# Patient Record
Sex: FEMALE | Race: WHITE | NOT HISPANIC OR LATINO | Employment: OTHER | URBAN - METROPOLITAN AREA
[De-identification: names, ages, dates, MRNs, and addresses within clinical notes are randomized per-mention and may not be internally consistent; named-entity substitution may affect disease eponyms.]

---

## 2017-03-01 ENCOUNTER — TELEPHONE (OUTPATIENT)
Dept: CARDIOLOGY | Facility: CLINIC | Age: 82
End: 2017-03-01

## 2017-03-01 NOTE — TELEPHONE ENCOUNTER
Patient called and asked that her notes from Dr. Mandel and Dr. Castellano be faxed over to Dr. Mast's office. She also requested her last carotid ultrasound and last echo be faxed also. Records faxed to Dr. Mast at [phone].

## 2017-03-15 PROBLEM — R00.1 SYMPTOMATIC BRADYCARDIA: Status: ACTIVE | Noted: 2017-03-15

## 2017-11-13 ENCOUNTER — TELEPHONE (OUTPATIENT)
Dept: PULMONOLOGY | Facility: CLINIC | Age: 82
End: 2017-11-13

## 2017-11-13 NOTE — TELEPHONE ENCOUNTER
----- Message from Dayanna Everett MA sent at 11/13/2017 11:59 AM CST -----  Contact: Pt   Isiah,  This is something you can do. The pt needs to be scheduled with any provider in a new pt spot b/c its been more than 3 years  Thanks  ----- Message -----  From: Yadira Mosley MA  Sent: 11/13/2017  11:46 AM  To: Dayanna Everett MA        ----- Message -----  From: Brennon Dominguez  Sent: 11/13/2017  11:43 AM  [facility]    Pt is calling to schedule Lung rehab stated that she was seeing a rehab specialist at [facility] but is now closer to [facility] not sure who to schedule with     Pt can be reached [phone] Pt will be stepping out please leave detailed message with call back extension

## 2017-11-13 NOTE — TELEPHONE ENCOUNTER
Reffered pt to call [facility] for pulm rehab. Pt stated that she understood and agreed verbal read back.

## 2019-05-22 ENCOUNTER — HOSPITAL ENCOUNTER (INPATIENT)
Facility: HOSPITAL | Age: 84
LOS: 9 days | Discharge: HOME HEALTH | DRG: 199 | End: 2019-05-31
Attending: EMERGENCY MEDICINE | Admitting: HOSPITALIST
Payer: MEDICARE

## 2019-05-22 DIAGNOSIS — R06.02 SHORTNESS OF BREATH: ICD-10-CM

## 2019-05-22 DIAGNOSIS — J44.9 CHRONIC OBSTRUCTIVE PULMONARY DISEASE, UNSPECIFIED COPD TYPE: ICD-10-CM

## 2019-05-22 DIAGNOSIS — R79.89 ELEVATED BRAIN NATRIURETIC PEPTIDE (BNP) LEVEL: ICD-10-CM

## 2019-05-22 DIAGNOSIS — Z96.89 CHEST TUBE IN PLACE: ICD-10-CM

## 2019-05-22 DIAGNOSIS — R07.9 CHEST PAIN: ICD-10-CM

## 2019-05-22 DIAGNOSIS — J93.83 SPONTANEOUS PNEUMOTHORAX: Primary | ICD-10-CM

## 2019-05-22 DIAGNOSIS — Z46.82 ENCOUNTER FOR CHEST TUBE PLACEMENT: ICD-10-CM

## 2019-05-22 DIAGNOSIS — J93.9 PNEUMOTHORAX, RIGHT: ICD-10-CM

## 2019-05-22 PROBLEM — R53.81 DEBILITY: Status: ACTIVE | Noted: 2019-05-22

## 2019-05-22 PROBLEM — Z66 DNR (DO NOT RESUSCITATE): Status: ACTIVE | Noted: 2019-05-22

## 2019-05-22 LAB
ALBUMIN SERPL BCP-MCNC: 3.8 G/DL (ref 3.5–5.2)
ALLENS TEST: ABNORMAL
ANION GAP SERPL CALC-SCNC: 11 MMOL/L (ref 8–16)
BASOPHILS # BLD AUTO: 0.06 K/UL (ref 0–0.2)
BASOPHILS NFR BLD: 0.5 % (ref 0–1.9)
BNP SERPL-MCNC: 1379 PG/ML (ref 0–99)
BUN SERPL-MCNC: 30 MG/DL (ref 8–23)
CALCIUM SERPL-MCNC: 10.8 MG/DL (ref 8.7–10.5)
CHLORIDE SERPL-SCNC: 92 MMOL/L (ref 95–110)
CO2 SERPL-SCNC: 31 MMOL/L (ref 23–29)
CREAT SERPL-MCNC: 1 MG/DL (ref 0.5–1.4)
DIFFERENTIAL METHOD: ABNORMAL
EOSINOPHIL # BLD AUTO: 0 K/UL (ref 0–0.5)
EOSINOPHIL NFR BLD: 0.3 % (ref 0–8)
ERYTHROCYTE [DISTWIDTH] IN BLOOD BY AUTOMATED COUNT: 13.4 % (ref 11.5–14.5)
EST. GFR  (AFRICAN AMERICAN): 57.3 ML/MIN/1.73 M^2
EST. GFR  (NON AFRICAN AMERICAN): 49.7 ML/MIN/1.73 M^2
GLUCOSE SERPL-MCNC: 118 MG/DL (ref 70–110)
HCO3 UR-SCNC: 36.1 MMOL/L (ref 24–28)
HCT VFR BLD AUTO: 41.4 % (ref 37–48.5)
HGB BLD-MCNC: 13.5 G/DL (ref 12–16)
IMM GRANULOCYTES # BLD AUTO: 0.07 K/UL (ref 0–0.04)
IMM GRANULOCYTES NFR BLD AUTO: 0.6 % (ref 0–0.5)
LYMPHOCYTES # BLD AUTO: 1.1 K/UL (ref 1–4.8)
LYMPHOCYTES NFR BLD: 9.7 % (ref 18–48)
MAGNESIUM SERPL-MCNC: 2.3 MG/DL (ref 1.6–2.6)
MCH RBC QN AUTO: 31.4 PG (ref 27–31)
MCHC RBC AUTO-ENTMCNC: 32.6 G/DL (ref 32–36)
MCV RBC AUTO: 96 FL (ref 82–98)
MONOCYTES # BLD AUTO: 1.1 K/UL (ref 0.3–1)
MONOCYTES NFR BLD: 9.4 % (ref 4–15)
NEUTROPHILS # BLD AUTO: 9.2 K/UL (ref 1.8–7.7)
NEUTROPHILS NFR BLD: 79.5 % (ref 38–73)
NRBC BLD-RTO: 0 /100 WBC
PCO2 BLDA: 70 MMHG (ref 35–45)
PH SMN: 7.32 [PH] (ref 7.35–7.45)
PLATELET # BLD AUTO: 182 K/UL (ref 150–350)
PMV BLD AUTO: 10.1 FL (ref 9.2–12.9)
PO2 BLDA: 59 MMHG (ref 40–60)
POC BE: 10 MMOL/L
POC SATURATED O2: 87 % (ref 95–100)
POC TCO2: 38 MMOL/L (ref 24–29)
POTASSIUM SERPL-SCNC: 4.5 MMOL/L (ref 3.5–5.1)
RBC # BLD AUTO: 4.3 M/UL (ref 4–5.4)
SAMPLE: ABNORMAL
SITE: ABNORMAL
SODIUM SERPL-SCNC: 134 MMOL/L (ref 136–145)
WBC # BLD AUTO: 11.58 K/UL (ref 3.9–12.7)

## 2019-05-22 PROCEDURE — 32551 PR TUBE THORACOSTOMY INCLUDES WATER SEAL: ICD-10-PCS | Mod: ,,, | Performed by: EMERGENCY MEDICINE

## 2019-05-22 PROCEDURE — 99223 PR INITIAL HOSPITAL CARE,LEVL III: ICD-10-PCS | Mod: AI,GC,, | Performed by: HOSPITALIST

## 2019-05-22 PROCEDURE — 93005 ELECTROCARDIOGRAM TRACING: CPT

## 2019-05-22 PROCEDURE — 20600001 HC STEP DOWN PRIVATE ROOM

## 2019-05-22 PROCEDURE — 82040 ASSAY OF SERUM ALBUMIN: CPT

## 2019-05-22 PROCEDURE — 99291 CRITICAL CARE FIRST HOUR: CPT | Mod: 25

## 2019-05-22 PROCEDURE — 93010 ELECTROCARDIOGRAM REPORT: CPT | Mod: ,,, | Performed by: INTERNAL MEDICINE

## 2019-05-22 PROCEDURE — 83880 ASSAY OF NATRIURETIC PEPTIDE: CPT

## 2019-05-22 PROCEDURE — 80048 BASIC METABOLIC PNL TOTAL CA: CPT

## 2019-05-22 PROCEDURE — 93010 EKG 12-LEAD: ICD-10-PCS | Mod: ,,, | Performed by: INTERNAL MEDICINE

## 2019-05-22 PROCEDURE — 99291 CRITICAL CARE FIRST HOUR: CPT | Mod: 25,,, | Performed by: EMERGENCY MEDICINE

## 2019-05-22 PROCEDURE — 63600175 PHARM REV CODE 636 W HCPCS: Performed by: STUDENT IN AN ORGANIZED HEALTH CARE EDUCATION/TRAINING PROGRAM

## 2019-05-22 PROCEDURE — 85025 COMPLETE CBC W/AUTO DIFF WBC: CPT

## 2019-05-22 PROCEDURE — 32551 INSERTION OF CHEST TUBE: CPT | Mod: ,,, | Performed by: EMERGENCY MEDICINE

## 2019-05-22 PROCEDURE — 99223 1ST HOSP IP/OBS HIGH 75: CPT | Mod: AI,GC,, | Performed by: HOSPITALIST

## 2019-05-22 PROCEDURE — 96374 THER/PROPH/DIAG INJ IV PUSH: CPT

## 2019-05-22 PROCEDURE — 99291 PR CRITICAL CARE, E/M 30-74 MINUTES: ICD-10-PCS | Mod: 25,,, | Performed by: EMERGENCY MEDICINE

## 2019-05-22 PROCEDURE — 63600175 PHARM REV CODE 636 W HCPCS: Performed by: EMERGENCY MEDICINE

## 2019-05-22 PROCEDURE — 83735 ASSAY OF MAGNESIUM: CPT

## 2019-05-22 PROCEDURE — 32551 INSERTION OF CHEST TUBE: CPT | Mod: 59

## 2019-05-22 RX ORDER — AMLODIPINE BESYLATE 2.5 MG/1
2.5 TABLET ORAL NIGHTLY PRN
Status: DISCONTINUED | OUTPATIENT
Start: 2019-05-22 | End: 2019-05-31 | Stop reason: HOSPADM

## 2019-05-22 RX ORDER — GLUCAGON 1 MG
1 KIT INJECTION
Status: DISCONTINUED | OUTPATIENT
Start: 2019-05-22 | End: 2019-05-31 | Stop reason: HOSPADM

## 2019-05-22 RX ORDER — SODIUM CHLORIDE 0.9 % (FLUSH) 0.9 %
10 SYRINGE (ML) INJECTION
Status: DISCONTINUED | OUTPATIENT
Start: 2019-05-22 | End: 2019-05-31 | Stop reason: HOSPADM

## 2019-05-22 RX ORDER — IBUPROFEN 200 MG
24 TABLET ORAL
Status: DISCONTINUED | OUTPATIENT
Start: 2019-05-22 | End: 2019-05-31 | Stop reason: HOSPADM

## 2019-05-22 RX ORDER — IPRATROPIUM BROMIDE AND ALBUTEROL SULFATE 2.5; .5 MG/3ML; MG/3ML
3 SOLUTION RESPIRATORY (INHALATION)
Status: DISCONTINUED | OUTPATIENT
Start: 2019-05-22 | End: 2019-05-22

## 2019-05-22 RX ORDER — TIOTROPIUM BROMIDE 18 UG/1
18 CAPSULE ORAL; RESPIRATORY (INHALATION) DAILY
Status: DISCONTINUED | OUTPATIENT
Start: 2019-05-23 | End: 2019-05-31 | Stop reason: HOSPADM

## 2019-05-22 RX ORDER — IBUPROFEN 200 MG
16 TABLET ORAL
Status: DISCONTINUED | OUTPATIENT
Start: 2019-05-22 | End: 2019-05-31 | Stop reason: HOSPADM

## 2019-05-22 RX ORDER — ALBUTEROL SULFATE 90 UG/1
1 AEROSOL, METERED RESPIRATORY (INHALATION) EVERY 6 HOURS PRN
Status: DISCONTINUED | OUTPATIENT
Start: 2019-05-22 | End: 2019-05-31 | Stop reason: HOSPADM

## 2019-05-22 RX ORDER — FUROSEMIDE 10 MG/ML
40 INJECTION INTRAMUSCULAR; INTRAVENOUS DAILY
Status: DISCONTINUED | OUTPATIENT
Start: 2019-05-22 | End: 2019-05-24

## 2019-05-22 RX ORDER — MAGNESIUM SULFATE HEPTAHYDRATE 40 MG/ML
2 INJECTION, SOLUTION INTRAVENOUS
Status: DISCONTINUED | OUTPATIENT
Start: 2019-05-22 | End: 2019-05-22

## 2019-05-22 RX ORDER — AMLODIPINE BESYLATE 2.5 MG/1
2.5 TABLET ORAL NIGHTLY PRN
Refills: 3 | Status: ON HOLD | COMMUNITY
Start: 2019-04-06 | End: 2019-08-09 | Stop reason: SDUPTHER

## 2019-05-22 RX ORDER — ENOXAPARIN SODIUM 100 MG/ML
40 INJECTION SUBCUTANEOUS EVERY 24 HOURS
Status: DISCONTINUED | OUTPATIENT
Start: 2019-05-22 | End: 2019-05-31 | Stop reason: HOSPADM

## 2019-05-22 RX ORDER — FENTANYL CITRATE 50 UG/ML
50 INJECTION, SOLUTION INTRAMUSCULAR; INTRAVENOUS
Status: COMPLETED | OUTPATIENT
Start: 2019-05-22 | End: 2019-05-22

## 2019-05-22 RX ORDER — FUROSEMIDE 20 MG/1
20 TABLET ORAL DAILY
Status: ON HOLD | COMMUNITY
End: 2019-05-31 | Stop reason: HOSPADM

## 2019-05-22 RX ADMIN — ENOXAPARIN SODIUM 40 MG: 100 INJECTION SUBCUTANEOUS at 07:05

## 2019-05-22 RX ADMIN — FUROSEMIDE 40 MG: 10 INJECTION, SOLUTION INTRAMUSCULAR; INTRAVENOUS at 07:05

## 2019-05-22 RX ADMIN — FENTANYL CITRATE 50 MCG: 50 INJECTION INTRAMUSCULAR; INTRAVENOUS at 09:05

## 2019-05-22 NOTE — H&P
[facility] Hospital Medicine  History & Physical    Patient Name: Venus Mayer  MRN: [id number]  Admission Date: 5/22/2019  Attending Physician: Cherry Mar MD   Primary Care Provider: Jona Che MD    [facility] Medicine Team: [facility] HOSP MED 2 Shane Jo MD     Patient information was obtained from patient, relative(s), past medical records and ER records.     Subjective:     Principal Problem:Spontaneous pneumothorax    Chief Complaint:   Chief Complaint   Patient presents with    Shortness of Breath     hx of copd 3L home oxygen. On arrival of ems 78% on 3L.         HPI: Venus Mayer is a 89 year old female with a medical history significant for HLD, HTN, COPD (on home O2 3L), HOCM s/p AICD, carotid artery stenosis s/p L CEA 2008 and prior L sided spontaneous pneumothorax who presents to [facility] for evaluation of acute onset shortness of breath. Pt states that she developed acute shortness of breath yesterday afternoon around 3PM. Pt states that she took her rescue inhalers, nebulized saline and an allerga without any improvement. Pt states that she tried to go to sleep but was unable due to dyspnea. Pt states that this am one of her children felt that as she was not improving they should call 911. On arrival to ED, pt had SpO2 of 78%. CXR in ED showed a large right pneumothorax with marked compressive atelectasis of the right lung. A right sided pleural catheter was placed in ED with immediate resolution of shortness of breath. Repeat CXR showed a decrease in the volume of pneumothorax following catheter placement, with partial re-expansion of the right lung. Oxygen saturation increased to 88-93% on home oxygen requirement.     Pt denies any chest pain. Pt endorses chronic shortness of breath that acute worsened yesterday but has since resolved. Pt endorses a recent sinus infection that was treated with antibiotics. Pt denies N/V, fever/chills, change in bowel or bladder  habits, HA or focal/general weakness.    Pt rece[facility] majority of [facility] at [facility].    Past Medical History:   Diagnosis Date    Cardiomyopathy     Carotid artery occlusion     Hyperlipidemia     Hypertension        Past Surgical History:   Procedure Laterality Date    CARDIAC CATHETERIZATION      CAROTID ENDARTERECTOMY         Review of patient's allergies indicates:   Allergen Reactions    Sulfamethoxazole-trimethoprim      Other reaction(s): Rash       No current facility-administered medications on file prior to encounter.      Current Outpatient Medications on File Prior to Encounter   Medication Sig    albuterol (PROAIR HFA) 90 mcg/actuation inhaler Inhale 1 puff into the lungs every 6 (six) hours as needed for Wheezing. Rescue    amLODIPine (NORVASC) 2.5 MG tablet Take 2.5 mg by mouth nightly as needed for SBP greater than. SBP greater than 155    aspirin 81 mg Tab Take by mouth. 1 Tablet Oral Every day    tiotropium (SPIRIVA) 18 mcg inhalation capsule Inhale 18 mcg into the lungs once daily.      [DISCONTINUED] hydrochlorothiazide (HYDRODIURIL) 12.5 MG Tab Take 12.5 mg by mouth. Pt is taking medication on Monday/wednesday and friday     [DISCONTINUED] losartan (COZAAR) 100 MG tablet Take 100 mg by mouth once daily.      [DISCONTINUED] multivitamin-minerals-lutein (CENTRUM SILVER) Tab Take by mouth. 1 Tablet Oral Every day    [DISCONTINUED] omega-3 fatty acids-vitamin E (FISH OIL) 1,000 mg Cap Take by mouth. 1 Capsule Oral Every day    [DISCONTINUED] potassium chloride (MICRO-K) 10 MEQ CpSR Take 10 mEq by mouth once daily.    [DISCONTINUED] predniSONE (DELTASONE) 20 MG tablet Take 20 mg by mouth once daily.    albuterol (PROVENTIL/VENTOLIN) 90 mcg/actuation inhaler Inhale 2 puffs into the lungs every 6 (six) hours as needed for Wheezing or Shortness of Breath (cough).     Family History     Problem Relation (Age of Onset)    Hypertension Mother        Tobacco Use     Smoking status: Former Smoker     Packs/day: 2.00     Years: 20.00     Pack years: 40.00     Types: Cigarettes     Last attempt to quit: 1980     Years since quittin.3   Substance and Sexual Activity    Alcohol use: Yes     Alcohol/week: 1.2 oz     Types: 2 Glasses of wine per week     Comment: social    Drug use: No    Sexual activity: Not on file     Review of Systems   Constitutional: Negative for activity change, appetite change, chills, fatigue and fever.   HENT: Negative for sneezing and sore throat.    Eyes: Negative for photophobia and visual disturbance.   Respiratory: Positive for cough and shortness of breath. Negative for chest tightness and wheezing.    Cardiovascular: Positive for leg swelling. Negative for chest pain and palpitations.   Gastrointestinal: Negative for abdominal distention, abdominal pain, diarrhea, nausea and vomiting.   Genitourinary: Negative for difficulty urinating and dysuria.   Musculoskeletal: Negative for arthralgias, back pain, neck pain and neck stiffness.   Skin: Positive for wound (chest tube in place). Negative for pallor.   Neurological: Negative for dizziness, tremors, speech difficulty, weakness and headaches.   Psychiatric/Behavioral: Negative for confusion. The patient is not nervous/anxious.      Objective:     Vital Signs (Most Recent):  Temp: 96.8 °F (36 °C) (19 0850)  Pulse: 92 (19 1437)  Resp: (!) 30 (19 0850)  BP: (!) 167/76 (19 1431)  SpO2: 96 % (19 1437) Vital Signs (24h Range):  Temp:  [96.8 °F (36 °C)] 96.8 °F (36 °C)  Pulse:  [] 92  Resp:  [30] 30  SpO2:  [91 %-100 %] 96 %  BP: (128-212)/() 167/76     Weight: 54.4 kg (120 lb)  Body mass index is 20.6 kg/m².    Physical Exam   Constitutional: She is oriented to person, place, and time. She appears well-developed and well-nourished. No distress.   HENT:   Head: Normocephalic and atraumatic.   Eyes: Pupils are equal, round, and reactive to light. EOM are  normal.   Neck: Normal range of motion. Neck supple. No JVD present.   Cardiovascular: Normal rate, regular rhythm and intact distal pulses.   AICD present left chest   Pulmonary/Chest: Effort normal. No respiratory distress. She has no wheezes. She exhibits no tenderness.   Slight decrease in right sided breath sounds. Chest tube present on right side   Abdominal: Soft. Bowel sounds are normal. There is no tenderness.   Musculoskeletal: Normal range of motion.   Neurological: She is alert and oriented to person, place, and time. No cranial nerve deficit.   Skin: Skin is warm and dry. She is not diaphoretic.   Nursing note and vitals reviewed.     Significant Labs:   ABGs:   Recent Labs   Lab 05/22/19  0900   PH 7.321*   PCO2 70.0*   HCO3 36.1*   POCSATURATED 87*   BE 10     CBC:   Recent Labs   Lab 05/22/19  0857   WBC 11.58   HGB 13.5   HCT 41.4        CMP:   Recent Labs   Lab 05/22/19  0857   *   K 4.5   CL 92*   CO2 31*   *   BUN 30*   CREATININE 1.0   CALCIUM 10.8*   ALBUMIN 3.8   ANIONGAP 11   EGFRNONAA 49.7*     All pertinent labs within the past 24 hours have been reviewed.    Significant Imaging: I have reviewed all pertinent imaging results/findings within the past 24 hours.     CXR 5/22/19 0905  Large right pneumothorax with marked compressive atelectasis of the right lung, obviously representing a significant detrimental interval change since 07/15/2013.    CXR 5/22/19 0953  Right-sided pleural catheter is now seen.  Volume of pneumothorax on the right has decreased since 05/22/2019 at 09:01, following this catheter placement, with partial re-expansion of the right lung also appreciated.  No detrimental interval change in the appearance of the chest since that time is noted.    CXR 5/22/19 1025  Left chest wall pacemaker is seen with transvenous lead wires extending to the myocardium. Right-sided chest tube is noted, which appears slightly reposition and overlying the mid aspect of  the right hemithorax. Unchanged radiographic appearance of the lungs.  Small pneumothorax is present along the lateral lower aspect of the right hemithorax.  Cardiac silhouette is unchanged.  Atherosclerotic calcification is noted at the level of the aortic arch.    Assessment/Plan:     * Spontaneous pneumothorax  - 89 year old female with preexisting lung disease (COPD) and prior spontaneous PTX presenting with acute onset SOB not responsive to COPD medications or rescue inhalers  - CXR shows large R PTX now s/p chest tube placement with resolution of SOB and radiographic improvement  - Continue to monitor  - Admit to hospital medicine for further work up       Debility  - Hip fracture 2 months ago  - works with OP PT/OT  - Continue PT/OT      DNR (do not resuscitate)  - Conversation held between medical team, pt and family at bedside concerning code status.   - Pt states that per her living will, she is DNR  - Family agrees with patient decision   - [facility] DNR form signed and placed into chart       COPD (chronic obstructive pulmonary disease)  - ?COPD vs ADHF   - Continue home spiriva  - Albuterol rescue inahler PRN for wheezing  - Oxygen as needed to maintain sp02 >88%  - Monitor respiratory status for any acute change       Heart failure, diastolic  - ?ADHF vs COPD  - BNP 1300  - LE pitting edema on exam, no crackles of elevated JVD  - Lasix 20 PO daily at home  - Will give lasix 40 IV daily and fluid restrict   - No recent echo on file, will repeat      HTN (hypertension)  - Per pt records, pt takes Amlodipine 2.5mg at bed if SBP>155  - Hypertensive on arrival, resolved with resolution of SOB  - Amlodipine 2.5mg nightly prn as prescribed       HOCM (hypertrophic obstructive cardiomyopathy): Apical HCM  - AICD in placed, follows with cardiology at [facility]       VTE Risk Mitigation (From admission, onward)        Ordered     enoxaparin injection 40 mg  Daily      05/22/19 1156     IP VTE HIGH RISK  PATIENT  Once      05/22/19 1156     Place sequential compression device  Until discontinued      05/22/19 1100             Shane Jo MD  Department of Hospital Medicine   [facility] [address]

## 2019-05-22 NOTE — ED PROVIDER NOTES
Encounter Date: 2019       History     Chief Complaint   Patient presents with    Shortness of Breath     hx of copd 3L home oxygen. On arrival of ems 78% on 3L.      89-year-old female with past medical history of hyperlipidemia, CAD, hypertension, cardiomyopathy, and COPD (3L supplemental O2 at home) who presents to the ED with complaint of shortness of breath.  Patient states that around 3:00 PM yesterday, she suddenly developed acute SOB. She contributed her symptoms to allergies and took an Allegra, which exacerbated her SOB. Per EMS, she uses 3L of home O2 and had SpO2 of 78% upon their arrival. She has associated mild lower chest tightness, which she contributes to her SOB and accessory muscle use. Denies cough, fevers, chills, diaphoresis, numbness, weakness, congestion, sinus pressure, sneezing, abdominal pain, n/v, or any other medical complaints.    A ten point review of systems was completed and is negative except as documented above.  Patient denies any other acute medical complaint.  The patients available PMH, PSH, Social History, medications, allergies, and triage vital signs were reviewed just prior to their medical evaluation.    The history is provided by the patient.     Review of patient's allergies indicates:   Allergen Reactions    Sulfamethoxazole-trimethoprim      Other reaction(s): Rash     Past Medical History:   Diagnosis Date    Cardiomyopathy     Carotid artery occlusion     Hyperlipidemia     Hypertension      Past Surgical History:   Procedure Laterality Date    CARDIAC CATHETERIZATION      CAROTID ENDARTERECTOMY       Family History   Problem Relation Age of Onset    Hypertension Mother      Social History     Tobacco Use    Smoking status: Former Smoker     Packs/day: 2.00     Years: 20.00     Pack years: 40.00     Types: Cigarettes     Last attempt to quit: 1980     Years since quittin.3   Substance Use Topics    Alcohol use: Yes     Alcohol/week: 1.2 oz      Types: 2 Glasses of wine per week     Comment: social    Drug use: No     Review of Systems   Constitutional: Negative for chills and fever.   HENT: Negative for congestion, sinus pressure, sinus pain and sore throat.    Eyes: Negative for visual disturbance.   Respiratory: Positive for shortness of breath. Negative for cough and wheezing.    Cardiovascular: Positive for chest pain.   Gastrointestinal: Negative for abdominal pain, nausea and vomiting.   Genitourinary: Negative for dysuria.   Musculoskeletal: Negative for back pain.   Skin: Negative for rash.   Neurological: Negative for dizziness, weakness, numbness and headaches.   Hematological: Does not bruise/bleed easily.   All other systems reviewed and are negative.      Physical Exam     Initial Vitals [05/22/19 0850]   BP Pulse Resp Temp SpO2   (!) 186/120 (!) 115 (!) 30 96.8 °F (36 °C) (!) 93 %      MAP       --         Physical Exam    Nursing note and vitals reviewed.  Constitutional: She appears well-developed and well-nourished. She is not diaphoretic. She appears distressed.   Pt is obvious respiratory distress with accessory muscle use.    HENT:   Head: Normocephalic and atraumatic.   Nose: Nose normal.   Eyes: Conjunctivae are normal. Right eye exhibits no discharge. Left eye exhibits no discharge.   Neck: Normal range of motion. Neck supple.   Cardiovascular: Regular rhythm and normal heart sounds. Tachycardia present.  Exam reveals no gallop and no friction rub.    No murmur heard.  Pulmonary/Chest: She is in respiratory distress. She has no wheezes. She has no rhonchi. She has no rales. She exhibits no tenderness.   Absence of right lung sounds. Left lung sounds clear without wheeze.    Abdominal: Soft. There is no tenderness. There is no rebound and no guarding.   Musculoskeletal: Normal range of motion. She exhibits no edema or tenderness.   Neurological: She is alert and oriented to person, place, and time. GCS score is 15. GCS eye  "subscore is 4. GCS verbal subscore is 5. GCS motor subscore is 6.   Skin: Skin is warm. No rash noted. No erythema.   Psychiatric: She has a normal mood and affect. Her behavior is normal. Judgment and thought content normal.         ED Course   Chest Tube  Date/Time: 2019 9:50 AM  Location procedure was performed: [facility] EMERGENCY DEPARTMENT  Performed by: Edgardo Monae MD  Authorized by: Edgardo Monae MD   Assisting provider: Edgardo Monae MD  Post-operative diagnosis: pneumothorax  Pre-operative diagnosis: pneumothorax  Consent Done: Yes  Consent: Verbal consent obtained. Written consent obtained.  Risks and benefits: risks, benefits and alternatives were discussed  Consent given by: patient  Patient understanding: patient states understanding of the procedure being performed  Patient consent: the patient's understanding of the procedure matches consent given  Procedure consent: procedure consent matches procedure scheduled  Test results: test results available and properly labeled  Site marked: the operative site was marked  Imaging studies: imaging studies available  Patient identity confirmed: name,  and verbally with patient  Time out: Immediately prior to procedure a "time out" was called to verify the correct patient, procedure, equipment, support staff and site/side marked as required.  Indications: pneumothorax  Patient sedated: yes  Sedation type: anxiolysis  (See MAR for exact dosages of medications).  Sedatives: fentanyl  Analgesia: fentanyl  Anesthesia: local infiltration    Anesthesia:  Local Anesthetic: lidocaine 1% without epinephrine  Anesthetic total: 15 mL  Preparation: skin prepped with ChloraPrep  Placement location: right lateral  Scalpel size: 11  Ultrasound guidance: no  Tension pneumothorax heard: no  Tube connected to: water seal  Drainage amount: 0 ml  Suture material: 0 silk  Dressing: 4x4 sterile gauze and petrolatum-impregnated gauze  Post-insertion x-ray " findings: tube in good position  Patient tolerance: Patient tolerated the procedure well with no immediate complications  Complications: No  Estimated blood loss (mL): 0  Specimens: No  Implants: No  Comments: Local infiltration with pleural block provided good analgesia.  Pig tail cath placed without issue.  Pulled back 2cm after confirmatory CXR.  No complications or issues        Labs Reviewed   BASIC METABOLIC PANEL - Abnormal; Notable for the following components:       Result Value    Sodium 134 (*)     Chloride 92 (*)     CO2 31 (*)     Glucose 118 (*)     BUN, Bld 30 (*)     Calcium 10.8 (*)     eGFR if  57.3 (*)     eGFR if non  49.7 (*)     All other components within normal limits   CBC W/ AUTO DIFFERENTIAL - Abnormal; Notable for the following components:    Mean Corpuscular Hemoglobin 31.4 (*)     Immature Granulocytes 0.6 (*)     Gran # (ANC) 9.2 (*)     Immature Grans (Abs) 0.07 (*)     Mono # 1.1 (*)     Gran% 79.5 (*)     Lymph% 9.7 (*)     All other components within normal limits   ISTAT PROCEDURE - Abnormal; Notable for the following components:    POC PH 7.321 (*)     POC PCO2 70.0 (*)     POC HCO3 36.1 (*)     POC SATURATED O2 87 (*)     POC TCO2 38 (*)     All other components within normal limits   MAGNESIUM     EKG Readings: (Independently Interpreted)   Initial Reading: No STEMI. Rhythm: Sinus Tachycardia. Heart Rate: 130. Ectopy: No Ectopy. Conduction: RBBB. ST Segments: Normal ST Segments. T Waves: Normal.   biatrial enlargement       Imaging Results          X-Ray Chest AP Portable (Final result)  Result time 05/22/19 10:32:34    Final result by Edmund Hebert MD (05/22/19 10:32:34)                 Impression:      Interval repositioning of the right chest tube.  Small pneumothorax noted along the lateral lower aspect of the right hemithorax, which appears improved in comparison to prior exam.      Electronically signed by: Edmund  Emilee  Date:    05/22/2019  Time:    10:32             Narrative:    EXAMINATION:  XR CHEST AP PORTABLE    CLINICAL HISTORY:  Presence of other specified functional implants    TECHNIQUE:  Single frontal view of the chest was performed.    COMPARISON:  Radiograph 05/22/2019.    FINDINGS:  Left chest wall pacemaker is seen with transvenous lead wires extending to the myocardium.  Right-sided chest tube is noted, which appears slightly reposition and overlying the mid aspect of the right hemithorax.    Unchanged radiographic appearance of the lungs.  Small pneumothorax is present along the lateral lower aspect of the right hemithorax.  Cardiac silhouette is unchanged.  Atherosclerotic calcification is noted at the level of the aortic arch.                               X-Ray Chest AP Portable (Final result)  Result time 05/22/19 10:01:10    Final result by Thor Murrell MD (05/22/19 10:01:10)                 Impression:      As above      Electronically signed by: Thor Murrell MD  Date:    05/22/2019  Time:    10:01             Narrative:    EXAMINATION:  XR CHEST AP PORTABLE    COMPARISON:  Comparison is made to 05/22/2019 at 09:01.    FINDINGS:  Right-sided pleural catheter is now seen.  Volume of pneumothorax on the right has decreased since 05/22/2019 at 09:01, following this catheter placement, with partial re-expansion of the right lung also appreciated.  No detrimental interval change in the appearance of the chest since that time is noted.                               X-Ray Chest AP Portable (Final result)  Result time 05/22/19 09:18:11    Final result by Thor Murrell MD (05/22/19 09:18:11)                 Impression:      Large right pneumothorax with marked compressive atelectasis of the right lung, obviously representing a significant detrimental interval change since 07/15/2013.    The results of this examination were communicated to Dr. Monae on 05/22/2019 at 09:12 a.m., immediately upon discovery of the  abnormality.  He was already aware of the finding,      Electronically signed by: Thor Murrell MD  Date:    05/22/2019  Time:    09:18             Narrative:    EXAMINATION:  XR CHEST AP PORTABLE    COMPARISON:  Comparison is made to the only available prior chest radiograph, dated 07/15/2013.    FINDINGS:  A large right pneumothorax is present, with severe associated compressive atelectasis of the right lung.  No significant degree of mediastinal shift is present.  The heart is not significantly enlarged, and there is no significant detriment interval change in the appearance of the cardiomediastinal silhouette since the examination referenced above.  Left lung is adequately aerated and free of significant airspace consolidation or volume loss.  No pleural fluid is seen on either side.  No pneumothorax on the left.  Cardiac pacemaker is again noted, as is prominent calcification in the wall of the aortic arch.                              X-Rays:   Independently Interpreted Readings:   Other Readings:  Initial CXR:  Right PNX    Second CXR:  Chest tube with improved PNX, a little deep    Third CXR:  Improved PNX and Chest tube placement    Medical Decision Making:   History:   I obtained history from: someone other than patient and EMS provider.  Old Medical Records: I decided to obtain old medical records.  Old Records Summarized: records from clinic visits.  Initial Assessment:   89-year-old female with past medical history of hyperlipidemia, CAD, hypertension, cardiomyopathy, and COPD who presents to the ED with complaint of SOB that be began suddenly yesterday at 3 PM. Increased oxygen demand, spo2 78% upon EMS arrival with patient on 3 L of supplemental O2. Pt tachycardic, tachypneic, and hypoxic upon arrival to ED. Pt is obvious respiratory distress with accessory muscle use. Abscence of right sided lung sounds.   Differential Diagnosis:   DDx includes but is not limited to spontaneous pneumothorax, COPD  exacerbation, PE, ACS, pneumonia.   Independently Interpreted Test(s):   I have ordered and independently interpreted X-rays - see prior notes.  I have ordered and independently interpreted EKG Reading(s) - see prior notes  Clinical Tests:   Lab Tests: Ordered and Reviewed  Radiological Study: Ordered and Reviewed  Medical Tests: Ordered and Reviewed  ED Management:  Pt's presentation consistent with spontaneous pneumothorax. Will obtain portable CXR STAT and VBG. Will also check basic labs, mag, and EKG.     9:05 AM Portable CXR independently interpreted by my supervising physician. Large right sided pneumothorax visualized with compressive atelectasis.     9:49 AM Chest tube successfully placed, without complications. See procedure note for details. Pt tolerated procedure well. Pt's vital signs improved with HR 83 and SpO2 95-96%. Pt appears comfortable and is no longer in respiratory distress. Post procedural CXR with re-expansion of lungs and confirming proper placement of tube.     9:59 AM Discussed with CTS, who will come evaluate the patient in the ED.     BUN 30, Cr 1.0. Calcium 10.8, Sodium 134, Cl 92, CO2 31. No leukocytosis or anemia on CBC.     10:15 AM: Discussed with Hospital Medicine, who will admit the patient to their service for further management of pneumothorax. Pt resting comfortably on 5 L of O2 NC.     I have discussed the treatment and management of this patient with my supervising physician, and we agree on the plan of care.      Rafaela Gleason PA-C      .  Other:   I have discussed this case with another health care provider.       <> Summary of the Discussion: CTS  Thoracic, IM              Attending Attestation:     Physician Attestation Statement for NP/PA:   I have conducted a face to face encounter with this patient in addition to the NP/PA, due to Medical Complexity    Other NP/PA Attestation Additions:      Medical Decision Makin-year-old female with severe COPD presents with  spontaneous right-sided pneumothorax.  Suspect lab rupture.  Placed pigtail catheter.  Patient vastly improved.  Transition from facemask to nasal cannula.  Re-evaluated multiple times.  Discussed with patient family at bedside.  All questions answered.  They knowledge understanding.  Admitted to Internal Medicine with thoracic surgery consult.   Procedure Note: See procedure note    Attending Critical Care:   Critical Care Times:   Direct Patient Care (initial evaluation, reassessments, and time considering the case)................................................................15 minutes.   Additional History from reviewing old medical records or taking additional history from the family, EMS, PCP, etc.......................5 minutes.   Ordering, Reviewing, and Interpreting Diagnostic Studies...............................................................................................................5 minutes.   Documentation..................................................................................................................................................................................5 minutes.   Consultation with other Physicians. .................................................................................................................................................5 minutes.   Consultation with the patient's family directly relating to the patient's condition, care, and DNR status (when patient unable)......5 minutes.   ==============================================================  · Total Critical Care Time - exclusive of procedural time: 40 minutes.  ==============================================================  Critical care reasons: respiratory distress from pneumothorax.   The following critical care procedures were done by me (see procedure notes): pulse oximetry and chest tube placement.   Critical care was time spent personally by me on the following activities: obtaining  history from patient or relative, examination of patient, ordering lab, x-rays, and/or EKG, development of treatment plan with patient or relative, ordering and performing treatments and interventions, evaluation of patient's response to treatment, discussion with consultants, interpretation of cardiac measurements, re-evaluation of patient's conition and end of life discussions.   Critical Care Condition: potentially life-threatening                  Clinical Impression:       ICD-10-CM ICD-9-CM   1. Spontaneous pneumothorax J93.83 512.89   2. Shortness of breath R06.02 786.05   3. Encounter for chest tube placement Z46.82 V58.82   4. Pneumothorax, right J93.9 512.89   5. Chest tube in place Z96.89 V49.89         Disposition:   Disposition: Admitted  Condition: Fair                        Edgardo Monae MD  05/23/19 1002

## 2019-05-22 NOTE — HPI
Venus Mayer is a 89 year old female with a medical history significant for HLD, HTN, COPD (on home O2 3L), HOCM s/p AICD, carotid artery stenosis s/p L CEA 2008 and prior L sided spontaneous pneumothorax who presents to [facility] for evaluation of acute onset shortness of breath. Pt states that she developed acute shortness of breath yesterday afternoon around 3PM. Pt states that she took her rescue inhalers, nebulized saline and an allerga without any improvement. Pt states that she tried to go to sleep but was unable due to dyspnea. Pt states that this am one of her children felt that as she was not improving they should call 911. On arrival to ED, pt had SpO2 of 78%. CXR in ED showed a large right pneumothorax with marked compressive atelectasis of the right lung. A right sided pleural catheter was placed in ED with immediate resolution of shortness of breath. Repeat CXR showed a decrease in the volume of pneumothorax following catheter placement, with partial re-expansion of the right lung. Oxygen saturation increased to 88-93% on home oxygen requirement.     Pt denies any chest pain. Pt endorses chronic shortness of breath that acute worsened yesterday but has since resolved. Pt endorses a recent sinus infection that was treated with antibiotics. Pt denies N/V, fever/chills, change in bowel or bladder habits, HA or focal/general weakness.    Pt receieves majority of care at [facility].

## 2019-05-22 NOTE — ASSESSMENT & PLAN NOTE
- Per pt records, pt takes Amlodipine 2.5mg at bed if SBP>155  - Hypertensive on arrival, resolved with resolution of SOB  - Amlodipine 2.5mg nightly prn as prescribed   - PRN Hydralazine 5 for SBP>180

## 2019-05-22 NOTE — CONSULTS
"[facility]  General Surgery  Consult Note    Consults  Subjective:     History of Present Illness: Venus Mayer is a 89 y.o. female with a hx of HLD, CAD, HTN, and COPD on 3L of oxygen at home, who presented for evaluation of SOB to the ED this morning. She reported acute onset SOB this morning. Pt noted to be in distress on presentation with saturations in the high 70s as well as tachycardia. Pneumothorax noted on CXR. Pigtail catheter placed by ED with resolution of pneumothorax. Patient denies any falls or other traumas recently. Of note, she reports a distant hx of left sided "air around my lung" as well as a left sided "lung mass" excision. Denies any pathology on the right. Pt reports resolution of her SOB with placement of pigtail catheter.       No current facility-administered medications on file prior to encounter.      Current Outpatient Medications on File Prior to Encounter   Medication Sig    albuterol (PROAIR HFA) 90 mcg/actuation inhaler Inhale 1 puff into the lungs every 6 (six) hours as needed for Wheezing. Rescue    aspirin 81 mg Tab Take by mouth. 1 Tablet Oral Every day    hydrochlorothiazide (HYDRODIURIL) 12.5 MG Tab Take 12.5 mg by mouth. Pt is taking medication on Monday/wednesday and friday     losartan (COZAAR) 100 MG tablet Take 100 mg by mouth once daily.      multivitamin-minerals-lutein (CENTRUM SILVER) Tab Take by mouth. 1 Tablet Oral Every day    omega-3 fatty acids-vitamin E (FISH OIL) 1,000 mg Cap Take by mouth. 1 Capsule Oral Every day    potassium chloride (MICRO-K) 10 MEQ CpSR Take 10 mEq by mouth once daily.    predniSONE (DELTASONE) 20 MG tablet Take 20 mg by mouth once daily.    tiotropium (SPIRIVA) 18 mcg inhalation capsule Inhale 18 mcg into the lungs once daily.      albuterol (PROVENTIL/VENTOLIN) 90 mcg/actuation inhaler Inhale 2 puffs into the lungs every 6 (six) hours as needed for Wheezing or Shortness of Breath (cough).       Review of " patient's allergies indicates:   Allergen Reactions    Sulfamethoxazole-trimethoprim      Other reaction(s): Rash       Past Medical History:   Diagnosis Date    Cardiomyopathy     Carotid artery occlusion     Hyperlipidemia     Hypertension      Past Surgical History:   Procedure Laterality Date    CARDIAC CATHETERIZATION      CAROTID ENDARTERECTOMY       Family History     Problem Relation (Age of Onset)    Hypertension Mother        Tobacco Use    Smoking status: Former Smoker     Packs/day: 2.00     Years: 20.00     Pack years: 40.00     Types: Cigarettes     Last attempt to quit: 1980     Years since quittin.3   Substance and Sexual Activity    Alcohol use: Yes     Alcohol/week: 1.2 oz     Types: 2 Glasses of wine per week     Comment: social    Drug use: No    Sexual activity: Not on file     Review of Systems   Respiratory: Positive for shortness of breath.    All other systems reviewed and are negative.    Objective:     Vital Signs (Most Recent):  Temp: 96.8 °F (36 °C) (19 0850)  Pulse: 90 (19 1037)  Resp: (!) 30 (19 0850)  BP: (!) 151/70 (19 1031)  SpO2: 95 % (19 1037) Vital Signs (24h Range):  Temp:  [96.8 °F (36 °C)] 96.8 °F (36 °C)  Pulse:  [] 90  Resp:  [30] 30  SpO2:  [91 %-98 %] 95 %  BP: (138-212)/() 151/70     Weight: 54.4 kg (120 lb)  Body mass index is 20.6 kg/m².    No intake or output data in the 24 hours ending 19 1046    Physical Exam   Constitutional: She is oriented to person, place, and time. No distress.   HENT:   Head: Normocephalic.   Cardiovascular: Normal rate.   Pulmonary/Chest: Effort normal. No respiratory distress.   On supplemental oxygen, not in respiratory distress   Abdominal: Soft. She exhibits no distension.   Neurological: She is alert and oriented to person, place, and time. No cranial nerve deficit.   Psychiatric: She has a normal mood and affect. Her behavior is normal.   No air leak    Significant  Labs:  CBC:   Recent Labs   Lab 05/22/19  0857   WBC 11.58   RBC 4.30   HGB 13.5   HCT 41.4      MCV 96   MCH 31.4*   MCHC 32.6     CMP:   Recent Labs   Lab 05/22/19  0857   *   CALCIUM 10.8*   *   K 4.5   CO2 31*   CL 92*   BUN 30*   CREATININE 1.0       Significant Diagnostics:  I have reviewed all pertinent imaging results/findings within the past 24 hours.    Assessment/Plan:     Active Diagnoses:    Diagnosis Date Noted POA    Spontaneous pneumothorax [J93.83] 05/22/2019 Yes      Problems Resolved During this Admission:     Venus Mayer is a 89 y.o. female with right sided spontaneous pneumothorax s/p right pigtail catheter placement    Continue chest tube to suction  Daily CXR  Will attempt to manage conservatively  Will follow along while pt admitted to medicine     Thank you for your consult. I will follow-up with patient. Please contact us if you have any additional questions.    Juan Luis Gooden MD  General Surgery  [facility]

## 2019-05-22 NOTE — ED NOTES
"Patient called this RN to bedside - reports "I can't breathe". O2 sat stable - patient reports chest "tightness" across her lower chest. Per patient family, patient often has esophogeal spasms after eating that cause similar pain. EKG obtained. Chest tube remains in place to R chest wall. Breath sounds clear and equal bilaterally. Equal chest rise noted, no tracheal deviation present. Patient sat upright in bed with some relief. Hospital medicine paged.     "

## 2019-05-22 NOTE — ED TRIAGE NOTES
"Patient in from home for eval of SOB that started yesterday afternoon - patient wears home O2 (3 L) but reports she has was unable to catch her breath this morning. Patient reports chest "tightness". Denies abdominal pain, nausea, vomiting, swelling, or trauma.   "

## 2019-05-22 NOTE — ASSESSMENT & PLAN NOTE
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

## 2019-05-22 NOTE — MEDICAL/APP STUDENT
[facility] Hospital Medicine  History & Physical    Patient Name: Venus Mayer  MRN: [id number]  Admission Date: 5/22/2019  Attending Physician: Cherry Mar MD   Primary Care Provider: Jona Che MD    [facility] Medicine Team: [facility] MED 2 Duglas Hicks     Patient information was obtained from patient, relative(s) and ER records.     Subjective:     Principal Problem:<principal problem not specified>    Chief Complaint:   Chief Complaint   Patient presents with    Shortness of Breath     hx of copd 3L home oxygen. On arrival of ems 78% on 3L.         HPI: PT is a 89 y.o. Female with past medical history of HLD, CAD, HTN, carotid artery occlusion, and COPD who presented to ED with shortness of breath. Pt states SOB was noticed suddenly in the evening and was treated with Allegra and Proair and slept over the night and when she woke up still having shortness of breath in the morning. Uses 2.5 LPM home O2 continuous and SpO2 of 78% upon arrival. Denies chest pain, cough, fever, chills, diaphoresis, numbness, weakness, congestion, sneezing, abd pain, or N/V.     Past Medical History:   Diagnosis Date    Cardiomyopathy     Carotid artery occlusion     Hyperlipidemia     Hypertension        Past Surgical History:   Procedure Laterality Date    CARDIAC CATHETERIZATION      CAROTID ENDARTERECTOMY         Review of patient's allergies indicates:   Allergen Reactions    Sulfamethoxazole-trimethoprim      Other reaction(s): Rash       No current facility-administered medications on file prior to encounter.      Current Outpatient Medications on File Prior to Encounter   Medication Sig    albuterol (PROAIR HFA) 90 mcg/actuation inhaler Inhale 1 puff into the lungs every 6 (six) hours as needed for Wheezing. Rescue    amLODIPine (NORVASC) 2.5 MG tablet Take 2.5 mg by mouth nightly as needed for SBP greater than. SBP greater than 155    aspirin 81 mg Tab Take by mouth. 1 Tablet Oral  Every day    tiotropium (SPIRIVA) 18 mcg inhalation capsule Inhale 18 mcg into the lungs once daily.      [DISCONTINUED] hydrochlorothiazide (HYDRODIURIL) 12.5 MG Tab Take 12.5 mg by mouth. Pt is taking medication on Monday/wednesday and friday     [DISCONTINUED] losartan (COZAAR) 100 MG tablet Take 100 mg by mouth once daily.      [DISCONTINUED] multivitamin-minerals-lutein (CENTRUM SILVER) Tab Take by mouth. 1 Tablet Oral Every day    [DISCONTINUED] omega-3 fatty acids-vitamin E (FISH OIL) 1,000 mg Cap Take by mouth. 1 Capsule Oral Every day    [DISCONTINUED] potassium chloride (MICRO-K) 10 MEQ CpSR Take 10 mEq by mouth once daily.    [DISCONTINUED] predniSONE (DELTASONE) 20 MG tablet Take 20 mg by mouth once daily.    albuterol (PROVENTIL/VENTOLIN) 90 mcg/actuation inhaler Inhale 2 puffs into the lungs every 6 (six) hours as needed for Wheezing or Shortness of Breath (cough).     Family History     Problem Relation (Age of Onset)    Hypertension Mother        Tobacco Use    Smoking status: Former Smoker     Packs/day: 2.00     Years: 20.00     Pack years: 40.00     Types: Cigarettes     Last attempt to quit: 1980     Years since quittin.3   Substance and Sexual Activity    Alcohol use: Yes     Alcohol/week: 1.2 oz     Types: 2 Glasses of wine per week     Comment: social    Drug use: No    Sexual activity: Not on file     Review of Systems   Constitutional: Negative for chills and fever.   HENT: Negative for congestion and sneezing.    Respiratory: Positive for shortness of breath. Negative for wheezing.    Cardiovascular: Negative for chest pain and leg swelling.   Gastrointestinal: Negative for abdominal pain, nausea and vomiting.   Genitourinary: Negative for dysuria and urgency.   Musculoskeletal: Negative for back pain and myalgias.   Skin: Negative for color change and pallor.   Neurological: Negative for dizziness and headaches.   Psychiatric/Behavioral: Negative for agitation and  confusion.     Objective:     Vital Signs (Most Recent):  Temp: 96.8 °F (36 °C) (05/22/19 0850)  Pulse: 92 (05/22/19 1437)  Resp: (!) 30 (05/22/19 0850)  BP: (!) 167/76 (05/22/19 1431)  SpO2: 96 % (05/22/19 1437) Vital Signs (24h Range):  Temp:  [96.8 °F (36 °C)] 96.8 °F (36 °C)  Pulse:  [] 92  Resp:  [30] 30  SpO2:  [91 %-100 %] 96 %  BP: (128-212)/() 167/76     Weight: 54.4 kg (120 lb)  Body mass index is 20.6 kg/m².    Physical Exam   Constitutional: She is oriented to person, place, and time. She appears well-developed and well-nourished.   HENT:   Head: Normocephalic and atraumatic.   Eyes: Pupils are equal, round, and reactive to light. EOM are normal.   Left eye adducted.   Neck: Normal range of motion. Neck supple.   Cardiovascular: Normal rate and regular rhythm.   Pulmonary/Chest: Effort normal and breath sounds normal.   Abdominal: Soft. Bowel sounds are normal.   Musculoskeletal: Normal range of motion.   Neurological: She is alert and oriented to person, place, and time.   Skin: Skin is warm and dry.   Psychiatric: She has a normal mood and affect. Her behavior is normal.         CRANIAL NERVES     CN III, IV, VI   Pupils are equal, round, and reactive to light.  Extraocular motions are normal.       Significant Labs:   CBC:   Recent Labs   Lab 05/22/19  0857   WBC 11.58   HGB 13.5   HCT 41.4        CMP:   Recent Labs   Lab 05/22/19  0857   *   K 4.5   CL 92*   CO2 31*   *   BUN 30*   CREATININE 1.0   CALCIUM 10.8*   ALBUMIN 3.8   ANIONGAP 11   EGFRNONAA 49.7*     Cardiac Markers:   Recent Labs   Lab 05/22/19  1318   BNP 1,379*     Magnesium:   Recent Labs   Lab 05/22/19  0857   MG 2.3       Significant Imaging: I have reviewed all pertinent imaging results/findings within the past 24 hours.     CXR (05/22)    Interval repositioning of the right chest tube.  Small pneumothorax noted along the lateral lower aspect of the right hemithorax, which appears improved in  comparison to prior exam.    CXR (05/22)    Right-sided pleural catheter is now seen.  Volume of pneumothorax on the right has decreased since 05/22/2019 at 09:01, following this catheter placement, with partial re-expansion of the right lung also appreciated.  No detrimental interval change in the appearance of the chest since that time is noted.    CXR (05/22)    Large right pneumothorax with marked compressive atelectasis of the right lung, obviously representing a significant detrimental interval change since 07/15/2013.    The results of this examination were communicated to Dr. Monae on 05/22/2019 at 09:12 a.m., immediately upon discovery of the abnormality.  He was already aware of the finding,    Assessment/Plan:   Venus Mayer is a 89 year old Female who presented with SOB and is being treated for spontaneous pneumothorax.    Spontaneous pneumothorax  CXR showed large pneumothorax, chest tube successful placed and post procedural CXR showed re-expansion of lungs. Treat 5LPM O2 NC and wean pt off. Incentive spirometry. PT/OT.     Heart Failure  Continue home furosemide. BNP at 1379. Unaware of what is baseline.    Chronic Obstructive Pulmonary Disease (COPD)  Continue home albutoral inhaler. On oxygen 5LPM O2 and slowly wean as tolerated. Pt home is 2.5 LPM.    Coronary Artery Disease  Chronic, stable, hold baby aspirin.    HTN  BPS currently >160's. Continue amlodipine 2.5 MG table PRN when BP > 155.    Active Diagnoses:    Diagnosis Date Noted POA    Spontaneous pneumothorax [J93.83] 05/22/2019 Yes      Problems Resolved During this Admission:     VTE Risk Mitigation (From admission, onward)        Ordered     enoxaparin injection 40 mg  Daily      05/22/19 1156     IP VTE HIGH RISK PATIENT  Once      05/22/19 1156     Place sequential compression device  Until discontinued      05/22/19 1100            Duglas Hicks  Department of Hospital Medicine   [facility]

## 2019-05-22 NOTE — ASSESSMENT & PLAN NOTE
- 89 year old female with preexisting lung disease (COPD) and prior spontaneous PTX presenting with acute onset SOB not responsive to COPD medications or rescue inhalers  - Pigtail cath initially placed by CT surgery, replaced this am (5/22/19) by chest tube with improvement in dyspnea     - Daily CXR  - CXR 5/22/19 0905 - Large right pneumothorax with marked compressive atelectasis of the right lung  - CXR 5/22/19 0953 - Right-sided pleural catheter is now seen.  Volume of pneumothorax on the right has decreased since 05/22/2019 at 09:01, following this catheter placement, with partial re-expansion of the right lung also appreciated.  No detrimental interval change in the appearance of the chest since that time is noted.  - CXR 5/22/19 1025 - Right-sided chest tube is noted, which appears slightly reposition and overlying the mid aspect of the right hemithorax. Unchanged radiographic appearance of the lungs.  Small pneumothorax is present along the lateral lower aspect of the right hemithorax  - CXR 5/22/19 2130 - More lateral position of right-sided chest tube tip with marked increase in size of right-sided pneumothorax.  - CXR 5/22/19 2334 - On the current examination the right pigtail catheter projects over the right hemithorax similar to the earlier study of 05/22/2019, 21:25 hours.  Size of the right pneumothorax is smaller than that 21:25 hours.  - CXR 5/23/19 0406 - Right-sided pigtail chest tube and right pneumothorax appear unchanged.  - CXR 5/23/19 0826 - Reaccumulation of right pneumothorax and slight leftward mediastinal shift despite pleural pigtail catheter.   - CXR 5/23/19 0930 - Previously present right pleural catheter has been removed, and replaced with a right thoracostomy tube.  A right pneumothorax persists, although its volume has significantly decreased since the examination referenced above, following this tube placement.  No detrimental interval change in the appearance of the chest since  05/23/2019 at 08:22 is appreciated.

## 2019-05-22 NOTE — ASSESSMENT & PLAN NOTE
- Per pt records, pt takes Amlodipine 2.5mg at bed if SBP>155  - Hypertensive on arrival, resolved with resolution of SOB  - Amlodipine 2.5mg nightly prn as prescribed

## 2019-05-22 NOTE — ASSESSMENT & PLAN NOTE
- 89 year old female with preexisting lung disease (COPD) and prior spontaneous PTX presenting with acute onset SOB not responsive to COPD medications or rescue inhalers  - CXR shows large R PTX now s/p chest tube placement with resolution of SOB and radiographic improvement  - Continue to monitor  - Admit to hospital medicine for further work up

## 2019-05-22 NOTE — ASSESSMENT & PLAN NOTE
- ?COPD vs ADHF   - Continue home spiriva  - Albuterol rescue inahler PRN for wheezing  - Oxygen as needed to maintain sp02 >88%  - Monitor respiratory status for any acute change

## 2019-05-22 NOTE — ASSESSMENT & PLAN NOTE
- Conversation held between medical team, pt and family at bedside concerning code status.   - Pt states that per her living will, she is DNR  - Family agrees with patient decision   - [facility] DNR form signed and placed into chart

## 2019-05-22 NOTE — ASSESSMENT & PLAN NOTE
- Continue home spiriva  - Albuterol rescue inahler PRN for wheezing  - Oxygen as needed to maintain sp02 >88%  - Monitor respiratory status for any acute change

## 2019-05-22 NOTE — SUBJECTIVE & OBJECTIVE
Past Medical History:   Diagnosis Date    Cardiomyopathy     Carotid artery occlusion     Hyperlipidemia     Hypertension        Past Surgical History:   Procedure Laterality Date    CARDIAC CATHETERIZATION      CAROTID ENDARTERECTOMY         Review of patient's allergies indicates:   Allergen Reactions    Sulfamethoxazole-trimethoprim      Other reaction(s): Rash       No current facility-administered medications on file prior to encounter.      Current Outpatient Medications on File Prior to Encounter   Medication Sig    albuterol (PROAIR HFA) 90 mcg/actuation inhaler Inhale 1 puff into the lungs every 6 (six) hours as needed for Wheezing. Rescue    amLODIPine (NORVASC) 2.5 MG tablet Take 2.5 mg by mouth nightly as needed for SBP greater than. SBP greater than 155    aspirin 81 mg Tab Take by mouth. 1 Tablet Oral Every day    tiotropium (SPIRIVA) 18 mcg inhalation capsule Inhale 18 mcg into the lungs once daily.      [DISCONTINUED] hydrochlorothiazide (HYDRODIURIL) 12.5 MG Tab Take 12.5 mg by mouth. Pt is taking medication on Monday/wednesday and friday     [DISCONTINUED] losartan (COZAAR) 100 MG tablet Take 100 mg by mouth once daily.      [DISCONTINUED] multivitamin-minerals-lutein (CENTRUM SILVER) Tab Take by mouth. 1 Tablet Oral Every day    [DISCONTINUED] omega-3 fatty acids-vitamin E (FISH OIL) 1,000 mg Cap Take by mouth. 1 Capsule Oral Every day    [DISCONTINUED] potassium chloride (MICRO-K) 10 MEQ CpSR Take 10 mEq by mouth once daily.    [DISCONTINUED] predniSONE (DELTASONE) 20 MG tablet Take 20 mg by mouth once daily.    albuterol (PROVENTIL/VENTOLIN) 90 mcg/actuation inhaler Inhale 2 puffs into the lungs every 6 (six) hours as needed for Wheezing or Shortness of Breath (cough).     Family History     Problem Relation (Age of Onset)    Hypertension Mother        Tobacco Use    Smoking status: Former Smoker     Packs/day: 2.00     Years: 20.00     Pack years: 40.00     Types: Cigarettes      Last attempt to quit: 1980     Years since quittin.3   Substance and Sexual Activity    Alcohol use: Yes     Alcohol/week: 1.2 oz     Types: 2 Glasses of wine per week     Comment: social    Drug use: No    Sexual activity: Not on file     Review of Systems   Constitutional: Negative for activity change, appetite change, chills, fatigue and fever.   HENT: Negative for sneezing and sore throat.    Eyes: Negative for photophobia and visual disturbance.   Respiratory: Positive for cough and shortness of breath. Negative for chest tightness and wheezing.    Cardiovascular: Positive for leg swelling. Negative for chest pain and palpitations.   Gastrointestinal: Negative for abdominal distention, abdominal pain, diarrhea, nausea and vomiting.   Genitourinary: Negative for difficulty urinating and dysuria.   Musculoskeletal: Negative for arthralgias, back pain, neck pain and neck stiffness.   Skin: Positive for wound (chest tube in place). Negative for pallor.   Neurological: Negative for dizziness, tremors, speech difficulty, weakness and headaches.   Psychiatric/Behavioral: Negative for confusion. The patient is not nervous/anxious.      Objective:     Vital Signs (Most Recent):  Temp: 96.8 °F (36 °C) (19 0850)  Pulse: 92 (19 1437)  Resp: (!) 30 (19 0850)  BP: (!) 167/76 (19 1431)  SpO2: 96 % (19 1437) Vital Signs (24h Range):  Temp:  [96.8 °F (36 °C)] 96.8 °F (36 °C)  Pulse:  [] 92  Resp:  [30] 30  SpO2:  [91 %-100 %] 96 %  BP: (128-212)/() 167/76     Weight: 54.4 kg (120 lb)  Body mass index is 20.6 kg/m².    Physical Exam   Constitutional: She is oriented to person, place, and time. She appears well-developed and well-nourished. No distress.   HENT:   Head: Normocephalic and atraumatic.   Eyes: Pupils are equal, round, and reactive to light. EOM are normal.   Neck: Normal range of motion. Neck supple. No JVD present.   Cardiovascular: Normal rate, regular  rhythm and intact distal pulses.   AICD present left chest   Pulmonary/Chest: Effort normal. No respiratory distress. She has no wheezes. She exhibits no tenderness.   Slight decrease in right sided breath sounds. Chest tube present on right side   Abdominal: Soft. Bowel sounds are normal. There is no tenderness.   Musculoskeletal: Normal range of motion.   Neurological: She is alert and oriented to person, place, and time. No cranial nerve deficit.   Skin: Skin is warm and dry. She is not diaphoretic.   Nursing note and vitals reviewed.     Significant Labs:   ABGs:   Recent Labs   Lab 05/22/19  0900   PH 7.321*   PCO2 70.0*   HCO3 36.1*   POCSATURATED 87*   BE 10     CBC:   Recent Labs   Lab 05/22/19  0857   WBC 11.58   HGB 13.5   HCT 41.4        CMP:   Recent Labs   Lab 05/22/19  0857   *   K 4.5   CL 92*   CO2 31*   *   BUN 30*   CREATININE 1.0   CALCIUM 10.8*   ALBUMIN 3.8   ANIONGAP 11   EGFRNONAA 49.7*     All pertinent labs within the past 24 hours have been reviewed.    Significant Imaging: I have reviewed all pertinent imaging results/findings within the past 24 hours.     CXR 5/22/19 0905  Large right pneumothorax with marked compressive atelectasis of the right lung, obviously representing a significant detrimental interval change since 07/15/2013.    CXR 5/22/19 0953  Right-sided pleural catheter is now seen.  Volume of pneumothorax on the right has decreased since 05/22/2019 at 09:01, following this catheter placement, with partial re-expansion of the right lung also appreciated.  No detrimental interval change in the appearance of the chest since that time is noted.    CXR 5/22/19 1025  Left chest wall pacemaker is seen with transvenous lead wires extending to the myocardium. Right-sided chest tube is noted, which appears slightly reposition and overlying the mid aspect of the right hemithorax. Unchanged radiographic appearance of the lungs.  Small pneumothorax is present along the  lateral lower aspect of the right hemithorax.  Cardiac silhouette is unchanged.  Atherosclerotic calcification is noted at the level of the aortic arch.

## 2019-05-23 PROBLEM — M62.838 MUSCLE SPASM: Status: ACTIVE | Noted: 2019-05-23

## 2019-05-23 LAB
ALBUMIN SERPL BCP-MCNC: 3.2 G/DL (ref 3.5–5.2)
ALLENS TEST: ABNORMAL
ALP SERPL-CCNC: 84 U/L (ref 55–135)
ALT SERPL W/O P-5'-P-CCNC: 14 U/L (ref 10–44)
ANION GAP SERPL CALC-SCNC: 9 MMOL/L (ref 8–16)
AST SERPL-CCNC: 28 U/L (ref 10–40)
BASOPHILS # BLD AUTO: 0.05 K/UL (ref 0–0.2)
BASOPHILS NFR BLD: 0.7 % (ref 0–1.9)
BILIRUB SERPL-MCNC: 0.5 MG/DL (ref 0.1–1)
BSA FOR ECHO PROCEDURE: 1.57 M2
BUN SERPL-MCNC: 26 MG/DL (ref 8–23)
CALCIUM SERPL-MCNC: 9.9 MG/DL (ref 8.7–10.5)
CHLORIDE SERPL-SCNC: 94 MMOL/L (ref 95–110)
CO2 SERPL-SCNC: 38 MMOL/L (ref 23–29)
CREAT SERPL-MCNC: 0.9 MG/DL (ref 0.5–1.4)
CV ECHO LV RWT: 0.81 CM
DELSYS: ABNORMAL
DIFFERENTIAL METHOD: ABNORMAL
DOP CALC LVOT AREA: 2.3 CM2
DOP CALC LVOT DIAMETER: 1.71 CM
ECHO LV POSTERIOR WALL: 1.2 CM (ref 0.6–1.1)
EOSINOPHIL # BLD AUTO: 0.3 K/UL (ref 0–0.5)
EOSINOPHIL NFR BLD: 4.5 % (ref 0–8)
ERYTHROCYTE [DISTWIDTH] IN BLOOD BY AUTOMATED COUNT: 13.2 % (ref 11.5–14.5)
EST. GFR  (AFRICAN AMERICAN): >60 ML/MIN/1.73 M^2
EST. GFR  (NON AFRICAN AMERICAN): 56.5 ML/MIN/1.73 M^2
FERRITIN SERPL-MCNC: 269 NG/ML (ref 20–300)
FRACTIONAL SHORTENING: 36 % (ref 28–44)
GLUCOSE SERPL-MCNC: 80 MG/DL (ref 70–110)
HCO3 UR-SCNC: 42.7 MMOL/L (ref 24–28)
HCT VFR BLD AUTO: 37.6 % (ref 37–48.5)
HGB BLD-MCNC: 12.3 G/DL (ref 12–16)
IMM GRANULOCYTES # BLD AUTO: 0.02 K/UL (ref 0–0.04)
IMM GRANULOCYTES NFR BLD AUTO: 0.3 % (ref 0–0.5)
INTERVENTRICULAR SEPTUM: 1.4 CM (ref 0.6–1.1)
IRON SERPL-MCNC: 29 UG/DL (ref 30–160)
LEFT ATRIUM SIZE: 2.59 CM
LEFT INTERNAL DIMENSION IN SYSTOLE: 1.9 CM (ref 2.1–4)
LEFT VENTRICLE DIASTOLIC VOLUME INDEX: 21.61 ML/M2
LEFT VENTRICLE DIASTOLIC VOLUME: 34.01 ML
LEFT VENTRICLE MASS INDEX: 77.9 G/M2
LEFT VENTRICLE SYSTOLIC VOLUME INDEX: 7.1 ML/M2
LEFT VENTRICLE SYSTOLIC VOLUME: 11.1 ML
LEFT VENTRICULAR INTERNAL DIMENSION IN DIASTOLE: 2.97 CM (ref 3.5–6)
LEFT VENTRICULAR MASS: 122.58 G
LYMPHOCYTES # BLD AUTO: 1.3 K/UL (ref 1–4.8)
LYMPHOCYTES NFR BLD: 17.3 % (ref 18–48)
MAGNESIUM SERPL-MCNC: 2.1 MG/DL (ref 1.6–2.6)
MCH RBC QN AUTO: 30.9 PG (ref 27–31)
MCHC RBC AUTO-ENTMCNC: 32.7 G/DL (ref 32–36)
MCV RBC AUTO: 95 FL (ref 82–98)
MONOCYTES # BLD AUTO: 1.1 K/UL (ref 0.3–1)
MONOCYTES NFR BLD: 13.8 % (ref 4–15)
NEUTROPHILS # BLD AUTO: 4.8 K/UL (ref 1.8–7.7)
NEUTROPHILS NFR BLD: 63.4 % (ref 38–73)
NRBC BLD-RTO: 0 /100 WBC
PCO2 BLDA: 84.5 MMHG (ref 35–45)
PH SMN: 7.31 [PH] (ref 7.35–7.45)
PHOSPHATE SERPL-MCNC: 3.8 MG/DL (ref 2.7–4.5)
PISA TR MAX VEL: 2.11 M/S
PLATELET # BLD AUTO: 148 K/UL (ref 150–350)
PMV BLD AUTO: 10.5 FL (ref 9.2–12.9)
PO2 BLDA: 195 MMHG (ref 80–100)
POC BE: 16 MMOL/L
POC SATURATED O2: 100 % (ref 95–100)
POC TCO2: 45 MMOL/L (ref 23–27)
POTASSIUM SERPL-SCNC: 3.7 MMOL/L (ref 3.5–5.1)
PROT SERPL-MCNC: 6.3 G/DL (ref 6–8.4)
PROVIDER NOTIFIED: ABNORMAL
PV PEAK VELOCITY: 1.13 CM/S
RBC # BLD AUTO: 3.98 M/UL (ref 4–5.4)
SAMPLE: ABNORMAL
SATURATED IRON: 8 % (ref 20–50)
SINUS: 2.57 CM
SITE: ABNORMAL
SODIUM SERPL-SCNC: 141 MMOL/L (ref 136–145)
STJ: 1.96 CM
TIME NOTIFIED: 855
TOTAL IRON BINDING CAPACITY: 373 UG/DL (ref 250–450)
TR MAX PG: 17.81 MMHG
TRANSFERRIN SERPL-MCNC: 252 MG/DL (ref 200–375)
WBC # BLD AUTO: 7.61 K/UL (ref 3.9–12.7)

## 2019-05-23 PROCEDURE — 83735 ASSAY OF MAGNESIUM: CPT

## 2019-05-23 PROCEDURE — 82803 BLOOD GASES ANY COMBINATION: CPT

## 2019-05-23 PROCEDURE — 83540 ASSAY OF IRON: CPT

## 2019-05-23 PROCEDURE — 99233 PR SUBSEQUENT HOSPITAL CARE,LEVL III: ICD-10-PCS | Mod: GC,,, | Performed by: HOSPITALIST

## 2019-05-23 PROCEDURE — 20600001 HC STEP DOWN PRIVATE ROOM

## 2019-05-23 PROCEDURE — 99233 SBSQ HOSP IP/OBS HIGH 50: CPT | Mod: GC,,, | Performed by: HOSPITALIST

## 2019-05-23 PROCEDURE — 36600 WITHDRAWAL OF ARTERIAL BLOOD: CPT

## 2019-05-23 PROCEDURE — 27000221 HC OXYGEN, UP TO 24 HOURS

## 2019-05-23 PROCEDURE — 63600175 PHARM REV CODE 636 W HCPCS: Performed by: STUDENT IN AN ORGANIZED HEALTH CARE EDUCATION/TRAINING PROGRAM

## 2019-05-23 PROCEDURE — 25000242 PHARM REV CODE 250 ALT 637 W/ HCPCS: Performed by: STUDENT IN AN ORGANIZED HEALTH CARE EDUCATION/TRAINING PROGRAM

## 2019-05-23 PROCEDURE — 25000003 PHARM REV CODE 250: Performed by: STUDENT IN AN ORGANIZED HEALTH CARE EDUCATION/TRAINING PROGRAM

## 2019-05-23 PROCEDURE — 36415 COLL VENOUS BLD VENIPUNCTURE: CPT

## 2019-05-23 PROCEDURE — 99900035 HC TECH TIME PER 15 MIN (STAT)

## 2019-05-23 PROCEDURE — 82800 BLOOD PH: CPT

## 2019-05-23 PROCEDURE — 80053 COMPREHEN METABOLIC PANEL: CPT

## 2019-05-23 PROCEDURE — 82728 ASSAY OF FERRITIN: CPT

## 2019-05-23 PROCEDURE — 84100 ASSAY OF PHOSPHORUS: CPT

## 2019-05-23 PROCEDURE — 85025 COMPLETE CBC W/AUTO DIFF WBC: CPT

## 2019-05-23 RX ORDER — OXYCODONE AND ACETAMINOPHEN 5; 325 MG/1; MG/1
1 TABLET ORAL EVERY 4 HOURS PRN
Status: DISCONTINUED | OUTPATIENT
Start: 2019-05-23 | End: 2019-05-31 | Stop reason: HOSPADM

## 2019-05-23 RX ORDER — ACETAMINOPHEN 500 MG
1000 TABLET ORAL DAILY PRN
COMMUNITY
End: 2022-03-16 | Stop reason: CLARIF

## 2019-05-23 RX ORDER — ACETAMINOPHEN 325 MG/1
650 TABLET ORAL EVERY 6 HOURS PRN
Status: DISCONTINUED | OUTPATIENT
Start: 2019-05-23 | End: 2019-05-31 | Stop reason: HOSPADM

## 2019-05-23 RX ORDER — HYDRALAZINE HYDROCHLORIDE 20 MG/ML
10 INJECTION INTRAMUSCULAR; INTRAVENOUS EVERY 6 HOURS PRN
Status: DISCONTINUED | OUTPATIENT
Start: 2019-05-23 | End: 2019-05-31 | Stop reason: HOSPADM

## 2019-05-23 RX ORDER — MULTIVITAMIN
1 TABLET ORAL DAILY
Status: ON HOLD | COMMUNITY
End: 2022-05-29 | Stop reason: HOSPADM

## 2019-05-23 RX ORDER — TRIAMCINOLONE ACETONIDE 55 UG/1
SPRAY, METERED NASAL
Status: ON HOLD | COMMUNITY
End: 2019-07-21

## 2019-05-23 RX ORDER — CALCIUM CARBONATE 200(500)MG
500 TABLET,CHEWABLE ORAL DAILY PRN
Status: DISCONTINUED | OUTPATIENT
Start: 2019-05-23 | End: 2019-05-31 | Stop reason: HOSPADM

## 2019-05-23 RX ADMIN — TIOTROPIUM BROMIDE 18 MCG: 18 CAPSULE ORAL; RESPIRATORY (INHALATION) at 03:05

## 2019-05-23 RX ADMIN — ENOXAPARIN SODIUM 40 MG: 100 INJECTION SUBCUTANEOUS at 05:05

## 2019-05-23 RX ADMIN — CALCIUM CARBONATE (ANTACID) CHEW TAB 500 MG 500 MG: 500 CHEW TAB at 05:05

## 2019-05-23 RX ADMIN — HYDRALAZINE HYDROCHLORIDE 10 MG: 20 INJECTION INTRAMUSCULAR; INTRAVENOUS at 09:05

## 2019-05-23 RX ADMIN — FUROSEMIDE 40 MG: 10 INJECTION, SOLUTION INTRAMUSCULAR; INTRAVENOUS at 09:05

## 2019-05-23 RX ADMIN — ACETAMINOPHEN 650 MG: 325 TABLET ORAL at 12:05

## 2019-05-23 NOTE — SUBJECTIVE & OBJECTIVE
Interval History: Desat overnight and increase in pain prompting CXR. Chest tube put on -40 suction at that time. Morning CXR with good lung expansion. On home 3LNC with SpO2 > 98%.    Medications:  Continuous Infusions:  Scheduled Meds:   enoxaparin  40 mg Subcutaneous Daily    furosemide  40 mg Intravenous Daily    tiotropium  18 mcg Inhalation Daily     PRN Meds:albuterol, amLODIPine, dextrose 50%, dextrose 50%, glucagon (human recombinant), glucose, glucose, sodium chloride 0.9%     Review of patient's allergies indicates:   Allergen Reactions    Sulfamethoxazole-trimethoprim      Other reaction(s): Rash     Objective:     Vital Signs (Most Recent):  Temp: 97.8 °F (36.6 °C) (05/23/19 0400)  Pulse: 84 (05/23/19 0400)  Resp: 18 (05/23/19 0400)  BP: (!) 141/68 (05/23/19 0400)  SpO2: 100 % (05/23/19 0400) Vital Signs (24h Range):  Temp:  [96.8 °F (36 °C)-98.4 °F (36.9 °C)] 97.8 °F (36.6 °C)  Pulse:  [] 84  Resp:  [18-30] 18  SpO2:  [91 %-100 %] 100 %  BP: (128-212)/() 141/68     Intake/Output - Last 3 Shifts       05/21 0700 - 05/22 0659 05/22 0700 - 05/23 0659 05/23 0700 - 05/24 0659    P.O.  240     Total Intake(mL/kg)  240 (4.4)     Urine (mL/kg/hr)  1150     Emesis/NG output  0     Stool  0     Chest Tube  0     Total Output  1150     Net  -910            Urine Occurrence  2 x     Stool Occurrence  0 x     Emesis Occurrence  0 x           SpO2: 100 %  O2 Device (Oxygen Therapy): nasal cannula    Physical Exam   Constitutional:   Thin elderly female    HENT:   Head: Atraumatic.   Eyes: EOM are normal.   Neck: Neck supple.   Cardiovascular: Normal rate and regular rhythm.   Pulmonary/Chest: Effort normal.   Right Pigtail in place, no air leak    Abdominal: Soft.   Neurological: She is alert.   Skin: Skin is warm and dry.   Psychiatric: She has a normal mood and affect. Thought content normal.   Vitals reviewed.      Significant Labs:  BMP:   Recent Labs   Lab 05/23/19  0451   GLU 80      K  3.7   CL 94*   CO2 38*   BUN 26*   CREATININE 0.9   CALCIUM 9.9   MG 2.1     CBC:   Recent Labs   Lab 05/23/19  0451   WBC 7.61   RBC 3.98*   HGB 12.3   HCT 37.6   *   MCV 95   MCH 30.9   MCHC 32.7       Significant Diagnostics:  CXR: I have reviewed all pertinent results/findings within the past 24 hours    VTE Risk Mitigation (From admission, onward)        Ordered     enoxaparin injection 40 mg  Daily      05/22/19 1156     IP VTE HIGH RISK PATIENT  Once      05/22/19 1156     Place sequential compression device  Until discontinued      05/22/19 1100

## 2019-05-23 NOTE — CARE UPDATE
"RAPID RESPONSE NURSE PROACTIVE ROUNDING NOTE     Time of Visit:     Admit Date: 2019  LOS: 1  Code Status: DNR   Date of Visit: 2019  : 1929  Age: 89 y.o.  Sex: female  Race: White  Bed: 8074/8074 A:   MRN: [id number]  Was the patient discharged from an ICU this admission? no   Was the patient discharged from a PACU within last 24 hours?  no  Did the patient receive conscious sedation/general anesthesia in last 24 hours?  no  Was the patient in the ED within the past 24 hours?  yes  Was the patient started on NIPPV within the past 24 hours?  no  Attending Physician: Cherry Mar MD  Primary Service: [facility] MED 2    ASSESSMENT     Diagnosis: Spontaneous pneumothorax    Abnormal Vital Signs: BP (!) 150/67 (BP Location: Right arm, Patient Position: Lying)   Pulse 89   Temp 97.7 °F (36.5 °C) (Oral)   Resp 18   Ht 5' 4" (1.626 m)   Wt 54.4 kg (119 lb 14.9 oz)   SpO2 98%   Breastfeeding? No   BMI 20.59 kg/m²      Clinical Issues: Respiratory    Patient  has a past medical history of Cardiomyopathy, Carotid artery occlusion, Hyperlipidemia, and Hypertension.      Patient with SOB with spontaneous penumo s/p chest tube placement in the ED. Called to bedside to assess as patient had subjective SOB, increased work of breathing and O2 sat of 86% on 4L NC. To bedside to assess. Dressing was taken down to assess insertion site which was intact. Chest Xray obtained. Dr. Vazquez with surgery paged and notified of concerns for possible chest tube placement/leak as bubbling noted. To bedside to assess patient. Suction increased to -40 and air evacuated by deep breathing and repositioning. Patient related much improvement after. Sats improved to 94% and patients subjectively relates feeling better. Followup chest XRay order in an hour. Will follow if needed.     INTERVENTIONS/ RECOMMENDATIONS     Increased chest tube suction and repositioned and encouraged deep breaths.    Discussed plan of care with " RN, Mary/Delvis.    PHYSICIAN ESCALATION     Yes/No  yes    Orders received and case discussed with Dr. Vazquez.    Disposition: Remain in room 8074.    FOLLOW-UP     Call back the Rapid Response Nurse, Anabelle Garcia RN at [phone] for additional questions or concerns.

## 2019-05-23 NOTE — CARE UPDATE
Rapid Response Respiratory Therapy Proactive Rounding Note      Time of visit: 1515    Code Status: DNR   : 1929  Age: 89 y.o.  Weight:   Wt Readings from Last 1 Encounters:   19 54.4 kg (119 lb 14.9 oz)     Sex: female  Race: White   Bed: 8074/8074 A:   MRN: [id number]    SITUATION     Evaluated patient for: re-evaluated from this morning.  She was on 2LNC SpO2 99%.  I added humidity per daughters request    BACKGROUND     Patient has a past medical history of Cardiomyopathy, Carotid artery occlusion, Hyperlipidemia, and Hypertension.      ASSESSMENT     Pulse: Pulse: 87 Respiratory rate: Resp: 18 Temperature: Temp: 98.1 °F (36.7 °C) BP: BP: (!) 120/58 SpO2:SpO2: 99 %   Level of Consciousness: Level of Consciousness (AVPU): alert  Respiratory Effort: Respiratory Effort: Normal, Unlabored  Expansion/Accessory Muscle Usage: Expansion/Accessory Muscles/Retractions: expansion symmetric, no retractions, no use of accessory muscles  All Lung Field Breath Sounds: All Lung Fields Breath Sounds: coarse  ROBYN Breath Sounds: coarse  LLL Breath Sounds: clear  RUL Breath Sounds: diminished  RML Breath Sounds: diminished  RLL Breath Sounds: diminished  Cough Type:    Mobility at time of assessment: General Mobility: generalized weakness  O2 Device/Concentration: O2 Device (Oxygen Therapy): nasal cannula w/ humidification   Flow (L/min): 2    Most recent blood gas:   Recent Labs     19  0855   PH 7.311*   PCO2 84.5*   PO2 195*   HCO3 42.7*   POCSATURATED 100   BE 16     Ambu at bedside: Ambu bag with the patient?: Yes, Adult Ambu    INTERVENTIONS/RECOMMENDATIONS   ?  Will continue to monitor        FOLLOW-UP     Please call back the Rapid Response RT, Misty Schafer, RRT at x [phone] for any questions or concerns.

## 2019-05-23 NOTE — PLAN OF CARE
Problem: Adult Inpatient Plan of Care  Goal: Plan of Care Review  Outcome: Ongoing (interventions implemented as appropriate)  Pt received from ED. Pt AAOx4. VSS. Pt oriented to room and equipment. Chest tube to left side -20 /80 to wall suction. O2 3L NC.  Teds hose placed. Dinner ordered for Pt. Awaiting further orders. Bed locked and lowest position, call bell in reach. Bed alarm set. Pt instructed to call for assistance. Pt expressed understanding. WCTM.

## 2019-05-23 NOTE — PLAN OF CARE
05/23/19 1106   Discharge Assessment   Assessment Type Discharge Planning Assessment   Confirmed/corrected address and phone number on facesheet? Yes   Assessment information obtained from? Patient;Caregiver   Expected Length of Stay (days) 3   Communicated expected length of stay with patient/caregiver yes   Prior to hospitilization cognitive status: Alert/Oriented   Prior to hospitalization functional status: Assistive Equipment   Current cognitive status: Alert/Oriented   Current Functional Status: Assistive Equipment   Lives With alone   Able to Return to Prior Arrangements yes   Is patient able to care for self after discharge? Yes   Who are your caregiver(s) and their phone number(s)? Richmond MayerDckd-zbl-[phone]   Patient's perception of discharge disposition home health   Readmission Within the Last 30 Days no previous admission in last 30 days   Patient currently being followed by outpatient case management? No   Patient currently receives any other outside agency services? No   Equipment Currently Used at Home walker, rolling;3-in-1 commode;bedside commode;oxygen   Do you have any problems affording any of your prescribed medications? No   Is the patient taking medications as prescribed? yes   Does the patient have transportation home? Yes   Transportation Anticipated family or friend will provide   Does the patient receive services at the [facility]? No   Discharge Plan A Home   Discharge Plan B Home Health   DME Needed Upon Discharge  none   Patient/Family in Agreement with Plan yes     Jona Che MD  [address] / [place] [address]    Extended Emergency Contact Information  Primary Emergency Contact: Richmond Mayer   [facility]  Home Phone: [phone]  Mobile Phone: [phone]  Relation: Son  Secondary Emergency Contact: RAJINDER FLYNN  Mobile Phone: [phone]  Relation: Daughter  Preferred language: English   needed? No    Pt has Home O2-Respicare and  Inogen   Pt has 24 hr private duty sitters at home with family support.

## 2019-05-23 NOTE — CARE UPDATE
Rapid Response Respiratory Therapy Proactive Rounding Note      Time of visit: 08    Code Status: DNR   : 1929  Age: 89 y.o.  Weight:   Wt Readings from Last 1 Encounters:   19 54.4 kg (119 lb 14.9 oz)     Sex: female  Race: White   Bed: 8074/8074 A:   MRN: [id number]    SITUATION     Evaluated patient for: RT caught me in the hallway and said they asked for a STAT ABG.  Went to assess the pt and they needed the ABG before the chest tube exchange.   Pt was on a NRB for the procedure.    BACKGROUND     Patient has a past medical history of Cardiomyopathy, Carotid artery occlusion, Hyperlipidemia, and Hypertension. COPD      ASSESSMENT     Pulse: Pulse: 103 Respiratory rate: Resp: 20 Temperature: Temp: 96.4 °F (35.8 °C) BP: BP: (!) 90/54 SpO2:SpO2: 99 %   Level of Consciousness: Level of Consciousness (AVPU): alert  Respiratory Effort: Respiratory Effort: Unlabored, Normal  Expansion/Accessory Muscle Usage: Expansion/Accessory Muscles/Retractions: expansion symmetric, no retractions, no use of accessory muscles  All Lung Field Breath Sounds: All Lung Fields Breath Sounds: coarse  ROBYN Breath Sounds: coarse  LLL Breath Sounds: clear  RUL Breath Sounds: diminished  RML Breath Sounds: diminished  RLL Breath Sounds: diminished  Cough Type:    Mobility at time of assessment: General Mobility: generalized weakness, mildly impaired  O2 Device/Concentration: O2 Device (Oxygen Therapy): Non Rebreather Mask   Flow (L/min): 15    Most recent blood gas:   Recent Labs     19  0900   PH 7.321*   PCO2 70.0*   PO2 59   HCO3 36.1*   POCSATURATED 87*   BE 10       Current Respiratory Care Orders: O2  Ambu at bedside: Ambu bag with the patient?: Yes, Adult Ambu    INTERVENTIONS/RECOMMENDATIONS   ?  Will continue to monitor        FOLLOW-UP     Please call back the Rapid Response RTMisty, RRT at x [phone] for any questions or concerns.

## 2019-05-23 NOTE — CARE UPDATE
"RAPID RESPONSE NURSE PROACTIVE ROUNDING NOTE     Time of Visit: 0842    Admit Date: 2019  LOS: 1  Code Status: DNR   Date of Visit: 2019  : 1929  Age: 89 y.o.  Sex: female  Race: White  Bed: 8074/8074 A:   MRN: [id number]  Was the patient discharged from an ICU this admission? no   Was the patient discharged from a PACU within last 24 hours?  no  Did the patient receive conscious sedation/general anesthesia in last 24 hours?  no  Was the patient in the ED within the past 24 hours?  yes  Was the patient started on NIPPV within the past 24 hours?  no  Attending Physician: Cherry Mar MD  Primary Service: [facility]OSP MED 2    ASSESSMENT     Diagnosis: Spontaneous pneumothorax    Abnormal Vital Signs: BP (!) 107/55   Pulse 105   Temp 96.4 °F (35.8 °C) (Oral)   Resp 20   Ht 5' 4" (1.626 m)   Wt 54.4 kg (119 lb 14.9 oz)   SpO2 100%   Breastfeeding? No   BMI 20.59 kg/m²      Clinical Issues: Respiratory    Patient  has a past medical history of Cardiomyopathy, Carotid artery occlusion, Hyperlipidemia, and Hypertension.      Upon assessment patient sitting up in bed, in notable distress from pain attributed from chest tube.  3 Gen surg MDs at bedside to assess.  Hypertension noted 210/92 attributed to pain.  IV pain meds ordered.  Patient able to converse freely.  Decision made to change out chest tube due to frequent pinching off.  MDs to remain with patient for monitoring.  If dyspnea does not improve with switch consider upgrade to ICU     INTERVENTIONS/ RECOMMENDATIONS     See above    Discussed plan of care with RNEh.    PHYSICIAN ESCALATION     Yes/No  yes    Orders received and case discussed with Chalo AVALOS and Gen Surg team.    Disposition: Remain in room 8074.    FOLLOW-UP     Call back the Rapid Response Nurse, Brando Cordero RN at [phone] for additional questions or concerns.          "

## 2019-05-23 NOTE — PROGRESS NOTES
[facility] Hospital Medicine  Progress Note    Patient Name: Venus Mayer  MRN: [id number]  Patient Class: IP- Inpatient   Admission Date: 5/22/2019  Length of Stay: 1 days  Attending Physician: Cherry Mar MD  Primary Care Provider: Jona Che MD    Hospital Medicine Team: [facility] 2 Genesis Clemons MD    Subjective:     Principal Problem:Spontaneous pneumothorax    HPI:  Venus Mayer is a 89 year old female with a medical history significant for HLD, HTN, COPD (on home O2 3L), HOCM s/p AICD, carotid artery stenosis s/p L CEA 2008 and prior L sided spontaneous pneumothorax who presents to [facility] for evaluation of acute onset shortness of breath. Pt states that she developed acute shortness of breath yesterday afternoon around 3PM. Pt states that she took her rescue inhalers, nebulized saline and an allerga without any improvement. Pt states that she tried to go to sleep but was unable due to dyspnea. Pt states that this am one of her children felt that as she was not improving they should call 911. On arrival to ED, pt had SpO2 of 78%. CXR in ED showed a large right pneumothorax with marked compressive atelectasis of the right lung. A right sided pleural catheter was placed in ED with immediate resolution of shortness of breath. Repeat CXR showed a decrease in the volume of pneumothorax following catheter placement, with partial re-expansion of the right lung. Oxygen saturation increased to 88-93% on home oxygen requirement.     Pt denies any chest pain. Pt endorses chronic shortness of breath that acute worsened yesterday but has since resolved. Pt endorses a recent sinus infection that was treated with antibiotics. Pt denies N/V, fever/chills, change in bowel or bladder habits, HA or focal/general weakness.    Pt receieves majority of care at [facility].    Hospital Course:  Pt admitted to Hospital Medicine. Overnight, she had worsening SOB requiring repeat  CXR which noted progression of spontaneous pneumothorax. CT surgery evaluated patient and changed pigtail catheter to chest tube. Since then, pt has been doing well    Interval History: Overnight, patient had an eventful night, was complaining of recurrent episodes of shortness of breath. Despite multiple attempts at reposition her and chest tube, symptoms didn't resolve. On repeat CXR this morning, she was noted to have progression of her pneumothorax. CT surgery examined patient bedside and exchanged the pigtail catheter for a chest tube.     Review of Systems   Constitutional: Negative for activity change, appetite change, chills, fatigue and fever.   HENT: Negative for sneezing and sore throat.    Eyes: Negative for photophobia and visual disturbance.   Respiratory: Positive for cough and shortness of breath. Negative for chest tightness and wheezing.    Cardiovascular: Positive for leg swelling. Negative for chest pain and palpitations.   Gastrointestinal: Negative for abdominal distention, abdominal pain, diarrhea, nausea and vomiting.   Genitourinary: Negative for difficulty urinating and dysuria.   Musculoskeletal: Negative for arthralgias, back pain, neck pain and neck stiffness.   Skin: Positive for wound (chest tube in place). Negative for pallor.   Neurological: Negative for dizziness, tremors, speech difficulty, weakness and headaches.   Psychiatric/Behavioral: Negative for confusion. The patient is not nervous/anxious.      Objective:     Vital Signs (Most Recent):  Temp: 97.8 °F (36.6 °C) (05/23/19 1118)  Pulse: 101 (05/23/19 1129)  Resp: 18 (05/23/19 1118)  BP: (!) 116/53 (05/23/19 1118)  SpO2: 99 % (05/23/19 1004) Vital Signs (24h Range):  Temp:  [96.4 °F (35.8 °C)-98.4 °F (36.9 °C)] 97.8 °F (36.6 °C)  Pulse:  [] 101  Resp:  [18-22] 18  SpO2:  [93 %-100 %] 99 %  BP: ()/() 116/53     Weight: 54.4 kg (119 lb 14.9 oz)  Body mass index is 20.59 kg/m².    Intake/Output Summary (Last 24  hours) at 5/23/2019 1453  Last data filed at 5/23/2019 1300  Gross per 24 hour   Intake 440 ml   Output 1150 ml   Net -710 ml      Physical Exam   Constitutional:   Thin elderly female    HENT:   Head: Atraumatic.   Eyes: EOM are normal.   Neck: Neck supple.   Cardiovascular: Normal rate and regular rhythm.   Pulmonary/Chest: Effort normal.   Right Pigtail in place, no air leak    Abdominal: Soft.   Neurological: She is alert.   Skin: Skin is warm and dry. Capillary refill takes less than 2 seconds.   Psychiatric: She has a normal mood and affect. Thought content normal.   Vitals reviewed.    Significant Labs:   CBC:   Recent Labs   Lab 05/22/19  0857 05/23/19 0451   WBC 11.58 7.61   HGB 13.5 12.3   HCT 41.4 37.6    148*     CMP:   Recent Labs   Lab 05/22/19  0857 05/23/19 0451   * 141   K 4.5 3.7   CL 92* 94*   CO2 31* 38*   * 80   BUN 30* 26*   CREATININE 1.0 0.9   CALCIUM 10.8* 9.9   PROT  --  6.3   ALBUMIN 3.8 3.2*   BILITOT  --  0.5   ALKPHOS  --  84   AST  --  28   ALT  --  14   ANIONGAP 11 9   EGFRNONAA 49.7* 56.5*     Significant Imaging: I have reviewed and interpreted all pertinent imaging results/findings within the past 24 hours.    Assessment/Plan:      * Spontaneous pneumothorax  - 89 year old female with preexisting lung disease (COPD) and prior spontaneous PTX presenting with acute onset SOB not responsive to COPD medications or rescue inhalers  - Pigtail cath initially placed by CT surgery, replaced this am (5/22/19) by chest tube with improvement in dyspnea     - Daily CXR  - CXR 5/22/19 0905 - Large right pneumothorax with marked compressive atelectasis of the right lung  - CXR 5/22/19 0953 - Right-sided pleural catheter is now seen.  Volume of pneumothorax on the right has decreased since 05/22/2019 at 09:01, following this catheter placement, with partial re-expansion of the right lung also appreciated.  No detrimental interval change in the appearance of the chest since  that time is noted.  - CXR 5/22/19 1025 - Right-sided chest tube is noted, which appears slightly reposition and overlying the mid aspect of the right hemithorax. Unchanged radiographic appearance of the lungs.  Small pneumothorax is present along the lateral lower aspect of the right hemithorax  - CXR 5/22/19 2130 - More lateral position of right-sided chest tube tip with marked increase in size of right-sided pneumothorax.  - CXR 5/22/19 2334 - On the current examination the right pigtail catheter projects over the right hemithorax similar to the earlier study of 05/22/2019, 21:25 hours.  Size of the right pneumothorax is smaller than that 21:25 hours.  - CXR 5/23/19 0406 - Right-sided pigtail chest tube and right pneumothorax appear unchanged.  - CXR 5/23/19 0826 - Reaccumulation of right pneumothorax and slight leftward mediastinal shift despite pleural pigtail catheter.   - CXR 5/23/19 0930 - Previously present right pleural catheter has been removed, and replaced with a right thoracostomy tube.  A right pneumothorax persists, although its volume has significantly decreased since the examination referenced above, following this tube placement.  No detrimental interval change in the appearance of the chest since 05/23/2019 at 08:22 is appreciated.    COPD (chronic obstructive pulmonary disease)  - ?COPD vs ADHF   - Continue home spiriva  - Albuterol rescue inahler PRN for wheezing  - Oxygen as needed to maintain sp02 >88%  - Monitor respiratory status for any acute change     Muscle spasm  - pt complaining of muscle spasms in bilateral legs, no pain associated  - checking iron    Heart failure, diastolic  - ?ADHF vs COPD  - BNP 1300  - LE pitting edema on exam, no crackles of elevated JVD  - Lasix 20 PO daily at home  - Will give lasix 40 IV daily and fluid restrict, good response to lasix with 1L UOP  - will order limited ECHO tomorrow to determine EF    HTN (hypertension)  - Per pt records, pt takes Amlodipine  2.5mg at bed if SBP>155  - Hypertensive on arrival, resolved with resolution of SOB  - Amlodipine 2.5mg nightly prn as prescribed   - PRN Hydralazine 5 for SBP>180    HOCM (hypertrophic obstructive cardiomyopathy): Apical HCM  - AICD in placed, [facility]ology [facility]  - Hip fracture 2 months ago  - works with OP PT/OT  - Continue PT/OT    DNR (do not resuscitate)  - Conversation held between medical team, pt and family at bedside concerning code status.   - Pt states that per her living will, she is DNR  - Family agrees with patient decision   - [facility] DNR form signed and placed into chart     VTE Risk Mitigation (From admission, onward)        Ordered     enoxaparin injection 40 mg  Daily      05/22/19 1156     IP VTE HIGH RISK PATIENT  Once      05/22/19 1156     Place sequential compression device  Until discontinued      05/22/19 1100      Diet: Low sodium 2gm  Code: DNR    Dispo: Pending improvement in respiratory status & volume status.    Patient seen and plan of care discussed with staff    Genesis Clemons MD  Department of Hospital Medicine   [facility]

## 2019-05-23 NOTE — HOSPITAL COURSE
Venus Mayer was admitted to [facility] on 5/22/19 for evaluation of acute on chronic SOB. In ED, pt was noted to have an elevated BNP and CXR showed spontaneous pneumothorax. CT surgery was consulted and placed a pigtail catheter. Pigtail catheter was changed to chest tube the next day following reaccumulation of PTX. IV lasix was started for dieuresis with good urine output. Pt was weaned to home O2 and transitioned to PO lasix once euvolemic. Output via chest tube has decreased appropriately, CXR appears improved and clinically patient is back to baseline requirement of 2L via nasal cannula. Chest tube was managed by CT surgery with repeated trials of clamping which were not tolerated. Pleurodesis done on 5/27/19. Subsequent chest tube malfunction on 5/29 resulting in some subq emphysema of arm and right chest requiring bedside decompression on 5/29/19. A one-way valve on right anterior chest is present with improvement of swelling and continued dyspnea of symptoms at rest or on exertion.     Pt was discharged home on 5/31 with home health and wound care. Pt was advised to follow up with CT surgery and her PCP.

## 2019-05-23 NOTE — PLAN OF CARE
Problem: Adult Inpatient Plan of Care  Goal: Plan of Care Review  Outcome: Ongoing (interventions implemented as appropriate)  Rested comfortably most of night, VSS, NAD. Began shift off with diff breathing, Tachycardic, Decreased pulse ox, Increased shallow respirations, anxiety. CXR showed increase of pneumo. Surgeon came and readjusted chest tube. Pt immediately improved in all areas.

## 2019-05-23 NOTE — PROGRESS NOTES
"[facility]  Thoracic Surgery  Progress Note    Subjective:     History of Present Illness:  Venus Mayer is a 89 y.o. female with a hx of HLD, CAD, HTN, and COPD on 3L of oxygen at home, who presented for evaluation of SOB to the ED this morning. She reported acute onset SOB this morning. Pt noted to be in distress on presentation with saturations in the high 70s as well as tachycardia. Pneumothorax noted on CXR. Pigtail catheter placed by ED with resolution of pneumothorax. Patient denies any falls or other traumas recently. Of note, she reports a distant hx of left sided "air around my lung" as well as a left sided "lung mass" excision. Denies any pathology on the right. Pt reports resolution of her SOB with placement of pigtail catheter.      Post-Op Info:  * No surgery found *         Interval History: Desat overnight and increase in pain prompting CXR. Chest tube put on -40 suction at that time. Morning CXR with good lung expansion. On home 3LNC with SpO2 > 98%.    Medications:  Continuous Infusions:  Scheduled Meds:   enoxaparin  40 mg Subcutaneous Daily    furosemide  40 mg Intravenous Daily    tiotropium  18 mcg Inhalation Daily     PRN Meds:albuterol, amLODIPine, dextrose 50%, dextrose 50%, glucagon (human recombinant), glucose, glucose, sodium chloride 0.9%     Review of patient's allergies indicates:   Allergen Reactions    Sulfamethoxazole-trimethoprim      Other reaction(s): Rash     Objective:     Vital Signs (Most Recent):  Temp: 97.8 °F (36.6 °C) (05/23/19 0400)  Pulse: 84 (05/23/19 0400)  Resp: 18 (05/23/19 0400)  BP: (!) 141/68 (05/23/19 0400)  SpO2: 100 % (05/23/19 0400) Vital Signs (24h Range):  Temp:  [96.8 °F (36 °C)-98.4 °F (36.9 °C)] 97.8 °F (36.6 °C)  Pulse:  [] 84  Resp:  [18-30] 18  SpO2:  [91 %-100 %] 100 %  BP: (128-212)/() 141/68     Intake/Output - Last 3 Shifts       05/21 0700 - 05/22 0659 05/22 0700 - 05/23 0659 05/23 0700 - 05/24 0659    P.O.  " 240     Total Intake(mL/kg)  240 (4.4)     Urine (mL/kg/hr)  1150     Emesis/NG output  0     Stool  0     Chest Tube  0     Total Output  1150     Net  -910            Urine Occurrence  2 x     Stool Occurrence  0 x     Emesis Occurrence  0 x           SpO2: 100 %  O2 Device (Oxygen Therapy): nasal cannula    Physical Exam   Constitutional:   Thin elderly female    HENT:   Head: Atraumatic.   Eyes: EOM are normal.   Neck: Neck supple.   Cardiovascular: Normal rate and regular rhythm.   Pulmonary/Chest: Effort normal.   Right Pigtail in place, no air leak    Abdominal: Soft.   Neurological: She is alert.   Skin: Skin is warm and dry.   Psychiatric: She has a normal mood and affect. Thought content normal.   Vitals reviewed.      Significant Labs:  BMP:   Recent Labs   Lab 05/23/19  0451   GLU 80      K 3.7   CL 94*   CO2 38*   BUN 26*   CREATININE 0.9   CALCIUM 9.9   MG 2.1     CBC:   Recent Labs   Lab 05/23/19  0451   WBC 7.61   RBC 3.98*   HGB 12.3   HCT 37.6   *   MCV 95   MCH 30.9   MCHC 32.7       Significant Diagnostics:  CXR: I have reviewed all pertinent results/findings within the past 24 hours    VTE Risk Mitigation (From admission, onward)        Ordered     enoxaparin injection 40 mg  Daily      05/22/19 1156     IP VTE HIGH RISK PATIENT  Once      05/22/19 1156     Place sequential compression device  Until discontinued      05/22/19 1100        Assessment/Plan:     * Spontaneous pneumothorax  89 yo female with spontaneous PTX s/p pigtail placement in ED.     Chest tube placed to water seal this morning. Will repeat a CXR in 4 hours. If lung remains stable will clamp CT for extended clamp trial.   Wean oxygen  Daily CXR  If acute change in breathing may need CXR sooner.             Arleen Rodriguez PA-C  Thoracic Surgery  [facility]

## 2019-05-23 NOTE — HPI
"Venus Mayer is a 89 y.o. female with a hx of HLD, CAD, HTN, and COPD on 3L of oxygen at home, who presented for evaluation of SOB to the ED this morning. She reported acute onset SOB this morning. Pt noted to be in distress on presentation with saturations in the high 70s as well as tachycardia. Pneumothorax noted on CXR. Pigtail catheter placed by ED with resolution of pneumothorax. Patient denies any falls or other traumas recently. Of note, she reports a distant hx of left sided "air around my lung" as well as a left sided "lung mass" excision. Denies any pathology on the right. Pt reports resolution of her SOB with placement of pigtail catheter.   "

## 2019-05-23 NOTE — ASSESSMENT & PLAN NOTE
89 yo female with spontaneous PTX s/p pigtail placement in ED.     Chest tube placed to water seal this morning. Will repeat a CXR in 4 hours. If lung remains stable will clamp CT for extended clamp trial.   Wean oxygen  Daily CXR  If acute change in breathing may need CXR sooner.

## 2019-05-23 NOTE — SUBJECTIVE & OBJECTIVE
Interval History: Overnight, patient had an eventful night, was complaining of recurrent episodes of shortness of breath. Despite multiple attempts at reposition her and chest tube, symptoms didn't resolve. On repeat CXR this morning, she was noted to have progression of her pneumothorax. CT surgery examined patient bedside and exchanged the pigtail catheter for a chest tube.     Review of Systems   Constitutional: Negative for activity change, appetite change, chills, fatigue and fever.   HENT: Negative for sneezing and sore throat.    Eyes: Negative for photophobia and visual disturbance.   Respiratory: Positive for cough and shortness of breath. Negative for chest tightness and wheezing.    Cardiovascular: Positive for leg swelling. Negative for chest pain and palpitations.   Gastrointestinal: Negative for abdominal distention, abdominal pain, diarrhea, nausea and vomiting.   Genitourinary: Negative for difficulty urinating and dysuria.   Musculoskeletal: Negative for arthralgias, back pain, neck pain and neck stiffness.   Skin: Positive for wound (chest tube in place). Negative for pallor.   Neurological: Negative for dizziness, tremors, speech difficulty, weakness and headaches.   Psychiatric/Behavioral: Negative for confusion. The patient is not nervous/anxious.      Objective:     Vital Signs (Most Recent):  Temp: 97.8 °F (36.6 °C) (05/23/19 1118)  Pulse: 101 (05/23/19 1129)  Resp: 18 (05/23/19 1118)  BP: (!) 116/53 (05/23/19 1118)  SpO2: 99 % (05/23/19 1004) Vital Signs (24h Range):  Temp:  [96.4 °F (35.8 °C)-98.4 °F (36.9 °C)] 97.8 °F (36.6 °C)  Pulse:  [] 101  Resp:  [18-22] 18  SpO2:  [93 %-100 %] 99 %  BP: ()/() 116/53     Weight: 54.4 kg (119 lb 14.9 oz)  Body mass index is 20.59 kg/m².    Intake/Output Summary (Last 24 hours) at 5/23/2019 1453  Last data filed at 5/23/2019 1300  Gross per 24 hour   Intake 440 ml   Output 1150 ml   Net -710 ml      Physical Exam   Constitutional:    Thin elderly female    HENT:   Head: Atraumatic.   Eyes: EOM are normal.   Neck: Neck supple.   Cardiovascular: Normal rate and regular rhythm.   Pulmonary/Chest: Effort normal.   Right Pigtail in place, no air leak    Abdominal: Soft.   Neurological: She is alert.   Skin: Skin is warm and dry. Capillary refill takes less than 2 seconds.   Psychiatric: She has a normal mood and affect. Thought content normal.   Vitals reviewed.    Significant Labs:   CBC:   Recent Labs   Lab 05/22/19  0857 05/23/19  0451   WBC 11.58 7.61   HGB 13.5 12.3   HCT 41.4 37.6    148*     CMP:   Recent Labs   Lab 05/22/19  0857 05/23/19  0451   * 141   K 4.5 3.7   CL 92* 94*   CO2 31* 38*   * 80   BUN 30* 26*   CREATININE 1.0 0.9   CALCIUM 10.8* 9.9   PROT  --  6.3   ALBUMIN 3.8 3.2*   BILITOT  --  0.5   ALKPHOS  --  84   AST  --  28   ALT  --  14   ANIONGAP 11 9   EGFRNONAA 49.7* 56.5*     Significant Imaging: I have reviewed and interpreted all pertinent imaging results/findings within the past 24 hours.

## 2019-05-23 NOTE — PHARMACY MED REC
"Admission Medication Reconciliation - Pharmacy Consult Note    The home medication history was taken by Alisa Mitchell, Pharmacy Technician.    Based on information gathered and subsequent review by the clinical pharmacist, the items below may need attention.     You may go to "Admission" then "Reconcile Home Medications" tabs to review and/or act upon these items.     Potentially problematic discrepancies with current MAR  o Patient IS taking the following which was not ordered upon admit  o Aspirin 81 mg PO daily    Potential issues to be addressed PRIOR TO DISCHARGE  o Patient was recently changed from furosemide 40 mg daily to 20 mg daily    Please address this information as you see fit.  Feel free to contact us if you have any questions or require assistance.    Alexx Jones, Pharm.D., BCPS  [phone]                    .    .            "

## 2019-05-23 NOTE — PROGRESS NOTES
Called to bedside to assess decreased breath sounds on the right after recent Papa catheter placement.   Pt HDS but with increase WOB.   Recent CXR with re-accumulation of pneumothorax  Chest tube without air leak or tidaling.  Pt was repositioned and suction was increased to -40 mmHg.  Air was evacuated after above maneuvers  Repeat CXR in an hour  Will follow up.  If catheter is not adequate, may require formal chest tube.

## 2019-05-24 PROBLEM — E61.1 IRON DEFICIENCY: Status: ACTIVE | Noted: 2019-05-24

## 2019-05-24 LAB
ALBUMIN SERPL BCP-MCNC: 3.1 G/DL (ref 3.5–5.2)
ALP SERPL-CCNC: 86 U/L (ref 55–135)
ALT SERPL W/O P-5'-P-CCNC: 15 U/L (ref 10–44)
ANION GAP SERPL CALC-SCNC: 9 MMOL/L (ref 8–16)
AST SERPL-CCNC: 29 U/L (ref 10–40)
BASOPHILS # BLD AUTO: 0.05 K/UL (ref 0–0.2)
BASOPHILS NFR BLD: 0.6 % (ref 0–1.9)
BILIRUB SERPL-MCNC: 0.6 MG/DL (ref 0.1–1)
BUN SERPL-MCNC: 25 MG/DL (ref 8–23)
CALCIUM SERPL-MCNC: 9.8 MG/DL (ref 8.7–10.5)
CHLORIDE SERPL-SCNC: 93 MMOL/L (ref 95–110)
CO2 SERPL-SCNC: 37 MMOL/L (ref 23–29)
CREAT SERPL-MCNC: 0.9 MG/DL (ref 0.5–1.4)
DIFFERENTIAL METHOD: ABNORMAL
EOSINOPHIL # BLD AUTO: 0.4 K/UL (ref 0–0.5)
EOSINOPHIL NFR BLD: 5.1 % (ref 0–8)
ERYTHROCYTE [DISTWIDTH] IN BLOOD BY AUTOMATED COUNT: 13.3 % (ref 11.5–14.5)
EST. GFR  (AFRICAN AMERICAN): >60 ML/MIN/1.73 M^2
EST. GFR  (NON AFRICAN AMERICAN): 56.5 ML/MIN/1.73 M^2
GLUCOSE SERPL-MCNC: 87 MG/DL (ref 70–110)
HCT VFR BLD AUTO: 39.9 % (ref 37–48.5)
HGB BLD-MCNC: 12.9 G/DL (ref 12–16)
IMM GRANULOCYTES # BLD AUTO: 0.02 K/UL (ref 0–0.04)
IMM GRANULOCYTES NFR BLD AUTO: 0.2 % (ref 0–0.5)
LYMPHOCYTES # BLD AUTO: 1.2 K/UL (ref 1–4.8)
LYMPHOCYTES NFR BLD: 13.9 % (ref 18–48)
MAGNESIUM SERPL-MCNC: 2 MG/DL (ref 1.6–2.6)
MCH RBC QN AUTO: 30.7 PG (ref 27–31)
MCHC RBC AUTO-ENTMCNC: 32.3 G/DL (ref 32–36)
MCV RBC AUTO: 95 FL (ref 82–98)
MONOCYTES # BLD AUTO: 1.3 K/UL (ref 0.3–1)
MONOCYTES NFR BLD: 14.4 % (ref 4–15)
NEUTROPHILS # BLD AUTO: 5.7 K/UL (ref 1.8–7.7)
NEUTROPHILS NFR BLD: 65.8 % (ref 38–73)
NRBC BLD-RTO: 0 /100 WBC
PHOSPHATE SERPL-MCNC: 2.9 MG/DL (ref 2.7–4.5)
PLATELET # BLD AUTO: 145 K/UL (ref 150–350)
PMV BLD AUTO: 10.4 FL (ref 9.2–12.9)
POTASSIUM SERPL-SCNC: 3.5 MMOL/L (ref 3.5–5.1)
PROT SERPL-MCNC: 6.1 G/DL (ref 6–8.4)
RBC # BLD AUTO: 4.2 M/UL (ref 4–5.4)
SODIUM SERPL-SCNC: 139 MMOL/L (ref 136–145)
WBC # BLD AUTO: 8.69 K/UL (ref 3.9–12.7)

## 2019-05-24 PROCEDURE — 63600175 PHARM REV CODE 636 W HCPCS: Performed by: STUDENT IN AN ORGANIZED HEALTH CARE EDUCATION/TRAINING PROGRAM

## 2019-05-24 PROCEDURE — 20600001 HC STEP DOWN PRIVATE ROOM

## 2019-05-24 PROCEDURE — 99233 SBSQ HOSP IP/OBS HIGH 50: CPT | Mod: GC,,, | Performed by: HOSPITALIST

## 2019-05-24 PROCEDURE — 97165 OT EVAL LOW COMPLEX 30 MIN: CPT

## 2019-05-24 PROCEDURE — 83735 ASSAY OF MAGNESIUM: CPT

## 2019-05-24 PROCEDURE — 36415 COLL VENOUS BLD VENIPUNCTURE: CPT

## 2019-05-24 PROCEDURE — 25000242 PHARM REV CODE 250 ALT 637 W/ HCPCS: Performed by: STUDENT IN AN ORGANIZED HEALTH CARE EDUCATION/TRAINING PROGRAM

## 2019-05-24 PROCEDURE — 97116 GAIT TRAINING THERAPY: CPT

## 2019-05-24 PROCEDURE — 80053 COMPREHEN METABOLIC PANEL: CPT

## 2019-05-24 PROCEDURE — 94640 AIRWAY INHALATION TREATMENT: CPT

## 2019-05-24 PROCEDURE — 85025 COMPLETE CBC W/AUTO DIFF WBC: CPT

## 2019-05-24 PROCEDURE — 25000003 PHARM REV CODE 250: Performed by: STUDENT IN AN ORGANIZED HEALTH CARE EDUCATION/TRAINING PROGRAM

## 2019-05-24 PROCEDURE — 27000221 HC OXYGEN, UP TO 24 HOURS

## 2019-05-24 PROCEDURE — 99233 PR SUBSEQUENT HOSPITAL CARE,LEVL III: ICD-10-PCS | Mod: GC,,, | Performed by: HOSPITALIST

## 2019-05-24 PROCEDURE — 97161 PT EVAL LOW COMPLEX 20 MIN: CPT

## 2019-05-24 PROCEDURE — 84100 ASSAY OF PHOSPHORUS: CPT

## 2019-05-24 RX ORDER — NAPROXEN SODIUM 220 MG/1
81 TABLET, FILM COATED ORAL DAILY
Status: DISCONTINUED | OUTPATIENT
Start: 2019-05-24 | End: 2019-05-31 | Stop reason: HOSPADM

## 2019-05-24 RX ORDER — RAMELTEON 8 MG/1
8 TABLET ORAL NIGHTLY PRN
Status: DISCONTINUED | OUTPATIENT
Start: 2019-05-24 | End: 2019-05-31 | Stop reason: HOSPADM

## 2019-05-24 RX ORDER — POLYETHYLENE GLYCOL 3350 17 G/17G
17 POWDER, FOR SOLUTION ORAL DAILY
Status: DISCONTINUED | OUTPATIENT
Start: 2019-05-24 | End: 2019-05-31 | Stop reason: HOSPADM

## 2019-05-24 RX ORDER — HYDROXYZINE PAMOATE 25 MG/1
25 CAPSULE ORAL EVERY 8 HOURS PRN
Status: DISCONTINUED | OUTPATIENT
Start: 2019-05-24 | End: 2019-05-31 | Stop reason: HOSPADM

## 2019-05-24 RX ORDER — AMOXICILLIN 250 MG
1 CAPSULE ORAL DAILY PRN
Status: DISCONTINUED | OUTPATIENT
Start: 2019-05-24 | End: 2019-05-31 | Stop reason: HOSPADM

## 2019-05-24 RX ORDER — IPRATROPIUM BROMIDE AND ALBUTEROL SULFATE 2.5; .5 MG/3ML; MG/3ML
3 SOLUTION RESPIRATORY (INHALATION) EVERY 6 HOURS
Status: DISCONTINUED | OUTPATIENT
Start: 2019-05-24 | End: 2019-05-31 | Stop reason: HOSPADM

## 2019-05-24 RX ORDER — FERROUS SULFATE 325(65) MG
325 TABLET, DELAYED RELEASE (ENTERIC COATED) ORAL DAILY
Status: DISCONTINUED | OUTPATIENT
Start: 2019-05-24 | End: 2019-05-31 | Stop reason: HOSPADM

## 2019-05-24 RX ORDER — FUROSEMIDE 40 MG/1
40 TABLET ORAL DAILY
Status: DISCONTINUED | OUTPATIENT
Start: 2019-05-24 | End: 2019-05-31 | Stop reason: HOSPADM

## 2019-05-24 RX ADMIN — TIOTROPIUM BROMIDE 18 MCG: 18 CAPSULE ORAL; RESPIRATORY (INHALATION) at 09:05

## 2019-05-24 RX ADMIN — IPRATROPIUM BROMIDE AND ALBUTEROL SULFATE 3 ML: .5; 3 SOLUTION RESPIRATORY (INHALATION) at 08:05

## 2019-05-24 RX ADMIN — THERA TABS 1 TABLET: TAB at 11:05

## 2019-05-24 RX ADMIN — ENOXAPARIN SODIUM 40 MG: 100 INJECTION SUBCUTANEOUS at 06:05

## 2019-05-24 RX ADMIN — FUROSEMIDE 40 MG: 10 INJECTION, SOLUTION INTRAMUSCULAR; INTRAVENOUS at 09:05

## 2019-05-24 RX ADMIN — POLYETHYLENE GLYCOL 3350 17 G: 17 POWDER, FOR SOLUTION ORAL at 11:05

## 2019-05-24 RX ADMIN — FERROUS SULFATE TAB EC 325 MG (65 MG FE EQUIVALENT) 325 MG: 325 (65 FE) TABLET DELAYED RESPONSE at 09:05

## 2019-05-24 RX ADMIN — ASPIRIN 81 MG CHEWABLE TABLET 81 MG: 81 TABLET CHEWABLE at 11:05

## 2019-05-24 RX ADMIN — IPRATROPIUM BROMIDE AND ALBUTEROL SULFATE 3 ML: .5; 3 SOLUTION RESPIRATORY (INHALATION) at 12:05

## 2019-05-24 NOTE — PT/OT/SLP EVAL
Occupational Therapy   Evaluation    Name: Venus Mayer  MRN: [id number]  Admitting Diagnosis:  Spontaneous pneumothorax      Recommendations:     Discharge Recommendations: outpatient PT  Discharge Equipment Recommendations:  none  Barriers to discharge:  None    Assessment:     Venus Mayer is a 89 y.o. female with a medical diagnosis of Spontaneous pneumothorax.  She presents with impairments listed below. Pt displayed global deconditioning requiring increased assist for ADLs and mobility at this time. Pt would benefit from skilled OT services to improve independence and overall occupational functioning.     Performance deficits affecting function: impaired self care skills, impaired endurance, impaired functional mobilty, impaired cardiopulmonary response to activity.      Rehab Prognosis: Good; patient would benefit from acute skilled OT services to address these deficits and reach maximum level of function.       Plan:     Patient to be seen 3 x/week to address the above listed problems via self-care/home management, therapeutic activities, therapeutic exercises  · Plan of Care Expires: 06/24/19  · Plan of Care Reviewed with: patient, family    Subjective     Chief Complaint: fatigue/sob w/ exertion   Patient/Family Comments/goals: return home    Occupational Profile:  Living Environment: Pt lives at home w/ steps to enter.   Previous level of function: MOD I for ADLs and mobility  Roles and Routines: N/A  Equipment Used at Home:  walker, rolling, 3-in-1 commode, bedside commode  Assistance upon Discharge: Pt has good family support upon D/C.     Pain/Comfort:  Pain Rating 1: 0/10  Pain Rating Post-Intervention 1: 0/10    Patients cultural, spiritual, [employer] conflicts given the current situation:      Objective:     Communicated with: RN prior to session.  Patient found HOB elevated with telemetry, oxygen upon OT entry to room.    General Precautions: Standard, fall   Orthopedic Precautions:RLE posterior  precautions   Braces: N/A     Occupational Performance:    Bed Mobility:    · Patient completed Scooting/Bridging with stand by assistance  · Patient completed Supine to Sit with stand by assistance    Functional Mobility/Transfers:  · Patient completed Sit <> Stand Transfer with stand by assistance  with  rolling walker   · Patient completed Bed <> Chair Transfer using Step Transfer technique with minimum assistance and for cues to reach for chair before sitting with rolling walker  · Patient completed Toilet Transfer Step Transfer technique with minimum assistance and cuesto reach for toilet before sitting with  rolling walker and grab bars  · Functional Mobility: Pt ambulated ~100 ft at [facility] w/ RW.     Activities of Daily Living:  · Upper Body Dressing: independence donned gown as robe  · Toileting: stand by assistance voided while seated on toilet     Cognitive/Visual Perceptual:  Cognitive/Psychosocial Skills:     -       Oriented to: Person, Place, Time and Situation   -       Follows Commands/attention:Follows multistep  commands  -       Communication: clear/fluent  -       Memory: No Deficits noted  -       Safety awareness/insight to disability: intact   -       Mood/Affect/Coping skills/emotional control: Appropriate to situation  Visual/Perceptual:      -Intact      Physical Exam:  Balance:    -       Pt displayed good overall balanc e  Postural examination/scapula alignment:    -       Rounded shoulders  Skin integrity: Visible skin intact  Upper Extremity Range of Motion:     -       Right Upper Extremity: WFL  -       Left Upper Extremity: WFL  Upper Extremity Strength:    -       Right Upper Extremity: WFL  -       Left Upper Extremity: WFL   Strength:    -       Right Upper Extremity: WFL  -       Left Upper Extremity: WFL  Fine Motor Coordination:    -       Intact  Gross motor coordination:   WFL    AMPAC 6 Click ADL:  AMPAC Total Score: 21    Treatment & Education:  Pt educated on  POC.  Education:    Patient left up in chair with all lines intact, call button in reach and family present    GOALS:   Multidisciplinary Problems     Occupational Therapy Goals        Problem: Occupational Therapy Goal    Goal Priority Disciplines Outcome Interventions   Occupational Therapy Goal     OT, PT/OT     Description:  Goals to be met by: 5/31/2019     Patient will increase functional independence with ADLs by performing:    UE Dressing with [place].  LE Dressing with Modified [place] and Assistive Devices as needed.  Grooming while standing at sink with Supervision.  Toileting from toilet with Supervision for hygiene and clothing management.   Toilet transfer to toilet with Supervision.                      History:     Past Medical History:   Diagnosis Date    Cardiomyopathy     Carotid artery occlusion     Hyperlipidemia     Hypertension          Past Surgical History:   Procedure Laterality Date    CARDIAC CATHETERIZATION      CAROTID ENDARTERECTOMY         Time Tracking:     OT Date of Treatment: 05/24/19  OT Start Time: 1415  OT Stop Time: 1430  OT Total Time (min): 15 min    Billable Minutes:Evaluation 15 minutes    Stefan Park OT  5/24/2019

## 2019-05-24 NOTE — ASSESSMENT & PLAN NOTE
89 yo female with spontaneous PTX s/p pigtail placement in ED.     - Chest tube to suction today  - Wean oxygen  - Daily CXR  - Remainder of care per primary team.

## 2019-05-24 NOTE — ASSESSMENT & PLAN NOTE
- ?ADHF vs COPD  - BNP 1300  - LE pitting edema on exam, no crackles of elevated JVD  - Lasix 20 PO daily at home  - Will give lasix 40 PO daily and fluid restrict, good response to lasix with net negative -1L UOP  - No recent ECHO on file, will repeat

## 2019-05-24 NOTE — SUBJECTIVE & OBJECTIVE
Interval History: NAEON. Some subjective SOB this am but saturation never dropped below 92% on home level of oxygen. Will continue to monitor. Will transition to PO lasix today.     Review of Systems   Constitutional: Negative for activity change, appetite change, chills, fatigue and fever.   HENT: Negative for sneezing and sore throat.    Eyes: Negative for photophobia and visual disturbance.   Respiratory: Positive for cough and shortness of breath. Negative for chest tightness and wheezing.    Cardiovascular: Positive for leg swelling. Negative for chest pain and palpitations.   Gastrointestinal: Negative for abdominal distention, abdominal pain, diarrhea, nausea and vomiting.   Genitourinary: Negative for difficulty urinating and dysuria.   Musculoskeletal: Negative for arthralgias, back pain, neck pain and neck stiffness.   Skin: Positive for wound (chest tube in place). Negative for pallor.   Neurological: Negative for dizziness, tremors, speech difficulty, weakness and headaches.   Psychiatric/Behavioral: Negative for confusion. The patient is not nervous/anxious.      Objective:     Vital Signs (Most Recent):  Temp: 97.9 °F (36.6 °C) (05/24/19 0849)  Pulse: 85 (05/24/19 0907)  Resp: 18 (05/24/19 0907)  BP: (!) 112/57 (05/24/19 0849)  SpO2: 95 % (05/24/19 0849) Vital Signs (24h Range):  Temp:  [97.8 °F (36.6 °C)-98.5 °F (36.9 °C)] 97.9 °F (36.6 °C)  Pulse:  [] 85  Resp:  [17-20] 18  SpO2:  [93 %-100 %] 95 %  BP: (112-142)/(53-68) 112/57     Weight: 54.4 kg (119 lb 14.9 oz)  Body mass index is 20.59 kg/m².    Intake/Output Summary (Last 24 hours) at 5/24/2019 1031  Last data filed at 5/24/2019 0600  Gross per 24 hour   Intake 460 ml   Output 465 ml   Net -5 ml      Physical Exam   Constitutional:   Thin elderly female    HENT:   Head: Atraumatic.   Eyes: EOM are normal.   Neck: Neck supple.   Cardiovascular: Normal rate and regular rhythm.   Pulmonary/Chest: Effort normal.   Right Pigtail in place, no  air leak    Abdominal: Soft.   Neurological: She is alert.   Skin: Skin is warm and dry. Capillary refill takes less than 2 seconds.   Psychiatric: She has a normal mood and affect. Thought content normal.   Vitals reviewed.    Significant Labs:   CBC:   Recent Labs   Lab 05/23/19 0451 05/24/19 0424   WBC 7.61 8.69   HGB 12.3 12.9   HCT 37.6 39.9   * 145*     CMP:   Recent Labs   Lab 05/23/19 0451 05/24/19 0424    139   K 3.7 3.5   CL 94* 93*   CO2 38* 37*   GLU 80 87   BUN 26* 25*   CREATININE 0.9 0.9   CALCIUM 9.9 9.8   PROT 6.3 6.1   ALBUMIN 3.2* 3.1*   BILITOT 0.5 0.6   ALKPHOS 84 86   AST 28 29   ALT 14 15   ANIONGAP 9 9   EGFRNONAA 56.5* 56.5*     Significant Imaging: I have reviewed and interpreted all pertinent imaging results/findings within the past 24 hours.

## 2019-05-24 NOTE — PLAN OF CARE
Problem: Adult Inpatient Plan of Care  Goal: Plan of Care Review  Outcome: Ongoing (interventions implemented as appropriate)  Rested comfortably most of night, VSS, NAD. Did begin to c/o not breathing as easy as she had been, port cxr done and surg came to redress chest tube. prob not fully resolved.

## 2019-05-24 NOTE — SUBJECTIVE & OBJECTIVE
Interval History: NAEON. On 2LNC with saturations nearly 100%. Pain well controlled.     Medications:  Continuous Infusions:  Scheduled Meds:   enoxaparin  40 mg Subcutaneous Daily    ferrous sulfate  325 mg Oral Daily    furosemide  40 mg Intravenous Daily    tiotropium  18 mcg Inhalation Daily     PRN Meds:acetaminophen, albuterol, amLODIPine, calcium carbonate, dextrose 50%, dextrose 50%, glucagon (human recombinant), glucose, glucose, hydrALAZINE, oxyCODONE-acetaminophen, sodium chloride 0.9%     Review of patient's allergies indicates:   Allergen Reactions    Sulfamethoxazole-trimethoprim      Other reaction(s): Rash     Objective:     Vital Signs (Most Recent):  Temp: 98 °F (36.7 °C) (05/24/19 0400)  Pulse: 85 (05/24/19 0700)  Resp: 18 (05/24/19 0400)  BP: (!) 133/59 (05/24/19 0400)  SpO2: 100 % (05/24/19 0400) Vital Signs (24h Range):  Temp:  [96.4 °F (35.8 °C)-98.5 °F (36.9 °C)] 98 °F (36.7 °C)  Pulse:  [] 85  Resp:  [17-20] 18  SpO2:  [93 %-100 %] 100 %  BP: ()/() 133/59     Intake/Output - Last 3 Shifts       05/22 0700 - 05/23 0659 05/23 0700 - 05/24 0659 05/24 0700 - 05/25 0659    P.O. 240 560     Total Intake(mL/kg) 240 (4.4) 560 (10.3)     Urine (mL/kg/hr) 1150 675 (0.5)     Emesis/NG output 0 0     Stool 0 0     Chest Tube 0 40     Total Output 1150 715     Net -910 -155            Urine Occurrence 2 x      Stool Occurrence 0 x 0 x     Emesis Occurrence 0 x 0 x           SpO2: 100 %  O2 Device (Oxygen Therapy): nasal cannula    Physical Exam   Constitutional:   Thin elderly female    HENT:   Head: Atraumatic.   Eyes: EOM are normal.   Neck: Neck supple.   Cardiovascular: Normal rate and regular rhythm.   Pulmonary/Chest: Effort normal.   Right chest tube in place.Intermittent air leak. Minimal output.   Abdominal: Soft.   Neurological: She is alert.   Skin: Skin is warm and dry.   Psychiatric: She has a normal mood and affect. Thought content normal.   Vitals  reviewed.      Significant Labs:  BMP:   Recent Labs   Lab 05/24/19 0424   GLU 87      K 3.5   CL 93*   CO2 37*   BUN 25*   CREATININE 0.9   CALCIUM 9.8   MG 2.0     CBC:   Recent Labs   Lab 05/24/19 0424   WBC 8.69   RBC 4.20   HGB 12.9   HCT 39.9   *   MCV 95   MCH 30.7   MCHC 32.3     CMP:   Recent Labs   Lab 05/24/19 0424   GLU 87   CALCIUM 9.8   ALBUMIN 3.1*   PROT 6.1      K 3.5   CO2 37*   CL 93*   BUN 25*   CREATININE 0.9   ALKPHOS 86   ALT 15   AST 29   BILITOT 0.6     Coagulation: No results for input(s): PT, INR, APTT in the last 48 hours.    Significant Diagnostics:  CXR: I have reviewed all pertinent results/findings within the past 24 hours    VTE Risk Mitigation (From admission, onward)        Ordered     enoxaparin injection 40 mg  Daily      05/22/19 1156     IP VTE HIGH RISK PATIENT  Once      05/22/19 1156     Place sequential compression device  Until discontinued      05/22/19 1100

## 2019-05-24 NOTE — PLAN OF CARE
Problem: Physical Therapy Goal  Goal: Physical Therapy Goal  Goals to be met by: 2019    Patient will increase functional independence with mobility by performin. Supine to sit with Modified [place]  2. Sit to supine with Modified [place]  3. Sit to stand transfer with Supervision  4. Bed to chair transfer with Supervision using LRAD  5. Gait  x 200 feet with Supervision using LRAD.   6. Ascend/descend 4 stair with right Handrails Minimum Assistance.      Outcome: Ongoing (interventions implemented as appropriate)  Goals set.

## 2019-05-24 NOTE — PT/OT/SLP EVAL
Physical Therapy Evaluation    Patient Name:  Venus Mayer   MRN:  [id number]    Recommendations:     Discharge Recommendations:  outpatient PT   Discharge Equipment Recommendations: none   Barriers to discharge: None    Assessment:     Venus Mayer is a 89 y.o. female admitted with a medical diagnosis of Spontaneous pneumothorax.  She presents with the following impairments/functional limitations:  impaired endurance, weakness, impaired functional mobilty, gait instability, impaired balance, impaired cardiopulmonary response to activity. Pt cooperative and motivated during therapy session. Pt with previous (L) hip partial replacement with posterior precautions in place (~2 months post surgery per pt's daughter). Prior to hospitalization, pt going to OP PT. Pt ambulated x80ft and x15ft with RW and SBA/CGA. Pt would benefit from acute PT services to increase functional mobility. Pt on 2L O2 throughout session.    Rehab Prognosis: Good; patient would benefit from acute skilled PT services to address these deficits and reach maximum level of function.    Recent Surgery: * No surgery found *      Plan:     During this hospitalization, patient to be seen 3 x/week to address the identified rehab impairments via gait training, therapeutic activities, therapeutic exercises, neuromuscular re-education and progress toward the following goals:    · Plan of Care Expires:  06/23/19    Subjective     Chief Complaint: None stated  Patient/Family Comments/goals: To return home and return to outpatient PT   Pain/Comfort:  · Pain Rating 1: 0/10    Patients cultural, spiritual, Tenriism conflicts given the current situation: no    Living Environment:  Pt lives alone in [facility] with 4 ALEKSANDR with (R) handrail. Pt currently has 24 hour assistance from family and caregivers in home.   Prior to admission, patients level of function was ambulating (post hip replacement) with RW and recently SC.  Equipment used at home: walker, rolling,  oxygen, cane, straight, bedside commode, 3-in-1 commode. Upon discharge, patient will have assistance from family and caregivers.    Objective:     Communicated with nursing prior to session.  Patient found supine with peripheral IV, chest tube, oxygen, PureWick, telemetry  upon PT entry to room.    General Precautions: Standard, fall   Orthopedic Precautions:(LLE posterior hip precautions )   Braces: N/A     Exams:  · RLE ROM: WFL  · RLE Strength: WFL  · LLE ROM: WFL  · LLE Strength: WFL    Functional Mobility:  · Bed Mobility:     · Rolling Right: stand by assistance  · Scooting: stand by assistance  · Supine to Sit: stand by assistance    · Transfers:     · Sit to Stand:  stand by assistance with rolling walker  · Toilet Transfer: stand by assistance with  rolling walker using Step Transfer    · Gait: Ambulated x80' and x15' with RW, SBA/CGA, and seated rest break between trials. Required VC/TCing for walker management. Pt ambulated with decreased step length/liane, FFP, and decreased endurance.      · Balance: Good sitting, fair standing with RW      Therapeutic Activities and Exercises:   Discussed with pt about PT POC, goals, and recommendations. Educated pt and family to use nurse call light to transfer back to bed. Also educated pt and family about use of RW as opposed to SC currently for increased stability.     AM-PAC 6 CLICK MOBILITY  Total Score:18     Patient left up in chair with all lines intact, call button in reach and family  present.    GOALS:   Multidisciplinary Problems     Physical Therapy Goals        Problem: Physical Therapy Goal    Goal Priority Disciplines Outcome Goal Variances Interventions   Physical Therapy Goal     PT, PT/OT Ongoing (interventions implemented as appropriate)     Description:  Goals to be met by: 2019    Patient will increase functional independence with mobility by performin. Supine to sit with Modified [place]  2. Sit to supine with Modified  [place]  3. Sit to stand transfer with Supervision  4. Bed to chair transfer with Supervision using LRAD  5. Gait  x 200 feet with Supervision using LRAD.   6. Ascend/descend 4 stair with right Handrails Minimum Assistance.                        History:     Past Medical History:   Diagnosis Date    Cardiomyopathy     Carotid artery occlusion     Hyperlipidemia     Hypertension        Past Surgical History:   Procedure Laterality Date    CARDIAC CATHETERIZATION      CAROTID ENDARTERECTOMY         Time Tracking:     PT Received On: 05/24/19  PT Start Time: 1407     PT Stop Time: 1434  PT Total Time (min): 27 min     Billable Minutes: Evaluation 17 and Gait Training 10      OLGA Rivera  05/24/2019

## 2019-05-24 NOTE — PROGRESS NOTES
"[facility]  Thoracic Surgery  Progress Note    Subjective:     History of Present Illness:  Venus Mayer is a 89 y.o. female with a hx of HLD, CAD, HTN, and COPD on 3L of oxygen at home, who presented for evaluation of SOB to the ED this morning. She reported acute onset SOB this morning. Pt noted to be in distress on presentation with saturations in the high 70s as well as tachycardia. Pneumothorax noted on CXR. Pigtail catheter placed by ED with resolution of pneumothorax. Patient denies any falls or other traumas recently. Of note, she reports a distant hx of left sided "air around my lung" as well as a left sided "lung mass" excision. Denies any pathology on the right. Pt reports resolution of her SOB with placement of pigtail catheter.      Post-Op Info:  * No surgery found *         Interval History: NAEON. On 2LNC with saturations nearly 100%. Pain well controlled.     Medications:  Continuous Infusions:  Scheduled Meds:   enoxaparin  40 mg Subcutaneous Daily    ferrous sulfate  325 mg Oral Daily    furosemide  40 mg Intravenous Daily    tiotropium  18 mcg Inhalation Daily     PRN Meds:acetaminophen, albuterol, amLODIPine, calcium carbonate, dextrose 50%, dextrose 50%, glucagon (human recombinant), glucose, glucose, hydrALAZINE, oxyCODONE-acetaminophen, sodium chloride 0.9%     Review of patient's allergies indicates:   Allergen Reactions    Sulfamethoxazole-trimethoprim      Other reaction(s): Rash     Objective:     Vital Signs (Most Recent):  Temp: 98 °F (36.7 °C) (05/24/19 0400)  Pulse: 85 (05/24/19 0700)  Resp: 18 (05/24/19 0400)  BP: (!) 133/59 (05/24/19 0400)  SpO2: 100 % (05/24/19 0400) Vital Signs (24h Range):  Temp:  [96.4 °F (35.8 °C)-98.5 °F (36.9 °C)] 98 °F (36.7 °C)  Pulse:  [] 85  Resp:  [17-20] 18  SpO2:  [93 %-100 %] 100 %  BP: ()/() 133/59     Intake/Output - Last 3 Shifts       05/22 0700 - 05/23 0659 05/23 0700 - 05/24 0659 05/24 0700 - 05/25 " 0659    P.O. 240 560     Total Intake(mL/kg) 240 (4.4) 560 (10.3)     Urine (mL/kg/hr) 1150 675 (0.5)     Emesis/NG output 0 0     Stool 0 0     Chest Tube 0 40     Total Output 1150 715     Net -910 -155            Urine Occurrence 2 x      Stool Occurrence 0 x 0 x     Emesis Occurrence 0 x 0 x           SpO2: 100 %  O2 Device (Oxygen Therapy): nasal cannula    Physical Exam   Constitutional:   Thin elderly female    HENT:   Head: Atraumatic.   Eyes: EOM are normal.   Neck: Neck supple.   Cardiovascular: Normal rate and regular rhythm.   Pulmonary/Chest: Effort normal.   Right chest tube in place.Intermittent air leak. Minimal output.   Abdominal: Soft.   Neurological: She is alert.   Skin: Skin is warm and dry.   Psychiatric: She has a normal mood and affect. Thought content normal.   Vitals reviewed.      Significant Labs:  BMP:   Recent Labs   Lab 05/24/19 0424   GLU 87      K 3.5   CL 93*   CO2 37*   BUN 25*   CREATININE 0.9   CALCIUM 9.8   MG 2.0     CBC:   Recent Labs   Lab 05/24/19 0424   WBC 8.69   RBC 4.20   HGB 12.9   HCT 39.9   *   MCV 95   MCH 30.7   MCHC 32.3     CMP:   Recent Labs   Lab 05/24/19 0424   GLU 87   CALCIUM 9.8   ALBUMIN 3.1*   PROT 6.1      K 3.5   CO2 37*   CL 93*   BUN 25*   CREATININE 0.9   ALKPHOS 86   ALT 15   AST 29   BILITOT 0.6     Coagulation: No results for input(s): PT, INR, APTT in the last 48 hours.    Significant Diagnostics:  CXR: I have reviewed all pertinent results/findings within the past 24 hours    VTE Risk Mitigation (From admission, onward)        Ordered     enoxaparin injection 40 mg  Daily      05/22/19 1156     IP VTE HIGH RISK PATIENT  Once      05/22/19 1156     Place sequential compression device  Until discontinued      05/22/19 1100        Assessment/Plan:     * Spontaneous pneumothorax  89 yo female with spontaneous PTX s/p pigtail placement in ED.     - Chest tube to suction today  - Wean oxygen  - Daily CXR  - Remainder of care per  primary team.             BHUPINDER Caal  Thoracic Surgery  [facility]

## 2019-05-24 NOTE — PLAN OF CARE
Problem: Adult Inpatient Plan of Care  Goal: Plan of Care Review  Outcome: Ongoing (interventions implemented as appropriate)  Chest tube exchanged for SOB, increased WOB. Respiratory status improved thereafter. VS and assessment per flow sheet, patient progressing towards goals as tolerated, plan of care reviewed with Venus Mayer and family, all concerns addressed, will continue to monitor.

## 2019-05-24 NOTE — PLAN OF CARE
Problem: Occupational Therapy Goal  Goal: Occupational Therapy Goal  Goals to be met by: 5/31/2019     Patient will increase functional independence with ADLs by performing:    UE Dressing with Brooks.  LE Dressing with Modified Brooks and Assistive Devices as needed.  Grooming while standing at sink with Supervision.  Toileting from toilet with Supervision for hygiene and clothing management.   Toilet transfer to toilet with Supervision.    Initiate OT POC     Comments: Stefan Park OTR/L  5/24/2019

## 2019-05-24 NOTE — PROGRESS NOTES
[facility] Hospital Medicine  Progress Note    Patient Name: Venus Mayer  MRN: [id number]  Patient Class: IP- Inpatient   Admission Date: 5/22/2019  Length of Stay: 2 days  Attending Physician: Cherry Mar MD  Primary Care Provider: Jona Che MD    [facility] Medicine Team: [facility]OSP MED 2 Shane Jo MD    Subjective:     Principal Problem:Spontaneous pneumothorax    HPI:  Venus Mayer is a 89 year old female with a medical history significant for HLD, HTN, COPD (on home O2 3L), HOCM s/p AICD, carotid artery stenosis s/p L CEA 2008 and prior L sided spontaneous pneumothorax who presents to [facility] for evaluation of acute onset shortness of breath. Pt states that she developed acute shortness of breath yesterday afternoon around 3PM. Pt states that she took her rescue inhalers, nebulized saline and an allerga without any improvement. Pt states that she tried to go to sleep but was unable due to dyspnea. Pt states that this am one of her children felt that as she was not improving they should call 911. On arrival to ED, pt had SpO2 of 78%. CXR in ED showed a large right pneumothorax with marked compressive atelectasis of the right lung. A right sided pleural catheter was placed in ED with immediate resolution of shortness of breath. Repeat CXR showed a decrease in the volume of pneumothorax following catheter placement, with partial re-expansion of the right lung. Oxygen saturation increased to 88-93% on home oxygen requirement.     Pt denies any chest pain. Pt endorses chronic shortness of breath that acute worsened yesterday but has since resolved. Pt endorses a recent sinus infection that was treated with antibiotics. Pt denies N/V, fever/chills, change in bowel or bladder habits, HA or focal/general weakness.    Pt receieves majority of care at [facility].    Hospital Course:  Pt admitted to Hospital Medicine. Overnight, she had worsening SOB requiring  repeat CXR which noted progression of spontaneous pneumothorax. CT surgery evaluated patient and changed pigtail catheter to chest tube. Since then, pt has been doing well with O2 sats remaining above 90%. Pt was weaned to home O2 and transitioned to PO lasix.     Interval History: NAEON. Some subjective SOB this am but saturation never dropped below 92% on home level of oxygen. Will continue to monitor. Will transition to PO lasix today.     Review of Systems   Constitutional: Negative for activity change, appetite change, chills, fatigue and fever.   HENT: Negative for sneezing and sore throat.    Eyes: Negative for photophobia and visual disturbance.   Respiratory: Positive for cough and shortness of breath. Negative for chest tightness and wheezing.    Cardiovascular: Positive for leg swelling. Negative for chest pain and palpitations.   Gastrointestinal: Negative for abdominal distention, abdominal pain, diarrhea, nausea and vomiting.   Genitourinary: Negative for difficulty urinating and dysuria.   Musculoskeletal: Negative for arthralgias, back pain, neck pain and neck stiffness.   Skin: Positive for wound (chest tube in place). Negative for pallor.   Neurological: Negative for dizziness, tremors, speech difficulty, weakness and headaches.   Psychiatric/Behavioral: Negative for confusion. The patient is not nervous/anxious.      Objective:     Vital Signs (Most Recent):  Temp: 97.9 °F (36.6 °C) (05/24/19 0849)  Pulse: 85 (05/24/19 0907)  Resp: 18 (05/24/19 0907)  BP: (!) 112/57 (05/24/19 0849)  SpO2: 95 % (05/24/19 0849) Vital Signs (24h Range):  Temp:  [97.8 °F (36.6 °C)-98.5 °F (36.9 °C)] 97.9 °F (36.6 °C)  Pulse:  [] 85  Resp:  [17-20] 18  SpO2:  [93 %-100 %] 95 %  BP: (112-142)/(53-68) 112/57     Weight: 54.4 kg (119 lb 14.9 oz)  Body mass index is 20.59 kg/m².    Intake/Output Summary (Last 24 hours) at 5/24/2019 1031  Last data filed at 5/24/2019 0600  Gross per 24 hour   Intake 460 ml   Output  465 ml   Net -5 ml      Physical Exam   Constitutional:   Thin elderly female    HENT:   Head: Atraumatic.   Eyes: EOM are normal.   Neck: Neck supple.   Cardiovascular: Normal rate and regular rhythm.   Pulmonary/Chest: Effort normal.   Right Pigtail in place, no air leak    Abdominal: Soft.   Neurological: She is alert.   Skin: Skin is warm and dry. Capillary refill takes less than 2 seconds.   Psychiatric: She has a normal mood and affect. Thought content normal.   Vitals reviewed.    Significant Labs:   CBC:   Recent Labs   Lab 05/23/19 0451 05/24/19 0424   WBC 7.61 8.69   HGB 12.3 12.9   HCT 37.6 39.9   * 145*     CMP:   Recent Labs   Lab 05/23/19 0451 05/24/19 0424    139   K 3.7 3.5   CL 94* 93*   CO2 38* 37*   GLU 80 87   BUN 26* 25*   CREATININE 0.9 0.9   CALCIUM 9.9 9.8   PROT 6.3 6.1   ALBUMIN 3.2* 3.1*   BILITOT 0.5 0.6   ALKPHOS 84 86   AST 28 29   ALT 14 15   ANIONGAP 9 9   EGFRNONAA 56.5* 56.5*     Significant Imaging: I have reviewed and interpreted all pertinent imaging results/findings within the past 24 hours.    Assessment/Plan:      * Spontaneous pneumothorax  - 89 year old female with preexisting lung disease (COPD) and prior spontaneous PTX presenting with acute onset SOB not responsive to COPD medications or rescue inhalers  - Pigtail cath initially placed by CT surgery, replaced this am (5/22/19) by chest tube with improvement in dyspnea     - Daily CXR  - CXR 5/22/19 0905 - Large right pneumothorax with marked compressive atelectasis of the right lung  - CXR 5/22/19 0953 - Right-sided pleural catheter is now seen.  Volume of pneumothorax on the right has decreased since 05/22/2019 at 09:01, following this catheter placement, with partial re-expansion of the right lung also appreciated.  No detrimental interval change in the appearance of the chest since that time is noted.  - CXR 5/22/19 1025 - Right-sided chest tube is noted, which appears slightly reposition and overlying  the mid aspect of the right hemithorax. Unchanged radiographic appearance of the lungs.  Small pneumothorax is present along the lateral lower aspect of the right hemithorax  - CXR 5/22/19 2130 - More lateral position of right-sided chest tube tip with marked increase in size of right-sided pneumothorax.  - CXR 5/22/19 2334 - On the current examination the right pigtail catheter projects over the right hemithorax similar to the earlier study of 05/22/2019, 21:25 hours.  Size of the right pneumothorax is smaller than that 21:25 hours.  - CXR 5/23/19 0406 - Right-sided pigtail chest tube and right pneumothorax appear unchanged.  - CXR 5/23/19 0826 - Reaccumulation of right pneumothorax and slight leftward mediastinal shift despite pleural pigtail catheter.   - CXR 5/23/19 0930 - Previously present right pleural catheter has been removed, and replaced with a right thoracostomy tube.  A right pneumothorax persists, although its volume has significantly decreased since the examination referenced above, following this tube placement.  No detrimental interval change in the appearance of the chest since 05/23/2019 at 08:22 is appreciated.  - CXR 5/23/19 0311 - Right chest tube appears unchanged with possible buckling of the tube again noted. Right apical pneumothorax appears unchanged.    Iron deficiency  - hemoglobin stable  - Fe 29, 8% saturation  - Daily iron replacements PO      Muscle spasm  - pt complaining of muscle spasms in bilateral legs, no pain associated  - checking iron    Debility  - Hip fracture 2 months ago  - works with OP PT/OT  - Continue PT/OT    DNR (do not resuscitate)  - Conversation held between medical team, pt and family at bedside concerning code status.   - Pt states that per her living will, she is DNR  - Family agrees with patient decision   - [facility] DNR form signed and placed into chart       COPD (chronic obstructive pulmonary disease)  - ?COPD vs ADHF   - Continue home spiriva  - Albuterol  rescue inahler PRN for wheezing  - Oxygen as needed to maintain sp02 >88%  - Monitor respiratory status for any acute change       Heart failure, diastolic  - ?ADHF vs COPD  - BNP 1300  - LE pitting edema on exam, no crackles of elevated JVD  - Lasix 20 PO daily at home  - Will give lasix 40 PO daily and fluid restrict, good response to lasix with net negative -1L UOP  - No recent ECHO on file, will repeat    HTN (hypertension)  - Per pt records, pt takes Amlodipine 2.5mg at bed if SBP>155  - Hypertensive on arrival, resolved with resolution of SOB  - Amlodipine 2.5mg nightly prn as prescribed   - PRN Hydralazine 5 for SBP>180      HOCM (hypertrophic obstructive cardiomyopathy): Apical HCM  - AICD in placed, follows with cardiology at [facility]       VTE Risk Mitigation (From admission, onward)        Ordered     enoxaparin injection 40 mg  Daily      05/22/19 1156     IP VTE HIGH RISK PATIENT  Once      05/22/19 1156     Place sequential compression device  Until discontinued      05/22/19 1100              Shane Jo MD  Department of Hospital Medicine   [facility]

## 2019-05-24 NOTE — ASSESSMENT & PLAN NOTE
- 89 year old female with preexisting lung disease (COPD) and prior spontaneous PTX presenting with acute onset SOB not responsive to COPD medications or rescue inhalers  - Pigtail cath initially placed by CT surgery, replaced this am (5/22/19) by chest tube with improvement in dyspnea     - Daily CXR  - CXR 5/22/19 0905 - Large right pneumothorax with marked compressive atelectasis of the right lung  - CXR 5/22/19 0953 - Right-sided pleural catheter is now seen.  Volume of pneumothorax on the right has decreased since 05/22/2019 at 09:01, following this catheter placement, with partial re-expansion of the right lung also appreciated.  No detrimental interval change in the appearance of the chest since that time is noted.  - CXR 5/22/19 1025 - Right-sided chest tube is noted, which appears slightly reposition and overlying the mid aspect of the right hemithorax. Unchanged radiographic appearance of the lungs.  Small pneumothorax is present along the lateral lower aspect of the right hemithorax  - CXR 5/22/19 2130 - More lateral position of right-sided chest tube tip with marked increase in size of right-sided pneumothorax.  - CXR 5/22/19 2334 - On the current examination the right pigtail catheter projects over the right hemithorax similar to the earlier study of 05/22/2019, 21:25 hours.  Size of the right pneumothorax is smaller than that 21:25 hours.  - CXR 5/23/19 0406 - Right-sided pigtail chest tube and right pneumothorax appear unchanged.  - CXR 5/23/19 0826 - Reaccumulation of right pneumothorax and slight leftward mediastinal shift despite pleural pigtail catheter.   - CXR 5/23/19 0930 - Previously present right pleural catheter has been removed, and replaced with a right thoracostomy tube.  A right pneumothorax persists, although its volume has significantly decreased since the examination referenced above, following this tube placement.  No detrimental interval change in the appearance of the chest since  05/23/2019 at 08:22 is appreciated.  - CXR 5/23/19 0311 - Right chest tube appears unchanged with possible buckling of the tube again noted. Right apical pneumothorax appears unchanged.

## 2019-05-25 LAB
ALBUMIN SERPL BCP-MCNC: 2.9 G/DL (ref 3.5–5.2)
ALP SERPL-CCNC: 86 U/L (ref 55–135)
ALT SERPL W/O P-5'-P-CCNC: 14 U/L (ref 10–44)
ANION GAP SERPL CALC-SCNC: 9 MMOL/L (ref 8–16)
AST SERPL-CCNC: 26 U/L (ref 10–40)
BASOPHILS # BLD AUTO: 0.05 K/UL (ref 0–0.2)
BASOPHILS NFR BLD: 0.6 % (ref 0–1.9)
BILIRUB SERPL-MCNC: 0.5 MG/DL (ref 0.1–1)
BUN SERPL-MCNC: 25 MG/DL (ref 8–23)
CALCIUM SERPL-MCNC: 10.1 MG/DL (ref 8.7–10.5)
CHLORIDE SERPL-SCNC: 90 MMOL/L (ref 95–110)
CO2 SERPL-SCNC: 40 MMOL/L (ref 23–29)
CREAT SERPL-MCNC: 0.9 MG/DL (ref 0.5–1.4)
DIFFERENTIAL METHOD: ABNORMAL
EOSINOPHIL # BLD AUTO: 0.4 K/UL (ref 0–0.5)
EOSINOPHIL NFR BLD: 5.4 % (ref 0–8)
ERYTHROCYTE [DISTWIDTH] IN BLOOD BY AUTOMATED COUNT: 13.2 % (ref 11.5–14.5)
EST. GFR  (AFRICAN AMERICAN): >60 ML/MIN/1.73 M^2
EST. GFR  (NON AFRICAN AMERICAN): 56.5 ML/MIN/1.73 M^2
GLUCOSE SERPL-MCNC: 98 MG/DL (ref 70–110)
HCT VFR BLD AUTO: 38.8 % (ref 37–48.5)
HGB BLD-MCNC: 12.4 G/DL (ref 12–16)
IMM GRANULOCYTES # BLD AUTO: 0.02 K/UL (ref 0–0.04)
IMM GRANULOCYTES NFR BLD AUTO: 0.3 % (ref 0–0.5)
LYMPHOCYTES # BLD AUTO: 1.3 K/UL (ref 1–4.8)
LYMPHOCYTES NFR BLD: 16 % (ref 18–48)
MAGNESIUM SERPL-MCNC: 2.2 MG/DL (ref 1.6–2.6)
MCH RBC QN AUTO: 30.7 PG (ref 27–31)
MCHC RBC AUTO-ENTMCNC: 32 G/DL (ref 32–36)
MCV RBC AUTO: 96 FL (ref 82–98)
MONOCYTES # BLD AUTO: 1.1 K/UL (ref 0.3–1)
MONOCYTES NFR BLD: 13.5 % (ref 4–15)
NEUTROPHILS # BLD AUTO: 5.1 K/UL (ref 1.8–7.7)
NEUTROPHILS NFR BLD: 64.2 % (ref 38–73)
NRBC BLD-RTO: 0 /100 WBC
PHOSPHATE SERPL-MCNC: 3.8 MG/DL (ref 2.7–4.5)
PLATELET # BLD AUTO: 157 K/UL (ref 150–350)
PMV BLD AUTO: 10.8 FL (ref 9.2–12.9)
POTASSIUM SERPL-SCNC: 3.2 MMOL/L (ref 3.5–5.1)
PROT SERPL-MCNC: 5.9 G/DL (ref 6–8.4)
RBC # BLD AUTO: 4.04 M/UL (ref 4–5.4)
SODIUM SERPL-SCNC: 139 MMOL/L (ref 136–145)
WBC # BLD AUTO: 7.93 K/UL (ref 3.9–12.7)

## 2019-05-25 PROCEDURE — 94761 N-INVAS EAR/PLS OXIMETRY MLT: CPT

## 2019-05-25 PROCEDURE — 99233 SBSQ HOSP IP/OBS HIGH 50: CPT | Mod: GC,,, | Performed by: HOSPITALIST

## 2019-05-25 PROCEDURE — 80053 COMPREHEN METABOLIC PANEL: CPT

## 2019-05-25 PROCEDURE — 25000003 PHARM REV CODE 250: Performed by: STUDENT IN AN ORGANIZED HEALTH CARE EDUCATION/TRAINING PROGRAM

## 2019-05-25 PROCEDURE — 25000242 PHARM REV CODE 250 ALT 637 W/ HCPCS: Performed by: STUDENT IN AN ORGANIZED HEALTH CARE EDUCATION/TRAINING PROGRAM

## 2019-05-25 PROCEDURE — 63600175 PHARM REV CODE 636 W HCPCS: Performed by: STUDENT IN AN ORGANIZED HEALTH CARE EDUCATION/TRAINING PROGRAM

## 2019-05-25 PROCEDURE — 83735 ASSAY OF MAGNESIUM: CPT

## 2019-05-25 PROCEDURE — 99233 PR SUBSEQUENT HOSPITAL CARE,LEVL III: ICD-10-PCS | Mod: GC,,, | Performed by: HOSPITALIST

## 2019-05-25 PROCEDURE — 20600001 HC STEP DOWN PRIVATE ROOM

## 2019-05-25 PROCEDURE — 85025 COMPLETE CBC W/AUTO DIFF WBC: CPT

## 2019-05-25 PROCEDURE — 84100 ASSAY OF PHOSPHORUS: CPT

## 2019-05-25 PROCEDURE — 36415 COLL VENOUS BLD VENIPUNCTURE: CPT

## 2019-05-25 PROCEDURE — 94640 AIRWAY INHALATION TREATMENT: CPT

## 2019-05-25 PROCEDURE — 27000221 HC OXYGEN, UP TO 24 HOURS

## 2019-05-25 RX ORDER — POTASSIUM CHLORIDE 20 MEQ/1
40 TABLET, EXTENDED RELEASE ORAL ONCE
Status: COMPLETED | OUTPATIENT
Start: 2019-05-25 | End: 2019-05-25

## 2019-05-25 RX ADMIN — ENOXAPARIN SODIUM 40 MG: 100 INJECTION SUBCUTANEOUS at 05:05

## 2019-05-25 RX ADMIN — IPRATROPIUM BROMIDE AND ALBUTEROL SULFATE 3 ML: .5; 3 SOLUTION RESPIRATORY (INHALATION) at 12:05

## 2019-05-25 RX ADMIN — ACETAMINOPHEN 650 MG: 325 TABLET ORAL at 08:05

## 2019-05-25 RX ADMIN — ASPIRIN 81 MG CHEWABLE TABLET 81 MG: 81 TABLET CHEWABLE at 08:05

## 2019-05-25 RX ADMIN — IPRATROPIUM BROMIDE AND ALBUTEROL SULFATE 3 ML: .5; 3 SOLUTION RESPIRATORY (INHALATION) at 07:05

## 2019-05-25 RX ADMIN — POLYETHYLENE GLYCOL 3350 17 G: 17 POWDER, FOR SOLUTION ORAL at 08:05

## 2019-05-25 RX ADMIN — POTASSIUM CHLORIDE 40 MEQ: 1500 TABLET, EXTENDED RELEASE ORAL at 08:05

## 2019-05-25 RX ADMIN — THERA TABS 1 TABLET: TAB at 08:05

## 2019-05-25 RX ADMIN — FERROUS SULFATE TAB EC 325 MG (65 MG FE EQUIVALENT) 325 MG: 325 (65 FE) TABLET DELAYED RESPONSE at 08:05

## 2019-05-25 RX ADMIN — FUROSEMIDE 40 MG: 40 TABLET ORAL at 08:05

## 2019-05-25 RX ADMIN — TIOTROPIUM BROMIDE 18 MCG: 18 CAPSULE ORAL; RESPIRATORY (INHALATION) at 08:05

## 2019-05-25 RX ADMIN — IPRATROPIUM BROMIDE AND ALBUTEROL SULFATE 3 ML: .5; 3 SOLUTION RESPIRATORY (INHALATION) at 01:05

## 2019-05-25 NOTE — PROGRESS NOTES
[facility] Hospital Medicine  Progress Note    Patient Name: Venus Mayer  MRN: [id number]  Patient Class: IP- Inpatient   Admission Date: 5/22/2019  Length of Stay: 3 days  Attending Physician: Cherry Mar MD  Primary Care Provider: Jona Che MD    [facility] Medicine Team: [facility] HOSP MED 2 Shane Jo MD    Subjective:     Principal Problem:Spontaneous pneumothorax    HPI:  Venus Mayer is a 89 year old female with a medical history significant for HLD, HTN, COPD (on home O2 3L), HOCM s/p AICD, carotid artery stenosis s/p L CEA 2008 and prior L sided spontaneous pneumothorax who presents to [facility] for evaluation of acute onset shortness of breath. Pt states that she developed acute shortness of breath yesterday afternoon around 3PM. Pt states that she took her rescue inhalers, nebulized saline and an allerga without any improvement. Pt states that she tried to go to sleep but was unable due to dyspnea. Pt states that this am one of her children felt that as she was not improving they should call 911. On arrival to ED, pt had SpO2 of 78%. CXR in ED showed a large right pneumothorax with marked compressive atelectasis of the right lung. A right sided pleural catheter was placed in ED with immediate resolution of shortness of breath. Repeat CXR showed a decrease in the volume of pneumothorax following catheter placement, with partial re-expansion of the right lung. Oxygen saturation increased to 88-93% on home oxygen requirement.     Pt denies any chest pain. Pt endorses chronic shortness of breath that acute worsened yesterday but has since resolved. Pt endorses a recent sinus infection that was treated with antibiotics. Pt denies N/V, fever/chills, change in bowel or bladder habits, HA or focal/general weakness.    Pt receieves majority of care at [facility].    Hospital Course:  Pt admitted to Hospital Medicine. Overnight, she had worsening SOB requiring  repeat CXR which noted progression of spontaneous pneumothorax. CT surgery evaluated patient and changed pigtail catheter to chest tube. Since then, pt has been doing well with O2 sats remaining above 90%. Pt was weaned to home O2 and transitioned to PO lasix once euvolemic.     Interval History: NAEON. Chest tube going to water seal today per CT surgery. Pt denies any SOB or pain on exam. Euvolemic on exam on home dose of lasix and on home level of oxygen.     Review of Systems   Constitutional: Negative for activity change, appetite change, chills, fatigue and fever.   HENT: Negative for sneezing and sore throat.    Eyes: Negative for photophobia and visual disturbance.   Respiratory: Positive for cough and shortness of breath. Negative for chest tightness and wheezing.    Cardiovascular: Positive for leg swelling. Negative for chest pain and palpitations.   Gastrointestinal: Negative for abdominal distention, abdominal pain, diarrhea, nausea and vomiting.   Genitourinary: Negative for difficulty urinating and dysuria.   Musculoskeletal: Negative for arthralgias, back pain, neck pain and neck stiffness.   Skin: Positive for wound (chest tube in place). Negative for pallor.   Neurological: Negative for dizziness, tremors, speech difficulty, weakness and headaches.   Psychiatric/Behavioral: Negative for confusion. The patient is not nervous/anxious.      Objective:     Vital Signs (Most Recent):  Temp: 98 °F (36.7 °C) (05/25/19 1021)  Pulse: 94 (05/25/19 1100)  Resp: 18 (05/25/19 1021)  BP: 134/63 (05/25/19 1021)  SpO2: 96 % (05/25/19 1021) Vital Signs (24h Range):  Temp:  [97.8 °F (36.6 °C)-98.7 °F (37.1 °C)] 98 °F (36.7 °C)  Pulse:  [] 94  Resp:  [16-22] 18  SpO2:  [93 %-98 %] 96 %  BP: (114-151)/(57-74) 134/63     Weight: 54.4 kg (119 lb 14.9 oz)  Body mass index is 20.59 kg/m².    Intake/Output Summary (Last 24 hours) at 5/25/2019 1219  Last data filed at 5/25/2019 1000  Gross per 24 hour   Intake 600 ml    Output 595 ml   Net 5 ml      Physical Exam   Constitutional:   Thin elderly female    HENT:   Head: Atraumatic.   Eyes: EOM are normal.   Neck: Neck supple.   Cardiovascular: Normal rate and regular rhythm.   Pulmonary/Chest: Effort normal.   Right Pigtail in place, no air leak    Abdominal: Soft.   Neurological: She is alert.   Skin: Skin is warm and dry. Capillary refill takes less than 2 seconds.   Psychiatric: She has a normal mood and affect. Thought content normal.   Vitals reviewed.    Significant Labs:   CBC:   Recent Labs   Lab 05/24/19 0424 05/25/19 0439   WBC 8.69 7.93   HGB 12.9 12.4   HCT 39.9 38.8   * 157     CMP:   Recent Labs   Lab 05/24/19 0424 05/25/19 0439    139   K 3.5 3.2*   CL 93* 90*   CO2 37* 40*   GLU 87 98   BUN 25* 25*   CREATININE 0.9 0.9   CALCIUM 9.8 10.1   PROT 6.1 5.9*   ALBUMIN 3.1* 2.9*   BILITOT 0.6 0.5   ALKPHOS 86 86   AST 29 26   ALT 15 14   ANIONGAP 9 9   EGFRNONAA 56.5* 56.5*     Significant Imaging: I have reviewed and interpreted all pertinent imaging results/findings within the past 24 hours.    Assessment/Plan:      * Spontaneous pneumothorax    - 89 year old female with preexisting lung disease (COPD) and prior spontaneous PTX presenting with acute onset SOB not responsive to COPD medications or rescue inhalers  - Pigtail cath initially placed by CT surgery, replaced(5/22/19) by chest tube with improvement in dyspnea. Chest tube to water seal now.     - Daily CXR  - CXR 5/22/19 0905 - Large right pneumothorax with marked compressive atelectasis of the right lung  - CXR 5/22/19 0953 - Right-sided pleural catheter is now seen.  Volume of pneumothorax on the right has decreased since 05/22/2019 at 09:01, following this catheter placement, with partial re-expansion of the right lung also appreciated.  No detrimental interval change in the appearance of the chest since that time is noted.  - CXR 5/22/19 1025 - Right-sided chest tube is noted, which appears  slightly reposition and overlying the mid aspect of the right hemithorax. Unchanged radiographic appearance of the lungs.  Small pneumothorax is present along the lateral lower aspect of the right hemithorax  - CXR 5/22/19 2130 - More lateral position of right-sided chest tube tip with marked increase in size of right-sided pneumothorax.  - CXR 5/22/19 2334 - On the current examination the right pigtail catheter projects over the right hemithorax similar to the earlier study of 05/22/2019, 21:25 hours.  Size of the right pneumothorax is smaller than that 21:25 hours.  - CXR 5/23/19 0406 - Right-sided pigtail chest tube and right pneumothorax appear unchanged.  - CXR 5/23/19 0826 - Reaccumulation of right pneumothorax and slight leftward mediastinal shift despite pleural pigtail catheter.   - CXR 5/23/19 0930 - Previously present right pleural catheter has been removed, and replaced with a right thoracostomy tube.  A right pneumothorax persists, although its volume has significantly decreased since the examination referenced above, following this tube placement.  No detrimental interval change in the appearance of the chest since 05/23/2019 at 08:22 is appreciated.  - CXR 5/23/19 0311 - Right chest tube appears unchanged with possible buckling of the tube again noted. Right apical pneumothorax appears unchanged.  - CXR 5/24/19 0311 - No significant change from prior study.  - CXR 5/25/19 0548 - Stable appearance.    Iron deficiency  - hemoglobin stable  - Fe 29, 8% saturation  - Daily iron replacements PO      Muscle spasm  - pt complaining of muscle spasms in bilateral legs, no pain associated  - Fe deficient     Debility  - Hip fracture 2 months ago  - works with OP PT/OT  - Continue PT/OT    DNR (do not resuscitate)  - Conversation held between medical team, pt and family at bedside concerning code status.   - Pt states that per her living will, she is DNR  - Family agrees with patient decision   - [facility] DNR  form signed and placed into chart        COPD (chronic obstructive pulmonary disease)  - ?COPD vs ADHF   - Continue home spiriva  - Albuterol rescue inahler PRN for wheezing  - Oxygen as needed to maintain sp02 >88% (on home O2)  - Monitor respiratory status for any acute change       Heart failure, diastolic  - ?ADHF vs COPD  - BNP 1300  - LE pitting edema on exam, no crackles of elevated JVD   - Lasix 20 PO daily at home (recent change, was on 40 before)  - Will give lasix 40 PO daily and fluid restrict, good response    HTN (hypertension)  - Per pt records, pt takes Amlodipine 2.5mg at bed if SBP>155  - Hypertensive on arrival, resolved with resolution of SOB  - Amlodipine 2.5mg nightly prn as prescribed   - PRN Hydralazine 5 for SBP>180       HOCM (hypertrophic obstructive cardiomyopathy): Apical HCM  - AICD in placed, follows with cardiology at [facility]       VTE Risk Mitigation (From admission, onward)        Ordered     enoxaparin injection 40 mg  Daily      05/22/19 1156     IP VTE HIGH RISK PATIENT  Once      05/22/19 1156     Place sequential compression device  Until discontinued      05/22/19 1100              Shane Jo MD  Department of Hospital Medicine   [facility]

## 2019-05-25 NOTE — SUBJECTIVE & OBJECTIVE
Interval History: NAEON. Chest tube going to water seal today per CT surgery. Pt denies any SOB or pain on exam. Euvolemic on exam on home dose of lasix and on home level of oxygen.     Review of Systems   Constitutional: Negative for activity change, appetite change, chills, fatigue and fever.   HENT: Negative for sneezing and sore throat.    Eyes: Negative for photophobia and visual disturbance.   Respiratory: Positive for cough and shortness of breath. Negative for chest tightness and wheezing.    Cardiovascular: Positive for leg swelling. Negative for chest pain and palpitations.   Gastrointestinal: Negative for abdominal distention, abdominal pain, diarrhea, nausea and vomiting.   Genitourinary: Negative for difficulty urinating and dysuria.   Musculoskeletal: Negative for arthralgias, back pain, neck pain and neck stiffness.   Skin: Positive for wound (chest tube in place). Negative for pallor.   Neurological: Negative for dizziness, tremors, speech difficulty, weakness and headaches.   Psychiatric/Behavioral: Negative for confusion. The patient is not nervous/anxious.      Objective:     Vital Signs (Most Recent):  Temp: 98 °F (36.7 °C) (05/25/19 1021)  Pulse: 94 (05/25/19 1100)  Resp: 18 (05/25/19 1021)  BP: 134/63 (05/25/19 1021)  SpO2: 96 % (05/25/19 1021) Vital Signs (24h Range):  Temp:  [97.8 °F (36.6 °C)-98.7 °F (37.1 °C)] 98 °F (36.7 °C)  Pulse:  [] 94  Resp:  [16-22] 18  SpO2:  [93 %-98 %] 96 %  BP: (114-151)/(57-74) 134/63     Weight: 54.4 kg (119 lb 14.9 oz)  Body mass index is 20.59 kg/m².    Intake/Output Summary (Last 24 hours) at 5/25/2019 1219  Last data filed at 5/25/2019 1000  Gross per 24 hour   Intake 600 ml   Output 595 ml   Net 5 ml      Physical Exam   Constitutional:   Thin elderly female    HENT:   Head: Atraumatic.   Eyes: EOM are normal.   Neck: Neck supple.   Cardiovascular: Normal rate and regular rhythm.   Pulmonary/Chest: Effort normal.   Right Pigtail in place, no air leak     Abdominal: Soft.   Neurological: She is alert.   Skin: Skin is warm and dry. Capillary refill takes less than 2 seconds.   Psychiatric: She has a normal mood and affect. Thought content normal.   Vitals reviewed.    Significant Labs:   CBC:   Recent Labs   Lab 05/24/19 0424 05/25/19 0439   WBC 8.69 7.93   HGB 12.9 12.4   HCT 39.9 38.8   * 157     CMP:   Recent Labs   Lab 05/24/19 0424 05/25/19 0439    139   K 3.5 3.2*   CL 93* 90*   CO2 37* 40*   GLU 87 98   BUN 25* 25*   CREATININE 0.9 0.9   CALCIUM 9.8 10.1   PROT 6.1 5.9*   ALBUMIN 3.1* 2.9*   BILITOT 0.6 0.5   ALKPHOS 86 86   AST 29 26   ALT 15 14   ANIONGAP 9 9   EGFRNONAA 56.5* 56.5*     Significant Imaging: I have reviewed and interpreted all pertinent imaging results/findings within the past 24 hours.

## 2019-05-25 NOTE — SUBJECTIVE & OBJECTIVE
Interval History: NAEON. On 2LNC with saturations nearly 100%. Pain well controlled.     Medications:  Continuous Infusions:  Scheduled Meds:   albuterol-ipratropium  3 mL Nebulization Q6H    aspirin  81 mg Oral Daily    enoxaparin  40 mg Subcutaneous Daily    ferrous sulfate  325 mg Oral Daily    furosemide  40 mg Oral Daily    multivitamin  1 tablet Oral Daily    polyethylene glycol  17 g Oral Daily    tiotropium  18 mcg Inhalation Daily     PRN Meds:acetaminophen, albuterol, amLODIPine, calcium carbonate, dextrose 50%, dextrose 50%, glucagon (human recombinant), glucose, glucose, hydrALAZINE, hydrOXYzine pamoate, oxyCODONE-acetaminophen, ramelteon, senna-docusate 8.6-50 mg, sodium chloride 0.9%     Review of patient's allergies indicates:   Allergen Reactions    Sulfamethoxazole-trimethoprim      Other reaction(s): Rash     Objective:     Vital Signs (Most Recent):  Temp: 98.2 °F (36.8 °C) (05/25/19 0347)  Pulse: 86 (05/25/19 0815)  Resp: 16 (05/25/19 0815)  BP: 127/67 (05/25/19 0347)  SpO2: (!) 93 % (05/25/19 0716) Vital Signs (24h Range):  Temp:  [97.8 °F (36.6 °C)-98.7 °F (37.1 °C)] 98.2 °F (36.8 °C)  Pulse:  [] 86  Resp:  [16-22] 16  SpO2:  [93 %-99 %] 93 %  BP: (114-151)/(57-74) 127/67     Intake/Output - Last 3 Shifts       05/23 0700 - 05/24 0659 05/24 0700 - 05/25 0659 05/25 0700 - 05/26 0659    P.O. 560 600     Total Intake(mL/kg) 560 (10.3) 600 (11)     Urine (mL/kg/hr) 675 (0.5) 1200 (0.9)     Emesis/NG output 0 0     Stool 0 0     Blood  0     Chest Tube 40 45     Total Output 715 1245     Net -155 -645            Urine Occurrence  1 x     Stool Occurrence 0 x 2 x     Emesis Occurrence 0 x            SpO2: (!) 93 %  O2 Device (Oxygen Therapy): nasal cannula w/ humidification    Physical Exam   Constitutional:   Thin elderly female    HENT:   Head: Atraumatic.   Eyes: EOM are normal.   Neck: Neck supple.   Cardiovascular: Normal rate and regular rhythm.   Pulmonary/Chest: Effort normal.    Right chest tube in place. No air leak, no tidaling. 45mL output in last 24hrs.   Abdominal: Soft.   Neurological: She is alert.   Skin: Skin is warm and dry.   Psychiatric: She has a normal mood and affect. Thought content normal.   Vitals reviewed.      Significant Labs:  BMP:   Recent Labs   Lab 05/25/19 0439   GLU 98      K 3.2*   CL 90*   CO2 40*   BUN 25*   CREATININE 0.9   CALCIUM 10.1   MG 2.2     CBC:   Recent Labs   Lab 05/25/19 0439   WBC 7.93   RBC 4.04   HGB 12.4   HCT 38.8      MCV 96   MCH 30.7   MCHC 32.0     CMP:   Recent Labs   Lab 05/25/19 0439   GLU 98   CALCIUM 10.1   ALBUMIN 2.9*   PROT 5.9*      K 3.2*   CO2 40*   CL 90*   BUN 25*   CREATININE 0.9   ALKPHOS 86   ALT 14   AST 26   BILITOT 0.5     Coagulation: No results for input(s): PT, INR, APTT in the last 48 hours.    Significant Diagnostics:  CXR: I have reviewed all pertinent results/findings within the past 24 hours    VTE Risk Mitigation (From admission, onward)        Ordered     enoxaparin injection 40 mg  Daily      05/22/19 1156     IP VTE HIGH RISK PATIENT  Once      05/22/19 1156     Place sequential compression device  Until discontinued      05/22/19 1100        ROS

## 2019-05-25 NOTE — ASSESSMENT & PLAN NOTE
- 89 year old female with preexisting lung disease (COPD) and prior spontaneous PTX presenting with acute onset SOB not responsive to COPD medications or rescue inhalers  - Pigtail cath initially placed by CT surgery, replaced(5/22/19) by chest tube with improvement in dyspnea. Chest tube to water seal now.     - Daily CXR  - CXR 5/22/19 0905 - Large right pneumothorax with marked compressive atelectasis of the right lung  - CXR 5/22/19 0953 - Right-sided pleural catheter is now seen.  Volume of pneumothorax on the right has decreased since 05/22/2019 at 09:01, following this catheter placement, with partial re-expansion of the right lung also appreciated.  No detrimental interval change in the appearance of the chest since that time is noted.  - CXR 5/22/19 1025 - Right-sided chest tube is noted, which appears slightly reposition and overlying the mid aspect of the right hemithorax. Unchanged radiographic appearance of the lungs.  Small pneumothorax is present along the lateral lower aspect of the right hemithorax  - CXR 5/22/19 2130 - More lateral position of right-sided chest tube tip with marked increase in size of right-sided pneumothorax.  - CXR 5/22/19 2334 - On the current examination the right pigtail catheter projects over the right hemithorax similar to the earlier study of 05/22/2019, 21:25 hours.  Size of the right pneumothorax is smaller than that 21:25 hours.  - CXR 5/23/19 0406 - Right-sided pigtail chest tube and right pneumothorax appear unchanged.  - CXR 5/23/19 0826 - Reaccumulation of right pneumothorax and slight leftward mediastinal shift despite pleural pigtail catheter.   - CXR 5/23/19 0930 - Previously present right pleural catheter has been removed, and replaced with a right thoracostomy tube.  A right pneumothorax persists, although its volume has significantly decreased since the examination referenced above, following this tube placement.  No detrimental interval change in the  appearance of the chest since 05/23/2019 at 08:22 is appreciated.  - CXR 5/23/19 0311 - Right chest tube appears unchanged with possible buckling of the tube again noted. Right apical pneumothorax appears unchanged.  - CXR 5/24/19 0311 - No significant change from prior study.  - CXR 5/25/19 0548 - Stable appearance.

## 2019-05-25 NOTE — ASSESSMENT & PLAN NOTE
- ?COPD vs ADHF   - Continue home spiriva  - Albuterol rescue inahler PRN for wheezing  - Oxygen as needed to maintain sp02 >88% (on home O2)  - Monitor respiratory status for any acute change

## 2019-05-25 NOTE — PROGRESS NOTES
[facility]  Cardiothoracic Surgery  Progress Note    Patient Name: Venus Mayer  MRN: [id number]  Admission Date: 5/22/2019  Hospital Length of Stay: 3 days  Code Status: DNR   Attending Physician: Cherry Mar MD   Referring Provider: Self, Aaareferral  Principal Problem:Spontaneous pneumothorax      Subjective:     Post-Op Info:  * No surgery found *         Interval History: NAEON. On 2LNC with saturations nearly 100%. Pain well controlled.     Medications:  Continuous Infusions:  Scheduled Meds:   albuterol-ipratropium  3 mL Nebulization Q6H    aspirin  81 mg Oral Daily    enoxaparin  40 mg Subcutaneous Daily    ferrous sulfate  325 mg Oral Daily    furosemide  40 mg Oral Daily    multivitamin  1 tablet Oral Daily    polyethylene glycol  17 g Oral Daily    tiotropium  18 mcg Inhalation Daily     PRN Meds:acetaminophen, albuterol, amLODIPine, calcium carbonate, dextrose 50%, dextrose 50%, glucagon (human recombinant), glucose, glucose, hydrALAZINE, hydrOXYzine pamoate, oxyCODONE-acetaminophen, ramelteon, senna-docusate 8.6-50 mg, sodium chloride 0.9%     Review of patient's allergies indicates:   Allergen Reactions    Sulfamethoxazole-trimethoprim      Other reaction(s): Rash     Objective:     Vital Signs (Most Recent):  Temp: 98.2 °F (36.8 °C) (05/25/19 0347)  Pulse: 86 (05/25/19 0815)  Resp: 16 (05/25/19 0815)  BP: 127/67 (05/25/19 0347)  SpO2: (!) 93 % (05/25/19 0716) Vital Signs (24h Range):  Temp:  [97.8 °F (36.6 °C)-98.7 °F (37.1 °C)] 98.2 °F (36.8 °C)  Pulse:  [] 86  Resp:  [16-22] 16  SpO2:  [93 %-99 %] 93 %  BP: (114-151)/(57-74) 127/67     Intake/Output - Last 3 Shifts       05/23 0700 - 05/24 0659 05/24 0700 - 05/25 0659 05/25 0700 - 05/26 0659    P.O. 560 600     Total Intake(mL/kg) 560 (10.3) 600 (11)     Urine (mL/kg/hr) 675 (0.5) 1200 (0.9)     Emesis/NG output 0 0     Stool 0 0     Blood  0     Chest Tube 40 45     Total Output 392 7815     Net -816 -462             Urine Occurrence  1 x     Stool Occurrence 0 x 2 x     Emesis Occurrence 0 x            SpO2: (!) 93 %  O2 Device (Oxygen Therapy): nasal cannula w/ humidification    Physical Exam   Constitutional:   Thin elderly female    HENT:   Head: Atraumatic.   Eyes: EOM are normal.   Neck: Neck supple.   Cardiovascular: Normal rate and regular rhythm.   Pulmonary/Chest: Effort normal.   Right chest tube in place. No air leak, no tidaling. 45mL output in last 24hrs.   Abdominal: Soft.   Neurological: She is alert.   Skin: Skin is warm and dry.   Psychiatric: She has a normal mood and affect. Thought content normal.   Vitals reviewed.      Significant Labs:  BMP:   Recent Labs   Lab 05/25/19 0439   GLU 98      K 3.2*   CL 90*   CO2 40*   BUN 25*   CREATININE 0.9   CALCIUM 10.1   MG 2.2     CBC:   Recent Labs   Lab 05/25/19 0439   WBC 7.93   RBC 4.04   HGB 12.4   HCT 38.8      MCV 96   MCH 30.7   MCHC 32.0     CMP:   Recent Labs   Lab 05/25/19 0439   GLU 98   CALCIUM 10.1   ALBUMIN 2.9*   PROT 5.9*      K 3.2*   CO2 40*   CL 90*   BUN 25*   CREATININE 0.9   ALKPHOS 86   ALT 14   AST 26   BILITOT 0.5     Coagulation: No results for input(s): PT, INR, APTT in the last 48 hours.    Significant Diagnostics:  CXR: I have reviewed all pertinent results/findings within the past 24 hours    VTE Risk Mitigation (From admission, onward)        Ordered     enoxaparin injection 40 mg  Daily      05/22/19 1156     IP VTE HIGH RISK PATIENT  Once      05/22/19 1156     Place sequential compression device  Until discontinued      05/22/19 1100        ROS        Assessment/Plan:     * Spontaneous pneumothorax  89 yo female with spontaneous PTX s/p pigtail placement in ED.      - Chest tube going to water seal today  - CXR at noon to rule out pneumothorax  - Wean oxygen (at 2L NC which is home level, expect it will not be further weaned)  - Daily CXR  - Remainder of care per primary team.         Sheron Timmons,  MD  Cardiothoracic Surgery  [facility]

## 2019-05-25 NOTE — ASSESSMENT & PLAN NOTE
- ?ADHF vs COPD  - BNP 1300  - LE pitting edema on exam, no crackles of elevated JVD   - Lasix 20 PO daily at home (recent change, was on 40 before)  - Will give lasix 40 PO daily and fluid restrict, good response

## 2019-05-25 NOTE — ASSESSMENT & PLAN NOTE
89 yo female with spontaneous PTX s/p pigtail placement in ED.      - Chest tube going to water seal today  - CXR at noon to rule out pneumothorax  - Wean oxygen (at 2L NC which is home level, expect it will not be further weaned)  - Daily CXR  - Remainder of care per primary team.

## 2019-05-26 PROBLEM — F41.9 ANXIETY: Status: ACTIVE | Noted: 2019-05-26

## 2019-05-26 LAB
ALBUMIN SERPL BCP-MCNC: 2.8 G/DL (ref 3.5–5.2)
ALP SERPL-CCNC: 80 U/L (ref 55–135)
ALT SERPL W/O P-5'-P-CCNC: 11 U/L (ref 10–44)
ANION GAP SERPL CALC-SCNC: 9 MMOL/L (ref 8–16)
AST SERPL-CCNC: 25 U/L (ref 10–40)
BASOPHILS # BLD AUTO: 0.06 K/UL (ref 0–0.2)
BASOPHILS NFR BLD: 0.8 % (ref 0–1.9)
BILIRUB SERPL-MCNC: 0.4 MG/DL (ref 0.1–1)
BUN SERPL-MCNC: 22 MG/DL (ref 8–23)
CALCIUM SERPL-MCNC: 9.9 MG/DL (ref 8.7–10.5)
CHLORIDE SERPL-SCNC: 92 MMOL/L (ref 95–110)
CO2 SERPL-SCNC: 38 MMOL/L (ref 23–29)
CREAT SERPL-MCNC: 0.9 MG/DL (ref 0.5–1.4)
DIFFERENTIAL METHOD: ABNORMAL
EOSINOPHIL # BLD AUTO: 0.6 K/UL (ref 0–0.5)
EOSINOPHIL NFR BLD: 8.5 % (ref 0–8)
ERYTHROCYTE [DISTWIDTH] IN BLOOD BY AUTOMATED COUNT: 13.5 % (ref 11.5–14.5)
EST. GFR  (AFRICAN AMERICAN): >60 ML/MIN/1.73 M^2
EST. GFR  (NON AFRICAN AMERICAN): 56.5 ML/MIN/1.73 M^2
GLUCOSE SERPL-MCNC: 109 MG/DL (ref 70–110)
HCT VFR BLD AUTO: 37.7 % (ref 37–48.5)
HGB BLD-MCNC: 12 G/DL (ref 12–16)
IMM GRANULOCYTES # BLD AUTO: 0.03 K/UL (ref 0–0.04)
IMM GRANULOCYTES NFR BLD AUTO: 0.4 % (ref 0–0.5)
LYMPHOCYTES # BLD AUTO: 1.3 K/UL (ref 1–4.8)
LYMPHOCYTES NFR BLD: 18 % (ref 18–48)
MAGNESIUM SERPL-MCNC: 1.9 MG/DL (ref 1.6–2.6)
MCH RBC QN AUTO: 30.7 PG (ref 27–31)
MCHC RBC AUTO-ENTMCNC: 31.8 G/DL (ref 32–36)
MCV RBC AUTO: 96 FL (ref 82–98)
MONOCYTES # BLD AUTO: 1 K/UL (ref 0.3–1)
MONOCYTES NFR BLD: 14.1 % (ref 4–15)
NEUTROPHILS # BLD AUTO: 4.3 K/UL (ref 1.8–7.7)
NEUTROPHILS NFR BLD: 58.2 % (ref 38–73)
NRBC BLD-RTO: 0 /100 WBC
PHOSPHATE SERPL-MCNC: 3.2 MG/DL (ref 2.7–4.5)
PLATELET # BLD AUTO: 143 K/UL (ref 150–350)
PMV BLD AUTO: 10.2 FL (ref 9.2–12.9)
POTASSIUM SERPL-SCNC: 3.8 MMOL/L (ref 3.5–5.1)
PROT SERPL-MCNC: 5.8 G/DL (ref 6–8.4)
RBC # BLD AUTO: 3.91 M/UL (ref 4–5.4)
SODIUM SERPL-SCNC: 139 MMOL/L (ref 136–145)
WBC # BLD AUTO: 7.32 K/UL (ref 3.9–12.7)

## 2019-05-26 PROCEDURE — 94640 AIRWAY INHALATION TREATMENT: CPT

## 2019-05-26 PROCEDURE — 25000003 PHARM REV CODE 250: Performed by: STUDENT IN AN ORGANIZED HEALTH CARE EDUCATION/TRAINING PROGRAM

## 2019-05-26 PROCEDURE — 36415 COLL VENOUS BLD VENIPUNCTURE: CPT

## 2019-05-26 PROCEDURE — 94761 N-INVAS EAR/PLS OXIMETRY MLT: CPT

## 2019-05-26 PROCEDURE — 63600175 PHARM REV CODE 636 W HCPCS: Performed by: STUDENT IN AN ORGANIZED HEALTH CARE EDUCATION/TRAINING PROGRAM

## 2019-05-26 PROCEDURE — 85025 COMPLETE CBC W/AUTO DIFF WBC: CPT

## 2019-05-26 PROCEDURE — 25000242 PHARM REV CODE 250 ALT 637 W/ HCPCS: Performed by: STUDENT IN AN ORGANIZED HEALTH CARE EDUCATION/TRAINING PROGRAM

## 2019-05-26 PROCEDURE — 27000221 HC OXYGEN, UP TO 24 HOURS

## 2019-05-26 PROCEDURE — 99233 SBSQ HOSP IP/OBS HIGH 50: CPT | Mod: GC,,, | Performed by: HOSPITALIST

## 2019-05-26 PROCEDURE — 80053 COMPREHEN METABOLIC PANEL: CPT

## 2019-05-26 PROCEDURE — 99233 PR SUBSEQUENT HOSPITAL CARE,LEVL III: ICD-10-PCS | Mod: GC,,, | Performed by: HOSPITALIST

## 2019-05-26 PROCEDURE — 94799 UNLISTED PULMONARY SVC/PX: CPT

## 2019-05-26 PROCEDURE — 84100 ASSAY OF PHOSPHORUS: CPT

## 2019-05-26 PROCEDURE — 20600001 HC STEP DOWN PRIVATE ROOM

## 2019-05-26 PROCEDURE — 83735 ASSAY OF MAGNESIUM: CPT

## 2019-05-26 RX ORDER — ROPINIROLE 0.25 MG/1
0.25 TABLET, FILM COATED ORAL NIGHTLY
Status: DISCONTINUED | OUTPATIENT
Start: 2019-05-26 | End: 2019-05-31 | Stop reason: HOSPADM

## 2019-05-26 RX ADMIN — ROPINIROLE HYDROCHLORIDE 0.25 MG: 0.25 TABLET, FILM COATED ORAL at 09:05

## 2019-05-26 RX ADMIN — ENOXAPARIN SODIUM 40 MG: 100 INJECTION SUBCUTANEOUS at 04:05

## 2019-05-26 RX ADMIN — IPRATROPIUM BROMIDE AND ALBUTEROL SULFATE 3 ML: .5; 3 SOLUTION RESPIRATORY (INHALATION) at 07:05

## 2019-05-26 RX ADMIN — IPRATROPIUM BROMIDE AND ALBUTEROL SULFATE 3 ML: .5; 3 SOLUTION RESPIRATORY (INHALATION) at 01:05

## 2019-05-26 RX ADMIN — POLYETHYLENE GLYCOL 3350 17 G: 17 POWDER, FOR SOLUTION ORAL at 08:05

## 2019-05-26 RX ADMIN — FUROSEMIDE 40 MG: 40 TABLET ORAL at 08:05

## 2019-05-26 RX ADMIN — ASPIRIN 81 MG CHEWABLE TABLET 81 MG: 81 TABLET CHEWABLE at 08:05

## 2019-05-26 RX ADMIN — ACETAMINOPHEN 650 MG: 325 TABLET ORAL at 04:05

## 2019-05-26 RX ADMIN — FERROUS SULFATE TAB EC 325 MG (65 MG FE EQUIVALENT) 325 MG: 325 (65 FE) TABLET DELAYED RESPONSE at 09:05

## 2019-05-26 RX ADMIN — ACETAMINOPHEN 650 MG: 325 TABLET ORAL at 08:05

## 2019-05-26 RX ADMIN — THERA TABS 1 TABLET: TAB at 08:05

## 2019-05-26 RX ADMIN — IPRATROPIUM BROMIDE AND ALBUTEROL SULFATE 3 ML: .5; 3 SOLUTION RESPIRATORY (INHALATION) at 02:05

## 2019-05-26 RX ADMIN — TIOTROPIUM BROMIDE 18 MCG: 18 CAPSULE ORAL; RESPIRATORY (INHALATION) at 08:05

## 2019-05-26 NOTE — ASSESSMENT & PLAN NOTE
89 yo female with spontaneous PTX s/p pigtail placement in ED.     - Chest tube to water seal yesterday  - Clamped this am  - Repeat CXR at noon, if stable will pull tube.

## 2019-05-26 NOTE — SUBJECTIVE & OBJECTIVE
Interval History: NAEON. ON 2L NC. Saturating well. No difficulty breathing. Feeling well. No new complaints or concerns.     Medications:  Continuous Infusions:  Scheduled Meds:   albuterol-ipratropium  3 mL Nebulization Q6H    aspirin  81 mg Oral Daily    enoxaparin  40 mg Subcutaneous Daily    ferrous sulfate  325 mg Oral Daily    furosemide  40 mg Oral Daily    multivitamin  1 tablet Oral Daily    polyethylene glycol  17 g Oral Daily    tiotropium  18 mcg Inhalation Daily     PRN Meds:acetaminophen, albuterol, amLODIPine, calcium carbonate, dextrose 50%, dextrose 50%, glucagon (human recombinant), glucose, glucose, hydrALAZINE, hydrOXYzine pamoate, oxyCODONE-acetaminophen, ramelteon, senna-docusate 8.6-50 mg, sodium chloride 0.9%     Review of patient's allergies indicates:   Allergen Reactions    Sulfamethoxazole-trimethoprim      Other reaction(s): Rash     Objective:     Vital Signs (Most Recent):  Temp: 96 °F (35.6 °C) (05/26/19 0809)  Pulse: 100 (05/26/19 0813)  Resp: 18 (05/26/19 0813)  BP: 119/60 (05/26/19 0809)  SpO2: 96 % (05/26/19 0809) Vital Signs (24h Range):  Temp:  [96 °F (35.6 °C)-98 °F (36.7 °C)] 96 °F (35.6 °C)  Pulse:  [] 100  Resp:  [18-20] 18  SpO2:  [93 %-100 %] 96 %  BP: (105-150)/(53-70) 119/60     Intake/Output - Last 3 Shifts       05/24 0700 - 05/25 0659 05/25 0700 - 05/26 0659 05/26 0700 - 05/27 0659    P.O. 600      Total Intake(mL/kg) 600 (11)      Urine (mL/kg/hr) 1200 (0.9) 350 (0.3)     Emesis/NG output 0      Stool 0 0     Blood 0      Chest Tube 45      Total Output 1245 350     Net -645 -350            Urine Occurrence 1 x      Stool Occurrence 2 x 0 x           SpO2: 96 %  O2 Device (Oxygen Therapy): nasal cannula    Physical Exam   Constitutional: No distress.   Thin elderly female    HENT:   Head: Atraumatic.   Eyes: EOM are normal.   Neck: Neck supple.   Cardiovascular: Normal rate and regular rhythm.   Pulmonary/Chest: Effort normal.   Right chest tube in  place. No air leak, tidaling.   Abdominal: Soft.   Neurological: She is alert.   Skin: Skin is warm and dry.   Psychiatric: She has a normal mood and affect. Thought content normal.   Vitals reviewed.      Significant Labs:  BMP:   Recent Labs   Lab 05/26/19  0436         K 3.8   CL 92*   CO2 38*   BUN 22   CREATININE 0.9   CALCIUM 9.9   MG 1.9     CBC:   Recent Labs   Lab 05/26/19 0436   WBC 7.32   RBC 3.91*   HGB 12.0   HCT 37.7   *   MCV 96   MCH 30.7   MCHC 31.8*     CMP:   Recent Labs   Lab 05/26/19  0436      CALCIUM 9.9   ALBUMIN 2.8*   PROT 5.8*      K 3.8   CO2 38*   CL 92*   BUN 22   CREATININE 0.9   ALKPHOS 80   ALT 11   AST 25   BILITOT 0.4     Coagulation: No results for input(s): PT, INR, APTT in the last 48 hours.    Significant Diagnostics:  CXR: I have reviewed all pertinent results/findings within the past 24 hours    VTE Risk Mitigation (From admission, onward)        Ordered     enoxaparin injection 40 mg  Daily      05/22/19 1156     IP VTE HIGH RISK PATIENT  Once      05/22/19 1156     Place sequential compression device  Until discontinued      05/22/19 1100        ROS

## 2019-05-26 NOTE — PLAN OF CARE
"Problem: Adult Inpatient Plan of Care  Goal: Plan of Care Review  Outcome: Ongoing (interventions implemented as appropriate)  POC reviewed with patient at bedside. Updated when daughter arrived. Had episode of SOB at beginning of shift. All VS at that time were WNL. MD notified and CXR obtained. During night reported legs "jumping" stated a nurse friend had told her years ago to eat sugar and sugar had previously relieved symptom. Reported same symptom had been present for years. Encouraged to discuss with MD on rounds. Safety maintained. Bed in low and locked position. Call light within reach. Side rails up x2. Frequent rounds.      "

## 2019-05-26 NOTE — ASSESSMENT & PLAN NOTE
89 year old female with preexisting lung disease (COPD) and prior spontaneous PTX presenting with acute onset SOB not responsive to COPD medications or rescue inhalers  - Pigtail cath initially placed by CT surgery, replaced(5/22/19) by chest tube with improvement in dyspnea. Chest tube to water seal now.   - Daily CXR  - CXR 5/22/19 0905 - Large right pneumothorax with marked compressive atelectasis of the right lung  - CXR 5/22/19 0953 - Right-sided pleural catheter is now seen.  Volume of pneumothorax on the right has decreased since 05/22/2019 at 09:01, following this catheter placement, with partial re-expansion of the right lung also appreciated.  No detrimental interval change in the appearance of the chest since that time is noted.  - CXR 5/22/19 1025 - Right-sided chest tube is noted, which appears slightly reposition and overlying the mid aspect of the right hemithorax. Unchanged radiographic appearance of the lungs.  Small pneumothorax is present along the lateral lower aspect of the right hemithorax  - CXR 5/22/19 2130 - More lateral position of right-sided chest tube tip with marked increase in size of right-sided pneumothorax.  - CXR 5/22/19 2334 - On the current examination the right pigtail catheter projects over the right hemithorax similar to the earlier study of 05/22/2019, 21:25 hours.  Size of the right pneumothorax is smaller than that 21:25 hours.  - CXR 5/23/19 0406 - Right-sided pigtail chest tube and right pneumothorax appear unchanged.  - CXR 5/23/19 0826 - Reaccumulation of right pneumothorax and slight leftward mediastinal shift despite pleural pigtail catheter.   - CXR 5/23/19 0930 - Previously present right pleural catheter has been removed, and replaced with a right thoracostomy tube.  A right pneumothorax persists, although its volume has significantly decreased since the examination referenced above, following this tube placement.  No detrimental interval change in the appearance of  the chest since 05/23/2019 at 08:22 is appreciated.  - CXR 5/23/19 0311 - Right chest tube appears unchanged with possible buckling of the tube again noted. Right apical pneumothorax appears unchanged.  - CXR 5/24/19 0311 - No significant change from prior study.  - CXR 5/25/19 0548 - Stable appearance.  - CXR 5/26/19 - appears improved c/w prior  - plan to remove chest tube today

## 2019-05-26 NOTE — SUBJECTIVE & OBJECTIVE
Interval History: Overnight, pt had another episode of anxiety. Vitals within normal and no desaturations noted. She is also complaining of restless leg.    Review of Systems   Constitutional: Negative for activity change, appetite change, chills, fatigue and fever.   HENT: Negative for sneezing and sore throat.    Eyes: Negative for photophobia and visual disturbance.   Respiratory: Positive for cough and shortness of breath. Negative for chest tightness and wheezing.    Cardiovascular: Positive for leg swelling. Negative for chest pain and palpitations.   Gastrointestinal: Negative for abdominal distention, abdominal pain, diarrhea, nausea and vomiting.   Genitourinary: Negative for difficulty urinating and dysuria.   Musculoskeletal: Negative for arthralgias, back pain, neck pain and neck stiffness.   Skin: Positive for wound (chest tube in place). Negative for pallor.   Neurological: Negative for dizziness, tremors, speech difficulty, weakness and headaches.   Psychiatric/Behavioral: Negative for confusion. The patient is not nervous/anxious.      Objective:     Vital Signs (Most Recent):  Temp: 98 °F (36.7 °C) (05/26/19 1154)  Pulse: 99 (05/26/19 1154)  Resp: 18 (05/26/19 1154)  BP: (!) 149/70 (05/26/19 1154)  SpO2: 99 % (05/26/19 1154) Vital Signs (24h Range):  Temp:  [96 °F (35.6 °C)-98 °F (36.7 °C)] 98 °F (36.7 °C)  Pulse:  [] 99  Resp:  [18-20] 18  SpO2:  [93 %-100 %] 99 %  BP: (105-150)/(53-70) 149/70     Weight: 54.4 kg (119 lb 14.9 oz)  Body mass index is 20.59 kg/m².    Intake/Output Summary (Last 24 hours) at 5/26/2019 1256  Last data filed at 5/26/2019 1100  Gross per 24 hour   Intake --   Output 450 ml   Net -450 ml      Physical Exam   Constitutional: No distress.   Thin elderly female    HENT:   Head: Atraumatic.   Eyes: EOM are normal.   Neck: Neck supple.   Cardiovascular: Normal rate and regular rhythm.   Pulmonary/Chest: Effort normal.   Right chest tube in place. No air leak, tidaling.    Abdominal: Soft.   Neurological: She is alert.   Skin: Skin is warm and dry.   Psychiatric: She has a normal mood and affect. Thought content normal.   Vitals reviewed.    Significant Labs:   CBC:   Recent Labs   Lab 05/25/19 0439 05/26/19 0436   WBC 7.93 7.32   HGB 12.4 12.0   HCT 38.8 37.7    143*     CMP:   Recent Labs   Lab 05/25/19 0439 05/26/19 0436    139   K 3.2* 3.8   CL 90* 92*   CO2 40* 38*   GLU 98 109   BUN 25* 22   CREATININE 0.9 0.9   CALCIUM 10.1 9.9   PROT 5.9* 5.8*   ALBUMIN 2.9* 2.8*   BILITOT 0.5 0.4   ALKPHOS 86 80   AST 26 25   ALT 14 11   ANIONGAP 9 9   EGFRNONAA 56.5* 56.5*     Significant Imaging: I have reviewed and interpreted all pertinent imaging results/findings within the past 24 hours.

## 2019-05-26 NOTE — PROGRESS NOTES
"[facility]  Thoracic Surgery  Progress Note    Subjective:     History of Present Illness:  Venus Mayer is a 89 y.o. female with a hx of HLD, CAD, HTN, and COPD on 3L of oxygen at home, who presented for evaluation of SOB to the ED this morning. She reported acute onset SOB this morning. Pt noted to be in distress on presentation with saturations in the high 70s as well as tachycardia. Pneumothorax noted on CXR. Pigtail catheter placed by ED with resolution of pneumothorax. Patient denies any falls or other traumas recently. Of note, she reports a distant hx of left sided "air around my lung" as well as a left sided "lung mass" excision. Denies any pathology on the right. Pt reports resolution of her SOB with placement of pigtail catheter.      Post-Op Info:  * No surgery found *         Interval History: NAEON. ON 2L NC. Saturating well. No difficulty breathing. Feeling well. No new complaints or concerns.     Medications:  Continuous Infusions:  Scheduled Meds:   albuterol-ipratropium  3 mL Nebulization Q6H    aspirin  81 mg Oral Daily    enoxaparin  40 mg Subcutaneous Daily    ferrous sulfate  325 mg Oral Daily    furosemide  40 mg Oral Daily    multivitamin  1 tablet Oral Daily    polyethylene glycol  17 g Oral Daily    tiotropium  18 mcg Inhalation Daily     PRN Meds:acetaminophen, albuterol, amLODIPine, calcium carbonate, dextrose 50%, dextrose 50%, glucagon (human recombinant), glucose, glucose, hydrALAZINE, hydrOXYzine pamoate, oxyCODONE-acetaminophen, ramelteon, senna-docusate 8.6-50 mg, sodium chloride 0.9%     Review of patient's allergies indicates:   Allergen Reactions    Sulfamethoxazole-trimethoprim      Other reaction(s): Rash     Objective:     Vital Signs (Most Recent):  Temp: 96 °F (35.6 °C) (05/26/19 0809)  Pulse: 100 (05/26/19 0813)  Resp: 18 (05/26/19 0813)  BP: 119/60 (05/26/19 0809)  SpO2: 96 % (05/26/19 0809) Vital Signs (24h Range):  Temp:  [96 °F (35.6 " °C)-98 °F (36.7 °C)] 96 °F (35.6 °C)  Pulse:  [] 100  Resp:  [18-20] 18  SpO2:  [93 %-100 %] 96 %  BP: (105-150)/(53-70) 119/60     Intake/Output - Last 3 Shifts       05/24 0700 - 05/25 0659 05/25 0700 - 05/26 0659 05/26 0700 - 05/27 0659    P.O. 600      Total Intake(mL/kg) 600 (11)      Urine (mL/kg/hr) 1200 (0.9) 350 (0.3)     Emesis/NG output 0      Stool 0 0     Blood 0      Chest Tube 45      Total Output 1245 350     Net -645 -350            Urine Occurrence 1 x      Stool Occurrence 2 x 0 x           SpO2: 96 %  O2 Device (Oxygen Therapy): nasal cannula    Physical Exam   Constitutional: No distress.   Thin elderly female    HENT:   Head: Atraumatic.   Eyes: EOM are normal.   Neck: Neck supple.   Cardiovascular: Normal rate and regular rhythm.   Pulmonary/Chest: Effort normal.   Right chest tube in place. No air leak, tidaling.   Abdominal: Soft.   Neurological: She is alert.   Skin: Skin is warm and dry.   Psychiatric: She has a normal mood and affect. Thought content normal.   Vitals reviewed.      Significant Labs:  BMP:   Recent Labs   Lab 05/26/19  0436         K 3.8   CL 92*   CO2 38*   BUN 22   CREATININE 0.9   CALCIUM 9.9   MG 1.9     CBC:   Recent Labs   Lab 05/26/19  0436   WBC 7.32   RBC 3.91*   HGB 12.0   HCT 37.7   *   MCV 96   MCH 30.7   MCHC 31.8*     CMP:   Recent Labs   Lab 05/26/19  0436      CALCIUM 9.9   ALBUMIN 2.8*   PROT 5.8*      K 3.8   CO2 38*   CL 92*   BUN 22   CREATININE 0.9   ALKPHOS 80   ALT 11   AST 25   BILITOT 0.4     Coagulation: No results for input(s): PT, INR, APTT in the last 48 hours.    Significant Diagnostics:  CXR: I have reviewed all pertinent results/findings within the past 24 hours    VTE Risk Mitigation (From admission, onward)        Ordered     enoxaparin injection 40 mg  Daily      05/22/19 1156     IP VTE HIGH RISK PATIENT  Once      05/22/19 1156     Place sequential compression device  Until discontinued       05/22/19 1100        ROS      Assessment/Plan:     * Spontaneous pneumothorax  89 yo female with spontaneous PTX s/p pigtail placement in ED.     - Chest tube to water seal yesterday  - Clamped this am  - Repeat CXR at noon, if stable will pull tube.             Juan Luis Gooden MD  Thoracic Surgery  [facility]

## 2019-05-26 NOTE — PROGRESS NOTES
[facility] Hospital Medicine  Progress Note    Patient Name: Venus Mayer  MRN: [id number]  Patient Class: IP- Inpatient   Admission Date: 5/22/2019  Length of Stay: 4 days  Attending Physician: Cherry Mar MD  Primary Care Provider: Jona Che MD    Hospital Medicine Team: [facility] 2 Genesis Clemons MD    Subjective:     Principal Problem:Spontaneous pneumothorax    HPI:  Venus Mayer is a 89 year old female with a medical history significant for HLD, HTN, COPD (on home O2 3L), HOCM s/p AICD, carotid artery stenosis s/p L CEA 2008 and prior L sided spontaneous pneumothorax who [place] [facility] [place] of acute onset shortness of breath. Pt states that she developed acute shortness of breath yesterday afternoon around 3PM. Pt states that she took her rescue inhalers, nebulized saline and an allerga without any improvement. Pt states that she tried to go to sleep but was unable due to dyspnea. Pt states that this am one of her children felt that as she was not improving they should call 911. On arrival to ED, pt had SpO2 of 78%. CXR in ED showed a large right pneumothorax with marked compressive atelectasis of the right lung. A right sided pleural catheter was placed in ED with immediate resolution of shortness of breath. Repeat CXR showed a decrease in the volume of pneumothorax following catheter placement, with partial re-expansion of the right lung. Oxygen saturation increased to 88-93% on home oxygen requirement.     Pt denies any chest pain. Pt endorses chronic shortness of breath that acute worsened yesterday but has since resolved. Pt endorses a recent sinus infection that was treated with antibiotics. Pt denies N/V, fever/chills, change in bowel or bladder habits, HA or focal/general weakness.    Pt receieves majority of care at [facility].    Hospital Course:  Pt admitted to Hospital Medicine. Overnight, she had worsening SOB requiring repeat  CXR which noted progression of spontaneous pneumothorax. CT surgery evaluated patient and changed pigtail catheter to chest tube. Since then, pt has been doing well with O2 sats remaining above 90%. Pt was weaned to home O2 and transitioned to PO lasix once euvolemic. Output via chest tube has decreased appropriately, CXR appears improved and clinically patient is back to baseline requirement of 2L via nasal cannula. Plan to take out chest tube today.    Interval History: Overnight, pt had another episode of anxiety. Vitals within normal and no desaturations noted. She is also complaining of restless leg.    Review of Systems   Constitutional: Negative for activity change, appetite change, chills, fatigue and fever.   HENT: Negative for sneezing and sore throat.    Eyes: Negative for photophobia and visual disturbance.   Respiratory: Positive for cough and shortness of breath. Negative for chest tightness and wheezing.    Cardiovascular: Positive for leg swelling. Negative for chest pain and palpitations.   Gastrointestinal: Negative for abdominal distention, abdominal pain, diarrhea, nausea and vomiting.   Genitourinary: Negative for difficulty urinating and dysuria.   Musculoskeletal: Negative for arthralgias, back pain, neck pain and neck stiffness.   Skin: Positive for wound (chest tube in place). Negative for pallor.   Neurological: Negative for dizziness, tremors, speech difficulty, weakness and headaches.   Psychiatric/Behavioral: Negative for confusion. The patient is not nervous/anxious.      Objective:     Vital Signs (Most Recent):  Temp: 98 °F (36.7 °C) (05/26/19 1154)  Pulse: 99 (05/26/19 1154)  Resp: 18 (05/26/19 1154)  BP: (!) 149/70 (05/26/19 1154)  SpO2: 99 % (05/26/19 1154) Vital Signs (24h Range):  Temp:  [96 °F (35.6 °C)-98 °F (36.7 °C)] 98 °F (36.7 °C)  Pulse:  [] 99  Resp:  [18-20] 18  SpO2:  [93 %-100 %] 99 %  BP: (105-150)/(53-70) 149/70     Weight: 54.4 kg (119 lb 14.9 oz)  Body mass  index is 20.59 kg/m².    Intake/Output Summary (Last 24 hours) at 5/26/2019 1256  Last data filed at 5/26/2019 1100  Gross per 24 hour   Intake --   Output 450 ml   Net -450 ml      Physical Exam   Constitutional: No distress.   Thin elderly female    HENT:   Head: Atraumatic.   Eyes: EOM are normal.   Neck: Neck supple.   Cardiovascular: Normal rate and regular rhythm.   Pulmonary/Chest: Effort normal.   Right chest tube in place. No air leak, tidaling.   Abdominal: Soft.   Neurological: She is alert.   Skin: Skin is warm and dry.   Psychiatric: She has a normal mood and affect. Thought content normal.   Vitals reviewed.    Significant Labs:   CBC:   Recent Labs   Lab 05/25/19 0439 05/26/19 0436   WBC 7.93 7.32   HGB 12.4 12.0   HCT 38.8 37.7    143*     CMP:   Recent Labs   Lab 05/25/19  0439 05/26/19 0436    139   K 3.2* 3.8   CL 90* 92*   CO2 40* 38*   GLU 98 109   BUN 25* 22   CREATININE 0.9 0.9   CALCIUM 10.1 9.9   PROT 5.9* 5.8*   ALBUMIN 2.9* 2.8*   BILITOT 0.5 0.4   ALKPHOS 86 80   AST 26 25   ALT 14 11   ANIONGAP 9 9   EGFRNONAA 56.5* 56.5*     Significant Imaging: I have reviewed and interpreted all pertinent imaging results/findings within the past 24 hours.    Assessment/Plan:      * Spontaneous pneumothorax  89 year old female with preexisting lung disease (COPD) and prior spontaneous PTX presenting with acute onset SOB not responsive to COPD medications or rescue inhalers  - Pigtail cath initially placed by CT surgery, replaced(5/22/19) by chest tube with improvement in dyspnea. Chest tube to water seal now.   - Daily CXR  - CXR 5/22/19 0905 - Large right pneumothorax with marked compressive atelectasis of the right lung  - CXR 5/22/19 0953 - Right-sided pleural catheter is now seen.  Volume of pneumothorax on the right has decreased since 05/22/2019 at 09:01, following this catheter placement, with partial re-expansion of the right lung also appreciated.  No detrimental interval  change in the appearance of the chest since that time is noted.  - CXR 5/22/19 1025 - Right-sided chest tube is noted, which appears slightly reposition and overlying the mid aspect of the right hemithorax. Unchanged radiographic appearance of the lungs.  Small pneumothorax is present along the lateral lower aspect of the right hemithorax  - CXR 5/22/19 2130 - More lateral position of right-sided chest tube tip with marked increase in size of right-sided pneumothorax.  - CXR 5/22/19 2334 - On the current examination the right pigtail catheter projects over the right hemithorax similar to the earlier study of 05/22/2019, 21:25 hours.  Size of the right pneumothorax is smaller than that 21:25 hours.  - CXR 5/23/19 0406 - Right-sided pigtail chest tube and right pneumothorax appear unchanged.  - CXR 5/23/19 0826 - Reaccumulation of right pneumothorax and slight leftward mediastinal shift despite pleural pigtail catheter.   - CXR 5/23/19 0930 - Previously present right pleural catheter has been removed, and replaced with a right thoracostomy tube.  A right pneumothorax persists, although its volume has significantly decreased since the examination referenced above, following this tube placement.  No detrimental interval change in the appearance of the chest since 05/23/2019 at 08:22 is appreciated.  - CXR 5/23/19 0311 - Right chest tube appears unchanged with possible buckling of the tube again noted. Right apical pneumothorax appears unchanged.  - CXR 5/24/19 0311 - No significant change from prior study.  - CXR 5/25/19 0548 - Stable appearance.  - CXR 5/26/19 - appears improved c/w prior  - plan to remove chest tube today    Anxiety  - pt has significant anxiety associated with her SOB for which we have hydroxyzine ordered prn but she refuses to take it.   - she is instead reporting restless leg syndrome for which she takes Mg at home. She was assured her Mag here was 1.9 and unlikely to cause her symptoms. In  addition, her iron levels have been checked. Due to persistence of symptoms, trial or ropinirole tonight.      COPD (chronic obstructive pulmonary disease)  - ?COPD vs ADHF   - Continue home spiriva  - Albuterol rescue inahler PRN for wheezing  - Oxygen as needed to maintain sp02 >88% (on home O2)  - Monitor respiratory status for any acute change       Muscle spasm  - pt complaining of muscle spasms in bilateral legs, no pain associated  - Fe deficient     Heart failure, diastolic  - ?ADHF vs COPD  - BNP 1300  - LE pitting edema on exam, no crackles of elevated JVD   - Lasix 20 PO daily at home (recent change, was on 40 before)  - Will give lasix 40 PO daily and fluid restrict, good response    HTN (hypertension)  - Per pt records, pt takes Amlodipine 2.5mg at bed if SBP>155  - Hypertensive on arrival, resolved with resolution of SOB  - Amlodipine 2.5mg nightly prn as prescribed   - PRN Hydralazine 5 for SBP>180       HOCM (hypertrophic obstructive cardiomyopathy): Apical HCM  - AICD in placed, [facility]     Iron deficiency  - hemoglobin stable  - Fe 29, 8% saturation  - Daily iron replacements PO      Debility  - Hip fracture 2 months ago  - works with OP PT/OT  - Continue PT/OT    DNR (do not resuscitate)  - Conversation held between medical team, pt and family at bedside concerning code status.   - Pt states that per her living will, she is DNR  - Family agrees with patient decision   - [facility] DNR form signed and placed into chart          VTE Risk Mitigation (From admission, onward)        Ordered     enoxaparin injection 40 mg  Daily      05/22/19 1156     IP VTE HIGH RISK PATIENT  Once      05/22/19 1156     Place sequential compression device  Until discontinued      05/22/19 1100      Diet: adult regular  Full code    Dispo: pending removal of chest tube and monitoring clinical status and CXR post removal. If remains stable and at current O2  Requirements, anticipate  discharge home tomorrow.     Patient seen and plan of care discussed with staff.     Genesis Clemons MD  Department of Hospital Medicine   [facility]

## 2019-05-26 NOTE — ASSESSMENT & PLAN NOTE
- pt has significant anxiety associated with her SOB for which we have hydroxyzine ordered prn but she refuses to take it.   - she is instead reporting restless leg syndrome for which she takes Mg at home. She was assured her Mag here was 1.9 and unlikely to cause her symptoms. In addition, her iron levels have been checked. Due to persistence of symptoms, trial or ropinirole tonight.

## 2019-05-27 LAB
ALBUMIN SERPL BCP-MCNC: 2.9 G/DL (ref 3.5–5.2)
ALP SERPL-CCNC: 88 U/L (ref 55–135)
ALT SERPL W/O P-5'-P-CCNC: 16 U/L (ref 10–44)
ANION GAP SERPL CALC-SCNC: 7 MMOL/L (ref 8–16)
AST SERPL-CCNC: 28 U/L (ref 10–40)
BASOPHILS # BLD AUTO: 0.04 K/UL (ref 0–0.2)
BASOPHILS NFR BLD: 0.5 % (ref 0–1.9)
BILIRUB SERPL-MCNC: 0.5 MG/DL (ref 0.1–1)
BUN SERPL-MCNC: 24 MG/DL (ref 8–23)
CALCIUM SERPL-MCNC: 9.8 MG/DL (ref 8.7–10.5)
CHLORIDE SERPL-SCNC: 91 MMOL/L (ref 95–110)
CO2 SERPL-SCNC: 38 MMOL/L (ref 23–29)
CREAT SERPL-MCNC: 0.8 MG/DL (ref 0.5–1.4)
DIFFERENTIAL METHOD: ABNORMAL
EOSINOPHIL # BLD AUTO: 0.6 K/UL (ref 0–0.5)
EOSINOPHIL NFR BLD: 7.4 % (ref 0–8)
ERYTHROCYTE [DISTWIDTH] IN BLOOD BY AUTOMATED COUNT: 13.2 % (ref 11.5–14.5)
EST. GFR  (AFRICAN AMERICAN): >60 ML/MIN/1.73 M^2
EST. GFR  (NON AFRICAN AMERICAN): >60 ML/MIN/1.73 M^2
GLUCOSE SERPL-MCNC: 84 MG/DL (ref 70–110)
HCT VFR BLD AUTO: 37.5 % (ref 37–48.5)
HGB BLD-MCNC: 11.9 G/DL (ref 12–16)
IMM GRANULOCYTES # BLD AUTO: 0.04 K/UL (ref 0–0.04)
IMM GRANULOCYTES NFR BLD AUTO: 0.5 % (ref 0–0.5)
LYMPHOCYTES # BLD AUTO: 1.4 K/UL (ref 1–4.8)
LYMPHOCYTES NFR BLD: 17.4 % (ref 18–48)
MAGNESIUM SERPL-MCNC: 2.1 MG/DL (ref 1.6–2.6)
MCH RBC QN AUTO: 31.1 PG (ref 27–31)
MCHC RBC AUTO-ENTMCNC: 31.7 G/DL (ref 32–36)
MCV RBC AUTO: 98 FL (ref 82–98)
MONOCYTES # BLD AUTO: 0.9 K/UL (ref 0.3–1)
MONOCYTES NFR BLD: 11.2 % (ref 4–15)
NEUTROPHILS # BLD AUTO: 5 K/UL (ref 1.8–7.7)
NEUTROPHILS NFR BLD: 63 % (ref 38–73)
NRBC BLD-RTO: 0 /100 WBC
PHOSPHATE SERPL-MCNC: 3.5 MG/DL (ref 2.7–4.5)
PLATELET # BLD AUTO: 165 K/UL (ref 150–350)
PMV BLD AUTO: 10.4 FL (ref 9.2–12.9)
POTASSIUM SERPL-SCNC: 4.1 MMOL/L (ref 3.5–5.1)
PROT SERPL-MCNC: 5.9 G/DL (ref 6–8.4)
RBC # BLD AUTO: 3.83 M/UL (ref 4–5.4)
SODIUM SERPL-SCNC: 136 MMOL/L (ref 136–145)
WBC # BLD AUTO: 7.94 K/UL (ref 3.9–12.7)

## 2019-05-27 PROCEDURE — 99233 SBSQ HOSP IP/OBS HIGH 50: CPT | Mod: GC,,, | Performed by: HOSPITALIST

## 2019-05-27 PROCEDURE — 25000242 PHARM REV CODE 250 ALT 637 W/ HCPCS: Performed by: STUDENT IN AN ORGANIZED HEALTH CARE EDUCATION/TRAINING PROGRAM

## 2019-05-27 PROCEDURE — 99233 PR SUBSEQUENT HOSPITAL CARE,LEVL III: ICD-10-PCS | Mod: GC,,, | Performed by: HOSPITALIST

## 2019-05-27 PROCEDURE — 94640 AIRWAY INHALATION TREATMENT: CPT

## 2019-05-27 PROCEDURE — 27000221 HC OXYGEN, UP TO 24 HOURS

## 2019-05-27 PROCEDURE — 63600175 PHARM REV CODE 636 W HCPCS: Performed by: STUDENT IN AN ORGANIZED HEALTH CARE EDUCATION/TRAINING PROGRAM

## 2019-05-27 PROCEDURE — 25000003 PHARM REV CODE 250: Performed by: STUDENT IN AN ORGANIZED HEALTH CARE EDUCATION/TRAINING PROGRAM

## 2019-05-27 PROCEDURE — 94761 N-INVAS EAR/PLS OXIMETRY MLT: CPT

## 2019-05-27 PROCEDURE — 97110 THERAPEUTIC EXERCISES: CPT

## 2019-05-27 PROCEDURE — 83735 ASSAY OF MAGNESIUM: CPT

## 2019-05-27 PROCEDURE — 84100 ASSAY OF PHOSPHORUS: CPT

## 2019-05-27 PROCEDURE — 85025 COMPLETE CBC W/AUTO DIFF WBC: CPT

## 2019-05-27 PROCEDURE — 36415 COLL VENOUS BLD VENIPUNCTURE: CPT

## 2019-05-27 PROCEDURE — 20600001 HC STEP DOWN PRIVATE ROOM

## 2019-05-27 PROCEDURE — 97116 GAIT TRAINING THERAPY: CPT

## 2019-05-27 PROCEDURE — 80053 COMPREHEN METABOLIC PANEL: CPT

## 2019-05-27 RX ORDER — TRAMADOL HYDROCHLORIDE 50 MG/1
50 TABLET ORAL ONCE
Status: COMPLETED | OUTPATIENT
Start: 2019-05-27 | End: 2019-05-27

## 2019-05-27 RX ORDER — LIDOCAINE HYDROCHLORIDE 10 MG/ML
20 INJECTION INFILTRATION; PERINEURAL ONCE
Status: DISCONTINUED | OUTPATIENT
Start: 2019-05-27 | End: 2019-05-28

## 2019-05-27 RX ORDER — LIDOCAINE HYDROCHLORIDE 10 MG/ML
30 INJECTION, SOLUTION EPIDURAL; INFILTRATION; INTRACAUDAL; PERINEURAL ONCE
Status: DISCONTINUED | OUTPATIENT
Start: 2019-05-27 | End: 2019-05-27

## 2019-05-27 RX ADMIN — FUROSEMIDE 40 MG: 40 TABLET ORAL at 09:05

## 2019-05-27 RX ADMIN — POLYETHYLENE GLYCOL 3350 17 G: 17 POWDER, FOR SOLUTION ORAL at 09:05

## 2019-05-27 RX ADMIN — FERROUS SULFATE TAB EC 325 MG (65 MG FE EQUIVALENT) 325 MG: 325 (65 FE) TABLET DELAYED RESPONSE at 09:05

## 2019-05-27 RX ADMIN — ROPINIROLE HYDROCHLORIDE 0.25 MG: 0.25 TABLET, FILM COATED ORAL at 09:05

## 2019-05-27 RX ADMIN — OXYCODONE HYDROCHLORIDE AND ACETAMINOPHEN 1 TABLET: 5; 325 TABLET ORAL at 04:05

## 2019-05-27 RX ADMIN — ACETAMINOPHEN 650 MG: 325 TABLET ORAL at 06:05

## 2019-05-27 RX ADMIN — THERA TABS 1 TABLET: TAB at 09:05

## 2019-05-27 RX ADMIN — IPRATROPIUM BROMIDE AND ALBUTEROL SULFATE 3 ML: .5; 3 SOLUTION RESPIRATORY (INHALATION) at 01:05

## 2019-05-27 RX ADMIN — IPRATROPIUM BROMIDE AND ALBUTEROL SULFATE 3 ML: .5; 3 SOLUTION RESPIRATORY (INHALATION) at 08:05

## 2019-05-27 RX ADMIN — TRAMADOL HYDROCHLORIDE 50 MG: 50 TABLET, FILM COATED ORAL at 08:05

## 2019-05-27 RX ADMIN — ENOXAPARIN SODIUM 40 MG: 100 INJECTION SUBCUTANEOUS at 04:05

## 2019-05-27 RX ADMIN — TIOTROPIUM BROMIDE 18 MCG: 18 CAPSULE ORAL; RESPIRATORY (INHALATION) at 09:05

## 2019-05-27 RX ADMIN — ACETAMINOPHEN 650 MG: 325 TABLET ORAL at 05:05

## 2019-05-27 RX ADMIN — ASPIRIN 81 MG CHEWABLE TABLET 81 MG: 81 TABLET CHEWABLE at 09:05

## 2019-05-27 NOTE — PLAN OF CARE
Problem: Physical Therapy Goal  Goal: Physical Therapy Goal  Goals to be met by: 2019    Patient will increase functional independence with mobility by performin. Supine to sit with Modified [place]  2. Sit to supine with Modified [place]  3. Sit to stand transfer with Supervision  4. Bed to chair transfer with Supervision using LRAD  5. Gait  x 200 feet with Supervision using LRAD.   6. Ascend/descend 4 stair with right Handrails Minimum Assistance.       Outcome: Ongoing (interventions implemented as appropriate)  Patient continues to progress consistently towards her goals as evidence of improved walking range.

## 2019-05-27 NOTE — PROGRESS NOTES
[facility] Hospital Medicine  Progress Note    Patient Name: Venus Mayer  MRN: [id number]  Patient Class: IP- Inpatient   Admission Date: 5/22/2019  Length of Stay: 5 days  Attending Physician: Cherry Mar MD  Primary Care Provider: Jona Che MD    [facility] Medicine Team: [facility]OSP MED 2 Shane Jo MD    Subjective:     Principal Problem:Spontaneous pneumothorax    HPI:  Venus Mayer is a 89 year old female with a medical history significant for HLD, HTN, COPD (on home O2 3L), HOCM s/p AICD, carotid artery stenosis s/p L CEA 2008 and prior L sided spontaneous pneumothorax who presents to [facility] for evaluation of acute onset shortness of breath. Pt states that she developed acute shortness of breath yesterday afternoon around 3PM. Pt states that she took her rescue inhalers, nebulized saline and an allerga without any improvement. Pt states that she tried to go to sleep but was unable due to dyspnea. Pt states that this am one of her children felt that as she was not improving they should call 911. On arrival to ED, pt had SpO2 of 78%. CXR in ED showed a large right pneumothorax with marked compressive atelectasis of the right lung. A right sided pleural catheter was placed in ED with immediate resolution of shortness of breath. Repeat CXR showed a decrease in the volume of pneumothorax following catheter placement, with partial re-expansion of the right lung. Oxygen saturation increased to 88-93% on home oxygen requirement.     Pt denies any chest pain. Pt endorses chronic shortness of breath that acute worsened yesterday but has since resolved. Pt endorses a recent sinus infection that was treated with antibiotics. Pt denies N/V, fever/chills, change in bowel or bladder habits, HA or focal/general weakness.    Pt receieves majority of care at [facility].    Hospital Course:  Venus Mayer was admitted to [facility] on 5/22/19 for evaluation of acute on chronic  SOB. In ED, pt was noted to have an elevated BNP and CXR showed spontaneous pneumothorax. CT surgery was consulted and placed a pigtail catheter. Pigtail catheter was changed to chest tube the next day following reaccumulation of PTX. IV lasix was started for dieuresis with good urine output. Pt was weaned to home O2 and transitioned to PO lasix once euvolemic. Output via chest tube has decreased appropriately, CXR appears improved and clinically patient is back to baseline requirement of 2L via nasal cannula. Chest tube was managed by CT surgery with repeated trials of clamping which were not tolerated. Plan for pleurodesis today.     Interval History: Per CT surgery, pt did not tolerate chest tube clamping trial and was placed back on water seal. Plan for pleurodesis today. Pt had complaint of SOB this am but saturation remained above 93% on home O2. Will reassess post procedure this am.     Review of Systems   Constitutional: Negative for activity change, appetite change, chills, fatigue and fever.   HENT: Negative for sneezing and sore throat.    Eyes: Negative for photophobia and visual disturbance.   Respiratory: Positive for cough and shortness of breath. Negative for chest tightness and wheezing.    Cardiovascular: Positive for leg swelling. Negative for chest pain and palpitations.   Gastrointestinal: Negative for abdominal distention, abdominal pain, diarrhea, nausea and vomiting.   Genitourinary: Negative for difficulty urinating and dysuria.   Musculoskeletal: Negative for arthralgias, back pain, neck pain and neck stiffness.   Skin: Positive for wound (chest tube in place). Negative for pallor.   Neurological: Negative for dizziness, tremors, speech difficulty, weakness and headaches.   Psychiatric/Behavioral: Negative for confusion. The patient is not nervous/anxious.      Objective:     Vital Signs (Most Recent):  Temp: 97.7 °F (36.5 °C) (05/27/19 0834)  Pulse: 85 (05/27/19 1051)  Resp: 20 (05/27/19  0956)  BP: (!) 115/59 (05/27/19 0834)  SpO2: 95 % (05/27/19 0834) Vital Signs (24h Range):  Temp:  [97.4 °F (36.3 °C)-98.5 °F (36.9 °C)] 97.7 °F (36.5 °C)  Pulse:  [] 85  Resp:  [16-24] 20  SpO2:  [95 %-99 %] 95 %  BP: (108-150)/(53-70) 115/59     Weight: 54.4 kg (119 lb 14.9 oz)  Body mass index is 20.59 kg/m².    Intake/Output Summary (Last 24 hours) at 5/27/2019 1122  Last data filed at 5/27/2019 0900  Gross per 24 hour   Intake 240 ml   Output 850 ml   Net -610 ml      Physical Exam   Constitutional: No distress.   Thin elderly female    HENT:   Head: Atraumatic.   Eyes: EOM are normal.   Neck: Neck supple.   Cardiovascular: Normal rate and regular rhythm.   Pulmonary/Chest: Effort normal.   Right chest tube in place. No air leak, tidaling.   Abdominal: Soft.   Neurological: She is alert.   Skin: Skin is warm and dry.   Psychiatric: She has a normal mood and affect. Thought content normal.   Vitals reviewed.    Significant Labs:   CBC:   Recent Labs   Lab 05/26/19  0436 05/27/19  0425   WBC 7.32 7.94   HGB 12.0 11.9*   HCT 37.7 37.5   * 165     CMP:   Recent Labs   Lab 05/26/19  0436 05/27/19  0425    136   K 3.8 4.1   CL 92* 91*   CO2 38* 38*    84   BUN 22 24*   CREATININE 0.9 0.8   CALCIUM 9.9 9.8   PROT 5.8* 5.9*   ALBUMIN 2.8* 2.9*   BILITOT 0.4 0.5   ALKPHOS 80 88   AST 25 28   ALT 11 16   ANIONGAP 9 7*   EGFRNONAA 56.5* >60.0     Significant Imaging: I have reviewed and interpreted all pertinent imaging results/findings within the past 24 hours.    Assessment/Plan:      * Spontaneous pneumothorax  89 year old female with preexisting lung disease (COPD) and prior spontaneous PTX presenting with acute onset SOB not responsive to COPD medications or rescue inhalers  - Pigtail cath initially placed by CT surgery, replaced(5/22/19) by chest tube with improvement in dyspnea. Chest tube to water seal now.   - Daily CXR  - CXR 5/22/19 0905 - Large right pneumothorax with marked  compressive atelectasis of the right lung  - CXR 5/22/19 0953 - Right-sided pleural catheter is now seen.  Volume of pneumothorax on the right has decreased since 05/22/2019 at 09:01, following this catheter placement, with partial re-expansion of the right lung also appreciated.  No detrimental interval change in the appearance of the chest since that time is noted.  - CXR 5/22/19 1025 - Right-sided chest tube is noted, which appears slightly reposition and overlying the mid aspect of the right hemithorax. Unchanged radiographic appearance of the lungs.  Small pneumothorax is present along the lateral lower aspect of the right hemithorax  - CXR 5/22/19 2130 - More lateral position of right-sided chest tube tip with marked increase in size of right-sided pneumothorax.  - CXR 5/22/19 2334 - On the current examination the right pigtail catheter projects over the right hemithorax similar to the earlier study of 05/22/2019, 21:25 hours.  Size of the right pneumothorax is smaller than that 21:25 hours.  - CXR 5/23/19 0406 - Right-sided pigtail chest tube and right pneumothorax appear unchanged.  - CXR 5/23/19 0826 - Reaccumulation of right pneumothorax and slight leftward mediastinal shift despite pleural pigtail catheter.   - CXR 5/23/19 0930 - Previously present right pleural catheter has been removed, and replaced with a right thoracostomy tube.  A right pneumothorax persists, although its volume has significantly decreased since the examination referenced above, following this tube placement.  No detrimental interval change in the appearance of the chest since 05/23/2019 at 08:22 is appreciated.  - CXR 5/23/19 0311 - Right chest tube appears unchanged with possible buckling of the tube again noted. Right apical pneumothorax appears unchanged.  - CXR 5/24/19 0311 - No significant change from prior study.  - CXR 5/25/19 0548 - Stable appearance.  - CXR 5/26/19 - appears improved c/w prior  - Pleurodesis today per CT  surgery     Anxiety  - pt has significant anxiety associated with her SOB for which we have hydroxyzine ordered prn but she refuses to take it.   - she is instead reporting restless leg syndrome for which she takes Mg at home. She was assured her Mag here was 1.9 and unlikely to cause her symptoms. In addition, her iron levels have been checked. Due to persistence of symptoms, continue ropinirole.      Iron deficiency  - hemoglobin stable  - Fe 29, 8% saturation  - Daily iron replacements PO      Muscle spasm  - pt complaining of muscle spasms in bilateral legs, no pain associated  - Fe deficient   - Ropinirole     Debility  - Hip fracture 2 months ago  - works with OP PT/OT  - Continue PT/OT    DNR (do not resuscitate)  - Conversation held between medical team, pt and family at bedside concerning code status.   - Pt states that per her living will, she is DNR  - Family agrees with patient decision   - [facility] DNR form signed and placed into chart        COPD (chronic obstructive pulmonary disease)  - ?COPD vs ADHF   - Continue home spiriva  - Albuterol rescue inahler PRN for wheezing  - Oxygen as needed to maintain sp02 >88% (on home O2)  - Monitor respiratory status for any acute change       Heart failure, diastolic  - ?ADHF vs COPD  - BNP 1300  - LE pitting edema on exam, no crackles of elevated JVD   - Lasix 20 PO daily at home (recent change, was on 40 before)  - Will give lasix 40 PO daily and fluid restrict, good response    HTN (hypertension)  - Per pt records, pt takes Amlodipine 2.5mg at bed if SBP>155  - Hypertensive on arrival, resolved with resolution of SOB  - Amlodipine 2.5mg nightly prn as prescribed   - PRN Hydralazine 5 for SBP>180       HOCM (hypertrophic obstructive cardiomyopathy): Apical HCM  - AICD in placed, follows with cardiology at [facility]       VTE Risk Mitigation (From admission, onward)        Ordered     enoxaparin injection 40 mg  Daily      05/22/19 1156     IP VTE HIGH RISK  PATIENT  Once      05/22/19 1156     Place sequential compression device  Until discontinued      05/22/19 1100              Shane Jo MD  Department of Hospital Medicine   [facility]

## 2019-05-27 NOTE — PT/OT/SLP PROGRESS
Physical Therapy Treatment    Patient Name:  Venus Mayer   MRN:  [id number]    Recommendations:     Discharge Recommendations:  outpatient PT   Discharge Equipment Recommendations: none   Barriers to discharge: None    Assessment:     Venus Mayer is a 89 y.o. female admitted with a medical diagnosis of Spontaneous pneumothorax.  She presents with the following impairments/functional limitations:  weakness, impaired endurance, impaired cardiopulmonary response to activity, impaired skin, impaired self care skills, impaired functional mobilty, gait instability . Patient appeared very drowsy as she initiated therapy session. Patient fatigued during gait training, but no significant shortness of breath noted. Patient ambulates with decreased liane and mild unsteadiness.    Rehab Prognosis: Good; patient would benefit from acute skilled PT services to address these deficits and reach maximum level of function.    Recent Surgery: * No surgery found *      Plan:     During this hospitalization, patient to be seen 3 x/week to address the identified rehab impairments via gait training, therapeutic activities, therapeutic exercises, neuromuscular re-education and progress toward the following goals:    · Plan of Care Expires:  06/23/19    Subjective     Chief Complaint: fatigue  Patient/Family Comments/goals: to get stronger  Pain/Comfort:  · Pain Rating 1: 0/10  · Pain Rating Post-Intervention 1: 0/10      Objective:     Communicated with nsg prior to session.  Patient found up in chair with telemetry, pulse ox (continuous), PureWick, oxygen, chest tube upon PT entry to room.     General Precautions: Standard, fall   Orthopedic Precautions:LLE posterior precautions   Braces: N/A     Functional Mobility:  · Transfers:     · Sit to Stand:  stand by assistance and contact guard assistance with rolling walker  · Gait: 110 ft with RW and CGA, with 2L O2 tank.      AM-PAC 6 CLICK MOBILITY  Turning over in bed (including  adjusting bedclothes, sheets and blankets)?: 3  Sitting down on and standing up from a chair with arms (e.g., wheelchair, bedside commode, etc.): 3  Moving from lying on back to sitting on the side of the bed?: 3  Moving to and from a bed to a chair (including a wheelchair)?: 3  Need to walk in hospital room?: 3  Climbing 3-5 steps with a railing?: 3  Basic Mobility Total Score: 18       Therapeutic Activities and Exercises:   Donned a second gown. There ex in sitting: LAQ, HIP FLEX AND HEEL/TOE RAISES 2X12 REPS B LE.    Patient left up in chair with all lines intact, call button in reach and NSG notified..    GOALS:   Multidisciplinary Problems     Physical Therapy Goals        Problem: Physical Therapy Goal    Goal Priority Disciplines Outcome Goal Variances Interventions   Physical Therapy Goal     PT, PT/OT Ongoing (interventions implemented as appropriate)     Description:  Goals to be met by: 2019    Patient will increase functional independence with mobility by performin. Supine to sit with Modified [place]  2. Sit to supine with Modified [place]  3. Sit to stand transfer with Supervision  4. Bed to chair transfer with Supervision using LRAD  5. Gait  x 200 feet with Supervision using LRAD.   6. Ascend/descend 4 stair with right Handrails Minimum Assistance.                        Time Tracking:     PT Received On: 19  PT Start Time: 1132     PT Stop Time: 1158  PT Total Time (min): 26 min     Billable Minutes: Gait Training 16 and Therapeutic Exercise 10    Treatment Type: Treatment  PT/PTA: PTA     PTA Visit Number: Amisha     Shane Kelly, PTA  2019

## 2019-05-27 NOTE — PLAN OF CARE
05/27/19 1014   Discharge Reassessment   Assessment Type Discharge Planning Reassessment   Provided patient/caregiver education on the expected discharge date and the discharge plan Yes   Do you have any problems affording any of your prescribed medications? No   Discharge Plan A Home with family   Discharge Plan B Home Health   DME Needed Upon Discharge  none   Patient choice form signed by patient/caregiver Yes   Anticipated Discharge Disposition Home   Can the patient answer the patient profile reliably? Yes, cognitively intact   How does the patient rate their overall health at the present time? Fair   Describe the patient's ability to walk at the present time. Major restrictions/daily assistance from another person   How often would a person be available to care for the patient? Whenever needed   Number of comorbid conditions (as recorded on the chart) Five or more   During the past month, has the patient often been bothered by feeling down, depressed or hopeless? No   During the past month, has the patient often been bothered by little interest or pleasure in doing things? No   Post-Acute Status   Post-Acute Authorization Home Health/Hospice   Home Health/Hospice Status Awaiting Internal Medical Clearance

## 2019-05-27 NOTE — ASSESSMENT & PLAN NOTE
- pt complaining of muscle spasms in bilateral legs, no pain associated  - Fe deficient   - Ropinirole

## 2019-05-27 NOTE — PT/OT/SLP PROGRESS
Occupational Therapy   Treatment    Name: Venus Mayer  MRN: [id number]  Admitting Diagnosis:  Spontaneous pneumothorax       Recommendations:     Discharge Recommendations: outpatient PT  Discharge Equipment Recommendations:  none  Barriers to discharge:       Assessment:     Venus Mayer is a 89 y.o. female with a medical diagnosis of Spontaneous pneumothorax.  She presents with report of fatigue from earlier PT session but agreeable to in-chair therex. Performance deficits affecting function are weakness, impaired self care skills, impaired balance, impaired functional mobilty, gait instability, decreased coordination, impaired endurance.     Rehab Prognosis:  Good; patient would benefit from acute skilled OT services to address these deficits and reach maximum level of function.       Plan:     Patient to be seen 3 x/week to address the above listed problems via self-care/home management, therapeutic activities, therapeutic exercises  · Plan of Care Expires: 06/24/19  · Plan of Care Reviewed with: patient    Subjective     Pain/Comfort:  · Pain Rating 1: 0/10    Objective:     Communicated with: rn prior to session.  Patient found up in chair with   upon OT entry to room.    General Precautions: Standard, fall   Orthopedic Precautions:LLE posterior precautions   Braces:       Occupational Performance: Declined    [facility] 6 Click ADL: 21    Treatment & Education:  From chair level, pt completed BUE/LE therex (x 10 reps each) including:  - elbow flexion/extension  - lat pulls  - shld presses  - seated marching  - knee flexion/extension  - ankle pumps    Patient left up in chair with all lines intact and call button in reachEducation:      GOALS:   Multidisciplinary Problems     Occupational Therapy Goals     Not on file          Multidisciplinary Problems (Resolved)        Problem: Occupational Therapy Goal    Goal Priority Disciplines Outcome Interventions   Occupational Therapy Goal   (Resolved)     OT, PT/OT  Outcome(s) achieved    Description:  Goals to be met by: 5/31/2019     Patient will increase functional independence with ADLs by performing:    UE Dressing with [place].  LE Dressing with Modified [place] and Assistive Devices as needed.  Grooming while standing at sink with Supervision.  Toileting from toilet with Supervision for hygiene and clothing management.   Toilet transfer to toilet with Supervision.                      Time Tracking:     OT Date of Treatment: 05/27/19  OT Start Time: 1325  OT Stop Time: 1335  OT Total Time (min): 10 min    Billable Minutes:Therapeutic Exercise 10    EULA Caruso  5/27/2019

## 2019-05-27 NOTE — SUBJECTIVE & OBJECTIVE
Interval History: Per CT surgery, pt did not tolerate chest tube clamping trial and was placed back on water seal. Plan for pleurodesis today. Pt had complaint of SOB this am but saturation remained above 93% on home O2. Will reassess post procedure this am.     Review of Systems   Constitutional: Negative for activity change, appetite change, chills, fatigue and fever.   HENT: Negative for sneezing and sore throat.    Eyes: Negative for photophobia and visual disturbance.   Respiratory: Positive for cough and shortness of breath. Negative for chest tightness and wheezing.    Cardiovascular: Positive for leg swelling. Negative for chest pain and palpitations.   Gastrointestinal: Negative for abdominal distention, abdominal pain, diarrhea, nausea and vomiting.   Genitourinary: Negative for difficulty urinating and dysuria.   Musculoskeletal: Negative for arthralgias, back pain, neck pain and neck stiffness.   Skin: Positive for wound (chest tube in place). Negative for pallor.   Neurological: Negative for dizziness, tremors, speech difficulty, weakness and headaches.   Psychiatric/Behavioral: Negative for confusion. The patient is not nervous/anxious.      Objective:     Vital Signs (Most Recent):  Temp: 97.7 °F (36.5 °C) (05/27/19 0834)  Pulse: 85 (05/27/19 1051)  Resp: 20 (05/27/19 0956)  BP: (!) 115/59 (05/27/19 0834)  SpO2: 95 % (05/27/19 0834) Vital Signs (24h Range):  Temp:  [97.4 °F (36.3 °C)-98.5 °F (36.9 °C)] 97.7 °F (36.5 °C)  Pulse:  [] 85  Resp:  [16-24] 20  SpO2:  [95 %-99 %] 95 %  BP: (108-150)/(53-70) 115/59     Weight: 54.4 kg (119 lb 14.9 oz)  Body mass index is 20.59 kg/m².    Intake/Output Summary (Last 24 hours) at 5/27/2019 1122  Last data filed at 5/27/2019 0900  Gross per 24 hour   Intake 240 ml   Output 850 ml   Net -610 ml      Physical Exam   Constitutional: No distress.   Thin elderly female    HENT:   Head: Atraumatic.   Eyes: EOM are normal.   Neck: Neck supple.   Cardiovascular:  Normal rate and regular rhythm.   Pulmonary/Chest: Effort normal.   Right chest tube in place. No air leak, tidaling.   Abdominal: Soft.   Neurological: She is alert.   Skin: Skin is warm and dry.   Psychiatric: She has a normal mood and affect. Thought content normal.   Vitals reviewed.    Significant Labs:   CBC:   Recent Labs   Lab 05/26/19 0436 05/27/19  0425   WBC 7.32 7.94   HGB 12.0 11.9*   HCT 37.7 37.5   * 165     CMP:   Recent Labs   Lab 05/26/19  0436 05/27/19  0425    136   K 3.8 4.1   CL 92* 91*   CO2 38* 38*    84   BUN 22 24*   CREATININE 0.9 0.8   CALCIUM 9.9 9.8   PROT 5.8* 5.9*   ALBUMIN 2.8* 2.9*   BILITOT 0.4 0.5   ALKPHOS 80 88   AST 25 28   ALT 11 16   ANIONGAP 9 7*   EGFRNONAA 56.5* >60.0     Significant Imaging: I have reviewed and interpreted all pertinent imaging results/findings within the past 24 hours.

## 2019-05-27 NOTE — SUBJECTIVE & OBJECTIVE
Interval History: did not tolerate clamping trial was placed back on water seal, did well otherwise, main complaint is restless leg syndrome.     Medications:  Continuous Infusions:  Scheduled Meds:   albuterol-ipratropium  3 mL Nebulization Q6H    aspirin  81 mg Oral Daily    doxycycline 500mg in sodium chloride 60 mL pleurosclerosis  500 mg Intrapleural Once    enoxaparin  40 mg Subcutaneous Daily    ferrous sulfate  325 mg Oral Daily    furosemide  40 mg Oral Daily    lidocaine HCL 10 mg/ml (1%)  20 mL Other Once    multivitamin  1 tablet Oral Daily    polyethylene glycol  17 g Oral Daily    rOPINIRole  0.25 mg Oral QHS    tiotropium  18 mcg Inhalation Daily     PRN Meds:acetaminophen, albuterol, amLODIPine, calcium carbonate, dextrose 50%, dextrose 50%, glucagon (human recombinant), glucose, glucose, hydrALAZINE, hydrOXYzine pamoate, oxyCODONE-acetaminophen, ramelteon, senna-docusate 8.6-50 mg, sodium chloride 0.9%     Review of patient's allergies indicates:   Allergen Reactions    Sulfamethoxazole-trimethoprim      Other reaction(s): Rash     Objective:     Vital Signs (Most Recent):  Temp: 98 °F (36.7 °C) (05/27/19 0340)  Pulse: 90 (05/27/19 0340)  Resp: 18 (05/27/19 0340)  BP: (!) 142/64 (05/27/19 0340)  SpO2: 95 % (05/27/19 0340) Vital Signs (24h Range):  Temp:  [96 °F (35.6 °C)-98.5 °F (36.9 °C)] 98 °F (36.7 °C)  Pulse:  [] 90  Resp:  [16-24] 18  SpO2:  [95 %-99 %] 95 %  BP: (108-150)/(53-70) 142/64     Intake/Output - Last 3 Shifts       05/25 0700 - 05/26 0659 05/26 0700 - 05/27 0659 05/27 0700 - 05/28 0659    P.O.  240     Total Intake(mL/kg)  240 (4.4)     Urine (mL/kg/hr) 350 (0.3) 600 (0.5)     Emesis/NG output       Stool 0 0     Blood       Chest Tube  0     Total Output 350 600     Net -350 -360            Stool Occurrence 0 x 0 x           SpO2: 95 %  O2 Device (Oxygen Therapy): nasal cannula    Physical Exam   Constitutional: No distress.   Thin elderly female    HENT:   Head:  Atraumatic.   Eyes: EOM are normal.   Neck: Neck supple.   Cardiovascular: Normal rate and regular rhythm.   Pulmonary/Chest: Effort normal.   Right chest tube in place. Small air leak present.    Abdominal: Soft.   Neurological: She is alert.   Skin: Skin is warm and dry.   Psychiatric: She has a normal mood and affect. Thought content normal.   Vitals reviewed.      Significant Labs:  BMP:   Recent Labs   Lab 05/27/19 0425   GLU 84      K 4.1   CL 91*   CO2 38*   BUN 24*   CREATININE 0.8   CALCIUM 9.8   MG 2.1     CBC:   Recent Labs   Lab 05/27/19 0425   WBC 7.94   RBC 3.83*   HGB 11.9*   HCT 37.5      MCV 98   MCH 31.1*   MCHC 31.7*     CMP:   Recent Labs   Lab 05/27/19 0425   GLU 84   CALCIUM 9.8   ALBUMIN 2.9*   PROT 5.9*      K 4.1   CO2 38*   CL 91*   BUN 24*   CREATININE 0.8   ALKPHOS 88   ALT 16   AST 28   BILITOT 0.5     Coagulation: No results for input(s): PT, INR, APTT in the last 48 hours.    Significant Diagnostics:  CXR: I have reviewed all pertinent results/findings within the past 24 hours    VTE Risk Mitigation (From admission, onward)        Ordered     enoxaparin injection 40 mg  Daily      05/22/19 1156     IP VTE HIGH RISK PATIENT  Once      05/22/19 1156     Place sequential compression device  Until discontinued      05/22/19 1100        ROS

## 2019-05-27 NOTE — PLAN OF CARE
Problem: Occupational Therapy Goal  Goal: Occupational Therapy Goal  Goals to be met by: 5/31/2019     Patient will increase functional independence with ADLs by performing:    UE Dressing with [place].  LE Dressing with Modified [place] and Assistive Devices as needed.  Grooming while standing at sink with Supervision.  Toileting from toilet with Supervision for hygiene and clothing management.   Toilet transfer to toilet with Supervision.     Outcome: Outcome(s) achieved Date Met: 05/27/19  Con't POC.    EULA Caruso

## 2019-05-27 NOTE — PROGRESS NOTES
"[facility]  Thoracic Surgery  Progress Note    Subjective:     History of Present Illness:  Venus Mayer is a 89 y.o. female with a hx of HLD, CAD, HTN, and COPD on 3L of oxygen at home, who presented for evaluation of SOB to the ED this morning. She reported acute onset SOB this morning. Pt noted to be in distress on presentation with saturations in the high 70s as well as tachycardia. Pneumothorax noted on CXR. Pigtail catheter placed by ED with resolution of pneumothorax. Patient denies any falls or other traumas recently. Of note, she reports a distant hx of left sided "air around my lung" as well as a left sided "lung mass" excision. Denies any pathology on the right. Pt reports resolution of her SOB with placement of pigtail catheter.      Post-Op Info:  * No surgery found *         Interval History: did not tolerate clamping trial was placed back on water seal, did well otherwise, main complaint is restless leg syndrome.     Medications:  Continuous Infusions:  Scheduled Meds:   albuterol-ipratropium  3 mL Nebulization Q6H    aspirin  81 mg Oral Daily    doxycycline 500mg in sodium chloride 60 mL pleurosclerosis  500 mg Intrapleural Once    enoxaparin  40 mg Subcutaneous Daily    ferrous sulfate  325 mg Oral Daily    furosemide  40 mg Oral Daily    lidocaine HCL 10 mg/ml (1%)  20 mL Other Once    multivitamin  1 tablet Oral Daily    polyethylene glycol  17 g Oral Daily    rOPINIRole  0.25 mg Oral QHS    tiotropium  18 mcg Inhalation Daily     PRN Meds:acetaminophen, albuterol, amLODIPine, calcium carbonate, dextrose 50%, dextrose 50%, glucagon (human recombinant), glucose, glucose, hydrALAZINE, hydrOXYzine pamoate, oxyCODONE-acetaminophen, ramelteon, senna-docusate 8.6-50 mg, sodium chloride 0.9%     Review of patient's allergies indicates:   Allergen Reactions    Sulfamethoxazole-trimethoprim      Other reaction(s): Rash     Objective:     Vital Signs (Most Recent):  Temp: " 98 °F (36.7 °C) (05/27/19 0340)  Pulse: 90 (05/27/19 0340)  Resp: 18 (05/27/19 0340)  BP: (!) 142/64 (05/27/19 0340)  SpO2: 95 % (05/27/19 0340) Vital Signs (24h Range):  Temp:  [96 °F (35.6 °C)-98.5 °F (36.9 °C)] 98 °F (36.7 °C)  Pulse:  [] 90  Resp:  [16-24] 18  SpO2:  [95 %-99 %] 95 %  BP: (108-150)/(53-70) 142/64     Intake/Output - Last 3 Shifts       05/25 0700 - 05/26 0659 05/26 0700 - 05/27 0659 05/27 0700 - 05/28 0659    P.O.  240     Total Intake(mL/kg)  240 (4.4)     Urine (mL/kg/hr) 350 (0.3) 600 (0.5)     Emesis/NG output       Stool 0 0     Blood       Chest Tube  0     Total Output 350 600     Net -350 -360            Stool Occurrence 0 x 0 x           SpO2: 95 %  O2 Device (Oxygen Therapy): nasal cannula    Physical Exam   Constitutional: No distress.   Thin elderly female    HENT:   Head: Atraumatic.   Eyes: EOM are normal.   Neck: Neck supple.   Cardiovascular: Normal rate and regular rhythm.   Pulmonary/Chest: Effort normal.   Right chest tube in place. Small air leak present.    Abdominal: Soft.   Neurological: She is alert.   Skin: Skin is warm and dry.   Psychiatric: She has a normal mood and affect. Thought content normal.   Vitals reviewed.      Significant Labs:  BMP:   Recent Labs   Lab 05/27/19 0425   GLU 84      K 4.1   CL 91*   CO2 38*   BUN 24*   CREATININE 0.8   CALCIUM 9.8   MG 2.1     CBC:   Recent Labs   Lab 05/27/19 0425   WBC 7.94   RBC 3.83*   HGB 11.9*   HCT 37.5      MCV 98   MCH 31.1*   MCHC 31.7*     CMP:   Recent Labs   Lab 05/27/19 0425   GLU 84   CALCIUM 9.8   ALBUMIN 2.9*   PROT 5.9*      K 4.1   CO2 38*   CL 91*   BUN 24*   CREATININE 0.8   ALKPHOS 88   ALT 16   AST 28   BILITOT 0.5     Coagulation: No results for input(s): PT, INR, APTT in the last 48 hours.    Significant Diagnostics:  CXR: I have reviewed all pertinent results/findings within the past 24 hours    VTE Risk Mitigation (From admission, onward)        Ordered     enoxaparin  injection 40 mg  Daily      05/22/19 1156     IP VTE HIGH RISK PATIENT  Once      05/22/19 1156     Place sequential compression device  Until discontinued      05/22/19 1100        ROS      Assessment/Plan:     * Spontaneous pneumothorax  89 yo female with spontaneous PTX s/p pigtail placement in ED.     - Chest tube to suction this morning, planning for doxycycline pleurodesis today          Juan Luis Gooden MD  Thoracic Surgery  [facility]

## 2019-05-27 NOTE — ASSESSMENT & PLAN NOTE
89 yo female with spontaneous PTX s/p pigtail placement in ED.     - Chest tube to suction this morning, planning for doxycycline pleurodesis today

## 2019-05-27 NOTE — ASSESSMENT & PLAN NOTE
- pt has significant anxiety associated with her SOB for which we have hydroxyzine ordered prn but she refuses to take it.   - she is instead reporting restless leg syndrome for which she takes Mg at home. She was assured her Mag here was 1.9 and unlikely to cause her symptoms. In addition, her iron levels have been checked. Due to persistence of symptoms, continue ropinirole.

## 2019-05-27 NOTE — PLAN OF CARE
Problem: Fall Injury Risk  Goal: Absence of Fall and Fall-Related Injury    Intervention: Identify and Manage Contributors to Fall Injury Risk     05/27/19 1732   Manage Acute Allergic Reaction   Medication Review/Management medications reviewed;high risk medications identified   Identify and Manage Contributors to Fall Injury Risk   Self-Care Promotion BADL personal objects within reach;safe use of adaptive equipment encouraged;other (see comments)  (encouraged to call for assistance, call light in reach. )     Intervention: Promote Injury-Free Environment     05/27/19 1732   Optimize Balance and Safe Activity   Safety Promotion/Fall Prevention assistive device/personal item within reach;Fall Risk reviewed with patient/family;Fall Risk signage in place;medications reviewed;side rails raised x 2   Optimize [place] and Functional Mobility   Environmental Safety Modification assistive device/personal items within reach;clutter free environment maintained;mobility aid in reach         Problem: Adult Inpatient Plan of Care  Goal: Plan of Care Review  Patient up to chair, BSC today.  Large BM x 1.  Continuous oxygen, continuous pulse ox and telemetry WNL.  Pleurodesis performed at BS per MD.  Pt tolerated procedure with moderate pain, expected per MD.  VSS.  Patient and family educated by MD and RN on expectations of procedure and post procedure levels of comfort, patient treated with prn meds.

## 2019-05-27 NOTE — ASSESSMENT & PLAN NOTE
89 year old female with preexisting lung disease (COPD) and prior spontaneous PTX presenting with acute onset SOB not responsive to COPD medications or rescue inhalers  - Pigtail cath initially placed by CT surgery, replaced(5/22/19) by chest tube with improvement in dyspnea. Chest tube to water seal now.   - Daily CXR  - CXR 5/22/19 0905 - Large right pneumothorax with marked compressive atelectasis of the right lung  - CXR 5/22/19 0953 - Right-sided pleural catheter is now seen.  Volume of pneumothorax on the right has decreased since 05/22/2019 at 09:01, following this catheter placement, with partial re-expansion of the right lung also appreciated.  No detrimental interval change in the appearance of the chest since that time is noted.  - CXR 5/22/19 1025 - Right-sided chest tube is noted, which appears slightly reposition and overlying the mid aspect of the right hemithorax. Unchanged radiographic appearance of the lungs.  Small pneumothorax is present along the lateral lower aspect of the right hemithorax  - CXR 5/22/19 2130 - More lateral position of right-sided chest tube tip with marked increase in size of right-sided pneumothorax.  - CXR 5/22/19 2334 - On the current examination the right pigtail catheter projects over the right hemithorax similar to the earlier study of 05/22/2019, 21:25 hours.  Size of the right pneumothorax is smaller than that 21:25 hours.  - CXR 5/23/19 0406 - Right-sided pigtail chest tube and right pneumothorax appear unchanged.  - CXR 5/23/19 0826 - Reaccumulation of right pneumothorax and slight leftward mediastinal shift despite pleural pigtail catheter.   - CXR 5/23/19 0930 - Previously present right pleural catheter has been removed, and replaced with a right thoracostomy tube.  A right pneumothorax persists, although its volume has significantly decreased since the examination referenced above, following this tube placement.  No detrimental interval change in the appearance of  the chest since 05/23/2019 at 08:22 is appreciated.  - CXR 5/23/19 0311 - Right chest tube appears unchanged with possible buckling of the tube again noted. Right apical pneumothorax appears unchanged.  - CXR 5/24/19 0311 - No significant change from prior study.  - CXR 5/25/19 0548 - Stable appearance.  - CXR 5/26/19 - appears improved c/w prior  - Pleurodesis today per CT surgery

## 2019-05-27 NOTE — PLAN OF CARE
Problem: Adult Inpatient Plan of Care  Goal: Plan of Care Review  Outcome: Ongoing (interventions implemented as appropriate)  POC reviewed at bedside with patient and daughter. Questions and concerns addressed. VSS. Requip given for restless leg with good results. Safety maintained. Bed in low and locked position. Call light within reach. Side rails up x2. Frequent rounds.

## 2019-05-28 LAB
ALBUMIN SERPL BCP-MCNC: 2.8 G/DL (ref 3.5–5.2)
ALP SERPL-CCNC: 84 U/L (ref 55–135)
ALT SERPL W/O P-5'-P-CCNC: 14 U/L (ref 10–44)
ANION GAP SERPL CALC-SCNC: 9 MMOL/L (ref 8–16)
AST SERPL-CCNC: 24 U/L (ref 10–40)
BASOPHILS # BLD AUTO: 0.03 K/UL (ref 0–0.2)
BASOPHILS NFR BLD: 0.3 % (ref 0–1.9)
BILIRUB SERPL-MCNC: 0.3 MG/DL (ref 0.1–1)
BUN SERPL-MCNC: 28 MG/DL (ref 8–23)
CALCIUM SERPL-MCNC: 9.8 MG/DL (ref 8.7–10.5)
CHLORIDE SERPL-SCNC: 92 MMOL/L (ref 95–110)
CO2 SERPL-SCNC: 35 MMOL/L (ref 23–29)
CREAT SERPL-MCNC: 0.9 MG/DL (ref 0.5–1.4)
DIFFERENTIAL METHOD: ABNORMAL
EOSINOPHIL # BLD AUTO: 0.3 K/UL (ref 0–0.5)
EOSINOPHIL NFR BLD: 2.7 % (ref 0–8)
ERYTHROCYTE [DISTWIDTH] IN BLOOD BY AUTOMATED COUNT: 13.2 % (ref 11.5–14.5)
EST. GFR  (AFRICAN AMERICAN): >60 ML/MIN/1.73 M^2
EST. GFR  (NON AFRICAN AMERICAN): 56.5 ML/MIN/1.73 M^2
GLUCOSE SERPL-MCNC: 95 MG/DL (ref 70–110)
HCT VFR BLD AUTO: 39 % (ref 37–48.5)
HGB BLD-MCNC: 12.3 G/DL (ref 12–16)
IMM GRANULOCYTES # BLD AUTO: 0.05 K/UL (ref 0–0.04)
IMM GRANULOCYTES NFR BLD AUTO: 0.5 % (ref 0–0.5)
LYMPHOCYTES # BLD AUTO: 1 K/UL (ref 1–4.8)
LYMPHOCYTES NFR BLD: 10.5 % (ref 18–48)
MAGNESIUM SERPL-MCNC: 1.9 MG/DL (ref 1.6–2.6)
MCH RBC QN AUTO: 30.8 PG (ref 27–31)
MCHC RBC AUTO-ENTMCNC: 31.5 G/DL (ref 32–36)
MCV RBC AUTO: 98 FL (ref 82–98)
MONOCYTES # BLD AUTO: 1 K/UL (ref 0.3–1)
MONOCYTES NFR BLD: 11 % (ref 4–15)
NEUTROPHILS # BLD AUTO: 7 K/UL (ref 1.8–7.7)
NEUTROPHILS NFR BLD: 75 % (ref 38–73)
NRBC BLD-RTO: 0 /100 WBC
PHOSPHATE SERPL-MCNC: 4.6 MG/DL (ref 2.7–4.5)
PLATELET # BLD AUTO: 163 K/UL (ref 150–350)
PMV BLD AUTO: 10.3 FL (ref 9.2–12.9)
POTASSIUM SERPL-SCNC: 4.2 MMOL/L (ref 3.5–5.1)
PROT SERPL-MCNC: 5.8 G/DL (ref 6–8.4)
RBC # BLD AUTO: 4 M/UL (ref 4–5.4)
SODIUM SERPL-SCNC: 136 MMOL/L (ref 136–145)
WBC # BLD AUTO: 9.35 K/UL (ref 3.9–12.7)

## 2019-05-28 PROCEDURE — 25000003 PHARM REV CODE 250: Performed by: STUDENT IN AN ORGANIZED HEALTH CARE EDUCATION/TRAINING PROGRAM

## 2019-05-28 PROCEDURE — 25000242 PHARM REV CODE 250 ALT 637 W/ HCPCS: Performed by: STUDENT IN AN ORGANIZED HEALTH CARE EDUCATION/TRAINING PROGRAM

## 2019-05-28 PROCEDURE — 83735 ASSAY OF MAGNESIUM: CPT

## 2019-05-28 PROCEDURE — 97535 SELF CARE MNGMENT TRAINING: CPT

## 2019-05-28 PROCEDURE — 99233 PR SUBSEQUENT HOSPITAL CARE,LEVL III: ICD-10-PCS | Mod: GC,,, | Performed by: HOSPITALIST

## 2019-05-28 PROCEDURE — 84100 ASSAY OF PHOSPHORUS: CPT

## 2019-05-28 PROCEDURE — 94761 N-INVAS EAR/PLS OXIMETRY MLT: CPT

## 2019-05-28 PROCEDURE — 20600001 HC STEP DOWN PRIVATE ROOM

## 2019-05-28 PROCEDURE — 99233 SBSQ HOSP IP/OBS HIGH 50: CPT | Mod: GC,,, | Performed by: HOSPITALIST

## 2019-05-28 PROCEDURE — 63600175 PHARM REV CODE 636 W HCPCS: Performed by: STUDENT IN AN ORGANIZED HEALTH CARE EDUCATION/TRAINING PROGRAM

## 2019-05-28 PROCEDURE — 27000221 HC OXYGEN, UP TO 24 HOURS

## 2019-05-28 PROCEDURE — 85025 COMPLETE CBC W/AUTO DIFF WBC: CPT

## 2019-05-28 PROCEDURE — 94799 UNLISTED PULMONARY SVC/PX: CPT

## 2019-05-28 PROCEDURE — 94640 AIRWAY INHALATION TREATMENT: CPT

## 2019-05-28 PROCEDURE — 97530 THERAPEUTIC ACTIVITIES: CPT

## 2019-05-28 PROCEDURE — 80053 COMPREHEN METABOLIC PANEL: CPT

## 2019-05-28 PROCEDURE — 36415 COLL VENOUS BLD VENIPUNCTURE: CPT

## 2019-05-28 RX ADMIN — FUROSEMIDE 40 MG: 40 TABLET ORAL at 10:05

## 2019-05-28 RX ADMIN — IPRATROPIUM BROMIDE AND ALBUTEROL SULFATE 3 ML: .5; 3 SOLUTION RESPIRATORY (INHALATION) at 02:05

## 2019-05-28 RX ADMIN — IPRATROPIUM BROMIDE AND ALBUTEROL SULFATE 3 ML: .5; 3 SOLUTION RESPIRATORY (INHALATION) at 07:05

## 2019-05-28 RX ADMIN — IPRATROPIUM BROMIDE AND ALBUTEROL SULFATE 3 ML: .5; 3 SOLUTION RESPIRATORY (INHALATION) at 08:05

## 2019-05-28 RX ADMIN — OXYCODONE HYDROCHLORIDE AND ACETAMINOPHEN 1 TABLET: 5; 325 TABLET ORAL at 08:05

## 2019-05-28 RX ADMIN — ROPINIROLE HYDROCHLORIDE 0.25 MG: 0.25 TABLET, FILM COATED ORAL at 09:05

## 2019-05-28 RX ADMIN — ENOXAPARIN SODIUM 40 MG: 100 INJECTION SUBCUTANEOUS at 05:05

## 2019-05-28 RX ADMIN — POLYETHYLENE GLYCOL 3350 17 G: 17 POWDER, FOR SOLUTION ORAL at 10:05

## 2019-05-28 RX ADMIN — TIOTROPIUM BROMIDE 18 MCG: 18 CAPSULE ORAL; RESPIRATORY (INHALATION) at 10:05

## 2019-05-28 RX ADMIN — THERA TABS 1 TABLET: TAB at 10:05

## 2019-05-28 RX ADMIN — FERROUS SULFATE TAB EC 325 MG (65 MG FE EQUIVALENT) 325 MG: 325 (65 FE) TABLET DELAYED RESPONSE at 10:05

## 2019-05-28 RX ADMIN — RAMELTEON 8 MG: 8 TABLET, FILM COATED ORAL at 09:05

## 2019-05-28 RX ADMIN — ASPIRIN 81 MG CHEWABLE TABLET 81 MG: 81 TABLET CHEWABLE at 10:05

## 2019-05-28 NOTE — PLAN OF CARE
Problem: Occupational Therapy Goal  Goal: Occupational Therapy Goal  Goals to be met by: 5/31/2019     Patient will increase functional independence with ADLs by performing:    UE Dressing with [place].  LE Dressing with Modified [place] and Assistive Devices as needed.  Grooming while standing at sink with Supervision.  Toileting from toilet with Supervision for hygiene and clothing management.   Toilet transfer to toilet with Supervision.     Outcome: Ongoing (interventions implemented as appropriate)  Con't POC.    EULA Caruso

## 2019-05-28 NOTE — SUBJECTIVE & OBJECTIVE
Interval History: Pleurodesis overnight with CT surgery, chest tube back to suction for 48 hours. Will reassess tomorrow. No complaints this am.     Review of Systems   Constitutional: Negative for activity change, appetite change, chills, fatigue and fever.   HENT: Negative for sneezing and sore throat.    Eyes: Negative for photophobia and visual disturbance.   Respiratory: Positive for cough and shortness of breath. Negative for chest tightness and wheezing.    Cardiovascular: Positive for leg swelling. Negative for chest pain and palpitations.   Gastrointestinal: Negative for abdominal distention, abdominal pain, diarrhea, nausea and vomiting.   Genitourinary: Negative for difficulty urinating and dysuria.   Musculoskeletal: Negative for arthralgias, back pain, neck pain and neck stiffness.   Skin: Positive for wound (chest tube in place). Negative for pallor.   Neurological: Negative for dizziness, tremors, speech difficulty, weakness and headaches.   Psychiatric/Behavioral: Negative for confusion. The patient is not nervous/anxious.      Objective:     Vital Signs (Most Recent):  Temp: 97.8 °F (36.6 °C) (05/28/19 1147)  Pulse: 93 (05/28/19 1147)  Resp: 18 (05/28/19 1147)  BP: 117/70 (05/28/19 1147)  SpO2: 95 % (05/28/19 1147) Vital Signs (24h Range):  Temp:  [97.4 °F (36.3 °C)-98.1 °F (36.7 °C)] 97.8 °F (36.6 °C)  Pulse:  [] 93  Resp:  [16-20] 18  SpO2:  [94 %-99 %] 95 %  BP: (108-182)/(53-78) 117/70     Weight: 54.4 kg (119 lb 14.9 oz)  Body mass index is 20.59 kg/m².    Intake/Output Summary (Last 24 hours) at 5/28/2019 1323  Last data filed at 5/28/2019 0500  Gross per 24 hour   Intake 960 ml   Output 250 ml   Net 710 ml      Physical Exam   Constitutional: No distress.   Thin elderly female    HENT:   Head: Atraumatic.   Eyes: EOM are normal.   Neck: Neck supple.   Cardiovascular: Normal rate and regular rhythm.   Pulmonary/Chest: Effort normal.   Right chest tube in place. No air leak, tidaling.    Abdominal: Soft.   Neurological: She is alert.   Skin: Skin is warm and dry.   Psychiatric: She has a normal mood and affect. Thought content normal.   Vitals reviewed.    Significant Labs:   CBC:   Recent Labs   Lab 05/27/19 0425 05/28/19 0429   WBC 7.94 9.35   HGB 11.9* 12.3   HCT 37.5 39.0    163     CMP:   Recent Labs   Lab 05/27/19 0425 05/28/19 0429    136   K 4.1 4.2   CL 91* 92*   CO2 38* 35*   GLU 84 95   BUN 24* 28*   CREATININE 0.8 0.9   CALCIUM 9.8 9.8   PROT 5.9* 5.8*   ALBUMIN 2.9* 2.8*   BILITOT 0.5 0.3   ALKPHOS 88 84   AST 28 24   ALT 16 14   ANIONGAP 7* 9   EGFRNONAA >60.0 56.5*     Significant Imaging: I have reviewed and interpreted all pertinent imaging results/findings within the past 24 hours.

## 2019-05-28 NOTE — PT/OT/SLP PROGRESS
Occupational Therapy   Treatment    Name: Venus Mayer  MRN: [id number]  Admitting Diagnosis:  Spontaneous pneumothorax       Recommendations:     Discharge Recommendations: outpatient PT  Discharge Equipment Recommendations:  none  Barriers to discharge:       Assessment:     Venus Mayer is a 89 y.o. female with a medical diagnosis of Spontaneous pneumothorax.  She presents with continued generalized weakness limiting (I) with ADLs/mobility. Performance deficits affecting function are weakness, impaired self care skills, impaired balance, decreased coordination, impaired functional mobilty, impaired endurance, gait instability.     Rehab Prognosis:  Good; patient would benefit from acute skilled OT services to address these deficits and reach maximum level of function.       Plan:     Patient to be seen 3 x/week to address the above listed problems via self-care/home management, therapeutic activities, therapeutic exercises  · Plan of Care Expires: 06/24/19  · Plan of Care Reviewed with: patient    Subjective     Pain/Comfort:  · Pain Rating 1: 0/10    Objective:     Communicated with: rn prior to session.  Patient found supine with oxygen, chest tube upon OT entry to room.    General Precautions: Standard, fall   Orthopedic Precautions:LLE posterior precautions   Braces:       Occupational Performance:     Bed Mobility:    · Patient completed Rolling/Turning to Right with supervision  · Patient completed Scooting/Bridging with supervision  · Patient completed Supine to Sit with supervision     Functional Mobility/Transfers:  · Patient completed Sit <> Stand Transfer with stand by assistance  with  rolling walker   · Patient completed Bed <> Chair Transfer using Step Transfer technique with stand by assistance with rolling walker  · Functional Mobility: Pt walked ~ 110' SBA using a RW.    Activities of Daily Living:  · Upper Body Dressing: minimum assistance   · Lower Body Dressing: total assistance to don  brief    [facility] 6 Click ADL: 20    Treatment & Education:  Educated on calling staff for assistance.  Reviewed OT POC.    Patient left up in chair with all lines intact and call button in reachEducation:      GOALS:   Multidisciplinary Problems     Occupational Therapy Goals        Problem: Occupational Therapy Goal    Goal Priority Disciplines Outcome Interventions   Occupational Therapy Goal     OT, PT/OT Ongoing (interventions implemented as appropriate)    Description:  Goals to be met by: 5/31/2019     Patient will increase functional independence with ADLs by performing:    UE Dressing with [place].  LE Dressing with Modified [place] and Assistive Devices as needed.  Grooming while standing at sink with Supervision.  Toileting from toilet with Supervision for hygiene and clothing management.   Toilet transfer to toilet with Supervision.                       Time Tracking:     OT Date of Treatment: 05/28/19  OT Start Time: 1251  OT Stop Time: 1315  OT Total Time (min): 24 min    Billable Minutes:Self Care/Home Management 10  Therapeutic Activity 14    EULA Caruso  5/28/2019

## 2019-05-28 NOTE — PLAN OF CARE
Problem: Adult Inpatient Plan of Care  Goal: Patient-Specific Goal (Individualization)  Outcome: Ongoing (interventions implemented as appropriate)  POC reviewed with pt, acknowledged understanding. Pt AAO x 4. Pt remains free of falls/injuries. Pt on telemetry remains SR. Pt tolerating diet. Pt pain controlled with prescribed meds. Pt CT set to -20 suction, output recorded. Pt ambulates in the room w/ x 1 asst. Pt on 2 L NC. No acute events throughout shift. No distress noted, will continue to monitor.

## 2019-05-28 NOTE — ASSESSMENT & PLAN NOTE
89 year old female with preexisting lung disease (COPD) and prior spontaneous PTX presenting with acute onset SOB not responsive to COPD medications or rescue inhalers  - Pigtail cath initially placed by CT surgery, replaced(5/22/19) by chest tube with improvement in dyspnea.  - Daily CXR  - CXR 5/22/19 0905 - Large right pneumothorax with marked compressive atelectasis of the right lung  - CXR 5/22/19 0953 - Right-sided pleural catheter is now seen.  Volume of pneumothorax on the right has decreased since 05/22/2019 at 09:01, following this catheter placement, with partial re-expansion of the right lung also appreciated.  No detrimental interval change in the appearance of the chest since that time is noted.  - CXR 5/22/19 1025 - Right-sided chest tube is noted, which appears slightly reposition and overlying the mid aspect of the right hemithorax. Unchanged radiographic appearance of the lungs.  Small pneumothorax is present along the lateral lower aspect of the right hemithorax  - CXR 5/22/19 2130 - More lateral position of right-sided chest tube tip with marked increase in size of right-sided pneumothorax.  - CXR 5/22/19 2334 - On the current examination the right pigtail catheter projects over the right hemithorax similar to the earlier study of 05/22/2019, 21:25 hours.  Size of the right pneumothorax is smaller than that 21:25 hours.  - CXR 5/23/19 0406 - Right-sided pigtail chest tube and right pneumothorax appear unchanged.  - CXR 5/23/19 0826 - Reaccumulation of right pneumothorax and slight leftward mediastinal shift despite pleural pigtail catheter.   - CXR 5/23/19 0930 - Previously present right pleural catheter has been removed, and replaced with a right thoracostomy tube.  A right pneumothorax persists, although its volume has significantly decreased since the examination referenced above, following this tube placement.  No detrimental interval change in the appearance of the chest since 05/23/2019 at  08:22 is appreciated.  - CXR 5/23/19 0311 - Right chest tube appears unchanged with possible buckling of the tube again noted. Right apical pneumothorax appears unchanged.  - CXR 5/24/19 0311 - No significant change from prior study.  - CXR 5/25/19 0553 - Stable appearance.  - CXR 5/26/19 - appears improved c/w prior  - Pleurodesis per CT surgery, chest tube back to suction x 48 houras

## 2019-05-28 NOTE — ASSESSMENT & PLAN NOTE
89 yo female with spontaneous PTX s/p pigtail placement in ED.     - Continue chest tube to suction for 48 hours s/p pleurodesis.   - Wean O2 as tolerated  - Daily CXR

## 2019-05-28 NOTE — PLAN OF CARE
Problem: Adult Inpatient Plan of Care  Goal: Plan of Care Review  Patient continues with Chest Tube to -20cm suction to Right Chest wall.  Denies pain thus far this shift. Maintaining o2 sats >= 92% with o2 3LNC.  Sats drop below or at 88% at 2L NC. Walked in hallway with PT/OT, oxygen and a walker.  Up to chair in room for meals x 3 and most of afternoon.  Family at bedside.

## 2019-05-28 NOTE — PROGRESS NOTES
"[facility]  Thoracic Surgery  Progress Note    Subjective:     History of Present Illness:  Venus Mayer is a 89 y.o. female with a hx of HLD, CAD, HTN, and COPD on 3L of oxygen at home, who presented for evaluation of SOB to the ED this morning. She reported acute onset SOB this morning. Pt noted to be in distress on presentation with saturations in the high 70s as well as tachycardia. Pneumothorax noted on CXR. Pigtail catheter placed by ED with resolution of pneumothorax. Patient denies any falls or other traumas recently. Of note, she reports a distant hx of left sided "air around my lung" as well as a left sided "lung mass" excision. Denies any pathology on the right. Pt reports resolution of her SOB with placement of pigtail catheter.      Post-Op Info:  * No surgery found *         Interval History: NAEON. Tolerated bedside doxycycline pleurodesis yesterday. Pain and tachycardia improved. Remains on 2LNC with saturations in high 90s.     Medications:  Continuous Infusions:  Scheduled Meds:   albuterol-ipratropium  3 mL Nebulization Q6H    aspirin  81 mg Oral Daily    doxycycline 500mg in sodium chloride 60 mL pleurosclerosis  500 mg Intrapleural Once    enoxaparin  40 mg Subcutaneous Daily    ferrous sulfate  325 mg Oral Daily    furosemide  40 mg Oral Daily    lidocaine HCL 10 mg/ml (1%)  20 mL Other Once    multivitamin  1 tablet Oral Daily    polyethylene glycol  17 g Oral Daily    rOPINIRole  0.25 mg Oral QHS    tiotropium  18 mcg Inhalation Daily     PRN Meds:acetaminophen, albuterol, amLODIPine, calcium carbonate, dextrose 50%, dextrose 50%, glucagon (human recombinant), glucose, glucose, hydrALAZINE, hydrOXYzine pamoate, oxyCODONE-acetaminophen, ramelteon, senna-docusate 8.6-50 mg, sodium chloride 0.9%     Review of patient's allergies indicates:   Allergen Reactions    Sulfamethoxazole-trimethoprim      Other reaction(s): Rash     Objective:     Vital Signs (Most " Recent):  Temp: 98.1 °F (36.7 °C) (05/28/19 0516)  Pulse: 96 (05/28/19 0600)  Resp: 18 (05/28/19 0516)  BP: (!) 108/53 (05/28/19 0516)  SpO2: 96 % (05/28/19 0516) Vital Signs (24h Range):  Temp:  [97.4 °F (36.3 °C)-98.4 °F (36.9 °C)] 98.1 °F (36.7 °C)  Pulse:  [] 96  Resp:  [18-22] 18  SpO2:  [94 %-98 %] 96 %  BP: (108-182)/(53-78) 108/53     Intake/Output - Last 3 Shifts       05/26 0700 - 05/27 0659 05/27 0700 - 05/28 0659 05/28 0700 - 05/29 0659    P.O. 240 960     Total Intake(mL/kg) 240 (4.4) 960 (17.6)     Urine (mL/kg/hr) 600 (0.5) 650 (0.5)     Stool 0      Chest Tube 0      Total Output 600 650     Net -360 +310            Urine Occurrence  3 x     Stool Occurrence 0 x            SpO2: 96 %  O2 Device (Oxygen Therapy): nasal cannula    Physical Exam   Constitutional: No distress.   Thin elderly female    HENT:   Head: Atraumatic.   Eyes: EOM are normal.   Neck: Neck supple.   Cardiovascular: Normal rate and regular rhythm.   Pulmonary/Chest: Effort normal.   Right chest tube in place. Small air leak present.    Abdominal: Soft.   Neurological: She is alert.   Skin: Skin is warm and dry.   Psychiatric: She has a normal mood and affect. Thought content normal.   Vitals reviewed.      Significant Labs:  ABGs: No results for input(s): PH, PCO2, PO2, HCO3, POCSATURATED, BE in the last 48 hours.  BMP:   Recent Labs   Lab 05/28/19 0429   GLU 95      K 4.2   CL 92*   CO2 35*   BUN 28*   CREATININE 0.9   CALCIUM 9.8   MG 1.9     CBC:   Recent Labs   Lab 05/28/19 0429   WBC 9.35   RBC 4.00   HGB 12.3   HCT 39.0      MCV 98   MCH 30.8   MCHC 31.5*     CMP:   Recent Labs   Lab 05/28/19  0429   GLU 95   CALCIUM 9.8   ALBUMIN 2.8*   PROT 5.8*      K 4.2   CO2 35*   CL 92*   BUN 28*   CREATININE 0.9   ALKPHOS 84   ALT 14   AST 24   BILITOT 0.3     Coagulation: No results for input(s): PT, INR, APTT in the last 48 hours.    Significant Diagnostics:  CXR: I have reviewed all pertinent  results/findings within the past 24 hours    VTE Risk Mitigation (From admission, onward)        Ordered     enoxaparin injection 40 mg  Daily      05/22/19 1156     IP VTE HIGH RISK PATIENT  Once      05/22/19 1156     Place sequential compression device  Until discontinued      05/22/19 1100        Assessment/Plan:     * Spontaneous pneumothorax  89 yo female with spontaneous PTX s/p pigtail placement in ED.     - Continue chest tube to suction for 48 hours s/p pleurodesis.   - Wean O2 as tolerated  - Daily CXR          BHUPINDER Caal  Thoracic Surgery  [facility]

## 2019-05-28 NOTE — PHYSICIAN QUERY
"PT Name: Venus Mayer  MR #: [id number]    Physician Query Form - Heart  Condition Clarification     CDS/: [address]               Contact information:[email or web address]  This form is a permanent document in the medical record.     Query Date: May 28, 2019    By submitting this query, we are merely seeking further clarification of documentation. Please utilize your independent clinical judgment when addressing the question(s) below.    The medical record contains the following   Indicators     Supporting Clinical Findings Location in Medical Record   x BNP BNP = 1379   Labs 5/22   x EF The estimated ejection fraction is 65%    TTE 5/23   x Radiology findings Interval repositioning of the right chest tube.  Small pneumothorax noted along the lateral lower aspect of the right hemithorax, which appears improved in comparison to prior exam.   CXR 5/22   x Echo Results · Limited Echo no apical views, patient did not want to continue. repeat echo when patient can for a full study.  · Normal left ventricular systolic function.The estimated ejection fraction is 65%   · Assymetric septal hypertrophy (septum measures 1.5 cm) with systolic anterior mitral motion.However no flow acceleration seen in the parasternal views suggesting LVOT obstruction.  · There is abnormal thickening and calcification of the anaterior MV leaflet.This may be related to JUAREZ.  · Trivial to Mild mitral regurgitation.  · RV function appears normal form parasternal view.   TTE 5/23    "Ascites" documented     x "SOB" or "DANGELO" documented complaint of SOB that be began suddenly yesterday at 3 PM. Increased oxygen demand, spo2 78% upon EMS arrival with patient on 3 L of supplemental O2   ED Provider Notes 5/22   x "Hypoxia" documented Pt tachycardic, tachypneic, and hypoxic upon arrival to ED   ED Provider Notes 5/22   x Heart Failure documented Heart failure, diastolic   - ?ADHF vs COPD   - BNP 1300   - LE pitting edema on exam, no " "crackles of elevated JVD   - Lasix 20 PO daily at home   - Will give lasix 40 IV daily and fluid restrict   - No recent echo on file, will repeat    H&P 5/22 -  PN 5/27   x "Edema" documented She exhibits no edema or tenderness    peripheral edema   ED Provider Notes 5/22      H&P 5/22   x Diuretics/Meds Furosemide 40mg IV daily    Furosemide 40mg PO daily MAR 5/22 - 5/24    MAR 5/24 - current      x Treatment: Will change to PO lasix 40mg from tomorrow. Monitor Is/Os. Continue breathing tx.       PN 5/24   x Other:  Echo with preserved Ef, no diastolic dysfunction.    PN 5/24   Heart failure (HF) can be acute, chronic or both. It is generally further specificed as systolic, diastolic, or combined. Lastly, it is important to identify an underlying etiology if known or suspected.     Common clues to acute exacerbation:  Rapidly progressive symptoms (w/in 2 weeks of presentation), using IV diuretics to treat, using supplemental O2, pulmonary edema on Xray, MI w/in 4 weeks, and/or BNP >500    Systolic Heart Failure: is defined as chart documentation of a left ventricular ejection fraction (LVEF) less than 40%     Diastolic Heart Failure: is defined as a left ventricular ejection fraction (LVEF) greater than 40%   +      Evidence of diastolic dysfunction on echocardiography OR    Right heart catheterization wedge pressure above 12 mm Hg OR    Left heart catheterization left ventricular end diastolic pressure 18 mm Hg or above.    References: *American Heart Association    The clinical guidelines noted below are only system guidelines, and do not replace the providers clinical judgment.     Provider, please specify the diagnosis associated with above clinical findings    [  x ] Acute on Chronic Diastolic Heart Failure -    Pre-existing diastoic HF diagnosis.  EF > 40%  and acute HF symptoms documented                                 [   ] Chronic Diastolic Heart Failure - Pre-existing diastolic HF diagnosis.  " EF > 40%  without  acute HF symptoms documented[   ] Other Type of Heart Failure (please specify type): _________________________  [   ] Heart Failure Ruled Out  [   ] Other (please specify): ___________________________________  [   ] Clinically Undetermined                          Please document in your progress notes daily for the duration of treatment until resolved and include in your discharge summary.

## 2019-05-28 NOTE — SUBJECTIVE & OBJECTIVE
Interval History: NAEON. Tolerated bedside doxycycline pleurodesis yesterday. Pain and tachycardia improved. Remains on 2LNC with saturations in high 90s.     Medications:  Continuous Infusions:  Scheduled Meds:   albuterol-ipratropium  3 mL Nebulization Q6H    aspirin  81 mg Oral Daily    doxycycline 500mg in sodium chloride 60 mL pleurosclerosis  500 mg Intrapleural Once    enoxaparin  40 mg Subcutaneous Daily    ferrous sulfate  325 mg Oral Daily    furosemide  40 mg Oral Daily    lidocaine HCL 10 mg/ml (1%)  20 mL Other Once    multivitamin  1 tablet Oral Daily    polyethylene glycol  17 g Oral Daily    rOPINIRole  0.25 mg Oral QHS    tiotropium  18 mcg Inhalation Daily     PRN Meds:acetaminophen, albuterol, amLODIPine, calcium carbonate, dextrose 50%, dextrose 50%, glucagon (human recombinant), glucose, glucose, hydrALAZINE, hydrOXYzine pamoate, oxyCODONE-acetaminophen, ramelteon, senna-docusate 8.6-50 mg, sodium chloride 0.9%     Review of patient's allergies indicates:   Allergen Reactions    Sulfamethoxazole-trimethoprim      Other reaction(s): Rash     Objective:     Vital Signs (Most Recent):  Temp: 98.1 °F (36.7 °C) (05/28/19 0516)  Pulse: 96 (05/28/19 0600)  Resp: 18 (05/28/19 0516)  BP: (!) 108/53 (05/28/19 0516)  SpO2: 96 % (05/28/19 0516) Vital Signs (24h Range):  Temp:  [97.4 °F (36.3 °C)-98.4 °F (36.9 °C)] 98.1 °F (36.7 °C)  Pulse:  [] 96  Resp:  [18-22] 18  SpO2:  [94 %-98 %] 96 %  BP: (108-182)/(53-78) 108/53     Intake/Output - Last 3 Shifts       05/26 0700 - 05/27 0659 05/27 0700 - 05/28 0659 05/28 0700 - 05/29 0659    P.O. 240 960     Total Intake(mL/kg) 240 (4.4) 960 (17.6)     Urine (mL/kg/hr) 600 (0.5) 650 (0.5)     Stool 0      Chest Tube 0      Total Output 600 650     Net -360 +310            Urine Occurrence  3 x     Stool Occurrence 0 x            SpO2: 96 %  O2 Device (Oxygen Therapy): nasal cannula    Physical Exam   Constitutional: No distress.   Thin elderly  female    HENT:   Head: Atraumatic.   Eyes: EOM are normal.   Neck: Neck supple.   Cardiovascular: Normal rate and regular rhythm.   Pulmonary/Chest: Effort normal.   Right chest tube in place. Small air leak present.    Abdominal: Soft.   Neurological: She is alert.   Skin: Skin is warm and dry.   Psychiatric: She has a normal mood and affect. Thought content normal.   Vitals reviewed.      Significant Labs:  ABGs: No results for input(s): PH, PCO2, PO2, HCO3, POCSATURATED, BE in the last 48 hours.  BMP:   Recent Labs   Lab 05/28/19  0429   GLU 95      K 4.2   CL 92*   CO2 35*   BUN 28*   CREATININE 0.9   CALCIUM 9.8   MG 1.9     CBC:   Recent Labs   Lab 05/28/19 0429   WBC 9.35   RBC 4.00   HGB 12.3   HCT 39.0      MCV 98   MCH 30.8   MCHC 31.5*     CMP:   Recent Labs   Lab 05/28/19 0429   GLU 95   CALCIUM 9.8   ALBUMIN 2.8*   PROT 5.8*      K 4.2   CO2 35*   CL 92*   BUN 28*   CREATININE 0.9   ALKPHOS 84   ALT 14   AST 24   BILITOT 0.3     Coagulation: No results for input(s): PT, INR, APTT in the last 48 hours.    Significant Diagnostics:  CXR: I have reviewed all pertinent results/findings within the past 24 hours    VTE Risk Mitigation (From admission, onward)        Ordered     enoxaparin injection 40 mg  Daily      05/22/19 1156     IP VTE HIGH RISK PATIENT  Once      05/22/19 1156     Place sequential compression device  Until discontinued      05/22/19 1100

## 2019-05-29 LAB
ALBUMIN SERPL BCP-MCNC: 3 G/DL (ref 3.5–5.2)
ALP SERPL-CCNC: 89 U/L (ref 55–135)
ALT SERPL W/O P-5'-P-CCNC: 16 U/L (ref 10–44)
ANION GAP SERPL CALC-SCNC: 7 MMOL/L (ref 8–16)
AST SERPL-CCNC: 26 U/L (ref 10–40)
BASOPHILS # BLD AUTO: 0.04 K/UL (ref 0–0.2)
BASOPHILS NFR BLD: 0.5 % (ref 0–1.9)
BILIRUB SERPL-MCNC: 0.4 MG/DL (ref 0.1–1)
BUN SERPL-MCNC: 31 MG/DL (ref 8–23)
CALCIUM SERPL-MCNC: 10.2 MG/DL (ref 8.7–10.5)
CHLORIDE SERPL-SCNC: 91 MMOL/L (ref 95–110)
CO2 SERPL-SCNC: 37 MMOL/L (ref 23–29)
CREAT SERPL-MCNC: 0.9 MG/DL (ref 0.5–1.4)
DIFFERENTIAL METHOD: ABNORMAL
EOSINOPHIL # BLD AUTO: 0.5 K/UL (ref 0–0.5)
EOSINOPHIL NFR BLD: 5.5 % (ref 0–8)
ERYTHROCYTE [DISTWIDTH] IN BLOOD BY AUTOMATED COUNT: 13.3 % (ref 11.5–14.5)
EST. GFR  (AFRICAN AMERICAN): >60 ML/MIN/1.73 M^2
EST. GFR  (NON AFRICAN AMERICAN): 56.5 ML/MIN/1.73 M^2
GLUCOSE SERPL-MCNC: 88 MG/DL (ref 70–110)
HCT VFR BLD AUTO: 40.6 % (ref 37–48.5)
HGB BLD-MCNC: 12.7 G/DL (ref 12–16)
IMM GRANULOCYTES # BLD AUTO: 0.03 K/UL (ref 0–0.04)
IMM GRANULOCYTES NFR BLD AUTO: 0.4 % (ref 0–0.5)
LYMPHOCYTES # BLD AUTO: 1.6 K/UL (ref 1–4.8)
LYMPHOCYTES NFR BLD: 19 % (ref 18–48)
MAGNESIUM SERPL-MCNC: 2.1 MG/DL (ref 1.6–2.6)
MCH RBC QN AUTO: 30.7 PG (ref 27–31)
MCHC RBC AUTO-ENTMCNC: 31.3 G/DL (ref 32–36)
MCV RBC AUTO: 98 FL (ref 82–98)
MONOCYTES # BLD AUTO: 0.9 K/UL (ref 0.3–1)
MONOCYTES NFR BLD: 10.5 % (ref 4–15)
NEUTROPHILS # BLD AUTO: 5.4 K/UL (ref 1.8–7.7)
NEUTROPHILS NFR BLD: 64.1 % (ref 38–73)
NRBC BLD-RTO: 0 /100 WBC
PHOSPHATE SERPL-MCNC: 3.9 MG/DL (ref 2.7–4.5)
PLATELET # BLD AUTO: 183 K/UL (ref 150–350)
PMV BLD AUTO: 10.2 FL (ref 9.2–12.9)
POTASSIUM SERPL-SCNC: 4.1 MMOL/L (ref 3.5–5.1)
PROT SERPL-MCNC: 6.4 G/DL (ref 6–8.4)
RBC # BLD AUTO: 4.14 M/UL (ref 4–5.4)
SODIUM SERPL-SCNC: 135 MMOL/L (ref 136–145)
WBC # BLD AUTO: 8.48 K/UL (ref 3.9–12.7)

## 2019-05-29 PROCEDURE — 99233 PR SUBSEQUENT HOSPITAL CARE,LEVL III: ICD-10-PCS | Mod: GC,,, | Performed by: HOSPITALIST

## 2019-05-29 PROCEDURE — 27000221 HC OXYGEN, UP TO 24 HOURS

## 2019-05-29 PROCEDURE — 25000003 PHARM REV CODE 250: Performed by: STUDENT IN AN ORGANIZED HEALTH CARE EDUCATION/TRAINING PROGRAM

## 2019-05-29 PROCEDURE — 63600175 PHARM REV CODE 636 W HCPCS: Performed by: STUDENT IN AN ORGANIZED HEALTH CARE EDUCATION/TRAINING PROGRAM

## 2019-05-29 PROCEDURE — 84100 ASSAY OF PHOSPHORUS: CPT

## 2019-05-29 PROCEDURE — 36415 COLL VENOUS BLD VENIPUNCTURE: CPT

## 2019-05-29 PROCEDURE — 85025 COMPLETE CBC W/AUTO DIFF WBC: CPT

## 2019-05-29 PROCEDURE — 94761 N-INVAS EAR/PLS OXIMETRY MLT: CPT

## 2019-05-29 PROCEDURE — 25000242 PHARM REV CODE 250 ALT 637 W/ HCPCS: Performed by: STUDENT IN AN ORGANIZED HEALTH CARE EDUCATION/TRAINING PROGRAM

## 2019-05-29 PROCEDURE — 94640 AIRWAY INHALATION TREATMENT: CPT

## 2019-05-29 PROCEDURE — 97110 THERAPEUTIC EXERCISES: CPT

## 2019-05-29 PROCEDURE — 83735 ASSAY OF MAGNESIUM: CPT

## 2019-05-29 PROCEDURE — 99233 SBSQ HOSP IP/OBS HIGH 50: CPT | Mod: GC,,, | Performed by: HOSPITALIST

## 2019-05-29 PROCEDURE — 20600001 HC STEP DOWN PRIVATE ROOM

## 2019-05-29 PROCEDURE — 80053 COMPREHEN METABOLIC PANEL: CPT

## 2019-05-29 PROCEDURE — 99900035 HC TECH TIME PER 15 MIN (STAT)

## 2019-05-29 RX ADMIN — IPRATROPIUM BROMIDE AND ALBUTEROL SULFATE 3 ML: .5; 3 SOLUTION RESPIRATORY (INHALATION) at 12:05

## 2019-05-29 RX ADMIN — THERA TABS 1 TABLET: TAB at 09:05

## 2019-05-29 RX ADMIN — FERROUS SULFATE TAB EC 325 MG (65 MG FE EQUIVALENT) 325 MG: 325 (65 FE) TABLET DELAYED RESPONSE at 09:05

## 2019-05-29 RX ADMIN — FUROSEMIDE 40 MG: 40 TABLET ORAL at 09:05

## 2019-05-29 RX ADMIN — IPRATROPIUM BROMIDE AND ALBUTEROL SULFATE 3 ML: .5; 3 SOLUTION RESPIRATORY (INHALATION) at 07:05

## 2019-05-29 RX ADMIN — ACETAMINOPHEN 650 MG: 325 TABLET ORAL at 04:05

## 2019-05-29 RX ADMIN — ENOXAPARIN SODIUM 40 MG: 100 INJECTION SUBCUTANEOUS at 04:05

## 2019-05-29 RX ADMIN — ROPINIROLE HYDROCHLORIDE 0.25 MG: 0.25 TABLET, FILM COATED ORAL at 09:05

## 2019-05-29 RX ADMIN — TIOTROPIUM BROMIDE 18 MCG: 18 CAPSULE ORAL; RESPIRATORY (INHALATION) at 09:05

## 2019-05-29 RX ADMIN — POLYETHYLENE GLYCOL 3350 17 G: 17 POWDER, FOR SOLUTION ORAL at 09:05

## 2019-05-29 RX ADMIN — ASPIRIN 81 MG CHEWABLE TABLET 81 MG: 81 TABLET CHEWABLE at 09:05

## 2019-05-29 NOTE — PROGRESS NOTES
[facility] Hospital Medicine  Progress Note    Patient Name: Venus Mayer  MRN: [id number]  Patient Class: IP- Inpatient   Admission Date: 5/22/2019  Length of Stay: 7 days  Attending Physician: hCerry Mar MD  Primary Care Provider: Jona Che MD    [facility] Medicine Team: [facility] HOSP MED 2 Shane Jo MD    Subjective:     Principal Problem:Spontaneous pneumothorax    HPI:  Venus Mayer is a 89 year old female with a medical history significant for HLD, HTN, COPD (on home O2 3L), HOCM s/p AICD, carotid artery stenosis s/p L CEA 2008 and prior L sided spontaneous pneumothorax who presents to [facility] for evaluation of acute onset shortness of breath. Pt states that she developed acute shortness of breath yesterday afternoon around 3PM. Pt states that she took her rescue inhalers, nebulized saline and an allerga without any improvement. Pt states that she tried to go to sleep but was unable due to dyspnea. Pt states that this am one of her children felt that as she was not improving they should call 911. On arrival to ED, pt had SpO2 of 78%. CXR in ED showed a large right pneumothorax with marked compressive atelectasis of the right lung. A right sided pleural catheter was placed in ED with immediate resolution of shortness of breath. Repeat CXR showed a decrease in the volume of pneumothorax following catheter placement, with partial re-expansion of the right lung. Oxygen saturation increased to 88-93% on home oxygen requirement.     Pt denies any chest pain. Pt endorses chronic shortness of breath that acute worsened yesterday but has since resolved. Pt endorses a recent sinus infection that was treated with antibiotics. Pt denies N/V, fever/chills, change in bowel or bladder habits, HA or focal/general weakness.    Pt receieves majority of care at [facility].    Hospital Course:  Venus Mayer was admitted to [facility] on 5/22/19 for evaluation of acute on chronic  SOB. In ED, pt was noted to have an elevated BNP and CXR showed spontaneous pneumothorax. CT surgery was consulted and placed a pigtail catheter. Pigtail catheter was changed to chest tube the next day following reaccumulation of PTX. IV lasix was started for dieuresis with good urine output. Pt was weaned to home O2 and transitioned to PO lasix once euvolemic. Output via chest tube has decreased appropriately, CXR appears improved and clinically patient is back to baseline requirement of 2L via nasal cannula. Chest tube was managed by CT surgery with repeated trials of clamping which were not tolerated. Pleurodesis done on 5/27/19. Subsequent chest tube malfunction on 5/29 resulting in some subq emphysema of arm and right chest. Chest tube was clamped by CT surgery on 5/29 with plan for removal.     Interval History: Chest tube malfunction (kink) overnight leading to subq emphysema of arm and right chest. No change in respiratory status. Remains on home O2. Chest tube clamped this am. Plan for removal today.     Review of Systems   Constitutional: Negative for activity change, appetite change, chills, fatigue and fever.   HENT: Negative for sneezing and sore throat.    Eyes: Negative for photophobia and visual disturbance.   Respiratory: Positive for cough and shortness of breath. Negative for chest tightness and wheezing.    Cardiovascular: Positive for leg swelling. Negative for chest pain and palpitations.   Gastrointestinal: Negative for abdominal distention, abdominal pain, diarrhea, nausea and vomiting.   Genitourinary: Negative for difficulty urinating and dysuria.   Musculoskeletal: Negative for arthralgias, back pain, neck pain and neck stiffness.   Skin: Positive for wound (chest tube in place). Negative for pallor.   Neurological: Negative for dizziness, tremors, speech difficulty, weakness and headaches.   Psychiatric/Behavioral: Negative for confusion. The patient is not nervous/anxious.       Objective:     Vital Signs (Most Recent):  Temp: 98.1 °F (36.7 °C) (05/29/19 0357)  Pulse: 91 (05/29/19 1145)  Resp: 20 (05/29/19 0955)  BP: (!) 147/63 (05/29/19 0742)  SpO2: (!) 94 % (05/29/19 1145) Vital Signs (24h Range):  Temp:  [98 °F (36.7 °C)-98.6 °F (37 °C)] 98.1 °F (36.7 °C)  Pulse:  [] 91  Resp:  [16-24] 20  SpO2:  [87 %-100 %] 94 %  BP: (116-153)/(58-76) 147/63     Weight: 53.2 kg (117 lb 4.6 oz)  Body mass index is 20.13 kg/m².    Intake/Output Summary (Last 24 hours) at 5/29/2019 1210  Last data filed at 5/29/2019 0500  Gross per 24 hour   Intake 200 ml   Output 470 ml   Net -270 ml      Physical Exam   Constitutional: No distress.   Thin elderly female    HENT:   Head: Atraumatic.   Eyes: EOM are normal.   Neck: Neck supple.   Cardiovascular: Normal rate and regular rhythm.   Pulmonary/Chest: Effort normal.   Right chest tube in place. Subq emphysema of arm and right chest   Abdominal: Soft.   Neurological: She is alert.   Skin: Skin is warm and dry.   Psychiatric: She has a normal mood and affect. Thought content normal.   Vitals reviewed.    Significant Labs:   CBC:   Recent Labs   Lab 05/28/19  0429 05/29/19  0444   WBC 9.35 8.48   HGB 12.3 12.7   HCT 39.0 40.6    183     CMP:   Recent Labs   Lab 05/28/19  0429 05/29/19  0444    135*   K 4.2 4.1   CL 92* 91*   CO2 35* 37*   GLU 95 88   BUN 28* 31*   CREATININE 0.9 0.9   CALCIUM 9.8 10.2   PROT 5.8* 6.4   ALBUMIN 2.8* 3.0*   BILITOT 0.3 0.4   ALKPHOS 84 89   AST 24 26   ALT 14 16   ANIONGAP 9 7*   EGFRNONAA 56.5* 56.5*     Significant Imaging: I have reviewed and interpreted all pertinent imaging results/findings within the past 24 hours.    Assessment/Plan:      * Spontaneous pneumothorax  89 year old female with preexisting lung disease (COPD) and prior spontaneous PTX presenting with acute onset SOB not responsive to COPD medications or rescue inhalers  - Pigtail cath initially placed by CT surgery, replaced(5/22/19) by  chest tube with improvement in dyspnea.  - Daily CXR  - CXR 5/22/19 0905 - Large right pneumothorax with marked compressive atelectasis of the right lung  - CXR 5/22/19 0953 - Right-sided pleural catheter is now seen.  Volume of pneumothorax on the right has decreased since 05/22/2019 at 09:01, following this catheter placement, with partial re-expansion of the right lung also appreciated.  No detrimental interval change in the appearance of the chest since that time is noted.  - CXR 5/22/19 1025 - Right-sided chest tube is noted, which appears slightly reposition and overlying the mid aspect of the right hemithorax. Unchanged radiographic appearance of the lungs.  Small pneumothorax is present along the lateral lower aspect of the right hemithorax  - CXR 5/22/19 2130 - More lateral position of right-sided chest tube tip with marked increase in size of right-sided pneumothorax.  - CXR 5/22/19 2334 - On the current examination the right pigtail catheter projects over the right hemithorax similar to the earlier study of 05/22/2019, 21:25 hours.  Size of the right pneumothorax is smaller than that 21:25 hours.  - CXR 5/23/19 0406 - Right-sided pigtail chest tube and right pneumothorax appear unchanged.  - CXR 5/23/19 0826 - Reaccumulation of right pneumothorax and slight leftward mediastinal shift despite pleural pigtail catheter.   - CXR 5/23/19 0930 - Previously present right pleural catheter has been removed, and replaced with a right thoracostomy tube.  A right pneumothorax persists, although its volume has significantly decreased since the examination referenced above, following this tube placement.  No detrimental interval change in the appearance of the chest since 05/23/2019 at 08:22 is appreciated.  - CXR 5/23/19 0311 - Right chest tube appears unchanged with possible buckling of the tube again noted. Right apical pneumothorax appears unchanged.  - CXR 5/24/19 0311 - No significant change from prior study.  -  CXR 5/25/19 0548 - Stable appearance.  - CXR 5/26/19 - appears improved c/w prior  - Pleurodesis per CT surgery, chest tube back to suction x 48 hours, clamped this am    Anxiety  - pt has significant anxiety associated with her SOB for which we have hydroxyzine ordered prn but she refuses to take it.   - she is instead reporting restless leg syndrome for which she takes Mg at home. She was assured her Mag here was 1.9 and unlikely to cause her symptoms. In addition, her iron levels have been checked. Due to persistence of symptoms, continue ropinirole.      Iron deficiency  - hemoglobin stable  - Fe 29, 8% saturation  - Daily iron replacements PO      Muscle spasm  - pt complaining of muscle spasms in bilateral legs, no pain associated  - Fe deficient   - Ropinirole     Debility  - Hip fracture 2 months ago  - works with OP PT/OT  - Continue PT/OT    DNR (do not resuscitate)  - Conversation held between medical team, pt and family at bedside concerning code status.   - Pt states that per her living will, she is DNR  - Family agrees with patient decision   - [facility] DNR form signed and placed into chart        COPD (chronic obstructive pulmonary disease)  - ?COPD vs ADHF   - Continue home spiriva  - Albuterol rescue inahler PRN for wheezing  - Oxygen as needed to maintain sp02 >88% (on home O2)  - Monitor respiratory status for any acute change       Heart failure, diastolic  - ?ADHF vs COPD  - BNP 1300  - LE pitting edema on exam, no crackles of elevated JVD   - Lasix 20 PO daily at home (recent change, was on 40 before)  - Will give lasix 40 PO daily and fluid restrict, good response    HTN (hypertension)  - Per pt records, pt takes Amlodipine 2.5mg at bed if SBP>155  - Hypertensive on arrival, resolved with resolution of SOB  - Amlodipine 2.5mg nightly prn as prescribed   - PRN Hydralazine 5 for SBP>180       HOCM (hypertrophic obstructive cardiomyopathy): Apical HCM  - AICD in placed, follows with cardiology at  [facility]       VTE Risk Mitigation (From admission, onward)        Ordered     enoxaparin injection 40 mg  Daily      05/22/19 1156     IP VTE HIGH RISK PATIENT  Once      05/22/19 1156     Place sequential compression device  Until discontinued      05/22/19 1100              Shane Jo MD  Department of Hospital Medicine   [facility]

## 2019-05-29 NOTE — TREATMENT PLAN
Right chest tube removed after AM clamp trial. Occlusive bandage placed. Called by nurse to assess increasing subq air into face and eyes about an hour later. Stat CXR showed small basilar space. Lung well expanded otherwise.  Patient saturating well on 2LNC and in no respiratory distress. On exam, subq air increased in right pec, right neck and left eye. No air leaking from chest tube site. Decision made by Dr. Mckeon to make incision in right anterior subcutaneous tissue to aid in air decompression. 2cm incision made until sterile conditions by Dr. Mckeon. Wet to dry gauze placed in wound. Nurse and family educated on subcutaneous emphysema progression and care of incision. Will repeat CXR this afternoon and tomorrow morning to monitor subpulmonic space. Please call with any questions.     Shari Albert PA-C  Thoracic Surgery  [id number]

## 2019-05-29 NOTE — ASSESSMENT & PLAN NOTE
89 year old female with preexisting lung disease (COPD) and prior spontaneous PTX presenting with acute onset SOB not responsive to COPD medications or rescue inhalers  - Pigtail cath initially placed by CT surgery, replaced(5/22/19) by chest tube with improvement in dyspnea.  - Daily CXR  - CXR 5/22/19 0905 - Large right pneumothorax with marked compressive atelectasis of the right lung  - CXR 5/22/19 0953 - Right-sided pleural catheter is now seen.  Volume of pneumothorax on the right has decreased since 05/22/2019 at 09:01, following this catheter placement, with partial re-expansion of the right lung also appreciated.  No detrimental interval change in the appearance of the chest since that time is noted.  - CXR 5/22/19 1025 - Right-sided chest tube is noted, which appears slightly reposition and overlying the mid aspect of the right hemithorax. Unchanged radiographic appearance of the lungs.  Small pneumothorax is present along the lateral lower aspect of the right hemithorax  - CXR 5/22/19 2130 - More lateral position of right-sided chest tube tip with marked increase in size of right-sided pneumothorax.  - CXR 5/22/19 2334 - On the current examination the right pigtail catheter projects over the right hemithorax similar to the earlier study of 05/22/2019, 21:25 hours.  Size of the right pneumothorax is smaller than that 21:25 hours.  - CXR 5/23/19 0406 - Right-sided pigtail chest tube and right pneumothorax appear unchanged.  - CXR 5/23/19 0826 - Reaccumulation of right pneumothorax and slight leftward mediastinal shift despite pleural pigtail catheter.   - CXR 5/23/19 0930 - Previously present right pleural catheter has been removed, and replaced with a right thoracostomy tube.  A right pneumothorax persists, although its volume has significantly decreased since the examination referenced above, following this tube placement.  No detrimental interval change in the appearance of the chest since 05/23/2019 at  08:22 is appreciated.  - CXR 5/23/19 0311 - Right chest tube appears unchanged with possible buckling of the tube again noted. Right apical pneumothorax appears unchanged.  - CXR 5/24/19 0311 - No significant change from prior study.  - CXR 5/25/19 0548 - Stable appearance.  - CXR 5/26/19 - appears improved c/w prior  - Pleurodesis per CT surgery, chest tube back to suction x 48 hours, clamped this am

## 2019-05-29 NOTE — PROGRESS NOTES
"[facility]  Thoracic Surgery  Progress Note    Subjective:     History of Present Illness:  Venus Mayer is a 89 y.o. female with a hx of HLD, CAD, HTN, and COPD on 3L of oxygen at home, who presented for evaluation of SOB to the ED this morning. She reported acute onset SOB this morning. Pt noted to be in distress on presentation with saturations in the high 70s as well as tachycardia. Pneumothorax noted on CXR. Pigtail catheter placed by ED with resolution of pneumothorax. Patient denies any falls or other traumas recently. Of note, she reports a distant hx of left sided "air around my lung" as well as a left sided "lung mass" excision. Denies any pathology on the right. Pt reports resolution of her SOB with placement of pigtail catheter.      Post-Op Info:  * No surgery found *         Interval History: Episode of increased work of breathing overnight. Saturations remained in mid 90s on home dose of supplemental O2. Chest tube adjusted. Lung remains well expanded on multiple CXRs. Subq emphysema stable since yesterday.     Medications:  Continuous Infusions:  Scheduled Meds:   albuterol-ipratropium  3 mL Nebulization Q6H    aspirin  81 mg Oral Daily    enoxaparin  40 mg Subcutaneous Daily    ferrous sulfate  325 mg Oral Daily    furosemide  40 mg Oral Daily    multivitamin  1 tablet Oral Daily    polyethylene glycol  17 g Oral Daily    rOPINIRole  0.25 mg Oral QHS    tiotropium  18 mcg Inhalation Daily     PRN Meds:acetaminophen, albuterol, amLODIPine, calcium carbonate, dextrose 50%, dextrose 50%, glucagon (human recombinant), glucose, glucose, hydrALAZINE, hydrOXYzine pamoate, oxyCODONE-acetaminophen, ramelteon, senna-docusate 8.6-50 mg, sodium chloride 0.9%     Review of patient's allergies indicates:   Allergen Reactions    Sulfamethoxazole-trimethoprim      Other reaction(s): Rash     Objective:     Vital Signs (Most Recent):  Temp: 98.1 °F (36.7 °C) (05/29/19 0357)  Pulse: 92 " (05/29/19 0746)  Resp: 20 (05/29/19 0742)  BP: (!) 147/63 (05/29/19 0742)  SpO2: 100 % (05/29/19 0746) Vital Signs (24h Range):  Temp:  [97.8 °F (36.6 °C)-98.6 °F (37 °C)] 98.1 °F (36.7 °C)  Pulse:  [] 92  Resp:  [16-24] 20  SpO2:  [87 %-100 %] 100 %  BP: (116-153)/(58-76) 147/63     Intake/Output - Last 3 Shifts       05/27 0700 - 05/28 0659 05/28 0700 - 05/29 0659 05/29 0700 - 05/30 0659    P.O. 960 200     Total Intake(mL/kg) 960 (17.6) 200 (3.8)     Urine (mL/kg/hr) 650 (0.5) 450 (0.4)     Stool  0     Chest Tube  20     Total Output 650 470     Net +310 -270            Urine Occurrence 3 x 3 x     Stool Occurrence  0 x           SpO2: 100 %  O2 Device (Oxygen Therapy): nasal cannula    Physical Exam   Constitutional: No distress.   Thin elderly female    HENT:   Head: Atraumatic.   Eyes: EOM are normal.   Neck: Neck supple.   Cardiovascular: Normal rate and regular rhythm.   Pulmonary/Chest: Effort normal.   Right chest tube in place. No air leak.   Subq air to right lateral chest and neck.    Abdominal: Soft.   Neurological: She is alert.   Skin: Skin is warm and dry.   Psychiatric: She has a normal mood and affect. Thought content normal.   Vitals reviewed.      Significant Labs:  ABGs: No results for input(s): PH, PCO2, PO2, HCO3, POCSATURATED, BE in the last 48 hours.  BMP:   Recent Labs   Lab 05/29/19 0444   GLU 88   *   K 4.1   CL 91*   CO2 37*   BUN 31*   CREATININE 0.9   CALCIUM 10.2   MG 2.1     CBC:   Recent Labs   Lab 05/29/19 0444   WBC 8.48   RBC 4.14   HGB 12.7   HCT 40.6      MCV 98   MCH 30.7   MCHC 31.3*     CMP:   Recent Labs   Lab 05/29/19 0444   GLU 88   CALCIUM 10.2   ALBUMIN 3.0*   PROT 6.4   *   K 4.1   CO2 37*   CL 91*   BUN 31*   CREATININE 0.9   ALKPHOS 89   ALT 16   AST 26   BILITOT 0.4     Coagulation: No results for input(s): PT, INR, APTT in the last 48 hours.    Significant Diagnostics:  CXR: I have reviewed all pertinent results/findings within the  past 24 hours    VTE Risk Mitigation (From admission, onward)        Ordered     enoxaparin injection 40 mg  Daily      05/22/19 1156     IP VTE HIGH RISK PATIENT  Once      05/22/19 1156     Place sequential compression device  Until discontinued      05/22/19 1100        Assessment/Plan:     * Spontaneous pneumothorax  89 yo female with spontaneous PTX s/p pigtail placement in ED.     - Clamp chest tube this morning. Repeat CXR at 1000. If stable, will pull tube today. Suspect chest tube sentinel hole was briefly dislodged yesterday causing subq emphysema.   - Increased work of breathing improved after breathing treatment this morning.  - Wean O2 as tolerated  - Daily CXR          BHUPINDER Caal  Thoracic Surgery  [facility]

## 2019-05-29 NOTE — SUBJECTIVE & OBJECTIVE
Interval History: Episode of increased work of breathing overnight. Saturations remained in mid 90s on home dose of supplemental O2. Chest tube adjusted. Lung remains well expanded on multiple CXRs. Subq emphysema stable since yesterday.     Medications:  Continuous Infusions:  Scheduled Meds:   albuterol-ipratropium  3 mL Nebulization Q6H    aspirin  81 mg Oral Daily    enoxaparin  40 mg Subcutaneous Daily    ferrous sulfate  325 mg Oral Daily    furosemide  40 mg Oral Daily    multivitamin  1 tablet Oral Daily    polyethylene glycol  17 g Oral Daily    rOPINIRole  0.25 mg Oral QHS    tiotropium  18 mcg Inhalation Daily     PRN Meds:acetaminophen, albuterol, amLODIPine, calcium carbonate, dextrose 50%, dextrose 50%, glucagon (human recombinant), glucose, glucose, hydrALAZINE, hydrOXYzine pamoate, oxyCODONE-acetaminophen, ramelteon, senna-docusate 8.6-50 mg, sodium chloride 0.9%     Review of patient's allergies indicates:   Allergen Reactions    Sulfamethoxazole-trimethoprim      Other reaction(s): Rash     Objective:     Vital Signs (Most Recent):  Temp: 98.1 °F (36.7 °C) (05/29/19 0357)  Pulse: 92 (05/29/19 0746)  Resp: 20 (05/29/19 0742)  BP: (!) 147/63 (05/29/19 0742)  SpO2: 100 % (05/29/19 0746) Vital Signs (24h Range):  Temp:  [97.8 °F (36.6 °C)-98.6 °F (37 °C)] 98.1 °F (36.7 °C)  Pulse:  [] 92  Resp:  [16-24] 20  SpO2:  [87 %-100 %] 100 %  BP: (116-153)/(58-76) 147/63     Intake/Output - Last 3 Shifts       05/27 0700 - 05/28 0659 05/28 0700 - 05/29 0659 05/29 0700 - 05/30 0659    P.O. 960 200     Total Intake(mL/kg) 960 (17.6) 200 (3.8)     Urine (mL/kg/hr) 650 (0.5) 450 (0.4)     Stool  0     Chest Tube  20     Total Output 650 470     Net +310 -270            Urine Occurrence 3 x 3 x     Stool Occurrence  0 x           SpO2: 100 %  O2 Device (Oxygen Therapy): nasal cannula    Physical Exam   Constitutional: No distress.   Thin elderly female    HENT:   Head: Atraumatic.   Eyes: EOM are  normal.   Neck: Neck supple.   Cardiovascular: Normal rate and regular rhythm.   Pulmonary/Chest: Effort normal.   Right chest tube in place. No air leak.   Subq air to right lateral chest and neck.    Abdominal: Soft.   Neurological: She is alert.   Skin: Skin is warm and dry.   Psychiatric: She has a normal mood and affect. Thought content normal.   Vitals reviewed.      Significant Labs:  ABGs: No results for input(s): PH, PCO2, PO2, HCO3, POCSATURATED, BE in the last 48 hours.  BMP:   Recent Labs   Lab 05/29/19  0444   GLU 88   *   K 4.1   CL 91*   CO2 37*   BUN 31*   CREATININE 0.9   CALCIUM 10.2   MG 2.1     CBC:   Recent Labs   Lab 05/29/19  0444   WBC 8.48   RBC 4.14   HGB 12.7   HCT 40.6      MCV 98   MCH 30.7   MCHC 31.3*     CMP:   Recent Labs   Lab 05/29/19  0444   GLU 88   CALCIUM 10.2   ALBUMIN 3.0*   PROT 6.4   *   K 4.1   CO2 37*   CL 91*   BUN 31*   CREATININE 0.9   ALKPHOS 89   ALT 16   AST 26   BILITOT 0.4     Coagulation: No results for input(s): PT, INR, APTT in the last 48 hours.    Significant Diagnostics:  CXR: I have reviewed all pertinent results/findings within the past 24 hours    VTE Risk Mitigation (From admission, onward)        Ordered     enoxaparin injection 40 mg  Daily      05/22/19 1156     IP VTE HIGH RISK PATIENT  Once      05/22/19 1156     Place sequential compression device  Until discontinued      05/22/19 1100

## 2019-05-29 NOTE — CARE UPDATE
"RAPID RESPONSE NURSE NOTE     Admit Date: 2019  LOS: 7  Code Status: DNR   Date of Consult: 2019  : 1929  Age: 89 y.o.  Weight:   Wt Readings from Last 1 Encounters:   19 54.4 kg (119 lb 14.9 oz)     Sex: female  Race: White   Bed: 8074/8074 A:   MRN: [id number]  Time Rapid Response Team page Received: 0419  Time Rapid Response Team at Bedside: 0420  Time Rapid Response Team left Bedside: 0451  Was the patient discharged from an ICU this admission?   no  Was the patient discharged from a PACU within last 24 hours?  no  Did the patient receive conscious sedation/general anesthesia within last 24 hours?  no  Was the patient in the ED within the past 24 hours?  no  Was the patient started on NIPPV within the past 24 hours?  no  Did this progress into an ARC or CPA:  no  Attending Physician: Cherry Mar MD  Primary Service: [facility]  Consult Requested By: Cherry Mar MD     SITUATION     Reason for Call:   Called to evaluate the patient for Respiratory    BACKGROUND     Why is the patient in the hospital?: Spontaneous pneumothorax    Patient has a past medical history of Cardiomyopathy, Carotid artery occlusion, Hyperlipidemia, and Hypertension.    ASSESSMENT/INTERVENTIONS     BP (!) 151/76 (BP Location: Left arm, Patient Position: Lying)   Pulse 92   Temp 98.1 °F (36.7 °C) (Oral)   Resp 19   Ht 5' 4" (1.626 m)   Wt 54.4 kg (119 lb 14.9 oz)   SpO2 95%   Breastfeeding? No   BMI 20.59 kg/m²     What did you find: Rapid response called by bedside RN, patient with increased work of breathing and c/o SOB. O2 sats % on 4L Nasal Cannula. Other VSS. Patient awake and alert, following commands. Subcutaneous air present to right side of patient. R chest tube in place, connected to continuous wall suction. No additional output noted. Chest X-ray ordered/obtained.     RECOMMENDATIONS     We recommend: Patient may benefit from Gen Surg consult to evaluate chest tube placement "     FOLLOW-UP/CONTINGENCY PLAN     Patient needs a second visit at : Later today    Call the Rapid Response Nurse, Shane Castellano RN at x [phone] for additional questions or concerns.    PHYSICIAN ESCALATION     Orders received and case discussed with Otis De La Garza MD.    Disposition: Remain in room 8074.

## 2019-05-29 NOTE — PT/OT/SLP PROGRESS
"Physical Therapy Treatment    Patient Name:  Venus Mayer   MRN:  [id number]    Recommendations:     Discharge Recommendations:  outpatient PT   Discharge Equipment Recommendations: none   Barriers to discharge: None    Assessment:     Venus Mayer is a 89 y.o. female admitted with a medical diagnosis of Spontaneous pneumothorax.  She presents with the following impairments/functional limitations:  weakness, impaired endurance, impaired self care skills, impaired functional mobilty, gait instability, impaired balance, decreased coordination. Pt limited to supine therex this day d/t recent rapid response for evaluation of proper chest tube placement. NSG requests no bed/OOB mobility for safety with chest tube being closely monitored. Pt and daughter agreeable to supine therex this day. Pt will continue to benefit from therapy services to address impairments listed above.     Rehab Prognosis: Good; patient would benefit from acute skilled PT services to address these deficits and reach maximum level of function.    Recent Surgery: * No surgery found *      Plan:     During this hospitalization, patient to be seen 3 x/week to address the identified rehab impairments via gait training, therapeutic activities, therapeutic exercises, neuromuscular re-education and progress toward the following goals:    · Plan of Care Expires:  06/23/19    Subjective     Chief Complaint: mild SOB  Patient/Family Comments/goals: "I can try some exercise I think, that's okay."   Pain/Comfort:  · Pain Rating 1: other (see comments)(unrated c/o pain)  · Location - Side 1: Right  · Location 1: chest  · Pain Addressed 1: (NSG aware)      Objective:     Communicated with NSG prior to session.  Patient found HOB elevated with chest tube, oxygen upon PTA entry to room.     General Precautions: Standard, fall   Orthopedic Precautions:LLE posterior precautions   Braces: N/A     Functional Mobility:  · No functional mobility per charge NSG " request/hold      AM-PAC 6 CLICK MOBILITY  Turning over in bed (including adjusting bedclothes, sheets and blankets)?: 3  Sitting down on and standing up from a chair with arms (e.g., wheelchair, bedside commode, etc.): 3  Moving from lying on back to sitting on the side of the bed?: 3  Moving to and from a bed to a chair (including a wheelchair)?: 3  Need to walk in hospital room?: 3  Climbing 3-5 steps with a railing?: 3  Basic Mobility Total Score: 18       Therapeutic Activities and Exercises:  Pt performs BLE supine therex: AP, Hip ABD/ADD, and QS x 30 reps. Pt performs RLE therex: SAQ and SLR x 20 reps.  Pt and daughter educated on PT POC and plan for session with regards to restriction to bed therex only this day.     Patient left HOB elevated with all lines intact, call button in reach and daughter present.    GOALS:   Multidisciplinary Problems     Physical Therapy Goals        Problem: Physical Therapy Goal    Goal Priority Disciplines Outcome Goal Variances Interventions   Physical Therapy Goal     PT, PT/OT Ongoing (interventions implemented as appropriate)     Description:  Goals to be met by: 2019    Patient will increase functional independence with mobility by performin. Supine to sit with Modified [place]  2. Sit to supine with Modified [place]  3. Sit to stand transfer with Supervision  4. Bed to chair transfer with Supervision using LRAD  5. Gait  x 200 feet with Supervision using LRAD.   6. Ascend/descend 4 stair with right Handrails Minimum Assistance.                        Time Tracking:     PT Received On: 19  PT Start Time: 1104     PT Stop Time: 1120  PT Total Time (min): 16 min     Billable Minutes: Therapeutic Exercise 16    Treatment Type: Treatment  PT/PTA: PTA     PTA Visit Number: 2     Edmund Canales, PTA  2019

## 2019-05-29 NOTE — CARE UPDATE
"RAPID RESPONSE NURSE PROACTIVE ROUNDING NOTE     Time of Visit: 09    Admit Date: 2019  LOS: 7  Code Status: DNR   Date of Visit: 2019  : 1929  Age: 89 y.o.  Sex: female  Race: White  Bed: 8074/8074 A:   MRN: [id number]  Was the patient discharged from an ICU this admission? no   Was the patient discharged from a PACU within last 24 hours?  no  Did the patient receive conscious sedation/general anesthesia in last 24 hours?  no  Was the patient in the ED within the past 24 hours?  no  Was the patient started on NIPPV within the past 24 hours?  no  Attending Physician: Cherry Mar MD  Primary Service: [facility] 2    ASSESSMENT     Diagnosis: Spontaneous pneumothorax    Abnormal Vital Signs: BP (!) 147/63   Pulse 96   Temp 98.1 °F (36.7 °C) (Oral)   Resp 20   Ht 5' 4" (1.626 m)   Wt 53.2 kg (117 lb 4.6 oz)   SpO2 (!) 91%   Breastfeeding? No   BMI 20.13 kg/m²      Clinical Issues: Respiratory    Patient  has a past medical history of Cardiomyopathy, Carotid artery occlusion, Hyperlipidemia, and Hypertension.      Upon arrival to bedside, pt sitting up in chair. AAOx4 no signs of distress. 3L NC in place, Sats 90-92%, HR 98. Chest tube clamped. Dressing CDI. R chest sub Q emphysema noted. Diminished breath sounds in RUQ and RLQ.     INTERVENTIONS/ RECOMMENDATIONS     Continue to closely monitor sats and chest tube.     Discussed plan of care with RN, Shannon [phone].    PHYSICIAN ESCALATION     Yes/No  no    Orders received and case discussed with NA.    Disposition: Remain in room 8074.    FOLLOW-UP     Call back the Rapid Response Nurse, Yaima Trevino RN at [phone] for additional questions or concerns.        "

## 2019-05-29 NOTE — CARE UPDATE
Rapid Response Respiratory Therapy Proactive Rounding Note      Time of visit: 09    Code Status: DNR   : 1929  Age: 89 y.o.  Weight:   Wt Readings from Last 1 Encounters:   19 53.2 kg (117 lb 4.6 oz)     Sex: female  Race: White   Bed: 8074/8074 A:   MRN: [id number]    SITUATION     Evaluated patient for: pt has chest tube and it is being evaluated for an issue.  She denies any SOB but is labored due to pain from the chest tube.  She is on 3L with a SAT of 96%.  All vitals are stable and within normal range.  She is here for a spontaneous pnuemothorax.   She has subq air due to the chest tube issue    BACKGROUND     Patient has a past medical history of Cardiomyopathy, Carotid artery occlusion, Hyperlipidemia, and Hypertension.  Pulmonary Hx: COPD Pneumothorax      ASSESSMENT     Pulse: Pulse: 96 Respiratory rate: Resp: 20 Temperature: Temp: 98.1 °F (36.7 °C) BP: BP: (!) 147/63 SpO2:SpO2: 96 %   Level of Consciousness: Level of Consciousness (AVPU): alert  Respiratory Effort: Respiratory Effort: Mild, Labored  Expansion/Accessory Muscle Usage: Expansion/Accessory Muscles/Retractions: accessory muscle use  All Lung Field Breath Sounds: All Lung Fields Breath Sounds: Anterior:, Lateral:  ROBYN Breath Sounds: clear  LLL Breath Sounds: clear  RUL Breath Sounds: diminished  RML Breath Sounds: diminished  RLL Breath Sounds: diminished  Cough Type: Cough Type: nonproductive  Mobility at time of assessment: General Mobility: generalized weakness  O2 Device/Concentration: O2 Device (Oxygen Therapy): nasal cannula w/ humidification   Flow (L/min): 3 Oxygen Concentration (%): 36  Most recent blood gas: No results for input(s): PH, PCO2, PO2, HCO3, POCSATURATED, BE in the last 72 hours.    Current Respiratory Care Orders: O2, Tx Q6H and IS  Ambu at bedside: Ambu bag with the patient?: Yes, Adult Ambu    INTERVENTIONS/RECOMMENDATIONS   ?will continue to monitor         FOLLOW-UP     Please call back the Rapid  Response RT, Misty Schafer, RRT at x [phone] for any questions or concerns.

## 2019-05-29 NOTE — SUBJECTIVE & OBJECTIVE
Interval History: Chest tube malfunction (kink) overnight leading to subq emphysema of arm and right chest. No change in respiratory status. Remains on home O2. Chest tube clamped this am. Plan for removal today.     Review of Systems   Constitutional: Negative for activity change, appetite change, chills, fatigue and fever.   HENT: Negative for sneezing and sore throat.    Eyes: Negative for photophobia and visual disturbance.   Respiratory: Positive for cough and shortness of breath. Negative for chest tightness and wheezing.    Cardiovascular: Positive for leg swelling. Negative for chest pain and palpitations.   Gastrointestinal: Negative for abdominal distention, abdominal pain, diarrhea, nausea and vomiting.   Genitourinary: Negative for difficulty urinating and dysuria.   Musculoskeletal: Negative for arthralgias, back pain, neck pain and neck stiffness.   Skin: Positive for wound (chest tube in place). Negative for pallor.   Neurological: Negative for dizziness, tremors, speech difficulty, weakness and headaches.   Psychiatric/Behavioral: Negative for confusion. The patient is not nervous/anxious.      Objective:     Vital Signs (Most Recent):  Temp: 98.1 °F (36.7 °C) (05/29/19 0357)  Pulse: 91 (05/29/19 1145)  Resp: 20 (05/29/19 0955)  BP: (!) 147/63 (05/29/19 0742)  SpO2: (!) 94 % (05/29/19 1145) Vital Signs (24h Range):  Temp:  [98 °F (36.7 °C)-98.6 °F (37 °C)] 98.1 °F (36.7 °C)  Pulse:  [] 91  Resp:  [16-24] 20  SpO2:  [87 %-100 %] 94 %  BP: (116-153)/(58-76) 147/63     Weight: 53.2 kg (117 lb 4.6 oz)  Body mass index is 20.13 kg/m².    Intake/Output Summary (Last 24 hours) at 5/29/2019 1210  Last data filed at 5/29/2019 0500  Gross per 24 hour   Intake 200 ml   Output 470 ml   Net -270 ml      Physical Exam   Constitutional: No distress.   Thin elderly female    HENT:   Head: Atraumatic.   Eyes: EOM are normal.   Neck: Neck supple.   Cardiovascular: Normal rate and regular rhythm.    Pulmonary/Chest: Effort normal.   Right chest tube in place. Subq emphysema of arm and right chest   Abdominal: Soft.   Neurological: She is alert.   Skin: Skin is warm and dry.   Psychiatric: She has a normal mood and affect. Thought content normal.   Vitals reviewed.    Significant Labs:   CBC:   Recent Labs   Lab 05/28/19 0429 05/29/19  0444   WBC 9.35 8.48   HGB 12.3 12.7   HCT 39.0 40.6    183     CMP:   Recent Labs   Lab 05/28/19 0429 05/29/19  0444    135*   K 4.2 4.1   CL 92* 91*   CO2 35* 37*   GLU 95 88   BUN 28* 31*   CREATININE 0.9 0.9   CALCIUM 9.8 10.2   PROT 5.8* 6.4   ALBUMIN 2.8* 3.0*   BILITOT 0.3 0.4   ALKPHOS 84 89   AST 24 26   ALT 14 16   ANIONGAP 9 7*   EGFRNONAA 56.5* 56.5*     Significant Imaging: I have reviewed and interpreted all pertinent imaging results/findings within the past 24 hours.

## 2019-05-29 NOTE — PLAN OF CARE
Problem: Physical Therapy Goal  Goal: Physical Therapy Goal  Goals to be met by: 2019    Patient will increase functional independence with mobility by performin. Supine to sit with Modified [place]  2. Sit to supine with Modified [place]  3. Sit to stand transfer with Supervision  4. Bed to chair transfer with Supervision using LRAD  5. Gait  x 200 feet with Supervision using LRAD.   6. Ascend/descend 4 stair with right Handrails Minimum Assistance.       Outcome: Ongoing (interventions implemented as appropriate)  Goals remain appropriate.

## 2019-05-30 LAB
ALBUMIN SERPL BCP-MCNC: 3 G/DL (ref 3.5–5.2)
ALP SERPL-CCNC: 93 U/L (ref 55–135)
ALT SERPL W/O P-5'-P-CCNC: 17 U/L (ref 10–44)
ANION GAP SERPL CALC-SCNC: 7 MMOL/L (ref 8–16)
AST SERPL-CCNC: 30 U/L (ref 10–40)
BASOPHILS # BLD AUTO: 0.04 K/UL (ref 0–0.2)
BASOPHILS NFR BLD: 0.4 % (ref 0–1.9)
BILIRUB SERPL-MCNC: 0.5 MG/DL (ref 0.1–1)
BUN SERPL-MCNC: 28 MG/DL (ref 8–23)
CALCIUM SERPL-MCNC: 10.2 MG/DL (ref 8.7–10.5)
CHLORIDE SERPL-SCNC: 90 MMOL/L (ref 95–110)
CO2 SERPL-SCNC: 38 MMOL/L (ref 23–29)
CREAT SERPL-MCNC: 0.8 MG/DL (ref 0.5–1.4)
DIFFERENTIAL METHOD: ABNORMAL
EOSINOPHIL # BLD AUTO: 0.4 K/UL (ref 0–0.5)
EOSINOPHIL NFR BLD: 3.9 % (ref 0–8)
ERYTHROCYTE [DISTWIDTH] IN BLOOD BY AUTOMATED COUNT: 13.2 % (ref 11.5–14.5)
EST. GFR  (AFRICAN AMERICAN): >60 ML/MIN/1.73 M^2
EST. GFR  (NON AFRICAN AMERICAN): >60 ML/MIN/1.73 M^2
GLUCOSE SERPL-MCNC: 122 MG/DL (ref 70–110)
HCT VFR BLD AUTO: 41 % (ref 37–48.5)
HGB BLD-MCNC: 13.1 G/DL (ref 12–16)
IMM GRANULOCYTES # BLD AUTO: 0.05 K/UL (ref 0–0.04)
IMM GRANULOCYTES NFR BLD AUTO: 0.5 % (ref 0–0.5)
LYMPHOCYTES # BLD AUTO: 1.3 K/UL (ref 1–4.8)
LYMPHOCYTES NFR BLD: 12.8 % (ref 18–48)
MAGNESIUM SERPL-MCNC: 2 MG/DL (ref 1.6–2.6)
MCH RBC QN AUTO: 30.4 PG (ref 27–31)
MCHC RBC AUTO-ENTMCNC: 32 G/DL (ref 32–36)
MCV RBC AUTO: 95 FL (ref 82–98)
MONOCYTES # BLD AUTO: 0.8 K/UL (ref 0.3–1)
MONOCYTES NFR BLD: 7.7 % (ref 4–15)
NEUTROPHILS # BLD AUTO: 7.8 K/UL (ref 1.8–7.7)
NEUTROPHILS NFR BLD: 74.7 % (ref 38–73)
NRBC BLD-RTO: 0 /100 WBC
PHOSPHATE SERPL-MCNC: 3.7 MG/DL (ref 2.7–4.5)
PLATELET # BLD AUTO: 212 K/UL (ref 150–350)
PMV BLD AUTO: 10.2 FL (ref 9.2–12.9)
POTASSIUM SERPL-SCNC: 4.1 MMOL/L (ref 3.5–5.1)
PROT SERPL-MCNC: 6.5 G/DL (ref 6–8.4)
RBC # BLD AUTO: 4.31 M/UL (ref 4–5.4)
SODIUM SERPL-SCNC: 135 MMOL/L (ref 136–145)
WBC # BLD AUTO: 10.49 K/UL (ref 3.9–12.7)

## 2019-05-30 PROCEDURE — 99233 SBSQ HOSP IP/OBS HIGH 50: CPT | Mod: GC,,, | Performed by: HOSPITALIST

## 2019-05-30 PROCEDURE — 63600175 PHARM REV CODE 636 W HCPCS: Performed by: STUDENT IN AN ORGANIZED HEALTH CARE EDUCATION/TRAINING PROGRAM

## 2019-05-30 PROCEDURE — 36415 COLL VENOUS BLD VENIPUNCTURE: CPT

## 2019-05-30 PROCEDURE — 83735 ASSAY OF MAGNESIUM: CPT

## 2019-05-30 PROCEDURE — 85025 COMPLETE CBC W/AUTO DIFF WBC: CPT

## 2019-05-30 PROCEDURE — 25000242 PHARM REV CODE 250 ALT 637 W/ HCPCS: Performed by: STUDENT IN AN ORGANIZED HEALTH CARE EDUCATION/TRAINING PROGRAM

## 2019-05-30 PROCEDURE — 25000003 PHARM REV CODE 250: Performed by: STUDENT IN AN ORGANIZED HEALTH CARE EDUCATION/TRAINING PROGRAM

## 2019-05-30 PROCEDURE — 94761 N-INVAS EAR/PLS OXIMETRY MLT: CPT

## 2019-05-30 PROCEDURE — 80053 COMPREHEN METABOLIC PANEL: CPT

## 2019-05-30 PROCEDURE — 94799 UNLISTED PULMONARY SVC/PX: CPT

## 2019-05-30 PROCEDURE — 27000221 HC OXYGEN, UP TO 24 HOURS

## 2019-05-30 PROCEDURE — 20600001 HC STEP DOWN PRIVATE ROOM

## 2019-05-30 PROCEDURE — 84100 ASSAY OF PHOSPHORUS: CPT

## 2019-05-30 PROCEDURE — 99233 PR SUBSEQUENT HOSPITAL CARE,LEVL III: ICD-10-PCS | Mod: GC,,, | Performed by: HOSPITALIST

## 2019-05-30 PROCEDURE — 94640 AIRWAY INHALATION TREATMENT: CPT

## 2019-05-30 RX ORDER — CETIRIZINE HYDROCHLORIDE 5 MG/1
10 TABLET ORAL DAILY
Status: DISCONTINUED | OUTPATIENT
Start: 2019-05-30 | End: 2019-05-31 | Stop reason: HOSPADM

## 2019-05-30 RX ADMIN — POLYETHYLENE GLYCOL 3350 17 G: 17 POWDER, FOR SOLUTION ORAL at 08:05

## 2019-05-30 RX ADMIN — ENOXAPARIN SODIUM 40 MG: 100 INJECTION SUBCUTANEOUS at 06:05

## 2019-05-30 RX ADMIN — TIOTROPIUM BROMIDE 18 MCG: 18 CAPSULE ORAL; RESPIRATORY (INHALATION) at 08:05

## 2019-05-30 RX ADMIN — IPRATROPIUM BROMIDE AND ALBUTEROL SULFATE 3 ML: .5; 3 SOLUTION RESPIRATORY (INHALATION) at 12:05

## 2019-05-30 RX ADMIN — IPRATROPIUM BROMIDE AND ALBUTEROL SULFATE 3 ML: .5; 3 SOLUTION RESPIRATORY (INHALATION) at 07:05

## 2019-05-30 RX ADMIN — IPRATROPIUM BROMIDE AND ALBUTEROL SULFATE 3 ML: .5; 3 SOLUTION RESPIRATORY (INHALATION) at 08:05

## 2019-05-30 RX ADMIN — THERA TABS 1 TABLET: TAB at 08:05

## 2019-05-30 RX ADMIN — FUROSEMIDE 40 MG: 40 TABLET ORAL at 08:05

## 2019-05-30 RX ADMIN — ROPINIROLE HYDROCHLORIDE 0.25 MG: 0.25 TABLET, FILM COATED ORAL at 09:05

## 2019-05-30 RX ADMIN — ASPIRIN 81 MG CHEWABLE TABLET 81 MG: 81 TABLET CHEWABLE at 08:05

## 2019-05-30 RX ADMIN — SENNOSIDES,DOCUSATE SODIUM 1 TABLET: 50; 8.6 TABLET, FILM COATED ORAL at 08:05

## 2019-05-30 RX ADMIN — IPRATROPIUM BROMIDE AND ALBUTEROL SULFATE 3 ML: .5; 3 SOLUTION RESPIRATORY (INHALATION) at 01:05

## 2019-05-30 RX ADMIN — CETIRIZINE HYDROCHLORIDE 10 MG: 5 TABLET ORAL at 06:05

## 2019-05-30 RX ADMIN — FERROUS SULFATE TAB EC 325 MG (65 MG FE EQUIVALENT) 325 MG: 325 (65 FE) TABLET DELAYED RESPONSE at 08:05

## 2019-05-30 NOTE — PROGRESS NOTES
[facility] Hospital Medicine  Progress Note    Patient Name: Venus Mayer  MRN: [id number]  Patient Class: IP- Inpatient   Admission Date: 5/22/2019  Length of Stay: 8 days  Attending Physician: Cherry Mar MD  Primary Care Provider: Jona Che MD    [facility] Genesis Clemons MD    Subjective:     Principal Problem:Spontaneous pneumothorax    HPI:  Venus Mayer is a 89 year old female with a medical history significant for HLD, HTN, COPD (on home O2 3L), HOCM s/p AICD, carotid artery stenosis s/p L CEA 2008 and prior L sided spontaneous pneumothorax who [place] [facility] [place] of acute onset shortness of breath. Pt states that she developed acute shortness of breath yesterday afternoon around 3PM. Pt states that she took her rescue inhalers, nebulized saline and an allerga without any improvement. Pt states that she tried to go to sleep but was unable due to dyspnea. Pt states that this am one of her children felt that as she was not improving they should call 911. On arrival to ED, pt had SpO2 of 78%. CXR in ED showed a large right pneumothorax with marked compressive atelectasis of the right lung. A right sided pleural catheter was placed in ED with immediate resolution of shortness of breath. Repeat CXR showed a decrease in the volume of pneumothorax following catheter placement, with partial re-expansion of the right lung. Oxygen saturation increased to 88-93% on home oxygen requirement.     Pt denies any chest pain. Pt endorses chronic shortness of breath that acute worsened yesterday but has since resolved. Pt endorses a recent sinus infection that was treated with antibiotics. Pt denies N/V, fever/chills, change in bowel or bladder habits, HA or focal/general weakness.    Pt receieves majority of care at [facility].    Hospital Course:  Venus Mayer was admitted [place] [facility] [place] 5/22[place]19 for evaluation of acute on chronic SOB.  In ED, pt was noted to have an elevated BNP and CXR showed spontaneous pneumothorax. CT surgery was consulted and placed a pigtail catheter. Pigtail catheter was changed to chest tube the next day following reaccumulation of PTX. IV lasix was started for dieuresis with good urine output. Pt was weaned to home O2 and transitioned to PO lasix once euvolemic. Output via chest tube has decreased appropriately, CXR appears improved and clinically patient is back to baseline requirement of 2L via nasal cannula. Chest tube was managed by CT surgery with repeated trials of clamping which were not tolerated. Pleurodesis done on 5/27/19. Subsequent chest tube malfunction on 5/29 resulting in some subq emphysema of arm and right chest requiring bedside decompression on 5/29/19. A one-way valve on right anterior chest is present.    Interval History: no acute events overnight. Vitals within normal.     Review of Systems   Constitutional: Negative for activity change, appetite change, chills, fatigue and fever.   HENT: Negative for sneezing and sore throat.    Eyes: Negative for photophobia and visual disturbance.   Respiratory: Positive for cough and shortness of breath. Negative for chest tightness and wheezing.    Cardiovascular: Negative for chest pain, palpitations and leg swelling.   Gastrointestinal: Negative for abdominal distention, abdominal pain, diarrhea, nausea and vomiting.   Genitourinary: Negative for difficulty urinating and dysuria.   Musculoskeletal: Negative for arthralgias, back pain, neck pain and neck stiffness.   Skin: Positive for wound (chest tube in place). Negative for pallor.   Neurological: Negative for dizziness, tremors, speech difficulty, weakness and headaches.   Psychiatric/Behavioral: Negative for confusion. The patient is not nervous/anxious.      Objective:     Vital Signs (Most Recent):  Temp: 97.8 °F (36.6 °C) (05/30/19 1523)  Pulse: 100 (05/30/19 1528)  Resp: 18 (05/30/19 1523)  BP: (!)  158/68 (05/30/19 1523)  SpO2: 97 % (05/30/19 1528) Vital Signs (24h Range):  Temp:  [97.5 °F (36.4 °C)-98.5 °F (36.9 °C)] 97.8 °F (36.6 °C)  Pulse:  [] 100  Resp:  [18-20] 18  SpO2:  [90 %-99 %] 97 %  BP: (125-158)/(56-72) 158/68     Weight: 53.2 kg (117 lb 4.6 oz)  Body mass index is 20.13 kg/m².    Intake/Output Summary (Last 24 hours) at 5/30/2019 1612  Last data filed at 5/30/2019 1200  Gross per 24 hour   Intake 360 ml   Output 775 ml   Net -415 ml      Physical Exam   Constitutional: No distress.   Thin elderly female    HENT:   Head: Atraumatic.   Eyes: EOM are normal.   Neck: Neck supple.   Cardiovascular: Normal rate and regular rhythm.   Pulmonary/Chest: Effort normal.   Subcutaneous emphysema of right chest, neck and facial edema. Subjectively improved compared to yesterday.   Abdominal: Soft.   Neurological: She is alert.   Skin: Skin is warm and dry.   Psychiatric: She has a normal mood and affect. Thought content normal.   Vitals reviewed.    Significant Labs:   CBC:   Recent Labs   Lab 05/29/19  0444 05/30/19  0651   WBC 8.48 10.49   HGB 12.7 13.1   HCT 40.6 41.0    212     CMP:   Recent Labs   Lab 05/29/19  0444 05/30/19  0651   * 135*   K 4.1 4.1   CL 91* 90*   CO2 37* 38*   GLU 88 122*   BUN 31* 28*   CREATININE 0.9 0.8   CALCIUM 10.2 10.2   PROT 6.4 6.5   ALBUMIN 3.0* 3.0*   BILITOT 0.4 0.5   ALKPHOS 89 93   AST 26 30   ALT 16 17   ANIONGAP 7* 7*   EGFRNONAA 56.5* >60.0     Significant Imaging: I have reviewed and interpreted all pertinent imaging results/findings within the past 24 hours.    Assessment/Plan:      * Spontaneous pneumothorax  - noted incidentally on admission when pt presented with acute onset SOB  - Pigtail cath initially placed 5/21/19 by Thoracic surgery, replaced 5/22/19 by chest tube which remained in until 5/29/19. Underwent pleurodesis on 5/28/19. She had development of subcutaneous emphysema overnight on 5/28/19 likely due to dislodgement of or change in  position chest tube due to which the chest tube was taken out the next day. Due to progression of subcutaneous emphysema a one-way valve made bedside on 5/29/19.   - currently her oxygen requirements have remained stable at 3L via NC  - continue close monitoring  - daily CXR  - appreciate Thoracic surgery following along    Anxiety  - pt has significant anxiety associated with her SOB for which we have hydroxyzine ordered prn but she refuses to take it.   - she is instead reporting restless leg syndrome for which she takes Mg at home. She was assured her Mag here was 1.9 and unlikely to cause her symptoms. In addition, her iron levels have been checked. Due to persistence of symptoms, continue ropinirole.    COPD (chronic obstructive pulmonary disease)  - ?COPD vs ADHF   - Continue home spiriva  - Albuterol rescue inahler PRN for wheezing  - Oxygen as needed to maintain sp02 >88% (on home O2)  - Monitor respiratory status for any acute change     Muscle spasm  - pt complaining of muscle spasms in bilateral legs, no pain associated  - Fe deficient   - Ropinirole     Heart failure, diastolic  - ?ADHF vs COPD  - BNP 1300  - LE pitting edema on exam, no crackles of elevated JVD   - Lasix 20 PO daily at home (recent change, was on 40 before)  - Will give lasix 40 PO daily and fluid restrict, good response    HTN (hypertension)  - Per pt records, pt takes Amlodipine 2.5mg at bed if SBP>155  - Hypertensive on arrival, resolved with resolution of SOB  - Amlodipine 2.5mg nightly prn as prescribed   - PRN Hydralazine 5 for SBP>180     HOCM (hypertrophic obstructive cardiomyopathy): Apical HCM  - AICD in placed, [facility] with [facility]ology at [facility]     Iron deficiency  - hemoglobin stable  - Fe 29, 8% saturation  - Daily iron replacements PO    Debility  - Hip fracture 2 months ago  - works with OP PT/OT  - Continue PT/OT    DNR (do not resuscitate)  - Conversation held between medical team, pt and family at bedside  concerning code status.   - Pt states that per her living will, she is DNR  - Family agrees with patient decision   - [facility] DNR form signed and placed into chart        VTE Risk Mitigation (From admission, onward)        Ordered     enoxaparin injection 40 mg  Daily      05/22/19 1156     IP VTE HIGH RISK PATIENT  Once      05/22/19 1156     Place sequential compression device  Until discontinued      05/22/19 1100      Diet:   Full code    Dispo: awaiting clinical improvement prior to discharge. Encouraged ambulation today and monitoring O2 sats with activity.     Patient seen and plan of care discussed with staff.      Genesis Clemons MD  Department of Hospital Medicine   [facility]

## 2019-05-30 NOTE — PLAN OF CARE
Problem: Adult Inpatient Plan of Care  Goal: Plan of Care Review  Outcome: Ongoing (interventions implemented as appropriate)  VSS. A/Ox4.  No complaints of pain.  Right anterior chest wet to dry dressing changed per daughter request.  Old right chest tube site dressing CDI.  Patient on 3L O2 via NC saturating between 91-96%.  SubQ emphysema in patches on patient right upper back, right lower chin/cheek, and right upper chest, per patient and daughter much better than in the AM.  Purewick in place.  No acute events overnight. Safety maintained.  All questions answered. Patient updated on plan of care.  Will continue to monitor.

## 2019-05-30 NOTE — PLAN OF CARE
Problem: Adult Inpatient Plan of Care  Goal: Plan of Care Review  Chest tube to right lateral chest wall, clamped then removed per MD.  Pt experienced sub q emphysema to right chest wall, neck, face and periorbital areas.  Procedure at bs per MD to relieve pressure.  Oxygen saturation maintained >=88% with 3L NC.  VSS, AAOX4.  Daughter at BS

## 2019-05-30 NOTE — CARE UPDATE
"RAPID RESPONSE NURSE PROACTIVE ROUNDING NOTE     Time of Visit:     Admit Date: 2019  LOS: 7  Code Status: DNR   Date of Visit: 2019  : 1929  Age: 89 y.o.  Sex: female  Race: White  Bed: 8074/8074 A:   MRN: [id number]  Was the patient discharged from an ICU this admission? no   Was the patient discharged from a PACU within last 24 hours?  no  Did the patient receive conscious sedation/general anesthesia in last 24 hours?  no  Was the patient in the ED within the past 24 hours?  no  Was the patient started on NIPPV within the past 24 hours?  no  Attending Physician: Cherry Mra MD  Primary Service: [facility] MED 2    ASSESSMENT     Diagnosis: Spontaneous pneumothorax    Abnormal Vital Signs: BP (!) 125/56 (BP Location: Right arm, Patient Position: Sitting)   Pulse 96   Temp 98.5 °F (36.9 °C) (Oral)   Resp 18   Ht 5' 4" (1.626 m)   Wt 53.2 kg (117 lb 4.6 oz)   SpO2 99%   Breastfeeding? No   BMI 20.13 kg/m²      Clinical Issues: Respiratory    Patient  has a past medical history of Cardiomyopathy, Carotid artery occlusion, Hyperlipidemia, and Hypertension.      Followed up for proactive rounding.  Upon assessment patient found to be sitting up in bed, in no apparent distress.  Daughter at bedside.  VS remain stable and BS CTA bilat.  Will continue to follow up.     INTERVENTIONS/ RECOMMENDATIONS     Educated daughter on signs and symptoms of respiratory distress    Discussed plan of care with RNEzra.    PHYSICIAN ESCALATION     Yes/No  no    Orders received and case discussed with NA.    Disposition: Remain in room 8074.    FOLLOW-UP     Call back the Rapid Response Nurse, Brando Cordero RN at [phone] for additional questions or concerns.          "

## 2019-05-30 NOTE — PLAN OF CARE
Problem: Adult Inpatient Plan of Care  Goal: Plan of Care Review  Outcome: Ongoing (interventions implemented as appropriate)  AAOx4. VSS. Dressing change to right chest wet to dry dressing. Subq patches from emphysema right chest and back. CXR done today. Purewik in place. No other acute changes. Safety maintained. No falls. Will continue to monitor.

## 2019-05-30 NOTE — PROGRESS NOTES
"[facility]  Thoracic Surgery  Progress Note    Subjective:     History of Present Illness:  Venus Mayer is a 89 y.o. female with a hx of HLD, CAD, HTN, and COPD on 3L of oxygen at home, who presented for evaluation of SOB to the ED this morning. She reported acute onset SOB this morning. Pt noted to be in distress on presentation with saturations in the high 70s as well as tachycardia. Pneumothorax noted on CXR. Pigtail catheter placed by ED with resolution of pneumothorax. Patient denies any falls or other traumas recently. Of note, she reports a distant hx of left sided "air around my lung" as well as a left sided "lung mass" excision. Denies any pathology on the right. Pt reports resolution of her SOB with placement of pigtail catheter.      Post-Op Info:  * No surgery found *         Interval History: Continues with subcutaneous emphysema. Desires to have bowel movement. No other new or concerning complaints. Breathing at baseline.     Medications:  Continuous Infusions:  Scheduled Meds:   albuterol-ipratropium  3 mL Nebulization Q6H    aspirin  81 mg Oral Daily    enoxaparin  40 mg Subcutaneous Daily    ferrous sulfate  325 mg Oral Daily    furosemide  40 mg Oral Daily    multivitamin  1 tablet Oral Daily    polyethylene glycol  17 g Oral Daily    rOPINIRole  0.25 mg Oral QHS    tiotropium  18 mcg Inhalation Daily     PRN Meds:acetaminophen, albuterol, amLODIPine, calcium carbonate, dextrose 50%, dextrose 50%, glucagon (human recombinant), glucose, glucose, hydrALAZINE, hydrOXYzine pamoate, oxyCODONE-acetaminophen, ramelteon, senna-docusate 8.6-50 mg, sodium chloride 0.9%     Review of patient's allergies indicates:   Allergen Reactions    Sulfamethoxazole-trimethoprim      Other reaction(s): Rash     Objective:     Vital Signs (Most Recent):  Temp: 97.7 °F (36.5 °C) (05/30/19 0741)  Pulse: 92 (05/30/19 0747)  Resp: 20 (05/30/19 0742)  BP: (!) 153/72 (05/30/19 0741)  SpO2: 96 % " (05/30/19 0742) Vital Signs (24h Range):  Temp:  [97.5 °F (36.4 °C)-98.5 °F (36.9 °C)] 97.7 °F (36.5 °C)  Pulse:  [] 92  Resp:  [16-21] 20  SpO2:  [87 %-99 %] 96 %  BP: (125-153)/(56-72) 153/72     Intake/Output - Last 3 Shifts       05/28 0700 - 05/29 0659 05/29 0700 - 05/30 0659 05/30 0700 - 05/31 0659    P.O. 200      Total Intake(mL/kg) 200 (3.8)      Urine (mL/kg/hr) 450 (0.4) 775 (0.6)     Stool 0      Chest Tube 20 0     Total Output 470 775     Net -270 -775            Urine Occurrence 3 x 2 x     Stool Occurrence 0 x            SpO2: 96 %  O2 Device (Oxygen Therapy): nasal cannula w/ humidification    Physical Exam   Constitutional: She is oriented to person, place, and time. No distress.   HENT:   Head: Atraumatic.   Cardiovascular: Normal rate.   Pulmonary/Chest: Effort normal.   Chest wall subcutaneous emphysema, seems improved from yesterday   Neurological: She is alert and oriented to person, place, and time. No cranial nerve deficit.   Skin: Skin is warm.   Psychiatric: She has a normal mood and affect. Her behavior is normal.       Significant Labs:  CBC:   Recent Labs   Lab 05/30/19  0651   WBC 10.49   RBC 4.31   HGB 13.1   HCT 41.0      MCV 95   MCH 30.4   MCHC 32.0     CMP:   Recent Labs   Lab 05/30/19  0651   *   CALCIUM 10.2   ALBUMIN 3.0*   PROT 6.5   *   K 4.1   CO2 38*   CL 90*   BUN 28*   CREATININE 0.8   ALKPHOS 93   ALT 17   AST 30   BILITOT 0.5       Significant Diagnostics:  I have reviewed all pertinent imaging results/findings within the past 24 hours.    VTE Risk Mitigation (From admission, onward)        Ordered     enoxaparin injection 40 mg  Daily      05/22/19 1156     IP VTE HIGH RISK PATIENT  Once      05/22/19 1156     Place sequential compression device  Until discontinued      05/22/19 1100        Assessment/Plan:     * Spontaneous pneumothorax  89 yo female with spontaneous PTX s/p pigtail placement in ED.     - CT removed  - CXR with subcutaneous  emphysema as prior  - Pt respiratory status baseline  - Continue conservative management  - Prn dressing changes to blow hole site  - Encourage activity and ambulation          Juan Luis Gooden MD  Thoracic Surgery  [facility]

## 2019-05-30 NOTE — ASSESSMENT & PLAN NOTE
89 yo female with spontaneous PTX s/p pigtail placement in ED.     - CT removed  - CXR with subcutaneous emphysema as prior  - Pt respiratory status baseline  - Continue conservative management  - Prn dressing changes to blow hole site  - Encourage activity and ambulation

## 2019-05-30 NOTE — SUBJECTIVE & OBJECTIVE
Interval History: no acute events overnight. Vitals within normal.     Review of Systems   Constitutional: Negative for activity change, appetite change, chills, fatigue and fever.   HENT: Negative for sneezing and sore throat.    Eyes: Negative for photophobia and visual disturbance.   Respiratory: Positive for cough and shortness of breath. Negative for chest tightness and wheezing.    Cardiovascular: Negative for chest pain, palpitations and leg swelling.   Gastrointestinal: Negative for abdominal distention, abdominal pain, diarrhea, nausea and vomiting.   Genitourinary: Negative for difficulty urinating and dysuria.   Musculoskeletal: Negative for arthralgias, back pain, neck pain and neck stiffness.   Skin: Positive for wound (chest tube in place). Negative for pallor.   Neurological: Negative for dizziness, tremors, speech difficulty, weakness and headaches.   Psychiatric/Behavioral: Negative for confusion. The patient is not nervous/anxious.      Objective:     Vital Signs (Most Recent):  Temp: 97.8 °F (36.6 °C) (05/30/19 1523)  Pulse: 100 (05/30/19 1528)  Resp: 18 (05/30/19 1523)  BP: (!) 158/68 (05/30/19 1523)  SpO2: 97 % (05/30/19 1528) Vital Signs (24h Range):  Temp:  [97.5 °F (36.4 °C)-98.5 °F (36.9 °C)] 97.8 °F (36.6 °C)  Pulse:  [] 100  Resp:  [18-20] 18  SpO2:  [90 %-99 %] 97 %  BP: (125-158)/(56-72) 158/68     Weight: 53.2 kg (117 lb 4.6 oz)  Body mass index is 20.13 kg/m².    Intake/Output Summary (Last 24 hours) at 5/30/2019 1612  Last data filed at 5/30/2019 1200  Gross per 24 hour   Intake 360 ml   Output 775 ml   Net -415 ml      Physical Exam   Constitutional: No distress.   Thin elderly female    HENT:   Head: Atraumatic.   Eyes: EOM are normal.   Neck: Neck supple.   Cardiovascular: Normal rate and regular rhythm.   Pulmonary/Chest: Effort normal.   Subcutaneous emphysema of right chest, neck and facial edema. Subjectively improved compared to yesterday.   Abdominal: Soft.    Neurological: She is alert.   Skin: Skin is warm and dry.   Psychiatric: She has a normal mood and affect. Thought content normal.   Vitals reviewed.    Significant Labs:   CBC:   Recent Labs   Lab 05/29/19  0444 05/30/19  0651   WBC 8.48 10.49   HGB 12.7 13.1   HCT 40.6 41.0    212     CMP:   Recent Labs   Lab 05/29/19 0444 05/30/19  0651   * 135*   K 4.1 4.1   CL 91* 90*   CO2 37* 38*   GLU 88 122*   BUN 31* 28*   CREATININE 0.9 0.8   CALCIUM 10.2 10.2   PROT 6.4 6.5   ALBUMIN 3.0* 3.0*   BILITOT 0.4 0.5   ALKPHOS 89 93   AST 26 30   ALT 16 17   ANIONGAP 7* 7*   EGFRNONAA 56.5* >60.0     Significant Imaging: I have reviewed and interpreted all pertinent imaging results/findings within the past 24 hours.

## 2019-05-30 NOTE — SUBJECTIVE & OBJECTIVE
Interval History: Continues with subcutaneous emphysema. Desires to have bowel movement. No other new or concerning complaints. Breathing at baseline.     Medications:  Continuous Infusions:  Scheduled Meds:   albuterol-ipratropium  3 mL Nebulization Q6H    aspirin  81 mg Oral Daily    enoxaparin  40 mg Subcutaneous Daily    ferrous sulfate  325 mg Oral Daily    furosemide  40 mg Oral Daily    multivitamin  1 tablet Oral Daily    polyethylene glycol  17 g Oral Daily    rOPINIRole  0.25 mg Oral QHS    tiotropium  18 mcg Inhalation Daily     PRN Meds:acetaminophen, albuterol, amLODIPine, calcium carbonate, dextrose 50%, dextrose 50%, glucagon (human recombinant), glucose, glucose, hydrALAZINE, hydrOXYzine pamoate, oxyCODONE-acetaminophen, ramelteon, senna-docusate 8.6-50 mg, sodium chloride 0.9%     Review of patient's allergies indicates:   Allergen Reactions    Sulfamethoxazole-trimethoprim      Other reaction(s): Rash     Objective:     Vital Signs (Most Recent):  Temp: 97.7 °F (36.5 °C) (05/30/19 0741)  Pulse: 92 (05/30/19 0747)  Resp: 20 (05/30/19 0742)  BP: (!) 153/72 (05/30/19 0741)  SpO2: 96 % (05/30/19 0742) Vital Signs (24h Range):  Temp:  [97.5 °F (36.4 °C)-98.5 °F (36.9 °C)] 97.7 °F (36.5 °C)  Pulse:  [] 92  Resp:  [16-21] 20  SpO2:  [87 %-99 %] 96 %  BP: (125-153)/(56-72) 153/72     Intake/Output - Last 3 Shifts       05/28 0700 - 05/29 0659 05/29 0700 - 05/30 0659 05/30 0700 - 05/31 0659    P.O. 200      Total Intake(mL/kg) 200 (3.8)      Urine (mL/kg/hr) 450 (0.4) 775 (0.6)     Stool 0      Chest Tube 20 0     Total Output 470 775     Net -270 -775            Urine Occurrence 3 x 2 x     Stool Occurrence 0 x            SpO2: 96 %  O2 Device (Oxygen Therapy): nasal cannula w/ humidification    Physical Exam   Constitutional: She is oriented to person, place, and time. No distress.   HENT:   Head: Atraumatic.   Cardiovascular: Normal rate.   Pulmonary/Chest: Effort normal.   Chest wall  subcutaneous emphysema, seems improved from yesterday   Neurological: She is alert and oriented to person, place, and time. No cranial nerve deficit.   Skin: Skin is warm.   Psychiatric: She has a normal mood and affect. Her behavior is normal.       Significant Labs:  CBC:   Recent Labs   Lab 05/30/19  0651   WBC 10.49   RBC 4.31   HGB 13.1   HCT 41.0      MCV 95   MCH 30.4   MCHC 32.0     CMP:   Recent Labs   Lab 05/30/19  0651   *   CALCIUM 10.2   ALBUMIN 3.0*   PROT 6.5   *   K 4.1   CO2 38*   CL 90*   BUN 28*   CREATININE 0.8   ALKPHOS 93   ALT 17   AST 30   BILITOT 0.5       Significant Diagnostics:  I have reviewed all pertinent imaging results/findings within the past 24 hours.    VTE Risk Mitigation (From admission, onward)        Ordered     enoxaparin injection 40 mg  Daily      05/22/19 1156     IP VTE HIGH RISK PATIENT  Once      05/22/19 1156     Place sequential compression device  Until discontinued      05/22/19 1100

## 2019-05-31 VITALS
BODY MASS INDEX: 20.03 KG/M2 | HEIGHT: 64 IN | HEART RATE: 98 BPM | DIASTOLIC BLOOD PRESSURE: 58 MMHG | SYSTOLIC BLOOD PRESSURE: 126 MMHG | WEIGHT: 117.31 LBS | OXYGEN SATURATION: 98 % | TEMPERATURE: 98 F | RESPIRATION RATE: 18 BRPM

## 2019-05-31 LAB
ALBUMIN SERPL BCP-MCNC: 2.8 G/DL (ref 3.5–5.2)
ALP SERPL-CCNC: 83 U/L (ref 55–135)
ALT SERPL W/O P-5'-P-CCNC: 18 U/L (ref 10–44)
ANION GAP SERPL CALC-SCNC: 11 MMOL/L (ref 8–16)
AST SERPL-CCNC: 30 U/L (ref 10–40)
BASOPHILS # BLD AUTO: 0.03 K/UL (ref 0–0.2)
BASOPHILS NFR BLD: 0.4 % (ref 0–1.9)
BILIRUB SERPL-MCNC: 0.4 MG/DL (ref 0.1–1)
BUN SERPL-MCNC: 27 MG/DL (ref 8–23)
CALCIUM SERPL-MCNC: 9.8 MG/DL (ref 8.7–10.5)
CHLORIDE SERPL-SCNC: 92 MMOL/L (ref 95–110)
CO2 SERPL-SCNC: 36 MMOL/L (ref 23–29)
CREAT SERPL-MCNC: 0.8 MG/DL (ref 0.5–1.4)
DIFFERENTIAL METHOD: ABNORMAL
EOSINOPHIL # BLD AUTO: 0.4 K/UL (ref 0–0.5)
EOSINOPHIL NFR BLD: 5.5 % (ref 0–8)
ERYTHROCYTE [DISTWIDTH] IN BLOOD BY AUTOMATED COUNT: 13.2 % (ref 11.5–14.5)
EST. GFR  (AFRICAN AMERICAN): >60 ML/MIN/1.73 M^2
EST. GFR  (NON AFRICAN AMERICAN): >60 ML/MIN/1.73 M^2
GLUCOSE SERPL-MCNC: 85 MG/DL (ref 70–110)
HCT VFR BLD AUTO: 38.7 % (ref 37–48.5)
HGB BLD-MCNC: 12.8 G/DL (ref 12–16)
IMM GRANULOCYTES # BLD AUTO: 0.04 K/UL (ref 0–0.04)
IMM GRANULOCYTES NFR BLD AUTO: 0.6 % (ref 0–0.5)
LYMPHOCYTES # BLD AUTO: 1.1 K/UL (ref 1–4.8)
LYMPHOCYTES NFR BLD: 15.2 % (ref 18–48)
MAGNESIUM SERPL-MCNC: 2.1 MG/DL (ref 1.6–2.6)
MCH RBC QN AUTO: 31 PG (ref 27–31)
MCHC RBC AUTO-ENTMCNC: 33.1 G/DL (ref 32–36)
MCV RBC AUTO: 94 FL (ref 82–98)
MONOCYTES # BLD AUTO: 0.7 K/UL (ref 0.3–1)
MONOCYTES NFR BLD: 10.1 % (ref 4–15)
NEUTROPHILS # BLD AUTO: 4.9 K/UL (ref 1.8–7.7)
NEUTROPHILS NFR BLD: 68.2 % (ref 38–73)
NRBC BLD-RTO: 0 /100 WBC
PHOSPHATE SERPL-MCNC: 3.6 MG/DL (ref 2.7–4.5)
PLATELET # BLD AUTO: ABNORMAL K/UL (ref 150–350)
PMV BLD AUTO: 10.6 FL (ref 9.2–12.9)
POTASSIUM SERPL-SCNC: 3.9 MMOL/L (ref 3.5–5.1)
PROT SERPL-MCNC: 5.9 G/DL (ref 6–8.4)
RBC # BLD AUTO: 4.13 M/UL (ref 4–5.4)
SODIUM SERPL-SCNC: 139 MMOL/L (ref 136–145)
WBC # BLD AUTO: 7.15 K/UL (ref 3.9–12.7)

## 2019-05-31 PROCEDURE — 83735 ASSAY OF MAGNESIUM: CPT

## 2019-05-31 PROCEDURE — 25000003 PHARM REV CODE 250: Performed by: STUDENT IN AN ORGANIZED HEALTH CARE EDUCATION/TRAINING PROGRAM

## 2019-05-31 PROCEDURE — 99239 HOSP IP/OBS DSCHRG MGMT >30: CPT | Mod: GC,,, | Performed by: HOSPITALIST

## 2019-05-31 PROCEDURE — 99239 PR HOSPITAL DISCHARGE DAY,>30 MIN: ICD-10-PCS | Mod: GC,,, | Performed by: HOSPITALIST

## 2019-05-31 PROCEDURE — 25000242 PHARM REV CODE 250 ALT 637 W/ HCPCS: Performed by: STUDENT IN AN ORGANIZED HEALTH CARE EDUCATION/TRAINING PROGRAM

## 2019-05-31 PROCEDURE — 94761 N-INVAS EAR/PLS OXIMETRY MLT: CPT

## 2019-05-31 PROCEDURE — 80053 COMPREHEN METABOLIC PANEL: CPT

## 2019-05-31 PROCEDURE — 97530 THERAPEUTIC ACTIVITIES: CPT

## 2019-05-31 PROCEDURE — 27000221 HC OXYGEN, UP TO 24 HOURS

## 2019-05-31 PROCEDURE — 85025 COMPLETE CBC W/AUTO DIFF WBC: CPT

## 2019-05-31 PROCEDURE — 36415 COLL VENOUS BLD VENIPUNCTURE: CPT

## 2019-05-31 PROCEDURE — 97116 GAIT TRAINING THERAPY: CPT

## 2019-05-31 PROCEDURE — 84100 ASSAY OF PHOSPHORUS: CPT

## 2019-05-31 PROCEDURE — 94640 AIRWAY INHALATION TREATMENT: CPT

## 2019-05-31 RX ORDER — FUROSEMIDE 40 MG/1
40 TABLET ORAL DAILY
Qty: 30 TABLET | Refills: 11 | Status: ON HOLD | OUTPATIENT
Start: 2019-06-01 | End: 2019-07-11 | Stop reason: SDUPTHER

## 2019-05-31 RX ORDER — ROPINIROLE 0.25 MG/1
0.25 TABLET, FILM COATED ORAL NIGHTLY
Qty: 30 TABLET | Refills: 11 | Status: ON HOLD | OUTPATIENT
Start: 2019-05-31 | End: 2022-03-30

## 2019-05-31 RX ORDER — FERROUS SULFATE 325(65) MG
325 TABLET, DELAYED RELEASE (ENTERIC COATED) ORAL DAILY
Refills: 0 | Status: ON HOLD | COMMUNITY
Start: 2019-06-01 | End: 2022-05-29 | Stop reason: HOSPADM

## 2019-05-31 RX ADMIN — THERA TABS 1 TABLET: TAB at 10:05

## 2019-05-31 RX ADMIN — ASPIRIN 81 MG CHEWABLE TABLET 81 MG: 81 TABLET CHEWABLE at 10:05

## 2019-05-31 RX ADMIN — POLYETHYLENE GLYCOL 3350 17 G: 17 POWDER, FOR SOLUTION ORAL at 10:05

## 2019-05-31 RX ADMIN — FUROSEMIDE 40 MG: 40 TABLET ORAL at 10:05

## 2019-05-31 RX ADMIN — TIOTROPIUM BROMIDE 18 MCG: 18 CAPSULE ORAL; RESPIRATORY (INHALATION) at 11:05

## 2019-05-31 RX ADMIN — FERROUS SULFATE TAB EC 325 MG (65 MG FE EQUIVALENT) 325 MG: 325 (65 FE) TABLET DELAYED RESPONSE at 10:05

## 2019-05-31 RX ADMIN — IPRATROPIUM BROMIDE AND ALBUTEROL SULFATE 3 ML: .5; 3 SOLUTION RESPIRATORY (INHALATION) at 12:05

## 2019-05-31 RX ADMIN — CETIRIZINE HYDROCHLORIDE 10 MG: 5 TABLET ORAL at 10:05

## 2019-05-31 RX ADMIN — IPRATROPIUM BROMIDE AND ALBUTEROL SULFATE 3 ML: .5; 3 SOLUTION RESPIRATORY (INHALATION) at 07:05

## 2019-05-31 NOTE — TREATMENT PLAN
"If patient discharged today, recommend continued care of right anterior chest wall "blow hole." Wet to dry dressings daily and as needed for the next 4-5 days. Place half of a saline wet gauze into opening and leave remainder of gauze dry outside of incision. No occlusive dressing should be placed during this time period. This incision is allowing subcutaneous emphysema to escape instead of accumulating under skin. Follow-up with regular pulmonologist.       "

## 2019-05-31 NOTE — ASSESSMENT & PLAN NOTE
89 yo female with spontaneous PTX s/p pigtail placement in ED.     - Follow up on cxr  - Continue ambulating/moving up and out of bed  - OK to milk out subcutaneous emphysema  - Daily + prn dressing changes to blow hole  - Remainder of management per primary

## 2019-05-31 NOTE — PLAN OF CARE
[facility]    HOME HEALTH ORDERS  FACE TO FACE ENCOUNTER    Patient Name: Venus Mayer  YOB: 1929    PCP: Jona hCe MD   PCP Address: [address] [facility] [address] / [place]  PCP Phone Number: [phone]  PCP Fax: [phone]    Encounter Date: 05/31/2019    Admit to Home Health    Diagnoses:  Active Hospital Problems    Diagnosis  POA    *Spontaneous pneumothorax [J93.83]  Yes    Anxiety [F41.9]  Yes    Iron deficiency [E61.1]  Yes    Muscle spasm [M62.838]  Yes    DNR (do not resuscitate) [Z66]  Yes    Debility [R53.81]  Yes    COPD (chronic obstructive pulmonary disease) [J44.9]  Yes    HTN (hypertension) [I10]  Yes    HOCM (hypertrophic obstructive cardiomyopathy): Apical HCM [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  Nl EF 70%      Heart failure, diastolic [I50.30]  Yes      Resolved Hospital Problems   No resolved problems to display.       No future appointments.        I have seen and examined this patient face to face today. My clinical findings that support the need for the home health skilled services and home bound status are the following:  Weakness/numbness causing balance and gait disturbance due to Weakness/Debility making it taxing to leave home.  Requiring assistive device to leave home due to unsteady gait caused by  Weakness/Debility.    Allergies:  Review of patient's allergies indicates:   Allergen Reactions    Sulfamethoxazole-trimethoprim      Other reaction(s): Rash       Diet: regular diet    Activities: activity as tolerated    Nursing:   SN to complete comprehensive assessment including routine vital signs. Instruct on disease process and s/s of complications to report to MD. Review/verify medication list sent home with the patient at time of discharge  and instruct patient/caregiver as needed. Frequency may be adjusted depending on start of care date.    Notify MD if SBP > 160 or < 90; DBP > 90 or < 50; HR > 120 or < 50;  Temp > 101; Other:       CONSULTS:    Physical Therapy to evaluate and treat. Evaluate for home safety and equipment needs; Establish/upgrade home exercise program. Perform / instruct on therapeutic exercises, gait training, transfer training, and Range of Motion.  Occupational Therapy to evaluate and treat. Evaluate home environment for safety and equipment needs. Perform/Instruct on transfers, ADL training, ROM, and therapeutic exercises.   to evaluate for community resources/long-range planning.  Aide to provide assistance with personal care, ADLs, and vital signs.    MISCELLANEOUS CARE:  Home Oxygen:  No change    WOUND CARE ORDERS  yes:  Surgical Wound:  Location: Right chest wall      Consult ET nurse        Apply the following to wound:   Wet to Dry dressing: Daily and PRN (frequency)      Medications: Review discharge medications with patient and family and provide education.      Current Discharge Medication List      START taking these medications    Details   ferrous sulfate 325 (65 FE) MG EC tablet Take 1 tablet (325 mg total) by mouth once daily.  Refills: 0      rOPINIRole (REQUIP) 0.25 MG tablet Take 1 tablet (0.25 mg total) by mouth every evening.  Qty: 30 tablet, Refills: 11         CONTINUE these medications which have CHANGED    Details   furosemide (LASIX) 40 MG tablet Take 1 tablet (40 mg total) by mouth once daily.  Qty: 30 tablet, Refills: 11         CONTINUE these medications which have NOT CHANGED    Details   acetaminophen (TYLENOL) 500 MG tablet Take 1,000 mg by mouth daily as needed for Pain.      albuterol (PROAIR HFA) 90 mcg/actuation inhaler Inhale 1 puff into the lungs every 6 (six) hours as needed for Wheezing. Rescue      amLODIPine (NORVASC) 2.5 MG tablet Take 2.5 mg by mouth nightly as needed for SBP greater than. SBP greater than 155  Refills: 3      aspirin 81 mg Tab Take by mouth. 1 Tablet Oral Every day      multivitamin (THERAGRAN) per tablet Take 1 tablet by  mouth once daily.      sodium chloride 0.9 % Soln Uses as needed by nebulization route      tiotropium (SPIRIVA) 18 mcg inhalation capsule Inhale 18 mcg into the lungs once daily.        triamcinolone (NASACORT) 55 mcg nasal inhaler Mixes 1/2 ml with phenylephrine             I certify that this patient is confined to her home and needs intermittent skilled nursing care, physical therapy and occupational therapy .      Shane Jo MD  Department of Hospital Medicine   [facility]

## 2019-05-31 NOTE — SUBJECTIVE & OBJECTIVE
Interval History: No complaints, reports baseline breathing. Feels well this am.     Medications:  Continuous Infusions:  Scheduled Meds:   albuterol-ipratropium  3 mL Nebulization Q6H    aspirin  81 mg Oral Daily    cetirizine  10 mg Oral Daily    enoxaparin  40 mg Subcutaneous Daily    ferrous sulfate  325 mg Oral Daily    furosemide  40 mg Oral Daily    multivitamin  1 tablet Oral Daily    polyethylene glycol  17 g Oral Daily    rOPINIRole  0.25 mg Oral QHS    tiotropium  18 mcg Inhalation Daily     PRN Meds:acetaminophen, albuterol, amLODIPine, calcium carbonate, dextrose 50%, dextrose 50%, glucagon (human recombinant), glucose, glucose, hydrALAZINE, hydrOXYzine pamoate, oxyCODONE-acetaminophen, ramelteon, senna-docusate 8.6-50 mg, sodium chloride 0.9%     Review of patient's allergies indicates:   Allergen Reactions    Sulfamethoxazole-trimethoprim      Other reaction(s): Rash     Objective:     Vital Signs (Most Recent):  Temp: 97.9 °F (36.6 °C) (05/30/19 1933)  Pulse: 91 (05/31/19 0626)  Resp: 20 (05/30/19 2008)  BP: (!) 143/63 (05/30/19 1933)  SpO2: 98 % (05/31/19 0626) Vital Signs (24h Range):  Temp:  [97.7 °F (36.5 °C)-97.9 °F (36.6 °C)] 97.9 °F (36.6 °C)  Pulse:  [] 91  Resp:  [18-20] 20  SpO2:  [90 %-100 %] 98 %  BP: (132-158)/(63-72) 143/63     Intake/Output - Last 3 Shifts       05/29 0700 - 05/30 0659 05/30 0700 - 05/31 0659    P.O.  1080    Total Intake(mL/kg)  1080 (20.3)    Urine (mL/kg/hr) 775 (0.6) 300 (0.2)    Chest Tube 0     Total Output 775 300    Net -775 +780          Urine Occurrence 2 x           SpO2: 98 %  O2 Device (Oxygen Therapy): nasal cannula    Physical Exam   Constitutional: She is oriented to person, place, and time. No distress.   HENT:   Head: Atraumatic.   Cardiovascular: Normal rate.   Pulmonary/Chest: Effort normal.   Chest wall subcutaneous emphysema, seems improved from yesterday   Neurological: She is alert and oriented to person, place, and time. No  cranial nerve deficit.   Skin: Skin is warm.   Psychiatric: She has a normal mood and affect. Her behavior is normal.       Significant Labs:  CBC:   Recent Labs   Lab 05/30/19  0651 05/31/19  0530   WBC 10.49 7.15   RBC 4.31 4.13   HGB 13.1 12.8   HCT 41.0 38.7     --    MCV 95 94   MCH 30.4 31.0   MCHC 32.0 33.1     CMP:   Recent Labs   Lab 05/31/19  0530   GLU 85   CALCIUM 9.8   ALBUMIN 2.8*   PROT 5.9*      K 3.9   CO2 36*   CL 92*   BUN 27*   CREATININE 0.8   ALKPHOS 83   ALT 18   AST 30   BILITOT 0.4       Significant Diagnostics:  I have reviewed all pertinent imaging results/findings within the past 24 hours.    VTE Risk Mitigation (From admission, onward)        Ordered     enoxaparin injection 40 mg  Daily      05/22/19 1156     IP VTE HIGH RISK PATIENT  Once      05/22/19 1156     Place sequential compression device  Until discontinued      05/22/19 1100

## 2019-05-31 NOTE — PLAN OF CARE
05/31/19 1201   Final Note   Assessment Type Final Discharge Note   Anticipated Discharge Disposition Home-Health   What phone number can be called within the next 1-3 days to see how you are doing after discharge? [phone]   Hospital Follow Up  Appt(s) scheduled? Yes   Discharge plans and expectations educations in teach back method with documentation complete? Yes   Right Care Referral Info   Post Acute Recommendation Home-care   Facility Name [facility]

## 2019-05-31 NOTE — PROGRESS NOTES
"[facility]  Thoracic Surgery  Progress Note    Subjective:     History of Present Illness:  Venus Mayer is a 89 y.o. female with a hx of HLD, CAD, HTN, and COPD on 3L of oxygen at home, who presented for evaluation of SOB to the ED this morning. She reported acute onset SOB this morning. Pt noted to be in distress on presentation with saturations in the high 70s as well as tachycardia. Pneumothorax noted on CXR. Pigtail catheter placed by ED with resolution of pneumothorax. Patient denies any falls or other traumas recently. Of note, she reports a distant hx of left sided "air around my lung" as well as a left sided "lung mass" excision. Denies any pathology on the right. Pt reports resolution of her SOB with placement of pigtail catheter.      Post-Op Info:  * No surgery found *         Interval History: No complaints, reports baseline breathing. Feels well this am.     Medications:  Continuous Infusions:  Scheduled Meds:   albuterol-ipratropium  3 mL Nebulization Q6H    aspirin  81 mg Oral Daily    cetirizine  10 mg Oral Daily    enoxaparin  40 mg Subcutaneous Daily    ferrous sulfate  325 mg Oral Daily    furosemide  40 mg Oral Daily    multivitamin  1 tablet Oral Daily    polyethylene glycol  17 g Oral Daily    rOPINIRole  0.25 mg Oral QHS    tiotropium  18 mcg Inhalation Daily     PRN Meds:acetaminophen, albuterol, amLODIPine, calcium carbonate, dextrose 50%, dextrose 50%, glucagon (human recombinant), glucose, glucose, hydrALAZINE, hydrOXYzine pamoate, oxyCODONE-acetaminophen, ramelteon, senna-docusate 8.6-50 mg, sodium chloride 0.9%     Review of patient's allergies indicates:   Allergen Reactions    Sulfamethoxazole-trimethoprim      Other reaction(s): Rash     Objective:     Vital Signs (Most Recent):  Temp: 97.9 °F (36.6 °C) (05/30/19 1933)  Pulse: 91 (05/31/19 0626)  Resp: 20 (05/30/19 2008)  BP: (!) 143/63 (05/30/19 1933)  SpO2: 98 % (05/31/19 0626) Vital Signs (24h " Range):  Temp:  [97.7 °F (36.5 °C)-97.9 °F (36.6 °C)] 97.9 °F (36.6 °C)  Pulse:  [] 91  Resp:  [18-20] 20  SpO2:  [90 %-100 %] 98 %  BP: (132-158)/(63-72) 143/63     Intake/Output - Last 3 Shifts       05/29 0700 - 05/30 0659 05/30 0700 - 05/31 0659    P.O.  1080    Total Intake(mL/kg)  1080 (20.3)    Urine (mL/kg/hr) 775 (0.6) 300 (0.2)    Chest Tube 0     Total Output 775 300    Net -775 +780          Urine Occurrence 2 x           SpO2: 98 %  O2 Device (Oxygen Therapy): nasal cannula    Physical Exam   Constitutional: She is oriented to person, place, and time. No distress.   HENT:   Head: Atraumatic.   Cardiovascular: Normal rate.   Pulmonary/Chest: Effort normal.   Chest wall subcutaneous emphysema, seems improved from yesterday   Neurological: She is alert and oriented to person, place, and time. No cranial nerve deficit.   Skin: Skin is warm.   Psychiatric: She has a normal mood and affect. Her behavior is normal.       Significant Labs:  CBC:   Recent Labs   Lab 05/30/19  0651 05/31/19  0530   WBC 10.49 7.15   RBC 4.31 4.13   HGB 13.1 12.8   HCT 41.0 38.7     --    MCV 95 94   MCH 30.4 31.0   MCHC 32.0 33.1     CMP:   Recent Labs   Lab 05/31/19  0530   GLU 85   CALCIUM 9.8   ALBUMIN 2.8*   PROT 5.9*      K 3.9   CO2 36*   CL 92*   BUN 27*   CREATININE 0.8   ALKPHOS 83   ALT 18   AST 30   BILITOT 0.4       Significant Diagnostics:  I have reviewed all pertinent imaging results/findings within the past 24 hours.    VTE Risk Mitigation (From admission, onward)        Ordered     enoxaparin injection 40 mg  Daily      05/22/19 1156     IP VTE HIGH RISK PATIENT  Once      05/22/19 1156     Place sequential compression device  Until discontinued      05/22/19 1100        Assessment/Plan:     * Spontaneous pneumothorax  89 yo female with spontaneous PTX s/p pigtail placement in ED.     - Follow up on cxr  - Continue ambulating/moving up and out of bed  - OK to milk out subcutaneous emphysema  -  Daily + prn dressing changes to blow hole  - Remainder of management per primary          Juan Luis Gooden MD  Thoracic Surgery  [facility]

## 2019-05-31 NOTE — PLAN OF CARE
Problem: Physical Therapy Goal  Goal: Physical Therapy Goal  Goals to be met by: 2019    Patient will increase functional independence with mobility by performin. Supine to sit with Modified [place]  2. Sit to supine with Modified [place]  3. Sit to stand transfer with Supervision  4. Bed to chair transfer with Supervision using LRAD  5. Gait  x 200 feet with Supervision using LRAD.   6. Ascend/descend 4 stair with right Handrails Minimum Assistance.       Outcome: Ongoing (interventions implemented as appropriate)  Goals remain appropriate.

## 2019-05-31 NOTE — PROGRESS NOTES
"  [facility]  Adult Nutrition  Consult Note    SUMMARY     Recommendations    1. Continue current Low sodium diet + Boost Plus ONS.   2. RD to monitor & follow-up.    Goals: PO intake >50%  Nutrition Goal Status: new  Communication of RD Recs: reviewed with RN    Reason for Assessment    Reason For Assessment: length of stay  Diagnosis: other (see comments)(Spontaneous Pneumothorax)  Relevant Medical History: HTN, HLD, COPD  Interdisciplinary Rounds: did not attend    General Information Comments: Pt w/ good appetite, consumed 75% of breakfast this AM. Reports stable wt PTA, chart review confirms UBW: 110-120#. NFPE not indicated, pt appears thin, however doesnt meet malnutrition criteria. Possible discharge today.  Nutrition Discharge Planning: Adequate PO intake    Nutrition/Diet History    Patient Reported Diet/Restrictions/Preferences: general  Spiritual, Cultural Beliefs, Temple Practices, Values that Affect Care: no  Factors Affecting Nutritional Intake: None identified at this time    Anthropometrics    Temp: 98.1 °F (36.7 °C)  Height Method: Stated  Height: 5' 4" (162.6 cm)  Height (inches): 64 in  Weight Method: Bed Scale  Weight: 53.2 kg (117 lb 4.6 oz)  Weight (lb): 117.29 lb  Ideal Body Weight (IBW), Female: 120 lb  % Ideal Body Weight, Female (lb): 97.74 lb  BMI (Calculated): 20.2  BMI Grade: 18.5-24.9 - normal    Lab/Procedures/Meds    Pertinent Labs Reviewed: reviewed  Pertinent Medications Reviewed: reviewed    Estimated/Assessed Needs    Weight Used For Calorie Calculations: 53.2 kg (117 lb 4.6 oz)     Energy Calorie Requirements (kcal): 1218 kcal/d  Energy Need Method: [place]-St Jeor(1.3 PAL)     Protein Requirements: 64 g/d (1.2 g/kg)  Weight Used For Protein Calculations: 53.2 kg (117 lb 4.6 oz)     Estimated Fluid Requirement Method: other (see comments)(Per MD or 1 mL/kcal)    Nutrition Prescription Ordered    Current Diet Order: Low sodium  Oral Nutrition Supplement: " Boost Plus ONS    Evaluation of Received Nutrient/Fluid Intake    Comments: LBM: 5/28    Tolerance: tolerating    Nutrition Risk    Level of Risk/Frequency of Follow-up: (1x/week)     Assessment and Plan    No nutritional dx at this time.     Monitor and Evaluation    Food and Nutrient Intake: energy intake, food and beverage intake  Food and Nutrient Adminstration: diet order  Physical Activity and Function: nutrition-related ADLs and IADLs  Anthropometric Measurements: weight, weight change  Biochemical Data, Medical Tests and Procedures: inflammatory profile, lipid profile, glucose/endocrine profile, gastrointestinal profile, electrolyte and renal panel  Nutrition-Focused Physical Findings: overall appearance     Nutrition Follow-Up    RD Follow-up?: Yes

## 2019-05-31 NOTE — PLAN OF CARE
Problem: Adult Inpatient Plan of Care  Goal: Plan of Care Review     05/30/19 1554   Plan of Care Review   Plan of Care Reviewed With patient;daughter   Uneventful shift : supplement oxygen per nasal cannula with continuous pulse ox : sat decrease while sleeping & activity  : safety rounds performed : bed alarm : HECTOR [employer]LE[employer].

## 2019-05-31 NOTE — PT/OT/SLP PROGRESS
"Physical Therapy Treatment    Patient Name:  Venus Mayer   MRN:  [id number]    Recommendations:     Discharge Recommendations:  outpatient PT   Discharge Equipment Recommendations: none   Barriers to discharge: None    Assessment:     Venus Mayer is a 89 y.o. female admitted with a medical diagnosis of Spontaneous pneumothorax.  She presents with the following impairments/functional limitations:  weakness, impaired endurance, impaired self care skills, impaired functional mobilty, gait instability, impaired balance, decreased coordination. Pt tolerated session well with focus on transfers and gait training. Pt demonstrates good stability requiring CGA for all OOB mobility. Pt limited by impaired endurance. Pt will continue to benefit from therapy services to improve impairments listed above.     Rehab Prognosis: Good; patient would benefit from acute skilled PT services to address these deficits and reach maximum level of function.    Recent Surgery: * No surgery found *      Plan:     During this hospitalization, patient to be seen 3 x/week to address the identified rehab impairments via gait training, therapeutic activities, therapeutic exercises, neuromuscular re-education and progress toward the following goals:    · Plan of Care Expires:  06/23/19    Subjective     Chief Complaint: no c/o  Patient/Family Comments/goals: "I really just want to get back home."   Pain/Comfort:  · Pain Rating 1: 0/10  · Pain Rating Post-Intervention 1: 0/10      Objective:     Communicated with NSG prior to session.  Patient found up in chair with oxygen, PureWick upon PTA entry to room.     General Precautions: Standard, fall   Orthopedic Precautions:LLE posterior precautions   Braces: N/A     Functional Mobility:  · Transfers:     · Sit to Stand:  contact guard assistance with rolling walker  · Toilet Transfer: contact guard assistance with  rolling walker  using  Step Transfer  · Gait: Pt ambulates 20 ft in room and 120 ft in " hallway after toielting with RW and CGA. Pt steady and with no LOB.       AM-PAC 6 CLICK MOBILITY  Turning over in bed (including adjusting bedclothes, sheets and blankets)?: 3  Sitting down on and standing up from a chair with arms (e.g., wheelchair, bedside commode, etc.): 3  Moving from lying on back to sitting on the side of the bed?: 3  Moving to and from a bed to a chair (including a wheelchair)?: 3  Need to walk in hospital room?: 3  Climbing 3-5 steps with a railing?: 3  Basic Mobility Total Score: 18       Therapeutic Activities and Exercises:   Pt assisted with functional mobility as noted above.   Pt stands statically with LUE support while performing pericare with RUE with set-up assistance.   Pt and family educated on safety with all mobility and PT POC.     Patient left up in chair with all lines intact and call button in reach..    GOALS:   Multidisciplinary Problems     Physical Therapy Goals        Problem: Physical Therapy Goal    Goal Priority Disciplines Outcome Goal Variances Interventions   Physical Therapy Goal     PT, PT/OT Ongoing (interventions implemented as appropriate)     Description:  Goals to be met by: 2019    Patient will increase functional independence with mobility by performin. Supine to sit with Modified [place]  2. Sit to supine with Modified [place]  3. Sit to stand transfer with Supervision  4. Bed to chair transfer with Supervision using LRAD  5. Gait  x 200 feet with Supervision using LRAD.   6. Ascend/descend 4 stair with right Handrails Minimum Assistance.                        Time Tracking:     PT Received On: 19  PT Start Time: 916     PT Stop Time: 945  PT Total Time (min): 29 min     Billable Minutes: Gait Training 15 and Therapeutic Activity 14    Treatment Type: Treatment  PT/PTA: PTA     PTA Visit Number: 3     Edmund Canales, PTA  2019

## 2019-05-31 NOTE — DISCHARGE SUMMARY
[facility] Hospital Medicine  Discharge Summary      Patient Name: Venus Mayer  MRN: [id number]  Admission Date: 5/22/2019  Hospital Length of Stay: 9 days  Discharge Date and Time:  05/31/2019 11:07 AM  Attending Physician: Cherry Mar MD   Discharging Provider: Shane Jo MD  Primary Care Provider: Jona Che MD  Hospital Medicine Team: [facility] 2 Shane Jo MD    HPI:   Venus Mayer is a 89 year old female with a medical history significant for HLD, HTN, COPD (on home O2 3L), HOCM s/p AICD, carotid artery stenosis s/p L CEA 2008 and prior L sided spontaneous pneumothorax who presents to [facility] for evaluation of acute onset shortness of breath. Pt states that she developed acute shortness of breath yesterday afternoon around 3PM. Pt states that she took her rescue inhalers, nebulized saline and an allerga without any improvement. Pt states that she tried to go to sleep but was unable due to dyspnea. Pt states that this am one of her children felt that as she was not improving they should call 911. On arrival to ED, pt had SpO2 of 78%. CXR in ED showed a large right pneumothorax with marked compressive atelectasis of the right lung. A right sided pleural catheter was placed in ED with immediate resolution of shortness of breath. Repeat CXR showed a decrease in the volume of pneumothorax following catheter placement, with partial re-expansion of the right lung. Oxygen saturation increased to 88-93% on home oxygen requirement.     Pt denies any chest pain. Pt endorses chronic shortness of breath that acute worsened yesterday but has since resolved. Pt endorses a recent sinus infection that was treated with antibiotics. Pt denies N/V, fever/chills, change in bowel or bladder habits, HA or focal/general weakness.    Pt receieves majority of care at [facility].    * No surgery found *      Hospital Course:   Venus Mayer was admitted to [facility] on  "5/22/19 for evaluation of acute on chronic SOB. In ED, pt was noted to have an elevated BNP and CXR showed spontaneous pneumothorax. CT surgery was consulted and placed a pigtail catheter. Pigtail catheter was changed to chest tube the next day following reaccumulation of PTX. IV lasix was started for dieuresis with good urine output. Pt was weaned to home O2 and transitioned to PO lasix once euvolemic. Output via chest tube has decreased appropriately, CXR appears improved and clinically patient is back to baseline requirement of 2L via nasal cannula. Chest tube was managed by CT surgery with repeated trials of clamping which were not tolerated. Pleurodesis done on 5/27/19. Subsequent chest tube malfunction on 5/29 resulting in some subq emphysema of arm and right chest requiring bedside decompression on 5/29/19. A one-way valve on right anterior chest is present with improvement of swelling and continued dyspnea of symptoms at rest or on exertion.     Pt was discharged home on 5/31 with home health and wound care. Pt was advised to follow up with CT surgery and her PCP.      BP (!) 151/69 (BP Location: Left arm, Patient Position: Lying)   Pulse 88   Temp 98.1 °F (36.7 °C) (Oral)   Resp 18   Ht 5' 4" (1.626 m)   Wt 53.2 kg (117 lb 4.6 oz)   SpO2 (!) 91%   Breastfeeding? No   BMI 20.13 kg/m²     Physical Exam   Constitutional: No distress.   Thin elderly female    HENT:   Head: Atraumatic.   Eyes: EOM are normal.   Neck: Neck supple.   Cardiovascular: Normal rate and regular rhythm.   Pulmonary/Chest: Effort normal.   Subcutaneous emphysema of right chest, neck and facial edema improved.   One way flap/blow hole on right upper chest wall, clean dry.   Abdominal: Soft.   Neurological: She is alert.   Skin: Skin is warm and dry.   Psychiatric: She has a normal mood and affect. Thought content normal.   Vitals reviewed.    Consults:   Consults (From admission, onward)        Status Ordering Provider     " Inpatient consult to Cardiothoracic Surgery  Once     Provider:  (Not yet assigned)    Completed [facility]          * Spontaneous pneumothorax  89 year old female with preexisting lung disease (COPD) and prior spontaneous PTX presenting with acute onset SOB not responsive to COPD medications or rescue inhalers  - Pigtail cath initially placed by CT surgery, replaced(5/22/19) by chest tube with improvement in dyspnea.  - Daily CXR  - Pleurodesis per CT surgery and then chest tube removed on 5/30. SubQ emphysema noted prior to removal so held for 48 hours. Interval improvement and placement of one way flap / blow hold placed on right chest wall by CTS. Will follow up in clinic.      Anxiety  - pt has significant anxiety associated with her SOB for which we have hydroxyzine ordered prn but she refuses to take it.   - she is instead reporting restless leg syndrome for which she takes Mg at home. She was assured her Mag here was 1.9 and unlikely to cause her symptoms. In addition, her iron levels have been checked. Due to persistence of symptoms, continue ropinirole.      Iron deficiency  - hemoglobin stable  - Fe 29, 8% saturation  - Daily iron replacements PO      Muscle spasm  - pt complaining of muscle spasms in bilateral legs, no pain associated  - Fe deficient   - Ropinirole     Debility  - Hip fracture 2 months ago  - works with OP PT/OT  - Continue PT/OT    DNR (do not resuscitate)  - Conversation held between medical team, pt and family at bedside concerning code status.   - Pt states that per her living will, she is DNR  - Family agrees with patient decision   - [facility] DNR form signed and placed into chart        COPD (chronic obstructive pulmonary disease)  - ?COPD vs ADHF   - Continue home spiriva  - Albuterol rescue inahler PRN for wheezing  - Oxygen as needed to maintain sp02 >88% (on home O2)  - Monitor respiratory status for any acute change       Heart failure, diastolic  - ?ADHF vs COPD  -  BNP 1300  - LE pitting edema on exam, no crackles of elevated JVD   - Lasix 20 PO daily at home (recent change, was on 40 before)  - Will give lasix 40 PO daily and fluid restrict, good response    HTN (hypertension)  - Per pt records, pt takes Amlodipine 2.5mg at bed if SBP>155  - Hypertensive on arrival, resolved with resolution of SOB  - Amlodipine 2.5mg nightly prn as prescribed   - PRN Hydralazine 5 for SBP>180       HOCM (hypertrophic obstructive cardiomyopathy): Apical HCM  - AICD in placed, [facility]       Final Active Diagnoses:    Diagnosis Date Noted POA    PRINCIPAL PROBLEM:  Spontaneous pneumothorax [J93.83] 05/22/2019 Yes    Anxiety [F41.9] 05/26/2019 Yes    Iron deficiency [E61.1] 05/24/2019 Yes    Muscle spasm [M62.838] 05/23/2019 Yes    DNR (do not resuscitate) [Z66] 05/22/2019 Yes    Debility [R53.81] 05/22/2019 Yes    COPD (chronic obstructive pulmonary disease) [J44.9] 03/28/2013 Yes    HTN (hypertension) [I10] 01/17/2013 Yes    HOCM (hypertrophic obstructive cardiomyopathy): Apical HCM [I42.1] 01/17/2013 Yes    Heart failure, diastolic [I50.30] 01/17/2013 Yes      Problems Resolved During this Admission:       Discharged Condition: stable    Disposition:     Follow Up:  Follow-up Information     [facility] [place]. Call in 1 week.    Specialty:  Cardiothoracic Surgery  Why:  For wound re-check  Contact information:  [address] [place] [address] [phone]               Patient Instructions:   No discharge procedures on file.    Significant Diagnostic Studies: Labs:   CMP   Recent Labs   Lab 05/30/19  0651 05/31/19  0530   * 139   K 4.1 3.9   CL 90* 92*   CO2 38* 36*   * 85   BUN 28* 27*   CREATININE 0.8 0.8   CALCIUM 10.2 9.8   PROT 6.5 5.9*   ALBUMIN 3.0* 2.8*   BILITOT 0.5 0.4   ALKPHOS 93 83   AST 30 30   ALT 17 18   ANIONGAP 7* 11   ESTGFRAFRICA >60.0 >60.0   EGFRNONAA >60.0 >60.0   , CBC   Recent Labs    Lab 05/30/19  0651 05/31/19  0530   WBC 10.49 7.15   HGB 13.1 12.8   HCT 41.0 38.7    SEE COMMENT    and All labs within the past 24 hours have been reviewed  Radiology: X-Ray: CXR: X-Ray Chest 1 View (CXR):   Results for orders placed or performed during the hospital encounter of 05/22/19   X-Ray Chest 1 View    Narrative    EXAMINATION:  XR CHEST 1 VIEW    CLINICAL HISTORY:  pneumothorax;    TECHNIQUE:  Single frontal view of the chest was performed.    COMPARISON:  05/30/2019, 08:03 hours.  05/29/2019, 16:41 and 13 30 hours.    FINDINGS:  Mediastinal structures remain midline.  Pacer with transvenous leads is in its usual location.    Abundant subcutaneous emphysema persists.    However pneumomediastinum has improved since 05/30/2019 at 08:03 hours.  Right apical pneumothorax is no larger than on that recent study.  Previously identified right subpulmonic pneumothorax is not apparent on today's study.    There may be a small amount of dependent right pleural fluid, a finding of uncertain clinical significance.  I detect no left pleural fluid or left pneumothorax.    I detect no pneumoperitoneum or significant osseous abnormality.      Impression    1.  Interval improvement in pneumomediastinum since 05/30/2019.  Right pneumothorax is no larger and may be smaller.    2.  Persistent subcutaneous emphysema.  No new disease.      Electronically signed by: Asiya Rosales MD  Date:    05/31/2019  Time:    08:57    and X-Ray Chest PA and Lateral (CXR): No results found for this visit on 05/22/19.  Cardiac Graphics: Echocardiogram:   2D echo with color flow doppler:   Results for orders placed or performed during the hospital encounter of 10/25/13   2D Echo w/ Color Flow Doppler   Result Value Ref Range    QEF 65     Mitral Valve Regurgitation mild to moderate     and Transthoracic echo (TTE) complete (Cupid Only):   Results for orders placed or performed during the hospital encounter of 05/22/19    Transthoracic echo (TTE) 2D with Color Flow   Result Value Ref Range    STJ 1.96 cm    IVS 1.40 (A) 0.6 - 1.1 cm    LA size 2.59 cm    LVIDD 2.97 (A) 3.5 - 6.0 cm    LVIDS 1.90 (A) 2.1 - 4.0 cm    LVOT diameter 1.71 cm    PW 1.20 0.6 - 1.1 cm    Sinus 2.57 cm    PV PEAK VELOCITY 1.13 cm/s    LV Diastolic Volume 34.01 mL    LV Systolic Volume 11.10 mL    FS 36 %    LV mass 122.58 g    Left Ventricle Relative Wall Thickness 0.81 cm    LVOT area 2.30 cm2    LV Systolic Volume Index 7.1 mL/m2    LV Diastolic Volume Index 21.61 mL/m2    LV Mass Index 77.9 g/m2    BSA 1.57 m2    TR Max Steve 2.11 m/s    Triscuspid Valve Regurgitation Peak Gradient 17.81 mmHg       Pending Diagnostic Studies:     None         Medications:  Reconciled Home Medications:      Medication List      START taking these medications    ferrous sulfate 325 (65 FE) MG EC tablet  Take 1 tablet (325 mg total) by mouth once daily.  Start taking on:  6/1/2019     rOPINIRole 0.25 MG tablet  Commonly known as:  REQUIP  Take 1 tablet (0.25 mg total) by mouth every evening.        CHANGE how you take these medications    furosemide 40 MG tablet  Commonly known as:  LASIX  Take 1 tablet (40 mg total) by mouth once daily.  Start taking on:  6/1/2019  What changed:    · medication strength  · how much to take        CONTINUE taking these medications    acetaminophen 500 MG tablet  Commonly known as:  TYLENOL  Take 1,000 mg by mouth daily as needed for Pain.     amLODIPine 2.5 MG tablet  Commonly known as:  NORVASC  Take 2.5 mg by mouth nightly as needed for SBP greater than. SBP greater than 155     aspirin 81 mg Tab  Take by mouth. 1 Tablet Oral Every day     multivitamin per tablet  Commonly known as:  THERAGRAN  Take 1 tablet by mouth once daily.     PROAIR HFA 90 mcg/actuation inhaler  Generic drug:  albuterol  Inhale 1 puff into the lungs every 6 (six) hours as needed for Wheezing. Rescue     sodium chloride 0.9 % Soln  Uses as needed by nebulization  route     tiotropium 18 mcg inhalation capsule  Commonly known as:  SPIRIVA  Inhale 18 mcg into the lungs once daily.     triamcinolone 55 mcg nasal inhaler  Commonly known as:  NASACORT  Mixes 1/2 ml with phenylephrine            Indwelling Lines/Drains at time of discharge:   Lines/Drains/Airways     Drain            Female External Urinary Catheter 05/29/19 2100 1 day                Time spent on the discharge of patient: 20 minutes  Patient was seen and examined on the date of discharge and determined to be suitable for discharge.         Shane Jo MD  Department of Hospital Medicine  [facility]

## 2019-05-31 NOTE — ASSESSMENT & PLAN NOTE
89 year old female with preexisting lung disease (COPD) and prior spontaneous PTX presenting with acute onset SOB not responsive to COPD medications or rescue inhalers  - Pigtail cath initially placed by CT surgery, replaced(5/22/19) by chest tube with improvement in dyspnea.  - Daily CXR  - Pleurodesis per CT surgery and then chest tube removed on 5/30. SubQ emphysema noted prior to removal so held for 48 hours. Interval improvement and placement of one way flap / blow hold placed on right chest wall by CTS. Will follow up in clinic.

## 2019-06-03 DIAGNOSIS — J93.83 SPONTANEOUS PNEUMOTHORAX: Primary | ICD-10-CM

## 2019-06-04 ENCOUNTER — PATIENT OUTREACH (OUTPATIENT)
Dept: ADMINISTRATIVE | Facility: CLINIC | Age: 84
End: 2019-06-04

## 2019-06-04 NOTE — PATIENT INSTRUCTIONS
Pneumothorax, Spontaneous  Pneumothorax is when air leaks out and gets trapped in the space between the lung and the chest wall (pleural space). It can cause complete or partial collapse of a lung. The trapped air prevents the lung from re-inflating. Spontaneous pneumothorax occurs when a weakened spot on the lung surface (bleb) ruptures. It may occur in people with asthma or emphysema, or even in those with no pre-existing lung disease.    Your pneumothorax is small and should get better without treatment. This can be observed at home. If the amount of trapped air grows larger, it must be removed with a tube placed into the pleural space (catheter).  Home care  · Rest at home. Do not do vigorous activity or exercise for the next week.  · You may use acetaminophen or ibuprofen to control pain, unless another medicine was prescribed. Note: If you have chronic liver or kidney disease or have ever had a stomach ulcer or gastrointestinal bleeding, talk with your healthcare provider before using these medicines. Also talk to your provider if you are taking medicine to prevent blood clots.  · During the next 3 days, it is important to take 4 slow, deep breaths every 1 to 2 hours while awake. Do this even though your chest may hurt when you breathe. It sends extra oxygen and blood to the lung. This is important to help keep the lung expanded. If an incentive spirometer (breathing exercise device) was given, use it as directed.  · If you smoke or use e-cigarettes, quit.  Follow-up care  Follow up with your healthcare provider, or as advised, for a repeat chest X-ray to be sure the pneumothorax is not getting larger.  Note: Any X-rays taken will be reviewed by a specialist. You will be notified of any new findings that may affect your care.  Call 911  Contact emergency services right away if any of these occur.  · Trouble breathing  · Confusion or difficulty arousing  · Fainting or loss of consciousness  · Rapid heart  rate  · Passing out or fainting  · New pain in the chest, arm, shoulder, neck, or upper back  When to seek medical advice  Call your healthcare provider right away if any of these occur:  · Increased pain with breathing  · Weakness or dizziness  · Fever or productive cough  Date Last Reviewed: 9/13/201  © 5805-4394 [employer]. [address] [place] [address], [place] [address]. All rights reserved. This information is not intended as a substitute for professional medical care. Always follow your healthcare professional's instructions.

## 2019-06-05 ENCOUNTER — HOSPITAL ENCOUNTER (EMERGENCY)
Facility: HOSPITAL | Age: 84
Discharge: HOME | End: 2019-06-05
Attending: EMERGENCY MEDICINE
Payer: MEDICARE

## 2019-06-05 VITALS
OXYGEN SATURATION: 100 % | BODY MASS INDEX: 20.08 KG/M2 | SYSTOLIC BLOOD PRESSURE: 168 MMHG | HEART RATE: 83 BPM | DIASTOLIC BLOOD PRESSURE: 73 MMHG | RESPIRATION RATE: 15 BRPM | WEIGHT: 117 LBS | TEMPERATURE: 98 F

## 2019-06-05 DIAGNOSIS — R09.02 HYPOXIA: ICD-10-CM

## 2019-06-05 DIAGNOSIS — J93.82 PERSISTENT PNEUMOTHORAX: ICD-10-CM

## 2019-06-05 DIAGNOSIS — R06.02 SOB (SHORTNESS OF BREATH): Primary | ICD-10-CM

## 2019-06-05 LAB
ALBUMIN SERPL BCP-MCNC: 3.3 G/DL (ref 3.5–5.2)
ALP SERPL-CCNC: 85 U/L (ref 55–135)
ALT SERPL W/O P-5'-P-CCNC: 17 U/L (ref 10–44)
ANION GAP SERPL CALC-SCNC: 9 MMOL/L (ref 8–16)
AST SERPL-CCNC: 32 U/L (ref 10–40)
BASOPHILS # BLD AUTO: 0.08 K/UL (ref 0–0.2)
BASOPHILS NFR BLD: 1 % (ref 0–1.9)
BILIRUB SERPL-MCNC: 0.3 MG/DL (ref 0.1–1)
BUN SERPL-MCNC: 25 MG/DL (ref 8–23)
CALCIUM SERPL-MCNC: 10.6 MG/DL (ref 8.7–10.5)
CHLORIDE SERPL-SCNC: 92 MMOL/L (ref 95–110)
CO2 SERPL-SCNC: 39 MMOL/L (ref 23–29)
CREAT SERPL-MCNC: 0.8 MG/DL (ref 0.5–1.4)
DIFFERENTIAL METHOD: ABNORMAL
EOSINOPHIL # BLD AUTO: 0.6 K/UL (ref 0–0.5)
EOSINOPHIL NFR BLD: 8 % (ref 0–8)
ERYTHROCYTE [DISTWIDTH] IN BLOOD BY AUTOMATED COUNT: 13 % (ref 11.5–14.5)
EST. GFR  (AFRICAN AMERICAN): >60 ML/MIN/1.73 M^2
EST. GFR  (NON AFRICAN AMERICAN): >60 ML/MIN/1.73 M^2
GLUCOSE SERPL-MCNC: 85 MG/DL (ref 70–110)
HCT VFR BLD AUTO: 42.5 % (ref 37–48.5)
HGB BLD-MCNC: 13.3 G/DL (ref 12–16)
IMM GRANULOCYTES # BLD AUTO: 0.05 K/UL (ref 0–0.04)
IMM GRANULOCYTES NFR BLD AUTO: 0.6 % (ref 0–0.5)
INR PPP: 0.9 (ref 0.8–1.2)
LYMPHOCYTES # BLD AUTO: 1.7 K/UL (ref 1–4.8)
LYMPHOCYTES NFR BLD: 21.8 % (ref 18–48)
MCH RBC QN AUTO: 30.6 PG (ref 27–31)
MCHC RBC AUTO-ENTMCNC: 31.3 G/DL (ref 32–36)
MCV RBC AUTO: 98 FL (ref 82–98)
MONOCYTES # BLD AUTO: 1.1 K/UL (ref 0.3–1)
MONOCYTES NFR BLD: 14 % (ref 4–15)
NEUTROPHILS # BLD AUTO: 4.3 K/UL (ref 1.8–7.7)
NEUTROPHILS NFR BLD: 54.6 % (ref 38–73)
NRBC BLD-RTO: 0 /100 WBC
PLATELET # BLD AUTO: 234 K/UL (ref 150–350)
PMV BLD AUTO: 9.5 FL (ref 9.2–12.9)
POTASSIUM SERPL-SCNC: 3.7 MMOL/L (ref 3.5–5.1)
PROT SERPL-MCNC: 6.6 G/DL (ref 6–8.4)
PROTHROMBIN TIME: 9.5 SEC (ref 9–12.5)
RBC # BLD AUTO: 4.34 M/UL (ref 4–5.4)
SODIUM SERPL-SCNC: 140 MMOL/L (ref 136–145)
WBC # BLD AUTO: 7.88 K/UL (ref 3.9–12.7)

## 2019-06-05 PROCEDURE — 25500020 PHARM REV CODE 255: Performed by: EMERGENCY MEDICINE

## 2019-06-05 PROCEDURE — 99285 PR EMERGENCY DEPT VISIT,LEVEL V: ICD-10-PCS | Mod: ,,, | Performed by: EMERGENCY MEDICINE

## 2019-06-05 PROCEDURE — 99285 EMERGENCY DEPT VISIT HI MDM: CPT

## 2019-06-05 PROCEDURE — 99285 EMERGENCY DEPT VISIT HI MDM: CPT | Mod: ,,, | Performed by: EMERGENCY MEDICINE

## 2019-06-05 PROCEDURE — 85610 PROTHROMBIN TIME: CPT

## 2019-06-05 PROCEDURE — 80053 COMPREHEN METABOLIC PANEL: CPT

## 2019-06-05 PROCEDURE — 85025 COMPLETE CBC W/AUTO DIFF WBC: CPT

## 2019-06-05 RX ADMIN — IOHEXOL 75 ML: 350 INJECTION, SOLUTION INTRAVENOUS at 03:06

## 2019-06-05 NOTE — ED TRIAGE NOTES
Patient became sob this afternoon. Patient had pneumothorax on 5/31/2019. Patient stating 100% on 2L of o2.

## 2019-06-05 NOTE — ED NOTES
Patient identifiers verified and correct for Venus Moreno   LOC: The patient is awake, alert and aware of environment with an appropriate affect, the patient is oriented x 3 and speaking appropriately.   APPEARANCE:  patient is clean and well groomed.  SKIN: The skin is warm and dry, color consistent with ethnicity, patient has normal skin turgor and moist mucus membranes, skin intact, no breakdown or bruising noted. Patient has incision where chest tube was placed on the left side (back). Patient has an incision on left time (front) from complications post chest tube.    MUSCULOSKELETAL: Patient moving all extremities spontaneously, no swelling noted.  RESPIRATORY: Airway is open and patent, respirations are spontaneous, patient has a normal effort and rate, no accessory muscle use noted, pt placed on continuous pulse ox with O2 sats noted at 99% on 2l.  CARDIAC: Pt placed on cardiac monitor. Patient has a normal rate and regular rhythm (pacemaker), edema noted, capillary refill < 3 seconds.   GASTRO: Soft and non tender to palpation, no distention noted, normoactive bowel sounds present in all four quadrants. Pt states bowel movements have been regular.  : Pt states frequency with urination.  NEURO: Pt opens eyes spontaneously, behavior appropriate to situation, follows commands, facial expression symmetrical, bilateral hand grasp equal and even, purposeful motor response noted, normal sensation in all extremities when touched with a finger.

## 2019-06-05 NOTE — ED PROVIDER NOTES
Encounter Date: 2019    SCRIBE #1 NOTE: I, Son Antonia, am scribing for, and in the presence of,  Dr. Dyer. I have scribed the entire note.       History     Chief Complaint   Patient presents with    Hypoxia     arrived via  EMS from home with c/o hypoxia, reports recent pneumothorax, EMS reports pt SpO2 70s% on home O2 2L, changed to EMS O2 3L NC and SpO2 100% on 3L O2     Ms. Myaer  is a 89 y.o. female on home O2 with past medical history of HLD, HTN, and was recently discharged for a spontaneous pneumothorax on 2019 presenting with continued shortness of breath that began while eating breakfast this morning. Patient states that she felt fine yesterday; however, this morning she suddenly became short of breath. When one of the therapist came, they noticed that her pulse ox was low. According to her daughter, the patient was sating in the 70s-80s, so they called EMS. She was placed on 3L O2 nasal cannula when EMS arrived which gave her minimal relief. Currently, she states that she has trouble breathing while at rest and denies chest pain. She denies nausea, vomiting, diarrhea.     The history is provided by the patient, medical records, the EMS personnel and a relative.     Review of patient's allergies indicates:   Allergen Reactions    Sulfamethoxazole-trimethoprim      Other reaction(s): Rash     Past Medical History:   Diagnosis Date    Cardiomyopathy     Carotid artery occlusion     Hyperlipidemia     Hypertension      Past Surgical History:   Procedure Laterality Date    CARDIAC CATHETERIZATION      CAROTID ENDARTERECTOMY       Family History   Problem Relation Age of Onset    Hypertension Mother      Social History     Tobacco Use    Smoking status: Former Smoker     Packs/day: 2.00     Years: 20.00     Pack years: 40.00     Types: Cigarettes     Last attempt to quit: 1980     Years since quittin.4   Substance Use Topics    Alcohol use: Yes     Alcohol/week: 1.2 oz      Types: 2 Glasses of wine per week     Comment: social    Drug use: No     Review of Systems   Constitutional: Negative for chills and fever.   HENT: Negative for congestion.    Eyes: Negative for visual disturbance.   Respiratory: Positive for shortness of breath.    Cardiovascular: Negative for chest pain.   Gastrointestinal: Negative for diarrhea, nausea and vomiting.   Genitourinary: Negative for dysuria.   Musculoskeletal: Negative for back pain.   Skin: Negative for rash.   Neurological: Negative for headaches.       Physical Exam     Initial Vitals [06/05/19 1253]   BP Pulse Resp Temp SpO2   (!) 156/80 100 20 97.8 °F (36.6 °C) 100 %      MAP       --         Physical Exam    Nursing note and vitals reviewed.  Constitutional: She appears well-developed and well-nourished. She is not diaphoretic. No distress.   HENT:   Head: Normocephalic and atraumatic.   Mouth/Throat: Oropharynx is clear and moist.   Neck: Normal range of motion. Neck supple. No JVD present.   Cardiovascular: Normal rate, regular rhythm, normal heart sounds and intact distal pulses.   Pulmonary/Chest: She exhibits crepitus.   Decreased breath sounds, poor air movement bilaterally more on right. 2 cm incision on right anterior chest and lateral chest- c/d/i without drainage or erythema   Mild crepitus of upper trunk, anterior chest and back   Abdominal: Soft. She exhibits no distension. There is no tenderness.   Musculoskeletal: Normal range of motion. She exhibits edema.   1+ edema traced to mid shin   Lymphadenopathy:     She has no cervical adenopathy.   Neurological: She is alert and oriented to person, place, and time. She has normal strength. No cranial nerve deficit or sensory deficit.   Skin: Skin is warm and dry.         ED Course   Procedures  Labs Reviewed   CBC W/ AUTO DIFFERENTIAL - Abnormal; Notable for the following components:       Result Value    Mean Corpuscular Hemoglobin Conc 31.3 (*)     Immature Granulocytes 0.6 (*)      Immature Grans (Abs) 0.05 (*)     Mono # 1.1 (*)     Eos # 0.6 (*)     All other components within normal limits   COMPREHENSIVE METABOLIC PANEL - Abnormal; Notable for the following components:    Chloride 92 (*)     CO2 39 (*)     BUN, Bld 25 (*)     Calcium 10.6 (*)     Albumin 3.3 (*)     All other components within normal limits   PROTIME-INR          Imaging Results           CTA Chest Non-Coronary (PE Study) (Final result)  Result time 06/05/19 16:08:59    Final result by Chalino Silver MD (06/05/19 16:08:59)                 Impression:      There is no filling defect in the pulmonary arteries to suggest pulmonary thromboembolism.    Recent removal of right-sided pleural drainage catheter with small persistent right apical pneumothorax ; extensive air in soft tissues of chest wall again noted with improvement over radiographs last month.    Moderate right-sided pleural effusion with associated compressive atelectasis.    Paraseptal and confluent centrilobular emphysematous changes with biapical predominance.    Status post left thoracotomy with wedge resection in left upper lobe.    Aortic annular calcification and multi-vessel coronary artery atherosclerosis.    Cholelithiasis.  No evidence of cholecystitis.    This report was flagged in Epic as abnormal.    Electronically signed by resident: Michael Navarro MD  Date:    06/05/2019  Time:    15:29    Electronically signed by: Chalino Silver MD  Date:    06/05/2019  Time:    16:08             Narrative:    EXAMINATION:  CTA CHEST NON CORONARY    CLINICAL HISTORY:  Chest pain, acute, PE suspected, high pretest prob;    TECHNIQUE:  Low dose axial images, sagittal and coronal reformations were obtained from the thoracic inlet to the lung bases following the IV administration of 75 mL of Omnipaque 350.  Contrast timing was optimized to evaluate the pulmonary arteries.  MIP images were performed.    COMPARISON:  Chest radiograph AP portable  06/05/2019    FINDINGS:  The vascular and soft tissues structures at the base of the neck are unremarkable.    There is a left-sided aortic arch with 3 branch vessels. The aorta is normal in caliber, contour, and course without significant calcific atherosclerosis.  Diffuse extensive subcutaneous emphysema noted.    There is no cardiac enlargement or pericardial fluid.  Aortic annular calcification and multi-vessel coronary artery atherosclerosis.    Several subcarinal and pretracheal lymph nodes which are at the upper limit of normal in size.    The pulmonary arteries distribute normally.  There are 4 pulmonary veins.  Systemic and pulmonary venoatrial connections are concordant. This study is adequate for the evaluation of pulmonary thromboembolism. There is no filling defect of the pulmonary arteries to suggest pulmonary thromboembolism.  Segmental artery of the left upper lobe ends in a staple row likely related to postsurgical change from prior thoracotomy.    The trachea is midline and the proximal airways are patent.  Interval removal of right-sided pleural catheter.  There is paraseptal and confluent centrilobular emphysematous changes with biapical predominance.  Redemonstration of biapical scarring.  Moderate size right-sided pleural effusion with associated compressive atelectasis.  Small residual right apical pneumothorax.  Pleural calcification at the posterior aspect of the superior segment of the left lower lobe.    The esophagus is normal in caliber and contour.  There is a punctate calcification in the right kidney likely nonobstructing nephrolith versus vascular calcification.  Gallstones versus biliary sludge with no secondary signs to suggest cholecystitis.  Partially visualized upper abdominal structures are otherwise unremarkable.    The osseous structures demonstrate mild degenerative changes without acute fracture or bony destructive process.  Subacute or remote left 12th and left 4th rib  fractures.                               X-Ray Chest AP Portable (Final result)  Result time 06/05/19 13:31:19    Final result by Asiya Rosales MD (06/05/19 13:31:19)                 Impression:      Progressive improvement in the appearance of the chest radiograph compared to recent studies dating back to 05/29/2018.  On today's study I detect no convincing evidence of pneumothorax and note only modest pneumomediastinum.  Subcutaneous emphysema has also improved over time.  However if there is persistent clinical concern for right pneumothorax or other intrathoracic disease, chest CT would provide more sensitive assessment.    No new disease identified.      Electronically signed by: Asiya Rosales MD  Date:    06/05/2019  Time:    13:31             Narrative:    EXAMINATION:  XR CHEST AP PORTABLE    CLINICAL HISTORY:  Shortness of breath    TECHNIQUE:  Single frontal view of the chest was performed.    COMPARISON:  05/31/2019, 08:24 hours.  05/30/2019.  05/29/2019.    FINDINGS:  Pacer in the left anterior chest wall has 2 attached leads that extend to overlie the right atrium and right ventricle.    Radiopaque staple lines project over the left upper lung zone.    Mediastinal structures are midline.  Calcific plaque is present at the aortic arch.  Cardiac silhouette is stable.  Pulmonary vascular distribution appears stable.    Recent chest radiographs dating back to 05/29/2019 have revealed parenchymal consolidation in the right lower lung zone; this has shown progressive improvement over the intervening images, resulting in only modest patchy opacity at that location on today's study.    The patient has a recent history of right pneumothorax, abundant subcutaneous emphysema, and pneumomediastinum.  On today's study I detect no right or left pneumothorax.  Subcutaneous emphysema continues to improve.  Pneumomediastinum has also improved markedly although today's study reveals a modest quantity of air  extending along the left and right walls of the intrathoracic trachea.    No new disease identified.                              X-Rays:   Independently Interpreted Readings:   Chest X-Ray: No new pneumothorax, subcutaneous emphysema, mild pneumomediastinum that was present in previous chest x-ray   Other Readings:  CXR: no new ptx, diffuse crepitus of the anterior/lateral/posterior trunk, (+) pneumomediastinum.       Medical Decision Making:   History:   Old Medical Records: I decided to obtain old medical records.  Initial Assessment:   Emergent evaluation of a 89-year-old female here with acute hypoxia and shortness of breath noted by home physical therapist.  Patient had a recent spontaneous right-sided pneumothorax that was treated with chest tube.    Differential Diagnosis:   Recurrent pneumothorax, pleural effusion, pneumonia, pulmonary embolus  Independently Interpreted Test(s):   I have ordered and independently interpreted X-rays - see prior notes.  Clinical Tests:   Lab Tests: Ordered and Reviewed  Radiological Study: Ordered and Reviewed  ED Management:  - labs  - stat CXR  - EKG  - cardiac monitor  - NC 3 liters    X-ray reviewed which shows bilateral subcutaneous emphysema, improved.  There is no new pneumothorax, pleural effusion or pneumonia.  Labs reviewed with no acute process.  CTA ordered to rule out PE and also evaluate for occult lung issue causing hypoxia    4:20 PM  CT reviewed with no filling defect to suggest pulmonary embolism. There is a small persistent right apical pneumothorax, extensive air in soft tissues of chest wall again noted with improvement, right-sided pleural effusion.  Patient was seen and examined by Dr. Mckeon, recommending follow up at scheduled appointment on 6/14.       Explained results with patient and family at bedside.  They expressed understanding.  Patient stable for discharge            Scribe Attestation:   Scribe #1: I performed the above scribed service  and the documentation accurately describes the services I performed. I attest to the accuracy of the note.    Attending Attestation:           Physician Attestation for Scribe:      Comments: I, Dr. Apoorva Dyer, personally performed the services described in this documentation. All medical record entries made by the scribe were at my direction and in my presence.  I have reviewed the chart and agree that the record reflects my personal performance and is accurate and complete. Apoorva Dyer MD.                 Clinical Impression:       ICD-10-CM ICD-9-CM   1. SOB (shortness of breath) R06.02 786.05   2. Hypoxia R09.02 799.02   3. Persistent pneumothorax J93.81 512.83         Disposition:   Disposition: Discharged  Condition: Stable                        Apoorva Dyer MD  06/06/19 0700

## 2019-06-05 NOTE — TREATMENT PLAN
Patient seen and examined with Dr. Mckeon in ED. Subq emphysema continues to improve. Right anterior chest incision appropriately packed with dry gauze. Right chest tube site c/d/i. Saturations were in mid 90s on 3LNC. Dr. Mckeon reviewed CXR and Chest CTA. Right lung well expanded indicating a good response to pleurodesis. Small pleural effusion too small for intervention. Widespread subq emphysema appears stable. Recommend keeping follow up appointment with Dr. Mckeon on 6/14/19 with a CXR.     Shari Albert PA-C  Thoracic Surgery  [id number]

## 2019-06-06 ENCOUNTER — PES CALL (OUTPATIENT)
Dept: ADMINISTRATIVE | Facility: CLINIC | Age: 84
End: 2019-06-06

## 2019-06-14 ENCOUNTER — OFFICE VISIT (OUTPATIENT)
Dept: CARDIOTHORACIC SURGERY | Facility: CLINIC | Age: 84
End: 2019-06-14
Payer: MEDICARE

## 2019-06-14 ENCOUNTER — TELEPHONE (OUTPATIENT)
Dept: CARDIOTHORACIC SURGERY | Facility: CLINIC | Age: 84
End: 2019-06-14

## 2019-06-14 ENCOUNTER — HOSPITAL ENCOUNTER (OUTPATIENT)
Dept: RADIOLOGY | Facility: HOSPITAL | Age: 84
Discharge: HOME | End: 2019-06-14
Attending: THORACIC SURGERY (CARDIOTHORACIC VASCULAR SURGERY)
Payer: MEDICARE

## 2019-06-14 VITALS
BODY MASS INDEX: 16.44 KG/M2 | HEART RATE: 79 BPM | OXYGEN SATURATION: 94 % | SYSTOLIC BLOOD PRESSURE: 125 MMHG | DIASTOLIC BLOOD PRESSURE: 63 MMHG | HEIGHT: 64 IN | WEIGHT: 96.31 LBS

## 2019-06-14 DIAGNOSIS — J43.1 PANLOBULAR EMPHYSEMA: ICD-10-CM

## 2019-06-14 DIAGNOSIS — J93.83 SPONTANEOUS PNEUMOTHORAX: ICD-10-CM

## 2019-06-14 DIAGNOSIS — J93.12 SECONDARY SPONTANEOUS PNEUMOTHORAX: Primary | ICD-10-CM

## 2019-06-14 PROCEDURE — 99024 PR POST-OP FOLLOW-UP VISIT: ICD-10-PCS | Mod: S$GLB,,, | Performed by: THORACIC SURGERY (CARDIOTHORACIC VASCULAR SURGERY)

## 2019-06-14 PROCEDURE — 71046 XR CHEST PA AND LATERAL: ICD-10-PCS | Mod: 26,,, | Performed by: RADIOLOGY

## 2019-06-14 PROCEDURE — 71046 X-RAY EXAM CHEST 2 VIEWS: CPT | Mod: TC,FY

## 2019-06-14 PROCEDURE — 99024 POSTOP FOLLOW-UP VISIT: CPT | Mod: S$GLB,,, | Performed by: THORACIC SURGERY (CARDIOTHORACIC VASCULAR SURGERY)

## 2019-06-14 PROCEDURE — 99999 PR PBB SHADOW E&M-EST. PATIENT-LVL III: ICD-10-PCS | Mod: PBBFAC,,, | Performed by: THORACIC SURGERY (CARDIOTHORACIC VASCULAR SURGERY)

## 2019-06-14 PROCEDURE — 99999 PR PBB SHADOW E&M-EST. PATIENT-LVL III: CPT | Mod: PBBFAC,,, | Performed by: THORACIC SURGERY (CARDIOTHORACIC VASCULAR SURGERY)

## 2019-06-14 PROCEDURE — 71046 X-RAY EXAM CHEST 2 VIEWS: CPT | Mod: 26,,, | Performed by: RADIOLOGY

## 2019-06-14 NOTE — PROGRESS NOTES
"Subjective:       Patient ID: Venus Mayer is a 89 y.o. female.    Chief Complaint: Follow-up    HPI   89 year old female with history of HLD, CAD, HTN, and COPD on 3L of oxygen at home returns for hospital follow up s/p right spontaneous pneumothorax. Hospital course complicated by subcutaneous emphysema after pigtail removal. Right anterior blow hole placed. Lung remained well expanded, did not require tube replacement.     Review of Systems   Constitutional: Negative for activity change, appetite change, fatigue and fever.   HENT: Negative for congestion, trouble swallowing and voice change.    Eyes: Negative for visual disturbance.   Respiratory: Positive for chest tightness. Negative for cough, shortness of breath and wheezing.    Cardiovascular: Negative for chest pain, palpitations and leg swelling.   Gastrointestinal: Negative for abdominal distention, diarrhea, nausea and vomiting.   Genitourinary: Negative for difficulty urinating.   Musculoskeletal: Negative for back pain and neck pain.   Neurological: Negative for syncope, light-headedness and headaches.   Psychiatric/Behavioral: Negative for agitation.         Objective:       Vitals:    06/14/19 1109   BP: 125/63   Pulse: 79   SpO2: (!) 94%   Weight: 43.7 kg (96 lb 5.5 oz)   Height: 5' 4" (1.626 m)   PainSc: 0-No pain       Physical Exam   Constitutional: She is oriented to person, place, and time. She appears well-developed and well-nourished.   HENT:   Head: Normocephalic.   Eyes: Pupils are equal, round, and reactive to light.   Neck: Normal range of motion. Neck supple.   Cardiovascular: Normal rate, regular rhythm, normal heart sounds and intact distal pulses.   Pulmonary/Chest: Effort normal and breath sounds normal. She exhibits tenderness.   Chest tube site and blow hole site healing well. Minimal drainage. Subq emphysema nearly completely resolved.    Abdominal: Soft. Bowel sounds are normal. She exhibits no distension. There is no " tenderness.   Musculoskeletal: She exhibits no edema.   Lymphadenopathy:     She has no cervical adenopathy.   Neurological: She is alert and oriented to person, place, and time.   Psychiatric: She has a normal mood and affect.       6/14/19- CXR- Reviewed. Lung expanded.     Assessment:         89 year old female with history of HLD, CAD, HTN, and COPD on 3L of oxygen at home returns for hospital follow up s/p right spontaneous pneumothorax.    Plan:       No further follow up with thoracic surgery. Leave left anterior chest wall cut down site open to air starting Monday.  No need for further dressing changes from that point forward.  RTC PRN.

## 2019-06-14 NOTE — TELEPHONE ENCOUNTER
Patient's daughter given the number to Patient Accounts  Customer Service.    ----- Message from Kamila Knight sent at 6/14/2019  3:48 PM CDT -----  Contact: Patient's Daughter Bri  Needs Advice    Reason for call: Caller is wanting to know if she should have paid a copay as this was a f/u appoint post procedure in the ED (it was scheduled as an established patient visit)        Communication Preference: [phone]    Additional Information: please contact the caller

## 2019-06-26 ENCOUNTER — HOSPITAL ENCOUNTER (OUTPATIENT)
Facility: HOSPITAL | Age: 84
Discharge: HOME | End: 2019-06-26
Attending: EMERGENCY MEDICINE | Admitting: THORACIC SURGERY (CARDIOTHORACIC VASCULAR SURGERY)
Payer: MEDICARE

## 2019-06-26 VITALS
DIASTOLIC BLOOD PRESSURE: 68 MMHG | WEIGHT: 94 LBS | SYSTOLIC BLOOD PRESSURE: 140 MMHG | HEART RATE: 91 BPM | OXYGEN SATURATION: 92 % | HEIGHT: 60 IN | TEMPERATURE: 98 F | RESPIRATION RATE: 18 BRPM | BODY MASS INDEX: 18.46 KG/M2

## 2019-06-26 DIAGNOSIS — J93.83 RECURRENT SPONTANEOUS PNEUMOTHORAX: ICD-10-CM

## 2019-06-26 DIAGNOSIS — J93.9 PNEUMOTHORAX, UNSPECIFIED TYPE: Primary | ICD-10-CM

## 2019-06-26 DIAGNOSIS — R06.02 SOB (SHORTNESS OF BREATH): ICD-10-CM

## 2019-06-26 LAB
ALBUMIN SERPL BCP-MCNC: 3.5 G/DL (ref 3.5–5.2)
ALP SERPL-CCNC: 103 U/L (ref 55–135)
ALT SERPL W/O P-5'-P-CCNC: 18 U/L (ref 10–44)
ANION GAP SERPL CALC-SCNC: 11 MMOL/L (ref 8–16)
AST SERPL-CCNC: 47 U/L (ref 10–40)
BASOPHILS # BLD AUTO: 0.06 K/UL (ref 0–0.2)
BASOPHILS NFR BLD: 0.6 % (ref 0–1.9)
BILIRUB SERPL-MCNC: 0.4 MG/DL (ref 0.1–1)
BNP SERPL-MCNC: 1095 PG/ML (ref 0–99)
BUN SERPL-MCNC: 21 MG/DL (ref 8–23)
CALCIUM SERPL-MCNC: 9.8 MG/DL (ref 8.7–10.5)
CHLORIDE SERPL-SCNC: 88 MMOL/L (ref 95–110)
CO2 SERPL-SCNC: 37 MMOL/L (ref 23–29)
CREAT SERPL-MCNC: 0.9 MG/DL (ref 0.5–1.4)
DIFFERENTIAL METHOD: ABNORMAL
EOSINOPHIL # BLD AUTO: 0.8 K/UL (ref 0–0.5)
EOSINOPHIL NFR BLD: 7.8 % (ref 0–8)
ERYTHROCYTE [DISTWIDTH] IN BLOOD BY AUTOMATED COUNT: 13.5 % (ref 11.5–14.5)
EST. GFR  (AFRICAN AMERICAN): >60 ML/MIN/1.73 M^2
EST. GFR  (NON AFRICAN AMERICAN): 56.5 ML/MIN/1.73 M^2
GLUCOSE SERPL-MCNC: 124 MG/DL (ref 70–110)
HCT VFR BLD AUTO: 40.8 % (ref 37–48.5)
HGB BLD-MCNC: 12.8 G/DL (ref 12–16)
IMM GRANULOCYTES # BLD AUTO: 0.03 K/UL (ref 0–0.04)
IMM GRANULOCYTES NFR BLD AUTO: 0.3 % (ref 0–0.5)
LYMPHOCYTES # BLD AUTO: 1.3 K/UL (ref 1–4.8)
LYMPHOCYTES NFR BLD: 12.8 % (ref 18–48)
MCH RBC QN AUTO: 30.7 PG (ref 27–31)
MCHC RBC AUTO-ENTMCNC: 31.4 G/DL (ref 32–36)
MCV RBC AUTO: 98 FL (ref 82–98)
MONOCYTES # BLD AUTO: 0.9 K/UL (ref 0.3–1)
MONOCYTES NFR BLD: 9 % (ref 4–15)
NEUTROPHILS # BLD AUTO: 6.8 K/UL (ref 1.8–7.7)
NEUTROPHILS NFR BLD: 69.5 % (ref 38–73)
NRBC BLD-RTO: 0 /100 WBC
PLATELET # BLD AUTO: 168 K/UL (ref 150–350)
PMV BLD AUTO: 10.2 FL (ref 9.2–12.9)
POTASSIUM SERPL-SCNC: 4.4 MMOL/L (ref 3.5–5.1)
PROT SERPL-MCNC: 6.8 G/DL (ref 6–8.4)
RBC # BLD AUTO: 4.17 M/UL (ref 4–5.4)
SODIUM SERPL-SCNC: 136 MMOL/L (ref 136–145)
TROPONIN I SERPL DL<=0.01 NG/ML-MCNC: 0.1 NG/ML (ref 0–0.03)
WBC # BLD AUTO: 9.84 K/UL (ref 3.9–12.7)

## 2019-06-26 PROCEDURE — 25000242 PHARM REV CODE 250 ALT 637 W/ HCPCS: Performed by: STUDENT IN AN ORGANIZED HEALTH CARE EDUCATION/TRAINING PROGRAM

## 2019-06-26 PROCEDURE — 27000221 HC OXYGEN, UP TO 24 HOURS

## 2019-06-26 PROCEDURE — 99214 OFFICE O/P EST MOD 30 MIN: CPT | Mod: ,,, | Performed by: THORACIC SURGERY (CARDIOTHORACIC VASCULAR SURGERY)

## 2019-06-26 PROCEDURE — 94761 N-INVAS EAR/PLS OXIMETRY MLT: CPT

## 2019-06-26 PROCEDURE — G0378 HOSPITAL OBSERVATION PER HR: HCPCS

## 2019-06-26 PROCEDURE — 99291 PR CRITICAL CARE, E/M 30-74 MINUTES: ICD-10-PCS | Mod: ,,, | Performed by: EMERGENCY MEDICINE

## 2019-06-26 PROCEDURE — 27000190 HC CPAP FULL FACE MASK W/VALVE

## 2019-06-26 PROCEDURE — 84484 ASSAY OF TROPONIN QUANT: CPT

## 2019-06-26 PROCEDURE — 99291 CRITICAL CARE FIRST HOUR: CPT | Mod: ,,, | Performed by: EMERGENCY MEDICINE

## 2019-06-26 PROCEDURE — 25000003 PHARM REV CODE 250: Performed by: STUDENT IN AN ORGANIZED HEALTH CARE EDUCATION/TRAINING PROGRAM

## 2019-06-26 PROCEDURE — 63600175 PHARM REV CODE 636 W HCPCS: Performed by: PHYSICIAN ASSISTANT

## 2019-06-26 PROCEDURE — 99214 PR OFFICE/OUTPT VISIT, EST, LEVL IV, 30-39 MIN: ICD-10-PCS | Mod: ,,, | Performed by: THORACIC SURGERY (CARDIOTHORACIC VASCULAR SURGERY)

## 2019-06-26 PROCEDURE — 80053 COMPREHEN METABOLIC PANEL: CPT

## 2019-06-26 PROCEDURE — 94640 AIRWAY INHALATION TREATMENT: CPT

## 2019-06-26 PROCEDURE — 99285 EMERGENCY DEPT VISIT HI MDM: CPT | Mod: 25

## 2019-06-26 PROCEDURE — 94660 CPAP INITIATION&MGMT: CPT

## 2019-06-26 PROCEDURE — 96374 THER/PROPH/DIAG INJ IV PUSH: CPT

## 2019-06-26 PROCEDURE — 83880 ASSAY OF NATRIURETIC PEPTIDE: CPT

## 2019-06-26 PROCEDURE — 99900035 HC TECH TIME PER 15 MIN (STAT)

## 2019-06-26 PROCEDURE — 85025 COMPLETE CBC W/AUTO DIFF WBC: CPT

## 2019-06-26 PROCEDURE — 25000242 PHARM REV CODE 250 ALT 637 W/ HCPCS: Performed by: PHYSICIAN ASSISTANT

## 2019-06-26 RX ORDER — NAPROXEN SODIUM 220 MG/1
81 TABLET, FILM COATED ORAL DAILY
Status: DISCONTINUED | OUTPATIENT
Start: 2019-06-26 | End: 2019-06-26 | Stop reason: HOSPADM

## 2019-06-26 RX ORDER — ONDANSETRON 8 MG/1
8 TABLET, ORALLY DISINTEGRATING ORAL EVERY 8 HOURS PRN
Status: DISCONTINUED | OUTPATIENT
Start: 2019-06-26 | End: 2019-06-26 | Stop reason: HOSPADM

## 2019-06-26 RX ORDER — METHYLPREDNISOLONE SOD SUCC 125 MG
125 VIAL (EA) INJECTION
Status: COMPLETED | OUTPATIENT
Start: 2019-06-26 | End: 2019-06-26

## 2019-06-26 RX ORDER — ROPINIROLE 0.25 MG/1
0.25 TABLET, FILM COATED ORAL NIGHTLY
Status: DISCONTINUED | OUTPATIENT
Start: 2019-06-26 | End: 2019-06-26 | Stop reason: HOSPADM

## 2019-06-26 RX ORDER — IPRATROPIUM BROMIDE AND ALBUTEROL SULFATE 2.5; .5 MG/3ML; MG/3ML
3 SOLUTION RESPIRATORY (INHALATION)
Status: COMPLETED | OUTPATIENT
Start: 2019-06-26 | End: 2019-06-26

## 2019-06-26 RX ORDER — ALBUTEROL SULFATE 90 UG/1
1 AEROSOL, METERED RESPIRATORY (INHALATION) EVERY 6 HOURS PRN
Status: DISCONTINUED | OUTPATIENT
Start: 2019-06-26 | End: 2019-06-26 | Stop reason: HOSPADM

## 2019-06-26 RX ORDER — TIOTROPIUM BROMIDE 18 UG/1
18 CAPSULE ORAL; RESPIRATORY (INHALATION) DAILY
Status: DISCONTINUED | OUTPATIENT
Start: 2019-06-26 | End: 2019-06-26 | Stop reason: HOSPADM

## 2019-06-26 RX ORDER — SODIUM CHLORIDE 0.9 % (FLUSH) 0.9 %
10 SYRINGE (ML) INJECTION
Status: DISCONTINUED | OUTPATIENT
Start: 2019-06-26 | End: 2019-06-26 | Stop reason: HOSPADM

## 2019-06-26 RX ORDER — ACETAMINOPHEN 325 MG/1
650 TABLET ORAL EVERY 4 HOURS PRN
Status: DISCONTINUED | OUTPATIENT
Start: 2019-06-26 | End: 2019-06-26 | Stop reason: HOSPADM

## 2019-06-26 RX ORDER — FUROSEMIDE 40 MG/1
40 TABLET ORAL DAILY
Status: DISCONTINUED | OUTPATIENT
Start: 2019-06-26 | End: 2019-06-26 | Stop reason: HOSPADM

## 2019-06-26 RX ORDER — AMLODIPINE BESYLATE 2.5 MG/1
2.5 TABLET ORAL NIGHTLY PRN
Status: DISCONTINUED | OUTPATIENT
Start: 2019-06-26 | End: 2019-06-26 | Stop reason: HOSPADM

## 2019-06-26 RX ORDER — ACETAMINOPHEN 325 MG/1
650 TABLET ORAL EVERY 8 HOURS PRN
Status: DISCONTINUED | OUTPATIENT
Start: 2019-06-26 | End: 2019-06-26 | Stop reason: HOSPADM

## 2019-06-26 RX ADMIN — FUROSEMIDE 40 MG: 40 TABLET ORAL at 10:06

## 2019-06-26 RX ADMIN — IPRATROPIUM BROMIDE AND ALBUTEROL SULFATE 3 ML: .5; 3 SOLUTION RESPIRATORY (INHALATION) at 03:06

## 2019-06-26 RX ADMIN — METHYLPREDNISOLONE SODIUM SUCCINATE 125 MG: 125 INJECTION, POWDER, FOR SOLUTION INTRAMUSCULAR; INTRAVENOUS at 03:06

## 2019-06-26 RX ADMIN — ASPIRIN 81 MG CHEWABLE TABLET 81 MG: 81 TABLET CHEWABLE at 10:06

## 2019-06-26 RX ADMIN — TIOTROPIUM BROMIDE 18 MCG: 18 CAPSULE ORAL; RESPIRATORY (INHALATION) at 11:06

## 2019-06-26 NOTE — HPI
89 year old female with history of previous significant tobacco use (quit 30 yr ago) HLD, CAD, HTN, and COPD on 3L of oxygen at home presents to the ED after acute onset of SOB at home. Family states she was having difficulty taking her nightly meds which caused her to have some gagging/coughing. Following this she had SOB and she asked her family to bring her to the hospital. Recently admitted for a right spontaneous pneumothorax with her hospital course complicated by subcutaneous emphysema after pigtail removal requiring right anterior blow hole placed. Her lung remained well expanded and did not require tube replacement. Pt also had a spontaneous left sided pneumothorax over 30 years ago. Of note, she had a hip fracture 2 months ago and currently working with home PT but is able to ambulate with minimal assistance. She lives at home alone but family/sitters are with her 24/7 following her recent injury.      On admission to ED, CXR revealed a small basilar right sided pneumothorax that was not present on her hospital f/u visit 2 weeks ago. She was initially placed on BiPAP by EMS and transferred to a non-rebreather in ED.      On exam, she is resting comfortably denies SOB and has been weaned back to a nasal canula (5 LPM).

## 2019-06-26 NOTE — DISCHARGE SUMMARY
[facility]  Thoracic Surgery  Discharge Summary    Patient Name: Venus Mayer  MRN: [id number]  Admission Date: 6/26/2019  Hospital Length of Stay: 0 days  Discharge Date and Time:  06/26/2019 4:59 PM  Attending Physician: Jean Marie Hernandez MD   Discharging Provider: BHUPINDER Caal  Primary Care Provider: Jona Che MD    HPI:   89 year old female with history of previous significant tobacco use (quit 30 yr ago) HLD, CAD, HTN, and COPD on 3L of oxygen at home presents to the ED after acute onset of SOB at home. Family states she was having difficulty taking her nightly meds which caused her to have some gagging/coughing. Following this she had SOB and she asked her family to bring her to the hospital. Recently admitted for a right spontaneous pneumothorax with her hospital course complicated by subcutaneous emphysema after pigtail removal requiring right anterior blow hole placed. Her lung remained well expanded and did not require tube replacement. Pt also had a spontaneous left sided pneumothorax over 30 years ago. Of note, she had a hip fracture 2 months ago and currently working with home PT but is able to ambulate with minimal assistance. She lives at home alone but family/sitters are with her 24/7 following her recent injury.      On admission to ED, CXR revealed a small basilar right sided pneumothorax that was not present on her hospital f/u visit 2 weeks ago. She was initially placed on BiPAP by EMS and transferred to a non-rebreather in ED.      On exam, she is resting comfortably denies SOB and has been weaned back to a nasal canula (5 LPM).    * No surgery found *      Hospital Course: 6/25/19- Presented to ED for acute SOB and hypoxia at home. CXR in ED showed small basilar pneumothorax. In ED she was comfortable on 2LNC with saturations in high 90s.   6/26/19- Serial CXRs, including a PA/Lateral, throughout the day showed stable subpulmonic space. Patient remained  clinically stable and in no respiratory distress. Discharged home with family. She will call our office with any return of symptoms.     Consults (From admission, onward)        Status Ordering Provider     Inpatient consult to Cardiothoracic Surgery  Once     Provider:  (Not yet assigned)    Completed EDUARD JACOBO          Significant Diagnostic Studies: Labs:   BMP:   Recent Labs   Lab 06/26/19 0316   *      K 4.4   CL 88*   CO2 37*   BUN 21   CREATININE 0.9   CALCIUM 9.8   , CMP   Recent Labs   Lab 06/26/19 0316      K 4.4   CL 88*   CO2 37*   *   BUN 21   CREATININE 0.9   CALCIUM 9.8   PROT 6.8   ALBUMIN 3.5   BILITOT 0.4   ALKPHOS 103   AST 47*   ALT 18   ANIONGAP 11   ESTGFRAFRICA >60.0   EGFRNONAA 56.5*    and CBC   Recent Labs   Lab 06/26/19 0316   WBC 9.84   HGB 12.8   HCT 40.8        Radiology: X-Ray: CXR: X-Ray Chest 1 View (CXR):   Results for orders placed or performed during the hospital encounter of 06/26/19   X-Ray Chest 1 View    Narrative    EXAMINATION:  XR CHEST 1 VIEW    CLINICAL HISTORY:  assess right sided pnuemothorax;    TECHNIQUE:  Single frontal view of the chest was performed.    COMPARISON:  06/26/2019 02:48 hours    FINDINGS:  There is relatively stable appearance of the small to moderate-sized right-sided pneumothorax.  Lungs appear relatively unchanged in appearance compared to prior examination without new confluent airspace consolidation.  No definite left-sided pneumothorax appreciated.  Cardiomediastinal silhouette is unchanged.      Impression    Relatively stable appearance of small to moderate-sized right-sided pneumothorax..      Electronically signed by: Jean Marie Benitez MD  Date:    06/26/2019  Time:    07:10    and X-Ray Chest PA and Lateral (CXR):   Results for orders placed or performed during the hospital encounter of 06/26/19   X-Ray Chest PA And Lateral    Narrative    EXAMINATION:  XR CHEST PA AND LATERAL    CLINICAL  HISTORY:  pneumothorax;    TECHNIQUE:  PA and lateral views of the chest were performed.    COMPARISON:  06/26/2019, 0638 and 02:47 hours.  06/14/2019..    FINDINGS:  Pacer in the left anterior chest wall obscures some detail the left mid lung zone.  Transvenous leads extend to the heart.    Small right basal pneumothorax is present, no larger than on the most recent study of 06:38 hours today.  There is mild patchy opacification at the base of the right lung also similar to studies obtained earlier today, consistent with atelectasis; differential diagnosis includes aspiration and pneumonia.    No new disease identified.      Impression    Please see above.      Electronically signed by: Asiya Rosales MD  Date:    06/26/2019  Time:    14:10       Pending Diagnostic Studies:     None        Final Active Diagnoses:    Diagnosis Date Noted POA    PRINCIPAL PROBLEM:  Recurrent spontaneous pneumothorax [J93.83] 06/26/2019 Yes    SOB (shortness of breath) [R06.02]  Yes      Problems Resolved During this Admission:      Discharged Condition: good    Disposition: Home or Self Care    Follow Up:  Follow-up Information     Call Klever Mckeon MD.    Specialty:  Cardiothoracic Surgery  Why:  As needed., If symptoms worsen  [facility] information:  [facility] [address]  [phone]                 Patient Instructions:      Diet Adult Regular     Notify your health care provider if you experience any of the following:  temperature >100.4     Notify your health care provider if you experience any of the following:  persistent nausea and vomiting or diarrhea     Notify your health care provider if you experience any of the following:  redness, tenderness, or signs of infection (pain, swelling, redness, odor or green/yellow discharge around incision site)     Notify your health care provider if you experience any of the following:  severe uncontrolled pain     Notify your health care provider if you  experience any of the following:  difficulty breathing or increased cough     Notify your health care provider if you experience any of the following:  severe persistent headache     Notify your health care provider if you experience any of the following:  worsening rash     Notify your health care provider if you experience any of the following:  persistent dizziness, light-headedness, or visual disturbances     Notify your health care provider if you experience any of the following:  increased confusion or weakness     No dressing needed     Activity as tolerated     Medications:  Reconciled Home Medications:      Medication List      CONTINUE taking these medications    acetaminophen 500 MG tablet  Commonly known as:  TYLENOL  Take 1,000 mg by mouth daily as needed for Pain.     amLODIPine 2.5 MG tablet  Commonly known as:  NORVASC  Take 2.5 mg by mouth nightly as needed for SBP greater than. SBP greater than 155     aspirin 81 mg Tab  Take by mouth. 1 Tablet Oral Every day     ferrous sulfate 325 (65 FE) MG EC tablet  Take 1 tablet (325 mg total) by mouth once daily.     furosemide 40 MG tablet  Commonly known as:  LASIX  Take 1 tablet (40 mg total) by mouth once daily.     multivitamin per tablet  Commonly known as:  THERAGRAN  Take 1 tablet by mouth once daily.     PROAIR HFA 90 mcg/actuation inhaler  Generic drug:  albuterol  Inhale 1 puff into the lungs every 6 (six) hours as needed for Wheezing. Rescue     rOPINIRole 0.25 MG tablet  Commonly known as:  REQUIP  Take 1 tablet (0.25 mg total) by mouth every evening.     sodium chloride 0.9 % Soln  Uses as needed by nebulization route     tiotropium 18 mcg inhalation capsule  Commonly known as:  SPIRIVA  Inhale 18 mcg into the lungs once daily.     triamcinolone 55 mcg nasal inhaler  Commonly known as:  NASACORT  Mixes 1/2 ml with phenylephrine            BHUPINDER Caal  Thoracic Surgery  [facility]

## 2019-06-26 NOTE — SUBJECTIVE & OBJECTIVE
No current facility-administered medications on file prior to encounter.      Current Outpatient Medications on File Prior to Encounter   Medication Sig    acetaminophen (TYLENOL) 500 MG tablet Take 1,000 mg by mouth daily as needed for Pain.    albuterol (PROAIR HFA) 90 mcg/actuation inhaler Inhale 1 puff into the lungs every 6 (six) hours as needed for Wheezing. Rescue    amLODIPine (NORVASC) 2.5 MG tablet Take 2.5 mg by mouth nightly as needed for SBP greater than. SBP greater than 155    aspirin 81 mg Tab Take by mouth. 1 Tablet Oral Every day    ferrous sulfate 325 (65 FE) MG EC tablet Take 1 tablet (325 mg total) by mouth once daily.    furosemide (LASIX) 40 MG tablet Take 1 tablet (40 mg total) by mouth once daily.    multivitamin (THERAGRAN) per tablet Take 1 tablet by mouth once daily.    rOPINIRole (REQUIP) 0.25 MG tablet Take 1 tablet (0.25 mg total) by mouth every evening.    sodium chloride 0.9 % Soln Uses as needed by nebulization route    tiotropium (SPIRIVA) 18 mcg inhalation capsule Inhale 18 mcg into the lungs once daily.      triamcinolone (NASACORT) 55 mcg nasal inhaler Mixes 1/2 ml with phenylephrine       Review of patient's allergies indicates:   Allergen Reactions    Sulfamethoxazole-trimethoprim      Other reaction(s): Rash    Cefazolin Rash    Cefuroxime Rash       Past Medical History:   Diagnosis Date    Cardiomyopathy     Carotid artery occlusion     Hyperlipidemia     Hypertension      Past Surgical History:   Procedure Laterality Date    CARDIAC CATHETERIZATION      CAROTID ENDARTERECTOMY       Family History     Problem Relation (Age of Onset)    Hypertension Mother        Tobacco Use    Smoking status: Former Smoker     Packs/day: 2.00     Years: 20.00     Pack years: 40.00     Types: Cigarettes     Last attempt to quit: 1980     Years since quittin.4   Substance and Sexual Activity    Alcohol use: Yes     Alcohol/week: 1.2 oz     Types: 2 Glasses of  wine per week     Comment: social    Drug use: No    Sexual activity: Not on file     Review of Systems   Constitutional: Negative for activity change, chills, fatigue and fever.   HENT: Negative for congestion and sore throat.    Eyes: Negative for pain and redness.   Respiratory: Positive for shortness of breath. Negative for cough and chest tightness.    Cardiovascular: Negative for chest pain, palpitations and leg swelling.   Gastrointestinal: Negative for abdominal distention, abdominal pain, constipation, diarrhea, nausea and vomiting.   Genitourinary: Negative for flank pain and hematuria.   Musculoskeletal: Negative for back pain and gait problem.   Skin: Negative for rash and wound.   Neurological: Negative for light-headedness, numbness and headaches.   Hematological: Does not bruise/bleed easily.   Psychiatric/Behavioral: Negative for confusion. The patient is not nervous/anxious.      Objective:     Vital Signs (Most Recent):  Temp: 98.2 °F (36.8 °C) (06/26/19 0228)  Pulse: 96 (06/26/19 0417)  Resp: (!) 34 (06/26/19 0417)  BP: (!) 151/70 (06/26/19 0417)  SpO2: 98 % (06/26/19 0417) Vital Signs (24h Range):  Temp:  [98.2 °F (36.8 °C)] 98.2 °F (36.8 °C)  Pulse:  [] 96  Resp:  [20-38] 34  SpO2:  [98 %-100 %] 98 %  BP: (151-186)/(70-94) 151/70        There is no height or weight on file to calculate BMI.    Physical Exam   Constitutional: She is oriented to person, place, and time. She appears well-developed and well-nourished.   HENT:   Head: Normocephalic and atraumatic.   Eyes: Conjunctivae and EOM are normal.   Neck: Normal range of motion. Neck supple. No tracheal deviation present.   Cardiovascular: Normal rate and regular rhythm.   Pulmonary/Chest: Effort normal. No respiratory distress.   Decreased breath sounds over RLL fields  RR 18-24 on 5L NC  Healed blow hole over right anterior chest   Abdominal: Soft. Bowel sounds are normal. She exhibits no distension. There is no tenderness.    Musculoskeletal: Normal range of motion. She exhibits no edema.   Neurological: She is alert and oriented to person, place, and time.   Skin: Skin is warm and dry. She is not diaphoretic.   Psychiatric: She has a normal mood and affect.   Vitals reviewed.      Significant Labs:  Reviewed    Significant Diagnostics:  CXR reviewed

## 2019-06-26 NOTE — ED TRIAGE NOTES
Venus Mayer, a 89 y.o. female presents to the ED w/ complaint of     Triage note:  Chief Complaint   Patient presents with    Shortness of Breath     Pt brought in by  EMS for SOB. Patient was 67% o2 sat on EMS arrival. Pt placed on bipap, hx COPD     Review of patient's allergies indicates:   Allergen Reactions    Sulfamethoxazole-trimethoprim      Other reaction(s): Rash    Cefazolin Rash    Cefuroxime Rash     Past Medical History:   Diagnosis Date    Cardiomyopathy     Carotid artery occlusion     Hyperlipidemia     Hypertension                LOC: Patient name and date of birth verified.  The patient is awake, alert and aware of environment with an appropriate affect, the patient is oriented x 3 and speaking appropriately.  Pt in NAD.      APPEARANCE: pt noted to be in respiratory distress.    SKIN: The skin is warm and dry, color consistent with ethnicity, patient has normal skin turgor and moist mucus membranes, skin intact, no breakdown, rash.    MUSCULOSKELETAL: Patient moving all extremities well, no obvious swelling or deformities noted. Denies weakness and fatigue. Denies pain.     RESPIRATORY: pt c/o SOB. Pt appears in distress having to use accessory muscles. Tachypneic. Diminished breath sounds heard in right lower lobe    CARDIAC: pt tachycardic per monitor. No c/o of chest pain.     ABDOMEN: Soft and non tender to palpation, no distention noted. Bowel sounds present in all four quadrants. Pt denies N/V and diarrhea.    NEUROLOGIC: Eyes open spontaneously, behavior appropriate to situation, follows commands, facial expression symmetrical, bilateral hand grasp equal and even, purposeful motor response noted, normal sensation in all extremities when touched with a finger. Denies headache and dizziness.

## 2019-06-26 NOTE — HOSPITAL COURSE
6/25/19- Presented to ED for acute SOB and hypoxia at home. CXR in ED showed small basilar pneumothorax. In ED she was comfortable on 2LNC with saturations in high 90s.   6/26/19- Serial CXRs, including a PA/Lateral, throughout the day showed stable subpulmonic space. Patient remained clinically stable and in no respiratory distress. Discharged home with family. She will call our office with any return of symptoms.

## 2019-06-26 NOTE — ED PROVIDER NOTES
Encounter Date: 2019       History     Chief Complaint   Patient presents with    Shortness of Breath     Pt brought in by  EMS for SOB. Patient was 67% o2 sat on EMS arrival. Pt placed on bipap, hx COPD     89-year-old female to the ER via EMS for evaluation of acute onset shortness of breath.  Per chart review patient had a spontaneous right-sided pneumothorax last month treated by CT surgery with a chest tube.  Chest tube was removed, complicated by subcutaneous emphysema.  Ultimately the patient had follow-up with CT surgery and was discharged from their service in the outpatient setting.  The patient chronically uses 2 L oxygen nasal cannula.  Tonight she developed acute onset of shortness of breath similar to prior ER presentation earlier this month.  At that time patient had a negative workup in a negative CTA.   Upon EMS arrival patient's saturation 60% on 2 L, tachypneic and not moving much air.   Patient started on CPAP and given a DuoNeb treatment EN route.   Upon arrival here, she appears unwell, tachypneic and tachycardic, working hard to breathe, on CPAP.        Review of patient's allergies indicates:   Allergen Reactions    Sulfamethoxazole-trimethoprim      Other reaction(s): Rash    Cefazolin Rash    Cefuroxime Rash     Past Medical History:   Diagnosis Date    Cardiomyopathy     Carotid artery occlusion     Hyperlipidemia     Hypertension      Past Surgical History:   Procedure Laterality Date    CARDIAC CATHETERIZATION      CAROTID ENDARTERECTOMY       Family History   Problem Relation Age of Onset    Hypertension Mother      Social History     Tobacco Use    Smoking status: Former Smoker     Packs/day: 2.00     Years: 20.00     Pack years: 40.00     Types: Cigarettes     Last attempt to quit: 1980     Years since quittin.4   Substance Use Topics    Alcohol use: Yes     Alcohol/week: 1.2 oz     Types: 2 Glasses of wine per week     Comment: social    Drug use: No      Review of Systems   Constitutional: Negative for fever.   HENT: Negative for sore throat.    Respiratory: Positive for shortness of breath.    Cardiovascular: Negative for chest pain.   Gastrointestinal: Negative for nausea.   Genitourinary: Negative for dysuria.   Musculoskeletal: Negative for back pain.   Skin: Negative for rash.   Neurological: Negative for weakness.   Hematological: Does not bruise/bleed easily.       Physical Exam     Initial Vitals [06/26/19 0228]   BP Pulse Resp Temp SpO2   (!) 186/94 96 (!) 38 98.2 °F (36.8 °C) 100 %      MAP       --         Physical Exam    Constitutional: Vital signs are normal. She appears well-developed and well-nourished. She is not diaphoretic. She appears distressed.   HENT:   Head: Normocephalic and atraumatic.   Right Ear: External ear normal.   Left Ear: External ear normal.   Eyes: Conjunctivae are normal.   Cardiovascular: Normal rate and regular rhythm. Exam reveals no gallop.    Pulmonary/Chest: She is in respiratory distress. She has no wheezes. She has no rhonchi. She has no rales. She exhibits no tenderness.   Minimal air movement on the right  No air movement on the left  Tachypneic  Tripod position working hard to breathe    Abdominal: Soft. Normal appearance and bowel sounds are normal.   Musculoskeletal: Normal range of motion.   Neurological: She is alert and oriented to person, place, and time.   Skin: Skin is warm and intact.   Psychiatric: She has a normal mood and affect. Her speech is normal and behavior is normal. Cognition and memory are normal.         ED Course   Procedures  Labs Reviewed   CBC W/ AUTO DIFFERENTIAL   COMPREHENSIVE METABOLIC PANEL   B-TYPE NATRIURETIC PEPTIDE   TROPONIN I          Imaging Results          X-Ray Chest AP Portable (In process)               X-Rays:   Independently Interpreted Readings:   Other Readings:  Small right-sided pneumothorax at the base    Medical Decision Making:   History:   Old Medical Records: I  decided to obtain old medical records.  Initial Assessment:   89-year-old female to the ER with acute onset of shortness of breath  Differential Diagnosis:   Pneumothorax, pneumonia, PE, ACS  Clinical Tests:   Lab Tests: Ordered and Reviewed  Radiological Study: Ordered and Reviewed  Medical Tests: Ordered and Reviewed  ED Management:  Chest x-ray reviewed, reveals small right-sided pneumothorax  Patient taken of BiPAP, will attempt trial non-rebreather  Case discussed with Radiology  Case discussed with General surgery who was covering for CT surgery  Patient will be admitted  Other:   I discussed test(s) with the performing physician.  I have discussed this case with another health care provider.                      Clinical Impression:       ICD-10-CM ICD-9-CM   1. Pneumothorax, unspecified type J93.9 512.89   2. SOB (shortness of breath) R06.02 786.05         Disposition:   Disposition: Admitted  Condition: Stable                        Ferdinand Valenzuela PA-C  06/26/19 0415

## 2019-06-26 NOTE — ASSESSMENT & PLAN NOTE
89 y/o F with recurrent right sided spontaneous pneumothorax    Admit to Cardiothoracic Surgery  Wean supplemental oxygen to home 3 L as tolerated for sats >88%  Repeat CXR in 4-6 hours to assess pneumo, if expanding or pt experiences increased WOB/SOB will need CT placed.   Continue home inhaler for COPD  Continue home BP meds for essential HTN

## 2019-06-26 NOTE — PLAN OF CARE
06/26/19 1324   Discharge Assessment   Assessment Type Discharge Planning Assessment   Confirmed/corrected address and phone number on facesheet? Yes   Assessment information obtained from? Patient;Medical Record   Expected Length of Stay (days) 2   Communicated expected length of stay with patient/caregiver yes   Prior to hospitilization cognitive status: Alert/Oriented   Prior to hospitalization functional status: Assistive Equipment;Needs Assistance;Partially Dependent   Current cognitive status: Alert/Oriented   Current Functional Status: Needs Assistance;Partially Dependent;Assistive Equipment   Facility Arrived From: home via ED   Lives With alone  (She has around the clock family care for her in her home)   Able to Return to Prior Arrangements yes   Is patient able to care for self after discharge? No   Who are your caregiver(s) and their phone number(s)? Richmond Mayer son [phone], Jennifer daughter [phone], Bri daughter [phone]   Patient's perception of discharge disposition home health   Readmission Within the Last 30 Days no previous admission in last 30 days   Patient currently being followed by outpatient case management? No   Patient currently receives any other outside agency services? No   Equipment Currently Used at Home bedside commode;walker, rolling;shower chair   Do you have any problems affording any of your prescribed medications? No   Is the patient taking medications as prescribed? yes   Does the patient have transportation home? Yes   Transportation Anticipated family or friend will provide   Does the patient receive services at the [facility]? No   Discharge Plan A Home Health;Home with family   Discharge Plan B Skilled Nursing Facility   DME Needed Upon Discharge  none   Patient/Family in Agreement with Plan yes     Met with patient in room 529A to discuss plan of care, no family at bedside at this time. The patient is current with [facility] prior to admission and will need  resumption in HH orders prior to discharge. She is has DME in the home including home O2 @ 3 LPM/NC she stated [facility] is the provider of home O2. She lives alone in [place], [place] but has 24/7 caregiver support. Her family members take turns staying with the patient around the clock. She uses a rolling walker for ambulation. She has a shower chair and bedside commode. She has transportation to her appt and home. Possible discharge to home tomorrow. Will contact family members to coordinate transport home. No other discharge needs assessed at this time. Blue health packet given to patient and  contact information placed on white board. Will continue to folloe and help with discharge planning throughout admission.       [facility] - DYLON LAGUNAS - [facility] [address]  Phone: [phone] Fax: [phone]    [employer] [address] [place] [address] DYLON LAGUNAS  [address] [facility] [address]  Phone: [phone] Fax: [phone]    PCP  Jona Che MD   [phone] phone  [phone] fax    Payor: [employer] MANAGED MEDICARE / Plan: [employer] MEDICARE HMO / Product Type: Capitation /

## 2019-06-26 NOTE — H&P
[facility]  General Surgery  History & Physical    Patient Name: Venus Mayer  MRN: [id number]  Admission Date: 6/26/2019  Attending Physician: Jean Marie Hernandez MD  Primary Care Provider: Jona Che MD    Patient information was obtained from patient and ER records.     Subjective:     Chief Complaint/Reason for Admission: Spontaneous pneumothorax    History of Present Illness: 89 year old female with history of previous significant tobacco use (quit 30 yr ago) HLD, CAD, HTN, and COPD on 3L of oxygen at home presents to the ED after acute onset of SOB at home. Family states she was having difficulty taking her nightly meds which caused her to have some gagging/coughing. Following this she had SOB and she asked her family to bring her to the hospital. Recently admitted for a right spontaneous pneumothorax with her hospital course complicated by subcutaneous emphysema after pigtail removal requiring right anterior blow hole placed. Her lung remained well expanded and did not require tube replacement. Pt also had a spontaneous left sided pneumothorax over 30 years ago. Of note, she had a hip fracture 2 months ago and currently working with home PT but is able to ambulate with minimal assistance. She lives at home alone but family/sitters are with her 24/7 following her recent injury.     On admission to ED, CXR revealed a small basilar right sided pneumothorax that was not present on her hospital f/u visit 2 weeks ago. She was initially placed on BiPAP by EMS and transferred to a non-rebreather in ED.     On exam, she is resting comfortably denies SOB and has been weaned back to a nasal canula (5 LPM).    No current facility-administered medications on file prior to encounter.      Current Outpatient Medications on File Prior to Encounter   Medication Sig    acetaminophen (TYLENOL) 500 MG tablet Take 1,000 mg by mouth daily as needed for Pain.    albuterol (PROAIR HFA) 90 mcg/actuation  inhaler Inhale 1 puff into the lungs every 6 (six) hours as needed for Wheezing. Rescue    amLODIPine (NORVASC) 2.5 MG tablet Take 2.5 mg by mouth nightly as needed for SBP greater than. SBP greater than 155    aspirin 81 mg Tab Take by mouth. 1 Tablet Oral Every day    ferrous sulfate 325 (65 FE) MG EC tablet Take 1 tablet (325 mg total) by mouth once daily.    furosemide (LASIX) 40 MG tablet Take 1 tablet (40 mg total) by mouth once daily.    multivitamin (THERAGRAN) per tablet Take 1 tablet by mouth once daily.    rOPINIRole (REQUIP) 0.25 MG tablet Take 1 tablet (0.25 mg total) by mouth every evening.    sodium chloride 0.9 % Soln Uses as needed by nebulization route    tiotropium (SPIRIVA) 18 mcg inhalation capsule Inhale 18 mcg into the lungs once daily.      triamcinolone (NASACORT) 55 mcg nasal inhaler Mixes 1/2 ml with phenylephrine       Review of patient's allergies indicates:   Allergen Reactions    Sulfamethoxazole-trimethoprim      Other reaction(s): Rash    Cefazolin Rash    Cefuroxime Rash       Past Medical History:   Diagnosis Date    Cardiomyopathy     Carotid artery occlusion     Hyperlipidemia     Hypertension      Past Surgical History:   Procedure Laterality Date    CARDIAC CATHETERIZATION      CAROTID ENDARTERECTOMY       Family History     Problem Relation (Age of Onset)    Hypertension Mother        Tobacco Use    Smoking status: Former Smoker     Packs/day: 2.00     Years: 20.00     Pack years: 40.00     Types: Cigarettes     Last attempt to quit: 1980     Years since quittin.4   Substance and Sexual Activity    Alcohol use: Yes     Alcohol/week: 1.2 oz     Types: 2 Glasses of wine per week     Comment: social    Drug use: No    Sexual activity: Not on file     Review of Systems   Constitutional: Negative for activity change, chills, fatigue and fever.   HENT: Negative for congestion and sore throat.    Eyes: Negative for pain and redness.   Respiratory:  Positive for shortness of breath. Negative for cough and chest tightness.    Cardiovascular: Negative for chest pain, palpitations and leg swelling.   Gastrointestinal: Negative for abdominal distention, abdominal pain, constipation, diarrhea, nausea and vomiting.   Genitourinary: Negative for flank pain and hematuria.   Musculoskeletal: Negative for back pain and gait problem.   Skin: Negative for rash and wound.   Neurological: Negative for light-headedness, numbness and headaches.   Hematological: Does not bruise/bleed easily.   Psychiatric/Behavioral: Negative for confusion. The patient is not nervous/anxious.      Objective:     Vital Signs (Most Recent):  Temp: 98.2 °F (36.8 °C) (06/26/19 0228)  Pulse: 96 (06/26/19 0417)  Resp: (!) 34 (06/26/19 0417)  BP: (!) 151/70 (06/26/19 0417)  SpO2: 98 % (06/26/19 0417) Vital Signs (24h Range):  Temp:  [98.2 °F (36.8 °C)] 98.2 °F (36.8 °C)  Pulse:  [] 96  Resp:  [20-38] 34  SpO2:  [98 %-100 %] 98 %  BP: (151-186)/(70-94) 151/70        There is no height or weight on file to calculate BMI.    Physical Exam   Constitutional: She is oriented to person, place, and time. She appears well-developed and well-nourished.   HENT:   Head: Normocephalic and atraumatic.   Eyes: Conjunctivae and EOM are normal.   Neck: Normal range of motion. Neck supple. No tracheal deviation present.   Cardiovascular: Normal rate and regular rhythm.   Pulmonary/Chest: Effort normal. No respiratory distress.   Decreased breath sounds over RLL fields  RR 18-24 on 5L NC  Healed blow hole over right anterior chest   Abdominal: Soft. Bowel sounds are normal. She exhibits no distension. There is no tenderness.   Musculoskeletal: Normal range of motion. She exhibits no edema.   Neurological: She is alert and oriented to person, place, and time.   Skin: Skin is warm and dry. She is not diaphoretic.   Psychiatric: She has a normal mood and affect.   Vitals reviewed.      Significant  Labs:  Reviewed    Significant Diagnostics:  CXR reviewed    Assessment/Plan:     Recurrent spontaneous pneumothorax  89 y/o F with recurrent right sided spontaneous pneumothorax    Admit to Cardiothoracic Surgery  Wean supplemental oxygen to home 3 L as tolerated for sats >88%  Repeat CXR in 4-6 hours to assess pneumo, if expanding or pt experiences increased WOB/SOB will need CT placed.   Continue home inhaler for COPD  Continue home BP meds for essential HTN        VTE Risk Mitigation (From admission, onward)        Ordered     Place sequential compression device  Until discontinued      06/26/19 0419     IP VTE HIGH RISK PATIENT  Once      06/26/19 0419          Juan Luis Mcgee MD  General Surgery  [facility]

## 2019-06-26 NOTE — NURSING
Patient discharging home once ride arrives.  Medication education, aftercare instructions and follow up appointment.  Patient verbalized understanding.

## 2019-07-06 ENCOUNTER — HOSPITAL ENCOUNTER (INPATIENT)
Facility: HOSPITAL | Age: 84
LOS: 5 days | Discharge: HOME | DRG: 291 | End: 2019-07-11
Attending: EMERGENCY MEDICINE | Admitting: HOSPITALIST
Payer: MEDICARE

## 2019-07-06 DIAGNOSIS — R06.02 SOB (SHORTNESS OF BREATH): ICD-10-CM

## 2019-07-06 DIAGNOSIS — I50.9 HEART FAILURE: ICD-10-CM

## 2019-07-06 DIAGNOSIS — J44.9 CHRONIC OBSTRUCTIVE PULMONARY DISEASE, UNSPECIFIED COPD TYPE: ICD-10-CM

## 2019-07-06 DIAGNOSIS — I50.33 CHF (CONGESTIVE HEART FAILURE), NYHA CLASS I, ACUTE ON CHRONIC, DIASTOLIC: ICD-10-CM

## 2019-07-06 DIAGNOSIS — I50.9 ACUTE ON CHRONIC CONGESTIVE HEART FAILURE, UNSPECIFIED HEART FAILURE TYPE: Primary | ICD-10-CM

## 2019-07-06 DIAGNOSIS — I50.9 CHF (CONGESTIVE HEART FAILURE): ICD-10-CM

## 2019-07-06 DIAGNOSIS — I50.43 ACUTE ON CHRONIC COMBINED SYSTOLIC AND DIASTOLIC CONGESTIVE HEART FAILURE: ICD-10-CM

## 2019-07-06 DIAGNOSIS — J44.1 CHRONIC OBSTRUCTIVE PULMONARY DISEASE WITH ACUTE EXACERBATION: ICD-10-CM

## 2019-07-06 DIAGNOSIS — I50.33 ACUTE ON CHRONIC DIASTOLIC HEART FAILURE: ICD-10-CM

## 2019-07-06 DIAGNOSIS — J90 PLEURAL EFFUSION: ICD-10-CM

## 2019-07-06 DIAGNOSIS — I27.23 PULMONARY HYPERTENSION DUE TO COPD: Chronic | ICD-10-CM

## 2019-07-06 DIAGNOSIS — J44.9 PULMONARY HYPERTENSION DUE TO COPD: Chronic | ICD-10-CM

## 2019-07-06 PROBLEM — Z87.81 H/O FRACTURE OF HIP: Status: ACTIVE | Noted: 2019-07-06

## 2019-07-06 PROBLEM — J96.02 ACUTE HYPERCAPNIC RESPIRATORY FAILURE: Status: ACTIVE | Noted: 2019-07-06

## 2019-07-06 LAB
ALBUMIN SERPL BCP-MCNC: 3.7 G/DL (ref 3.5–5.2)
ALLENS TEST: ABNORMAL
ALLENS TEST: ABNORMAL
ALP SERPL-CCNC: 96 U/L (ref 55–135)
ALT SERPL W/O P-5'-P-CCNC: 25 U/L (ref 10–44)
ANION GAP SERPL CALC-SCNC: 12 MMOL/L (ref 8–16)
ANION GAP SERPL CALC-SCNC: 13 MMOL/L (ref 8–16)
AST SERPL-CCNC: 35 U/L (ref 10–40)
BASOPHILS # BLD AUTO: 0.02 K/UL (ref 0–0.2)
BASOPHILS # BLD AUTO: 0.04 K/UL (ref 0–0.2)
BASOPHILS NFR BLD: 0.2 % (ref 0–1.9)
BASOPHILS NFR BLD: 0.3 % (ref 0–1.9)
BILIRUB SERPL-MCNC: 0.6 MG/DL (ref 0.1–1)
BNP SERPL-MCNC: 2573 PG/ML (ref 0–99)
BUN SERPL-MCNC: 30 MG/DL (ref 8–23)
BUN SERPL-MCNC: 31 MG/DL (ref 8–23)
BUN SERPL-MCNC: 33 MG/DL (ref 6–30)
CALCIUM SERPL-MCNC: 10.1 MG/DL (ref 8.7–10.5)
CALCIUM SERPL-MCNC: 9.9 MG/DL (ref 8.7–10.5)
CHLORIDE SERPL-SCNC: 91 MMOL/L (ref 95–110)
CHLORIDE SERPL-SCNC: 91 MMOL/L (ref 95–110)
CHLORIDE SERPL-SCNC: 94 MMOL/L (ref 95–110)
CK MB SERPL-MCNC: 6.5 NG/ML (ref 0.1–6.5)
CK MB SERPL-RTO: 17.6 % (ref 0–5)
CK SERPL-CCNC: 37 U/L (ref 20–180)
CO2 SERPL-SCNC: 32 MMOL/L (ref 23–29)
CO2 SERPL-SCNC: 40 MMOL/L (ref 23–29)
CREAT SERPL-MCNC: 0.9 MG/DL (ref 0.5–1.4)
CREAT SERPL-MCNC: 0.9 MG/DL (ref 0.5–1.4)
CREAT SERPL-MCNC: 1.1 MG/DL (ref 0.5–1.4)
CRP SERPL-MCNC: 4.8 MG/L (ref 0–8.2)
DELSYS: ABNORMAL
DELSYS: ABNORMAL
DIFFERENTIAL METHOD: ABNORMAL
DIFFERENTIAL METHOD: ABNORMAL
EOSINOPHIL # BLD AUTO: 0 K/UL (ref 0–0.5)
EOSINOPHIL # BLD AUTO: 0.1 K/UL (ref 0–0.5)
EOSINOPHIL NFR BLD: 0.3 % (ref 0–8)
EOSINOPHIL NFR BLD: 0.9 % (ref 0–8)
EP: 8
ERYTHROCYTE [DISTWIDTH] IN BLOOD BY AUTOMATED COUNT: 14.4 % (ref 11.5–14.5)
ERYTHROCYTE [DISTWIDTH] IN BLOOD BY AUTOMATED COUNT: 14.5 % (ref 11.5–14.5)
ERYTHROCYTE [SEDIMENTATION RATE] IN BLOOD BY WESTERGREN METHOD: 14 MM/H
ERYTHROCYTE [SEDIMENTATION RATE] IN BLOOD BY WESTERGREN METHOD: 24 MM/H
ERYTHROCYTE [SEDIMENTATION RATE] IN BLOOD BY WESTERGREN METHOD: 6 MM/HR (ref 0–36)
EST. GFR  (AFRICAN AMERICAN): >60 ML/MIN/1.73 M^2
EST. GFR  (AFRICAN AMERICAN): >60 ML/MIN/1.73 M^2
EST. GFR  (NON AFRICAN AMERICAN): 56.5 ML/MIN/1.73 M^2
EST. GFR  (NON AFRICAN AMERICAN): 56.5 ML/MIN/1.73 M^2
ESTIMATED AVG GLUCOSE: 103 MG/DL (ref 68–131)
FIO2: 28
FIO2: 32
FLOW: 3
GLUCOSE SERPL-MCNC: 106 MG/DL (ref 70–110)
GLUCOSE SERPL-MCNC: 110 MG/DL (ref 70–110)
GLUCOSE SERPL-MCNC: 113 MG/DL (ref 70–110)
HBA1C MFR BLD HPLC: 5.2 % (ref 4–5.6)
HCO3 UR-SCNC: 42 MMOL/L (ref 24–28)
HCO3 UR-SCNC: 46.6 MMOL/L (ref 24–28)
HCT VFR BLD AUTO: 36.4 % (ref 37–48.5)
HCT VFR BLD AUTO: 45.1 % (ref 37–48.5)
HCT VFR BLD CALC: 39 %PCV (ref 36–54)
HGB BLD-MCNC: 11.4 G/DL (ref 12–16)
HGB BLD-MCNC: 13.9 G/DL (ref 12–16)
IMM GRANULOCYTES # BLD AUTO: 0.04 K/UL (ref 0–0.04)
IMM GRANULOCYTES # BLD AUTO: 0.1 K/UL (ref 0–0.04)
IMM GRANULOCYTES NFR BLD AUTO: 0.4 % (ref 0–0.5)
IMM GRANULOCYTES NFR BLD AUTO: 0.6 % (ref 0–0.5)
IP: 15
LACTATE SERPL-SCNC: 1 MMOL/L (ref 0.5–2.2)
LYMPHOCYTES # BLD AUTO: 0.7 K/UL (ref 1–4.8)
LYMPHOCYTES # BLD AUTO: 0.8 K/UL (ref 1–4.8)
LYMPHOCYTES NFR BLD: 5.3 % (ref 18–48)
LYMPHOCYTES NFR BLD: 6.8 % (ref 18–48)
MAGNESIUM SERPL-MCNC: 2.5 MG/DL (ref 1.6–2.6)
MCH RBC QN AUTO: 30.7 PG (ref 27–31)
MCH RBC QN AUTO: 31.1 PG (ref 27–31)
MCHC RBC AUTO-ENTMCNC: 30.8 G/DL (ref 32–36)
MCHC RBC AUTO-ENTMCNC: 31.3 G/DL (ref 32–36)
MCV RBC AUTO: 100 FL (ref 82–98)
MCV RBC AUTO: 99 FL (ref 82–98)
MIN VOL: 11.2
MODE: ABNORMAL
MODE: ABNORMAL
MONOCYTES # BLD AUTO: 1.5 K/UL (ref 0.3–1)
MONOCYTES # BLD AUTO: 1.7 K/UL (ref 0.3–1)
MONOCYTES NFR BLD: 10.7 % (ref 4–15)
MONOCYTES NFR BLD: 14.1 % (ref 4–15)
NEUTROPHILS # BLD AUTO: 12.8 K/UL (ref 1.8–7.7)
NEUTROPHILS # BLD AUTO: 8.5 K/UL (ref 1.8–7.7)
NEUTROPHILS NFR BLD: 78.2 % (ref 38–73)
NEUTROPHILS NFR BLD: 82.2 % (ref 38–73)
NRBC BLD-RTO: 0 /100 WBC
NRBC BLD-RTO: 0 /100 WBC
PCO2 BLDA: 71 MMHG (ref 35–45)
PCO2 BLDA: 79.2 MMHG (ref 35–45)
PH SMN: 7.38 [PH] (ref 7.35–7.45)
PH SMN: 7.38 [PH] (ref 7.35–7.45)
PLATELET # BLD AUTO: 140 K/UL (ref 150–350)
PLATELET # BLD AUTO: 177 K/UL (ref 150–350)
PMV BLD AUTO: 10.4 FL (ref 9.2–12.9)
PMV BLD AUTO: 9.7 FL (ref 9.2–12.9)
PO2 BLDA: 117 MMHG (ref 80–100)
PO2 BLDA: 61 MMHG (ref 80–100)
POC BE: 17 MMOL/L
POC BE: 21 MMOL/L
POC IONIZED CALCIUM: 1.12 MMOL/L (ref 1.06–1.42)
POC SATURATED O2: 88 % (ref 95–100)
POC SATURATED O2: 98 % (ref 95–100)
POC TCO2 (MEASURED): 38 MMOL/L (ref 23–29)
POC TCO2: 44 MMOL/L (ref 23–27)
POC TCO2: 49 MMOL/L (ref 23–27)
POTASSIUM BLD-SCNC: 4.2 MMOL/L (ref 3.5–5.1)
POTASSIUM SERPL-SCNC: 3.5 MMOL/L (ref 3.5–5.1)
POTASSIUM SERPL-SCNC: 4.4 MMOL/L (ref 3.5–5.1)
PROCALCITONIN SERPL IA-MCNC: 0.05 NG/ML
PROT SERPL-MCNC: 7 G/DL (ref 6–8.4)
RBC # BLD AUTO: 3.67 M/UL (ref 4–5.4)
RBC # BLD AUTO: 4.53 M/UL (ref 4–5.4)
SAMPLE: ABNORMAL
SITE: ABNORMAL
SITE: ABNORMAL
SODIUM BLD-SCNC: 137 MMOL/L (ref 136–145)
SODIUM SERPL-SCNC: 138 MMOL/L (ref 136–145)
SODIUM SERPL-SCNC: 144 MMOL/L (ref 136–145)
SP02: 91
SP02: 91
SPONT RATE: 37
T4 FREE SERPL-MCNC: 1.3 NG/DL (ref 0.71–1.51)
TROPONIN I SERPL DL<=0.01 NG/ML-MCNC: 0.13 NG/ML (ref 0–0.03)
TROPONIN I SERPL DL<=0.01 NG/ML-MCNC: 0.14 NG/ML (ref 0–0.03)
TSH SERPL DL<=0.005 MIU/L-ACNC: 0.17 UIU/ML (ref 0.4–4)
WBC # BLD AUTO: 10.91 K/UL (ref 3.9–12.7)
WBC # BLD AUTO: 15.55 K/UL (ref 3.9–12.7)

## 2019-07-06 PROCEDURE — 99291 PR CRITICAL CARE, E/M 30-74 MINUTES: ICD-10-PCS | Mod: ,,, | Performed by: INTERNAL MEDICINE

## 2019-07-06 PROCEDURE — 93005 ELECTROCARDIOGRAM TRACING: CPT

## 2019-07-06 PROCEDURE — 99223 PR INITIAL HOSPITAL CARE,LEVL III: ICD-10-PCS | Mod: ,,, | Performed by: PHYSICIAN ASSISTANT

## 2019-07-06 PROCEDURE — 27000221 HC OXYGEN, UP TO 24 HOURS

## 2019-07-06 PROCEDURE — 85025 COMPLETE CBC W/AUTO DIFF WBC: CPT

## 2019-07-06 PROCEDURE — 25000242 PHARM REV CODE 250 ALT 637 W/ HCPCS: Performed by: HOSPITALIST

## 2019-07-06 PROCEDURE — 84443 ASSAY THYROID STIM HORMONE: CPT

## 2019-07-06 PROCEDURE — 84484 ASSAY OF TROPONIN QUANT: CPT

## 2019-07-06 PROCEDURE — 94640 AIRWAY INHALATION TREATMENT: CPT

## 2019-07-06 PROCEDURE — 93010 ELECTROCARDIOGRAM REPORT: CPT | Mod: 76,,, | Performed by: INTERNAL MEDICINE

## 2019-07-06 PROCEDURE — 25000003 PHARM REV CODE 250: Performed by: PHYSICIAN ASSISTANT

## 2019-07-06 PROCEDURE — 84484 ASSAY OF TROPONIN QUANT: CPT | Mod: 91

## 2019-07-06 PROCEDURE — 63600175 PHARM REV CODE 636 W HCPCS: Performed by: HOSPITALIST

## 2019-07-06 PROCEDURE — 82553 CREATINE MB FRACTION: CPT

## 2019-07-06 PROCEDURE — 36415 COLL VENOUS BLD VENIPUNCTURE: CPT

## 2019-07-06 PROCEDURE — 96375 TX/PRO/DX INJ NEW DRUG ADDON: CPT

## 2019-07-06 PROCEDURE — 99285 PR EMERGENCY DEPT VISIT,LEVEL V: ICD-10-PCS | Mod: ,,, | Performed by: PHYSICIAN ASSISTANT

## 2019-07-06 PROCEDURE — 20000000 HC ICU ROOM

## 2019-07-06 PROCEDURE — 86140 C-REACTIVE PROTEIN: CPT

## 2019-07-06 PROCEDURE — 99900035 HC TECH TIME PER 15 MIN (STAT)

## 2019-07-06 PROCEDURE — 82803 BLOOD GASES ANY COMBINATION: CPT

## 2019-07-06 PROCEDURE — 99285 EMERGENCY DEPT VISIT HI MDM: CPT | Mod: 25

## 2019-07-06 PROCEDURE — 85652 RBC SED RATE AUTOMATED: CPT

## 2019-07-06 PROCEDURE — 25000242 PHARM REV CODE 250 ALT 637 W/ HCPCS: Performed by: PHYSICIAN ASSISTANT

## 2019-07-06 PROCEDURE — 99291 CRITICAL CARE FIRST HOUR: CPT | Mod: ,,, | Performed by: INTERNAL MEDICINE

## 2019-07-06 PROCEDURE — 93010 EKG 12-LEAD: ICD-10-PCS | Mod: ,,, | Performed by: INTERNAL MEDICINE

## 2019-07-06 PROCEDURE — 83036 HEMOGLOBIN GLYCOSYLATED A1C: CPT

## 2019-07-06 PROCEDURE — 27000190 HC CPAP FULL FACE MASK W/VALVE

## 2019-07-06 PROCEDURE — 99223 1ST HOSP IP/OBS HIGH 75: CPT | Mod: ,,, | Performed by: PHYSICIAN ASSISTANT

## 2019-07-06 PROCEDURE — 87632 RESP VIRUS 6-11 TARGETS: CPT

## 2019-07-06 PROCEDURE — 83880 ASSAY OF NATRIURETIC PEPTIDE: CPT

## 2019-07-06 PROCEDURE — 84439 ASSAY OF FREE THYROXINE: CPT

## 2019-07-06 PROCEDURE — 94660 CPAP INITIATION&MGMT: CPT

## 2019-07-06 PROCEDURE — 63600175 PHARM REV CODE 636 W HCPCS: Performed by: PHYSICIAN ASSISTANT

## 2019-07-06 PROCEDURE — 84145 PROCALCITONIN (PCT): CPT

## 2019-07-06 PROCEDURE — 83735 ASSAY OF MAGNESIUM: CPT

## 2019-07-06 PROCEDURE — 99285 EMERGENCY DEPT VISIT HI MDM: CPT | Mod: ,,, | Performed by: PHYSICIAN ASSISTANT

## 2019-07-06 PROCEDURE — 94761 N-INVAS EAR/PLS OXIMETRY MLT: CPT

## 2019-07-06 PROCEDURE — 96365 THER/PROPH/DIAG IV INF INIT: CPT

## 2019-07-06 PROCEDURE — 83605 ASSAY OF LACTIC ACID: CPT

## 2019-07-06 PROCEDURE — 80048 BASIC METABOLIC PNL TOTAL CA: CPT

## 2019-07-06 PROCEDURE — 80053 COMPREHEN METABOLIC PANEL: CPT

## 2019-07-06 PROCEDURE — 93010 ELECTROCARDIOGRAM REPORT: CPT | Mod: ,,, | Performed by: INTERNAL MEDICINE

## 2019-07-06 PROCEDURE — 36600 WITHDRAWAL OF ARTERIAL BLOOD: CPT

## 2019-07-06 PROCEDURE — 82550 ASSAY OF CK (CPK): CPT

## 2019-07-06 RX ORDER — ENOXAPARIN SODIUM 100 MG/ML
40 INJECTION SUBCUTANEOUS EVERY 12 HOURS
Status: DISCONTINUED | OUTPATIENT
Start: 2019-07-06 | End: 2019-07-07

## 2019-07-06 RX ORDER — ROPINIROLE 0.25 MG/1
0.25 TABLET, FILM COATED ORAL NIGHTLY
Status: DISCONTINUED | OUTPATIENT
Start: 2019-07-06 | End: 2019-07-11 | Stop reason: HOSPADM

## 2019-07-06 RX ORDER — ENOXAPARIN SODIUM 100 MG/ML
30 INJECTION SUBCUTANEOUS EVERY 24 HOURS
Status: DISCONTINUED | OUTPATIENT
Start: 2019-07-06 | End: 2019-07-06

## 2019-07-06 RX ORDER — LEVOFLOXACIN 750 MG/1
750 TABLET ORAL
Status: DISCONTINUED | OUTPATIENT
Start: 2019-07-06 | End: 2019-07-07

## 2019-07-06 RX ORDER — TIOTROPIUM BROMIDE 18 UG/1
18 CAPSULE ORAL; RESPIRATORY (INHALATION) DAILY
Status: DISCONTINUED | OUTPATIENT
Start: 2019-07-06 | End: 2019-07-11 | Stop reason: HOSPADM

## 2019-07-06 RX ORDER — IPRATROPIUM BROMIDE AND ALBUTEROL SULFATE 2.5; .5 MG/3ML; MG/3ML
3 SOLUTION RESPIRATORY (INHALATION)
Status: COMPLETED | OUTPATIENT
Start: 2019-07-06 | End: 2019-07-06

## 2019-07-06 RX ORDER — SODIUM CHLORIDE 0.9 % (FLUSH) 0.9 %
10 SYRINGE (ML) INJECTION
Status: DISCONTINUED | OUTPATIENT
Start: 2019-07-06 | End: 2019-07-11 | Stop reason: HOSPADM

## 2019-07-06 RX ORDER — FUROSEMIDE 10 MG/ML
40 INJECTION INTRAMUSCULAR; INTRAVENOUS
Status: COMPLETED | OUTPATIENT
Start: 2019-07-06 | End: 2019-07-06

## 2019-07-06 RX ORDER — HYDRALAZINE HYDROCHLORIDE 20 MG/ML
5 INJECTION INTRAMUSCULAR; INTRAVENOUS EVERY 8 HOURS PRN
Status: DISCONTINUED | OUTPATIENT
Start: 2019-07-06 | End: 2019-07-11 | Stop reason: HOSPADM

## 2019-07-06 RX ORDER — ONDANSETRON 4 MG/1
4 TABLET, ORALLY DISINTEGRATING ORAL EVERY 8 HOURS PRN
Status: DISCONTINUED | OUTPATIENT
Start: 2019-07-06 | End: 2019-07-11 | Stop reason: HOSPADM

## 2019-07-06 RX ORDER — POLYETHYLENE GLYCOL 3350 17 G/17G
17 POWDER, FOR SOLUTION ORAL DAILY
Status: DISCONTINUED | OUTPATIENT
Start: 2019-07-06 | End: 2019-07-11 | Stop reason: HOSPADM

## 2019-07-06 RX ORDER — SODIUM CHLORIDE 0.9 % (FLUSH) 0.9 %
10 SYRINGE (ML) INJECTION
Status: CANCELLED | OUTPATIENT
Start: 2019-07-06

## 2019-07-06 RX ORDER — FUROSEMIDE 10 MG/ML
80 INJECTION INTRAMUSCULAR; INTRAVENOUS ONCE
Status: COMPLETED | OUTPATIENT
Start: 2019-07-06 | End: 2019-07-06

## 2019-07-06 RX ORDER — FUROSEMIDE 10 MG/ML
120 INJECTION INTRAMUSCULAR; INTRAVENOUS ONCE
Status: DISCONTINUED | OUTPATIENT
Start: 2019-07-06 | End: 2019-07-06

## 2019-07-06 RX ORDER — ACETAMINOPHEN 500 MG
1000 TABLET ORAL DAILY PRN
Status: DISCONTINUED | OUTPATIENT
Start: 2019-07-06 | End: 2019-07-11 | Stop reason: HOSPADM

## 2019-07-06 RX ORDER — ALBUTEROL SULFATE 90 UG/1
1 AEROSOL, METERED RESPIRATORY (INHALATION) EVERY 6 HOURS PRN
Status: DISCONTINUED | OUTPATIENT
Start: 2019-07-06 | End: 2019-07-11 | Stop reason: HOSPADM

## 2019-07-06 RX ORDER — IPRATROPIUM BROMIDE AND ALBUTEROL SULFATE 2.5; .5 MG/3ML; MG/3ML
3 SOLUTION RESPIRATORY (INHALATION) EVERY 4 HOURS
Status: DISCONTINUED | OUTPATIENT
Start: 2019-07-06 | End: 2019-07-07

## 2019-07-06 RX ORDER — AMLODIPINE BESYLATE 2.5 MG/1
2.5 TABLET ORAL NIGHTLY PRN
Status: DISCONTINUED | OUTPATIENT
Start: 2019-07-06 | End: 2019-07-09

## 2019-07-06 RX ORDER — ENOXAPARIN SODIUM 100 MG/ML
40 INJECTION SUBCUTANEOUS EVERY 24 HOURS
Status: DISCONTINUED | OUTPATIENT
Start: 2019-07-06 | End: 2019-07-06

## 2019-07-06 RX ORDER — NAPROXEN SODIUM 220 MG/1
81 TABLET, FILM COATED ORAL DAILY
Status: DISCONTINUED | OUTPATIENT
Start: 2019-07-06 | End: 2019-07-11 | Stop reason: HOSPADM

## 2019-07-06 RX ORDER — FERROUS SULFATE 325(65) MG
325 TABLET, DELAYED RELEASE (ENTERIC COATED) ORAL NIGHTLY
Status: DISCONTINUED | OUTPATIENT
Start: 2019-07-06 | End: 2019-07-11 | Stop reason: HOSPADM

## 2019-07-06 RX ORDER — FUROSEMIDE 10 MG/ML
40 INJECTION INTRAMUSCULAR; INTRAVENOUS 2 TIMES DAILY
Status: DISCONTINUED | OUTPATIENT
Start: 2019-07-06 | End: 2019-07-06

## 2019-07-06 RX ORDER — FUROSEMIDE 10 MG/ML
40 INJECTION INTRAMUSCULAR; INTRAVENOUS DAILY
Status: DISCONTINUED | OUTPATIENT
Start: 2019-07-07 | End: 2019-07-06

## 2019-07-06 RX ADMIN — ROPINIROLE HYDROCHLORIDE 0.25 MG: 0.25 TABLET, FILM COATED ORAL at 08:07

## 2019-07-06 RX ADMIN — FUROSEMIDE 80 MG: 10 INJECTION, SOLUTION INTRAMUSCULAR; INTRAVENOUS at 11:07

## 2019-07-06 RX ADMIN — IPRATROPIUM BROMIDE AND ALBUTEROL SULFATE 3 ML: .5; 3 SOLUTION RESPIRATORY (INHALATION) at 11:07

## 2019-07-06 RX ADMIN — POLYETHYLENE GLYCOL 3350 17 G: 17 POWDER, FOR SOLUTION ORAL at 11:07

## 2019-07-06 RX ADMIN — FUROSEMIDE 40 MG: 10 INJECTION, SOLUTION INTRAMUSCULAR; INTRAVENOUS at 07:07

## 2019-07-06 RX ADMIN — ENOXAPARIN SODIUM 40 MG: 100 INJECTION SUBCUTANEOUS at 05:07

## 2019-07-06 RX ADMIN — IPRATROPIUM BROMIDE AND ALBUTEROL SULFATE 3 ML: .5; 3 SOLUTION RESPIRATORY (INHALATION) at 05:07

## 2019-07-06 RX ADMIN — LEVOFLOXACIN 750 MG: 750 TABLET, FILM COATED ORAL at 02:07

## 2019-07-06 RX ADMIN — TIOTROPIUM BROMIDE 18 MCG: 18 CAPSULE ORAL; RESPIRATORY (INHALATION) at 12:07

## 2019-07-06 RX ADMIN — IPRATROPIUM BROMIDE AND ALBUTEROL SULFATE 3 ML: .5; 3 SOLUTION RESPIRATORY (INHALATION) at 06:07

## 2019-07-06 RX ADMIN — METHYLPREDNISOLONE SODIUM SUCCINATE 40 MG: 40 INJECTION, POWDER, FOR SOLUTION INTRAMUSCULAR; INTRAVENOUS at 06:07

## 2019-07-06 RX ADMIN — AMLODIPINE BESYLATE 2.5 MG: 2.5 TABLET ORAL at 08:07

## 2019-07-06 RX ADMIN — AZITHROMYCIN MONOHYDRATE 500 MG: 500 INJECTION, POWDER, LYOPHILIZED, FOR SOLUTION INTRAVENOUS at 06:07

## 2019-07-06 RX ADMIN — FERROUS SULFATE TAB EC 325 MG (65 MG FE EQUIVALENT) 325 MG: 325 (65 FE) TABLET DELAYED RESPONSE at 08:07

## 2019-07-06 RX ADMIN — ASPIRIN 81 MG CHEWABLE TABLET 81 MG: 81 TABLET CHEWABLE at 11:07

## 2019-07-06 NOTE — ED NOTES
Report given to Orquidea VICENTE. Pt's room assignment was changed after calling report to Observation.

## 2019-07-06 NOTE — H&P
[facility] Hospital Medicine  History & Physical    Patient Name: Venus Mayer  MRN: [id number]  Admission Date: 7/6/2019  Attending Physician: Cesar Wilkins MD   Primary Care Provider: Jona Che MD    [facility] Medicine Team: [facility] MED Y Mackenzie Stephens PA-C     Patient information was obtained from patient, relative(s), past medical records and ER records.     Subjective:     Principal Problem:Acute on chronic congestive heart failure    Chief Complaint:   Chief Complaint   Patient presents with    Shortness of Breath     presents to ED c/o difficulty breathing after coughing. Was seen here on 7/4 for COPD exacerbation.         HPI: 89 y/o WF with PMH of COPD (on 2.5 L home O2), HTN, HLD, recurrent spontaneous pneumothorax (recently admitted 5/22/19 with large right pneumothorax requiring chest tube and pleurodesis on 5/27/19 and small recurrence on 6/25/19 not requiring intervention), left hip fracture (4 months ago), HOCM s/p AICD, and cartoid artery stenosis s/p L CEA in 2008 presents complaining of worsening SOB beginning at 3 AM this morning. Pt lives with her son who assists with the history. Pt woke son up at 3 AM this morning for SOB. He increased her home O2 to 3L NC and sat her up for 15 minutes with no improvement in the SOB. Pt told son she needed to go to the hospital. Of note, she was seen in the ED on 7/4/19 for same complaint. She was diagnosed with COPD exacerbation. CXR could not exclude PNA. Pt sent home with improved symptoms after duoneb treatment with prednisone 20 mg bid for 5 days and azithromycin 250 mg. Per daughter, patient took first doses of medication yesterday (7/5/19). Daughter also reports pt decreased lasix from 40 mg daily to 20 mg daily 1 week ago due to frequent urination after speaking with provider. Pt endorses chronic dry cough for past 4 months, unchanged recently and intermittent peripheral edema (improved on this admission). She has a  40+ pack year history, quit 30 years ago. Denies fever/chills, CP, leg swelling, wheezing, orthopnea, PND, N/V/D, abdominal pain, dizziness, syncope, urinary symptoms.     In the ED:  O2 sat at 96% on arrival with 4L NC, weaned to home setting of 2.5L with sat maintained at 97%. BP 150s/70s. Afebrile without leukocytosis. Trop 0.133 (0.166 on 7/4/19). EKG with NSR, RBBB, and biatrial enlargement. BNP with significant increase 2,573 (1,652 on 7/4/19). CXR with bilateral basilar infiltrates and right pleural effusion. Given duoneb, lasix 40 mg IV, and azithromycin.     Past Medical History:   Diagnosis Date    Acute on chronic congestive heart failure 7/6/2019    Cardiomyopathy     Carotid artery occlusion     COPD (chronic obstructive pulmonary disease)     Hyperlipidemia     Hypertension        Past Surgical History:   Procedure Laterality Date    CARDIAC CATHETERIZATION      CAROTID ENDARTERECTOMY         Review of patient's allergies indicates:   Allergen Reactions    Sulfamethoxazole-trimethoprim      Other reaction(s): Rash    Cefazolin Rash    Cefuroxime Rash       No current facility-administered medications on file prior to encounter.      Current Outpatient Medications on File Prior to Encounter   Medication Sig    acetaminophen (TYLENOL) 500 MG tablet Take 1,000 mg by mouth daily as needed for Pain.    albuterol (PROAIR HFA) 90 mcg/actuation inhaler Inhale 1 puff into the lungs every 6 (six) hours as needed for Wheezing. Rescue    amLODIPine (NORVASC) 2.5 MG tablet Take 2.5 mg by mouth nightly as needed for SBP greater than. SBP greater than 155    aspirin 81 mg Tab Take by mouth. 1 Tablet Oral Every day    azithromycin (Z-JAK) 250 MG tablet Take 1 tablet (250 mg total) by mouth once daily. Take first 2 tablets together, then 1 every day until finished.    ferrous sulfate 325 (65 FE) MG EC tablet Take 1 tablet (325 mg total) by mouth once daily.    furosemide (LASIX) 40 MG tablet Take 1  tablet (40 mg total) by mouth once daily.    multivitamin (THERAGRAN) per tablet Take 1 tablet by mouth once daily.    predniSONE (DELTASONE) 20 MG tablet Take 2 tablets (40 mg total) by mouth once daily. for 5 days    rOPINIRole (REQUIP) 0.25 MG tablet Take 1 tablet (0.25 mg total) by mouth every evening.    sodium chloride 0.9 % Soln Uses as needed by nebulization route    tiotropium (SPIRIVA) 18 mcg inhalation capsule Inhale 18 mcg into the lungs once daily.      triamcinolone (NASACORT) 55 mcg nasal inhaler Mixes 1/2 ml with phenylephrine     Family History     Problem Relation (Age of Onset)    Hypertension Mother        Tobacco Use    Smoking status: Former Smoker     Packs/day: 2.00     Years: 20.00     Pack years: 40.00     Types: Cigarettes     Last attempt to quit: 1980     Years since quittin.4    Smokeless tobacco: Never Used   Substance and Sexual Activity    Alcohol use: Yes     Alcohol/week: 1.2 oz     Types: 2 Glasses of wine per week     Comment: social    Drug use: No    Sexual activity: Not Currently     Review of Systems   Constitutional: Positive for fatigue. Negative for activity change, appetite change, chills, diaphoresis, fever and unexpected weight change.   HENT: Positive for hearing loss. Negative for congestion, drooling, rhinorrhea, sore throat and trouble swallowing.    Eyes: Negative for photophobia, pain, redness and visual disturbance.   Respiratory: Positive for cough (chronic, dry) and shortness of breath. Negative for apnea, choking, chest tightness and wheezing.    Cardiovascular: Positive for leg swelling (intermittent). Negative for chest pain and palpitations.   Gastrointestinal: Negative for abdominal distention, abdominal pain, constipation, diarrhea, nausea and vomiting.   Endocrine: Negative for cold intolerance, heat intolerance, polydipsia, polyphagia and polyuria.   Genitourinary: Positive for frequency (with lasix). Negative for difficulty  urinating, dysuria, flank pain, hematuria and urgency.   Musculoskeletal: Negative for back pain, joint swelling and myalgias.   Skin: Negative for color change, pallor, rash and wound.   Neurological: Negative for dizziness, tremors, syncope, weakness, light-headedness, numbness and headaches.   Psychiatric/Behavioral: Negative for agitation, confusion and decreased concentration. The patient is not nervous/anxious.      Objective:     Vital Signs (Most Recent):  Temp: 97.5 °F (36.4 °C) (07/06/19 0921)  Pulse: 91 (07/06/19 0921)  Resp: 18 (07/06/19 0921)  BP: (!) 151/72 (07/06/19 0921)  SpO2: 97 % (07/06/19 0921) Vital Signs (24h Range):  Temp:  [97.5 °F (36.4 °C)-97.6 °F (36.4 °C)] 97.5 °F (36.4 °C)  Pulse:  [] 91  Resp:  [18-24] 18  SpO2:  [96 %-99 %] 97 %  BP: (120-159)/() 151/72        There is no height or weight on file to calculate BMI.    Physical Exam   Constitutional: She is oriented to person, place, and time. She appears well-developed. No distress.   Thin   HENT:   Head: Normocephalic and atraumatic.   Right Ear: External ear normal.   Left Ear: External ear normal.   Eyes: Pupils are equal, round, and reactive to light. Conjunctivae and EOM are normal.   Neck: Normal range of motion. Neck supple.   Cardiovascular: Normal rate and regular rhythm.   No murmur heard.  Pulmonary/Chest: Effort normal. No accessory muscle usage. No respiratory distress. She has decreased breath sounds in the right lower field. She has no wheezes. She has rales.   Abdominal: Soft. Bowel sounds are normal. She exhibits no distension. There is no tenderness.   Musculoskeletal: Normal range of motion. She exhibits no edema or tenderness.   Neurological: She is alert and oriented to person, place, and time. No cranial nerve deficit.   Skin: Skin is warm and dry. She is not diaphoretic.   Psychiatric: She has a normal mood and affect. Her behavior is normal. Thought content normal.         CRANIAL NERVES     CN III,  IV, VI   Pupils are equal, round, and reactive to light.  Extraocular motions are normal.        Significant Labs: All pertinent labs within the past 24 hours have been reviewed.    Significant Imaging: I have reviewed all pertinent imaging results/findings within the past 24 hours.    Assessment/Plan:     * Acute on chronic congestive heart failure  Patient with 2.5L HOME O2 for COPD  -BNP: 2573 (usually [phone]) chest x-ray: bilateral basilar infiltrates and right pleural effusion there are nodular opacities of the lung bases as well for which follow-up is recommended.  -Previous Echo with EF of 65% and normal diastolic dysfunction however echo unable to be completed.  -Echo pending  CHF pathway initiated  -lasix home dose po furosemide 40 mg recently decreased to 20 mg, pt received furosemide 40 mg IV in ED, will continue 40 mg IV bid   -Daily weights.  20 lb wt loss since 05/2019.  7/4/19 94 lbs.  -Strict I&O's   - Low salt cardiac diet; 1500mL fluid restriction  - Cardiac monitoring      COPD (chronic obstructive pulmonary disease)  Discharged from ED on 7/4 with zpack and prednisone  Afebrile without leukocytosis. ESR/CRP WNL.   Given azithromycin in ED  Spoke with Dr. Enriquez, will start Levaquin   Symptoms more consistent with CHF exacerbation, will hold off on steroids at this time  O2 saturation goal: 88-92% in setting of COPD and pneumothorax history  Continue albuterol PRN and Spiriva     Recurrent spontaneous pneumothorax  Recently admitted 5/22/19 for a right spontaneous pneumothorax with her hospital course complicated by subcutaneous emphysema after pigtail removal requiring right anterior blow hole placed. Her lung remained well expanded and did not require tube replacement. Pt also had a spontaneous left sided pneumothorax over 30 years ago.  6/25/19- Presented to ED for acute SOB and hypoxia at home. CXR in ED showed small basilar pneumothorax.   6/26/19- Serial CXRs, including a PA/Lateral,  throughout the day showed stable subpulmonic space. Patient remained clinically stable and in no respiratory distress.   7/6/19 Bilateral basilar infiltrates and right pleural effusion there are nodular opacities of the lung bases as well for which follow-up is recommended.  96% on 4L then reduced to 2.5L maintaining saturation at 99%.  Saturation goal: 88-92% in the setting of COPD and pneumothorax hx, will wean O2 down    HTN (hypertension)  Uncontrolled upon arrival 156/70 trended down to 139/81 after IV furosemide 40 mg  Home medication: amlodipine 2.5 mg qhs IF systolic BP >155. Pt has not required in 4 months.      Dyslipidemia  Will repeat lipid panel last on file 2013      CKD (chronic kidney disease) stage 2, GFR 60-89 ml/min  Cr 0.9 on admission, at baseline      H/O fracture of hip  See above, never went to SNF or rehab      Debility  Discharged on [facility] in the past  Will order PT/OT      DNR (do not resuscitate)  Confirmed pt to remain DNR        VTE Risk Mitigation (From admission, onward)        Ordered     enoxaparin injection 40 mg  Daily      07/06/19 0809     IP VTE HIGH RISK PATIENT  Once      07/06/19 0809             Mackenzie Stephens PA-C  Department of Hospital Medicine   [facility]

## 2019-07-06 NOTE — ED TRIAGE NOTES
"Venus Mayer, a 89 y.o. female presents to the ED w/ complaint of waking up with increased SOB 1 hour ago. PT stated that she felt no relief from home albuterol inhaler. PT stated, "Ifell a little short breath now but, not like I did when I woke up." PT denies chest pain, fever, chills, and n/v/d.     Triage note:  Chief Complaint   Patient presents with    Shortness of Breath     presents to ED c/o difficulty breathing after coughing. Was seen here on 7/4 for COPD exacerbation.      Review of patient's allergies indicates:   Allergen Reactions    Sulfamethoxazole-trimethoprim      Other reaction(s): Rash    Cefazolin Rash    Cefuroxime Rash     Past Medical History:   Diagnosis Date    Cardiomyopathy     Carotid artery occlusion     Hyperlipidemia     Hypertension      Adult Physical Assessment  LOC: Venus Mayer, 89 y.o. female verified via two identifiers.  The patient is awake, alert, oriented and speaking appropriately at this time.  APPEARANCE: Patient resting comfortably and appears to be in no acute distress at this time. Patient is clean and well groomed, patient's clothing is properly fastened.  SKIN:The skin is warm and dry, color consistent with ethnicity, patient has normal skin turgor and moist mucus membranes, skin intact, no breakdown or brusing noted.  MUSCULOSKELETAL: Patient moving all extremities well, no obvious swelling or deformities noted.  RESPIRATORY: Airway is open and patent, respirations are spontaneous, patient has a normal effort and rate, no accessory muscle use noted. PT on 2.5L home oxygen.   CARDIAC: Patient has a normal rate and rhythm, no periphreal edema noted in any extremity, capillary refill < 3 seconds in all extremities  ABDOMEN: Soft and non tender to palpation, no abdominal distention noted. Bowel sounds present in all four quadrants.  NEUROLOGIC: Eyes open spontaneously, behavior appropriate to situation, follows commands, facial expression symmetrical, " bilateral hand grasp equal and even, purposeful motor response noted, normal sensation in all extremities when touched with a finger.

## 2019-07-06 NOTE — HPI
89 y/o WF with PMH of COPD (on 2.5 L home O2), HTN, HLD, recurrent spontaneous pneumothorax (recently admitted 5/22/19 with large right pneumothorax requiring chest tube and pleurodesis on 5/27/19 and small recurrence on 6/25/19 not requiring intervention), left hip fracture (4 months ago), HOCM s/p AICD, and cartoid artery stenosis s/p L CEA in 2008 presents complaining of worsening SOB beginning at 3 AM this morning. Pt lives with her son who assists with the history. Pt woke son up at 3 AM this morning for SOB. He increased her home O2 to 3L NC and sat her up for 15 minutes with no improvement in the SOB. Pt told son she needed to go to the hospital. Of note, she was seen in the ED on 7/4/19 for same complaint. She was diagnosed with COPD exacerbation. CXR could not exclude PNA. Pt sent home with improved symptoms after duoneb treatment with prednisone 20 mg bid for 5 days and azithromycin 250 mg. Per daughter, patient took first doses of medication yesterday (7/5/19). Daughter also reports pt decreased lasix from 40 mg daily to 20 mg daily 1 week ago due to frequent urination after speaking with provider. Pt endorses chronic dry cough for past 4 months, unchanged recently and intermittent peripheral edema (improved on this admission). She has a 40+ pack year history, quit 30 years ago. Denies fever/chills, CP, leg swelling, wheezing, orthopnea, PND, N/V/D, abdominal pain, dizziness, syncope, urinary symptoms.     In the ED:  O2 sat at 96% on arrival with 4L NC, weaned to home setting of 2.5L with sat maintained at 97%. BP 150s/70s. Afebrile without leukocytosis. Trop 0.133 (0.166 on 7/4/19). EKG with NSR, RBBB, and biatrial enlargement. BNP with significant increase 2,573 (1,652 on 7/4/19). CXR with bilateral basilar infiltrates and right pleural effusion. Given duoneb, lasix 40 mg IV, and azithromycin.

## 2019-07-06 NOTE — ASSESSMENT & PLAN NOTE
Discharged from ED on 7/4 with zpack and prednisone  Afebrile without leukocytosis. ESR/CRP WNL.   Given azithromycin in ED  Spoke with Dr. Enriquez, will start Levaquin   Symptoms more consistent with CHF exacerbation, will hold off on steroids at this time  O2 saturation goal: 88-92% in setting of COPD and pneumothorax history  Continue albuterol PRN and Spiriva

## 2019-07-06 NOTE — PROGRESS NOTES
RAPID RESPONSE NURSE NOTE     Admit Date: 2019  LOS: 0  Code Status: DNR   Date of Consult: 2019  : 1929  Age: 89 y.o.  Weight:   Wt Readings from Last 1 Encounters:   19 41.6 kg (91 lb 11.4 oz)     Sex: female  Race: White   Bed: 342/342 A:   MRN: [id number]  Time Rapid Response Team page Received: 1602  Time Rapid Response Team at Bedside: 1605  Time Rapid Response Team left Bedside: 1725  Was the patient discharged from an ICU this admission?   no  Was the patient discharged from a PACU within last 24 hours?  no  Did the patient receive conscious sedation/general anesthesia within last 24 hours?  no  Was the patient in the ED within the past 24 hours?  yes  Was the patient started on NIPPV within the past 24 hours?  yes  Did this progress into an ARC or CPA:  no  Attending Physician: Cesar Wilkins MD  Primary Service: [facility] MED A  Consult Requested By: Cesar Wilkins MD     SITUATION     Reason for Call: worsening tachypnea, increased shortness of breath  Called to evaluate the patient for Respiratory    BACKGROUND     Why is the patient in the hospital?: Acute on chronic congestive heart failure    Patient has a past medical history of Acute on chronic congestive heart failure, Cardiomyopathy, Carotid artery occlusion, COPD (chronic obstructive pulmonary disease), Hyperlipidemia, and Hypertension.    ASSESSMENT/INTERVENTIONS     BP (!) 157/74   Pulse 103   Temp 97.4 °F (36.3 °C)   Resp (!) 37   Ht 5' (1.524 m)   Wt 41.6 kg (91 lb 11.4 oz)   SpO2 (!) 91%   Breastfeeding? No   BMI 17.91 kg/m²     What did you find: Rapid Response called as this patient began experiencing increased shortness of breath and worsening tachypnea. The patient was admitted overnight with presumptive COPD exacerbations versus pneumonia versus worsening heart failure. She was placed on bipap at approximately noon today for increasing shortness of breath and hypercapnia. Upon arrival to the bedside, the  patient was found supine in bed with bipap mask secured appropriately with a setting of 15/8 and 28%. The patient's vital signs were noted to be stable, with an SpO2 of 88%. Upon assessment, the patient was alert, oriented, and appropriate. Physical examination revealed tachypnea with appropriate tidal volumes/synchrony with bipap. No edema noted. Positive urine output. Dr. Wilkins was at the bedside and ordered lasix, labs, ekg, echo, chest x-ray, abg, and critical care consult. Hypercapnia noted to be worsening. No changes made with bipap at this time. Conferring decision between the primary and critical care team is to move this patient to the ICU for aggressive steroid therapy and medical management of COPD exacerbations.      FOLLOW-UP/CONTINGENCY PLAN     Patient needs a second visit at : N/A    Call the Rapid Response Nurse, Arleen Lucas RN at x [phone] for additional questions or concerns.    PHYSICIAN ESCALATION     Orders received and case discussed with Dr. Ezra Dorsey (Critical Care) and Dr. Wilkins.    Disposition: TX to ICU bed 6093.

## 2019-07-06 NOTE — ED PROVIDER NOTES
Encounter Date: 2019       History     Chief Complaint   Patient presents with    Shortness of Breath     presents to ED c/o difficulty breathing after coughing. Was seen here on  for COPD exacerbation.      Ms Mayer is a 89yoF who returns for continued symptoms of shortness of breath; pertinent PMHx HTN, HLD, COPD, recent pneumothorax and left hip fracture.  Patient was seen yesterday in of the ED for aforementioned CC; diagnosed with COPD exacerbation/possible developing PNA and given steroid burst, Z-Burak and refill of inhalers.  Patient has not yet picked up her medications.  She endorses return/continuation of the symptoms that was unrelieved with home inhaler use earlier this morning.  She denies change in quality of symptoms. Denies onset of chest pain, cough, abdominal pain, dizziness, weakness, fever or vomiting.  The patients available PMH, PSH, Social History, medications, allergies, and triage vital signs were reviewed just prior to their medical evaluation.  A ten point review of systems was completed and is negative except as documented above.  Patient denies any other acute medical complaint.          Review of patient's allergies indicates:   Allergen Reactions    Sulfamethoxazole-trimethoprim      Other reaction(s): Rash    Cefazolin Rash    Cefuroxime Rash     Past Medical History:   Diagnosis Date    Acute on chronic congestive heart failure 2019    Cardiomyopathy     Carotid artery occlusion     COPD (chronic obstructive pulmonary disease)     Hyperlipidemia     Hypertension      Past Surgical History:   Procedure Laterality Date    CARDIAC CATHETERIZATION      CAROTID ENDARTERECTOMY       Family History   Problem Relation Age of Onset    Hypertension Mother      Social History     Tobacco Use    Smoking status: Former Smoker     Packs/day: 2.00     Years: 20.00     Pack years: 40.00     Types: Cigarettes     Last attempt to quit: 1980     Years since quittin.4     Smokeless tobacco: Never Used   Substance Use Topics    Alcohol use: Yes     Alcohol/week: 1.2 oz     Types: 2 Glasses of wine per week     Comment: social    Drug use: No     Review of Systems   Constitutional: Negative for chills, fatigue and fever.   HENT: Negative for congestion, sinus pressure, sore throat and trouble swallowing.    Eyes: Negative for visual disturbance.   Respiratory: Positive for shortness of breath. Negative for cough, chest tightness, wheezing and stridor.    Cardiovascular: Negative for chest pain, palpitations and leg swelling.   Gastrointestinal: Negative for abdominal pain, nausea and vomiting.   Genitourinary: Negative for dysuria, flank pain and pelvic pain.   Musculoskeletal: Negative for back pain and neck pain.   Skin: Negative for pallor and rash.   Neurological: Negative for dizziness, weakness and light-headedness.   Psychiatric/Behavioral: Negative for agitation and confusion.       Physical Exam     Initial Vitals [07/06/19 0417]   BP Pulse Resp Temp SpO2   (!) 156/70 80 20 97.6 °F (36.4 °C) 96 %      MAP       --         Physical Exam    Vitals reviewed.  Constitutional: She appears well-developed and well-nourished. She is not diaphoretic. No distress.   Not toxic-appearing female in NAD, VSS, afebrile, 96% on 3L NC.     HENT:   Head: Normocephalic and atraumatic.   Right Ear: External ear normal.   Left Ear: External ear normal.   Nose: Nose normal.   Mouth/Throat: Oropharynx is clear and moist. No oropharyngeal exudate.   Eyes: Conjunctivae are normal. Pupils are equal, round, and reactive to light. No scleral icterus.   Strabismus OU, chronic   Neck: Normal range of motion. Neck supple. JVD present.   Cardiovascular: Normal rate, regular rhythm and intact distal pulses. Exam reveals no friction rub.    Pulmonary/Chest: No stridor. No respiratory distress. She has no wheezes. She has no rhonchi. She has rales.   Decreased breath sounds globally  Mild rales  auscultated lower lung fields  Mild tachypnea home NC oxygen, no accessory muscle use, no agitation or confusion, no cyanosis   Abdominal: Soft. There is no tenderness.   Musculoskeletal: Normal range of motion. She exhibits no edema.   Lymphadenopathy:     She has no cervical adenopathy.   Neurological: She is alert and oriented to person, place, and time. She has normal strength. No sensory deficit.   Skin: Skin is warm and dry. Capillary refill takes less than 2 seconds. No rash noted. No erythema. No pallor.   Psychiatric: She has a normal mood and affect. Her behavior is normal. Judgment and thought content normal.         ED Course   Procedures  Labs Reviewed   B-TYPE NATRIURETIC PEPTIDE - Abnormal; Notable for the following components:       Result Value    BNP 2,573 (*)     All other components within normal limits    Narrative:     ad on bmp as per Kim Martinez PA-C   TROPONIN I - Abnormal; Notable for the following components:    Troponin I 0.133 (*)     All other components within normal limits   BASIC METABOLIC PANEL - Abnormal; Notable for the following components:    Chloride 94 (*)     CO2 32 (*)     BUN, Bld 31 (*)     eGFR if non  56.5 (*)     All other components within normal limits    Narrative:     ad on bmp as per Kim Martinez PA-C   CBC W/ AUTO DIFFERENTIAL - Abnormal; Notable for the following components:    RBC 3.67 (*)     Hemoglobin 11.4 (*)     Hematocrit 36.4 (*)     Mean Corpuscular Volume 99 (*)     Mean Corpuscular Hemoglobin 31.1 (*)     Mean Corpuscular Hemoglobin Conc 31.3 (*)     Platelets 140 (*)     Gran # (ANC) 8.5 (*)     Lymph # 0.7 (*)     Mono # 1.5 (*)     Gran% 78.2 (*)     Lymph% 6.8 (*)     All other components within normal limits   ISTAT PROCEDURE - Abnormal; Notable for the following components:    POC Glucose 113 (*)     POC BUN 33 (*)     POC Chloride 91 (*)     POC TCO2 (MEASURED) 38 (*)     All other components within normal limits   CBC W/  AUTO DIFFERENTIAL   BASIC METABOLIC PANEL   SEDIMENTATION RATE   C-REACTIVE PROTEIN   PROCALCITONIN   HEMOGLOBIN A1C   MAGNESIUM   TSH   C-REACTIVE PROTEIN    Narrative:     ad on bmp as per Kim Martinez PA-C  crp add on per Mackenzie Stephens PA-C @ 08:28 on 07/06/2019; order#   [id number]  mg(order# [id number]), tsh(order# [id number]) and hemoglobin a1c   (order# [id number]) add on per Mackenzie Stephens PA-C @ 08:35 on   07/06/2019   MAGNESIUM    Narrative:     ad on bmp as per Kmi Martinez PA-C  crp add on per Mackenzie Stephens PA-C @ 08:28 on 07/06/2019; order#   [id number]  mg(order# [id number]), tsh(order# [id number]) and hemoglobin a1c   (order# [id number]) add on per Mackenzie Stephens PA-C @ 08:35 on   07/06/2019   HEMOGLOBIN A1C    Narrative:     ad on bmp as per Kim Martinez PA-C  crp add on per Mackenzie Stephens PA-C @ 08:28 on 07/06/2019; order#   [id number]  mg(order# [id number]), tsh(order# [id number]) and hemoglobin a1c   (order# [id number]) add on per Mackenzie Stephens PA-C @ 08:35 on   07/06/2019   ISTAT CHEM8        ECG Results          EKG 12-lead (Final result)  Result time 07/06/19 11:18:22    Final result by Interface, Lab In [facility] (07/06/19 11:18:22)                 Narrative:    Test Reason : I50.9,    Vent. Rate : 099 BPM     Atrial Rate : 099 BPM     P-R Int : 136 ms          QRS Dur : 124 ms      QT Int : 366 ms       P-R-T Axes : 082 254 054 degrees     QTc Int : 469 ms    Normal sinus rhythm  Biatrial enlargement  Right bundle branch block  Right superior axis deviation  Abnormal ECG  When compared with ECG of 04-JUL-2019 23:05,  Premature ventricular complexes are no longer Present  Confirmed by Akanksha Smith MD ([id number]) on 7/6/2019 11:18:12 AM    Referred By: [email or web address]   SELF           Confirmed By:Akanksha Smith MD                            Imaging Results          X-Ray Chest PA And Lateral (Final result)  Result time 07/06/19 05:49:06    Final result by Akhil Garland MD (07/06/19 05:49:06)                  Impression:      Bilateral basilar infiltrates and right pleural effusion there are nodular opacities of the lung bases as well for which follow-up is recommended.      Electronically signed by: Akhil Garland  Date:    07/06/2019  Time:    05:49             Narrative:    EXAMINATION:  XR CHEST PA AND LATERAL    CLINICAL HISTORY:  Shortness of breath    TECHNIQUE:  PA and lateral views of the chest were performed.    COMPARISON:  July 4, 2019 23:16 hours    FINDINGS:  Two views of the chest are submitted.  Cardiac pacemaker again noted, the cardiomediastinal silhouette appears stable.  There is appearance of basilar infiltrates bilaterally, with associated pleural fluid on the right.  There are some nodular opacities as well at the lung bases, follow-up is recommended.  Chronic appearing lung changes with appearance of suspected emphysematous change and bleb/bulla formation particularly involving the upper lobes right more so than left noted.  There is no evidence for pneumothorax.  The osseous structures demonstrate chronic change.                                 Medical Decision Making:   History:   Old Medical Records: I decided to obtain old medical records.  Old Records Summarized: records from clinic visits and records from previous admission(s).  Initial Assessment:   Patient returns 1 day after discharge for continued symptoms of shortness of breath, has not yet received antibiotic, steroid for COPD exacerbation.  Mild tachypnea, decreased breath sounds globally with mild rales, VSS, afebrile, neuro exam intact  Differential Diagnosis:   DDx includes COPD exacerbation, pulmonary edema, progressed PNA, ACS. Physical exam and history taking lower clinical suspicion for aortic dissection, pleurisy, CVA, acute abdomen.  Independently Interpreted Test(s):   I have ordered and independently interpreted X-rays - see prior notes.  I have ordered and independently interpreted EKG Reading(s) - see prior  notes  Clinical Tests:   Lab Tests: Ordered and Reviewed  Radiological Study: Ordered and Reviewed  Medical Tests: Ordered and Reviewed  ED Management:  Will repeat chest x-ray and get cardiac labs.  Given DuoNeb.  Patient remains comfortable on home oxygen.  Update:  Labs show minimally increasing troponin at 0.133 with double BNP at 2500 (troponin likely in response to increased BNP, do not suspect ACS at this time).  Additional labs ordered.  Stable anemia, minimally increased ANC at 8.5.  Repeat x-ray shows largely unchanged findings of mild right pleural effusion +/- consolidation.  Given initial doses of azithromycin and IV Lasix.  I suspect CHF exacerbation in addition to underlying COPD, patient will require additional IV diuresis.  Remains comfortable on home oxygen.  Placed in observation for continued diuresis and management. Patient agreed to plan of care and voiced understanding.    Kim Martinez PA-C  07/06/2019    I discussed the following case, diagnosis and plan of care with attending physician.                Attending Attestation:     Physician Attestation Statement for NP/PA:   I have conducted a face to face encounter with this patient in addition to the NP/PA, due to Medical Complexity    Other NP/PA Attestation Additions:    History of Present Illness: 89 year old female with past medical history of COPD on home oxygen, recent pneumothorax improved, presenting for increasing shortness of breath since yesterday.  Patient seen in ED by myself 2 days ago where chest x-ray demonstrated chronic pleural effusion with BNP elevated to 1000s, however this is consistent with patient's baseline, and patient's breathing improved after DuoNeb.  Possible pneumonia shown on x-ray at that time, however patient wished to be discharged and was stable, discharged home with azithromycin.  Patient reports she had not yet been able to take the azithromycin and tonight experience acutely worsening shortness of  breath. She denies any chest pain, fever or chills, worsening cough.   Physical Exam: Thin, NAD, no respiratory distress, satting 97% on nasal cannula, NCAT, A&Ox3, PERRL and EOMI, neck supple/normal ROM, bibasilar rales with slightly decreased breath sounds right basilar, RRR no mrg, normal extremity ROM/m/s, abd s/nt/nd, no LE edema, skin dry and warm   Medical Decision Making: BNP doubled to 2000s compared to 07/04, will admit for CHF exacerbation with diuretics.    Attending Note:  Physician Attestation Statement: I have personally seen and examined this patient. As the supervising MD I agree with the above history. As the supervising MD I agree with the above PE. As the supervising MD I agree with the above treatment, course, plan, and disposition.     MDM Complexity Points:   Problem Points:  1.New problem, with additional ED work-up planned - CHF exacerbation  2.New problem, with additional ED work-up planned - COPD excerbation    Data Points:  Review or order clinical lab tests, Review or order radiology test, Review or order medicine test (PFTs, EKG, cardiac echo or catheterization), Decision to obtain old records (in the EHR), Review and summarization of old records, Obtaining history from Someone else other than the patient.  and Discuss test with performing physician/consulting physician    Risk:  High Risk                        Clinical Impression:       ICD-10-CM ICD-9-CM   1. Acute on chronic congestive heart failure, unspecified heart failure type I50.9 428.0   2. SOB (shortness of breath) R06.02 786.05   3. Pleural effusion J90 511.9   4. Chronic obstructive pulmonary disease, unspecified COPD type J44.9 496   5. Heart failure I50.9 428.9   6. Acute on chronic combined systolic and diastolic congestive heart failure I50.43 428.43     428.0   7. CHF (congestive heart failure) I50.9 428.0   8. CHF (congestive heart failure), NYHA class I, acute on chronic, diastolic I50.33 428.33     428.0          Disposition:   Disposition: Placed in Observation  Condition: Stable                        Kim Martinez PA-C  07/06/19 2015       Ninoska Lagos MD  07/16/19 0711

## 2019-07-06 NOTE — SUBJECTIVE & OBJECTIVE
Past Medical History:   Diagnosis Date    Acute on chronic congestive heart failure 7/6/2019    Cardiomyopathy     Carotid artery occlusion     COPD (chronic obstructive pulmonary disease)     Hyperlipidemia     Hypertension        Past Surgical History:   Procedure Laterality Date    CARDIAC CATHETERIZATION      CAROTID ENDARTERECTOMY         Review of patient's allergies indicates:   Allergen Reactions    Sulfamethoxazole-trimethoprim      Other reaction(s): Rash    Cefazolin Rash    Cefuroxime Rash       No current facility-administered medications on file prior to encounter.      Current Outpatient Medications on File Prior to Encounter   Medication Sig    acetaminophen (TYLENOL) 500 MG tablet Take 1,000 mg by mouth daily as needed for Pain.    albuterol (PROAIR HFA) 90 mcg/actuation inhaler Inhale 1 puff into the lungs every 6 (six) hours as needed for Wheezing. Rescue    amLODIPine (NORVASC) 2.5 MG tablet Take 2.5 mg by mouth nightly as needed for SBP greater than. SBP greater than 155    aspirin 81 mg Tab Take by mouth. 1 Tablet Oral Every day    azithromycin (Z-JAK) 250 MG tablet Take 1 tablet (250 mg total) by mouth once daily. Take first 2 tablets together, then 1 every day until finished.    ferrous sulfate 325 (65 FE) MG EC tablet Take 1 tablet (325 mg total) by mouth once daily.    furosemide (LASIX) 40 MG tablet Take 1 tablet (40 mg total) by mouth once daily.    multivitamin (THERAGRAN) per tablet Take 1 tablet by mouth once daily.    predniSONE (DELTASONE) 20 MG tablet Take 2 tablets (40 mg total) by mouth once daily. for 5 days    rOPINIRole (REQUIP) 0.25 MG tablet Take 1 tablet (0.25 mg total) by mouth every evening.    sodium chloride 0.9 % Soln Uses as needed by nebulization route    tiotropium (SPIRIVA) 18 mcg inhalation capsule Inhale 18 mcg into the lungs once daily.      triamcinolone (NASACORT) 55 mcg nasal inhaler Mixes 1/2 ml with phenylephrine     Family History      Problem Relation (Age of Onset)    Hypertension Mother        Tobacco Use    Smoking status: Former Smoker     Packs/day: 2.00     Years: 20.00     Pack years: 40.00     Types: Cigarettes     Last attempt to quit: 1980     Years since quittin.4    Smokeless tobacco: Never Used   Substance and Sexual Activity    Alcohol use: Yes     Alcohol/week: 1.2 oz     Types: 2 Glasses of wine per week     Comment: social    Drug use: No    Sexual activity: Not Currently     Review of Systems   Constitutional: Positive for fatigue. Negative for activity change, appetite change, chills, diaphoresis, fever and unexpected weight change.   HENT: Positive for hearing loss. Negative for congestion, drooling, rhinorrhea, sore throat and trouble swallowing.    Eyes: Negative for photophobia, pain, redness and visual disturbance.   Respiratory: Positive for cough (chronic, dry) and shortness of breath. Negative for apnea, choking, chest tightness and wheezing.    Cardiovascular: Positive for leg swelling (intermittent). Negative for chest pain and palpitations.   Gastrointestinal: Negative for abdominal distention, abdominal pain, constipation, diarrhea, nausea and vomiting.   Endocrine: Negative for cold intolerance, heat intolerance, polydipsia, polyphagia and polyuria.   Genitourinary: Positive for frequency (with lasix). Negative for difficulty urinating, dysuria, flank pain, hematuria and urgency.   Musculoskeletal: Negative for back pain, joint swelling and myalgias.   Skin: Negative for color change, pallor, rash and wound.   Neurological: Negative for dizziness, tremors, syncope, weakness, light-headedness, numbness and headaches.   Psychiatric/Behavioral: Negative for agitation, confusion and decreased concentration. The patient is not nervous/anxious.      Objective:     Vital Signs (Most Recent):  Temp: 97.5 °F (36.4 °C) (19)  Pulse: 91 (19)  Resp: 18 (19)  BP: (!) 151/72  (07/06/19 0921)  SpO2: 97 % (07/06/19 0921) Vital Signs (24h Range):  Temp:  [97.5 °F (36.4 °C)-97.6 °F (36.4 °C)] 97.5 °F (36.4 °C)  Pulse:  [] 91  Resp:  [18-24] 18  SpO2:  [96 %-99 %] 97 %  BP: (120-159)/() 151/72        There is no height or weight on file to calculate BMI.    Physical Exam   Constitutional: She is oriented to person, place, and time. She appears well-developed. No distress.   Thin   HENT:   Head: Normocephalic and atraumatic.   Right Ear: External ear normal.   Left Ear: External ear normal.   Eyes: Pupils are equal, round, and reactive to light. Conjunctivae and EOM are normal.   Neck: Normal range of motion. Neck supple.   Cardiovascular: Normal rate and regular rhythm.   No murmur heard.  Pulmonary/Chest: Effort normal. No accessory muscle usage. No respiratory distress. She has decreased breath sounds in the right lower field. She has no wheezes. She has rales.   Abdominal: Soft. Bowel sounds are normal. She exhibits no distension. There is no tenderness.   Musculoskeletal: Normal range of motion. She exhibits no edema or tenderness.   Neurological: She is alert and oriented to person, place, and time. No cranial nerve deficit.   Skin: Skin is warm and dry. She is not diaphoretic.   Psychiatric: She has a normal mood and affect. Her behavior is normal. Thought content normal.         CRANIAL NERVES     CN III, IV, VI   Pupils are equal, round, and reactive to light.  Extraocular motions are normal.        Significant Labs: All pertinent labs within the past 24 hours have been reviewed.    Significant Imaging: I have reviewed all pertinent imaging results/findings within the past 24 hours.

## 2019-07-06 NOTE — ASSESSMENT & PLAN NOTE
Recently admitted 5/22/19 for a right spontaneous pneumothorax with her hospital course complicated by subcutaneous emphysema after pigtail removal requiring right anterior blow hole placed. Her lung remained well expanded and did not require tube replacement. Pt also had a spontaneous left sided pneumothorax over 30 years ago.  6/25/19- Presented to ED for acute SOB and hypoxia at home. CXR in ED showed small basilar pneumothorax.   6/26/19- Serial CXRs, including a PA/Lateral, throughout the day showed stable subpulmonic space. Patient remained clinically stable and in no respiratory distress.   7/6/19 Bilateral basilar infiltrates and right pleural effusion there are nodular opacities of the lung bases as well for which follow-up is recommended.  96% on 4L then reduced to 2.5L maintaining saturation at 99%.  Saturation goal: 88-92% in the setting of COPD and pneumothorax hx, will wean O2 down

## 2019-07-06 NOTE — ED NOTES
Pt's first and last name and birthday confirmed.   LOC: The patient is awake, alert and aware of environment with an appropriate affect, the patient is oriented x 3 and speaking appropriately.  APPEARANCE: Patient resting comfortably and in no acute distress, patient is clean and well groomed. Pt is thin and frail.   SKIN: The skin is warm and dry, patient has normal skin turgor and moist mucus membranes, skin intact, no breakdown or brusing noted.  MUSKULOSKELETAL: Patient moving all extremities well, no obvious swelling or deformities noted.  RESPIRATORY: Airway is open and patent, respirations are spontaneous, patient has a normal effort and rate. Orthopnea noted. Breath sounds are clear and equal bilaterally.  CARDIAC: Normal heart sounds. No peripheral edema.  ABDOMEN: Soft and non tender to palpation, no distention noted. Bowel sounds present.   NEURO: No neuro deficits, hand grasp equal, no drift noted, no facial droop noted. Speech is clear.

## 2019-07-06 NOTE — CONSULTS
Evaluated by Critical Care Team 1. Accepted to ICU, full progres note to follow.     Yudi Martin MD

## 2019-07-06 NOTE — ASSESSMENT & PLAN NOTE
Uncontrolled upon arrival 156/70 trended down to 139/81 after IV furosemide 40 mg  Home medication: amlodipine 2.5 mg qhs IF systolic BP >155. Pt has not required in 4 months.

## 2019-07-06 NOTE — HPI
89 y/o WF with PMH of COPD (on 2.5 L home O2), HTN, HLD, recurrent spontaneous pneumothorax (recently admitted 5/22/19 with large right pneumothorax requiring chest tube and pleurodesis on 5/27/19 and small recurrence on 6/25/19 not requiring intervention), left hip fracture (4 months ago), HOCM s/p AICD, and cartoid artery stenosis s/p L CEA in 2008 presents complaining of worsening SOB beginning at 3 AM this morning. Pt lives with her son who assists with the history. Pt woke son up at 3 AM this morning for SOB. He increased her home O2 to 3L NC and sat her up for 15 minutes with no improvement in the SOB. Pt told son she needed to go to the hospital. Of note, she was seen in the ED on 7/4/19 for same complaint. She was diagnosed with COPD exacerbation. CXR could not exclude PNA. Pt sent home with improved symptoms after duoneb treatment with prednisone 20 mg bid for 5 days and azithromycin 250 mg. Per daughter, patient took first doses of medication yesterday (7/5/19). Daughter also reports pt decreased lasix from 40 mg daily to 20 mg daily 1 week ago due to frequent urination after speaking with provider. Pt endorses chronic dry cough for past 4 months, unchanged recently and intermittent peripheral edema (improved on this admission). She has a 40+ pack year history, quit 30 years ago. Denies fever/chills, CP, leg swelling, wheezing, orthopnea, PND, N/V/D, abdominal pain, dizziness, syncope, urinary symptoms.      In the ED:  O2 sat at 96% on arrival with 4L NC, weaned to home setting of 2.5L with sat maintained at 97%. BP 150s/70s. Afebrile without leukocytosis. Trop 0.133 (0.166 on 7/4/19). EKG with NSR, RBBB, and biatrial enlargement. BNP with significant increase 2,573 (1,652 on 7/4/19). CXR with bilateral basilar infiltrates and right pleural effusion. Given duoneb, lasix 40 mg IV, and azithromycin. She was admitted to hospital medicine on the early morning of 7/5.

## 2019-07-06 NOTE — ASSESSMENT & PLAN NOTE
Patient with 2.5L HOME O2 for COPD  -BNP: 2573 (usually 8677-2826) chest x-ray: bilateral basilar infiltrates and right pleural effusion there are nodular opacities of the lung bases as well for which follow-up is recommended.  -Previous Echo with EF of 65% and normal diastolic dysfunction however echo unable to be completed.  -Echo pending  CHF pathway initiated  -lasix home dose po furosemide 40 mg recently decreased to 20 mg, pt received furosemide 40 mg IV in ED, will continue 40 mg IV bid   -Daily weights.  20 lb wt loss since 05/2019.  7/4/19 94 lbs.  -Strict I&O's   - Low salt cardiac diet; 1500mL fluid restriction  - Cardiac monitoring

## 2019-07-07 PROBLEM — E43 SEVERE MALNUTRITION: Status: ACTIVE | Noted: 2019-07-07

## 2019-07-07 LAB
ALLENS TEST: ABNORMAL
ANION GAP SERPL CALC-SCNC: 15 MMOL/L (ref 8–16)
ASCENDING AORTA: 2.69 CM
AV INDEX (PROSTH): 0.67
AV MEAN GRADIENT: 6 MMHG
AV PEAK GRADIENT: 10 MMHG
AV VALVE AREA: 1.36 CM2
AV VELOCITY RATIO: 0.65
BASOPHILS # BLD AUTO: 0.01 K/UL (ref 0–0.2)
BASOPHILS NFR BLD: 0.1 % (ref 0–1.9)
BSA FOR ECHO PROCEDURE: 1.3 M2
BUN SERPL-MCNC: 36 MG/DL (ref 8–23)
CALCIUM SERPL-MCNC: 10 MG/DL (ref 8.7–10.5)
CHLORIDE SERPL-SCNC: 91 MMOL/L (ref 95–110)
CHOLEST SERPL-MCNC: 190 MG/DL (ref 120–199)
CHOLEST/HDLC SERPL: 2.1 {RATIO} (ref 2–5)
CO2 SERPL-SCNC: 37 MMOL/L (ref 23–29)
CREAT SERPL-MCNC: 1 MG/DL (ref 0.5–1.4)
CV ECHO LV RWT: 0.39 CM
DELSYS: ABNORMAL
DIFFERENTIAL METHOD: ABNORMAL
DOP CALC AO PEAK VEL: 1.55 M/S
DOP CALC AO VTI: 23.89 CM
DOP CALC LVOT AREA: 2 CM2
DOP CALC LVOT DIAMETER: 1.61 CM
DOP CALC LVOT PEAK VEL: 1 M/S
DOP CALC LVOT STROKE VOLUME: 32.37 CM3
DOP CALCLVOT PEAK VEL VTI: 15.91 CM
E/E' RATIO: 24.62 M/S
ECHO LV POSTERIOR WALL: 0.73 CM (ref 0.6–1.1)
EOSINOPHIL # BLD AUTO: 0 K/UL (ref 0–0.5)
EOSINOPHIL NFR BLD: 0 % (ref 0–8)
EP: 8
ERYTHROCYTE [DISTWIDTH] IN BLOOD BY AUTOMATED COUNT: 14.1 % (ref 11.5–14.5)
ERYTHROCYTE [SEDIMENTATION RATE] IN BLOOD BY WESTERGREN METHOD: 14 MM/H
EST. GFR  (AFRICAN AMERICAN): 57.3 ML/MIN/1.73 M^2
EST. GFR  (NON AFRICAN AMERICAN): 49.7 ML/MIN/1.73 M^2
FIO2: 28
FRACTIONAL SHORTENING: 35 % (ref 28–44)
GLUCOSE SERPL-MCNC: 97 MG/DL (ref 70–110)
HCO3 UR-SCNC: 42.3 MMOL/L (ref 24–28)
HCT VFR BLD AUTO: 43.8 % (ref 37–48.5)
HDLC SERPL-MCNC: 90 MG/DL (ref 40–75)
HDLC SERPL: 47.4 % (ref 20–50)
HGB BLD-MCNC: 13.7 G/DL (ref 12–16)
IMM GRANULOCYTES # BLD AUTO: 0.04 K/UL (ref 0–0.04)
IMM GRANULOCYTES NFR BLD AUTO: 0.4 % (ref 0–0.5)
INTERVENTRICULAR SEPTUM: 1.3 CM (ref 0.6–1.1)
IP: 15
IVRT: 0.1 MSEC
LA MAJOR: 5.1 CM
LA MINOR: 5.08 CM
LA WIDTH: 3.92 CM
LDLC SERPL CALC-MCNC: 77.6 MG/DL (ref 63–159)
LEFT ATRIUM SIZE: 3.48 CM
LEFT ATRIUM VOLUME INDEX: 44.8 ML/M2
LEFT ATRIUM VOLUME: 59.02 CM3
LEFT INTERNAL DIMENSION IN SYSTOLE: 2.44 CM (ref 2.1–4)
LEFT VENTRICLE DIASTOLIC VOLUME INDEX: 45.62 ML/M2
LEFT VENTRICLE DIASTOLIC VOLUME: 60.07 ML
LEFT VENTRICLE MASS INDEX: 89 G/M2
LEFT VENTRICLE SYSTOLIC VOLUME INDEX: 15.9 ML/M2
LEFT VENTRICLE SYSTOLIC VOLUME: 20.98 ML
LEFT VENTRICULAR INTERNAL DIMENSION IN DIASTOLE: 3.75 CM (ref 3.5–6)
LEFT VENTRICULAR MASS: 117.38 G
LV LATERAL E/E' RATIO: 26.67 M/S
LV SEPTAL E/E' RATIO: 22.86 M/S
LYMPHOCYTES # BLD AUTO: 0.5 K/UL (ref 1–4.8)
LYMPHOCYTES NFR BLD: 5.9 % (ref 18–48)
MCH RBC QN AUTO: 30.5 PG (ref 27–31)
MCHC RBC AUTO-ENTMCNC: 31.3 G/DL (ref 32–36)
MCV RBC AUTO: 98 FL (ref 82–98)
MIN VOL: 9.5
MODE: ABNORMAL
MONOCYTES # BLD AUTO: 0.4 K/UL (ref 0.3–1)
MONOCYTES NFR BLD: 3.8 % (ref 4–15)
MV MEAN GRADIENT: 6 MMHG
MV PEAK E VEL: 1.6 M/S
MV STENOSIS PRESSURE HALF TIME: 60 MS
MV VALVE AREA P 1/2 METHOD: 3.67 CM2
NEUTROPHILS # BLD AUTO: 8.3 K/UL (ref 1.8–7.7)
NEUTROPHILS NFR BLD: 89.8 % (ref 38–73)
NONHDLC SERPL-MCNC: 100 MG/DL
NRBC BLD-RTO: 0 /100 WBC
PCO2 BLDA: 61.6 MMHG (ref 35–45)
PH SMN: 7.45 [PH] (ref 7.35–7.45)
PISA TR MAX VEL: 3.3 M/S
PLATELET # BLD AUTO: 150 K/UL (ref 150–350)
PMV BLD AUTO: 10.3 FL (ref 9.2–12.9)
PO2 BLDA: 67 MMHG (ref 80–100)
POC BE: 18 MMOL/L
POC SATURATED O2: 93 % (ref 95–100)
POC TCO2: 44 MMOL/L (ref 23–27)
POTASSIUM SERPL-SCNC: 3.6 MMOL/L (ref 3.5–5.1)
PROCALCITONIN SERPL IA-MCNC: 0.06 NG/ML
RA MAJOR: 4.21 CM
RA PRESSURE: 8 MMHG
RA WIDTH: 3.45 CM
RBC # BLD AUTO: 4.49 M/UL (ref 4–5.4)
RIGHT VENTRICULAR END-DIASTOLIC DIMENSION: 3.13 CM
RV TISSUE DOPPLER FREE WALL SYSTOLIC VELOCITY 1 (APICAL 4 CHAMBER VIEW): 12.15 CM/S
SAMPLE: ABNORMAL
SINUS: 2.19 CM
SITE: ABNORMAL
SODIUM SERPL-SCNC: 143 MMOL/L (ref 136–145)
SP02: 95
SPONT RATE: 24
STJ: 1.91 CM
TDI LATERAL: 0.06 M/S
TDI SEPTAL: 0.07 M/S
TDI: 0.07 M/S
TR MAX PG: 44 MMHG
TRICUSPID ANNULAR PLANE SYSTOLIC EXCURSION: 1.76 CM
TRIGL SERPL-MCNC: 112 MG/DL (ref 30–150)
TV REST PULMONARY ARTERY PRESSURE: 52 MMHG
WBC # BLD AUTO: 9.23 K/UL (ref 3.9–12.7)

## 2019-07-07 PROCEDURE — 25000242 PHARM REV CODE 250 ALT 637 W/ HCPCS: Performed by: PHYSICIAN ASSISTANT

## 2019-07-07 PROCEDURE — 20600001 HC STEP DOWN PRIVATE ROOM

## 2019-07-07 PROCEDURE — 63600175 PHARM REV CODE 636 W HCPCS

## 2019-07-07 PROCEDURE — 94761 N-INVAS EAR/PLS OXIMETRY MLT: CPT

## 2019-07-07 PROCEDURE — 94660 CPAP INITIATION&MGMT: CPT

## 2019-07-07 PROCEDURE — 25000242 PHARM REV CODE 250 ALT 637 W/ HCPCS: Performed by: STUDENT IN AN ORGANIZED HEALTH CARE EDUCATION/TRAINING PROGRAM

## 2019-07-07 PROCEDURE — 82803 BLOOD GASES ANY COMBINATION: CPT

## 2019-07-07 PROCEDURE — 84145 PROCALCITONIN (PCT): CPT

## 2019-07-07 PROCEDURE — 80061 LIPID PANEL: CPT

## 2019-07-07 PROCEDURE — 94640 AIRWAY INHALATION TREATMENT: CPT

## 2019-07-07 PROCEDURE — 36600 WITHDRAWAL OF ARTERIAL BLOOD: CPT

## 2019-07-07 PROCEDURE — 99900035 HC TECH TIME PER 15 MIN (STAT)

## 2019-07-07 PROCEDURE — 99233 PR SUBSEQUENT HOSPITAL CARE,LEVL III: ICD-10-PCS | Mod: ,,, | Performed by: INTERNAL MEDICINE

## 2019-07-07 PROCEDURE — 27000221 HC OXYGEN, UP TO 24 HOURS

## 2019-07-07 PROCEDURE — 25000242 PHARM REV CODE 250 ALT 637 W/ HCPCS: Performed by: HOSPITALIST

## 2019-07-07 PROCEDURE — 63600175 PHARM REV CODE 636 W HCPCS: Performed by: HOSPITALIST

## 2019-07-07 PROCEDURE — 25000003 PHARM REV CODE 250: Performed by: PHYSICIAN ASSISTANT

## 2019-07-07 PROCEDURE — 99233 SBSQ HOSP IP/OBS HIGH 50: CPT | Mod: ,,, | Performed by: INTERNAL MEDICINE

## 2019-07-07 PROCEDURE — 85025 COMPLETE CBC W/AUTO DIFF WBC: CPT

## 2019-07-07 PROCEDURE — 80048 BASIC METABOLIC PNL TOTAL CA: CPT

## 2019-07-07 PROCEDURE — 63600175 PHARM REV CODE 636 W HCPCS: Performed by: STUDENT IN AN ORGANIZED HEALTH CARE EDUCATION/TRAINING PROGRAM

## 2019-07-07 RX ORDER — LEVOFLOXACIN 750 MG/1
750 TABLET ORAL EVERY OTHER DAY
Status: COMPLETED | OUTPATIENT
Start: 2019-07-08 | End: 2019-07-10

## 2019-07-07 RX ORDER — PREDNISONE 10 MG/1
40 TABLET ORAL DAILY
Status: DISCONTINUED | OUTPATIENT
Start: 2019-07-07 | End: 2019-07-08

## 2019-07-07 RX ORDER — IPRATROPIUM BROMIDE AND ALBUTEROL SULFATE 2.5; .5 MG/3ML; MG/3ML
3 SOLUTION RESPIRATORY (INHALATION) EVERY 8 HOURS
Status: DISCONTINUED | OUTPATIENT
Start: 2019-07-07 | End: 2019-07-11 | Stop reason: HOSPADM

## 2019-07-07 RX ORDER — FUROSEMIDE 40 MG/1
40 TABLET ORAL DAILY
Status: DISCONTINUED | OUTPATIENT
Start: 2019-07-07 | End: 2019-07-07

## 2019-07-07 RX ORDER — ENOXAPARIN SODIUM 100 MG/ML
30 INJECTION SUBCUTANEOUS EVERY 24 HOURS
Status: DISCONTINUED | OUTPATIENT
Start: 2019-07-07 | End: 2019-07-11 | Stop reason: HOSPADM

## 2019-07-07 RX ORDER — FUROSEMIDE 40 MG/1
40 TABLET ORAL DAILY
Status: DISCONTINUED | OUTPATIENT
Start: 2019-07-08 | End: 2019-07-11 | Stop reason: HOSPADM

## 2019-07-07 RX ADMIN — HYDRALAZINE HYDROCHLORIDE 5 MG: 20 INJECTION INTRAMUSCULAR; INTRAVENOUS at 06:07

## 2019-07-07 RX ADMIN — ENOXAPARIN SODIUM 30 MG: 100 INJECTION SUBCUTANEOUS at 04:07

## 2019-07-07 RX ADMIN — IPRATROPIUM BROMIDE AND ALBUTEROL SULFATE 3 ML: .5; 3 SOLUTION RESPIRATORY (INHALATION) at 11:07

## 2019-07-07 RX ADMIN — IPRATROPIUM BROMIDE AND ALBUTEROL SULFATE 3 ML: .5; 3 SOLUTION RESPIRATORY (INHALATION) at 05:07

## 2019-07-07 RX ADMIN — FERROUS SULFATE TAB EC 325 MG (65 MG FE EQUIVALENT) 325 MG: 325 (65 FE) TABLET DELAYED RESPONSE at 10:07

## 2019-07-07 RX ADMIN — POLYETHYLENE GLYCOL 3350 17 G: 17 POWDER, FOR SOLUTION ORAL at 08:07

## 2019-07-07 RX ADMIN — ROPINIROLE HYDROCHLORIDE 0.25 MG: 0.25 TABLET, FILM COATED ORAL at 10:07

## 2019-07-07 RX ADMIN — THERA TABS 1 TABLET: TAB at 08:07

## 2019-07-07 RX ADMIN — METHYLPREDNISOLONE SODIUM SUCCINATE 40 MG: 40 INJECTION, POWDER, FOR SOLUTION INTRAMUSCULAR; INTRAVENOUS at 08:07

## 2019-07-07 RX ADMIN — TIOTROPIUM BROMIDE 18 MCG: 18 CAPSULE ORAL; RESPIRATORY (INHALATION) at 10:07

## 2019-07-07 RX ADMIN — IPRATROPIUM BROMIDE AND ALBUTEROL SULFATE 3 ML: .5; 3 SOLUTION RESPIRATORY (INHALATION) at 04:07

## 2019-07-07 RX ADMIN — ASPIRIN 81 MG CHEWABLE TABLET 81 MG: 81 TABLET CHEWABLE at 08:07

## 2019-07-07 RX ADMIN — AMLODIPINE BESYLATE 2.5 MG: 2.5 TABLET ORAL at 11:07

## 2019-07-07 RX ADMIN — PREDNISONE 40 MG: 20 TABLET ORAL at 02:07

## 2019-07-07 RX ADMIN — IPRATROPIUM BROMIDE AND ALBUTEROL SULFATE 3 ML: .5; 3 SOLUTION RESPIRATORY (INHALATION) at 08:07

## 2019-07-07 NOTE — PLAN OF CARE
Problem: Adult Inpatient Plan of Care  Goal: Plan of Care Review  Outcome: Ongoing (interventions implemented as appropriate)  No significant events this shift. VSS. Remains on Bipap 15/8 28%, off for 2hours (on 3L NC) to eat, tolerated well. ABG WNL this AM. SR via tele monitoring. Purewick in place with adequate UO. No complaints of pain or resp distress. AAOX. Free from falls and injury this shift. Plan of care reviewed with patient and family, all questions answered. Will monitor.

## 2019-07-07 NOTE — ASSESSMENT & PLAN NOTE
Contributing Nutrition Diagnosis  Malnutrition in the context of Acute Illness/Injury    Related to (etiology):  Inadequate energy intake    Signs and Symptoms (as evidenced by):  Energy Intake: <75% of estimated energy requirement for 1 month  Body Fat Depletion: mild depletion of orbitals and triceps   Muscle Mass Depletion: mild and moderate depletion of temples, clavicle region, interosseous muscle and lower extremities   Weight Loss: 24.2% x 1 month (likely some fluid related)     Interventions/Recommendations (treatment strategy):  Collaboration of nutrition care with other providers    Nutrition Diagnosis Status:  New

## 2019-07-07 NOTE — RESIDENT HANDOFF
Handoff     Primary Team: Networked reference to record [facility]  Room Number: 6093/6093 A     Patient Name: Venus Mayer MRN: [id number]     Date of Birth: [id number] Allergies: Sulfamethoxazole-trimethoprim; Cefazolin; and Cefuroxime     Age: 89 y.o. Admit Date: 7/6/2019     Sex: female  BMI: Body mass index is 17.19 kg/m².     Code Status: DNR        Illness Level (current clinical status): Watcher - no    Reason for Admission: Acute on chronic congestive heart failure    Brief HPI : 89 y/o WF with PMH of COPD (on 2.5 L home O2), HTN, HLD, recurrent spontaneous pneumothorax (recently admitted 5/22/19 with large right pneumothorax requiring chest tube and pleurodesis on 5/27/19 and small recurrence on 6/25/19 not requiring intervention), left hip fracture (4 months ago), HOCM s/p AICD, and cartoid artery stenosis s/p L CEA in 2008 presents complaining of worsening SOB beginning at 3 AM this morning. Pt lives with her son who assists with the history. Pt woke son up at 3 AM this morning for SOB. He increased her home O2 to 3L NC and sat her up for 15 minutes with no improvement in the SOB. Pt told son she needed to go to the hospital. Of note, she was seen in the ED on 7/4/19 for same complaint. She was diagnosed with COPD exacerbation. CXR could not exclude PNA. Pt sent home with improved symptoms after duoneb treatment with prednisone 20 mg bid for 5 days and azithromycin 250 mg. Per daughter, patient took first doses of medication yesterday (7/5/19). Daughter also reports pt decreased lasix from 40 mg daily to 20 mg daily 1 week ago due to frequent urination after speaking with provider. Pt endorses chronic dry cough for past 4 months, unchanged recently and intermittent peripheral edema (improved on this admission). She has a 40+ pack year history, quit 30 years ago. Denies fever/chills, CP, leg swelling, wheezing, orthopnea, PND, N/V/D, abdominal pain, dizziness, syncope, urinary symptoms.      In the ED:  O2 sat at 96%  on arrival with 4L NC, weaned to home setting of 2.5L with sat maintained at 97%. BP 150s/70s. Afebrile without leukocytosis. Trop 0.133 (0.166 on 7/4/19). EKG with NSR, RBBB, and biatrial enlargement. BNP with significant increase 2,573 (1,652 on 7/4/19). CXR with bilateral basilar infiltrates and right pleural effusion. Given duoneb, lasix 40 mg IV, and azithromycin. She was admitted to hospital medicine on the early morning of 7/5      Hospital Course:  Brought to the ICU on Bipap. Continued diuresis and steroids/duonebs/levoquin resulted in respiratory improvement. She was transitioned back to nasal canula and is currently on home O2. Will step down to hospital medicine today.    Tasks:   -Acute on Chronic hypercapnic respiratory failure: likely mixed picture of volume overload and COPD exacerbation. Currently on home oxygen 2L.  --transitioned to home lasix dose 40mg daily  --continue Levaquin x 7 days, prednisone x 5 days, and duonebs  --Bipap PRN nightly    -COPD: continue Spiriva/duonebs    -HFpEF: continue home lasix 40mg        Estimated Discharge Date: TBD    Discharge Disposition: TBD

## 2019-07-07 NOTE — NURSING TRANSFER
Nursing Transfer Note      7/7/2019     Transfer To: [id number] from 6093     Transfer via stretcher    Transfer with cardiac monitoring, 1L NC     Transported by Fish, JULES     Medicines sent: Spiriva     Chart send with patient: Yes    Notified: daughter    Patient reassessed at: 1600; 7/7/2019     Upon arrival to floor: cardiac monitor applied, patient oriented to room, call bell in reach and bed in lowest position

## 2019-07-07 NOTE — PLAN OF CARE
Problem: Adult Inpatient Plan of Care  Goal: Plan of Care Review  Outcome: Ongoing (interventions implemented as appropriate)     07/07/19 0231   Plan of Care Review   Plan of Care Reviewed With patient;daughter   Progress improving   POC reviewed with patient and daughters. VSS. A&O x 3. Disoriented sometimes to place/situation. Pt on 1.5L O2 via nasal cannula. Sats remained between 88%-92% per orders. Family to remain at bedside. Pt currently resting peacefully in bed. Call light within reach. No s/s of distress observed. WCTM.

## 2019-07-07 NOTE — ASSESSMENT & PLAN NOTE
Patient admitted worsening SOB that started at 3AM on day of admission, on home oxygen of 2L  Noted to be in worsening respiratory distress throughout day  Started on CPAP but later transitioned to Bipap after ABG showing worsening hypercapnia  Given lasix 120mg IV, displayed urinary response but no accurate I/O's obtained, no improvement in respiratory distress  CXR showing bilateral basilar infiltrates and right pleural effusion   Started on enoxaparin 1 mg/kg BID for possible PE  DDX: etiology likely due to COPD exacerbation vs PNA vs CHF vs PE, favor COPD exacerbation as patient was found to be hypercapnic    Plan:  -would not continue to diuresis as patient appears dry on exam and no improvement in respiratory status from lasix  -start methylprednisolone 40mg IV q8hrs  -continue levaquin of possible PNA coverage  -schedule duonebs   -respiratory and viral panel pending

## 2019-07-07 NOTE — PLAN OF CARE
Problem: Adult Inpatient Plan of Care  Goal: Plan of Care Review  Recommendations  Recommendation/Intervention:   1. Encourage increased PO intake as tolerated.   2. Recommend adding Boost Plus OS to all meals.   3. Recommend advancing diet to Cardiac with texture per SLP.   RD to monitor.    Goals: Patient to consume > 75% of meals by RD follow-up  Nutrition Goal Status: new    Full assessment completed, see RD Note 7/7/2019.

## 2019-07-07 NOTE — NURSING
Pt arrived to floor via bed with telemetry attached, family at bedside. 1.5L of O2 via N/C. VSS. Pt resting comfortably in bed. Call light within reach. No s/s of distress observed. HECTOR.   Statement Selected

## 2019-07-07 NOTE — ASSESSMENT & PLAN NOTE
Noted to have a admission in 5/22/19 for large right-sided pneumothorax requiring chest tube placement and pleurodesis on 5/27 by CTS  Admitted again in 6/27/19 for repeat small right sided pneumothorax that stabilized without intervention    -will continue monitor

## 2019-07-07 NOTE — ASSESSMENT & PLAN NOTE
Echo from 5/2019 showing 65%  CXR showing possible pulmonary edema, BNP in 2000's  Noted to be in acute respiratory distress shortly after admission  Given lasix 120mg IV with urinary response but unable to assess accurate I/O's    Plan:  -would avoid continued diuresis as patient now not appearing clinically overloaded on exam  -Repeat echo ordered and pending

## 2019-07-07 NOTE — PT/OT/SLP PROGRESS
Physical Therapy      Patient Name:  Venus Mayer   MRN:  [id number]    Patient not seen today secondary to (pt. in process of changing rooms to another floor). Will follow-up tomorrow.    Juan Luis Ahumada, PT   7/7/2019

## 2019-07-07 NOTE — ASSESSMENT & PLAN NOTE
Noted to have a admission in 5/22/19 for large right-sided pneumothorax requiring chest tube placement and pleurodesis on 5/27 by CTS  Admitted again in 6/27/19 for repeat small right sided pneumothorax that stabilized without intervention  Reported to be in respiratory distress in AM on day of admission  No pneumothorax on repeat CXR  Unlikely to be contributing to present respiratory distress    -will continue monitor

## 2019-07-07 NOTE — HOSPITAL COURSE
7/5: In the morning, she was noted to be tachypnic with increased work of breathing. She was initially placed on CPAP. Xray showed concern for possible pulmonary edema. She was given   ABG later showed hypercapnia with CO2 70 and pH 7.38. She was placed on Bipap, with repeat ABG showing worsening hypercapnia with CO2 of 79 and pH 7.37. ICU was consulted and she was admitted to critical care for hypercapnic respiratory failure.

## 2019-07-07 NOTE — PLAN OF CARE
[facility] ICU Acceptance Note    Date of Admit: 7/6/2019  Date of Transfer / Stepdown: 7/7/2019  ICU team stepping patient down: MICU,   ICU team member giving verbal handoff: Margret Martin MD. [id number]  Accepting HM team: A    Brief History of Present Illness:      Venus Mayer is a 89 y/o woman with COPD admitted for concerns of acute on chronic hypercapnic respiratory failure secondary to COPD Exacerbation and CHF exacerbation       Hospital/ICU Course:     Patient known to me, required transfer yesterday for progressive hypercapnic respiratory failure and increased work of breathing requiring non-invasive ventilation - BIPAP.      Patient went to ICU, was restarted on COPD exacerbation therapy - scheduled nebulizers, glucocorticoids, BIPAP continued and weaned off today to nasal cannula.     Patient had received High dose diuretics and was incontinent of urine on the floor and tapered back to normal home dose lasix.      ECHO is pending.         Consultants and Procedures:     Consultants:  n/a    Procedures:    ECHO is pending  BIPAP    Transfer Information:     Diet:  Full Liquid    Physical Activity:  PT/OT consults pending     To Do / Pending Studies / Follow ups:  -F/u ECHO results   -PT/OT Recs   -continue COPD & CHF treatment and taper/adjust as needed.       Patient has been accepted by [facility] Team A, who will assume care of the patient upon arrival to the floor from the ICU. Please contact ICU team with any concerns prior to arrival. Please contact [facility] at [phone] or [phone] (please do NOT leave a voicemail) when patient arrives to the floor.        Cesar Wilkins M.D.  Attending Physician  [facility] Dept.  Pager: [phone]  Spectralink -[phone]

## 2019-07-07 NOTE — CONSULTS
[facility]  Adult Nutrition  Consult Note    SUMMARY       Recommendations  Recommendation/Intervention:   1. Encourage increased PO intake as tolerated.   2. Recommend adding Boost Plus OS to all meals.   3. Recommend advancing diet to Cardiac with texture per SLP.   RD to monitor.    Goals: Patient to consume > 75% of meals by RD follow-up  Nutrition Goal Status: new  Communication of RD Recs: (POC)    Reason for Assessment  Reason For Assessment: consult  Diagnosis: cardiac disease(CHF)  Relevant Medical History: COPD, HTN, HLD  General Information Comments: RR yesterday, transferred to ICU. Requiring BiPAP but taken off for a few hours to eat. On FL diet, tolerating well. NFPE completed, patient with fat and muscle wasting. Per chart review, patient with weight loss over the past month. Patient with acute malnturition.  Nutrition Discharge Planning: Adequate nutrition via PO intake.    Nutrition Risk Screen  Nutrition Risk Screen: no indicators present    Nutrition/Diet History  Spiritual, Cultural Beliefs, Faith Practices, Values that Affect Care: no  Factors Affecting Nutritional Intake: (respiratory status and FL diet)    Anthropometrics  Temp: 97.6 °F (36.4 °C)  Height Method: Stated  Height: 5' (152.4 cm)  Height (inches): 60 in  Weight Method: Bed Scale  Weight: 40.2 kg (88 lb 10 oz)  Weight (lb): 88.63 lb  Ideal Body Weight (IBW), Female: 100 lb  % Ideal Body Weight, Female (lb): 91.71 lb  BMI (Calculated): 17.9  BMI Grade: 17 - 18.4 protein-energy malnutrition grade I  Usual Body Weight (UBW), k.1 kg(per chart review 19)  % Usual Body Weight: 75.86  % Weight Change From Usual Weight: -24.29 %    Lab/Procedures/Meds  Pertinent Labs Reviewed: reviewed  Pertinent Labs Comments: Cl 91  Pertinent Medications Reviewed: reviewed  Pertinent Medications Comments: ferrous sulfate, methylprednisolone, MVI    Estimated/Assessed Needs  Weight Used For Calorie Calculations: 40.2 kg (88  lb 10 oz)  Energy Calorie Requirements (kcal): 9315-1008 kcal/day  Energy Need Method: Kcal/kg(30-35)  Protein Requirements: 48-56 g/day(1.2-1.4 g/kg)  Weight Used For Protein Calculations: 40.2 kg (88 lb 10 oz)  Fluid Requirements (mL): 1 mL/kcal or per MD  Estimated Fluid Requirement Method: RDA Method  RDA Method (mL): 1206    Nutrition Prescription Ordered  Current Diet Order: Full Liquid    Evaluation of Received Nutrient/Fluid Intake  I/O: Noted  Comments: LBM 7/4  % Intake of Estimated Energy Needs: 25 - 50 %  % Meal Intake: 25 - 50 %    Nutrition Risk  Level of Risk/Frequency of Follow-up: high(2x/week)     Assessment and Plan  Severe malnutrition  Contributing Nutrition Diagnosis  Malnutrition in the context of Acute Illness/Injury    Related to (etiology):  Inadequate energy intake    Signs and Symptoms (as evidenced by):  Energy Intake: <75% of estimated energy requirement for 1 month  Body Fat Depletion: mild depletion of orbitals and triceps   Muscle Mass Depletion: mild and moderate depletion of temples, clavicle region, interosseous muscle and lower extremities   Weight Loss: 24.2% x 1 month (likely some fluid related)     Interventions/Recommendations (treatment strategy):  Collaboration of nutrition care with other providers    Nutrition Diagnosis Status:  New    Monitor and Evaluation  Food and Nutrient Intake: energy intake, food and beverage intake  Food and Nutrient Adminstration: diet order  Physical Activity and Function: nutrition-related ADLs and IADLs  Anthropometric Measurements: weight, weight change  Biochemical Data, Medical Tests and Procedures: electrolyte and renal panel, gastrointestinal profile, inflammatory profile  Nutrition-Focused Physical Findings: overall appearance     Malnutrition Assessment  Malnutrition Type: acute illness or injury  Orbital Region (Subcutaneous Fat Loss): mild depletion  Upper Arm Region (Subcutaneous Fat Loss): mild depletion  Thoracic and Lumbar  Region: well nourished   [place] Region (Muscle Loss): moderate depletion  Clavicle Bone Region (Muscle Loss): moderate depletion  Clavicle and Acromion Bone Region (Muscle Loss): mild depletion  Dorsal Hand (Muscle Loss): mild depletion  Patellar Region (Muscle Loss): mild depletion  Anterior Thigh Region (Muscle Loss): mild depletion  Posterior Calf Region (Muscle Loss): mild depletion     Nutrition Follow-Up  RD Follow-up?: Yes

## 2019-07-07 NOTE — SUBJECTIVE & OBJECTIVE
Past Medical History:   Diagnosis Date    Acute on chronic congestive heart failure 2019    Cardiomyopathy     Carotid artery occlusion     COPD (chronic obstructive pulmonary disease)     Hyperlipidemia     Hypertension        Past Surgical History:   Procedure Laterality Date    CARDIAC CATHETERIZATION      CAROTID ENDARTERECTOMY         Review of patient's allergies indicates:   Allergen Reactions    Sulfamethoxazole-trimethoprim      Other reaction(s): Rash    Cefazolin Rash    Cefuroxime Rash       Family History     Problem Relation (Age of Onset)    Hypertension Mother        Tobacco Use    Smoking status: Former Smoker     Packs/day: 2.00     Years: 20.00     Pack years: 40.00     Types: Cigarettes     Last attempt to quit: 1980     Years since quittin.4    Smokeless tobacco: Never Used   Substance and Sexual Activity    Alcohol use: Yes     Alcohol/week: 1.2 oz     Types: 2 Glasses of wine per week     Comment: social    Drug use: No    Sexual activity: Not Currently      Review of Systems   Constitutional: Negative for chills and fever.   HENT: Negative for congestion.    Respiratory: Positive for shortness of breath. Negative for chest tightness and wheezing.    Cardiovascular: Negative for chest pain and leg swelling.   Gastrointestinal: Negative for abdominal pain, diarrhea, nausea and vomiting.   Genitourinary: Negative for dysuria.   Musculoskeletal: Negative for arthralgias.   Neurological: Negative for dizziness, light-headedness and headaches.     Objective:     Vital Signs (Most Recent):  Temp: 97.4 °F (36.3 °C) (19 1548)  Pulse: 95 (19 1840)  Resp: (!) 24 (19 1840)  BP: (!) 157/74 (19 1622)  SpO2: 97 % (19 1840) Vital Signs (24h Range):  Temp:  [96.2 °F (35.7 °C)-97.6 °F (36.4 °C)] 97.4 °F (36.3 °C)  Pulse:  [] 95  Resp:  [18-37] 24  SpO2:  [85 %-99 %] 97 %  BP: (120-201)/() 157/74   Weight: 41.6 kg (91 lb 11.4 oz)  Body mass  index is 17.91 kg/m².      Intake/Output Summary (Last 24 hours) at 7/6/2019 1943  Last data filed at 7/6/2019 0717  Gross per 24 hour   Intake 250 ml   Output --   Net 250 ml       Physical Exam   Constitutional: She is oriented to person, place, and time. She appears distressed.   Frail appearing elderly female   HENT:   Head: Normocephalic and atraumatic.   Eyes: Pupils are equal, round, and reactive to light. Right eye exhibits no discharge. Left eye exhibits no discharge.   Cardiovascular: Normal rate and regular rhythm. Exam reveals no friction rub.   No murmur heard.  Pulmonary/Chest: She is in respiratory distress. She has no wheezes. She has no rales.   Labored breathing with use of accessory muscles, currently on Bipap   Abdominal: Soft. There is no tenderness. There is no rebound and no guarding.   Musculoskeletal: She exhibits no edema.   Neurological: She is alert and oriented to person, place, and time.   Skin: No erythema.   Nursing note and vitals reviewed.      Vents:  Oxygen Concentration (%): 28 (07/06/19 1840)  Lines/Drains/Airways     Drain            Female External Urinary Catheter 07/06/19 0730 less than 1 day          Peripheral Intravenous Line                 Peripheral IV - Single Lumen 07/06/19 0418 20 G Right Hand less than 1 day              Significant Labs:    CBC/Anemia Profile:  Recent Labs   Lab 07/04/19 2315 07/06/19  0532 07/06/19  0616 07/06/19  1625   WBC 10.17  --  10.91 15.55*   HGB 13.5  --  11.4* 13.9   HCT 42.5 39 36.4* 45.1     --  140* 177   MCV 97  --  99* 100*   RDW 14.0  --  14.4 14.5        Chemistries:  Recent Labs   Lab 07/04/19  2315 07/06/19  0524 07/06/19  1625    138 144   K 3.6 4.4 3.5   CL 95 94* 91*   CO2 33* 32* 40*   BUN 19 31* 30*   CREATININE 0.9 0.9 0.9   CALCIUM 9.9 10.1 9.9   ALBUMIN 3.6  --  3.7   PROT 6.5  --  7.0   BILITOT 0.5  --  0.6   ALKPHOS 95  --  96   ALT 14  --  25   AST 27  --  35   MG  --  2.5  --

## 2019-07-07 NOTE — ASSESSMENT & PLAN NOTE
Patient admitted worsening SOB that started at 3AM on day of admission, on home oxygen of 2L  Noted to be in worsening respiratory distress throughout day  Started on CPAP but later transitioned to Bipap after ABG showing worsening hypercapnia  Given lasix 120mg IV, displayed urinary response but no accurate I/O's obtained, no improvement in respiratory distress  CXR showing bilateral basilar infiltrates and right pleural effusion   Started on enoxaparin 1 mg/kg BID for possible PE  DDX: etiology likely due to COPD exacerbation vs PNA vs CHF vs PE, favor COPD exacerbation as patient was found to be hypercapnic    Plan:  -continue prednisone 40mg daily x 5 days  -continue levaquin for 7 days  -schedule duonebs   -respiratory and viral panel pending

## 2019-07-07 NOTE — H&P
[facility]  Critical Care Medicine  History & Physical    Patient Name: Venus Mayer  MRN: [id number]  Admission Date: 7/6/2019  Hospital Length of Stay: 0 days  Code Status: DNR  Attending Physician: Yolie Cedillo MD   Primary Care Provider: Jona Che MD   Principal Problem: Acute on chronic congestive heart failure    Subjective:     HPI:   89 y/o WF with PMH of COPD (on 2.5 L home O2), HTN, HLD, recurrent spontaneous pneumothorax (recently admitted 5/22/19 with large right pneumothorax requiring chest tube and pleurodesis on 5/27/19 and small recurrence on 6/25/19 not requiring intervention), left hip fracture (4 months ago), HOCM s/p AICD, and cartoid artery stenosis s/p L CEA in 2008 presents complaining of worsening SOB beginning at 3 AM this morning. Pt lives with her son who assists with the history. Pt woke son up at 3 AM this morning for SOB. He increased her home O2 to 3L NC and sat her up for 15 minutes with no improvement in the SOB. Pt told son she needed to go to the hospital. Of note, she was seen in the ED on 7/4/19 for same complaint. She was diagnosed with COPD exacerbation. CXR could not exclude PNA. Pt sent home with improved symptoms after duoneb treatment with prednisone 20 mg bid for 5 days and azithromycin 250 mg. Per daughter, patient took first doses of medication yesterday (7/5/19). Daughter also reports pt decreased lasix from 40 mg daily to 20 mg daily 1 week ago due to frequent urination after speaking with provider. Pt endorses chronic dry cough for past 4 months, unchanged recently and intermittent peripheral edema (improved on this admission). She has a 40+ pack year history, quit 30 years ago. Denies fever/chills, CP, leg swelling, wheezing, orthopnea, PND, N/V/D, abdominal pain, dizziness, syncope, urinary symptoms.      In the ED:  O2 sat at 96% on arrival with 4L NC, weaned to home setting of 2.5L with sat maintained at 97%. BP 150s/70s. Afebrile  without leukocytosis. Trop 0.133 (0.166 on 19). EKG with NSR, RBBB, and biatrial enlargement. BNP with significant increase 2,573 (1,652 on 19). CXR with bilateral basilar infiltrates and right pleural effusion. Given duoneb, lasix 40 mg IV, and azithromycin. She was admitted to hospital medicine on the early morning of .           Hospital/ICU Course:  : In the morning, she was noted to be tachypnic with increased work of breathing. She was initially placed on CPAP. Xray showed concern for possible pulmonary edema. She was given   ABG later showed hypercapnia with CO2 70 and pH 7.38. She was placed on Bipap, with repeat ABG showing worsening hypercapnia with CO2 of 79 and pH 7.37. ICU was consulted and she was admitted to critical care for hypercapnic respiratory failure.     Past Medical History:   Diagnosis Date    Acute on chronic congestive heart failure 2019    Cardiomyopathy     Carotid artery occlusion     COPD (chronic obstructive pulmonary disease)     Hyperlipidemia     Hypertension        Past Surgical History:   Procedure Laterality Date    CARDIAC CATHETERIZATION      CAROTID ENDARTERECTOMY         Review of patient's allergies indicates:   Allergen Reactions    Sulfamethoxazole-trimethoprim      Other reaction(s): Rash    Cefazolin Rash    Cefuroxime Rash       Family History     Problem Relation (Age of Onset)    Hypertension Mother        Tobacco Use    Smoking status: Former Smoker     Packs/day: 2.00     Years: 20.00     Pack years: 40.00     Types: Cigarettes     Last attempt to quit: 1980     Years since quittin.4    Smokeless tobacco: Never Used   Substance and Sexual Activity    Alcohol use: Yes     Alcohol/week: 1.2 oz     Types: 2 Glasses of wine per week     Comment: social    Drug use: No    Sexual activity: Not Currently      Review of Systems   Constitutional: Negative for chills and fever.   HENT: Negative for congestion.    Respiratory:  Positive for shortness of breath. Negative for chest tightness and wheezing.    Cardiovascular: Negative for chest pain and leg swelling.   Gastrointestinal: Negative for abdominal pain, diarrhea, nausea and vomiting.   Genitourinary: Negative for dysuria.   Musculoskeletal: Negative for arthralgias.   Neurological: Negative for dizziness, light-headedness and headaches.     Objective:     Vital Signs (Most Recent):  Temp: 97.4 °F (36.3 °C) (07/06/19 1548)  Pulse: 95 (07/06/19 1840)  Resp: (!) 24 (07/06/19 1840)  BP: (!) 157/74 (07/06/19 1622)  SpO2: 97 % (07/06/19 1840) Vital Signs (24h Range):  Temp:  [96.2 °F (35.7 °C)-97.6 °F (36.4 °C)] 97.4 °F (36.3 °C)  Pulse:  [] 95  Resp:  [18-37] 24  SpO2:  [85 %-99 %] 97 %  BP: (120-201)/() 157/74   Weight: 41.6 kg (91 lb 11.4 oz)  Body mass index is 17.91 kg/m².      Intake/Output Summary (Last 24 hours) at 7/6/2019 1943  Last data filed at 7/6/2019 0717  Gross per 24 hour   Intake 250 ml   Output --   Net 250 ml       Physical Exam   Constitutional: She is oriented to person, place, and time. She appears distressed.   Frail appearing elderly female   HENT:   Head: Normocephalic and atraumatic.   Eyes: Pupils are equal, round, and reactive to light. Right eye exhibits no discharge. Left eye exhibits no discharge.   Cardiovascular: Normal rate and regular rhythm. Exam reveals no friction rub.   No murmur heard.  Pulmonary/Chest: She is in respiratory distress. She has no wheezes. She has no rales.   Labored breathing with use of accessory muscles, currently on Bipap   Abdominal: Soft. There is no tenderness. There is no rebound and no guarding.   Musculoskeletal: She exhibits no edema.   Neurological: She is alert and oriented to person, place, and time.   Skin: No erythema.   Nursing note and vitals reviewed.      Vents:  Oxygen Concentration (%): 28 (07/06/19 1840)  Lines/Drains/Airways     Drain            Female External Urinary Catheter 07/06/19 0730 less  than 1 day          Peripheral Intravenous Line                 Peripheral IV - Single Lumen 07/06/19 0418 20 G Right Hand less than 1 day              Significant Labs:    CBC/Anemia Profile:  Recent Labs   Lab 07/04/19  2315 07/06/19  0532 07/06/19  0616 07/06/19  1625   WBC 10.17  --  10.91 15.55*   HGB 13.5  --  11.4* 13.9   HCT 42.5 39 36.4* 45.1     --  140* 177   MCV 97  --  99* 100*   RDW 14.0  --  14.4 14.5        Chemistries:  Recent Labs   Lab 07/04/19  2315 07/06/19  0524 07/06/19  1625    138 144   K 3.6 4.4 3.5   CL 95 94* 91*   CO2 33* 32* 40*   BUN 19 31* 30*   CREATININE 0.9 0.9 0.9   CALCIUM 9.9 10.1 9.9   ALBUMIN 3.6  --  3.7   PROT 6.5  --  7.0   BILITOT 0.5  --  0.6   ALKPHOS 95  --  96   ALT 14  --  25   AST 27  --  35   MG  --  2.5  --      Assessment/Plan:     Pulmonary  Acute on Chronic hypercapnic respiratory failure  Patient admitted worsening SOB that started at 3AM on day of admission, on home oxygen of 2L  Noted to be in worsening respiratory distress throughout day  Started on CPAP but later transitioned to Bipap after ABG showing worsening hypercapnia  Given lasix 120mg IV, displayed urinary response but no accurate I/O's obtained, no improvement in respiratory distress  CXR showing bilateral basilar infiltrates and right pleural effusion   Started on enoxaparin 1 mg/kg BID for possible PE  DDX: etiology likely due to COPD exacerbation vs PNA vs CHF vs PE, favor COPD exacerbation as patient was found to be hypercapnic    Plan:  -would not continue to diuresis as patient appears dry on exam and no improvement in respiratory status from lasix  -start methylprednisolone 40mg IV q8hrs  -continue levaquin of possible PNA coverage  -schedule duonebs   -respiratory and viral panel pending    Recurrent spontaneous pneumothorax  Noted to have a admission in 5/22/19 for large right-sided pneumothorax requiring chest tube placement and pleurodesis on 5/27 by CTS  Admitted again in  6/27/19 for repeat small right sided pneumothorax that stabilized without intervention  Reported to be in respiratory distress in AM on day of admission  No pneumothorax on repeat CXR  Unlikely to be contributing to present respiratory distress    -will continue monitor    COPD (chronic obstructive pulmonary disease)  -Continue home spirva and albuterol  -continue duonebs    Cardiac/Vascular  * Acute on chronic congestive heart failure  Echo from 5/2019 showing 65%  CXR showing possible pulmonary edema, BNP in 2000's  Noted to be in acute respiratory distress shortly after admission  Given lasix 120mg IV with urinary response but unable to assess accurate I/O's    Plan:  -would avoid continued diuresis as patient now not appearing clinically overloaded on exam  -Repeat echo ordered and pending    Orthopedic  H/O fracture of hip  History of hip fracture earlier this year  Family reports ambulates with walker    Palliative Care  DNR (do not resuscitate)  Patient is currently DNR status which has been reviewed with patient and family         Critical secondary to Patient has a condition that poses threat to life and bodily function: Severe Respiratory Distress     Critical care was time spent personally by me on the following activities: development of treatment plan with patient or surrogate and bedside caregivers, discussions with consultants, evaluation of patient's response to treatment, examination of patient, ordering and performing treatments and interventions, ordering and review of laboratory studies, ordering and review of radiographic studies, pulse oximetry, re-evaluation of patient's condition. This critical care time did not overlap with that of any other provider or involve time for any procedures.     Yudi Martin MD  Critical Care Medicine  [facility]

## 2019-07-07 NOTE — PROGRESS NOTES
[facility]  Critical Care Medicine  Progress Note    Patient Name: Venus Mayer  MRN: [id number]  Admission Date: 7/6/2019  Hospital Length of Stay: 1 days  Code Status: DNR  Attending Provider: Yolie Cedillo MD  Primary Care Provider: Jona Che MD   Principal Problem: Acute on chronic congestive heart failure    Subjective:     HPI:   89 y/o WF with PMH of COPD (on 2.5 L home O2), HTN, HLD, recurrent spontaneous pneumothorax (recently admitted 5/22/19 with large right pneumothorax requiring chest tube and pleurodesis on 5/27/19 and small recurrence on 6/25/19 not requiring intervention), left hip fracture (4 months ago), HOCM s/p AICD, and cartoid artery stenosis s/p L CEA in 2008 presents complaining of worsening SOB beginning at 3 AM this morning. Pt lives with her son who assists with the history. Pt woke son up at 3 AM this morning for SOB. He increased her home O2 to 3L NC and sat her up for 15 minutes with no improvement in the SOB. Pt told son she needed to go to the hospital. Of note, she was seen in the ED on 7/4/19 for same complaint. She was diagnosed with COPD exacerbation. CXR could not exclude PNA. Pt sent home with improved symptoms after duoneb treatment with prednisone 20 mg bid for 5 days and azithromycin 250 mg. Per daughter, patient took first doses of medication yesterday (7/5/19). Daughter also reports pt decreased lasix from 40 mg daily to 20 mg daily 1 week ago due to frequent urination after speaking with provider. Pt endorses chronic dry cough for past 4 months, unchanged recently and intermittent peripheral edema (improved on this admission). She has a 40+ pack year history, quit 30 years ago. Denies fever/chills, CP, leg swelling, wheezing, orthopnea, PND, N/V/D, abdominal pain, dizziness, syncope, urinary symptoms.      In the ED:  O2 sat at 96% on arrival with 4L NC, weaned to home setting of 2.5L with sat maintained at 97%. BP 150s/70s. Afebrile without  leukocytosis. Trop 0.133 (0.166 on 7/4/19). EKG with NSR, RBBB, and biatrial enlargement. BNP with significant increase 2,573 (1,652 on 7/4/19). CXR with bilateral basilar infiltrates and right pleural effusion. Given duoneb, lasix 40 mg IV, and azithromycin. She was admitted to hospital medicine on the early morning of 7/5.           Hospital/ICU Course:  7/5: In the morning, she was noted to be tachypnic with increased work of breathing. She was initially placed on CPAP. Xray showed concern for possible pulmonary edema. She was given   ABG later showed hypercapnia with CO2 70 and pH 7.38. She was placed on Bipap, with repeat ABG showing worsening hypercapnia with CO2 of 79 and pH 7.37. ICU was consulted and she was admitted to critical care for hypercapnic respiratory failure.    Interval History/Significant Events: no acute events overnight. Transitioned to nasal canula from Bipap. Currently on home 2L, tolerating well.    Review of Systems   Constitutional: Negative for chills and fever.   HENT: Negative for congestion.    Respiratory: Negative for chest tightness, shortness of breath and wheezing.    Cardiovascular: Negative for chest pain and leg swelling.   Gastrointestinal: Negative for abdominal pain, diarrhea, nausea and vomiting.   Genitourinary: Negative for dysuria.   Musculoskeletal: Negative for arthralgias.   Neurological: Negative for dizziness, light-headedness and headaches.     Objective:     Vital Signs (Most Recent):  Temp: 97.6 °F (36.4 °C) (07/07/19 0705)  Pulse: 102 (07/07/19 1300)  Resp: (!) 24 (07/07/19 1300)  BP: 112/85 (07/07/19 1300)  SpO2: (!) 91 % (07/07/19 1300) Vital Signs (24h Range):  Temp:  [97.4 °F (36.3 °C)-98.1 °F (36.7 °C)] 97.6 °F (36.4 °C)  Pulse:  [] 102  Resp:  [16-52] 24  SpO2:  [86 %-100 %] 91 %  BP: (112-179)/() 112/85   Weight: 39.9 kg (88 lb)  Body mass index is 17.19 kg/m².      Intake/Output Summary (Last 24 hours) at 7/7/2019 1487  Last data filed at  7/7/2019 0500  Gross per 24 hour   Intake 440 ml   Output 220 ml   Net 220 ml       Physical Exam   Constitutional: She is oriented to person, place, and time. No distress.   HENT:   Head: Normocephalic and atraumatic.   Eyes: Pupils are equal, round, and reactive to light. Right eye exhibits no discharge. Left eye exhibits no discharge.   Cardiovascular: Normal rate and regular rhythm. Exam reveals no friction rub.   No murmur heard.  Pulmonary/Chest: Effort normal. No respiratory distress. She has no wheezes. She has no rales.   On 2L nasal canula   Abdominal: Soft. There is no tenderness. There is no rebound and no guarding.   Musculoskeletal: She exhibits no edema.   Neurological: She is alert and oriented to person, place, and time.   Skin: She is not diaphoretic. No erythema.   Nursing note and vitals reviewed.      Vents:  Oxygen Concentration (%): 28 (07/07/19 0600)  Lines/Drains/Airways     Drain            Female External Urinary Catheter 07/06/19 0730 1 day          Peripheral Intravenous Line                 Peripheral IV - Single Lumen 07/06/19 0418 20 G Right Hand 1 day              Significant Labs:    CBC/Anemia Profile:  Recent Labs   Lab 07/06/19  0616 07/06/19  1625 07/07/19  0428   WBC 10.91 15.55* 9.23   HGB 11.4* 13.9 13.7   HCT 36.4* 45.1 43.8   * 177 150   MCV 99* 100* 98   RDW 14.4 14.5 14.1        Chemistries:  Recent Labs   Lab 07/06/19  0524 07/06/19  1625 07/07/19  0428    144 143   K 4.4 3.5 3.6   CL 94* 91* 91*   CO2 32* 40* 37*   BUN 31* 30* 36*   CREATININE 0.9 0.9 1.0   CALCIUM 10.1 9.9 10.0   ALBUMIN  --  3.7  --    PROT  --  7.0  --    BILITOT  --  0.6  --    ALKPHOS  --  96  --    ALT  --  25  --    AST  --  35  --    MG 2.5  --   --              ABG  Recent Labs   Lab 07/07/19  0540   PH 7.445   PO2 67*   PCO2 61.6*   HCO3 42.3*   BE 18     Assessment/Plan:     Pulmonary  Acute on Chronic hypercapnic respiratory failure  Patient admitted worsening SOB that started  at 3AM on day of admission, on home oxygen of 2L  Noted to be in worsening respiratory distress throughout day  Started on CPAP but later transitioned to Bipap after ABG showing worsening hypercapnia  Given lasix 120mg IV, displayed urinary response but no accurate I/O's obtained, no improvement in respiratory distress  CXR showing bilateral basilar infiltrates and right pleural effusion   Started on enoxaparin 1 mg/kg BID for possible PE  DDX: etiology likely due to COPD exacerbation vs PNA vs CHF vs PE, favor COPD exacerbation as patient was found to be hypercapnic    Plan:  -continue prednisone 40mg daily x 5 days  -continue levaquin for 7 days  -schedule duonebs   -respiratory and viral panel pending    Recurrent spontaneous pneumothorax  Noted to have a admission in 5/22/19 for large right-sided pneumothorax requiring chest tube placement and pleurodesis on 5/27 by CTS  Admitted again in 6/27/19 for repeat small right sided pneumothorax that stabilized without intervention    -will continue monitor    COPD (chronic obstructive pulmonary disease)  -Continue home spirva and albuterol  -continue duonebs    Cardiac/Vascular  * Acute on chronic congestive heart failure  Echo from 5/2019 showing 65%  CXR showing possible pulmonary edema, BNP in 2000's  Noted to be in acute respiratory distress shortly after admission  Given lasix 120mg IV with urinary response but unable to assess accurate I/O's    Plan:  -continue home lasix 40mg PO daily    Orthopedic  H/O fracture of hip  History of hip fracture earlier this year  Family reports ambulates with walker    Palliative Care  DNR (do not resuscitate)  Patient is currently DNR status which has been reviewed with patient and family         Critical care was time spent personally by me on the following activities: development of treatment plan with patient or surrogate and bedside caregivers, discussions with consultants, evaluation of patient's response to treatment,  examination of patient, ordering and performing treatments and interventions, ordering and review of laboratory studies, ordering and review of radiographic studies, pulse oximetry, re-evaluation of patient's condition. This critical care time did not overlap with that of any other provider or involve time for any procedures.     Yudi Martin MD  Critical Care Medicine  [facility]

## 2019-07-07 NOTE — SUBJECTIVE & OBJECTIVE
Interval History/Significant Events: no acute events overnight. Transitioned to nasal canula from Bipap. Currently on home 2L, tolerating well.    Review of Systems   Constitutional: Negative for chills and fever.   HENT: Negative for congestion.    Respiratory: Negative for chest tightness, shortness of breath and wheezing.    Cardiovascular: Negative for chest pain and leg swelling.   Gastrointestinal: Negative for abdominal pain, diarrhea, nausea and vomiting.   Genitourinary: Negative for dysuria.   Musculoskeletal: Negative for arthralgias.   Neurological: Negative for dizziness, light-headedness and headaches.     Objective:     Vital Signs (Most Recent):  Temp: 97.6 °F (36.4 °C) (07/07/19 0705)  Pulse: 102 (07/07/19 1300)  Resp: (!) 24 (07/07/19 1300)  BP: 112/85 (07/07/19 1300)  SpO2: (!) 91 % (07/07/19 1300) Vital Signs (24h Range):  Temp:  [97.4 °F (36.3 °C)-98.1 °F (36.7 °C)] 97.6 °F (36.4 °C)  Pulse:  [] 102  Resp:  [16-52] 24  SpO2:  [86 %-100 %] 91 %  BP: (112-179)/() 112/85   Weight: 39.9 kg (88 lb)  Body mass index is 17.19 kg/m².      Intake/Output Summary (Last 24 hours) at 7/7/2019 1405  Last data filed at 7/7/2019 0500  Gross per 24 hour   Intake 440 ml   Output 220 ml   Net 220 ml       Physical Exam   Constitutional: She is oriented to person, place, and time. No distress.   HENT:   Head: Normocephalic and atraumatic.   Eyes: Pupils are equal, round, and reactive to light. Right eye exhibits no discharge. Left eye exhibits no discharge.   Cardiovascular: Normal rate and regular rhythm. Exam reveals no friction rub.   No murmur heard.  Pulmonary/Chest: Effort normal. No respiratory distress. She has no wheezes. She has no rales.   On 2L nasal canula   Abdominal: Soft. There is no tenderness. There is no rebound and no guarding.   Musculoskeletal: She exhibits no edema.   Neurological: She is alert and oriented to person, place, and time.   Skin: She is not diaphoretic. No erythema.    Nursing note and vitals reviewed.      Vents:  Oxygen Concentration (%): 28 (07/07/19 0600)  Lines/Drains/Airways     Drain            Female External Urinary Catheter 07/06/19 0730 1 day          Peripheral Intravenous Line                 Peripheral IV - Single Lumen 07/06/19 0418 20 G Right Hand 1 day              Significant Labs:    CBC/Anemia Profile:  Recent Labs   Lab 07/06/19  0616 07/06/19  1625 07/07/19  0428   WBC 10.91 15.55* 9.23   HGB 11.4* 13.9 13.7   HCT 36.4* 45.1 43.8   * 177 150   MCV 99* 100* 98   RDW 14.4 14.5 14.1        Chemistries:  Recent Labs   Lab 07/06/19  0524 07/06/19  1625 07/07/19  0428    144 143   K 4.4 3.5 3.6   CL 94* 91* 91*   CO2 32* 40* 37*   BUN 31* 30* 36*   CREATININE 0.9 0.9 1.0   CALCIUM 10.1 9.9 10.0   ALBUMIN  --  3.7  --    PROT  --  7.0  --    BILITOT  --  0.6  --    ALKPHOS  --  96  --    ALT  --  25  --    AST  --  35  --    MG 2.5  --   --

## 2019-07-07 NOTE — ASSESSMENT & PLAN NOTE
Echo from 5/2019 showing 65%  CXR showing possible pulmonary edema, BNP in 2000's  Noted to be in acute respiratory distress shortly after admission  Given lasix 120mg IV with urinary response but unable to assess accurate I/O's    Plan:  -continue home lasix 40mg PO daily

## 2019-07-08 PROBLEM — I27.23 PULMONARY HYPERTENSION DUE TO COPD: Chronic | Status: ACTIVE | Noted: 2019-07-08

## 2019-07-08 PROBLEM — J44.9 PULMONARY HYPERTENSION DUE TO COPD: Chronic | Status: ACTIVE | Noted: 2019-07-08

## 2019-07-08 PROBLEM — I50.813 ACUTE ON CHRONIC RIGHT-SIDED CONGESTIVE HEART FAILURE: Status: ACTIVE | Noted: 2019-07-06

## 2019-07-08 LAB
ANION GAP SERPL CALC-SCNC: 13 MMOL/L (ref 8–16)
BACTERIA #/AREA URNS AUTO: ABNORMAL /HPF
BASOPHILS # BLD AUTO: 0.01 K/UL (ref 0–0.2)
BASOPHILS NFR BLD: 0.1 % (ref 0–1.9)
BILIRUB UR QL STRIP: NEGATIVE
BUN SERPL-MCNC: 34 MG/DL (ref 8–23)
CALCIUM SERPL-MCNC: 10.7 MG/DL (ref 8.7–10.5)
CHLORIDE SERPL-SCNC: 88 MMOL/L (ref 95–110)
CLARITY UR REFRACT.AUTO: CLEAR
CO2 SERPL-SCNC: 38 MMOL/L (ref 23–29)
COLOR UR AUTO: YELLOW
CREAT SERPL-MCNC: 1 MG/DL (ref 0.5–1.4)
DIFFERENTIAL METHOD: ABNORMAL
EOSINOPHIL # BLD AUTO: 0 K/UL (ref 0–0.5)
EOSINOPHIL NFR BLD: 0 % (ref 0–8)
ERYTHROCYTE [DISTWIDTH] IN BLOOD BY AUTOMATED COUNT: 14.3 % (ref 11.5–14.5)
EST. GFR  (AFRICAN AMERICAN): 57.3 ML/MIN/1.73 M^2
EST. GFR  (NON AFRICAN AMERICAN): 49.7 ML/MIN/1.73 M^2
GLUCOSE SERPL-MCNC: 116 MG/DL (ref 70–110)
GLUCOSE UR QL STRIP: NEGATIVE
HCT VFR BLD AUTO: 41.9 % (ref 37–48.5)
HGB BLD-MCNC: 13.1 G/DL (ref 12–16)
HGB UR QL STRIP: NEGATIVE
IMM GRANULOCYTES # BLD AUTO: 0.07 K/UL (ref 0–0.04)
IMM GRANULOCYTES NFR BLD AUTO: 0.7 % (ref 0–0.5)
KETONES UR QL STRIP: NEGATIVE
LEUKOCYTE ESTERASE UR QL STRIP: ABNORMAL
LYMPHOCYTES # BLD AUTO: 0.6 K/UL (ref 1–4.8)
LYMPHOCYTES NFR BLD: 5.8 % (ref 18–48)
MCH RBC QN AUTO: 30.4 PG (ref 27–31)
MCHC RBC AUTO-ENTMCNC: 31.3 G/DL (ref 32–36)
MCV RBC AUTO: 97 FL (ref 82–98)
MICROSCOPIC COMMENT: ABNORMAL
MONOCYTES # BLD AUTO: 1.4 K/UL (ref 0.3–1)
MONOCYTES NFR BLD: 13.7 % (ref 4–15)
NEUTROPHILS # BLD AUTO: 8.3 K/UL (ref 1.8–7.7)
NEUTROPHILS NFR BLD: 79.7 % (ref 38–73)
NITRITE UR QL STRIP: NEGATIVE
NRBC BLD-RTO: 0 /100 WBC
PH UR STRIP: 6 [PH] (ref 5–8)
PLATELET # BLD AUTO: 186 K/UL (ref 150–350)
PMV BLD AUTO: 10.7 FL (ref 9.2–12.9)
POTASSIUM SERPL-SCNC: 3.4 MMOL/L (ref 3.5–5.1)
PROT UR QL STRIP: NEGATIVE
RBC # BLD AUTO: 4.31 M/UL (ref 4–5.4)
RBC #/AREA URNS AUTO: 0 /HPF (ref 0–4)
SODIUM SERPL-SCNC: 139 MMOL/L (ref 136–145)
SP GR UR STRIP: 1.01 (ref 1–1.03)
URN SPEC COLLECT METH UR: ABNORMAL
WBC # BLD AUTO: 10.38 K/UL (ref 3.9–12.7)
WBC #/AREA URNS AUTO: 22 /HPF (ref 0–5)

## 2019-07-08 PROCEDURE — 99232 SBSQ HOSP IP/OBS MODERATE 35: CPT | Mod: ,,, | Performed by: HOSPITALIST

## 2019-07-08 PROCEDURE — 97161 PT EVAL LOW COMPLEX 20 MIN: CPT

## 2019-07-08 PROCEDURE — 87086 URINE CULTURE/COLONY COUNT: CPT

## 2019-07-08 PROCEDURE — 25000003 PHARM REV CODE 250: Performed by: HOSPITALIST

## 2019-07-08 PROCEDURE — 94761 N-INVAS EAR/PLS OXIMETRY MLT: CPT

## 2019-07-08 PROCEDURE — 99900035 HC TECH TIME PER 15 MIN (STAT)

## 2019-07-08 PROCEDURE — 25000003 PHARM REV CODE 250: Performed by: PHYSICIAN ASSISTANT

## 2019-07-08 PROCEDURE — 94660 CPAP INITIATION&MGMT: CPT

## 2019-07-08 PROCEDURE — 85025 COMPLETE CBC W/AUTO DIFF WBC: CPT

## 2019-07-08 PROCEDURE — 94640 AIRWAY INHALATION TREATMENT: CPT

## 2019-07-08 PROCEDURE — 97116 GAIT TRAINING THERAPY: CPT

## 2019-07-08 PROCEDURE — 25000242 PHARM REV CODE 250 ALT 637 W/ HCPCS: Performed by: PHYSICIAN ASSISTANT

## 2019-07-08 PROCEDURE — 25000003 PHARM REV CODE 250: Performed by: STUDENT IN AN ORGANIZED HEALTH CARE EDUCATION/TRAINING PROGRAM

## 2019-07-08 PROCEDURE — 80048 BASIC METABOLIC PNL TOTAL CA: CPT

## 2019-07-08 PROCEDURE — 99232 PR SUBSEQUENT HOSPITAL CARE,LEVL II: ICD-10-PCS | Mod: ,,, | Performed by: HOSPITALIST

## 2019-07-08 PROCEDURE — 20600001 HC STEP DOWN PRIVATE ROOM

## 2019-07-08 PROCEDURE — 63600175 PHARM REV CODE 636 W HCPCS: Performed by: STUDENT IN AN ORGANIZED HEALTH CARE EDUCATION/TRAINING PROGRAM

## 2019-07-08 PROCEDURE — 81001 URINALYSIS AUTO W/SCOPE: CPT

## 2019-07-08 PROCEDURE — 36415 COLL VENOUS BLD VENIPUNCTURE: CPT

## 2019-07-08 PROCEDURE — 63600175 PHARM REV CODE 636 W HCPCS

## 2019-07-08 PROCEDURE — 27000221 HC OXYGEN, UP TO 24 HOURS

## 2019-07-08 PROCEDURE — 25000242 PHARM REV CODE 250 ALT 637 W/ HCPCS: Performed by: STUDENT IN AN ORGANIZED HEALTH CARE EDUCATION/TRAINING PROGRAM

## 2019-07-08 RX ORDER — PREDNISONE 10 MG/1
20 TABLET ORAL DAILY
Status: COMPLETED | OUTPATIENT
Start: 2019-07-09 | End: 2019-07-10

## 2019-07-08 RX ORDER — POTASSIUM CHLORIDE 750 MG/1
30 CAPSULE, EXTENDED RELEASE ORAL ONCE
Status: COMPLETED | OUTPATIENT
Start: 2019-07-08 | End: 2019-07-08

## 2019-07-08 RX ADMIN — FUROSEMIDE 40 MG: 40 TABLET ORAL at 09:07

## 2019-07-08 RX ADMIN — LEVOFLOXACIN 750 MG: 750 TABLET, FILM COATED ORAL at 09:07

## 2019-07-08 RX ADMIN — THERA TABS 1 TABLET: TAB at 09:07

## 2019-07-08 RX ADMIN — IPRATROPIUM BROMIDE AND ALBUTEROL SULFATE 3 ML: .5; 3 SOLUTION RESPIRATORY (INHALATION) at 11:07

## 2019-07-08 RX ADMIN — POLYETHYLENE GLYCOL 3350 17 G: 17 POWDER, FOR SOLUTION ORAL at 09:07

## 2019-07-08 RX ADMIN — ASPIRIN 81 MG CHEWABLE TABLET 81 MG: 81 TABLET CHEWABLE at 09:07

## 2019-07-08 RX ADMIN — ENOXAPARIN SODIUM 30 MG: 100 INJECTION SUBCUTANEOUS at 06:07

## 2019-07-08 RX ADMIN — IPRATROPIUM BROMIDE AND ALBUTEROL SULFATE 3 ML: .5; 3 SOLUTION RESPIRATORY (INHALATION) at 08:07

## 2019-07-08 RX ADMIN — ROPINIROLE HYDROCHLORIDE 0.25 MG: 0.25 TABLET, FILM COATED ORAL at 11:07

## 2019-07-08 RX ADMIN — TIOTROPIUM BROMIDE 18 MCG: 18 CAPSULE ORAL; RESPIRATORY (INHALATION) at 09:07

## 2019-07-08 RX ADMIN — IPRATROPIUM BROMIDE AND ALBUTEROL SULFATE 3 ML: .5; 3 SOLUTION RESPIRATORY (INHALATION) at 03:07

## 2019-07-08 RX ADMIN — HYDRALAZINE HYDROCHLORIDE 5 MG: 20 INJECTION INTRAMUSCULAR; INTRAVENOUS at 05:07

## 2019-07-08 RX ADMIN — PREDNISONE 40 MG: 20 TABLET ORAL at 09:07

## 2019-07-08 RX ADMIN — FERROUS SULFATE TAB EC 325 MG (65 MG FE EQUIVALENT) 325 MG: 325 (65 FE) TABLET DELAYED RESPONSE at 09:07

## 2019-07-08 RX ADMIN — POTASSIUM CHLORIDE 30 MEQ: 750 CAPSULE, EXTENDED RELEASE ORAL at 02:07

## 2019-07-08 NOTE — PLAN OF CARE
07/08/19 1530   Discharge Assessment   Assessment Type Discharge Planning Assessment   Confirmed/corrected address and phone number on facesheet? Yes   Assessment information obtained from? Caregiver   Expected Length of Stay (days) 2   Communicated expected length of stay with patient/caregiver yes   Prior to hospitilization cognitive status: Alert/Oriented   Prior to hospitalization functional status: Needs Assistance   Current cognitive status: Alert/Oriented   Current Functional Status: Needs Assistance   Facility Arrived From: home   Lives With alone  (Lives alone but has 24 hours with the help of her kids )   Able to Return to Prior Arrangements yes   Is patient able to care for self after discharge? No   Who are your caregiver(s) and their phone number(s)? Bri Green [phone]   Patient's perception of discharge disposition admitted as an inpatient   Patient currently being followed by outpatient case management? No   Patient currently receives any other outside agency services? No   Equipment Currently Used at Home walker, standard;bedside commode;wheelchair   Do you have any problems affording any of your prescribed medications? No   Is the patient taking medications as prescribed? yes   Does the patient have transportation home? Yes   Transportation Anticipated family or friend will provide   Does the patient receive services at the [facility]? No   Discharge Plan A Home   Discharge Plan B Home   DME Needed Upon Discharge  walker, rolling   Patient/Family in Agreement with Plan yes   SW called and spoke with Pt daughter Bri [phone] expressed pt currently lives at home and will return home with care that is provided from her adult children. Pt would like to use [facility] if she is discharged with HH. Pt has DME and does not require new DME but would like to look into receiving an Bipap upon discharge. Pt would like to continue to use [facility] on [address]

## 2019-07-08 NOTE — PLAN OF CARE
Problem: Adult Inpatient Plan of Care  Goal: Plan of Care Review  Outcome: Ongoing (interventions implemented as appropriate)  Pt AAx3, breathing even and unlabored, call light in reach, bed in lowest position, family at bedside throughout shift. Pt displayed confusion and required multiple re-direction. Telesitter initiated. Pt daughter, Bri, requested call from med team regarding plan of care and requested urinalysis to r/o UTI. Purewick in place and educated pt to call when urination occurs. VSS, frequent hourly rounding completed. No significant events throughout shift. Will continue to monitor.    [id number] [id number]. Team A paged.

## 2019-07-08 NOTE — PLAN OF CARE
Problem: Physical Therapy Goal  Goal: Physical Therapy Goal  Goals to be met by: 2019    Patient will increase functional independence with mobility by performin. Supine to sit with Modified [place]  2. Sit to supine with Modified [place]  3. Sit to stand transfer with Modified [place]  4. Bed to chair transfer with Supervision using Rolling Walker  5. Gait  x 200 feet with Rolling Walker Supervision.   6. Ascend/descend 4 stair with bilateral Handrails Contact Guard Assistance.     Outcome: Ongoing (interventions implemented as appropriate)  Evaluation complete. Goals set.

## 2019-07-08 NOTE — PLAN OF CARE
Problem: Adult Inpatient Plan of Care  Goal: Plan of Care Review  Outcome: Ongoing (interventions implemented as appropriate)     07/08/19 6032   Plan of Care Review   Plan of Care Reviewed With patient;daughter   Progress no change     POC reviewed w/ pt and daughter. AAOx4 w/ confusion at times. Daughter at bedside. No acute events overnight. Denied pain/discomfort. Bipap worn overnight. Pt 1.5L O2 via NC. SpO2 goal wnl. BP elevated, PRN meds given. Safety maintain. Will continue to monitor.

## 2019-07-08 NOTE — PT/OT/SLP EVAL
"Physical Therapy Evaluation    Patient Name:  Venus Mayer   MRN:  [id number]    Recommendations:     Discharge Recommendations:  (Continue outpatient PT)   Discharge Equipment Recommendations: none   Barriers to discharge: None    Assessment:     Venus Mayer is a 89 y.o. female admitted with a medical diagnosis of Acute on chronic congestive heart failure. Pt with history of L hip fracture and was actively seeing outpatient PT prior to hospitalization.  She presents with the following impairments/functional limitations:  impaired endurance, weakness, gait instability, impaired balance, impaired functional mobilty, impaired cardiopulmonary response to activity. Pt is hearing impaired and presents pleasantly confused despite being oriented x4. Pt has 4 ALEKSANDR with B handrails and denied stair mobility on this date. Pt would benefit from skilled acute PT services 3x/week to increase functional mobility. Recommend continue with outpatient PT upon discharge when medically stable.     Rehab Prognosis: Good; patient would benefit from acute skilled PT services to address these deficits and reach maximum level of function.    Recent Surgery: * No surgery found *      Plan:     During this hospitalization, patient to be seen 3 x/week to address the identified rehab impairments via gait training, therapeutic activities, therapeutic exercises, neuromuscular re-education and progress toward the following goals:    · Plan of Care Expires:  08/07/19    Subjective     Chief Complaint: "I feel wobbly" upon ambulation   Patient/Family Comments/goals: to go home   Pain/Comfort:  · Pain Rating 1: 0/10    Patients cultural, spiritual, Anglican conflicts given the current situation: no    Living Environment:  Pt lives alone in [facility] with 4 ALEKSANDR and B handrails. Pt has 4 children all of which live within 30 min; pt has been having someone stay with her since hip fracture.   Prior to admission, patients level of function was modified " "independent with RW, pt does not drive and requires assistance for transportation.  Equipment used at home: walker, rolling, cane, straight, bedside commode, shower chair. Upon discharge, patient will have assistance from children.    Objective:     Communicated with nursing prior to session.  Patient found HOB elevated with oxygen, telemetry  upon PT entry to room.    General Precautions: Standard, fall   Orthopedic Precautions:N/A   Braces: N/A     Exams:  · Cognitive Exam:  Patient is oriented to Person, Place, Time and Situation    · RLE ROM: WFL  · RLE Strength: WFL  · LLE ROM: WFL  · LLE Strength: WFL    Functional Mobility:  · Bed Mobility:    · Rolling Left:  stand by assistance  · Scooting: stand by assistance  · Supine to Sit: stand by assistance    · Transfers:    · Sit to Stand:  contact guard assistance with rolling walker  · Bed to Chair: contact guard assistance with  rolling walker  using  Step Transfer    · Gait: Ambulated x150' with RW and CGA and x1 standing rest break. Pt ambulates with upright posture, reciprocal gait, and fair + gait stability. Pt did not experience LOB episode however stated she "feels wobbly"    · Balance:   · Static Sitting EOB: SBA  · Static standing with RW: CGA      Therapeutic Activities and Exercises:   - Pt educated on PT role, POC, goals, discharge recommendations, current fall risk   - Pt instructed to use nurse call light for assistance with all transfers     AM-PAC 6 CLICK MOBILITY  Total Score:18     Patient left up in chair with all lines intact, call button in reach and nursing notified.    GOALS:   Multidisciplinary Problems     Physical Therapy Goals        Problem: Physical Therapy Goal    Goal Priority Disciplines Outcome Goal Variances Interventions   Physical Therapy Goal     PT, PT/OT Ongoing (interventions implemented as appropriate)     Description:  Goals to be met by: 7/22/2019    Patient will increase functional independence with mobility by " performin. Supine to sit with Modified [place]  2. Sit to supine with Modified [place]  3. Sit to stand transfer with Modified [place]  4. Bed to chair transfer with Supervision using Rolling Walker  5. Gait  x 200 feet with Rolling Walker Supervision.   6. Ascend/descend 4 stair with bilateral Handrails Contact Guard Assistance.                       History:     Past Medical History:   Diagnosis Date    Acute on chronic congestive heart failure 2019    Cardiomyopathy     Carotid artery occlusion     COPD (chronic obstructive pulmonary disease)     Hyperlipidemia     Hypertension        Past Surgical History:   Procedure Laterality Date    CARDIAC CATHETERIZATION      CAROTID ENDARTERECTOMY         Time Tracking:     PT Received On: 19  PT Start Time: 929     PT Stop Time: 956  PT Total Time (min): 27 min     Billable Minutes: Evaluation 17 and Gait Training 10      OLGA Rivera  2019

## 2019-07-09 PROBLEM — N39.0 UTI (URINARY TRACT INFECTION): Status: ACTIVE | Noted: 2019-07-09

## 2019-07-09 PROBLEM — J44.9 PULMONARY HYPERTENSION DUE TO COPD: Chronic | Status: RESOLVED | Noted: 2019-07-08 | Resolved: 2019-07-09

## 2019-07-09 PROBLEM — I27.23 PULMONARY HYPERTENSION DUE TO COPD: Chronic | Status: RESOLVED | Noted: 2019-07-08 | Resolved: 2019-07-09

## 2019-07-09 LAB
ANION GAP SERPL CALC-SCNC: 11 MMOL/L (ref 8–16)
BASOPHILS # BLD AUTO: 0.01 K/UL (ref 0–0.2)
BASOPHILS NFR BLD: 0.1 % (ref 0–1.9)
BUN SERPL-MCNC: 39 MG/DL (ref 8–23)
CALCIUM SERPL-MCNC: 10.2 MG/DL (ref 8.7–10.5)
CHLORIDE SERPL-SCNC: 93 MMOL/L (ref 95–110)
CO2 SERPL-SCNC: 39 MMOL/L (ref 23–29)
CREAT SERPL-MCNC: 1.1 MG/DL (ref 0.5–1.4)
DIFFERENTIAL METHOD: ABNORMAL
EOSINOPHIL # BLD AUTO: 0 K/UL (ref 0–0.5)
EOSINOPHIL NFR BLD: 0.3 % (ref 0–8)
ERYTHROCYTE [DISTWIDTH] IN BLOOD BY AUTOMATED COUNT: 14.6 % (ref 11.5–14.5)
EST. GFR  (AFRICAN AMERICAN): 51.1 ML/MIN/1.73 M^2
EST. GFR  (NON AFRICAN AMERICAN): 44.3 ML/MIN/1.73 M^2
GLUCOSE SERPL-MCNC: 75 MG/DL (ref 70–110)
HCT VFR BLD AUTO: 39.2 % (ref 37–48.5)
HGB BLD-MCNC: 12.3 G/DL (ref 12–16)
IMM GRANULOCYTES # BLD AUTO: 0.06 K/UL (ref 0–0.04)
IMM GRANULOCYTES NFR BLD AUTO: 0.5 % (ref 0–0.5)
LYMPHOCYTES # BLD AUTO: 1.2 K/UL (ref 1–4.8)
LYMPHOCYTES NFR BLD: 10.5 % (ref 18–48)
MCH RBC QN AUTO: 30.7 PG (ref 27–31)
MCHC RBC AUTO-ENTMCNC: 31.4 G/DL (ref 32–36)
MCV RBC AUTO: 98 FL (ref 82–98)
MONOCYTES # BLD AUTO: 1.8 K/UL (ref 0.3–1)
MONOCYTES NFR BLD: 15.4 % (ref 4–15)
NEUTROPHILS # BLD AUTO: 8.4 K/UL (ref 1.8–7.7)
NEUTROPHILS NFR BLD: 73.2 % (ref 38–73)
NRBC BLD-RTO: 0 /100 WBC
PLATELET # BLD AUTO: 172 K/UL (ref 150–350)
PMV BLD AUTO: 11 FL (ref 9.2–12.9)
POTASSIUM SERPL-SCNC: 3.8 MMOL/L (ref 3.5–5.1)
RBC # BLD AUTO: 4.01 M/UL (ref 4–5.4)
SODIUM SERPL-SCNC: 143 MMOL/L (ref 136–145)
WBC # BLD AUTO: 11.41 K/UL (ref 3.9–12.7)

## 2019-07-09 PROCEDURE — 99900035 HC TECH TIME PER 15 MIN (STAT)

## 2019-07-09 PROCEDURE — 94761 N-INVAS EAR/PLS OXIMETRY MLT: CPT

## 2019-07-09 PROCEDURE — 25000003 PHARM REV CODE 250: Performed by: PHYSICIAN ASSISTANT

## 2019-07-09 PROCEDURE — 27000221 HC OXYGEN, UP TO 24 HOURS

## 2019-07-09 PROCEDURE — 85025 COMPLETE CBC W/AUTO DIFF WBC: CPT

## 2019-07-09 PROCEDURE — 25000242 PHARM REV CODE 250 ALT 637 W/ HCPCS: Performed by: PHYSICIAN ASSISTANT

## 2019-07-09 PROCEDURE — 25000003 PHARM REV CODE 250: Performed by: STUDENT IN AN ORGANIZED HEALTH CARE EDUCATION/TRAINING PROGRAM

## 2019-07-09 PROCEDURE — 80048 BASIC METABOLIC PNL TOTAL CA: CPT

## 2019-07-09 PROCEDURE — 63600175 PHARM REV CODE 636 W HCPCS

## 2019-07-09 PROCEDURE — 94660 CPAP INITIATION&MGMT: CPT

## 2019-07-09 PROCEDURE — 25000242 PHARM REV CODE 250 ALT 637 W/ HCPCS: Performed by: STUDENT IN AN ORGANIZED HEALTH CARE EDUCATION/TRAINING PROGRAM

## 2019-07-09 PROCEDURE — 99232 SBSQ HOSP IP/OBS MODERATE 35: CPT | Mod: ,,, | Performed by: HOSPITALIST

## 2019-07-09 PROCEDURE — 94640 AIRWAY INHALATION TREATMENT: CPT

## 2019-07-09 PROCEDURE — 99232 PR SUBSEQUENT HOSPITAL CARE,LEVL II: ICD-10-PCS | Mod: ,,, | Performed by: HOSPITALIST

## 2019-07-09 PROCEDURE — 63600175 PHARM REV CODE 636 W HCPCS: Performed by: STUDENT IN AN ORGANIZED HEALTH CARE EDUCATION/TRAINING PROGRAM

## 2019-07-09 PROCEDURE — 20600001 HC STEP DOWN PRIVATE ROOM

## 2019-07-09 PROCEDURE — 63600175 PHARM REV CODE 636 W HCPCS: Performed by: HOSPITALIST

## 2019-07-09 RX ADMIN — THERA TABS 1 TABLET: TAB at 09:07

## 2019-07-09 RX ADMIN — PREDNISONE 20 MG: 10 TABLET ORAL at 09:07

## 2019-07-09 RX ADMIN — TIOTROPIUM BROMIDE 18 MCG: 18 CAPSULE ORAL; RESPIRATORY (INHALATION) at 09:07

## 2019-07-09 RX ADMIN — POLYETHYLENE GLYCOL 3350 17 G: 17 POWDER, FOR SOLUTION ORAL at 09:07

## 2019-07-09 RX ADMIN — ROPINIROLE HYDROCHLORIDE 0.25 MG: 0.25 TABLET, FILM COATED ORAL at 09:07

## 2019-07-09 RX ADMIN — FUROSEMIDE 40 MG: 40 TABLET ORAL at 09:07

## 2019-07-09 RX ADMIN — ASPIRIN 81 MG CHEWABLE TABLET 81 MG: 81 TABLET CHEWABLE at 09:07

## 2019-07-09 RX ADMIN — IPRATROPIUM BROMIDE AND ALBUTEROL SULFATE 3 ML: .5; 3 SOLUTION RESPIRATORY (INHALATION) at 03:07

## 2019-07-09 RX ADMIN — HYDRALAZINE HYDROCHLORIDE 5 MG: 20 INJECTION INTRAMUSCULAR; INTRAVENOUS at 01:07

## 2019-07-09 RX ADMIN — IPRATROPIUM BROMIDE AND ALBUTEROL SULFATE 3 ML: .5; 3 SOLUTION RESPIRATORY (INHALATION) at 11:07

## 2019-07-09 RX ADMIN — FERROUS SULFATE TAB EC 325 MG (65 MG FE EQUIVALENT) 325 MG: 325 (65 FE) TABLET DELAYED RESPONSE at 09:07

## 2019-07-09 RX ADMIN — ENOXAPARIN SODIUM 30 MG: 100 INJECTION SUBCUTANEOUS at 06:07

## 2019-07-09 RX ADMIN — IPRATROPIUM BROMIDE AND ALBUTEROL SULFATE 3 ML: .5; 3 SOLUTION RESPIRATORY (INHALATION) at 08:07

## 2019-07-09 NOTE — ASSESSMENT & PLAN NOTE
Discharged from ED on 7/4 with zpack and prednisone  Afebrile without leukocytosis. ESR/CRP WNL.   Given azithromycin in ED  Started on levaquin  Symptoms more consistent with CHF exacerbation, will hold off on steroids at this time; no wheezes  O2 saturation goal: 88-92% in setting of COPD   Wears O2 at 2.5 L   Continue albuterol PRN and Spiriva

## 2019-07-09 NOTE — PROGRESS NOTES
[facility] Hospital Medicine  Progress Note    Patient Name: Venus Mayer  MRN: [id number]  Patient Class: IP- Inpatient   Admission Date: 7/6/2019  Length of Stay: 3 days  Attending Physician: Paris Stevenson MD  Primary Care Provider: Jona Che MD    [facility] Medicine Team: [facility]  Paris Stevenson MD    Subjective:     Principal Problem:Acute hypercapnic respiratory failure      HPI:  89 y/o WF with PMH of COPD (on 2.5 L home O2), HTN, HLD, recurrent spontaneous pneumothorax (recently admitted 5/22/19 with large right pneumothorax requiring chest tube and pleurodesis on 5/27/19 and small recurrence on 6/25/19 not requiring intervention), left hip fracture (4 months ago), HOCM s/p AICD, and cartoid artery stenosis s/p L CEA in 2008 presents complaining of worsening SOB beginning at 3 AM this morning. Pt lives with her son who assists with the history. Pt woke son up at 3 AM this morning for SOB. He increased her home O2 to 3L NC and sat her up for 15 minutes with no improvement in the SOB. Pt told son she needed to go to the hospital. Of note, she was seen in the ED on 7/4/19 for same complaint. She was diagnosed with COPD exacerbation. CXR could not exclude PNA. Pt sent home with improved symptoms after duoneb treatment with prednisone 20 mg bid for 5 days and azithromycin 250 mg. Per daughter, patient took first doses of medication yesterday (7/5/19). Daughter also reports pt decreased lasix from 40 mg daily to 20 mg daily 1 week ago due to frequent urination after speaking with provider. Pt endorses chronic dry cough for past 4 months, unchanged recently and intermittent peripheral edema (improved on this admission). She has a 40+ pack year history, quit 30 years ago. Denies fever/chills, CP, leg swelling, wheezing, orthopnea, PND, N/V/D, abdominal pain, dizziness, syncope, urinary symptoms.     In the ED:  O2 sat at 96% on arrival with 4L NC, weaned to home setting of 2.5L  with sat maintained at 97%. BP 150s/70s. Afebrile without leukocytosis. Trop 0.133 (0.166 on 7/4/19). EKG with NSR, RBBB, and biatrial enlargement. BNP with significant increase 2,573 (1,652 on 7/4/19). CXR with bilateral basilar infiltrates and right pleural effusion. Given duoneb, lasix 40 mg IV, and azithromycin.     Overview/Hospital Course:  No notes on file    Interval History: Feels well. Urinary retention overnight requiring in and out caths. Daughter concerned about confusion overnight. UA done with was positive.     Review of Systems   Constitutional: Negative for chills, fatigue and fever.   HENT: Negative.    Respiratory: Negative for cough and shortness of breath.    Cardiovascular: Negative for chest pain and palpitations.   Gastrointestinal: Negative for abdominal pain, diarrhea, nausea and vomiting.   Genitourinary: Negative for difficulty urinating, frequency and hematuria.   Musculoskeletal: Negative.    Neurological: Negative for light-headedness and headaches.     Objective:     Vital Signs (Most Recent):  Temp: 97.9 °F (36.6 °C) (07/09/19 1321)  Pulse: 107 (07/09/19 1443)  Resp: 18 (07/09/19 1321)  BP: (!) 156/74 (07/09/19 1443)  SpO2: 98 % (07/09/19 0825) Vital Signs (24h Range):  Temp:  [96.8 °F (36 °C)-98.6 °F (37 °C)] 97.9 °F (36.6 °C)  Pulse:  [] 107  Resp:  [16-22] 18  SpO2:  [94 %-98 %] 98 %  BP: (126-170)/(63-79) 156/74     Weight: 41.1 kg (90 lb 9.7 oz)  Body mass index is 17.7 kg/m².    Intake/Output Summary (Last 24 hours) at 7/9/2019 1517  Last data filed at 7/9/2019 1200  Gross per 24 hour   Intake 700 ml   Output 1600 ml   Net -900 ml      Physical Exam   Constitutional:   Thin elderly female sitting up comfortably in chair    HENT:   Head: Normocephalic and atraumatic.   Eyes: No scleral icterus.   Neck: No JVD present.   Pulmonary/Chest: Effort normal and breath sounds normal. No respiratory distress. She has no wheezes. She has no rales.   On 2L O2   Abdominal: Soft. Bowel  sounds are normal. She exhibits no distension.   Neurological: She is alert.   Oriented to person and place  States she wears 2.5 L O2 for emphysema    Skin: No erythema.   Vitals reviewed.      Significant Labs: All pertinent labs within the past 24 hours have been reviewed.    Significant Imaging: I have reviewed all pertinent imaging results/findings within the past 24 hours.      Assessment/Plan:      * Acute on Chronic hypercapnic respiratory failure  Possibly due to CHF and COPD  Management as per below       UTI (urinary tract infection)  Possible etiology of confusion  Follow up culture  On levaquin for copd; will continue     H/O fracture of hip  See above, never went to SNF or rehab      Acute on chronic right-sided congestive heart failure  Patient with 2.5L HOME O2 for COPD  -BNP: 2573 (usually 0205-1253) chest x-ray: bilateral basilar infiltrates and right pleural effusion there are nodular opacities of the lung bases as well for which follow-up is recommended.  -Previous Echo with EF of 65% and normal diastolic dysfunction however echo unable to be completed.  Recent echo:  · Normal left ventricular systolic function. The estimated ejection fraction is 65%  · Septal wall has abnormal motion.  · Moderate left atrial enlargement.  · Normal right ventricular systolic function.  · Mild aortic regurgitation.  · Moderate mitral sclerosis.  · There is moderate leaflet calcification of the Mitral Valve.  · Mild mitral stenosis.  · Mitral Valve peak velocity is m/s; mean gradient is 6 mmHg.  · Mild-to-moderate mitral regurgitation.  · Mild pulmonic regurgitation.  · Intermediate central venous pressure (8 mm Hg).  · The estimated PA systolic pressure is 52 mm Hg  · Pulmonary hypertension present.     Switched back to home dose lasix  Weight increased by 1.1 kg but clinically appears comfortable and on 2L NC without rales; will continue   Maintain euvolemia by monitoring I/O's and daily weights.  Fluid  restriction (1.5 liters/24 hours)  Low Na diet  Monitor for signs of fluid overload: RR>30, O2 sat<92%, weight gain of >3 lbs, or urinary output <160ml/8hr  Maintain oxygen sats >92% via NC if supplemental oxygen needed.     Patient Vitals for the past 72 hrs (Last 3 readings):   Weight   07/09/19 0400 41.1 kg (90 lb 9.7 oz)   07/08/19 0400 40 kg (88 lb 2.9 oz)   07/07/19 1021 39.9 kg (88 lb)         Recurrent spontaneous pneumothorax  Recently admitted 5/22/19 for a right spontaneous pneumothorax with her hospital course complicated by subcutaneous emphysema after pigtail removal requiring right anterior blow hole placed. Her lung remained well expanded and did not require tube replacement. Pt also had a spontaneous left sided pneumothorax over 30 years ago.  6/25/19- Presented to ED for acute SOB and hypoxia at home. CXR in ED showed small basilar pneumothorax.   6/26/19- Serial CXRs, including a PA/Lateral, throughout the day showed stable subpulmonic space. Patient remained clinically stable and in no respiratory distress.   7/6/19 Bilateral basilar infiltrates and right pleural effusion there are nodular opacities of the lung bases as well for which follow-up is recommended.  96% on 4L then reduced to 2.5L maintaining saturation at 99%.  Saturation goal: 88-92% in the setting of COPD and pneumothorax hx, will wean O2 down    Debility  PTOT      DNR (do not resuscitate)  Confirmed pt to remain DNR      CKD (chronic kidney disease) stage 2, GFR 60-89 ml/min  Cr 0.9 on admission, at baseline  Lab Results   Component Value Date    CREATININE 1.1 07/09/2019     Sr Cr stable  Cont to monitor with daily labs   Avoid nephrotoxins.   Renally dose all medications   Monitor events that may lead to decreased renal perfusion (hypovolemia, hypotension, sepsis).   Monitor urine output (goal 0.5 mL/kg/hr) to assure that no obstruction precipitates worsening in GFR.  Cont ACE inhibitor for shane protection.   Serum bicarb level  **. Monitor for need to initiate sodium bicarb.         Chronic obstructive pulmonary disease with acute exacerbation  Discharged from ED on 7/4 with zpack and prednisone  Afebrile without leukocytosis. ESR/CRP WNL.   Given azithromycin in ED  Started on levaquin  Symptoms more consistent with CHF exacerbation, will hold off on steroids at this time; no wheezes  O2 saturation goal: 88-92% in setting of COPD   Wears O2 at 2.5 L   Continue albuterol PRN and Spiriva     Essential hypertension  Uncontrolled upon arrival 156/70 trended down to 139/81 after IV furosemide 40 mg  Home medication: amlodipine 2.5 mg qhs IF systolic BP >155. Pt has not required in 4 months.        VTE Risk Mitigation (From admission, onward)        Ordered     enoxaparin injection 30 mg  Daily      07/07/19 0754     IP VTE HIGH RISK PATIENT  Once      07/06/19 0809                Paris Stevenson MD  Department of Hospital Medicine   [facility]

## 2019-07-09 NOTE — PROGRESS NOTES
[facility] Hospital Medicine  Progress Note    Patient Name: Venus Mayer  MRN: [id number]  Patient Class: IP- Inpatient   Admission Date: 7/6/2019  Length of Stay: 2 days  Attending Physician: Cesar Wilkins MD  Primary Care Provider: Jona Che MD    [facility] Medicine Team: [facility] MED A Cesar Wilkins MD    Subjective:     Principal Problem:Acute on chronic congestive heart failure    HPI: 89 y/o WF with PMH of COPD (on 2.5 L home O2), HTN, HLD, recurrent spontaneous pneumothorax (recently admitted 5/22/19 with large right pneumothorax requiring chest tube and pleurodesis on 5/27/19 and small recurrence on 6/25/19 not requiring intervention), left hip fracture (4 months ago), HOCM s/p AICD, and cartoid artery stenosis s/p L CEA in 2008 presents complaining of worsening SOB beginning at 3 AM this morning. Pt lives with her son who assists with the history. Pt woke son up at 3 AM this morning for SOB. He increased her home O2 to 3L NC and sat her up for 15 minutes with no improvement in the SOB. Pt told son she needed to go to the hospital. Of note, she was seen in the ED on 7/4/19 for same complaint. She was diagnosed with COPD exacerbation. CXR could not exclude PNA. Pt sent home with improved symptoms after duoneb treatment with prednisone 20 mg bid for 5 days and azithromycin 250 mg. Per daughter, patient took first doses of medication yesterday (7/5/19). Daughter also reports pt decreased lasix from 40 mg daily to 20 mg daily 1 week ago due to frequent urination after speaking with provider. Pt endorses chronic dry cough for past 4 months, unchanged recently and intermittent peripheral edema (improved on this admission). She has a 40+ pack year history, quit 30 years ago. Denies fever/chills, CP, leg swelling, wheezing, orthopnea, PND, N/V/D, abdominal pain, dizziness, syncope, urinary symptoms.      In the ED:  O2 sat at 96% on arrival with 4L NC, weaned to home setting of  2.5L with sat maintained at 97%. BP 150s/70s. Afebrile without leukocytosis. Trop 0.133 (0.166 on 7/4/19). EKG with NSR, RBBB, and biatrial enlargement. BNP with significant increase 2,573 (1,652 on 7/4/19). CXR with bilateral basilar infiltrates and right pleural effusion. Given duoneb, lasix 40 mg IV, and azithromycin. She was admitted to hospital medicine on the early morning of 7/5.           Hospital Course:   7/5: In the morning, she was noted to be tachypnic with increased work of breathing. She was initially placed on CPAP. Xray showed concern for possible pulmonary edema. She was given   ABG later showed hypercapnia with CO2 70 and pH 7.38. She was placed on Bipap, with repeat ABG showing worsening hypercapnia with CO2 of 79 and pH 7.37. ICU was consulted and she was admitted to critical care for hypercapnic respiratory failure.     She was restarted on COPD exacerbation therapy with scheduled bronchodilators, IV methylpred 40mg q12 hours, continued on empiric antibiotics,  continued on diuretics that were weaned to home lasix.   BIPAP was continued and eventually transitioned back to 2L nasal cannula.     Her repeat ABG prior to stepdown morning of 7/7. was 7.44/61/67/ 93% on bipap.     Interval History:   Noted by nursing that when family not at bedside patient with some confusion, trying to get out of chair despite bed alarm.     Patient is oriented to self, hospital, not fully to situation.  She is hard of hearing and has hearing aids but report when trying to place them not fully working     Family member at bedside.     Pt reports feeling much better than admission, asking when she can go home.     Review of Systems   Constitutional: Negative for fatigue and fever.   HENT: Negative for sore throat.    Eyes: Negative for visual disturbance.   Respiratory: Negative for chest tightness and wheezing.    Cardiovascular: Negative for chest pain and leg swelling.   Genitourinary: Negative for dysuria.         Incontinence     Objective:     Vital Signs (Most Recent):  Temp: 97.9 °F (36.6 °C) (07/08/19 1948)  Pulse: 98 (07/08/19 1948)  Resp: 20 (07/08/19 1948)  BP: 136/63 (07/08/19 1948)  SpO2: 96 % (07/08/19 1948) Vital Signs (24h Range):  Temp:  [97.6 °F (36.4 °C)-98.1 °F (36.7 °C)] 97.9 °F (36.6 °C)  Pulse:  [] 98  Resp:  [18-32] 20  SpO2:  [92 %-99 %] 96 %  BP: (126-186)/(63-93) 136/63     Weight: 40 kg (88 lb 2.9 oz)  Body mass index is 17.22 kg/m².    Intake/Output Summary (Last 24 hours) at 7/8/2019 2013  Last data filed at 7/8/2019 2000  Gross per 24 hour   Intake 300 ml   Output 800 ml   Net -500 ml      Physical Exam   Constitutional:   Thin, cachexia   Eyes: Pupils are equal, round, and reactive to light. No scleral icterus.   Neck:   Sitting upright, JVP at level of clavicle, pt in chair   Cardiovascular: Normal rate.   Murmur heard.  Pulmonary/Chest: Effort normal. She has wheezes.   Decreased basilar breath sounds   Abdominal: Soft. Bowel sounds are normal. She exhibits no distension. There is no tenderness.   Lymphadenopathy:     She has no cervical adenopathy.   Neurological: GCS eye subscore is 4. GCS verbal subscore is 4. GCS motor subscore is 6.   Skin: Skin is warm and dry.       Significant Labs: All pertinent labs within the past 24 hours have been reviewed.    Significant Imaging: I have reviewed all pertinent imaging results/findings within the past 24 hours.    TRANSTHORACIC ECHO (TTE) COMPLETE   Conclusion     · Normal left ventricular systolic function. The estimated ejection fraction is 65%  · Septal wall has abnormal motion.  · Moderate left atrial enlargement.  · Normal right ventricular systolic function.  · Mild aortic regurgitation.  · Moderate mitral sclerosis.  · There is moderate leaflet calcification of the Mitral Valve.  · Mild mitral stenosis.  · Mitral Valve peak velocity is m/s; mean gradient is 6 mmHg.  · Mild-to-moderate mitral regurgitation.  · Mild pulmonic  regurgitation.  · Intermediate central venous pressure (8 mm Hg).  · The estimated PA systolic pressure is 52 mm Hg  · Pulmonary hypertension present.         Assessment/Plan:      COPD exacerbation  Acute on Chronic hypercapnic respiratory failure   -reduced prednisone to 20mg as per ICU attending addendum  Continue bronchodilators  -empiric antibiotics for COPD, renal dosed.  ONe dose remaining  -spiriva,     Acute on chronic diastolic congestive heart failure   Pulmonary Hypertension due to COPD  -weaned back to home lasix dose, re-eval if patient may need IV diuretics as BNP significantly elevated on admission, ECHO reflects pulmonary hypertension and elevated CVP  -re-evaluate on exam tomorrow      Dementia vs Delirium.   -concern for altered mentation when family not around to re-direct patient  -stick notes that family wants to be notified before any medication.   -but if non-rediretable would try low dose Olanzapine 2.5mg vs low dose haldol 1mg. Iv.         Active Diagnoses:    Diagnosis Date Noted POA    PRINCIPAL PROBLEM:  Acute on Chronic hypercapnic respiratory failure [J96.02] 07/06/2019 Yes    Chronic obstructive pulmonary disease with acute exacerbation [J44.1] 03/28/2013 Yes    Acute on chronic right-sided congestive heart failure [I50.813] 07/06/2019 Yes    Pulmonary hypertension due to COPD [I27.23, J44.9] 07/08/2019 Yes     Chronic    Essential hypertension [I10] 01/17/2013 Yes    CKD (chronic kidney disease) stage 2, GFR 60-89 ml/min [N18.2] 03/28/2013 Yes    Dyslipidemia [E78.5] 01/17/2013 Yes    DNR (do not resuscitate) [Z66] 05/22/2019 Yes    Debility [R53.81] 05/22/2019 Yes    Recurrent spontaneous pneumothorax [J93.83] 06/26/2019 Yes    H/O fracture of hip [Z87.81] 07/06/2019 Not Applicable    Severe malnutrition [E43] 07/07/2019 Yes      Problems Resolved During this Admission:     VTE Risk Mitigation (From admission, onward)        Ordered     enoxaparin injection 30 mg  Daily       07/07/19 0754     IP VTE HIGH RISK PATIENT  Once      07/06/19 0809             Cesar Wilkins MD  Department of Hospital Medicine   [facility]

## 2019-07-09 NOTE — PLAN OF CARE
Problem: Adult Inpatient Plan of Care  Goal: Plan of Care Review  Outcome: Ongoing (interventions implemented as appropriate)  Patient AAO x3, occasionally confused. Family at bedside overnight, camera utilized. No falls or injuries during shift. BiPAP utilized overnight. No urine output overnight.  Bladder scanned volume 185 at 0400 am. In and out cath done by RN at the start of shift, volume 600 ml. Patient stable, will continue to monitor.

## 2019-07-09 NOTE — ASSESSMENT & PLAN NOTE
Patient with 2.5L HOME O2 for COPD  -BNP: 2573 (usually 3342-3725) chest x-ray: bilateral basilar infiltrates and right pleural effusion there are nodular opacities of the lung bases as well for which follow-up is recommended.  -Previous Echo with EF of 65% and normal diastolic dysfunction however echo unable to be completed.  Recent echo:  · Normal left ventricular systolic function. The estimated ejection fraction is 65%  · Septal wall has abnormal motion.  · Moderate left atrial enlargement.  · Normal right ventricular systolic function.  · Mild aortic regurgitation.  · Moderate mitral sclerosis.  · There is moderate leaflet calcification of the Mitral Valve.  · Mild mitral stenosis.  · Mitral Valve peak velocity is m/s; mean gradient is 6 mmHg.  · Mild-to-moderate mitral regurgitation.  · Mild pulmonic regurgitation.  · Intermediate central venous pressure (8 mm Hg).  · The estimated PA systolic pressure is 52 mm Hg  · Pulmonary hypertension present.     Switched back to home dose lasix  Weight increased by 1.1 kg but clinically appears comfortable and on 2L NC without rales; will continue   Maintain euvolemia by monitoring I/O's and daily weights.  Fluid restriction (1.5 liters/24 hours)  Low Na diet  Monitor for signs of fluid overload: RR>30, O2 sat<92%, weight gain of >3 lbs, or urinary output <160ml/8hr  Maintain oxygen sats >92% via NC if supplemental oxygen needed.     Patient Vitals for the past 72 hrs (Last 3 readings):   Weight   07/09/19 0400 41.1 kg (90 lb 9.7 oz)   07/08/19 0400 40 kg (88 lb 2.9 oz)   07/07/19 1021 39.9 kg (88 lb)

## 2019-07-09 NOTE — SUBJECTIVE & OBJECTIVE
Interval History: Feels well. Urinary retention overnight requiring in and out caths. Daughter concerned about confusion overnight. UA done with was positive.     Review of Systems   Constitutional: Negative for chills, fatigue and fever.   HENT: Negative.    Respiratory: Negative for cough and shortness of breath.    Cardiovascular: Negative for chest pain and palpitations.   Gastrointestinal: Negative for abdominal pain, diarrhea, nausea and vomiting.   Genitourinary: Negative for difficulty urinating, frequency and hematuria.   Musculoskeletal: Negative.    Neurological: Negative for light-headedness and headaches.     Objective:     Vital Signs (Most Recent):  Temp: 97.9 °F (36.6 °C) (07/09/19 1321)  Pulse: 107 (07/09/19 1443)  Resp: 18 (07/09/19 1321)  BP: (!) 156/74 (07/09/19 1443)  SpO2: 98 % (07/09/19 0825) Vital Signs (24h Range):  Temp:  [96.8 °F (36 °C)-98.6 °F (37 °C)] 97.9 °F (36.6 °C)  Pulse:  [] 107  Resp:  [16-22] 18  SpO2:  [94 %-98 %] 98 %  BP: (126-170)/(63-79) 156/74     Weight: 41.1 kg (90 lb 9.7 oz)  Body mass index is 17.7 kg/m².    Intake/Output Summary (Last 24 hours) at 7/9/2019 1517  Last data filed at 7/9/2019 1200  Gross per 24 hour   Intake 700 ml   Output 1600 ml   Net -900 ml      Physical Exam   Constitutional:   Thin elderly female sitting up comfortably in chair    HENT:   Head: Normocephalic and atraumatic.   Eyes: No scleral icterus.   Neck: No JVD present.   Pulmonary/Chest: Effort normal and breath sounds normal. No respiratory distress. She has no wheezes. She has no rales.   On 2L O2   Abdominal: Soft. Bowel sounds are normal. She exhibits no distension.   Neurological: She is alert.   Oriented to person and place  States she wears 2.5 L O2 for emphysema    Skin: No erythema.   Vitals reviewed.      Significant Labs: All pertinent labs within the past 24 hours have been reviewed.    Significant Imaging: I have reviewed all pertinent imaging results/findings within the  past 24 hours.

## 2019-07-09 NOTE — ASSESSMENT & PLAN NOTE
Cr 0.9 on admission, at baseline  Lab Results   Component Value Date    CREATININE 1.1 07/09/2019     Sr Cr stable  Cont to monitor with daily labs   Avoid nephrotoxins.   Renally dose all medications   Monitor events that may lead to decreased renal perfusion (hypovolemia, hypotension, sepsis).   Monitor urine output (goal 0.5 mL/kg/hr) to assure that no obstruction precipitates worsening in GFR.  Cont ACE inhibitor for shane protection.   Serum bicarb level **. Monitor for need to initiate sodium bicarb.

## 2019-07-10 LAB
ANION GAP SERPL CALC-SCNC: 11 MMOL/L (ref 8–16)
BACTERIA UR CULT: NO GROWTH
BUN SERPL-MCNC: 31 MG/DL (ref 8–23)
CALCIUM SERPL-MCNC: 9.8 MG/DL (ref 8.7–10.5)
CHLORIDE SERPL-SCNC: 94 MMOL/L (ref 95–110)
CO2 SERPL-SCNC: 36 MMOL/L (ref 23–29)
CREAT SERPL-MCNC: 0.9 MG/DL (ref 0.5–1.4)
EST. GFR  (AFRICAN AMERICAN): >60 ML/MIN/1.73 M^2
EST. GFR  (NON AFRICAN AMERICAN): 56.5 ML/MIN/1.73 M^2
GLUCOSE SERPL-MCNC: 74 MG/DL (ref 70–110)
POTASSIUM SERPL-SCNC: 3.7 MMOL/L (ref 3.5–5.1)
SODIUM SERPL-SCNC: 141 MMOL/L (ref 136–145)

## 2019-07-10 PROCEDURE — 63600175 PHARM REV CODE 636 W HCPCS: Performed by: HOSPITALIST

## 2019-07-10 PROCEDURE — 25000003 PHARM REV CODE 250: Performed by: PHYSICIAN ASSISTANT

## 2019-07-10 PROCEDURE — 25000242 PHARM REV CODE 250 ALT 637 W/ HCPCS: Performed by: PHYSICIAN ASSISTANT

## 2019-07-10 PROCEDURE — 25000242 PHARM REV CODE 250 ALT 637 W/ HCPCS: Performed by: STUDENT IN AN ORGANIZED HEALTH CARE EDUCATION/TRAINING PROGRAM

## 2019-07-10 PROCEDURE — 97165 OT EVAL LOW COMPLEX 30 MIN: CPT

## 2019-07-10 PROCEDURE — 97116 GAIT TRAINING THERAPY: CPT

## 2019-07-10 PROCEDURE — 99232 SBSQ HOSP IP/OBS MODERATE 35: CPT | Mod: ,,, | Performed by: HOSPITALIST

## 2019-07-10 PROCEDURE — 25000003 PHARM REV CODE 250: Performed by: STUDENT IN AN ORGANIZED HEALTH CARE EDUCATION/TRAINING PROGRAM

## 2019-07-10 PROCEDURE — 99900035 HC TECH TIME PER 15 MIN (STAT)

## 2019-07-10 PROCEDURE — 36415 COLL VENOUS BLD VENIPUNCTURE: CPT

## 2019-07-10 PROCEDURE — 27000221 HC OXYGEN, UP TO 24 HOURS

## 2019-07-10 PROCEDURE — 80048 BASIC METABOLIC PNL TOTAL CA: CPT

## 2019-07-10 PROCEDURE — 97530 THERAPEUTIC ACTIVITIES: CPT

## 2019-07-10 PROCEDURE — 63600175 PHARM REV CODE 636 W HCPCS: Performed by: STUDENT IN AN ORGANIZED HEALTH CARE EDUCATION/TRAINING PROGRAM

## 2019-07-10 PROCEDURE — 99232 PR SUBSEQUENT HOSPITAL CARE,LEVL II: ICD-10-PCS | Mod: ,,, | Performed by: HOSPITALIST

## 2019-07-10 PROCEDURE — 20600001 HC STEP DOWN PRIVATE ROOM

## 2019-07-10 PROCEDURE — 94640 AIRWAY INHALATION TREATMENT: CPT

## 2019-07-10 PROCEDURE — 63600175 PHARM REV CODE 636 W HCPCS

## 2019-07-10 PROCEDURE — 94761 N-INVAS EAR/PLS OXIMETRY MLT: CPT

## 2019-07-10 RX ADMIN — IPRATROPIUM BROMIDE AND ALBUTEROL SULFATE 3 ML: .5; 3 SOLUTION RESPIRATORY (INHALATION) at 08:07

## 2019-07-10 RX ADMIN — POLYETHYLENE GLYCOL 3350 17 G: 17 POWDER, FOR SOLUTION ORAL at 08:07

## 2019-07-10 RX ADMIN — FERROUS SULFATE TAB EC 325 MG (65 MG FE EQUIVALENT) 325 MG: 325 (65 FE) TABLET DELAYED RESPONSE at 08:07

## 2019-07-10 RX ADMIN — ROPINIROLE HYDROCHLORIDE 0.25 MG: 0.25 TABLET, FILM COATED ORAL at 08:07

## 2019-07-10 RX ADMIN — ENOXAPARIN SODIUM 30 MG: 100 INJECTION SUBCUTANEOUS at 05:07

## 2019-07-10 RX ADMIN — PREDNISONE 20 MG: 10 TABLET ORAL at 08:07

## 2019-07-10 RX ADMIN — IPRATROPIUM BROMIDE AND ALBUTEROL SULFATE 3 ML: .5; 3 SOLUTION RESPIRATORY (INHALATION) at 04:07

## 2019-07-10 RX ADMIN — LEVOFLOXACIN 750 MG: 750 TABLET, FILM COATED ORAL at 08:07

## 2019-07-10 RX ADMIN — TIOTROPIUM BROMIDE 18 MCG: 18 CAPSULE ORAL; RESPIRATORY (INHALATION) at 08:07

## 2019-07-10 RX ADMIN — THERA TABS 1 TABLET: TAB at 08:07

## 2019-07-10 RX ADMIN — ASPIRIN 81 MG CHEWABLE TABLET 81 MG: 81 TABLET CHEWABLE at 08:07

## 2019-07-10 RX ADMIN — FUROSEMIDE 40 MG: 40 TABLET ORAL at 08:07

## 2019-07-10 NOTE — ASSESSMENT & PLAN NOTE
Discharged from ED on 7/4 with zpack and prednisone  Afebrile without leukocytosis. ESR/CRP WNL.   Given azithromycin in ED  Started on levaquin. Completed   Symptoms more consistent with CHF exacerbation, will hold off on steroids at this time; no wheezes  O2 saturation goal: 88-92% in setting of COPD   Wears O2 at 2.5 L   Continue albuterol PRN and Spiriva

## 2019-07-10 NOTE — ASSESSMENT & PLAN NOTE
Cont with PT/OT for gait training and strengthening and restoration of ADL's   Encourage mobility, OOB in chair, and early ambulation as appropriate  Fall precautions   Monitor for bowel and bladder dysfunction  Monitor for and prevent skin breakdown and pressure ulcers

## 2019-07-10 NOTE — PROGRESS NOTES
[facility] Hospital Medicine  Progress Note    Patient Name: Venus Mayer  MRN: [id number]  Patient Class: IP- Inpatient   Admission Date: 7/6/2019  Length of Stay: 4 days  Attending Physician: Soham Stevenson MD  Primary Care Provider: Jona Che MD    [facility] Medicine Team: [facility] MED  Soham Zaragoza MD    Subjective:     Principal Problem:Acute on chronic right-sided congestive heart failure      HPI:  89 y/o WF with PMH of COPD (on 2.5 L home O2), HTN, HLD, recurrent spontaneous pneumothorax (recently admitted 5/22/19 with large right pneumothorax requiring chest tube and pleurodesis on 5/27/19 and small recurrence on 6/25/19 not requiring intervention), left hip fracture (4 months ago), HOCM s/p AICD, and cartoid artery stenosis s/p L CEA in 2008 presents complaining of worsening SOB beginning at 3 AM this morning. Pt lives with her son who assists with the history. Pt woke son up at 3 AM this morning for SOB. He increased her home O2 to 3L NC and sat her up for 15 minutes with no improvement in the SOB. Pt told son she needed to go to the hospital. Of note, she was seen in the ED on 7/4/19 for same complaint. She was diagnosed with COPD exacerbation. CXR could not exclude PNA. Pt sent home with improved symptoms after duoneb treatment with prednisone 20 mg bid for 5 days and azithromycin 250 mg. Per daughter, patient took first doses of medication yesterday (7/5/19). Daughter also reports pt decreased lasix from 40 mg daily to 20 mg daily 1 week ago due to frequent urination after speaking with provider. Pt endorses chronic dry cough for past 4 months, unchanged recently and intermittent peripheral edema (improved on this admission). She has a 40+ pack year history, quit 30 years ago. Denies fever/chills, CP, leg swelling, wheezing, orthopnea, PND, N/V/D, abdominal pain, dizziness, syncope, urinary symptoms.     In the ED:  O2 sat at 96% on arrival with 4L NC, weaned to home  setting of 2.5L with sat maintained at 97%. BP 150s/70s. Afebrile without leukocytosis. Trop 0.133 (0.166 on 7/4/19). EKG with NSR, RBBB, and biatrial enlargement. BNP with significant increase 2,573 (1,652 on 7/4/19). CXR with bilateral basilar infiltrates and right pleural effusion. Given duoneb, lasix 40 mg IV, and azithromycin.     Overview/Hospital Course:  No notes on file    Interval History: Feels well. Urinary retention overnight requiring in and out caths. Daughter concerned about confusion overnight. UA done with was positive.     7/10: No acute issues. Uneventful night. Patient up in chair awake and alert. Calm and cooperative. Denies SOB. No family at bedside.     Review of Systems   Constitutional: Negative for chills, fatigue and fever.   HENT: Negative.    Respiratory: Negative for cough and shortness of breath.    Cardiovascular: Negative for chest pain and palpitations.   Gastrointestinal: Negative for abdominal pain, diarrhea, nausea and vomiting.   Genitourinary: Negative for difficulty urinating, frequency and hematuria.   Musculoskeletal: Negative.    Neurological: Negative for light-headedness and headaches.     Objective:     Vital Signs (Most Recent):  Temp: 97.2 °F (36.2 °C) (07/10/19 1132)  Pulse: 83 (07/10/19 1132)  Resp: 18 (07/10/19 1132)  BP: (!) 135/58 (07/10/19 1132)  SpO2: 97 % (07/10/19 0825) Vital Signs (24h Range):  Temp:  [96.5 °F (35.8 °C)-98.4 °F (36.9 °C)] 97.2 °F (36.2 °C)  Pulse:  [] 83  Resp:  [15-18] 18  SpO2:  [94 %-98 %] 97 %  BP: (123-170)/(58-79) 135/58     Weight: 42.1 kg (92 lb 13 oz)  Body mass index is 18.13 kg/m².    Intake/Output Summary (Last 24 hours) at 7/10/2019 1136  Last data filed at 7/10/2019 0600  Gross per 24 hour   Intake 440 ml   Output 800 ml   Net -360 ml      Physical Exam   Constitutional:   Thin elderly female sitting up comfortably in chair    HENT:   Head: Normocephalic and atraumatic.   Eyes: No scleral icterus.   Neck: No JVD present.    Pulmonary/Chest: Effort normal and breath sounds normal. No respiratory distress. She has no wheezes. She has no rales.   On 2L O2   Abdominal: Soft. Bowel sounds are normal. She exhibits no distension.   Neurological: She is alert.   Oriented to person and place  States she wears 2.5 L O2 for emphysema    Skin: No erythema.   Vitals reviewed.      Significant Labs: All pertinent labs within the past 24 hours have been reviewed.    Significant Imaging: I have reviewed all pertinent imaging results/findings within the past 24 hours.      Assessment/Plan:      * Acute on chronic right-sided congestive heart failure  Patient with 2.5L HOME O2 for COPD  BNP: 2573 (usually 3095-6945) chest x-ray: bilateral basilar infiltrates and right pleural effusion there are nodular opacities of the lung bases as well for which follow-up is recommended.  Previous Echo with EF of 65% and normal diastolic dysfunction however echo unable to be completed.  Recent echo:  · Normal left ventricular systolic function. The estimated ejection fraction is 65%  · Septal wall has abnormal motion.  · Moderate left atrial enlargement.  · Normal right ventricular systolic function.  · Mild aortic regurgitation.  · Moderate mitral sclerosis.  · There is moderate leaflet calcification of the Mitral Valve.  · Mitral Valve peak velocity is m/s; mean gradient is 6 mmHg.  · Intermediate central venous pressure (8 mm Hg).  · The estimated PA systolic pressure is 52 mm Hg  · Pulmonary hypertension present.     Switched back to home dose lasix  Weight increased by 1.1 kg but clinically appears comfortable and on 2L NC without rales; will continue   Maintain euvolemia by monitoring I/O's and daily weights.  Fluid restriction (1.5 liters/24 hours)  Low Na diet  Monitor for signs of fluid overload  Maintain oxygen sats >92% via NC    Patient Vitals for the past 72 hrs (Last 3 readings):   Weight   07/10/19 0400 42.1 kg (92 lb 13 oz)   07/09/19 0400 41.1 kg  (90 lb 9.7 oz)   07/08/19 0400 40 kg (88 lb 2.9 oz)         Chronic obstructive pulmonary disease with acute exacerbation  Discharged from ED on 7/4 with zpack and prednisone  Afebrile without leukocytosis. ESR/CRP WNL.   Given azithromycin in ED  Started on levaquin. Completed   Symptoms more consistent with CHF exacerbation, will hold off on steroids at this time; no wheezes  O2 saturation goal: 88-92% in setting of COPD   Wears O2 at 2.5 L   Continue albuterol PRN and Spiriva     Acute on Chronic hypercapnic respiratory failure  Possibly due to CHF and COPD  Management as per below       UTI (urinary tract infection)  Urine culture negative   Stop antibiotics     H/O fracture of hip  See above, never went to SNF or rehab      Recurrent spontaneous pneumothorax  Recently admitted 5/22/19 for a right spontaneous pneumothorax with her hospital course complicated by subcutaneous emphysema after pigtail removal requiring right anterior blow hole placed. Her lung remained well expanded and did not require tube replacement. Pt also had a spontaneous left sided pneumothorax over 30 years ago.  6/25/19- Presented to ED for acute SOB and hypoxia at home. CXR in ED showed small basilar pneumothorax.   6/26/19- Serial CXRs, including a PA/Lateral, throughout the day showed stable subpulmonic space. Patient remained clinically stable and in no respiratory distress.   7/6/19 Bilateral basilar infiltrates and right pleural effusion there are nodular opacities of the lung bases as well for which follow-up is recommended.  96% on 4L then reduced to 2.5L maintaining saturation at 99%.  Saturation goal: 88-92% in the setting of COPD and pneumothorax hx, will wean O2 down    Debility  Cont with PT/OT for gait training and strengthening and restoration of ADL's   Encourage mobility, OOB in chair, and early ambulation as appropriate  Fall precautions   Monitor for bowel and bladder dysfunction  Monitor for and prevent skin breakdown  and pressure ulcers      DNR (do not resuscitate)  Confirmed pt to remain DNR      CKD (chronic kidney disease) stage 2, GFR 60-89 ml/min  Cr 0.9 on admission, at baseline  Lab Results   Component Value Date    CREATININE 0.9 07/10/2019     Sr Cr stable  Cont to monitor with daily labs   Avoid nephrotoxins.   Renally dose all medications   Monitor urine output    Essential hypertension  BP Readings from Last 3 Encounters:   07/10/19 (!) 140/74   07/05/19 122/60   06/26/19 (!) 140/68     Uncontrolled upon arrival 156/70 trended down to 139/81 after IV furosemide 40 mg  Home medication: amlodipine 2.5 mg qhs IF systolic BP >155.   Pt has not required in 4 months.  Cont to monitor        VTE Risk Mitigation (From admission, onward)        Ordered     enoxaparin injection 30 mg  Daily      07/07/19 0754     IP VTE HIGH RISK PATIENT  Once      07/06/19 0809                Soham Zraagoza MD  Department of Hospital Medicine   [facility]

## 2019-07-10 NOTE — PT/OT/SLP EVAL
Occupational Therapy   Evaluation    Name: Venus Mayer  MRN: [id number]  Admitting Diagnosis:  Acute on chronic right-sided congestive heart failure      Recommendations:     Discharge Recommendations: outpatient OT  Discharge Equipment Recommendations:  none  Barriers to discharge:  None    Assessment:     Venus Mayer is a 89 y.o. female with a medical diagnosis of Acute on chronic right-sided congestive heart failure.  She presents with decreased independence with ADL's. Performance deficits affecting function: weakness, impaired endurance, impaired self care skills, impaired functional mobilty, impaired balance, decreased upper extremity function, decreased lower extremity function, decreased safety awareness.      Rehab Prognosis: Good; patient would benefit from acute skilled OT services to address these deficits and reach maximum level of function.       Plan:     Patient to be seen 3 x/week to address the above listed problems via self-care/home management, therapeutic activities, therapeutic exercises  · Plan of Care Expires: 08/09/19  · Plan of Care Reviewed with: patient    Subjective     Chief Complaint: none  Patient/Family Comments/goals: to get OOB    Occupational Profile:  Living Environment & PLOF: Pt reports that she resides alone (however family has been providing 24 hour (A) since recent hip fracture) in 1 story house with 4 steps (B) rails.  Pt requires (A) with all ADL's & uses RW for transfers/ambulation since hip fracture.  Pt does not drive.  Pt is a retired  & enjoys dancing, scrabble, puzzles, & playing cards.  Equipment Used at Home:  (RW, SC, 3 in 1 commode, bathtub chair, sock aid, reacher)  Assistance upon Discharge: family provides 24 hour (A) since recent hip fracture 4 months ago    Pain/Comfort:  · Pain Rating 1: 0/10  · Pain Rating Post-Intervention 1: 0/10    Patients cultural, spiritual, Latter-day conflicts given the current situation: no    Objective:      Communicated with: RN prior to session.  Patient found supine with telemetry, oxygen upon OT entry to room.  No family present.    General Precautions: Standard, fall(DNR), posterior hip precautions until sometime in September per pt.    Occupational Performance:    Bed Mobility:    · Patient completed Supine to Sit with stand by assistance    Functional Mobility/Transfers:  · Patient completed Sit <> Stand Transfer with stand by assistance  with  rolling walker   · Patient completed Bed <> Chair Transfer using Stand Pivot technique with stand by assistance with rolling walker  · Functional Mobility: SBA using RW    Activities of Daily Living:  · Upper Body Dressing: stand by assistance seated EOB  · Lower Body Dressing: total assistance donning socks (pt reports that she is not allowed to bend forward since hip surgery  · Toileting: total assistance while supine using [email or web address]    Cognitive/Visual Perceptual:  Cognitive/Psychosocial Skills:     -       Oriented to: Person, Place, Time and Situation   -       Follows Commands/attention:Follows one-step commands  -       Communication: clear/fluent  -       Memory: No Deficits noted  -       Safety awareness/insight to disability: impaired   -       Mood/Affect/Coping skills/emotional control: Appropriate to situation    Physical Exam:  Sensation:    -       Intact  Dominant hand:    -       right  Upper Extremity Range of Motion:     -       Right Upper Extremity: WFL  -       Left Upper Extremity: WFL  Upper Extremity Strength:    -       Right Upper Extremity: WFL  -       Left Upper Extremity: WFL   Strength:    -       Right Upper Extremity: WFL  -       Left Upper Extremity: WFL    AMPAC 6 Click ADL:  AMPAC Total Score: 15    Treatment & Education:  Provided education regarding role of OT, POC, & discharge recommendations with pt verbalizing understanding.  Provided education that she should only get up OOB with (A) from staff. Pt had no further questions  & when asked whether there were any concerns pt reported none.    Education:  Patient left up in chair with all lines intact, call button in reach, chair alarm on, RN notified, Bashirs camera (notified personnel of pt [facility]) present and white board updated.    GOALS:   Multidisciplinary Problems     Occupational Therapy Goals        Problem: Occupational Therapy Goal    Goal Priority Disciplines Outcome Interventions   Occupational Therapy Goal     OT, PT/OT Ongoing (interventions implemented as appropriate)    Description:  Goals to be met by: 7/20     Patient will increase functional independence with ADLs by performing:    UE Dressing with Supervision.  LE Dressing with Stand-by Assistance and Assistive Devices as needed.  Grooming while standing with Supervision.  Toileting from toilet with Supervision for hygiene and clothing management.   Supine to sit with Supervision.  Stand pivot transfers with Supervision using RW.                      History:     Past Medical History:   Diagnosis Date    Acute on chronic congestive heart failure 7/6/2019    Cardiomyopathy     Carotid artery occlusion     COPD (chronic obstructive pulmonary disease)     Hyperlipidemia     Hypertension        Past Surgical History:   Procedure Laterality Date    CARDIAC CATHETERIZATION      CAROTID ENDARTERECTOMY         Time Tracking:     OT Date of Treatment: 07/10/19  OT Start Time: 0930  OT Stop Time: 0954  OT Total Time (min): 24 min    Billable Minutes:Evaluation 24    EULA Dodson  7/10/2019

## 2019-07-10 NOTE — ASSESSMENT & PLAN NOTE
Cr 0.9 on admission, at baseline  Lab Results   Component Value Date    CREATININE 0.9 07/10/2019     Sr Cr stable  Cont to monitor with daily labs   Avoid nephrotoxins.   Renally dose all medications   Monitor urine output

## 2019-07-10 NOTE — SUBJECTIVE & OBJECTIVE
Interval History: Feels well. Urinary retention overnight requiring in and out caths. Daughter concerned about confusion overnight. UA done with was positive.     7/10: No acute issues. Uneventful night. Patient up in chair awake and alert. Calm and cooperative. Denies SOB. No family at bedside.     Review of Systems   Constitutional: Negative for chills, fatigue and fever.   HENT: Negative.    Respiratory: Negative for cough and shortness of breath.    Cardiovascular: Negative for chest pain and palpitations.   Gastrointestinal: Negative for abdominal pain, diarrhea, nausea and vomiting.   Genitourinary: Negative for difficulty urinating, frequency and hematuria.   Musculoskeletal: Negative.    Neurological: Negative for light-headedness and headaches.     Objective:     Vital Signs (Most Recent):  Temp: 97.2 °F (36.2 °C) (07/10/19 1132)  Pulse: 83 (07/10/19 1132)  Resp: 18 (07/10/19 1132)  BP: (!) 135/58 (07/10/19 1132)  SpO2: 97 % (07/10/19 0825) Vital Signs (24h Range):  Temp:  [96.5 °F (35.8 °C)-98.4 °F (36.9 °C)] 97.2 °F (36.2 °C)  Pulse:  [] 83  Resp:  [15-18] 18  SpO2:  [94 %-98 %] 97 %  BP: (123-170)/(58-79) 135/58     Weight: 42.1 kg (92 lb 13 oz)  Body mass index is 18.13 kg/m².    Intake/Output Summary (Last 24 hours) at 7/10/2019 1136  Last data filed at 7/10/2019 0600  Gross per 24 hour   Intake 440 ml   Output 800 ml   Net -360 ml      Physical Exam   Constitutional:   Thin elderly female sitting up comfortably in chair    HENT:   Head: Normocephalic and atraumatic.   Eyes: No scleral icterus.   Neck: No JVD present.   Pulmonary/Chest: Effort normal and breath sounds normal. No respiratory distress. She has no wheezes. She has no rales.   On 2L O2   Abdominal: Soft. Bowel sounds are normal. She exhibits no distension.   Neurological: She is alert.   Oriented to person and place  States she wears 2.5 L O2 for emphysema    Skin: No erythema.   Vitals reviewed.      Significant Labs: All pertinent  labs within the past 24 hours have been reviewed.    Significant Imaging: I have reviewed all pertinent imaging results/findings within the past 24 hours.

## 2019-07-10 NOTE — PLAN OF CARE
Problem: Physical Therapy Goal  Goal: Physical Therapy Goal  Goals to be met by: 2019    Patient will increase functional independence with mobility by performin. Supine to sit with Modified Rogers  2. Sit to supine with Modified Rogers  3. Sit to stand transfer with Modified Rogers  4. Bed to chair transfer with Supervision using Rolling Walker  5. Gait  x 200 feet with Rolling Walker Supervision.   6. Ascend/descend 4 stair with bilateral Handrails Contact Guard Assistance.        Discharge Recommendations: OP PT    Pt safe to transfer OOB/BTB and amb with RN/PCT: Use RW with SBA of 1 person.    Goals remain appropriate.     Dayanara Hennessy, PTA.   [phone]   7/10/2019

## 2019-07-10 NOTE — ASSESSMENT & PLAN NOTE
Patient with 2.5L HOME O2 for COPD  BNP: 2573 (usually 2626-6564) chest x-ray: bilateral basilar infiltrates and right pleural effusion there are nodular opacities of the lung bases as well for which follow-up is recommended.  Previous Echo with EF of 65% and normal diastolic dysfunction however echo unable to be completed.  Recent echo:  · Normal left ventricular systolic function. The estimated ejection fraction is 65%  · Septal wall has abnormal motion.  · Moderate left atrial enlargement.  · Normal right ventricular systolic function.  · Mild aortic regurgitation.  · Moderate mitral sclerosis.  · There is moderate leaflet calcification of the Mitral Valve.  · Mitral Valve peak velocity is m/s; mean gradient is 6 mmHg.  · Intermediate central venous pressure (8 mm Hg).  · The estimated PA systolic pressure is 52 mm Hg  · Pulmonary hypertension present.     Switched back to home dose lasix  Weight increased by 1.1 kg but clinically appears comfortable and on 2L NC without rales; will continue   Maintain euvolemia by monitoring I/O's and daily weights.  Fluid restriction (1.5 liters/24 hours)  Low Na diet  Monitor for signs of fluid overload  Maintain oxygen sats >92% via NC    Patient Vitals for the past 72 hrs (Last 3 readings):   Weight   07/10/19 0400 42.1 kg (92 lb 13 oz)   07/09/19 0400 41.1 kg (90 lb 9.7 oz)   07/08/19 0400 40 kg (88 lb 2.9 oz)

## 2019-07-10 NOTE — ASSESSMENT & PLAN NOTE
BP Readings from Last 3 Encounters:   07/10/19 (!) 140/74   07/05/19 122/60   06/26/19 (!) 140/68     Uncontrolled upon arrival 156/70 trended down to 139/81 after IV furosemide 40 mg  Home medication: amlodipine 2.5 mg qhs IF systolic BP >155.   Pt has not required in 4 months.  Cont to monitor

## 2019-07-10 NOTE — PLAN OF CARE
Problem: Adult Inpatient Plan of Care  Goal: Plan of Care Review  Outcome: Ongoing (interventions implemented as appropriate)  Pt AAx3-4, breathing even and unlabored, call light in reach, bed in lowest position. Reminded pt to call prior to ambulation, pt stated understanding. Assisted pt to restroom throughout shift. Family called multiple time throughout shift regarding pt status, paged Dr. Stevenson and relayed daughter's wish to speak with her. BP elevated, administered IV hydralazine per orders. Otherwise, VSS stable, frequent hourly rounding completed. Will continue to monitor.

## 2019-07-10 NOTE — PLAN OF CARE
Problem: Adult Inpatient Plan of Care  Goal: Plan of Care Review  Outcome: Ongoing (interventions implemented as appropriate)  Patient AAo x3, calm and cooperative. No acute events overnight. BiPAP utilized overnight. No complaints of pain. Patient urinating independently. No falls and injuries overnight. Patient stable,w ill continue to monitor.     Problem: Skin Injury Risk Increased  Goal: Skin Health and Integrity  Outcome: Ongoing (interventions implemented as appropriate)  Intervention: Optimize Skin Protection     07/10/19 0414   Prevent Additional Skin Injury   Head of Bed (HOB) HOB at 20-30 degrees   Pressure Reduction Devices positioning supports utilized   Monitor and Manage Hypervolemia   Skin Protection incontinence pads utilized

## 2019-07-10 NOTE — PLAN OF CARE
Problem: Occupational Therapy Goal  Goal: Occupational Therapy Goal  Goals to be met by: 7/20     Patient will increase functional independence with ADLs by performing:    UE Dressing with Supervision.  LE Dressing with Stand-by Assistance and Assistive Devices as needed.  Grooming while standing with Supervision.  Toileting from toilet with Supervision for hygiene and clothing management.   Supine to sit with Supervision.  Stand pivot transfers with Supervision using RW.    Outcome: Ongoing (interventions implemented as appropriate)  OT eval completed.

## 2019-07-10 NOTE — PLAN OF CARE
Problem: Adult Inpatient Plan of Care  Goal: Plan of Care Review  Outcome: Ongoing (interventions implemented as appropriate)  POC reviewed with patient. AAOX3 period of confusion. VVS. Address questions and concerns. [place]. Up in chair with rehab. Tolerated well. No complaint of discomfort/pain. Free from falls. Safety maintained. Call light in reach. Frequent rounding. telesitter at bedside. Tele monitoring. ALEXANDRA

## 2019-07-10 NOTE — PT/OT/SLP PROGRESS
Physical Therapy Treatment    Patient Name:  Venus Mayer   MRN:  [id number]  Admitting Diagnosis: Acute on chronic right-sided congestive heart failure  Recent Surgery: * No surgery found *      Recommendations:     Discharge Recommendations:  outpatient PT   Discharge Equipment Recommendations: none   Barriers to discharge: None    Plan:     During this hospitalization, patient to be seen 3 x/week to address the above listed problems via gait training, therapeutic activities, therapeutic exercises, neuromuscular re-education  · Plan of Care Expires:  08/07/19   Plan of Care Reviewed with: patient    This Plan of care has been discussed with the patient who was involved in its development and understands and is in agreement with the identified goals and treatment plan    Subjective     Communicated with RN (Myrna) prior to session.     Patient comments: Pt with no complaints  Pain/Comfort:  · Pain Rating 1: 0/10  · Pain Rating Post-Intervention 1: 0/10    Objective:     Patient found with: bed alarm, PureWick, pulse ox (continuous), telemetry, oxygen(3 LPM via NC, later decreased to 2LPM per respiratory therapist recommendation)    Patient found sup in bed upon PT entry to room, agreeable to treatment.  Family present in the room.    General Precautions: Standard, fall, hearing impaired(DNR)   Orthopedic Precautions:N/A   Braces: N/A       BED MOBILITY (vc's for hand placement sequencing of task):        Rolling to the R:  Sup with bedrail.       Rolling to the L:  Sup with bedrail.        Sup > sit at the EOB:  SBA for safety.       Sit > sup:  Mod A for B LE's.       Scooting hips to EOB with SBA                SITTING AT THE EDGE OF THE BED    Assistance Level Required: close sup for safety with B UE support     TRANSFERS  (vc's for hand placement, sequencing of task and safety)   Patient completed Sit <> Stand Transfer from EOB with SBA for safety with rolling walker x2 trial(s)   Patient completed Stand <>  Sit Transfer to EOB with SBA for safety with rolling walker      GAIT: in hallway,  with supplemental O2 at 2LPM via NC   Patient ambulated: 200ft   Patient required: SBA for safety   Patient used:  Rolling Walker   Gait Pattern observed: reciprocal gait   Gait Deviation(s): decreased step length, decreased stride length and B shoulder elevation    Comments: vc's for increased B step length, B shoulder relaxation      STAIRS  Pt ascended/descended 5 stair(s) with No Assistive Device with right handrail with Stand-by Assistance with vc's for   Sequencing of LE's, hand placement, speed of task and safety.     EDUCATION  Patient provided with daily orientation and goals of this PT session. They were educated to call for assistance and to transfer with hospital staff only.  Also, pt was educated on the effects of prolonged immobility and the importance of performing OOB activity and exercises to promote healing and reduce recovery time    Whiteboard updated with correct mobility information. RN/PCT notified.  Pt safe to transfer OOB/BTB and amb with RN/PCT: Use RW with SBA of 1 person.    Patient left sup in bed with HOB elevated, with  all lines intact, call button in reach, bed alarm on, RN notified and son present    AM-PAC 6 CLICK MOBILITY  Turning over in bed (including adjusting bedclothes, sheets and blankets)?: 3  Sitting down on and standing up from a chair with arms (e.g., wheelchair, bedside commode, etc.): 3  Moving from lying on back to sitting on the side of the bed?: 3  Moving to and from a bed to a chair (including a wheelchair)?: 3  Need to walk in hospital room?: 3  Climbing 3-5 steps with a railing?: 3  Basic Mobility Total Score: 18     Assessment:     Venus Mayer is a 89 y.o. female admitted with a medical diagnosis of Acute on chronic right-sided congestive heart failure.  She presents with the following impairments/functional limitations:  weakness, impaired endurance, impaired self care  skills, impaired functional mobilty, gait instability, impaired balance. requiring assistance and verbal cues for bed mob, gait and stairs to prevent falls due to instability.   Pt will cont to benefit from skilled PT intervention to address deficits and improve functional mobility.     Rehab Prognosis:  Good; patient would benefit from acute skilled PT services to address these deficits and reach maximum level of function.      GOALS:   Multidisciplinary Problems     Physical Therapy Goals        Problem: Physical Therapy Goal    Goal Priority Disciplines Outcome Goal Variances Interventions   Physical Therapy Goal     PT, PT/OT Ongoing (interventions implemented as appropriate)     Description:  Goals to be met by: 2019    Patient will increase functional independence with mobility by performin. Supine to sit with Modified [place]  2. Sit to supine with Modified [place]  3. Sit to stand transfer with Modified [place]  4. Bed to chair transfer with Supervision using Rolling Walker  5. Gait  x 200 feet with Rolling Walker Supervision.   6. Ascend/descend 4 stair with bilateral Handrails Contact Guard Assistance.                       Time Tracking:     PT Received On: 07/10/19  PT Start Time: 1605     PT Stop Time: 1650  PT Total Time (min): 45 min     Billable Minutes: Gait Training 15 and Therapeutic Activity 30    Treatment Type: Treatment  PT/PTA: PTA     PTA Visit Number: 1       Dayanara Hennessy PTA.  Pager [phone]    7/10/2019    .  '

## 2019-07-11 VITALS
DIASTOLIC BLOOD PRESSURE: 62 MMHG | OXYGEN SATURATION: 95 % | TEMPERATURE: 98 F | BODY MASS INDEX: 18.22 KG/M2 | WEIGHT: 92.81 LBS | RESPIRATION RATE: 18 BRPM | HEART RATE: 81 BPM | HEIGHT: 60 IN | SYSTOLIC BLOOD PRESSURE: 133 MMHG

## 2019-07-11 LAB
ANION GAP SERPL CALC-SCNC: 10 MMOL/L (ref 8–16)
BUN SERPL-MCNC: 28 MG/DL (ref 8–23)
CALCIUM SERPL-MCNC: 9.7 MG/DL (ref 8.7–10.5)
CHLORIDE SERPL-SCNC: 92 MMOL/L (ref 95–110)
CO2 SERPL-SCNC: 39 MMOL/L (ref 23–29)
CREAT SERPL-MCNC: 0.9 MG/DL (ref 0.5–1.4)
EST. GFR  (AFRICAN AMERICAN): >60 ML/MIN/1.73 M^2
EST. GFR  (NON AFRICAN AMERICAN): 56.5 ML/MIN/1.73 M^2
GLUCOSE SERPL-MCNC: 78 MG/DL (ref 70–110)
POTASSIUM SERPL-SCNC: 3.5 MMOL/L (ref 3.5–5.1)
SODIUM SERPL-SCNC: 141 MMOL/L (ref 136–145)

## 2019-07-11 PROCEDURE — 36415 COLL VENOUS BLD VENIPUNCTURE: CPT

## 2019-07-11 PROCEDURE — 25000003 PHARM REV CODE 250: Performed by: PHYSICIAN ASSISTANT

## 2019-07-11 PROCEDURE — 99239 HOSP IP/OBS DSCHRG MGMT >30: CPT | Mod: ,,, | Performed by: HOSPITALIST

## 2019-07-11 PROCEDURE — 25000003 PHARM REV CODE 250: Performed by: STUDENT IN AN ORGANIZED HEALTH CARE EDUCATION/TRAINING PROGRAM

## 2019-07-11 PROCEDURE — 94761 N-INVAS EAR/PLS OXIMETRY MLT: CPT

## 2019-07-11 PROCEDURE — 80048 BASIC METABOLIC PNL TOTAL CA: CPT

## 2019-07-11 PROCEDURE — 99239 PR HOSPITAL DISCHARGE DAY,>30 MIN: ICD-10-PCS | Mod: ,,, | Performed by: HOSPITALIST

## 2019-07-11 PROCEDURE — 25000242 PHARM REV CODE 250 ALT 637 W/ HCPCS: Performed by: PHYSICIAN ASSISTANT

## 2019-07-11 PROCEDURE — 94660 CPAP INITIATION&MGMT: CPT

## 2019-07-11 PROCEDURE — 27000221 HC OXYGEN, UP TO 24 HOURS

## 2019-07-11 PROCEDURE — 25000242 PHARM REV CODE 250 ALT 637 W/ HCPCS: Performed by: STUDENT IN AN ORGANIZED HEALTH CARE EDUCATION/TRAINING PROGRAM

## 2019-07-11 PROCEDURE — 99900035 HC TECH TIME PER 15 MIN (STAT)

## 2019-07-11 PROCEDURE — 94640 AIRWAY INHALATION TREATMENT: CPT

## 2019-07-11 RX ORDER — FUROSEMIDE 40 MG/1
40 TABLET ORAL DAILY
Qty: 30 TABLET | Refills: 11 | Status: ON HOLD | OUTPATIENT
Start: 2019-07-11 | End: 2021-09-23

## 2019-07-11 RX ORDER — TIOTROPIUM BROMIDE 18 UG/1
18 CAPSULE ORAL; RESPIRATORY (INHALATION) DAILY
Qty: 30 CAPSULE | Refills: 3 | Status: SHIPPED | OUTPATIENT
Start: 2019-07-11 | End: 2021-08-16

## 2019-07-11 RX ORDER — ALBUTEROL SULFATE 90 UG/1
1 AEROSOL, METERED RESPIRATORY (INHALATION) EVERY 6 HOURS PRN
Qty: 1 INHALER | Refills: 5 | Status: ON HOLD | OUTPATIENT
Start: 2019-07-11 | End: 2022-03-18 | Stop reason: HOSPADM

## 2019-07-11 RX ADMIN — FUROSEMIDE 40 MG: 40 TABLET ORAL at 08:07

## 2019-07-11 RX ADMIN — IPRATROPIUM BROMIDE AND ALBUTEROL SULFATE 3 ML: .5; 3 SOLUTION RESPIRATORY (INHALATION) at 08:07

## 2019-07-11 RX ADMIN — ASPIRIN 81 MG CHEWABLE TABLET 81 MG: 81 TABLET CHEWABLE at 08:07

## 2019-07-11 RX ADMIN — TIOTROPIUM BROMIDE 18 MCG: 18 CAPSULE ORAL; RESPIRATORY (INHALATION) at 08:07

## 2019-07-11 RX ADMIN — IPRATROPIUM BROMIDE AND ALBUTEROL SULFATE 3 ML: .5; 3 SOLUTION RESPIRATORY (INHALATION) at 12:07

## 2019-07-11 RX ADMIN — POLYETHYLENE GLYCOL 3350 17 G: 17 POWDER, FOR SOLUTION ORAL at 08:07

## 2019-07-11 RX ADMIN — THERA TABS 1 TABLET: TAB at 08:07

## 2019-07-11 NOTE — ASSESSMENT & PLAN NOTE
Cr 0.9 on admission, at baseline  Lab Results   Component Value Date    CREATININE 0.9 07/11/2019     Sr Cr stable  Cont to monitor with daily labs   Avoid nephrotoxins.   Renally dose all medications   Monitor urine output

## 2019-07-11 NOTE — DISCHARGE SUMMARY
[facility] Hospital Medicine  Discharge Summary      Patient Name: Venus Mayer  MRN: [id number]  Admission Date: 7/6/2019  Hospital Length of Stay: 5 days  Discharge Date and Time:  07/11/2019 8:02 AM  Attending Physician: Soham Stevenson MD   Discharging Provider: Soham Zaragoza MD  Primary Care Provider: Jona Che MD  [facility] Medicine Team: [facility]  Soham Zaragoza MD    HPI:   89 y/o WF with PMH of COPD (on 2.5 L home O2), HTN, HLD, recurrent spontaneous pneumothorax (recently admitted 5/22/19 with large right pneumothorax requiring chest tube and pleurodesis on 5/27/19 and small recurrence on 6/25/19 not requiring intervention), left hip fracture (4 months ago), HOCM s/p AICD, and cartoid artery stenosis s/p L CEA in 2008 presents complaining of worsening SOB beginning at 3 AM this morning. Pt lives with her son who assists with the history. Pt woke son up at 3 AM this morning for SOB. He increased her home O2 to 3L NC and sat her up for 15 minutes with no improvement in the SOB. Pt told son she needed to go to the hospital. Of note, she was seen in the ED on 7/4/19 for same complaint. She was diagnosed with COPD exacerbation. CXR could not exclude PNA. Pt sent home with improved symptoms after duoneb treatment with prednisone 20 mg bid for 5 days and azithromycin 250 mg. Per daughter, patient took first doses of medication yesterday (7/5/19). Daughter also reports pt decreased lasix from 40 mg daily to 20 mg daily 1 week ago due to frequent urination after speaking with provider. Pt endorses chronic dry cough for past 4 months, unchanged recently and intermittent peripheral edema (improved on this admission). She has a 40+ pack year history, quit 30 years ago. Denies fever/chills, CP, leg swelling, wheezing, orthopnea, PND, N/V/D, abdominal pain, dizziness, syncope, urinary symptoms.     In the ED:  O2 sat at 96% on arrival with 4L NC, weaned to home setting of 2.5L with sat  maintained at 97%. BP 150s/70s. Afebrile without leukocytosis. Trop 0.133 (0.166 on 7/4/19). EKG with NSR, RBBB, and biatrial enlargement. BNP with significant increase 2,573 (1,652 on 7/4/19). CXR with bilateral basilar infiltrates and right pleural effusion. Given duoneb, lasix 40 mg IV, and azithromycin.     * No surgery found *      Hospital Course:   No notes on file     Consults:   Consults (From admission, onward)        Status Ordering Provider     Inpatient consult to Critical Care Medicine  Once     Provider:  (Not yet assigned)    Completed IMELDA RAMIREZ     Inpatient consult to Registered Dietitian/Nutritionist  Once     Provider:  (Not yet assigned)    Completed DOMINICK GAMEZ     Inpatient consult to Social Work/Case Management  Once     Provider:  (Not yet assigned)    Acknowledged DOMINICK GAMEZ          * Acute on chronic right-sided congestive heart failure  Patient with 2.5L HOME O2 for COPD  BNP: 2573 (usually 1666-5247) chest x-ray: bilateral basilar infiltrates and right pleural effusion there are nodular opacities of the lung bases as well for which follow-up is recommended.  Previous Echo with EF of 65% and normal diastolic dysfunction however echo unable to be completed.  Recent echo:  · Normal left ventricular systolic function. The estimated ejection fraction is 65%  · Septal wall has abnormal motion.  · Moderate left atrial enlargement.  · Normal right ventricular systolic function.  · Mild aortic regurgitation.  · Moderate mitral sclerosis.  · There is moderate leaflet calcification of the Mitral Valve.  · Mitral Valve peak velocity is m/s; mean gradient is 6 mmHg.  · Intermediate central venous pressure (8 mm Hg).  · The estimated PA systolic pressure is 52 mm Hg  · Pulmonary hypertension present.     Switched back to home dose lasix  Weight increased by 1.1 kg but clinically appears comfortable and on 2L NC without rales; will continue   Maintain euvolemia by monitoring I/O's and  daily weights.  Fluid restriction (1.5 liters/24 hours)  Low Na diet  Monitor for signs of fluid overload  Maintain oxygen sats >92% via NC    Patient Vitals for the past 72 hrs (Last 3 readings):   Weight   07/10/19 0400 42.1 kg (92 lb 13 oz)   07/09/19 0400 41.1 kg (90 lb 9.7 oz)         Chronic obstructive pulmonary disease with acute exacerbation  Discharged from ED on 7/4 with zpack and prednisone  Afebrile without leukocytosis. ESR/CRP WNL.   Given azithromycin in ED  Started on levaquin. Completed   Symptoms more consistent with CHF exacerbation, will hold off on steroids at this time; no wheezes  O2 saturation goal: 88-92% in setting of COPD   Wears O2 at 2.5 L   Continue albuterol PRN and Spiriva     Acute on Chronic hypercapnic respiratory failure  Possibly due to CHF and COPD  Management as per below       UTI (urinary tract infection)  Urine culture negative   Stop antibiotics     Debility  Cont with PT/OT for gait training and strengthening and restoration of ADL's   Encourage mobility, OOB in chair, and early ambulation as appropriate  Fall precautions   Monitor for bowel and bladder dysfunction  Monitor for and prevent skin breakdown and pressure ulcers      DNR (do not resuscitate)  Confirmed pt to remain DNR      CKD (chronic kidney disease) stage 2, GFR 60-89 ml/min  Cr 0.9 on admission, at baseline  Lab Results   Component Value Date    CREATININE 0.9 07/11/2019     Sr Cr stable  Cont to monitor with daily labs   Avoid nephrotoxins.   Renally dose all medications   Monitor urine output    Essential hypertension  BP Readings from Last 3 Encounters:   07/11/19 124/63   07/05/19 122/60   06/26/19 (!) 140/68     Uncontrolled upon arrival 156/70 trended down to 139/81 after IV furosemide 40 mg  Home medication: amlodipine 2.5 mg qhs IF systolic BP >155.   Pt has not required in 4 months.  Cont to monitor        Final Active Diagnoses:    Diagnosis Date Noted POA    PRINCIPAL PROBLEM:  Acute on chronic  right-sided congestive heart failure [I50.813] 07/06/2019 Yes    Chronic obstructive pulmonary disease with acute exacerbation [J44.1] 03/28/2013 Yes    Acute on Chronic hypercapnic respiratory failure [J96.02] 07/06/2019 Yes    UTI (urinary tract infection) [N39.0] 07/09/2019 Yes    DNR (do not resuscitate) [Z66] 05/22/2019 Yes    Debility [R53.81] 05/22/2019 Yes    CKD (chronic kidney disease) stage 2, GFR 60-89 ml/min [N18.2] 03/28/2013 Yes    Essential hypertension [I10] 01/17/2013 Yes      Problems Resolved During this Admission:    Diagnosis Date Noted Date Resolved POA    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    Specialty:  General Practice  Why:  For follow up and review of hosptial course   Contact information:  [address]  [place] [address]  [phone]                 Patient Instructions:      Referral to OutPatient  Physical therapy   Referral Priority: Routine Referral Type: Physical Medicine   Referral Reason: Specialty Services Required   Requested Specialty: Physical Therapy   Number of Visits Requested: 1     Referral to Outpatient Occupational therapy   Referral Priority: Routine Referral Type: Occupational Therapy   Referral Reason: Specialty Services Required   Requested Specialty: Occupational Therapy   Number of Visits Requested: 1     Ambulatory referral to Pulmonology   Referral Priority: Routine Referral Type: Consultation   Referral Reason: Specialty Services Required   Requested Specialty: Pulmonary Disease   Number of Visits Requested: 1     Ambulatory Referral to [facility]   Referral Priority: Routine Referral Type: Consultation   Referral Reason: Specialty Services Required   Number of Visits Requested: 1     Diet  Cardiac     Notify your health care provider if you experience any of the following:  temperature >100.4     Notify your health care provider if you experience any of the following:  difficulty breathing or increased cough     Activity as tolerated       Significant Diagnostic Studies: Labs:   BMP:   Recent Labs   Lab 07/10/19  0434 07/11/19  0427   GLU 74 78    141   K 3.7 3.5   CL 94* 92*   CO2 36* 39*   BUN 31* 28*   CREATININE 0.9 0.9   CALCIUM 9.8 9.7    and CBC No results for input(s): WBC, HGB, HCT, PLT in the last 48 hours.    Pending Diagnostic Studies:     Procedure Component Value Units Date/Time    X-Ray Chest PA And Lateral [[id number]]     Order Status:  Sent Lab Status:  No result          Medications:  Reconciled Home Medications:      Medication List      CHANGE how you take these medications    albuterol 90 mcg/actuation inhaler  Commonly known as:  PROAIR HFA  Inhale 1 puff into the lungs every 6 (six) hours as needed for Wheezing or Shortness of Breath. Rescue  What changed:  reasons to take this        CONTINUE taking these medications    acetaminophen 500 MG tablet  Commonly known as:  TYLENOL  Take 1,000 mg by mouth daily as needed for Pain.     amLODIPine 2.5 MG tablet  Commonly known as:  NORVASC  Take 2.5 mg by mouth nightly as needed for SBP greater than. SBP greater than 155     aspirin 81 mg Tab  Take by mouth. 1 Tablet Oral Every day     ferrous sulfate 325 (65 FE) MG EC tablet  Take 1 tablet (325 mg total) by mouth once daily.     furosemide 40 MG tablet  Commonly known as:  LASIX  Take 1 tablet (40 mg total) by mouth once daily.     multivitamin per tablet  Commonly known as:  THERAGRAN  Take 1 tablet by mouth once daily.     rOPINIRole 0.25 MG tablet  Commonly known as:  REQUIP  Take 1 tablet (0.25 mg total) by mouth every evening.     sodium chloride 0.9 % Soln  Uses as needed by nebulization route     tiotropium 18 mcg inhalation capsule  Commonly known as:  SPIRIVA  Inhale 1  capsule (18 mcg total) into the lungs once daily.     triamcinolone 55 mcg nasal inhaler  Commonly known as:  NASACORT  Mixes 1/2 ml with phenylephrine        STOP taking these medications    azithromycin 250 MG tablet  Commonly known as:  Z-JAK     predniSONE 20 MG tablet  Commonly known as:  DELTASONE            Indwelling Lines/Drains at time of discharge:   Lines/Drains/Airways     Drain            Female External Urinary Catheter 07/06/19 0730 5 days                Time spent on the discharge of patient: 30 minutes  Patient was seen and examined on the date of discharge and determined to be suitable for discharge.         Soham Zaragoza MD  Department of Hospital Medicine  [facility]

## 2019-07-11 NOTE — PLAN OF CARE
07/11/19 1306   Final Note   Assessment Type Final Discharge Note   Anticipated Discharge Disposition Home   What phone number can be called within the next 1-3 days to see how you are doing after discharge? [phone]   Hospital Follow Up  Appt(s) scheduled? Yes   Right Care Referral Info   Post Acute Recommendation No Care

## 2019-07-11 NOTE — PROGRESS NOTES
[facility]  Adult Nutrition  Consult Note    SUMMARY     Recommendations    1. Continue current Cardiac, Dysphagia soft diet (fluid restriction per MD).  2. RD to monitor & follow-up.    Goals: Patient to consume > 75% of meals by RD follow-up  Nutrition Goal Status: goal met  Communication of RD Recs: reviewed with RN    Reason for Assessment    Reason For Assessment: RD follow-up  Diagnosis: cardiac disease(CHF)  Relevant Medical History: COPD, HTN, HLD  Interdisciplinary Rounds: did not attend    General Information Comments: Pt resting at time of visit w/ no family at bedside. Per RN, pt consumed 75% of breakfast this AM. Noted pt w/ empty Boost Breeze ONS carton at bedside. NFPE complete , patient with fat and muscle wasting. Per chart review, patient with weight loss over the past month. Patient with acute malnturition. Pt scheduled to discharge today.  Nutrition Discharge Planning: Adequate nutrition via PO intake.    Nutrition/Diet History    Spiritual, Cultural Beliefs, Zoroastrian Practices, Values that Affect Care: no  Factors Affecting Nutritional Intake: None identified at this time    Anthropometrics    Temp: 96.1 °F (35.6 °C)  Height Method: Stated  Height: 5' (152.4 cm)  Height (inches): 60 in  Weight Method: Bed Scale  Weight: 42.1 kg (92 lb 13 oz)  Weight (lb): 92.81 lb  Ideal Body Weight (IBW), Female: 100 lb  % Ideal Body Weight, Female (lb): 92.81 lb  BMI (Calculated): 18.2  BMI Grade: 17 - 18.4 protein-energy malnutrition grade I  Usual Body Weight (UBW), k.1 kg(per chart review 19)  % Usual Body Weight: 75.86  % Weight Change From Usual Weight: -24.29 %    Lab/Procedures/Meds    Pertinent Labs Reviewed: reviewed  Pertinent Labs Comments: BUN 28  Pertinent Medications Reviewed: reviewed  Pertinent Medications Comments: MVI    Estimated/Assessed Needs    Weight Used For Calorie Calculations: 42.1 kg (92 lb 13 oz)     Energy Calorie Requirements (kcal): 0905-9109  kcal/d  Energy Need Method: Kcal/kg(30-35)     Protein Requirements: 55 g/d (1.3 g/kg)  Weight Used For Protein Calculations: 42.1 kg (92 lb 13 oz)     Fluid Requirements (mL): 1 mL/kcal or per MD    Nutrition Prescription Ordered    Current Diet Order: Cardiac, Dysphagia soft  Nutrition Order Comments: 1500 mL FR    Evaluation of Received Nutrient/Fluid Intake    Comments: LBM: 7/9    Tolerance: tolerating    Nutrition Risk    Level of Risk/Frequency of Follow-up: (1x/week)     Assessment and Plan    Severe malnutrition  Contributing Nutrition Diagnosis  Malnutrition in the context of Acute Illness/Injury     Related to (etiology):  Inadequate energy intake     Signs and Symptoms (as evidenced by):  Energy Intake: <75% of estimated energy requirement for 1 month  Body Fat Depletion: mild depletion of orbitals and triceps   Muscle Mass Depletion: mild and moderate depletion of temples, clavicle region, interosseous muscle and lower extremities   Weight Loss: 24.2% x 1 month (likely some fluid related)      Interventions/Recommendations (treatment strategy):  Collaboration of nutrition care with other providers     Nutrition Diagnosis Status:  Continues      Monitor and Evaluation    Food and Nutrient Intake: energy intake, food and beverage intake  Food and Nutrient Adminstration: diet order  Physical Activity and Function: nutrition-related ADLs and IADLs  Anthropometric Measurements: weight, weight change  Biochemical Data, Medical Tests and Procedures: electrolyte and renal panel, gastrointestinal profile, inflammatory profile  Nutrition-Focused Physical Findings: overall appearance     Malnutrition Assessment    Malnutrition Type: acute illness or injury   Orbital Region (Subcutaneous Fat Loss): mild depletion  Upper Arm Region (Subcutaneous Fat Loss): mild depletion  Thoracic and Lumbar Region: well nourished   Rastafari Region (Muscle Loss): moderate depletion  Clavicle Bone Region (Muscle Loss): moderate  depletion  Clavicle and Acromion Bone Region (Muscle Loss): mild depletion  Dorsal Hand (Muscle Loss): mild depletion  Patellar Region (Muscle Loss): mild depletion  Anterior Thigh Region (Muscle Loss): mild depletion  Posterior Calf Region (Muscle Loss): mild depletion     Nutrition Follow-Up    RD Follow-up?: Yes

## 2019-07-11 NOTE — ASSESSMENT & PLAN NOTE
BP Readings from Last 3 Encounters:   07/11/19 124/63   07/05/19 122/60   06/26/19 (!) 140/68     Uncontrolled upon arrival 156/70 trended down to 139/81 after IV furosemide 40 mg  Home medication: amlodipine 2.5 mg qhs IF systolic BP >155.   Pt has not required in 4 months.  Cont to monitor

## 2019-07-11 NOTE — PLAN OF CARE
Problem: Adult Inpatient Plan of Care  Goal: Plan of Care Review  Outcome: Ongoing (interventions implemented as appropriate)     07/11/19 0439   Plan of Care Review   Plan of Care Reviewed With patient   Progress no change   POC reviewed with Pt. No acute events overnight. AAOx3. Vitals stable. Bipap worn most of the night, requested a machine that blows warm air, RT could not find the machine. Safety maintained. C/o Restless legs, scheduled meds given. Will continue to monitor.

## 2019-07-11 NOTE — PLAN OF CARE
Call placed to Dr. Jona Che's office ([phone]) to schedule patient a hospital follow up. Informed by scheduling nurse that they were just about to call patient's daughter Bri ([phone]) to schedule the follow up appointment since she travels from the [place]. Ms. Mayer is expected to discharge home today.    Ashlyn Dias RN  Ext [phone]

## 2019-07-11 NOTE — ASSESSMENT & PLAN NOTE
Patient with 2.5L HOME O2 for COPD  BNP: 2573 (usually 3048-0508) chest x-ray: bilateral basilar infiltrates and right pleural effusion there are nodular opacities of the lung bases as well for which follow-up is recommended.  Previous Echo with EF of 65% and normal diastolic dysfunction however echo unable to be completed.  Recent echo:  · Normal left ventricular systolic function. The estimated ejection fraction is 65%  · Septal wall has abnormal motion.  · Moderate left atrial enlargement.  · Normal right ventricular systolic function.  · Mild aortic regurgitation.  · Moderate mitral sclerosis.  · There is moderate leaflet calcification of the Mitral Valve.  · Mitral Valve peak velocity is m/s; mean gradient is 6 mmHg.  · Intermediate central venous pressure (8 mm Hg).  · The estimated PA systolic pressure is 52 mm Hg  · Pulmonary hypertension present.     Switched back to home dose lasix  Weight increased by 1.1 kg but clinically appears comfortable and on 2L NC without rales; will continue   Maintain euvolemia by monitoring I/O's and daily weights.  Fluid restriction (1.5 liters/24 hours)  Low Na diet  Monitor for signs of fluid overload  Maintain oxygen sats >92% via NC    Patient Vitals for the past 72 hrs (Last 3 readings):   Weight   07/10/19 0400 42.1 kg (92 lb 13 oz)   07/09/19 0400 41.1 kg (90 lb 9.7 oz)

## 2019-07-12 ENCOUNTER — PATIENT OUTREACH (OUTPATIENT)
Dept: ADMINISTRATIVE | Facility: CLINIC | Age: 84
End: 2019-07-12

## 2019-07-12 LAB
ENTEROVIRUS: NOT DETECTED
HUMAN BOCAVIRUS: NOT DETECTED
HUMAN CORONAVIRUS, COMMON COLD VIRUS: NOT DETECTED
INFLUENZA A - H1N1-09: NOT DETECTED
PARAINFLUENZA: NOT DETECTED
RVP - ADENOVIRUS: NOT DETECTED
RVP - HUMAN METAPNEUMOVIRUS (HMPV): NOT DETECTED
RVP - INFLUENZA A: NOT DETECTED
RVP - INFLUENZA B: NOT DETECTED
RVP - RESPIRATORY SYNCTIAL VIRUS (RSV) A: NOT DETECTED
RVP - RESPIRATORY VIRAL PANEL, SOURCE: NORMAL
RVP - RHINOVIRUS: NOT DETECTED

## 2019-07-12 NOTE — PROGRESS NOTES
C3 nurse attempted to contact patient. No answer.  C3 nurse attempted to contact Venus Mayerfor a TCC post hospital discharge follow up call. No answer at phone number listed and no voicemail available. The patient does not have a scheduled HOSFU appointment within 7-14 days post hospital discharge date 07/11/2019. Non [facility] PCP.

## 2019-07-20 ENCOUNTER — HOSPITAL ENCOUNTER (INPATIENT)
Facility: HOSPITAL | Age: 84
LOS: 6 days | Discharge: HOME HEALTH | DRG: 200 | End: 2019-07-26
Attending: HOSPITALIST | Admitting: HOSPITALIST
Payer: MEDICARE

## 2019-07-20 DIAGNOSIS — J93.9 PNEUMOTHORAX: ICD-10-CM

## 2019-07-20 DIAGNOSIS — J44.1 CHRONIC OBSTRUCTIVE PULMONARY DISEASE WITH ACUTE EXACERBATION: ICD-10-CM

## 2019-07-20 DIAGNOSIS — J93.9 PNEUMOTHORAX, UNSPECIFIED TYPE: ICD-10-CM

## 2019-07-20 DIAGNOSIS — I50.32 CHRONIC DIASTOLIC HEART FAILURE: ICD-10-CM

## 2019-07-20 DIAGNOSIS — R53.81 DEBILITY: Primary | ICD-10-CM

## 2019-07-20 DIAGNOSIS — J93.12 SECONDARY SPONTANEOUS PNEUMOTHORAX: ICD-10-CM

## 2019-07-20 PROBLEM — G25.81 RESTLESS LEG SYNDROME: Status: ACTIVE | Noted: 2019-07-20

## 2019-07-20 PROCEDURE — 11000001 HC ACUTE MED/SURG PRIVATE ROOM

## 2019-07-20 PROCEDURE — 99223 1ST HOSP IP/OBS HIGH 75: CPT | Mod: ,,, | Performed by: HOSPITALIST

## 2019-07-20 PROCEDURE — 63600175 PHARM REV CODE 636 W HCPCS: Performed by: STUDENT IN AN ORGANIZED HEALTH CARE EDUCATION/TRAINING PROGRAM

## 2019-07-20 PROCEDURE — 99223 PR INITIAL HOSPITAL CARE,LEVL III: ICD-10-PCS | Mod: ,,, | Performed by: HOSPITALIST

## 2019-07-20 PROCEDURE — 25000003 PHARM REV CODE 250: Performed by: STUDENT IN AN ORGANIZED HEALTH CARE EDUCATION/TRAINING PROGRAM

## 2019-07-20 RX ORDER — POLYETHYLENE GLYCOL 3350 17 G/17G
17 POWDER, FOR SOLUTION ORAL DAILY
Status: DISCONTINUED | OUTPATIENT
Start: 2019-07-21 | End: 2019-07-26 | Stop reason: HOSPADM

## 2019-07-20 RX ORDER — AMLODIPINE BESYLATE 2.5 MG/1
2.5 TABLET ORAL NIGHTLY PRN
Status: DISCONTINUED | OUTPATIENT
Start: 2019-07-20 | End: 2019-07-21

## 2019-07-20 RX ORDER — NAPROXEN SODIUM 220 MG/1
81 TABLET, FILM COATED ORAL DAILY
Status: DISCONTINUED | OUTPATIENT
Start: 2019-07-21 | End: 2019-07-26 | Stop reason: HOSPADM

## 2019-07-20 RX ORDER — ROPINIROLE 0.25 MG/1
0.25 TABLET, FILM COATED ORAL NIGHTLY
Status: DISCONTINUED | OUTPATIENT
Start: 2019-07-20 | End: 2019-07-26 | Stop reason: HOSPADM

## 2019-07-20 RX ORDER — SODIUM CHLORIDE 0.9 % (FLUSH) 0.9 %
10 SYRINGE (ML) INJECTION
Status: DISCONTINUED | OUTPATIENT
Start: 2019-07-20 | End: 2019-07-26 | Stop reason: HOSPADM

## 2019-07-20 RX ORDER — FERROUS SULFATE 325(65) MG
325 TABLET, DELAYED RELEASE (ENTERIC COATED) ORAL DAILY
Status: DISCONTINUED | OUTPATIENT
Start: 2019-07-21 | End: 2019-07-26 | Stop reason: HOSPADM

## 2019-07-20 RX ORDER — IBUPROFEN 200 MG
16 TABLET ORAL
Status: DISCONTINUED | OUTPATIENT
Start: 2019-07-20 | End: 2019-07-26 | Stop reason: HOSPADM

## 2019-07-20 RX ORDER — ENOXAPARIN SODIUM 100 MG/ML
40 INJECTION SUBCUTANEOUS EVERY 24 HOURS
Status: DISCONTINUED | OUTPATIENT
Start: 2019-07-20 | End: 2019-07-21

## 2019-07-20 RX ORDER — FLUTICASONE PROPIONATE 50 MCG
1 SPRAY, SUSPENSION (ML) NASAL DAILY
Status: DISCONTINUED | OUTPATIENT
Start: 2019-07-21 | End: 2019-07-26 | Stop reason: HOSPADM

## 2019-07-20 RX ORDER — TIOTROPIUM BROMIDE 18 UG/1
18 CAPSULE ORAL; RESPIRATORY (INHALATION) DAILY
Status: DISCONTINUED | OUTPATIENT
Start: 2019-07-21 | End: 2019-07-26 | Stop reason: HOSPADM

## 2019-07-20 RX ORDER — IBUPROFEN 200 MG
24 TABLET ORAL
Status: DISCONTINUED | OUTPATIENT
Start: 2019-07-20 | End: 2019-07-26 | Stop reason: HOSPADM

## 2019-07-20 RX ORDER — FUROSEMIDE 40 MG/1
40 TABLET ORAL DAILY
Status: DISCONTINUED | OUTPATIENT
Start: 2019-07-21 | End: 2019-07-26 | Stop reason: HOSPADM

## 2019-07-20 RX ORDER — ALBUTEROL SULFATE 90 UG/1
1 AEROSOL, METERED RESPIRATORY (INHALATION) EVERY 6 HOURS PRN
Status: DISCONTINUED | OUTPATIENT
Start: 2019-07-20 | End: 2019-07-26 | Stop reason: HOSPADM

## 2019-07-20 RX ORDER — ACETAMINOPHEN 325 MG/1
650 TABLET ORAL EVERY 4 HOURS PRN
Status: DISCONTINUED | OUTPATIENT
Start: 2019-07-20 | End: 2019-07-26 | Stop reason: HOSPADM

## 2019-07-20 RX ORDER — GLUCAGON 1 MG
1 KIT INJECTION
Status: DISCONTINUED | OUTPATIENT
Start: 2019-07-20 | End: 2019-07-26 | Stop reason: HOSPADM

## 2019-07-20 RX ADMIN — ENOXAPARIN SODIUM 40 MG: 100 INJECTION SUBCUTANEOUS at 09:07

## 2019-07-20 RX ADMIN — ROPINIROLE HYDROCHLORIDE 0.25 MG: 0.25 TABLET, FILM COATED ORAL at 10:07

## 2019-07-20 RX ADMIN — ACETAMINOPHEN 650 MG: 325 TABLET ORAL at 09:07

## 2019-07-20 NOTE — PLAN OF CARE
" (Physician in Lead of Transfers)   Outside Transfer Acceptance Note / Regional Referral Center    Transferring Physician: Delmy Cardozo MD    Accepting Physician: Meri Melendez MD    Date of Acceptance: 07/20/2019    Transferring Facility: [facility] ED    Reason for Transfer: R PTX    Report from Transferring Physician/Hospital course:     89F with COPD (on 2.5 L home O2), former smoking, recurrent spontaneous PTX, admit 5/22/19 for large R PTX requiring chest tube and pleurodesis 5/27/19, small recurrence on 6/25/19 not requiring intervention, also HTN, HLD, CKD-2, chronic dCHF, [place] [facility] for acute on chronic dCHF and COPD Exac, who presented to outside ED today at 1230 w/ SOB that started abruptly yesterday, "Recent was started on antibiotics and steroids for COPD exacerbation.  With her acute onset of shortness of breath she was brought immediately to a room.  Sats were 91% on arrival on her 2 L of oxygen.  No breath sounds were heard in the right upper and mid lung.  Chest x-ray was quickly taken and revealed patient right-sided pneumothorax which I confirmed with radiology prior to placing a right-sided chest tube.  Patient's sats are now 100% she immediately reported improvement in her pain was pain free but now has gradually developed chest pain again on the right side for which she has been treated with 500 mg of Tylenol." 12F Ct placed, re-inflated the bottom part of the lung but did not re-inflate the top part. AT 315pm, while speaking with Dr Cardozo on the phone, she was re-positioning the chest tube to see if it would help.  Sats low 90s.  Some pain - only wants Tylenol.    Per Dr Cardozo, Family was unsure whether they wanted her to be transferred to [facility] versus wanting her to go back to [facility] where she was admitted most recently for the same issue.  After discussion with several family members they have decided they do " want transferred to [facility] and I have initiated contacting the regional transfer center    Lives with son     Other PMH: L hip Fx, HOCM s/p AICD, and PVD/carotid artery stenosis s/p L CEA 08    VS: VSS    Labs: see above    Radiographs: see above    To Do List: Admit to , consult pulm     Upon patient arrival to floor, please call extension [phone] (if no answer, this will flip to a beeper, so enter your call back number) for Hospital Medicine admit team assignment and for additional admit orders for the patient.  Do not page the attending physician associated with the patient on arrival (this physician may not be on duty at the time of arrival).  Rather, always call [phone] to reach the triage physician for orders and team assignment.    Meri Melendez MD  Hospital Medicine Staff  Pager: [phone]  Cell: [phone]

## 2019-07-21 PROBLEM — J93.12 SECONDARY SPONTANEOUS PNEUMOTHORAX: Status: ACTIVE | Noted: 2019-06-26

## 2019-07-21 LAB
ALBUMIN SERPL BCP-MCNC: 2.9 G/DL (ref 3.5–5.2)
ALP SERPL-CCNC: 75 U/L (ref 55–135)
ALT SERPL W/O P-5'-P-CCNC: 13 U/L (ref 10–44)
ANION GAP SERPL CALC-SCNC: 8 MMOL/L (ref 8–16)
APTT BLDCRRT: 27.5 SEC (ref 21–32)
AST SERPL-CCNC: 28 U/L (ref 10–40)
BASOPHILS # BLD AUTO: 0.03 K/UL (ref 0–0.2)
BASOPHILS NFR BLD: 0.4 % (ref 0–1.9)
BILIRUB SERPL-MCNC: 0.4 MG/DL (ref 0.1–1)
BUN SERPL-MCNC: 21 MG/DL (ref 8–23)
CALCIUM SERPL-MCNC: 9.5 MG/DL (ref 8.7–10.5)
CHLORIDE SERPL-SCNC: 92 MMOL/L (ref 95–110)
CO2 SERPL-SCNC: 41 MMOL/L (ref 23–29)
CREAT SERPL-MCNC: 0.9 MG/DL (ref 0.5–1.4)
DIFFERENTIAL METHOD: ABNORMAL
EOSINOPHIL # BLD AUTO: 0.7 K/UL (ref 0–0.5)
EOSINOPHIL NFR BLD: 9.2 % (ref 0–8)
ERYTHROCYTE [DISTWIDTH] IN BLOOD BY AUTOMATED COUNT: 14.1 % (ref 11.5–14.5)
EST. GFR  (AFRICAN AMERICAN): >60 ML/MIN/1.73 M^2
EST. GFR  (NON AFRICAN AMERICAN): 56.5 ML/MIN/1.73 M^2
GLUCOSE SERPL-MCNC: 66 MG/DL (ref 70–110)
HCT VFR BLD AUTO: 41.2 % (ref 37–48.5)
HGB BLD-MCNC: 12.7 G/DL (ref 12–16)
IMM GRANULOCYTES # BLD AUTO: 0.03 K/UL (ref 0–0.04)
IMM GRANULOCYTES NFR BLD AUTO: 0.4 % (ref 0–0.5)
INR PPP: 0.9 (ref 0.8–1.2)
LYMPHOCYTES # BLD AUTO: 1.1 K/UL (ref 1–4.8)
LYMPHOCYTES NFR BLD: 15 % (ref 18–48)
MAGNESIUM SERPL-MCNC: 2.4 MG/DL (ref 1.6–2.6)
MCH RBC QN AUTO: 30.7 PG (ref 27–31)
MCHC RBC AUTO-ENTMCNC: 30.8 G/DL (ref 32–36)
MCV RBC AUTO: 100 FL (ref 82–98)
MONOCYTES # BLD AUTO: 0.9 K/UL (ref 0.3–1)
MONOCYTES NFR BLD: 11.3 % (ref 4–15)
NEUTROPHILS # BLD AUTO: 4.8 K/UL (ref 1.8–7.7)
NEUTROPHILS NFR BLD: 63.7 % (ref 38–73)
NRBC BLD-RTO: 0 /100 WBC
PHOSPHATE SERPL-MCNC: 4.6 MG/DL (ref 2.7–4.5)
PLATELET # BLD AUTO: 128 K/UL (ref 150–350)
PMV BLD AUTO: 10.9 FL (ref 9.2–12.9)
POTASSIUM SERPL-SCNC: 4.5 MMOL/L (ref 3.5–5.1)
PROT SERPL-MCNC: 5.6 G/DL (ref 6–8.4)
PROTHROMBIN TIME: 9.5 SEC (ref 9–12.5)
RBC # BLD AUTO: 4.14 M/UL (ref 4–5.4)
SODIUM SERPL-SCNC: 141 MMOL/L (ref 136–145)
WBC # BLD AUTO: 7.53 K/UL (ref 3.9–12.7)

## 2019-07-21 PROCEDURE — 32551 INSERTION OF CHEST TUBE: CPT | Mod: RT,,, | Performed by: INTERNAL MEDICINE

## 2019-07-21 PROCEDURE — 85025 COMPLETE CBC W/AUTO DIFF WBC: CPT

## 2019-07-21 PROCEDURE — 94761 N-INVAS EAR/PLS OXIMETRY MLT: CPT

## 2019-07-21 PROCEDURE — 36415 COLL VENOUS BLD VENIPUNCTURE: CPT

## 2019-07-21 PROCEDURE — 99233 SBSQ HOSP IP/OBS HIGH 50: CPT | Mod: ,,, | Performed by: HOSPITALIST

## 2019-07-21 PROCEDURE — 27000221 HC OXYGEN, UP TO 24 HOURS

## 2019-07-21 PROCEDURE — 32551 PR TUBE THORACOSTOMY INCLUDES WATER SEAL: ICD-10-PCS | Mod: RT,,, | Performed by: INTERNAL MEDICINE

## 2019-07-21 PROCEDURE — 99233 SBSQ HOSP IP/OBS HIGH 50: CPT | Mod: 25,,, | Performed by: INTERNAL MEDICINE

## 2019-07-21 PROCEDURE — 27201073 HC S PNEUMOTHORAX SET

## 2019-07-21 PROCEDURE — 80053 COMPREHEN METABOLIC PANEL: CPT

## 2019-07-21 PROCEDURE — 99233 PR SUBSEQUENT HOSPITAL CARE,LEVL III: ICD-10-PCS | Mod: ,,, | Performed by: HOSPITALIST

## 2019-07-21 PROCEDURE — 25000003 PHARM REV CODE 250: Performed by: STUDENT IN AN ORGANIZED HEALTH CARE EDUCATION/TRAINING PROGRAM

## 2019-07-21 PROCEDURE — 25000242 PHARM REV CODE 250 ALT 637 W/ HCPCS: Performed by: STUDENT IN AN ORGANIZED HEALTH CARE EDUCATION/TRAINING PROGRAM

## 2019-07-21 PROCEDURE — 85730 THROMBOPLASTIN TIME PARTIAL: CPT

## 2019-07-21 PROCEDURE — 20000000 HC ICU ROOM

## 2019-07-21 PROCEDURE — 83735 ASSAY OF MAGNESIUM: CPT

## 2019-07-21 PROCEDURE — 85610 PROTHROMBIN TIME: CPT

## 2019-07-21 PROCEDURE — 84100 ASSAY OF PHOSPHORUS: CPT

## 2019-07-21 PROCEDURE — 63600175 PHARM REV CODE 636 W HCPCS: Performed by: STUDENT IN AN ORGANIZED HEALTH CARE EDUCATION/TRAINING PROGRAM

## 2019-07-21 PROCEDURE — 27100171 HC OXYGEN HIGH FLOW UP TO 24 HOURS

## 2019-07-21 PROCEDURE — 27201992 HC TUBE, CHEST W/ VALVE

## 2019-07-21 PROCEDURE — 99233 PR SUBSEQUENT HOSPITAL CARE,LEVL III: ICD-10-PCS | Mod: 25,,, | Performed by: INTERNAL MEDICINE

## 2019-07-21 PROCEDURE — 32551 INSERTION OF CHEST TUBE: CPT

## 2019-07-21 PROCEDURE — C1751 CATH, INF, PER/CENT/MIDLINE: HCPCS

## 2019-07-21 RX ORDER — FENTANYL CITRATE 50 UG/ML
25 INJECTION, SOLUTION INTRAMUSCULAR; INTRAVENOUS ONCE
Status: COMPLETED | OUTPATIENT
Start: 2019-07-21 | End: 2019-07-21

## 2019-07-21 RX ORDER — FENTANYL CITRATE 50 UG/ML
INJECTION, SOLUTION INTRAMUSCULAR; INTRAVENOUS
Status: DISPENSED
Start: 2019-07-21 | End: 2019-07-22

## 2019-07-21 RX ORDER — ENOXAPARIN SODIUM 100 MG/ML
30 INJECTION SUBCUTANEOUS EVERY 24 HOURS
Status: DISCONTINUED | OUTPATIENT
Start: 2019-07-21 | End: 2019-07-26 | Stop reason: HOSPADM

## 2019-07-21 RX ADMIN — FERROUS SULFATE TAB EC 325 MG (65 MG FE EQUIVALENT) 325 MG: 325 (65 FE) TABLET DELAYED RESPONSE at 09:07

## 2019-07-21 RX ADMIN — FENTANYL CITRATE 25 MCG: 50 INJECTION INTRAMUSCULAR; INTRAVENOUS at 06:07

## 2019-07-21 RX ADMIN — FUROSEMIDE 40 MG: 40 TABLET ORAL at 09:07

## 2019-07-21 RX ADMIN — POLYETHYLENE GLYCOL 3350 17 G: 17 POWDER, FOR SOLUTION ORAL at 09:07

## 2019-07-21 RX ADMIN — ACETAMINOPHEN 650 MG: 325 TABLET ORAL at 10:07

## 2019-07-21 RX ADMIN — TIOTROPIUM BROMIDE 18 MCG: 18 CAPSULE ORAL; RESPIRATORY (INHALATION) at 09:07

## 2019-07-21 RX ADMIN — ROPINIROLE HYDROCHLORIDE 0.25 MG: 0.25 TABLET, FILM COATED ORAL at 10:07

## 2019-07-21 RX ADMIN — THERA TABS 1 TABLET: TAB at 09:07

## 2019-07-21 NOTE — NURSING
Upon arrival to floor: CCS at bedside to place a second chest tube; however, the chest tube that pt came with was found to be dislodged and was subsequently d/c by Dr. Abbott. Dr. Abbott placing a chest tube at bedside now. Consent obtained from pt's daughter.

## 2019-07-21 NOTE — PROGRESS NOTES
AAOX4. C/o increased SOB at rest. 2.5L of 02 in progress via nc.O2 titrated. HOB upright. O2 sats fluctuated from  86% to 92 % after titration of 02. P- 107. Purse lip breathing encouraged/ performed. MD notified during episode. New orders in place for cxr for further eval. Respiratory therapist consulted

## 2019-07-21 NOTE — PROGRESS NOTES
No output reported or observed this am. No abd distention present, and abd non tender. MD notified.

## 2019-07-21 NOTE — ASSESSMENT & PLAN NOTE
89F with h/o COPD, 40 pack year smoking hx, and chronic dCHF who was transferred from OSH for R sided PTX s/p chest tube satting 99% on 3 L O2 NC    - Titrate O2 NC to maintain >88% O2 sats  - Continue home tiotropium, fluticasone, and rescue albuterol   - Pulmonology has been consulted, will follow up. Appreciate recs

## 2019-07-21 NOTE — HOSPITAL COURSE
The patient is a 89 year old lady who presented as a transfer from OSH with R sided pneumothorax with a chest tube in place. On 07/21, a CXR showed near complete resolution of the PTX, though clotted with blood was noted in the chest tube line. Overnight, the patient became acutely hypoxic requiring increased O2 requirements. She was assessed by MICU with chest tube found to have been dislodged and pneumothorax reaccumulated. The patient was transferred to MICU with new chest tube placed. Repeat CXRs showed re-resolution of the R sided PTX and the patient progressed well with water seal trials and clamped chest tube. The chest tube was removed on 07/25 which the patient tolerated well and was discharged at her baseline pulmonary function on 07/26. Of note, a 16 beat run of Vtach (<30 seconds) was noted on telemetry during which the patient was resting comfortably. She did not report ACS symptoms or dyspnea. She will go home with HH with PT and OT in addition to outpatient referrals for pulmonology, pulmonary rehabilitation, and cardiology.

## 2019-07-21 NOTE — NURSING TRANSFER
Nursing Transfer Note      7/21/2019     Transfer to 6071 from 1105    Transfer via bed    Transfer with cardiac monitoring, NRB, R sided chest tube     Transported by rapid response RN Luanne    Medicines sent: none    Chart send with patient: Yes    Notified: critical care at bedside, daughter notified of ICU transfer by MD that got consent for chest tube procedure.

## 2019-07-21 NOTE — PLAN OF CARE
Problem: Adult Inpatient Plan of Care  Goal: Patient-Specific Goal (Individualization)  Outcome: Ongoing (interventions implemented as appropriate)  Patient remains free from falls.  Bed locked and in lowest position.  Personal items and call light in reach.  Alert and oriented x 2.  Daughter at the bedside.  No c/o pain or discomfort this shift. Abdomen soft and nontender with active bowel sounds all 4 quadrants.  Purwic in place.  Continent of bowel and bladder.  No urine output as of yet.  Chest tube drained 30 ml this shift.  Rested well this shift.  No shortness of breath or dyspnea noted.       07/20/19 1945   OTHER   Anxieties, Fears or Concerns Patient is concerned about her breathing.   Individualized Care Needs Vitals, Chest Tube, O2, Meds  and Respiratory assessments.   Patient-Specific Goal (Individualization)   Patient-Specific Goals (Include Timeframe) Patient will not have unresolved shortness of breath.

## 2019-07-21 NOTE — PROGRESS NOTES
Post void bladder scan results 405 following output of 150 to external catheter. MD notified. Patient refused in and out catheter.

## 2019-07-21 NOTE — SUBJECTIVE & OBJECTIVE
Past Medical History:   Diagnosis Date    Acute on chronic congestive heart failure 7/6/2019    Cardiomyopathy     Carotid artery occlusion     COPD (chronic obstructive pulmonary disease)     Hyperlipidemia     Hypertension        Past Surgical History:   Procedure Laterality Date    CARDIAC CATHETERIZATION      CAROTID ENDARTERECTOMY         Review of patient's allergies indicates:   Allergen Reactions    Ancef in dextrose (iso-osm) Rash    Cefazolin Rash    Cefuroxime Rash    Sulfamethoxazole-trimethoprim Rash     Other reaction(s): Rash       Current Facility-Administered Medications on File Prior to Encounter   Medication    [COMPLETED] acetaminophen tablet 500 mg    [COMPLETED] lidocaine (PF) 10 mg/ml (1%) 10 mg/mL (1 %) injection    [COMPLETED] morphine injection 1 mg    [COMPLETED] ondansetron injection 4 mg    [DISCONTINUED] lidocaine-EPINEPHrine 1%-1:100,000 injection 5 mL    [DISCONTINUED] morphine injection 1 mg    [DISCONTINUED] ondansetron injection 4 mg     Current Outpatient Medications on File Prior to Encounter   Medication Sig    acetaminophen (TYLENOL) 500 MG tablet Take 1,000 mg by mouth daily as needed for Pain.    albuterol (PROAIR HFA) 90 mcg/actuation inhaler Inhale 1 puff into the lungs every 6 (six) hours as needed for Wheezing or Shortness of Breath. Rescue    amLODIPine (NORVASC) 2.5 MG tablet Take 2.5 mg by mouth nightly as needed for SBP greater than. SBP greater than 155    aspirin 81 mg Tab Take by mouth. 1 Tablet Oral Every day    ferrous sulfate 325 (65 FE) MG EC tablet Take 1 tablet (325 mg total) by mouth once daily.    furosemide (LASIX) 40 MG tablet Take 1 tablet (40 mg total) by mouth once daily.    multivitamin (THERAGRAN) per tablet Take 1 tablet by mouth once daily.    potassium chloride (MICRO-K) 10 MEQ CpSR Take 10 mEq by mouth once daily.    rOPINIRole (REQUIP) 0.25 MG tablet Take 1 tablet (0.25 mg total) by mouth every evening.    sodium  chloride 0.9 % Soln Uses as needed by nebulization route    tiotropium (SPIRIVA) 18 mcg inhalation capsule Inhale 1 capsule (18 mcg total) into the lungs once daily.    triamcinolone (NASACORT) 55 mcg nasal inhaler Mixes 1/2 ml with phenylephrine     Family History     Problem Relation (Age of Onset)    Hypertension Mother        Tobacco Use    Smoking status: Former Smoker     Packs/day: 2.00     Years: 20.00     Pack years: 40.00     Types: Cigarettes     Last attempt to quit: 1980     Years since quittin.5    Smokeless tobacco: Never Used   Substance and Sexual Activity    Alcohol use: Yes     Alcohol/week: 1.2 oz     Types: 2 Glasses of wine per week     Comment: social    Drug use: No    Sexual activity: Not Currently     Review of Systems   Constitutional: Negative for chills, diaphoresis, fatigue and fever.   HENT: Negative for rhinorrhea and sore throat.    Respiratory: Negative for cough, shortness of breath (no dyspnea at time of exam) and wheezing.    Cardiovascular: Negative for chest pain and leg swelling.   Gastrointestinal: Negative for abdominal distention, abdominal pain, nausea and vomiting.   Genitourinary: Positive for difficulty urinating (reported urinary retention overnight, but was able to void this AM). Negative for dysuria, enuresis, frequency, hematuria and urgency.   Neurological: Negative for headaches.   Psychiatric/Behavioral: Negative for agitation, behavioral problems and confusion.     Objective:     Vital Signs (Most Recent):  Temp: 97.5 °F (36.4 °C) (19 0801)  Pulse: 81 (19 0950)  Resp: (!) 24 (19 0950)  BP: (!) 117/57 (19 0801)  SpO2: (!) 93 % (19 0802) Vital Signs (24h Range):  Temp:  [97.5 °F (36.4 °C)-98.7 °F (37.1 °C)] 97.5 °F (36.4 °C)  Pulse:  [] 81  Resp:  [14-30] 24  SpO2:  [91 %-100 %] 93 %  BP: (117-166)/(57-95) 117/57     Weight: 40.8 kg (90 lb)  Body mass index is 17.58 kg/m².    Physical Exam   Constitutional: She  is oriented to person, place, and time. She appears well-developed. No distress.   HENT:   Head: Normocephalic.   Right Ear: External ear normal.   Left Ear: External ear normal.   Mouth/Throat: No oropharyngeal exudate.   Eyes: Pupils are equal, round, and reactive to light.   Cardiovascular: Normal rate and regular rhythm.   Pulmonary/Chest: Effort normal. No stridor. No respiratory distress. She has no rales. She exhibits tenderness (pain at site of chest tube insertion).   B/L decreased breath sounds, more so on R lung. Rales heard throughout both lungs   Abdominal: Soft. Bowel sounds are normal. She exhibits no distension. There is no tenderness. There is no guarding.   Genitourinary:   Genitourinary Comments: Discomfort with suprapubic palpation   Neurological: She is alert and oriented to person, place, and time.   Skin: Skin is warm and dry. She is not diaphoretic.         CRANIAL NERVES     CN III, IV, VI   Pupils are equal, round, and reactive to light.       Significant Labs: All pertinent labs within the past 24 hours have been reviewed.    Significant Imaging: I have reviewed all pertinent imaging results/findings within the past 24 hours.

## 2019-07-21 NOTE — PROGRESS NOTES
Non rebreather applied, and pt assessed by pulmonologist at bedside. Responsible party updated on status. Chest tube patent at present time. New order from pulmonologist to transfer to icu for further eval. Responsible party notified of transfer by pulmonologist.

## 2019-07-21 NOTE — NURSING
Informed Team 1 of patient having 453 ml per bladder scan.  Patient doesn't have c/o pain or discomfort and no bladder distention.  Chest tube has stopped bubbling and noted dark red drainiage to the chest catheter at insertion site.  Patients vitals stable and no shortness of breath.  No changes in mental status.

## 2019-07-21 NOTE — PROGRESS NOTES
Critical lab value of CO2 AT  41 reported. MD notified with service team 1. 02 sats  At 88 %  With 1.5L of O2 via nc. 02 increased to 2L of 02; sats flucutating from 90 to 94%. PT AAOX4. Respirations  Unlabored. 50ml of bloody drainage to chest tube canister since previous shift. Bloody drainage noted to insertion site. Drsg in place and intact. MD assessed for further eval and  Pulmonologist consulted in place. No distress noted.

## 2019-07-21 NOTE — ASSESSMENT & PLAN NOTE
89F with h/o COPD, 40 pack year smoking hx, and chronic dCHF who was transferred from OSH for R sided PTX s/p chest tube satting 99% on 3 L O2 NC    - Titrate O2 NC to maintain >88% O2 sats  - Continue home tiotropium and fluticasone  - Will consult pulm in the AM

## 2019-07-21 NOTE — ASSESSMENT & PLAN NOTE
Deconditioned state, pt reports progressive fatigue over the past several months    - PT/OT eval and treat. Appreciate help  - Scheduled bowel regimen (polyethylene glycol 17g)

## 2019-07-21 NOTE — HPI
Patient is a 89F with PMH of COPD (on 2.5 L home O2), 40 pack year smoking history (quit 30 years ago), recurrent spontaneous PTX, chronic dCHF, and HOCM s/p AICD, PVD/carotid artery stenosis s/p L CEA 08, HTN, HLD, and 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 When seen, the patient was resting comfortably in bed on 3 L O2 NC with her daughter, son (with whom she lives), and daughter in law at bedside. A R side chest tube was in place and the patient denied any dyspnea, though she endorsed pain at the site of chest tube insertion.     At OSH ED, O2 sats were 91% on home 2.5 L of O2 and no breath sounds were heard in the R upper and middle lung. CXR showed a R-sided PTX for which a 12f chest tube was placed. Patient's O2 sats improved to 100% and she reported immediate relief of dyspnea and discomfort. She reported chest pain at the site of insertion of chest tube and responded well to 500 mg of tylenol. BNP was 1340.    The patient's recurrent PTXs seem to have started during her admission on 05/22/2019 where she presented with a large R sided PTX requiring chest tube and pleurodesis on 5/27/2019. Since, she has had a small recurrence of PTX on 6/25/19 not requiring intervention, and admission to OSH 7/6-7/11 for acute on chronic dCHF and COPD exacerbation.    Per Dr Cardozo, Family was unsure whether they wanted her to be transferred to [facility] versus wanting her to go back to [facility] where she was admitted most recently for the same issue.  After discussion with several family members they have decided they do want transferred to [facility] and I have initiated contacting the regional transfer center

## 2019-07-21 NOTE — PROGRESS NOTES
[facility] Hospital Medicine  Progress Note    Patient Name: Venus Mayer  MRN: [id number]  Patient Class: IP- Inpatient   Admission Date: 7/20/2019  Length of Stay: 1 days  Attending Physician: Kathia Gordillo MD  Primary Care Provider: Jona Che MD    [facility] Medicine Team: [facility] MED 1 Aristeo Aldana MD    Subjective:     Principal Problem:Pneumothorax      HPI:  Patient is a 89F with PMH of COPD (on 2.5 L home O2), 40 pack year smoking history (quit 30 years ago), recurrent spontaneous PTX, chronic dCHF, and HOCM s/p AICD, PVD/carotid artery stenosis s/p L CEA 08, HTN, HLD, and 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 When seen, the patient was resting comfortably in bed on 3 L O2 NC with her daughter, son (with whom she lives), and daughter in law at bedside. A R side chest tube was in place and the patient denied any dyspnea, though she endorsed pain at the site of chest tube insertion.     At OSH ED, O2 sats were 91% on home 2.5 L of O2 and no breath sounds were heard in the R upper and middle lung. CXR showed a R-sided PTX for which a 12f chest tube was placed. Patient's O2 sats improved to 100% and she reported immediate relief of dyspnea and discomfort. She reported chest pain at the site of insertion of chest tube and responded well to 500 mg of tylenol. BNP was 1340.    The patient's recurrent PTXs seem to have started during her admission on 05/22/2019 where she presented with a large R sided PTX requiring chest tube and pleurodesis on 5/27/2019. Since, she has had a small recurrence of PTX on 6/25/19 not requiring intervention, and admission to OSH 7/6-7/11 for acute on chronic dCHF and COPD exacerbation.    Per Dr Cardozo, Family was unsure whether they wanted her to be transferred to [facility] versus wanting her to go back to [facility] where she was admitted most recently  for the same issue.  After discussion with several family members they have decided they do want transferred to [facility] and I have initiated contacting the regional transfer center    Overview/Hospital Course:  07/21/2019 Chest tube was clotted with blood today, ~50 mL of dried blood in canister. Pulmonology consulted and will see pt today. CXR showed near complete resolution of R sided PTX. Satting 93% on O2 of 2 L NC. Pt reported urinary retention overnight with 450 mL seen on bladder scan, but was able to void this AM with 150 mL seen afterward on bladder scan. Refused prn straight cath which is still available.    Past Medical History:   Diagnosis Date    Acute on chronic congestive heart failure 7/6/2019    Cardiomyopathy     Carotid artery occlusion     COPD (chronic obstructive pulmonary disease)     Hyperlipidemia     Hypertension        Past Surgical History:   Procedure Laterality Date    CARDIAC CATHETERIZATION      CAROTID ENDARTERECTOMY         Review of patient's allergies indicates:   Allergen Reactions    Ancef in dextrose (iso-osm) Rash    Cefazolin Rash    Cefuroxime Rash    Sulfamethoxazole-trimethoprim Rash     Other reaction(s): Rash       Current Facility-Administered Medications on File Prior to Encounter   Medication    [COMPLETED] acetaminophen tablet 500 mg    [COMPLETED] lidocaine (PF) 10 mg/ml (1%) 10 mg/mL (1 %) injection    [COMPLETED] morphine injection 1 mg    [COMPLETED] ondansetron injection 4 mg    [DISCONTINUED] lidocaine-EPINEPHrine 1%-1:100,000 injection 5 mL    [DISCONTINUED] morphine injection 1 mg    [DISCONTINUED] ondansetron injection 4 mg     Current Outpatient Medications on File Prior to Encounter   Medication Sig    acetaminophen (TYLENOL) 500 MG tablet Take 1,000 mg by mouth daily as needed for Pain.    albuterol (PROAIR HFA) 90 mcg/actuation inhaler Inhale 1 puff into the lungs every 6 (six) hours as needed for Wheezing or Shortness of  Breath. Rescue    amLODIPine (NORVASC) 2.5 MG tablet Take 2.5 mg by mouth nightly as needed for SBP greater than. SBP greater than 155    aspirin 81 mg Tab Take by mouth. 1 Tablet Oral Every day    ferrous sulfate 325 (65 FE) MG EC tablet Take 1 tablet (325 mg total) by mouth once daily.    furosemide (LASIX) 40 MG tablet Take 1 tablet (40 mg total) by mouth once daily.    multivitamin (THERAGRAN) per tablet Take 1 tablet by mouth once daily.    potassium chloride (MICRO-K) 10 MEQ CpSR Take 10 mEq by mouth once daily.    rOPINIRole (REQUIP) 0.25 MG tablet Take 1 tablet (0.25 mg total) by mouth every evening.    sodium chloride 0.9 % Soln Uses as needed by nebulization route    tiotropium (SPIRIVA) 18 mcg inhalation capsule Inhale 1 capsule (18 mcg total) into the lungs once daily.    triamcinolone (NASACORT) 55 mcg nasal inhaler Mixes 1/2 ml with phenylephrine     Family History     Problem Relation (Age of Onset)    Hypertension Mother        Tobacco Use    Smoking status: Former Smoker     Packs/day: 2.00     Years: 20.00     Pack years: 40.00     Types: Cigarettes     Last attempt to quit: 1980     Years since quittin.5    Smokeless tobacco: Never Used   Substance and Sexual Activity    Alcohol use: Yes     Alcohol/week: 1.2 oz     Types: 2 Glasses of wine per week     Comment: social    Drug use: No    Sexual activity: Not Currently     Review of Systems   Constitutional: Negative for chills, diaphoresis, fatigue and fever.   HENT: Negative for rhinorrhea and sore throat.    Respiratory: Negative for cough, shortness of breath (no dyspnea at time of exam) and wheezing.    Cardiovascular: Negative for chest pain and leg swelling.   Gastrointestinal: Negative for abdominal distention, abdominal pain, nausea and vomiting.   Genitourinary: Positive for difficulty urinating (reported urinary retention overnight, but was able to void this AM). Negative for dysuria, enuresis, frequency,  hematuria and urgency.   Neurological: Negative for headaches.   Psychiatric/Behavioral: Negative for agitation, behavioral problems and confusion.     Objective:     Vital Signs (Most Recent):  Temp: 97.5 °F (36.4 °C) (07/21/19 0801)  Pulse: 81 (07/21/19 0950)  Resp: (!) 24 (07/21/19 0950)  BP: (!) 117/57 (07/21/19 0801)  SpO2: (!) 93 % (07/21/19 0802) Vital Signs (24h Range):  Temp:  [97.5 °F (36.4 °C)-98.7 °F (37.1 °C)] 97.5 °F (36.4 °C)  Pulse:  [] 81  Resp:  [14-30] 24  SpO2:  [91 %-100 %] 93 %  BP: (117-166)/(57-95) 117/57     Weight: 40.8 kg (90 lb)  Body mass index is 17.58 kg/m².    Physical Exam   Constitutional: She is oriented to person, place, and time. She appears well-developed. No distress.   HENT:   Head: Normocephalic.   Right Ear: External ear normal.   Left Ear: External ear normal.   Mouth/Throat: No oropharyngeal exudate.   Eyes: Pupils are equal, round, and reactive to light.   Cardiovascular: Normal rate and regular rhythm.   Pulmonary/Chest: Effort normal. No stridor. No respiratory distress. She has no rales. She exhibits tenderness (pain at site of chest tube insertion).   B/L decreased breath sounds, more so on R lung. Rales heard throughout both lungs   Abdominal: Soft. Bowel sounds are normal. She exhibits no distension. There is no tenderness. There is no guarding.   Genitourinary:   Genitourinary Comments: Discomfort with suprapubic palpation   Neurological: She is alert and oriented to person, place, and time.   Skin: Skin is warm and dry. She is not diaphoretic.         CRANIAL NERVES     CN III, IV, VI   Pupils are equal, round, and reactive to light.       Significant Labs: All pertinent labs within the past 24 hours have been reviewed.    Significant Imaging: I have reviewed all pertinent imaging results/findings within the past 24 hours.      Assessment/Plan:      * Pneumothorax  89F with h/o COPD, 40 pack year smoking hx, and chronic dCHF who was transferred from OSH for  R sided PTX s/p chest tube satting 99% on 3 L O2 NC    - Titrate O2 NC to maintain >88% O2 sats  - Continue home tiotropium, fluticasone, and rescue albuterol   - Pulmonology has been consulted, will follow up. Appreciate recs    Restless leg syndrome  - Continue home ropinirole 0.25 mg      Iron deficiency  Cont home ferrous sulfate 325       Debility  Deconditioned state, pt reports progressive fatigue over the past several months    - PT/OT eval and treat. Appreciate help  - Scheduled bowel regimen (polyethylene glycol 17g)      Chronic obstructive pulmonary disease with acute exacerbation  - See Pneumothorax    Heart failure, diastolic  - Continue home furosemide 40 mg      Essential hypertension  - Continue home prn amlodipine 2.5 mg if SBP >155      Carotid artery stenosis:Asx; S/P L. CEA 2008 with restenosis.  Cont ASA 81        VTE Risk Mitigation (From admission, onward)        Ordered     enoxaparin injection 30 mg  Daily      07/21/19 1002     IP VTE HIGH RISK PATIENT  Once      07/20/19 2005                Aristeo Aldana MD  Department of Hospital Medicine   [facility]                    07/21/2019                             STAFF PHYSICIAN NOTE                                   Attending Attestation for Rounds with Resident  I have reviewed and concur with the resident's history, physical, assessment, and plan.  I have personally interviewed and examined the patient at bedside and agree with the resident's findings.                                  ________________________________________                                     REASON FOR ADMISSION:     Patient is 89 y.o.female    Body mass index is 17.58 kg/m².,  Pneumothorax

## 2019-07-21 NOTE — SUBJECTIVE & OBJECTIVE
Past Medical History:   Diagnosis Date    Acute on chronic congestive heart failure 7/6/2019    Cardiomyopathy     Carotid artery occlusion     COPD (chronic obstructive pulmonary disease)     Hyperlipidemia     Hypertension        Past Surgical History:   Procedure Laterality Date    CARDIAC CATHETERIZATION      CAROTID ENDARTERECTOMY         Review of patient's allergies indicates:   Allergen Reactions    Sulfamethoxazole-trimethoprim      Other reaction(s): Rash    Cefazolin Rash    Cefuroxime Rash       Current Facility-Administered Medications on File Prior to Encounter   Medication    [COMPLETED] acetaminophen tablet 500 mg    [COMPLETED] lidocaine (PF) 10 mg/ml (1%) 10 mg/mL (1 %) injection    [COMPLETED] morphine injection 1 mg    [COMPLETED] ondansetron injection 4 mg    [DISCONTINUED] lidocaine-EPINEPHrine 1%-1:100,000 injection 5 mL    [DISCONTINUED] morphine injection 1 mg    [DISCONTINUED] ondansetron injection 4 mg     Current Outpatient Medications on File Prior to Encounter   Medication Sig    acetaminophen (TYLENOL) 500 MG tablet Take 1,000 mg by mouth daily as needed for Pain.    albuterol (PROAIR HFA) 90 mcg/actuation inhaler Inhale 1 puff into the lungs every 6 (six) hours as needed for Wheezing or Shortness of Breath. Rescue    amLODIPine (NORVASC) 2.5 MG tablet Take 2.5 mg by mouth nightly as needed for SBP greater than. SBP greater than 155    aspirin 81 mg Tab Take by mouth. 1 Tablet Oral Every day    ferrous sulfate 325 (65 FE) MG EC tablet Take 1 tablet (325 mg total) by mouth once daily.    furosemide (LASIX) 40 MG tablet Take 1 tablet (40 mg total) by mouth once daily.    multivitamin (THERAGRAN) per tablet Take 1 tablet by mouth once daily.    potassium chloride (MICRO-K) 10 MEQ CpSR Take 10 mEq by mouth once daily.    rOPINIRole (REQUIP) 0.25 MG tablet Take 1 tablet (0.25 mg total) by mouth every evening.    sodium chloride 0.9 % Soln Uses as needed by  nebulization route    tiotropium (SPIRIVA) 18 mcg inhalation capsule Inhale 1 capsule (18 mcg total) into the lungs once daily.    triamcinolone (NASACORT) 55 mcg nasal inhaler Mixes 1/2 ml with phenylephrine     Family History     Problem Relation (Age of Onset)    Hypertension Mother        Tobacco Use    Smoking status: Former Smoker     Packs/day: 2.00     Years: 20.00     Pack years: 40.00     Types: Cigarettes     Last attempt to quit: 1980     Years since quittin.5    Smokeless tobacco: Never Used   Substance and Sexual Activity    Alcohol use: Yes     Alcohol/week: 1.2 oz     Types: 2 Glasses of wine per week     Comment: social    Drug use: No    Sexual activity: Not Currently     Review of Systems   Constitutional: Positive for fatigue. Negative for chills, diaphoresis and fever.   HENT: Negative for rhinorrhea and sore throat.    Respiratory: Negative for cough, shortness of breath (no dyspnea at time of exam) and wheezing.    Cardiovascular: Positive for leg swelling (daughter reported R leg swelling, absent on exam). Negative for chest pain.   Gastrointestinal: Negative for abdominal distention, abdominal pain, nausea and vomiting.   Genitourinary: Negative for dysuria.   Neurological: Negative for headaches.     Objective:     Vital Signs (Most Recent):  Temp: 98.2 °F (36.8 °C) (19)  Pulse: 70 (19)  Resp: 20 (19)  BP: 128/67 (19)  SpO2: 99 % (19) Vital Signs (24h Range):  Temp:  [98.2 °F (36.8 °C)-98.7 °F (37.1 °C)] 98.2 °F (36.8 °C)  Pulse:  [] 70  Resp:  [14-30] 20  SpO2:  [91 %-100 %] 99 %  BP: (128-166)/(63-95) 128/67        There is no height or weight on file to calculate BMI.    Physical Exam   Constitutional: She is oriented to person, place, and time. She appears well-developed. No distress.   HENT:   Head: Normocephalic.   Right Ear: External ear normal.   Left Ear: External ear normal.   Mouth/Throat: No  oropharyngeal exudate.   Eyes: Pupils are equal, round, and reactive to light.   Cardiovascular: Normal rate and regular rhythm.   Pulmonary/Chest: Effort normal. No stridor. No respiratory distress. She has no rales. She exhibits tenderness (pain at site of chest tube insertion).   Abdominal: Soft. Bowel sounds are normal. She exhibits no distension. There is no tenderness. There is no guarding.   Neurological: She is alert and oriented to person, place, and time.   Skin: Skin is warm and dry. She is not diaphoretic.         CRANIAL NERVES     CN III, IV, VI   Pupils are equal, round, and reactive to light.       Significant Labs: All pertinent labs within the past 24 hours have been reviewed.    Significant Imaging: I have reviewed all pertinent imaging results/findings within the past 24 hours.

## 2019-07-21 NOTE — PHARMACY MED REC
"Admission Medication Reconciliation - Pharmacy Consult Note    The home medication history was taken by Alisa Mitchell, Pharmacy Technician.  Based on information gathered and subsequent review by the clinical pharmacist, the items below may need attention.    You may go to "Admission" then "Reconcile Home Medications" tabs to review and/or act upon these items.      No issues noted with the medication reconciliation.      Potential issues to be addressed PRIOR TO DISCHARGE  o Please address the PRN amlodipine for home use (this medication is not intended for PRN use)    Please address this information as you see fit.  Feel free to contact us if you have any questions or require assistance.    Anabelle Montero, PharmD  [id number]                  .    .          "

## 2019-07-22 LAB
ALBUMIN SERPL BCP-MCNC: 2.7 G/DL (ref 3.5–5.2)
ALBUMIN SERPL BCP-MCNC: 2.7 G/DL (ref 3.5–5.2)
ALLENS TEST: ABNORMAL
ALP SERPL-CCNC: 74 U/L (ref 55–135)
ALP SERPL-CCNC: 74 U/L (ref 55–135)
ALT SERPL W/O P-5'-P-CCNC: 14 U/L (ref 10–44)
ALT SERPL W/O P-5'-P-CCNC: 14 U/L (ref 10–44)
ANION GAP SERPL CALC-SCNC: 7 MMOL/L (ref 8–16)
ANION GAP SERPL CALC-SCNC: 7 MMOL/L (ref 8–16)
AST SERPL-CCNC: 26 U/L (ref 10–40)
AST SERPL-CCNC: 26 U/L (ref 10–40)
BASOPHILS # BLD AUTO: 0.05 K/UL (ref 0–0.2)
BASOPHILS # BLD AUTO: 0.05 K/UL (ref 0–0.2)
BASOPHILS NFR BLD: 0.6 % (ref 0–1.9)
BASOPHILS NFR BLD: 0.6 % (ref 0–1.9)
BILIRUB SERPL-MCNC: 0.4 MG/DL (ref 0.1–1)
BILIRUB SERPL-MCNC: 0.4 MG/DL (ref 0.1–1)
BUN SERPL-MCNC: 20 MG/DL (ref 8–23)
BUN SERPL-MCNC: 20 MG/DL (ref 8–23)
CALCIUM SERPL-MCNC: 9.1 MG/DL (ref 8.7–10.5)
CALCIUM SERPL-MCNC: 9.1 MG/DL (ref 8.7–10.5)
CHLORIDE SERPL-SCNC: 92 MMOL/L (ref 95–110)
CHLORIDE SERPL-SCNC: 92 MMOL/L (ref 95–110)
CO2 SERPL-SCNC: 40 MMOL/L (ref 23–29)
CO2 SERPL-SCNC: 40 MMOL/L (ref 23–29)
CREAT SERPL-MCNC: 0.9 MG/DL (ref 0.5–1.4)
CREAT SERPL-MCNC: 0.9 MG/DL (ref 0.5–1.4)
DELSYS: ABNORMAL
DIFFERENTIAL METHOD: ABNORMAL
DIFFERENTIAL METHOD: ABNORMAL
EOSINOPHIL # BLD AUTO: 0.3 K/UL (ref 0–0.5)
EOSINOPHIL # BLD AUTO: 0.3 K/UL (ref 0–0.5)
EOSINOPHIL NFR BLD: 3.3 % (ref 0–8)
EOSINOPHIL NFR BLD: 3.3 % (ref 0–8)
ERYTHROCYTE [DISTWIDTH] IN BLOOD BY AUTOMATED COUNT: 14 % (ref 11.5–14.5)
ERYTHROCYTE [DISTWIDTH] IN BLOOD BY AUTOMATED COUNT: 14 % (ref 11.5–14.5)
EST. GFR  (AFRICAN AMERICAN): >60 ML/MIN/1.73 M^2
EST. GFR  (AFRICAN AMERICAN): >60 ML/MIN/1.73 M^2
EST. GFR  (NON AFRICAN AMERICAN): 56.5 ML/MIN/1.73 M^2
EST. GFR  (NON AFRICAN AMERICAN): 56.5 ML/MIN/1.73 M^2
FLOW: 2
GLUCOSE SERPL-MCNC: 109 MG/DL (ref 70–110)
GLUCOSE SERPL-MCNC: 109 MG/DL (ref 70–110)
HCO3 UR-SCNC: 50.1 MMOL/L (ref 24–28)
HCT VFR BLD AUTO: 38.1 % (ref 37–48.5)
HCT VFR BLD AUTO: 38.1 % (ref 37–48.5)
HGB BLD-MCNC: 11.8 G/DL (ref 12–16)
HGB BLD-MCNC: 11.8 G/DL (ref 12–16)
IMM GRANULOCYTES # BLD AUTO: 0.03 K/UL (ref 0–0.04)
IMM GRANULOCYTES # BLD AUTO: 0.03 K/UL (ref 0–0.04)
IMM GRANULOCYTES NFR BLD AUTO: 0.4 % (ref 0–0.5)
IMM GRANULOCYTES NFR BLD AUTO: 0.4 % (ref 0–0.5)
LYMPHOCYTES # BLD AUTO: 1.1 K/UL (ref 1–4.8)
LYMPHOCYTES # BLD AUTO: 1.1 K/UL (ref 1–4.8)
LYMPHOCYTES NFR BLD: 13.4 % (ref 18–48)
LYMPHOCYTES NFR BLD: 13.4 % (ref 18–48)
MAGNESIUM SERPL-MCNC: 2.2 MG/DL (ref 1.6–2.6)
MAGNESIUM SERPL-MCNC: 2.2 MG/DL (ref 1.6–2.6)
MCH RBC QN AUTO: 30.5 PG (ref 27–31)
MCH RBC QN AUTO: 30.5 PG (ref 27–31)
MCHC RBC AUTO-ENTMCNC: 31 G/DL (ref 32–36)
MCHC RBC AUTO-ENTMCNC: 31 G/DL (ref 32–36)
MCV RBC AUTO: 98 FL (ref 82–98)
MCV RBC AUTO: 98 FL (ref 82–98)
MODE: ABNORMAL
MONOCYTES # BLD AUTO: 0.8 K/UL (ref 0.3–1)
MONOCYTES # BLD AUTO: 0.8 K/UL (ref 0.3–1)
MONOCYTES NFR BLD: 9.1 % (ref 4–15)
MONOCYTES NFR BLD: 9.1 % (ref 4–15)
NEUTROPHILS # BLD AUTO: 6.2 K/UL (ref 1.8–7.7)
NEUTROPHILS # BLD AUTO: 6.2 K/UL (ref 1.8–7.7)
NEUTROPHILS NFR BLD: 73.2 % (ref 38–73)
NEUTROPHILS NFR BLD: 73.2 % (ref 38–73)
NRBC BLD-RTO: 0 /100 WBC
NRBC BLD-RTO: 0 /100 WBC
PCO2 BLDA: 88.1 MMHG (ref 35–45)
PH SMN: 7.36 [PH] (ref 7.35–7.45)
PLATELET # BLD AUTO: 133 K/UL (ref 150–350)
PLATELET # BLD AUTO: 133 K/UL (ref 150–350)
PMV BLD AUTO: 10.1 FL (ref 9.2–12.9)
PMV BLD AUTO: 10.1 FL (ref 9.2–12.9)
PO2 BLDA: 25 MMHG (ref 40–60)
POC BE: 25 MMOL/L
POC SATURATED O2: 37 % (ref 95–100)
POC TCO2: >50 MMOL/L (ref 24–29)
POTASSIUM SERPL-SCNC: 4.3 MMOL/L (ref 3.5–5.1)
POTASSIUM SERPL-SCNC: 4.3 MMOL/L (ref 3.5–5.1)
PROT SERPL-MCNC: 5.5 G/DL (ref 6–8.4)
PROT SERPL-MCNC: 5.5 G/DL (ref 6–8.4)
RBC # BLD AUTO: 3.87 M/UL (ref 4–5.4)
RBC # BLD AUTO: 3.87 M/UL (ref 4–5.4)
SAMPLE: ABNORMAL
SITE: ABNORMAL
SODIUM SERPL-SCNC: 139 MMOL/L (ref 136–145)
SODIUM SERPL-SCNC: 139 MMOL/L (ref 136–145)
SP02: 99
WBC # BLD AUTO: 8.42 K/UL (ref 3.9–12.7)
WBC # BLD AUTO: 8.42 K/UL (ref 3.9–12.7)

## 2019-07-22 PROCEDURE — 85025 COMPLETE CBC W/AUTO DIFF WBC: CPT

## 2019-07-22 PROCEDURE — 83735 ASSAY OF MAGNESIUM: CPT

## 2019-07-22 PROCEDURE — 63600175 PHARM REV CODE 636 W HCPCS: Performed by: HOSPITALIST

## 2019-07-22 PROCEDURE — 80053 COMPREHEN METABOLIC PANEL: CPT

## 2019-07-22 PROCEDURE — 99233 SBSQ HOSP IP/OBS HIGH 50: CPT | Mod: 25,,, | Performed by: NURSE PRACTITIONER

## 2019-07-22 PROCEDURE — 27000221 HC OXYGEN, UP TO 24 HOURS

## 2019-07-22 PROCEDURE — 25000003 PHARM REV CODE 250: Performed by: STUDENT IN AN ORGANIZED HEALTH CARE EDUCATION/TRAINING PROGRAM

## 2019-07-22 PROCEDURE — 99900035 HC TECH TIME PER 15 MIN (STAT)

## 2019-07-22 PROCEDURE — 20600001 HC STEP DOWN PRIVATE ROOM

## 2019-07-22 PROCEDURE — 25000242 PHARM REV CODE 250 ALT 637 W/ HCPCS: Performed by: STUDENT IN AN ORGANIZED HEALTH CARE EDUCATION/TRAINING PROGRAM

## 2019-07-22 PROCEDURE — 25000003 PHARM REV CODE 250

## 2019-07-22 PROCEDURE — 82803 BLOOD GASES ANY COMBINATION: CPT

## 2019-07-22 PROCEDURE — 94761 N-INVAS EAR/PLS OXIMETRY MLT: CPT

## 2019-07-22 PROCEDURE — 99233 PR SUBSEQUENT HOSPITAL CARE,LEVL III: ICD-10-PCS | Mod: 25,,, | Performed by: NURSE PRACTITIONER

## 2019-07-22 RX ADMIN — SODIUM CHLORIDE 500 ML: 0.9 INJECTION, SOLUTION INTRAVENOUS at 06:07

## 2019-07-22 RX ADMIN — ASPIRIN 81 MG CHEWABLE TABLET 81 MG: 81 TABLET CHEWABLE at 08:07

## 2019-07-22 RX ADMIN — ENOXAPARIN SODIUM 30 MG: 100 INJECTION SUBCUTANEOUS at 05:07

## 2019-07-22 RX ADMIN — TIOTROPIUM BROMIDE 18 MCG: 18 CAPSULE ORAL; RESPIRATORY (INHALATION) at 10:07

## 2019-07-22 RX ADMIN — FERROUS SULFATE TAB EC 325 MG (65 MG FE EQUIVALENT) 325 MG: 325 (65 FE) TABLET DELAYED RESPONSE at 08:07

## 2019-07-22 RX ADMIN — ROPINIROLE HYDROCHLORIDE 0.25 MG: 0.25 TABLET, FILM COATED ORAL at 10:07

## 2019-07-22 RX ADMIN — FUROSEMIDE 40 MG: 40 TABLET ORAL at 08:07

## 2019-07-22 RX ADMIN — POLYETHYLENE GLYCOL 3350 17 G: 17 POWDER, FOR SOLUTION ORAL at 08:07

## 2019-07-22 RX ADMIN — THERA TABS 1 TABLET: TAB at 08:07

## 2019-07-22 RX ADMIN — FLUTICASONE PROPIONATE 50 MCG: 50 SPRAY, METERED NASAL at 10:07

## 2019-07-22 NOTE — H&P
[facility]  Critical Care Medicine  History & Physical    Patient Name: Venus Mayer  MRN: [id number]  Admission Date: 7/20/2019  Hospital Length of Stay: 2 days  Code Status: DNR  Attending Physician: Aden Srinivasan*   Primary Care Provider: Jona Che MD   Principal Problem: Pneumothorax    Subjective:     HPI:  Ms. Mayer is a 89 y/o with history of COPD on home 2L, 40 pack year smoking history (quit 30 years ago), recurrent spontaneous pneumothorax with pleurodesis in May 2019, HFpEF, HOCM s/p AICD, PVD, HTN, HLD, and CKD who presented to [facility] ED with acute shortness of breath. She was found to have a small right sided pneumothorax; 12 Fr chest tube was placed at the [facility] hospital. She was then transferred to [facility] for further management.  Pulmonary was then consulted for management of chest tube. During the afternoon of 7/21, she developed acute dyspneic, tachycardic, and diaphoretic. She was then moved to the ICU for further management.     Hospital/ICU Course:  Ms. Mayer was admitted to the ICU with CCM with concern for tension pneumothorax.  Previous chest tube was found to be dislodged. Chest tube replaced with re-expansion of right lung.  CT surgery consulted with plan for water seal and clamping trials on 7/23.  If fails clamping trials, plan is for repeat bedside pleurodesis or bronchoscopy with endobronchial valve placement. She is not a surgical candidate due to cardiac co morbidities.      Interval History/Significant Events: right sided chest tube placed overnight.  On 2 L NC.     Review of Systems   Constitutional: Negative for chills, diaphoresis and fever.   Respiratory: Positive for shortness of breath.    Cardiovascular: Negative for chest pain and leg swelling.   Gastrointestinal: Negative for abdominal pain.   Genitourinary: Negative for dysuria.   Neurological: Negative for dizziness and headaches.     Objective:     Vital Signs  (Most Recent):  Temp: 97.7 °F (36.5 °C) (07/22/19 0705)  Pulse: 75 (07/22/19 1010)  Resp: (!) 28 (07/22/19 1010)  BP: (!) 150/72 (07/22/19 1000)  SpO2: 100 % (07/22/19 1010) Vital Signs (24h Range):  Temp:  [97.7 °F (36.5 °C)-98.2 °F (36.8 °C)] 97.7 °F (36.5 °C)  Pulse:  [] 75  Resp:  [15-34] 28  SpO2:  [90 %-100 %] 100 %  BP: (127-201)/(59-81) 150/72   Weight: 40.8 kg (90 lb)  Body mass index is 17.58 kg/m².      Intake/Output Summary (Last 24 hours) at 7/22/2019 1020  Last data filed at 7/22/2019 0800  Gross per 24 hour   Intake 1500 ml   Output 1062 ml   Net 438 ml       Physical Exam   Constitutional: She is oriented to person, place, and time. She appears cachectic. She has a sickly appearance. Nasal cannula in place.   HENT:   Head: Normocephalic.   Cardiovascular: Normal rate and normal pulses.   Murmur heard.  Pulmonary/Chest: No accessory muscle usage. Tachypnea noted. No respiratory distress. She has decreased breath sounds. She has no wheezes. She has no rhonchi. She has rales in the right upper field, the right middle field and the right lower field.   Abdominal: Soft. Bowel sounds are normal. There is no tenderness.   Genitourinary:   Genitourinary Comments: purewick with clear yellow output   Neurological: She is oriented to person, place, and time. No cranial nerve deficit or sensory deficit. GCS eye subscore is 4. GCS verbal subscore is 5. GCS motor subscore is 6.   Drowsy, non focal   Skin: Skin is warm, dry and intact.   Nursing note and vitals reviewed.      Vents:  Oxygen Concentration (%): 100 (07/21/19 1948)  Lines/Drains/Airways     Drain            Female External Urinary Catheter 07/20/19 2000 1 day         Chest Tube 07/21/19 1907 1 Right Fourth intercostal space 14 Fr. less than 1 day          Peripheral Intravenous Line                 Peripheral IV - Single Lumen 07/20/19 1242 20 G Right Forearm 1 day         Peripheral IV - Single Lumen 07/21/19 1906 20 G Left Forearm less than 1  day              Significant Labs:    CBC/Anemia Profile:  Recent Labs   Lab 07/21/19  0445 07/22/19  0317   WBC 7.53 8.42  8.42   HGB 12.7 11.8*  11.8*   HCT 41.2 38.1  38.1   * 133*  133*   * 98  98   RDW 14.1 14.0  14.0        Chemistries:  Recent Labs   Lab 07/21/19  0445 07/22/19  0317    139  139   K 4.5 4.3  4.3   CL 92* 92*  92*   CO2 41* 40*  40*   BUN 21 20  20   CREATININE 0.9 0.9  0.9   CALCIUM 9.5 9.1  9.1   ALBUMIN 2.9* 2.7*  2.7*   PROT 5.6* 5.5*  5.5*   BILITOT 0.4 0.4  0.4   ALKPHOS 75 74  74   ALT 13 14  14   AST 28 26  26   MG 2.4 2.2  2.2   PHOS 4.6*  --        All pertinent labs within the past 24 hours have been reviewed.    Significant Imaging:  I have reviewed and interpreted all pertinent imaging results/findings within the past 24 hours.    Assessment/Plan:     Pulmonary  Secondary spontaneous pneumothorax  --chest tube to suction.  CTS following. Plan for water seal and clamping trials on 7/23.     Cardiac/Vascular  Heart failure, diastolic  --does not appear to be in acute heart failure decompensation  --monitor fluid status  --continue lasix-home dose    Essential hypertension  --continue home regimen with lasix.    Other  Debility  --pt/ot ordered       DVT ppx: Enoxaparin     Updated patient and daughter at bedside    Transfer to stepdown unit with hospital medicine.    Discussed plan with Dr. Srinivasan    I spent >70 minutes reviewing patient records, examining, and counseling the patient with greater than 50% of the time spent with direct patient care and coordination.        Lisa Ahumada NP  Critical Care Medicine  [facility]

## 2019-07-22 NOTE — SUBJECTIVE & OBJECTIVE
Interval History/Significant Events: right sided chest tube placed overnight.  On 2 L NC.     Review of Systems   Constitutional: Negative for chills, diaphoresis and fever.   Respiratory: Positive for shortness of breath.    Cardiovascular: Negative for chest pain and leg swelling.   Gastrointestinal: Negative for abdominal pain.   Genitourinary: Negative for dysuria.   Neurological: Negative for dizziness and headaches.     Objective:     Vital Signs (Most Recent):  Temp: 97.7 °F (36.5 °C) (07/22/19 0705)  Pulse: 75 (07/22/19 1010)  Resp: (!) 28 (07/22/19 1010)  BP: (!) 150/72 (07/22/19 1000)  SpO2: 100 % (07/22/19 1010) Vital Signs (24h Range):  Temp:  [97.7 °F (36.5 °C)-98.2 °F (36.8 °C)] 97.7 °F (36.5 °C)  Pulse:  [] 75  Resp:  [15-34] 28  SpO2:  [90 %-100 %] 100 %  BP: (127-201)/(59-81) 150/72   Weight: 40.8 kg (90 lb)  Body mass index is 17.58 kg/m².      Intake/Output Summary (Last 24 hours) at 7/22/2019 1020  Last data filed at 7/22/2019 0800  Gross per 24 hour   Intake 1500 ml   Output 1062 ml   Net 438 ml       Physical Exam   Constitutional: She is oriented to person, place, and time. She appears cachectic. She has a sickly appearance. Nasal cannula in place.   HENT:   Head: Normocephalic.   Cardiovascular: Normal rate and normal pulses.   Murmur heard.  Pulmonary/Chest: No accessory muscle usage. Tachypnea noted. No respiratory distress. She has decreased breath sounds. She has no wheezes. She has no rhonchi. She has rales in the right upper field, the right middle field and the right lower field.   Abdominal: Soft. Bowel sounds are normal. There is no tenderness.   Genitourinary:   Genitourinary Comments: purewick with clear yellow output   Neurological: She is oriented to person, place, and time. No cranial nerve deficit or sensory deficit. GCS eye subscore is 4. GCS verbal subscore is 5. GCS motor subscore is 6.   Drowsy, non focal   Skin: Skin is warm, dry and intact.   Nursing note and vitals  reviewed.      Vents:  Oxygen Concentration (%): 100 (07/21/19 1948)  Lines/Drains/Airways     Drain            Female External Urinary Catheter 07/20/19 2000 1 day         Chest Tube 07/21/19 1907 1 Right Fourth intercostal space 14 Fr. less than 1 day          Peripheral Intravenous Line                 Peripheral IV - Single Lumen 07/20/19 1242 20 G Right Forearm 1 day         Peripheral IV - Single Lumen 07/21/19 1906 20 G Left Forearm less than 1 day              Significant Labs:    CBC/Anemia Profile:  Recent Labs   Lab 07/21/19 0445 07/22/19 0317   WBC 7.53 8.42  8.42   HGB 12.7 11.8*  11.8*   HCT 41.2 38.1  38.1   * 133*  133*   * 98  98   RDW 14.1 14.0  14.0        Chemistries:  Recent Labs   Lab 07/21/19 0445 07/22/19 0317    139  139   K 4.5 4.3  4.3   CL 92* 92*  92*   CO2 41* 40*  40*   BUN 21 20  20   CREATININE 0.9 0.9  0.9   CALCIUM 9.5 9.1  9.1   ALBUMIN 2.9* 2.7*  2.7*   PROT 5.6* 5.5*  5.5*   BILITOT 0.4 0.4  0.4   ALKPHOS 75 74  74   ALT 13 14  14   AST 28 26  26   MG 2.4 2.2  2.2   PHOS 4.6*  --        All pertinent labs within the past 24 hours have been reviewed.    Significant Imaging:  I have reviewed and interpreted all pertinent imaging results/findings within the past 24 hours.

## 2019-07-22 NOTE — ASSESSMENT & PLAN NOTE
89 year old female with multiple medical comorbidities presents with recurrent right pneumothorax after drain placement at outside ED for chronic pneumothorax.     - Recommend chest tube to suction today. Water seal and clamping trials tomorrow.   - If patient fails clamping trial, recommend repeat bedside pleurodesis or bronchoscopy with endobronchial valve placement. Patient is not a surgical candidate due to cardiac comorbidities. These options were discussed at length with patient and daughter.   - Okay to step down from our standpoint. Will continue to follow.

## 2019-07-22 NOTE — SUBJECTIVE & OBJECTIVE
Past Medical History:   Diagnosis Date    Acute on chronic congestive heart failure 2019    Cardiomyopathy     Carotid artery occlusion     COPD (chronic obstructive pulmonary disease)     Hyperlipidemia     Hypertension        Past Surgical History:   Procedure Laterality Date    CARDIAC CATHETERIZATION      CAROTID ENDARTERECTOMY         Review of patient's allergies indicates:   Allergen Reactions    Ancef in dextrose (iso-osm) Rash    Cefazolin Rash    Cefuroxime Rash    Sulfamethoxazole-trimethoprim Rash     Other reaction(s): Rash       Family History     Problem Relation (Age of Onset)    Hypertension Mother        Tobacco Use    Smoking status: Former Smoker     Packs/day: 2.00     Years: 20.00     Pack years: 40.00     Types: Cigarettes     Last attempt to quit: 1980     Years since quittin.5    Smokeless tobacco: Never Used   Substance and Sexual Activity    Alcohol use: Yes     Alcohol/week: 1.2 oz     Types: 2 Glasses of wine per week     Comment: social    Drug use: No    Sexual activity: Not Currently         Review of Systems   All other systems reviewed and are negative.    Objective:     Vital Signs (Most Recent):  Temp: 98 °F (36.7 °C) (19)  Pulse: 93 (19)  Resp: (!) 29 (19)  BP: (!) 201/81 (19)  SpO2: 100 % (19) Vital Signs (24h Range):  Temp:  [97.5 °F (36.4 °C)-98.1 °F (36.7 °C)] 98 °F (36.7 °C)  Pulse:  [] 93  Resp:  [17-29] 29  SpO2:  [91 %-100 %] 100 %  BP: (117-201)/(57-81) 201/81     Weight: 40.8 kg (90 lb)  Body mass index is 17.58 kg/m².      Intake/Output Summary (Last 24 hours) at 2019  Last data filed at 2019 1700  Gross per 24 hour   Intake 1140 ml   Output 1250 ml   Net -110 ml       Physical Exam   Constitutional: She appears well-developed and well-nourished.   HENT:   Head: Normocephalic.   Neck: JVD present.   Cardiovascular: Regular rhythm. Exam reveals no friction rub.    No murmur heard.  tachy   Pulmonary/Chest:   Tachypneic; absent breath sounds over R chest  R ct in place   Abdominal: Soft. Bowel sounds are normal.   Musculoskeletal: Normal range of motion.   Skin: Skin is warm and dry.       Vents:  Oxygen Concentration (%): 100 (07/21/19 1948)    Lines/Drains/Airways     Drain            Female External Urinary Catheter 07/20/19 2000 1 day         Chest Tube 07/21/19 1907 1 Right Fourth intercostal space 14 Fr. less than 1 day          Peripheral Intravenous Line                 Peripheral IV - Single Lumen 07/20/19 1242 20 G Right Forearm 1 day         Peripheral IV - Single Lumen 07/21/19 1906 20 G Left Forearm less than 1 day                Significant Labs:    CBC/Anemia Profile:  Recent Labs   Lab 07/21/19  0445   WBC 7.53   HGB 12.7   HCT 41.2   *   *   RDW 14.1        Chemistries:  Recent Labs   Lab 07/21/19  0445      K 4.5   CL 92*   CO2 41*   BUN 21   CREATININE 0.9   CALCIUM 9.5   ALBUMIN 2.9*   PROT 5.6*   BILITOT 0.4   ALKPHOS 75   ALT 13   AST 28   MG 2.4   PHOS 4.6*       All pertinent labs within the past 24 hours have been reviewed.    Significant Imaging:   I have reviewed and interpreted all pertinent imaging results/findings within the past 24 hours.

## 2019-07-22 NOTE — CONSULTS
[facility]  Pulmonology  Consult Note    Patient Name: Venus Mayer  MRN: [id number]  Admission Date: 2019  Hospital Length of Stay: 1 days  Code Status: DNR  Attending Physician: Yolie Cedillo MD  Primary Care Provider: Jona Che MD   Principal Problem: Pneumothorax    Consults  Subjective:     HPI:  Ms. Mayer is a 89yo WF w/ pmhx of COPD on home 2L, tobacco abuse, recurrent PTX's w/p pleurodesis in May 2019, AICD, PVD, HTN, HLD, CKD. She initially presented to outside ED for acute shortness of breath. She was found to have a small R sided PTX and 12fr CT was placed at the outside hospital. She was then transferred to [facility]. Pulm was then consulted for mgmt of chest tube. On first questioning and exam, she had no complaints of shortness of breath.She denied any chest pain. However, later in the afternoon, she became acutely dyspneic, tachycardic, and diaphoretic. She was then moved to the ICU for further mgmt.     Past Medical History:   Diagnosis Date    Acute on chronic congestive heart failure 2019    Cardiomyopathy     Carotid artery occlusion     COPD (chronic obstructive pulmonary disease)     Hyperlipidemia     Hypertension        Past Surgical History:   Procedure Laterality Date    CARDIAC CATHETERIZATION      CAROTID ENDARTERECTOMY         Review of patient's allergies indicates:   Allergen Reactions    Ancef in dextrose (iso-osm) Rash    Cefazolin Rash    Cefuroxime Rash    Sulfamethoxazole-trimethoprim Rash     Other reaction(s): Rash       Family History     Problem Relation (Age of Onset)    Hypertension Mother        Tobacco Use    Smoking status: Former Smoker     Packs/day: 2.00     Years: 20.00     Pack years: 40.00     Types: Cigarettes     Last attempt to quit: 1980     Years since quittin.5    Smokeless tobacco: Never Used   Substance and Sexual Activity    Alcohol use: Yes     Alcohol/week: 1.2 oz     Types: 2 Glasses of  wine per week     Comment: social    Drug use: No    Sexual activity: Not Currently         Review of Systems   All other systems reviewed and are negative.    Objective:     Vital Signs (Most Recent):  Temp: 98 °F (36.7 °C) (07/21/19 1738)  Pulse: 93 (07/21/19 1948)  Resp: (!) 29 (07/21/19 1948)  BP: (!) 201/81 (07/21/19 1948)  SpO2: 100 % (07/21/19 1948) Vital Signs (24h Range):  Temp:  [97.5 °F (36.4 °C)-98.1 °F (36.7 °C)] 98 °F (36.7 °C)  Pulse:  [] 93  Resp:  [17-29] 29  SpO2:  [91 %-100 %] 100 %  BP: (117-201)/(57-81) 201/81     Weight: 40.8 kg (90 lb)  Body mass index is 17.58 kg/m².      Intake/Output Summary (Last 24 hours) at 7/21/2019 2149  Last data filed at 7/21/2019 1700  Gross per 24 hour   Intake 1140 ml   Output 1250 ml   Net -110 ml       Physical Exam   Constitutional: She appears well-developed and well-nourished.   HENT:   Head: Normocephalic.   Neck: JVD present.   Cardiovascular: Regular rhythm. Exam reveals no friction rub.   No murmur heard.  tachy   Pulmonary/Chest:   Tachypneic; absent breath sounds over R chest  R ct in place   Abdominal: Soft. Bowel sounds are normal.   Musculoskeletal: Normal range of motion.   Skin: Skin is warm and dry.       Vents:  Oxygen Concentration (%): 100 (07/21/19 1948)    Lines/Drains/Airways     Drain            Female External Urinary Catheter 07/20/19 2000 1 day         Chest Tube 07/21/19 1907 1 Right Fourth intercostal space 14 Fr. less than 1 day          Peripheral Intravenous Line                 Peripheral IV - Single Lumen 07/20/19 1242 20 G Right Forearm 1 day         Peripheral IV - Single Lumen 07/21/19 1906 20 G Left Forearm less than 1 day                Significant Labs:    CBC/Anemia Profile:  Recent Labs   Lab 07/21/19  0445   WBC 7.53   HGB 12.7   HCT 41.2   *   *   RDW 14.1        Chemistries:  Recent Labs   Lab 07/21/19  0445      K 4.5   CL 92*   CO2 41*   BUN 21   CREATININE 0.9   CALCIUM 9.5   ALBUMIN 2.9*    PROT 5.6*   BILITOT 0.4   ALKPHOS 75   ALT 13   AST 28   MG 2.4   PHOS 4.6*       All pertinent labs within the past 24 hours have been reviewed.    Significant Imaging:   I have reviewed and interpreted all pertinent imaging results/findings within the past 24 hours.    Assessment/Plan:     Secondary spontaneous pneumothorax  -transferred [facility] on 7[facility]20 for mgmt of recurrent PTX. 12fr chest tube was in place prior to arrival, however, became dislodged early PM today. Signs and symptoms of Tension PTX. CXR w/ large PTX  -transferred to ICU and 14fr CT placed  -continue CT to suction at -20cm h20  -repeat CXR in the AM  -will need additional pleurodesis and will consult CTS in the AM          Thank you for your consult. I will follow-up with patient. Please contact us if you have any additional questions.     Miah Abbott MD  Pulmonology  [facility]

## 2019-07-22 NOTE — PROCEDURES
"Venus Mayer is a 89 y.o. female patient.    Temp: 98 °F (36.7 °C) (19)  Pulse: 93 (19)  Resp: (!) 29 (19)  BP: (!) 201/81 (19)  SpO2: 100 % (19)  Weight: 40.8 kg (90 lb) (19)  Height: 5' (152.4 cm) (19)       Chest Tube Insertion  Date/Time: 2019 6:35 PM  Performed by: Miah Abbott MD  Authorized by: Miah Abbott MD   Consent Done: Yes  Consent: Verbal consent obtained.  Risks and benefits: risks, benefits and alternatives were discussed  Consent given by: patient (and daugther via phone)  Patient understanding: patient states understanding of the procedure being performed  Patient consent: the patient's understanding of the procedure matches consent given  Procedure consent: procedure consent matches procedure scheduled  Site marked: the operative site was marked  Patient identity confirmed:  and MRN  Time out: Immediately prior to procedure a "time out" was called to verify the correct patient, procedure, equipment, support staff and site/side marked as required.  Indications: pneumothorax  Patient sedated: no  Anesthesia: local infiltration    Anesthesia:  Local Anesthetic: lidocaine 1% without epinephrine  Preparation: skin prepped with ChloraPrep  Placement location: right lateral  Scalpel size: 11  Tube size (Hebrew): 14.  Ultrasound guidance: no  Tension pneumothorax heard: no  Tube connected to: suction  Drainage amount: 0 ml  Suture material: 2-0 silk  Dressing: 4x4 sterile gauze  Post-insertion x-ray findings: tube in good position  Complications: No  Estimated blood loss (mL): 5          Miah Abbott  2019  "

## 2019-07-22 NOTE — HPI
89 year old female well known to our service with a PMH of COPD (2-3LNC at home), chronic diastolic CHF, HOCM s/p AICD, pulmonary HTN, PVD, carotid disease, CKD and chronic right pneumothorax is now admitted to the CMICU for recurrent right pneumothorax and respiratory distress. History dates to mid-May when she was admitted for a large right pneumothorax. Underwent 8Fr pigtail placement followed by bedside Doxycycline pleurodesis which she tolerated well. Tube removal complicated by subcutaneous emphysema but did not require another chest tube placed. On inital outpatient follow up, lung was well expanded. She has since presented to the ED twice for acute SOB. Multiple CXRs revealed a stable subpulmonic pneumothorax. Patient went to ED in [place] on Saturday for SOB. CXR in ED showed similar appearing small pneumothorax. 12Fr tube placed in ED with expansion of small subpulmonic pneumothorax. Patient's family requested transfer to [facility]. Patient admitted to the medicine service and CXR on arrival showed drain slightly backed out but in the chest. However, last night patient became acutely dyspneic, tachycardic, and diaphoretic. Transferred to the MICU. CXR showed tube dislodged and enlarged pneumothorax. 10Fr pigtail placed by critical care fellow with immediate expansion of lung. Today she is feeling well. On home regimen of supplemental O2. Chest tube without air leak. No subq emphysema.

## 2019-07-22 NOTE — PLAN OF CARE
Problem: Adult Inpatient Plan of Care  Goal: Plan of Care Review  Outcome: Ongoing (interventions implemented as appropriate)  Pt remains in ICU 6071. Stepdown orders placed. AAOx4. NSR, pacemaker in place. Paced at times.2 L NC, baseline 2.5 L @ home. Afebrile. Cardiac diet, no BM on shift. Miralax administered. Slight chest discomfort today, EKG completed, no changes from baseline. Foam to heels/sacrum, waffle mattress in place. Purewick in place. SCDs in place. POC updated with pt and pt's family at bedside, all questions and concerns addressed.     Problem: Respiratory Compromise  Goal: Optimal Oxygenation and Ventilation    Intervention: Manage Pneumothorax Effects  Pt with chest tube to right side, no tidaling/bubbling noted. Chest x-ray x 2 completed. Chest tube to suction. Breath sounds more diminished on the right. Slight SOB noted throughout the day. 20 cc output throughout the day.

## 2019-07-22 NOTE — CONSULTS
[facility]  Thoracic Surgery  Consult Note    Patient Name: Venus Mayer  MRN: [id number]  Code Status: DNR  Admission Date: 7/20/2019  Hospital Length of Stay: 2 days  Consult Requesting Physician: Dr. Srinivasan  Consulting Physician: Dr. Mckeon  Primary Care Provider: Jona Che MD    Inpatient consult to Cardiothoracic Surgery  Consult performed by: BHUPINDER Mcgill  Consult ordered by: Tawny Dotson MD        Subjective:     Reason for Consult: Pneumothorax    History of Present Illness: 89 year old female well known to our service with a PMH of COPD (2-3LNC at home), chronic diastolic CHF, HOCM s/p AICD, pulmonary HTN, PVD, carotid disease, CKD and chronic right pneumothorax is now admitted to the CMICU for recurrent right pneumothorax and respiratory distress. History dates to mid-May when she was admitted for a large right pneumothorax. Underwent 8Fr pigtail placement followed by bedside Doxycycline pleurodesis which she tolerated well. Tube removal complicated by subcutaneous emphysema but did not require another chest tube placed. On inital outpatient follow up, lung was well expanded. She has since presented to the ED twice for acute SOB. Multiple CXRs revealed a stable subpulmonic pneumothorax. Patient went to ED in [place] on Saturday for SOB. CXR in ED showed similar appearing small pneumothorax. 12Fr tube placed in ED with expansion of small subpulmonic pneumothorax. Patient's family requested transfer to [facility]. Patient admitted to the medicine service and CXR on arrival showed drain slightly backed out but in the chest. However, last night patient became acutely dyspneic, tachycardic, and diaphoretic. Transferred to the MICU. CXR showed tube dislodged and enlarged pneumothorax. 10Fr pigtail placed by critical care fellow with immediate expansion of lung. Today she is feeling well. On home regimen of supplemental O2. Chest tube without air leak. No subq emphysema.          Medications:  Continuous Infusions:  Scheduled Meds:   alteplase  2 mg Intra-Catheter Once    aspirin  81 mg Oral Daily    enoxaparin  30 mg Subcutaneous Daily    ferrous sulfate  325 mg Oral Daily    fluticasone propionate  1 spray Each Nare Daily    furosemide  40 mg Oral Daily    multivitamin  1 tablet Oral Daily    polyethylene glycol  17 g Oral Daily    rOPINIRole  0.25 mg Oral QHS    tiotropium  18 mcg Inhalation Daily     PRN Meds:acetaminophen, albuterol, dextrose 50%, dextrose 50%, glucagon (human recombinant), glucose, glucose, sodium chloride 0.9%     Review of patient's allergies indicates:   Allergen Reactions    Ancef in dextrose (iso-osm) Rash    Cefazolin Rash    Cefuroxime Rash    Sulfamethoxazole-trimethoprim Rash     Other reaction(s): Rash     Objective:     Vital Signs (Most Recent):  Temp: 97 °F (36.1 °C) (07/22/19 1100)  Pulse: 71 (07/22/19 1200)  Resp: (!) 21 (07/22/19 1200)  BP: (!) 142/70 (07/22/19 1200)  SpO2: 99 % (07/22/19 1200) Vital Signs (24h Range):  Temp:  [97 °F (36.1 °C)-98.2 °F (36.8 °C)] 97 °F (36.1 °C)  Pulse:  [] 71  Resp:  [15-34] 21  SpO2:  [90 %-100 %] 99 %  BP: (127-201)/(59-81) 142/70     Intake/Output - Last 3 Shifts       07/20 0700 - 07/21 0659 07/21 0700 - 07/22 0659 07/22 0700 - 07/23 0659    P.O. 180 1240 240    IV Piggyback  500     Total Intake(mL/kg) 180 (4.4) 1740 (42.6) 240 (5.9)    Urine (mL/kg/hr) 200 1050 (1.1) 300 (1.2)    Chest Tube 20  12    Total Output 220 1050 312    Net -40 +690 -72           Urine Occurrence  1 x           SpO2: 99 %  O2 Device (Oxygen Therapy): nasal cannula    Physical Exam   Constitutional: She is oriented to person, place, and time. She appears well-developed.   Elderly frail female.    HENT:   Mouth/Throat: Oropharynx is clear and moist.   Eyes: Pupils are equal, round, and reactive to light. Conjunctivae are normal.   Neck: No tracheal deviation present.   Cardiovascular: Normal rate, regular rhythm  and intact distal pulses.   Pulmonary/Chest: Effort normal and breath sounds normal. No respiratory distress. She has no wheezes.   Right chest pigtail intact with ss drainage to suction. No air leak/tidaling on and off suction.    Abdominal: Soft. Bowel sounds are normal. She exhibits no distension.   Musculoskeletal: She exhibits no edema.   Neurological: She is alert and oriented to person, place, and time.   Psychiatric: She has a normal mood and affect.       Significant Labs:  CBC:   Recent Labs   Lab 07/22/19 0317   WBC 8.42  8.42   RBC 3.87*  3.87*   HGB 11.8*  11.8*   HCT 38.1  38.1   *  133*   MCV 98  98   MCH 30.5  30.5   MCHC 31.0*  31.0*     CMP:   Recent Labs   Lab 07/22/19 0317     109   CALCIUM 9.1  9.1   ALBUMIN 2.7*  2.7*   PROT 5.5*  5.5*     139   K 4.3  4.3   CO2 40*  40*   CL 92*  92*   BUN 20  20   CREATININE 0.9  0.9   ALKPHOS 74  74   ALT 14  14   AST 26  26   BILITOT 0.4  0.4       Significant Diagnostics:  CXR: I have reviewed all pertinent results/findings within the past 24 hours    VTE Risk Mitigation (From admission, onward)        Ordered     enoxaparin injection 30 mg  Daily      07/21/19 1002     IP VTE HIGH RISK PATIENT  Once      07/20/19 2005        Assessment/Plan:     * Pneumothorax  89 year old female with multiple medical comorbidities presents with recurrent right pneumothorax after drain placement at outside ED for chronic pneumothorax.     - Recommend chest tube to suction today. Water seal and clamping trials tomorrow.   - If patient fails clamping trial, recommend repeat bedside pleurodesis or bronchoscopy with endobronchial valve placement. Patient is not a surgical candidate due to cardiac comorbidities. These options were discussed at length with patient and daughter.   - Okay to step down from our standpoint. Will continue to follow.         Thank you for your consult. I will follow-up with patient. Please contact us if you  have any additional questions.    BHUPINDER Caal  Thoracic Surgery  [facility]

## 2019-07-22 NOTE — ASSESSMENT & PLAN NOTE
--does not appear to be in acute heart failure decompensation  --monitor fluid status  --continue lasix-home dose

## 2019-07-22 NOTE — CONSULTS
See consult note from Dr Abbott from yesterday 7/21/19    Ferdinand Ryan MD  [facility] Pulmonary and Critical Care Fellow  Pager: [phone]  Cell: [phone]

## 2019-07-22 NOTE — PLAN OF CARE
Jona Che MD  [address] / [place] [address]    [facility] - [place], [place] - [address] [place] [address] [place] [address]  [address] [address]  Phone: [phone] Fax: [phone]    Payor: [employer] MANAGED MEDICARE / Plan: [employer] MEDICARE HMO / Product Type: Capitation /     Extended Emergency Contact Information  Primary Emergency Contact: Richmond Mayer   [facility]  Home Phone: [phone]  Mobile Phone: [phone]  Relation: Son  Secondary Emergency Contact: RAJINDER FLYNN  Mobile Phone: [phone]  Relation: Daughter  Preferred language: English   needed? No    No future appointments.       07/22/19 1028   Discharge Assessment   Assessment Type Discharge Planning Assessment   Confirmed/corrected address and phone number on facesheet? Yes   Assessment information obtained from? Patient;Caregiver;Medical Record   Communicated expected length of stay with patient/caregiver no   Prior to hospitilization cognitive status: Alert/Oriented   Prior to hospitalization functional status: Needs Assistance;Assistive Equipment   Current cognitive status: Alert/Oriented   Current Functional Status: Needs Assistance;Assistive Equipment   Facility Arrived From: ED Transfer from [facility]    Lives With alone  (Has been staying with adult daughter in her home )   Able to Return to Prior Arrangements other (see comments)  (TBD)   Is patient able to care for self after discharge? Unable to determine at this time (comments)   Patient's perception of discharge disposition home health;home or selfcare  (Would like to use [facility] services if HH recommended)   Readmission Within the Last 30 Days no previous admission in last 30 days   Patient currently being followed by outpatient case management? No   Patient currently receives any other outside agency services? No   Equipment Currently Used at Home  wheelchair;walker, standard;bedside commode   Do you have any problems affording any of your prescribed medications? No   Is the patient taking medications as prescribed? yes   Does the patient have transportation home? Yes   Transportation Anticipated family or friend will provide   Discharge Plan A Home Health;Home with family   Discharge Plan B Skilled Nursing Facility   DME Needed Upon Discharge  other (see comments)  (TBD)   Patient/Family in Agreement with Plan yes       Clotilde Martinez RN, [place]  Case Management-Critical Care     [phone]

## 2019-07-22 NOTE — HPI
Ms. Mayer is a 89yo WF w/ pmhx of COPD on home 2L, tobacco abuse, recurrent PTX's w/p pleurodesis in May 2019, AICD, PVD, HTN, HLD, CKD. She initially presented to outside ED for acute shortness of breath. She was found to have a small R sided PTX and 12fr CT was placed at the outsided hospital. [facility] Pulm was then consulted for mgmt of chest tube. On first questioning and exam, she had no complaints of shortness of breath.She denied any chest pain. However, later in the afternoon, she became acutely dyspneic, tachycardic, and diaphoretic. She was then moved to the ICU for further mgmt.

## 2019-07-22 NOTE — ASSESSMENT & PLAN NOTE
-transferred from [facility] on 7/20 for mgmt of recurrent PTX. 12fr chest tube was in place prior to arrival, however, became dislodged early PM today. Signs and symptoms of Tension PTX. CXR w/ large PTX  -transferred to ICU and 14fr CT placed  -continue CT to suction at -20cm h20  -repeat CXR in the AM  -will need additional pleurodesis and will consult CTS in the AM

## 2019-07-22 NOTE — HOSPITAL COURSE
Ms. Mayer was admitted to the ICU with CCM with concern for tension pneumothorax.  Previous chest tube was found to be dislodged. Chest tube replaced with re-expansion of right lung.  CT surgery consulted with plan for water seal and clamping trials on 7/23.  If fails clamping trials, plan is for repeat bedside pleurodesis or bronchoscopy with endobronchial valve placement. She is not a surgical candidate due to cardiac co morbidities.

## 2019-07-22 NOTE — RESIDENT HANDOFF
ICU Transfer of Care Note  Critical Care Medicine    Admit Date: 7/20/2019  LOS: 2    CC: Pneumothorax    Code Status: DNR       HPI and Hospital Course:     HPI:  Ms. Mayer is a 89yo WF w/ pmhx of COPD on home 2L, tobacco abuse, recurrent PTX's w/p pleurodesis in May 2019, AICD, PVD, HTN, HLD, CKD. She initially presented to outside ED for acute shortness of breath. She was found to have a small R sided PTX and 12fr CT was placed at the outsided hospital. She was then transferred to [facility]. Pulm was then consulted for mgmt of chest tube. On first questioning and exam, she had no complaints of shortness of breath.She denied any chest pain. However, later in the afternoon, she became acutely dyspneic, tachycardic, and diaphoretic. She was then moved to the ICU for further mgmt.        Hospital/ICU Course:  Ms. Mayer was admitted to the ICU for concern for tension pneumothorax.  Chest tube replaced with re-expansion of right lung. CTS consulted on 7/22. See below.     To Follow Up:     1. Follow up water seal and clamping trials on 7/23. If fails, CTS planning for repeat bedside pleurodesis or bronchoscopy with endobronchial valve placement.       Discharge Plan:     Likely home when medically stable. PT/OT ordered.     Call with questions.     JAY JAY RUTHERFORD, ACNP-BC  Critical Care Medicine  [phone]

## 2019-07-22 NOTE — PT/OT/SLP PROGRESS
Physical Therapy      Patient Name:  Venus Mayer   MRN:  [id number]    Patient not seen today. Pt t/f to ICU 7/21 for increased SOB and closer monitoring. Need new PT/OT orders when medically appropriate    Adrienne Nicholas PT, DPT  7/22/2019  [phone]

## 2019-07-22 NOTE — HPI
Ms. Mayer is a 89 y/o with history of COPD on home 2L, 40 pack year smoking history (quit 30 years ago), recurrent spontaneous pneumothorax with pleurodesis in May 2019, HFpEF, HOCM s/p AICD, PVD, HTN, HLD, and CKD who presented to [facility] ED with acute shortness of breath. She was found to have a small right sided pneumothorax; 12 Fr chest tube was placed at the outsided hospital. She was then transferred to [facility] for further management.  Pulmonary was then consulted for management of chest tube. During the afternoon of 7/21, she developed acute dyspneic, tachycardic, and diaphoretic. She was then moved to the ICU for further management.

## 2019-07-22 NOTE — SUBJECTIVE & OBJECTIVE
Medications:  Continuous Infusions:  Scheduled Meds:   alteplase  2 mg Intra-Catheter Once    aspirin  81 mg Oral Daily    enoxaparin  30 mg Subcutaneous Daily    ferrous sulfate  325 mg Oral Daily    fluticasone propionate  1 spray Each Nare Daily    furosemide  40 mg Oral Daily    multivitamin  1 tablet Oral Daily    polyethylene glycol  17 g Oral Daily    rOPINIRole  0.25 mg Oral QHS    tiotropium  18 mcg Inhalation Daily     PRN Meds:acetaminophen, albuterol, dextrose 50%, dextrose 50%, glucagon (human recombinant), glucose, glucose, sodium chloride 0.9%     Review of patient's allergies indicates:   Allergen Reactions    Ancef in dextrose (iso-osm) Rash    Cefazolin Rash    Cefuroxime Rash    Sulfamethoxazole-trimethoprim Rash     Other reaction(s): Rash     Objective:     Vital Signs (Most Recent):  Temp: 97 °F (36.1 °C) (07/22/19 1100)  Pulse: 71 (07/22/19 1200)  Resp: (!) 21 (07/22/19 1200)  BP: (!) 142/70 (07/22/19 1200)  SpO2: 99 % (07/22/19 1200) Vital Signs (24h Range):  Temp:  [97 °F (36.1 °C)-98.2 °F (36.8 °C)] 97 °F (36.1 °C)  Pulse:  [] 71  Resp:  [15-34] 21  SpO2:  [90 %-100 %] 99 %  BP: (127-201)/(59-81) 142/70     Intake/Output - Last 3 Shifts       07/20 0700 - 07/21 0659 07/21 0700 - 07/22 0659 07/22 0700 - 07/23 0659    P.O. 180 1240 240    IV Piggyback  500     Total Intake(mL/kg) 180 (4.4) 1740 (42.6) 240 (5.9)    Urine (mL/kg/hr) 200 1050 (1.1) 300 (1.2)    Chest Tube 20  12    Total Output 220 1050 312    Net -40 +690 -72           Urine Occurrence  1 x           SpO2: 99 %  O2 Device (Oxygen Therapy): nasal cannula    Physical Exam   Constitutional: She is oriented to person, place, and time. She appears well-developed.   Elderly frail female.    HENT:   Mouth/Throat: Oropharynx is clear and moist.   Eyes: Pupils are equal, round, and reactive to light. Conjunctivae are normal.   Neck: No tracheal deviation present.   Cardiovascular: Normal rate, regular rhythm and  intact distal pulses.   Pulmonary/Chest: Effort normal and breath sounds normal. No respiratory distress. She has no wheezes.   Right chest pigtail intact with ss drainage to suction. No air leak/tidaling on and off suction.    Abdominal: Soft. Bowel sounds are normal. She exhibits no distension.   Musculoskeletal: She exhibits no edema.   Neurological: She is alert and oriented to person, place, and time.   Psychiatric: She has a normal mood and affect.       Significant Labs:  CBC:   Recent Labs   Lab 07/22/19 0317   WBC 8.42  8.42   RBC 3.87*  3.87*   HGB 11.8*  11.8*   HCT 38.1  38.1   *  133*   MCV 98  98   MCH 30.5  30.5   MCHC 31.0*  31.0*     CMP:   Recent Labs   Lab 07/22/19 0317     109   CALCIUM 9.1  9.1   ALBUMIN 2.7*  2.7*   PROT 5.5*  5.5*     139   K 4.3  4.3   CO2 40*  40*   CL 92*  92*   BUN 20  20   CREATININE 0.9  0.9   ALKPHOS 74  74   ALT 14  14   AST 26  26   BILITOT 0.4  0.4       Significant Diagnostics:  CXR: I have reviewed all pertinent results/findings within the past 24 hours    VTE Risk Mitigation (From admission, onward)        Ordered     enoxaparin injection 30 mg  Daily      07/21/19 1002     IP VTE HIGH RISK PATIENT  Once      07/20/19 2005

## 2019-07-23 PROBLEM — Z71.89 GOALS OF CARE, COUNSELING/DISCUSSION: Status: ACTIVE | Noted: 2019-07-23

## 2019-07-23 LAB
ALBUMIN SERPL BCP-MCNC: 2.6 G/DL (ref 3.5–5.2)
ALP SERPL-CCNC: 76 U/L (ref 55–135)
ALT SERPL W/O P-5'-P-CCNC: 12 U/L (ref 10–44)
ANION GAP SERPL CALC-SCNC: 7 MMOL/L (ref 8–16)
AST SERPL-CCNC: 24 U/L (ref 10–40)
BASOPHILS # BLD AUTO: 0.04 K/UL (ref 0–0.2)
BASOPHILS NFR BLD: 0.6 % (ref 0–1.9)
BILIRUB SERPL-MCNC: 0.6 MG/DL (ref 0.1–1)
BUN SERPL-MCNC: 16 MG/DL (ref 8–23)
CALCIUM SERPL-MCNC: 9.5 MG/DL (ref 8.7–10.5)
CHLORIDE SERPL-SCNC: 95 MMOL/L (ref 95–110)
CO2 SERPL-SCNC: 37 MMOL/L (ref 23–29)
CREAT SERPL-MCNC: 0.7 MG/DL (ref 0.5–1.4)
DIFFERENTIAL METHOD: ABNORMAL
EOSINOPHIL # BLD AUTO: 0.6 K/UL (ref 0–0.5)
EOSINOPHIL NFR BLD: 8.6 % (ref 0–8)
ERYTHROCYTE [DISTWIDTH] IN BLOOD BY AUTOMATED COUNT: 13.5 % (ref 11.5–14.5)
EST. GFR  (AFRICAN AMERICAN): >60 ML/MIN/1.73 M^2
EST. GFR  (NON AFRICAN AMERICAN): >60 ML/MIN/1.73 M^2
GLUCOSE SERPL-MCNC: 84 MG/DL (ref 70–110)
HCT VFR BLD AUTO: 38.8 % (ref 37–48.5)
HGB BLD-MCNC: 12.3 G/DL (ref 12–16)
IMM GRANULOCYTES # BLD AUTO: 0.02 K/UL (ref 0–0.04)
IMM GRANULOCYTES NFR BLD AUTO: 0.3 % (ref 0–0.5)
LYMPHOCYTES # BLD AUTO: 1 K/UL (ref 1–4.8)
LYMPHOCYTES NFR BLD: 15 % (ref 18–48)
MAGNESIUM SERPL-MCNC: 2.1 MG/DL (ref 1.6–2.6)
MCH RBC QN AUTO: 30.3 PG (ref 27–31)
MCHC RBC AUTO-ENTMCNC: 31.7 G/DL (ref 32–36)
MCV RBC AUTO: 96 FL (ref 82–98)
MONOCYTES # BLD AUTO: 0.7 K/UL (ref 0.3–1)
MONOCYTES NFR BLD: 11.6 % (ref 4–15)
NEUTROPHILS # BLD AUTO: 4.1 K/UL (ref 1.8–7.7)
NEUTROPHILS NFR BLD: 63.9 % (ref 38–73)
NRBC BLD-RTO: 0 /100 WBC
PLATELET # BLD AUTO: 129 K/UL (ref 150–350)
PMV BLD AUTO: 10.7 FL (ref 9.2–12.9)
POTASSIUM SERPL-SCNC: 4.3 MMOL/L (ref 3.5–5.1)
PROT SERPL-MCNC: 5.3 G/DL (ref 6–8.4)
RBC # BLD AUTO: 4.06 M/UL (ref 4–5.4)
SODIUM SERPL-SCNC: 139 MMOL/L (ref 136–145)
WBC # BLD AUTO: 6.39 K/UL (ref 3.9–12.7)

## 2019-07-23 PROCEDURE — 25000003 PHARM REV CODE 250: Performed by: STUDENT IN AN ORGANIZED HEALTH CARE EDUCATION/TRAINING PROGRAM

## 2019-07-23 PROCEDURE — 97161 PT EVAL LOW COMPLEX 20 MIN: CPT

## 2019-07-23 PROCEDURE — 97165 OT EVAL LOW COMPLEX 30 MIN: CPT

## 2019-07-23 PROCEDURE — 27000221 HC OXYGEN, UP TO 24 HOURS

## 2019-07-23 PROCEDURE — 94761 N-INVAS EAR/PLS OXIMETRY MLT: CPT

## 2019-07-23 PROCEDURE — 85025 COMPLETE CBC W/AUTO DIFF WBC: CPT

## 2019-07-23 PROCEDURE — 25000242 PHARM REV CODE 250 ALT 637 W/ HCPCS: Performed by: STUDENT IN AN ORGANIZED HEALTH CARE EDUCATION/TRAINING PROGRAM

## 2019-07-23 PROCEDURE — 99232 PR SUBSEQUENT HOSPITAL CARE,LEVL II: ICD-10-PCS | Mod: GC,,, | Performed by: HOSPITALIST

## 2019-07-23 PROCEDURE — 80053 COMPREHEN METABOLIC PANEL: CPT

## 2019-07-23 PROCEDURE — 83735 ASSAY OF MAGNESIUM: CPT

## 2019-07-23 PROCEDURE — 20600001 HC STEP DOWN PRIVATE ROOM

## 2019-07-23 PROCEDURE — 63600175 PHARM REV CODE 636 W HCPCS: Performed by: HOSPITALIST

## 2019-07-23 PROCEDURE — 99232 SBSQ HOSP IP/OBS MODERATE 35: CPT | Mod: GC,,, | Performed by: HOSPITALIST

## 2019-07-23 PROCEDURE — 36415 COLL VENOUS BLD VENIPUNCTURE: CPT

## 2019-07-23 RX ADMIN — THERA TABS 1 TABLET: TAB at 08:07

## 2019-07-23 RX ADMIN — ROPINIROLE HYDROCHLORIDE 0.25 MG: 0.25 TABLET, FILM COATED ORAL at 08:07

## 2019-07-23 RX ADMIN — FUROSEMIDE 40 MG: 40 TABLET ORAL at 08:07

## 2019-07-23 RX ADMIN — ENOXAPARIN SODIUM 30 MG: 100 INJECTION SUBCUTANEOUS at 04:07

## 2019-07-23 RX ADMIN — FERROUS SULFATE TAB EC 325 MG (65 MG FE EQUIVALENT) 325 MG: 325 (65 FE) TABLET DELAYED RESPONSE at 08:07

## 2019-07-23 RX ADMIN — TIOTROPIUM BROMIDE 18 MCG: 18 CAPSULE ORAL; RESPIRATORY (INHALATION) at 08:07

## 2019-07-23 RX ADMIN — FLUTICASONE PROPIONATE 50 MCG: 50 SPRAY, METERED NASAL at 08:07

## 2019-07-23 RX ADMIN — POLYETHYLENE GLYCOL 3350 17 G: 17 POWDER, FOR SOLUTION ORAL at 08:07

## 2019-07-23 RX ADMIN — ASPIRIN 81 MG CHEWABLE TABLET 81 MG: 81 TABLET CHEWABLE at 08:07

## 2019-07-23 NOTE — PLAN OF CARE
Problem: Adult Inpatient Plan of Care  Goal: Plan of Care Review  Outcome: Ongoing (interventions implemented as appropriate)  Pt is AAOx4 in bed wearing non-skid footwear, bed in low/locked position and with call bell within reach. Pt reminded to use call bell to call for assistance, pt verbalizes understanding. Daughter at bedside. Pt is afebrile at this time. Proper hand hygiene performed before and after pt care activities. Right chest tube to -20 wall suction. SpO2 90-98% on 2L O2. Denies any pain or discomfort at this time.

## 2019-07-23 NOTE — ASSESSMENT & PLAN NOTE
Discussed pt's case with pt and family: pt has had 3 admissions related to PTX in the last 2 months. At this time they would like to continue aggressive therapy but discussed that there may come a time when pt and family no longer want to bring her to the hospital; they were receptive.     Discussed [place] with pt and family and clarified code status. Pt and family state that in the event of a cardiac arrest they would want pt to get CPR, cardioversion, and vasopressors if necessary; however, pt and family state that they would not want pt to be intubated. Reflected code status to update this but also advised that CPR without securing airway has limited utility; they expressed understanding.

## 2019-07-23 NOTE — PLAN OF CARE
Problem: Adult Inpatient Plan of Care  Goal: Plan of Care Review  Outcome: Ongoing (interventions implemented as appropriate)  Pt remains AAO x 4 with VSS throughout shift  No chief complaints at this time  Right chest tube intact; to water seal; dressing CDI  2 L NC; sats > 98%  Left arm PIV remains secure and intact  Pt up to chair and ambulated with PT  External catheter intact; I/O documented in flowsheets  Tele monitor remain in place  Family remains at bedside  Pt remains free from falls and injuries  Will continue to monitor

## 2019-07-23 NOTE — SUBJECTIVE & OBJECTIVE
Interval History/Significant Events: See Hospital Course. Feels well today.    Review of Systems   Constitutional: Negative for chills, diaphoresis and fever.   Respiratory: Positive for shortness of breath.    Cardiovascular: Negative for chest pain and leg swelling.   Gastrointestinal: Negative for abdominal pain.   Genitourinary: Negative for dysuria.   Neurological: Negative for dizziness and headaches.     Objective:     Vital Signs (Most Recent):  Temp: 97.7 °F (36.5 °C) (07/23/19 1555)  Pulse: 86 (07/23/19 1555)  Resp: 18 (07/23/19 1555)  BP: (!) 148/82 (07/23/19 1555)  SpO2: 95 % (07/23/19 1555) Vital Signs (24h Range):  Temp:  [97.4 °F (36.3 °C)-98.2 °F (36.8 °C)] 97.7 °F (36.5 °C)  Pulse:  [70-92] 86  Resp:  [17-38] 18  SpO2:  [89 %-99 %] 95 %  BP: (147-170)/(65-84) 148/82   Weight: 41 kg (90 lb 6.2 oz)  Body mass index is 17.65 kg/m².      Intake/Output Summary (Last 24 hours) at 7/23/2019 1745  Last data filed at 7/23/2019 1422  Gross per 24 hour   Intake 450 ml   Output 458 ml   Net -8 ml       Physical Exam   Constitutional: She is oriented to person, place, and time. She appears cachectic. She has a sickly appearance. Nasal cannula in place.   HENT:   Head: Normocephalic.   Cardiovascular: Normal rate and normal pulses.   Murmur heard.  Pulmonary/Chest: No accessory muscle usage. Tachypnea noted. No respiratory distress. She has decreased breath sounds. She has no wheezes. She has no rhonchi. She has rales in the right upper field, the right middle field and the right lower field.   Abdominal: Soft. Bowel sounds are normal. There is no tenderness.   Genitourinary:   Genitourinary Comments: purewick with clear yellow output   Neurological: She is oriented to person, place, and time. No cranial nerve deficit or sensory deficit. GCS eye subscore is 4. GCS verbal subscore is 5. GCS motor subscore is 6.   Drowsy, non focal   Skin: Skin is warm, dry and intact.   Nursing note and vitals  reviewed.    Significant Labs:  CBC/Anemia Profile:  Recent Labs   Lab 07/22/19 0317 07/23/19  0644   WBC 8.42  8.42 6.39   HGB 11.8*  11.8* 12.3   HCT 38.1  38.1 38.8   *  133* 129*   MCV 98  98 96   RDW 14.0  14.0 13.5        Chemistries:  Recent Labs   Lab 07/22/19 0317 07/23/19  0644     139 139   K 4.3  4.3 4.3   CL 92*  92* 95   CO2 40*  40* 37*   BUN 20  20 16   CREATININE 0.9  0.9 0.7   CALCIUM 9.1  9.1 9.5   ALBUMIN 2.7*  2.7* 2.6*   PROT 5.5*  5.5* 5.3*   BILITOT 0.4  0.4 0.6   ALKPHOS 74  74 76   ALT 14  14 12   AST 26  26 24   MG 2.2  2.2 2.1       All pertinent labs within the past 24 hours have been reviewed.    Significant Imaging:  I have reviewed and interpreted all pertinent imaging results/findings within the past 24 hours.

## 2019-07-23 NOTE — PT/OT/SLP EVAL
Occupational Therapy   Evaluation    Name: Venus Mayer  MRN: [id number]  Admitting Diagnosis:  Pneumothorax      Recommendations:     Discharge Recommendations: home with home health  Discharge Equipment Recommendations:     Barriers to discharge:       Assessment:     Venus Mayer is a 89 y.o. female with a medical diagnosis of Pneumothorax.  She presents with the following performance deficits affecting function:  .  Pt tolerated therapy evaluation well on this day. Pt is hard of hearing and is currently using a hearing aide on L side. Pt's information was obtained from her daughter, primary caregiver during this time. Pt values her independence and is motivated to return to [facility]. Since pt's hip fracture in March, she has been requiring increased assistanceto perform some ADLs and supervision for mobility. Pt's performance deficits, as well as her impulsivity and decreased safety awareness raise conerns for safety and high fall risk. Pt has a history of falls in the past. Pt would benefit to continue from skilled acute OT services to improve indep and safety with functional mobility and ADL performance.     Rehab Prognosis: Good; patient would benefit from acute skilled OT services to address these deficits and reach maximum level of function.       Plan:     Patient to be seen 3 x/week to address the above listed problems via self-care/home management, therapeutic activities, therapeutic exercises  · Plan of Care Expires: 08/23/19  · Plan of Care Reviewed with: patient, family    Subjective     Chief Complaint: tired     Patient/Family Comments/goals: to return home     Occupational Profile:  Living Environment: Pt lives alone in a [facility], [address] with B HR. Pt has a shower/tub combo with a TTB and a raised toilet seat. Pt's daughter and other family members have been staying with her at home for provide 24/7 supervision and assistance.   Previous level of function: (I) before Hip fracture in March however, since  then she needs assistance for safe mobility and ADL completion.   Roles and Routines: N/A  Equipment Used at Home:  shower chair, walker, rolling(elevated toilet seat and hip kit for dressing )  Assistance upon Discharge: Pt will have assistance from family     Pain/Comfort:       Patients cultural, spiritual, [employer]alism conflicts given the current situation:      Objective:     Communicated with: RN prior to session.  Patient found up in chair with   upon OT entry to room.    General Precautions: Standard, fall   Orthopedic Precautions:N/A   Braces: N/A     Occupational Performance:    Bed Mobility:    · Pt's daughter reported she needed some assistance to get OOB.     Functional Mobility/Transfers:  · Patient completed Sit <> Stand Transfer with stand by assistance  with  rolling walker   · Functional Mobility: Pt ambulated ~ 100' with CG/SBA and RW. Pt demo 1 LOB and decreased safety awareness requiring curing for RW management with transfers and to slow down.     Activities of Daily Living:  · Lower Body Dressing: Not attempted 2/2 to pt and pt's daughter reporting she must adhere to hip precautions until September. Pt owns hip kit at home but family normally provides assistance for LBD.   · Upper Body Dressing: Set up assistance to don gown seated around back. Pt able to indep doff gown standing.     Cognitive/Visual Perceptual:  Cognitive/Psychosocial Skills:     -       Oriented to: Person, Place, Time and Situation   -       Follows Commands/attention:Follows multistep  commands  -       Communication: clear/fluent  -       Memory: No Deficits noted  -       Safety awareness/insight to disability: intact   -       Mood/Affect/Coping skills/emotional control: Appropriate to situation and Withdrawn    Physical Exam:  Postural examination/scapula alignment:    -       Rounded shoulders  -       Forward head  Upper Extremity Range of Motion:     -       Right Upper Extremity: WFL  -       Left Upper Extremity:  WFL  Upper Extremity Strength:    -       Right Upper Extremity: WFL  -       Left Upper Extremity: WFL  Gross motor coordination:   WFL    AMPAC 6 Click ADL:  AMPAC Total Score:      Treatment & Education:  Pt/family du on POC and role of OT, and role of HHOT/PT  Pt edu on benefits of OOB and importance of calling for assistance before getting up    Education:    Patient left up in chair with all lines intact, RN notified and family present    GOALS:   Multidisciplinary Problems     Occupational Therapy Goals        Problem: Occupational Therapy Goal    Goal Priority Disciplines Outcome Interventions   Occupational Therapy Goal     OT, PT/OT Ongoing (interventions implemented as appropriate)    Description:  Goals to be met by: 8/6    Patient will increase functional independence with ADLs by performing:    UE Dressing with Set-up Assistance.  LE Dressing with Stand-by Assistance and AD as needed.  Grooming while standing with Supervision.  Toileting from toilet with Supervision for hygiene and clothing management.   Toilet transfer to toilet with Stand-by Assistance with RW.                      History:     Past Medical History:   Diagnosis Date    Acute on chronic congestive heart failure 7/6/2019    Cardiomyopathy     Carotid artery occlusion     COPD (chronic obstructive pulmonary disease)     Hyperlipidemia     Hypertension          Past Surgical History:   Procedure Laterality Date    CARDIAC CATHETERIZATION      CAROTID ENDARTERECTOMY         Time Tracking:     OT Date of Treatment:    OT Start Time:    OT Stop Time:    OT Total Time (min):      Billable Minutes:Evaluation 14    Stefan Park OT  7/23/2019

## 2019-07-23 NOTE — PLAN OF CARE
Problem: Occupational Therapy Goal  Goal: Occupational Therapy Goal  Goals to be met by: 8/6    Patient will increase functional independence with ADLs by performing:    UE Dressing with Set-up Assistance.  LE Dressing with Stand-by Assistance and AD as needed.  Grooming while standing with Supervision.  Toileting from toilet with Supervision for hygiene and clothing management.   Toilet transfer to toilet with Stand-by Assistance with RW.    Outcome: Ongoing (interventions implemented as appropriate)  DEMETRIA Renae

## 2019-07-23 NOTE — PROGRESS NOTES
[facility] Hospital Medicine  Progress Note    Patient Name: Venus Mayer  MRN: [id number]  Patient Class: IP- Inpatient   Admission Date: 7/20/2019  Length of Stay: 3 days  Attending Physician: Ezra Escobedo MD  Primary Care Provider: Jona Che MD    [facility] Medicine Team: [facility] MED 1 Yuriy Bang MD    Subjective:     Principal Problem:Pneumothorax      HPI:  Patient is a 89F with PMH of COPD (on 2.5 L home O2), 40 pack year smoking history (quit 30 years ago), recurrent spontaneous PTX, chronic dCHF, and HOCM s/p AICD, PVD/carotid artery stenosis s/p L CEA 08, HTN, HLD, and 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 When seen, the patient was resting comfortably in bed on 3 L O2 NC with her daughter, son (with whom she lives), and daughter in law at bedside. A R side chest tube was in place and the patient denied any dyspnea, though she endorsed pain at the site of chest tube insertion.     At OSH ED, O2 sats were 91% on home 2.5 L of O2 and no breath sounds were heard in the R upper and middle lung. CXR showed a R-sided PTX for which a 12f chest tube was placed. Patient's O2 sats improved to 100% and she reported immediate relief of dyspnea and discomfort. She reported chest pain at the site of insertion of chest tube and responded well to 500 mg of tylenol. BNP was 1340.    The patient's recurrent PTXs seem to have started during her admission on 05/22/2019 where she presented with a large R sided PTX requiring chest tube and pleurodesis on 5/27/2019. Since, she has had a small recurrence of PTX on 6/25/19 not requiring intervention, and admission to OSH 7/6-7/11 for acute on chronic dCHF and COPD exacerbation.    Per Dr Cardozo, Family was unsure whether they wanted her to be transferred to [facility] versus wanting her to go back to [facility] where she was admitted most recently  for the same issue.  After discussion with several family members they have decided they do want transferred to [facility] and I have initiated contacting the regional transfer center    Overview/Hospital Course:  07/21/2019 Chest tube was clotted with blood today, ~50 mL of dried blood in canister. Pulmonology consulted and will see pt today. CXR showed near complete resolution of R sided PTX. Satting 93% on O2 of 2 L NC in AM. In PM pt became acutely hypoxic requiring increased O2 requirements. Assessed by MICU with chest tube found to have been dislodged; pneumothorax reaccumulated. Pt was transferred to MICU with new chest tube placed.  07/22/2019 Re-expansion of R lung on CXR. CTS consulted advised water seal + clamping trials starting 07/23. Stepped down in PM on home O2 2L NC.  07/23/2019 Feels better today. At rest feels that she is near her baseline but notices exertional dyspnea. 2L NC. Water seal trial started, no recurrence of PTX. Pulm to see pt 07/24 AM.    Interval History/Significant Events: See Hospital Course. Feels well today.    Review of Systems   Constitutional: Negative for chills, diaphoresis and fever.   Respiratory: Positive for shortness of breath.    Cardiovascular: Negative for chest pain and leg swelling.   Gastrointestinal: Negative for abdominal pain.   Genitourinary: Negative for dysuria.   Neurological: Negative for dizziness and headaches.     Objective:     Vital Signs (Most Recent):  Temp: 97.7 °F (36.5 °C) (07/23/19 1555)  Pulse: 86 (07/23/19 1555)  Resp: 18 (07/23/19 1555)  BP: (!) 148/82 (07/23/19 1555)  SpO2: 95 % (07/23/19 1555) Vital Signs (24h Range):  Temp:  [97.4 °F (36.3 °C)-98.2 °F (36.8 °C)] 97.7 °F (36.5 °C)  Pulse:  [70-92] 86  Resp:  [17-38] 18  SpO2:  [89 %-99 %] 95 %  BP: (147-170)/(65-84) 148/82   Weight: 41 kg (90 lb 6.2 oz)  Body mass index is 17.65 kg/m².      Intake/Output Summary (Last 24 hours) at 7/23/2019 3855  Last data filed at 7/23/2019 1422  Gross  per 24 hour   Intake 450 ml   Output 458 ml   Net -8 ml       Physical Exam   Constitutional: She is oriented to person, place, and time. She appears cachectic. She has a sickly appearance. Nasal cannula in place.   HENT:   Head: Normocephalic.   Cardiovascular: Normal rate and normal pulses.   Murmur heard.  Pulmonary/Chest: No accessory muscle usage. Tachypnea noted. No respiratory distress. She has decreased breath sounds. She has no wheezes. She has no rhonchi. She has rales in the right upper field, the right middle field and the right lower field.   Abdominal: Soft. Bowel sounds are normal. There is no tenderness.   Genitourinary:   Genitourinary Comments: purewick with clear yellow output   Neurological: She is oriented to person, place, and time. No cranial nerve deficit or sensory deficit. GCS eye subscore is 4. GCS verbal subscore is 5. GCS motor subscore is 6.   Drowsy, non focal   Skin: Skin is warm, dry and intact.   Nursing note and vitals reviewed.    Significant Labs:  CBC/Anemia Profile:  Recent Labs   Lab 07/22/19  0317 07/23/19  0644   WBC 8.42  8.42 6.39   HGB 11.8*  11.8* 12.3   HCT 38.1  38.1 38.8   *  133* 129*   MCV 98  98 96   RDW 14.0  14.0 13.5        Chemistries:  Recent Labs   Lab 07/22/19  0317 07/23/19  0644     139 139   K 4.3  4.3 4.3   CL 92*  92* 95   CO2 40*  40* 37*   BUN 20  20 16   CREATININE 0.9  0.9 0.7   CALCIUM 9.1  9.1 9.5   ALBUMIN 2.7*  2.7* 2.6*   PROT 5.5*  5.5* 5.3*   BILITOT 0.4  0.4 0.6   ALKPHOS 74  74 76   ALT 14  14 12   AST 26  26 24   MG 2.2  2.2 2.1       All pertinent labs within the past 24 hours have been reviewed.    Significant Imaging:  I have reviewed and interpreted all pertinent imaging results/findings within the past 24 hours.      Assessment/Plan:      * Pneumothorax  89F with h/o COPD, 40 pack year smoking hx, and chronic dCHF who was transferred from OSH for R sided PTX s/p chest tube satting 99% on 3 L O2 NC.  Dislodgement 07/21 PM with reaccumulation of PTX, re-expansion of lung after replacement of chest tube. Stepped down 07/22 PM, 07/23 AM started water seal trial    - Titrate O2 NC to maintain >88% O2 sats  - Continue home tiotropium, fluticasone, and rescue albuterol   - CTS consulted recs appreciated - advised water seal and clamping trials with pleurodesis if failed  - Water seal 07/23 with clamping trial 07/24; will be seen by Pulm 07/24 AM.    Goals of care, counseling/discussion  Discussed pt's case with pt and family: pt has had 3 admissions related to PTX in the last 2 months. At this time they would like to continue aggressive therapy but discussed that there may come a time when pt and family no longer want to bring her to the hospital; they were receptive.     Discussed LaPOST with pt and family and clarified code status. Pt and family state that in the event of a cardiac arrest they would want pt to get CPR, cardioversion, and vasopressors if necessary; however, pt and family state that they would not want pt to be intubated. Reflected code status to update this but also advised that CPR without securing airway has limited utility; they expressed understanding.    Restless leg syndrome  - Continue home ropinirole 0.25 mg      Iron deficiency  Cont home ferrous sulfate 325       Debility  Deconditioned state, pt reports progressive fatigue over the past several months    - PT/OT eval and treat. Appreciate help  - Scheduled bowel regimen (polyethylene glycol 17g)      Chronic obstructive pulmonary disease with acute exacerbation  - See Pneumothorax    Heart failure, diastolic  - Continue home furosemide 40 mg      Essential hypertension  - Continue home prn amlodipine 2.5 mg if SBP >155      Carotid artery stenosis:Asx; S/P L. CEA 2008 with restenosis.  Cont ASA 81        VTE Risk Mitigation (From admission, onward)        Ordered     enoxaparin injection 30 mg  Daily      07/21/19 1002     IP VTE HIGH RISK  PATIENT  Once      07/20/19 2005                Yuriy Bang MD  Department of Hospital Medicine   [facility]

## 2019-07-23 NOTE — SIGNIFICANT EVENT
Called to bed by nursing regarding patient's code status. Discussed code status with patient and she does not have the ability to make her own decisions based on my assessment. Discussed case with patient's son Arthur Miller (POA) and his desire is to have her be full code at this time. Family plans to meet with patient tomorrow and make final decision. Code status changed based on POA wishes for patient.     Klever Rodriguez M.D. PGY-3  [facility]  8:30 PM  7/22/2019  Pager [phone]

## 2019-07-23 NOTE — NURSING TRANSFER
Nursing Transfer Note      7/22/2019     Transfer from [facility] room 6071 to TSU room 84035    Transfer via bed    Transfer with oxygen, chest tube, tele monitoring    Transported by PCT and RN    Medicines sent: yes    Chart send with patient: yes    Notified: family, nurse     Upon arrival to floor: {patient oriented to new room. TSU RN at bedside

## 2019-07-23 NOTE — PLAN OF CARE
Problem: Physical Therapy Goal  Goal: Physical Therapy Goal  Goals to be met by: 2019     Patient will increase functional independence with mobility by performin. Supine to sit with Modified [place]  2. Sit to supine with Modified [place]  3. Sit to stand transfer with Supervision  4. Bed to chair transfer with Supervision using Rolling Walker  5. Gait  x 250 feet with Supervision using Rolling Walker.   6. Lower extremity exercise program x15 reps per handout, with supervision    Outcome: Ongoing (interventions implemented as appropriate)  Goals set

## 2019-07-23 NOTE — PT/OT/SLP EVAL
Physical Therapy Evaluation    Patient Name:  Venus Mayer   MRN:  [id number]    Recommendations:     Discharge Recommendations:  outpatient PT   Discharge Equipment Recommendations: none   Barriers to discharge: None    Assessment:     Venus Mayer is a 89 y.o. female admitted with a medical diagnosis of Pneumothorax.  She presents with the following impairments/functional limitations:  weakness, impaired endurance, impaired functional mobilty, gait instability, impaired balance, impaired cardiopulmonary response to activity, decreased safety awareness Pt. cooperative and tolerated treatment well.    Rehab Prognosis: Good; patient would benefit from acute skilled PT services to address these deficits and reach maximum level of function.    Recent Surgery: * No surgery found *      Plan:     During this hospitalization, patient to be seen 3 x/week to address the identified rehab impairments via gait training, therapeutic activities, therapeutic exercises and progress toward the following goals:    · Plan of Care Expires:  08/22/19    Subjective     Chief Complaint: weakness  Patient/Family Comments/goals: to go home  Pain/Comfort:  · Pain Rating 1: 0/10  · Pain Rating Post-Intervention 1: 0/10    Patients cultural, spiritual, Oriental orthodox conflicts given the current situation: no    Living Environment:  Pt lives alone in [place] [facility] with 4 ALEKSANDR and B handrails. Pt has 4 children all of which live within 30 min; pt has been having someone stay with her since hip fracture.  Prior to admission, patients level of function was mod. indep. with RW.  Equipment used at home: walker, rolling, cane, straight, bedside commode, oxygen, shower chair.  Upon discharge, patient will have assistance from her children and sitters.    Objective:     Communicated with nursing prior to session.  Patient found up in chair with chest tube, PureWick, peripheral IV, oxygen, telemetry  upon PT entry to room.    General Precautions: Standard, fall,  hearing impaired   Orthopedic Precautions:N/A   Braces:       Exams:  · RUE ROM: WFL  · RUE Strength: WFL  · LUE ROM: WFL  · LUE Strength: WFL  · RLE ROM: WFL  · RLE Strength: WFL  · LLE ROM: WFL  · LLE Strength: WFL    Functional Mobility:  · Transfers:     · Sit to Stand:  contact guard assistance with rolling walker  · Bed to Chair: contact guard assistance with  rolling walker  using  Stand Pivot  · Gait: 200' with RW and CGA-Min A due to LOB x1 posteriorly. Pt. amb. with portable oxygen @ 3L.  · Balance: fair-      Therapeutic Activities and Exercises:   Discussed therapy needs, goals, and POC.    AM-PAC 6 CLICK MOBILITY  Total Score:18     Patient left up in chair with all lines intact and call button in reach.    GOALS:   Multidisciplinary Problems     Physical Therapy Goals        Problem: Physical Therapy Goal    Goal Priority Disciplines Outcome Goal Variances Interventions   Physical Therapy Goal     PT, PT/OT Ongoing (interventions implemented as appropriate)     Description:  Goals to be met by: 2019     Patient will increase functional independence with mobility by performin. Supine to sit with Modified [place]  2. Sit to supine with Modified [place]  3. Sit to stand transfer with Supervision  4. Bed to chair transfer with Supervision using Rolling Walker  5. Gait  x 250 feet with Supervision using Rolling Walker.   6. Lower extremity exercise program x15 reps per handout, with supervision                      History:     Past Medical History:   Diagnosis Date    Acute on chronic congestive heart failure 2019    Cardiomyopathy     Carotid artery occlusion     COPD (chronic obstructive pulmonary disease)     Hyperlipidemia     Hypertension        Past Surgical History:   Procedure Laterality Date    CARDIAC CATHETERIZATION      CAROTID ENDARTERECTOMY         Time Tracking:     PT Received On: 19  PT Start Time: 1032     PT Stop Time: 1047  PT Total Time (min): 15  min     Billable Minutes: Evaluation 15      Juan Luis Ahumada, PT  07/23/2019

## 2019-07-24 LAB
ALBUMIN SERPL BCP-MCNC: 2.6 G/DL (ref 3.5–5.2)
ALP SERPL-CCNC: 75 U/L (ref 55–135)
ALT SERPL W/O P-5'-P-CCNC: 12 U/L (ref 10–44)
ANION GAP SERPL CALC-SCNC: 8 MMOL/L (ref 8–16)
AST SERPL-CCNC: 23 U/L (ref 10–40)
BASOPHILS # BLD AUTO: 0.03 K/UL (ref 0–0.2)
BASOPHILS NFR BLD: 0.5 % (ref 0–1.9)
BILIRUB SERPL-MCNC: 0.5 MG/DL (ref 0.1–1)
BUN SERPL-MCNC: 17 MG/DL (ref 8–23)
CALCIUM SERPL-MCNC: 9.8 MG/DL (ref 8.7–10.5)
CHLORIDE SERPL-SCNC: 93 MMOL/L (ref 95–110)
CO2 SERPL-SCNC: 37 MMOL/L (ref 23–29)
CREAT SERPL-MCNC: 0.8 MG/DL (ref 0.5–1.4)
DIFFERENTIAL METHOD: ABNORMAL
EOSINOPHIL # BLD AUTO: 0.4 K/UL (ref 0–0.5)
EOSINOPHIL NFR BLD: 7.4 % (ref 0–8)
ERYTHROCYTE [DISTWIDTH] IN BLOOD BY AUTOMATED COUNT: 13.6 % (ref 11.5–14.5)
EST. GFR  (AFRICAN AMERICAN): >60 ML/MIN/1.73 M^2
EST. GFR  (NON AFRICAN AMERICAN): >60 ML/MIN/1.73 M^2
GLUCOSE SERPL-MCNC: 95 MG/DL (ref 70–110)
HCT VFR BLD AUTO: 38 % (ref 37–48.5)
HGB BLD-MCNC: 12.1 G/DL (ref 12–16)
IMM GRANULOCYTES # BLD AUTO: 0.01 K/UL (ref 0–0.04)
IMM GRANULOCYTES NFR BLD AUTO: 0.2 % (ref 0–0.5)
LYMPHOCYTES # BLD AUTO: 1.1 K/UL (ref 1–4.8)
LYMPHOCYTES NFR BLD: 18.6 % (ref 18–48)
MAGNESIUM SERPL-MCNC: 2.1 MG/DL (ref 1.6–2.6)
MCH RBC QN AUTO: 30.3 PG (ref 27–31)
MCHC RBC AUTO-ENTMCNC: 31.8 G/DL (ref 32–36)
MCV RBC AUTO: 95 FL (ref 82–98)
MONOCYTES # BLD AUTO: 0.7 K/UL (ref 0.3–1)
MONOCYTES NFR BLD: 12 % (ref 4–15)
NEUTROPHILS # BLD AUTO: 3.6 K/UL (ref 1.8–7.7)
NEUTROPHILS NFR BLD: 61.3 % (ref 38–73)
NRBC BLD-RTO: 0 /100 WBC
PLATELET # BLD AUTO: 129 K/UL (ref 150–350)
PMV BLD AUTO: 10.5 FL (ref 9.2–12.9)
POTASSIUM SERPL-SCNC: 4 MMOL/L (ref 3.5–5.1)
PROT SERPL-MCNC: 5.3 G/DL (ref 6–8.4)
RBC # BLD AUTO: 4 M/UL (ref 4–5.4)
SODIUM SERPL-SCNC: 138 MMOL/L (ref 136–145)
WBC # BLD AUTO: 5.82 K/UL (ref 3.9–12.7)

## 2019-07-24 PROCEDURE — 80053 COMPREHEN METABOLIC PANEL: CPT

## 2019-07-24 PROCEDURE — 25000242 PHARM REV CODE 250 ALT 637 W/ HCPCS: Performed by: STUDENT IN AN ORGANIZED HEALTH CARE EDUCATION/TRAINING PROGRAM

## 2019-07-24 PROCEDURE — 63600175 PHARM REV CODE 636 W HCPCS: Performed by: HOSPITALIST

## 2019-07-24 PROCEDURE — 83735 ASSAY OF MAGNESIUM: CPT

## 2019-07-24 PROCEDURE — 27000221 HC OXYGEN, UP TO 24 HOURS

## 2019-07-24 PROCEDURE — 36415 COLL VENOUS BLD VENIPUNCTURE: CPT

## 2019-07-24 PROCEDURE — 99232 SBSQ HOSP IP/OBS MODERATE 35: CPT | Mod: GC,,, | Performed by: HOSPITALIST

## 2019-07-24 PROCEDURE — 20600001 HC STEP DOWN PRIVATE ROOM

## 2019-07-24 PROCEDURE — 25000003 PHARM REV CODE 250: Performed by: STUDENT IN AN ORGANIZED HEALTH CARE EDUCATION/TRAINING PROGRAM

## 2019-07-24 PROCEDURE — 85025 COMPLETE CBC W/AUTO DIFF WBC: CPT

## 2019-07-24 PROCEDURE — 99232 PR SUBSEQUENT HOSPITAL CARE,LEVL II: ICD-10-PCS | Mod: GC,,, | Performed by: HOSPITALIST

## 2019-07-24 PROCEDURE — 99232 SBSQ HOSP IP/OBS MODERATE 35: CPT | Mod: GC,,, | Performed by: INTERNAL MEDICINE

## 2019-07-24 PROCEDURE — 99232 PR SUBSEQUENT HOSPITAL CARE,LEVL II: ICD-10-PCS | Mod: GC,,, | Performed by: INTERNAL MEDICINE

## 2019-07-24 RX ORDER — POLYETHYLENE GLYCOL 3350 17 G/17G
17 POWDER, FOR SOLUTION ORAL DAILY
Qty: 30 EACH | Refills: 0 | Status: ON HOLD | OUTPATIENT
Start: 2019-07-25 | End: 2019-07-29

## 2019-07-24 RX ADMIN — ROPINIROLE HYDROCHLORIDE 0.25 MG: 0.25 TABLET, FILM COATED ORAL at 07:07

## 2019-07-24 RX ADMIN — FERROUS SULFATE TAB EC 325 MG (65 MG FE EQUIVALENT) 325 MG: 325 (65 FE) TABLET DELAYED RESPONSE at 09:07

## 2019-07-24 RX ADMIN — THERA TABS 1 TABLET: TAB at 09:07

## 2019-07-24 RX ADMIN — ENOXAPARIN SODIUM 30 MG: 100 INJECTION SUBCUTANEOUS at 05:07

## 2019-07-24 RX ADMIN — ASPIRIN 81 MG CHEWABLE TABLET 81 MG: 81 TABLET CHEWABLE at 09:07

## 2019-07-24 RX ADMIN — TIOTROPIUM BROMIDE 18 MCG: 18 CAPSULE ORAL; RESPIRATORY (INHALATION) at 09:07

## 2019-07-24 RX ADMIN — FLUTICASONE PROPIONATE 50 MCG: 50 SPRAY, METERED NASAL at 11:07

## 2019-07-24 RX ADMIN — FUROSEMIDE 40 MG: 40 TABLET ORAL at 09:07

## 2019-07-24 NOTE — PLAN OF CARE
Chest tube on water seal overnight. Minimal tidaling noted in the tube. No air leak with deep breathing and cough. Good AE bilaterally and no residual pneumothorax on CXR this AM.     Clamped tube at 0800. Will repeat CXR in 1-2 hours. Plan to remove chest tube later today if no signs of decompensation or enlarging pneumo.      Ferdinand Ryan MD  [facility] Pulmonary and Critical Care Fellow  Pager: [phone]  Cell: [phone]

## 2019-07-24 NOTE — PLAN OF CARE
Problem: Adult Inpatient Plan of Care  Goal: Plan of Care Review  Outcome: Ongoing (interventions implemented as appropriate)  Chest clamped by Dr. Ryan from pulmonary this am. PCXR done after. Plan to keep chest tube clamped for now. Dressing remains dry and intact. Lungs with crackles bilat. Denies SOB. Pt has ambulated to BR and sat in chair this am. Tolerated well. Daughter at BS. Afebrile. Skin intact. Purewick in use. Reinforced to wear non-skid socks when ambulating to prevent falling. Verbalized understanding.

## 2019-07-25 LAB
ALBUMIN SERPL BCP-MCNC: 2.6 G/DL (ref 3.5–5.2)
ALP SERPL-CCNC: 80 U/L (ref 55–135)
ALT SERPL W/O P-5'-P-CCNC: 14 U/L (ref 10–44)
ANION GAP SERPL CALC-SCNC: 6 MMOL/L (ref 8–16)
AST SERPL-CCNC: 26 U/L (ref 10–40)
BASOPHILS # BLD AUTO: 0.05 K/UL (ref 0–0.2)
BASOPHILS NFR BLD: 0.7 % (ref 0–1.9)
BILIRUB SERPL-MCNC: 0.4 MG/DL (ref 0.1–1)
BUN SERPL-MCNC: 19 MG/DL (ref 8–23)
CALCIUM SERPL-MCNC: 9.5 MG/DL (ref 8.7–10.5)
CHLORIDE SERPL-SCNC: 93 MMOL/L (ref 95–110)
CO2 SERPL-SCNC: 38 MMOL/L (ref 23–29)
CREAT SERPL-MCNC: 0.7 MG/DL (ref 0.5–1.4)
DIFFERENTIAL METHOD: ABNORMAL
EOSINOPHIL # BLD AUTO: 0.7 K/UL (ref 0–0.5)
EOSINOPHIL NFR BLD: 10.2 % (ref 0–8)
ERYTHROCYTE [DISTWIDTH] IN BLOOD BY AUTOMATED COUNT: 13.8 % (ref 11.5–14.5)
EST. GFR  (AFRICAN AMERICAN): >60 ML/MIN/1.73 M^2
EST. GFR  (NON AFRICAN AMERICAN): >60 ML/MIN/1.73 M^2
GLUCOSE SERPL-MCNC: 97 MG/DL (ref 70–110)
HCT VFR BLD AUTO: 36 % (ref 37–48.5)
HGB BLD-MCNC: 11.6 G/DL (ref 12–16)
IMM GRANULOCYTES # BLD AUTO: 0.03 K/UL (ref 0–0.04)
IMM GRANULOCYTES NFR BLD AUTO: 0.4 % (ref 0–0.5)
LYMPHOCYTES # BLD AUTO: 1.3 K/UL (ref 1–4.8)
LYMPHOCYTES NFR BLD: 17.8 % (ref 18–48)
MAGNESIUM SERPL-MCNC: 2.2 MG/DL (ref 1.6–2.6)
MCH RBC QN AUTO: 30.3 PG (ref 27–31)
MCHC RBC AUTO-ENTMCNC: 32.2 G/DL (ref 32–36)
MCV RBC AUTO: 94 FL (ref 82–98)
MONOCYTES # BLD AUTO: 1 K/UL (ref 0.3–1)
MONOCYTES NFR BLD: 13.9 % (ref 4–15)
NEUTROPHILS # BLD AUTO: 4 K/UL (ref 1.8–7.7)
NEUTROPHILS NFR BLD: 57 % (ref 38–73)
NRBC BLD-RTO: 0 /100 WBC
PLATELET # BLD AUTO: 140 K/UL (ref 150–350)
PMV BLD AUTO: 10.7 FL (ref 9.2–12.9)
POTASSIUM SERPL-SCNC: 3.7 MMOL/L (ref 3.5–5.1)
PROT SERPL-MCNC: 5.3 G/DL (ref 6–8.4)
RBC # BLD AUTO: 3.83 M/UL (ref 4–5.4)
SODIUM SERPL-SCNC: 137 MMOL/L (ref 136–145)
WBC # BLD AUTO: 7.04 K/UL (ref 3.9–12.7)

## 2019-07-25 PROCEDURE — 27000221 HC OXYGEN, UP TO 24 HOURS

## 2019-07-25 PROCEDURE — 63600175 PHARM REV CODE 636 W HCPCS: Performed by: HOSPITALIST

## 2019-07-25 PROCEDURE — 85025 COMPLETE CBC W/AUTO DIFF WBC: CPT

## 2019-07-25 PROCEDURE — 94640 AIRWAY INHALATION TREATMENT: CPT

## 2019-07-25 PROCEDURE — 80053 COMPREHEN METABOLIC PANEL: CPT

## 2019-07-25 PROCEDURE — 25000003 PHARM REV CODE 250: Performed by: STUDENT IN AN ORGANIZED HEALTH CARE EDUCATION/TRAINING PROGRAM

## 2019-07-25 PROCEDURE — 99232 PR SUBSEQUENT HOSPITAL CARE,LEVL II: ICD-10-PCS | Mod: GC,,, | Performed by: INTERNAL MEDICINE

## 2019-07-25 PROCEDURE — 83735 ASSAY OF MAGNESIUM: CPT

## 2019-07-25 PROCEDURE — 99232 SBSQ HOSP IP/OBS MODERATE 35: CPT | Mod: GC,,, | Performed by: HOSPITALIST

## 2019-07-25 PROCEDURE — 36415 COLL VENOUS BLD VENIPUNCTURE: CPT

## 2019-07-25 PROCEDURE — 99232 PR SUBSEQUENT HOSPITAL CARE,LEVL II: ICD-10-PCS | Mod: GC,,, | Performed by: HOSPITALIST

## 2019-07-25 PROCEDURE — 99232 SBSQ HOSP IP/OBS MODERATE 35: CPT | Mod: GC,,, | Performed by: INTERNAL MEDICINE

## 2019-07-25 PROCEDURE — 25000242 PHARM REV CODE 250 ALT 637 W/ HCPCS: Performed by: STUDENT IN AN ORGANIZED HEALTH CARE EDUCATION/TRAINING PROGRAM

## 2019-07-25 PROCEDURE — 20600001 HC STEP DOWN PRIVATE ROOM

## 2019-07-25 PROCEDURE — 94761 N-INVAS EAR/PLS OXIMETRY MLT: CPT

## 2019-07-25 PROCEDURE — 25000242 PHARM REV CODE 250 ALT 637 W/ HCPCS: Performed by: HOSPITALIST

## 2019-07-25 RX ORDER — IPRATROPIUM BROMIDE AND ALBUTEROL SULFATE 2.5; .5 MG/3ML; MG/3ML
SOLUTION RESPIRATORY (INHALATION)
Status: DISPENSED
Start: 2019-07-25 | End: 2019-07-25

## 2019-07-25 RX ORDER — IPRATROPIUM BROMIDE AND ALBUTEROL SULFATE 2.5; .5 MG/3ML; MG/3ML
3 SOLUTION RESPIRATORY (INHALATION) ONCE
Status: COMPLETED | OUTPATIENT
Start: 2019-07-25 | End: 2019-07-25

## 2019-07-25 RX ADMIN — THERA TABS 1 TABLET: TAB at 08:07

## 2019-07-25 RX ADMIN — FERROUS SULFATE TAB EC 325 MG (65 MG FE EQUIVALENT) 325 MG: 325 (65 FE) TABLET DELAYED RESPONSE at 08:07

## 2019-07-25 RX ADMIN — FUROSEMIDE 40 MG: 40 TABLET ORAL at 08:07

## 2019-07-25 RX ADMIN — TIOTROPIUM BROMIDE 18 MCG: 18 CAPSULE ORAL; RESPIRATORY (INHALATION) at 09:07

## 2019-07-25 RX ADMIN — FLUTICASONE PROPIONATE 50 MCG: 50 SPRAY, METERED NASAL at 08:07

## 2019-07-25 RX ADMIN — ASPIRIN 81 MG CHEWABLE TABLET 81 MG: 81 TABLET CHEWABLE at 08:07

## 2019-07-25 RX ADMIN — ENOXAPARIN SODIUM 30 MG: 100 INJECTION SUBCUTANEOUS at 07:07

## 2019-07-25 RX ADMIN — IPRATROPIUM BROMIDE AND ALBUTEROL SULFATE 3 ML: .5; 3 SOLUTION RESPIRATORY (INHALATION) at 10:07

## 2019-07-25 RX ADMIN — ACETAMINOPHEN 650 MG: 325 TABLET ORAL at 11:07

## 2019-07-25 RX ADMIN — ACETAMINOPHEN 650 MG: 325 TABLET ORAL at 10:07

## 2019-07-25 RX ADMIN — ROPINIROLE HYDROCHLORIDE 0.25 MG: 0.25 TABLET, FILM COATED ORAL at 07:07

## 2019-07-25 NOTE — PROGRESS NOTES
[facility] Hospital Medicine  Progress Note    Patient Name: Venus Mayer  MRN: [id number]  Patient Class: IP- Inpatient   Admission Date: 7/20/2019  Length of Stay: 4 days  Attending Physician: Ezra Escobedo MD  Primary Care Provider: Jona Che MD    [facility] Medicine Team: [facility] MED 1 Aristeo Aldana MD    Subjective:     Principal Problem:Pneumothorax      HPI:  Patient is a 89F with PMH of COPD (on 2.5 L home O2), 40 pack year smoking history (quit 30 years ago), recurrent spontaneous PTX, chronic dCHF, and HOCM s/p AICD, PVD/carotid artery stenosis s/p L CEA 08, HTN, HLD, and 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 When seen, the patient was resting comfortably in bed on 3 L O2 NC with her daughter, son (with whom she lives), and daughter in law at bedside. A R side chest tube was in place and the patient denied any dyspnea, though she endorsed pain at the site of chest tube insertion.     At OSH ED, O2 sats were 91% on home 2.5 L of O2 and no breath sounds were heard in the R upper and middle lung. CXR showed a R-sided PTX for which a 12f chest tube was placed. Patient's O2 sats improved to 100% and she reported immediate relief of dyspnea and discomfort. She reported chest pain at the site of insertion of chest tube and responded well to 500 mg of tylenol. BNP was 1340.    The patient's recurrent PTXs seem to have started during her admission on 05/22/2019 where she presented with a large R sided PTX requiring chest tube and pleurodesis on 5/27/2019. Since, she has had a small recurrence of PTX on 6/25/19 not requiring intervention, and admission to OSH 7/6-7/11 for acute on chronic dCHF and COPD exacerbation.    Per Dr Cardozo, Family was unsure whether they wanted her to be transferred to [facility] versus wanting her to go back to [facility] where she was admitted most recently  for the same issue.  After discussion with several family members they have decided they do want transferred to [facility] and I have initiated contacting the regional transfer center    Overview/Hospital Course:  07/21/2019 Chest tube was clotted with blood today, ~50 mL of dried blood in canister. Pulmonology consulted and will see pt today. CXR showed near complete resolution of R sided PTX. Satting 93% on O2 of 2 L NC in AM. In PM pt became acutely hypoxic requiring increased O2 requirements. Assessed by MICU with chest tube found to have been dislodged; pneumothorax reaccumulated. Pt was transferred to MICU with new chest tube placed.  07/22/2019 Re-expansion of R lung on CXR. CTS consulted advised water seal + clamping trials starting 07/23. Stepped down in PM on home O2 2L NC.  07/23/2019 Feels better today. At rest feels that she is near her baseline but notices exertional dyspnea. 2L NC. Water seal trial started, no recurrence of PTX. Pulm to see pt 07/24 AM.  07/24/2019 Chest tube clamped today, pt tolerating well with no increased work of breathing or dyspnea. Pt c/o occasional dyspnea with solids and liquids. Daughter reports a barium swallow study done at [facility] showed esophageal spasms, not currently treated. Diet changed to mechanical soft. Pt and daughter at bedside are willing to see palliative care physician on outpatient basis after discharge.    Interval History/Significant Events: See Hospital Course. Feels well today.    Review of Systems   Constitutional: Negative for chills, diaphoresis and fever.   Respiratory: Positive for shortness of breath. Negative for cough.    Cardiovascular: Negative for chest pain and leg swelling.   Gastrointestinal: Negative for abdominal pain.        Reports occasional dysphagia to solids and liquids     Genitourinary: Negative for dysuria.   Neurological: Negative for dizziness and headaches.     Objective:     Vital Signs (Most Recent):  Temp: 98.1  °F (36.7 °C) (07/24/19 1738)  Pulse: 93 (07/24/19 1900)  Resp: 20 (07/24/19 1738)  BP: 139/75 (07/24/19 1738)  SpO2: 96 % (07/24/19 1738) Vital Signs (24h Range):  Temp:  [97.9 °F (36.6 °C)-98.5 °F (36.9 °C)] 98.1 °F (36.7 °C)  Pulse:  [] 93  Resp:  [14-20] 20  SpO2:  [94 %-96 %] 96 %  BP: (135-169)/(62-88) 139/75   Weight: 41 kg (90 lb 6.2 oz)  Body mass index is 17.65 kg/m².      Intake/Output Summary (Last 24 hours) at 7/24/2019 1947  Last data filed at 7/24/2019 1820  Gross per 24 hour   Intake 600 ml   Output 315 ml   Net 285 ml       Physical Exam   Constitutional: She is oriented to person, place, and time. She appears cachectic. She does not have a sickly appearance. Nasal cannula in place.   HENT:   Head: Normocephalic.   Cardiovascular: Normal rate and normal pulses.   No murmur heard.  Pulmonary/Chest: No accessory muscle usage. No tachypnea. No respiratory distress. She has decreased breath sounds. She has no wheezes. She has no rhonchi. She has rales in the right upper field, the right middle field and the right lower field.   Abdominal: Soft. Bowel sounds are normal. There is no tenderness.   Genitourinary:   Genitourinary Comments: purewick with clear yellow output   Neurological: She is oriented to person, place, and time. No cranial nerve deficit or sensory deficit. GCS eye subscore is 4. GCS verbal subscore is 5. GCS motor subscore is 6.   Drowsy, non focal   Skin: Skin is warm, dry and intact.   Nursing note and vitals reviewed.    Significant Labs:  CBC/Anemia Profile:  Recent Labs   Lab 07/23/19  0644 07/24/19  0626   WBC 6.39 5.82   HGB 12.3 12.1   HCT 38.8 38.0   * 129*   MCV 96 95   RDW 13.5 13.6        Chemistries:  Recent Labs   Lab 07/23/19  0644 07/24/19  0626    138   K 4.3 4.0   CL 95 93*   CO2 37* 37*   BUN 16 17   CREATININE 0.7 0.8   CALCIUM 9.5 9.8   ALBUMIN 2.6* 2.6*   PROT 5.3* 5.3*   BILITOT 0.6 0.5   ALKPHOS 76 75   ALT 12 12   AST 24 23   MG 2.1 2.1       All  pertinent labs within the past 24 hours have been reviewed.    Significant Imaging:  I have reviewed and interpreted all pertinent imaging results/findings within the past 24 hours.      Assessment/Plan:      * Pneumothorax  89F with h/o COPD, 40 pack year smoking hx, and chronic dCHF who was transferred from OSH for R sided PTX s/p chest tube satting 99% on 3 L O2 NC. Dislodgement 07/21 PM with reaccumulation of PTX, re-expansion of lung after replacement of chest tube. Stepped down 07/22 PM, 07/23 AM started water seal trial    - Titrate O2 NC to maintain >88% O2 sats  - Continue home tiotropium, fluticasone, and rescue albuterol   - CTS consulted recs appreciated - advised water seal and clamping trials with pleurodesis if failed  - Water seal 07/23 with clamping trial 07/24; Pt tolerated clamping trial well. Possible removal of CT on 07/25 per pulmonology     Goals of care, counseling/discussion  Discussed pt's case with pt and family: pt has had 3 admissions related to PTX in the last 2 months. At this time they would like to continue aggressive therapy but discussed that there may come a time when pt and family no longer want to bring her to the hospital; they were receptive.     Discussed LaPOST with pt and family and clarified code status. Pt and family state that in the event of a cardiac arrest they would want pt to get CPR, cardioversion, and vasopressors if necessary; however, pt and family state that they would not want pt to be intubated. Reflected code status to update this but also advised that CPR without securing airway has limited utility; they expressed understanding.    Restless leg syndrome  - Continue home ropinirole 0.25 mg      Iron deficiency  Cont home ferrous sulfate 325       Debility  Deconditioned state, pt reports progressive fatigue over the past several months    - PT/OT eval and treat. Appreciate help  - Scheduled bowel regimen (polyethylene glycol 17g)  - Pt to see palliative care  on outpatient basis      Chronic obstructive pulmonary disease with acute exacerbation  - See Pneumothorax    Heart failure, diastolic  - Continue home furosemide 40 mg      Essential hypertension  BP well-controlled on no anti-hypertensives    Carotid artery stenosis:Asx; S/P L. CEA 2008 with restenosis.  Cont ASA 81        VTE Risk Mitigation (From admission, onward)        Ordered     enoxaparin injection 30 mg  Daily      07/21/19 1002     IP VTE HIGH RISK PATIENT  Once      07/20/19 2005                Aristeo Aldana MD  Department of Hospital Medicine   [facility]

## 2019-07-25 NOTE — ASSESSMENT & PLAN NOTE
- removed chest tube today  - No evidence of re accumulation after 24 hours clamped  - Ok to watch overnight for signs of decompensation prior to DC  - OK to resume pulmonary rehab  - Family would like [facility] pulmonary outpatient referral at discharge as well for COPD management

## 2019-07-25 NOTE — PLAN OF CARE
"Problem: Adult Inpatient Plan of Care  Goal: Plan of Care Review  Outcome: Ongoing (interventions implemented as appropriate)  -AAOx4  -LFA 20g PIV saline locked. Dressing CDI.   -RAC 20g PIV saline locked. Dressing CDI.   -R CT clamped. CXR in am to assess if CT can be removed. Pts family would like her to stay 1 more night after removal to ensure pneumo does not return.   -Pt incontinent of urine. Pure wick is use. 350ml yellow UOP thus far this shift.   -Took pm meds with apple sauce. Pt c/o pills and food "getting stuck". Diet changed to mechanical soft.   -2L NC (home o2). SpO2 93-97%.   -Pt ambulates with 1 person assistance. Wears non slip socks when oob. Free from falls/injuries thus far this shift.   -Bed in lowest, locked position. Bed rails up x2. Call light and personal belongings at bedside. Daughter staying overnight.   -Will continue to monitor.       "

## 2019-07-25 NOTE — PROGRESS NOTES
[facility]  Pulmonology  Progress Note    Patient Name: Venus Mayer  MRN: [id number]  Admission Date: 7/20/2019  Hospital Length of Stay: 5 days  Code Status: Partial Code  Attending Provider: Ezra Escobedo MD  Primary Care Provider: Jona Che MD   Principal Problem: Pneumothorax    Subjective:     Interval History: Complains of some irritation at the site of her chest tube. No increased dyspnea or chest pain.    Objective:     Vital Signs (Most Recent):  Temp: 98 °F (36.7 °C) (07/25/19 1111)  Pulse: 88 (07/25/19 1214)  Resp: 15 (07/25/19 1111)  BP: 123/60 (07/25/19 1111)  SpO2: 97 % (07/25/19 1111) Vital Signs (24h Range):  Temp:  [97.6 °F (36.4 °C)-98.1 °F (36.7 °C)] 98 °F (36.7 °C)  Pulse:  [67-94] 88  Resp:  [15-22] 15  SpO2:  [91 %-99 %] 97 %  BP: (118-139)/(56-75) 123/60     Weight: 41 kg (90 lb 6.2 oz)  Body mass index is 17.65 kg/m².      Intake/Output Summary (Last 24 hours) at 7/25/2019 1501  Last data filed at 7/25/2019 0857  Gross per 24 hour   Intake 120 ml   Output 0 ml   Net 120 ml       Physical Exam   Constitutional: She appears well-developed and well-nourished. No distress.   HENT:   Head: Normocephalic and atraumatic.   Eyes: Conjunctivae are normal. No scleral icterus.   Neck: Neck supple.   Cardiovascular: Normal rate, regular rhythm and normal heart sounds.   Pulmonary/Chest: Effort normal and breath sounds normal.   Abdominal: Soft.   Musculoskeletal: She exhibits no edema.   Skin: Skin is warm. Capillary refill takes less than 2 seconds.       Vents:  Oxygen Concentration (%): 28 (07/25/19 1022)    Lines/Drains/Airways     Drain            Female External Urinary Catheter 07/20/19 2000 4 days         Chest Tube 07/21/19 1907 1 Right Fourth intercostal space 14 Fr. 3 days          Peripheral Intravenous Line                 Peripheral IV - Single Lumen 07/20/19 1242 20 G Right Forearm 5 days         Peripheral IV - Single Lumen 07/21/19 1906 20 G Left Forearm 3  days                Significant Labs:    CBC/Anemia Profile:  Recent Labs   Lab 07/24/19  0626 07/25/19  0642   WBC 5.82 7.04   HGB 12.1 11.6*   HCT 38.0 36.0*   * 140*   MCV 95 94   RDW 13.6 13.8        Chemistries:  Recent Labs   Lab 07/24/19  0626 07/25/19  0642    137   K 4.0 3.7   CL 93* 93*   CO2 37* 38*   BUN 17 19   CREATININE 0.8 0.7   CALCIUM 9.8 9.5   ALBUMIN 2.6* 2.6*   PROT 5.3* 5.3*   BILITOT 0.5 0.4   ALKPHOS 75 80   ALT 12 14   AST 23 26   MG 2.1 2.2       All pertinent labs within the past 24 hours have been reviewed.    Significant Imaging:  I have reviewed all pertinent imaging results/findings within the past 24 hours.     CXR 07/25/2019 no residual pneumothorax      Assessment/Plan:     Secondary spontaneous pneumothorax  - removed chest tube today  - No evidence of re accumulation after 24 hours clamped  - Ok to watch overnight for signs of decompensation prior to DC  - OK to resume pulmonary rehab  - Family would like [facility] pulmonary outpatient referral at discharge as well for COPD management           Ferdinand Alberts MD  Pulmonology  [facility]

## 2019-07-25 NOTE — ASSESSMENT & PLAN NOTE
89F with h/o COPD, 40 pack year smoking hx, and chronic dCHF who was transferred from OSH for R sided PTX s/p chest tube satting 99% on 3 L O2 NC. Dislodgement 07/21 PM with reaccumulation of PTX, re-expansion of lung after replacement of chest tube. Stepped down 07/22 PM, 07/23 AM started water seal trial    - Titrate O2 NC to maintain >88% O2 sats  - Continue home tiotropium, fluticasone, and rescue albuterol   - CTS consulted recs appreciated - advised water seal and clamping trials with pleurodesis if failed  - Water seal 07/23 with clamping trial 07/24; Pt tolerated clamping trial well. Possible removal of CT on 07/25 per pulmonology

## 2019-07-25 NOTE — SUBJECTIVE & OBJECTIVE
Interval History/Significant Events: See Hospital Course. Feels well today.    Review of Systems   Constitutional: Negative for chills, diaphoresis and fever.   Respiratory: Positive for shortness of breath. Negative for cough.    Cardiovascular: Negative for chest pain and leg swelling.   Gastrointestinal: Negative for abdominal pain.        Reports occasional dysphagia to solids and liquids     Genitourinary: Negative for dysuria.   Neurological: Negative for dizziness and headaches.     Objective:     Vital Signs (Most Recent):  Temp: 98.1 °F (36.7 °C) (07/24/19 1738)  Pulse: 93 (07/24/19 1900)  Resp: 20 (07/24/19 1738)  BP: 139/75 (07/24/19 1738)  SpO2: 96 % (07/24/19 1738) Vital Signs (24h Range):  Temp:  [97.9 °F (36.6 °C)-98.5 °F (36.9 °C)] 98.1 °F (36.7 °C)  Pulse:  [] 93  Resp:  [14-20] 20  SpO2:  [94 %-96 %] 96 %  BP: (135-169)/(62-88) 139/75   Weight: 41 kg (90 lb 6.2 oz)  Body mass index is 17.65 kg/m².      Intake/Output Summary (Last 24 hours) at 7/24/2019 1947  Last data filed at 7/24/2019 1820  Gross per 24 hour   Intake 600 ml   Output 315 ml   Net 285 ml       Physical Exam   Constitutional: She is oriented to person, place, and time. She appears cachectic. She does not have a sickly appearance. Nasal cannula in place.   HENT:   Head: Normocephalic.   Cardiovascular: Normal rate and normal pulses.   No murmur heard.  Pulmonary/Chest: No accessory muscle usage. No tachypnea. No respiratory distress. She has decreased breath sounds. She has no wheezes. She has no rhonchi. She has rales in the right upper field, the right middle field and the right lower field.   Abdominal: Soft. Bowel sounds are normal. There is no tenderness.   Genitourinary:   Genitourinary Comments: purewick with clear yellow output   Neurological: She is oriented to person, place, and time. No cranial nerve deficit or sensory deficit. GCS eye subscore is 4. GCS verbal subscore is 5. GCS motor subscore is 6.   Drowsy, non  focal   Skin: Skin is warm, dry and intact.   Nursing note and vitals reviewed.    Significant Labs:  CBC/Anemia Profile:  Recent Labs   Lab 07/23/19  0644 07/24/19  0626   WBC 6.39 5.82   HGB 12.3 12.1   HCT 38.8 38.0   * 129*   MCV 96 95   RDW 13.5 13.6        Chemistries:  Recent Labs   Lab 07/23/19  0644 07/24/19  0626    138   K 4.3 4.0   CL 95 93*   CO2 37* 37*   BUN 16 17   CREATININE 0.7 0.8   CALCIUM 9.5 9.8   ALBUMIN 2.6* 2.6*   PROT 5.3* 5.3*   BILITOT 0.6 0.5   ALKPHOS 76 75   ALT 12 12   AST 24 23   MG 2.1 2.1       All pertinent labs within the past 24 hours have been reviewed.    Significant Imaging:  I have reviewed and interpreted all pertinent imaging results/findings within the past 24 hours.

## 2019-07-25 NOTE — PLAN OF CARE
Problem: Adult Inpatient Plan of Care  Goal: Plan of Care Review  Outcome: Ongoing (interventions implemented as appropriate)  Pt AAOx4, VSS, in NAD throughout shift.  Pt up in chair intermittently throughout shift.  Pt wearing SCDs.  Pt utilizing PureWick - staff changes once/shift and PRN.  Pt very [place], only has working hearing aid for R ear.  CT removed per pulmonology staff this shift, pt tolerated well, no episodes of hypoxia or SOB s/p removal.  Pt in good spirits and tolerating all medications and interventions well this shift.  RN maintaining fall risk precautions throughout shift.  Pt denies pain or other need at this time; RN will continue to monitor, assess, and alter plan of care as needed until report given to oncoming night shift nurse.

## 2019-07-25 NOTE — SUBJECTIVE & OBJECTIVE
Interval History: Complains of some irritation at the site of her chest tube. No increased dyspnea or chest pain.    Objective:     Vital Signs (Most Recent):  Temp: 98 °F (36.7 °C) (07/25/19 1111)  Pulse: 88 (07/25/19 1214)  Resp: 15 (07/25/19 1111)  BP: 123/60 (07/25/19 1111)  SpO2: 97 % (07/25/19 1111) Vital Signs (24h Range):  Temp:  [97.6 °F (36.4 °C)-98.1 °F (36.7 °C)] 98 °F (36.7 °C)  Pulse:  [67-94] 88  Resp:  [15-22] 15  SpO2:  [91 %-99 %] 97 %  BP: (118-139)/(56-75) 123/60     Weight: 41 kg (90 lb 6.2 oz)  Body mass index is 17.65 kg/m².      Intake/Output Summary (Last 24 hours) at 7/25/2019 1501  Last data filed at 7/25/2019 0857  Gross per 24 hour   Intake 120 ml   Output 0 ml   Net 120 ml       Physical Exam   Constitutional: She appears well-developed and well-nourished. No distress.   HENT:   Head: Normocephalic and atraumatic.   Eyes: Conjunctivae are normal. No scleral icterus.   Neck: Neck supple.   Cardiovascular: Normal rate, regular rhythm and normal heart sounds.   Pulmonary/Chest: Effort normal and breath sounds normal.   Abdominal: Soft.   Musculoskeletal: She exhibits no edema.   Skin: Skin is warm. Capillary refill takes less than 2 seconds.       Vents:  Oxygen Concentration (%): 28 (07/25/19 1022)    Lines/Drains/Airways     Drain            Female External Urinary Catheter 07/20/19 2000 4 days         Chest Tube 07/21/19 1907 1 Right Fourth intercostal space 14 Fr. 3 days          Peripheral Intravenous Line                 Peripheral IV - Single Lumen 07/20/19 1242 20 G Right Forearm 5 days         Peripheral IV - Single Lumen 07/21/19 1906 20 G Left Forearm 3 days                Significant Labs:    CBC/Anemia Profile:  Recent Labs   Lab 07/24/19  0626 07/25/19  0642   WBC 5.82 7.04   HGB 12.1 11.6*   HCT 38.0 36.0*   * 140*   MCV 95 94   RDW 13.6 13.8        Chemistries:  Recent Labs   Lab 07/24/19  0626 07/25/19  0642    137   K 4.0 3.7   CL 93* 93*   CO2 37* 38*   BUN  17 19   CREATININE 0.8 0.7   CALCIUM 9.8 9.5   ALBUMIN 2.6* 2.6*   PROT 5.3* 5.3*   BILITOT 0.5 0.4   ALKPHOS 75 80   ALT 12 14   AST 23 26   MG 2.1 2.2       All pertinent labs within the past 24 hours have been reviewed.    Significant Imaging:  I have reviewed all pertinent imaging results/findings within the past 24 hours.     CXR 07/25/2019 no residual pneumothorax

## 2019-07-25 NOTE — ASSESSMENT & PLAN NOTE
Deconditioned state, pt reports progressive fatigue over the past several months    - PT/OT eval and treat. Appreciate help  - Scheduled bowel regimen (polyethylene glycol 17g)  - Pt to see palliative care on outpatient basis

## 2019-07-26 VITALS
HEIGHT: 60 IN | DIASTOLIC BLOOD PRESSURE: 63 MMHG | SYSTOLIC BLOOD PRESSURE: 144 MMHG | TEMPERATURE: 98 F | WEIGHT: 90.19 LBS | RESPIRATION RATE: 20 BRPM | OXYGEN SATURATION: 92 % | HEART RATE: 94 BPM | BODY MASS INDEX: 17.71 KG/M2

## 2019-07-26 PROBLEM — J93.12 SECONDARY SPONTANEOUS PNEUMOTHORAX: Status: RESOLVED | Noted: 2019-06-26 | Resolved: 2019-07-26

## 2019-07-26 LAB
ALBUMIN SERPL BCP-MCNC: 2.6 G/DL (ref 3.5–5.2)
ALP SERPL-CCNC: 80 U/L (ref 55–135)
ALT SERPL W/O P-5'-P-CCNC: 11 U/L (ref 10–44)
ANION GAP SERPL CALC-SCNC: 8 MMOL/L (ref 8–16)
AST SERPL-CCNC: 24 U/L (ref 10–40)
BASOPHILS # BLD AUTO: 0.03 K/UL (ref 0–0.2)
BASOPHILS NFR BLD: 0.6 % (ref 0–1.9)
BILIRUB SERPL-MCNC: 0.4 MG/DL (ref 0.1–1)
BUN SERPL-MCNC: 17 MG/DL (ref 8–23)
CALCIUM SERPL-MCNC: 9.2 MG/DL (ref 8.7–10.5)
CHLORIDE SERPL-SCNC: 96 MMOL/L (ref 95–110)
CO2 SERPL-SCNC: 38 MMOL/L (ref 23–29)
CREAT SERPL-MCNC: 0.7 MG/DL (ref 0.5–1.4)
DIFFERENTIAL METHOD: ABNORMAL
EOSINOPHIL # BLD AUTO: 0.6 K/UL (ref 0–0.5)
EOSINOPHIL NFR BLD: 11 % (ref 0–8)
ERYTHROCYTE [DISTWIDTH] IN BLOOD BY AUTOMATED COUNT: 13.7 % (ref 11.5–14.5)
EST. GFR  (AFRICAN AMERICAN): >60 ML/MIN/1.73 M^2
EST. GFR  (NON AFRICAN AMERICAN): >60 ML/MIN/1.73 M^2
FERRITIN SERPL-MCNC: 296 NG/ML (ref 20–300)
GLUCOSE SERPL-MCNC: 85 MG/DL (ref 70–110)
HCT VFR BLD AUTO: 37.3 % (ref 37–48.5)
HGB BLD-MCNC: 11.9 G/DL (ref 12–16)
IMM GRANULOCYTES # BLD AUTO: 0.01 K/UL (ref 0–0.04)
IMM GRANULOCYTES NFR BLD AUTO: 0.2 % (ref 0–0.5)
IRON SERPL-MCNC: 46 UG/DL (ref 30–160)
LYMPHOCYTES # BLD AUTO: 1.1 K/UL (ref 1–4.8)
LYMPHOCYTES NFR BLD: 20 % (ref 18–48)
MAGNESIUM SERPL-MCNC: 2.2 MG/DL (ref 1.6–2.6)
MCH RBC QN AUTO: 30.6 PG (ref 27–31)
MCHC RBC AUTO-ENTMCNC: 31.9 G/DL (ref 32–36)
MCV RBC AUTO: 96 FL (ref 82–98)
MONOCYTES # BLD AUTO: 0.7 K/UL (ref 0.3–1)
MONOCYTES NFR BLD: 13.2 % (ref 4–15)
NEUTROPHILS # BLD AUTO: 3 K/UL (ref 1.8–7.7)
NEUTROPHILS NFR BLD: 55 % (ref 38–73)
NRBC BLD-RTO: 0 /100 WBC
PLATELET # BLD AUTO: 133 K/UL (ref 150–350)
PMV BLD AUTO: 10.3 FL (ref 9.2–12.9)
POTASSIUM SERPL-SCNC: 3.6 MMOL/L (ref 3.5–5.1)
PROT SERPL-MCNC: 5.2 G/DL (ref 6–8.4)
RBC # BLD AUTO: 3.89 M/UL (ref 4–5.4)
SATURATED IRON: 16 % (ref 20–50)
SODIUM SERPL-SCNC: 142 MMOL/L (ref 136–145)
TOTAL IRON BINDING CAPACITY: 293 UG/DL (ref 250–450)
TRANSFERRIN SERPL-MCNC: 198 MG/DL (ref 200–375)
TRANSFERRIN SERPL-MCNC: 198 MG/DL (ref 200–375)
WBC # BLD AUTO: 5.44 K/UL (ref 3.9–12.7)

## 2019-07-26 PROCEDURE — 83735 ASSAY OF MAGNESIUM: CPT

## 2019-07-26 PROCEDURE — 97535 SELF CARE MNGMENT TRAINING: CPT

## 2019-07-26 PROCEDURE — 83540 ASSAY OF IRON: CPT

## 2019-07-26 PROCEDURE — 82728 ASSAY OF FERRITIN: CPT

## 2019-07-26 PROCEDURE — 25000003 PHARM REV CODE 250: Performed by: STUDENT IN AN ORGANIZED HEALTH CARE EDUCATION/TRAINING PROGRAM

## 2019-07-26 PROCEDURE — 97116 GAIT TRAINING THERAPY: CPT

## 2019-07-26 PROCEDURE — 27000221 HC OXYGEN, UP TO 24 HOURS

## 2019-07-26 PROCEDURE — 99238 HOSP IP/OBS DSCHRG MGMT 30/<: CPT | Mod: GC,,, | Performed by: HOSPITALIST

## 2019-07-26 PROCEDURE — 85025 COMPLETE CBC W/AUTO DIFF WBC: CPT

## 2019-07-26 PROCEDURE — 25000242 PHARM REV CODE 250 ALT 637 W/ HCPCS: Performed by: STUDENT IN AN ORGANIZED HEALTH CARE EDUCATION/TRAINING PROGRAM

## 2019-07-26 PROCEDURE — 80053 COMPREHEN METABOLIC PANEL: CPT

## 2019-07-26 PROCEDURE — 99238 PR HOSPITAL DISCHARGE DAY,<30 MIN: ICD-10-PCS | Mod: GC,,, | Performed by: HOSPITALIST

## 2019-07-26 PROCEDURE — 63600175 PHARM REV CODE 636 W HCPCS: Performed by: HOSPITALIST

## 2019-07-26 RX ADMIN — ASPIRIN 81 MG CHEWABLE TABLET 81 MG: 81 TABLET CHEWABLE at 08:07

## 2019-07-26 RX ADMIN — TIOTROPIUM BROMIDE 18 MCG: 18 CAPSULE ORAL; RESPIRATORY (INHALATION) at 01:07

## 2019-07-26 RX ADMIN — FUROSEMIDE 40 MG: 40 TABLET ORAL at 08:07

## 2019-07-26 RX ADMIN — THERA TABS 1 TABLET: TAB at 08:07

## 2019-07-26 RX ADMIN — ENOXAPARIN SODIUM 30 MG: 100 INJECTION SUBCUTANEOUS at 05:07

## 2019-07-26 RX ADMIN — FERROUS SULFATE TAB EC 325 MG (65 MG FE EQUIVALENT) 325 MG: 325 (65 FE) TABLET DELAYED RESPONSE at 08:07

## 2019-07-26 RX ADMIN — FLUTICASONE PROPIONATE 50 MCG: 50 SPRAY, METERED NASAL at 08:07

## 2019-07-26 NOTE — PLAN OF CARE
[facility]  HOME  HEALTH ORDERS     07/26/2019    Admit to Home Health    Diagnoses:  Active Hospital Problems    Diagnosis  POA    *Pneumothorax [J93.9]  Yes    Goals of care, counseling/discussion [Z71.89]  Not Applicable    Restless leg syndrome [G25.81]  Unknown    Iron deficiency [E61.1]  Yes    Debility [R53.81]  Yes    Chronic obstructive pulmonary disease with acute exacerbation [J44.1]  Yes    Heart failure, diastolic [I50.30]  Yes    Carotid artery stenosis:Asx; S/P L. CEA 2008 with restenosis. [I65.29]  Yes     Left CEA done at [facility] in 6/08 by Dr. Griffith due to  asymptomatic stenosis.  Carotid ultrasound on 1/23/12 showed left            carotid stenosis 70-79%.  Prior US showed 80-99% a year ago.  Velocities most recent are 335 and a year ago was 379.     January 2013: L. ICA velocity stable at 323.  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 Instruct on disease process and s/s of complications to report to MD. Review/verify medication list sent home with the patient at time of discharge  and instruct patient/caregiver as needed. Frequency may be adjusted depending on start of care date.    Notify MD if SBP > 160 or < 90; DBP > 90 or < 50; HR > 120 or < 50; Temp > 101;     CONSULTS:     PT to evaluate and treat. Evaluate for home safety and equipment needs; Establish/upgrade home exercise program. Perform / instruct on  therapeutic exercises, gait training, transfer training, and Range of Motion.    OT to evaluate and treat. Evaluate home environment for safety and equipment needs. Perform/Instruct on transfers, ADL training, ROM, and therapeutic exercises.    Medications: Review discharge medications with patient and family and provide education.       JONN Mayer   Home Medication Instructions LARRY:[id number]    Printed on:07/26/19 0960   Medication Information                      acetaminophen (TYLENOL) 500 MG tablet  Take 1,000 mg by mouth daily as needed for Pain.             albuterol (PROAIR HFA) 90 mcg/actuation inhaler  Inhale 1 puff into the lungs every 6 (six) hours as needed for Wheezing or Shortness of Breath. Rescue             amLODIPine (NORVASC) 2.5 MG tablet  Take 2.5 mg by mouth nightly as needed for SBP greater than. SBP greater than 155             aspirin 81 mg Tab  Take 1 tablet by mouth once daily.              ferrous sulfate 325 (65 FE) MG EC tablet  Take 1 tablet (325 mg total) by mouth once daily.             furosemide (LASIX) 40 MG tablet  Take 1 tablet (40 mg total) by mouth once daily.             multivitamin (THERAGRAN) per tablet  Take 1 tablet by mouth once daily.             polyethylene glycol (GLYCOLAX) 17 gram PwPk  Take 17 g by mouth once daily. Hold for loose or frequent bowel movements             potassium chloride (MICRO-K) 10 MEQ CpSR  Take 10 mEq by mouth once daily.             rOPINIRole (REQUIP) 0.25 MG tablet  Take 1 tablet (0.25 mg total) by mouth every evening.             tiotropium (SPIRIVA) 18 mcg inhalation capsule  Inhale 1 capsule (18 mcg total) into the lungs once daily.                       _________________________________  Aristeo Aldana MD  07/26/2019

## 2019-07-26 NOTE — PT/OT/SLP PROGRESS
Occupational Therapy   Treatment    Name: Venus Mayer  MRN: [id number]  Admitting Diagnosis:  Pneumothorax       Recommendations:     Discharge Recommendations: home with home health  Discharge Equipment Recommendations:  none  Barriers to discharge:  None    Assessment:     Venus Mayer is a 89 y.o. female with a medical diagnosis of Pneumothorax.  She presents with impairments listed below. Pt did well to tolerate and participate in session. Pt did well to perfrom LBD and UBD. Pt displayed global deconditioning requiring increased assist for ADLs and mobility at this time. Pt would benefit from skilled OT services to improve independence and overall occupational functioning.     Performance deficits affecting function are weakness, impaired endurance, impaired self care skills, impaired cardiopulmonary response to activity.     Rehab Prognosis:  Good; patient would benefit from acute skilled OT services to address these deficits and reach maximum level of function.       Plan:     Patient to be seen 3 x/week to address the above listed problems via self-care/home management, therapeutic activities, therapeutic exercises  · Plan of Care Expires: 08/23/19  · Plan of Care Reviewed with: patient    Subjective     Pain/Comfort:  · Pain Rating 1: 0/10  · Pain Rating Post-Intervention 1: 0/10    Objective:     Communicated with: RN prior to session.  Patient found up in chair with telemetry, PureWick upon OT entry to room.    General Precautions: Standard, fall, hearing impaired   Orthopedic Precautions:N/A   Braces: N/A     Occupational Performance:       Functional Mobility/Transfers:  · Patient completed Sit <> Stand Transfer with modified independence  with  rolling walker   · Functional Mobility: Pt did not ambulate on this date.     Activities of Daily Living:  · Upper Body Dressing: set-up assistance  doffed gown and donned button down shirt  · Lower Body Dressing: minimum assistance donned underwear and pantis  initially in sitting and completed in standing      [facility] 6 Click ADL: 21    Treatment & Education:  Pt and pt's dtr were educated on D/C recs, safety in the home, and POC.     Patient left up in chair with all lines intact, call button in reach, RN notified and dtr presentEducation:      GOALS:   Multidisciplinary Problems     Occupational Therapy Goals        Problem: Occupational Therapy Goal    Goal Priority Disciplines Outcome Interventions   Occupational Therapy Goal     OT, PT/OT Ongoing (interventions implemented as appropriate)    Description:  Goals to be met by: 8/6    Patient will increase functional independence with ADLs by performing:    UE Dressing with Set-up Assistance. Met  LE Dressing with Stand-by Assistance and AD as needed.  Grooming while standing with Supervision.  Toileting from toilet with Supervision for hygiene and clothing management.   Toilet transfer to toilet with Stand-by Assistance with RW.                       Time Tracking:     OT Date of Treatment: 07/26/19  OT Start Time: 1506  OT Stop Time: 1518  OT Total Time (min): 12 min    Billable Minutes:Self Care/Home Management 12 minutes    Stefan Park, OT  7/26/2019

## 2019-07-26 NOTE — DISCHARGE INSTRUCTIONS
For chest tube wound, please keep dry for 2 more days. May start washing and place bandaid on 07/29/2019 if wound has healed.

## 2019-07-26 NOTE — PLAN OF CARE
Problem: Adult Inpatient Plan of Care  Goal: Plan of Care Review  Outcome: Ongoing (interventions implemented as appropriate)  No acute events overnight. VSS.  Tentative d/c today with palliative care, consult in place.   Iron studies to be drawn this am per daughter's request - order in place.   Pure wick remains in place d/t urinary incontinence.   Fall precautions maintained. Bed wheels locked, bed in lowest position, upper SR up x 2, call light in reach, non-skid socks when OOB. Instructed pt to call for assistance as needed.

## 2019-07-26 NOTE — ASSESSMENT & PLAN NOTE
89F with h/o COPD, 40 pack year smoking hx, and chronic dCHF who was transferred from OSH for R sided PTX s/p chest tube satting 99% on 3 L O2 NC. Dislodgement 07/21 PM with reaccumulation of PTX, re-expansion of lung after replacement of chest tube. Stepped down 07/22 PM, 07/23 AM started water seal trial    - Titrate O2 NC to maintain >88% O2 sats  - Continue home tiotropium, fluticasone, and rescue albuterol   - CTS consulted recs appreciated - advised water seal and clamping trials with pleurodesis if failed  - Water seal 07/23 with clamping trial 07/24; Pt tolerated clamping trial well. CT removed by pulmonology 07/25. Will monitor overnight

## 2019-07-26 NOTE — PLAN OF CARE
HH orders sent to [facility], at pt's request, via [facility].      Yadi Sparrow LMSW

## 2019-07-26 NOTE — PROGRESS NOTES
[facility] Hospital Medicine  Progress Note    Patient Name: Venus Mayer  MRN: [id number]  Patient Class: IP- Inpatient   Admission Date: 7/20/2019  Length of Stay: 5 days  Attending Physician: Ezra Escobedo MD  Primary Care Provider: Jona Che MD    [facility] Medicine Team: [facility] 1 Aristeo Aldana MD    Subjective:     Principal Problem:Pneumothorax      HPI:  Patient is a 89F with PMH of COPD (on 2.5 L home O2), 40 pack year smoking history (quit 30 years ago), recurrent spontaneous PTX, chronic dCHF, and HOCM s/p AICD, PVD/carotid artery stenosis s/p L CEA 08, HTN, HLD, and 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 When seen, the patient was resting comfortably in bed on 3 L O2 NC with her daughter, son (with whom she lives), and daughter in law at bedside. A R side chest tube was in place and the patient denied any dyspnea, though she endorsed pain at the site of chest tube insertion.     At OSH ED, O2 sats were 91% on home 2.5 L of O2 and no breath sounds were heard in the R upper and middle lung. CXR showed a R-sided PTX for which a 12f chest tube was placed. Patient's O2 sats improved to 100% and she reported immediate relief of dyspnea and discomfort. She reported chest pain at the site of insertion of chest tube and responded well to 500 mg of tylenol. BNP was 1340.    The patient's recurrent PTXs seem to have started during her admission on 05/22/2019 where she presented with a large R sided PTX requiring chest tube and pleurodesis on 5/27/2019. Since, she has had a small recurrence of PTX on 6/25/19 not requiring intervention, and admission to OSH 7/6-7/11 for acute on chronic dCHF and COPD exacerbation.    Per Dr Cardozo, Family was unsure whether they wanted her to be transferred to [facility] versus wanting her to go back to [facility] where she was admitted most recently  for the same issue.  After discussion with several family members they have decided they do want transferred to [facility] and I have initiated contacting the regional transfer center    Overview/Hospital Course:  07/21/2019 Chest tube was clotted with blood today, ~50 mL of dried blood in canister. Pulmonology consulted and will see pt today. CXR showed near complete resolution of R sided PTX. Satting 93% on O2 of 2 L NC in AM. In PM pt became acutely hypoxic requiring increased O2 requirements. Assessed by MICU with chest tube found to have been dislodged; pneumothorax reaccumulated. Pt was transferred to MICU with new chest tube placed.  07/22/2019 Re-expansion of R lung on CXR. CTS consulted advised water seal + clamping trials starting 07/23. Stepped down in PM on home O2 2L NC.  07/23/2019 Feels better today. At rest feels that she is near her baseline but notices exertional dyspnea. 2L NC. Water seal trial started, no recurrence of PTX. Pulm to see pt 07/24 AM.  07/24/2019 Chest tube clamped today, pt tolerating well with no increased work of breathing or dyspnea. Pt c/o occasional dyspnea with solids and liquids. Daughter reports a barium swallow study done at [facility] showed esophageal spasms, not currently treated. Diet changed to mechanical soft. Pt and daughter at bedside are willing to see palliative care physician on outpatient basis after discharge.  07/25/2019 Chest tube removed by pulmonology today. Will monitor overnight to see that patient is stable.     Interval History/Significant Events: See Hospital Course. Feels well today.    Review of Systems   Constitutional: Negative for chills, diaphoresis and fever.   Respiratory: Positive for shortness of breath. Negative for cough.    Cardiovascular: Negative for chest pain and leg swelling.   Gastrointestinal: Negative for abdominal pain, diarrhea, nausea and vomiting.        Reports occasional dysphagia to solids and liquids      Genitourinary: Negative for dysuria.   Neurological: Negative for dizziness and headaches.     Objective:     Vital Signs (Most Recent):  Temp: 96.8 °F (36 °C) (07/25/19 1924)  Pulse: 85 (07/25/19 1924)  Resp: 20 (07/25/19 1924)  BP: (!) 143/61 (07/25/19 1924)  SpO2: (!) 93 % (07/25/19 1924) Vital Signs (24h Range):  Temp:  [96.8 °F (36 °C)-98 °F (36.7 °C)] 96.8 °F (36 °C)  Pulse:  [67-91] 85  Resp:  [15-22] 20  SpO2:  [91 %-99 %] 93 %  BP: (118-143)/(56-72) 143/61   Weight: 41 kg (90 lb 6.2 oz)  Body mass index is 17.65 kg/m².      Intake/Output Summary (Last 24 hours) at 7/25/2019 2115  Last data filed at 7/25/2019 1630  Gross per 24 hour   Intake 360 ml   Output 0 ml   Net 360 ml       Physical Exam   Constitutional: She is oriented to person, place, and time. She appears cachectic. She does not have a sickly appearance. Nasal cannula in place.   HENT:   Head: Normocephalic.   Cardiovascular: Normal rate and normal pulses.   No murmur heard.  Pulmonary/Chest: No accessory muscle usage. No tachypnea. No respiratory distress. She has decreased breath sounds. She has no wheezes. She has no rhonchi. She has rales in the right upper field, the right middle field and the right lower field.   Decreased breath sounds in all lung fields     Abdominal: Soft. Bowel sounds are normal. There is no tenderness.   Genitourinary:   Genitourinary Comments: purewick with clear yellow output   Neurological: She is oriented to person, place, and time. No cranial nerve deficit or sensory deficit. GCS eye subscore is 4. GCS verbal subscore is 5. GCS motor subscore is 6.   Drowsy, non focal   Skin: Skin is warm, dry and intact.   Nursing note and vitals reviewed.    Significant Labs:  CBC/Anemia Profile:  Recent Labs   Lab 07/24/19  0626 07/25/19  0642   WBC 5.82 7.04   HGB 12.1 11.6*   HCT 38.0 36.0*   * 140*   MCV 95 94   RDW 13.6 13.8        Chemistries:  Recent Labs   Lab 07/24/19  0626 07/25/19  0642    137   K 4.0  3.7   CL 93* 93*   CO2 37* 38*   BUN 17 19   CREATININE 0.8 0.7   CALCIUM 9.8 9.5   ALBUMIN 2.6* 2.6*   PROT 5.3* 5.3*   BILITOT 0.5 0.4   ALKPHOS 75 80   ALT 12 14   AST 23 26   MG 2.1 2.2       All pertinent labs within the past 24 hours have been reviewed.    Significant Imaging:  I have reviewed and interpreted all pertinent imaging results/findings within the past 24 hours.      Assessment/Plan:      * Pneumothorax  89F with h/o COPD, 40 pack year smoking hx, and chronic dCHF who was transferred from OSH for R sided PTX s/p chest tube satting 99% on 3 L O2 NC. Dislodgement 07/21 PM with reaccumulation of PTX, re-expansion of lung after replacement of chest tube. Stepped down 07/22 PM, 07/23 AM started water seal trial    - Titrate O2 NC to maintain >88% O2 sats  - Continue home tiotropium, fluticasone, and rescue albuterol   - CTS consulted recs appreciated - advised water seal and clamping trials with pleurodesis if failed  - Water seal 07/23 with clamping trial 07/24; Pt tolerated clamping trial well. CT removed by pulmonology 07/25. Will monitor overnight    Goals of care, counseling/discussion  Discussed pt's case with pt and family: pt has had 3 admissions related to PTX in the last 2 months. At this time they would like to continue aggressive therapy but discussed that there may come a time when pt and family no longer want to bring her to the hospital; they were receptive.     Discussed LaPOST with pt and family and clarified code status. Pt and family state that in the event of a cardiac arrest they would want pt to get CPR, cardioversion, and vasopressors if necessary; however, pt and family state that they would not want pt to be intubated. Reflected code status to update this but also advised that CPR without securing airway has limited utility; they expressed understanding.    Restless leg syndrome  - Continue home ropinirole 0.25 mg      Iron deficiency  Cont home ferrous sulfate 325        Debility  Deconditioned state, pt reports progressive fatigue over the past several months    - PT/OT eval and treat. Appreciate help  - Scheduled bowel regimen (polyethylene glycol 17g)  - Pt to see palliative care on outpatient basis      Chronic obstructive pulmonary disease with acute exacerbation  - See Pneumothorax    Heart failure, diastolic  - Continue home furosemide 40 mg      Essential hypertension  BP well-controlled on no anti-hypertensives    On amlodipine 2.5 mg prn at home    Carotid artery stenosis:Asx; S/P L. CEA 2008 with restenosis.  Cont ASA 81        VTE Risk Mitigation (From admission, onward)        Ordered     enoxaparin injection 30 mg  Daily      07/21/19 1002     IP VTE HIGH RISK PATIENT  Once      07/20/19 2005                Aristeo Aldana MD  Department of Hospital Medicine   [facility]

## 2019-07-26 NOTE — PLAN OF CARE
Problem: Adult Inpatient Plan of Care  Goal: Plan of Care Review  Outcome: Ongoing (interventions implemented as appropriate)  Denies SOB at rest. Becomes SOB with exertion. Was able to ambulate in jennings with PT. Stated tolerated well. OT worked with pt this afternoon. Dressing dry and intact to old CT site. Dressing to stay in place for 2 says before removal. Has good appetite. UOP adequate per Mariya. Plan to discharge home today with HH. Daughter at .

## 2019-07-26 NOTE — PT/OT/SLP PROGRESS
Physical Therapy Treatment    Patient Name:  Venus Mayer   MRN:  [id number]    Recommendations:     Discharge Recommendations:  outpatient PT   Discharge Equipment Recommendations: none   Barriers to discharge: None    Assessment:     Venus Mayer is a 89 y.o. female admitted with a medical diagnosis of Pneumothorax.  She presents with the following impairments/functional limitations:  weakness, impaired endurance, impaired self care skills, gait instability, impaired functional mobilty, impaired balance, decreased safety awareness, impaired cardiopulmonary response to activity .Pt  motivated and cooperative with treatment session. Pt Progressing with PT Intervention. Pt Progressing with improving gait distance. Pt would continue to benefit from skilled PT to address overall functional mobility and goals. Goals remain appropriate      Rehab Prognosis: Good; patient would benefit from acute skilled PT services to address these deficits and reach maximum level of function.    Recent Surgery: * No surgery found *      Plan:     During this hospitalization, patient to be seen 3 x/week to address the identified rehab impairments via gait training, therapeutic activities, therapeutic exercises and progress toward the following goals:    · Plan of Care Expires:  08/22/19    Subjective     Patient/Family Comments/goals: I might leave today  Pain/Comfort:  · Pain Rating 1: 0/10  · Pain Rating Post-Intervention 1: 0/10      Objective:     Communicated with RN prior to session.  Patient found up in chair with PureWick, telemetry upon PT entry to room.     General Precautions: Standard, fall, hearing impaired   Orthopedic Precautions:N/A   Braces: N/A     Functional Mobility:  · Transfers:     · Sit to Stand:  contact guard assistance with rolling walker  ·   · Gait: 220' with RW and CGA/SBA with portable oxygen @ 2L in tow    AM-PAC 6 CLICK MOBILITY  Turning over in bed (including adjusting bedclothes, sheets and blankets)?:  3  Sitting down on and standing up from a chair with arms (e.g., wheelchair, bedside commode, etc.): 3  Moving from lying on back to sitting on the side of the bed?: 3  Moving to and from a bed to a chair (including a wheelchair)?: 3  Need to walk in hospital room?: 3  Climbing 3-5 steps with a railing?: 3  Basic Mobility Total Score: 18       Therapeutic Activities and Exercises:   educated patient on progress, safety,d/c,PT POC, on the effects of prolonged immobility and the importance of performing OOB activity and exercises to promote healing and reduce recovery time   Updated white board with appropriate PT mobility information for medical team notification  Donned an extra gown   Pt encouraged to ambulate in hallways 3x/day with nursing or family assistance to improve endurance.   Pt safe to ambulate in hallway with RN or PCT assistance   Bedside table in front of patient and area set up for function, convenience, and safety. RN aware of patient's mobility needs and status. Questions/concerns addressed within PTA scope of practice; patient with no further questions. Time was provided for active listening, discussion of health disposition, and discussion of safe discharge. Pt?verbalized?agreement .      Patient left up in chair with all lines intact, call button in reach and nsg notified..    GOALS:   Multidisciplinary Problems     Physical Therapy Goals        Problem: Physical Therapy Goal    Goal Priority Disciplines Outcome Goal Variances Interventions   Physical Therapy Goal     PT, PT/OT Ongoing (interventions implemented as appropriate)     Description:  Goals to be met by: 2019     Patient will increase functional independence with mobility by performin. Supine to sit with Modified [place]  2. Sit to supine with Modified [place]  3. Sit to stand transfer with Supervision  4. Bed to chair transfer with Supervision using Rolling Walker  5. Gait  x 250 feet with Supervision using  Rolling Walker.   6. Lower extremity exercise program x15 reps per handout, with supervision                      Time Tracking:     PT Received On: 07/26/19  PT Start Time: 1000     PT Stop Time: 1012  PT Total Time (min): 12 min     Billable Minutes: Gait Training 12    Treatment Type: Treatment  PT/PTA: PTA     PTA Visit Number: 1     Thor Pack, PTA  07/26/2019

## 2019-07-26 NOTE — PLAN OF CARE
Problem: Physical Therapy Goal  Goal: Physical Therapy Goal  Goals to be met by: 2019     Patient will increase functional independence with mobility by performin. Supine to sit with Modified [place]  2. Sit to supine with Modified [place]  3. Sit to stand transfer with Supervision  4. Bed to chair transfer with Supervision using Rolling Walker  5. Gait  x 250 feet with Supervision using Rolling Walker.   6. Lower extremity exercise program x15 reps per handout, with supervision     Pt progressing towards goals. continue with PT POC.Goals remain appropriate.  Thor Pack PTA  2019

## 2019-07-26 NOTE — SUBJECTIVE & OBJECTIVE
Interval History/Significant Events: See Hospital Course. Feels well today.    Review of Systems   Constitutional: Negative for chills, diaphoresis and fever.   Respiratory: Positive for shortness of breath. Negative for cough.    Cardiovascular: Negative for chest pain and leg swelling.   Gastrointestinal: Negative for abdominal pain, diarrhea, nausea and vomiting.        Reports occasional dysphagia to solids and liquids     Genitourinary: Negative for dysuria.   Neurological: Negative for dizziness and headaches.     Objective:     Vital Signs (Most Recent):  Temp: 96.8 °F (36 °C) (07/25/19 1924)  Pulse: 85 (07/25/19 1924)  Resp: 20 (07/25/19 1924)  BP: (!) 143/61 (07/25/19 1924)  SpO2: (!) 93 % (07/25/19 1924) Vital Signs (24h Range):  Temp:  [96.8 °F (36 °C)-98 °F (36.7 °C)] 96.8 °F (36 °C)  Pulse:  [67-91] 85  Resp:  [15-22] 20  SpO2:  [91 %-99 %] 93 %  BP: (118-143)/(56-72) 143/61   Weight: 41 kg (90 lb 6.2 oz)  Body mass index is 17.65 kg/m².      Intake/Output Summary (Last 24 hours) at 7/25/2019 2115  Last data filed at 7/25/2019 1630  Gross per 24 hour   Intake 360 ml   Output 0 ml   Net 360 ml       Physical Exam   Constitutional: She is oriented to person, place, and time. She appears cachectic. She does not have a sickly appearance. Nasal cannula in place.   HENT:   Head: Normocephalic.   Cardiovascular: Normal rate and normal pulses.   No murmur heard.  Pulmonary/Chest: No accessory muscle usage. No tachypnea. No respiratory distress. She has decreased breath sounds. She has no wheezes. She has no rhonchi. She has rales in the right upper field, the right middle field and the right lower field.   Decreased breath sounds in all lung fields     Abdominal: Soft. Bowel sounds are normal. There is no tenderness.   Genitourinary:   Genitourinary Comments: purewick with clear yellow output   Neurological: She is oriented to person, place, and time. No cranial nerve deficit or sensory deficit. GCS eye  subscore is 4. GCS verbal subscore is 5. GCS motor subscore is 6.   Drowsy, non focal   Skin: Skin is warm, dry and intact.   Nursing note and vitals reviewed.    Significant Labs:  CBC/Anemia Profile:  Recent Labs   Lab 07/24/19  0626 07/25/19  0642   WBC 5.82 7.04   HGB 12.1 11.6*   HCT 38.0 36.0*   * 140*   MCV 95 94   RDW 13.6 13.8        Chemistries:  Recent Labs   Lab 07/24/19  0626 07/25/19  0642    137   K 4.0 3.7   CL 93* 93*   CO2 37* 38*   BUN 17 19   CREATININE 0.8 0.7   CALCIUM 9.8 9.5   ALBUMIN 2.6* 2.6*   PROT 5.3* 5.3*   BILITOT 0.5 0.4   ALKPHOS 75 80   ALT 12 14   AST 23 26   MG 2.1 2.2       All pertinent labs within the past 24 hours have been reviewed.    Significant Imaging:  I have reviewed and interpreted all pertinent imaging results/findings within the past 24 hours.

## 2019-07-26 NOTE — PLAN OF CARE
Problem: Occupational Therapy Goal  Goal: Occupational Therapy Goal  Goals to be met by: 8/6    Patient will increase functional independence with ADLs by performing:    UE Dressing with Set-up Assistance. Met  LE Dressing with Stand-by Assistance and AD as needed.  Grooming while standing with Supervision.  Toileting from toilet with Supervision for hygiene and clothing management.   Toilet transfer to toilet with Stand-by Assistance with RW.     Outcome: Ongoing (interventions implemented as appropriate)  Continue OT POC     Comments: Stefan Park OTR/L  7/26/2019

## 2019-07-27 ENCOUNTER — HOSPITAL ENCOUNTER (INPATIENT)
Facility: HOSPITAL | Age: 84
LOS: 13 days | Discharge: HOME HEALTH | DRG: 163 | End: 2019-08-09
Attending: EMERGENCY MEDICINE | Admitting: HOSPITALIST
Payer: MEDICARE

## 2019-07-27 ENCOUNTER — NURSE TRIAGE (OUTPATIENT)
Dept: ADMINISTRATIVE | Facility: CLINIC | Age: 84
End: 2019-07-27

## 2019-07-27 DIAGNOSIS — R07.9 CHEST PAIN: ICD-10-CM

## 2019-07-27 DIAGNOSIS — Z71.89 GOALS OF CARE, COUNSELING/DISCUSSION: ICD-10-CM

## 2019-07-27 DIAGNOSIS — Z51.5 PALLIATIVE CARE ENCOUNTER: ICD-10-CM

## 2019-07-27 DIAGNOSIS — T85.698A: ICD-10-CM

## 2019-07-27 DIAGNOSIS — Z71.89 ADVANCED CARE PLANNING/COUNSELING DISCUSSION: ICD-10-CM

## 2019-07-27 DIAGNOSIS — R06.02 SOB (SHORTNESS OF BREATH): ICD-10-CM

## 2019-07-27 DIAGNOSIS — J93.9 PNEUMOTHORAX: ICD-10-CM

## 2019-07-27 DIAGNOSIS — R09.02 HYPOXIA: ICD-10-CM

## 2019-07-27 DIAGNOSIS — J93.9 PNEUMOTHORAX ON RIGHT: Primary | ICD-10-CM

## 2019-07-27 PROCEDURE — 93010 EKG 12-LEAD: ICD-10-PCS | Mod: ,,, | Performed by: INTERNAL MEDICINE

## 2019-07-27 PROCEDURE — 80053 COMPREHEN METABOLIC PANEL: CPT

## 2019-07-27 PROCEDURE — 99291 CRITICAL CARE FIRST HOUR: CPT | Mod: 25

## 2019-07-27 PROCEDURE — 32551 INSERTION OF CHEST TUBE: CPT

## 2019-07-27 PROCEDURE — 93005 ELECTROCARDIOGRAM TRACING: CPT

## 2019-07-27 PROCEDURE — 12000002 HC ACUTE/MED SURGE SEMI-PRIVATE ROOM

## 2019-07-27 PROCEDURE — 85025 COMPLETE CBC W/AUTO DIFF WBC: CPT

## 2019-07-27 PROCEDURE — 83880 ASSAY OF NATRIURETIC PEPTIDE: CPT

## 2019-07-27 PROCEDURE — 99291 CRITICAL CARE FIRST HOUR: CPT | Mod: 25,,, | Performed by: EMERGENCY MEDICINE

## 2019-07-27 PROCEDURE — 32551 INSERTION OF CHEST TUBE: CPT | Mod: RT,,, | Performed by: EMERGENCY MEDICINE

## 2019-07-27 PROCEDURE — 99291 PR CRITICAL CARE, E/M 30-74 MINUTES: ICD-10-PCS | Mod: 25,,, | Performed by: EMERGENCY MEDICINE

## 2019-07-27 PROCEDURE — 32551 PR TUBE THORACOSTOMY INCLUDES WATER SEAL: ICD-10-PCS | Mod: RT,,, | Performed by: EMERGENCY MEDICINE

## 2019-07-27 PROCEDURE — 84484 ASSAY OF TROPONIN QUANT: CPT

## 2019-07-27 PROCEDURE — 93010 ELECTROCARDIOGRAM REPORT: CPT | Mod: ,,, | Performed by: INTERNAL MEDICINE

## 2019-07-27 RX ORDER — LIDOCAINE HYDROCHLORIDE 10 MG/ML
10 INJECTION INFILTRATION; PERINEURAL ONCE
Status: COMPLETED | OUTPATIENT
Start: 2019-07-28 | End: 2019-07-28

## 2019-07-27 NOTE — ASSESSMENT & PLAN NOTE
89F with h/o COPD, 40 pack year smoking hx, and chronic dCHF who was transferred from OSH for R sided PTX s/p chest tube satting 99% on 3 L O2 NC. Dislodgement 07/21 PM with reaccumulation of PTX, re-expansion of lung after replacement of chest tube. Stepped down 07/22 PM, 07/23 AM started water seal trial    - Titrate O2 NC to maintain >88% O2 sats  - Continue home tiotropium, fluticasone, and rescue albuterol   - CTS consulted recs appreciated - advised water seal and clamping trials with pleurodesis if failed  - Water seal 07/23 with clamping trial 07/24; Pt tolerated clamping trial well. CT removed by pulmonology 07/25 which the patient tolerated well. At baseline pulmonary function on day of discharge

## 2019-07-27 NOTE — SUBJECTIVE & OBJECTIVE
Interval History/Significant Events: See Hospital Course. Feels well today.    Review of Systems   Constitutional: Negative for chills, diaphoresis and fever.   Respiratory: Positive for shortness of breath (baseline level of dyspnea). Negative for cough.    Cardiovascular: Negative for chest pain and leg swelling.   Gastrointestinal: Negative for abdominal pain, diarrhea, nausea and vomiting.        Reports occasional dysphagia to solids and liquids     Genitourinary: Negative for dysuria.   Neurological: Negative for dizziness and headaches.     Objective:     Vital Signs (Most Recent):  Temp: 97.6 °F (36.4 °C) (07/26/19 1725)  Pulse: 94 (07/26/19 1725)  Resp: 20 (07/26/19 1725)  BP: (!) 144/63 (07/26/19 1725)  SpO2: (!) 92 % (07/26/19 1725) Vital Signs (24h Range):  Temp:  [97.5 °F (36.4 °C)-97.7 °F (36.5 °C)] 97.6 °F (36.4 °C)  Pulse:  [] 94  Resp:  [16-20] 20  SpO2:  [92 %-100 %] 92 %  BP: (124-150)/(57-68) 144/63   Weight: 40.9 kg (90 lb 2.7 oz)  Body mass index is 17.61 kg/m².      Intake/Output Summary (Last 24 hours) at 7/26/2019 1938  Last data filed at 7/26/2019 1020  Gross per 24 hour   Intake 180 ml   Output 650 ml   Net -470 ml       Physical Exam   Constitutional: She is oriented to person, place, and time. She appears cachectic. She does not have a sickly appearance. Nasal cannula in place.   HENT:   Head: Normocephalic.   Cardiovascular: Normal rate and normal pulses.   No murmur heard.  Pulmonary/Chest: No accessory muscle usage. No tachypnea. No respiratory distress. She has decreased breath sounds. She has no wheezes. She has no rhonchi. She has no rales.   Decreased breath sounds in all lung fields  Breath sounds were auscultated in all lung fields     Abdominal: Soft. Bowel sounds are normal. There is no tenderness.   Genitourinary:   Genitourinary Comments: purewick with clear yellow output   Neurological: She is oriented to person, place, and time. No cranial nerve deficit or sensory  deficit. GCS eye subscore is 4. GCS verbal subscore is 5. GCS motor subscore is 6.   Skin: Skin is warm, dry and intact.   Nursing note and vitals reviewed.    Significant Labs:  CBC/Anemia Profile:  Recent Labs   Lab 07/25/19  0642 07/26/19  0658   WBC 7.04 5.44   HGB 11.6* 11.9*   HCT 36.0* 37.3   * 133*   MCV 94 96   RDW 13.8 13.7   IRON  --  46   FERRITIN  --  296        Chemistries:  Recent Labs   Lab 07/25/19  0642 07/26/19  0658    142   K 3.7 3.6   CL 93* 96   CO2 38* 38*   BUN 19 17   CREATININE 0.7 0.7   CALCIUM 9.5 9.2   ALBUMIN 2.6* 2.6*   PROT 5.3* 5.2*   BILITOT 0.4 0.4   ALKPHOS 80 80   ALT 14 11   AST 26 24   MG 2.2 2.2       All pertinent labs within the past 24 hours have been reviewed.    Significant Imaging:  I have reviewed and interpreted all pertinent imaging results/findings within the past 24 hours.

## 2019-07-27 NOTE — ASSESSMENT & PLAN NOTE
Discussed pt's case with pt and family: pt has had 3 admissions related to PTX in the last 2 months. At this time they would like to continue aggressive therapy but discussed that there may come a time when pt and family no longer want to bring her to the hospital; they were receptive.     Discussed LaPOST with pt and family and clarified code status. Pt and family state that in the event of a cardiac arrest they would want pt to get CPR, cardioversion, and vasopressors if necessary; however, pt and family state that they would not want pt to be intubated. Reflected code status to update this but also advised that CPR without securing airway has limited utility; they expressed understanding.    Patient and family open to idea of visiting palliative care physician on outpatient basis.

## 2019-07-27 NOTE — DISCHARGE SUMMARY
[facility] Hospital Medicine  Discharge Summary      Patient Name: Venus Mayer  MRN: [id number]  Admission Date: 7/20/2019  Hospital Length of Stay: 6 days  Discharge Date and Time:  07/26/2019 7:50 PM  Attending Physician: Anita att. providers found   Discharging Provider: Aristeo Aldana MD  Primary Care Provider: Jona Che MD  [facility] Medicine Team: [facility] HOSP MED 1 Aristeo Aldana MD    HPI:   Patient is a 89F with PMH of COPD (on 2.5 L home O2), 40 pack year smoking history (quit 30 years ago), recurrent spontaneous PTX, chronic dCHF, and HOCM s/p AICD, PVD/carotid artery stenosis s/p L CEA 08, HTN, HLD, and 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 When seen, the patient was resting comfortably in bed on 3 L O2 NC with her daughter, son (with whom she lives), and daughter in law at bedside. A R side chest tube was in place and the patient denied any dyspnea, though she endorsed pain at the site of chest tube insertion.     At OSH ED, O2 sats were 91% on home 2.5 L of O2 and no breath sounds were heard in the R upper and middle lung. CXR showed a R-sided PTX for which a 12f chest tube was placed. Patient's O2 sats improved to 100% and she reported immediate relief of dyspnea and discomfort. She reported chest pain at the site of insertion of chest tube and responded well to 500 mg of tylenol. BNP was 1340.    The patient's recurrent PTXs seem to have started during her admission on 05/22/2019 where she presented with a large R sided PTX requiring chest tube and pleurodesis on 5/27/2019. Since, she has had a small recurrence of PTX on 6/25/19 not requiring intervention, and admission to OSH 7/6-7/11 for acute on chronic dCHF and COPD exacerbation.    Per Dr Cardozo, Family was unsure whether they wanted her to be transferred to [facility] versus wanting her to go back to [facility] where she was  admitted most recently for the same issue.  After discussion with several family members they have decided they do want transferred to [facility] and I have initiated contacting the regional transfer center    * No surgery found *      Hospital Course:   The patient is a 89 year old lady who presented as a transfer from [facility] with R sided pneumothorax with a chest tube in place. On 07/21, a CXR showed near complete resolution of the PTX, though clotted with blood was noted in the chest tube line. Overnight, the patient became acutely hypoxic requiring increased O2 requirements. She was assessed by MICU with chest tube found to have been dislodged and pneumothorax reaccumulated. The patient was transferred to MICU with new chest tube placed. Repeat CXRs showed re-resolution of the R sided PTX and the patient progressed well with water seal trials and clamped chest tube. The chest tube was removed on 07/25 which the patient tolerated well and was discharged at her baseline pulmonary function on 07/26. Of note, a 16 beat run of Vtach (<30 seconds) was noted on telemetry during which the patient was resting comfortably. She did not report ACS symptoms or dyspnea. She will go home with HH with PT and OT in addition to outpatient referrals for pulmonology, pulmonary rehabilitation, and cardiology.      Review of Systems   Constitutional: Negative for chills, diaphoresis and fever.   Respiratory: Positive for shortness of breath (baseline level of dyspnea). Negative for cough.    Cardiovascular: Negative for chest pain and leg swelling.   Gastrointestinal: Negative for abdominal pain, diarrhea, nausea and vomiting.        Reports occasional dysphagia to solids and liquids     Genitourinary: Negative for dysuria.   Neurological: Negative for dizziness and headaches.      Objective:      Vital Signs (Most Recent):  Temp: 97.6 °F (36.4 °C) (07/26/19 1725)  Pulse: 94 (07/26/19 1725)  Resp: 20 (07/26/19 1725)  BP: (!) 144/63  (07/26/19 1725)  SpO2: (!) 92 % (07/26/19 1725) Vital Signs (24h Range):  Temp:  [97.5 °F (36.4 °C)-97.7 °F (36.5 °C)] 97.6 °F (36.4 °C)  Pulse:  [] 94  Resp:  [16-20] 20  SpO2:  [92 %-100 %] 92 %  BP: (124-150)/(57-68) 144/63   Weight: 40.9 kg (90 lb 2.7 oz)  Body mass index is 17.61 kg/m².        Intake/Output Summary (Last 24 hours) at 7/26/2019 1938  Last data filed at 7/26/2019 1020      Gross per 24 hour   Intake 180 ml   Output 650 ml   Net -470 ml         Physical Exam   Constitutional: She is oriented to person, place, and time. She appears cachectic. She does not have a sickly appearance. Nasal cannula in place.   HENT:   Head: Normocephalic.   Cardiovascular: Normal rate and normal pulses.   No murmur heard.  Pulmonary/Chest: No accessory muscle usage. No tachypnea. No respiratory distress. She has decreased breath sounds. She has no wheezes. She has no rhonchi. She has no rales.   Decreased breath sounds in all lung fields  Breath sounds were auscultated in all lung fields     Abdominal: Soft. Bowel sounds are normal. There is no tenderness.   Genitourinary:   Genitourinary Comments: purewick with clear yellow output   Neurological: She is oriented to person, place, and time. No cranial nerve deficit or sensory deficit. GCS eye subscore is 4. GCS verbal subscore is 5. GCS motor subscore is 6.   Skin: Skin is warm, dry and intact.   Nursing note and vitals reviewed.    Consults:   Consults (From admission, onward)        Status Ordering Provider     Inpatient consult to Cardiothoracic Surgery  Once     Provider:  (Not yet assigned)    Completed BRET HOLLEY     Inpatient consult to Pulmonology  Once     Provider:  (Not yet assigned)    Completed DARIO GILLETTE          * Pneumothorax  89F with h/o COPD, 40 pack year smoking hx, and chronic dCHF who was transferred from OSH for R sided PTX s/p chest tube satting 99% on 3 L O2 NC. Dislodgement 07/21 PM with reaccumulation of PTX, re-expansion of lung  after replacement of chest tube. Stepped down 07/22 PM, 07/23 AM started water seal trial    - Titrate O2 NC to maintain >88% O2 sats  - Continue home tiotropium, fluticasone, and rescue albuterol   - CTS consulted recs appreciated - advised water seal and clamping trials with pleurodesis if failed  - Water seal 07/23 with clamping trial 07/24; Pt tolerated clamping trial well. CT removed by pulmonology 07/25 which the patient tolerated well. At baseline pulmonary function on day of discharge    Goals of care, counseling/discussion  Discussed pt's case with pt and family: pt has had 3 admissions related to PTX in the last 2 months. At this time they would like to continue aggressive therapy but discussed that there may come a time when pt and family no longer want to bring her to the hospital; they were receptive.     Discussed [place] with pt and family and clarified code status. Pt and family state that in the event of a cardiac arrest they would want pt to get CPR, cardioversion, and vasopressors if necessary; however, pt and family state that they would not want pt to be intubated. Reflected code status to update this but also advised that CPR without securing airway has limited utility; they expressed understanding.    Patient and family open to idea of visiting palliative care physician on outpatient basis.    Restless leg syndrome  - Continue home ropinirole 0.25 mg      Iron deficiency  Cont home ferrous sulfate 325       Debility  Deconditioned state, pt reports progressive fatigue over the past several months    - PT/OT eval and treat. Appreciate help  - Scheduled bowel regimen (polyethylene glycol 17g)  - Pt to see palliative care on outpatient basis  - Will have  PT and OT       Chronic obstructive pulmonary disease with acute exacerbation  - See Pneumothorax    Heart failure, diastolic  - Continue home furosemide 40 mg      Carotid artery stenosis:Asx; S/P L. CEA 2008 with restenosis.  Cont ASA  81        Final Active Diagnoses:    Diagnosis Date Noted POA    PRINCIPAL PROBLEM:  Pneumothorax [J93.9] 07/20/2019 Yes    Goals of care, counseling/discussion [Z71.89] 07/23/2019 Not Applicable    Restless leg syndrome [G25.81] 07/20/2019 Unknown    Iron deficiency [E61.1] 05/24/2019 Yes    Debility [R53.81] 05/22/2019 Yes    Chronic obstructive pulmonary disease with acute exacerbation [J44.1] 03/28/2013 Yes    Heart failure, diastolic [I50.30] 01/17/2013 Yes    Carotid artery stenosis:Asx; S/P L. CEA 2008 with restenosis. [I65.29] 01/17/2013 Yes      Problems Resolved During this Admission:    Diagnosis Date Noted Date Resolved POA    Secondary spontaneous pneumothorax [J93.12] 06/26/2019 07/26/2019 Yes    Essential hypertension [I10] 01/17/2013 07/26/2019 Yes       Discharged Condition: stable    Disposition: Home or Self Care    Follow Up:    Patient Instructions:      Ambulatory Referral to Palliative Care   Referral Priority: Routine Referral Type: Consultation   Requested Specialty: Hospice and Palliative Medicine   Number of Visits Requested: 1     Ambulatory Referral to Pulmonary Rehab   Referral Priority: Routine Referral Type: Consultation   Referral Reason: Specialty Services Required   Requested Specialty: Pulmonary Disease   Number of Visits Requested: 1     Ambulatory Referral to Pulmonology   Referral Priority: Routine Referral Type: Consultation   Referral Reason: Specialty Services Required   Requested Specialty: Pulmonary Disease   Number of Visits Requested: 1     Ambulatory Referral to Cardiology   Referral Priority: Routine Referral Type: Consultation   Referral Reason: Specialty Services Required   Requested Specialty: Cardiology   Number of Visits Requested: 1     Diet Cardiac     Notify your health care provider if you experience any of the following:  difficulty breathing or increased cough     Notify your health care provider if you experience any of the following:  increased  confusion or weakness     Activity as tolerated       Significant Diagnostic Studies: Labs:   CMP   Recent Labs   Lab 07/25/19  0642 07/26/19  0658    142   K 3.7 3.6   CL 93* 96   CO2 38* 38*   GLU 97 85   BUN 19 17   CREATININE 0.7 0.7   CALCIUM 9.5 9.2   PROT 5.3* 5.2*   ALBUMIN 2.6* 2.6*   BILITOT 0.4 0.4   ALKPHOS 80 80   AST 26 24   ALT 14 11   ANIONGAP 6* 8   ESTGFRAFRICA >60.0 >60.0   EGFRNONAA >60.0 >60.0    and CBC   Recent Labs   Lab 07/25/19  0642 07/26/19  0658   WBC 7.04 5.44   HGB 11.6* 11.9*   HCT 36.0* 37.3   * 133*       Pending Diagnostic Studies:     None         Medications:  Reconciled Home Medications:      Medication List      START taking these medications    polyethylene glycol 17 gram Pwpk  Commonly known as:  GLYCOLAX  Take 17 g by mouth once daily. Hold for loose or frequent bowel movements        CONTINUE taking these medications    acetaminophen 500 MG tablet  Commonly known as:  TYLENOL  Take 1,000 mg by mouth daily as needed for Pain.     albuterol 90 mcg/actuation inhaler  Commonly known as:  PROAIR HFA  Inhale 1 puff into the lungs every 6 (six) hours as needed for Wheezing or Shortness of Breath. Rescue     amLODIPine 2.5 MG tablet  Commonly known as:  NORVASC  Take 2.5 mg by mouth nightly as needed for SBP greater than. SBP greater than 155     aspirin 81 mg Tab  Take 1 tablet by mouth once daily.     ferrous sulfate 325 (65 FE) MG EC tablet  Take 1 tablet (325 mg total) by mouth once daily.     furosemide 40 MG tablet  Commonly known as:  LASIX  Take 1 tablet (40 mg total) by mouth once daily.     multivitamin per tablet  Commonly known as:  THERAGRAN  Take 1 tablet by mouth once daily.     potassium chloride 10 MEQ Cpsr  Commonly known as:  MICRO-K  Take 10 mEq by mouth once daily.     rOPINIRole 0.25 MG tablet  Commonly known as:  REQUIP  Take 1 tablet (0.25 mg total) by mouth every evening.     tiotropium 18 mcg inhalation capsule  Commonly known as:   SPIRIVA  Inhale 1 capsule (18 mcg total) into the lungs once daily.            Indwelling Lines/Drains at time of discharge:   Lines/Drains/Airways     Drain            Female External Urinary Catheter 07/20/19 2000 5 days                Time spent on the discharge of patient: 35 minutes  Patient was seen and examined on the date of discharge and determined to be suitable for discharge.         Aristoe Aldana MD  Department of Hospital Medicine  [facility]

## 2019-07-27 NOTE — TELEPHONE ENCOUNTER
Family wanted instructions on how often to administer albuterol, gave them instructions from actual prescription. Advised them top call back with any needs or concerns, caller agrees

## 2019-07-27 NOTE — ASSESSMENT & PLAN NOTE
Deconditioned state, pt reports progressive fatigue over the past several months    - PT/OT eval and treat. Appreciate help  - Scheduled bowel regimen (polyethylene glycol 17g)  - Pt to see palliative care on outpatient basis  - Will have HH PT and OT

## 2019-07-27 NOTE — PLAN OF CARE
Patient discharged home with [facility].    Follow up appointment made with Dr. Che for 8/1/19 at 1:15 PM.       07/27/19 1026   Final Note   Assessment Type Final Discharge Note   Anticipated Discharge Disposition Home-Health

## 2019-07-28 LAB
ALBUMIN SERPL BCP-MCNC: 3.4 G/DL (ref 3.5–5.2)
ALP SERPL-CCNC: 96 U/L (ref 55–135)
ALT SERPL W/O P-5'-P-CCNC: 15 U/L (ref 10–44)
ANION GAP SERPL CALC-SCNC: 11 MMOL/L (ref 8–16)
ANION GAP SERPL CALC-SCNC: 12 MMOL/L (ref 8–16)
AST SERPL-CCNC: 31 U/L (ref 10–40)
BASOPHILS # BLD AUTO: 0.02 K/UL (ref 0–0.2)
BASOPHILS # BLD AUTO: 0.04 K/UL (ref 0–0.2)
BASOPHILS # BLD AUTO: 0.04 K/UL (ref 0–0.2)
BASOPHILS NFR BLD: 0.2 % (ref 0–1.9)
BASOPHILS NFR BLD: 0.5 % (ref 0–1.9)
BASOPHILS NFR BLD: 0.5 % (ref 0–1.9)
BILIRUB SERPL-MCNC: 0.4 MG/DL (ref 0.1–1)
BNP SERPL-MCNC: 1141 PG/ML (ref 0–99)
BUN SERPL-MCNC: 23 MG/DL (ref 8–23)
BUN SERPL-MCNC: 25 MG/DL (ref 8–23)
CALCIUM SERPL-MCNC: 10.2 MG/DL (ref 8.7–10.5)
CALCIUM SERPL-MCNC: 10.9 MG/DL (ref 8.7–10.5)
CHLORIDE SERPL-SCNC: 88 MMOL/L (ref 95–110)
CHLORIDE SERPL-SCNC: 92 MMOL/L (ref 95–110)
CO2 SERPL-SCNC: 37 MMOL/L (ref 23–29)
CO2 SERPL-SCNC: 37 MMOL/L (ref 23–29)
CREAT SERPL-MCNC: 0.8 MG/DL (ref 0.5–1.4)
CREAT SERPL-MCNC: 1 MG/DL (ref 0.5–1.4)
DIFFERENTIAL METHOD: ABNORMAL
EOSINOPHIL # BLD AUTO: 0.1 K/UL (ref 0–0.5)
EOSINOPHIL # BLD AUTO: 0.3 K/UL (ref 0–0.5)
EOSINOPHIL # BLD AUTO: 0.3 K/UL (ref 0–0.5)
EOSINOPHIL NFR BLD: 0.9 % (ref 0–8)
EOSINOPHIL NFR BLD: 3.4 % (ref 0–8)
EOSINOPHIL NFR BLD: 3.4 % (ref 0–8)
ERYTHROCYTE [DISTWIDTH] IN BLOOD BY AUTOMATED COUNT: 13.8 % (ref 11.5–14.5)
EST. GFR  (AFRICAN AMERICAN): 57.3 ML/MIN/1.73 M^2
EST. GFR  (AFRICAN AMERICAN): >60 ML/MIN/1.73 M^2
EST. GFR  (NON AFRICAN AMERICAN): 49.7 ML/MIN/1.73 M^2
EST. GFR  (NON AFRICAN AMERICAN): >60 ML/MIN/1.73 M^2
GLUCOSE SERPL-MCNC: 161 MG/DL (ref 70–110)
GLUCOSE SERPL-MCNC: 86 MG/DL (ref 70–110)
HCT VFR BLD AUTO: 35.9 % (ref 37–48.5)
HCT VFR BLD AUTO: 35.9 % (ref 37–48.5)
HCT VFR BLD AUTO: 41.2 % (ref 37–48.5)
HGB BLD-MCNC: 11.6 G/DL (ref 12–16)
HGB BLD-MCNC: 11.6 G/DL (ref 12–16)
HGB BLD-MCNC: 12.6 G/DL (ref 12–16)
IMM GRANULOCYTES # BLD AUTO: 0.03 K/UL (ref 0–0.04)
IMM GRANULOCYTES NFR BLD AUTO: 0.4 % (ref 0–0.5)
LYMPHOCYTES # BLD AUTO: 0.9 K/UL (ref 1–4.8)
LYMPHOCYTES # BLD AUTO: 1.2 K/UL (ref 1–4.8)
LYMPHOCYTES # BLD AUTO: 1.2 K/UL (ref 1–4.8)
LYMPHOCYTES NFR BLD: 11.5 % (ref 18–48)
LYMPHOCYTES NFR BLD: 14.5 % (ref 18–48)
LYMPHOCYTES NFR BLD: 14.5 % (ref 18–48)
MAGNESIUM SERPL-MCNC: 2.3 MG/DL (ref 1.6–2.6)
MAGNESIUM SERPL-MCNC: 2.3 MG/DL (ref 1.6–2.6)
MCH RBC QN AUTO: 29.9 PG (ref 27–31)
MCH RBC QN AUTO: 30.2 PG (ref 27–31)
MCH RBC QN AUTO: 30.2 PG (ref 27–31)
MCHC RBC AUTO-ENTMCNC: 30.6 G/DL (ref 32–36)
MCHC RBC AUTO-ENTMCNC: 32.3 G/DL (ref 32–36)
MCHC RBC AUTO-ENTMCNC: 32.3 G/DL (ref 32–36)
MCV RBC AUTO: 94 FL (ref 82–98)
MCV RBC AUTO: 94 FL (ref 82–98)
MCV RBC AUTO: 98 FL (ref 82–98)
MONOCYTES # BLD AUTO: 0.8 K/UL (ref 0.3–1)
MONOCYTES # BLD AUTO: 1.2 K/UL (ref 0.3–1)
MONOCYTES # BLD AUTO: 1.2 K/UL (ref 0.3–1)
MONOCYTES NFR BLD: 10.1 % (ref 4–15)
MONOCYTES NFR BLD: 14.6 % (ref 4–15)
MONOCYTES NFR BLD: 14.6 % (ref 4–15)
NEUTROPHILS # BLD AUTO: 5.3 K/UL (ref 1.8–7.7)
NEUTROPHILS # BLD AUTO: 5.3 K/UL (ref 1.8–7.7)
NEUTROPHILS # BLD AUTO: 6.2 K/UL (ref 1.8–7.7)
NEUTROPHILS NFR BLD: 66.6 % (ref 38–73)
NEUTROPHILS NFR BLD: 66.6 % (ref 38–73)
NEUTROPHILS NFR BLD: 76.9 % (ref 38–73)
NRBC BLD-RTO: 0 /100 WBC
PLATELET # BLD AUTO: 168 K/UL (ref 150–350)
PLATELET # BLD AUTO: 168 K/UL (ref 150–350)
PLATELET # BLD AUTO: 202 K/UL (ref 150–350)
PMV BLD AUTO: 10 FL (ref 9.2–12.9)
POTASSIUM SERPL-SCNC: 3.8 MMOL/L (ref 3.5–5.1)
POTASSIUM SERPL-SCNC: 4 MMOL/L (ref 3.5–5.1)
PROT SERPL-MCNC: 7.1 G/DL (ref 6–8.4)
RBC # BLD AUTO: 3.84 M/UL (ref 4–5.4)
RBC # BLD AUTO: 3.84 M/UL (ref 4–5.4)
RBC # BLD AUTO: 4.22 M/UL (ref 4–5.4)
SODIUM SERPL-SCNC: 136 MMOL/L (ref 136–145)
SODIUM SERPL-SCNC: 141 MMOL/L (ref 136–145)
TROPONIN I SERPL DL<=0.01 NG/ML-MCNC: 0.17 NG/ML (ref 0–0.03)
TROPONIN I SERPL DL<=0.01 NG/ML-MCNC: 0.18 NG/ML (ref 0–0.03)
WBC # BLD AUTO: 7.99 K/UL (ref 3.9–12.7)
WBC # BLD AUTO: 7.99 K/UL (ref 3.9–12.7)
WBC # BLD AUTO: 8.09 K/UL (ref 3.9–12.7)

## 2019-07-28 PROCEDURE — 25000003 PHARM REV CODE 250: Performed by: STUDENT IN AN ORGANIZED HEALTH CARE EDUCATION/TRAINING PROGRAM

## 2019-07-28 PROCEDURE — 25000242 PHARM REV CODE 250 ALT 637 W/ HCPCS: Performed by: STUDENT IN AN ORGANIZED HEALTH CARE EDUCATION/TRAINING PROGRAM

## 2019-07-28 PROCEDURE — 80048 BASIC METABOLIC PNL TOTAL CA: CPT

## 2019-07-28 PROCEDURE — 93010 EKG 12-LEAD: ICD-10-PCS | Mod: ,,, | Performed by: INTERNAL MEDICINE

## 2019-07-28 PROCEDURE — 84484 ASSAY OF TROPONIN QUANT: CPT

## 2019-07-28 PROCEDURE — 99223 1ST HOSP IP/OBS HIGH 75: CPT | Mod: AI,GC,, | Performed by: HOSPITALIST

## 2019-07-28 PROCEDURE — 94640 AIRWAY INHALATION TREATMENT: CPT

## 2019-07-28 PROCEDURE — 99223 PR INITIAL HOSPITAL CARE,LEVL III: ICD-10-PCS | Mod: AI,GC,, | Performed by: HOSPITALIST

## 2019-07-28 PROCEDURE — 85025 COMPLETE CBC W/AUTO DIFF WBC: CPT

## 2019-07-28 PROCEDURE — 20600001 HC STEP DOWN PRIVATE ROOM

## 2019-07-28 PROCEDURE — 63600175 PHARM REV CODE 636 W HCPCS: Performed by: STUDENT IN AN ORGANIZED HEALTH CARE EDUCATION/TRAINING PROGRAM

## 2019-07-28 PROCEDURE — 27000221 HC OXYGEN, UP TO 24 HOURS

## 2019-07-28 PROCEDURE — 36415 COLL VENOUS BLD VENIPUNCTURE: CPT

## 2019-07-28 PROCEDURE — 83735 ASSAY OF MAGNESIUM: CPT

## 2019-07-28 PROCEDURE — 93010 ELECTROCARDIOGRAM REPORT: CPT | Mod: ,,, | Performed by: INTERNAL MEDICINE

## 2019-07-28 PROCEDURE — 93005 ELECTROCARDIOGRAM TRACING: CPT

## 2019-07-28 PROCEDURE — 94761 N-INVAS EAR/PLS OXIMETRY MLT: CPT

## 2019-07-28 RX ORDER — LIDOCAINE HYDROCHLORIDE 10 MG/ML
1 INJECTION INFILTRATION; PERINEURAL ONCE
Status: COMPLETED | OUTPATIENT
Start: 2019-07-28 | End: 2019-07-28

## 2019-07-28 RX ORDER — HEPARIN SODIUM 5000 [USP'U]/ML
5000 INJECTION, SOLUTION INTRAVENOUS; SUBCUTANEOUS EVERY 8 HOURS
Status: DISCONTINUED | OUTPATIENT
Start: 2019-07-28 | End: 2019-08-09 | Stop reason: HOSPADM

## 2019-07-28 RX ORDER — IPRATROPIUM BROMIDE AND ALBUTEROL SULFATE 2.5; .5 MG/3ML; MG/3ML
3 SOLUTION RESPIRATORY (INHALATION) EVERY 4 HOURS PRN
Status: DISCONTINUED | OUTPATIENT
Start: 2019-07-28 | End: 2019-07-29

## 2019-07-28 RX ORDER — POLYETHYLENE GLYCOL 3350 17 G/17G
17 POWDER, FOR SOLUTION ORAL DAILY
Status: DISCONTINUED | OUTPATIENT
Start: 2019-07-28 | End: 2019-07-30

## 2019-07-28 RX ORDER — TIOTROPIUM BROMIDE 18 UG/1
18 CAPSULE ORAL; RESPIRATORY (INHALATION) DAILY
Status: DISCONTINUED | OUTPATIENT
Start: 2019-07-28 | End: 2019-07-29

## 2019-07-28 RX ORDER — LIDOCAINE HYDROCHLORIDE 10 MG/ML
10 INJECTION INFILTRATION; PERINEURAL ONCE
Status: COMPLETED | OUTPATIENT
Start: 2019-07-28 | End: 2019-07-28

## 2019-07-28 RX ORDER — FERROUS SULFATE 325(65) MG
325 TABLET, DELAYED RELEASE (ENTERIC COATED) ORAL DAILY
Status: DISCONTINUED | OUTPATIENT
Start: 2019-07-28 | End: 2019-08-09 | Stop reason: HOSPADM

## 2019-07-28 RX ORDER — GLUCAGON 1 MG
1 KIT INJECTION
Status: DISCONTINUED | OUTPATIENT
Start: 2019-07-28 | End: 2019-08-09 | Stop reason: HOSPADM

## 2019-07-28 RX ORDER — IPRATROPIUM BROMIDE AND ALBUTEROL SULFATE 2.5; .5 MG/3ML; MG/3ML
3 SOLUTION RESPIRATORY (INHALATION) ONCE
Status: COMPLETED | OUTPATIENT
Start: 2019-07-28 | End: 2019-07-28

## 2019-07-28 RX ORDER — IBUPROFEN 200 MG
24 TABLET ORAL
Status: DISCONTINUED | OUTPATIENT
Start: 2019-07-28 | End: 2019-08-09 | Stop reason: HOSPADM

## 2019-07-28 RX ORDER — IBUPROFEN 200 MG
16 TABLET ORAL
Status: DISCONTINUED | OUTPATIENT
Start: 2019-07-28 | End: 2019-08-09 | Stop reason: HOSPADM

## 2019-07-28 RX ORDER — ACETAMINOPHEN 500 MG
500 TABLET ORAL ONCE
Status: COMPLETED | OUTPATIENT
Start: 2019-07-28 | End: 2019-07-28

## 2019-07-28 RX ORDER — SODIUM CHLORIDE 0.9 % (FLUSH) 0.9 %
10 SYRINGE (ML) INJECTION
Status: DISCONTINUED | OUTPATIENT
Start: 2019-07-28 | End: 2019-07-29

## 2019-07-28 RX ORDER — ROPINIROLE 0.25 MG/1
0.25 TABLET, FILM COATED ORAL NIGHTLY
Status: DISCONTINUED | OUTPATIENT
Start: 2019-07-28 | End: 2019-08-09 | Stop reason: HOSPADM

## 2019-07-28 RX ORDER — LIDOCAINE HYDROCHLORIDE 10 MG/ML
1 INJECTION INFILTRATION; PERINEURAL ONCE
Status: DISCONTINUED | OUTPATIENT
Start: 2019-07-28 | End: 2019-07-28

## 2019-07-28 RX ORDER — NAPROXEN SODIUM 220 MG/1
81 TABLET, FILM COATED ORAL DAILY
Status: DISCONTINUED | OUTPATIENT
Start: 2019-07-28 | End: 2019-08-09 | Stop reason: HOSPADM

## 2019-07-28 RX ORDER — FUROSEMIDE 40 MG/1
40 TABLET ORAL DAILY
Status: DISCONTINUED | OUTPATIENT
Start: 2019-07-28 | End: 2019-07-28

## 2019-07-28 RX ORDER — SODIUM CHLORIDE 0.9 % (FLUSH) 0.9 %
10 SYRINGE (ML) INJECTION
Status: DISCONTINUED | OUTPATIENT
Start: 2019-07-28 | End: 2019-08-05

## 2019-07-28 RX ADMIN — IPRATROPIUM BROMIDE AND ALBUTEROL SULFATE 3 ML: .5; 3 SOLUTION RESPIRATORY (INHALATION) at 06:07

## 2019-07-28 RX ADMIN — HEPARIN SODIUM 5000 UNITS: 5000 INJECTION INTRAVENOUS; SUBCUTANEOUS at 09:07

## 2019-07-28 RX ADMIN — HEPARIN SODIUM 5000 UNITS: 5000 INJECTION INTRAVENOUS; SUBCUTANEOUS at 02:07

## 2019-07-28 RX ADMIN — LIDOCAINE HYDROCHLORIDE 10 ML: 10 INJECTION, SOLUTION INFILTRATION; PERINEURAL at 12:07

## 2019-07-28 RX ADMIN — HEPARIN SODIUM 5000 UNITS: 5000 INJECTION INTRAVENOUS; SUBCUTANEOUS at 06:07

## 2019-07-28 RX ADMIN — LIDOCAINE HYDROCHLORIDE 1 ML: 10 INJECTION, SOLUTION INFILTRATION; PERINEURAL at 07:07

## 2019-07-28 RX ADMIN — LIDOCAINE HYDROCHLORIDE 10 ML: 10 INJECTION, SOLUTION INFILTRATION; PERINEURAL at 04:07

## 2019-07-28 RX ADMIN — TIOTROPIUM BROMIDE 18 MCG: 18 CAPSULE ORAL; RESPIRATORY (INHALATION) at 01:07

## 2019-07-28 RX ADMIN — ROPINIROLE HYDROCHLORIDE 0.25 MG: 0.25 TABLET, FILM COATED ORAL at 10:07

## 2019-07-28 RX ADMIN — THERA TABS 1 TABLET: TAB at 12:07

## 2019-07-28 RX ADMIN — ACETAMINOPHEN 500 MG: 500 TABLET ORAL at 11:07

## 2019-07-28 RX ADMIN — IPRATROPIUM BROMIDE AND ALBUTEROL SULFATE 3 ML: .5; 3 SOLUTION RESPIRATORY (INHALATION) at 07:07

## 2019-07-28 RX ADMIN — IPRATROPIUM BROMIDE AND ALBUTEROL SULFATE 3 ML: .5; 3 SOLUTION RESPIRATORY (INHALATION) at 11:07

## 2019-07-28 RX ADMIN — ASPIRIN 81 MG CHEWABLE TABLET 81 MG: 81 TABLET CHEWABLE at 12:07

## 2019-07-28 RX ADMIN — FERROUS SULFATE TAB EC 325 MG (65 MG FE EQUIVALENT) 325 MG: 325 (65 FE) TABLET DELAYED RESPONSE at 09:07

## 2019-07-28 RX ADMIN — ACETAMINOPHEN 500 MG: 500 TABLET ORAL at 09:07

## 2019-07-28 NOTE — PROCEDURES
Venus Mayer is a 89 y.o. female patient.    Temp: 98.3 °F (36.8 °C) (07/28/19 1205)  Pulse: 78 (07/28/19 1527)  Resp: (!) 22 (07/28/19 1301)  BP: (!) 116/56 (07/28/19 1205)  SpO2: 98 % (07/28/19 1527)  Weight: 40.8 kg (90 lb) (07/28/19 0500)  Height: 5' (152.4 cm) (07/28/19 0500)       Chest Tube Insertion  Date/Time: 7/28/2019 4:48 PM  Performed by: Charisse Foley MD  Authorized by: Charisse Foley MD   Consent Done: Yes  Consent given by: patient  Indications: pneumothorax  Patient sedated: no  Anesthesia: local infiltration    Anesthesia:  Local Anesthetic: lidocaine 1% with epinephrine  Anesthetic total: 15 mL  Preparation: skin prepped with ChloraPrep  Placement location: right anterior  Scalpel size: 11  Tube size: 24 Icelandic  Dissection instrument: Ashleigh clamp  Ultrasound guidance: no  Tension pneumothorax heard: no  Tube connected to: suction  Drainage characteristics: bloody  Suture material: 0 silk  Dressing: 4x4 sterile gauze and petrolatum-impregnated gauze      Charisse Foley MD, PGY-4  General Surgery  [phone]      Charisse Foley  7/28/2019

## 2019-07-28 NOTE — ED NOTES
Patient moved to ED room 3 via ems,  patient assisted onto stretcher and changed into a gown. Patient placed on cardiac monitor, continuous pulse oximetry and automatic blood pressure cuff. Bed placed in low locked position, side rails up x 2, call light is within reach of patient or family, orientation to room and explanation of wait provided to family and patient, alarms set and turned on for monitor and pulse ox, awaiting MD evaluation and orders, will continue to monitor.

## 2019-07-28 NOTE — PLAN OF CARE
Problem: Adult Inpatient Plan of Care  Goal: Plan of Care Review  Outcome: Revised  POC reviewed with patient and family who verbalized understanding. VSS on 2.5 L NC. AAOX4 - decreased hearing w/ hearing aids in place. Remains free of falls and injury.     RT chest tube to water seal with continous air leak, MDs aware. Occasionally wheezing sound heard from chest tube, MDs aware. EKG completed today.    Tolerating cardiac diet, denies nausea. Pt denies pain. Telemetry being monitored running NSR w/ continuous pulse ox - want spo2 >88%. Patient denies chest pain & SOB. SDS in place. No acute events. No distress noted. Bed in lowest position, call light within reach, frequent rounds made for safety.     Respiratory treatments PRN. DNR status changed to partial, still waiting for form to place in chart - MDs are aware w/ IM 1.    WCTM.

## 2019-07-28 NOTE — PROGRESS NOTES
MD re-sutured chest tube bedside with lidocaine, pt tolerated well. Chest tube with continuous bubbling noted. No or decreased wheezing sounds from chest tube. MD's aware. CXR ordered.     Called MD on call for IM 1 about DNR vs DNI status. Reported to MD we need code to be fixed and paper work to be placed in chart. Stressed the importance of having this. Charge nurse aware of situation.     Saw incomplete 12 lead EKG from 2 am this morning, called EKG to see if they can come do this.     Pure wick placed.     WCTM.

## 2019-07-28 NOTE — NURSING
MD on call notified that pt is in room, and that family is requesting to see physician.     O2 goal set for 88-92%, RT in room and assessing pt and resp status.

## 2019-07-28 NOTE — PROGRESS NOTES
Dr. Foley with thoracic bedside and up-sizing chest tube. Nurse lyndon beside, completed time out procedure. Old chest tube removed, pt tolerated well. Lidocaine given and scanned. Pt tolerated well.    CXR ordered. Chest tube placed to wall suction.     WCTM

## 2019-07-28 NOTE — ASSESSMENT & PLAN NOTE
Pigtail catheter in place. Improvement of pneumothorax on 530am chest xray, but tube became kinked.   Follow up repeat chest xray after repositioning.  Maintain chest tube to suction  May still require upsizing to larger chest tube

## 2019-07-28 NOTE — HPI
Venus Mayer is 89 y.o. lady with COPD, (on 2.5 L home O2), 40 pack year smoking history (quit 30 years ago), multiple readmissions for spontaneous pneumothorax, chronic dCHFS/P AICD, PVD/carotid artery stenosis s/p L CEA 08,was brought to the ED because she was SOB.    She was Discharged from [facility] on 7/26/19 after being admitted for pneumothorax, improved after placing CT with return to baseline pulmonary function on discharge.  Then around evening of 7/27 started feeling SOB, not relieved with her Inhaler or Nebulization, progressively worse,  prompting her to come to the Hospital. No chest pain, fever , confusion.    In the ED she was hypoxic, CXR revelaed Right sided Pneumothorax. CTS was consulted and placed a Pig tail catheter, after repositioning showed air leak and patient symptomatically improved and is currently on a non re breather.     The patient's recurrent PTXs seem to have started during her admission on 05/22/2019 where she presented with a large R sided PTX requiring chest tube and pleurodesis on 5/27/2019. Since, she has had a small recurrence of PTX on 6/25/19 not requiring intervention, and admission to OSH 7/6-7/11 for acute on chronic dCHF and COPD exacerbation. She has been less mobile and uses a walker at home.

## 2019-07-28 NOTE — ASSESSMENT & PLAN NOTE
Patient with COPD on homeO2, currently do not suspect any underlying  [place]    - Continue Tiotropium   - Duonebs as needed  - On Supplemental oxygen

## 2019-07-28 NOTE — ASSESSMENT & PLAN NOTE
ECHO on 7/19 shows EF of 65% with DD, PA pressure of %^ and valvular abnormalities  BNP - 1414, but not volume overloaded currently  Mild EKG leak that has peaked suspect secondary to demand.    - Continue Home Lasix 40mg Daily, If she does become volume overloaded then can increase dose  - Monitor I/O, Weights

## 2019-07-28 NOTE — SUBJECTIVE & OBJECTIVE
Past Medical History:   Diagnosis Date    Acute on chronic congestive heart failure 7/6/2019    Cardiomyopathy     Carotid artery occlusion     COPD (chronic obstructive pulmonary disease)     Hyperlipidemia     Hypertension        Past Surgical History:   Procedure Laterality Date    CARDIAC CATHETERIZATION      CAROTID ENDARTERECTOMY         Review of patient's allergies indicates:   Allergen Reactions    Ancef in dextrose (iso-osm) Rash    Cefazolin Rash    Cefuroxime Rash    Sulfamethoxazole-trimethoprim Rash     Other reaction(s): Rash       No current facility-administered medications on file prior to encounter.      Current Outpatient Medications on File Prior to Encounter   Medication Sig    acetaminophen (TYLENOL) 500 MG tablet Take 1,000 mg by mouth daily as needed for Pain.    albuterol (PROAIR HFA) 90 mcg/actuation inhaler Inhale 1 puff into the lungs every 6 (six) hours as needed for Wheezing or Shortness of Breath. Rescue    amLODIPine (NORVASC) 2.5 MG tablet Take 2.5 mg by mouth nightly as needed for SBP greater than. SBP greater than 155    aspirin 81 mg Tab Take 1 tablet by mouth once daily.     ferrous sulfate 325 (65 FE) MG EC tablet Take 1 tablet (325 mg total) by mouth once daily.    furosemide (LASIX) 40 MG tablet Take 1 tablet (40 mg total) by mouth once daily.    multivitamin (THERAGRAN) per tablet Take 1 tablet by mouth once daily.    polyethylene glycol (GLYCOLAX) 17 gram PwPk Take 17 g by mouth once daily. Hold for loose or frequent bowel movements    potassium chloride (MICRO-K) 10 MEQ CpSR Take 10 mEq by mouth once daily.    rOPINIRole (REQUIP) 0.25 MG tablet Take 1 tablet (0.25 mg total) by mouth every evening.    tiotropium (SPIRIVA) 18 mcg inhalation capsule Inhale 1 capsule (18 mcg total) into the lungs once daily.     Family History     Problem Relation (Age of Onset)    Hypertension Mother        Tobacco Use    Smoking status: Former Smoker     Packs/day:  2.00     Years: 20.00     Pack years: 40.00     Types: Cigarettes     Last attempt to quit: 1980     Years since quittin.5    Smokeless tobacco: Never Used   Substance and Sexual Activity    Alcohol use: Yes     Alcohol/week: 1.2 oz     Types: 2 Glasses of wine per week     Comment: social    Drug use: No    Sexual activity: Not Currently     Review of Systems   Constitutional: Negative for chills and fever.   HENT: Negative for sore throat and trouble swallowing.    Respiratory: Positive for cough and shortness of breath.    Cardiovascular: Negative for chest pain, palpitations and leg swelling.   Gastrointestinal: Negative for abdominal distention, abdominal pain, nausea and vomiting. Diarrhea: Loose BM with Mirilax.   Genitourinary: Negative for dysuria and hematuria.   Skin: Negative for rash and wound.   Neurological: Negative for dizziness and headaches.   Psychiatric/Behavioral: Negative for agitation and confusion.     Objective:     Vital Signs (Most Recent):  Pulse: 94 (19 045)  BP: 135/63 (19)  SpO2: (!) 90 % (19) Vital Signs (24h Range):  Pulse:  [] 94  SpO2:  [90 %-100 %] 90 %  BP: (114-184)/(56-90) 135/63     Weight: 40.8 kg (90 lb)  Body mass index is 17.58 kg/m².    Physical Exam   Constitutional: She is oriented to person, place, and time. She appears well-developed and well-nourished.   HENT:   Head: Normocephalic and atraumatic.   Eyes: Pupils are equal, round, and reactive to light. EOM are normal.   Neck: Normal range of motion. No JVD present. No tracheal deviation present.   Cardiovascular: Normal rate, regular rhythm and normal heart sounds.   No murmur heard.  Pulmonary/Chest: She has decreased breath sounds. She has no wheezes. She has no rales.   Abdominal: Soft. Bowel sounds are normal. She exhibits no distension. There is no tenderness.   Musculoskeletal: Normal range of motion. She exhibits no edema.   Neurological: She is alert and  oriented to person, place, and time. No cranial nerve deficit.   Psychiatric: She has a normal mood and affect. Judgment normal.         CRANIAL NERVES     CN III, IV, VI   Pupils are equal, round, and reactive to light.  Extraocular motions are normal.        Significant Labs: All pertinent labs within the past 24 hours have been reviewed.    Significant Imaging: I have reviewed and interpreted all pertinent imaging results/findings within the past 24 hours.

## 2019-07-28 NOTE — ASSESSMENT & PLAN NOTE
89 y.o. lady with h/o COPD with multiple pneumothorax is coming with dyspnea and hypoxia.  Previous placement of CT and Pleurodesis    - CXR shows Right Pneumothorax,   - CTS consulted by ED, Place a Pigtail catheter which showed an Airleak and improvement in symptoms. They will continue to follow, appreciate assistance.   - On non re breather, wean oxygen to maintain saturation of 88-92%  - To have goals of discussion and involve palliative care; as per family currently partial code, no to Intubation will consider DNR in the morning.

## 2019-07-28 NOTE — PROGRESS NOTES
[facility]  Thoracic Surgery  Progress Note    Subjective:     History of Present Illness:  No notes on file    Post-Op Info:  * No surgery found *         Interval History:   Pigtail catheter kinked, after repositioning had significant air out. Patient reports feeling better able to breath after manipulations. Repeat CXR pending.    Medications:  Continuous Infusions:  Scheduled Meds:   ferrous sulfate  325 mg Oral Daily    furosemide  40 mg Oral Daily    heparin (porcine)  5,000 Units Subcutaneous Q8H    rOPINIRole  0.25 mg Oral QHS    tiotropium  18 mcg Inhalation Daily     PRN Meds:albuterol-ipratropium, Dextrose 10% Bolus, Dextrose 10% Bolus, glucagon (human recombinant), glucose, glucose, sodium chloride 0.9%, sodium chloride 0.9%     Review of patient's allergies indicates:   Allergen Reactions    Ancef in dextrose (iso-osm) Rash    Cefazolin Rash    Cefuroxime Rash    Sulfamethoxazole-trimethoprim Rash     Other reaction(s): Rash     Objective:     Vital Signs (Most Recent):  Temp: 97.2 °F (36.2 °C) (07/28/19 0745)  Pulse: 97 (07/28/19 0745)  Resp: (!) 28 (07/28/19 0745)  BP: (!) 143/65 (07/28/19 0745)  SpO2: 97 % (07/28/19 0745) Vital Signs (24h Range):  Temp:  [97.2 °F (36.2 °C)] 97.2 °F (36.2 °C)  Pulse:  [] 97  Resp:  [28-31] 28  SpO2:  [90 %-100 %] 97 %  BP: (114-184)/(56-90) 143/65     Intake/Output - Last 3 Shifts       07/26 0700 - 07/27 0659 07/27 0700 - 07/28 0659 07/28 0700 - 07/29 0659           Stool Occurrence  0 x           SpO2: 97 %  O2 Device (Oxygen Therapy): nasal cannula    Physical Exam   Constitutional: She appears well-developed and well-nourished. No distress.   Cardiovascular: Normal rate and regular rhythm.   Pulmonary/Chest: Effort normal. No respiratory distress.   Chest tube with air leak after tube repositioned  Minimal serosanguinous output   Abdominal: Soft. She exhibits no distension. There is no tenderness.   Skin: Skin is warm and dry.    Psychiatric: She has a normal mood and affect. Her behavior is normal.       Significant Labs:  CBC:   Recent Labs   Lab 07/28/19  0622   WBC 7.99  7.99   RBC 3.84*  3.84*   HGB 11.6*  11.6*   HCT 35.9*  35.9*     168   MCV 94  94   MCH 30.2  30.2   MCHC 32.3  32.3     CMP:   Recent Labs   Lab 07/27/19  2322   *   CALCIUM 10.9*   ALBUMIN 3.4*   PROT 7.1      K 4.0   CO2 37*   CL 88*   BUN 25*   CREATININE 1.0   ALKPHOS 96   ALT 15   AST 31   BILITOT 0.4       Significant Diagnostics:  I have reviewed all pertinent imaging results/findings within the past 24 hours.    VTE Risk Mitigation (From admission, onward)        Ordered     heparin (porcine) injection 5,000 Units  Every 8 hours      07/28/19 0551     IP VTE HIGH RISK PATIENT  Once      07/28/19 0551        Assessment/Plan:     Pneumothorax on right  Pigtail catheter in place. Improvement of pneumothorax on 530am chest xray, but tube became kinked.   Follow up repeat chest xray after repositioning.  Maintain chest tube to suction  May still require upsizing to larger chest tube        Charisse Foley MD  Thoracic Surgery  [facility]

## 2019-07-28 NOTE — H&P
[facility] Hospital Medicine  History & Physical    Patient Name: Venus Mayer  MRN: [id number]  Admission Date: 7/27/2019  Attending Physician: Ezra Escobedo MD   Primary Care Provider: Jona Che MD    Hospital Medicine Team: [facility] MED 1 Zay Farrell MD     Patient information was obtained from patient, past medical records and ER records.     Subjective:     Principal Problem:<principal problem not specified>    Chief Complaint:   Chief Complaint   Patient presents with    Shortness of Breath     ems reports sob - ra sats 70% - placed on 15 l nrb now satting 92% - used home neb tx and rescue inhaler with no relief - reports incread wob - hc of chest tube and collapsed lung to right side- bandage still in place        HPI: Venus Mayer is 89 y.o. lady with COPD, (on 2.5 L home O2), 40 pack year smoking history (quit 30 years ago), multiple readmissions for spontaneous pneumothorax, chronic dCHFS/P AICD, PVD/carotid artery stenosis s/p L CEA 08,was brought to the ED because she was SOB.    She was Discharged from [facility] on 7/26/19 after being admitted for pneumothorax, improved after placing CT with return to baseline pulmonary function on discharge.  Then around evening of 7/27 started feeling SOB, not relieved with her Inhaler or Nebulization, progressively worse,  prompting her to come to the Hospital. No chest pain, fever , confusion.    In the ED she was hypoxic, CXR revelaed Right sided Pneumothorax. CTS was consulted and placed a Pig tail catheter, after repositioning showed air leak and patient symptomatically improved and is currently on a non re breather.     The patient's recurrent PTXs seem to have started during her admission on 05/22/2019 where she presented with a large R sided PTX requiring chest tube and pleurodesis on 5/27/2019. Since, she has had a small recurrence of PTX on 6/25/19 not requiring intervention, and admission to OSH 7/6-7/11 for acute on  chronic dCHF and COPD exacerbation. She has been less mobile and uses a walker at home.     Past Medical History:   Diagnosis Date    Acute on chronic congestive heart failure 7/6/2019    Cardiomyopathy     Carotid artery occlusion     COPD (chronic obstructive pulmonary disease)     Hyperlipidemia     Hypertension        Past Surgical History:   Procedure Laterality Date    CARDIAC CATHETERIZATION      CAROTID ENDARTERECTOMY         Review of patient's allergies indicates:   Allergen Reactions    Ancef in dextrose (iso-osm) Rash    Cefazolin Rash    Cefuroxime Rash    Sulfamethoxazole-trimethoprim Rash     Other reaction(s): Rash       No current facility-administered medications on file prior to encounter.      Current Outpatient Medications on File Prior to Encounter   Medication Sig    acetaminophen (TYLENOL) 500 MG tablet Take 1,000 mg by mouth daily as needed for Pain.    albuterol (PROAIR HFA) 90 mcg/actuation inhaler Inhale 1 puff into the lungs every 6 (six) hours as needed for Wheezing or Shortness of Breath. Rescue    amLODIPine (NORVASC) 2.5 MG tablet Take 2.5 mg by mouth nightly as needed for SBP greater than. SBP greater than 155    aspirin 81 mg Tab Take 1 tablet by mouth once daily.     ferrous sulfate 325 (65 FE) MG EC tablet Take 1 tablet (325 mg total) by mouth once daily.    furosemide (LASIX) 40 MG tablet Take 1 tablet (40 mg total) by mouth once daily.    multivitamin (THERAGRAN) per tablet Take 1 tablet by mouth once daily.    polyethylene glycol (GLYCOLAX) 17 gram PwPk Take 17 g by mouth once daily. Hold for loose or frequent bowel movements    potassium chloride (MICRO-K) 10 MEQ CpSR Take 10 mEq by mouth once daily.    rOPINIRole (REQUIP) 0.25 MG tablet Take 1 tablet (0.25 mg total) by mouth every evening.    tiotropium (SPIRIVA) 18 mcg inhalation capsule Inhale 1 capsule (18 mcg total) into the lungs once daily.     Family History     Problem Relation (Age of Onset)     Hypertension Mother        Tobacco Use    Smoking status: Former Smoker     Packs/day: 2.00     Years: 20.00     Pack years: 40.00     Types: Cigarettes     Last attempt to quit: 1980     Years since quittin.5    Smokeless tobacco: Never Used   Substance and Sexual Activity    Alcohol use: Yes     Alcohol/week: 1.2 oz     Types: 2 Glasses of wine per week     Comment: social    Drug use: No    Sexual activity: Not Currently     Review of Systems   Constitutional: Negative for chills and fever.   HENT: Negative for sore throat and trouble swallowing.    Respiratory: Positive for cough and shortness of breath.    Cardiovascular: Negative for chest pain, palpitations and leg swelling.   Gastrointestinal: Negative for abdominal distention, abdominal pain, nausea and vomiting. Diarrhea: Loose BM with Mirilax.   Genitourinary: Negative for dysuria and hematuria.   Skin: Negative for rash and wound.   Neurological: Negative for dizziness and headaches.   Psychiatric/Behavioral: Negative for agitation and confusion.     Objective:     Vital Signs (Most Recent):  Pulse: 94 (19 045)  BP: 135/63 (19)  SpO2: (!) 90 % (19) Vital Signs (24h Range):  Pulse:  [] 94  SpO2:  [90 %-100 %] 90 %  BP: (114-184)/(56-90) 135/63     Weight: 40.8 kg (90 lb)  Body mass index is 17.58 kg/m².    Physical Exam   Constitutional: She is oriented to person, place, and time. She appears well-developed and well-nourished.   HENT:   Head: Normocephalic and atraumatic.   Eyes: Pupils are equal, round, and reactive to light. EOM are normal.   Neck: Normal range of motion. No JVD present. No tracheal deviation present.   Cardiovascular: Normal rate, regular rhythm and normal heart sounds.   No murmur heard.  Pulmonary/Chest: She has decreased breath sounds. She has no wheezes. She has no rales.   Abdominal: Soft. Bowel sounds are normal. She exhibits no distension. There is no tenderness.    Musculoskeletal: Normal range of motion. She exhibits no edema.   Neurological: She is alert and oriented to person, place, and time. No cranial nerve deficit.   Psychiatric: She has a normal mood and affect. Judgment normal.         CRANIAL NERVES     CN III, IV, VI   Pupils are equal, round, and reactive to light.  Extraocular motions are normal.        Significant Labs: All pertinent labs within the past 24 hours have been reviewed.    Significant Imaging: I have reviewed and interpreted all pertinent imaging results/findings within the past 24 hours.    Assessment/Plan:     Restless leg syndrome    Continue Ropinirole     Pneumothorax on right  89 y.o. lady with h/o COPD with multiple pneumothorax is coming with dyspnea and hypoxia.  Previous placement of CT and Pleurodesis    - CXR shows Right Pneumothorax,   - CTS consulted by ED, Place a Pigtail catheter which showed an Airleak and improvement in symptoms. They will continue to follow, appreciate assistance.   - On non re breather, wean oxygen to maintain saturation of 88-92%  - To have goals of discussion and involve palliative care; as per family currently partial code, no to Intubation will consider DNR in the morning.               COLD (chronic obstructive lung disease)  Patient with COPD on homeO2, currently do not suspect any underlying  [place]    - Continue Tiotropium   - Duonebs as needed  - On Supplemental oxygen      Heart failure, diastolic  ECHO on 7/19 shows EF of 65% with DD, PA pressure of %^ and valvular abnormalities  BNP - 1414, but not volume overloaded currently  Mild EKG leak that has peaked suspect secondary to demand.    - Continue Home Lasix 40mg Daily, If she does become volume overloaded then can increase dose  - Monitor I/O, Weights          VTE Risk Mitigation (From admission, onward)        Ordered     heparin (porcine) injection 5,000 Units  Every 8 hours      07/28/19 0551     IP VTE HIGH RISK PATIENT  Once      07/28/19  0551     Place YANIRA hose  Until discontinued      07/28/19 0139             Zay Farrell MD  Department of [facility] Medicine   [facility]

## 2019-07-28 NOTE — SUBJECTIVE & OBJECTIVE
Interval History:   Pigtail catheter kinked, after repositioning had significant air out. Patient reports feeling better able to breath after manipulations. Repeat CXR pending.    Medications:  Continuous Infusions:  Scheduled Meds:   ferrous sulfate  325 mg Oral Daily    furosemide  40 mg Oral Daily    heparin (porcine)  5,000 Units Subcutaneous Q8H    rOPINIRole  0.25 mg Oral QHS    tiotropium  18 mcg Inhalation Daily     PRN Meds:albuterol-ipratropium, Dextrose 10% Bolus, Dextrose 10% Bolus, glucagon (human recombinant), glucose, glucose, sodium chloride 0.9%, sodium chloride 0.9%     Review of patient's allergies indicates:   Allergen Reactions    Ancef in dextrose (iso-osm) Rash    Cefazolin Rash    Cefuroxime Rash    Sulfamethoxazole-trimethoprim Rash     Other reaction(s): Rash     Objective:     Vital Signs (Most Recent):  Temp: 97.2 °F (36.2 °C) (07/28/19 0745)  Pulse: 97 (07/28/19 0745)  Resp: (!) 28 (07/28/19 0745)  BP: (!) 143/65 (07/28/19 0745)  SpO2: 97 % (07/28/19 0745) Vital Signs (24h Range):  Temp:  [97.2 °F (36.2 °C)] 97.2 °F (36.2 °C)  Pulse:  [] 97  Resp:  [28-31] 28  SpO2:  [90 %-100 %] 97 %  BP: (114-184)/(56-90) 143/65     Intake/Output - Last 3 Shifts       07/26 0700 - 07/27 0659 07/27 0700 - 07/28 0659 07/28 0700 - 07/29 0659           Stool Occurrence  0 x           SpO2: 97 %  O2 Device (Oxygen Therapy): nasal cannula    Physical Exam   Constitutional: She appears well-developed and well-nourished. No distress.   Cardiovascular: Normal rate and regular rhythm.   Pulmonary/Chest: Effort normal. No respiratory distress.   Chest tube with air leak after tube repositioned  Minimal serosanguinous output   Abdominal: Soft. She exhibits no distension. There is no tenderness.   Skin: Skin is warm and dry.   Psychiatric: She has a normal mood and affect. Her behavior is normal.       Significant Labs:  CBC:   Recent Labs   Lab 07/28/19 0622   WBC 7.99  7.99   RBC 3.84*  3.84*    HGB 11.6*  11.6*   HCT 35.9*  35.9*     168   MCV 94  94   MCH 30.2  30.2   MCHC 32.3  32.3     CMP:   Recent Labs   Lab 07/27/19  2322   *   CALCIUM 10.9*   ALBUMIN 3.4*   PROT 7.1      K 4.0   CO2 37*   CL 88*   BUN 25*   CREATININE 1.0   ALKPHOS 96   ALT 15   AST 31   BILITOT 0.4       Significant Diagnostics:  I have reviewed all pertinent imaging results/findings within the past 24 hours.    VTE Risk Mitigation (From admission, onward)        Ordered     heparin (porcine) injection 5,000 Units  Every 8 hours      07/28/19 0551     IP VTE HIGH RISK PATIENT  Once      07/28/19 0551

## 2019-07-28 NOTE — CONSULTS
[facility]  General Surgery  Consult Note    Consults  Subjective:     History of Present Illness: Venus Mayer is a 89 y.o. female with a hx of COPD and multiple readmissions for spontaneous pneumothorax. Numerous attempts at pleurodesis have been performed. Patient is not a surgical candidate. She presents to the ED with acute onset shortness of breath. Imaging showed large right sided pneumothorax. Pt otherwise stable.     No current facility-administered medications on file prior to encounter.      Current Outpatient Medications on File Prior to Encounter   Medication Sig    acetaminophen (TYLENOL) 500 MG tablet Take 1,000 mg by mouth daily as needed for Pain.    albuterol (PROAIR HFA) 90 mcg/actuation inhaler Inhale 1 puff into the lungs every 6 (six) hours as needed for Wheezing or Shortness of Breath. Rescue    amLODIPine (NORVASC) 2.5 MG tablet Take 2.5 mg by mouth nightly as needed for SBP greater than. SBP greater than 155    aspirin 81 mg Tab Take 1 tablet by mouth once daily.     ferrous sulfate 325 (65 FE) MG EC tablet Take 1 tablet (325 mg total) by mouth once daily.    furosemide (LASIX) 40 MG tablet Take 1 tablet (40 mg total) by mouth once daily.    multivitamin (THERAGRAN) per tablet Take 1 tablet by mouth once daily.    polyethylene glycol (GLYCOLAX) 17 gram PwPk Take 17 g by mouth once daily. Hold for loose or frequent bowel movements    potassium chloride (MICRO-K) 10 MEQ CpSR Take 10 mEq by mouth once daily.    rOPINIRole (REQUIP) 0.25 MG tablet Take 1 tablet (0.25 mg total) by mouth every evening.    tiotropium (SPIRIVA) 18 mcg inhalation capsule Inhale 1 capsule (18 mcg total) into the lungs once daily.       Review of patient's allergies indicates:   Allergen Reactions    Ancef in dextrose (iso-osm) Rash    Cefazolin Rash    Cefuroxime Rash    Sulfamethoxazole-trimethoprim Rash     Other reaction(s): Rash       Past Medical History:   Diagnosis Date     Acute on chronic congestive heart failure 2019    Cardiomyopathy     Carotid artery occlusion     COPD (chronic obstructive pulmonary disease)     Hyperlipidemia     Hypertension      Past Surgical History:   Procedure Laterality Date    CARDIAC CATHETERIZATION      CAROTID ENDARTERECTOMY       Family History     Problem Relation (Age of Onset)    Hypertension Mother        Tobacco Use    Smoking status: Former Smoker     Packs/day: 2.00     Years: 20.00     Pack years: 40.00     Types: Cigarettes     Last attempt to quit: 1980     Years since quittin.5    Smokeless tobacco: Never Used   Substance and Sexual Activity    Alcohol use: Yes     Alcohol/week: 1.2 oz     Types: 2 Glasses of wine per week     Comment: social    Drug use: No    Sexual activity: Not Currently     Review of Systems   Respiratory: Positive for shortness of breath.    Cardiovascular: Negative for chest pain.   Gastrointestinal: Negative for abdominal pain.     Objective:     Vital Signs (Most Recent):  Pulse: 85 (19 033)  BP: 130/63 (19)  SpO2: 100 % (19) Vital Signs (24h Range):  Pulse:  [] 85  SpO2:  [97 %-100 %] 100 %  BP: (114-184)/(56-90) 130/63        There is no height or weight on file to calculate BMI.    No intake or output data in the 24 hours ending 19    Physical Exam   Constitutional: She is oriented to person, place, and time. She appears well-developed and well-nourished. No distress.   HENT:   Head: Normocephalic and atraumatic.   Cardiovascular: Normal rate.   Pulmonary/Chest: No respiratory distress.   On supplemental oxygen   Abdominal: Soft. She exhibits no distension.   Neurological: She is alert and oriented to person, place, and time.   Psychiatric: She has a normal mood and affect. Her behavior is normal.       Significant Labs:  ABGs: No results for input(s): PH, PCO2, PO2, HCO3, POCSATURATED, BE in the last 48 hours.  CBC:   Recent Labs   Lab  07/27/19  2322   WBC 8.09   RBC 4.22   HGB 12.6   HCT 41.2      MCV 98   MCH 29.9   MCHC 30.6*     CMP:   Recent Labs   Lab 07/27/19  2322   *   CALCIUM 10.9*   ALBUMIN 3.4*   PROT 7.1      K 4.0   CO2 37*   CL 88*   BUN 25*   CREATININE 1.0   ALKPHOS 96   ALT 15   AST 31   BILITOT 0.4       Significant Diagnostics:  I have reviewed all pertinent imaging results/findings within the past 24 hours.    Assessment/Plan:     Active Diagnoses:    Diagnosis Date Noted POA    Pneumothorax on right [J93.9] 07/20/2019 Yes      Problems Resolved During this Admission:     Venus Mayer is a 89 y.o. female with multiple co-morbidities and repeat spontaneous pneumothoraces     Pigtail catheter placed in ED  Initially no resolution, however chest tube found to be kinked  On repositioning large air leak noted  Will follow up with repeat chest xray shortly  Admit to medicine  Will follow along      Thank you for your consult. I will follow-up with patient. Please contact us if you have any additional questions.    Juan Luis Gooden MD  General Surgery  [facility]

## 2019-07-28 NOTE — PLAN OF CARE
Problem: Adult Inpatient Plan of Care  Goal: Plan of Care Review  Outcome: Ongoing (interventions implemented as appropriate)  POC discussed with pt. And verbalized understanding. AAOx4, VSS with 3L NC and telemetry in place with NSR and continuous pulse ox. R chest tube to wall suction, air leak at insertion site, MDs aware. Pt uses a walker and assistance to ambulate, incontinent of bladder, no N/V reported. Remains free of falls and injury. Safety precautions maintained, call light within reach, will continue to monitor.

## 2019-07-28 NOTE — ED PROVIDER NOTES
Encounter Date: 2019       History     Chief Complaint   Patient presents with    Shortness of Breath     ems reports sob - ra sats 70% - placed on 15 l nrb now satting 92% - used home neb tx and rescue inhaler with no relief - reports incread wob - hc of chest tube and collapsed lung to right side- bandage still in place     Ms. Mayer is a 89F with PMH of severe COPD on 2L home O2, recent frequent right sided PTX requiring CT placement including a tension PTX during her last admission, prior pleurodesis BIB EMS for worsening DANGELO for one day, found to be hypoxic with reduced breath sounds on the right side.  Placed on NRM with improvement in O2 saturations. Associated CP.  Mentation normal on presentation.         Review of patient's allergies indicates:   Allergen Reactions    Ancef in dextrose (iso-osm) Rash    Cefazolin Rash    Cefuroxime Rash    Sulfamethoxazole-trimethoprim Rash     Other reaction(s): Rash     Past Medical History:   Diagnosis Date    Acute on chronic congestive heart failure 2019    Cardiomyopathy     Carotid artery occlusion     COPD (chronic obstructive pulmonary disease)     Hyperlipidemia     Hypertension      Past Surgical History:   Procedure Laterality Date    CARDIAC CATHETERIZATION      CAROTID ENDARTERECTOMY       Family History   Problem Relation Age of Onset    Hypertension Mother      Social History     Tobacco Use    Smoking status: Former Smoker     Packs/day: 2.00     Years: 20.00     Pack years: 40.00     Types: Cigarettes     Last attempt to quit: 1980     Years since quittin.5    Smokeless tobacco: Never Used   Substance Use Topics    Alcohol use: Yes     Alcohol/week: 1.2 oz     Types: 2 Glasses of wine per week     Comment: social    Drug use: No     Review of Systems   Constitutional: Negative for chills and fever.   HENT: Negative for congestion and sore throat.    Eyes: Negative for photophobia and visual disturbance.   Respiratory:  Positive for cough, chest tightness and shortness of breath.    Cardiovascular: Negative for chest pain and palpitations.   Gastrointestinal: Negative for blood in stool, diarrhea, nausea and vomiting.   Genitourinary: Negative for dysuria and flank pain.   Musculoskeletal: Negative for back pain.   Skin: Negative for rash.   Neurological: Negative for weakness and light-headedness.   Hematological: Does not bruise/bleed easily.   Psychiatric/Behavioral: Negative for agitation and behavioral problems.   All other systems reviewed and are negative.      Physical Exam     Initial Vitals   BP Pulse Resp Temp SpO2   07/27/19 2326 07/27/19 2321 -- -- 07/27/19 2321   (!) 184/90 (!) 113   97 %      MAP       --                Physical Exam    Nursing note and vitals reviewed.  Constitutional: She appears well-developed and well-nourished.   HENT:   Head: Normocephalic and atraumatic.   Eyes: EOM are normal. Pupils are equal, round, and reactive to light.   Neck: Normal range of motion.   Cardiovascular: Normal heart sounds and intact distal pulses.   Pulmonary/Chest: She is in respiratory distress.   Decreased BS right side.  Comfortable on NRB mask with sats 100%.   Abdominal: Soft. Bowel sounds are normal.   Musculoskeletal: Normal range of motion. She exhibits no tenderness.   Neurological: She is alert and oriented to person, place, and time.   Skin: Skin is warm and dry.   Psychiatric: She has a normal mood and affect. Thought content normal.         ED Course   Chest Tube  Date/Time: 7/28/2019 1:20 AM  Location procedure was performed: [facility] EMERGENCY DEPARTMENT  Performed by: Leeanna Mead MD  Authorized by: Yang Block MD   Post-operative diagnosis: pneumothorax  Pre-operative diagnosis: pneumothorax  Consent Done: Yes  Consent: Written consent obtained.  Consent given by: patient  Patient understanding: patient states understanding of the procedure being performed  Patient consent: the patient's  "understanding of the procedure matches consent given  Procedure consent: procedure consent matches procedure scheduled  Relevant documents: relevant documents present and verified  Site marked: the operative site was marked  Imaging studies: imaging studies available  Patient identity confirmed:  and MRN  Time out: Immediately prior to procedure a "time out" was called to verify the correct patient, procedure, equipment, support staff and site/side marked as required.  Indications: pneumothorax  Patient sedated: no  Anesthesia: local infiltration    Anesthesia:  Local Anesthetic: lidocaine 1% without epinephrine  Anesthetic total: 5 mL  Preparation: skin prepped with ChloraPrep  Placement location: right anterior  Scalpel size: 11  Tube size: 8 (8.5) Kinyarwanda  Tension pneumothorax heard: no  Tube connected to: suction  Suture material: 2-0 silk  Dressing: petrolatum-impregnated gauze and 4x4 sterile gauze  Post-insertion x-ray findings: tube in good position  Patient tolerance: Patient tolerated the procedure well with no immediate complications  Complications: No  Specimens: No  Implants: No  Comments: Small skin hematoma that resolved with direct pressure. Lowest O2 saturation during procedure was 100%    Critical Care  Date/Time: 2019 12:30 AM  Performed by: Yang Block MD  Authorized by: Yang Block MD   Direct patient critical care time: 20 minutes  Additional history critical care time: 5 minutes  Ordering / reviewing critical care time: 10 minutes  Documentation critical care time: 5 minutes  Consulting other physicians critical care time: 15 minutes  Consult with family critical care time: 10 minutes  Total critical care time (exclusive of procedural time) : 65 minutes  Critical care time was exclusive of teaching time.  Critical care was necessary to treat or prevent imminent or life-threatening deterioration of the following conditions: trauma.  Critical care was time spent " personally by me on the following activities: development of treatment plan with patient or surrogate, discussions with consultants, interpretation of cardiac output measurements, evaluation of patient's response to treatment, examination of patient, obtaining history from patient or surrogate, ordering and review of laboratory studies, ordering and performing treatments and interventions, ordering and review of radiographic studies, pulse oximetry, re-evaluation of patient's condition and review of old charts.        Labs Reviewed   TROPONIN I - Abnormal; Notable for the following components:       Result Value    Troponin I 0.176 (*)     All other components within normal limits    Narrative:     ADD ON BNP PER DR COLLETTE SILVA/ORDER# [id number] @ 23:36 7/27/19   CBC W/ AUTO DIFFERENTIAL - Abnormal; Notable for the following components:    Mean Corpuscular Hemoglobin Conc 30.6 (*)     Lymph # 0.9 (*)     Gran% 76.9 (*)     Lymph% 11.5 (*)     All other components within normal limits    Narrative:     ADD ON BNP PER DR COLLETTE SILVA/ORDER# [id number] @ 23:36 7/27/19   COMPREHENSIVE METABOLIC PANEL - Abnormal; Notable for the following components:    Chloride 88 (*)     CO2 37 (*)     Glucose 161 (*)     BUN, Bld 25 (*)     Calcium 10.9 (*)     Albumin 3.4 (*)     eGFR if  57.3 (*)     eGFR if non  49.7 (*)     All other components within normal limits    Narrative:     ADD ON BNP PER DR COLLETTE SILVA/ORDER# [id number] @ 23:36 7/27/19   B-TYPE NATRIURETIC PEPTIDE - Abnormal; Notable for the following components:    BNP 1,141 (*)     All other components within normal limits    Narrative:     ADD ON BNP PER DR COLLETTE SILVA/ORDER# [id number] @ 23:36 7/27/19   B-TYPE NATRIURETIC PEPTIDE          Imaging Results          X-Ray Chest AP Portable (In process)                 CT Chest Without Contrast (Final result)  Result time 07/28/19 00:11:28    Final result by Akhil  LINDSEY Garland MD (07/28/19 00:11:28)                 Impression:      Large right pneumothorax noted, there is no evidence for left-sided pneumothorax.    Small right pleural effusion.    The right lung demonstrates areas of opacity that may relate to volume loss and atelectasis however underlying pulmonary nodules or masses would be in the differential, follow-up is recommended.    The left lung demonstrates chronic change, there is also a spiculated focus at the left apex for which follow-up is recommended.    Prominent pretracheal lymph node.    This report was flagged in Epic as abnormal.      Electronically signed by: Akhil Garland  Date:    07/28/2019  Time:    00:11             Narrative:    EXAMINATION:  CT CHEST WITHOUT CONTRAST    CLINICAL HISTORY:  pneumothorax;    TECHNIQUE:  Low dose axial images, sagittal and coronal reformations were obtained from the thoracic inlet to the lung bases. Contrast was not administered.    COMPARISON:  Chest radiograph July 27, 2019    FINDINGS:  Axial noncontrast CT examination of the chest was performed.  Intravenous contrast was not utilized this diminishes sensitivity of the exam.  Axial imaging, sagittal and coronal reconstruction imaging is submitted.  There is no prior chest CT available comparison.    As on the radiograph of the same date there is a large right-sided pneumothorax noted.  The right lung demonstrates chronic change with emphysematous change noted, there are also areas of opacity associated with the right lung this may relate to volume loss and atelectasis however underlying areas of infiltrate or even nodules or masses cannot be excluded and therefore follow-up is recommended.    The left lung demonstrates chronic change there are emphysematous changes noted there is a spiculated focus at the apex that could represent scarring however a spiculated nodule would be in the differential, and therefore follow-up is recommended.  There is additional  appearance thought to represent chronic change and scarring involving the left upper lobe with some density that may relate to mineralization/calcification.  There is no evidence for left-sided pneumothorax or significant pulmonary infiltrate or pleural fluid.  There is a small right pleural effusion noted.    Cardiac pacemaker noted.  There is no evidence for pericardial effusion.  Mitral valve calcification is noted.  The aorta demonstrates atherosclerotic change, the heart and great vessels are not well evaluated on this exam.  There is a prominent pretracheal lymph node noted measuring approximately 9.2 x 15.2 mm.  Limited imaging of the upper abdomen demonstrates no evidence for acute upper abdominal process.  The visualized osseous structures appear intact, chronic changes are noted including appearance of remote left rib fractures.                               X-Ray Chest AP Portable (Final result)  Result time 07/27/19 23:44:43    Final result by Lavell Ford MD (07/27/19 23:44:43)                 Impression:      Interval removal of right-sided chest tube.  New moderate right-sided multicompartmental pneumothorax.  No significant midline shift.    COMMUNICATION  This critical result was discovered/received at 23:40 hours on 07/27/2019.  The critical information above was relayed directly by me by telephone to Dr. Mead on 07/27/2019 at 23:42 hours.      Electronically signed by: Lavell Ford MD  Date:    07/27/2019  Time:    23:44             Narrative:    EXAMINATION:  XR CHEST AP PORTABLE    CLINICAL HISTORY:  Shortness of breath    TECHNIQUE:  Single frontal view of the chest was performed.    COMPARISON:  07/25/2019.    FINDINGS:  There has been interval removal of the right-sided chest tube.  There is unchanged appearance of a left-sided dual lead pacemaker.  Monitoring EKG leads are present.    The trachea is unremarkable.  There are calcifications of the aortic knob.  The cardiomediastinal  silhouette is unchanged.  There is flattening of the right hemidiaphragm.  The left hemidiaphragm is unremarkable.  There is no evidence of free air beneath the hemidiaphragms.  There is a moderate multi compartmental right-sided pneumothorax.  No pneumothorax is identified on the left.  There are fibrotic changes throughout the lung fields most prominent in the upper lung zones.  There are degenerative changes in the osseous structures.                                 Medical Decision Making:   History:   I obtained history from: someone other than patient and EMS provider.  Old Medical Records: I decided to obtain old medical records.  Old Records Summarized: records from clinic visits, records from previous admission(s) and records from another hospital.  Initial Assessment:   PGY-3 MDM    Assessment:  89F with COPD, recurrent right sided PTX requiring pleurodesis in the past presenting with acute onset SOB and hypoxia. Sats improved on NRB mask.  Ddx: PTX, tension ptx, blebs, ACS  Workup: basic labs, trop, bnp, EKG.  Stat CXR shows complex PTX no current tension PTX.  Consulted surgery, who requested a CT chest prior to thoracostomy since she's had pleurodesis in the past.   Dispo:   Pending work-up and and re-evaluation.  Anticipate CT placement and admission    Case discussed with Dr. Mckayla Mead  Internal Medicine/Emergency Medicine, PGY-3  11:44 PM      Clinical Tests:   Lab Tests: Ordered and Reviewed  Radiological Study: Ordered and Reviewed  Medical Tests: Ordered and Reviewed  Other:   I have discussed this case with another health care provider.     Update:  CT chest showed large right side PTX.  Chest tube placed and post CXR ordered confirmed placement. AM CXR ordered; surgery will manage CT while in house.  Patient says she feels better after tube placement. Trop, BNP mildly elevated, eKG without ischemic changes. Repeat trop ordered for 0230.  Will admit to hospital medicine step  down unit.       Leeanna Mead MD  Internal Medicine/Emergency Medicine, PGY-3  1:42 AM          Attending Attestation:   Physician Attestation Statement for Resident:  As the supervising MD   Physician Attestation Statement: I have personally seen and examined this patient.   I agree with the above history. -: 89-year-old woman with history of recurrent pneumothoraces presents by EMS for evaluation of recurrent shortness of breath and right-sided chest wall pain   As the supervising MD I agree with the above PE.    As the supervising MD I agree with the above treatment, course, plan, and disposition.  I was personally present during the entire procedure.  I have reviewed and agree with the residents interpretation of the following: CT scans, x-rays and lab data.                    ED Course as of Jul 28 0134   Sun Jul 28, 2019   0129 Troponin I(!): 0.176 [CH]   0129 BNP(!): 1,141 [CH]   0129 CO2(!): 37 [CH]      ED Course User Index  [CH] Leeanna Mead MD     Clinical Impression:       ICD-10-CM ICD-9-CM   1. Pneumothorax on right J93.9 512.89   2. SOB (shortness of breath) R06.02 786.05   3. Hypoxia R09.02 799.02   4. Clotted chest tube, initial encounter T85.698A 996.59         Disposition:   Disposition: Admitted  Condition: Fair                        Leeanna Mead MD  Resident  07/28/19 0143       Yang Block MD  07/28/19 0240

## 2019-07-29 PROBLEM — Z51.5 PALLIATIVE CARE ENCOUNTER: Status: ACTIVE | Noted: 2019-07-29

## 2019-07-29 LAB
BASOPHILS # BLD AUTO: 0.04 K/UL (ref 0–0.2)
BASOPHILS NFR BLD: 0.6 % (ref 0–1.9)
DIFFERENTIAL METHOD: ABNORMAL
EOSINOPHIL # BLD AUTO: 0.4 K/UL (ref 0–0.5)
EOSINOPHIL NFR BLD: 6.1 % (ref 0–8)
ERYTHROCYTE [DISTWIDTH] IN BLOOD BY AUTOMATED COUNT: 14.1 % (ref 11.5–14.5)
HCT VFR BLD AUTO: 33 % (ref 37–48.5)
HGB BLD-MCNC: 10.6 G/DL (ref 12–16)
IMM GRANULOCYTES # BLD AUTO: 0.01 K/UL (ref 0–0.04)
IMM GRANULOCYTES NFR BLD AUTO: 0.2 % (ref 0–0.5)
LYMPHOCYTES # BLD AUTO: 1.2 K/UL (ref 1–4.8)
LYMPHOCYTES NFR BLD: 19.6 % (ref 18–48)
MAGNESIUM SERPL-MCNC: 2.2 MG/DL (ref 1.6–2.6)
MCH RBC QN AUTO: 30.5 PG (ref 27–31)
MCHC RBC AUTO-ENTMCNC: 32.1 G/DL (ref 32–36)
MCV RBC AUTO: 95 FL (ref 82–98)
MONOCYTES # BLD AUTO: 0.9 K/UL (ref 0.3–1)
MONOCYTES NFR BLD: 13.6 % (ref 4–15)
NEUTROPHILS # BLD AUTO: 3.7 K/UL (ref 1.8–7.7)
NEUTROPHILS NFR BLD: 59.9 % (ref 38–73)
NRBC BLD-RTO: 0 /100 WBC
PLATELET # BLD AUTO: 163 K/UL (ref 150–350)
PMV BLD AUTO: 10.2 FL (ref 9.2–12.9)
RBC # BLD AUTO: 3.47 M/UL (ref 4–5.4)
WBC # BLD AUTO: 6.24 K/UL (ref 3.9–12.7)

## 2019-07-29 PROCEDURE — 99232 PR SUBSEQUENT HOSPITAL CARE,LEVL II: ICD-10-PCS | Mod: GC,,, | Performed by: HOSPITALIST

## 2019-07-29 PROCEDURE — 83735 ASSAY OF MAGNESIUM: CPT

## 2019-07-29 PROCEDURE — 63600175 PHARM REV CODE 636 W HCPCS: Performed by: STUDENT IN AN ORGANIZED HEALTH CARE EDUCATION/TRAINING PROGRAM

## 2019-07-29 PROCEDURE — 99223 PR INITIAL HOSPITAL CARE,LEVL III: ICD-10-PCS | Mod: ,,, | Performed by: CLINICAL NURSE SPECIALIST

## 2019-07-29 PROCEDURE — 25000242 PHARM REV CODE 250 ALT 637 W/ HCPCS: Performed by: STUDENT IN AN ORGANIZED HEALTH CARE EDUCATION/TRAINING PROGRAM

## 2019-07-29 PROCEDURE — 20600001 HC STEP DOWN PRIVATE ROOM

## 2019-07-29 PROCEDURE — 99223 1ST HOSP IP/OBS HIGH 75: CPT | Mod: ,,, | Performed by: CLINICAL NURSE SPECIALIST

## 2019-07-29 PROCEDURE — 94640 AIRWAY INHALATION TREATMENT: CPT

## 2019-07-29 PROCEDURE — 25000003 PHARM REV CODE 250: Performed by: STUDENT IN AN ORGANIZED HEALTH CARE EDUCATION/TRAINING PROGRAM

## 2019-07-29 PROCEDURE — 99232 SBSQ HOSP IP/OBS MODERATE 35: CPT | Mod: GC,,, | Performed by: HOSPITALIST

## 2019-07-29 PROCEDURE — 94761 N-INVAS EAR/PLS OXIMETRY MLT: CPT

## 2019-07-29 PROCEDURE — 85025 COMPLETE CBC W/AUTO DIFF WBC: CPT

## 2019-07-29 PROCEDURE — 36415 COLL VENOUS BLD VENIPUNCTURE: CPT

## 2019-07-29 PROCEDURE — 25000003 PHARM REV CODE 250: Performed by: HOSPITALIST

## 2019-07-29 PROCEDURE — 27000221 HC OXYGEN, UP TO 24 HOURS

## 2019-07-29 RX ORDER — ALBUTEROL SULFATE 0.83 MG/ML
2.5 SOLUTION RESPIRATORY (INHALATION) EVERY 6 HOURS PRN
Status: ON HOLD | COMMUNITY
End: 2020-10-13 | Stop reason: HOSPADM

## 2019-07-29 RX ORDER — ALBUTEROL SULFATE 90 UG/1
2 AEROSOL, METERED RESPIRATORY (INHALATION) EVERY 4 HOURS PRN
Status: DISCONTINUED | OUTPATIENT
Start: 2019-07-29 | End: 2019-08-09 | Stop reason: HOSPADM

## 2019-07-29 RX ORDER — ACETAMINOPHEN 500 MG
1000 TABLET ORAL EVERY 6 HOURS
Status: DISCONTINUED | OUTPATIENT
Start: 2019-07-29 | End: 2019-07-29

## 2019-07-29 RX ORDER — ACETAMINOPHEN 500 MG
500 TABLET ORAL EVERY 4 HOURS PRN
Status: DISCONTINUED | OUTPATIENT
Start: 2019-07-29 | End: 2019-07-29

## 2019-07-29 RX ORDER — POTASSIUM CHLORIDE 750 MG/1
10 CAPSULE, EXTENDED RELEASE ORAL DAILY
Status: DISCONTINUED | OUTPATIENT
Start: 2019-07-30 | End: 2019-08-09 | Stop reason: HOSPADM

## 2019-07-29 RX ORDER — DOCUSATE SODIUM 100 MG/1
100 CAPSULE, LIQUID FILLED ORAL DAILY PRN
COMMUNITY
End: 2019-09-06

## 2019-07-29 RX ORDER — FUROSEMIDE 40 MG/1
40 TABLET ORAL DAILY
Status: DISCONTINUED | OUTPATIENT
Start: 2019-07-29 | End: 2019-08-09 | Stop reason: HOSPADM

## 2019-07-29 RX ORDER — ACETAMINOPHEN 500 MG
1000 TABLET ORAL EVERY 8 HOURS
Status: DISCONTINUED | OUTPATIENT
Start: 2019-07-29 | End: 2019-08-05

## 2019-07-29 RX ADMIN — FUROSEMIDE 40 MG: 40 TABLET ORAL at 04:07

## 2019-07-29 RX ADMIN — ACETAMINOPHEN 1000 MG: 500 TABLET ORAL at 09:07

## 2019-07-29 RX ADMIN — ASPIRIN 81 MG CHEWABLE TABLET 81 MG: 81 TABLET CHEWABLE at 08:07

## 2019-07-29 RX ADMIN — IPRATROPIUM BROMIDE AND ALBUTEROL SULFATE 3 ML: .5; 3 SOLUTION RESPIRATORY (INHALATION) at 12:07

## 2019-07-29 RX ADMIN — ROPINIROLE HYDROCHLORIDE 0.25 MG: 0.25 TABLET, FILM COATED ORAL at 10:07

## 2019-07-29 RX ADMIN — HEPARIN SODIUM 5000 UNITS: 5000 INJECTION INTRAVENOUS; SUBCUTANEOUS at 06:07

## 2019-07-29 RX ADMIN — HEPARIN SODIUM 5000 UNITS: 5000 INJECTION INTRAVENOUS; SUBCUTANEOUS at 03:07

## 2019-07-29 RX ADMIN — ACETAMINOPHEN 500 MG: 500 TABLET ORAL at 08:07

## 2019-07-29 RX ADMIN — TIOTROPIUM BROMIDE 18 MCG: 18 CAPSULE ORAL; RESPIRATORY (INHALATION) at 10:07

## 2019-07-29 RX ADMIN — ALBUTEROL SULFATE 2 PUFF: 90 AEROSOL, METERED RESPIRATORY (INHALATION) at 05:07

## 2019-07-29 RX ADMIN — THERA TABS 1 TABLET: TAB at 08:07

## 2019-07-29 RX ADMIN — HEPARIN SODIUM 5000 UNITS: 5000 INJECTION INTRAVENOUS; SUBCUTANEOUS at 09:07

## 2019-07-29 RX ADMIN — FERROUS SULFATE TAB EC 325 MG (65 MG FE EQUIVALENT) 325 MG: 325 (65 FE) TABLET DELAYED RESPONSE at 08:07

## 2019-07-29 NOTE — HPI
Venus Mayer is a 89 y.o. female with a hx of COPD and multiple readmissions for spontaneous pneumothorax. Numerous attempts at pleurodesis have been performed. Patient is not a surgical candidate. She presents to the ED with acute onset shortness of breath. Imaging showed large right sided pneumothorax. Pt otherwise stable.      No current facility-administered medications on file prior to encounter.

## 2019-07-29 NOTE — PROGRESS NOTES
Rapid Response Nurse Chart Check     Chart check completed, abnormal VS noted, bedside RNRafael contacted, no concerns verbalized at this time, instructed to call [phone] for further concerns or assistance.

## 2019-07-29 NOTE — CARE UPDATE
Rapid Response Nurse Chart Check     Chart check completed, abnormal VS noted, bedside RNLacy contacted, no concerns   verbalized at this time, instructed to call [phone] for further concerns or assistance.

## 2019-07-29 NOTE — CONSULTS
[facility]  Palliative Medicine  Consult Note    Patient Name: Venus Mayer  MRN: [id number]  Admission Date: 7/27/2019  Hospital Length of Stay: 1 days  Code Status: Partial Code   Attending Provider: Ezra Escobedo MD  Consulting Provider: SERA Gonzales  Primary Care Physician: Jona Che MD  Principal Problem:<principal problem not specified>    .      Inpatient consult to Palliative Care  Consult performed by: SERA Cerda  Consult ordered by: Ezra Escobedo MD  Reason for consult: goals of care  Assessment/Recommendations: Palliative Care Acknowledgement of Consult - .date 7/29/19 7:36 AM    Consult received. A Palliative Care Provider will be assigned  And  will touch base with team prior to seeing patient. Full consult to follow.    Thank you for allowing us to be a part of the care of this patient.

## 2019-07-29 NOTE — CONSULTS
Radiology Consult    Venus Mayer is a 89 y.o. female with a history of right pneumothorax on a b/g of COPD complicated by multiple pneumothoraces with adhesions and history of pleurodesis.  Past Medical History:   Diagnosis Date    Acute on chronic congestive heart failure 7/6/2019    Cardiomyopathy     Carotid artery occlusion     CHF (congestive heart failure)     COPD (chronic obstructive pulmonary disease)     Coronary artery disease     Hyperlipidemia     Hypertension      Past Surgical History:   Procedure Laterality Date    CARDIAC CATHETERIZATION      cardic cath      CAROTID ENDARTERECTOMY         Discussed with primary team, BHUPINDER Rain.    Imaging reviewed with Radiology staff, Dr. Edmund Hebert.     Procedure: chest tube insertion    Scheduled Meds:    acetaminophen  1,000 mg Oral Q6H    aspirin  81 mg Oral Daily    ferrous sulfate  325 mg Oral Daily    heparin (porcine)  5,000 Units Subcutaneous Q8H    multivitamin  1 tablet Oral Daily    polyethylene glycol  17 g Oral Daily    rOPINIRole  0.25 mg Oral QHS    tiotropium  18 mcg Inhalation Daily     Continuous Infusions:   PRN Meds:acetaminophen, albuterol-ipratropium, Dextrose 10% Bolus, Dextrose 10% Bolus, glucagon (human recombinant), glucose, glucose, sodium chloride 0.9%, sodium chloride 0.9%    Allergies:   Review of patient's allergies indicates:   Allergen Reactions    Ancef in dextrose (iso-osm) Rash    Cefazolin Rash    Cefuroxime Rash    Sulfamethoxazole-trimethoprim Rash     Other reaction(s): Rash       Labs:  No results for input(s): INR in the last 168 hours.    Invalid input(s):  PT,  PTT    Recent Labs   Lab 07/29/19  0527   WBC 6.24   HGB 10.6*   HCT 33.0*   MCV 95         Recent Labs   Lab 07/27/19  2322 07/28/19  0622 07/29/19  0527   * 86  --     141  --    K 4.0 3.8  --    CL 88* 92*  --    CO2 37* 37*  --    BUN 25* 23  --    CREATININE 1.0 0.8  --    CALCIUM 10.9* 10.2  --    MG   --  2.3  2.3 2.2   ALT 15  --   --    AST 31  --   --    ALBUMIN 3.4*  --   --    BILITOT 0.4  --   --          Vitals (Most Recent):  Temp: 97.6 °F (36.4 °C) (07/29/19 0813)  Pulse: 100 (07/29/19 1104)  Resp: 16 (07/29/19 1031)  BP: 138/62 (07/29/19 0813)  SpO2: (!) 94 % (07/29/19 0915)    Plan:   1. NPO after midnight.  2. Hold anticoagulants as feasible.  3. tenatively scheduled for Tuesday morning 7/30/19.    Robert Sheikh MD  Resident  Department of Radiology  Pager: [phone]

## 2019-07-29 NOTE — SUBJECTIVE & OBJECTIVE
Interval History: Patient with R sided pneumothorax continues to endorse mild shortness of breath with no improvement since yesterday. CXR shows increased size of pneumothorax. Chest tube was removed and replaced again today due to concern for placement. Patient now NPO with plans to get chest tube from IR tomorrow.    Review of Systems   Constitutional: Negative for chills and fever.   HENT: Negative for sore throat and trouble swallowing.    Eyes: Negative for pain and visual disturbance.   Respiratory: Positive for cough and shortness of breath.    Cardiovascular: Negative for chest pain, palpitations and leg swelling.   Gastrointestinal: Negative for abdominal distention, abdominal pain, nausea and vomiting. Diarrhea: Loose BM with Mirilax.   Genitourinary: Negative for dysuria and hematuria.   Musculoskeletal: Negative for joint swelling and myalgias.   Skin: Negative for rash and wound.   Neurological: Negative for dizziness and headaches.   Psychiatric/Behavioral: Negative for agitation and confusion.     Objective:     Vital Signs (Most Recent):  Temp: 97.8 °F (36.6 °C) (07/29/19 1302)  Pulse: 106 (07/29/19 1511)  Resp: 20 (07/29/19 1302)  BP: (!) 164/74 (07/29/19 1302)  SpO2: (!) 90 % (07/29/19 1302) Vital Signs (24h Range):  Temp:  [96.7 °F (35.9 °C)-98.1 °F (36.7 °C)] 97.8 °F (36.6 °C)  Pulse:  [] 106  Resp:  [16-24] 20  SpO2:  [85 %-99 %] 90 %  BP: (120-180)/(56-82) 164/74     Weight: 40.8 kg (90 lb)  Body mass index is 17.58 kg/m².    Intake/Output Summary (Last 24 hours) at 7/29/2019 1516  Last data filed at 7/28/2019 2348  Gross per 24 hour   Intake --   Output 310 ml   Net -310 ml      Physical Exam   Constitutional: She is oriented to person, place, and time. She appears well-developed and well-nourished.   HENT:   Head: Normocephalic and atraumatic.   Eyes: Pupils are equal, round, and reactive to light. EOM are normal.   Neck: Normal range of motion. No JVD present. No tracheal deviation  present.   Cardiovascular: Normal rate, regular rhythm and normal heart sounds.   No murmur heard.  Pulmonary/Chest: She has decreased breath sounds. She has no wheezes. She has no rales.   Abdominal: Soft. Bowel sounds are normal. She exhibits no distension. There is no tenderness.   Musculoskeletal: Normal range of motion. She exhibits no edema.   Neurological: She is alert and oriented to person, place, and time. No cranial nerve deficit.   Psychiatric: She has a normal mood and affect. Judgment normal.       Significant Labs: All pertinent labs within the past 24 hours have been reviewed.    Significant Imaging: I have reviewed all pertinent imaging results/findings within the past 24 hours.

## 2019-07-29 NOTE — PLAN OF CARE
Patient staying with her adult child.  Patient is current with [facility].  Plan is to discharge back to home with home health.    Pharmacy:    [id number] - [place], [place] - [address] [place] [address]  [address]  [place] [address]  Phone: [phone] Fax: [phone]    PCP:  Jona Che MD    Payor: HU[employer] MANAGED MEDICARE / Plan: HU[employer] MEDICARE HMO / Product Type: Capitation /      07/29/19 1525   Discharge Assessment   Assessment Type Discharge Planning Assessment   Confirmed/corrected address and phone number on facesheet? Yes   Assessment information obtained from? Medical Record   Expected Length of Stay (days) 3   Communicated expected length of stay with patient/caregiver no   Prior to hospitilization cognitive status: Alert/Oriented   Prior to hospitalization functional status: Assistive Equipment;Needs Assistance   Current cognitive status: Alert/Oriented   Current Functional Status: Assistive Equipment;Needs Assistance   Lives With child(newton), adult   Able to Return to Prior Arrangements yes   Is patient able to care for self after discharge? Unable to determine at this time (comments)   Who are your caregiver(s) and their phone number(s)? Richmond Mayer - Son [phone]   Patient's perception of discharge disposition home health   Readmission Within the Last 30 Days previous discharge plan unsuccessful   Patient currently being followed by outpatient case management? No   Patient currently receives any other outside agency services? No   Equipment Currently Used at Home wheelchair;bedside commode;walker, standard   Do you have any problems affording any of your prescribed medications? No   Is the patient taking medications as prescribed? yes   Does the patient have transportation home? Yes   Transportation Anticipated family or friend will provide   Does the patient receive services at the [facility]? No   Discharge Plan A  Home Health   Discharge Plan B Skilled Nursing Facility   DME Needed Upon Discharge  none   Patient/Family in Agreement with Plan yes   Readmission Questionnaire   At the time of your discharge, did someone talk to you about what your health problems were? Yes   At the time of discharge, did someone talk to you about what to watch out for regarding worsening of your health problem? Yes   At the time of discharge, did someone talk to you about what to do if you experienced worsening of your health problem? Yes   At the time of discharge, did someone talk to you about which medication to take when you left the hospital and which ones to stop taking? Yes   At the time of discharge, did someone talk to you about when and where to follow up with a doctor after you left the hospital? Yes   Do you have problems taking your medications as prescribed? No   Do you have any problems affording any of  your prescribed medications? No   Do you have problems obtaining/receiving your medications? No   Living Arrangements house   Are you currently feeling confused? No   Are you currently having problems thinking? No   Are you currently having memory problems? No   Have you felt down, depressed, or hopeless? 0   Have you felt little interest or pleasure in doing things? 0

## 2019-07-29 NOTE — ASSESSMENT & PLAN NOTE
Palliative medicine consult received.  Palliative medicine APRN met at bedside with patient and daughter Bri. Palliative medicine introduced.      Impression: Mrs. Mayer is a 89 yo lady admitted with right spontaneous pneumothorax.  S/P chest tube placement to suction.  She is hard of hearing and awake, alert and oriented to person place time and situation.    States no pain or discomfort, dypnea on exertion.     Advanced Care Planning:  - No advanced directives have been received.   - Patient states her  Son Arthur Miller is her HPOA.  Documents have not been received.  Family has been encouraged to provide copy for [facility] records.  - Mrs. Mayer states she does not have a living will.   - Patient is now a partial code - no mechanical ventilation no intubation   - Today Mrs. Mayer states it is her wish not to be resuscitated.  Daughter Bri is at bedside who states Mrs. Mayer has been saying this for some time now.    - Resuscitation status will be discussed with her son before she will consent to DNR order.    Goals of Care  - lengthy conversation with Mrs. Mayer and her daugther Bri.  Mrs. Mayer's goal is to continue to follow up with her doctors in the clinic.   - she is not opposed to be hospitalized.  Although she has been in hospital 4 times in last two weeks for respiratory illness, she does not consider this a burden.    - She is aware she is not a candidate for surgical interventions.  She is amenable to continued procedures - chest tube etc if it continue to help her breath better and continue to have quality of life.   - Patient and family state they are considering a procedure where a one way valve is placed in the tracheal bronchial tree that will help prevent her lung from collapsing.   - Should this situation change, daughter states they would be interested in learning more about different plans of care.   - Palliative medicine explained there are many ways to keep her comfortable and have good quality of  life when she is no longer is amenable to aggressive care.     Plan/Recommendations.  - Patient and family amenable to returning to her home with day time sitters and family at night.   - Patient has had home health in the past.  They are happy with the care they receive with [facility] and [facility] - nurse practitioner care in the home provided through [facility].   - consult  for discharge planning - home health.   - palliative medicine will follow up with advanced care planning - obtaining HPOA  - Emotional support.      Primary team aware of the above goals of care conversation.

## 2019-07-29 NOTE — SUBJECTIVE & OBJECTIVE
Interval History:    Past Medical History:   Diagnosis Date    Acute on chronic congestive heart failure 2019    Cardiomyopathy     Carotid artery occlusion     CHF (congestive heart failure)     COPD (chronic obstructive pulmonary disease)     Coronary artery disease     Hyperlipidemia     Hypertension        Past Surgical History:   Procedure Laterality Date    CARDIAC CATHETERIZATION      cardi cath      CAROTID ENDARTERECTOMY         Review of patient's allergies indicates:   Allergen Reactions    Ancef in dextrose (iso-osm) Rash    Cefazolin Rash    Cefuroxime Rash    Sulfamethoxazole-trimethoprim Rash     Other reaction(s): Rash       Medications:  Continuous Infusions:  Scheduled Meds:   acetaminophen  1,000 mg Oral Q8H    aspirin  81 mg Oral Daily    ferrous sulfate  325 mg Oral Daily    heparin (porcine)  5,000 Units Subcutaneous Q8H    multivitamin  1 tablet Oral Daily    polyethylene glycol  17 g Oral Daily    rOPINIRole  0.25 mg Oral QHS    tiotropium  18 mcg Inhalation Daily     PRN Meds:albuterol-ipratropium, Dextrose 10% Bolus, Dextrose 10% Bolus, glucagon (human recombinant), glucose, glucose, sodium chloride 0.9%, sodium chloride 0.9%    Family History     Problem Relation (Age of Onset)    Hypertension Mother        Tobacco Use    Smoking status: Former Smoker     Packs/day: 2.00     Years: 20.00     Pack years: 40.00     Types: Cigarettes     Last attempt to quit: 1980     Years since quittin.5    Smokeless tobacco: Never Used   Substance and Sexual Activity    Alcohol use: Yes     Alcohol/week: 1.2 oz     Types: 2 Glasses of wine per week     Comment: social    Drug use: No    Sexual activity: Not Currently       Review of Systems   Constitutional: Positive for fatigue.   HENT: Positive for hearing loss.    Respiratory: Positive for shortness of breath.      Objective:     Vital Signs (Most Recent):  Temp: 97.8 °F (36.6 °C) (19 1302)  Pulse: (!)  120 (07/29/19 1302)  Resp: 20 (07/29/19 1302)  BP: (!) 164/74 (07/29/19 1302)  SpO2: (!) 90 % (07/29/19 1302) Vital Signs (24h Range):  Temp:  [96.7 °F (35.9 °C)-98.1 °F (36.7 °C)] 97.8 °F (36.6 °C)  Pulse:  [] 120  Resp:  [16-24] 20  SpO2:  [85 %-99 %] 90 %  BP: (120-180)/(56-82) 164/74     Weight: 40.8 kg (90 lb)  Body mass index is 17.58 kg/m².    Review of Symptoms  Symptom Assessment (ESAS 0-10 scale)   ESAS 0 1 2 3 4 5 6 7 8 9 10   Pain              Dyspnea              Anxiety              Nausea              Depression               Anorexia              Fatigue              Insomnia              Restlessness               Agitation              CAM / Delirium __ --  ___+   Constipation     __ --  ___+   Diarrhea           __ --  ___+  Bowel Management Plan (BMP): No    Comments: no pain, dyspnea on exertion 3-4     Pain Assessment:     OME in 24 hours: 0    Performance Status: 50    ECOG Performance Status Grade: 2 - Ambulates, capable of self care only    Physical Exam   Constitutional: She is oriented to person, place, and time. She appears well-developed. No distress.   Appears frail    HENT:   Robinson, hearing aid on the right is broken, not at bedside   Cardiovascular: Normal rate, regular rhythm and normal heart sounds.   Pulmonary/Chest:   Right chest tube intact    Abdominal: Soft. Bowel sounds are normal.   Musculoskeletal:   Weak, s/p right hip fracture, uses walker    Neurological: She is alert and oriented to person, place, and time.   Skin: Skin is warm and dry. There is pallor.   Psychiatric: She has a normal mood and affect. Her behavior is normal. Judgment and thought content normal.   Nursing note and vitals reviewed.      Significant Labs: All pertinent labs within the past 24 hours have been reviewed.  CBC:   Recent Labs   Lab 07/29/19  0527   WBC 6.24   HGB 10.6*   HCT 33.0*   MCV 95        BMP:  Recent Labs   Lab 07/29/19 0527   MG 2.2     LFT:  Lab Results   Component Value  Date    AST 31 07/27/2019    ALKPHOS 96 07/27/2019    BILITOT 0.4 07/27/2019     Albumin:   Albumin   Date Value Ref Range Status   07/27/2019 3.4 (L) 3.5 - 5.2 g/dL Final     Protein:   Total Protein   Date Value Ref Range Status   07/27/2019 7.1 6.0 - 8.4 g/dL Final     Lactic acid:   Lab Results   Component Value Date    LACTATE 1.0 07/06/2019       Significant Imaging: I have reviewed all pertinent imaging results/findings within the past 24 hours.    Advance Care Planning   Advanced Directives::  Living Will: No  LaPOST: No  Do Not Resuscitate Status: No, partial - no intubation mechanical ventilation   Medical Power of : No    Decision-Making Capacity: Patient answered questions, family answered questions.        Living Arrangements: Lives alone in her home with day time sitters and family at night.     Psychosocial/Cultural:   after 48 yrs of marriage, 5 children, 6 grand children and 7 great grand children,  and mother,  Living independently until she broke her hip 1 yr ago.  Enjoyed making road trips with her friends to [place].  Played cards and went to the [employer].  Goes to beauty parlor every week.         Spiritual:     F- Kylah and Belief: Quaker     I - Importance: went to [employer] every week until she was put on oxygen.  Now watches and participates with [employer] on TV  .  C - Community:     A - Address in Care: amenable to  visits and anointing of the sick.

## 2019-07-29 NOTE — CONSULTS
[facility]  Palliative Medicine  Consult Note    Patient Name: Venus Mayer  MRN: [id number]  Admission Date: 7/27/2019  Hospital Length of Stay: 1 days  Code Status: Partial Code   Attending Provider: Ezra Escobedo MD  Consulting Provider: SERA Gonzales  Primary Care Physician: Jona Che MD  Principal Problem:Pneumothorax on right    Patient information was obtained from patient, relative(s), past medical records and ER records.      Consults  Assessment/Plan:     No new Assessment & Plan notes have been filed under this hospital service since the last note was generated.  Service: Palliative Medicine      Thank you for your consult. I will follow-up with patient. Please contact us if you have any additional questions.    Subjective:     HPI:   HPI obtained from chart review     Mrs. Mayer is a 89 lady with PMH of  COPD (home O2 at 2 L NC)  40 pack year smoking history,  (quit 30 years ago), HFpEF, HOCM s/p AICD, PVD, HTN, HLD, and CKD. Recent admit for t spontaneous pneumothorax with pleurodesis in May 2019. She  presented to [facility] ED with acute shortness of breath and found to have a small right sided pneumothorax. S/p CT tube placement at OSH and transferred to [facility] for for further evaluation.  Pulmonary was then consulted for management of chest tube.     7/21: acute dyspneic, tachycardic, and diaphoretic.   Transferred to critical care for closer monitoring. CT placed at OSH dislodged and replaced with re-expansion of right lung.    CT surgery consulted  And have been monitoring chest tube.  Plan:  water seal and clamping trials on 7/23.  If fails clamping trials, plan is for repeat bedside pleurodesis or bronchoscopy with endobronchial valve placement. She is not a surgical candidate due to cardiac co morbidities.    .      Transitioned from critical care back to internal medicine.  CT surgery continues to follow and manage chest tube.    Palliative medicine consulted for goals of care.     Hospital Course:  No notes on file    No new subjective & objective note has been filed under this hospital service since the last note was generated.      > 50% of 70  min visit spent in chart review, face to face discussion of goals of care,  symptom assessment, coordination of care and emotional support.    Eveline Moses, CNS  Palliative Medicine  [facility]

## 2019-07-29 NOTE — SUBJECTIVE & OBJECTIVE
Interval History: Large bore chest tube placed yesterday afternoon. Continues to have incomplete expansion of lung. Hemodynamically stable. On home dose of NC O2.     Medications:  Continuous Infusions:  Scheduled Meds:   aspirin  81 mg Oral Daily    ferrous sulfate  325 mg Oral Daily    heparin (porcine)  5,000 Units Subcutaneous Q8H    multivitamin  1 tablet Oral Daily    polyethylene glycol  17 g Oral Daily    rOPINIRole  0.25 mg Oral QHS    tiotropium  18 mcg Inhalation Daily     PRN Meds:albuterol-ipratropium, Dextrose 10% Bolus, Dextrose 10% Bolus, glucagon (human recombinant), glucose, glucose, sodium chloride 0.9%, sodium chloride 0.9%     Review of patient's allergies indicates:   Allergen Reactions    Ancef in dextrose (iso-osm) Rash    Cefazolin Rash    Cefuroxime Rash    Sulfamethoxazole-trimethoprim Rash     Other reaction(s): Rash     Objective:     Vital Signs (Most Recent):  Temp: 98 °F (36.7 °C) (07/29/19 0306)  Pulse: 92 (07/29/19 0321)  Resp: 16 (07/29/19 0306)  BP: 139/63 (07/29/19 0306)  SpO2: 99 % (07/29/19 0321) Vital Signs (24h Range):  Temp:  [97.2 °F (36.2 °C)-98.3 °F (36.8 °C)] 98 °F (36.7 °C)  Pulse:  [] 92  Resp:  [16-31] 16  SpO2:  [85 %-100 %] 99 %  BP: (116-143)/(56-82) 139/63     Intake/Output - Last 3 Shifts       07/27 0700 - 07/28 0659 07/28 0700 - 07/29 0659 07/29 0700 - 07/30 0659    Urine (mL/kg/hr)  250 (0.3)     Stool 0 0     Chest Tube 30 60     Total Output 30 310     Net -30 -310            Stool Occurrence 0 x 0 x           SpO2: 99 %  O2 Device (Oxygen Therapy): nasal cannula    Physical Exam   Constitutional: She appears well-developed and well-nourished. No distress.   Cardiovascular: Normal rate and regular rhythm.   Pulmonary/Chest: Effort normal. No respiratory distress.   Large bore chest tube in place. No air leak.   Minimal serosanguinous output   Abdominal: Soft. She exhibits no distension. There is no tenderness.   Neurological: She is alert.    Skin: Skin is warm and dry.   Psychiatric: She has a normal mood and affect. Her behavior is normal.       Significant Labs:  ABGs: No results for input(s): PH, PCO2, PO2, HCO3, POCSATURATED, BE in the last 48 hours.  CBC:   Recent Labs   Lab 07/29/19  0527   WBC 6.24   RBC 3.47*   HGB 10.6*   HCT 33.0*      MCV 95   MCH 30.5   MCHC 32.1     CMP:   Recent Labs   Lab 07/27/19  2322 07/28/19  0622   * 86   CALCIUM 10.9* 10.2   ALBUMIN 3.4*  --    PROT 7.1  --     141   K 4.0 3.8   CO2 37* 37*   CL 88* 92*   BUN 25* 23   CREATININE 1.0 0.8   ALKPHOS 96  --    ALT 15  --    AST 31  --    BILITOT 0.4  --      Coagulation: No results for input(s): PT, INR, APTT in the last 48 hours.    Significant Diagnostics:  CXR: I have reviewed all pertinent results/findings within the past 24 hours    VTE Risk Mitigation (From admission, onward)        Ordered     heparin (porcine) injection 5,000 Units  Every 8 hours      07/28/19 0551     IP VTE HIGH RISK PATIENT  Once      07/28/19 0551

## 2019-07-29 NOTE — PT/OT/SLP PROGRESS
Physical Therapy      Patient Name:  Venus Mayer   MRN:  [id number]    Attempted to see patient both in AM and PM; however patient with increased SOB and family requesting to wait secondary to concerns and possible plans to readjust chest tube. Will follow-up tomorrow.    Ezequiel Doe, PT

## 2019-07-29 NOTE — PLAN OF CARE
Problem: Adult Inpatient Plan of Care  Goal: Plan of Care Review  Outcome: Ongoing (interventions implemented as appropriate)  AAO x 4. Pt has a R chest tube to water seal, slight bruising around site. Purewick in place for voiding, does not get out of bed. Tolerating cardiac diet, no complaints of N/V overnight. VSS on 2.5L NC. On tele running NSR. Complained of slight discomfort around chest tube site overnight, was given one time dose of extra strength Tylenol. Pt remained free of falls overnight. POC reviewed with pt and family who verbalized understanding. Daughter at bedside. Bed in low position, call light in reach. No signs of distress or concerns noted at this time. WCTM.

## 2019-07-29 NOTE — ASSESSMENT & PLAN NOTE
89 y.o. lady with h/o COPD with multiple pneumothorax is coming with dyspnea and hypoxia.  Previous placement of CT and Pleurodesis    - CXR shows Right Pneumothorax, no decrease in size as of 7/29  - CTS consulted by ED, Place a Pigtail catheter which has now been replaced twice  - plan for chest tube placement by IR tomorrow  - Patient on 2.5L O2 NC

## 2019-07-29 NOTE — CONSULTS
[facility]  Palliative Medicine  Consult Note    Patient Name: Venus Mayer  MRN: [id number]  Admission Date: 7/27/2019  Hospital Length of Stay: 1 days  Code Status: Partial Code   Attending Provider: Ezra Escobedo MD  Consulting Provider: SERA Gonzales  Primary Care Physician: Jona Che MD  Principal Problem:Pneumothorax on right    Patient information was obtained from patient, relative(s), past medical records and ER records.      Consults  Assessment/Plan:     Palliative care encounter  Palliative medicine consult received.  Palliative medicine APRN met at bedside with patient and daughter Bri. Palliative medicine introduced.      Impression: Mrs. Mayer is a 89 yo lady admitted with right spontaneous pneumothorax.  S/P chest tube placement to suction.  She is hard of hearing and awake, alert and oriented to person place time and situation.    States no pain or discomfort, dypnea on exertion.     Advanced Care Planning:  - No advanced directives have been received.   - Patient states her  Son Arthur Miller is her HPOA.  Documents have not been received.  Family has been encouraged to provide copy for [facility] [place].  - Mrs. Mayer states she does not have a living will.   - Patient is now a partial code - no mechanical ventilation no intubation   - Today Mrs. Mayer states it is her wish not to be resuscitated.  Daughter Bri is at bedside who states Mrs. Mayer has been saying this for some time now.    - Resuscitation status will be discussed with her son before she will consent to DNR order.    Goals of Care  - lengthy conversation with Mrs. Mayer and her daugther Bri.  Mrs. Mayer's goal is to continue to follow up with her doctors in the clinic.   - she is not opposed to be hospitalized.  Although she has been in hospital 4 times in last two weeks for respiratory illness, she does not consider this a burden.    - She is aware she is not a candidate for surgical interventions.   She is amenable to continued procedures - chest tube etc if it continue to help her breath better and continue to have quality of life.   - Patient and family state they are considering a procedure where a one way valve is placed in the tracheal bronchial tree that will help prevent her lung from collapsing.   - Should this situation change, daughter states they would be interested in learning more about different plans of care.   - Palliative medicine explained there are many ways to keep her comfortable and have good quality of life when she is no longer is amenable to aggressive care.     Plan/Recommendations.  - Patient and family amenable to returning to her home with day time sitters and family at night.   - Patient has had home health in the past.  They are happy with the care they receive with [facility] and [facility] - nurse practitioner care in the home provided through [employer].   - consult  for discharge planning - home health.   - palliative medicine will follow up with advanced care planning - obtaining HPOA  - Emotional support.      Primary team aware of the above goals of care conversation.          Thank you for your consult. I will follow-up with patient. Please contact us if you have any additional questions.    Subjective:     HPI:   HPI obtained from chart review     Mrs. Mayer is a 89 lady with PMH of  COPD (home O2 at 2 L NC)  40 pack year smoking history,  (quit 30 years ago), HFpEF, HOCM s/p AICD, PVD, HTN, HLD, and CKD. Recent admit for t spontaneous pneumothorax with pleurodesis in May 2019. She  presented to [facility] ED with acute shortness of breath and found to have a small right sided pneumothorax. S/p CT tube placement at OSH and transferred [place] [facility] for for further evaluation.  Pulmonary was then consulted for management of chest tube.     7/21: acute dyspneic, tachycardic, and diaphoretic.   Transferred to critical care for closer monitoring. CT  placed at OSH dislodged and replaced with re-expansion of right lung.    CT surgery consulted  And have been monitoring chest tube.  Plan:  water seal and clamping trials on 7/23.  If fails clamping trials, plan is for repeat bedside pleurodesis or bronchoscopy with endobronchial valve placement. She is not a surgical candidate due to cardiac co morbidities.    .      Transitioned from critical care back to internal medicine.  CT surgery continues to follow and manage chest tube.   Palliative medicine consulted for goals of care.     Hospital Course:  No notes on file    Interval History:    Past Medical History:   Diagnosis Date    Acute on chronic congestive heart failure 7/6/2019    Cardiomyopathy     Carotid artery occlusion     CHF (congestive heart failure)     COPD (chronic obstructive pulmonary disease)     Coronary artery disease     Hyperlipidemia     Hypertension        Past Surgical History:   Procedure Laterality Date    CARDIAC CATHETERIZATION      cardic cath      CAROTID ENDARTERECTOMY         Review of patient's allergies indicates:   Allergen Reactions    Ancef in dextrose (iso-osm) Rash    Cefazolin Rash    Cefuroxime Rash    Sulfamethoxazole-trimethoprim Rash     Other reaction(s): Rash       Medications:  Continuous Infusions:  Scheduled Meds:   acetaminophen  1,000 mg Oral Q8H    aspirin  81 mg Oral Daily    ferrous sulfate  325 mg Oral Daily    heparin (porcine)  5,000 Units Subcutaneous Q8H    multivitamin  1 tablet Oral Daily    polyethylene glycol  17 g Oral Daily    rOPINIRole  0.25 mg Oral QHS    tiotropium  18 mcg Inhalation Daily     PRN Meds:albuterol-ipratropium, Dextrose 10% Bolus, Dextrose 10% Bolus, glucagon (human recombinant), glucose, glucose, sodium chloride 0.9%, sodium chloride 0.9%    Family History     Problem Relation (Age of Onset)    Hypertension Mother        Tobacco Use    Smoking status: Former Smoker     Packs/day: 2.00     Years: 20.00      Pack years: 40.00     Types: Cigarettes     Last attempt to quit: 1980     Years since quittin.5    Smokeless tobacco: Never Used   Substance and Sexual Activity    Alcohol use: Yes     Alcohol/week: 1.2 oz     Types: 2 Glasses of wine per week     Comment: social    Drug use: No    Sexual activity: Not Currently       Review of Systems   Constitutional: Positive for fatigue.   HENT: Positive for hearing loss.    Respiratory: Positive for shortness of breath.      Objective:     Vital Signs (Most Recent):  Temp: 97.8 °F (36.6 °C) (19 1302)  Pulse: (!) 120 (19 1302)  Resp: 20 (19 1302)  BP: (!) 164/74 (19 1302)  SpO2: (!) 90 % (19 1302) Vital Signs (24h Range):  Temp:  [96.7 °F (35.9 °C)-98.1 °F (36.7 °C)] 97.8 °F (36.6 °C)  Pulse:  [] 120  Resp:  [16-24] 20  SpO2:  [85 %-99 %] 90 %  BP: (120-180)/(56-82) 164/74     Weight: 40.8 kg (90 lb)  Body mass index is 17.58 kg/m².    Review of Symptoms  Symptom Assessment (ESAS 0-10 scale)   ESAS 0 1 2 3 4 5 6 7 8 9 10   Pain              Dyspnea              Anxiety              Nausea              Depression               Anorexia              Fatigue              Insomnia              Restlessness               Agitation              CAM / Delirium __ --  ___+   Constipation     __ --  ___+   Diarrhea           __ --  ___+  Bowel Management Plan (BMP): No    Comments: no pain, dyspnea on exertion 3-4     Pain Assessment:     OME in 24 hours: 0    Performance Status: 50    ECOG Performance Status Grade: 2 - Ambulates, capable of self care only    Physical Exam   Constitutional: She is oriented to person, place, and time. She appears well-developed. No distress.   Appears frail    HENT:   [place], hearing aid on the right is broken, not at bedside   Cardiovascular: Normal rate, regular rhythm and normal heart sounds.   Pulmonary/Chest:   Right chest tube intact    Abdominal: Soft. Bowel sounds are normal.   Musculoskeletal:    Weak, s/p right hip fracture, uses walker    Neurological: She is alert and oriented to person, place, and time.   Skin: Skin is warm and dry. There is pallor.   Psychiatric: She has a normal mood and affect. Her behavior is normal. Judgment and thought content normal.   Nursing note and vitals reviewed.      Significant Labs: All pertinent labs within the past 24 hours have been reviewed.  CBC:   Recent Labs   Lab 07/29/19 0527   WBC 6.24   HGB 10.6*   HCT 33.0*   MCV 95        BMP:  Recent Labs   Lab 07/29/19 0527   MG 2.2     LFT:  Lab Results   Component Value Date    AST 31 07/27/2019    ALKPHOS 96 07/27/2019    BILITOT 0.4 07/27/2019     Albumin:   Albumin   Date Value Ref Range Status   07/27/2019 3.4 (L) 3.5 - 5.2 g/dL Final     Protein:   Total Protein   Date Value Ref Range Status   07/27/2019 7.1 6.0 - 8.4 g/dL Final     Lactic acid:   Lab Results   Component Value Date    LACTATE 1.0 07/06/2019       Significant Imaging: I have reviewed all pertinent imaging results/findings within the past 24 hours.    Advance Care Planning   Advanced Directives::  Living Will: No  LaPOST: No  Do Not Resuscitate Status: No, partial - no intubation mechanical ventilation   Medical Power of : No    Decision-Making Capacity: Patient answered questions, family answered questions.        Living Arrangements: Lives alone in her home with day time sitters and family at night.     Psychosocial/Cultural:   after 48 yrs of marriage, 5 children, 6 grand children and 7 great grand children,  and mother,  Living independently until she broke her hip 1 yr ago.  Enjoyed making road trips with her friends to [place] [facility] [place].  Played cards and went to the [employer].  Goes to beauty parlor every week.         Spiritual:     F- Kylah and Belief: Yarsani     I - Importance: went to [employer] every week until she was put on oxygen.  Now watches and participates with [employer] on  TV  .  C - Community:     A - Address in Care: amenable to  visits and anointing of the sick.       > 50% of 70  min visit spent in chart review, face to face discussion of goals of care,  symptom assessment, coordination of care and emotional support.    Eveline Moses, CNS  Palliative Medicine  [facility]

## 2019-07-29 NOTE — PROGRESS NOTES
[facility] Hospital Medicine  Progress Note    Patient Name: Venus Mayer  MRN: [id number]  Patient Class: IP- Inpatient   Admission Date: 7/27/2019  Length of Stay: 1 days  Attending Physician: Ezra Escobedo MD  Primary Care Provider: Jona Che MD    [facility] Medicine Team: [facility] HOSP MED 1 Марина Puente MD    Subjective:     Principal Problem:Pneumothorax on right      HPI:  Venus Mayer is 89 y.o. lady with COPD, (on 2.5 L home O2), 40 pack year smoking history (quit 30 years ago), multiple readmissions for spontaneous pneumothorax, chronic dCHFS/P AICD, PVD/carotid artery stenosis s/p L CEA 08,was brought to the ED because she was SOB.    She was Discharged from [facility] on 7/26/19 after being admitted for pneumothorax, improved after placing CT with return to baseline pulmonary function on discharge.  Then around evening of 7/27 started feeling SOB, not relieved with her Inhaler or Nebulization, progressively worse,  prompting her to come to the Hospital. No chest pain, fever , confusion.    In the ED she was hypoxic, CXR revelaed Right sided Pneumothorax. CTS was consulted and placed a Pig tail catheter, after repositioning showed air leak and patient symptomatically improved and is currently on a non re breather.     The patient's recurrent PTXs seem to have started during her admission on 05/22/2019 where she presented with a large R sided PTX requiring chest tube and pleurodesis on 5/27/2019. Since, she has had a small recurrence of PTX on 6/25/19 not requiring intervention, and admission to OSH 7/6-7/11 for acute on chronic dCHF and COPD exacerbation. She has been less mobile and uses a walker at home.     Overview/Hospital Course:  No notes on file    Interval History: Patient with R sided pneumothorax continues to endorse mild shortness of breath with no improvement since yesterday. CXR shows increased size of pneumothorax. Chest tube was removed and replaced again today due  to concern for placement. Patient now NPO with plans to get chest tube from IR tomorrow.    Review of Systems   Constitutional: Negative for chills and fever.   HENT: Negative for sore throat and trouble swallowing.    Eyes: Negative for pain and visual disturbance.   Respiratory: Positive for cough and shortness of breath.    Cardiovascular: Negative for chest pain, palpitations and leg swelling.   Gastrointestinal: Negative for abdominal distention, abdominal pain, nausea and vomiting. Diarrhea: Loose BM with Mirilax.   Genitourinary: Negative for dysuria and hematuria.   Musculoskeletal: Negative for joint swelling and myalgias.   Skin: Negative for rash and wound.   Neurological: Negative for dizziness and headaches.   Psychiatric/Behavioral: Negative for agitation and confusion.     Objective:     Vital Signs (Most Recent):  Temp: 97.8 °F (36.6 °C) (07/29/19 1302)  Pulse: 106 (07/29/19 1511)  Resp: 20 (07/29/19 1302)  BP: (!) 164/74 (07/29/19 1302)  SpO2: (!) 90 % (07/29/19 1302) Vital Signs (24h Range):  Temp:  [96.7 °F (35.9 °C)-98.1 °F (36.7 °C)] 97.8 °F (36.6 °C)  Pulse:  [] 106  Resp:  [16-24] 20  SpO2:  [85 %-99 %] 90 %  BP: (120-180)/(56-82) 164/74     Weight: 40.8 kg (90 lb)  Body mass index is 17.58 kg/m².    Intake/Output Summary (Last 24 hours) at 7/29/2019 1516  Last data filed at 7/28/2019 2348  Gross per 24 hour   Intake --   Output 310 ml   Net -310 ml      Physical Exam   Constitutional: She is oriented to person, place, and time. She appears well-developed and well-nourished.   HENT:   Head: Normocephalic and atraumatic.   Eyes: Pupils are equal, round, and reactive to light. EOM are normal.   Neck: Normal range of motion. No JVD present. No tracheal deviation present.   Cardiovascular: Normal rate, regular rhythm and normal heart sounds.   No murmur heard.  Pulmonary/Chest: She has decreased breath sounds. She has no wheezes. She has no rales.   Abdominal: Soft. Bowel sounds are normal.  She exhibits no distension. There is no tenderness.   Musculoskeletal: Normal range of motion. She exhibits no edema.   Neurological: She is alert and oriented to person, place, and time. No cranial nerve deficit.   Psychiatric: She has a normal mood and affect. Judgment normal.       Significant Labs: All pertinent labs within the past 24 hours have been reviewed.    Significant Imaging: I have reviewed all pertinent imaging results/findings within the past 24 hours.      Assessment/Plan:      * Pneumothorax on right  89 y.o. lady with h/o COPD with multiple pneumothorax is coming with dyspnea and hypoxia.  Previous placement of CT and Pleurodesis    - CXR shows Right Pneumothorax, no decrease in size as of 7/29  - CTS consulted by ED, Place a Pigtail catheter which has now been replaced twice  - plan for chest tube placement by IR tomorrow  - Patient on 2.5L O2 NC                 Restless leg syndrome    Continue Ropinirole     COLD (chronic obstructive lung disease)  Patient with COPD on homeO2, currently do not suspect any underlying  [place]    - Continue Tiotropium   - Duonebs as needed  - On Supplemental oxygen      Heart failure, diastolic  ECHO on 7/19 shows EF of 65% with DD, PA pressure of %^ and valvular abnormalities  BNP - 1414, but not volume overloaded currently  Mild EKG leak that has peaked suspect secondary to demand.    - Continue Home Lasix 40mg Daily, If she does become volume overloaded then can increase dose  - Monitor I/O, Weights          VTE Risk Mitigation (From admission, onward)        Ordered     heparin (porcine) injection 5,000 Units  Every 8 hours      07/28/19 0589     IP VTE HIGH RISK PATIENT  Once      07/28/19 0569                Марина Puente MD  Department of Hospital Medicine   [facility]

## 2019-07-29 NOTE — ASSESSMENT & PLAN NOTE
- Consulted IR for placement of 12-14Fr pigtail in lung apex. Current tube is likely in fissure.  - Will tentatively plan for bronchoscopy with endobronchial valve placement later in the week. - - She may need to discharge with chest tube to pneumostat as well.   - Will continue to follow for tube management.l

## 2019-07-29 NOTE — PROGRESS NOTES
[facility]  Thoracic Surgery  Progress Note    Subjective:     History of Present Illness:  Venus Mayer is a 89 y.o. female with a hx of COPD and multiple readmissions for spontaneous pneumothorax. Numerous attempts at pleurodesis have been performed. Patient is not a surgical candidate. She presents to the ED with acute onset shortness of breath. Imaging showed large right sided pneumothorax. Pt otherwise stable.      No current facility-administered medications on file prior to encounter.        Post-Op Info:  Procedure(s) (LRB):  BRONCHOSCOPY, FLEXIBLE, WITH ENDOBRONCHIAL VALVE INSERTION (N/A)         Interval History: Large bore chest tube placed yesterday afternoon. Continues to have incomplete expansion of lung. Hemodynamically stable. On home dose of NC O2.     Medications:  Continuous Infusions:  Scheduled Meds:   aspirin  81 mg Oral Daily    ferrous sulfate  325 mg Oral Daily    heparin (porcine)  5,000 Units Subcutaneous Q8H    multivitamin  1 tablet Oral Daily    polyethylene glycol  17 g Oral Daily    rOPINIRole  0.25 mg Oral QHS    tiotropium  18 mcg Inhalation Daily     PRN Meds:albuterol-ipratropium, Dextrose 10% Bolus, Dextrose 10% Bolus, glucagon (human recombinant), glucose, glucose, sodium chloride 0.9%, sodium chloride 0.9%     Review of patient's allergies indicates:   Allergen Reactions    Ancef in dextrose (iso-osm) Rash    Cefazolin Rash    Cefuroxime Rash    Sulfamethoxazole-trimethoprim Rash     Other reaction(s): Rash     Objective:     Vital Signs (Most Recent):  Temp: 98 °F (36.7 °C) (07/29/19 0306)  Pulse: 92 (07/29/19 0321)  Resp: 16 (07/29/19 0306)  BP: 139/63 (07/29/19 0306)  SpO2: 99 % (07/29/19 0321) Vital Signs (24h Range):  Temp:  [97.2 °F (36.2 °C)-98.3 °F (36.8 °C)] 98 °F (36.7 °C)  Pulse:  [] 92  Resp:  [16-31] 16  SpO2:  [85 %-100 %] 99 %  BP: (116-143)/(56-82) 139/63     Intake/Output - Last 3 Shifts       07/27 0700 - 07/28 0659 07/28  0700 - 07/29 0659 07/29 0700 - 07/30 0659    Urine (mL/kg/hr)  250 (0.3)     Stool 0 0     Chest Tube 30 60     Total Output 30 310     Net -30 -310            Stool Occurrence 0 x 0 x           SpO2: 99 %  O2 Device (Oxygen Therapy): nasal cannula    Physical Exam   Constitutional: She appears well-developed and well-nourished. No distress.   Cardiovascular: Normal rate and regular rhythm.   Pulmonary/Chest: Effort normal. No respiratory distress.   Large bore chest tube in place. No air leak.   Minimal serosanguinous output   Abdominal: Soft. She exhibits no distension. There is no tenderness.   Neurological: She is alert.   Skin: Skin is warm and dry.   Psychiatric: She has a normal mood and affect. Her behavior is normal.       Significant Labs:  ABGs: No results for input(s): PH, PCO2, PO2, HCO3, POCSATURATED, BE in the last 48 hours.  CBC:   Recent Labs   Lab 07/29/19  0527   WBC 6.24   RBC 3.47*   HGB 10.6*   HCT 33.0*      MCV 95   MCH 30.5   MCHC 32.1     CMP:   Recent Labs   Lab 07/27/19  2322 07/28/19  0622   * 86   CALCIUM 10.9* 10.2   ALBUMIN 3.4*  --    PROT 7.1  --     141   K 4.0 3.8   CO2 37* 37*   CL 88* 92*   BUN 25* 23   CREATININE 1.0 0.8   ALKPHOS 96  --    ALT 15  --    AST 31  --    BILITOT 0.4  --      Coagulation: No results for input(s): PT, INR, APTT in the last 48 hours.    Significant Diagnostics:  CXR: I have reviewed all pertinent results/findings within the past 24 hours    VTE Risk Mitigation (From admission, onward)        Ordered     heparin (porcine) injection 5,000 Units  Every 8 hours      07/28/19 0551     IP VTE HIGH RISK PATIENT  Once      07/28/19 0551        Assessment/Plan:     Pneumothorax on right    - Consulted IR for placement of 12-14Fr pigtail in lung apex. Current tube is likely in fissure.  - Will tentatively plan for bronchoscopy with endobronchial valve placement later in the week. - - She may need to discharge with chest tube to pneumostat  as well.   - Will continue to follow for tube management.BHUPINDER Oliveira  Thoracic Surgery  [facility]

## 2019-07-29 NOTE — PT/OT/SLP PROGRESS
Occupational Therapy      Patient Name:  Venus Mayer   MRN:  [id number]    Patient not seen today secondary to patient with increased shortness of breath and family requesting to hold off on therapy 2' concerns of chest tube placement. Will follow up next day as appropriate.    Ernestina Holley OT  7/29/2019

## 2019-07-30 ENCOUNTER — ANESTHESIA EVENT (OUTPATIENT)
Dept: SURGERY | Facility: HOSPITAL | Age: 84
DRG: 163 | End: 2019-07-30
Payer: MEDICARE

## 2019-07-30 LAB
ANION GAP SERPL CALC-SCNC: 10 MMOL/L (ref 8–16)
BASOPHILS # BLD AUTO: 0.04 K/UL (ref 0–0.2)
BASOPHILS NFR BLD: 0.6 % (ref 0–1.9)
BUN SERPL-MCNC: 21 MG/DL (ref 8–23)
CALCIUM SERPL-MCNC: 10.3 MG/DL (ref 8.7–10.5)
CHLORIDE SERPL-SCNC: 91 MMOL/L (ref 95–110)
CO2 SERPL-SCNC: 38 MMOL/L (ref 23–29)
CREAT SERPL-MCNC: 0.8 MG/DL (ref 0.5–1.4)
DIFFERENTIAL METHOD: ABNORMAL
EOSINOPHIL # BLD AUTO: 0.5 K/UL (ref 0–0.5)
EOSINOPHIL NFR BLD: 7.2 % (ref 0–8)
ERYTHROCYTE [DISTWIDTH] IN BLOOD BY AUTOMATED COUNT: 14.3 % (ref 11.5–14.5)
EST. GFR  (AFRICAN AMERICAN): >60 ML/MIN/1.73 M^2
EST. GFR  (NON AFRICAN AMERICAN): >60 ML/MIN/1.73 M^2
GLUCOSE SERPL-MCNC: 92 MG/DL (ref 70–110)
HCT VFR BLD AUTO: 36.1 % (ref 37–48.5)
HGB BLD-MCNC: 11.4 G/DL (ref 12–16)
IMM GRANULOCYTES # BLD AUTO: 0.03 K/UL (ref 0–0.04)
IMM GRANULOCYTES NFR BLD AUTO: 0.5 % (ref 0–0.5)
LYMPHOCYTES # BLD AUTO: 1.1 K/UL (ref 1–4.8)
LYMPHOCYTES NFR BLD: 17 % (ref 18–48)
MAGNESIUM SERPL-MCNC: 2.1 MG/DL (ref 1.6–2.6)
MCH RBC QN AUTO: 30.9 PG (ref 27–31)
MCHC RBC AUTO-ENTMCNC: 31.6 G/DL (ref 32–36)
MCV RBC AUTO: 98 FL (ref 82–98)
MONOCYTES # BLD AUTO: 0.9 K/UL (ref 0.3–1)
MONOCYTES NFR BLD: 14.6 % (ref 4–15)
NEUTROPHILS # BLD AUTO: 3.8 K/UL (ref 1.8–7.7)
NEUTROPHILS NFR BLD: 60.1 % (ref 38–73)
NRBC BLD-RTO: 0 /100 WBC
PLATELET # BLD AUTO: 192 K/UL (ref 150–350)
PMV BLD AUTO: 10.1 FL (ref 9.2–12.9)
POTASSIUM SERPL-SCNC: 3.7 MMOL/L (ref 3.5–5.1)
RBC # BLD AUTO: 3.69 M/UL (ref 4–5.4)
SODIUM SERPL-SCNC: 139 MMOL/L (ref 136–145)
WBC # BLD AUTO: 6.24 K/UL (ref 3.9–12.7)

## 2019-07-30 PROCEDURE — 25000242 PHARM REV CODE 250 ALT 637 W/ HCPCS: Performed by: STUDENT IN AN ORGANIZED HEALTH CARE EDUCATION/TRAINING PROGRAM

## 2019-07-30 PROCEDURE — 25000003 PHARM REV CODE 250: Performed by: STUDENT IN AN ORGANIZED HEALTH CARE EDUCATION/TRAINING PROGRAM

## 2019-07-30 PROCEDURE — 83735 ASSAY OF MAGNESIUM: CPT

## 2019-07-30 PROCEDURE — 25000003 PHARM REV CODE 250: Performed by: HOSPITALIST

## 2019-07-30 PROCEDURE — 94640 AIRWAY INHALATION TREATMENT: CPT

## 2019-07-30 PROCEDURE — 63600175 PHARM REV CODE 636 W HCPCS: Performed by: STUDENT IN AN ORGANIZED HEALTH CARE EDUCATION/TRAINING PROGRAM

## 2019-07-30 PROCEDURE — 36415 COLL VENOUS BLD VENIPUNCTURE: CPT

## 2019-07-30 PROCEDURE — 99232 SBSQ HOSP IP/OBS MODERATE 35: CPT | Mod: GC,,, | Performed by: HOSPITALIST

## 2019-07-30 PROCEDURE — 27000221 HC OXYGEN, UP TO 24 HOURS

## 2019-07-30 PROCEDURE — 85025 COMPLETE CBC W/AUTO DIFF WBC: CPT

## 2019-07-30 PROCEDURE — 20600001 HC STEP DOWN PRIVATE ROOM

## 2019-07-30 PROCEDURE — 99232 PR SUBSEQUENT HOSPITAL CARE,LEVL II: ICD-10-PCS | Mod: GC,,, | Performed by: HOSPITALIST

## 2019-07-30 PROCEDURE — 97162 PT EVAL MOD COMPLEX 30 MIN: CPT

## 2019-07-30 PROCEDURE — 99900035 HC TECH TIME PER 15 MIN (STAT)

## 2019-07-30 PROCEDURE — 94761 N-INVAS EAR/PLS OXIMETRY MLT: CPT

## 2019-07-30 PROCEDURE — 97116 GAIT TRAINING THERAPY: CPT

## 2019-07-30 PROCEDURE — 97165 OT EVAL LOW COMPLEX 30 MIN: CPT

## 2019-07-30 PROCEDURE — 80048 BASIC METABOLIC PNL TOTAL CA: CPT

## 2019-07-30 RX ORDER — POLYETHYLENE GLYCOL 3350 17 G/17G
17 POWDER, FOR SOLUTION ORAL DAILY PRN
Status: DISCONTINUED | OUTPATIENT
Start: 2019-07-30 | End: 2019-08-09 | Stop reason: HOSPADM

## 2019-07-30 RX ORDER — IPRATROPIUM BROMIDE AND ALBUTEROL SULFATE 2.5; .5 MG/3ML; MG/3ML
3 SOLUTION RESPIRATORY (INHALATION) EVERY 6 HOURS
Status: DISCONTINUED | OUTPATIENT
Start: 2019-07-30 | End: 2019-07-30

## 2019-07-30 RX ORDER — IPRATROPIUM BROMIDE 0.5 MG/2.5ML
0.5 SOLUTION RESPIRATORY (INHALATION) EVERY 6 HOURS
Status: DISCONTINUED | OUTPATIENT
Start: 2019-07-30 | End: 2019-08-07

## 2019-07-30 RX ADMIN — IPRATROPIUM BROMIDE 0.5 MG: 0.5 SOLUTION RESPIRATORY (INHALATION) at 07:07

## 2019-07-30 RX ADMIN — ROPINIROLE HYDROCHLORIDE 0.25 MG: 0.25 TABLET, FILM COATED ORAL at 09:07

## 2019-07-30 RX ADMIN — HEPARIN SODIUM 5000 UNITS: 5000 INJECTION INTRAVENOUS; SUBCUTANEOUS at 01:07

## 2019-07-30 RX ADMIN — FERROUS SULFATE TAB EC 325 MG (65 MG FE EQUIVALENT) 325 MG: 325 (65 FE) TABLET DELAYED RESPONSE at 10:07

## 2019-07-30 RX ADMIN — THERA TABS 1 TABLET: TAB at 10:07

## 2019-07-30 RX ADMIN — ALBUTEROL SULFATE 2 PUFF: 90 AEROSOL, METERED RESPIRATORY (INHALATION) at 01:07

## 2019-07-30 RX ADMIN — ACETAMINOPHEN 1000 MG: 500 TABLET ORAL at 01:07

## 2019-07-30 RX ADMIN — HEPARIN SODIUM 5000 UNITS: 5000 INJECTION INTRAVENOUS; SUBCUTANEOUS at 09:07

## 2019-07-30 RX ADMIN — IPRATROPIUM BROMIDE 0.5 MG: 0.5 SOLUTION RESPIRATORY (INHALATION) at 01:07

## 2019-07-30 RX ADMIN — ACETAMINOPHEN 1000 MG: 500 TABLET ORAL at 09:07

## 2019-07-30 RX ADMIN — ASPIRIN 81 MG CHEWABLE TABLET 81 MG: 81 TABLET CHEWABLE at 10:07

## 2019-07-30 RX ADMIN — FUROSEMIDE 40 MG: 40 TABLET ORAL at 10:07

## 2019-07-30 RX ADMIN — POTASSIUM CHLORIDE 10 MEQ: 750 CAPSULE, EXTENDED RELEASE ORAL at 10:07

## 2019-07-30 RX ADMIN — HEPARIN SODIUM 5000 UNITS: 5000 INJECTION INTRAVENOUS; SUBCUTANEOUS at 05:07

## 2019-07-30 NOTE — PLAN OF CARE
Pt arrived to IR CT via stretcher for chest tube placement. Name verified using two identifiers. No acute distress noted. Orders, allergies and labs reviewed. Awaiting consent.

## 2019-07-30 NOTE — PLAN OF CARE
Problem: Adult Inpatient Plan of Care  Goal: Plan of Care Review  Outcome: Ongoing (interventions implemented as appropriate)    Close Oxygen Saturation Monitoring. Continuous Pulse Ox per Telemetry. Sats 88-92% on 2.5 L 02 NC.  Chest tube #1 (proximal) to water seal. Chest tube #2 (distal) to low continuous wall suction. Chest tube #2 has constant bubbling in the chamber; MD aware: no need to inform team again. Nebulizer treatments scheduled Q6 hr. HR 92 in [facility] per telemetry.    Patient is a Partial Code: No Intubation. Documentation in the chart.     POC: Patient will be NPO at Midnight for a Bronchoscopy in the morning. Consent is signed and placed in the chart. Patient and family agree with POC.     Patient is Incontinent on an External Catheter. Turn Q2h and HAPI prevention. Brief on; Incontinence pad in place. Requires 1-2 person assistance for Incontinence care. WCTM.

## 2019-07-30 NOTE — ASSESSMENT & PLAN NOTE
89 y.o. lady with h/o COPD with multiple pneumothorax is coming with dyspnea and hypoxia.  Previous placement of CT and Pleurodesis    - CXR shows Right Pneumothorax, no decrease in size as of 7/29  - CTS consulted by ED, Place a Pigtail catheter which has now been replaced twice  - IR placed second chest tube today with patient reporting resolution in symptoms  - Per CT surgery will monitor for air leak tomorrow, in which case patient may get a bronchoscopy and endobronchial valve placed  - Patient on 2.5L O2 NC

## 2019-07-30 NOTE — ASSESSMENT & PLAN NOTE
- To IR today for placement of 12-14Fr pigtail in lung apex. Possible repeat pleurodesis via new tube tomorrow.   - Will tentatively plan for bronchoscopy with endobronchial valve placement later in the week.  - She may need to discharge with chest tube to pneumostat as well.   - Will continue to follow for tube management.    Dispo- Bronchoscopy with endobronchial valve placement and possible repeat pleurodesis Wednesday or Thursday

## 2019-07-30 NOTE — ANESTHESIA PREPROCEDURE EVALUATION
[facility]  Anesthesia Pre-Operative Evaluation         Patient Name: Venus Mayer  YOB: 1929  MRN: [id number]    SUBJECTIVE:     Pre-operative evaluation for Procedure(s) (LRB):  BRONCHOSCOPY, FLEXIBLE, WITH ENDOBRONCHIAL VALVE INSERTION (N/A)     07/30/2019    Venus Mayer is a 89 y.o. female w/ a significant PMHx COPD ( on 2.5 L home O2),  multiple readmissions for spontaneous pneumothorax, HFpEF S/P AICD, PVD/carotid artery stenosis s/p L CEA 08, admitted for right sided spont pneumothorax. S/p R chest tube placement by IR 7/30.     Patient partial code - compressions w/o intubation    Patient now presents for the above procedure(s).    LDA:        Peripheral IV - Single Lumen 07/27/19 2320 18 G Right Antecubital (Active)   Site Assessment Clean;Dry;Intact;No redness;No swelling 7/28/2019  8:00 PM   Line Status Flushed;Saline locked 7/28/2019  8:00 PM   Dressing Status Clean;Dry;Intact 7/28/2019  8:00 PM   Dressing Intervention Dressing reinforced 7/28/2019  8:00 PM   Dressing Change Due 07/31/19 7/29/2019  7:16 PM   Site Change Due 07/31/19 7/29/2019  7:16 PM   Reason Not Rotated Not due 7/29/2019  7:16 PM   Number of days: 2            Chest Tube 07/28/19 1649 1 Right 24 Fr. (Active)   Chest Tube WDL WDL 7/28/2019  8:00 PM   Function To water seal 7/28/2019  8:00 PM   Air Leak/Fluctuation air leak not present 7/28/2019  8:00 PM   Safety all tubing connections taped;2 rubber-tipped hemostats w/ patient;all connections secured;suction checked 7/28/2019  8:00 PM   Securement tubing secured to body distal to insertion site w/ tape 7/28/2019  8:00 PM   Patency Intervention Tip/tilt 7/28/2019  8:00 PM   Drainage Description Serosanguineous 7/28/2019  8:00 PM   Dressing Appearance occlusive gauze dressing intact 7/29/2019  4:00 PM   Right Subcutaneous Emphysema none present 7/28/2019  8:00 PM   Site Assessment Clean;Dry;Intact;No redness;No swelling 7/29/2019  4:00 PM   Surrounding  Skin Dry;Intact 7/28/2019  8:00 PM   Output (mL) 20 mL 7/30/2019  4:41 AM   Number of days: 1            Chest Tube 07/30/19 1000 Right 8 Fr. (Active)   Number of days: 0            Closed/Suction Drain 07/28/19 0120 Right Chest 8.5 Fr. (Active)   Dressing Type Gauze;Transparent 7/29/2019 11:00 AM   Dressing Status Clean;Dry;Intact 7/29/2019 11:00 AM   Drainage Bloody 7/29/2019 11:00 AM   Status Suction-low intermittent 7/29/2019  7:16 PM   Output (mL) 10 mL 7/29/2019 11:00 AM   Number of days: 2       Female External Urinary Catheter 07/28/19 0847 (Active)   Skin no redness;no breakdown 7/28/2019  8:00 PM   Tolerance no signs/symptoms of discomfort 7/28/2019  8:00 PM   Suction Continuous suction at 70 mmHg 7/28/2019  8:00 PM   Date of last wick change 07/28/19 7/28/2019  8:47 AM   Time of last wick change 0847 7/28/2019  8:47 AM   Output (mL) 600 mL 7/30/2019  4:41 AM   Number of days: 2       Prev airway: None documented.    Drips: None documented.      Patient Active Problem List   Diagnosis    Carotid artery stenosis:Asx; S/P L. CEA 2008 with restenosis.    HOCM (hypertrophic obstructive cardiomyopathy): Apical HCM    Heart failure, diastolic    COLD (chronic obstructive lung disease)    CKD (chronic kidney disease) stage 2, GFR 60-89 ml/min    Symptomatic bradycardia    Spontaneous pneumothorax    DNR (do not resuscitate)    Debility    Muscle spasm    Iron deficiency    Anxiety    SOB (shortness of breath)    Acute on chronic right-sided congestive heart failure    H/O fracture of hip    Acute on Chronic hypercapnic respiratory failure    Severe malnutrition    UTI (urinary tract infection)    Pneumothorax    Pneumothorax on right    Restless leg syndrome    Goals of care, counseling/discussion    Palliative care encounter    Advanced care planning/counseling discussion       Review of patient's allergies indicates:   Allergen Reactions    Ancef in dextrose (iso-osm) Rash    Cefazolin  Rash    Cefuroxime Rash    Sulfamethoxazole-trimethoprim Rash     Other reaction(s): Rash       Current Inpatient Medications:   acetaminophen  1,000 mg Oral Q8H    aspirin  81 mg Oral Daily    ferrous sulfate  325 mg Oral Daily    furosemide  40 mg Oral Daily    heparin (porcine)  5,000 Units Subcutaneous Q8H    ipratropium  0.5 mg Nebulization Q6H    multivitamin  1 tablet Oral Daily    potassium chloride  10 mEq Oral Daily    rOPINIRole  0.25 mg Oral QHS       No current facility-administered medications on file prior to encounter.      Current Outpatient Medications on File Prior to Encounter   Medication Sig Dispense Refill    acetaminophen (TYLENOL) 500 MG tablet Take 1,000 mg by mouth daily as needed for Pain.      albuterol (PROAIR HFA) 90 mcg/actuation inhaler Inhale 1 puff into the lungs every 6 (six) hours as needed for Wheezing or Shortness of Breath. Rescue 1 Inhaler 5    albuterol (PROVENTIL) 2.5 mg /3 mL (0.083 %) nebulizer solution Take 2.5 mg by nebulization every 6 (six) hours as needed for Wheezing or Shortness of Breath. Rescue      amLODIPine (NORVASC) 2.5 MG tablet Take 2.5 mg by mouth nightly as needed for SBP greater than. SBP greater than 155  3    aspirin 81 mg Tab Take 1 tablet by mouth once daily.       docusate sodium (COLACE) 100 MG capsule Take 100 mg by mouth daily as needed for Constipation.      ferrous sulfate 325 (65 FE) MG EC tablet Take 1 tablet (325 mg total) by mouth once daily.  0    furosemide (LASIX) 40 MG tablet Take 1 tablet (40 mg total) by mouth once daily. 30 tablet 11    multivitamin (THERAGRAN) per tablet Take 1 tablet by mouth once daily.      potassium chloride (MICRO-K) 10 MEQ CpSR Take 10 mEq by mouth once daily.      rOPINIRole (REQUIP) 0.25 MG tablet Take 1 tablet (0.25 mg total) by mouth every evening. 30 tablet 11    tiotropium (SPIRIVA) 18 mcg inhalation capsule Inhale 1 capsule (18 mcg total) into the lungs once daily. 30 capsule 3        Past Surgical History:   Procedure Laterality Date    CARDIAC CATHETERIZATION      cardic cath      CAROTID ENDARTERECTOMY         Social History     Socioeconomic History    Marital status:      Spouse name: Not on file    Number of children: Not on file    Years of education: Not on file    Highest education level: Not on file   Occupational History    Not on file   Social Needs    Financial resource strain: Not on file    Food insecurity:     Worry: Not on file     Inability: Not on file    Transportation needs:     Medical: Not on file     Non-medical: Not on file   Tobacco Use    Smoking status: Former Smoker     Packs/day: 2.00     Years: 20.00     Pack years: 40.00     Types: Cigarettes     Last attempt to quit: 1980     Years since quittin.5    Smokeless tobacco: Never Used   Substance and Sexual Activity    Alcohol use: Yes     Alcohol/week: 1.2 oz     Types: 2 Glasses of wine per week     Comment: social    Drug use: No    Sexual activity: Not Currently   Lifestyle    Physical activity:     Days per week: Not on file     Minutes per session: Not on file    Stress: Not on file   Relationships    Social connections:     Talks on phone: Not on file     Gets together: Not on file     Attends Jainism service: Not on file     Active member of club or organization: Not on file     Attends meetings of clubs or organizations: Not on file     Relationship status: Not on file   Other Topics Concern    Not on file   Social History Narrative    Not on file       OBJECTIVE:     Vital Signs Range (Last 24H):  Temp:  [35.9 °C (96.7 °F)-36.8 °C (98.3 °F)]   Pulse:  []   Resp:  [16-24]   BP: (139-180)/(62-82)   SpO2:  [85 %-99 %]       Significant Labs:  Lab Results   Component Value Date    WBC 6.24 2019    HGB 11.4 (L) 2019    HCT 36.1 (L) 2019     2019    CHOL 190 2019    TRIG 112 2019    HDL 90 (H) 2019    ALT 15  07/27/2019    AST 31 07/27/2019     07/30/2019    K 3.7 07/30/2019    CL 91 (L) 07/30/2019    CREATININE 0.8 07/30/2019    BUN 21 07/30/2019    CO2 38 (H) 07/30/2019    TSH 0.172 (L) 07/06/2019    INR 0.9 07/21/2019    HGBA1C 5.2 07/06/2019       Diagnostic Studies: No relevant studies.    EKG:   Results for orders placed or performed during the hospital encounter of 07/27/19   EKG 12-lead    Collection Time: 07/28/19  9:40 AM    Narrative    Test Reason : R07.9,    Vent. Rate : 089 BPM     Atrial Rate : 089 BPM     P-R Int : 148 ms          QRS Dur : 124 ms      QT Int : 398 ms       P-R-T Axes : 081 240 030 degrees     QTc Int : 484 ms    Sinus rhythm with Premature atrial complexes  Right superior axis deviation  Biatrial enlargement  Right bundle branch block  Abnormal ECG  When compared with ECG of 27-JUL-2019 23:23,  Premature atrial complexes are now Present  Confirmed by CHELE HAGER MD ([id number]) on 7/28/2019 1:56:42 PM    Referred By: [email or web address]   SELF           Confirmed By:CHELE HAGER MD         2D ECHO:  7/7/2019  · Normal left ventricular systolic function. The estimated ejection fraction is 65%  · Septal wall has abnormal motion.  · Moderate left atrial enlargement.  · Normal right ventricular systolic function.  · Mild aortic regurgitation.  · Moderate mitral sclerosis.  · There is moderate leaflet calcification of the Mitral Valve.  · Mild mitral stenosis.  · Mitral Valve peak velocity is m/s; mean gradient is 6 mmHg.  · Mild-to-moderate mitral regurgitation.  · Mild pulmonic regurgitation.  · Intermediate central venous pressure (8 mm Hg).  · The estimated PA systolic pressure is 52 mm Hg  · Pulmonary hypertension present.           ASSESSMENT/PLAN:         Anesthesia Evaluation    I have reviewed the Patient Summary Reports.     I have reviewed the Medications.     Review of Systems  Anesthesia Hx:  No problems with previous Anesthesia    Social:  Former Smoker     Hematology/Oncology:  Hematology Normal   Oncology Normal   Hematology Comments: Sq heparin this admit only    Cardiovascular:   Pacemaker (for 2 years.) Hypertension Denies MI. CAD     Denies Angina. CHF    Pulmonary:   COPD Shortness of breath    Renal/:   Chronic Renal Disease    Hepatic/GI:  Hepatic/GI Normal Denies Liver Disease.    Musculoskeletal:  Musculoskeletal Normal    Neurological:   CVA (unsure but possible after carotid.), no residual symptoms Seizures Subdural hematoma some time ago followed medically without sequelae.   Endocrine:   Denies Diabetes.        Physical Exam  General:  Well nourished    Airway/Jaw/Neck:  Airway Findings: Mouth Opening: Normal Pre-Existing Airway Tube(s): Oral Endotracheal tube  General Airway Assessment: Adult  Mallampati: II  TM Distance: Normal, at least 6 cm     Eyes/Ears/Nose:  Eyes/Ears/Nose Findings: (hearing loss right side)     Chest/Lungs:  Chest/Lungs Findings: Clear to auscultation     Heart/Vascular:  Heart Findings: Rate: Normal        Mental Status:  Mental Status Findings:  Cooperative         Anesthesia Plan  Type of Anesthesia, risks & benefits discussed:  Anesthesia Type:  general  Patient's Preference:   Intra-op Monitoring Plan: standard ASA monitors  Intra-op Monitoring Plan Comments:   Post Op Pain Control Plan: multimodal analgesia and IV/PO Opioids PRN  Post Op Pain Control Plan Comments:   Induction:   IV  Beta Blocker:  Patient is not currently on a Beta-Blocker (No further documentation required).       Informed Consent: Patient understands risks and agrees with Anesthesia plan.  Questions answered. Anesthesia consent signed with patient.  ASA Score: 3     Day of Surgery Review of History & Physical:    H&P update referred to the surgeon.         Ready For Surgery From Anesthesia Perspective.

## 2019-07-30 NOTE — CARE UPDATE
Palliative medicine returned to bedside.  Patient is unavailable - off floor to procedure. Palliative medicine will follow up with patient.

## 2019-07-30 NOTE — PT/OT/SLP EVAL
Physical Therapy Evaluation    Patient Name:  Venus Mayer   MRN:  [id number]    Recommendations:     Discharge Recommendations:  home with home health   Discharge Equipment Recommendations: none   Barriers to discharge: None    Assessment:       Venus Mayre is a 89 y.o. female admitted with a medical diagnosis of Pneumothorax on right and pertinent PMHx and surgical history including recent admission for recurrent pneumothorax.  She presents with the following impairments/functional limitations:  weakness, gait instability, impaired endurance, impaired balance, decreased lower extremity function, impaired self care skills, impaired functional mobilty.      Patient demonstrates good motivation and fair activity tolerance secondary SOB.  Patient requires CGA for bed mobility, transfers and ambulation with overall decrease in ambulation tolerance secondary to anxiety and SOB.    Rehab Prognosis: Fair; patient would benefit from acute skilled PT services 3 x/week to address these deficits and reach maximum level of function.  Patient is most appropriate to go to home with home health for endurance and strength training.  Recent Surgery: Procedure(s) (LRB):  BRONCHOSCOPY, FLEXIBLE, WITH ENDOBRONCHIAL VALVE INSERTION (N/A)      Plan:     During this hospitalization, patient to be seen 3 x/week to address the identified rehab impairments via gait training, therapeutic activities, therapeutic exercises, neuromuscular re-education and progress toward the following goals:    · Plan of Care Expires:  08/29/19    Subjective     Subjective:  Pain/Comfort:  · Pain Rating 1: 0/10    Patients cultural, spiritual, Adventism conflicts given the current situation: no    Living Environment:  Pt lives alone in [facility] with 4 ALEKSANDR and B handrails. Pt has 4 children all of which live within 30 min; pt has been having someone stay with her since hip fracture.  Patient has sitter 6 days per week and if family isn't there sitter is.  Prior to  admission, patients level of function was requiring SBA for line management and intermittent assistance for ADLs.  Equipment used at home: walker, rolling, cane, straight, bedside commode, oxygen, shower chair.  Upon discharge, patient will have assistance from her children and sitters.    Objective:     Communicated with RN prior to session.  Patient found supine with chest tube, PureWick, telemetry, pulse ox (continuous)  upon PT entry to room.    General Precautions: Standard, fall, hearing impaired   Orthopedic Precautions:N/A   Braces: N/A     Exams:  · Cognitive Exam:  Patient is oriented to Person, Place and Time  · RLE ROM: WFL  · RLE Strength: Deficits: grossly WFL  · LLE ROM: WFL  · LLE Strength: Deficits: grossly WFL    Functional Mobility:  · Bed Mobility:     · Supine to Sit: contact guard assistance  · Sit to Supine: contact guard assistance  · Transfers:     · Sit to Stand:  contact guard assistance with rolling walker  · Gait: min A for ambulation of 15 ft with rolling walker.      Therapeutic Activities and Exercises:   Gait training:  Patient required assistance for balance and cues for weight shift and to minimize posterior lean to increase independence and safety.  Patient required cues ~ 50% of the time.    Patient Education:    Patient educated on Fall risk, gait training, home safety, Home exercise program and transfer training by explanation.  Patient was receptive to education and verbalizes understanding.     AM-PAC 6 CLICK MOBILITY  Total Score:18     Patient left supine with all lines intact and call button in reach.    GOALS:   Multidisciplinary Problems     Physical Therapy Goals        Problem: Physical Therapy Goal    Goal Priority Disciplines Outcome Goal Variances Interventions   Physical Therapy Goal     PT, PT/OT Ongoing (interventions implemented as appropriate)     Description:  Goals to be met by: 8/13/2019    Patient will increase functional independence with mobility by  performin. Supine to sit with Set-up [place]  2. Sit to supine with Set-up [place]  3. Sit to stand transfer with Stand-by Assistance  4. Gait  x 100 feet with Stand-by Assistance using Rolling Walker.   5. Ascend/descend 2 stair with right Handrails Supervision using no assistive device.                       History:     Past Medical History:   Diagnosis Date    Acute on chronic congestive heart failure 2019    Cardiomyopathy     Carotid artery occlusion     CHF (congestive heart failure)     COPD (chronic obstructive pulmonary disease)     Coronary artery disease     Hyperlipidemia     Hypertension        Past Surgical History:   Procedure Laterality Date    CARDIAC CATHETERIZATION      [facility] cath      CAROTID ENDARTERECTOMY         Time Tracking:     PT Received On: 19  PT Start Time: 1500     PT Stop Time: 1519  PT Total Time (min): 19 min     Billable Minutes: Evaluation 10 min and Gait Training 9 min      Ezequiel Doe, PT  2019

## 2019-07-30 NOTE — PLAN OF CARE
Problem: Physical Therapy Goal  Goal: Physical Therapy Goal  Goals to be met by: 2019    Patient will increase functional independence with mobility by performin. Supine to sit with Set-up [place]  2. Sit to supine with Set-up [place]  3. Sit to stand transfer with Stand-by Assistance  4. Gait  x 100 feet with Stand-by Assistance using Rolling Walker.   5. Ascend/descend 2 stair with right Handrails Supervision using no assistive device.     Outcome: Ongoing (interventions implemented as appropriate)  PT evaluation completed, goals established and plan of care reviewed with patient.      Ezequiel Doe, PT, DPT  2019  Pager #: [phone]

## 2019-07-30 NOTE — PT/OT/SLP EVAL
Occupational Therapy   Evaluation    Name: Venus Mayer  MRN: [id number]  Admitting Diagnosis:  Pneumothorax on right      Recommendations:     Discharge Recommendations: home  Discharge Equipment Recommendations:  none  Barriers to discharge:  None    Assessment:     Venus Mayer is a 89 y.o. female with a medical diagnosis of Pneumothorax on right.  She presents with a decline in occupational participation and functional mobility. Patient agreeable to evaluation and gives good effort. Patient completes bed mobility with CGA and functional transfers/mobility with overall min assist. Performance deficits affecting function: weakness, impaired endurance, impaired self care skills, impaired cardiopulmonary response to activity, impaired balance.      Rehab Prognosis: Fair; patient would benefit from acute skilled OT services to address these deficits and reach maximum level of function.       Plan:     Patient to be seen 3 x/week to address the above listed problems via self-care/home management, therapeutic activities, therapeutic exercises  · Plan of Care Expires: 08/30/19  · Plan of Care Reviewed with: patient, family    Subjective     Chief Complaint: Shortness of breath  Patient/Family Comments/goals: To get better    Occupational Profile:  Living Environment: Patient lives alone in a [facility] with 5 ALEKSANDR with bilateral handrails. Patient's bathroom has a tub/shower combination.  Previous level of function: Patient was independent prior to hospitalization in March but has required assistance with ADLs/IADLs since then  Roles and Routines: Mother. Patient enjoyed going to [place].  Equipment Used at Home:  wheelchair, bedside commode, walker, standard, raised toilet, hip kit, bath bench  Assistance upon Discharge: Family and hired sitters available to assist upon d/c    Pain/Comfort:  · Pain Rating 1: other (see comments)(Did not provide pain rating.)    Patients cultural, spiritual, Restorationism conflicts given the  current situation: no    Objective:     Communicated with: RN prior to session.  Patient found supine with chest tube, telemetry, pulse ox (continuous), PureWick upon OT entry to room.    General Precautions: Standard, fall, hearing impaired   Orthopedic Precautions:N/A   Braces: N/A     Occupational Performance:    Bed Mobility:    · Patient completed Scooting to EOB for foot placement on floor with contact guard assistance  · Patient completed Supine to Sit to R side EOB with contact guard assistance  · Patient completed Sit to Supine with contact guard assistance    Functional Mobility/Transfers:  · Patient completed Sit <> Stand Transfer with minimum assistance  with  rolling walker   · Functional Mobility: Patient completed functional mobility within room from bed<>door with min assist and a RW. Patient required mod verbal cueing for proper technique with RW as patient exhibited posterior lean throughout.    Activities of Daily Living:  · Lower Body Dressing: total assistance Patient donned/doffed NSS sitting EOB with total assist.  · Toileting: total assistance Patient completes toileting with a purewick with total assist.    Cognitive/Visual Perceptual:  Cognitive/Psychosocial Skills:     -       Oriented to: Person, Place, Time and Situation   -       Follows Commands/attention:Follows multistep  commands    Physical Exam:  Sensation:    -       Intact  Upper Extremity Range of Motion:     -       Right Upper Extremity: WFL  -       Left Upper Extremity: WFL  Upper Extremity Strength:    -       Right Upper Extremity: DNT: patient reporting some pain with shoulder ROM 2' chest tube placement  -       Left Upper Extremity: DNT: patient reporting some pain with shoulder ROM 2' chest tube placement   Strength:    -       Right Upper Extremity: 3/5  -       Left Upper Extremity: 3/5    AMPAC 6 Click ADL:  AMPAC Total Score: 15    Treatment & Education:  Role of OT and evaluation  Call button for  assistance  Educated on proper technique with RW during functional mobility  Education:    Patient left supine with all lines intact, call button in reach and RN notified    GOALS:   Multidisciplinary Problems     Occupational Therapy Goals        Problem: Occupational Therapy Goal    Goal Priority Disciplines Outcome Interventions   Occupational Therapy Goal     OT, PT/OT Ongoing (interventions implemented as appropriate)    Description:  Goals to be met by: 8/13/2019     Patient will increase functional independence with ADLs by performing:    UE Dressing with [place].  LE Dressing with Modified [place] with AE PRN.  Grooming while seated with Set-up Assistance.  Toileting from toilet with Supervision for hygiene and clothing management.   Toilet transfer to toilet with Supervision with LRAD PRN.                      History:     Past Medical History:   Diagnosis Date    Acute on chronic congestive heart failure 7/6/2019    Cardiomyopathy     Carotid artery occlusion     CHF (congestive heart failure)     COPD (chronic obstructive pulmonary disease)     Coronary artery disease     Hyperlipidemia     Hypertension        Past Surgical History:   Procedure Laterality Date    CARDIAC CATHETERIZATION      cardic cath      CAROTID ENDARTERECTOMY         Time Tracking:     OT Date of Treatment: 07/30/19  OT Start Time: 1500  OT Stop Time: 1518  OT Total Time (min): 18 min    Billable Minutes:Evaluation 18 minutes    Ernestina Holley OT  7/30/2019

## 2019-07-30 NOTE — PROGRESS NOTES
[facility] Hospital Medicine  Progress Note    Patient Name: Venus Mayer  MRN: [id number]  Patient Class: IP- Inpatient   Admission Date: 7/27/2019  Length of Stay: 2 days  Attending Physician: Ezra Escobedo MD  Primary Care Provider: Jona Che MD    [facility] Medicine Team: [facility] MED 1 Марина Puente MD    Subjective:     Principal Problem:Pneumothorax on right      HPI:  Venus Mayer is 89 y.o. lady with COPD, (on 2.5 L home O2), 40 pack year smoking history (quit 30 years ago), multiple readmissions for spontaneous pneumothorax, chronic dCHFS/P AICD, PVD/carotid artery stenosis s/p L CEA 08,was brought to the ED because she was SOB.    She was Discharged from [facility] on 7/26/19 after being admitted for pneumothorax, improved after placing CT with return to baseline pulmonary function on discharge.  Then around evening of 7/27 started feeling SOB, not relieved with her Inhaler or Nebulization, progressively worse,  prompting her to come to the Hospital. No chest pain, fever , confusion.    In the ED she was hypoxic, CXR revelaed Right sided Pneumothorax. CTS was consulted and placed a Pig tail catheter, after repositioning showed air leak and patient symptomatically improved and is currently on a non re breather.     The patient's recurrent PTXs seem to have started during her admission on 05/22/2019 where she presented with a large R sided PTX requiring chest tube and pleurodesis on 5/27/2019. Since, she has had a small recurrence of PTX on 6/25/19 not requiring intervention, and admission to OSH 7/6-7/11 for acute on chronic dCHF and COPD exacerbation. She has been less mobile and uses a walker at home.     Overview/Hospital Course:  No notes on file    Interval History: Patient went to IR for chest tube placement. Patient reports significant improvement in shortness of breath.    Review of Systems   Constitutional: Negative for chills and fever.   HENT: Negative for sore throat  and trouble swallowing.    Eyes: Negative for pain and visual disturbance.   Respiratory: Positive for cough and shortness of breath.    Cardiovascular: Negative for chest pain, palpitations and leg swelling.   Gastrointestinal: Negative for abdominal distention, abdominal pain, nausea and vomiting. Diarrhea: Loose BM with Mirilax.   Genitourinary: Negative for dysuria and hematuria.   Musculoskeletal: Negative for joint swelling and myalgias.   Skin: Negative for rash and wound.   Neurological: Negative for dizziness and headaches.   Psychiatric/Behavioral: Negative for agitation and confusion.     Objective:     Vital Signs (Most Recent):  Temp: 97.8 °F (36.6 °C) (07/30/19 1151)  Pulse: 89 (07/30/19 1313)  Resp: 18 (07/30/19 1313)  BP: 138/64 (07/30/19 1151)  SpO2: 99 % (07/30/19 1313) Vital Signs (24h Range):  Temp:  [97.4 °F (36.3 °C)-98.3 °F (36.8 °C)] 97.8 °F (36.6 °C)  Pulse:  [] 89  Resp:  [16-22] 18  SpO2:  [85 %-99 %] 99 %  BP: (138-179)/(62-82) 138/64     Weight: 40.8 kg (90 lb)  Body mass index is 17.58 kg/m².    Intake/Output Summary (Last 24 hours) at 7/30/2019 1349  Last data filed at 7/30/2019 1200  Gross per 24 hour   Intake 480 ml   Output 920 ml   Net -440 ml      Physical Exam   Constitutional: She appears well-developed and well-nourished. No distress.   Cardiovascular: Normal rate and regular rhythm.   Pulmonary/Chest: Effort normal. No respiratory distress.   Large bore chest tube in place. No air leak.   Minimal serosanguinous output   Abdominal: Soft. She exhibits no distension. There is no tenderness.   Neurological: She is alert.   Skin: Skin is warm and dry.   Psychiatric: She has a normal mood and affect. Her behavior is normal.       Significant Labs: All pertinent labs within the past 24 hours have been reviewed.    Significant Imaging: I have reviewed all pertinent imaging results/findings within the past 24 hours.      Assessment/Plan:      * Pneumothorax on right  89 y.o. lady  with h/o COPD with multiple pneumothorax is coming with dyspnea and hypoxia.  Previous placement of CT and Pleurodesis    - CXR shows Right Pneumothorax, no decrease in size as of 7/29  - CTS consulted by ED, Place a Pigtail catheter which has now been replaced twice  - IR placed second chest tube today with patient reporting resolution in symptoms  - Per CT surgery will monitor for air leak tomorrow, in which case patient may get a bronchoscopy and endobronchial valve placed  - Patient on 2.5L O2 NC                 Restless leg syndrome    Continue Ropinirole     COLD (chronic obstructive lung disease)  Patient with COPD on homeO2, currently do not suspect any underlying  [place]    - Continue Tiotropium   - Duonebs as needed  - On Supplemental oxygen      Heart failure, diastolic  ECHO on 7/19 shows EF of 65% with DD, PA pressure of %^ and valvular abnormalities  BNP - 1414, but not volume overloaded currently  Mild EKG leak that has peaked suspect secondary to demand.    - Continue Home Lasix 40mg Daily, If she does become volume overloaded then can increase dose  - Monitor I/O, Weights          VTE Risk Mitigation (From admission, onward)        Ordered     heparin (porcine) injection 5,000 Units  Every 8 hours      07/28/19 0551     IP VTE HIGH RISK PATIENT  Once      07/28/19 0551                Марина Puente MD  Department of Hospital Medicine   [facility]

## 2019-07-30 NOTE — PROCEDURES
Radiology Post-Procedure Note    Pre Op Diagnosis: right pneumothorax    Post Op Diagnosis: right pneumothorax     Procedure: right chest tube placement    Procedure performed by: John Hills MD; Robert Sheikh MD    Written Informed Consent Obtained: Yes    Specimen Removed: NO    Estimated Blood Loss: Minimal    Findings: Local anesthesia and moderate sedation were used.    A right anterior approach was used to insert a 8.0-[place]  all-purpose drainage catheter into the pleural space using CT guidance. Air was removed under gentle suction. The tube was secured using pigtail formation of the distal end as well as skin suture. A chest tube drainage system was connected. Postprocedural imaging demonstrates decrease in size of the pneumothorax with increased expansion of the lung.     The patient tolerated the procedure well and there were no complications.  Please see Imaging report for further details.    Robert Sheikh MD  Resident  Department of Radiology  Pager: [phone]

## 2019-07-30 NOTE — NURSING
Notified IM-1 of continuous bubbling in chamber of newly placed right chest tube to ANETTE. MD aware. No SOB reported. sp02 95% on continuous pulse ox monitor. Pt on 2.5 L 02 NC.  WCTM.

## 2019-07-30 NOTE — SUBJECTIVE & OBJECTIVE
Interval History: NAEON. On 2.5L NC with saturations in mid 90s. Desats some with activity but this probably occurs at home as well. Tolerated tube adjustment yesterday. No improvement in PTX on CXR.     Medications:  Continuous Infusions:  Scheduled Meds:   acetaminophen  1,000 mg Oral Q8H    aspirin  81 mg Oral Daily    ferrous sulfate  325 mg Oral Daily    furosemide  40 mg Oral Daily    heparin (porcine)  5,000 Units Subcutaneous Q8H    multivitamin  1 tablet Oral Daily    polyethylene glycol  17 g Oral Daily    potassium chloride  10 mEq Oral Daily    rOPINIRole  0.25 mg Oral QHS     PRN Meds:albuterol, Dextrose 10% Bolus, Dextrose 10% Bolus, glucagon (human recombinant), glucose, glucose, sodium chloride 0.9%     Review of patient's allergies indicates:   Allergen Reactions    Ancef in dextrose (iso-osm) Rash    Cefazolin Rash    Cefuroxime Rash    Sulfamethoxazole-trimethoprim Rash     Other reaction(s): Rash     Objective:     Vital Signs (Most Recent):  Temp: 98 °F (36.7 °C) (07/30/19 0348)  Pulse: 95 (07/30/19 0714)  Resp: 18 (07/30/19 0348)  BP: (!) 159/66 (07/30/19 0348)  SpO2: 96 % (07/30/19 0714) Vital Signs (24h Range):  Temp:  [96.7 °F (35.9 °C)-98.3 °F (36.8 °C)] 98 °F (36.7 °C)  Pulse:  [] 95  Resp:  [16-24] 18  SpO2:  [90 %-97 %] 96 %  BP: (138-180)/(62-77) 159/66     Intake/Output - Last 3 Shifts       07/28 0700 - 07/29 0659 07/29 0700 - 07/30 0659 07/30 0700 - 07/31 0659    P.O.  240     Total Intake(mL/kg)  240 (5.9)     Urine (mL/kg/hr) 250 (0.3) 600 (0.6)     Drains  10     Stool 0 0     Chest Tube 60 20     Total Output 310 630     Net -310 -390            Urine Occurrence  1 x     Stool Occurrence 0 x 0 x           SpO2: 96 %  O2 Device (Oxygen Therapy): nasal cannula    Physical Exam   Constitutional: She appears well-developed and well-nourished. No distress.   Cardiovascular: Normal rate and regular rhythm.   Pulmonary/Chest: Effort normal. No respiratory distress.    Large bore chest tube in place. No air leak.   Minimal serosanguinous output   Abdominal: Soft. She exhibits no distension. There is no tenderness.   Neurological: She is alert.   Skin: Skin is warm and dry.   Psychiatric: She has a normal mood and affect. Her behavior is normal.       Significant Labs:  ABGs: No results for input(s): PH, PCO2, PO2, HCO3, POCSATURATED, BE in the last 48 hours.  BMP:   Recent Labs   Lab 07/30/19  0338   GLU 92      K 3.7   CL 91*   CO2 38*   BUN 21   CREATININE 0.8   CALCIUM 10.3   MG 2.1     CBC:   Recent Labs   Lab 07/30/19  0338   WBC 6.24   RBC 3.69*   HGB 11.4*   HCT 36.1*      MCV 98   MCH 30.9   MCHC 31.6*     CMP:   Recent Labs   Lab 07/30/19  0338   GLU 92   CALCIUM 10.3      K 3.7   CO2 38*   CL 91*   BUN 21   CREATININE 0.8       Significant Diagnostics:  CXR: I have reviewed all pertinent results/findings within the past 24 hours    VTE Risk Mitigation (From admission, onward)        Ordered     heparin (porcine) injection 5,000 Units  Every 8 hours      07/28/19 0551     IP VTE HIGH RISK PATIENT  Once      07/28/19 0551

## 2019-07-30 NOTE — SUBJECTIVE & OBJECTIVE
Interval History: Patient went to IR for chest tube placement. Patient reports significant improvement in shortness of breath.    Review of Systems   Constitutional: Negative for chills and fever.   HENT: Negative for sore throat and trouble swallowing.    Eyes: Negative for pain and visual disturbance.   Respiratory: Positive for cough and shortness of breath.    Cardiovascular: Negative for chest pain, palpitations and leg swelling.   Gastrointestinal: Negative for abdominal distention, abdominal pain, nausea and vomiting. Diarrhea: Loose BM with Mirilax.   Genitourinary: Negative for dysuria and hematuria.   Musculoskeletal: Negative for joint swelling and myalgias.   Skin: Negative for rash and wound.   Neurological: Negative for dizziness and headaches.   Psychiatric/Behavioral: Negative for agitation and confusion.     Objective:     Vital Signs (Most Recent):  Temp: 97.8 °F (36.6 °C) (07/30/19 1151)  Pulse: 89 (07/30/19 1313)  Resp: 18 (07/30/19 1313)  BP: 138/64 (07/30/19 1151)  SpO2: 99 % (07/30/19 1313) Vital Signs (24h Range):  Temp:  [97.4 °F (36.3 °C)-98.3 °F (36.8 °C)] 97.8 °F (36.6 °C)  Pulse:  [] 89  Resp:  [16-22] 18  SpO2:  [85 %-99 %] 99 %  BP: (138-179)/(62-82) 138/64     Weight: 40.8 kg (90 lb)  Body mass index is 17.58 kg/m².    Intake/Output Summary (Last 24 hours) at 7/30/2019 1349  Last data filed at 7/30/2019 1200  Gross per 24 hour   Intake 480 ml   Output 920 ml   Net -440 ml      Physical Exam   Constitutional: She appears well-developed and well-nourished. No distress.   Cardiovascular: Normal rate and regular rhythm.   Pulmonary/Chest: Effort normal. No respiratory distress.   Large bore chest tube in place. No air leak.   Minimal serosanguinous output   Abdominal: Soft. She exhibits no distension. There is no tenderness.   Neurological: She is alert.   Skin: Skin is warm and dry.   Psychiatric: She has a normal mood and affect. Her behavior is normal.       Significant Labs: All  pertinent labs within the past 24 hours have been reviewed.    Significant Imaging: I have reviewed all pertinent imaging results/findings within the past 24 hours.

## 2019-07-30 NOTE — H&P
Inpatient Radiology Pre-procedure Note    History of Present Illness:  Venus Mayer is a 89 y.o. female who presents for IR chest tube placement on a b/g of emphysema and multiple R sided pneumothoraces. Additional history of R sided pleurodesis and recent surgical chest tube placement.  Admission H&P reviewed.  Past Medical History:   Diagnosis Date    Acute on chronic congestive heart failure 7/6/2019    Cardiomyopathy     Carotid artery occlusion     CHF (congestive heart failure)     COPD (chronic obstructive pulmonary disease)     Coronary artery disease     Hyperlipidemia     Hypertension      Past Surgical History:   Procedure Laterality Date    CARDIAC CATHETERIZATION      cardic cath      CAROTID ENDARTERECTOMY         Review of Systems:   As documented in primary team H&P    Home Meds:   Prior to Admission medications    Medication Sig Start Date End Date Taking? Authorizing Provider   acetaminophen (TYLENOL) 500 MG tablet Take 1,000 mg by mouth daily as needed for Pain.   Yes Historical Provider, MD   albuterol (PROAIR HFA) 90 mcg/actuation inhaler Inhale 1 puff into the lungs every 6 (six) hours as needed for Wheezing or Shortness of Breath. Rescue 7/11/19  Yes Soham Stevenson MD   albuterol (PROVENTIL) 2.5 mg /3 mL (0.083 %) nebulizer solution Take 2.5 mg by nebulization every 6 (six) hours as needed for Wheezing or Shortness of Breath. Rescue   Yes Historical Provider, MD   amLODIPine (NORVASC) 2.5 MG tablet Take 2.5 mg by mouth nightly as needed for SBP greater than. SBP greater than 155 4/6/19  Yes Historical Provider, MD   aspirin 81 mg Tab Take 1 tablet by mouth once daily.    Yes Historical Provider, MD   docusate sodium (COLACE) 100 MG capsule Take 100 mg by mouth daily as needed for Constipation.   Yes Historical Provider, MD   ferrous sulfate 325 (65 FE) MG EC tablet Take 1 tablet (325 mg total) by mouth once daily. 6/1/19  Yes Shane Jo MD   furosemide (LASIX) 40 MG  tablet Take 1 tablet (40 mg total) by mouth once daily. 7/11/19 7/10/20 Yes Soham Stevenson MD   multivitamin (THERAGRAN) per tablet Take 1 tablet by mouth once daily.   Yes Historical Provider, MD   potassium chloride (MICRO-K) 10 MEQ CpSR Take 10 mEq by mouth once daily.   Yes Historical Provider, MD   rOPINIRole (REQUIP) 0.25 MG tablet Take 1 tablet (0.25 mg total) by mouth every evening. 5/31/19 5/30/20 Yes Shane Jo MD   tiotropium (SPIRIVA) 18 mcg inhalation capsule Inhale 1 capsule (18 mcg total) into the lungs once daily. 7/11/19  Yes Soham Stevenson MD     Scheduled Meds:    acetaminophen  1,000 mg Oral Q8H    albuterol-ipratropium  3 mL Nebulization Q6H    aspirin  81 mg Oral Daily    ferrous sulfate  325 mg Oral Daily    furosemide  40 mg Oral Daily    heparin (porcine)  5,000 Units Subcutaneous Q8H    multivitamin  1 tablet Oral Daily    polyethylene glycol  17 g Oral Daily    potassium chloride  10 mEq Oral Daily    rOPINIRole  0.25 mg Oral QHS     Continuous Infusions:   PRN Meds:albuterol, Dextrose 10% Bolus, Dextrose 10% Bolus, glucagon (human recombinant), glucose, glucose, sodium chloride 0.9%  Anticoagulants/Antiplatelets: aspirin, low dose    Allergies:   Review of patient's allergies indicates:   Allergen Reactions    Ancef in dextrose (iso-osm) Rash    Cefazolin Rash    Cefuroxime Rash    Sulfamethoxazole-trimethoprim Rash     Other reaction(s): Rash     Sedation Hx: have not been any systemic reactions    Labs:  No results for input(s): INR in the last 168 hours.    Invalid input(s):  PT,  PTT    Recent Labs   Lab 07/30/19  0338   WBC 6.24   HGB 11.4*   HCT 36.1*   MCV 98         Recent Labs   Lab 07/27/19  2322  07/30/19  0338   *   < > 92      < > 139   K 4.0   < > 3.7   CL 88*   < > 91*   CO2 37*   < > 38*   BUN 25*   < > 21   CREATININE 1.0   < > 0.8   CALCIUM 10.9*   < > 10.3   MG  --    < > 2.1   ALT 15  --   --    AST 31  --   --    ALBUMIN 3.4*   --   --    BILITOT 0.4  --   --     < > = values in this interval not displayed.         Vitals:  Temp: 97.4 °F (36.3 °C) (07/30/19 0839)  Pulse: 107 (07/30/19 0841)  Resp: 20 (07/30/19 0839)  BP: (!) 176/79 (07/30/19 0841)  SpO2: (!) 92 % (07/30/19 0841)     Physical Exam:  ASA: 3  Mallampati: 3    General: no acute distress  Mental Status: alert and oriented to person, place and time  HEENT: normocephalic, atraumatic  Chest: mild difficulty breathing on 3 L O2  Heart: regular heart rate  Abdomen: nondistended  Extremity: moves all extremities    Plan: right chest tube placement  Sedation Plan: up to moderate    Robert Sheikh MD  Resident  Department of Radiology  Pager: [phone]

## 2019-07-30 NOTE — PLAN OF CARE
Problem: Occupational Therapy Goal  Goal: Occupational Therapy Goal  Goals to be met by: 8/13/2019     Patient will increase functional independence with ADLs by performing:    UE Dressing with [place].  LE Dressing with Modified [place] with AE PRN.  Grooming while seated with Set-up Assistance.  Toileting from toilet with Supervision for hygiene and clothing management.   Toilet transfer to toilet with Supervision with LRAD PRN.    Outcome: Ongoing (interventions implemented as appropriate)  OT evaluation completed and POC established.  Ernestina Holley OT  7/30/2019

## 2019-07-30 NOTE — PHARMACY MED REC
"Admission Medication Reconciliation - Pharmacy Consult Note    The home medication history was taken by Miriam Santiago, Pharmacy Technician. Based on information gathered and subsequent review by the clinical pharmacist, the items below may need attention.     You may go to "Admission" then "Reconcile Home Medications" tabs to review and/or act upon these items.     Potentially problematic discrepancies with current MAR  o Patient IS taking the following which was not ordered upon admit  o Tiotropium 18 mcg into the lungs daily     Please address this information as you see fit.  Feel free to contact us if you have any questions or require assistance.    Mildred Tobias, PharmD, BCPS  [id number]     [facility] Medications   Medication Sig    acetaminophen (TYLENOL) 500 MG tablet Take 1,000 mg by mouth daily as needed for Pain.    albuterol (PROAIR HFA) 90 mcg/actuation inhaler Inhale 1 puff into the lungs every 6 (six) hours as needed for Wheezing or Shortness of Breath. Rescue    albuterol (PROVENTIL) 2.5 mg /3 mL (0.083 %) nebulizer solution Take 2.5 mg by nebulization every 6 (six) hours as needed for Wheezing or Shortness of Breath. Rescue    amLODIPine (NORVASC) 2.5 MG tablet Take 2.5 mg by mouth nightly as needed for SBP greater than. SBP greater than 155    aspirin 81 mg Tab Take 1 tablet by mouth once daily.     docusate sodium (COLACE) 100 MG capsule Take 100 mg by mouth daily as needed for Constipation.    ferrous sulfate 325 (65 FE) MG EC tablet Take 1 tablet (325 mg total) by mouth once daily.    furosemide (LASIX) 40 MG tablet Take 1 tablet (40 mg total) by mouth once daily.    multivitamin (THERAGRAN) per tablet Take 1 tablet by mouth once daily.    potassium chloride (MICRO-K) 10 MEQ CpSR Take 10 mEq by mouth once daily.    rOPINIRole (REQUIP) 0.25 MG tablet Take 1 tablet (0.25 mg total) by mouth every evening.    tiotropium (SPIRIVA) 18 mcg inhalation capsule Inhale 1 capsule (18 mcg total) into the " lungs once daily.     .    .

## 2019-07-30 NOTE — PLAN OF CARE
right chest tube placement complete. Pt tolerated well. No acute distress noted. Report to be called to primary RN. Will request transport in [facility]. Dressing CDI. No orders for chest xray at this time.

## 2019-07-30 NOTE — PROGRESS NOTES
[facility]  Thoracic Surgery  Progress Note    Subjective:     History of Present Illness:  Venus Mayer is a 89 y.o. female with a hx of COPD and multiple readmissions for spontaneous pneumothorax. Numerous attempts at pleurodesis have been performed. Patient is not a surgical candidate. She presents to the ED with acute onset shortness of breath. Imaging showed large right sided pneumothorax. Pt otherwise stable.      No current facility-administered medications on file prior to encounter.        Post-Op Info:  Procedure(s) (LRB):  BRONCHOSCOPY, FLEXIBLE, WITH ENDOBRONCHIAL VALVE INSERTION (N/A)         Interval History: NAEON. On 2.5L NC with saturations in mid 90s. Desats some with activity but this probably occurs at home as well. Tolerated tube adjustment yesterday. No improvement in PTX on CXR.     Medications:  Continuous Infusions:  Scheduled Meds:   acetaminophen  1,000 mg Oral Q8H    aspirin  81 mg Oral Daily    ferrous sulfate  325 mg Oral Daily    furosemide  40 mg Oral Daily    heparin (porcine)  5,000 Units Subcutaneous Q8H    multivitamin  1 tablet Oral Daily    polyethylene glycol  17 g Oral Daily    potassium chloride  10 mEq Oral Daily    rOPINIRole  0.25 mg Oral QHS     PRN Meds:albuterol, Dextrose 10% Bolus, Dextrose 10% Bolus, glucagon (human recombinant), glucose, glucose, sodium chloride 0.9%     Review of patient's allergies indicates:   Allergen Reactions    Ancef in dextrose (iso-osm) Rash    Cefazolin Rash    Cefuroxime Rash    Sulfamethoxazole-trimethoprim Rash     Other reaction(s): Rash     Objective:     Vital Signs (Most Recent):  Temp: 98 °F (36.7 °C) (07/30/19 0348)  Pulse: 95 (07/30/19 0714)  Resp: 18 (07/30/19 0348)  BP: (!) 159/66 (07/30/19 0348)  SpO2: 96 % (07/30/19 0714) Vital Signs (24h Range):  Temp:  [96.7 °F (35.9 °C)-98.3 °F (36.8 °C)] 98 °F (36.7 °C)  Pulse:  [] 95  Resp:  [16-24] 18  SpO2:  [90 %-97 %] 96 %  BP:  (138-180)/(62-77) 159/66     Intake/Output - Last 3 Shifts       07/28 0700 - 07/29 0659 07/29 0700 - 07/30 0659 07/30 0700 - 07/31 0659    P.O.  240     Total Intake(mL/kg)  240 (5.9)     Urine (mL/kg/hr) 250 (0.3) 600 (0.6)     Drains  10     Stool 0 0     Chest Tube 60 20     Total Output 310 630     Net -310 -390            Urine Occurrence  1 x     Stool Occurrence 0 x 0 x           SpO2: 96 %  O2 Device (Oxygen Therapy): nasal cannula    Physical Exam   Constitutional: She appears well-developed and well-nourished. No distress.   Cardiovascular: Normal rate and regular rhythm.   Pulmonary/Chest: Effort normal. No respiratory distress.   Large bore chest tube in place. No air leak.   Minimal serosanguinous output   Abdominal: Soft. She exhibits no distension. There is no tenderness.   Neurological: She is alert.   Skin: Skin is warm and dry.   Psychiatric: She has a normal mood and affect. Her behavior is normal.       Significant Labs:  ABGs: No results for input(s): PH, PCO2, PO2, HCO3, POCSATURATED, BE in the last 48 hours.  BMP:   Recent Labs   Lab 07/30/19  0338   GLU 92      K 3.7   CL 91*   CO2 38*   BUN 21   CREATININE 0.8   CALCIUM 10.3   MG 2.1     CBC:   Recent Labs   Lab 07/30/19 0338   WBC 6.24   RBC 3.69*   HGB 11.4*   HCT 36.1*      MCV 98   MCH 30.9   MCHC 31.6*     CMP:   Recent Labs   Lab 07/30/19  0338   GLU 92   CALCIUM 10.3      K 3.7   CO2 38*   CL 91*   BUN 21   CREATININE 0.8       Significant Diagnostics:  CXR: I have reviewed all pertinent results/findings within the past 24 hours    VTE Risk Mitigation (From admission, onward)        Ordered     heparin (porcine) injection 5,000 Units  Every 8 hours      07/28/19 0551     IP VTE HIGH RISK PATIENT  Once      07/28/19 0551        Assessment/Plan:     * Pneumothorax on right    - To IR today for placement of 12-14Fr pigtail in lung apex. Possible repeat pleurodesis via new tube tomorrow.   - Will tentatively plan for  bronchoscopy with endobronchial valve placement later in the week.  - She may need to discharge with chest tube to pneumostat as well.   - Will continue to follow for tube management.    Dispo- Bronchoscopy with endobronchial valve placement and possible repeat pleurodesis Wednesday or Thursday        BHUPINDER Caal  Thoracic Surgery  [facility]

## 2019-07-31 ENCOUNTER — ANESTHESIA (OUTPATIENT)
Dept: SURGERY | Facility: HOSPITAL | Age: 84
DRG: 163 | End: 2019-07-31
Payer: MEDICARE

## 2019-07-31 LAB
BASOPHILS # BLD AUTO: 0.05 K/UL (ref 0–0.2)
BASOPHILS NFR BLD: 0.8 % (ref 0–1.9)
DIFFERENTIAL METHOD: ABNORMAL
EOSINOPHIL # BLD AUTO: 0.6 K/UL (ref 0–0.5)
EOSINOPHIL NFR BLD: 9.3 % (ref 0–8)
ERYTHROCYTE [DISTWIDTH] IN BLOOD BY AUTOMATED COUNT: 14.2 % (ref 11.5–14.5)
HCT VFR BLD AUTO: 32.7 % (ref 37–48.5)
HGB BLD-MCNC: 10.8 G/DL (ref 12–16)
IMM GRANULOCYTES # BLD AUTO: 0.02 K/UL (ref 0–0.04)
IMM GRANULOCYTES NFR BLD AUTO: 0.3 % (ref 0–0.5)
LYMPHOCYTES # BLD AUTO: 1.3 K/UL (ref 1–4.8)
LYMPHOCYTES NFR BLD: 20.5 % (ref 18–48)
MAGNESIUM SERPL-MCNC: 1.9 MG/DL (ref 1.6–2.6)
MCH RBC QN AUTO: 31.3 PG (ref 27–31)
MCHC RBC AUTO-ENTMCNC: 33 G/DL (ref 32–36)
MCV RBC AUTO: 95 FL (ref 82–98)
MONOCYTES # BLD AUTO: 0.9 K/UL (ref 0.3–1)
MONOCYTES NFR BLD: 14.2 % (ref 4–15)
NEUTROPHILS # BLD AUTO: 3.5 K/UL (ref 1.8–7.7)
NEUTROPHILS NFR BLD: 54.9 % (ref 38–73)
NRBC BLD-RTO: 0 /100 WBC
PLATELET # BLD AUTO: 193 K/UL (ref 150–350)
PMV BLD AUTO: 10.1 FL (ref 9.2–12.9)
RBC # BLD AUTO: 3.45 M/UL (ref 4–5.4)
WBC # BLD AUTO: 6.43 K/UL (ref 3.9–12.7)

## 2019-07-31 PROCEDURE — 25000003 PHARM REV CODE 250: Performed by: STUDENT IN AN ORGANIZED HEALTH CARE EDUCATION/TRAINING PROGRAM

## 2019-07-31 PROCEDURE — 63600175 PHARM REV CODE 636 W HCPCS: Performed by: STUDENT IN AN ORGANIZED HEALTH CARE EDUCATION/TRAINING PROGRAM

## 2019-07-31 PROCEDURE — 94640 AIRWAY INHALATION TREATMENT: CPT

## 2019-07-31 PROCEDURE — D9220A PRA ANESTHESIA: Mod: ANES,,, | Performed by: ANESTHESIOLOGY

## 2019-07-31 PROCEDURE — 27000221 HC OXYGEN, UP TO 24 HOURS

## 2019-07-31 PROCEDURE — 32560 PR CHEMICAL PLEURODESIS FOR PERSISTENT PNEUMOTHORAX: ICD-10-PCS | Mod: 51,,, | Performed by: THORACIC SURGERY (CARDIOTHORACIC VASCULAR SURGERY)

## 2019-07-31 PROCEDURE — 94761 N-INVAS EAR/PLS OXIMETRY MLT: CPT

## 2019-07-31 PROCEDURE — C1757 CATH, THROMBECTOMY/EMBOLECT: HCPCS | Performed by: THORACIC SURGERY (CARDIOTHORACIC VASCULAR SURGERY)

## 2019-07-31 PROCEDURE — 71000033 HC RECOVERY, INTIAL HOUR: Performed by: THORACIC SURGERY (CARDIOTHORACIC VASCULAR SURGERY)

## 2019-07-31 PROCEDURE — 25000242 PHARM REV CODE 250 ALT 637 W/ HCPCS: Performed by: STUDENT IN AN ORGANIZED HEALTH CARE EDUCATION/TRAINING PROGRAM

## 2019-07-31 PROCEDURE — 37000009 HC ANESTHESIA EA ADD 15 MINS: Performed by: THORACIC SURGERY (CARDIOTHORACIC VASCULAR SURGERY)

## 2019-07-31 PROCEDURE — C1769 GUIDE WIRE: HCPCS | Performed by: THORACIC SURGERY (CARDIOTHORACIC VASCULAR SURGERY)

## 2019-07-31 PROCEDURE — 83735 ASSAY OF MAGNESIUM: CPT

## 2019-07-31 PROCEDURE — 99232 SBSQ HOSP IP/OBS MODERATE 35: CPT | Mod: GC,,, | Performed by: HOSPITALIST

## 2019-07-31 PROCEDURE — 25000242 PHARM REV CODE 250 ALT 637 W/ HCPCS

## 2019-07-31 PROCEDURE — D9220A PRA ANESTHESIA: ICD-10-PCS | Mod: ANES,,, | Performed by: ANESTHESIOLOGY

## 2019-07-31 PROCEDURE — 85025 COMPLETE CBC W/AUTO DIFF WBC: CPT

## 2019-07-31 PROCEDURE — 31622 PR BRONCHOSCOPY,DIAGNOSTIC: ICD-10-PCS | Mod: ,,, | Performed by: THORACIC SURGERY (CARDIOTHORACIC VASCULAR SURGERY)

## 2019-07-31 PROCEDURE — 36415 COLL VENOUS BLD VENIPUNCTURE: CPT

## 2019-07-31 PROCEDURE — 63600175 PHARM REV CODE 636 W HCPCS: Performed by: NURSE ANESTHETIST, CERTIFIED REGISTERED

## 2019-07-31 PROCEDURE — 71000039 HC RECOVERY, EACH ADD'L HOUR: Performed by: THORACIC SURGERY (CARDIOTHORACIC VASCULAR SURGERY)

## 2019-07-31 PROCEDURE — 36000709 HC OR TIME LEV III EA ADD 15 MIN: Performed by: THORACIC SURGERY (CARDIOTHORACIC VASCULAR SURGERY)

## 2019-07-31 PROCEDURE — 20600001 HC STEP DOWN PRIVATE ROOM

## 2019-07-31 PROCEDURE — 25000003 PHARM REV CODE 250: Performed by: NURSE ANESTHETIST, CERTIFIED REGISTERED

## 2019-07-31 PROCEDURE — D9220A PRA ANESTHESIA: ICD-10-PCS | Mod: CRNA,,, | Performed by: NURSE ANESTHETIST, CERTIFIED REGISTERED

## 2019-07-31 PROCEDURE — 25000003 PHARM REV CODE 250: Performed by: THORACIC SURGERY (CARDIOTHORACIC VASCULAR SURGERY)

## 2019-07-31 PROCEDURE — D9220A PRA ANESTHESIA: Mod: CRNA,,, | Performed by: NURSE ANESTHETIST, CERTIFIED REGISTERED

## 2019-07-31 PROCEDURE — 27201423 OPTIME MED/SURG SUP & DEVICES STERILE SUPPLY: Performed by: THORACIC SURGERY (CARDIOTHORACIC VASCULAR SURGERY)

## 2019-07-31 PROCEDURE — 63600175 PHARM REV CODE 636 W HCPCS

## 2019-07-31 PROCEDURE — 37000008 HC ANESTHESIA 1ST 15 MINUTES: Performed by: THORACIC SURGERY (CARDIOTHORACIC VASCULAR SURGERY)

## 2019-07-31 PROCEDURE — 32560 TREAT PLEURODESIS W/AGENT: CPT | Mod: 51,,, | Performed by: THORACIC SURGERY (CARDIOTHORACIC VASCULAR SURGERY)

## 2019-07-31 PROCEDURE — 31622 DX BRONCHOSCOPE/WASH: CPT | Mod: ,,, | Performed by: THORACIC SURGERY (CARDIOTHORACIC VASCULAR SURGERY)

## 2019-07-31 PROCEDURE — 36000708 HC OR TIME LEV III 1ST 15 MIN: Performed by: THORACIC SURGERY (CARDIOTHORACIC VASCULAR SURGERY)

## 2019-07-31 PROCEDURE — 25000003 PHARM REV CODE 250: Performed by: HOSPITALIST

## 2019-07-31 PROCEDURE — 99232 PR SUBSEQUENT HOSPITAL CARE,LEVL II: ICD-10-PCS | Mod: GC,,, | Performed by: HOSPITALIST

## 2019-07-31 RX ORDER — IPRATROPIUM BROMIDE AND ALBUTEROL SULFATE 2.5; .5 MG/3ML; MG/3ML
3 SOLUTION RESPIRATORY (INHALATION) ONCE
Status: DISCONTINUED | OUTPATIENT
Start: 2019-07-31 | End: 2019-07-31

## 2019-07-31 RX ORDER — IPRATROPIUM BROMIDE AND ALBUTEROL SULFATE 2.5; .5 MG/3ML; MG/3ML
3 SOLUTION RESPIRATORY (INHALATION) ONCE
Status: COMPLETED | OUTPATIENT
Start: 2019-07-31 | End: 2019-07-31

## 2019-07-31 RX ORDER — SODIUM CHLORIDE 9 MG/ML
INJECTION, SOLUTION INTRAVENOUS CONTINUOUS PRN
Status: DISCONTINUED | OUTPATIENT
Start: 2019-07-31 | End: 2019-07-31

## 2019-07-31 RX ORDER — PROPOFOL 10 MG/ML
VIAL (ML) INTRAVENOUS
Status: DISCONTINUED | OUTPATIENT
Start: 2019-07-31 | End: 2019-07-31

## 2019-07-31 RX ORDER — FENTANYL CITRATE 50 UG/ML
INJECTION, SOLUTION INTRAMUSCULAR; INTRAVENOUS
Status: DISCONTINUED | OUTPATIENT
Start: 2019-07-31 | End: 2019-07-31

## 2019-07-31 RX ORDER — PHENYLEPHRINE HYDROCHLORIDE 10 MG/ML
INJECTION INTRAVENOUS
Status: DISCONTINUED | OUTPATIENT
Start: 2019-07-31 | End: 2019-07-31

## 2019-07-31 RX ORDER — DOXYCYCLINE 100 MG/10ML
INJECTION, POWDER, LYOPHILIZED, FOR SOLUTION INTRAVENOUS
Status: DISCONTINUED | OUTPATIENT
Start: 2019-07-31 | End: 2019-07-31 | Stop reason: HOSPADM

## 2019-07-31 RX ORDER — KETOROLAC TROMETHAMINE 30 MG/ML
INJECTION, SOLUTION INTRAMUSCULAR; INTRAVENOUS
Status: COMPLETED
Start: 2019-07-31 | End: 2019-07-31

## 2019-07-31 RX ORDER — PROPOFOL 10 MG/ML
VIAL (ML) INTRAVENOUS CONTINUOUS PRN
Status: DISCONTINUED | OUTPATIENT
Start: 2019-07-31 | End: 2019-07-31

## 2019-07-31 RX ORDER — IPRATROPIUM BROMIDE AND ALBUTEROL SULFATE 2.5; .5 MG/3ML; MG/3ML
SOLUTION RESPIRATORY (INHALATION)
Status: COMPLETED
Start: 2019-07-31 | End: 2019-07-31

## 2019-07-31 RX ORDER — LIDOCAINE HCL/PF 100 MG/5ML
SYRINGE (ML) INTRAVENOUS
Status: DISCONTINUED | OUTPATIENT
Start: 2019-07-31 | End: 2019-07-31

## 2019-07-31 RX ORDER — VASOPRESSIN 20 [USP'U]/ML
INJECTION, SOLUTION INTRAMUSCULAR; SUBCUTANEOUS
Status: DISCONTINUED | OUTPATIENT
Start: 2019-07-31 | End: 2019-07-31

## 2019-07-31 RX ORDER — ROCURONIUM BROMIDE 10 MG/ML
INJECTION, SOLUTION INTRAVENOUS
Status: DISCONTINUED | OUTPATIENT
Start: 2019-07-31 | End: 2019-07-31

## 2019-07-31 RX ORDER — ONDANSETRON 2 MG/ML
4 INJECTION INTRAMUSCULAR; INTRAVENOUS DAILY PRN
Status: DISCONTINUED | OUTPATIENT
Start: 2019-07-31 | End: 2019-07-31 | Stop reason: HOSPADM

## 2019-07-31 RX ORDER — LIDOCAINE 50 MG/G
1 PATCH TOPICAL
Status: COMPLETED | OUTPATIENT
Start: 2019-07-31 | End: 2019-08-01

## 2019-07-31 RX ORDER — SODIUM CHLORIDE 0.9 % (FLUSH) 0.9 %
10 SYRINGE (ML) INJECTION
Status: DISCONTINUED | OUTPATIENT
Start: 2019-07-31 | End: 2019-08-05

## 2019-07-31 RX ORDER — KETOROLAC TROMETHAMINE 30 MG/ML
15 INJECTION, SOLUTION INTRAMUSCULAR; INTRAVENOUS ONCE
Status: COMPLETED | OUTPATIENT
Start: 2019-07-31 | End: 2019-07-31

## 2019-07-31 RX ORDER — FENTANYL CITRATE 50 UG/ML
25 INJECTION, SOLUTION INTRAMUSCULAR; INTRAVENOUS EVERY 5 MIN PRN
Status: DISCONTINUED | OUTPATIENT
Start: 2019-07-31 | End: 2019-07-31 | Stop reason: HOSPADM

## 2019-07-31 RX ORDER — KETOROLAC TROMETHAMINE 15 MG/ML
15 INJECTION, SOLUTION INTRAMUSCULAR; INTRAVENOUS ONCE
Status: COMPLETED | OUTPATIENT
Start: 2019-07-31 | End: 2019-07-31

## 2019-07-31 RX ADMIN — ACETAMINOPHEN 1000 MG: 500 TABLET ORAL at 06:07

## 2019-07-31 RX ADMIN — POTASSIUM CHLORIDE 10 MEQ: 750 CAPSULE, EXTENDED RELEASE ORAL at 09:07

## 2019-07-31 RX ADMIN — IPRATROPIUM BROMIDE 0.5 MG: 0.5 SOLUTION RESPIRATORY (INHALATION) at 07:07

## 2019-07-31 RX ADMIN — PROPOFOL 100 MCG/KG/MIN: 10 INJECTION, EMULSION INTRAVENOUS at 04:07

## 2019-07-31 RX ADMIN — SODIUM CHLORIDE: 0.9 INJECTION, SOLUTION INTRAVENOUS at 04:07

## 2019-07-31 RX ADMIN — FERROUS SULFATE TAB EC 325 MG (65 MG FE EQUIVALENT) 325 MG: 325 (65 FE) TABLET DELAYED RESPONSE at 09:07

## 2019-07-31 RX ADMIN — IPRATROPIUM BROMIDE 0.5 MG: 0.5 SOLUTION RESPIRATORY (INHALATION) at 12:07

## 2019-07-31 RX ADMIN — PHENYLEPHRINE HYDROCHLORIDE 200 MCG: 10 INJECTION INTRAVENOUS at 05:07

## 2019-07-31 RX ADMIN — IPRATROPIUM BROMIDE AND ALBUTEROL SULFATE 3 ML: 2.5; .5 SOLUTION RESPIRATORY (INHALATION) at 06:07

## 2019-07-31 RX ADMIN — LIDOCAINE HYDROCHLORIDE 40 MG: 20 INJECTION, SOLUTION INTRAVENOUS at 04:07

## 2019-07-31 RX ADMIN — IPRATROPIUM BROMIDE AND ALBUTEROL SULFATE 3 ML: .5; 3 SOLUTION RESPIRATORY (INHALATION) at 06:07

## 2019-07-31 RX ADMIN — PHENYLEPHRINE HYDROCHLORIDE 300 MCG: 10 INJECTION INTRAVENOUS at 04:07

## 2019-07-31 RX ADMIN — HEPARIN SODIUM 5000 UNITS: 5000 INJECTION INTRAVENOUS; SUBCUTANEOUS at 10:07

## 2019-07-31 RX ADMIN — IPRATROPIUM BROMIDE 0.5 MG: 0.5 SOLUTION RESPIRATORY (INHALATION) at 01:07

## 2019-07-31 RX ADMIN — LIDOCAINE 1 PATCH: 50 PATCH TOPICAL at 10:07

## 2019-07-31 RX ADMIN — SUGAMMADEX 200 MG: 100 INJECTION, SOLUTION INTRAVENOUS at 05:07

## 2019-07-31 RX ADMIN — PROPOFOL 50 MG: 10 INJECTION, EMULSION INTRAVENOUS at 04:07

## 2019-07-31 RX ADMIN — PROPOFOL 10 MG: 10 INJECTION, EMULSION INTRAVENOUS at 05:07

## 2019-07-31 RX ADMIN — ACETAMINOPHEN 1000 MG: 500 TABLET ORAL at 05:07

## 2019-07-31 RX ADMIN — HEPARIN SODIUM 5000 UNITS: 5000 INJECTION INTRAVENOUS; SUBCUTANEOUS at 05:07

## 2019-07-31 RX ADMIN — FENTANYL CITRATE 25 MCG: 50 INJECTION, SOLUTION INTRAMUSCULAR; INTRAVENOUS at 04:07

## 2019-07-31 RX ADMIN — VASOPRESSIN 3 UNITS: 20 INJECTION INTRAVENOUS at 05:07

## 2019-07-31 RX ADMIN — ROCURONIUM BROMIDE 10 MG: 10 INJECTION, SOLUTION INTRAVENOUS at 04:07

## 2019-07-31 RX ADMIN — KETOROLAC TROMETHAMINE 15 MG: 30 INJECTION, SOLUTION INTRAMUSCULAR; INTRAVENOUS at 08:07

## 2019-07-31 RX ADMIN — FUROSEMIDE 40 MG: 40 TABLET ORAL at 09:07

## 2019-07-31 RX ADMIN — KETOROLAC TROMETHAMINE 15 MG: 15 INJECTION, SOLUTION INTRAMUSCULAR; INTRAVENOUS at 05:07

## 2019-07-31 RX ADMIN — ROCURONIUM BROMIDE 10 MG: 10 INJECTION, SOLUTION INTRAVENOUS at 05:07

## 2019-07-31 NOTE — PROGRESS NOTES
[facility] Hospital Medicine  Progress Note    Patient Name: Venus Mayer  MRN: [id number]  Patient Class: IP- Inpatient   Admission Date: 7/27/2019  Length of Stay: 3 days  Attending Physician: Ezra Escobedo MD  Primary Care Provider: Jona Che MD    [facility] Medicine Team: [facility] HOSP MED 1 Марина Puente MD    Subjective:     Principal Problem:Pneumothorax on right      HPI:  Venus Mayer is 89 y.o. lady with COPD, (on 2.5 L home O2), 40 pack year smoking history (quit 30 years ago), multiple readmissions for spontaneous pneumothorax, chronic dCHFS/P AICD, PVD/carotid artery stenosis s/p L CEA 08,was brought to the ED because she was SOB.    She was Discharged from [facility] on 7/26/19 after being admitted for pneumothorax, improved after placing CT with return to baseline pulmonary function on discharge.  Then around evening of 7/27 started feeling SOB, not relieved with her Inhaler or Nebulization, progressively worse,  prompting her to come to the Hospital. No chest pain, fever , confusion.    In the ED she was hypoxic, CXR revelaed Right sided Pneumothorax. CTS was consulted and placed a Pig tail catheter, after repositioning showed air leak and patient symptomatically improved and is currently on a non re breather.     The patient's recurrent PTXs seem to have started during her admission on 05/22/2019 where she presented with a large R sided PTX requiring chest tube and pleurodesis on 5/27/2019. Since, she has had a small recurrence of PTX on 6/25/19 not requiring intervention, and admission to OSH 7/6-7/11 for acute on chronic dCHF and COPD exacerbation. She has been less mobile and uses a walker at home.     Overview/Hospital Course:  No notes on file    Interval History: Patient with R sided pneumothorax had no acute events overnight, was doing well this morning, and was taken to have a bronchoscopy with endobronchial valve placement along with chemical pleurodesis.     Review of  Systems   Constitutional: Negative for chills and fever.   HENT: Negative for sore throat and trouble swallowing.    Eyes: Negative for pain and visual disturbance.   Respiratory: Positive for cough and shortness of breath.    Cardiovascular: Negative for chest pain, palpitations and leg swelling.   Gastrointestinal: Negative for abdominal distention, abdominal pain, nausea and vomiting. Diarrhea: Loose BM with Mirilax.   Genitourinary: Negative for dysuria and hematuria.   Musculoskeletal: Negative for joint swelling and myalgias.   Skin: Negative for rash and wound.   Neurological: Negative for dizziness and headaches.   Psychiatric/Behavioral: Negative for agitation and confusion.     Objective:     Vital Signs (Most Recent):  Temp: 97.9 °F (36.6 °C) (07/31/19 1324)  Pulse: 86 (07/31/19 1509)  Resp: (!) 22 (07/31/19 1324)  BP: (!) 120/58 (07/31/19 1324)  SpO2: 97 % (07/31/19 1324) Vital Signs (24h Range):  Temp:  [96.7 °F (35.9 °C)-99 °F (37.2 °C)] 97.9 °F (36.6 °C)  Pulse:  [78-98] 86  Resp:  [14-22] 22  SpO2:  [91 %-100 %] 97 %  BP: (113-138)/(55-66) 120/58     Weight: 40.8 kg (89 lb 15.2 oz)  Body mass index is 17.57 kg/m².    Intake/Output Summary (Last 24 hours) at 7/31/2019 1651  Last data filed at 7/31/2019 0447  Gross per 24 hour   Intake 240 ml   Output 590 ml   Net -350 ml      Physical Exam   Constitutional: She appears well-developed and well-nourished. No distress.   Cardiovascular: Normal rate and regular rhythm.   Pulmonary/Chest: Effort normal. No respiratory distress.   Large bore chest tube in place. No air leak. On water seal.   Anterior pigtail in place, + air leak. On suction.    Abdominal: Soft. She exhibits no distension. There is no tenderness.   Neurological: She is alert.   Skin: Skin is warm and dry.   Psychiatric: She has a normal mood and affect. Her behavior is normal.       Significant Labs: All pertinent labs within the past 24 hours have been reviewed.    Significant Imaging: I  have reviewed all pertinent imaging results/findings within the past 24 hours.      Assessment/Plan:      * Pneumothorax on right  89 y.o. lady with h/o COPD with multiple pneumothorax is coming with dyspnea and hypoxia.  Previous placement of CT and Pleurodesis    - CXR shows Right Pneumothorax, decreased in size as of 7/31  - CTS consulted took patient for bronchoscopy with endobronchial valve placement and chemical pleurodesis  - will continue to monitor overnight    Restless leg syndrome    Continue Ropinirole     COLD (chronic obstructive lung disease)  Patient with COPD on homeO2, currently do not suspect any underlying  [place]    - Continue Tiotropium   - Duonebs as needed  - On Supplemental oxygen      Heart failure, diastolic  ECHO on 7/19 shows EF of 65% with DD, PA pressure of %^ and valvular abnormalities  BNP - 1414, but not volume overloaded currently  Mild EKG leak that has peaked suspect secondary to demand.    - Continue Home Lasix 40mg Daily, If she does become volume overloaded then can increase dose  - Monitor I/O, Weights          VTE Risk Mitigation (From admission, onward)        Ordered     heparin (porcine) injection 5,000 Units  Every 8 hours      07/28/19 0551     IP VTE HIGH RISK PATIENT  Once      07/28/19 0551                Марина Puente MD  Department of Hospital Medicine   [facility]

## 2019-07-31 NOTE — PHYSICIAN QUERY
PT Name: Venus Mayer  MR #: [id number]    Physician Query Form - Respiratory Condition Clarification      CDS/: BLUE Winn               Contact information: [email or web address]    This form is a permanent document in the medical record.    Query Date: July 31, 2019    By submitting this query, we are merely seeking further clarification of documentation. Please utilize your independent clinical judgment when addressing the question(s) below.    The Medical record contains the following   Indicators   Supporting Clinical Findings Location in Medical Record   X   SOB, DANGELO, Wheezing, Productive Cough, Use of Accessory Muscles, etc. SOB    She presents to the ED with acute onset shortness of breath. 7/27 ed note    7/28 consult note   X   Acute/Chronic Illness   Pneumothorax on right    recurrent admissions for pneumothoraces (4 admissions in the last 2 months) indicative progression of end-stage COPD.   7/28 h/p      Radiology Findings     X   Respiratory Distress or Failure She is in respiratory distress 7/27 ed note   X   Hypoxia or Hypercapnia hypoxia 7/27 ed note   X   RR         ABGs         O2 sat ra sats 70% - placed on 15 l nrb now satting 92%  7/27 ed note      BiPAP/Intubation     X   Supplemental O2 Placed on 15 L NRB 7/27 ed note   X   Home O2, Oxygen Dependence severe COPD on 2L home O2,    severe COPD on home oxygen (2.5 L)  7/27 ed note    7/28 h/p   X   Treatment  chest tube insertion, right anterior 7/28 procedure note      Other       Respiratory failure can be acute, chronic or both. It is generally further specified as hypoxic, hypercapnic or both. Lastly, it is important to identify an etiology, if known or suspected.   References::  https://www.acphospitalist.org/archives/2013/10/coding.htm http://Tr[employer]an[employer].com/acute-respiratory-failure-know     The clinical guidelines noted below are only system guidelines, and do not replace the providers clinical judgment.    Provider,  please specify diagnosis or diagnoses associated with above clinical findings.   [ x] Acute and (on) Chronic Respiratory Failure with Hypoxia - pO2 >10 mmHg below baseline OR SpO2 < 91% on usual home O2 OR O2 > 2L/min over baseline home O2    [   ] Chronic Respiratory Failure with Hypoxia -Continuous home oxygen use   [   ] Other Respiratory Diagnosis (please specify): _________________________________   [   ]  Clinically Undetermined       Please document in your progress notes daily for the duration of treatment until resolved and include in your discharge summary.

## 2019-07-31 NOTE — ASSESSMENT & PLAN NOTE
89 y.o. lady with h/o COPD with multiple pneumothorax is coming with dyspnea and hypoxia.  Previous placement of CT and Pleurodesis    - CXR shows Right Pneumothorax, decreased in size as of 7/31  - CTS consulted took patient for bronchoscopy with endobronchial valve placement and chemical pleurodesis

## 2019-07-31 NOTE — DISCHARGE SUMMARY
[facility]  Thoracic Surgery  Discharge Summary    Patient Name: Venus Mayer  MRN: [id number]  Admission Date: 7/27/2019  Hospital Length of Stay: 3 days  Discharge Date and Time:  07/31/2019 5:29 PM  Attending Physician: Sybil Escobedo MD   Discharging Provider: Arleen Rodriguez PA-C  Primary Care Provider: Jona Che MD    HPI:   Venus Mayer is a 89 y.o. female with a hx of COPD and multiple readmissions for spontaneous pneumothorax. Numerous attempts at pleurodesis have been performed. Patient is not a surgical candidate. She presents to the ED with acute onset shortness of breath. Imaging showed large right sided pneumothorax. Pt otherwise stable.      No current facility-administered medications on file prior to encounter.        Procedure(s) (LRB):  BRONCHOSCOPY, FIBEROPTIC Flexible (N/A)  PLEURODESIS (N/A)      Hospital Course: No notes on file    Consults (From admission, onward)        Status Ordering Provider     Inpatient consult to General surgery  Once     Provider:  (Not yet assigned)    Completed COLLETTE SUNSHINE     Inpatient consult to Interventional Radiology  Once     Provider:  (Not yet assigned)    Completed AGUILA HOWARD     Inpatient consult to Palliative Care  Once     Provider:  (Not yet assigned)    Completed SYBIL ESCOBEDO          Significant Diagnostic Studies: Radiology: X-Ray: CXR: X-Ray Chest 1 View (CXR):   Results for orders placed or performed during the hospital encounter of 07/27/19   X-Ray Chest 1 View    Narrative    EXAMINATION:  XR CHEST 1 VIEW    CLINICAL HISTORY:  s/p chest tube placement;    TECHNIQUE:  Single frontal view of the chest was performed.    COMPARISON:  07/30/2019, 05:29 hours.  07/29/2019, 12:17 hours.    FINDINGS:  Thoracostomy tube overlies the right hemithorax similar to the earlier study of 05/20 9 hr today.  Right pleural pigtail catheter has been installed in the interim.  Right lung expansion has  improved although pneumothorax persists at the cupola of the right hemithorax, lateral to the right upper lobe, and in subpulmonic location.  Patchy opacities are present in the right lung similar to recent studies cited above.    Mediastinum is more midline than on the earlier study of 0529 hr today.    I detect no new pulmonary or pleural disease.      Impression    Following installation of the right pleural pigtail catheter right lung aeration has improved.  Right pneumothorax is smaller but not yet resolved.    Mediastinum is more midline than on the study obtained earlier today.    No new disease identified.      Electronically signed by: Asiya Rosales MD  Date:    07/30/2019  Time:    11:17       Pending Diagnostic Studies:     None        Final Active Diagnoses:    Diagnosis Date Noted POA    PRINCIPAL PROBLEM:  Pneumothorax on right [J93.9] 07/20/2019 Yes    Palliative care encounter [Z51.5] 07/29/2019 Not Applicable    Advanced care planning/counseling discussion [Z71.89]  Not Applicable    Restless leg syndrome [G25.81] 07/20/2019 Yes    COLD (chronic obstructive lung disease) [J44.9] 03/28/2013 Yes    Heart failure, diastolic [I50.30] 01/17/2013 Yes      Problems Resolved During this Admission:      Discharged Condition: good    Disposition: Home or Self Care    Follow Up:    Patient Instructions:   No discharge procedures on file.  Medications:  Reconciled Home Medications:      Medication List      ASK your doctor about these medications    acetaminophen 500 MG tablet  Commonly known as:  TYLENOL  Take 1,000 mg by mouth daily as needed for Pain.     * albuterol 2.5 mg /3 mL (0.083 %) nebulizer solution  Commonly known as:  PROVENTIL  Take 2.5 mg by nebulization every 6 (six) hours as needed for Wheezing or Shortness of Breath. Rescue     * albuterol 90 mcg/actuation inhaler  Commonly known as:  PROAIR HFA  Inhale 1 puff into the lungs every 6 (six) hours as needed for Wheezing or Shortness of  Breath. Rescue     amLODIPine 2.5 MG tablet  Commonly known as:  NORVASC  Take 2.5 mg by mouth nightly as needed for SBP greater than. SBP greater than 155     aspirin 81 mg Tab  Take 1 tablet by mouth once daily.     docusate sodium 100 MG capsule  Commonly known as:  COLACE  Take 100 mg by mouth daily as needed for Constipation.     ferrous sulfate 325 (65 FE) MG EC tablet  Take 1 tablet (325 mg total) by mouth once daily.     furosemide 40 MG tablet  Commonly known as:  LASIX  Take 1 tablet (40 mg total) by mouth once daily.     multivitamin per tablet  Commonly known as:  THERAGRAN  Take 1 tablet by mouth once daily.     potassium chloride 10 MEQ Cpsr  Commonly known as:  MICRO-K  Take 10 mEq by mouth once daily.     rOPINIRole 0.25 MG tablet  Commonly known as:  REQUIP  Take 1 tablet (0.25 mg total) by mouth every evening.     tiotropium 18 mcg inhalation capsule  Commonly known as:  SPIRIVA  Inhale 1 capsule (18 mcg total) into the lungs once daily.         * This list has 2 medication(s) that are the same as other medications prescribed for you. Read the directions carefully, and ask your doctor or other care provider to review them with you.                Arleen Rodriguez PA-C  Thoracic Surgery  [facility]

## 2019-07-31 NOTE — BRIEF OP NOTE
[facility]  Brief Operative Note    SUMMARY     Surgery Date: 7/31/2019     Surgeon(s) and Role:     * Klever Mckeon MD - Primary     * Charisse Foley MD - Resident - Assisting     * Eddie Dodge MD - Fellow        Pre-op Diagnosis:  Pneumothorax on right [J93.9]    Post-op Diagnosis:  Post-Op Diagnosis Codes:     * Pneumothorax on right [J93.9]    Procedure(s) (LRB):  BRONCHOSCOPY, FIBEROPTIC Flexible (N/A)  PLEURODESIS (N/A)    Anesthesia: General    Description of Procedure:   Bronchoscopy with systematic occlusion of segmental bronchi  Pleurodesis    Description of the findings of the procedure:   Continued air leak despite occlusion of right sided bronchi, including occlusion of right mainstem bronchi. Therefore, no endobronchial valve was placed.  Doxycyline was used for pleurodesis    Estimated Blood Loss: None       Specimens:   Specimen (12h ago, onward)    None        Charisse Foley MD, PGY-4  General Surgery  [phone]

## 2019-07-31 NOTE — SUBJECTIVE & OBJECTIVE
Interval History: Chest tube (pigtail) placed by IR yesterday. Interval improvement in lung expansion although PTX does persist. Air leak in IR tube. Pigtail to suction. Chest tube to water seal. Remains on home oxygen requirements. NPO for OR today.      Medications:  Continuous Infusions:  Scheduled Meds:   acetaminophen  1,000 mg Oral Q8H    aspirin  81 mg Oral Daily    ferrous sulfate  325 mg Oral Daily    furosemide  40 mg Oral Daily    heparin (porcine)  5,000 Units Subcutaneous Q8H    ipratropium  0.5 mg Nebulization Q6H    multivitamin  1 tablet Oral Daily    potassium chloride  10 mEq Oral Daily    rOPINIRole  0.25 mg Oral QHS     PRN Meds:albuterol, Dextrose 10% Bolus, Dextrose 10% Bolus, glucagon (human recombinant), glucose, glucose, polyethylene glycol, sodium chloride 0.9%     Review of patient's allergies indicates:   Allergen Reactions    Ancef in dextrose (iso-osm) Rash    Cefazolin Rash    Cefuroxime Rash    Sulfamethoxazole-trimethoprim Rash     Other reaction(s): Rash     Objective:     Vital Signs (Most Recent):  Temp: 96.7 °F (35.9 °C) (07/31/19 0745)  Pulse: 82 (07/31/19 0745)  Resp: 14 (07/31/19 0745)  BP: (!) 122/58 (07/31/19 0745)  SpO2: 100 % (07/31/19 0745) Vital Signs (24h Range):  Temp:  [96.7 °F (35.9 °C)-99 °F (37.2 °C)] 96.7 °F (35.9 °C)  Pulse:  [] 82  Resp:  [14-22] 14  SpO2:  [85 %-100 %] 100 %  BP: (117-179)/(55-82) 122/58     Intake/Output - Last 3 Shifts       07/29 0700 - 07/30 0659 07/30 0700 - 07/31 0659 07/31 0700 - 08/01 0659    P.O. 240 540     Total Intake(mL/kg) 240 (5.9) 540 (13.2)     Urine (mL/kg/hr) 600 (0.6) 800 (0.8)     Drains 10      Stool 0 0     Chest Tube 20 90     Total Output 630 890     Net -390 -350            Urine Occurrence 1 x 6 x     Stool Occurrence 0 x 0 x           SpO2: 100 %  O2 Device (Oxygen Therapy): nasal cannula    Physical Exam   Constitutional: She appears well-developed and well-nourished. No distress.    Cardiovascular: Normal rate and regular rhythm.   Pulmonary/Chest: Effort normal. No respiratory distress.   Large bore chest tube in place. No air leak. On water seal.   Anterior pigtail in place, + air leak. On suction.    Abdominal: Soft. She exhibits no distension. There is no tenderness.   Neurological: She is alert.   Skin: Skin is warm and dry.   Psychiatric: She has a normal mood and affect. Her behavior is normal.       Significant Labs:  BMP:   Recent Labs   Lab 07/30/19  0338 07/31/19 0452   GLU 92  --      --    K 3.7  --    CL 91*  --    CO2 38*  --    BUN 21  --    CREATININE 0.8  --    CALCIUM 10.3  --    MG 2.1 1.9     CBC:   Recent Labs   Lab 07/31/19 0452   WBC 6.43   RBC 3.45*   HGB 10.8*   HCT 32.7*      MCV 95   MCH 31.3*   MCHC 33.0       Significant Diagnostics:  CXR: I have reviewed all pertinent results/findings within the past 24 hours    VTE Risk Mitigation (From admission, onward)        Ordered     heparin (porcine) injection 5,000 Units  Every 8 hours      07/28/19 0551     IP VTE HIGH RISK PATIENT  Once      07/28/19 0566

## 2019-07-31 NOTE — PROGRESS NOTES
[facility]  Thoracic Surgery  Progress Note    Subjective:     History of Present Illness:  Venus Mayer is a 89 y.o. female with a hx of COPD and multiple readmissions for spontaneous pneumothorax. Numerous attempts at pleurodesis have been performed. Patient is not a surgical candidate. She presents to the ED with acute onset shortness of breath. Imaging showed large right sided pneumothorax. Pt otherwise stable.      No current facility-administered medications on file prior to encounter.        Post-Op Info:  Procedure(s) (LRB):  BRONCHOSCOPY, FLEXIBLE, WITH ENDOBRONCHIAL VALVE INSERTION (N/A)         Interval History: Chest tube (pigtail) placed by IR yesterday. Interval improvement in lung expansion although PTX does persist. Air leak in IR tube. Pigtail to suction. Chest tube to water seal. Remains on home oxygen requirements. NPO for OR today.      Medications:  Continuous Infusions:  Scheduled Meds:   acetaminophen  1,000 mg Oral Q8H    aspirin  81 mg Oral Daily    ferrous sulfate  325 mg Oral Daily    furosemide  40 mg Oral Daily    heparin (porcine)  5,000 Units Subcutaneous Q8H    ipratropium  0.5 mg Nebulization Q6H    multivitamin  1 tablet Oral Daily    potassium chloride  10 mEq Oral Daily    rOPINIRole  0.25 mg Oral QHS     PRN Meds:albuterol, Dextrose 10% Bolus, Dextrose 10% Bolus, glucagon (human recombinant), glucose, glucose, polyethylene glycol, sodium chloride 0.9%     Review of patient's allergies indicates:   Allergen Reactions    Ancef in dextrose (iso-osm) Rash    Cefazolin Rash    Cefuroxime Rash    Sulfamethoxazole-trimethoprim Rash     Other reaction(s): Rash     Objective:     Vital Signs (Most Recent):  Temp: 96.7 °F (35.9 °C) (07/31/19 0745)  Pulse: 82 (07/31/19 0745)  Resp: 14 (07/31/19 0745)  BP: (!) 122/58 (07/31/19 0745)  SpO2: 100 % (07/31/19 0745) Vital Signs (24h Range):  Temp:  [96.7 °F (35.9 °C)-99 °F (37.2 °C)] 96.7 °F (35.9 °C)  Pulse:   [] 82  Resp:  [14-22] 14  SpO2:  [85 %-100 %] 100 %  BP: (117-179)/(55-82) 122/58     Intake/Output - Last 3 Shifts       07/29 0700 - 07/30 0659 07/30 0700 - 07/31 0659 07/31 0700 - 08/01 0659    P.O. 240 540     Total Intake(mL/kg) 240 (5.9) 540 (13.2)     Urine (mL/kg/hr) 600 (0.6) 800 (0.8)     Drains 10      Stool 0 0     Chest Tube 20 90     Total Output 630 890     Net -390 -350            Urine Occurrence 1 x 6 x     Stool Occurrence 0 x 0 x           SpO2: 100 %  O2 Device (Oxygen Therapy): nasal cannula    Physical Exam   Constitutional: She appears well-developed and well-nourished. No distress.   Cardiovascular: Normal rate and regular rhythm.   Pulmonary/Chest: Effort normal. No respiratory distress.   Large bore chest tube in place. No air leak. On water seal.   Anterior pigtail in place, + air leak. On suction.    Abdominal: Soft. She exhibits no distension. There is no tenderness.   Neurological: She is alert.   Skin: Skin is warm and dry.   Psychiatric: She has a normal mood and affect. Her behavior is normal.       Significant Labs:  BMP:   Recent Labs   Lab 07/30/19 0338 07/31/19 0452   GLU 92  --      --    K 3.7  --    CL 91*  --    CO2 38*  --    BUN 21  --    CREATININE 0.8  --    CALCIUM 10.3  --    MG 2.1 1.9     CBC:   Recent Labs   Lab 07/31/19 0452   WBC 6.43   RBC 3.45*   HGB 10.8*   HCT 32.7*      MCV 95   MCH 31.3*   MCHC 33.0       Significant Diagnostics:  CXR: I have reviewed all pertinent results/findings within the past 24 hours    VTE Risk Mitigation (From admission, onward)        Ordered     heparin (porcine) injection 5,000 Units  Every 8 hours      07/28/19 0551     IP VTE HIGH RISK PATIENT  Once      07/28/19 0551        Assessment/Plan:     * Pneumothorax on right    NPO for OR today pending valve arrival. Bronchoscopy with endobronchial valve placement and chemical pleurodesis (doxyxyxline vs bleomycin). Consents signed and on chart.   Keep pigtail to  suction this morning and chest tube to water seal.    - Will continue to follow for tube management.    Dispo- Bronchoscopy with endobronchial valve placement and possible repeat pleurodesis today pending valve arrival confirmation.         Arleen Rodriguez PA-C  Thoracic Surgery  [facility]

## 2019-07-31 NOTE — SUBJECTIVE & OBJECTIVE
Interval History: Patient with R sided pneumothorax had no acute events overnight, was doing well this morning, and was taken to have a bronchoscopy with endobronchial valve placement along with chemical pleurodesis.     Review of Systems   Constitutional: Negative for chills and fever.   HENT: Negative for sore throat and trouble swallowing.    Eyes: Negative for pain and visual disturbance.   Respiratory: Positive for cough and shortness of breath.    Cardiovascular: Negative for chest pain, palpitations and leg swelling.   Gastrointestinal: Negative for abdominal distention, abdominal pain, nausea and vomiting. Diarrhea: Loose BM with Mirilax.   Genitourinary: Negative for dysuria and hematuria.   Musculoskeletal: Negative for joint swelling and myalgias.   Skin: Negative for rash and wound.   Neurological: Negative for dizziness and headaches.   Psychiatric/Behavioral: Negative for agitation and confusion.     Objective:     Vital Signs (Most Recent):  Temp: 97.9 °F (36.6 °C) (07/31/19 1324)  Pulse: 86 (07/31/19 1509)  Resp: (!) 22 (07/31/19 1324)  BP: (!) 120/58 (07/31/19 1324)  SpO2: 97 % (07/31/19 1324) Vital Signs (24h Range):  Temp:  [96.7 °F (35.9 °C)-99 °F (37.2 °C)] 97.9 °F (36.6 °C)  Pulse:  [78-98] 86  Resp:  [14-22] 22  SpO2:  [91 %-100 %] 97 %  BP: (113-138)/(55-66) 120/58     Weight: 40.8 kg (89 lb 15.2 oz)  Body mass index is 17.57 kg/m².    Intake/Output Summary (Last 24 hours) at 7/31/2019 1651  Last data filed at 7/31/2019 0447  Gross per 24 hour   Intake 240 ml   Output 590 ml   Net -350 ml      Physical Exam   Constitutional: She appears well-developed and well-nourished. No distress.   Cardiovascular: Normal rate and regular rhythm.   Pulmonary/Chest: Effort normal. No respiratory distress.   Large bore chest tube in place. No air leak. On water seal.   Anterior pigtail in place, + air leak. On suction.    Abdominal: Soft. She exhibits no distension. There is no tenderness.   Neurological:  She is alert.   Skin: Skin is warm and dry.   Psychiatric: She has a normal mood and affect. Her behavior is normal.       Significant Labs: All pertinent labs within the past 24 hours have been reviewed.    Significant Imaging: I have reviewed all pertinent imaging results/findings within the past 24 hours.

## 2019-07-31 NOTE — ASSESSMENT & PLAN NOTE
NPO for OR today pending valve arrival. Bronchoscopy with endobronchial valve placement and chemical pleurodesis (doxyxyxline vs bleomycin). Consents signed and on chart.   Keep pigtail to suction this morning and chest tube to water seal.    - Will continue to follow for tube management.    Dispo- Bronchoscopy with endobronchial valve placement and possible repeat pleurodesis today pending valve arrival confirmation.

## 2019-07-31 NOTE — TRANSFER OF CARE
Anesthesia Transfer of Care Note    Patient: Venus Mayer    Procedure(s) Performed: Procedure(s) (LRB):  BRONCHOSCOPY, FIBEROPTIC Flexible (N/A)  PLEURODESIS (N/A)    Patient location: PACU    Anesthesia Type: general    Transport from OR: Transported from OR on 6-10 L/min O2 by face mask with adequate spontaneous ventilation    Post pain: adequate analgesia    Post assessment: no apparent anesthetic complications and tolerated procedure well    Post vital signs: stable    Level of consciousness: awake and alert    Nausea/Vomiting: no nausea/vomiting    Complications: none    Transfer of care protocol was followed      Last vitals:   Visit Vitals  BP (!) 120/58 (BP Location: Left arm, Patient Position: Lying)   Pulse 86   Temp 36.6 °C (97.9 °F) (Oral)   Resp (!) 22   Ht 5' (1.524 m)   Wt 40.8 kg (89 lb 15.2 oz)   LMP  (LMP Unknown)   SpO2 97%   Breastfeeding? No   BMI 17.57 kg/m²

## 2019-08-01 LAB
ANION GAP SERPL CALC-SCNC: 15 MMOL/L (ref 8–16)
BASOPHILS # BLD AUTO: 0.04 K/UL (ref 0–0.2)
BASOPHILS NFR BLD: 0.5 % (ref 0–1.9)
BUN SERPL-MCNC: 32 MG/DL (ref 8–23)
CALCIUM SERPL-MCNC: 9.8 MG/DL (ref 8.7–10.5)
CHLORIDE SERPL-SCNC: 91 MMOL/L (ref 95–110)
CO2 SERPL-SCNC: 33 MMOL/L (ref 23–29)
CREAT SERPL-MCNC: 0.9 MG/DL (ref 0.5–1.4)
DIFFERENTIAL METHOD: ABNORMAL
EOSINOPHIL # BLD AUTO: 0 K/UL (ref 0–0.5)
EOSINOPHIL NFR BLD: 0.2 % (ref 0–8)
ERYTHROCYTE [DISTWIDTH] IN BLOOD BY AUTOMATED COUNT: 14.4 % (ref 11.5–14.5)
EST. GFR  (AFRICAN AMERICAN): >60 ML/MIN/1.73 M^2
EST. GFR  (NON AFRICAN AMERICAN): 56.5 ML/MIN/1.73 M^2
GLUCOSE SERPL-MCNC: 95 MG/DL (ref 70–110)
HCT VFR BLD AUTO: 36 % (ref 37–48.5)
HGB BLD-MCNC: 11.5 G/DL (ref 12–16)
IMM GRANULOCYTES # BLD AUTO: 0.05 K/UL (ref 0–0.04)
IMM GRANULOCYTES NFR BLD AUTO: 0.6 % (ref 0–0.5)
LYMPHOCYTES # BLD AUTO: 0.8 K/UL (ref 1–4.8)
LYMPHOCYTES NFR BLD: 9.2 % (ref 18–48)
MAGNESIUM SERPL-MCNC: 1.9 MG/DL (ref 1.6–2.6)
MCH RBC QN AUTO: 30.6 PG (ref 27–31)
MCHC RBC AUTO-ENTMCNC: 31.9 G/DL (ref 32–36)
MCV RBC AUTO: 96 FL (ref 82–98)
MONOCYTES # BLD AUTO: 0.7 K/UL (ref 0.3–1)
MONOCYTES NFR BLD: 9.1 % (ref 4–15)
NEUTROPHILS # BLD AUTO: 6.6 K/UL (ref 1.8–7.7)
NEUTROPHILS NFR BLD: 80.4 % (ref 38–73)
NRBC BLD-RTO: 0 /100 WBC
PLATELET # BLD AUTO: 231 K/UL (ref 150–350)
PMV BLD AUTO: 11 FL (ref 9.2–12.9)
POTASSIUM SERPL-SCNC: 4.3 MMOL/L (ref 3.5–5.1)
RBC # BLD AUTO: 3.76 M/UL (ref 4–5.4)
SODIUM SERPL-SCNC: 139 MMOL/L (ref 136–145)
WBC # BLD AUTO: 8.17 K/UL (ref 3.9–12.7)

## 2019-08-01 PROCEDURE — 25000003 PHARM REV CODE 250: Performed by: STUDENT IN AN ORGANIZED HEALTH CARE EDUCATION/TRAINING PROGRAM

## 2019-08-01 PROCEDURE — 99233 SBSQ HOSP IP/OBS HIGH 50: CPT | Mod: GC,,,

## 2019-08-01 PROCEDURE — 94761 N-INVAS EAR/PLS OXIMETRY MLT: CPT

## 2019-08-01 PROCEDURE — 63600175 PHARM REV CODE 636 W HCPCS: Performed by: STUDENT IN AN ORGANIZED HEALTH CARE EDUCATION/TRAINING PROGRAM

## 2019-08-01 PROCEDURE — 25000242 PHARM REV CODE 250 ALT 637 W/ HCPCS: Performed by: STUDENT IN AN ORGANIZED HEALTH CARE EDUCATION/TRAINING PROGRAM

## 2019-08-01 PROCEDURE — 36415 COLL VENOUS BLD VENIPUNCTURE: CPT

## 2019-08-01 PROCEDURE — 97530 THERAPEUTIC ACTIVITIES: CPT

## 2019-08-01 PROCEDURE — 99233 PR SUBSEQUENT HOSPITAL CARE,LEVL III: ICD-10-PCS | Mod: GC,,,

## 2019-08-01 PROCEDURE — 80048 BASIC METABOLIC PNL TOTAL CA: CPT

## 2019-08-01 PROCEDURE — 85025 COMPLETE CBC W/AUTO DIFF WBC: CPT

## 2019-08-01 PROCEDURE — 27000221 HC OXYGEN, UP TO 24 HOURS

## 2019-08-01 PROCEDURE — 97116 GAIT TRAINING THERAPY: CPT

## 2019-08-01 PROCEDURE — 20600001 HC STEP DOWN PRIVATE ROOM

## 2019-08-01 PROCEDURE — 94640 AIRWAY INHALATION TREATMENT: CPT

## 2019-08-01 PROCEDURE — 83735 ASSAY OF MAGNESIUM: CPT

## 2019-08-01 RX ORDER — AMLODIPINE BESYLATE 2.5 MG/1
2.5 TABLET ORAL DAILY
Status: DISCONTINUED | OUTPATIENT
Start: 2019-08-01 | End: 2019-08-09 | Stop reason: HOSPADM

## 2019-08-01 RX ORDER — TIOTROPIUM BROMIDE 18 UG/1
1 CAPSULE ORAL; RESPIRATORY (INHALATION) DAILY
Status: DISCONTINUED | OUTPATIENT
Start: 2019-08-01 | End: 2019-08-02

## 2019-08-01 RX ADMIN — ROPINIROLE HYDROCHLORIDE 0.25 MG: 0.25 TABLET, FILM COATED ORAL at 12:08

## 2019-08-01 RX ADMIN — ACETAMINOPHEN 1000 MG: 500 TABLET ORAL at 09:08

## 2019-08-01 RX ADMIN — FUROSEMIDE 40 MG: 40 TABLET ORAL at 09:08

## 2019-08-01 RX ADMIN — HEPARIN SODIUM 5000 UNITS: 5000 INJECTION INTRAVENOUS; SUBCUTANEOUS at 02:08

## 2019-08-01 RX ADMIN — HEPARIN SODIUM 5000 UNITS: 5000 INJECTION INTRAVENOUS; SUBCUTANEOUS at 05:08

## 2019-08-01 RX ADMIN — ALBUTEROL SULFATE 2 PUFF: 90 AEROSOL, METERED RESPIRATORY (INHALATION) at 10:08

## 2019-08-01 RX ADMIN — AMLODIPINE BESYLATE 2.5 MG: 2.5 TABLET ORAL at 02:08

## 2019-08-01 RX ADMIN — POTASSIUM CHLORIDE 10 MEQ: 750 CAPSULE, EXTENDED RELEASE ORAL at 09:08

## 2019-08-01 RX ADMIN — ACETAMINOPHEN 1000 MG: 500 TABLET ORAL at 02:08

## 2019-08-01 RX ADMIN — ROPINIROLE HYDROCHLORIDE 0.25 MG: 0.25 TABLET, FILM COATED ORAL at 09:08

## 2019-08-01 RX ADMIN — THERA TABS 1 TABLET: TAB at 09:08

## 2019-08-01 RX ADMIN — ACETAMINOPHEN 1000 MG: 500 TABLET ORAL at 05:08

## 2019-08-01 RX ADMIN — IPRATROPIUM BROMIDE 0.5 MG: 0.5 SOLUTION RESPIRATORY (INHALATION) at 06:08

## 2019-08-01 RX ADMIN — IPRATROPIUM BROMIDE 0.5 MG: 0.5 SOLUTION RESPIRATORY (INHALATION) at 01:08

## 2019-08-01 RX ADMIN — FERROUS SULFATE TAB EC 325 MG (65 MG FE EQUIVALENT) 325 MG: 325 (65 FE) TABLET DELAYED RESPONSE at 09:08

## 2019-08-01 RX ADMIN — ALBUTEROL SULFATE 2 PUFF: 90 AEROSOL, METERED RESPIRATORY (INHALATION) at 12:08

## 2019-08-01 RX ADMIN — ASPIRIN 81 MG CHEWABLE TABLET 81 MG: 81 TABLET CHEWABLE at 09:08

## 2019-08-01 RX ADMIN — TIOTROPIUM BROMIDE 18 MCG: 18 CAPSULE ORAL; RESPIRATORY (INHALATION) at 05:08

## 2019-08-01 RX ADMIN — HEPARIN SODIUM 5000 UNITS: 5000 INJECTION INTRAVENOUS; SUBCUTANEOUS at 09:08

## 2019-08-01 NOTE — ASSESSMENT & PLAN NOTE
ECHO on 7/19 showed EF of 65% with DD, PA pressure of 52 and several valvular abnormalities.   BNP - 1414, but not volume currently overloaded.     Plan:  - Continue Home Lasix 40mg Daily; can increase dose if she becomes volume overloaded  - Monitor I/O, Weights

## 2019-08-01 NOTE — PT/OT/SLP PROGRESS
Physical Therapy Treatment    Patient Name:  Venus Mayer   MRN:  [id number]    Recommendations:     Discharge Recommendations:  home health PT   Discharge Equipment Recommendations: none   Barriers to discharge: Inaccessible home ([facility] home)    Assessment:     Venus Mayer is a 89 y.o. female admitted with a medical diagnosis of Pneumothorax on right.  She presents with the following impairments/functional limitations:  weakness, impaired balance, impaired endurance, impaired self care skills, impaired functional mobilty, gait instability, impaired cardiopulmonary response to activity .    Pt amol session well w/ good participation. She was able to demo improvement by being able to tolerate 2 gait trials on this date. She does however continue to be limited t/o sessions by decreased endurance and instability. She will continue to benefit from acute PT services.    Rehab Prognosis: Good; patient would benefit from acute skilled PT services to address these deficits and reach maximum level of function.    Recent Surgery: Procedure(s) (LRB):  BRONCHOSCOPY, FIBEROPTIC Flexible (N/A)  PLEURODESIS (N/A) 1 Day Post-Op    Plan:     During this hospitalization, patient to be seen 3 x/week to address the identified rehab impairments via gait training, therapeutic activities, therapeutic exercises and progress toward the following goals:    · Plan of Care Expires:  08/29/19    Subjective     Chief Complaint: weakness/SOB  Patient/Family Comments/goals: to get stronger  Pain/Comfort:  · Pain Rating 1: 0/10      Objective:     Communicated with nursing prior to session.  Patient found supine with telemetry, chest tube, oxygen, pulse ox (continuous), PureWick upon PT entry to room.     General Precautions: Standard, fall, hearing impaired   Orthopedic Precautions:N/A   Braces: N/A     Functional Mobility:  · Bed Mobility:   · Supine>sit on bed w/ SBA, HOB elevated and w/ side rail  · Cueing for  technique  · Transfers:  · Sit>stand from EOB w/ RW and CGA for safety  · Stand pivot to/from toilet w/ RW and [place]   · Stand pivot to bedside chair w/ RW and CGA  · Cueing for hand placement  · Gait:   · 15ft (x2 trials) w/ RW and CGA for safety  · Cueing to remain inside RW for safety  · Limited in distance by fatigue/SOB  · Balance:   · Static stand w/ RW and CGA for safety while pt completed heri-care and PT donned clean diaper      AM-PAC 6 CLICK MOBILITY  Turning over in bed (including adjusting bedclothes, sheets and blankets)?: 3  Sitting down on and standing up from a chair with arms (e.g., wheelchair, bedside commode, etc.): 3  Moving from lying on back to sitting on the side of the bed?: 3  Moving to and from a bed to a chair (including a wheelchair)?: 3  Need to walk in hospital room?: 3  Climbing 3-5 steps with a railing?: 2  Basic Mobility Total Score: 17       Therapeutic Activities and Exercises:  Pt completed toileting w/ overall Min/CGA including: SPT to/from toilet w/ RW and Min/CGA, heri-care in standing w/ CGA for safety, and gown management in standing w/ CGA    Pt was re-educated on PT role and POC along w/ use of call button for assistance. Pt verb understanding.     Patient left up in chair with all lines intact and call button in reach..    GOALS:   Multidisciplinary Problems     Physical Therapy Goals        Problem: Physical Therapy Goal    Goal Priority Disciplines Outcome Goal Variances Interventions   Physical Therapy Goal     PT, PT/OT Ongoing (interventions implemented as appropriate)     Description:  Goals to be met by: 2019    Patient will increase functional independence with mobility by performin. Supine to sit with Set-up [place]  2. Sit to supine with Set-up [place]  3. Sit to stand transfer with Stand-by Assistance  4. Gait  x 100 feet with Stand-by Assistance using Rolling Walker.   5. Ascend/descend 2 stair with right Handrails Supervision using no  assistive device.                       Time Tracking:     PT Received On: 08/01/19  PT Start Time: 1102     PT Stop Time: 1138  PT Total Time (min): 36 min     Billable Minutes: Gait Training 16, Therapeutic Activity 20 and Total Time 36    Treatment Type: Treatment  PT/PTA: PT     PTA Visit Number: 0     Stephanie Hanson, PT  08/01/2019

## 2019-08-01 NOTE — OP NOTE
[facility]  Surgery Department  Operative Note    SUMMARY     Date of Procedure: 7/31/2019     Procedure: Procedure(s) (LRB):  BRONCHOSCOPY, FIBEROPTIC Flexible (N/A)  PLEURODESIS (N/A)     Surgeon(s) and Role:     * Klever Mckeon MD - Primary     * Charisse Foley MD - Resident - Assisting     * Eddie Dodge MD - Fellow        Pre-Operative Diagnosis: Pneumothorax on right [J93.9]    Post-Operative Diagnosis: Post-Op Diagnosis Codes:     * Pneumothorax on right [J93.9]    Anesthesia: General    Indications:  Patient is a 89 year old female with multiple spontaneous pneumothoraces     Operative Procedure:  Patient was brought to the operating room and placed supine on the table. General anesthesia was induced without complication. A time out was performed and all team members were in agreement.  The bronchoscope was advanced through the endotracheal tube and both the left and right sides were inspected down to the segmental bronchi. No abnormalities were found. We systematically temporarily occluded each segmental bronchi on the right with a balloon and no improvement was noted in the air leak with any of the occlusions. Additionally, we temporarily occluded the entire right mainstem bronchus without significant improvement in the air leak. The chest tube and pleur-evac were inspected to ensure that an air leak was not coming from a malfunction; however, it was ensured that the air leak was coming from the patient, despite being unable to improve it with occlusion. The bronchoscope was removed.  Doxycycline was administered through the IR placed chest tube and the pleur-evacs were elevated.   The patient was extubated in the operating room and taken to the PACU in stable condition.    Complications: No    Estimated Blood Loss (EBL): 0ml         Implants: * No implants in log *    Specimens:   Specimen (12h ago, onward)    None                  Condition: Good    Disposition:  PACU - hemodynamically stable.    Charisse Foley MD, PGY-4  General Surgery  [phone]

## 2019-08-01 NOTE — ANESTHESIA POSTPROCEDURE EVALUATION
Anesthesia Post Evaluation    Patient: Venus Mayer    Procedure(s) Performed: Procedure(s) (LRB):  BRONCHOSCOPY, FIBEROPTIC Flexible (N/A)  PLEURODESIS (N/A)    Final Anesthesia Type: general  Patient location during evaluation: PACU  Patient participation: Yes- Able to Participate  Level of consciousness: awake and alert and oriented  Post-procedure vital signs: reviewed and stable  Pain management: adequate  Airway patency: patent  PONV status at discharge: No PONV  Anesthetic complications: no      Cardiovascular status: blood pressure returned to baseline  Respiratory status: nasal cannula  Hydration status: euvolemic  Follow-up not needed.          Vitals Value Taken Time   /61 7/31/2019  8:47 PM   Temp 36.6 °C (97.8 °F) 7/31/2019  6:00 PM   Pulse 90 7/31/2019  8:49 PM   Resp 24 7/31/2019  8:49 PM   SpO2 96 % 7/31/2019  8:49 PM   Vitals shown include unvalidated device data.      No case tracking events are documented in the log.      Pain/Estelle Score: Pain Rating Prior to Med Admin: 8 (7/31/2019  6:56 PM)  Pain Rating Post Med Admin: 0 (7/30/2019  2:00 PM)  Estelle Score: 8 (7/31/2019  6:00 PM)

## 2019-08-01 NOTE — ANESTHESIA RELEASE NOTE
Anesthesia Release from PACU Note    Patient: Venus Mayer    Procedure(s) Performed: Procedure(s) (LRB):  BRONCHOSCOPY, FIBEROPTIC Flexible (N/A)  PLEURODESIS (N/A)    Anesthesia type: general    Post pain: Adequate analgesia    Post assessment: no apparent anesthetic complications, tolerated procedure well and no evidence of recall    Last Vitals:   Visit Vitals  /61   Pulse 91   Temp 36.6 °C (97.8 °F) (Temporal)   Resp 20   Ht 5' (1.524 m)   Wt 40.8 kg (89 lb 15.2 oz)   LMP  (LMP Unknown)   SpO2 (!) 94%   Breastfeeding? No   BMI 17.57 kg/m²       Post vital signs: stable    Level of consciousness: awake, alert  and oriented    Nausea/Vomiting: no nausea/no vomiting    Complications: none    Airway Patency: patent    Respiratory: nasal cannula    Cardiovascular: stable and blood pressure at baseline    Hydration: euvolemic

## 2019-08-01 NOTE — SUBJECTIVE & OBJECTIVE
Interval History:    Past Medical History:   Diagnosis Date    Acute on chronic congestive heart failure 2019    Cardiomyopathy     Carotid artery occlusion     CHF (congestive heart failure)     COPD (chronic obstructive pulmonary disease)     Coronary artery disease     Hyperlipidemia     Hypertension        Past Surgical History:   Procedure Laterality Date    BRONCHOSCOPY, FIBEROPTIC Flexible N/A 2019    Performed by Klever [facility]keon MD at [facility] OR 2ND FLR    CARDIAC CATHETERIZATION      cardic cath      CAROTID ENDARTERECTOMY      PLEURODESIS N/A 2019    Performed by Klever Mckeon MD at [facility] OR 2ND FLR       Review of patient's allergies indicates:   Allergen Reactions    Ancef in dextrose (iso-osm) Rash    Cefazolin Rash    Cefuroxime Rash    Sulfamethoxazole-trimethoprim Rash     Other reaction(s): Rash       Medications:  Continuous Infusions:  Scheduled Meds:   acetaminophen  1,000 mg Oral Q8H    aspirin  81 mg Oral Daily    ferrous sulfate  325 mg Oral Daily    furosemide  40 mg Oral Daily    heparin (porcine)  5,000 Units Subcutaneous Q8H    ipratropium  0.5 mg Nebulization Q6H    multivitamin  1 tablet Oral Daily    potassium chloride  10 mEq Oral Daily    rOPINIRole  0.25 mg Oral QHS    tiotropium  1 capsule Inhalation Daily     PRN Meds:albuterol, Dextrose 10% Bolus, Dextrose 10% Bolus, glucagon (human recombinant), glucose, glucose, polyethylene glycol, sodium chloride 0.9%, sodium chloride 0.9%    Family History     Problem Relation (Age of Onset)    Hypertension Mother        Tobacco Use    Smoking status: Former Smoker     Packs/day: 2.00     Years: 20.00     Pack years: 40.00     Types: Cigarettes     Last attempt to quit: 1980     Years since quittin.5    Smokeless tobacco: Never Used   Substance and Sexual Activity    Alcohol use: Yes     Alcohol/week: 1.2 oz     Types: 2 Glasses of wine per week     Comment: social    Drug use: No     Sexual activity: Not Currently       Review of Systems   Constitutional: Positive for fatigue.   HENT: Positive for hearing loss.    Respiratory: Positive for shortness of breath.      Objective:     Vital Signs (Most Recent):  Temp: 97 °F (36.1 °C) (08/01/19 0822)  Pulse: 104 (08/01/19 0822)  Resp: 20 (08/01/19 0822)  BP: (!) 143/64 (08/01/19 0822)  SpO2: 95 % (08/01/19 0822) Vital Signs (24h Range):  Temp:  [97 °F (36.1 °C)-98.7 °F (37.1 °C)] 97 °F (36.1 °C)  Pulse:  [] 104  Resp:  [14-25] 20  SpO2:  [86 %-100 %] 95 %  BP: (112-160)/(53-69) 143/64     Weight: 40.8 kg (89 lb 15.2 oz)  Body mass index is 17.57 kg/m².    Review of Symptoms  Symptom Assessment (ESAS 0-10 scale)   ESAS 0 1 2 3 4 5 6 7 8 9 10   Pain              Dyspnea              Anxiety              Nausea              Depression               Anorexia              Fatigue              Insomnia              Restlessness               Agitation              CAM / Delirium __ --  ___+   Constipation     __ --  ___+   Diarrhea           __ --  ___+  Bowel Management Plan (BMP): No    Comments: no pain, dyspnea on exertion 3-4     Pain Assessment:     OME in 24 hours: 0    Performance Status: 50    ECOG Performance Status Grade: 2 - Ambulates, capable of self care only    Physical Exam   Constitutional: She is oriented to person, place, and time. She appears well-developed. No distress.   Appears frail    HENT:   [place], hearing aid on the right is broken, not at bedside   Cardiovascular: Normal rate, regular rhythm and normal heart sounds.   Pulmonary/Chest:   Right chest tube x2  Intact, coarse breath sounds and chest tube sounds    Abdominal: Soft. Bowel sounds are normal.   Musculoskeletal:   Weak, s/p right hip fracture, uses walker    Neurological: She is alert and oriented to person, place, and time.   Skin: Skin is warm and dry. There is pallor.   Psychiatric: She has a normal mood and affect. Her behavior is normal. Judgment and thought  content normal.   Nursing note and vitals reviewed.      Significant Labs: All pertinent labs within the past 24 hours have been reviewed.  CBC:   Recent Labs   Lab 08/01/19 0410   WBC 8.17   HGB 11.5*   HCT 36.0*   MCV 96        BMP:  Recent Labs   Lab 08/01/19 0410   GLU 95      K 4.3   CL 91*   CO2 33*   BUN 32*   CREATININE 0.9   CALCIUM 9.8   MG 1.9     LFT:  Lab Results   Component Value Date    AST 31 07/27/2019    ALKPHOS 96 07/27/2019    BILITOT 0.4 07/27/2019     Albumin:   Albumin   Date Value Ref Range Status   07/27/2019 3.4 (L) 3.5 - 5.2 g/dL Final     Protein:   Total Protein   Date Value Ref Range Status   07/27/2019 7.1 6.0 - 8.4 g/dL Final     Lactic acid:   Lab Results   Component Value Date    LACTATE 1.0 07/06/2019       Significant Imaging: I have reviewed all pertinent imaging results/findings within the past 24 hours.    Advance Care Planning   Advanced Directives::  Living Will: No  LaPOST: No  Do Not Resuscitate Status: No, partial - no intubation mechanical ventilation   Medical Power of : No    Decision-Making Capacity: Patient answered questions, family answered questions.        Living Arrangements: Lives alone in her home with day time sitters and family at night.     Psychosocial/Cultural:   after 48 yrs of marriage, 5 children, 6 grand children and 7 great grand children,  and mother,  Living independently until she broke her hip 1 yr ago.  Enjoyed making road trips with her friends to [place].  Played cards and went to the [employer].  Goes to beauty parlor every week.         Spiritual:     F- Kylah and Belief: Mandaeism     I - Importance: went to [employer] every week until she was put on oxygen.  Now watches and participates with [employer] on TV  .  C - Community:     A - Address in Care: amenable to  visits and anointing of the sick.

## 2019-08-01 NOTE — SUBJECTIVE & OBJECTIVE
Interval History: No significant overnight events. Patient's vital signs are WNL and she is saturating at 90-94 on 2L NC. Has two chest tubes placed; one is clamped and the other is on suction mode. Denies chest pain, SOB, and difficulty breathing. Complains of mild, improved tenderness on chest tube sites and of intermittent lower back pain.    Review of Systems   Constitutional: Negative for activity change, appetite change, chills and fever.   HENT: Negative for congestion and sore throat.    Eyes: Negative for photophobia and visual disturbance.   Respiratory: Negative for cough, chest tightness and shortness of breath.    Cardiovascular: Negative for chest pain and palpitations.        Mild pain on chest tube sites; improved from yesterday.    Gastrointestinal: Negative for abdominal distention, abdominal pain, constipation, diarrhea, nausea and vomiting.   Genitourinary: Negative for dysuria and hematuria.   Musculoskeletal: Positive for back pain (lower back pain, per patient caused by being bedridden ). Negative for arthralgias and myalgias.   Skin: Negative for color change and rash.   Neurological: Negative for dizziness and headaches.     Objective:     Vital Signs (Most Recent):  Temp: 97.2 °F (36.2 °C) (08/01/19 1222)  Pulse: 60 (08/01/19 1242)  Resp: 20 (08/01/19 1242)  BP: (!) 149/67 (08/01/19 1222)  SpO2: (!) 94 % (08/01/19 1242) Vital Signs (24h Range):  Temp:  [97 °F (36.1 °C)-97.8 °F (36.6 °C)] 97.2 °F (36.2 °C)  Pulse:  [] 60  Resp:  [17-25] 20  SpO2:  [86 %-100 %] 94 %  BP: (112-160)/(53-69) 149/67     Weight: 40.8 kg (89 lb 15.2 oz)  Body mass index is 17.57 kg/m².  No intake or output data in the 24 hours ending 08/01/19 1354   Physical Exam   Constitutional: She is oriented to person, place, and time. Vital signs are normal. She appears well-developed. She is active. She is easily aroused. No distress.   HENT:   Head: Normocephalic and atraumatic.   Eyes: Conjunctivae and lids are  normal. Lids are everted and swept, no foreign bodies found. No scleral icterus.   Cardiovascular: Normal rate, regular rhythm and normal heart sounds.   Pulses:       Radial pulses are 1+ on the right side, and 1+ on the left side.   Pulmonary/Chest: Effort normal. No respiratory distress.   Two chest tubes in place; one clamped and the other on suction. Tenderness around chest tubes, but no warmth, erythema, or discharge observed.    Abdominal: Soft. Normal appearance and bowel sounds are normal. She exhibits no distension. There is no tenderness.   Musculoskeletal: She exhibits no edema or tenderness.   Neurological: She is alert, oriented to person, place, and time and easily aroused. GCS eye subscore is 4. GCS verbal subscore is 5. GCS motor subscore is 6.   Skin: Skin is warm. No rash noted. She is not diaphoretic. No cyanosis.   Nursing note and vitals reviewed.      Significant Labs:   CBC:   Recent Labs   Lab 07/31/19  0452 08/01/19  0410   WBC 6.43 8.17   HGB 10.8* 11.5*   HCT 32.7* 36.0*    231     CMP:   Recent Labs   Lab 08/01/19  0410      K 4.3   CL 91*   CO2 33*   GLU 95   BUN 32*   CREATININE 0.9   CALCIUM 9.8   ANIONGAP 15   EGFRNONAA 56.5*       Significant Imaging: I have reviewed all pertinent imaging results/findings within the past 24 hours.

## 2019-08-01 NOTE — PLAN OF CARE
Patient arrived on unit AAO4 w/ no distress noted.Bed locked in lowest position, bedside table and personal belongings within reach. VSS stable. Will continue to monitor.

## 2019-08-01 NOTE — PROGRESS NOTES
[facility] Hospital Medicine  Progress Note    Patient Name: Venus Mayer  MRN: [id number]  Patient Class: IP- Inpatient   Admission Date: 7/27/2019  Length of Stay: 4 days  Attending Physician: Ezra Escobedo MD  Primary Care Provider: Jona Che MD    [facility] Hospital Medicine Team: [facility] HOSP MED 1 Tawny Dotson MD    Subjective:     Principal Problem:Pneumothorax on right      HPI:  Venus Mayer is 89 y.o. lady with COPD, (on 2.5 L home O2), 40 pack year smoking history (quit 30 years ago), multiple readmissions for spontaneous pneumothorax, chronic dCHFS/P AICD, PVD/carotid artery stenosis s/p L CEA 08,was brought to the ED because she was SOB.    She was Discharged from [facility] on 7/26/19 after being admitted for pneumothorax, improved after placing CT with return to baseline pulmonary function on discharge.  Then around evening of 7/27 started feeling SOB, not relieved with her Inhaler or Nebulization, progressively worse,  prompting her to come to the Hospital. No chest pain, fever , confusion.    In the ED she was hypoxic, CXR revelaed Right sided Pneumothorax. CTS was consulted and placed a Pig tail catheter, after repositioning showed air leak and patient symptomatically improved and is currently on a non re breather.     The patient's recurrent PTXs seem to have started during her admission on 05/22/2019 where she presented with a large R sided PTX requiring chest tube and pleurodesis on 5/27/2019. Since, she has had a small recurrence of PTX on 6/25/19 not requiring intervention, and admission to OSH 7/6-7/11 for acute on chronic dCHF and COPD exacerbation. She has been less mobile and uses a walker at home.     Overview/Hospital Course:  No notes on file    Interval History: No significant overnight events. Patient's vital signs are WNL and she is saturating at 90-94 on 2L NC. Has two chest tubes placed; one is clamped and the other is on suction mode. Denies chest pain, SOB, and  difficulty breathing. Complains of mild, improved tenderness on chest tube sites and of intermittent lower back pain.    Review of Systems   Constitutional: Negative for activity change, appetite change, chills and fever.   HENT: Negative for congestion and sore throat.    Eyes: Negative for photophobia and visual disturbance.   Respiratory: Negative for cough, chest tightness and shortness of breath.    Cardiovascular: Negative for chest pain and palpitations.        Mild pain on chest tube sites; improved from yesterday.    Gastrointestinal: Negative for abdominal distention, abdominal pain, constipation, diarrhea, nausea and vomiting.   Genitourinary: Negative for dysuria and hematuria.   Musculoskeletal: Positive for back pain (lower back pain, per patient caused by being bedridden ). Negative for arthralgias and myalgias.   Skin: Negative for color change and rash.   Neurological: Negative for dizziness and headaches.     Objective:     Vital Signs (Most Recent):  Temp: 97.2 °F (36.2 °C) (08/01/19 1222)  Pulse: 60 (08/01/19 1242)  Resp: 20 (08/01/19 1242)  BP: (!) 149/67 (08/01/19 1222)  SpO2: (!) 94 % (08/01/19 1242) Vital Signs (24h Range):  Temp:  [97 °F (36.1 °C)-97.8 °F (36.6 °C)] 97.2 °F (36.2 °C)  Pulse:  [] 60  Resp:  [17-25] 20  SpO2:  [86 %-100 %] 94 %  BP: (112-160)/(53-69) 149/67     Weight: 40.8 kg (89 lb 15.2 oz)  Body mass index is 17.57 kg/m².  No intake or output data in the 24 hours ending 08/01/19 1354   Physical Exam   Constitutional: She is oriented to person, place, and time. Vital signs are normal. She appears well-developed. She is active. She is easily aroused. No distress.   HENT:   Head: Normocephalic and atraumatic.   Eyes: Conjunctivae and lids are normal. Lids are everted and swept, no foreign bodies found. No scleral icterus.   Cardiovascular: Normal rate, regular rhythm and normal heart sounds.   Pulses:       Radial pulses are 1+ on the right side, and 1+ on the left side.    Pulmonary/Chest: Effort normal. No respiratory distress.   Two chest tubes in place; one clamped and the other on suction. Tenderness around chest tubes, but no warmth, erythema, or discharge observed.    Abdominal: Soft. Normal appearance and bowel sounds are normal. She exhibits no distension. There is no tenderness.   Musculoskeletal: She exhibits no edema or tenderness.   Neurological: She is alert, oriented to person, place, and time and easily aroused. GCS eye subscore is 4. GCS verbal subscore is 5. GCS motor subscore is 6.   Skin: Skin is warm. No rash noted. She is not diaphoretic. No cyanosis.   Nursing note and vitals reviewed.      Significant Labs:   CBC:   Recent Labs   Lab 07/31/19  0452 08/01/19  0410   WBC 6.43 8.17   HGB 10.8* 11.5*   HCT 32.7* 36.0*    231     CMP:   Recent Labs   Lab 08/01/19  0410      K 4.3   CL 91*   CO2 33*   GLU 95   BUN 32*   CREATININE 0.9   CALCIUM 9.8   ANIONGAP 15   EGFRNONAA 56.5*       Significant Imaging: I have reviewed all pertinent imaging results/findings within the past 24 hours.      Assessment/Plan:      * Pneumothorax on right  Patient with COPD (on 2.5 L home oxygen) and with multiple pneumothoraxes s/p pleurodesis and chest tube placement last week presented with dyspnea and hypoxia. CXR demonstrated R pneumothorax.   - Patient known to CTS Service; re-consulted now for management  - Patient is a poor surgical candidate due to cardiac comorbidities  - Pleurodesis performed on 7/31, CTS was going to place endobronchial valve but were unable to close the leak so they performed a pleurodesis.   -Obtaining daily CXR to monitor pneumothorax   - Per CTS, if repeat pleurodesis fails, they will place a blood patch over the weekend, for which she will need a PICC line.      Plan:  -monitor patient's hemodynamic status  -follow-up CTS recommendations for management of pneumothorax    Restless leg syndrome  Continue home Ropinirole     COLD (chronic  obstructive lung disease)  Patient with COPD on 2.5 L of home oxygen.     Plan:  - Continue Tiotropium   - Duonebs as needed  - Continue Supplemental oxygen      Heart failure, diastolic  ECHO on 7/19 showed EF of 65% with DD, PA pressure of 52 and several valvular abnormalities.   BNP - 1414, but not volume currently overloaded.     Plan:  - Continue Home Lasix 40mg Daily; can increase dose if she becomes volume overloaded  - Monitor I/O, Weights        Hypertension  Has been hypertensive during hospital stay.   Plan:  -Administer home Amlodipine 2.5mg PO daily        VTE Risk Mitigation (From admission, onward)        Ordered     heparin (porcine) injection 5,000 Units  Every 8 hours      07/28/19 0551     IP VTE HIGH RISK PATIENT  Once      07/28/19 0551                Tawny Dotson MD  Department of Hospital Medicine   [facility]

## 2019-08-01 NOTE — PROGRESS NOTES
[facility]  Thoracic Surgery  Progress Note    Subjective:     History of Present Illness:  Venus Mayer is a 89 y.o. female with a hx of COPD and multiple readmissions for spontaneous pneumothorax. Numerous attempts at pleurodesis have been performed. Patient is not a surgical candidate. She presents to the ED with acute onset shortness of breath. Imaging showed large right sided pneumothorax. Pt otherwise stable.      No current facility-administered medications on file prior to encounter.        Post-Op Info:  Procedure(s) (LRB):  BRONCHOSCOPY, FIBEROPTIC Flexible (N/A)  PLEURODESIS (N/A)   1 Day Post-Op     Interval History: NAEON. Tolerated repeat doxycycline pleurodesis yesterday in OR. Endobronchial valves not placed as there was no resolution of air leak with bronchus occlusion. Remains on home NC O2 regimen.     Medications:  Continuous Infusions:  Scheduled Meds:   acetaminophen  1,000 mg Oral Q8H    aspirin  81 mg Oral Daily    ferrous sulfate  325 mg Oral Daily    furosemide  40 mg Oral Daily    heparin (porcine)  5,000 Units Subcutaneous Q8H    ipratropium  0.5 mg Nebulization Q6H    lidocaine  1 patch Transdermal Q24H    multivitamin  1 tablet Oral Daily    potassium chloride  10 mEq Oral Daily    rOPINIRole  0.25 mg Oral QHS     PRN Meds:albuterol, Dextrose 10% Bolus, Dextrose 10% Bolus, glucagon (human recombinant), glucose, glucose, polyethylene glycol, sodium chloride 0.9%, sodium chloride 0.9%     Review of patient's allergies indicates:   Allergen Reactions    Ancef in dextrose (iso-osm) Rash    Cefazolin Rash    Cefuroxime Rash    Sulfamethoxazole-trimethoprim Rash     Other reaction(s): Rash     Objective:     Vital Signs (Most Recent):  Temp: 97.8 °F (36.6 °C) (08/01/19 0431)  Pulse: 99 (08/01/19 0431)  Resp: 17 (08/01/19 0431)  BP: (!) 157/65 (08/01/19 0431)  SpO2: (!) 93 % (08/01/19 0431) Vital Signs (24h Range):  Temp:  [96.7 °F (35.9 °C)-98.7 °F (37.1  °C)] 97.8 °F (36.6 °C)  Pulse:  [81-99] 99  Resp:  [14-25] 17  SpO2:  [86 %-100 %] 93 %  BP: (112-160)/(53-69) 157/65     Intake/Output - Last 3 Shifts       07/30 0700 - 07/31 0659 07/31 0700 - 08/01 0659 08/01 0700 - 08/02 0659    P.O. 540      Total Intake(mL/kg) 540 (13.2)      Urine (mL/kg/hr) 800 (0.8)      Drains       Stool 0      Chest Tube 90      Total Output 890      Net -350             Urine Occurrence 6 x      Stool Occurrence 0 x 0 x           SpO2: (!) 93 %  O2 Device (Oxygen Therapy): nasal cannula    Physical Exam   Constitutional: She appears well-developed and well-nourished. No distress.   Cardiovascular: Normal rate and regular rhythm.   Pulmonary/Chest: Effort normal. No respiratory distress.   Large bore chest tube in place. No air leak. On water seal.   Anterior pigtail in place, + air leak. On suction.    Abdominal: Soft. She exhibits no distension. There is no tenderness.   Neurological: She is alert.   Skin: Skin is warm and dry.   Psychiatric: She has a normal mood and affect. Her behavior is normal.       Significant Labs:  BMP:   Recent Labs   Lab 08/01/19 0410   GLU 95      K 4.3   CL 91*   CO2 33*   BUN 32*   CREATININE 0.9   CALCIUM 9.8   MG 1.9     CBC:   Recent Labs   Lab 08/01/19 0410   WBC 8.17   RBC 3.76*   HGB 11.5*   HCT 36.0*      MCV 96   MCH 30.6   MCHC 31.9*     CMP:   Recent Labs   Lab 08/01/19 0410   GLU 95   CALCIUM 9.8      K 4.3   CO2 33*   CL 91*   BUN 32*   CREATININE 0.9       Significant Diagnostics:  CXR: I have reviewed all pertinent results/findings within the past 24 hours    VTE Risk Mitigation (From admission, onward)        Ordered     heparin (porcine) injection 5,000 Units  Every 8 hours      07/28/19 0551     IP VTE HIGH RISK PATIENT  Once      07/28/19 0551        Assessment/Plan:     * Pneumothorax on right  - Both chest tubes to suction for 48 hours s/p pleurodesis  - If repeat pleurodesis fails, will try blood patch over the  weekend. Patient will need PICC placed prior to blood patch.   - Daily CXR  - Pulmonary toilet         BHUPINDER Caal  Thoracic Surgery  [facility]

## 2019-08-01 NOTE — ASSESSMENT & PLAN NOTE
"Palliative medicine follow up to goals of care.  Palliative medicine APRN met at bedside with patient. No family is present at this time.       Impression: Mrs. Mayer is a 89 yo lady admitted with right spontaneous pneumothorax.  S/P  Right chest tube placement X2.   to suction.  She is hard of hearing. Somnolent and arouses easily to tactile and verbal stimuli.  Alert and oriented to person,  Place, and  Time. Resting quietly with eyes closed.     S/P : Bronchoscopy and systematic occlusion of segmental brochi,  Doxycycline pleurodesis   "Continued air leak despite occlusion of right sided bronchi, including occlusion of right mainstem bronchi. Therefore, no endobronchial valve was placed"      Advanced Care Planning:  - No advanced directives have been received.   - Patient states her  Son Arthur Miller is her HPOA.  Documents have not been received.  Family has been encouraged to provide copy for [facility] records.  - Mrs. Mayer states she does not have a living will.   - Patient is now a partial code - no mechanical ventilation no intubation   - Today Mrs. Mayer states it is her wish not to be resuscitated.  Daughter Bri is at bedside who states Mrs. Mayer has been saying this for some time now.    - Resuscitation status will be discussed with her son before she will consent to DNR order.    Goals of Care  - Mrs. Mayer  s not opposed to be hospitalized.  Although she has been in hospital 4 times in last two weeks for respiratory illness, she does not consider this a burden.    - She is aware she is not a candidate for surgical interventions.  She is amenable to continued procedures - chest tube etc if it continue to help her breath better and continue to have quality of life.   - -  endobronchial valve was unable to be placed.    - From previous encounter Mrs. Mayer and her daughter state being interested in learning more about different plans of care.   - Palliative medicine explained there are many ways to keep her " comfortable and have good quality of life when she is no longer is amenable to aggressive care or does not have additional reasonable treatment options.   - Currently has sitters during the day and family takes shifts at night time.   -  Patient has had home health in the past.  They are happy with the care they receive with [facility] health and [facility] palliative care service through [facility].      Plan/Recommendations.  - Family is not available at bedside today.  Palliative medicine will reach out to family by telephone to further discuss goals of care.    - Patient and family may benefit from interdisciplinary goals of care meeting.  Palliative medicine will facilitate as needed.    - palliative medicine will continue to follow for goals of care and advanced care planning.   - Emotional support.      Primary team aware of the above goals of care conversation.

## 2019-08-01 NOTE — SUBJECTIVE & OBJECTIVE
Interval History: NAEON. Tolerated repeat doxycycline pleurodesis yesterday in OR. Endobronchial valves not placed as there was no resolution of air leak with bronchus occlusion. Remains on home NC O2 regimen.     Medications:  Continuous Infusions:  Scheduled Meds:   acetaminophen  1,000 mg Oral Q8H    aspirin  81 mg Oral Daily    ferrous sulfate  325 mg Oral Daily    furosemide  40 mg Oral Daily    heparin (porcine)  5,000 Units Subcutaneous Q8H    ipratropium  0.5 mg Nebulization Q6H    lidocaine  1 patch Transdermal Q24H    multivitamin  1 tablet Oral Daily    potassium chloride  10 mEq Oral Daily    rOPINIRole  0.25 mg Oral QHS     PRN Meds:albuterol, Dextrose 10% Bolus, Dextrose 10% Bolus, glucagon (human recombinant), glucose, glucose, polyethylene glycol, sodium chloride 0.9%, sodium chloride 0.9%     Review of patient's allergies indicates:   Allergen Reactions    Ancef in dextrose (iso-osm) Rash    Cefazolin Rash    Cefuroxime Rash    Sulfamethoxazole-trimethoprim Rash     Other reaction(s): Rash     Objective:     Vital Signs (Most Recent):  Temp: 97.8 °F (36.6 °C) (08/01/19 0431)  Pulse: 99 (08/01/19 0431)  Resp: 17 (08/01/19 0431)  BP: (!) 157/65 (08/01/19 0431)  SpO2: (!) 93 % (08/01/19 0431) Vital Signs (24h Range):  Temp:  [96.7 °F (35.9 °C)-98.7 °F (37.1 °C)] 97.8 °F (36.6 °C)  Pulse:  [81-99] 99  Resp:  [14-25] 17  SpO2:  [86 %-100 %] 93 %  BP: (112-160)/(53-69) 157/65     Intake/Output - Last 3 Shifts       07/30 0700 - 07/31 0659 07/31 0700 - 08/01 0659 08/01 0700 - 08/02 0659    P.O. 540      Total Intake(mL/kg) 540 (13.2)      Urine (mL/kg/hr) 800 (0.8)      Drains       Stool 0      Chest Tube 90      Total Output 890      Net -350             Urine Occurrence 6 x      Stool Occurrence 0 x 0 x           SpO2: (!) 93 %  O2 Device (Oxygen Therapy): nasal cannula    Physical Exam   Constitutional: She appears well-developed and well-nourished. No distress.   Cardiovascular: Normal  rate and regular rhythm.   Pulmonary/Chest: Effort normal. No respiratory distress.   Large bore chest tube in place. No air leak. On water seal.   Anterior pigtail in place, + air leak. On suction.    Abdominal: Soft. She exhibits no distension. There is no tenderness.   Neurological: She is alert.   Skin: Skin is warm and dry.   Psychiatric: She has a normal mood and affect. Her behavior is normal.       Significant Labs:  BMP:   Recent Labs   Lab 08/01/19 0410   GLU 95      K 4.3   CL 91*   CO2 33*   BUN 32*   CREATININE 0.9   CALCIUM 9.8   MG 1.9     CBC:   Recent Labs   Lab 08/01/19 0410   WBC 8.17   RBC 3.76*   HGB 11.5*   HCT 36.0*      MCV 96   MCH 30.6   MCHC 31.9*     CMP:   Recent Labs   Lab 08/01/19 0410   GLU 95   CALCIUM 9.8      K 4.3   CO2 33*   CL 91*   BUN 32*   CREATININE 0.9       Significant Diagnostics:  CXR: I have reviewed all pertinent results/findings within the past 24 hours    VTE Risk Mitigation (From admission, onward)        Ordered     heparin (porcine) injection 5,000 Units  Every 8 hours      07/28/19 0551     IP VTE HIGH RISK PATIENT  Once      07/28/19 0563

## 2019-08-01 NOTE — NURSING TRANSFER
Nursing Transfer Note      7/31/2019     Transfer 1012    Transfer via bed    Transfer with chest tube x2, remote tele monitor    Transported by RN    Medicines sent: n/a    Chart send with patient: yes    Notified: family    Patient reassessed at: 7/31/19 @ 3215

## 2019-08-01 NOTE — ASSESSMENT & PLAN NOTE
Patient with COPD on 2.5 L of home oxygen.     Plan:  - Continue Tiotropium   - Duonebs as needed  - Continue Supplemental oxygen

## 2019-08-01 NOTE — PLAN OF CARE
Problem: Adult Inpatient Plan of Care  Goal: Plan of Care Review  Plan of care discussed with pt. Pt verbalizes understanding. Pt tolerating low na diet with no complaints of discomfort or nausea. Pain managed with PRN pain medication. Pt on telemetry, paced rthymn.. Pt ambulated to chair once with PT/OT. Chest tube x1 removed with chest xray taken. Pt positions independently. Vital signs stable. Pt remains free of falls and injury. No acute events this shift. Will continue to monitor.

## 2019-08-01 NOTE — ASSESSMENT & PLAN NOTE
Patient with COPD (on 2.5 L home oxygen) and with multiple pneumothoraxes s/p pleurodesis and chest tube placement last week presented with dyspnea and hypoxia. CXR demonstrated R pneumothorax.   - Patient known to CTS Service; re-consulted now for management  - Patient is a poor surgical candidate due to cardiac comorbidities  - Pleurodesis performed on 7/31, CTS was going to place endobronchial valve but were unable to close the leak so they performed a pleurodesis.   -Obtaining daily CXR to monitor pneumothorax   - Per CTS, if repeat pleurodesis fails, they will place a blood patch over the weekend, for which she will need a PICC line.      Plan:  -monitor patient's hemodynamic status  -follow-up CTS recommendations for management of pneumothorax

## 2019-08-01 NOTE — ASSESSMENT & PLAN NOTE
- Both chest tubes to suction for 48 hours s/p pleurodesis  - If repeat pleurodesis fails, will try blood patch over the weekend. Patient will need PICC placed prior to blood patch.   - Daily CXR  - Pulmonary toilet

## 2019-08-01 NOTE — PLAN OF CARE
Problem: Physical Therapy Goal  Goal: Physical Therapy Goal  Goals to be met by: 2019    Patient will increase functional independence with mobility by performin. Supine to sit with Set-up [place]  2. Sit to supine with Set-up [place]  3. Sit to stand transfer with Stand-by Assistance  4. Gait  x 100 feet with Stand-by Assistance using Rolling Walker.   5. Ascend/descend 2 stair with right Handrails Supervision using no assistive device.      Outcome: Ongoing (interventions implemented as appropriate)  LTGs remain appropriate. Pt will continue PT POC.    Stephanie Hanson, PT  2019

## 2019-08-02 ENCOUNTER — ANESTHESIA EVENT (OUTPATIENT)
Dept: SURGERY | Facility: HOSPITAL | Age: 84
DRG: 163 | End: 2019-08-02
Payer: MEDICARE

## 2019-08-02 LAB
ANION GAP SERPL CALC-SCNC: 8 MMOL/L (ref 8–16)
BASOPHILS # BLD AUTO: 0.06 K/UL (ref 0–0.2)
BASOPHILS NFR BLD: 0.7 % (ref 0–1.9)
BUN SERPL-MCNC: 35 MG/DL (ref 8–23)
CALCIUM SERPL-MCNC: 10 MG/DL (ref 8.7–10.5)
CHLORIDE SERPL-SCNC: 93 MMOL/L (ref 95–110)
CO2 SERPL-SCNC: 37 MMOL/L (ref 23–29)
CREAT SERPL-MCNC: 1 MG/DL (ref 0.5–1.4)
DIFFERENTIAL METHOD: ABNORMAL
EOSINOPHIL # BLD AUTO: 0.6 K/UL (ref 0–0.5)
EOSINOPHIL NFR BLD: 6.3 % (ref 0–8)
ERYTHROCYTE [DISTWIDTH] IN BLOOD BY AUTOMATED COUNT: 14.6 % (ref 11.5–14.5)
EST. GFR  (AFRICAN AMERICAN): 57.3 ML/MIN/1.73 M^2
EST. GFR  (NON AFRICAN AMERICAN): 49.7 ML/MIN/1.73 M^2
GLUCOSE SERPL-MCNC: 87 MG/DL (ref 70–110)
HCT VFR BLD AUTO: 35.2 % (ref 37–48.5)
HGB BLD-MCNC: 10.9 G/DL (ref 12–16)
IMM GRANULOCYTES # BLD AUTO: 0.04 K/UL (ref 0–0.04)
IMM GRANULOCYTES NFR BLD AUTO: 0.5 % (ref 0–0.5)
LYMPHOCYTES # BLD AUTO: 1.2 K/UL (ref 1–4.8)
LYMPHOCYTES NFR BLD: 13.6 % (ref 18–48)
MAGNESIUM SERPL-MCNC: 1.8 MG/DL (ref 1.6–2.6)
MCH RBC QN AUTO: 30.4 PG (ref 27–31)
MCHC RBC AUTO-ENTMCNC: 31 G/DL (ref 32–36)
MCV RBC AUTO: 98 FL (ref 82–98)
MONOCYTES # BLD AUTO: 1.3 K/UL (ref 0.3–1)
MONOCYTES NFR BLD: 14.9 % (ref 4–15)
NEUTROPHILS # BLD AUTO: 5.7 K/UL (ref 1.8–7.7)
NEUTROPHILS NFR BLD: 64 % (ref 38–73)
NRBC BLD-RTO: 0 /100 WBC
PLATELET # BLD AUTO: 221 K/UL (ref 150–350)
PMV BLD AUTO: 9.9 FL (ref 9.2–12.9)
POTASSIUM SERPL-SCNC: 4.2 MMOL/L (ref 3.5–5.1)
RBC # BLD AUTO: 3.59 M/UL (ref 4–5.4)
SODIUM SERPL-SCNC: 138 MMOL/L (ref 136–145)
WBC # BLD AUTO: 8.88 K/UL (ref 3.9–12.7)

## 2019-08-02 PROCEDURE — 36569 INSJ PICC 5 YR+ W/O IMAGING: CPT

## 2019-08-02 PROCEDURE — 25000003 PHARM REV CODE 250: Performed by: STUDENT IN AN ORGANIZED HEALTH CARE EDUCATION/TRAINING PROGRAM

## 2019-08-02 PROCEDURE — 25000242 PHARM REV CODE 250 ALT 637 W/ HCPCS: Performed by: STUDENT IN AN ORGANIZED HEALTH CARE EDUCATION/TRAINING PROGRAM

## 2019-08-02 PROCEDURE — 99233 SBSQ HOSP IP/OBS HIGH 50: CPT | Mod: GC,,,

## 2019-08-02 PROCEDURE — 83735 ASSAY OF MAGNESIUM: CPT

## 2019-08-02 PROCEDURE — 76937 US GUIDE VASCULAR ACCESS: CPT

## 2019-08-02 PROCEDURE — 80048 BASIC METABOLIC PNL TOTAL CA: CPT

## 2019-08-02 PROCEDURE — 94761 N-INVAS EAR/PLS OXIMETRY MLT: CPT

## 2019-08-02 PROCEDURE — 27000221 HC OXYGEN, UP TO 24 HOURS

## 2019-08-02 PROCEDURE — 63600175 PHARM REV CODE 636 W HCPCS: Performed by: STUDENT IN AN ORGANIZED HEALTH CARE EDUCATION/TRAINING PROGRAM

## 2019-08-02 PROCEDURE — 36415 COLL VENOUS BLD VENIPUNCTURE: CPT

## 2019-08-02 PROCEDURE — 85025 COMPLETE CBC W/AUTO DIFF WBC: CPT

## 2019-08-02 PROCEDURE — 94640 AIRWAY INHALATION TREATMENT: CPT

## 2019-08-02 PROCEDURE — 99900035 HC TECH TIME PER 15 MIN (STAT)

## 2019-08-02 PROCEDURE — C1751 CATH, INF, PER/CENT/MIDLINE: HCPCS

## 2019-08-02 PROCEDURE — 20600001 HC STEP DOWN PRIVATE ROOM

## 2019-08-02 PROCEDURE — 25000003 PHARM REV CODE 250: Performed by: HOSPITALIST

## 2019-08-02 PROCEDURE — 99233 PR SUBSEQUENT HOSPITAL CARE,LEVL III: ICD-10-PCS | Mod: GC,,,

## 2019-08-02 PROCEDURE — A4216 STERILE WATER/SALINE, 10 ML: HCPCS | Performed by: HOSPITALIST

## 2019-08-02 RX ORDER — SODIUM CHLORIDE 0.9 % (FLUSH) 0.9 %
10 SYRINGE (ML) INJECTION
Status: DISCONTINUED | OUTPATIENT
Start: 2019-08-02 | End: 2019-08-09 | Stop reason: HOSPADM

## 2019-08-02 RX ORDER — TIOTROPIUM BROMIDE 18 UG/1
1 CAPSULE ORAL; RESPIRATORY (INHALATION) DAILY
Status: DISCONTINUED | OUTPATIENT
Start: 2019-08-03 | End: 2019-08-09 | Stop reason: HOSPADM

## 2019-08-02 RX ORDER — ONDANSETRON 4 MG/1
4 TABLET, FILM COATED ORAL ONCE
Status: COMPLETED | OUTPATIENT
Start: 2019-08-02 | End: 2019-08-02

## 2019-08-02 RX ORDER — SODIUM CHLORIDE 0.9 % (FLUSH) 0.9 %
10 SYRINGE (ML) INJECTION EVERY 6 HOURS
Status: DISCONTINUED | OUTPATIENT
Start: 2019-08-02 | End: 2019-08-09 | Stop reason: HOSPADM

## 2019-08-02 RX ORDER — LIDOCAINE 50 MG/G
1 PATCH TOPICAL
Status: COMPLETED | OUTPATIENT
Start: 2019-08-02 | End: 2019-08-03

## 2019-08-02 RX ORDER — HYDROCODONE BITARTRATE AND ACETAMINOPHEN 5; 325 MG/1; MG/1
1 TABLET ORAL EVERY 8 HOURS PRN
Status: DISCONTINUED | OUTPATIENT
Start: 2019-08-02 | End: 2019-08-09 | Stop reason: HOSPADM

## 2019-08-02 RX ADMIN — Medication 10 ML: at 11:08

## 2019-08-02 RX ADMIN — LIDOCAINE 1 PATCH: 50 PATCH TOPICAL at 11:08

## 2019-08-02 RX ADMIN — AMLODIPINE BESYLATE 2.5 MG: 2.5 TABLET ORAL at 09:08

## 2019-08-02 RX ADMIN — ALBUTEROL SULFATE 2 PUFF: 90 AEROSOL, METERED RESPIRATORY (INHALATION) at 12:08

## 2019-08-02 RX ADMIN — HEPARIN SODIUM 5000 UNITS: 5000 INJECTION INTRAVENOUS; SUBCUTANEOUS at 05:08

## 2019-08-02 RX ADMIN — ONDANSETRON 4 MG: 4 TABLET, FILM COATED ORAL at 05:08

## 2019-08-02 RX ADMIN — POTASSIUM CHLORIDE 10 MEQ: 750 CAPSULE, EXTENDED RELEASE ORAL at 09:08

## 2019-08-02 RX ADMIN — IPRATROPIUM BROMIDE 0.5 MG: 0.5 SOLUTION RESPIRATORY (INHALATION) at 06:08

## 2019-08-02 RX ADMIN — ASPIRIN 81 MG CHEWABLE TABLET 81 MG: 81 TABLET CHEWABLE at 09:08

## 2019-08-02 RX ADMIN — FUROSEMIDE 40 MG: 40 TABLET ORAL at 09:08

## 2019-08-02 RX ADMIN — HEPARIN SODIUM 5000 UNITS: 5000 INJECTION INTRAVENOUS; SUBCUTANEOUS at 09:08

## 2019-08-02 RX ADMIN — POLYETHYLENE GLYCOL 3350 17 G: 17 POWDER, FOR SOLUTION ORAL at 09:08

## 2019-08-02 RX ADMIN — IPRATROPIUM BROMIDE 0.5 MG: 0.5 SOLUTION RESPIRATORY (INHALATION) at 07:08

## 2019-08-02 RX ADMIN — ACETAMINOPHEN 1000 MG: 500 TABLET ORAL at 09:08

## 2019-08-02 RX ADMIN — ROPINIROLE HYDROCHLORIDE 0.25 MG: 0.25 TABLET, FILM COATED ORAL at 09:08

## 2019-08-02 RX ADMIN — ACETAMINOPHEN 1000 MG: 500 TABLET ORAL at 02:08

## 2019-08-02 RX ADMIN — FERROUS SULFATE TAB EC 325 MG (65 MG FE EQUIVALENT) 325 MG: 325 (65 FE) TABLET DELAYED RESPONSE at 09:08

## 2019-08-02 RX ADMIN — TIOTROPIUM BROMIDE 18 MCG: 18 CAPSULE ORAL; RESPIRATORY (INHALATION) at 12:08

## 2019-08-02 RX ADMIN — THERA TABS 1 TABLET: TAB at 09:08

## 2019-08-02 RX ADMIN — Medication 10 ML: at 05:08

## 2019-08-02 NOTE — PROCEDURES
Thoracic Blood Patch Procedure Note      Double lumen PICC placed in right basilic vein of RUE by PICC team. Tip confirmed in distal superior vena cava. 90mL of blood drawn from PICC line using sterile technique. Line flushed and luciana back easily. After cleaning end of 8.5Fr chest tube with alcohol prep, venous blood instilled into right chest. 30mL of sterile saline flushed into chest tube behind blood. Chest tube kept to suction and hung at bedside. Patient's vital signs remained stable on telemetry and continuous pulse oximetry. Recommend keeping chest tube elevated until tomorrow morning if tolerated.     Shari Albert PA-C  Thoracic Surgery  [id number]

## 2019-08-02 NOTE — PLAN OF CARE
Plan is to discharge home with continued [facility] home health.       08/02/19 1056   Discharge Reassessment   Assessment Type Discharge Planning Reassessment   Do you have any problems affording any of your prescribed medications? No   Discharge Plan A Home Health   Discharge Plan B Skilled Nursing Facility   DME Needed Upon Discharge  none   Anticipated Discharge Disposition Home-Health

## 2019-08-02 NOTE — SUBJECTIVE & OBJECTIVE
Interval History: CTS removed 1 chest tube, the other tube still bubbling, patient had episode of de-sating improved after increase o2 to 2.5 from 2L    Review of Systems   Constitutional: Negative for activity change, appetite change, chills and fever.   HENT: Negative for congestion and sore throat.    Eyes: Negative for photophobia and visual disturbance.   Respiratory: Negative for cough, chest tightness and shortness of breath.    Cardiovascular: Negative for chest pain and palpitations.        Mild pain on chest tube sites; improved from yesterday.    Gastrointestinal: Negative for abdominal distention, abdominal pain, constipation, diarrhea, nausea and vomiting.   Genitourinary: Negative for dysuria and hematuria.   Musculoskeletal: Positive for back pain (lower back pain, per patient caused by being bedridden ). Negative for arthralgias and myalgias.   Skin: Negative for color change and rash.   Neurological: Negative for dizziness and headaches.     Objective:     Vital Signs (Most Recent):  Temp: 97.9 °F (36.6 °C) (08/02/19 1208)  Pulse: 98 (08/02/19 1239)  Resp: 18 (08/02/19 1239)  BP: (!) 140/65 (08/02/19 1208)  SpO2: (!) 89 % (08/02/19 1239) Vital Signs (24h Range):  Temp:  [97.5 °F (36.4 °C)-98.2 °F (36.8 °C)] 97.9 °F (36.6 °C)  Pulse:  [] 98  Resp:  [13-20] 18  SpO2:  [86 %-99 %] 89 %  BP: ()/(52-65) 140/65     Weight: 40.8 kg (89 lb 15.2 oz)  Body mass index is 17.57 kg/m².    Intake/Output Summary (Last 24 hours) at 8/2/2019 1347  Last data filed at 8/2/2019 0600  Gross per 24 hour   Intake 560 ml   Output 415 ml   Net 145 ml      Physical Exam   Constitutional: She is oriented to person, place, and time. Vital signs are normal. She appears well-developed. She is active. She is easily aroused. No distress.   HENT:   Head: Normocephalic and atraumatic.   Eyes: Conjunctivae and lids are normal. Lids are everted and swept, no foreign bodies found. No scleral icterus.   Cardiovascular:  Normal rate, regular rhythm and normal heart sounds.   Pulses:       Radial pulses are 1+ on the right side, and 1+ on the left side.   Pulmonary/Chest: Effort normal. No respiratory distress.   One chest tubes in place. Tenderness around chest tubes, but no warmth, erythema, or discharge observed.    Abdominal: Soft. Normal appearance and bowel sounds are normal. She exhibits no distension. There is no tenderness.   Musculoskeletal: She exhibits no edema or tenderness.   Neurological: She is alert, oriented to person, place, and time and easily aroused. GCS eye subscore is 4. GCS verbal subscore is 5. GCS motor subscore is 6.   Skin: Skin is warm. No rash noted. She is not diaphoretic. No cyanosis.   Nursing note and vitals reviewed.      Significant Labs:   CBC:   Recent Labs   Lab 08/01/19 0410 08/02/19 0519   WBC 8.17 8.88   HGB 11.5* 10.9*   HCT 36.0* 35.2*    221     CMP:   Recent Labs   Lab 08/01/19 0410 08/02/19 0519    138   K 4.3 4.2   CL 91* 93*   CO2 33* 37*   GLU 95 87   BUN 32* 35*   CREATININE 0.9 1.0   CALCIUM 9.8 10.0   ANIONGAP 15 8   EGFRNONAA 56.5* 49.7*       Significant Imaging: I have reviewed all pertinent imaging results/findings within the past 24 hours.

## 2019-08-02 NOTE — PLAN OF CARE
Problem: Adult Inpatient Plan of Care  Goal: Plan of Care Review  Pt free from fall or injury so far this shift. Instructed to call if assistance needed, verbalized understanding.   Cardiac monitoring in progress, currently SR.  Sats high 90's on 2 L NC. Respiratory treatments continued.   CT in place to -20 wall sxn. Chest x-ray scheduled daily.   Denies pain or discomfort.  PT worked with pt yesterday.   No new skin breakdown noted. Pt encourage to adjust position frequently.   Afebrile. Daily labs ordered.

## 2019-08-02 NOTE — ANESTHESIA PREPROCEDURE EVALUATION
[facility]  Anesthesia Pre-Operative Evaluation         Patient Name: Venus Mayer  YOB: 1929  MRN: [id number]    SUBJECTIVE:     Pre-operative evaluation for Procedure(s) (LRB):  VATS, WITH PLEURODESIS (Right)  VATS, WITH WEDGE RESECTION, LUNG (Right)     08/02/2019    Venus Mayer is a 89 y.o. female w/ a significant PMHx COPD ( on 2.5 L home O2),  multiple readmissions for spontaneous pneumothorax, HFpEF S/P AICD (will need interrogation prior to OR, consult placed), PVD/carotid artery stenosis s/p L CEA 08, admitted for right sided spont pneumothorax. S/p R chest tube placement by IR 7/30. Attempted endobronchial valve insertion 7/31 which was unsuccessful.      Patient partial code - compressions w/o intubation    Current CTS plan to attempt blood patch over the weekend and if fails pt to undergo above procedure. Family at bedside requesting to defer signing anesthesia consent until Sunday. Family request to be present when getting consent from pt.   Yang Mora MD  8/2/19  2:22PM    Addendum:  Per surgical team notes, thoracic blood patch unsuccessful in termination of air leak and patient will require VATs. Attempted to gain consent with daughter at bedside who proceeded to call 4 other siblings and would prefer all to be present prior to signing consent. Anesthesia number left with nursing and will call [phone] when family present and ready to sign anesthesia consent.   Jeimy Antonio MD   8:07 AM  08/04/2019    Patient consented at bedside with family. Paged cardiology fellow regarding AICD interrogation and/or reprogramming prior to procedure. Instructed to call [phone] (device clinic) tomorrow morning and they will evaluate pacemaker.  Jeimy Antonio MD  10:51 AM      Patient now presents for the above procedure(s).      LDA:         Peripheral IV - Single Lumen 07/27/19 2320 18 G Right Antecubital (Active)   Site Assessment Clean;Dry;Intact 8/2/2019  8:00 AM    Line Status Flushed 8/2/2019  8:00 AM   Dressing Status Clean;Dry;Intact 8/2/2019  8:00 AM   Dressing Intervention Dressing reinforced 7/28/2019  8:00 PM   Dressing Change Due 07/31/19 8/1/2019 11:00 AM   Site Change Due 07/31/19 8/1/2019 11:00 AM   Reason Not Rotated Anticipated discharge 8/1/2019 11:00 AM   Number of days: 5            Chest Tube 07/30/19 1000 Right 8 Fr. (Active)   Chest Tube WDL WDL 8/2/2019  8:00 AM   Function -20 cm H2O 8/2/2019  8:00 AM   Air Leak/Fluctuation air leak present 8/2/2019  8:00 AM   Care physician notified 8/2/2019  8:00 AM   Safety all tubing connections taped 8/2/2019  8:00 AM   Securement tubing secured to body distal to insertion site w/ tape 8/2/2019  8:00 AM   Patency Intervention Tip/tilt 8/2/2019  8:00 AM   Drainage Description Serosanguineous 8/2/2019  8:00 AM   Dressing Appearance occlusive gauze dressing intact 8/2/2019  8:00 AM   Dressing Care dressing reinforced 8/2/2019  8:00 AM   Left Subcutaneous Emphysema none present 8/2/2019  8:00 AM   Right Subcutaneous Emphysema none present 8/2/2019  8:00 AM   Site Assessment Dry;Intact 8/2/2019  8:00 AM   Surrounding Skin Dry;Intact 8/2/2019  8:00 AM   Output (mL) 135 mL 8/2/2019  5:00 AM   Number of days: 3            Closed/Suction Drain 07/28/19 0120 Right Chest 8.5 Fr. (Active)   Site Description Unable to view 8/2/2019  8:00 AM   Dressing Type Gauze;Transparent 8/2/2019  8:00 AM   Dressing Status Clean;Dry;Intact 8/2/2019  8:00 AM   Drainage Bloody 8/2/2019  8:00 AM   Status Open to gravity drainage 8/2/2019  8:00 AM   Output (mL) 10 mL 7/29/2019 11:00 AM   Number of days: 5       Female External Urinary Catheter 07/28/19 0847 (Active)   Skin no breakdown 8/2/2019  8:00 AM   Tolerance no signs/symptoms of discomfort 8/2/2019  8:00 AM   Suction Continuous suction at 40 mmHg 8/2/2019  8:00 AM   Date of last wick change 08/02/19 8/2/2019  8:00 AM   Time of last wick change 1108 8/2/2019  8:00 AM   Output (mL) 200 mL  8/2/2019  5:00 AM   Number of days: 5     PICC line 8/2     Prev airway: 7/31 Easy mask, DL, Ryder 2, grade 1 view. 1 attempt.     Drips: None documented.      Patient Active Problem List   Diagnosis    Carotid artery stenosis:Asx; S/P L. CEA 2008 with restenosis.    HOCM (hypertrophic obstructive cardiomyopathy): Apical HCM    Hypertension    Heart failure, diastolic    COLD (chronic obstructive lung disease)    CKD (chronic kidney disease) stage 2, GFR 60-89 ml/min    Symptomatic bradycardia    Spontaneous pneumothorax    DNR (do not resuscitate)    Debility    Muscle spasm    Iron deficiency    Anxiety    SOB (shortness of breath)    Acute on chronic right-sided congestive heart failure    H/O fracture of hip    Acute on Chronic hypercapnic respiratory failure    Severe malnutrition    UTI (urinary tract infection)    Pneumothorax    Pneumothorax on right    Restless leg syndrome    Goals of care, counseling/discussion    Palliative care encounter    Advanced care planning/counseling discussion       Review of patient's allergies indicates:   Allergen Reactions    Ancef in dextrose (iso-osm) Rash    Cefazolin Rash    Cefuroxime Rash    Sulfamethoxazole-trimethoprim Rash     Other reaction(s): Rash       Current Inpatient Medications:   acetaminophen  1,000 mg Oral Q8H    amLODIPine  2.5 mg Oral Daily    aspirin  81 mg Oral Daily    ferrous sulfate  325 mg Oral Daily    furosemide  40 mg Oral Daily    heparin (porcine)  5,000 Units Subcutaneous Q8H    ipratropium  0.5 mg Nebulization Q6H    multivitamin  1 tablet Oral Daily    potassium chloride  10 mEq Oral Daily    rOPINIRole  0.25 mg Oral QHS    [START ON 8/3/2019] tiotropium  1 capsule Inhalation Daily       No current facility-administered medications on file prior to encounter.      Current Outpatient Medications on File Prior to Encounter   Medication Sig Dispense Refill    acetaminophen (TYLENOL) 500 MG tablet Take  1,000 mg by mouth daily as needed for Pain.      albuterol (PROAIR HFA) 90 mcg/actuation inhaler Inhale 1 puff into the lungs every 6 (six) hours as needed for Wheezing or Shortness of Breath. Rescue 1 Inhaler 5    albuterol (PROVENTIL) 2.5 mg /3 mL (0.083 %) nebulizer solution Take 2.5 mg by nebulization every 6 (six) hours as needed for Wheezing or Shortness of Breath. Rescue      amLODIPine (NORVASC) 2.5 MG tablet Take 2.5 mg by mouth nightly as needed for SBP greater than. SBP greater than 155  3    aspirin 81 mg Tab Take 1 tablet by mouth once daily.       docusate sodium (COLACE) 100 MG capsule Take 100 mg by mouth daily as needed for Constipation.      ferrous sulfate 325 (65 FE) MG EC tablet Take 1 tablet (325 mg total) by mouth once daily.  0    furosemide (LASIX) 40 MG tablet Take 1 tablet (40 mg total) by mouth once daily. 30 tablet 11    multivitamin (THERAGRAN) per tablet Take 1 tablet by mouth once daily.      potassium chloride (MICRO-K) 10 MEQ CpSR Take 10 mEq by mouth once daily.      rOPINIRole (REQUIP) 0.25 MG tablet Take 1 tablet (0.25 mg total) by mouth every evening. 30 tablet 11    tiotropium (SPIRIVA) 18 mcg inhalation capsule Inhale 1 capsule (18 mcg total) into the lungs once daily. 30 capsule 3       Past Surgical History:   Procedure Laterality Date    BRONCHOSCOPY, FIBEROPTIC Flexible N/A 7/31/2019    Performed by Klever [facility]keon [facility] at [facility] OR 2ND FLR    CARDIAC CATHETERIZATION      [facility] cath      CAROTID ENDARTERECTOMY      PLEURODESIS N/A 7/31/2019    Performed by Klever [facility]keon MD at [facility] OR 2ND FLR       Social History     Socioeconomic History    Marital status:      Spouse name: Not on file    Number of children: Not on file    Years of education: Not on file    Highest education level: Not on file   Occupational History    Not on file   Social Needs    Financial resource strain: Not on file    Food insecurity:     Worry: Not on file      Inability: Not on file    Transportation needs:     Medical: Not on file     Non-medical: Not on file   Tobacco Use    Smoking status: Former Smoker     Packs/day: 2.00     Years: 20.00     Pack years: 40.00     Types: Cigarettes     Last attempt to quit: 1980     Years since quittin.5    Smokeless tobacco: Never Used   Substance and Sexual Activity    Alcohol use: Yes     Alcohol/week: 1.2 oz     Types: 2 Glasses of wine per week     Comment: social    Drug use: No    Sexual activity: Not Currently   Lifestyle    Physical activity:     Days per week: Not on file     Minutes per session: Not on file    Stress: Not on file   Relationships    Social connections:     Talks on phone: Not on file     Gets together: Not on file     Attends Sikh service: Not on file     Active member of club or organization: Not on file     Attends meetings of clubs or organizations: Not on file     Relationship status: Not on file   Other Topics Concern    Not on file   Social History Narrative    Not on file       OBJECTIVE:     Vital Signs Range (Last 24H):  Temp:  [36.4 °C (97.5 °F)-36.8 °C (98.2 °F)]   Pulse:  []   Resp:  [13-20]   BP: ()/(52-65)   SpO2:  [86 %-99 %]       Significant Labs:  Lab Results   Component Value Date    WBC 8.88 2019    HGB 10.9 (L) 2019    HCT 35.2 (L) 2019     2019    CHOL 190 2019    TRIG 112 2019    HDL 90 (H) 2019    ALT 15 2019    AST 31 2019     2019    K 4.2 2019    CL 93 (L) 2019    CREATININE 1.0 2019    BUN 35 (H) 2019    CO2 37 (H) 2019    TSH 0.172 (L) 2019    INR 0.9 2019    HGBA1C 5.2 2019       Diagnostic Studies: No relevant studies.    EKG:   Results for orders placed or performed during the hospital encounter of 19   EKG 12-lead    Collection Time: 19  9:40 AM    Narrative    Test Reason : R07.9,    Vent. Rate : 089 BPM      Atrial Rate : 089 BPM     P-R Int : 148 ms          QRS Dur : 124 ms      QT Int : 398 ms       P-R-T Axes : 081 240 030 degrees     QTc Int : 484 ms    Sinus rhythm with Premature atrial complexes  Right superior axis deviation  Biatrial enlargement  Right bundle branch block  Abnormal ECG  When compared with ECG of 27-JUL-2019 23:23,  Premature atrial complexes are now Present  Confirmed by CHELE HAGER MD ([id number]) on 7/28/2019 1:56:42 PM    Referred By: [email or web address]   SELF           Confirmed By:CHELE HAGER MD       2D ECHO:  7/7/2019  · Normal left ventricular systolic function. The estimated ejection fraction is 65%  · Septal wall has abnormal motion.  · Moderate left atrial enlargement.  · Normal right ventricular systolic function.  · Mild aortic regurgitation.  · Moderate mitral sclerosis.  · There is moderate leaflet calcification of the Mitral Valve.  · Mild mitral stenosis.  · Mitral Valve peak velocity is m/s; mean gradient is 6 mmHg.  · Mild-to-moderate mitral regurgitation.  · Mild pulmonic regurgitation.  · Intermediate central venous pressure (8 mm Hg).  · The estimated PA systolic pressure is 52 mm Hg  · Pulmonary hypertension present.      ASSESSMENT/PLAN:       Anesthesia Evaluation    I have reviewed the Patient Summary Reports.     I have reviewed the Medications.     Review of Systems  Anesthesia Hx:  No problems with previous Anesthesia  History of prior surgery of interest to airway management or planning:   Social:  Former Smoker    Hematology/Oncology:  Hematology Normal   Oncology Normal   Hematology Comments: Sq heparin this admit only    Cardiovascular:   Pacemaker (for 2 years.) Hypertension Denies MI. CAD     Denies Angina. CHF    Pulmonary:   COPD Shortness of breath    Renal/:   Chronic Renal Disease    Hepatic/GI:  Hepatic/GI Normal Denies Liver Disease.    Musculoskeletal:  Musculoskeletal Normal    Neurological:   CVA (unsure but possible after carotid.), no residual symptoms  Seizures Subdural hematoma some time ago followed medically without sequelae.   Endocrine:   Denies Diabetes.        Physical Exam  General:  Well nourished    Airway/Jaw/Neck:  Airway Findings: Mouth Opening: Normal Pre-Existing Airway Tube(s): Oral Endotracheal tube  General Airway Assessment: Adult  Mallampati: II  TM Distance: Normal, at least 6 cm     Eyes/Ears/Nose:  Eyes/Ears/Nose Findings: (hearing loss right side)    Dental:  Dental Findings: Lower Dentures   Chest/Lungs:  Chest/Lungs Findings: Clear to auscultation     Heart/Vascular:  Heart Findings: Rate: Normal        Mental Status:  Mental Status Findings:  Cooperative         Anesthesia Plan  Type of Anesthesia, risks & benefits discussed:  Anesthesia Type:  general  Patient's Preference:   Intra-op Monitoring Plan: standard ASA monitors and arterial line  Intra-op Monitoring Plan Comments:   Post Op Pain Control Plan: multimodal analgesia, IV/PO Opioids PRN and per primary service following discharge from PACU  Post Op Pain Control Plan Comments:   Induction:   IV  Beta Blocker:  Patient is not currently on a Beta-Blocker (No further documentation required).       Informed Consent: Patient understands risks and agrees with Anesthesia plan.  Questions answered. Anesthesia consent signed with patient.  ASA Score: 3     Day of Surgery Review of History & Physical:    H&P update referred to the surgeon.         Ready For Surgery From Anesthesia Perspective.

## 2019-08-02 NOTE — PROGRESS NOTES
[facility] Hospital Medicine  Progress Note    Patient Name: Venus Mayer  MRN: [id number]  Patient Class: IP- Inpatient   Admission Date: 7/27/2019  Length of Stay: 5 days  Attending Physician: Ezra Escobedo MD  Primary Care Provider: Jona Che MD    Hospital Medicine Team: [facility] HOSP MED 1 KENYETTA Keller    Subjective:     Principal Problem:Pneumothorax on right      HPI:  Venus Mayer is 89 y.o. lady with COPD, (on 2.5 L home O2), 40 pack year smoking history (quit 30 years ago), multiple readmissions for spontaneous pneumothorax, chronic dCHFS/P AICD, PVD/carotid artery stenosis s/p L CEA 08,was brought to the ED because she was SOB.    She was Discharged from [facility] on 7/26/19 after being admitted for pneumothorax, improved after placing CT with return to baseline pulmonary function on discharge.  Then around evening of 7/27 started feeling SOB, not relieved with her Inhaler or Nebulization, progressively worse,  prompting her to come to the Hospital. No chest pain, fever , confusion.    In the ED she was hypoxic, CXR revelaed Right sided Pneumothorax. CTS was consulted and placed a Pig tail catheter, after repositioning showed air leak and patient symptomatically improved and is currently on a non re breather.     The patient's recurrent PTXs seem to have started during her admission on 05/22/2019 where she presented with a large R sided PTX requiring chest tube and pleurodesis on 5/27/2019. Since, she has had a small recurrence of PTX on 6/25/19 not requiring intervention, and admission to OSH 7/6-7/11 for acute on chronic dCHF and COPD exacerbation. She has been less mobile and uses a walker at home.     Overview/Hospital Course:  No notes on file    Interval History: CTS removed 1 chest tube, the other tube still bubbling, patient had episode of de-sating improved after increase o2 to 2.5 from 2L    Review of Systems   Constitutional: Negative for activity change, appetite  change, chills and fever.   HENT: Negative for congestion and sore throat.    Eyes: Negative for photophobia and visual disturbance.   Respiratory: Negative for cough, chest tightness and shortness of breath.    Cardiovascular: Negative for chest pain and palpitations.        Mild pain on chest tube sites; improved from yesterday.    Gastrointestinal: Negative for abdominal distention, abdominal pain, constipation, diarrhea, nausea and vomiting.   Genitourinary: Negative for dysuria and hematuria.   Musculoskeletal: Positive for back pain (lower back pain, per patient caused by being bedridden ). Negative for arthralgias and myalgias.   Skin: Negative for color change and rash.   Neurological: Negative for dizziness and headaches.     Objective:     Vital Signs (Most Recent):  Temp: 97.9 °F (36.6 °C) (08/02/19 1208)  Pulse: 98 (08/02/19 1239)  Resp: 18 (08/02/19 1239)  BP: (!) 140/65 (08/02/19 1208)  SpO2: (!) 89 % (08/02/19 1239) Vital Signs (24h Range):  Temp:  [97.5 °F (36.4 °C)-98.2 °F (36.8 °C)] 97.9 °F (36.6 °C)  Pulse:  [] 98  Resp:  [13-20] 18  SpO2:  [86 %-99 %] 89 %  BP: ()/(52-65) 140/65     Weight: 40.8 kg (89 lb 15.2 oz)  Body mass index is 17.57 kg/m².    Intake/Output Summary (Last 24 hours) at 8/2/2019 1347  Last data filed at 8/2/2019 0600  Gross per 24 hour   Intake 560 ml   Output 415 ml   Net 145 ml      Physical Exam   Constitutional: She is oriented to person, place, and time. Vital signs are normal. She appears well-developed. She is active. She is easily aroused. No distress.   HENT:   Head: Normocephalic and atraumatic.   Eyes: Conjunctivae and lids are normal. Lids are everted and swept, no foreign bodies found. No scleral icterus.   Cardiovascular: Normal rate, regular rhythm and normal heart sounds.   Pulses:       Radial pulses are 1+ on the right side, and 1+ on the left side.   Pulmonary/Chest: Effort normal. No respiratory distress.   One chest tubes in place. Tenderness  around chest tubes, but no warmth, erythema, or discharge observed.    Abdominal: Soft. Normal appearance and bowel sounds are normal. She exhibits no distension. There is no tenderness.   Musculoskeletal: She exhibits no edema or tenderness.   Neurological: She is alert, oriented to person, place, and time and easily aroused. GCS eye subscore is 4. GCS verbal subscore is 5. GCS motor subscore is 6.   Skin: Skin is warm. No rash noted. She is not diaphoretic. No cyanosis.   Nursing note and vitals reviewed.      Significant Labs:   CBC:   Recent Labs   Lab 08/01/19 0410 08/02/19 0519   WBC 8.17 8.88   HGB 11.5* 10.9*   HCT 36.0* 35.2*    221     CMP:   Recent Labs   Lab 08/01/19 0410 08/02/19 0519    138   K 4.3 4.2   CL 91* 93*   CO2 33* 37*   GLU 95 87   BUN 32* 35*   CREATININE 0.9 1.0   CALCIUM 9.8 10.0   ANIONGAP 15 8   EGFRNONAA 56.5* 49.7*       Significant Imaging: I have reviewed all pertinent imaging results/findings within the past 24 hours.      Assessment/Plan:      * Pneumothorax on right  Patient with COPD (on 2.5 L home oxygen) and with multiple pneumothoraxes s/p pleurodesis and chest tube placement last week presented with dyspnea and hypoxia. CXR demonstrated R pneumothorax.   - Patient known to CTS Service; re-consulted now for management  - Patient is a poor surgical candidate due to cardiac comorbidities  - Pleurodesis performed on 7/31, CTS was going to place endobronchial valve but were unable to close the leak so they performed a pleurodesis.   -Obtaining daily CXR to monitor pneumothorax, improving.  - Per CTS, if repeat pleurodesis fails, they will place a blood patch over the weekend, for which she will need a PICC line.      Plan:  -monitor patient's hemodynamic status  -follow-up CTS recommendations for management of pneumothorax  - pain management     Restless leg syndrome  Continue home Ropinirole     COLD (chronic obstructive lung disease)  Patient with COPD on 2.5 L  of home oxygen.     Plan:  - Continue Tiotropium   - Duonebs as needed  - Continue Supplemental oxygen      Heart failure, diastolic  ECHO on 7/19 showed EF of 65% with DD, PA pressure of 52 and several valvular abnormalities.   BNP - 1414, but not volume currently overloaded.     Plan:  - Continue Home Lasix 40mg Daily; can increase dose if she becomes volume overloaded  - Monitor I/O, Weights        Hypertension  Has been hypertensive during hospital stay.   Plan:  -Administer home Amlodipine 2.5mg PO daily        VTE Risk Mitigation (From admission, onward)        Ordered     heparin (porcine) injection 5,000 Units  Every 8 hours      07/28/19 0551     IP VTE HIGH RISK PATIENT  Once      07/28/19 0551                KENYETTA Keller  Department of Hospital Medicine   [facility]

## 2019-08-02 NOTE — ASSESSMENT & PLAN NOTE
"Palliative medicine follow up to goals of care.  Palliative medicine APRN met at bedside with patient. No family is present at this time.       Impression: Mrs. Mayer is a 89 yo lady admitted with right spontaneous pneumothorax.  S/P  Right chest tube placement X2.   to suction.  She is hard of hearing.  Sitting in chair, Alert and oriented to person,  Place, and  Time. States some mild pain at chest tube site.  Oxygen by nasal cannula.  No acute distress.      S/P : Bronchoscopy and systematic occlusion of segmental brochi,  Doxycycline pleurodesis   "Continued air leak despite occlusion of right sided bronchi, including occlusion of right mainstem bronchi. Therefore, no endobronchial valve was placed"      Advanced Care Planning:  - Advanced directives - HPOA and living will received. Placed in blue chart to be scanned in EMR  - Patient states her  Son Arthur Miller is her HPOA.  Documents have not been received.  Family has been encouraged to provide copy for [facility] records.  - Mrs. Mayer states she does not have a living will.   - Patient is now a partial code - no mechanical ventilation no intubation   - Today Mrs. Mayer states it is her wish not to be resuscitated.  Daughter Bri is at bedside who states Mrs. Mayer has been saying this for some time now.    - Resuscitation status will be discussed with her son before she will consent to DNR order.    Goals of Care  - Mrs. Mayer  s not opposed to be hospitalized.  Although she has been in hospital 4 times in last two weeks for respiratory illness, she does not consider this a burden.    - She is aware she is not a candidate for surgical interventions.  She is amenable to continued procedures - chest tube etc if it continue to help her breath better and continue to have quality of life.   - -  endobronchial valve was unable to be placed.    - 8/2/19 Ofelia Gregory states the next option is a blood patch.  This has not been scheduled.   - From previous encounter Mrs. Mayer " and her daughter state being interested in learning more about different plans of care.   - Plan to meet with family today 8/2/19       - Palliative medicine explained there are many ways to keep her comfortable and have good quality of life when she is no longer is amenable to aggressive care or does not have additional reasonable treatment options.   - Currently has sitters during the day and family takes shifts at night time.   -  Patient has had home health in the past.  They are happy with the care they receive with [facility] and [facility] - home palliative care service through [facility].      Plan/Recommendations.  - Patient and family may benefit from interdisciplinary goals of care meeting.  Palliative medicine will facilitate as needed.  - Family contacted for meeting,  Family agreed on 8/2/29  Daughter Bri is coordinating with her brother Jack who is the HPOA. At this time son has not arrived.     - Patient and family would benefit from continued education and information regarding clinical condition and prognosis     - palliative medicine will continue to follow for goals of care.    - Emotional support.      Primary team aware of the above goals of care conversation.

## 2019-08-02 NOTE — SUBJECTIVE & OBJECTIVE
Interval History: NAEON. Intermittently complains of SOB relieved by breathing treatments. Upper lobe more expanded on CXR today.     Medications:  Continuous Infusions:  Scheduled Meds:   acetaminophen  1,000 mg Oral Q8H    amLODIPine  2.5 mg Oral Daily    aspirin  81 mg Oral Daily    ferrous sulfate  325 mg Oral Daily    furosemide  40 mg Oral Daily    heparin (porcine)  5,000 Units Subcutaneous Q8H    ipratropium  0.5 mg Nebulization Q6H    multivitamin  1 tablet Oral Daily    potassium chloride  10 mEq Oral Daily    rOPINIRole  0.25 mg Oral QHS    tiotropium  1 capsule Inhalation Daily     PRN Meds:albuterol, Dextrose 10% Bolus, Dextrose 10% Bolus, glucagon (human recombinant), glucose, glucose, polyethylene glycol, sodium chloride 0.9%, sodium chloride 0.9%     Review of patient's allergies indicates:   Allergen Reactions    Ancef in dextrose (iso-osm) Rash    Cefazolin Rash    Cefuroxime Rash    Sulfamethoxazole-trimethoprim Rash     Other reaction(s): Rash     Objective:     Vital Signs (Most Recent):  Temp: 97.7 °F (36.5 °C) (08/02/19 0450)  Pulse: 86 (08/02/19 0618)  Resp: 16 (08/02/19 0618)  BP: (!) 118/58 (08/02/19 0450)  SpO2: 97 % (08/02/19 0618) Vital Signs (24h Range):  Temp:  [97 °F (36.1 °C)-98.2 °F (36.8 °C)] 97.7 °F (36.5 °C)  Pulse:  [] 86  Resp:  [13-20] 16  SpO2:  [86 %-99 %] 97 %  BP: (118-149)/(57-67) 118/58     Intake/Output - Last 3 Shifts       07/31 0700 - 08/01 0659 08/01 0700 - 08/02 0659 08/02 0700 - 08/03 0659    P.O.  560     Total Intake(mL/kg)  560 (13.7)     Urine (mL/kg/hr)  200 (0.2)     Stool  0     Chest Tube  215     Total Output  415     Net  +145            Urine Occurrence 2 x 2 x     Stool Occurrence 0 x 0 x           SpO2: 97 %  O2 Device (Oxygen Therapy): nasal cannula    Physical Exam   Constitutional: She appears well-developed and well-nourished. No distress.   Cardiovascular: Normal rate and regular rhythm.   Pulmonary/Chest: Effort normal. No  respiratory distress.   Anterior pigtail in place, + air leak. On suction.    Abdominal: Soft. She exhibits no distension. There is no tenderness.   Neurological: She is alert.   Skin: Skin is warm and dry.   Psychiatric: She has a normal mood and affect. Her behavior is normal.       Significant Labs:  CBC:   Recent Labs   Lab 08/02/19 0519   WBC 8.88   RBC 3.59*   HGB 10.9*   HCT 35.2*      MCV 98   MCH 30.4   MCHC 31.0*     CMP:   Recent Labs   Lab 08/02/19 0519   GLU 87   CALCIUM 10.0      K 4.2   CO2 37*   CL 93*   BUN 35*   CREATININE 1.0       Significant Diagnostics:  CXR: I have reviewed all pertinent results/findings within the past 24 hours    VTE Risk Mitigation (From admission, onward)        Ordered     heparin (porcine) injection 5,000 Units  Every 8 hours      07/28/19 0551     IP VTE HIGH RISK PATIENT  Once      07/28/19 0551

## 2019-08-02 NOTE — PLAN OF CARE
Problem: Adult Inpatient Plan of Care  Goal: Plan of Care Review  Outcome: Ongoing (interventions implemented as appropriate)  VSS. Call light and personal items in reach. Pt had a cxr this morning. PICC line was placed today. Pt had a blood patch today d/t persistent air leak of her chest tube. Chest tube is to wall suction and is to hang high on an iv pole over night. Sats are 94% on 2.5L n/c. Pt complained of being nauseated. No other issues. WCTM.

## 2019-08-02 NOTE — PT/OT/SLP PROGRESS
Occupational Therapy      Patient Name:  Venus Mayer   MRN:  [id number]    Patient not seen today secondary to patient with PICC line placement and then blood patch placement x2 attempts. Will follow-up next day as appropriate.    Ernestina Holley OT  8/2/2019

## 2019-08-02 NOTE — PROGRESS NOTES
[facility]  Thoracic Surgery  Progress Note    Subjective:     History of Present Illness:  Venus Mayer is a 89 y.o. female with a hx of COPD and multiple readmissions for spontaneous pneumothorax. Numerous attempts at pleurodesis have been performed. Patient is not a surgical candidate. She presents to the ED with acute onset shortness of breath. Imaging showed large right sided pneumothorax. Pt otherwise stable.      No current facility-administered medications on file prior to encounter.        Post-Op Info:  Procedure(s) (LRB):  BRONCHOSCOPY, FIBEROPTIC Flexible (N/A)  PLEURODESIS (N/A)   2 Days Post-Op     Interval History: NAEON. Intermittently complains of SOB relieved by breathing treatments. Upper lobe more expanded on CXR today.     Medications:  Continuous Infusions:  Scheduled Meds:   acetaminophen  1,000 mg Oral Q8H    amLODIPine  2.5 mg Oral Daily    aspirin  81 mg Oral Daily    ferrous sulfate  325 mg Oral Daily    furosemide  40 mg Oral Daily    heparin (porcine)  5,000 Units Subcutaneous Q8H    ipratropium  0.5 mg Nebulization Q6H    multivitamin  1 tablet Oral Daily    potassium chloride  10 mEq Oral Daily    rOPINIRole  0.25 mg Oral QHS    tiotropium  1 capsule Inhalation Daily     PRN Meds:albuterol, Dextrose 10% Bolus, Dextrose 10% Bolus, glucagon (human recombinant), glucose, glucose, polyethylene glycol, sodium chloride 0.9%, sodium chloride 0.9%     Review of patient's allergies indicates:   Allergen Reactions    Ancef in dextrose (iso-osm) Rash    Cefazolin Rash    Cefuroxime Rash    Sulfamethoxazole-trimethoprim Rash     Other reaction(s): Rash     Objective:     Vital Signs (Most Recent):  Temp: 97.7 °F (36.5 °C) (08/02/19 0450)  Pulse: 86 (08/02/19 0618)  Resp: 16 (08/02/19 0618)  BP: (!) 118/58 (08/02/19 0450)  SpO2: 97 % (08/02/19 0618) Vital Signs (24h Range):  Temp:  [97 °F (36.1 °C)-98.2 °F (36.8 °C)] 97.7 °F (36.5 °C)  Pulse:  [] 86  Resp:   [13-20] 16  SpO2:  [86 %-99 %] 97 %  BP: (118-149)/(57-67) 118/58     Intake/Output - Last 3 Shifts       07/31 0700 - 08/01 0659 08/01 0700 - 08/02 0659 08/02 0700 - 08/03 0659    P.O.  560     Total Intake(mL/kg)  560 (13.7)     Urine (mL/kg/hr)  200 (0.2)     Stool  0     Chest Tube  215     Total Output  415     Net  +145            Urine Occurrence 2 x 2 x     Stool Occurrence 0 x 0 x           SpO2: 97 %  O2 Device (Oxygen Therapy): nasal cannula    Physical Exam   Constitutional: She appears well-developed and well-nourished. No distress.   Cardiovascular: Normal rate and regular rhythm.   Pulmonary/Chest: Effort normal. No respiratory distress.   Anterior pigtail in place, + air leak. On suction.    Abdominal: Soft. She exhibits no distension. There is no tenderness.   Neurological: She is alert.   Skin: Skin is warm and dry.   Psychiatric: She has a normal mood and affect. Her behavior is normal.       Significant Labs:  CBC:   Recent Labs   Lab 08/02/19 0519   WBC 8.88   RBC 3.59*   HGB 10.9*   HCT 35.2*      MCV 98   MCH 30.4   MCHC 31.0*     CMP:   Recent Labs   Lab 08/02/19 0519   GLU 87   CALCIUM 10.0      K 4.2   CO2 37*   CL 93*   BUN 35*   CREATININE 1.0       Significant Diagnostics:  CXR: I have reviewed all pertinent results/findings within the past 24 hours    VTE Risk Mitigation (From admission, onward)        Ordered     heparin (porcine) injection 5,000 Units  Every 8 hours      07/28/19 0551     IP VTE HIGH RISK PATIENT  Once      07/28/19 0551        Assessment/Plan:     * Pneumothorax on right  - Keep pigtail to suction today s/p repeat pleurodesis. Lung more expanded on morning CXR.   - If repeat pleurodesis fails, will try blood patch over the weekend. Patient will need PICC placed prior to blood patch.   - Daily CXR  - Pulmonary toilet         BHUPINDER Caal  Thoracic Surgery  [facility]

## 2019-08-02 NOTE — PROGRESS NOTES
"[facility]  Palliative Medicine  Progress Note    Patient Name: Venus Mayer  MRN: [id number]  Admission Date: 7/27/2019  Hospital Length of Stay: 5 days  Code Status: Partial Code   Attending Provider: Ezra Escobedo MD  Consulting Provider: SERA Gonzales  Primary Care Physician: Jona Che MD  Principal Problem:Pneumothorax on right    Patient information was obtained from relative(s).      Assessment/Plan:     Palliative care encounter  Palliative medicine follow up to goals of care.  Palliative medicine APRN met at bedside with patient. No family is present at this time.       Impression: Mrs. Mayer is a 89 yo lady admitted with right spontaneous pneumothorax.  S/P  Right chest tube placement X2.   to suction.  She is hard of hearing.  Sitting in chair, Alert and oriented to person,  Place, and  Time. States some mild pain at chest tube site.  Oxygen by nasal cannula.  No acute distress.      S/P : Bronchoscopy and systematic occlusion of segmental brochi,  Doxycycline pleurodesis   "Continued air leak despite occlusion of right sided bronchi, including occlusion of right mainstem bronchi. Therefore, no endobronchial valve was placed"      Advanced Care Planning:  - Advanced directives - HPOA and living will received. Placed in blue chart to be scanned in EMR  - Patient states her  Son Arthur Miller is her HPOA.  Documents have not been received.  Family has been encouraged to provide copy for [facility] [place].  - Mrs. Mayer states she does not have a living will.   - Patient is now a partial code - no mechanical ventilation no intubation   - Today Mrs. Mayer states it is her wish not to be resuscitated.  Daughter Bri is at bedside who states Mrs. Mayer has been saying this for some time now.    - Resuscitation status will be discussed with her son before she will consent to DNR order.    Goals of Care  - Mrs. Mayer  s not opposed to be hospitalized.  Although she has been in hospital " 4 times in last two weeks for respiratory illness, she does not consider this a burden.    - She is aware she is not a candidate for surgical interventions.  She is amenable to continued procedures - chest tube etc if it continue to help her breath better and continue to have quality of life.   - -  endobronchial valve was unable to be placed.    - 8/2/19 Daughter Bri states the next option is a blood patch.  This has not been scheduled.   - From previous encounter Mrs. Mayer and her daughter state being interested in learning more about different plans of care.   - Plan to meet with family today 8/2/19       - Palliative medicine explained there are many ways to keep her comfortable and have good quality of life when she is no longer is amenable to aggressive care or does not have additional reasonable treatment options.   - Currently has sitters during the day and family takes shifts at night time.   -  Patient has had home health in the past.  They are happy with the care they receive with [facility] and [facility] - home palliative care service through [facility].      Plan/Recommendations.  - Patient and family may benefit from interdisciplinary goals of care meeting.  Palliative medicine will facilitate as needed.  - Family contacted for meeting,  Family agreed on 8/2/29  Daughter Bri is coordinating with her brother Jack who is the HPOA. At this time son has not arrived.     - Patient and family would benefit from continued education and information regarding clinical condition and prognosis     - palliative medicine will continue to follow for goals of care.    - Emotional support.      Primary team aware of the above goals of care conversation.          I will follow-up with patient. Please contact us if you have any additional questions.    Subjective:     Chief Complaint:   Chief Complaint   Patient presents with    Shortness of Breath     ems reports sob - ra sats 70% - placed on 15 l nrb now satting  92% - used home neb tx and rescue inhaler with no relief - reports incread wob - hc of chest tube and collapsed lung to right side- bandage still in place       HPI:   HPI obtained from chart review     Mrs. Mayer is a 89 lady with PMH of  COPD (home O2 at 2 L NC)  40 pack year smoking history,  (quit 30 years ago), HFpEF, HOCM s/p AICD, PVD, HTN, HLD, and CKD. Recent admit for t spontaneous pneumothorax with pleurodesis in May 2019. She  presented to [facility] ED with acute shortness of breath and found to have a small right sided pneumothorax. S/p CT tube placement at OSH and transferred to [facility] for for further evaluation.  Pulmonary was then consulted for management of chest tube.     7/21: acute dyspneic, tachycardic, and diaphoretic.   Transferred to critical care for closer monitoring. CT placed at OSH dislodged and replaced with re-expansion of right lung.    CT surgery consulted  And have been monitoring chest tube.  Plan:  water seal and clamping trials on 7/23.  If fails clamping trials, plan is for repeat bedside pleurodesis or bronchoscopy with endobronchial valve placement. She is not a surgical candidate due to cardiac co morbidities.    .      Transitioned from critical care back to internal medicine.  CT surgery continues to follow and manage chest tube.   Palliative medicine consulted for goals of care.     Hospital Course:  No notes on file    Interval History:    Past Medical History:   Diagnosis Date    Acute on chronic congestive heart failure 7/6/2019    Cardiomyopathy     Carotid artery occlusion     CHF (congestive heart failure)     COPD (chronic obstructive pulmonary disease)     Coronary artery disease     Hyperlipidemia     Hypertension        Past Surgical History:   Procedure Laterality Date    BRONCHOSCOPY, FIBEROPTIC Flexible N/A 7/31/2019    Performed by Klever Mckeon MD at [facility] OR 2ND FLR    CARDIAC CATHETERIZATION      cardic cath      CAROTID  ENDARTERECTOMY      PLEURODESIS N/A 2019    Performed by [facility] at [facility] OR [address] [place] [address]       Review of patient's allergies indicates:   Allergen Reactions    Ancef in dextrose (iso-osm) Rash    Cefazolin Rash    Cefuroxime Rash    Sulfamethoxazole-trimethoprim Rash     Other reaction(s): Rash       Medications:  Continuous Infusions:  Scheduled Meds:   acetaminophen  1,000 mg Oral Q8H    amLODIPine  2.5 mg Oral Daily    aspirin  81 mg Oral Daily    ferrous sulfate  325 mg Oral Daily    furosemide  40 mg Oral Daily    heparin (porcine)  5,000 Units Subcutaneous Q8H    ipratropium  0.5 mg Nebulization Q6H    multivitamin  1 tablet Oral Daily    potassium chloride  10 mEq Oral Daily    rOPINIRole  0.25 mg Oral QHS    [START ON 8/3/2019] tiotropium  1 capsule Inhalation Daily     PRN Meds:albuterol, Dextrose 10% Bolus, Dextrose 10% Bolus, glucagon (human recombinant), glucose, glucose, HYDROcodone-acetaminophen, polyethylene glycol, sodium chloride 0.9%, sodium chloride 0.9%    Family History     Problem Relation (Age of Onset)    Hypertension Mother        Tobacco Use    Smoking status: Former Smoker     Packs/day: 2.00     Years: 20.00     Pack years: 40.00     Types: Cigarettes     Last attempt to quit: 1980     Years since quittin.5    Smokeless tobacco: Never Used   Substance and Sexual Activity    Alcohol use: Yes     Alcohol/week: 1.2 oz     Types: 2 Glasses of wine per week     Comment: social    Drug use: No    Sexual activity: Not Currently       Review of Systems   Constitutional: Positive for fatigue.   HENT: Positive for hearing loss.    Respiratory: Positive for shortness of breath.      Objective:     Vital Signs (Most Recent):  Temp: 97.9 °F (36.6 °C) (19 1208)  Pulse: 102 (19 1208)  Resp: 16 (19 1208)  BP: (!) 140/65 (19 1208)  SpO2: (!) 93 % (19 1208) Vital Signs (24h Range):  Temp:  [97.5 °F (36.4 °C)-98.2 °F (36.8 °C)] 97.9  °F (36.6 °C)  Pulse:  [] 102  Resp:  [13-20] 16  SpO2:  [86 %-99 %] 93 %  BP: ()/(52-65) 140/65     Weight: 40.8 kg (89 lb 15.2 oz)  Body mass index is 17.57 kg/m².    Review of Symptoms  Symptom Assessment (ESAS 0-10 scale)   ESAS 0 1 2 3 4 5 6 7 8 9 10   Pain              Dyspnea              Anxiety              Nausea              Depression               Anorexia              Fatigue              Insomnia              Restlessness               Agitation              CAM / Delirium __ --  ___+   Constipation     __ --  ___+   Diarrhea           __ --  ___+  Bowel Management Plan (BMP): No    Comments: no pain, dyspnea on exertion 3-4     Pain Assessment:     OME in 24 hours: 0    Performance Status: 50    ECOG Performance Status Grade: 2 - Ambulates, capable of self care only    Physical Exam   Constitutional: She is oriented to person, place, and time. She appears well-developed. No distress.   Appears frail    HENT:   [place], hearing aid on the right is broken, not at bedside   Cardiovascular: Normal rate, regular rhythm and normal heart sounds.   Pulmonary/Chest:   Right chest tube x2  Intact, coarse breath sounds and chest tube sounds    Abdominal: Soft. Bowel sounds are normal.   Musculoskeletal:   Weak, s/p right hip fracture, uses walker    Neurological: She is alert and oriented to person, place, and time.   Skin: Skin is warm and dry. There is pallor.   Psychiatric: She has a normal mood and affect. Her behavior is normal. Judgment and thought content normal.   Nursing note and vitals reviewed.      Significant Labs: All pertinent labs within the past 24 hours have been reviewed.  CBC:   Recent Labs   Lab 08/02/19 0519   WBC 8.88   HGB 10.9*   HCT 35.2*   MCV 98        BMP:  Recent Labs   Lab 08/02/19 0519   GLU 87      K 4.2   CL 93*   CO2 37*   BUN 35*   CREATININE 1.0   CALCIUM 10.0   MG 1.8     LFT:  Lab Results   Component Value Date    AST 31 07/27/2019    ALKPHOS 96  07/27/2019    BILITOT 0.4 07/27/2019     Albumin:   Albumin   Date Value Ref Range Status   07/27/2019 3.4 (L) 3.5 - 5.2 g/dL Final     Protein:   Total Protein   Date Value Ref Range Status   07/27/2019 7.1 6.0 - 8.4 g/dL Final     Lactic acid:   Lab Results   Component Value Date    LACTATE 1.0 07/06/2019       Significant Imaging: I have reviewed all pertinent imaging results/findings within the past 24 hours.    Advance Care Planning   Advanced Directives::  Living Will: No  LaPOST: No  Do Not Resuscitate Status: No, partial - no intubation mechanical ventilation   Medical Power of : No    Decision-Making Capacity: Patient answered questions, family answered questions.        Living Arrangements: Lives alone in her home with day time sitters and family at night.     Psychosocial/Cultural:   after 48 yrs of marriage, 5 children, 6 grand children and 7 great grand children,  and mother,  Living independently until she broke her hip 1 yr ago.  Enjoyed making road trips with her friends to [place].  Played cards and went to the [employer].  Goes to beauty parlor every week.         Spiritual:     F- Kylah and Belief: Zoroastrianism     I - Importance: went to [employer] every week until she was put on oxygen.  Now watches and participates with [employer] on TV  .  C - Community:     A - Address in Care: amenable to  visits and anointing of the sick.       > 50% of 35 min visit spent in chart review, face to face discussion of goals of care,  symptom assessment, coordination of care and emotional support.    Eveline Moses, CNS  Palliative Medicine  [facility]

## 2019-08-02 NOTE — PROCEDURES
Venus Mayer is a 89 y.o. female patient.    Temp: 97.9 °F (36.6 °C) (08/02/19 1208)  Pulse: 98 (08/02/19 1239)  Resp: 18 (08/02/19 1239)  BP: (!) 140/65 (08/02/19 1208)  SpO2: (!) 89 % (08/02/19 1239)  Weight: 40.8 kg (89 lb 15.2 oz) (07/31/19 1324)  Height: 5' (152.4 cm) (07/31/19 1324)    PICC  Date/Time: 8/2/2019 2:52 PM  Performed by: Randy Verduzco RN  Assisting provider: Chica Cummins LPN  Consent Done: Yes  Time out: Immediately prior to procedure a time out was called to verify the correct patient, procedure, equipment, support staff and site/side marked as required  Indications: med administration and vascular access  Anesthesia: local infiltration  Local anesthetic: lidocaine 1% without epinephrine  Anesthetic Total (mL): 3  Preparation: skin prepped with ChloraPrep  Skin prep agent dried: skin prep agent completely dried prior to procedure  Sterile barriers: all five maximum sterile barriers used - cap, mask, sterile gown, sterile gloves, and large sterile sheet  Hand hygiene: hand hygiene performed prior to central venous catheter insertion  Location details: right basilic  Catheter type: double lumen  Catheter size: 5 Fr  Catheter Length: 32cm    Ultrasound guidance: yes  Vessel Caliber: medium and patent, compressibility normal  Vascular Doppler: not done  Needle advanced into vessel with real time Ultrasound guidance.  Guidewire confirmed in vessel.  Image recorded and saved.  Sterile sheath used.  Number of attempts: 1  Post-procedure: blood return through all ports and chlorhexidine patch  Technical procedures used: 3cg  Specimens: No  Implants: No  Assessment: placement verified by x-ray  Complications: none          Chica Cummins  8/2/2019

## 2019-08-02 NOTE — CONSULTS
Double lumen PICC placed in right basilic vein of RUE, 32cm in length with 0cm exposed and 21cm arm circumference. Lot#[id number].

## 2019-08-02 NOTE — ASSESSMENT & PLAN NOTE
- Keep pigtail to suction today s/p repeat pleurodesis. Lung more expanded on morning CXR.   - If repeat pleurodesis fails, will try blood patch over the weekend. Patient will need PICC placed prior to blood patch.   - Daily CXR  - Pulmonary toilet

## 2019-08-02 NOTE — ASSESSMENT & PLAN NOTE
Patient with COPD (on 2.5 L home oxygen) and with multiple pneumothoraxes s/p pleurodesis and chest tube placement last week presented with dyspnea and hypoxia. CXR demonstrated R pneumothorax.   - Patient known to CTS Service; re-consulted now for management  - Patient is a poor surgical candidate due to cardiac comorbidities  - Pleurodesis performed on 7/31, CTS was going to place endobronchial valve but were unable to close the leak so they performed a pleurodesis.   -Obtaining daily CXR to monitor pneumothorax, improving.  - Per CTS, if repeat pleurodesis fails, they will place a blood patch over the weekend, for which she will need a PICC line.      Plan:  -monitor patient's hemodynamic status  -follow-up CTS recommendations for management of pneumothorax  - pain management

## 2019-08-02 NOTE — PROGRESS NOTES
"[facility]  Palliative Medicine  Progress Note    Patient Name: Venus Mayer  MRN: [id number]  Admission Date: 7/27/2019  Hospital Length of Stay: 5 days  Code Status: Partial Code   Attending Provider: Ezra Escobedo MD  Consulting Provider: SERA Gonzales  Primary Care Physician: Jona Che MD  Principal Problem:Pneumothorax on right    Patient information was obtained from relative(s).      Assessment/Plan:     Palliative care encounter  Palliative medicine follow up to goals of care.  Palliative medicine APRN met at bedside with patient. No family is present at this time.       Impression: Mrs. Mayer is a 89 yo lady admitted with right spontaneous pneumothorax.  S/P  Right chest tube placement X2.   to suction.  She is hard of hearing. Somnolent and arouses easily to tactile and verbal stimuli.  Alert and oriented to person,  Place, and  Time. Resting quietly with eyes closed.     S/P : Bronchoscopy and systematic occlusion of segmental brochi,  Doxycycline pleurodesis   "Continued air leak despite occlusion of right sided bronchi, including occlusion of right mainstem bronchi. Therefore, no endobronchial valve was placed"      Advanced Care Planning:  - No advanced directives have been received.   - Patient states her  Son Arthur Miller is her HPOA.  Documents have not been received.  Family has been encouraged to provide copy for [facility] [place] [place] records.  - Mrs. Mayer states she does not have a living will.   - Patient is now a partial code - no mechanical ventilation no intubation   - Today Mrs. Mayer states it is her wish not to be resuscitated.  Daughter Bri is at bedside who states Mrs. Mayer has been saying this for some time now.    - Resuscitation status will be discussed with her son before she will consent to DNR order.    Goals of Care  - Mrs. Mayer  s not opposed to be hospitalized.  Although she has been in hospital 4 times in last two weeks for respiratory illness, she does not " consider this a burden.    - She is aware she is not a candidate for surgical interventions.  She is amenable to continued procedures - chest tube etc if it continue to help her breath better and continue to have quality of life.   - -  endobronchial valve was unable to be placed.    - From previous encounter Mrs. Mayer and her daughter state being interested in learning more about different plans of care.   - Palliative medicine explained there are many ways to keep her comfortable and have good quality of life when she is no longer is amenable to aggressive care or does not have additional reasonable treatment options.   - Currently has sitters during the day and family takes shifts at night time.   -  Patient has had home health in the past.  They are happy with the care they receive with [facility] and [facility] - home palliative care service through [facility].      Plan/Recommendations.  - Family is not available at bedside today.  Palliative medicine will reach out to family by telephone to further discuss goals of care.    - Patient and family may benefit from interdisciplinary goals of care meeting.  Palliative medicine will facilitate as needed.  Received telephone call from patients daughter family will be available to hospital Friday 8/2.  Dr. Hill aware   - palliative medicine will continue to follow for goals of care and advanced care planning.   - Emotional support.      Primary team aware of the above goals of care conversation.          I will follow-up with patient. Please contact us if you have any additional questions.    Subjective:     Chief Complaint:   Chief Complaint   Patient presents with    Shortness of Breath     ems reports sob - ra sats 70% - placed on 15 l nrb now satting 92% - used home neb tx and rescue inhaler with no relief - reports incread wob - hc of chest tube and collapsed lung to right side- bandage still in place       HPI:   HPI obtained from chart review     Mrs. Mayer is a  89 lady with PMH of  COPD (home O2 at 2 L NC)  40 pack year smoking history,  (quit 30 years ago), HFpEF, HOCM s/p AICD, PVD, HTN, HLD, and CKD. Recent admit for t spontaneous pneumothorax with pleurodesis in May 2019. She  presented to [facility] ED with acute shortness of breath and found to have a small right sided pneumothorax. S/p CT tube placement at OSH and transferred to [facility] for for further evaluation.  Pulmonary was then consulted for management of chest tube.     7/21: acute dyspneic, tachycardic, and diaphoretic.   Transferred to critical care for closer monitoring. CT placed at OSH dislodged and replaced with re-expansion of right lung.    CT surgery consulted  And have been monitoring chest tube.  Plan:  water seal and clamping trials on 7/23.  If fails clamping trials, plan is for repeat bedside pleurodesis or bronchoscopy with endobronchial valve placement. She is not a surgical candidate due to cardiac co morbidities.    .      Transitioned from critical care back to internal medicine.  CT surgery continues to follow and manage chest tube.   Palliative medicine consulted for goals of care.     Hospital Course:  No notes on file    Interval History:    Past Medical History:   Diagnosis Date    Acute on chronic congestive heart failure 7/6/2019    Cardiomyopathy     Carotid artery occlusion     CHF (congestive heart failure)     COPD (chronic obstructive pulmonary disease)     Coronary artery disease     Hyperlipidemia     Hypertension        Past Surgical History:   Procedure Laterality Date    BRONCHOSCOPY, FIBEROPTIC Flexible N/A 7/31/2019    Performed by [facility] at [facility] 2ND FLR    CARDIAC CATHETERIZATION      cardic cath      CAROTID ENDARTERECTOMY      PLEURODESIS N/A 7/31/2019    Performed by [facility]on MD at [facility] OR 2ND FLR       Review of patient's allergies indicates:   Allergen Reactions    Ancef in dextrose (iso-osm) Rash     Cefazolin Rash    Cefuroxime Rash    Sulfamethoxazole-trimethoprim Rash     Other reaction(s): Rash       Medications:  Continuous Infusions:  Scheduled Meds:   acetaminophen  1,000 mg Oral Q8H    aspirin  81 mg Oral Daily    ferrous sulfate  325 mg Oral Daily    furosemide  40 mg Oral Daily    heparin (porcine)  5,000 Units Subcutaneous Q8H    ipratropium  0.5 mg Nebulization Q6H    multivitamin  1 tablet Oral Daily    potassium chloride  10 mEq Oral Daily    rOPINIRole  0.25 mg Oral QHS    tiotropium  1 capsule Inhalation Daily     PRN Meds:albuterol, Dextrose 10% Bolus, Dextrose 10% Bolus, glucagon (human recombinant), glucose, glucose, polyethylene glycol, sodium chloride 0.9%, sodium chloride 0.9%    Family History     Problem Relation (Age of Onset)    Hypertension Mother        Tobacco Use    Smoking status: Former Smoker     Packs/day: 2.00     Years: 20.00     Pack years: 40.00     Types: Cigarettes     Last attempt to quit: 1980     Years since quittin.5    Smokeless tobacco: Never Used   Substance and Sexual Activity    Alcohol use: Yes     Alcohol/week: 1.2 oz     Types: 2 Glasses of wine per week     Comment: social    Drug use: No    Sexual activity: Not Currently       Review of Systems   Constitutional: Positive for fatigue.   HENT: Positive for hearing loss.    Respiratory: Positive for shortness of breath.      Objective:     Vital Signs (Most Recent):  Temp: 97 °F (36.1 °C) (19)  Pulse: 104 (19)  Resp: 20 (19)  BP: (!) 143/64 (19)  SpO2: 95 % (19) Vital Signs (24h Range):  Temp:  [97 °F (36.1 °C)-98.7 °F (37.1 °C)] 97 °F (36.1 °C)  Pulse:  [] 104  Resp:  [14-25] 20  SpO2:  [86 %-100 %] 95 %  BP: (112-160)/(53-69) 143/64     Weight: 40.8 kg (89 lb 15.2 oz)  Body mass index is 17.57 kg/m².    Review of Symptoms  Symptom Assessment (ESAS 0-10 scale)   ESAS 0 1 2 3 4 5 6 7 8 9 10   Pain              Dyspnea               Anxiety              Nausea              Depression               Anorexia              Fatigue              Insomnia              Restlessness               Agitation              CAM / Delirium __ --  ___+   Constipation     __ --  ___+   Diarrhea           __ --  ___+  Bowel Management Plan (BMP): No    Comments: no pain, dyspnea on exertion 3-4     Pain Assessment:     OME in 24 hours: 0    Performance Status: 50    ECOG Performance Status Grade: 2 - Ambulates, capable of self care only    Physical Exam   Constitutional: She is oriented to person, place, and time. She appears well-developed. No distress.   Appears frail    HENT:   [place], hearing aid on the right is broken, not at bedside   Cardiovascular: Normal rate, regular rhythm and normal heart sounds.   Pulmonary/Chest:   Right chest tube x2  Intact, coarse breath sounds and chest tube sounds    Abdominal: Soft. Bowel sounds are normal.   Musculoskeletal:   Weak, s/p right hip fracture, uses walker    Neurological: She is alert and oriented to person, place, and time.   Skin: Skin is warm and dry. There is pallor.   Psychiatric: She has a normal mood and affect. Her behavior is normal. Judgment and thought content normal.   Nursing note and vitals reviewed.      Significant Labs: All pertinent labs within the past 24 hours have been reviewed.  CBC:   Recent Labs   Lab 08/01/19  0410   WBC 8.17   HGB 11.5*   HCT 36.0*   MCV 96        BMP:  Recent Labs   Lab 08/01/19 0410   GLU 95      K 4.3   CL 91*   CO2 33*   BUN 32*   CREATININE 0.9   CALCIUM 9.8   MG 1.9     LFT:  Lab Results   Component Value Date    AST 31 07/27/2019    ALKPHOS 96 07/27/2019    BILITOT 0.4 07/27/2019     Albumin:   Albumin   Date Value Ref Range Status   07/27/2019 3.4 (L) 3.5 - 5.2 g/dL Final     Protein:   Total Protein   Date Value Ref Range Status   07/27/2019 7.1 6.0 - 8.4 g/dL Final     Lactic acid:   Lab Results   Component Value Date    LACTATE 1.0 07/06/2019        Significant Imaging: I have reviewed all pertinent imaging results/findings within the past 24 hours.    Advance Care Planning   Advanced Directives::  Living Will: No  LaPOST: No  Do Not Resuscitate Status: No, partial - no intubation mechanical ventilation   Medical Power of : No    Decision-Making Capacity: Patient answered questions, family answered questions.        Living Arrangements: Lives alone in her home with day time sitters and family at night.     Psychosocial/Cultural:   after 48 yrs of marriage, 5 children, 6 grand children and 7 great grand children,  and mother,  Living independently until she broke her hip 1 yr ago.  Enjoyed making road trips with her friends to [place] [facility] [place].  Played cards and went to the [employer].  Goes to beauty parlor every week.         Spiritual:     F- Kylah and Belief: Rastafarian     I - Importance: went to [employer] every week until she was put on oxygen.  Now watches and participates with [employer] on TV  .  C - Community:     A - Address in Care: amenable to  visits and anointing of the sick.       > 50% of 35  min visit spent in chart review, face to face discussion of goals of care,  symptom assessment, coordination of care and emotional support.    Eveline Moses, CNS  Palliative Medicine  [facility]

## 2019-08-02 NOTE — CARE UPDATE
Pt found sating 86 on 2 lnc; rt raised to 3 lnc per protocol; PT's son has asked that her flow be set to home settings of 2.5 LNC as her sats are well over her sat goal of 88%.

## 2019-08-02 NOTE — SUBJECTIVE & OBJECTIVE
Interval History:    Past Medical History:   Diagnosis Date    Acute on chronic congestive heart failure 2019    Cardiomyopathy     Carotid artery occlusion     CHF (congestive heart failure)     COPD (chronic obstructive pulmonary disease)     Coronary artery disease     Hyperlipidemia     Hypertension        Past Surgical History:   Procedure Laterality Date    BRONCHOSCOPY, FIBEROPTIC Flexible N/A 2019    Performed by [facility]eno [facility] at [facility] OR 2ND FLR    CARDIAC CATHETERIZATION      cardi cath      CAROTID ENDARTERECTOMY      PLEURODESIS N/A 2019    Performed by Kle[facility] Mckeon MD at [facility] OR 2ND FLR       Review of patient's allergies indicates:   Allergen Reactions    Ancef in dextrose (iso-osm) Rash    Cefazolin Rash    Cefuroxime Rash    Sulfamethoxazole-trimethoprim Rash     Other reaction(s): Rash       Medications:  Continuous Infusions:  Scheduled Meds:   acetaminophen  1,000 mg Oral Q8H    amLODIPine  2.5 mg Oral Daily    aspirin  81 mg Oral Daily    ferrous sulfate  325 mg Oral Daily    furosemide  40 mg Oral Daily    heparin (porcine)  5,000 Units Subcutaneous Q8H    ipratropium  0.5 mg Nebulization Q6H    multivitamin  1 tablet Oral Daily    potassium chloride  10 mEq Oral Daily    rOPINIRole  0.25 mg Oral QHS    [START ON 8/3/2019] tiotropium  1 capsule Inhalation Daily     PRN Meds:albuterol, Dextrose 10% Bolus, Dextrose 10% Bolus, glucagon (human recombinant), glucose, glucose, HYDROcodone-acetaminophen, polyethylene glycol, sodium chloride 0.9%, sodium chloride 0.9%    Family History     Problem Relation (Age of Onset)    Hypertension Mother        Tobacco Use    Smoking status: Former Smoker     Packs/day: 2.00     Years: 20.00     Pack years: 40.00     Types: Cigarettes     Last attempt to quit: 1980     Years since quittin.5    Smokeless tobacco: Never Used   Substance and Sexual Activity    Alcohol use: Yes     Alcohol/week:  1.2 oz     Types: 2 Glasses of wine per week     Comment: social    Drug use: No    Sexual activity: Not Currently       Review of Systems   Constitutional: Positive for fatigue.   HENT: Positive for hearing loss.    Respiratory: Positive for shortness of breath.      Objective:     Vital Signs (Most Recent):  Temp: 97.9 °F (36.6 °C) (08/02/19 1208)  Pulse: 102 (08/02/19 1208)  Resp: 16 (08/02/19 1208)  BP: (!) 140/65 (08/02/19 1208)  SpO2: (!) 93 % (08/02/19 1208) Vital Signs (24h Range):  Temp:  [97.5 °F (36.4 °C)-98.2 °F (36.8 °C)] 97.9 °F (36.6 °C)  Pulse:  [] 102  Resp:  [13-20] 16  SpO2:  [86 %-99 %] 93 %  BP: ()/(52-65) 140/65     Weight: 40.8 kg (89 lb 15.2 oz)  Body mass index is 17.57 kg/m².    Review of Symptoms  Symptom Assessment (ESAS 0-10 scale)   ESAS 0 1 2 3 4 5 6 7 8 9 10   Pain              Dyspnea              Anxiety              Nausea              Depression               Anorexia              Fatigue              Insomnia              Restlessness               Agitation              CAM / Delirium __ --  ___+   Constipation     __ --  ___+   Diarrhea           __ --  ___+  Bowel Management Plan (BMP): No    Comments: no pain, dyspnea on exertion 3-4     Pain Assessment:     OME in 24 hours: 0    Performance Status: 50    ECOG Performance Status Grade: 2 - Ambulates, capable of self care only    Physical Exam   Constitutional: She is oriented to person, place, and time. She appears well-developed. No distress.   Appears frail    HENT:   [place], hearing aid on the right is broken, not at bedside   Cardiovascular: Normal rate, regular rhythm and normal heart sounds.   Pulmonary/Chest:   Right chest tube x2  Intact, coarse breath sounds and chest tube sounds    Abdominal: Soft. Bowel sounds are normal.   Musculoskeletal:   Weak, s/p right hip fracture, uses walker    Neurological: She is alert and oriented to person, place, and time.   Skin: Skin is warm and dry. There is pallor.    Psychiatric: She has a normal mood and affect. Her behavior is normal. Judgment and thought content normal.   Nursing note and vitals reviewed.      Significant Labs: All pertinent labs within the past 24 hours have been reviewed.  CBC:   Recent Labs   Lab 08/02/19 0519   WBC 8.88   HGB 10.9*   HCT 35.2*   MCV 98        BMP:  Recent Labs   Lab 08/02/19 0519   GLU 87      K 4.2   CL 93*   CO2 37*   BUN 35*   CREATININE 1.0   CALCIUM 10.0   MG 1.8     LFT:  Lab Results   Component Value Date    AST 31 07/27/2019    ALKPHOS 96 07/27/2019    BILITOT 0.4 07/27/2019     Albumin:   Albumin   Date Value Ref Range Status   07/27/2019 3.4 (L) 3.5 - 5.2 g/dL Final     Protein:   Total Protein   Date Value Ref Range Status   07/27/2019 7.1 6.0 - 8.4 g/dL Final     Lactic acid:   Lab Results   Component Value Date    LACTATE 1.0 07/06/2019       Significant Imaging: I have reviewed all pertinent imaging results/findings within the past 24 hours.    Advance Care Planning   Advanced Directives::  Living Will: No  LaPOST: No  Do Not Resuscitate Status: No, partial - no intubation mechanical ventilation   Medical Power of : No    Decision-Making Capacity: Patient answered questions, family answered questions.        Living Arrangements: Lives alone in her home with day time sitters and family at night.     Psychosocial/Cultural:   after 48 yrs of marriage, 5 children, 6 grand children and 7 great grand children,  and mother,  Living independently until she broke her hip 1 yr ago.  Enjoyed making road trips [place] friends to [place].  Played cards and went to the [employer].  Goes to beauty parlor every week.         Spiritual:     F- Kylah and Belief: Religion     I - Importance: went to [employer] every week until she was put on oxygen.  Now watches and participates with [employer] on TV  .  C - Community:     A - Address in Care: amenable to  visits and  anointing of the sick.

## 2019-08-03 LAB
ANION GAP SERPL CALC-SCNC: 6 MMOL/L (ref 8–16)
BASOPHILS # BLD AUTO: 0.05 K/UL (ref 0–0.2)
BASOPHILS NFR BLD: 0.7 % (ref 0–1.9)
BUN SERPL-MCNC: 28 MG/DL (ref 8–23)
CALCIUM SERPL-MCNC: 9.4 MG/DL (ref 8.7–10.5)
CHLORIDE SERPL-SCNC: 94 MMOL/L (ref 95–110)
CO2 SERPL-SCNC: 39 MMOL/L (ref 23–29)
CREAT SERPL-MCNC: 0.8 MG/DL (ref 0.5–1.4)
DIFFERENTIAL METHOD: ABNORMAL
EOSINOPHIL # BLD AUTO: 0.5 K/UL (ref 0–0.5)
EOSINOPHIL NFR BLD: 7.8 % (ref 0–8)
ERYTHROCYTE [DISTWIDTH] IN BLOOD BY AUTOMATED COUNT: 14.6 % (ref 11.5–14.5)
EST. GFR  (AFRICAN AMERICAN): >60 ML/MIN/1.73 M^2
EST. GFR  (NON AFRICAN AMERICAN): >60 ML/MIN/1.73 M^2
GLUCOSE SERPL-MCNC: 77 MG/DL (ref 70–110)
HCT VFR BLD AUTO: 29.8 % (ref 37–48.5)
HGB BLD-MCNC: 9.3 G/DL (ref 12–16)
IMM GRANULOCYTES # BLD AUTO: 0.04 K/UL (ref 0–0.04)
IMM GRANULOCYTES NFR BLD AUTO: 0.6 % (ref 0–0.5)
LYMPHOCYTES # BLD AUTO: 1.2 K/UL (ref 1–4.8)
LYMPHOCYTES NFR BLD: 18.5 % (ref 18–48)
MAGNESIUM SERPL-MCNC: 1.8 MG/DL (ref 1.6–2.6)
MCH RBC QN AUTO: 30.8 PG (ref 27–31)
MCHC RBC AUTO-ENTMCNC: 31.2 G/DL (ref 32–36)
MCV RBC AUTO: 99 FL (ref 82–98)
MONOCYTES # BLD AUTO: 1 K/UL (ref 0.3–1)
MONOCYTES NFR BLD: 14.5 % (ref 4–15)
NEUTROPHILS # BLD AUTO: 3.9 K/UL (ref 1.8–7.7)
NEUTROPHILS NFR BLD: 57.9 % (ref 38–73)
NRBC BLD-RTO: 0 /100 WBC
PLATELET # BLD AUTO: 193 K/UL (ref 150–350)
PMV BLD AUTO: 9.7 FL (ref 9.2–12.9)
POTASSIUM SERPL-SCNC: 4.1 MMOL/L (ref 3.5–5.1)
RBC # BLD AUTO: 3.02 M/UL (ref 4–5.4)
SODIUM SERPL-SCNC: 139 MMOL/L (ref 136–145)
WBC # BLD AUTO: 6.7 K/UL (ref 3.9–12.7)

## 2019-08-03 PROCEDURE — 99232 PR SUBSEQUENT HOSPITAL CARE,LEVL II: ICD-10-PCS | Mod: GC,,,

## 2019-08-03 PROCEDURE — 99900035 HC TECH TIME PER 15 MIN (STAT)

## 2019-08-03 PROCEDURE — 25000003 PHARM REV CODE 250: Performed by: STUDENT IN AN ORGANIZED HEALTH CARE EDUCATION/TRAINING PROGRAM

## 2019-08-03 PROCEDURE — 99232 SBSQ HOSP IP/OBS MODERATE 35: CPT | Mod: GC,,,

## 2019-08-03 PROCEDURE — 25000242 PHARM REV CODE 250 ALT 637 W/ HCPCS: Performed by: STUDENT IN AN ORGANIZED HEALTH CARE EDUCATION/TRAINING PROGRAM

## 2019-08-03 PROCEDURE — 63600175 PHARM REV CODE 636 W HCPCS: Performed by: STUDENT IN AN ORGANIZED HEALTH CARE EDUCATION/TRAINING PROGRAM

## 2019-08-03 PROCEDURE — 94640 AIRWAY INHALATION TREATMENT: CPT

## 2019-08-03 PROCEDURE — 27000221 HC OXYGEN, UP TO 24 HOURS

## 2019-08-03 PROCEDURE — 83735 ASSAY OF MAGNESIUM: CPT

## 2019-08-03 PROCEDURE — 80048 BASIC METABOLIC PNL TOTAL CA: CPT

## 2019-08-03 PROCEDURE — 85025 COMPLETE CBC W/AUTO DIFF WBC: CPT

## 2019-08-03 PROCEDURE — A4216 STERILE WATER/SALINE, 10 ML: HCPCS | Performed by: HOSPITALIST

## 2019-08-03 PROCEDURE — 25000003 PHARM REV CODE 250: Performed by: HOSPITALIST

## 2019-08-03 PROCEDURE — 20600001 HC STEP DOWN PRIVATE ROOM

## 2019-08-03 PROCEDURE — 94761 N-INVAS EAR/PLS OXIMETRY MLT: CPT

## 2019-08-03 RX ORDER — SODIUM CHLORIDE 9 MG/ML
INJECTION, SOLUTION INTRAVENOUS CONTINUOUS
Status: DISCONTINUED | OUTPATIENT
Start: 2019-08-04 | End: 2019-08-05

## 2019-08-03 RX ADMIN — FUROSEMIDE 40 MG: 40 TABLET ORAL at 08:08

## 2019-08-03 RX ADMIN — IPRATROPIUM BROMIDE 0.5 MG: 0.5 SOLUTION RESPIRATORY (INHALATION) at 01:08

## 2019-08-03 RX ADMIN — Medication 10 ML: at 12:08

## 2019-08-03 RX ADMIN — Medication 10 ML: at 06:08

## 2019-08-03 RX ADMIN — HEPARIN SODIUM 5000 UNITS: 5000 INJECTION INTRAVENOUS; SUBCUTANEOUS at 02:08

## 2019-08-03 RX ADMIN — AMLODIPINE BESYLATE 2.5 MG: 2.5 TABLET ORAL at 08:08

## 2019-08-03 RX ADMIN — ROPINIROLE HYDROCHLORIDE 0.25 MG: 0.25 TABLET, FILM COATED ORAL at 09:08

## 2019-08-03 RX ADMIN — ASPIRIN 81 MG CHEWABLE TABLET 81 MG: 81 TABLET CHEWABLE at 08:08

## 2019-08-03 RX ADMIN — IPRATROPIUM BROMIDE 0.5 MG: 0.5 SOLUTION RESPIRATORY (INHALATION) at 07:08

## 2019-08-03 RX ADMIN — HEPARIN SODIUM 5000 UNITS: 5000 INJECTION INTRAVENOUS; SUBCUTANEOUS at 09:08

## 2019-08-03 RX ADMIN — ALBUTEROL SULFATE 2 PUFF: 90 AEROSOL, METERED RESPIRATORY (INHALATION) at 02:08

## 2019-08-03 RX ADMIN — FERROUS SULFATE TAB EC 325 MG (65 MG FE EQUIVALENT) 325 MG: 325 (65 FE) TABLET DELAYED RESPONSE at 08:08

## 2019-08-03 RX ADMIN — POTASSIUM CHLORIDE 10 MEQ: 750 CAPSULE, EXTENDED RELEASE ORAL at 08:08

## 2019-08-03 RX ADMIN — Medication 10 ML: at 05:08

## 2019-08-03 RX ADMIN — HEPARIN SODIUM 5000 UNITS: 5000 INJECTION INTRAVENOUS; SUBCUTANEOUS at 05:08

## 2019-08-03 RX ADMIN — ACETAMINOPHEN 1000 MG: 500 TABLET ORAL at 09:08

## 2019-08-03 RX ADMIN — TIOTROPIUM BROMIDE 18 MCG: 18 CAPSULE ORAL; RESPIRATORY (INHALATION) at 08:08

## 2019-08-03 RX ADMIN — IPRATROPIUM BROMIDE 0.5 MG: 0.5 SOLUTION RESPIRATORY (INHALATION) at 05:08

## 2019-08-03 RX ADMIN — THERA TABS 1 TABLET: TAB at 08:08

## 2019-08-03 NOTE — PLAN OF CARE
Problem: Adult Inpatient Plan of Care  Goal: Plan of Care Review  Outcome: Ongoing (interventions implemented as appropriate)  Plan of Care reviewed w/ pt and family at bedside. AVSS on 2.5L NC. R chest tube to -20 sxn, air leak noted, team aware. Tolerating low sodium diet. Incontinent of urine, purewick in place draining CYU. OOB to chair w/ x1 assist. Plan for OR Monday.

## 2019-08-03 NOTE — SUBJECTIVE & OBJECTIVE
Interval History: Patient with recurrent pneumothorax reports doing well but still has an air leak. Endorsing SOB on exertion, on 2.5L NC      Review of Systems   Constitutional: Negative for activity change, appetite change, chills and fever.   HENT: Negative for congestion and sore throat.    Eyes: Negative for photophobia and visual disturbance.   Respiratory: Positive for shortness of breath. Negative for cough and chest tightness.    Cardiovascular: Negative for chest pain and palpitations.   Gastrointestinal: Negative for abdominal distention, abdominal pain, constipation, diarrhea, nausea and vomiting.   Genitourinary: Negative for dysuria and hematuria.   Musculoskeletal: Negative for arthralgias and myalgias. Back pain: lower back pain, per patient caused by being bedridden    Skin: Negative for color change and rash.   Neurological: Negative for dizziness and headaches.     Objective:     Vital Signs (Most Recent):  Temp: 97.7 °F (36.5 °C) (08/03/19 0814)  Pulse: 79 (08/03/19 1111)  Resp: 18 (08/03/19 0814)  BP: (!) 142/63 (08/03/19 0814)  SpO2: (!) 93 % (08/03/19 0814) Vital Signs (24h Range):  Temp:  [97.5 °F (36.4 °C)-98.1 °F (36.7 °C)] 97.7 °F (36.5 °C)  Pulse:  [] 79  Resp:  [15-21] 18  SpO2:  [89 %-100 %] 93 %  BP: (122-153)/(57-75) 142/63     Weight: 40.8 kg (89 lb 15.2 oz)  Body mass index is 17.57 kg/m².    Intake/Output Summary (Last 24 hours) at 8/3/2019 1122  Last data filed at 8/3/2019 0800  Gross per 24 hour   Intake 380 ml   Output 700 ml   Net -320 ml      Physical Exam   Constitutional: She appears well-developed and well-nourished. No distress.   HENT:   Head: Normocephalic and atraumatic.   Eyes: Pupils are equal, round, and reactive to light. EOM are normal.   Neck: Normal range of motion. Neck supple.   Cardiovascular: Normal rate and regular rhythm.   Pulmonary/Chest: Effort normal. No respiratory distress.   Anterior pigtail in place, + air leak. On suction.    Abdominal: Soft.  She exhibits no distension. There is no tenderness.   Neurological: She is alert.   Skin: Skin is warm and dry.   Psychiatric: She has a normal mood and affect. Her behavior is normal.       Significant Labs: All pertinent labs within the past 24 hours have been reviewed.    Significant Imaging: I have reviewed all pertinent imaging results/findings within the past 24 hours.

## 2019-08-03 NOTE — PROGRESS NOTES
[facility]  Thoracic Surgery  Progress Note    Subjective:       Interval History: GUILLERMINA. Continues to have some intermittent SOB. CT blood patch placed yesterday, still continues to have significant air leak. On low sodium diet and tolerating well, no nausea or vomiting. Sitting up in chair this morning.     Medications:  Continuous Infusions:   [START ON 8/4/2019] sodium chloride 0.9%       Scheduled Meds:   acetaminophen  1,000 mg Oral Q8H    amLODIPine  2.5 mg Oral Daily    aspirin  81 mg Oral Daily    ferrous sulfate  325 mg Oral Daily    furosemide  40 mg Oral Daily    heparin (porcine)  5,000 Units Subcutaneous Q8H    ipratropium  0.5 mg Nebulization Q6H    lidocaine  1 patch Transdermal Q24H    multivitamin  1 tablet Oral Daily    potassium chloride  10 mEq Oral Daily    rOPINIRole  0.25 mg Oral QHS    sodium chloride 0.9%  10 mL Intravenous Q6H    tiotropium  1 capsule Inhalation Daily     PRN Meds:albuterol, Dextrose 10% Bolus, Dextrose 10% Bolus, glucagon (human recombinant), glucose, glucose, HYDROcodone-acetaminophen, polyethylene glycol, sodium chloride 0.9%, sodium chloride 0.9%, Flushing PICC Protocol **AND** sodium chloride 0.9% **AND** sodium chloride 0.9%     Review of patient's allergies indicates:   Allergen Reactions    Ancef in dextrose (iso-osm) Rash    Cefazolin Rash    Cefuroxime Rash    Sulfamethoxazole-trimethoprim Rash     Other reaction(s): Rash     Objective:     Vital Signs (Most Recent):  Temp: 97.7 °F (36.5 °C) (08/03/19 0814)  Pulse: 73 (08/03/19 0814)  Resp: 18 (08/03/19 0814)  BP: (!) 142/63 (08/03/19 0814)  SpO2: (!) 93 % (08/03/19 0814) Vital Signs (24h Range):  Temp:  [97.5 °F (36.4 °C)-98.1 °F (36.7 °C)] 97.7 °F (36.5 °C)  Pulse:  [] 73  Resp:  [15-21] 18  SpO2:  [89 %-100 %] 93 %  BP: (122-153)/(57-75) 142/63     Intake/Output - Last 3 Shifts       08/01 0700 - 08/02 0659 08/02 0700 - 08/03 0659 08/03 0700 - 08/04 0659    P.O. 560  180 200    I.V. (mL/kg)  0 (0)     Other  0     Total Intake(mL/kg) 560 (13.7) 180 (4.4) 200 (4.9)    Urine (mL/kg/hr) 900 (0.9) 700 (0.7)     Emesis/NG output  0     Other  0     Stool 0 0     Blood  0     Chest Tube 215      Total Output 1115 700     Net -555 -520 +200           Urine Occurrence 2 x 0 x     Stool Occurrence 0 x 0 x 0 x    Emesis Occurrence  0 x           SpO2: (!) 93 %  O2 Device (Oxygen Therapy): nasal cannula    Physical Exam   Constitutional: She appears well-developed and well-nourished. No distress.   Cardiovascular: Normal rate and regular rhythm.   Pulmonary/Chest: Effort normal. No respiratory distress.   Anterior pigtail in place, + air leak. On suction.    Abdominal: Soft. She exhibits no distension. There is no tenderness.   Neurological: She is alert.   Skin: Skin is warm and dry.   Psychiatric: She has a normal mood and affect. Her behavior is normal.       Significant Labs:  CBC:   Recent Labs   Lab 08/03/19 0424   WBC 6.70   RBC 3.02*   HGB 9.3*   HCT 29.8*      MCV 99*   MCH 30.8   MCHC 31.2*     CMP:   Recent Labs   Lab 08/03/19  0424   GLU 77   CALCIUM 9.4      K 4.1   CO2 39*   CL 94*   BUN 28*   CREATININE 0.8       Significant Diagnostics:  CXR: I have reviewed all pertinent results/findings within the past 24 hours    VTE Risk Mitigation (From admission, onward)        Ordered     heparin (porcine) injection 5,000 Units  Every 8 hours      07/28/19 0551     IP VTE HIGH RISK PATIENT  Once      07/28/19 0551        Assessment/Plan:     * Pneumothorax on right  - Continue pigtail on suction, patient will need to go to OR on Monday as still continues to have air leak  - Follow up morning CXR, daily CXR  - Pulmonary toilet   - NPO at midnight on Sunday for planned surgery, will start IVF at that time at low rate due to her age          Chica Moses MD  Thoracic Surgery  [facility]

## 2019-08-03 NOTE — SUBJECTIVE & OBJECTIVE
Interval History: NAEON. Continues to have some intermittent SOB. CT blood patch placed yesterday, still continues to have significant air leak. On low sodium diet and tolerating well, no nausea or vomiting. Sitting up in chair this morning.     Medications:  Continuous Infusions:   [START ON 8/4/2019] sodium chloride 0.9%       Scheduled Meds:   acetaminophen  1,000 mg Oral Q8H    amLODIPine  2.5 mg Oral Daily    aspirin  81 mg Oral Daily    ferrous sulfate  325 mg Oral Daily    furosemide  40 mg Oral Daily    heparin (porcine)  5,000 Units Subcutaneous Q8H    ipratropium  0.5 mg Nebulization Q6H    lidocaine  1 patch Transdermal Q24H    multivitamin  1 tablet Oral Daily    potassium chloride  10 mEq Oral Daily    rOPINIRole  0.25 mg Oral QHS    sodium chloride 0.9%  10 mL Intravenous Q6H    tiotropium  1 capsule Inhalation Daily     PRN Meds:albuterol, Dextrose 10% Bolus, Dextrose 10% Bolus, glucagon (human recombinant), glucose, glucose, HYDROcodone-acetaminophen, polyethylene glycol, sodium chloride 0.9%, sodium chloride 0.9%, Flushing PICC Protocol **AND** sodium chloride 0.9% **AND** sodium chloride 0.9%     Review of patient's allergies indicates:   Allergen Reactions    Ancef in dextrose (iso-osm) Rash    Cefazolin Rash    Cefuroxime Rash    Sulfamethoxazole-trimethoprim Rash     Other reaction(s): Rash     Objective:     Vital Signs (Most Recent):  Temp: 97.7 °F (36.5 °C) (08/03/19 0814)  Pulse: 73 (08/03/19 0814)  Resp: 18 (08/03/19 0814)  BP: (!) 142/63 (08/03/19 0814)  SpO2: (!) 93 % (08/03/19 0814) Vital Signs (24h Range):  Temp:  [97.5 °F (36.4 °C)-98.1 °F (36.7 °C)] 97.7 °F (36.5 °C)  Pulse:  [] 73  Resp:  [15-21] 18  SpO2:  [89 %-100 %] 93 %  BP: (122-153)/(57-75) 142/63     Intake/Output - Last 3 Shifts       08/01 0700 - 08/02 0659 08/02 0700 - 08/03 0659 08/03 0700 - 08/04 0659    P.O. 560 180 200    I.V. (mL/kg)  0 (0)     Other  0     Total Intake(mL/kg) 560 (13.7) 180  (4.4) 200 (4.9)    Urine (mL/kg/hr) 900 (0.9) 700 (0.7)     Emesis/NG output  0     Other  0     Stool 0 0     Blood  0     Chest Tube 215      Total Output 1115 700     Net -555 -520 +200           Urine Occurrence 2 x 0 x     Stool Occurrence 0 x 0 x 0 x    Emesis Occurrence  0 x           SpO2: (!) 93 %  O2 Device (Oxygen Therapy): nasal cannula    Physical Exam   Constitutional: She appears well-developed and well-nourished. No distress.   Cardiovascular: Normal rate and regular rhythm.   Pulmonary/Chest: Effort normal. No respiratory distress.   Anterior pigtail in place, + air leak. On suction.    Abdominal: Soft. She exhibits no distension. There is no tenderness.   Neurological: She is alert.   Skin: Skin is warm and dry.   Psychiatric: She has a normal mood and affect. Her behavior is normal.       Significant Labs:  CBC:   Recent Labs   Lab 08/03/19  0424   WBC 6.70   RBC 3.02*   HGB 9.3*   HCT 29.8*      MCV 99*   MCH 30.8   MCHC 31.2*     CMP:   Recent Labs   Lab 08/03/19  0424   GLU 77   CALCIUM 9.4      K 4.1   CO2 39*   CL 94*   BUN 28*   CREATININE 0.8       Significant Diagnostics:  CXR: I have reviewed all pertinent results/findings within the past 24 hours    VTE Risk Mitigation (From admission, onward)        Ordered     heparin (porcine) injection 5,000 Units  Every 8 hours      07/28/19 0508     IP VTE HIGH RISK PATIENT  Once      07/28/19 0536

## 2019-08-03 NOTE — SUBJECTIVE & OBJECTIVE
Interval History: ***    Medications:  Continuous Infusions:   [START ON 8/4/2019] sodium chloride 0.9%       Scheduled Meds:   acetaminophen  1,000 mg Oral Q8H    amLODIPine  2.5 mg Oral Daily    aspirin  81 mg Oral Daily    ferrous sulfate  325 mg Oral Daily    furosemide  40 mg Oral Daily    heparin (porcine)  5,000 Units Subcutaneous Q8H    ipratropium  0.5 mg Nebulization Q6H    lidocaine  1 patch Transdermal Q24H    multivitamin  1 tablet Oral Daily    potassium chloride  10 mEq Oral Daily    rOPINIRole  0.25 mg Oral QHS    sodium chloride 0.9%  10 mL Intravenous Q6H    tiotropium  1 capsule Inhalation Daily     PRN Meds:albuterol, Dextrose 10% Bolus, Dextrose 10% Bolus, glucagon (human recombinant), glucose, glucose, HYDROcodone-acetaminophen, polyethylene glycol, sodium chloride 0.9%, sodium chloride 0.9%, Flushing PICC Protocol **AND** sodium chloride 0.9% **AND** sodium chloride 0.9%     Review of patient's allergies indicates:   Allergen Reactions    Ancef in dextrose (iso-osm) Rash    Cefazolin Rash    Cefuroxime Rash    Sulfamethoxazole-trimethoprim Rash     Other reaction(s): Rash     Objective:     Vital Signs (Most Recent):  Temp: 97.7 °F (36.5 °C) (08/03/19 0814)  Pulse: 73 (08/03/19 0814)  Resp: 18 (08/03/19 0814)  BP: (!) 142/63 (08/03/19 0814)  SpO2: (!) 93 % (08/03/19 0814) Vital Signs (24h Range):  Temp:  [97.5 °F (36.4 °C)-98.1 °F (36.7 °C)] 97.7 °F (36.5 °C)  Pulse:  [] 73  Resp:  [15-21] 18  SpO2:  [89 %-100 %] 93 %  BP: (122-153)/(57-75) 142/63     Weight: 40.8 kg (89 lb 15.2 oz)  Body mass index is 17.57 kg/m².    Intake/Output - Last 3 Shifts       08/01 0700 - 08/02 0659 08/02 0700 - 08/03 0659 08/03 0700 - 08/04 0659    P.O. 560 180 200    I.V. (mL/kg)  0 (0)     Other  0     Total Intake(mL/kg) 560 (13.7) 180 (4.4) 200 (4.9)    Urine (mL/kg/hr) 900 (0.9) 700 (0.7)     Emesis/NG output  0     Other  0     Stool 0 0     Blood  0     Chest Tube 215      Total  Output 1117 114     Net -641 -520 +200           Urine Occurrence 2 x 0 x     Stool Occurrence 0 x 0 x 0 x    Emesis Occurrence  0 x           Physical Exam    Significant Labs:  {Results:[id number]}    Significant Diagnostics:  {Imaging Review:46227}

## 2019-08-03 NOTE — ASSESSMENT & PLAN NOTE
Patient with COPD (on 2.5 L home oxygen) and with multiple pneumothoraxes s/p pleurodesis and chest tube placement last week presented with dyspnea and hypoxia. CXR demonstrated R pneumothorax.   - Patient known to CTS Service; re-consulted now for management  - Patient is a poor surgical candidate due to cardiac comorbidities  - Pleurodesis performed on 7/31, CTS was going to place endobronchial valve but were unable to close the leak so they performed a pleurodesis.   -Obtaining daily CXR to monitor pneumothorax, improving.  - Per CTS, if repeat pleurodesis fails, they will place a blood patch over the weekend, for which she will need a PICC line.      Plan:  -Patient to be take by CT surgery to OR on Monday for surgical repair of persistent pneumothorax  -monitor patient's hemodynamic status  -pain management

## 2019-08-03 NOTE — PROGRESS NOTES
[facility] Hospital Medicine  Progress Note    Patient Name: Venus Mayer  MRN: [id number]  Patient Class: IP- Inpatient   Admission Date: 7/27/2019  Length of Stay: 6 days  Attending Physician: Enrique Hill MD  Primary Care Provider: Jona Che MD    Hospital Medicine Team: [facility] [place] HOSP MED 1 Марина Puente MD    Subjective:     Principal Problem:Pneumothorax on right      HPI:  Venus Mayer is 89 y.o. lady with COPD, (on 2.5 L home O2), 40 pack year smoking history (quit 30 years ago), multiple readmissions for spontaneous pneumothorax, chronic dCHFS/P AICD, PVD/carotid artery stenosis s/p L CEA 08,was brought to the ED because she was SOB.    She was Discharged from [facility] on 7/26/19 after being admitted for pneumothorax, improved after placing CT with return to baseline pulmonary function on discharge.  Then around evening of 7/27 started feeling SOB, not relieved with her Inhaler or Nebulization, progressively worse,  prompting her to come to the Hospital. No chest pain, fever , confusion.    In the ED she was hypoxic, CXR revelaed Right sided Pneumothorax. CTS was consulted and placed a Pig tail catheter, after repositioning showed air leak and patient symptomatically improved and is currently on a non re breather.     The patient's recurrent PTXs seem to have started during her admission on 05/22/2019 where she presented with a large R sided PTX requiring chest tube and pleurodesis on 5/27/2019. Since, she has had a small recurrence of PTX on 6/25/19 not requiring intervention, and admission to OSH 7/6-7/11 for acute on chronic dCHF and COPD exacerbation. She has been less mobile and uses a walker at home.     Overview/Hospital Course:  No notes on file    Interval History: Patient with recurrent pneumothorax reports doing well but still has an air leak. Endorsing SOB on exertion, on 2.5L NC      Review of Systems   Constitutional: Negative for activity change, appetite change,  chills and fever.   HENT: Negative for congestion and sore throat.    Eyes: Negative for photophobia and visual disturbance.   Respiratory: Positive for shortness of breath. Negative for cough and chest tightness.    Cardiovascular: Negative for chest pain and palpitations.   Gastrointestinal: Negative for abdominal distention, abdominal pain, constipation, diarrhea, nausea and vomiting.   Genitourinary: Negative for dysuria and hematuria.   Musculoskeletal: Negative for arthralgias and myalgias. Back pain: lower back pain, per patient caused by being bedridden    Skin: Negative for color change and rash.   Neurological: Negative for dizziness and headaches.     Objective:     Vital Signs (Most Recent):  Temp: 97.7 °F (36.5 °C) (08/03/19 0814)  Pulse: 79 (08/03/19 1111)  Resp: 18 (08/03/19 0814)  BP: (!) 142/63 (08/03/19 0814)  SpO2: (!) 93 % (08/03/19 0814) Vital Signs (24h Range):  Temp:  [97.5 °F (36.4 °C)-98.1 °F (36.7 °C)] 97.7 °F (36.5 °C)  Pulse:  [] 79  Resp:  [15-21] 18  SpO2:  [89 %-100 %] 93 %  BP: (122-153)/(57-75) 142/63     Weight: 40.8 kg (89 lb 15.2 oz)  Body mass index is 17.57 kg/m².    Intake/Output Summary (Last 24 hours) at 8/3/2019 1122  Last data filed at 8/3/2019 0800  Gross per 24 hour   Intake 380 ml   Output 700 ml   Net -320 ml      Physical Exam   Constitutional: She appears well-developed and well-nourished. No distress.   HENT:   Head: Normocephalic and atraumatic.   Eyes: Pupils are equal, round, and reactive to light. EOM are normal.   Neck: Normal range of motion. Neck supple.   Cardiovascular: Normal rate and regular rhythm.   Pulmonary/Chest: Effort normal. No respiratory distress.   Anterior pigtail in place, + air leak. On suction.    Abdominal: Soft. She exhibits no distension. There is no tenderness.   Neurological: She is alert.   Skin: Skin is warm and dry.   Psychiatric: She has a normal mood and affect. Her behavior is normal.       Significant Labs: All pertinent  labs within the past 24 hours have been reviewed.    Significant Imaging: I have reviewed all pertinent imaging results/findings within the past 24 hours.      Assessment/Plan:      * Pneumothorax on right  Patient with COPD (on 2.5 L home oxygen) and with multiple pneumothoraxes s/p pleurodesis and chest tube placement last week presented with dyspnea and hypoxia. CXR demonstrated R pneumothorax.   - Patient known to CTS Service; re-consulted now for management  - Patient is a poor surgical candidate due to cardiac comorbidities  - Pleurodesis performed on 7/31, CTS was going to place endobronchial valve but were unable to close the leak so they performed a pleurodesis.   -Obtaining daily CXR to monitor pneumothorax, improving.  - Per CTS, if repeat pleurodesis fails, they will place a blood patch over the weekend, for which she will need a PICC line.      Plan:  -Patient to be take by CT surgery to OR on Monday for surgical repair of persistent pneumothorax  -monitor patient's hemodynamic status  -pain management     Restless leg syndrome  Continue home Ropinirole     COLD (chronic obstructive lung disease)  Patient with COPD on 2.5 L of home oxygen.     Plan:  - Continue Tiotropium   - Duonebs as needed  - Continue Supplemental oxygen      Heart failure, diastolic  ECHO on 7/19 showed EF of 65% with DD, PA pressure of 52 and several valvular abnormalities.   BNP - 1414, but not volume currently overloaded.     Plan:  - Continue Home Lasix 40mg Daily; can increase dose if she becomes volume overloaded  - Monitor I/O, Weights        Hypertension  Has been hypertensive during hospital stay.   Plan:  -Administer home Amlodipine 2.5mg PO daily        VTE Risk Mitigation (From admission, onward)        Ordered     heparin (porcine) injection 5,000 Units  Every 8 hours      07/28/19 0551     IP VTE HIGH RISK PATIENT  Once      07/28/19 0551                Марина Puente MD  Department of Hospital Medicine   [facility]

## 2019-08-03 NOTE — ASSESSMENT & PLAN NOTE
- Continue pigtail on suction, patient will need to go to OR on Monday as still continues to have air leak  - Follow up morning CXR, daily CXR  - Pulmonary toilet   - NPO at midnight on Sunday for planned surgery, will start IVF at that time at low rate due to her age

## 2019-08-03 NOTE — PLAN OF CARE
Problem: Adult Inpatient Plan of Care  Goal: Plan of Care Review  Outcome: Ongoing (interventions implemented as appropriate)  AAOx3, afebrile, c/o pain. Lidocaine patch applied to backside and repositioned pt. Scheduled tylenol given. Telemetry monitor in place (NSR). Pt on 2.5-3L NC. Miralax given since pt has not had a BM in days. Purwick changed. Nursing communication in to keep chest tube risen and to wall suction -20. Will reassess in the morning. Pt being repositioned by nurse and tech. Daughter at bedside. Pt in lowest position, side rails up x2, non-skid foot wear in place, call light within reach, pt verbalized understanding to call RN when needed. Hand hygiene practiced per protocol. Will continue to monitor.

## 2019-08-04 LAB
BASOPHILS # BLD AUTO: 0.04 K/UL (ref 0–0.2)
BASOPHILS NFR BLD: 0.6 % (ref 0–1.9)
DIFFERENTIAL METHOD: ABNORMAL
EOSINOPHIL # BLD AUTO: 0.5 K/UL (ref 0–0.5)
EOSINOPHIL NFR BLD: 8.2 % (ref 0–8)
ERYTHROCYTE [DISTWIDTH] IN BLOOD BY AUTOMATED COUNT: 14.8 % (ref 11.5–14.5)
HCT VFR BLD AUTO: 30.1 % (ref 37–48.5)
HGB BLD-MCNC: 9.4 G/DL (ref 12–16)
IMM GRANULOCYTES # BLD AUTO: 0.07 K/UL (ref 0–0.04)
IMM GRANULOCYTES NFR BLD AUTO: 1.1 % (ref 0–0.5)
LYMPHOCYTES # BLD AUTO: 1.4 K/UL (ref 1–4.8)
LYMPHOCYTES NFR BLD: 21.9 % (ref 18–48)
MAGNESIUM SERPL-MCNC: 1.8 MG/DL (ref 1.6–2.6)
MCH RBC QN AUTO: 31 PG (ref 27–31)
MCHC RBC AUTO-ENTMCNC: 31.2 G/DL (ref 32–36)
MCV RBC AUTO: 99 FL (ref 82–98)
MONOCYTES # BLD AUTO: 1 K/UL (ref 0.3–1)
MONOCYTES NFR BLD: 14.8 % (ref 4–15)
NEUTROPHILS # BLD AUTO: 3.5 K/UL (ref 1.8–7.7)
NEUTROPHILS NFR BLD: 53.4 % (ref 38–73)
NRBC BLD-RTO: 0 /100 WBC
PLATELET # BLD AUTO: 200 K/UL (ref 150–350)
PMV BLD AUTO: 9.9 FL (ref 9.2–12.9)
RBC # BLD AUTO: 3.03 M/UL (ref 4–5.4)
WBC # BLD AUTO: 6.47 K/UL (ref 3.9–12.7)

## 2019-08-04 PROCEDURE — 83735 ASSAY OF MAGNESIUM: CPT

## 2019-08-04 PROCEDURE — 27000221 HC OXYGEN, UP TO 24 HOURS

## 2019-08-04 PROCEDURE — 25000003 PHARM REV CODE 250: Performed by: STUDENT IN AN ORGANIZED HEALTH CARE EDUCATION/TRAINING PROGRAM

## 2019-08-04 PROCEDURE — 94640 AIRWAY INHALATION TREATMENT: CPT

## 2019-08-04 PROCEDURE — 94761 N-INVAS EAR/PLS OXIMETRY MLT: CPT

## 2019-08-04 PROCEDURE — 99232 PR SUBSEQUENT HOSPITAL CARE,LEVL II: ICD-10-PCS | Mod: GC,,,

## 2019-08-04 PROCEDURE — 20600001 HC STEP DOWN PRIVATE ROOM

## 2019-08-04 PROCEDURE — 25000242 PHARM REV CODE 250 ALT 637 W/ HCPCS: Performed by: STUDENT IN AN ORGANIZED HEALTH CARE EDUCATION/TRAINING PROGRAM

## 2019-08-04 PROCEDURE — 85025 COMPLETE CBC W/AUTO DIFF WBC: CPT

## 2019-08-04 PROCEDURE — A4216 STERILE WATER/SALINE, 10 ML: HCPCS | Performed by: HOSPITALIST

## 2019-08-04 PROCEDURE — 25000003 PHARM REV CODE 250: Performed by: HOSPITALIST

## 2019-08-04 PROCEDURE — 99232 SBSQ HOSP IP/OBS MODERATE 35: CPT | Mod: GC,,,

## 2019-08-04 PROCEDURE — 63600175 PHARM REV CODE 636 W HCPCS: Performed by: STUDENT IN AN ORGANIZED HEALTH CARE EDUCATION/TRAINING PROGRAM

## 2019-08-04 RX ADMIN — THERA TABS 1 TABLET: TAB at 08:08

## 2019-08-04 RX ADMIN — ROPINIROLE HYDROCHLORIDE 0.25 MG: 0.25 TABLET, FILM COATED ORAL at 08:08

## 2019-08-04 RX ADMIN — POTASSIUM CHLORIDE 10 MEQ: 750 CAPSULE, EXTENDED RELEASE ORAL at 08:08

## 2019-08-04 RX ADMIN — IPRATROPIUM BROMIDE 0.5 MG: 0.5 SOLUTION RESPIRATORY (INHALATION) at 12:08

## 2019-08-04 RX ADMIN — AMLODIPINE BESYLATE 2.5 MG: 2.5 TABLET ORAL at 08:08

## 2019-08-04 RX ADMIN — HEPARIN SODIUM 5000 UNITS: 5000 INJECTION INTRAVENOUS; SUBCUTANEOUS at 02:08

## 2019-08-04 RX ADMIN — ASPIRIN 81 MG CHEWABLE TABLET 81 MG: 81 TABLET CHEWABLE at 08:08

## 2019-08-04 RX ADMIN — Medication 10 ML: at 12:08

## 2019-08-04 RX ADMIN — IPRATROPIUM BROMIDE 0.5 MG: 0.5 SOLUTION RESPIRATORY (INHALATION) at 05:08

## 2019-08-04 RX ADMIN — HEPARIN SODIUM 5000 UNITS: 5000 INJECTION INTRAVENOUS; SUBCUTANEOUS at 08:08

## 2019-08-04 RX ADMIN — POLYETHYLENE GLYCOL 3350 17 G: 17 POWDER, FOR SOLUTION ORAL at 09:08

## 2019-08-04 RX ADMIN — Medication 10 ML: at 06:08

## 2019-08-04 RX ADMIN — IPRATROPIUM BROMIDE 0.5 MG: 0.5 SOLUTION RESPIRATORY (INHALATION) at 01:08

## 2019-08-04 RX ADMIN — ACETAMINOPHEN 1000 MG: 500 TABLET ORAL at 08:08

## 2019-08-04 RX ADMIN — HEPARIN SODIUM 5000 UNITS: 5000 INJECTION INTRAVENOUS; SUBCUTANEOUS at 05:08

## 2019-08-04 RX ADMIN — TIOTROPIUM BROMIDE 18 MCG: 18 CAPSULE ORAL; RESPIRATORY (INHALATION) at 08:08

## 2019-08-04 RX ADMIN — FUROSEMIDE 40 MG: 40 TABLET ORAL at 08:08

## 2019-08-04 RX ADMIN — FERROUS SULFATE TAB EC 325 MG (65 MG FE EQUIVALENT) 325 MG: 325 (65 FE) TABLET DELAYED RESPONSE at 08:08

## 2019-08-04 RX ADMIN — IPRATROPIUM BROMIDE 0.5 MG: 0.5 SOLUTION RESPIRATORY (INHALATION) at 06:08

## 2019-08-04 NOTE — ASSESSMENT & PLAN NOTE
- Continue pigtail on suction  - Consented this morning for right VATS with pleurodesis tomorrow (8/5)  - Daily CXR  - Pulmonary toilet   - NPO at midnight tonight, mIVF

## 2019-08-04 NOTE — PROGRESS NOTES
[facility] Hospital Medicine  Progress Note    Patient Name: Venus Mayer  MRN: [id number]  Patient Class: IP- Inpatient   Admission Date: 7/27/2019  Length of Stay: 7 days  Attending Physician: Enrique Hill MD  Primary Care Provider: Jona Che MD    Hospital Medicine Team: [facility] [place] HOSP MED 1 Марина Puente MD    Subjective:     Principal Problem:Pneumothorax on right      HPI:  Venus Mayer is 89 y.o. lady with COPD, (on 2.5 L home O2), 40 pack year smoking history (quit 30 years ago), multiple readmissions for spontaneous pneumothorax, chronic dCHFS/P AICD, PVD/carotid artery stenosis s/p L CEA 08,was brought to the ED because she was SOB.    She was Discharged from [facility] on 7/26/19 after being admitted for pneumothorax, improved after placing CT with return to baseline pulmonary function on discharge.  Then around evening of 7/27 started feeling SOB, not relieved with her Inhaler or Nebulization, progressively worse,  prompting her to come to the Hospital. No chest pain, fever , confusion.    In the ED she was hypoxic, CXR revelaed Right sided Pneumothorax. CTS was consulted and placed a Pig tail catheter, after repositioning showed air leak and patient symptomatically improved and is currently on a non re breather.     The patient's recurrent PTXs seem to have started during her admission on 05/22/2019 where she presented with a large R sided PTX requiring chest tube and pleurodesis on 5/27/2019. Since, she has had a small recurrence of PTX on 6/25/19 not requiring intervention, and admission to OSH 7/6-7/11 for acute on chronic dCHF and COPD exacerbation. She has been less mobile and uses a walker at home.     Overview/Hospital Course:  No notes on file    Interval History: patient remains comfortable on 2.5L NC and is awaiting surgery in the morning.     Review of Systems   Constitutional: Negative for activity change, appetite change, chills and fever.   HENT: Negative for  congestion and sore throat.    Eyes: Negative for photophobia and visual disturbance.   Respiratory: Negative for cough and chest tightness.    Cardiovascular: Negative for chest pain and palpitations.   Gastrointestinal: Negative for abdominal distention, abdominal pain, constipation, diarrhea, nausea and vomiting.   Genitourinary: Negative for dysuria and hematuria.   Musculoskeletal: Negative for arthralgias and myalgias. Back pain: lower back pain, per patient caused by being bedridden    Skin: Negative for color change and rash.   Neurological: Negative for dizziness and headaches.     Objective:     Vital Signs (Most Recent):  Temp: 96.1 °F (35.6 °C) (08/04/19 0755)  Pulse: 87 (08/04/19 1120)  Resp: 18 (08/04/19 0755)  BP: (!) 112/58 (08/04/19 0755)  SpO2: (!) 92 % (08/04/19 1120) Vital Signs (24h Range):  Temp:  [96.1 °F (35.6 °C)-98.4 °F (36.9 °C)] 96.1 °F (35.6 °C)  Pulse:  [] 87  Resp:  [12-20] 18  SpO2:  [85 %-100 %] 92 %  BP: (112-141)/(57-66) 112/58     Weight: 40.8 kg (89 lb 15.2 oz)  Body mass index is 17.57 kg/m².    Intake/Output Summary (Last 24 hours) at 8/4/2019 1131  Last data filed at 8/4/2019 0900  Gross per 24 hour   Intake 1140 ml   Output 800 ml   Net 340 ml      Physical Exam   Constitutional: She appears well-developed and well-nourished. No distress.   Cardiovascular: Normal rate and regular rhythm.   Pulmonary/Chest: Effort normal. No respiratory distress.   Anterior pigtail in place, + air leak. On suction.    Abdominal: Soft. She exhibits no distension. There is no tenderness.   Neurological: She is alert.   Skin: Skin is warm and dry.   Psychiatric: She has a normal mood and affect. Her behavior is normal.       Significant Labs: All pertinent labs within the past 24 hours have been reviewed.    Significant Imaging: I have reviewed all pertinent imaging results/findings within the past 24 hours.      Assessment/Plan:      * Pneumothorax on right  Patient with COPD (on 2.5 L  home oxygen) and with multiple pneumothoraxes s/p pleurodesis and chest tube placement last week presented with dyspnea and hypoxia. CXR demonstrated R pneumothorax.   - Patient known to CTS Service; re-consulted now for management  - Patient is a poor surgical candidate due to cardiac comorbidities  - Pleurodesis performed on 7/31, CTS was going to place endobronchial valve but were unable to close the leak so they performed a pleurodesis.   -Obtaining daily CXR to monitor pneumothorax, improving.  - Per CTS, if repeat pleurodesis fails, they will place a blood patch over the weekend, for which she will need a PICC line.      Plan:  -Patient to be take by CT surgery to OR on Monday for surgical repair of persistent pneumothorax  -monitor patient's hemodynamic status  -pain management     Restless leg syndrome  Continue home Ropinirole     COLD (chronic obstructive lung disease)  Patient with COPD on 2.5 L of home oxygen.     Plan:  - Continue Tiotropium   - Duonebs as needed  - Continue Supplemental oxygen      Heart failure, diastolic  ECHO on 7/19 showed EF of 65% with DD, PA pressure of 52 and several valvular abnormalities.   BNP - 1414, but not volume currently overloaded.     Plan:  - Continue Home Lasix 40mg Daily; can increase dose if she becomes volume overloaded  - Monitor I/O, Weights        Hypertension  Has been hypertensive during hospital stay.   Plan:  -Administer home Amlodipine 2.5mg PO daily        VTE Risk Mitigation (From admission, onward)        Ordered     heparin (porcine) injection 5,000 Units  Every 8 hours      07/28/19 0551     IP VTE HIGH RISK PATIENT  Once      07/28/19 0551                Марина Puente MD  Department of Hospital Medicine   [facility]

## 2019-08-04 NOTE — SUBJECTIVE & OBJECTIVE
Interval History: patient remains comfortable on 2.5L NC and is awaiting surgery in the morning.     Review of Systems   Constitutional: Negative for activity change, appetite change, chills and fever.   HENT: Negative for congestion and sore throat.    Eyes: Negative for photophobia and visual disturbance.   Respiratory: Negative for cough and chest tightness.    Cardiovascular: Negative for chest pain and palpitations.   Gastrointestinal: Negative for abdominal distention, abdominal pain, constipation, diarrhea, nausea and vomiting.   Genitourinary: Negative for dysuria and hematuria.   Musculoskeletal: Negative for arthralgias and myalgias. Back pain: lower back pain, per patient caused by being bedridden    Skin: Negative for color change and rash.   Neurological: Negative for dizziness and headaches.     Objective:     Vital Signs (Most Recent):  Temp: 96.1 °F (35.6 °C) (08/04/19 0755)  Pulse: 87 (08/04/19 1120)  Resp: 18 (08/04/19 0755)  BP: (!) 112/58 (08/04/19 0755)  SpO2: (!) 92 % (08/04/19 1120) Vital Signs (24h Range):  Temp:  [96.1 °F (35.6 °C)-98.4 °F (36.9 °C)] 96.1 °F (35.6 °C)  Pulse:  [] 87  Resp:  [12-20] 18  SpO2:  [85 %-100 %] 92 %  BP: (112-141)/(57-66) 112/58     Weight: 40.8 kg (89 lb 15.2 oz)  Body mass index is 17.57 kg/m².    Intake/Output Summary (Last 24 hours) at 8/4/2019 1131  Last data filed at 8/4/2019 0900  Gross per 24 hour   Intake 1140 ml   Output 800 ml   Net 340 ml      Physical Exam   Constitutional: She appears well-developed and well-nourished. No distress.   Cardiovascular: Normal rate and regular rhythm.   Pulmonary/Chest: Effort normal. No respiratory distress.   Anterior pigtail in place, + air leak. On suction.    Abdominal: Soft. She exhibits no distension. There is no tenderness.   Neurological: She is alert.   Skin: Skin is warm and dry.   Psychiatric: She has a normal mood and affect. Her behavior is normal.       Significant Labs: All pertinent labs within the  past 24 hours have been reviewed.    Significant Imaging: I have reviewed all pertinent imaging results/findings within the past 24 hours.

## 2019-08-04 NOTE — PLAN OF CARE
Problem: Adult Inpatient Plan of Care  Goal: Plan of Care Review  Outcome: Ongoing (interventions implemented as appropriate)  Plan of Care reviewed w/ pt and family at bedside. AVSS on 2L NC. R chest tube to -20 sxn, air leak noted, team aware. Tolerating low sodium diet. Incontinent of urine, purewick in place draining CYU. OOB to chair w/ x1 assist. No c/o pain this shift. NPO and IVF ordered for MN tonight for OR tomorrow.

## 2019-08-04 NOTE — SUBJECTIVE & OBJECTIVE
Interval History: NAEON. Continues to have some intermittent SOB. CT remains with significant air leak, 100 out over 24 hours. On low sodium diet and tolerating well, no nausea or vomiting. Ambulating without difficulty. Good UOP.    Medications:  Continuous Infusions:   sodium chloride 0.9%       Scheduled Meds:   acetaminophen  1,000 mg Oral Q8H    amLODIPine  2.5 mg Oral Daily    aspirin  81 mg Oral Daily    ferrous sulfate  325 mg Oral Daily    furosemide  40 mg Oral Daily    heparin (porcine)  5,000 Units Subcutaneous Q8H    ipratropium  0.5 mg Nebulization Q6H    multivitamin  1 tablet Oral Daily    potassium chloride  10 mEq Oral Daily    rOPINIRole  0.25 mg Oral QHS    sodium chloride 0.9%  10 mL Intravenous Q6H    tiotropium  1 capsule Inhalation Daily     PRN Meds:albuterol, Dextrose 10% Bolus, Dextrose 10% Bolus, glucagon (human recombinant), glucose, glucose, HYDROcodone-acetaminophen, polyethylene glycol, sodium chloride 0.9%, sodium chloride 0.9%, Flushing PICC Protocol **AND** sodium chloride 0.9% **AND** sodium chloride 0.9%     Review of patient's allergies indicates:   Allergen Reactions    Ancef in dextrose (iso-osm) Rash    Cefazolin Rash    Cefuroxime Rash    Sulfamethoxazole-trimethoprim Rash     Other reaction(s): Rash     Objective:     Vital Signs (Most Recent):  Temp: 96.1 °F (35.6 °C) (08/04/19 0755)  Pulse: 82 (08/04/19 0755)  Resp: 18 (08/04/19 0755)  BP: (!) 112/58 (08/04/19 0755)  SpO2: (!) 93 % (08/04/19 0755) Vital Signs (24h Range):  Temp:  [96.1 °F (35.6 °C)-98.4 °F (36.9 °C)] 96.1 °F (35.6 °C)  Pulse:  [] 82  Resp:  [12-20] 18  SpO2:  [85 %-100 %] 93 %  BP: (112-142)/(57-66) 112/58     Intake/Output - Last 3 Shifts       08/02 0700 - 08/03 0659 08/03 0700 - 08/04 0659 08/04 0700 - 08/05 0659    P.O. 180 840 300    I.V. (mL/kg) 0 (0)      Other 0      Total Intake(mL/kg) 180 (4.4) 840 (20.6) 300 (7.4)    Urine (mL/kg/hr) 700 (0.7) 700 (0.7)     Emesis/NG  output 0      Other 0      Stool 0      Blood 0      Chest Tube  100     Total Output 700 800     Net -520 +40 +300           Urine Occurrence 0 x 3 x     Stool Occurrence 0 x 0 x     Emesis Occurrence 0 x            SpO2: (!) 93 %  O2 Device (Oxygen Therapy): nasal cannula    Physical Exam   Constitutional: She appears well-developed and well-nourished. No distress.   Cardiovascular: Normal rate and regular rhythm.   Pulmonary/Chest: Effort normal. No respiratory distress.   Anterior pigtail in place, + air leak. On suction.    Abdominal: Soft. She exhibits no distension. There is no tenderness.   Neurological: She is alert.   Skin: Skin is warm and dry.   Psychiatric: She has a normal mood and affect. Her behavior is normal.       Significant Labs:  CBC:   Recent Labs   Lab 08/04/19  0530   WBC 6.47   RBC 3.03*   HGB 9.4*   HCT 30.1*      MCV 99*   MCH 31.0   MCHC 31.2*     CMP:   Recent Labs   Lab 08/03/19  0424   GLU 77   CALCIUM 9.4      K 4.1   CO2 39*   CL 94*   BUN 28*   CREATININE 0.8       Significant Diagnostics:  CXR: I have reviewed all pertinent results/findings within the past 24 hours    VTE Risk Mitigation (From admission, onward)        Ordered     heparin (porcine) injection 5,000 Units  Every 8 hours      07/28/19 0551     IP VTE HIGH RISK PATIENT  Once      07/28/19 0574

## 2019-08-04 NOTE — PLAN OF CARE
Pt is AAOx3.Family at bedside. [place]. Pt needs max assistance ( x2 people)Telly monitoring on going. Standard precautions maintained.  No breakdown noted, po fluids encouraged.Pt remains injury and fall free, non skid footwear donned, call light within reach, personal items within reach, bed in low/locked position, pt able to voice needs all needs voiced have been met at this time.

## 2019-08-04 NOTE — PROGRESS NOTES
[facility]  Thoracic Surgery  Progress Note    Subjective:       Interval History: GUILLERMINA. Continues to have some intermittent SOB. CT remains with significant air leak, 100 out over 24 hours. On low sodium diet and tolerating well, no nausea or vomiting. Ambulating without difficulty. Good UOP.    Medications:  Continuous Infusions:   sodium chloride 0.9%       Scheduled Meds:   acetaminophen  1,000 mg Oral Q8H    amLODIPine  2.5 mg Oral Daily    aspirin  81 mg Oral Daily    ferrous sulfate  325 mg Oral Daily    furosemide  40 mg Oral Daily    heparin (porcine)  5,000 Units Subcutaneous Q8H    ipratropium  0.5 mg Nebulization Q6H    multivitamin  1 tablet Oral Daily    potassium chloride  10 mEq Oral Daily    rOPINIRole  0.25 mg Oral QHS    sodium chloride 0.9%  10 mL Intravenous Q6H    tiotropium  1 capsule Inhalation Daily     PRN Meds:albuterol, Dextrose 10% Bolus, Dextrose 10% Bolus, glucagon (human recombinant), glucose, glucose, HYDROcodone-acetaminophen, polyethylene glycol, sodium chloride 0.9%, sodium chloride 0.9%, Flushing PICC Protocol **AND** sodium chloride 0.9% **AND** sodium chloride 0.9%     Review of patient's allergies indicates:   Allergen Reactions    Ancef in dextrose (iso-osm) Rash    Cefazolin Rash    Cefuroxime Rash    Sulfamethoxazole-trimethoprim Rash     Other reaction(s): Rash     Objective:     Vital Signs (Most Recent):  Temp: 96.1 °F (35.6 °C) (08/04/19 0755)  Pulse: 82 (08/04/19 0755)  Resp: 18 (08/04/19 0755)  BP: (!) 112/58 (08/04/19 0755)  SpO2: (!) 93 % (08/04/19 0755) Vital Signs (24h Range):  Temp:  [96.1 °F (35.6 °C)-98.4 °F (36.9 °C)] 96.1 °F (35.6 °C)  Pulse:  [] 82  Resp:  [12-20] 18  SpO2:  [85 %-100 %] 93 %  BP: (112-142)/(57-66) 112/58     Intake/Output - Last 3 Shifts       08/02 0700 - 08/03 0659 08/03 0700 - 08/04 0659 08/04 0700 - 08/05 0659    P.O. 180 840 300    I.V. (mL/kg) 0 (0)      Other 0      Total Intake(mL/kg) 180  (4.4) 840 (20.6) 300 (7.4)    Urine (mL/kg/hr) 700 (0.7) 700 (0.7)     Emesis/NG output 0      Other 0      Stool 0      Blood 0      Chest Tube  100     Total Output 700 800     Net -520 +40 +300           Urine Occurrence 0 x 3 x     Stool Occurrence 0 x 0 x     Emesis Occurrence 0 x            SpO2: (!) 93 %  O2 Device (Oxygen Therapy): nasal cannula    Physical Exam   Constitutional: She appears well-developed and well-nourished. No distress.   Cardiovascular: Normal rate and regular rhythm.   Pulmonary/Chest: Effort normal. No respiratory distress.   Anterior pigtail in place, + air leak. On suction.    Abdominal: Soft. She exhibits no distension. There is no tenderness.   Neurological: She is alert.   Skin: Skin is warm and dry.   Psychiatric: She has a normal mood and affect. Her behavior is normal.       Significant Labs:  CBC:   Recent Labs   Lab 08/04/19  0530   WBC 6.47   RBC 3.03*   HGB 9.4*   HCT 30.1*      MCV 99*   MCH 31.0   MCHC 31.2*     CMP:   Recent Labs   Lab 08/03/19  0424   GLU 77   CALCIUM 9.4      K 4.1   CO2 39*   CL 94*   BUN 28*   CREATININE 0.8       Significant Diagnostics:  CXR: I have reviewed all pertinent results/findings within the past 24 hours    VTE Risk Mitigation (From admission, onward)        Ordered     heparin (porcine) injection 5,000 Units  Every 8 hours      07/28/19 0551     IP VTE HIGH RISK PATIENT  Once      07/28/19 0551        Assessment/Plan:     * Pneumothorax on right  - Continue pigtail on suction  - Consented this morning for right VATS with pleurodesis tomorrow (8/5)  - Daily CXR  - Pulmonary toilet   - NPO at midnight Erik peres MD  Thoracic Surgery  [facility]

## 2019-08-05 ENCOUNTER — ANESTHESIA (OUTPATIENT)
Dept: SURGERY | Facility: HOSPITAL | Age: 84
DRG: 163 | End: 2019-08-05
Payer: MEDICARE

## 2019-08-05 LAB
ALBUMIN SERPL BCP-MCNC: 2.3 G/DL (ref 3.5–5.2)
ALP SERPL-CCNC: 74 U/L (ref 55–135)
ALT SERPL W/O P-5'-P-CCNC: 12 U/L (ref 10–44)
ANION GAP SERPL CALC-SCNC: 7 MMOL/L (ref 8–16)
AST SERPL-CCNC: 21 U/L (ref 10–40)
BASOPHILS # BLD AUTO: 0.06 K/UL (ref 0–0.2)
BASOPHILS NFR BLD: 0.9 % (ref 0–1.9)
BILIRUB SERPL-MCNC: 0.4 MG/DL (ref 0.1–1)
BUN SERPL-MCNC: 20 MG/DL (ref 8–23)
CALCIUM SERPL-MCNC: 9.7 MG/DL (ref 8.7–10.5)
CHLORIDE SERPL-SCNC: 95 MMOL/L (ref 95–110)
CO2 SERPL-SCNC: 38 MMOL/L (ref 23–29)
CREAT SERPL-MCNC: 0.8 MG/DL (ref 0.5–1.4)
DIFFERENTIAL METHOD: ABNORMAL
EOSINOPHIL # BLD AUTO: 0.6 K/UL (ref 0–0.5)
EOSINOPHIL NFR BLD: 9.6 % (ref 0–8)
ERYTHROCYTE [DISTWIDTH] IN BLOOD BY AUTOMATED COUNT: 15 % (ref 11.5–14.5)
EST. GFR  (AFRICAN AMERICAN): >60 ML/MIN/1.73 M^2
EST. GFR  (NON AFRICAN AMERICAN): >60 ML/MIN/1.73 M^2
GLUCOSE SERPL-MCNC: 85 MG/DL (ref 70–110)
HCT VFR BLD AUTO: 29.9 % (ref 37–48.5)
HGB BLD-MCNC: 9.4 G/DL (ref 12–16)
IMM GRANULOCYTES # BLD AUTO: 0.09 K/UL (ref 0–0.04)
IMM GRANULOCYTES NFR BLD AUTO: 1.4 % (ref 0–0.5)
LYMPHOCYTES # BLD AUTO: 1.4 K/UL (ref 1–4.8)
LYMPHOCYTES NFR BLD: 21.2 % (ref 18–48)
MAGNESIUM SERPL-MCNC: 1.8 MG/DL (ref 1.6–2.6)
MCH RBC QN AUTO: 31 PG (ref 27–31)
MCHC RBC AUTO-ENTMCNC: 31.4 G/DL (ref 32–36)
MCV RBC AUTO: 99 FL (ref 82–98)
MONOCYTES # BLD AUTO: 0.9 K/UL (ref 0.3–1)
MONOCYTES NFR BLD: 14.3 % (ref 4–15)
NEUTROPHILS # BLD AUTO: 3.4 K/UL (ref 1.8–7.7)
NEUTROPHILS NFR BLD: 52.6 % (ref 38–73)
NRBC BLD-RTO: 0 /100 WBC
PLATELET # BLD AUTO: 199 K/UL (ref 150–350)
PMV BLD AUTO: 9.9 FL (ref 9.2–12.9)
POTASSIUM SERPL-SCNC: 4.3 MMOL/L (ref 3.5–5.1)
PROT SERPL-MCNC: 5 G/DL (ref 6–8.4)
RBC # BLD AUTO: 3.03 M/UL (ref 4–5.4)
SODIUM SERPL-SCNC: 140 MMOL/L (ref 136–145)
WBC # BLD AUTO: 6.45 K/UL (ref 3.9–12.7)

## 2019-08-05 PROCEDURE — 27201423 OPTIME MED/SURG SUP & DEVICES STERILE SUPPLY: Performed by: THORACIC SURGERY (CARDIOTHORACIC VASCULAR SURGERY)

## 2019-08-05 PROCEDURE — C9290 INJ, BUPIVACAINE LIPOSOME: HCPCS | Performed by: THORACIC SURGERY (CARDIOTHORACIC VASCULAR SURGERY)

## 2019-08-05 PROCEDURE — 94761 N-INVAS EAR/PLS OXIMETRY MLT: CPT

## 2019-08-05 PROCEDURE — D9220A PRA ANESTHESIA: ICD-10-PCS | Mod: CRNA,,, | Performed by: REGISTERED NURSE

## 2019-08-05 PROCEDURE — 99232 SBSQ HOSP IP/OBS MODERATE 35: CPT | Mod: GC,,,

## 2019-08-05 PROCEDURE — 71000033 HC RECOVERY, INTIAL HOUR: Performed by: THORACIC SURGERY (CARDIOTHORACIC VASCULAR SURGERY)

## 2019-08-05 PROCEDURE — 85025 COMPLETE CBC W/AUTO DIFF WBC: CPT

## 2019-08-05 PROCEDURE — 63600175 PHARM REV CODE 636 W HCPCS: Performed by: NURSE ANESTHETIST, CERTIFIED REGISTERED

## 2019-08-05 PROCEDURE — 20600001 HC STEP DOWN PRIVATE ROOM

## 2019-08-05 PROCEDURE — 63600175 PHARM REV CODE 636 W HCPCS: Performed by: REGISTERED NURSE

## 2019-08-05 PROCEDURE — 63600175 PHARM REV CODE 636 W HCPCS: Performed by: STUDENT IN AN ORGANIZED HEALTH CARE EDUCATION/TRAINING PROGRAM

## 2019-08-05 PROCEDURE — 71000039 HC RECOVERY, EACH ADD'L HOUR: Performed by: THORACIC SURGERY (CARDIOTHORACIC VASCULAR SURGERY)

## 2019-08-05 PROCEDURE — 37000009 HC ANESTHESIA EA ADD 15 MINS: Performed by: THORACIC SURGERY (CARDIOTHORACIC VASCULAR SURGERY)

## 2019-08-05 PROCEDURE — 63600175 PHARM REV CODE 636 W HCPCS: Performed by: THORACIC SURGERY (CARDIOTHORACIC VASCULAR SURGERY)

## 2019-08-05 PROCEDURE — 37000008 HC ANESTHESIA 1ST 15 MINUTES: Performed by: THORACIC SURGERY (CARDIOTHORACIC VASCULAR SURGERY)

## 2019-08-05 PROCEDURE — 25000003 PHARM REV CODE 250: Performed by: REGISTERED NURSE

## 2019-08-05 PROCEDURE — C1729 CATH, DRAINAGE: HCPCS | Performed by: THORACIC SURGERY (CARDIOTHORACIC VASCULAR SURGERY)

## 2019-08-05 PROCEDURE — 99232 PR SUBSEQUENT HOSPITAL CARE,LEVL II: ICD-10-PCS | Mod: GC,,,

## 2019-08-05 PROCEDURE — 83735 ASSAY OF MAGNESIUM: CPT

## 2019-08-05 PROCEDURE — 80053 COMPREHEN METABOLIC PANEL: CPT

## 2019-08-05 PROCEDURE — 97530 THERAPEUTIC ACTIVITIES: CPT

## 2019-08-05 PROCEDURE — 36000711: Performed by: THORACIC SURGERY (CARDIOTHORACIC VASCULAR SURGERY)

## 2019-08-05 PROCEDURE — D9220A PRA ANESTHESIA: Mod: ANES,,, | Performed by: ANESTHESIOLOGY

## 2019-08-05 PROCEDURE — 36000710: Performed by: THORACIC SURGERY (CARDIOTHORACIC VASCULAR SURGERY)

## 2019-08-05 PROCEDURE — S0077 INJECTION, CLINDAMYCIN PHOSP: HCPCS | Performed by: REGISTERED NURSE

## 2019-08-05 PROCEDURE — 27000221 HC OXYGEN, UP TO 24 HOURS

## 2019-08-05 PROCEDURE — 25000242 PHARM REV CODE 250 ALT 637 W/ HCPCS: Performed by: STUDENT IN AN ORGANIZED HEALTH CARE EDUCATION/TRAINING PROGRAM

## 2019-08-05 PROCEDURE — D9220A PRA ANESTHESIA: Mod: CRNA,,, | Performed by: REGISTERED NURSE

## 2019-08-05 PROCEDURE — 36620 INSERTION CATHETER ARTERY: CPT | Mod: 59,,, | Performed by: ANESTHESIOLOGY

## 2019-08-05 PROCEDURE — 94640 AIRWAY INHALATION TREATMENT: CPT

## 2019-08-05 PROCEDURE — D9220A PRA ANESTHESIA: ICD-10-PCS | Mod: ANES,,, | Performed by: ANESTHESIOLOGY

## 2019-08-05 PROCEDURE — 32650 PR THORACOSCOPY SURG W/PLEURODESIS: ICD-10-PCS | Mod: RT,,, | Performed by: THORACIC SURGERY (CARDIOTHORACIC VASCULAR SURGERY)

## 2019-08-05 PROCEDURE — 36620 ARTERIAL: ICD-10-PCS | Mod: 59,,, | Performed by: ANESTHESIOLOGY

## 2019-08-05 PROCEDURE — 25000003 PHARM REV CODE 250: Performed by: NURSE ANESTHETIST, CERTIFIED REGISTERED

## 2019-08-05 PROCEDURE — 32650 THORACOSCOPY W/PLEURODESIS: CPT | Mod: RT,,, | Performed by: THORACIC SURGERY (CARDIOTHORACIC VASCULAR SURGERY)

## 2019-08-05 RX ORDER — ONDANSETRON 2 MG/ML
INJECTION INTRAMUSCULAR; INTRAVENOUS
Status: DISCONTINUED | OUTPATIENT
Start: 2019-08-05 | End: 2019-08-05

## 2019-08-05 RX ORDER — CLINDAMYCIN PHOSPHATE 900 MG/50ML
INJECTION, SOLUTION INTRAVENOUS
Status: DISCONTINUED | OUTPATIENT
Start: 2019-08-05 | End: 2019-08-05

## 2019-08-05 RX ORDER — LIDOCAINE HCL/PF 100 MG/5ML
SYRINGE (ML) INTRAVENOUS
Status: DISCONTINUED | OUTPATIENT
Start: 2019-08-05 | End: 2019-08-05

## 2019-08-05 RX ORDER — ROCURONIUM BROMIDE 10 MG/ML
INJECTION, SOLUTION INTRAVENOUS
Status: DISCONTINUED | OUTPATIENT
Start: 2019-08-05 | End: 2019-08-05

## 2019-08-05 RX ORDER — SODIUM CHLORIDE 9 MG/ML
INJECTION, SOLUTION INTRAVENOUS CONTINUOUS
Status: ACTIVE | OUTPATIENT
Start: 2019-08-05 | End: 2019-08-06

## 2019-08-05 RX ORDER — SODIUM CHLORIDE 9 MG/ML
INJECTION, SOLUTION INTRAVENOUS CONTINUOUS PRN
Status: DISCONTINUED | OUTPATIENT
Start: 2019-08-05 | End: 2019-08-05

## 2019-08-05 RX ORDER — PHENYLEPHRINE HYDROCHLORIDE 10 MG/ML
INJECTION INTRAVENOUS
Status: DISCONTINUED | OUTPATIENT
Start: 2019-08-05 | End: 2019-08-05

## 2019-08-05 RX ORDER — ETOMIDATE 2 MG/ML
INJECTION INTRAVENOUS
Status: DISCONTINUED | OUTPATIENT
Start: 2019-08-05 | End: 2019-08-05

## 2019-08-05 RX ORDER — FENTANYL CITRATE 50 UG/ML
INJECTION, SOLUTION INTRAMUSCULAR; INTRAVENOUS
Status: DISCONTINUED | OUTPATIENT
Start: 2019-08-05 | End: 2019-08-05

## 2019-08-05 RX ORDER — ACETAMINOPHEN 325 MG/1
650 TABLET ORAL EVERY 8 HOURS PRN
Status: DISCONTINUED | OUTPATIENT
Start: 2019-08-05 | End: 2019-08-09 | Stop reason: HOSPADM

## 2019-08-05 RX ADMIN — CLINDAMYCIN PHOSPHATE 900 MG: 18 INJECTION, SOLUTION INTRAVENOUS at 03:08

## 2019-08-05 RX ADMIN — ROCURONIUM BROMIDE 20 MG: 10 INJECTION, SOLUTION INTRAVENOUS at 03:08

## 2019-08-05 RX ADMIN — PHENYLEPHRINE HYDROCHLORIDE 200 MCG: 10 INJECTION INTRAVENOUS at 03:08

## 2019-08-05 RX ADMIN — ONDANSETRON 4 MG: 2 INJECTION INTRAMUSCULAR; INTRAVENOUS at 04:08

## 2019-08-05 RX ADMIN — IPRATROPIUM BROMIDE 0.5 MG: 0.5 SOLUTION RESPIRATORY (INHALATION) at 12:08

## 2019-08-05 RX ADMIN — ROCURONIUM BROMIDE 30 MG: 10 INJECTION, SOLUTION INTRAVENOUS at 02:08

## 2019-08-05 RX ADMIN — FENTANYL CITRATE 100 MCG: 50 INJECTION, SOLUTION INTRAMUSCULAR; INTRAVENOUS at 02:08

## 2019-08-05 RX ADMIN — ETOMIDATE 8 MG: 2 INJECTION, SOLUTION INTRAVENOUS at 02:08

## 2019-08-05 RX ADMIN — SODIUM CHLORIDE: 0.9 INJECTION, SOLUTION INTRAVENOUS at 02:08

## 2019-08-05 RX ADMIN — PHENYLEPHRINE HYDROCHLORIDE 100 MCG: 10 INJECTION INTRAVENOUS at 02:08

## 2019-08-05 RX ADMIN — SODIUM CHLORIDE: 0.9 INJECTION, SOLUTION INTRAVENOUS at 08:08

## 2019-08-05 RX ADMIN — IPRATROPIUM BROMIDE 0.5 MG: 0.5 SOLUTION RESPIRATORY (INHALATION) at 01:08

## 2019-08-05 RX ADMIN — LIDOCAINE HYDROCHLORIDE 60 MG: 20 INJECTION, SOLUTION INTRAVENOUS at 02:08

## 2019-08-05 RX ADMIN — SODIUM CHLORIDE 0.5 MCG/KG/MIN: 9 INJECTION, SOLUTION INTRAVENOUS at 03:08

## 2019-08-05 RX ADMIN — IPRATROPIUM BROMIDE 0.5 MG: 0.5 SOLUTION RESPIRATORY (INHALATION) at 08:08

## 2019-08-05 RX ADMIN — PHENYLEPHRINE HYDROCHLORIDE 100 MCG: 10 INJECTION INTRAVENOUS at 04:08

## 2019-08-05 RX ADMIN — SUGAMMADEX 162 MG: 100 INJECTION, SOLUTION INTRAVENOUS at 05:08

## 2019-08-05 RX ADMIN — SODIUM CHLORIDE: 0.9 INJECTION, SOLUTION INTRAVENOUS at 12:08

## 2019-08-05 NOTE — ANESTHESIA PROCEDURE NOTES
Arterial    Diagnosis: Spontaneous pneumo    Patient location during procedure: done in OR  Procedure start time: 8/5/2019 3:26 PM  Timeout: 8/5/2019 3:25 PM  Procedure end time: 8/5/2019 3:27 PM    Staffing  Authorizing Provider: Jacky Groves MD  Performing Provider: Salvador Cloud CRNA    Staffing  Other anesthesia staff: Ac Patel CRNA  Anesthesiologist was present at the time of the procedure.    Preanesthetic Checklist  Completed: patient identified, site marked, surgical consent, pre-op evaluation, timeout performed, IV checked, risks and benefits discussed, monitors and equipment checked and anesthesia consent givenArterial  Skin Prep: chlorhexidine gluconate  Local Infiltration: none  Orientation: left  Location: radial  Catheter Size: 20 G  Catheter placement by Anatomical landmarks. Heme positive aspiration all ports.Insertion Attempts: 2  Assessment  Dressing: tegaderm  Patient: Tolerated well

## 2019-08-05 NOTE — BRIEF OP NOTE
[facility]  Brief Operative Note    SUMMARY     Surgery Date: 8/5/2019     Surgeon(s) and Role:     * Klever Mckeon MD - Primary     * Charisse Foley MD - Resident - Assisting        Pre-op Diagnosis:  Pneumothorax on right [J93.9]  SOB (shortness of breath) [R06.02]    Post-op Diagnosis:  Post-Op Diagnosis Codes:     * Pneumothorax on right [J93.9]     * SOB (shortness of breath) [R06.02]    Procedure(s) (LRB):  VATS, WITH PLEURAL TENT (Right)  PLEURODESIS, USING TALC (Right)    Anesthesia: General    Description of Procedure:   Right VATS with takedown of multiple adhesions, pleural stripping at apex and pleurodesis with talc    Estimated Blood Loss: 20ml         Specimens:   Specimen (12h ago, onward)    None        Charisse Foley MD, PGY-4  General Surgery  [phone]

## 2019-08-05 NOTE — PROGRESS NOTES
[facility] Hospital Medicine  Progress Note    Patient Name: Venus Mayer  MRN: [id number]  Patient Class: IP- Inpatient   Admission Date: 7/27/2019  Length of Stay: 8 days  Attending Physician: Enrique Hill MD  Primary Care Provider: Jona Che MD    [facility] Medicine Team: [facility] HOSP MED 1 Марина Puente MD    Subjective:     Principal Problem:Pneumothorax on right      HPI:  Venus Mayer is 89 y.o. lady with COPD, (on 2.5 L home O2), 40 pack year smoking history (quit 30 years ago), multiple readmissions for spontaneous pneumothorax, chronic dCHFS/P AICD, PVD/carotid artery stenosis s/p L CEA 08,was brought to the ED because she was SOB.    She was Discharged from [facility] on 7/26/19 after being admitted for pneumothorax, improved after placing CT with return to baseline pulmonary function on discharge.  Then around evening of 7/27 started feeling SOB, not relieved with her Inhaler or Nebulization, progressively worse,  prompting her to come to the Hospital. No chest pain, fever , confusion.    In the ED she was hypoxic, CXR revelaed Right sided Pneumothorax. CTS was consulted and placed a Pig tail catheter, after repositioning showed air leak and patient symptomatically improved and is currently on a non re breather.     The patient's recurrent PTXs seem to have started during her admission on 05/22/2019 where she presented with a large R sided PTX requiring chest tube and pleurodesis on 5/27/2019. Since, she has had a small recurrence of PTX on 6/25/19 not requiring intervention, and admission to OSH 7/6-7/11 for acute on chronic dCHF and COPD exacerbation. She has been less mobile and uses a walker at home.     Overview/Hospital Course:   8/5/2019- patient was NPO this morning awaiting CTS VATS for pneumothorax repair later today.    Interval History: Patient remained comfortable and has been NPO since midnight awaiting surgery later today.     Review of Systems   Constitutional:  Negative for activity change, appetite change, chills and fever.   HENT: Negative for congestion and sore throat.    Eyes: Negative for photophobia and visual disturbance.   Respiratory: Negative for cough and chest tightness.    Cardiovascular: Negative for chest pain and palpitations.   Gastrointestinal: Negative for abdominal distention, abdominal pain, constipation, diarrhea, nausea and vomiting.   Genitourinary: Negative for dysuria and hematuria.   Musculoskeletal: Negative for arthralgias and myalgias. Back pain: lower back pain, per patient caused by being bedridden    Skin: Negative for color change and rash.   Neurological: Negative for dizziness and headaches.     Objective:     Vital Signs (Most Recent):  Temp: 97.6 °F (36.4 °C) (08/05/19 1339)  Pulse: 93 (08/05/19 1339)  Resp: 18 (08/05/19 1339)  BP: (!) 153/66 (08/05/19 1339)  SpO2: 95 % (08/05/19 1339) Vital Signs (24h Range):  Temp:  [97 °F (36.1 °C)-97.6 °F (36.4 °C)] 97.6 °F (36.4 °C)  Pulse:  [] 93  Resp:  [16-18] 18  SpO2:  [89 %-100 %] 95 %  BP: (125-155)/(57-78) 153/66     Weight: 40.4 kg (89 lb)  Body mass index is 17.38 kg/m².    Intake/Output Summary (Last 24 hours) at 8/5/2019 1454  Last data filed at 8/5/2019 0800  Gross per 24 hour   Intake 970 ml   Output 450 ml   Net 520 ml      Physical Exam   Constitutional: She appears well-nourished. No distress.   Eyes: Pupils are equal, round, and reactive to light. EOM are normal.   Neck: Normal range of motion. Neck supple.   Cardiovascular: Normal rate and regular rhythm.   Pulmonary/Chest: Effort normal. No respiratory distress.   Abdominal: Soft. She exhibits no distension. There is no tenderness.   Musculoskeletal: Normal range of motion.   Neurological: She is alert.   Skin: Skin is warm and dry.   Psychiatric: She has a normal mood and affect. Her behavior is normal.       Significant Labs: All pertinent labs within the past 24 hours have been reviewed.    Significant Imaging: I have  reviewed all pertinent imaging results/findings within the past 24 hours.      Assessment/Plan:      * Pneumothorax on right  Patient with COPD (on 2.5 L home oxygen) and with multiple pneumothoraxes s/p pleurodesis and chest tube placement last week presented with dyspnea and hypoxia. CXR demonstrated R pneumothorax.   - Patient known to CTS Service; re-consulted now for management  - Pleurodesis performed on 7/31, CTS was going to place endobronchial valve but were unable to close the leak so they performed a pleurodesis.   -Obtaining daily CXR to monitor pneumothorax, improving.    Plan:  -Patient to be take by CT surgery today for surgical repair of persistent pneumothorax  -monitor patient's hemodynamic status  -pain management     Restless leg syndrome  Continue home Ropinirole     COLD (chronic obstructive lung disease)  Patient with COPD on 2.5 L of home oxygen.     Plan:  - Continue Tiotropium   - Duonebs as needed  - Continue Supplemental oxygen      Heart failure, diastolic  ECHO on 7/19 showed EF of 65% with DD, PA pressure of 52 and several valvular abnormalities.   BNP - 1414, but not volume currently overloaded.     Plan:  - Continue Home Lasix 40mg Daily; can increase dose if she becomes volume overloaded  - Monitor I/O, Weights        Hypertension  Has been hypertensive during hospital stay.   Plan:  -Administer home Amlodipine 2.5mg PO daily        VTE Risk Mitigation (From admission, onward)        Ordered     heparin (porcine) injection 5,000 Units  Every 8 hours      07/28/19 0551     IP VTE HIGH RISK PATIENT  Once      07/28/19 0551                Марина Puente MD  Department of Hospital Medicine   [facility]

## 2019-08-05 NOTE — PLAN OF CARE
Problem: Physical Therapy Goal  Goal: Physical Therapy Goal  Goals to be met by: 2019    Patient will increase functional independence with mobility by performin. Supine to sit with Set-up [place]  2. Sit to supine with Set-up [place]  3. Sit to stand transfer with Stand-by Assistance  4. Gait  x 100 feet with Stand-by Assistance using Rolling Walker.   5. Ascend/descend 2 stair with right Handrails Supervision using no assistive device.      Outcome: Ongoing (interventions implemented as appropriate)  Patient progressing appropriately towards current goals and plan of care remains appropriate at this time.     Ezequiel Doe, PT, DPT  2019  Pager #: [phone]

## 2019-08-05 NOTE — TRANSFER OF CARE
Anesthesia Transfer of Care Note    Patient: Venus Mayer    Procedure(s) Performed: Procedure(s) (LRB):  VATS, WITH PLEURAL TENT (Right)  PLEURODESIS, USING TALC (Right)    Patient location: PACU    Anesthesia Type: general    Transport from OR: Transported from OR on 6-10 L/min O2 by face mask with adequate spontaneous ventilation    Post pain: adequate analgesia    Post assessment: tolerated procedure well and no apparent anesthetic complications    Post vital signs: stable    Level of consciousness: awake, alert and oriented    Nausea/Vomiting: no nausea/vomiting    Complications: none    Transfer of care protocol was followed      Last vitals:   Visit Vitals  /64 (BP Location: Right arm, Patient Position: Lying)   Pulse 77   Temp 36 °C (96.8 °F) (Axillary)   Resp 18   Ht 5' (1.524 m)   Wt 40.4 kg (89 lb)   LMP  (LMP Unknown)   SpO2 100%   Breastfeeding? No   BMI 17.38 kg/m²

## 2019-08-05 NOTE — ASSESSMENT & PLAN NOTE
Patient with COPD (on 2.5 L home oxygen) and with multiple pneumothoraxes s/p pleurodesis and chest tube placement last week presented with dyspnea and hypoxia. CXR demonstrated R pneumothorax.   - Patient known to CTS Service; re-consulted now for management  - Patient is a poor surgical candidate due to cardiac comorbidities  - Pleurodesis performed on 7/31, CTS was going to place endobronchial valve but were unable to close the leak so they performed a pleurodesis.   -Obtaining daily CXR to monitor pneumothorax, improving.  - Per CTS, if repeat pleurodesis fails, they will place a blood patch over the weekend, for which she will need a PICC line.      Plan:  -Patient to be take by CT surgery today for surgical repair of persistent pneumothorax  -monitor patient's hemodynamic status  -pain management

## 2019-08-05 NOTE — PROGRESS NOTES
Spoke with Janay in pacemaker clinic. Pt has medtronic packemaker in place. Janay stated that magnet is only needed if cautery is used.

## 2019-08-05 NOTE — SUBJECTIVE & OBJECTIVE
Interval History:   NAEON. Still with air leak on chest tube. Chest tube with 100ml output in 24hr.    Medications:  Continuous Infusions:   sodium chloride 0.9% 75 mL/hr at 08/05/19 0008     Scheduled Meds:   acetaminophen  1,000 mg Oral Q8H    amLODIPine  2.5 mg Oral Daily    aspirin  81 mg Oral Daily    ferrous sulfate  325 mg Oral Daily    furosemide  40 mg Oral Daily    heparin (porcine)  5,000 Units Subcutaneous Q8H    ipratropium  0.5 mg Nebulization Q6H    multivitamin  1 tablet Oral Daily    potassium chloride  10 mEq Oral Daily    rOPINIRole  0.25 mg Oral QHS    sodium chloride 0.9%  10 mL Intravenous Q6H    tiotropium  1 capsule Inhalation Daily     PRN Meds:albuterol, Dextrose 10% Bolus, Dextrose 10% Bolus, glucagon (human recombinant), glucose, glucose, HYDROcodone-acetaminophen, polyethylene glycol, sodium chloride 0.9%, sodium chloride 0.9%, Flushing PICC Protocol **AND** sodium chloride 0.9% **AND** sodium chloride 0.9%     Review of patient's allergies indicates:   Allergen Reactions    Ancef in dextrose (iso-osm) Rash    Cefazolin Rash    Cefuroxime Rash    Sulfamethoxazole-trimethoprim Rash     Other reaction(s): Rash     Objective:     Vital Signs (Most Recent):  Temp: 97.5 °F (36.4 °C) (08/05/19 0358)  Pulse: 81 (08/05/19 0358)  Resp: 18 (08/05/19 0358)  BP: (!) 126/57 (08/05/19 0358)  SpO2: (!) 93 % (08/05/19 0433) Vital Signs (24h Range):  Temp:  [96.1 °F (35.6 °C)-97.5 °F (36.4 °C)] 97.5 °F (36.4 °C)  Pulse:  [] 81  Resp:  [15-18] 18  SpO2:  [89 %-99 %] 93 %  BP: (109-152)/(55-66) 126/57     Intake/Output - Last 3 Shifts       08/03 0700 - 08/04 0659 08/04 0700 - 08/05 0659    P.O. 840 1280    I.V. (mL/kg)  440 (10.8)    Total Intake(mL/kg) 840 (20.6) 1720 (42.2)    Urine (mL/kg/hr) 700 (0.7) 50 (0.1)    Stool  0    Chest Tube 100 100    Total Output 800 150    Net +40 +1570          Urine Occurrence 3 x 4 x    Stool Occurrence 0 x 0 x          SpO2: (!) 93 %  O2 Device  (Oxygen Therapy): nasal cannula    Physical Exam   Constitutional: She appears well-nourished. No distress.   Cardiovascular: Normal rate and regular rhythm.   Pulmonary/Chest: Effort normal. No respiratory distress.   Abdominal: Soft. She exhibits no distension. There is no tenderness.   Neurological: She is alert.   Skin: Skin is warm and dry.   Psychiatric: She has a normal mood and affect. Her behavior is normal.       Significant Labs:  CBC:   Recent Labs   Lab 08/05/19  0419   WBC 6.45   RBC 3.03*   HGB 9.4*   HCT 29.9*      MCV 99*   MCH 31.0   MCHC 31.4*       VTE Risk Mitigation (From admission, onward)        Ordered     heparin (porcine) injection 5,000 Units  Every 8 hours      07/28/19 0551     IP VTE HIGH RISK PATIENT  Once      07/28/19 0551

## 2019-08-05 NOTE — PROGRESS NOTES
[facility]  Thoracic Surgery  Progress Note    Subjective:     History of Present Illness:  Venus Mayer is a 89 y.o. female with a hx of COPD and multiple readmissions for spontaneous pneumothorax. Numerous attempts at pleurodesis have been performed. Patient is not a surgical candidate. She presents to the ED with acute onset shortness of breath. Imaging showed large right sided pneumothorax. Pt otherwise stable.      No current facility-administered medications on file prior to encounter.        Post-Op Info:  Procedure(s) (LRB):  BRONCHOSCOPY, FIBEROPTIC Flexible (N/A)  PLEURODESIS (N/A)   5 Days Post-Op     Interval History:   NAEON. Still with air leak on chest tube. Chest tube with 100ml output in 24hr.    Medications:  Continuous Infusions:   sodium chloride 0.9% 75 mL/hr at 08/05/19 0008     Scheduled Meds:   acetaminophen  1,000 mg Oral Q8H    amLODIPine  2.5 mg Oral Daily    aspirin  81 mg Oral Daily    ferrous sulfate  325 mg Oral Daily    furosemide  40 mg Oral Daily    heparin (porcine)  5,000 Units Subcutaneous Q8H    ipratropium  0.5 mg Nebulization Q6H    multivitamin  1 tablet Oral Daily    potassium chloride  10 mEq Oral Daily    rOPINIRole  0.25 mg Oral QHS    sodium chloride 0.9%  10 mL Intravenous Q6H    tiotropium  1 capsule Inhalation Daily     PRN Meds:albuterol, Dextrose 10% Bolus, Dextrose 10% Bolus, glucagon (human recombinant), glucose, glucose, HYDROcodone-acetaminophen, polyethylene glycol, sodium chloride 0.9%, sodium chloride 0.9%, Flushing PICC Protocol **AND** sodium chloride 0.9% **AND** sodium chloride 0.9%     Review of patient's allergies indicates:   Allergen Reactions    Ancef in dextrose (iso-osm) Rash    Cefazolin Rash    Cefuroxime Rash    Sulfamethoxazole-trimethoprim Rash     Other reaction(s): Rash     Objective:     Vital Signs (Most Recent):  Temp: 97.5 °F (36.4 °C) (08/05/19 0358)  Pulse: 81 (08/05/19 0358)  Resp: 18 (08/05/19  0358)  BP: (!) 126/57 (08/05/19 0358)  SpO2: (!) 93 % (08/05/19 0433) Vital Signs (24h Range):  Temp:  [96.1 °F (35.6 °C)-97.5 °F (36.4 °C)] 97.5 °F (36.4 °C)  Pulse:  [] 81  Resp:  [15-18] 18  SpO2:  [89 %-99 %] 93 %  BP: (109-152)/(55-66) 126/57     Intake/Output - Last 3 Shifts       08/03 0700 - 08/04 0659 08/04 0700 - 08/05 0659    P.O. 840 1280    I.V. (mL/kg)  440 (10.8)    Total Intake(mL/kg) 840 (20.6) 1720 (42.2)    Urine (mL/kg/hr) 700 (0.7) 50 (0.1)    Stool  0    Chest Tube 100 100    Total Output 800 150    Net +40 +1570          Urine Occurrence 3 x 4 x    Stool Occurrence 0 x 0 x          SpO2: (!) 93 %  O2 Device (Oxygen Therapy): nasal cannula    Physical Exam   Constitutional: She appears well-nourished. No distress.   Cardiovascular: Normal rate and regular rhythm.   Pulmonary/Chest: Effort normal. No respiratory distress.   Abdominal: Soft. She exhibits no distension. There is no tenderness.   Neurological: She is alert.   Skin: Skin is warm and dry.   Psychiatric: She has a normal mood and affect. Her behavior is normal.       Significant Labs:  CBC:   Recent Labs   Lab 08/05/19  0419   WBC 6.45   RBC 3.03*   HGB 9.4*   HCT 29.9*      MCV 99*   MCH 31.0   MCHC 31.4*       VTE Risk Mitigation (From admission, onward)        Ordered     heparin (porcine) injection 5,000 Units  Every 8 hours      07/28/19 0551     IP VTE HIGH RISK PATIENT  Once      07/28/19 0514        Assessment/Plan:     * Pneumothorax on right  - Continue pigtail on suction  - Plan for OR today, right VATS with pleurodesis  - Daily CXR  - Pulmonary toilet   - NPO           Charisse Foley MD  Thoracic Surgery  [facility]

## 2019-08-05 NOTE — ASSESSMENT & PLAN NOTE
- Continue pigtail on suction  - Plan for OR today, right VATS with pleurodesis  - Daily CXR  - Pulmonary toilet   - NPO

## 2019-08-05 NOTE — HOSPITAL COURSE
The patient was admitted for recurrent R sided pneumothorax. Initially a chest tube was placed but it did not help. Home oxygen ~2.5L/min NC was resumed and another tube was placed and the original tube was replaced by IR to ensure proper placement. X ray did show mild improvement and proper placement, but the patient remained SOB and an air leak was seen. Eventually CTS did a pleurodesis which was also unsuccessful at keeping the lung inflated. The patient was then scheduled and taken for a bronchoscopy and evaluation of an endobronchial valve and another pleurodesis. The CT surgery team were unable to identify a single air leak at the time, and air continued to leak even after occlusion of the R mainstem bronchus so the patient did not receive an endobronchial valve and instead continued to have chest tube placed. Pneumothorax showed either mild or no improvement on all subsequent X rays and CT surgery offered VATS and a third pleurodesis as a last resort. VATS was unable to stop the air leaks given the patients emphysema causing thin and delicate lung tissue leading to multiple sites of small  Air leak. At this point the patient's family decided it was best that she go home with hospice care, and the patient will be going home with a chest tube with heimlich valve.

## 2019-08-05 NOTE — PT/OT/SLP PROGRESS
Occupational Therapy      Patient Name:  Venus Mayer   MRN:  [id number]    Occupational therapy visit attempted in PM. Patient not seen today secondary to off floor for procedure. Will follow-up as scheduled.    Delma Zhong OT  8/5/2019

## 2019-08-05 NOTE — SUBJECTIVE & OBJECTIVE
Interval History: Patient remained comfortable and has been NPO since midnight awaiting surgery later today.     Review of Systems   Constitutional: Negative for activity change, appetite change, chills and fever.   HENT: Negative for congestion and sore throat.    Eyes: Negative for photophobia and visual disturbance.   Respiratory: Negative for cough and chest tightness.    Cardiovascular: Negative for chest pain and palpitations.   Gastrointestinal: Negative for abdominal distention, abdominal pain, constipation, diarrhea, nausea and vomiting.   Genitourinary: Negative for dysuria and hematuria.   Musculoskeletal: Negative for arthralgias and myalgias. Back pain: lower back pain, per patient caused by being bedridden    Skin: Negative for color change and rash.   Neurological: Negative for dizziness and headaches.     Objective:     Vital Signs (Most Recent):  Temp: 97.6 °F (36.4 °C) (08/05/19 1339)  Pulse: 93 (08/05/19 1339)  Resp: 18 (08/05/19 1339)  BP: (!) 153/66 (08/05/19 1339)  SpO2: 95 % (08/05/19 1339) Vital Signs (24h Range):  Temp:  [97 °F (36.1 °C)-97.6 °F (36.4 °C)] 97.6 °F (36.4 °C)  Pulse:  [] 93  Resp:  [16-18] 18  SpO2:  [89 %-100 %] 95 %  BP: (125-155)/(57-78) 153/66     Weight: 40.4 kg (89 lb)  Body mass index is 17.38 kg/m².    Intake/Output Summary (Last 24 hours) at 8/5/2019 1454  Last data filed at 8/5/2019 0800  Gross per 24 hour   Intake 970 ml   Output 450 ml   Net 520 ml      Physical Exam   Constitutional: She appears well-nourished. No distress.   Eyes: Pupils are equal, round, and reactive to light. EOM are normal.   Neck: Normal range of motion. Neck supple.   Cardiovascular: Normal rate and regular rhythm.   Pulmonary/Chest: Effort normal. No respiratory distress.   Abdominal: Soft. She exhibits no distension. There is no tenderness.   Musculoskeletal: Normal range of motion.   Neurological: She is alert.   Skin: Skin is warm and dry.   Psychiatric: She has a normal mood and  affect. Her behavior is normal.       Significant Labs: All pertinent labs within the past 24 hours have been reviewed.    Significant Imaging: I have reviewed all pertinent imaging results/findings within the past 24 hours.

## 2019-08-05 NOTE — PT/OT/SLP PROGRESS
"Physical Therapy Treatment    Patient Name:  Venus Mayer   MRN:  [id number]    Recommendations:     Discharge Recommendations:  home health PT   Discharge Equipment Recommendations: none   Barriers to discharge: None    Assessment:     Venus Mayer is a 89 y.o. female admitted with a medical diagnosis of Pneumothorax on right.  She presents with the following impairments/functional limitations:  weakness, impaired functional mobilty, decreased safety awareness, impaired cardiopulmonary response to activity, impaired endurance, gait instability, impaired self care skills.      Patient demonstrates good motivation and fair activity tolerance secondary SOB and fatigue.  Patient with increased weakness from baseline and requires between CGA and min A for bed mobility, transfers and short distance ambulation.  Patient tolerating only 3-4 steps away from chair secondary to SOB.  Patient making slow but appropriate progress towards goals.    Rehab Prognosis: Good; patient continues to benefit from acute skilled PT services to address these deficits and reach maximum level of function.  Patient remains most appropriate to discharge to home health PT  Recent Surgery: Procedure(s) (LRB):  VATS, WITH PLEURODESIS (Right)  VATS, WITH WEDGE RESECTION, LUNG (Right) Day of Surgery    Plan:     During this hospitalization, patient to be seen 3 x/week to address the identified rehab impairments via gait training, therapeutic activities, therapeutic exercises, neuromuscular re-education and progress toward the following goals:    · Plan of Care Expires:  08/29/19    Subjective     Subjective: "My legs don't feel like they are jumping as much when I am in the chair."  Pain/Comfort:  · Pain Rating 1: 0/10  · Pain Addressed 1: Reposition  · Pain Rating Post-Intervention 1: 0/10      Objective:     Communicated with RN prior to session.  Patient found supine with telemetry, chest tube, PureWick, peripheral IV, oxygen upon PT entry to " room.     General Precautions: Standard, fall, hearing impaired   Orthopedic Precautions:N/A   Braces: N/A     Functional Mobility:  · Bed Mobility:     · Supine to Sit: contact guard assistance  · Transfers:     · Sit to Stand:  minimum assistance with hand-held assist  · Bed to Chair: minimum assistance with  hand-held assist  using  Stand Pivot  · Gait: Patient ambulated 3-4 steps to chair and 3-4 steps at chair with hand held support.        AM-PAC 6 CLICK MOBILITY  Turning over in bed (including adjusting bedclothes, sheets and blankets)?: 3  Sitting down on and standing up from a chair with arms (e.g., wheelchair, bedside commode, etc.): 3  Moving from lying on back to sitting on the side of the bed?: 3  Moving to and from a bed to a chair (including a wheelchair)?: 3  Need to walk in hospital room?: 3  Climbing 3-5 steps with a railing?: 2  Basic Mobility Total Score: 17       Therapeutic Activities and Exercises:   Patient with decreased endurance limiting overall mobility.  Patient with appropriate balance but needs seated rest break shortly after taking steps secondary to SOB.      Patient Education:    Patient and Family member educated on Fall risk, home safety, Home exercise program and transfer training by explanation.  Patient was receptive to education and verbalizes understanding.     Patient left up in chair with all lines intact, call button in reach, RN notified and family present.    GOALS:   Multidisciplinary Problems     Physical Therapy Goals        Problem: Physical Therapy Goal    Goal Priority Disciplines Outcome Goal Variances Interventions   Physical Therapy Goal     PT, PT/OT Ongoing (interventions implemented as appropriate)     Description:  Goals to be met by: 2019    Patient will increase functional independence with mobility by performin. Supine to sit with Set-up [place]  2. Sit to supine with Set-up [place]  3. Sit to stand transfer with Stand-by  Assistance  4. Gait  x 100 feet with Stand-by Assistance using Rolling Walker.   5. Ascend/descend 2 stair with right Handrails Supervision using no assistive device.                       Time Tracking:     PT Received On: 08/05/19  PT Start Time: 1013     PT Stop Time: 1025  PT Total Time (min): 12 min     Billable Minutes: Therapeutic Activity 12 min    Treatment Type: Treatment  PT/PTA: PT     PTA Visit Number: 0     Ezequiel Doe, PT  08/05/2019

## 2019-08-06 LAB
ALBUMIN SERPL BCP-MCNC: 2.2 G/DL (ref 3.5–5.2)
ALP SERPL-CCNC: 60 U/L (ref 55–135)
ALT SERPL W/O P-5'-P-CCNC: 11 U/L (ref 10–44)
ANION GAP SERPL CALC-SCNC: 7 MMOL/L (ref 8–16)
AST SERPL-CCNC: 21 U/L (ref 10–40)
BASOPHILS # BLD AUTO: 0.06 K/UL (ref 0–0.2)
BASOPHILS NFR BLD: 0.5 % (ref 0–1.9)
BILIRUB SERPL-MCNC: 0.5 MG/DL (ref 0.1–1)
BUN SERPL-MCNC: 17 MG/DL (ref 8–23)
CALCIUM SERPL-MCNC: 9.2 MG/DL (ref 8.7–10.5)
CHLORIDE SERPL-SCNC: 102 MMOL/L (ref 95–110)
CO2 SERPL-SCNC: 33 MMOL/L (ref 23–29)
CREAT SERPL-MCNC: 0.7 MG/DL (ref 0.5–1.4)
DIFFERENTIAL METHOD: ABNORMAL
EOSINOPHIL # BLD AUTO: 0.1 K/UL (ref 0–0.5)
EOSINOPHIL NFR BLD: 1.2 % (ref 0–8)
ERYTHROCYTE [DISTWIDTH] IN BLOOD BY AUTOMATED COUNT: 15.5 % (ref 11.5–14.5)
EST. GFR  (AFRICAN AMERICAN): >60 ML/MIN/1.73 M^2
EST. GFR  (NON AFRICAN AMERICAN): >60 ML/MIN/1.73 M^2
GLUCOSE SERPL-MCNC: 89 MG/DL (ref 70–110)
HCT VFR BLD AUTO: 28.8 % (ref 37–48.5)
HGB BLD-MCNC: 9.1 G/DL (ref 12–16)
IMM GRANULOCYTES # BLD AUTO: 0.09 K/UL (ref 0–0.04)
IMM GRANULOCYTES NFR BLD AUTO: 0.8 % (ref 0–0.5)
LYMPHOCYTES # BLD AUTO: 0.9 K/UL (ref 1–4.8)
LYMPHOCYTES NFR BLD: 7.8 % (ref 18–48)
MAGNESIUM SERPL-MCNC: 1.8 MG/DL (ref 1.6–2.6)
MCH RBC QN AUTO: 30.8 PG (ref 27–31)
MCHC RBC AUTO-ENTMCNC: 31.6 G/DL (ref 32–36)
MCV RBC AUTO: 98 FL (ref 82–98)
MONOCYTES # BLD AUTO: 1.1 K/UL (ref 0.3–1)
MONOCYTES NFR BLD: 9.7 % (ref 4–15)
NEUTROPHILS # BLD AUTO: 9.1 K/UL (ref 1.8–7.7)
NEUTROPHILS NFR BLD: 80 % (ref 38–73)
NRBC BLD-RTO: 0 /100 WBC
PLATELET # BLD AUTO: 205 K/UL (ref 150–350)
PMV BLD AUTO: 9.8 FL (ref 9.2–12.9)
POTASSIUM SERPL-SCNC: 3.9 MMOL/L (ref 3.5–5.1)
PROT SERPL-MCNC: 4.6 G/DL (ref 6–8.4)
RBC # BLD AUTO: 2.95 M/UL (ref 4–5.4)
SODIUM SERPL-SCNC: 142 MMOL/L (ref 136–145)
WBC # BLD AUTO: 11.4 K/UL (ref 3.9–12.7)

## 2019-08-06 PROCEDURE — 25000003 PHARM REV CODE 250: Performed by: STUDENT IN AN ORGANIZED HEALTH CARE EDUCATION/TRAINING PROGRAM

## 2019-08-06 PROCEDURE — 99233 SBSQ HOSP IP/OBS HIGH 50: CPT | Mod: ,,, | Performed by: CLINICAL NURSE SPECIALIST

## 2019-08-06 PROCEDURE — 25000242 PHARM REV CODE 250 ALT 637 W/ HCPCS: Performed by: STUDENT IN AN ORGANIZED HEALTH CARE EDUCATION/TRAINING PROGRAM

## 2019-08-06 PROCEDURE — 97168 OT RE-EVAL EST PLAN CARE: CPT

## 2019-08-06 PROCEDURE — 99233 PR SUBSEQUENT HOSPITAL CARE,LEVL III: ICD-10-PCS | Mod: ,,, | Performed by: CLINICAL NURSE SPECIALIST

## 2019-08-06 PROCEDURE — 63600175 PHARM REV CODE 636 W HCPCS: Performed by: STUDENT IN AN ORGANIZED HEALTH CARE EDUCATION/TRAINING PROGRAM

## 2019-08-06 PROCEDURE — 97530 THERAPEUTIC ACTIVITIES: CPT

## 2019-08-06 PROCEDURE — 85025 COMPLETE CBC W/AUTO DIFF WBC: CPT

## 2019-08-06 PROCEDURE — 80053 COMPREHEN METABOLIC PANEL: CPT

## 2019-08-06 PROCEDURE — 20600001 HC STEP DOWN PRIVATE ROOM

## 2019-08-06 PROCEDURE — 27000221 HC OXYGEN, UP TO 24 HOURS

## 2019-08-06 PROCEDURE — 99232 PR SUBSEQUENT HOSPITAL CARE,LEVL II: ICD-10-PCS | Mod: GC,,, | Performed by: HOSPITALIST

## 2019-08-06 PROCEDURE — 94761 N-INVAS EAR/PLS OXIMETRY MLT: CPT

## 2019-08-06 PROCEDURE — 99232 SBSQ HOSP IP/OBS MODERATE 35: CPT | Mod: GC,,, | Performed by: HOSPITALIST

## 2019-08-06 PROCEDURE — 83735 ASSAY OF MAGNESIUM: CPT

## 2019-08-06 PROCEDURE — 94640 AIRWAY INHALATION TREATMENT: CPT

## 2019-08-06 RX ADMIN — ASPIRIN 81 MG CHEWABLE TABLET 81 MG: 81 TABLET CHEWABLE at 10:08

## 2019-08-06 RX ADMIN — ALBUTEROL SULFATE 2 PUFF: 90 AEROSOL, METERED RESPIRATORY (INHALATION) at 03:08

## 2019-08-06 RX ADMIN — IPRATROPIUM BROMIDE 0.5 MG: 0.5 SOLUTION RESPIRATORY (INHALATION) at 01:08

## 2019-08-06 RX ADMIN — HEPARIN SODIUM 5000 UNITS: 5000 INJECTION INTRAVENOUS; SUBCUTANEOUS at 04:08

## 2019-08-06 RX ADMIN — POTASSIUM CHLORIDE 10 MEQ: 750 CAPSULE, EXTENDED RELEASE ORAL at 10:08

## 2019-08-06 RX ADMIN — ROPINIROLE HYDROCHLORIDE 0.25 MG: 0.25 TABLET, FILM COATED ORAL at 08:08

## 2019-08-06 RX ADMIN — ACETAMINOPHEN 650 MG: 325 TABLET ORAL at 04:08

## 2019-08-06 RX ADMIN — HEPARIN SODIUM 5000 UNITS: 5000 INJECTION INTRAVENOUS; SUBCUTANEOUS at 05:08

## 2019-08-06 RX ADMIN — TIOTROPIUM BROMIDE 18 MCG: 18 CAPSULE ORAL; RESPIRATORY (INHALATION) at 10:08

## 2019-08-06 RX ADMIN — THERA TABS 1 TABLET: TAB at 10:08

## 2019-08-06 RX ADMIN — IPRATROPIUM BROMIDE 0.5 MG: 0.5 SOLUTION RESPIRATORY (INHALATION) at 09:08

## 2019-08-06 RX ADMIN — IPRATROPIUM BROMIDE 0.5 MG: 0.5 SOLUTION RESPIRATORY (INHALATION) at 07:08

## 2019-08-06 RX ADMIN — FUROSEMIDE 40 MG: 40 TABLET ORAL at 10:08

## 2019-08-06 RX ADMIN — FERROUS SULFATE TAB EC 325 MG (65 MG FE EQUIVALENT) 325 MG: 325 (65 FE) TABLET DELAYED RESPONSE at 10:08

## 2019-08-06 RX ADMIN — HEPARIN SODIUM 5000 UNITS: 5000 INJECTION INTRAVENOUS; SUBCUTANEOUS at 08:08

## 2019-08-06 NOTE — SUBJECTIVE & OBJECTIVE
Interval History: Patient with recurrent pneumothorax was taken to the OR by CTS yesterday for surgical repair of pneumothorax. The air leak was unable to be isolated, the patient received pleurodesis with talc, and is currently recovering with a chest tube in place on suction.    Review of Systems   Constitutional: Positive for fatigue. Negative for activity change, appetite change, chills and fever.   HENT: Positive for hearing loss. Negative for congestion and sore throat.    Eyes: Negative for photophobia and visual disturbance.   Respiratory: Positive for shortness of breath. Negative for cough and chest tightness.    Cardiovascular: Negative for chest pain and palpitations.   Gastrointestinal: Negative for abdominal distention, abdominal pain, constipation, diarrhea, nausea and vomiting.   Genitourinary: Negative for dysuria and hematuria.   Musculoskeletal: Negative for arthralgias and myalgias. Back pain: lower back pain, per patient caused by being bedridden    Skin: Negative for color change and rash.   Neurological: Negative for dizziness and headaches.     Objective:     Vital Signs (Most Recent):  Temp: 97.9 °F (36.6 °C) (08/06/19 1208)  Pulse: 95 (08/06/19 1553)  Resp: 18 (08/06/19 1553)  BP: 128/79 (08/06/19 1208)  SpO2: 95 % (08/06/19 1553) Vital Signs (24h Range):  Temp:  [96.8 °F (36 °C)-100.8 °F (38.2 °C)] 97.9 °F (36.6 °C)  Pulse:  [] 95  Resp:  [13-22] 18  SpO2:  [76 %-100 %] 95 %  BP: (113-167)/(54-80) 128/79     Weight: 40.4 kg (89 lb)  Body mass index is 17.38 kg/m².    Intake/Output Summary (Last 24 hours) at 8/6/2019 1637  Last data filed at 8/6/2019 1400  Gross per 24 hour   Intake 1027.5 ml   Output 1350 ml   Net -322.5 ml      Physical Exam   Constitutional: She appears well-nourished. No distress.   HENT:   Head: Normocephalic and atraumatic.   Eyes: Pupils are equal, round, and reactive to light. EOM are normal.   Neck: Normal range of motion. Neck supple.   Cardiovascular:  Normal rate and regular rhythm.   Pulmonary/Chest: Effort normal. No respiratory distress.   Abdominal: Soft. She exhibits no distension. There is no tenderness.   Musculoskeletal: Normal range of motion.   Neurological: She is alert.   Skin: Skin is warm and dry.   Psychiatric: She has a normal mood and affect. Her behavior is normal.       Significant Labs: All pertinent labs within the past 24 hours have been reviewed.    Significant Imaging: I have reviewed all pertinent imaging results/findings within the past 24 hours.

## 2019-08-06 NOTE — PROGRESS NOTES
[facility]  Thoracic Surgery  Progress Note    Subjective:     History of Present Illness:  Venus Mayer is a 89 y.o. female with a hx of COPD and multiple readmissions for spontaneous pneumothorax. Numerous attempts at pleurodesis have been performed. Patient is not a surgical candidate. She presents to the ED with acute onset shortness of breath. Imaging showed large right sided pneumothorax. Pt otherwise stable.      No current facility-administered medications on file prior to encounter.        Post-Op Info:  Procedure(s) (LRB):  VATS, WITH PLEURAL TENT (Right)  PLEURODESIS, USING TALC (Right)   1 Day Post-Op     Interval History:   Chest tube with continued air leak. Chest xray shows reinflation of lung with minimal pneumothorax.    Medications:  Continuous Infusions:  Scheduled Meds:   amLODIPine  2.5 mg Oral Daily    aspirin  81 mg Oral Daily    ferrous sulfate  325 mg Oral Daily    furosemide  40 mg Oral Daily    heparin (porcine)  5,000 Units Subcutaneous Q8H    ipratropium  0.5 mg Nebulization Q6H    multivitamin  1 tablet Oral Daily    potassium chloride  10 mEq Oral Daily    rOPINIRole  0.25 mg Oral QHS    sodium chloride 0.9%  10 mL Intravenous Q6H    tiotropium  1 capsule Inhalation Daily     PRN Meds:acetaminophen, albuterol, Dextrose 10% Bolus, Dextrose 10% Bolus, glucagon (human recombinant), glucose, glucose, HYDROcodone-acetaminophen, polyethylene glycol, Flushing PICC Protocol **AND** sodium chloride 0.9% **AND** sodium chloride 0.9%     Review of patient's allergies indicates:   Allergen Reactions    Ancef in dextrose (iso-osm) Rash    Cefazolin Rash    Cefuroxime Rash    Sulfamethoxazole-trimethoprim Rash     Other reaction(s): Rash     Objective:     Vital Signs (Most Recent):  Temp: 100.1 °F (37.8 °C) (08/06/19 0341)  Pulse: 90 (08/06/19 0715)  Resp: 16 (08/06/19 0341)  BP: 139/65 (08/06/19 0341)  SpO2: 97 % (08/06/19 0341) Vital Signs (24h Range):  Temp:   [96.8 °F (36 °C)-100.8 °F (38.2 °C)] 100.1 °F (37.8 °C)  Pulse:  [] 90  Resp:  [16-22] 16  SpO2:  [76 %-100 %] 97 %  BP: (113-167)/(54-80) 139/65     Intake/Output - Last 3 Shifts       08/04 0700 - 08/05 0659 08/05 0700 - 08/06 0659 08/06 0700 - 08/07 0659    P.O. 1280 0     I.V. (mL/kg) 440 (10.8) 1377.5 (34.1)     Total Intake(mL/kg) 1720 (42.2) 1377.5 (34.1)     Urine (mL/kg/hr) 50 (0.1) 400 (0.4)     Stool 0      Chest Tube 100 400     Total Output 150 800     Net +1570 +577.5            Urine Occurrence 4 x      Stool Occurrence 0 x            SpO2: 97 %  O2 Device (Oxygen Therapy): nasal cannula    Physical Exam   Constitutional: No distress.   Cardiovascular: Normal rate and regular rhythm.   Pulmonary/Chest: Effort normal. No respiratory distress.   Chest tube with air leak  [place] output   Musculoskeletal: Normal range of motion.   Neurological: She is alert.   Skin: Skin is warm and dry.   Psychiatric: She has a normal mood and affect. Her behavior is normal.       Significant Labs:  CBC:   Recent Labs   Lab 08/06/19  0540   WBC 11.40   RBC 2.95*   HGB 9.1*   HCT 28.8*      MCV 98   MCH 30.8   MCHC 31.6*     CMP:   Recent Labs   Lab 08/06/19  0540   GLU 89   CALCIUM 9.2   ALBUMIN 2.2*   PROT 4.6*      K 3.9   CO2 33*      BUN 17   CREATININE 0.7   ALKPHOS 60   ALT 11   AST 21   BILITOT 0.5       Significant Diagnostics:  I have reviewed all pertinent imaging results/findings within the past 24 hours.    VTE Risk Mitigation (From admission, onward)        Ordered     heparin (porcine) injection 5,000 Units  Every 8 hours      07/28/19 0551     IP VTE HIGH RISK PATIENT  Once      07/28/19 0551        Assessment/Plan:     * Pneumothorax on right  - Continue chest tube to suction. Patient may sit up in chair; however, would not disconnect from wall suction for two days  - Daily CXR  - Pulmonary toilet             Charisse Foley MD  Thoracic Surgery  [facility]  [place]

## 2019-08-06 NOTE — PLAN OF CARE
This  (SW) assigned to case today 8/7.     SW received call from Eveline with Palliative Care [phone]) who reported that Palliative Team is meeting with Pt and Pt's family tomorrow, 8/7, to discuss Hospice Care. Eveline advised that the family meeting is tentatively scheduled to occur sometime between the hours of 8am-11am and SW should follow-up with her before 12:30 PM to obtain report to determine next steps.    Eveline reported that after preliminary discussions with Pt's family, she believes that they will choose Home Hospice.     According to previous  note, Pt is current with [facility] through [employer].    SW will continue to follow and offer support as needed. SW in communication with Pt's , Yue GALLOWAY     08/06/19 1049   Post-Acute Status   Post-Acute Authorization Home Health/Hospice   Home Health/Hospice Status Pending Internal Education  (Family Hospice Meeting 8/7 a.m.)   Robina Burgos, [facility]  [phone]

## 2019-08-06 NOTE — PLAN OF CARE
Plan is to discharge home with possible hospice, depending on family meeting tomorrow.       08/06/19 1632   Discharge Reassessment   Assessment Type Discharge Planning Reassessment   Do you have any problems affording any of your prescribed medications? No   Discharge Plan A Hospice/home   Discharge Plan B Home Health   DME Needed Upon Discharge  none

## 2019-08-06 NOTE — PROGRESS NOTES
Spoke with Dr. Puente about pt's DNR form.  Form is dated 7/21.  New DNR form required for each hospital admission. MD verbalized understanding and stated they would take care of it today.

## 2019-08-06 NOTE — ANESTHESIA POSTPROCEDURE EVALUATION
Anesthesia Post Evaluation    Patient: Venus Mayer    Procedure(s) Performed: Procedure(s) (LRB):  VATS, WITH PLEURAL TENT (Right)  PLEURODESIS, USING TALC (Right)    Final Anesthesia Type: general  Patient location during evaluation: PACU  Patient participation: Yes- Able to Participate  Level of consciousness: awake and alert and oriented  Post-procedure vital signs: reviewed and stable  Pain management: adequate  Airway patency: patent  PONV status at discharge: No PONV  Anesthetic complications: no      Cardiovascular status: stable  Respiratory status: unassisted  Hydration status: euvolemic  Follow-up not needed.          Vitals Value Taken Time   /79 8/6/2019 12:08 PM   Temp 36.6 °C (97.9 °F) 8/6/2019 12:08 PM   Pulse 96 8/6/2019  1:04 PM   Resp 18 8/6/2019  1:04 PM   SpO2 95 % 8/6/2019  1:04 PM         Event Time     Out of Recovery 08/05/2019 19:48:13          Pain/Estelle Score: Estelle Score: 9 (8/5/2019  7:48 PM)

## 2019-08-06 NOTE — OP NOTE
[facility]  Surgery Department  Operative Note    SUMMARY     Date of Procedure: 8/5/2019     Procedure: Procedure(s) (LRB):  VATS, WITH PLEURAL TENT (Right)  PLEURODESIS, USING TALC (Right)     Surgeon(s) and Role:     * Klever Mckeon MD - Primary     * Charisse Foley MD - Resident - Assisting        Pre-Operative Diagnosis: Pneumothorax on right [J93.9]  SOB (shortness of breath) [R06.02]    Post-Operative Diagnosis: Post-Op Diagnosis Codes:     * Pneumothorax on right [J93.9]     * SOB (shortness of breath) [R06.02]    Anesthesia: General    Operative Procedure:  The patient was taken to the operating room and placed supine on the operating table.  Adequate general anesthesia was achieved.  Double lumen endotracheal tube was placed and its position and function were confirmed with bronchoscopy.  Patient was turned to the left lateral decubitus position, padded appropriately and secured. The right chest was prepped and draped in standard sterile fashion.  Prophylactic intravenous antibiotics were administered. The right lung was isolated.  Time-out was performed and all team members were in agreement.    A 1.5cm incision was made in the 8th intercostal space in the mid axillary line  Port and camera were inserted. Two additional ports were placed under direct visualization. Multiple adhesions were encountered and were carefully taken down. Significant emphysematous disease and multiple bullae were noted, particularly in the upper lobe. Saline was infused through the ports and the right lung was inflated. Multiple areas of air leak were noted. None of the areas were amenable to resection due to significant adhesions to the mediastinum. The pleural near the apex, overlying the most significant area of bullous disease, was stripped and talc pleurodesis was administered. Two 24 [place] drains were passed through the ports and directed apically.  The lung was inflated under direct  vision and filled the chest cavity.  Tube was secured to the skin with silk suture. Port incisions were closed in layers with absorbable suture.  Steri-strips and sterile dressings were applied.  All sponge, needle and instrument counts were correct at the end of the case.  The patient tolerated the procedure well.  There were no immediate complications.  She was extubated in the operating room and taken to the recovery room in stable condition.      Complications: No    Estimated Blood Loss (EBL):   20ml       Implants: * No implants in log *    Specimens:   Specimen (12h ago, onward)    None                  Condition: Good    Disposition: PACU - hemodynamically stable.    Charisse Foley MD, PGY-4  General Surgery  [phone]

## 2019-08-06 NOTE — SUBJECTIVE & OBJECTIVE
Interval History:   Chest tube with continued air leak. Chest xray shows reinflation of lung with minimal pneumothorax.    Medications:  Continuous Infusions:  Scheduled Meds:   amLODIPine  2.5 mg Oral Daily    aspirin  81 mg Oral Daily    ferrous sulfate  325 mg Oral Daily    furosemide  40 mg Oral Daily    heparin (porcine)  5,000 Units Subcutaneous Q8H    ipratropium  0.5 mg Nebulization Q6H    multivitamin  1 tablet Oral Daily    potassium chloride  10 mEq Oral Daily    rOPINIRole  0.25 mg Oral QHS    sodium chloride 0.9%  10 mL Intravenous Q6H    tiotropium  1 capsule Inhalation Daily     PRN Meds:acetaminophen, albuterol, Dextrose 10% Bolus, Dextrose 10% Bolus, glucagon (human recombinant), glucose, glucose, HYDROcodone-acetaminophen, polyethylene glycol, Flushing PICC Protocol **AND** sodium chloride 0.9% **AND** sodium chloride 0.9%     Review of patient's allergies indicates:   Allergen Reactions    Ancef in dextrose (iso-osm) Rash    Cefazolin Rash    Cefuroxime Rash    Sulfamethoxazole-trimethoprim Rash     Other reaction(s): Rash     Objective:     Vital Signs (Most Recent):  Temp: 100.1 °F (37.8 °C) (08/06/19 0341)  Pulse: 90 (08/06/19 0715)  Resp: 16 (08/06/19 0341)  BP: 139/65 (08/06/19 0341)  SpO2: 97 % (08/06/19 0341) Vital Signs (24h Range):  Temp:  [96.8 °F (36 °C)-100.8 °F (38.2 °C)] 100.1 °F (37.8 °C)  Pulse:  [] 90  Resp:  [16-22] 16  SpO2:  [76 %-100 %] 97 %  BP: (113-167)/(54-80) 139/65     Intake/Output - Last 3 Shifts       08/04 0700 - 08/05 0659 08/05 0700 - 08/06 0659 08/06 0700 - 08/07 0659    P.O. 1280 0     I.V. (mL/kg) 440 (10.8) 1377.5 (34.1)     Total Intake(mL/kg) 1720 (42.2) 1377.5 (34.1)     Urine (mL/kg/hr) 50 (0.1) 400 (0.4)     Stool 0      Chest Tube 100 400     Total Output 150 800     Net +1570 +577.5            Urine Occurrence 4 x      Stool Occurrence 0 x            SpO2: 97 %  O2 Device (Oxygen Therapy): nasal cannula    Physical Exam    Constitutional: No distress.   Cardiovascular: Normal rate and regular rhythm.   Pulmonary/Chest: Effort normal. No respiratory distress.   Chest tube with air leak  Serosang output   Musculoskeletal: Normal range of motion.   Neurological: She is alert.   Skin: Skin is warm and dry.   Psychiatric: She has a normal mood and affect. Her behavior is normal.       Significant Labs:  CBC:   Recent Labs   Lab 08/06/19  0540   WBC 11.40   RBC 2.95*   HGB 9.1*   HCT 28.8*      MCV 98   MCH 30.8   MCHC 31.6*     CMP:   Recent Labs   Lab 08/06/19  0540   GLU 89   CALCIUM 9.2   ALBUMIN 2.2*   PROT 4.6*      K 3.9   CO2 33*      BUN 17   CREATININE 0.7   ALKPHOS 60   ALT 11   AST 21   BILITOT 0.5       Significant Diagnostics:  I have reviewed all pertinent imaging results/findings within the past 24 hours.    VTE Risk Mitigation (From admission, onward)        Ordered     heparin (porcine) injection 5,000 Units  Every 8 hours      07/28/19 0551     IP VTE HIGH RISK PATIENT  Once      07/28/19 0549

## 2019-08-06 NOTE — PLAN OF CARE
Problem: Adult Inpatient Plan of Care  Goal: Plan of Care Review  Plan of care reviewed with patient and daughter. Patient aaox4. Picc line to right upper arm, no edema or swelling noted. PIV to left forearm saline locked. Chest tubes to right lateral chest, patent and to 20 cm H2o suction. Oxygen at 2 liters nc, sats within normal limits.  Drainage 250 ml of serosangious drainage noted this shift. Tolerating Cardiac diet well after tray set up however able to feed self. Patient up to chair with PT. Tolerated well. Purwick in place, adequate output, no s/s of skin irritation. Patient up to bs and had small soft  bowel movement noted. NO injuries noted this shift. Call light in reach. Will continue to monitor.

## 2019-08-06 NOTE — PLAN OF CARE
(SW) assigned to case today 8/6. SW will assist team with Discharge needs. SW in communication with the Pt's , Yue GALLOWAY     08/06/19 0945   Post-Acute Status   Post-Acute Authorization Other   Other Status See Comments   Robina Burgos, [facility]  [phone]

## 2019-08-06 NOTE — PROGRESS NOTES
Report called to Tahira RN, pt made ready for transport to 1012 with tele via stretcher per RN and PCT, pt resting quietly,VSS per monitor,resp unlabored,CT intact to -20mmHg suction, stable at present.

## 2019-08-06 NOTE — PT/OT/SLP RE-EVAL
Occupational Therapy   Re-evaluation    Name: Venus Mayer  MRN: [id number]  Admitting Diagnosis:  Pneumothorax on right 1 Day Post-Op   VATS, WITH PLEURAL TENT (Right)  PLEURODESIS, USING TALC (Right)     Recommendations:     Discharge Recommendations: home  Discharge Equipment Recommendations:  none  Barriers to discharge:  None    Assessment:     Venus Mayer is a 89 y.o. female with a medical diagnosis of Pneumothorax on right.  She presents with a decline in occupational participation and functional mobility. Performance deficits affecting function are weakness, impaired functional mobilty, decreased safety awareness, impaired cardiopulmonary response to activity, impaired self care skills, gait instability, impaired balance, impaired endurance.      Rehab Prognosis:  RN; patient would benefit from acute skilled OT services to address these deficits and reach maximum level of function.       Plan:     Patient to be seen 3 x/week to address the above listed problems via self-care/home management, therapeutic activities, therapeutic exercises  · Plan of Care Expires: 09/03/19  · Plan of Care Reviewed with: patient, family    Subjective     Chief Complaint: None at this time  Patient/Family stated goals: To get better and return home  Communicated with: RN prior to session.  Pain/Comfort:  · Pain Rating 1: other (see comments)(Did not provide pain rating.)    Objective:     Communicated with: RN prior to session.  Patient found HOB elevated playing scrabble with her daughter with: PureWick, peripheral IV, oxygen, chest tube, telemetry, pulse ox (continuous) upon OT entry to room.    General Precautions: Standard, hearing impaired, fall   Orthopedic Precautions:N/A   Braces: N/A     Occupational Performance:    Bed Mobility:    · Patient completed Scooting to EOB for foot placement on floor with maximal assistance  · Patient completed Supine to Sit to R side EOB with moderate assistance for BLE advancement off  bed    Functional Mobility/Transfers:  · Patient completed Sit <> Stand Transfer with contact guard assistance  with  hand-held assist x2 trials  · Patient completed Bed <> Chair Transfer using Step Transfer technique (~5 steps) with minimum assistance with hand-held assist  · Functional Mobility: Did not observe    Activities of Daily Living:  · Lower Body Dressing: total assistance Patient donned socks while long sitting in bed with total assist.  · Toileting: total assistance Patient completed toileting with PureWick.    Cognitive/Visual Perceptual:  Cognitive/Psychosocial Skills:     -       Oriented to: Person, Place, Time and Situation   -       Follows Commands/attention:Follows multistep  commands    Physical Exam:  Upper Extremity Range of Motion:     -       Right Upper Extremity: WFL  -       Left Upper Extremity: WFL  Upper Extremity Strength:    -       Right Upper Extremity: DNT 2' VATS procedure  -       Left Upper Extremity: DNT 2' VATS procedure   Strength:    -       Right Upper Extremity: 3/5  -       Left Upper Extremity: 3/5    [facility] 6 Click:  [facility] Total Score: 15    Treatment & Education:  Education:  Role of OT and POC  Call button for assistance    Patient left up in chair with all lines intact, call button in reach, RN notified and family and MDs present    GOALS:   Multidisciplinary Problems     Occupational Therapy Goals        Problem: Occupational Therapy Goal    Goal Priority Disciplines Outcome Interventions   Occupational Therapy Goal     OT, PT/OT Ongoing (interventions implemented as appropriate)    Description:  Goals to be met by: 8/20/2019     Patient will increase functional independence with ADLs by performing:    UE Dressing with [place].  LE Dressing with Modified [place] with AE PRN.  Grooming while seated with Set-up Assistance.  Toileting from [facility] with Supervision for hygiene and clothing management. -updated 8/6  Toilet transfer to [facility] with Supervision with  ANTONY PRN. -updated 8/6                       History:     Past Medical History:   Diagnosis Date    Acute on chronic congestive heart failure 7/6/2019    Cardiomyopathy     Carotid artery occlusion     CHF (congestive heart failure)     COPD (chronic obstructive pulmonary disease)     Coronary artery disease     Hyperlipidemia     Hypertension        Past Surgical History:   Procedure Laterality Date    BRONCHOSCOPY, FIBEROPTIC Flexible N/A 7/31/2019    Performed by [facility] at [facility] OR 2ND FLR    CARDIAC CATHETERIZATION      cardic cath      CAROTID ENDARTERECTOMY      PLEURODESIS N/A 7/31/2019    Performed by [facility] at [facility]    PLEURODESIS, USING TALC Right 8/5/2019    Performed by [facility] at [facility]LR    VATS, WITH PLEURAL TENT Right 8/5/2019    Performed by [facility] at [facility] OR [facility] FLR       Time Tracking:     OT Date of Treatment: 08/06/19  OT Start Time: 1445  OT Stop Time: 1510  OT Total Time (min): 25 min    Billable Minutes:Re-eval 10 minutes  Therapeutic Activity 15 minutes    Ernestina Holley OT  8/6/2019

## 2019-08-06 NOTE — ASSESSMENT & PLAN NOTE
Patient with COPD (on 2.5 L home oxygen) and with multiple pneumothoraxes s/p pleurodesis and chest tube placement last week presented with dyspnea and hypoxia. CXR demonstrated R pneumothorax.     Patient underwent surgery 8/5 by CTS for repair of pneumothorax. Air leak was unable to be isolated and patient is recovering with a chest tube to suction.    Plan:  -Monitor patient's pneumothorax with repeat imaging and continue with chest tube to suction  -monitor patient's hemodynamic status  -pain management

## 2019-08-06 NOTE — ASSESSMENT & PLAN NOTE
- Continue chest tube to suction. Patient may sit up in chair; however, would not disconnect from wall suction for two days  - Daily CXR  - Pulmonary toilet

## 2019-08-06 NOTE — PROGRESS NOTES
[facility]  Palliative Medicine  Progress Note    Patient Name: Venus Mayer  MRN: [id number]  Admission Date: 7/27/2019  Hospital Length of Stay: 9 days  Code Status: DNR   Attending Provider: Robert Alfredo MD  Consulting Provider: SERA Gonzales  Primary Care Physician: Jona Che MD  Principal Problem:Pneumothorax on right    Patient information was obtained from patient and relative(s).      Assessment/Plan:     Palliative care encounter    Palliative medicine follow up to goals of care.  Palliative medicine APRN met at bedside with patient and daughter Bri.      Impression: Mrs. Mayer is a 89 yo lady admitted with right spontaneous pneumothorax.  S/P  Right VATS procedure.  In bed, somnolent, arouses with verbal and tactile stimuli. Oriented to person and place.  Appears comfortable. No facial grimacing or moaning.  No acute distress.      S/P : Right  VATS and talc pleurodesis.  Daughter reports the doctors have discussed the leak is still present and have discussed placement of Heimlich Valve.        Advanced Care Planning:  - Advanced directives - HPOA and living will received. Placed in blue chart to be scanned in EMR  - Patient states her  Son Arthur Miller is her HPOA.  Documents have not been received.  Family has been encouraged to provide copy for [facility] records.  - Mrs. Mayer states she does not have a living will.   - Patient is now a partial code - no mechanical ventilation no intubation   - Today Mrs. Mayer states it is her wish not to be resuscitated.  Daughter Bri is at bedside who states Mrs. Mayer has been saying this for some time now.    - Resuscitation status will be discussed with her son before she will consent to DNR order.    Goals of Care  - Initially family reports and Mrs. Mayer confirmed she is not opposed to being  hospitalized.  Although she has been in hospital 4 times in last two weeks for respiratory illness, she does not consider this a burden.   Prior to VATS patient talked with daughter and states she is tired of coming back to the hospital after being at home for just a few days.   - Daughter with questions concerning hospice - How do you know and when should hospice be considered.  Palliative care gently explained the current clinical condition  Post  multiple invasive procedures with no resolution to air leak  is appropriate for transition to hospice as she is not a candidate for more aggressive surgical intervention.  Explained that the patient has stated she is tired of coming back to the hospital   - Hospice education initiated.  Following this discussion the patient's daughter expressed interest in home hospice.  Mrs. Mayer is able to make decisions and she has to be amenable to hospice.    - Daughter Bri will contact the patient's son who is the OA. Family makes decisions together with Mrs. Mayer. Palliative medicine will schedule a meeting for hospice education and goals of care for hospice.        Plan/Recommendations.  - Patient and family may benefit from goals of care meeting.  Palliative medicine will facilitate as needed.   -  Ofelia Gregory is coordinating with her brother Jack who is the [facility].      Meeting will be 8/7/19 at 10 AM  - Anticipate  consult for hospice.       - palliative medicine will continue to follow for goals of care.    - Emotional support.      Primary team and    aware of the above goals of care conversation.          I will follow-up with patient. Please contact us if you have any additional questions.    Subjective:     Chief Complaint:   Chief Complaint   Patient presents with    Shortness of Breath     ems reports sob - ra sats 70% - placed on 15 l nrb now satting 92% - used home neb tx and rescue inhaler with no relief - reports incread wob - hc of chest tube and collapsed lung to right side- bandage still in place       HPI:   HPI obtained from chart review     Mrs. Mayer is a 89  lady with PMH of  COPD (home O2 at 2 L NC)  40 pack year smoking history,  (quit 30 years ago), HFpEF, HOCM s/p AICD, PVD, HTN, HLD, and CKD. Recent admit for t spontaneous pneumothorax with pleurodesis in May 2019. She  presented to [facility] ED with acute shortness of breath and found to have a small right sided pneumothorax. S/p CT tube placement at OSH and transferred to [facility] for for further evaluation.  Pulmonary was then consulted for management of chest tube.     7/21: acute dyspneic, tachycardic, and diaphoretic.   Transferred to critical care for closer monitoring. CT placed at OSH dislodged and replaced with re-expansion of right lung.    CT surgery consulted  And have been monitoring chest tube.  Plan:  water seal and clamping trials on 7/23.  If fails clamping trials, plan is for repeat bedside pleurodesis or bronchoscopy with endobronchial valve placement. She is not a surgical candidate due to cardiac co morbidities.    .      Transitioned from critical care back to internal medicine.  CT surgery continues to follow and manage chest tube.   Palliative medicine consulted for goals of care.     Hospital Course:  No notes on file    Interval History:    Past Medical History:   Diagnosis Date    Acute on chronic congestive heart failure 7/6/2019    Cardiomyopathy     Carotid artery occlusion     CHF (congestive heart failure)     COPD (chronic obstructive pulmonary disease)     Coronary artery disease     Hyperlipidemia     Hypertension        Past Surgical History:   Procedure Laterality Date    BRONCHOSCOPY, FIBEROPTIC Flexible N/A 7/31/2019    [facility] at [facility] OR 2ND FLR    CARDIAC CATHETERIZATION      cardic cath      CAROTID ENDARTERECTOMY      PLEURODESIS N/A 7/31/2019    Performed [facility] at [facility] OR 2ND FLR    PLEURODESIS, USING TALC Right 8/5/2019    Performed by [facility] at [facility] OR 2ND FLR    VATS, WITH  PLEURAL TENT Right 2019    Performed by [facility]ver [facility]on [facility] at [facility] OR [address] [place] [address]       Review of patient's allergies indicates:   Allergen Reactions    Ancef in dextrose (iso-osm) Rash    Cefazolin Rash    Cefuroxime Rash    Sulfamethoxazole-trimethoprim Rash     Other reaction(s): Rash       Medications:  Continuous Infusions:  Scheduled Meds:   amLODIPine  2.5 mg Oral Daily    aspirin  81 mg Oral Daily    ferrous sulfate  325 mg Oral Daily    furosemide  40 mg Oral Daily    heparin (porcine)  5,000 Units Subcutaneous Q8H    ipratropium  0.5 mg Nebulization Q6H    multivitamin  1 tablet Oral Daily    potassium chloride  10 mEq Oral Daily    rOPINIRole  0.25 mg Oral QHS    sodium chloride 0.9%  10 mL Intravenous Q6H    tiotropium  1 capsule Inhalation Daily     PRN Meds:acetaminophen, albuterol, Dextrose 10% Bolus, Dextrose 10% Bolus, glucagon (human recombinant), glucose, glucose, HYDROcodone-acetaminophen, polyethylene glycol, Flushing PICC Protocol **AND** sodium chloride 0.9% **AND** sodium chloride 0.9%    Family History     Problem Relation (Age of Onset)    Hypertension Mother        Tobacco Use    Smoking status: Former Smoker     Packs/day: 2.00     Years: 20.00     Pack years: 40.00     Types: Cigarettes     Last attempt to quit: 1980     Years since quittin.5    Smokeless tobacco: Never Used   Substance and Sexual Activity    Alcohol use: Yes     Alcohol/week: 1.2 oz     Types: 2 Glasses of wine per week     Comment: social    Drug use: No    Sexual activity: Not Currently       Review of Systems   Constitutional: Positive for fatigue.   HENT: Positive for hearing loss.    Respiratory: Positive for shortness of breath.      Objective:     Vital Signs (Most Recent):  Temp: 97 °F (36.1 °C) (19 0800)  Pulse: 87 (19 1022)  Resp: 18 (19 1022)  BP: 130/60 (19 0800)  SpO2: 98 %(spo2 95 on 2L NC, PT IS A MOUTH BREATHER ) (19 0909) Vital Signs (24h  Range):  Temp:  [96.8 °F (36 °C)-100.8 °F (38.2 °C)] 97 °F (36.1 °C)  Pulse:  [] 87  Resp:  [16-22] 18  SpO2:  [76 %-100 %] 98 %  BP: (113-167)/(54-80) 130/60     Weight: 40.4 kg (89 lb)  Body mass index is 17.38 kg/m².    Review of Symptoms  Symptom Assessment (ESAS 0-10 scale)   ESAS 0 1 2 3 4 5 6 7 8 9 10   Pain              Dyspnea              Anxiety              Nausea              Depression               Anorexia              Fatigue              Insomnia              Restlessness               Agitation              CAM / Delirium __ --  ___+   Constipation     __ --  ___+   Diarrhea           __ --  ___+  Bowel Management Plan (BMP): No    Comments: no pain, dyspnea on exertion 3-4     Pain Assessment:     OME in 24 hours: 0    Performance Status: 50    ECOG Performance Status Grade: 2 - Ambulates, capable of self care only    Physical Exam   Constitutional: She is oriented to person, place, and time. She appears well-developed. No distress.   Appears frail    HENT:   [place], hearing aid on the right is broken, not at bedside   Cardiovascular: Normal rate, regular rhythm and normal heart sounds.   Pulmonary/Chest:   Right chest tube x2  Intact, coarse breath sounds and chest tube sounds    Abdominal: Soft. Bowel sounds are normal.   Musculoskeletal:   Weak, s/p right hip fracture, uses walker    Neurological: She is alert and oriented to person, place, and time.   Skin: Skin is warm and dry. There is pallor.   Psychiatric: She has a normal mood and affect. Her behavior is normal. Judgment and thought content normal.   Nursing note and vitals reviewed.      Significant Labs: All pertinent labs within the past 24 hours have been reviewed.  CBC:   Recent Labs   Lab 08/06/19  0540   WBC 11.40   HGB 9.1*   HCT 28.8*   MCV 98        BMP:  Recent Labs   Lab 08/06/19  0540   GLU 89      K 3.9      CO2 33*   BUN 17   CREATININE 0.7   CALCIUM 9.2   MG 1.8     LFT:  Lab Results   Component  Value Date    AST 21 08/06/2019    ALKPHOS 60 08/06/2019    BILITOT 0.5 08/06/2019     Albumin:   Albumin   Date Value Ref Range Status   08/06/2019 2.2 (L) 3.5 - 5.2 g/dL Final     Protein:   Total Protein   Date Value Ref Range Status   08/06/2019 4.6 (L) 6.0 - 8.4 g/dL Final     Lactic acid:   Lab Results   Component Value Date    LACTATE 1.0 07/06/2019       Significant Imaging: I have reviewed all pertinent imaging results/findings within the past 24 hours.    Advance Care Planning   Advanced Directives::  Living Will: No  LaPOST: No  Do Not Resuscitate Status: No, partial - no intubation mechanical ventilation   Medical Power of : No    Decision-Making Capacity: Patient answered questions, family answered questions.        Living Arrangements: Lives alone in her home with day time sitters and family at night.     Psychosocial/Cultural:   after 48 yrs of marriage, 5 children, 6 grand children and 7 great grand children,  and mother,  Living independently until she broke her hip 1 yr ago.  Enjoyed making road trips with her friends to [place].  Played cards and went to the [employer].  Goes to beauty parlor every week.         Spiritual:     F- Kylah and Belief: Anabaptism     I - Importance: went to [employer] every week until she was put on oxygen.  Now watches and participates with [employer] on TV  .  C - Community:     A - Address in Care: amenable to  visits and anointing of the sick.       > 50% of 35 min visit spent in chart review, face to face discussion of goals of care,  symptom assessment, coordination of care and emotional support.    Eveline Moses, CNS  Palliative Medicine  [facility]

## 2019-08-06 NOTE — PROGRESS NOTES
[facility]  Adult Nutrition  Progress Note    SUMMARY       Recommendations    Recommendation/Intervention:     1. Continue Cardic diet as tolerated.   2. If po intake <50%, recommend Boost Plus with meals.   3. RD following.    Goals: consume >50% of all meals  Nutrition Goal Status: new  Communication of RD Recs: (POC)    Reason for Assessment    Reason For Assessment: length of stay  Diagnosis: (Pneumothorax on right)  Relevant Medical History: severe COPD, CHF, carotid stenosis, HTN, HLD, CKD stage III, debility   General Information Comments: POD1 VATS and pleurodesis. Unable to see x 2 attempts. Cardiac diet ordered with no intake in chart review. Unable to fully assess for malnutrition at this time. Noted wt loss per chart review. Pt on lasix. D/C likely [facility].  Nutrition Discharge Planning: adequate po intake    Nutrition Risk Screen    Nutrition Risk Screen: no indicators present    Nutrition/Diet History    Spiritual, Cultural Beliefs, Ep[employer]opalian Practices, Values that Affect Care: no  Factors Affecting Nutritional Intake: (CARINA)    Anthropometrics    Temp: 97.9 °F (36.6 °C)  Height Method: Stated  Height: 5' (152.4 cm)  Height (inches): 60 in  Weight Method: Bed Scale  Weight: 40.4 kg (89 lb)  Weight (lb): 89 lb  Ideal Body Weight (IBW), Female: 100 lb  % Ideal Body Weight, Female (lb): 89 lb  BMI (Calculated): 17.4  BMI Grade: 17 - 18.4 protein-energy malnutrition grade I  Usual Body Weight (UBW), kg: (P) 53.2 kg(per chart review 5/31/19)  % Usual Body Weight: (P) 76.04  % Weight Change From Usual Weight: (P) -24.12 %       Lab/Procedures/Meds    Pertinent Labs Reviewed: reviewed  Pertinent Medications Reviewed: reviewed  Pertinent Medications Comments: amlodipoine, aspirin, ferrous sulfate, lasiz, MVI    Estimated/Assessed Needs    Weight Used For Calorie Calculations: 40.4 kg (89 lb 1.1 oz)  Energy Calorie Requirements (kcal): 4094-4174 kcal/day  Energy Need Method:  Kcal/kg(35-40)  Protein Requirements: 49-61 gm/day(1.2-1.5 gm/kg)  Weight Used For Protein Calculations: 40.4 kg (89 lb 1.1 oz)  Fluid Requirements (mL): 1 mL/kcal or per MD     RDA Method (mL): 1414     Nutrition Prescription Ordered    Current Diet Order: Cardiac    Evaluation of Received Nutrient/Fluid Intak     % Intake of Estimated Energy Needs: Other: CARINA  % Meal Intake: Other: CARINA    Nutrition Risk    Level of Risk/Frequency of Follow-up: (f/u 2 x wk)     Assessment and Plan    Nutrition Problem  Suspected Unintentional Weight Loss    Related to (etiology):   Suspected Decreased Energy Intake    Signs and Symptoms (as evidenced by):   24% wt loss x 3 months per chart review.     Interventions:  Collaboration and Referral of Nutrition Care    Nutrition Diagnosis Status:   New     Monitor and Evaluation    Food and Nutrient Intake: energy intake, food and beverage intake  Food and Nutrient Adminstration: diet order  Physical Activity and Function: nutrition-related ADLs and IADLs  Anthropometric Measurements: weight, weight change, body mass index  Biochemical Data, Medical Tests and Procedures: electrolyte and renal panel, gastrointestinal profile, glucose/endocrine profile, inflammatory profile, lipid profile  Nutrition-Focused Physical Findings: overall appearance     Nutrition Follow-Up    RD Follow-up?: Yes

## 2019-08-06 NOTE — PROGRESS NOTES
From pacu from vats.  AAOX4.      R CT x2 y ported to one pleurovac hooked to suction.  Air leak noted.  Purewick replaced.  Vitals obtained. NS @ 75cc/hr restarted.  scds initiated    Resident called to assess CT per pts daughters request

## 2019-08-06 NOTE — SUBJECTIVE & OBJECTIVE
Interval History:    Past Medical History:   Diagnosis Date    Acute on chronic congestive heart failure 7/6/2019    Cardiomyopathy     Carotid artery occlusion     CHF (congestive heart failure)     COPD (chronic obstructive pulmonary disease)     Coronary artery disease     Hyperlipidemia     Hypertension        Past Surgical History:   Procedure Laterality Date    BRONCHOSCOPY, FIBEROPTIC Flexible N/A 7/31/2019    Performed by Kle[facility]keon [facility] at [facility] OR 2ND FLR    CARDIAC CATHETERIZATION      cardic cath      CAROTID ENDARTERECTOMY      PLEURODESIS N/A 7/31/2019    Per[facility] at [facility]    PLEURODESIS, USING TALC Right 8/5/2019    [facility]    VATS, WITH PLEURAL TENT Right 8/5/2019    Performed by Klever Mckeon MD at [facility] OR [address] [place] [address]       Review of patient's allergies indicates:   Allergen Reactions    Ancef in dextrose (iso-osm) Rash    Cefazolin Rash    Cefuroxime Rash    Sulfamethoxazole-trimethoprim Rash     Other reaction(s): Rash       Medications:  Continuous Infusions:  Scheduled Meds:   amLODIPine  2.5 mg Oral Daily    aspirin  81 mg Oral Daily    ferrous sulfate  325 mg Oral Daily    furosemide  40 mg Oral Daily    heparin (porcine)  5,000 Units Subcutaneous Q8H    ipratropium  0.5 mg Nebulization Q6H    multivitamin  1 tablet Oral Daily    potassium chloride  10 mEq Oral Daily    rOPINIRole  0.25 mg Oral QHS    sodium chloride 0.9%  10 mL Intravenous Q6H    tiotropium  1 capsule Inhalation Daily     PRN Meds:acetaminophen, albuterol, Dextrose 10% Bolus, Dextrose 10% Bolus, glucagon (human recombinant), glucose, glucose, HYDROcodone-acetaminophen, polyethylene glycol, Flushing PICC Protocol **AND** sodium chloride 0.9% **AND** sodium chloride 0.9%    Family History     Problem Relation (Age of Onset)    Hypertension Mother        Tobacco Use    Smoking status: Former Smoker      Packs/day: 2.00     Years: 20.00     Pack years: 40.00     Types: Cigarettes     Last attempt to quit: 1980     Years since quittin.5    Smokeless tobacco: Never Used   Substance and Sexual Activity    Alcohol use: Yes     Alcohol/week: 1.2 oz     Types: 2 Glasses of wine per week     Comment: social    Drug use: No    Sexual activity: Not Currently       Review of Systems   Constitutional: Positive for fatigue.   HENT: Positive for hearing loss.    Respiratory: Positive for shortness of breath.      Objective:     Vital Signs (Most Recent):  Temp: 97 °F (36.1 °C) (19 0800)  Pulse: 87 (19 1022)  Resp: 18 (19 1022)  BP: 130/60 (19 0800)  SpO2: 98 %(spo2 95 on 2L NC, PT IS A MOUTH BREATHER ) (19 0909) Vital Signs (24h Range):  Temp:  [96.8 °F (36 °C)-100.8 °F (38.2 °C)] 97 °F (36.1 °C)  Pulse:  [] 87  Resp:  [16-22] 18  SpO2:  [76 %-100 %] 98 %  BP: (113-167)/(54-80) 130/60     Weight: 40.4 kg (89 lb)  Body mass index is 17.38 kg/m².    Review of Symptoms  Symptom Assessment (ESAS 0-10 scale)   ESAS 0 1 2 3 4 5 6 7 8 9 10   Pain              Dyspnea              Anxiety              Nausea              Depression               Anorexia              Fatigue              Insomnia              Restlessness               Agitation              CAM / Delirium __ --  ___+   Constipation     __ --  ___+   Diarrhea           __ --  ___+  Bowel Management Plan (BMP): No    Comments: no pain, dyspnea on exertion 3-4     Pain Assessment:     OME in 24 hours: 0    Performance Status: 50    ECOG Performance Status Grade: 2 - Ambulates, capable of self care only    Physical Exam   Constitutional: She is oriented to person, place, and time. She appears well-developed. No distress.   Appears frail    HENT:   [place], hearing aid on the right is broken, not at bedside   Cardiovascular: Normal rate, regular rhythm and normal heart sounds.   Pulmonary/Chest:   Right chest tube x2  Intact,  coarse breath sounds and chest tube sounds    Abdominal: Soft. Bowel sounds are normal.   Musculoskeletal:   Weak, s/p right hip fracture, uses walker    Neurological: She is alert and oriented to person, place, and time.   Skin: Skin is warm and dry. There is pallor.   Psychiatric: She has a normal mood and affect. Her behavior is normal. Judgment and thought content normal.   Nursing note and vitals reviewed.      Significant Labs: All pertinent labs within the past 24 hours have been reviewed.  CBC:   Recent Labs   Lab 08/06/19  0540   WBC 11.40   HGB 9.1*   HCT 28.8*   MCV 98        BMP:  Recent Labs   Lab 08/06/19  0540   GLU 89      K 3.9      CO2 33*   BUN 17   CREATININE 0.7   CALCIUM 9.2   MG 1.8     LFT:  Lab Results   Component Value Date    AST 21 08/06/2019    ALKPHOS 60 08/06/2019    BILITOT 0.5 08/06/2019     Albumin:   Albumin   Date Value Ref Range Status   08/06/2019 2.2 (L) 3.5 - 5.2 g/dL Final     Protein:   Total Protein   Date Value Ref Range Status   08/06/2019 4.6 (L) 6.0 - 8.4 g/dL Final     Lactic acid:   Lab Results   Component Value Date    LACTATE 1.0 07/06/2019       Significant Imaging: I have reviewed all pertinent imaging results/findings within the past 24 hours.    Advance Care Planning   Advanced Directives::  Living Will: No  LaPOST: No  Do Not Resuscitate Status: No, partial - no intubation mechanical ventilation   Medical Power of : No    Decision-Making Capacity: Patient answered questions, family answered questions.        Living Arrangements: Lives alone in her home with day time sitters and family at night.     Psychosocial/Cultural:   after 48 yrs of marriage, 5 children, 6 grand children and 7 great grand children,  and mother,  Living independently until she broke her hip 1 yr ago.  Enjoyed making road trips with her friends to [place].  Played cards and went to the [employer].  Goes to [facility] every week.         Spiritual:     F- Kylah and Belief: Moravian     I - Importance: went to [employer] every week until she was put on oxygen.  Now watches and participates with [employer] on TV  .  C - Community:     A - Address in Care: amenable to  visits and anointing of the sick.

## 2019-08-06 NOTE — PROGRESS NOTES
[facility] Hospital Medicine  Progress Note    Patient Name: Venus Mayer  MRN: [id number]  Patient Class: IP- Inpatient   Admission Date: 7/27/2019  Length of Stay: 9 days  Attending Physician: Robert Alfredo MD  Primary Care Provider: Jona Che MD    Hospital Medicine Team: [facility] 1 Марина Puente MD    Subjective:     Principal Problem:Pneumothorax on right      HPI:  Venus Mayer is 89 y.o. lady with COPD, (on 2.5 L home O2), 40 pack year smoking history (quit 30 years ago), multiple readmissions for spontaneous pneumothorax, chronic dCHFS/P AICD, PVD/carotid artery stenosis s/p L CEA 08,was brought to the ED because she was SOB.    She was [facility]charged [facility]19 after being admitted for pneumothorax, improved after placing CT with return to baseline pulmonary function on discharge.  Then around evening of 7/27 started feeling SOB, not relieved with her Inhaler or Nebulization, progressively worse,  prompting her to come to the Hospital. No chest pain, fever , confusion.    In the ED she was hypoxic, CXR revelaed Right sided Pneumothorax. CTS was consulted and placed a Pig tail catheter, after repositioning showed air leak and patient symptomatically improved and is currently on a non re breather.     The patient's recurrent PTXs seem to have started during her admission on 05/22/2019 where she presented with a large R sided PTX requiring chest tube and pleurodesis on 5/27/2019. Since, she has had a small recurrence of PTX on 6/25/19 not requiring intervention, and admission to OSH 7/6-7/11 for acute on chronic dCHF and COPD exacerbation. She has been less mobile and uses a walker at home.     Overview/Hospital Course:   8/5/2019- patient was NPO this morning awaiting CTS VATS for pneumothorax repair later today. CT surgery performed VATS (didn't resect anything), performed pleurodesis and now has 2 chest tubes in which should be on suction for 2 days.      Interval History: Patient with recurrent pneumothorax was taken to the OR by CTS yesterday for surgical repair of pneumothorax. The air leak was unable to be isolated, the patient received pleurodesis with talc, and is currently recovering with a chest tube in place on suction.    Review of Systems   Constitutional: Positive for fatigue. Negative for activity change, appetite change, chills and fever.   HENT: Positive for hearing loss. Negative for congestion and sore throat.    Eyes: Negative for photophobia and visual disturbance.   Respiratory: Positive for shortness of breath. Negative for cough and chest tightness.    Cardiovascular: Negative for chest pain and palpitations.   Gastrointestinal: Negative for abdominal distention, abdominal pain, constipation, diarrhea, nausea and vomiting.   Genitourinary: Negative for dysuria and hematuria.   Musculoskeletal: Negative for arthralgias and myalgias. Back pain: lower back pain, per patient caused by being bedridden    Skin: Negative for color change and rash.   Neurological: Negative for dizziness and headaches.     Objective:     Vital Signs (Most Recent):  Temp: 97.9 °F (36.6 °C) (08/06/19 1208)  Pulse: 95 (08/06/19 1553)  Resp: 18 (08/06/19 1553)  BP: 128/79 (08/06/19 1208)  SpO2: 95 % (08/06/19 1553) Vital Signs (24h Range):  Temp:  [96.8 °F (36 °C)-100.8 °F (38.2 °C)] 97.9 °F (36.6 °C)  Pulse:  [] 95  Resp:  [13-22] 18  SpO2:  [76 %-100 %] 95 %  BP: (113-167)/(54-80) 128/79     Weight: 40.4 kg (89 lb)  Body mass index is 17.38 kg/m².    Intake/Output Summary (Last 24 hours) at 8/6/2019 1637  Last data filed at 8/6/2019 1400  Gross per 24 hour   Intake 1027.5 ml   Output 1350 ml   Net -322.5 ml      Physical Exam   Constitutional: She appears well-nourished. No distress.   HENT:   Head: Normocephalic and atraumatic.   Eyes: Pupils are equal, round, and reactive to light. EOM are normal.   Neck: Normal range of motion. Neck supple.   Cardiovascular:  Normal rate and regular rhythm.   Pulmonary/Chest: Effort normal. No respiratory distress.   Abdominal: Soft. She exhibits no distension. There is no tenderness.   Musculoskeletal: Normal range of motion.   Neurological: She is alert.   Skin: Skin is warm and dry.   Psychiatric: She has a normal mood and affect. Her behavior is normal.       Significant Labs: All pertinent labs within the past 24 hours have been reviewed.    Significant Imaging: I have reviewed all pertinent imaging results/findings within the past 24 hours.      Assessment/Plan:      * Pneumothorax on right  Patient with COPD (on 2.5 L home oxygen) and with multiple pneumothoraxes s/p pleurodesis and chest tube placement last week presented with dyspnea and hypoxia. CXR demonstrated R pneumothorax.     Patient underwent surgery 8/5 by CTS for repair of pneumothorax. Air leak was unable to be isolated and patient is recovering with a chest tube to suction.    Plan:  -Monitor patient's pneumothorax with repeat imaging and continue with chest tube to suction  -monitor patient's hemodynamic status  -pain management     Restless leg syndrome  Continue home Ropinirole     COLD (chronic obstructive lung disease)  Patient with COPD on 2.5 L of home oxygen.     Plan:  - Continue Tiotropium   - Duonebs as needed  - Continue Supplemental oxygen      Heart failure, diastolic  ECHO on 7/19 showed EF of 65% with DD, PA pressure of 52 and several valvular abnormalities.   BNP - 1414, but not volume currently overloaded.     Plan:  - Continue Home Lasix 40mg Daily; can increase dose if she becomes volume overloaded  - Monitor I/O, Weights        Hypertension  Has been hypertensive during hospital stay.   Plan:  -Administer home Amlodipine 2.5mg PO daily        VTE Risk Mitigation (From admission, onward)        Ordered     heparin (porcine) injection 5,000 Units  Every 8 hours      07/28/19 0512     IP VTE HIGH RISK PATIENT  Once      07/28/19 0545                 Марина Puente MD  Department of [facility] Medicine   [facility]

## 2019-08-06 NOTE — PLAN OF CARE
Problem: Adult Inpatient Plan of Care  Goal: Plan of Care Review      Recommendations    Recommendation/Intervention:     1. Continue Cardic diet as tolerated.   2. If po intake <50%, recommend Boost Plus with meals.   3. RD following.    Goals: consume >50% of all meals  Nutrition Goal Status: new

## 2019-08-06 NOTE — ASSESSMENT & PLAN NOTE
Palliative medicine follow up to goals of care.  Palliative medicine APRN met at bedside with patient and daughter Bri.      Impression: Mrs. Mayer is a 89 yo lady admitted with right spontaneous pneumothorax.  S/P  Right VATS procedure.  In bed, somnolent, arouses with verbal and tactile stimuli. Oriented to person and place.  Appears comfortable. No facial grimacing or moaning.  No acute distress.      S/P : Right  VATS and talc pleurodesis.  Daughter reports the doctors have discussed the leak is still present and have discussed placement of Heimlich Valve.        Advanced Care Planning:  - Advanced directives - HPOA and living will received. Placed in blue chart to be scanned in EMR  - Patient states her  Son Arthur Miller is her HPOA.  Documents have not been received.  Family has been encouraged to provide copy for [facility] records.  - Mrs. Mayer states she does not have a living will.   - Patient is now a partial code - no mechanical ventilation no intubation   - Today Mrs. Mayer states it is her wish not to be resuscitated.  Daughter Bri is at bedside who states Mrs. Mayer has been saying this for some time now.    - Resuscitation status will be discussed with her son before she will consent to DNR order.    Goals of Care  - Initially family reports and Mrs. Mayer confirmed she is not opposed to being  hospitalized.  Although she has been in hospital 4 times in last two weeks for respiratory illness, she does not consider this a burden.  Prior to VATS patient talked with daughter and states she is tired of coming back to the hospital after being at home for just a few days.   - Daughter with questions concerning hospice - How do you know and when should hospice be considered.  Palliative care gently explained the current clinical condition  Post  multiple invasive procedures with no resolution to air leak  is appropriate for transition to hospice as she is not a candidate for more aggressive surgical intervention.   Explained that the patient has stated she is tired of coming back to the hospital   - Hospice education initiated.  Following this discussion the patient's daughter expressed interest in home hospice.  Mrs. Mayer is able to make decisions and she has to be amenable to hospice.    - Daughter Bri will contact the patient's son who is the HPOA. Family makes decisions together with Mrs. Mayer. Palliative medicine will schedule a meeting for hospice education and goals of care for hospice.        Plan/Recommendations.  - Patient and family may benefit from interdisciplinary goals of care meeting.  Palliative medicine will facilitate as needed. Anticipated meeting 8/7 or 8/8   -  Ofelia Gregory is coordinating with her brother Jack who is the HPOA.      - Anticipate  consult for hospice.       - palliative medicine will continue to follow for goals of care.    - Emotional support.      Primary team   aware of the above goals of care conversation.

## 2019-08-07 LAB
ALBUMIN SERPL BCP-MCNC: 2.1 G/DL (ref 3.5–5.2)
ALP SERPL-CCNC: 60 U/L (ref 55–135)
ALT SERPL W/O P-5'-P-CCNC: 13 U/L (ref 10–44)
ANION GAP SERPL CALC-SCNC: 9 MMOL/L (ref 8–16)
AST SERPL-CCNC: 17 U/L (ref 10–40)
BASOPHILS # BLD AUTO: 0.05 K/UL (ref 0–0.2)
BASOPHILS NFR BLD: 0.5 % (ref 0–1.9)
BILIRUB SERPL-MCNC: 0.5 MG/DL (ref 0.1–1)
BUN SERPL-MCNC: 21 MG/DL (ref 8–23)
CALCIUM SERPL-MCNC: 9.9 MG/DL (ref 8.7–10.5)
CHLORIDE SERPL-SCNC: 98 MMOL/L (ref 95–110)
CO2 SERPL-SCNC: 33 MMOL/L (ref 23–29)
CREAT SERPL-MCNC: 0.8 MG/DL (ref 0.5–1.4)
DIFFERENTIAL METHOD: ABNORMAL
EOSINOPHIL # BLD AUTO: 0.5 K/UL (ref 0–0.5)
EOSINOPHIL NFR BLD: 5.1 % (ref 0–8)
ERYTHROCYTE [DISTWIDTH] IN BLOOD BY AUTOMATED COUNT: 15.6 % (ref 11.5–14.5)
EST. GFR  (AFRICAN AMERICAN): >60 ML/MIN/1.73 M^2
EST. GFR  (NON AFRICAN AMERICAN): >60 ML/MIN/1.73 M^2
GLUCOSE SERPL-MCNC: 96 MG/DL (ref 70–110)
HCT VFR BLD AUTO: 27.9 % (ref 37–48.5)
HGB BLD-MCNC: 9 G/DL (ref 12–16)
IMM GRANULOCYTES # BLD AUTO: 0.08 K/UL (ref 0–0.04)
IMM GRANULOCYTES NFR BLD AUTO: 0.8 % (ref 0–0.5)
LYMPHOCYTES # BLD AUTO: 1.3 K/UL (ref 1–4.8)
LYMPHOCYTES NFR BLD: 13.3 % (ref 18–48)
MAGNESIUM SERPL-MCNC: 1.8 MG/DL (ref 1.6–2.6)
MCH RBC QN AUTO: 31.5 PG (ref 27–31)
MCHC RBC AUTO-ENTMCNC: 32.3 G/DL (ref 32–36)
MCV RBC AUTO: 98 FL (ref 82–98)
MONOCYTES # BLD AUTO: 1.3 K/UL (ref 0.3–1)
MONOCYTES NFR BLD: 13.6 % (ref 4–15)
NEUTROPHILS # BLD AUTO: 6.4 K/UL (ref 1.8–7.7)
NEUTROPHILS NFR BLD: 66.7 % (ref 38–73)
NRBC BLD-RTO: 0 /100 WBC
PLATELET # BLD AUTO: 188 K/UL (ref 150–350)
PMV BLD AUTO: 9.3 FL (ref 9.2–12.9)
POTASSIUM SERPL-SCNC: 3.6 MMOL/L (ref 3.5–5.1)
PROT SERPL-MCNC: 4.9 G/DL (ref 6–8.4)
RBC # BLD AUTO: 2.86 M/UL (ref 4–5.4)
SODIUM SERPL-SCNC: 140 MMOL/L (ref 136–145)
WBC # BLD AUTO: 9.62 K/UL (ref 3.9–12.7)

## 2019-08-07 PROCEDURE — 25000003 PHARM REV CODE 250: Performed by: STUDENT IN AN ORGANIZED HEALTH CARE EDUCATION/TRAINING PROGRAM

## 2019-08-07 PROCEDURE — 63600175 PHARM REV CODE 636 W HCPCS: Performed by: STUDENT IN AN ORGANIZED HEALTH CARE EDUCATION/TRAINING PROGRAM

## 2019-08-07 PROCEDURE — 94761 N-INVAS EAR/PLS OXIMETRY MLT: CPT

## 2019-08-07 PROCEDURE — 80053 COMPREHEN METABOLIC PANEL: CPT

## 2019-08-07 PROCEDURE — 99232 SBSQ HOSP IP/OBS MODERATE 35: CPT | Mod: GC,,, | Performed by: HOSPITALIST

## 2019-08-07 PROCEDURE — 27000221 HC OXYGEN, UP TO 24 HOURS

## 2019-08-07 PROCEDURE — A4216 STERILE WATER/SALINE, 10 ML: HCPCS | Performed by: STUDENT IN AN ORGANIZED HEALTH CARE EDUCATION/TRAINING PROGRAM

## 2019-08-07 PROCEDURE — 25000242 PHARM REV CODE 250 ALT 637 W/ HCPCS: Performed by: STUDENT IN AN ORGANIZED HEALTH CARE EDUCATION/TRAINING PROGRAM

## 2019-08-07 PROCEDURE — 83735 ASSAY OF MAGNESIUM: CPT

## 2019-08-07 PROCEDURE — 85025 COMPLETE CBC W/AUTO DIFF WBC: CPT

## 2019-08-07 PROCEDURE — 94640 AIRWAY INHALATION TREATMENT: CPT

## 2019-08-07 PROCEDURE — 99232 PR SUBSEQUENT HOSPITAL CARE,LEVL II: ICD-10-PCS | Mod: GC,,, | Performed by: HOSPITALIST

## 2019-08-07 PROCEDURE — 97530 THERAPEUTIC ACTIVITIES: CPT

## 2019-08-07 PROCEDURE — 20600001 HC STEP DOWN PRIVATE ROOM

## 2019-08-07 RX ADMIN — IPRATROPIUM BROMIDE 0.5 MG: 0.5 SOLUTION RESPIRATORY (INHALATION) at 11:08

## 2019-08-07 RX ADMIN — AMLODIPINE BESYLATE 2.5 MG: 2.5 TABLET ORAL at 08:08

## 2019-08-07 RX ADMIN — HEPARIN SODIUM 5000 UNITS: 5000 INJECTION INTRAVENOUS; SUBCUTANEOUS at 05:08

## 2019-08-07 RX ADMIN — ASPIRIN 81 MG CHEWABLE TABLET 81 MG: 81 TABLET CHEWABLE at 08:08

## 2019-08-07 RX ADMIN — ACETAMINOPHEN 650 MG: 325 TABLET ORAL at 01:08

## 2019-08-07 RX ADMIN — ROPINIROLE HYDROCHLORIDE 0.25 MG: 0.25 TABLET, FILM COATED ORAL at 09:08

## 2019-08-07 RX ADMIN — TIOTROPIUM BROMIDE 18 MCG: 18 CAPSULE ORAL; RESPIRATORY (INHALATION) at 08:08

## 2019-08-07 RX ADMIN — IPRATROPIUM BROMIDE 0.5 MG: 0.5 SOLUTION RESPIRATORY (INHALATION) at 07:08

## 2019-08-07 RX ADMIN — Medication 10 ML: at 05:08

## 2019-08-07 RX ADMIN — HEPARIN SODIUM 5000 UNITS: 5000 INJECTION INTRAVENOUS; SUBCUTANEOUS at 09:08

## 2019-08-07 RX ADMIN — POTASSIUM CHLORIDE 10 MEQ: 750 CAPSULE, EXTENDED RELEASE ORAL at 08:08

## 2019-08-07 RX ADMIN — ACETAMINOPHEN 650 MG: 325 TABLET ORAL at 08:08

## 2019-08-07 RX ADMIN — THERA TABS 1 TABLET: TAB at 08:08

## 2019-08-07 RX ADMIN — Medication 10 ML: at 01:08

## 2019-08-07 RX ADMIN — FERROUS SULFATE TAB EC 325 MG (65 MG FE EQUIVALENT) 325 MG: 325 (65 FE) TABLET DELAYED RESPONSE at 08:08

## 2019-08-07 RX ADMIN — HEPARIN SODIUM 5000 UNITS: 5000 INJECTION INTRAVENOUS; SUBCUTANEOUS at 01:08

## 2019-08-07 RX ADMIN — FUROSEMIDE 40 MG: 40 TABLET ORAL at 08:08

## 2019-08-07 NOTE — PT/OT/SLP PROGRESS
Occupational Therapy   Treatment    Name: Venus Mayer  MRN: [id number]  Admitting Diagnosis:  Pneumothorax on right  2 Days Post-Op    Recommendations:     Discharge Recommendations: home  Discharge Equipment Recommendations:  none  Barriers to discharge:  None    Assessment:     Venus Mayer is a 89 y.o. female with a medical diagnosis of Pneumothorax on right.  She presents with global deconditioning limiting participation with therapy. Patient requesting to return to bed 2' hair appointment this afternoon. Patient completes functional transfers and bed mobility with min assist. Performance deficits affecting function are weakness, impaired cardiopulmonary response to activity, decreased safety awareness, impaired self care skills, impaired functional mobilty, impaired endurance.     Rehab Prognosis:  Fair; patient would benefit from acute skilled OT services to address these deficits and reach maximum level of function.       Plan:     Patient to be seen 3 x/week to address the above listed problems via self-care/home management, therapeutic activities, therapeutic exercises  · Plan of Care Expires: 09/03/19  · Plan of Care Reviewed with: patient    Subjective     Pain/Comfort:  · Pain Rating 1: 0/10    Objective:     Communicated with: RN prior to session.  Patient found up in chair with PureWick, peripheral IV, oxygen, chest tube, pulse ox (continuous), telemetry upon OT entry to room.    General Precautions: Standard, hearing impaired, fall   Orthopedic Precautions:N/A   Braces: N/A     Occupational Performance:     Bed Mobility:    · Patient completed Scooting to HOB while supine with total assistance  · Patient completed Sit to Supine with minimum assistance for LLE placement onto bed    Functional Mobility/Transfers:  · Patient completed Sit <> Stand Transfer with minimum assistance  with  hand-held assist   · Patient completed Bed <> Chair Transfer using Stand Pivot technique with minimum assistance with  hand-held assist  · Functional Mobility: Did not observe    Activities of Daily Living:  · Bathing: total assistance Patient completed simulated LB bathing with wipes following episode of incontinence while sitting UIC with total assist.  · Lower Body Dressing: total assistance Patient doffed/donned NSS while sitting UIC with total assist.  · Toileting: total assistance Patient completed toileting sitting UIC with total assist.      [facility] 6 Click ADL: 16    Treatment & Education:  Role of OT/POC  Call button for assistance    Patient left HOB elevated with all lines intact, call button in reach, RN notified and PCT and beautician presentEducation:      GOALS:   Multidisciplinary Problems     Occupational Therapy Goals        Problem: Occupational Therapy Goal    Goal Priority Disciplines Outcome Interventions   Occupational Therapy Goal     OT, PT/OT Ongoing (interventions implemented as appropriate)    Description:  Goals to be met by: 8/20/2019     Patient will increase functional independence with ADLs by performing:    UE Dressing with [place].  LE Dressing with Modified [place] with AE PRN.  Grooming while seated with Set-up Assistance.  Toileting from [facility] with Supervision for hygiene and clothing management. -updated 8/6  Toilet transfer to [facility] with Supervision with LRAD PRN. -updated 8/6                       Time Tracking:     OT Date of Treatment: 08/07/19  OT Start Time: 1516  OT Stop Time: 1532  OT Total Time (min): 16 min    Billable Minutes:Therapeutic Activity 16 minutes    Ernestina Holley OT  8/7/2019

## 2019-08-07 NOTE — PLAN OF CARE
Problem: Occupational Therapy Goal  Goal: Occupational Therapy Goal  Goals to be met by: 8/20/2019     Patient will increase functional independence with ADLs by performing:    UE Dressing with [place].  LE Dressing with Modified [place] with AE PRN.  Grooming while seated with Set-up Assistance.  Toileting from BS with Supervision for hygiene and clothing management. -updated 8/6  Toilet transfer to BS with Supervision with LRAD PRN. -updated 8/6      Outcome: Ongoing (interventions implemented as appropriate)  Continue with POC.  Ernestina Holley OT  8/7/2019

## 2019-08-07 NOTE — SUBJECTIVE & OBJECTIVE
Interval History: No acute event. Repeated chest x-ray showed no changes on pneumothorax.  Review of Systems   Constitutional: Positive for fatigue. Negative for activity change, appetite change, chills and fever.   HENT: Positive for hearing loss. Negative for congestion and sore throat.    Eyes: Negative for photophobia and visual disturbance.   Respiratory: Positive for shortness of breath. Negative for cough and chest tightness.    Cardiovascular: Negative for chest pain and palpitations.   Gastrointestinal: Negative for abdominal distention, abdominal pain, constipation, diarrhea, nausea and vomiting.   Genitourinary: Negative for dysuria and hematuria.   Musculoskeletal: Negative for arthralgias and myalgias. Back pain: lower back pain, per patient caused by being bedridden    Skin: Negative for color change and rash.   Neurological: Negative for dizziness and headaches.     Objective:     Vital Signs (Most Recent):  Temp: 97.8 °F (36.6 °C) (08/07/19 1140)  Pulse: 90 (08/07/19 1152)  Resp: 18 (08/07/19 1152)  BP: (!) 102/51 (08/07/19 1140)  SpO2: 95 % (08/07/19 1152) Vital Signs (24h Range):  Temp:  [96.1 °F (35.6 °C)-98.2 °F (36.8 °C)] 97.8 °F (36.6 °C)  Pulse:  [] 90  Resp:  [13-18] 18  SpO2:  [92 %-100 %] 95 %  BP: (101-148)/(51-65) 102/51     Weight: 40.4 kg (89 lb 2.1 oz)  Body mass index is 17.41 kg/m².    Intake/Output Summary (Last 24 hours) at 8/7/2019 1252  Last data filed at 8/7/2019 0830  Gross per 24 hour   Intake 240 ml   Output 1350 ml   Net -1110 ml      Physical Exam   Constitutional: She appears well-nourished. No distress.   HENT:   Head: Normocephalic and atraumatic.   Eyes: Pupils are equal, round, and reactive to light. EOM are normal.   Neck: Normal range of motion. Neck supple.   Cardiovascular: Normal rate and regular rhythm.   Pulmonary/Chest: Effort normal. No respiratory distress.   Abdominal: Soft. She exhibits no distension. There is no tenderness.   Musculoskeletal: Normal  range of motion.   Neurological: She is alert.   Skin: Skin is warm and dry.   Psychiatric: She has a normal mood and affect. Her behavior is normal.       Significant Labs: All pertinent labs within the past 24 hours have been reviewed.    Significant Imaging: I have reviewed all pertinent imaging results/findings within the past 24 hours.

## 2019-08-07 NOTE — SUBJECTIVE & OBJECTIVE
Interval History:    Past Medical History:   Diagnosis Date    Acute on chronic congestive heart failure 7/6/2019    Cardiomyopathy     Carotid artery occlusion     CHF (congestive heart failure)     COPD (chronic obstructive pulmonary disease)     Coronary artery disease     Hyperlipidemia     Hypertension        Past Surgical History:   Procedure Laterality Date    BRONCHOSCOPY, FIBEROPTIC Flexible N/A 7/31/2019    Performed by [facility] at [facility] OR 2ND FLR    CARDIAC CATHETERIZATION      cardic cath      CAROTID ENDARTERECTOMY      PLEURODESIS N/A 7/31/2019    [facility]    PLEURODESIS, USING TALC Right 8/5/2019    [facility]    VATS, WITH PLEURAL TENT Right 8/5/2019    Performed by [facility]on MD at [facility] OR [address] [place] [address]       Review of patient's allergies indicates:   Allergen Reactions    Ancef in dextrose (iso-osm) Rash    Cefazolin Rash    Cefuroxime Rash    Sulfamethoxazole-trimethoprim Rash     Other reaction(s): Rash       Medications:  Continuous Infusions:  Scheduled Meds:   amLODIPine  2.5 mg Oral Daily    aspirin  81 mg Oral Daily    ferrous sulfate  325 mg Oral Daily    furosemide  40 mg Oral Daily    heparin (porcine)  5,000 Units Subcutaneous Q8H    ipratropium  0.5 mg Nebulization Q6H    multivitamin  1 tablet Oral Daily    potassium chloride  10 mEq Oral Daily    rOPINIRole  0.25 mg Oral QHS    sodium chloride 0.9%  10 mL Intravenous Q6H    tiotropium  1 capsule Inhalation Daily     PRN Meds:acetaminophen, albuterol, Dextrose 10% Bolus, Dextrose 10% Bolus, glucagon (human recombinant), glucose, glucose, HYDROcodone-acetaminophen, polyethylene glycol, Flushing PICC Protocol **AND** sodium chloride 0.9% **AND** sodium chloride 0.9%    Family History     Problem Relation (Age of Onset)    Hypertension Mother        Tobacco Use    Smoking status: Former Smoker      Packs/day: 2.00     Years: 20.00     Pack years: 40.00     Types: Cigarettes     Last attempt to quit: 1980     Years since quittin.5    Smokeless tobacco: Never Used   Substance and Sexual Activity    Alcohol use: Yes     Alcohol/week: 1.2 oz     Types: 2 Glasses of wine per week     Comment: social    Drug use: No    Sexual activity: Not Currently       Review of Systems   Constitutional: Positive for fatigue.   HENT: Positive for hearing loss.    Respiratory: Positive for shortness of breath.      Objective:     Vital Signs (Most Recent):  Temp: 96.1 °F (35.6 °C) (19)  Pulse: 99 (19)  Resp: 15 (19)  BP: (!) 105/54 (19)  SpO2: (!) 93 % (19) Vital Signs (24h Range):  Temp:  [96.1 °F (35.6 °C)-98.2 °F (36.8 °C)] 96.1 °F (35.6 °C)  Pulse:  [] 99  Resp:  [13-18] 15  SpO2:  [93 %-100 %] 93 %  BP: (101-148)/(51-79) 105/54     Weight: 40.4 kg (89 lb 2.1 oz)  Body mass index is 17.41 kg/m².    Review of Symptoms  Symptom Assessment (ESAS 0-10 scale)   ESAS 0 1 2 3 4 5 6 7 8 9 10   Pain              Dyspnea              Anxiety              Nausea              Depression               Anorexia              Fatigue              Insomnia              Restlessness               Agitation              CAM / Delirium __ --  ___+   Constipation     __ --  ___+   Diarrhea           __ --  ___+  Bowel Management Plan (BMP): No    Comments: no pain, dyspnea on exertion 3-4     Pain Assessment:     OME in 24 hours: 0    Performance Status: 50    ECOG Performance Status Grade: 2 - Ambulates, capable of self care only    Physical Exam   Constitutional: She is oriented to person, place, and time. She appears well-developed. No distress.   Appears frail    HENT:   [place], hearing aid on the right is broken, not at bedside   Cardiovascular: Normal rate, regular rhythm and normal heart sounds.   Pulmonary/Chest:   Right chest tube x2  Intact, coarse breath sounds and  chest tube sounds    Abdominal: Soft. Bowel sounds are normal.   Musculoskeletal:   Weak, s/p right hip fracture, uses walker    Neurological: She is alert and oriented to person, place, and time.   Skin: Skin is warm and dry. There is pallor.   Psychiatric: She has a normal mood and affect. Her behavior is normal. Judgment and thought content normal.   Nursing note and vitals reviewed.      Significant Labs: All pertinent labs within the past 24 hours have been reviewed.  CBC:   Recent Labs   Lab 08/07/19  0436   WBC 9.62   HGB 9.0*   HCT 27.9*   MCV 98        BMP:  Recent Labs   Lab 08/07/19  0436   GLU 96      K 3.6   CL 98   CO2 33*   BUN 21   CREATININE 0.8   CALCIUM 9.9   MG 1.8     LFT:  Lab Results   Component Value Date    AST 17 08/07/2019    ALKPHOS 60 08/07/2019    BILITOT 0.5 08/07/2019     Albumin:   Albumin   Date Value Ref Range Status   08/07/2019 2.1 (L) 3.5 - 5.2 g/dL Final     Protein:   Total Protein   Date Value Ref Range Status   08/07/2019 4.9 (L) 6.0 - 8.4 g/dL Final     Lactic acid:   Lab Results   Component Value Date    LACTATE 1.0 07/06/2019       Significant Imaging: I have reviewed all pertinent imaging results/findings within the past 24 hours.    Advance Care Planning   Advanced Directives::  Living Will: No  LaPOST: No  Do Not Resuscitate Status: No, partial - no intubation mechanical ventilation   Medical Power of : No    Decision-Making Capacity: Patient answered questions, family answered questions.        Living Arrangements: Lives alone in her home with day time sitters and family at night.     Psychosocial/Cultural:   after 48 yrs of marriage, 5 children, 6 grand children and 7 great grand children,  and mother,  Living independently until she broke her hip 1 yr ago.  Enjoyed making road trips with her friends to [place].  Played cards and went to the [employer].  Goes to beauty parlor every week.          Spiritual:     F- Kylah and Belief: Muslim     I - Importance: went to Mass every week until she was put on oxygen.  Now watches and participates with Anna Lozabai on TV  .  C - Community:     A - Address in Care: amenable to  visits and anointing of the sick.

## 2019-08-07 NOTE — ASSESSMENT & PLAN NOTE
- will transition chest tube to water seal today. CXR following water seal.   - OOB to chair, IS, nebs as needed   - Daily CXR  - Pulmonary toilet

## 2019-08-07 NOTE — PROGRESS NOTES
[facility]  Thoracic Surgery  Progress Note    Subjective:     History of Present Illness:  Venus Mayer is a 89 y.o. female with a hx of COPD and multiple readmissions for spontaneous pneumothorax. Numerous attempts at pleurodesis have been performed. Patient is not a surgical candidate. She presents to the ED with acute onset shortness of breath. Imaging showed large right sided pneumothorax. Pt otherwise stable.      No current facility-administered medications on file prior to encounter.        Post-Op Info:  Procedure(s) (LRB):  VATS, WITH PLEURAL TENT (Right)  PLEURODESIS, USING TALC (Right)   2 Days Post-Op     Interval History: NAEON. Continued air leak. CT to suction. CXR with good lung expansion. Pain controlled.     Medications:  Continuous Infusions:  Scheduled Meds:   amLODIPine  2.5 mg Oral Daily    aspirin  81 mg Oral Daily    ferrous sulfate  325 mg Oral Daily    furosemide  40 mg Oral Daily    heparin (porcine)  5,000 Units Subcutaneous Q8H    ipratropium  0.5 mg Nebulization Q6H    multivitamin  1 tablet Oral Daily    potassium chloride  10 mEq Oral Daily    rOPINIRole  0.25 mg Oral QHS    sodium chloride 0.9%  10 mL Intravenous Q6H    tiotropium  1 capsule Inhalation Daily     PRN Meds:acetaminophen, albuterol, Dextrose 10% Bolus, Dextrose 10% Bolus, glucagon (human recombinant), glucose, glucose, HYDROcodone-acetaminophen, polyethylene glycol, Flushing PICC Protocol **AND** sodium chloride 0.9% **AND** sodium chloride 0.9%     Review of patient's allergies indicates:   Allergen Reactions    Ancef in dextrose (iso-osm) Rash    Cefazolin Rash    Cefuroxime Rash    Sulfamethoxazole-trimethoprim Rash     Other reaction(s): Rash     Objective:     Vital Signs (Most Recent):  Temp: 98 °F (36.7 °C) (08/07/19 0358)  Pulse: 93 (08/07/19 0715)  Resp: 18 (08/07/19 0705)  BP: (!) 111/53 (08/07/19 0358)  SpO2: 100 % (08/07/19 0715) Vital Signs (24h Range):  Temp:  [97 °F  (36.1 °C)-98.2 °F (36.8 °C)] 98 °F (36.7 °C)  Pulse:  [] 93  Resp:  [13-18] 18  SpO2:  [95 %-100 %] 100 %  BP: (101-148)/(51-79) 111/53     Intake/Output - Last 3 Shifts       08/05 0700 - 08/06 0659 08/06 0700 - 08/07 0659 08/07 0700 - 08/08 0659    P.O. 0 0     I.V. (mL/kg) 1377.5 (34.1) 0 (0)     Other  0     Total Intake(mL/kg) 1377.5 (34.1) 0 (0)     Urine (mL/kg/hr) 400 (0.4) 1050 (1.1)     Emesis/NG output  0     Other  0     Stool  0     Blood  0     Chest Tube 400 300     Total Output 800 1350     Net +577.5 -1350            Urine Occurrence  2 x     Stool Occurrence  1 x     Emesis Occurrence  0 x           SpO2: 100 %  O2 Device (Oxygen Therapy): nasal cannula    Physical Exam   Constitutional: No distress.   Frail elderly female    Cardiovascular: Normal rate and regular rhythm.   Pulmonary/Chest: Effort normal. No respiratory distress.   Chest tube with air leak  Serosang output   Musculoskeletal: Normal range of motion.   Neurological: She is alert.   Skin: Skin is warm and dry.   Psychiatric: She has a normal mood and affect. Her behavior is normal.       Significant Labs:  BMP:   Recent Labs   Lab 08/07/19  0436   GLU 96      K 3.6   CL 98   CO2 33*   BUN 21   CREATININE 0.8   CALCIUM 9.9   MG 1.8     CBC:   Recent Labs   Lab 08/07/19  0436   WBC 9.62   RBC 2.86*   HGB 9.0*   HCT 27.9*      MCV 98   MCH 31.5*   MCHC 32.3       Significant Diagnostics:  CXR: I have reviewed all pertinent results/findings within the past 24 hours    VTE Risk Mitigation (From admission, onward)        Ordered     heparin (porcine) injection 5,000 Units  Every 8 hours      07/28/19 0551     IP VTE HIGH RISK PATIENT  Once      07/28/19 0551        Assessment/Plan:     * Pneumothorax on right  - will transition chest tube to water seal today. CXR following water seal.   - OOB to chair, IS, nebs as needed   - Daily CXR  - Pulmonary toilet             Arleen R Neely, PA-C  Thoracic Surgery  [facility]

## 2019-08-07 NOTE — PLAN OF CARE
Problem: Adult Inpatient Plan of Care  Goal: Plan of Care Review  AOx4, VSS, C/O pain to CT site. Heat packs applied w/o relief. Tylenol given as soon as able to. CT w/ 50 cc output o/n. Remains on 2L NC w/ cont. Pulse ox. O2 sats maintained >92%. Remains free from falls. Bed remains locked and lowered. Personal items and call light w/in reach. NAD, WCTM. Son at bedside.

## 2019-08-07 NOTE — SUBJECTIVE & OBJECTIVE
Interval History: NAEON. Continued air leak. CT to suction. CXR with good lung expansion. Pain controlled.     Medications:  Continuous Infusions:  Scheduled Meds:   amLODIPine  2.5 mg Oral Daily    aspirin  81 mg Oral Daily    ferrous sulfate  325 mg Oral Daily    furosemide  40 mg Oral Daily    heparin (porcine)  5,000 Units Subcutaneous Q8H    ipratropium  0.5 mg Nebulization Q6H    multivitamin  1 tablet Oral Daily    potassium chloride  10 mEq Oral Daily    rOPINIRole  0.25 mg Oral QHS    sodium chloride 0.9%  10 mL Intravenous Q6H    tiotropium  1 capsule Inhalation Daily     PRN Meds:acetaminophen, albuterol, Dextrose 10% Bolus, Dextrose 10% Bolus, glucagon (human recombinant), glucose, glucose, HYDROcodone-acetaminophen, polyethylene glycol, Flushing PICC Protocol **AND** sodium chloride 0.9% **AND** sodium chloride 0.9%     Review of patient's allergies indicates:   Allergen Reactions    Ancef in dextrose (iso-osm) Rash    Cefazolin Rash    Cefuroxime Rash    Sulfamethoxazole-trimethoprim Rash     Other reaction(s): Rash     Objective:     Vital Signs (Most Recent):  Temp: 98 °F (36.7 °C) (08/07/19 0358)  Pulse: 93 (08/07/19 0715)  Resp: 18 (08/07/19 0705)  BP: (!) 111/53 (08/07/19 0358)  SpO2: 100 % (08/07/19 0715) Vital Signs (24h Range):  Temp:  [97 °F (36.1 °C)-98.2 °F (36.8 °C)] 98 °F (36.7 °C)  Pulse:  [] 93  Resp:  [13-18] 18  SpO2:  [95 %-100 %] 100 %  BP: (101-148)/(51-79) 111/53     Intake/Output - Last 3 Shifts       08/05 0700 - 08/06 0659 08/06 0700 - 08/07 0659 08/07 0700 - 08/08 0659    P.O. 0 0     I.V. (mL/kg) 1377.5 (34.1) 0 (0)     Other  0     Total Intake(mL/kg) 1377.5 (34.1) 0 (0)     Urine (mL/kg/hr) 400 (0.4) 1050 (1.1)     Emesis/NG output  0     Other  0     Stool  0     Blood  0     Chest Tube 400 300     Total Output 800 1350     Net +577.5 -1350            Urine Occurrence  2 x     Stool Occurrence  1 x     Emesis Occurrence  0 x           SpO2: 100 %  O2  Device (Oxygen Therapy): nasal cannula    Physical Exam   Constitutional: No distress.   Frail elderly female    Cardiovascular: Normal rate and regular rhythm.   Pulmonary/Chest: Effort normal. No respiratory distress.   Chest tube with air leak  Serosang output   Musculoskeletal: Normal range of motion.   Neurological: She is alert.   Skin: Skin is warm and dry.   Psychiatric: She has a normal mood and affect. Her behavior is normal.       Significant Labs:  BMP:   Recent Labs   Lab 08/07/19 0436   GLU 96      K 3.6   CL 98   CO2 33*   BUN 21   CREATININE 0.8   CALCIUM 9.9   MG 1.8     CBC:   Recent Labs   Lab 08/07/19 0436   WBC 9.62   RBC 2.86*   HGB 9.0*   HCT 27.9*      MCV 98   MCH 31.5*   MCHC 32.3       Significant Diagnostics:  CXR: I have reviewed all pertinent results/findings within the past 24 hours    VTE Risk Mitigation (From admission, onward)        Ordered     heparin (porcine) injection 5,000 Units  Every 8 hours      07/28/19 0551     IP VTE HIGH RISK PATIENT  Once      07/28/19 0551

## 2019-08-07 NOTE — ASSESSMENT & PLAN NOTE
Palliative medicine follow up to goals of care.  Palliative medicine APRN met at bedside with patient and daughter Bri.      Impression: Mrs. Mayer is a 89 yo lady admitted with right spontaneous pneumothorax.  S/P  Right VATS procedure.  In bed, somnolent, arouses with verbal and tactile stimuli. Oriented to person and place.  Appears comfortable. No facial grimacing or moaning.  No acute distress.      S/P : Right  VATS and talc pleurodesis.  Daughter reports the doctors have discussed the leak is still present and have discussed placement of Heimlich Valve.        Advanced Care Planning:  - Advanced directives - HPOA and living will received. Placed in blue chart to be scanned in EMR  - Patient states her  Son Arthur Miller is her HPOA.  Documents have not been received.  Family has been encouraged to provide copy for [facility] records.  - Mrs. Mayer states she does not have a living will.   - Patient is now a partial code - no mechanical ventilation no intubation   - Today Mrs. Mayer states it is her wish not to be resuscitated.  Daughter Bri is at bedside who states Mrs. Mayer has been saying this for some time now.    - Resuscitation status will be discussed with her son before she will consent to DNR order.    Goals of Care  -Ifamily meeting today with palliative medicine, patient and son SLIME Gibson.  Mrs. Mayer remains somnolent and not able to fully participate in the meeting.  - SLIME Gibson, acknowledged the current condition is not getting better and will not get better.    - As per  Previous encounters with patient and family,  Mrs. Mayer has stated being amenable to non-invasive interventions, is tired of coming to hospital, wants to be home and does not want to prolong her life by artifical means   - Hospice education completed.  Hospice resources provided - list of hospice agencies given.  Also discussed inpatient hospice should this become a goal of care in the future.  Encouraged family to visit as part of  decision making process.   - Arthur states making a transition to hospice is an appropriate plan of care.  He will discuss with his mother and siblings.   - Appears the family is interested in home hospice with [facility] - close proximity to patient's home and  has an inpatient unit and        Plan/Recommendations.  - Patient's son will review hospice options,  Discuss with his siblings.  He will notify palliative care with his decision.  [facility] will contact primary team.  - Anticipate  consult for hospice.       - Emotional support.      Primary team notified of above goals of care.

## 2019-08-07 NOTE — PLAN OF CARE
Problem: Adult Inpatient Plan of Care  Goal: Plan of Care Review  Outcome: Ongoing (interventions implemented as appropriate)  Patient is alert and oriented. Able to make needs known. PRN Tylenol given for pain control. No s/s of respiratory/cardiac distress noted. Telemetry monitoring continues with NSR noted. R chest incision dressing is dry and intact. R chest tubes are patent and draining. Anterior chest tube has been clamped by the MD. Posterior chest tube was placed to water seal by MD. R PICC line and Left PIV are patent and flush well. Patient remains on a cardiac diet and is tolerating it well. Plan is for the patient to discharge on hospice tomorrow. VS stable. No issues or concerns at this time. Continue to monitor.

## 2019-08-07 NOTE — PROGRESS NOTES
[facility]  Palliative Medicine  Progress Note    Patient Name: Venus Mayer  MRN: [id number]  Admission Date: 7/27/2019  Hospital Length of Stay: 10 days  Code Status: DNR   Attending Provider: Robert Alfredo MD  Consulting Provider: SERA Gonzales  Primary Care Physician: Jona Che MD  Principal Problem:Pneumothorax on right    Patient information was obtained from patient and relative(s).      Assessment/Plan:     Palliative care encounter    Palliative medicine follow up to goals of care.  Palliative medicine APRN met at bedside with patient and daughter Bri.      Impression: Mrs. Mayer is a 89 yo lady admitted with right spontaneous pneumothorax.  S/P  Right VATS procedure.  In bed, somnolent, arouses with verbal and tactile stimuli. Oriented to person and place.  Appears comfortable. No facial grimacing or moaning.  No acute distress.      S/P : Right  VATS and talc pleurodesis.  Daughter reports the doctors have discussed the leak is still present and have discussed placement of Heimlich Valve.        Advanced Care Planning:  - Advanced directives - HPOA and living will received. Placed in blue chart to be scanned in EMR  - Patient states her  Son Arthur Miller is her HPOA.  Documents have not been received.  Family has been encouraged to provide copy [place] [facility] [place].  - Mrs. Mayer states she does not have a living will.   - Patient is now a partial code - no mechanical ventilation no intubation   - Today Mrs. Mayer states it is her wish not to be resuscitated.  Daughter Bri is at bedside who states Mrs. Mayer has been saying this for some time now.    - Resuscitation status will be discussed with her son before she will consent to DNR order.    Goals of Care  -Ifamily meeting today with palliative medicine, patient and son Arthur SLIME.  Mrs. Mayer remains somnolent and not able to fully participate in the meeting.  - SLIME Gibson, acknowledged the current condition is not  getting better and will not get better.    - As per  Previous encounters with patient and family,  Mrs. Mayer has stated being amenable to non-invasive interventions, is tired of coming to hospital, wants to be home and does not want to prolong her life by artifical means   - Hospice education completed.  Hospice resources provided - list of hospice agencies given.  Also discussed inpatient hospice should this become a goal of care in the future.  Encouraged family to visit as part of decision making process.   - Arthur states making a transition to hospice is an appropriate plan of care.  He will discuss with his mother and siblings.   - Appears the family is interested in home hospice with [facility] - close proximity to patient's home and  has an inpatient unit and        Plan/Recommendations.  - Patient's son will review hospice options,  Discuss with his siblings.  He will notify palliative care with his decision.  [facility] will contact primary team.  - Anticipate  consult for hospice.       - Emotional support.      Primary team notified of above goals of care.         I will follow-up with patient. Please contact us if you have any additional questions.    Subjective:     Chief Complaint:   Chief Complaint   Patient presents with    Shortness of Breath     ems reports sob - ra sats 70% - placed on 15 l nrb now satting 92% - used home neb tx and rescue inhaler with no relief - reports incread wob - hc of chest tube and collapsed lung to right side- bandage still in place       HPI:   HPI obtained from chart review     Mrs. Mayer is a 89 lady with PMH of  COPD (home O2 at 2 L NC)  40 pack year smoking history,  (quit 30 years ago), HFpEF, HOCM s/p AICD, PVD, HTN, HLD, and CKD. Recent admit for t spontaneous pneumothorax with pleurodesis in May 2019. She  presented to [facility] ED with acute shortness of breath and found to have a small right sided pneumothorax. S/p CT tube placement at  OSH and transferred to [facility] for for further evaluation.  Pulmonary was then consulted for management of chest tube.     7/21: acute dyspneic, tachycardic, and diaphoretic.   Transferred to critical care for closer monitoring. CT placed at OSH dislodged and replaced with re-expansion of right lung.    CT surgery consulted  And have been monitoring chest tube.  Plan:  water seal and clamping trials on 7/23.  If fails clamping trials, plan is for repeat bedside pleurodesis or bronchoscopy with endobronchial valve placement. She is not a surgical candidate due to cardiac co morbidities.    .      Transitioned from critical care back to internal medicine.  CT surgery continues to follow and manage chest tube.   Palliative medicine consulted for goals of care.     Hospital Course:  No notes on file    Interval History:    Past Medical History:   Diagnosis Date    Acute on chronic congestive heart failure 7/6/2019    Cardiomyopathy     Carotid artery occlusion     CHF (congestive heart failure)     COPD (chronic obstructive pulmonary disease)     Coronary artery disease     Hyperlipidemia     Hypertension        Past Surgical History:   Procedure Laterality Date    BRONCHOSCOPY, FIBEROPTIC Flexible N/A 7/31/2019    [facility] at [facility] OR 2ND FLR    CARDIAC CATHETERIZATION      cardic cath      CAROTID ENDARTERECTOMY      PLEURODESIS N/A 7/31/2019    [facility] at [facility] 2ND FLR    PLEURODESIS, USING TALC Right 8/5/2019    Performed by [facility] at [facility] OR 2ND FLR    VATS, WITH PLEURAL TENT Right 8/5/2019    Performed by [facility] at [facility] OR [address] [place] [address]       Review of patient's allergies indicates:   Allergen Reactions    Ancef in dextrose (iso-osm) Rash    Cefazolin Rash    Cefuroxime Rash    Sulfamethoxazole-trimethoprim Rash     Other reaction(s): Rash       Medications:  Continuous Infusions:  Scheduled Meds:    amLODIPine  2.5 mg Oral Daily    aspirin  81 mg Oral Daily    ferrous sulfate  325 mg Oral Daily    furosemide  40 mg Oral Daily    heparin (porcine)  5,000 Units Subcutaneous Q8H    ipratropium  0.5 mg Nebulization Q6H    multivitamin  1 tablet Oral Daily    potassium chloride  10 mEq Oral Daily    rOPINIRole  0.25 mg Oral QHS    sodium chloride 0.9%  10 mL Intravenous Q6H    tiotropium  1 capsule Inhalation Daily     PRN Meds:acetaminophen, albuterol, Dextrose 10% Bolus, Dextrose 10% Bolus, glucagon (human recombinant), glucose, glucose, HYDROcodone-acetaminophen, polyethylene glycol, Flushing PICC Protocol **AND** sodium chloride 0.9% **AND** sodium chloride 0.9%    Family History     Problem Relation (Age of Onset)    Hypertension Mother        Tobacco Use    Smoking status: Former Smoker     Packs/day: 2.00     Years: 20.00     Pack years: 40.00     Types: Cigarettes     Last attempt to quit: 1980     Years since quittin.5    Smokeless tobacco: Never Used   Substance and Sexual Activity    Alcohol use: Yes     Alcohol/week: 1.2 oz     Types: 2 Glasses of wine per week     Comment: social    Drug use: No    Sexual activity: Not Currently       Review of Systems   Constitutional: Positive for fatigue.   HENT: Positive for hearing loss.    Respiratory: Positive for shortness of breath.      Objective:     Vital Signs (Most Recent):  Temp: 96.1 °F (35.6 °C) (19)  Pulse: 99 (19)  Resp: 15 (19)  BP: (!) 105/54 (19)  SpO2: (!) 93 % (19) Vital Signs (24h Range):  Temp:  [96.1 °F (35.6 °C)-98.2 °F (36.8 °C)] 96.1 °F (35.6 °C)  Pulse:  [] 99  Resp:  [13-18] 15  SpO2:  [93 %-100 %] 93 %  BP: (101-148)/(51-79) 105/54     Weight: 40.4 kg (89 lb 2.1 oz)  Body mass index is 17.41 kg/m².    Review of Symptoms  Symptom Assessment (ESAS 0-10 scale)   ESAS 0 1 2 3 4 5 6 7 8 9 10   Pain              Dyspnea              Anxiety               Nausea              Depression               Anorexia              Fatigue              Insomnia              Restlessness               Agitation              CAM / Delirium __ --  ___+   Constipation     __ --  ___+   Diarrhea           __ --  ___+  Bowel Management Plan (BMP): No    Comments: no pain, dyspnea on exertion 3-4     Pain Assessment:     OME in 24 hours: 0    Performance Status: 50    ECOG Performance Status Grade: 2 - Ambulates, capable of self care only    Physical Exam   Constitutional: She is oriented to person, place, and time. She appears well-developed. No distress.   Appears frail    HENT:   [place] hearing aid on the right is broken, not at bedside   Cardiovascular: Normal rate, regular rhythm and normal heart sounds.   Pulmonary/Chest:   Right chest tube x2  Intact, coarse breath sounds and chest tube sounds    Abdominal: Soft. Bowel sounds are normal.   Musculoskeletal:   Weak, s/p right hip fracture, uses walker    Neurological: She is alert and oriented to person, place, and time.   Skin: Skin is warm and dry. There is pallor.   Psychiatric: She has a normal mood and affect. Her behavior is normal. Judgment and thought content normal.   Nursing note and vitals reviewed.      Significant Labs: All pertinent labs within the past 24 hours have been reviewed.  CBC:   Recent Labs   Lab 08/07/19 0436   WBC 9.62   HGB 9.0*   HCT 27.9*   MCV 98        BMP:  Recent Labs   Lab 08/07/19 0436   GLU 96      K 3.6   CL 98   CO2 33*   BUN 21   CREATININE 0.8   CALCIUM 9.9   MG 1.8     LFT:  Lab Results   Component Value Date    AST 17 08/07/2019    ALKPHOS 60 08/07/2019    BILITOT 0.5 08/07/2019     Albumin:   Albumin   Date Value Ref Range Status   08/07/2019 2.1 (L) 3.5 - 5.2 g/dL Final     Protein:   Total Protein   Date Value Ref Range Status   08/07/2019 4.9 (L) 6.0 - 8.4 g/dL Final     Lactic acid:   Lab Results   Component Value Date    LACTATE 1.0 07/06/2019       Significant  Imaging: I have reviewed all pertinent imaging results/findings within the past 24 hours.    Advance Care Planning   Advanced Directives::  Living Will: No  LaPOST: No  Do Not Resuscitate Status: No, partial - no intubation mechanical ventilation   Medical Power of : No    Decision-Making Capacity: Patient answered questions, family answered questions.        Living Arrangements: Lives alone in her home with day time sitters and family at night.     Psychosocial/Cultural:   after 48 yrs of marriage, 5 children, 6 grand children and 7 great grand children,  and mother,  Living independently until she broke her hip 1 yr ago.  Enjoyed making road trips with her friends to [place] [facility] [place].  Played cards and went to the [employer].  Goes to beauty parlor every week.         Spiritual:     F- Kylah and Belief: Anglican     I - Importance: went to [employer] every week until she was put on oxygen.  Now watches and participates with [employer] on TV  .  C - Community:     A - Address in Care: amenable to  visits and anointing of the sick.       > 50% of 35  min visit spent in chart review, face to face discussion of goals of care,  symptom assessment, coordination of care and emotional support.    Eveline Moses, CNS  Palliative Medicine  [facility]

## 2019-08-07 NOTE — PROGRESS NOTES
[facility] Hospital Medicine  Progress Note    Patient Name: Venus Mayer  MRN: [id number]  Patient Class: IP- Inpatient   Admission Date: 7/27/2019  Length of Stay: 10 days  Attending Physician: Robert Alfredo MD  Primary Care Provider: Jona Che MD    Hospital Medicine Team: [facility] KENYETTA Keller    Subjective:     Principal Problem:Pneumothorax on right      HPI:  Venus Mayer is 89 y.o. lady with COPD, (on 2.5 L home O2), 40 pack year smoking history (quit 30 years ago), multiple readmissions for spontaneous pneumothorax, chronic dCHFS/P AICD, PVD/carotid artery stenosis s/p L CEA 08,was brought to the ED because she was SOB.    She was Discharged from [facility] on 7/26/19 after being admitted for pneumothorax, improved after placing CT with return to baseline pulmonary function on discharge.  Then around evening of 7/27 started feeling SOB, not relieved with her Inhaler or Nebulization, progressively worse,  prompting her to come to the Hospital. No chest pain, fever , confusion.    In the ED she was hypoxic, CXR revelaed Right sided Pneumothorax. CTS was consulted and placed a Pig tail catheter, after repositioning showed air leak and patient symptomatically improved and is currently on a non re breather.     The patient's recurrent PTXs seem to have started during her admission on 05/22/2019 where she presented with a large R sided PTX requiring chest tube and pleurodesis on 5/27/2019. Since, she has had a small recurrence of PTX on 6/25/19 not requiring intervention, and admission to OSH 7/6-7/11 for acute on chronic dCHF and COPD exacerbation. She has been less mobile and uses a walker at home.     Overview/Hospital Course:   8/5/2019- patient was NPO this morning awaiting CTS VATS for pneumothorax repair later today. CT surgery performed VATS (didn't resect anything), performed pleurodesis and now has 2 chest tubes in which should be on suction for 2 days.    8/7/2019 patient and family agreed for home hospice, arrangement pending chest tube converting to plurex Vs heimlich.    Interval History: No acute event. Repeated chest x-ray showed no changes on pneumothorax.  Review of Systems   Constitutional: Positive for fatigue. Negative for activity change, appetite change, chills and fever.   HENT: Positive for hearing loss. Negative for congestion and sore throat.    Eyes: Negative for photophobia and visual disturbance.   Respiratory: Positive for shortness of breath. Negative for cough and chest tightness.    Cardiovascular: Negative for chest pain and palpitations.   Gastrointestinal: Negative for abdominal distention, abdominal pain, constipation, diarrhea, nausea and vomiting.   Genitourinary: Negative for dysuria and hematuria.   Musculoskeletal: Negative for arthralgias and myalgias. Back pain: lower back pain, per patient caused by being bedridden    Skin: Negative for color change and rash.   Neurological: Negative for dizziness and headaches.     Objective:     Vital Signs (Most Recent):  Temp: 97.8 °F (36.6 °C) (08/07/19 1140)  Pulse: 90 (08/07/19 1152)  Resp: 18 (08/07/19 1152)  BP: (!) 102/51 (08/07/19 1140)  SpO2: 95 % (08/07/19 1152) Vital Signs (24h Range):  Temp:  [96.1 °F (35.6 °C)-98.2 °F (36.8 °C)] 97.8 °F (36.6 °C)  Pulse:  [] 90  Resp:  [13-18] 18  SpO2:  [92 %-100 %] 95 %  BP: (101-148)/(51-65) 102/51     Weight: 40.4 kg (89 lb 2.1 oz)  Body mass index is 17.41 kg/m².    Intake/Output Summary (Last 24 hours) at 8/7/2019 1252  Last data filed at 8/7/2019 0830  Gross per 24 hour   Intake 240 ml   Output 1350 ml   Net -1110 ml      Physical Exam   Constitutional: She appears well-nourished. No distress.   HENT:   Head: Normocephalic and atraumatic.   Eyes: Pupils are equal, round, and reactive to light. EOM are normal.   Neck: Normal range of motion. Neck supple.   Cardiovascular: Normal rate and regular rhythm.   Pulmonary/Chest: Effort normal.  No respiratory distress.   Abdominal: Soft. She exhibits no distension. There is no tenderness.   Musculoskeletal: Normal range of motion.   Neurological: She is alert.   Skin: Skin is warm and dry.   Psychiatric: She has a normal mood and affect. Her behavior is normal.       Significant Labs: All pertinent labs within the past 24 hours have been reviewed.    Significant Imaging: I have reviewed all pertinent imaging results/findings within the past 24 hours.      Assessment/Plan:      * Pneumothorax on right  Patient with COPD (on 2.5 L home oxygen) and with multiple pneumothoraxes s/p pleurodesis and chest tube placement last week presented with dyspnea and hypoxia. CXR demonstrated R pneumothorax.     Patient underwent surgery 8/5 by CTS for repair of pneumothorax. Air leak was unable to be isolated and patient is recovering with a chest tube to suction.    Plan:  -Monitor patient's pneumothorax with repeat imaging and continue with chest tube to suction  -monitor patient's hemodynamic status  -pain management     Restless leg syndrome  Continue home Ropinirole     COLD (chronic obstructive lung disease)  Patient with COPD on 2.5 L of home oxygen.     Plan:  - Continue Tiotropium   - Duonebs as needed  - Continue Supplemental oxygen      Heart failure, diastolic  ECHO on 7/19 showed EF of 65% with DD, PA pressure of 52 and several valvular abnormalities.   BNP - 1414, but not volume currently overloaded.     Plan:  - Continue Home Lasix 40mg Daily; can increase dose if she becomes volume overloaded  - Monitor I/O, Weights        Hypertension  Has been hypertensive during hospital stay.   Plan:  -Administer home Amlodipine 2.5mg PO daily        VTE Risk Mitigation (From admission, onward)        Ordered     heparin (porcine) injection 5,000 Units  Every 8 hours      07/28/19 0551     IP VTE HIGH RISK PATIENT  Once      07/28/19 0551                KENYETTA Keller  Department of Hospital Medicine   [facility]

## 2019-08-08 LAB
ALBUMIN SERPL BCP-MCNC: 2 G/DL (ref 3.5–5.2)
ALP SERPL-CCNC: 60 U/L (ref 55–135)
ALT SERPL W/O P-5'-P-CCNC: 10 U/L (ref 10–44)
ANION GAP SERPL CALC-SCNC: 8 MMOL/L (ref 8–16)
AST SERPL-CCNC: 14 U/L (ref 10–40)
BASOPHILS # BLD AUTO: 0.06 K/UL (ref 0–0.2)
BASOPHILS NFR BLD: 0.6 % (ref 0–1.9)
BILIRUB SERPL-MCNC: 0.5 MG/DL (ref 0.1–1)
BUN SERPL-MCNC: 16 MG/DL (ref 8–23)
CALCIUM SERPL-MCNC: 9.2 MG/DL (ref 8.7–10.5)
CHLORIDE SERPL-SCNC: 95 MMOL/L (ref 95–110)
CO2 SERPL-SCNC: 36 MMOL/L (ref 23–29)
CREAT SERPL-MCNC: 0.7 MG/DL (ref 0.5–1.4)
DIFFERENTIAL METHOD: ABNORMAL
EOSINOPHIL # BLD AUTO: 0.6 K/UL (ref 0–0.5)
EOSINOPHIL NFR BLD: 5.6 % (ref 0–8)
ERYTHROCYTE [DISTWIDTH] IN BLOOD BY AUTOMATED COUNT: 15.6 % (ref 11.5–14.5)
EST. GFR  (AFRICAN AMERICAN): >60 ML/MIN/1.73 M^2
EST. GFR  (NON AFRICAN AMERICAN): >60 ML/MIN/1.73 M^2
GLUCOSE SERPL-MCNC: 86 MG/DL (ref 70–110)
HCT VFR BLD AUTO: 26.9 % (ref 37–48.5)
HGB BLD-MCNC: 8.6 G/DL (ref 12–16)
IMM GRANULOCYTES # BLD AUTO: 0.1 K/UL (ref 0–0.04)
IMM GRANULOCYTES NFR BLD AUTO: 1 % (ref 0–0.5)
LYMPHOCYTES # BLD AUTO: 1.3 K/UL (ref 1–4.8)
LYMPHOCYTES NFR BLD: 13.6 % (ref 18–48)
MAGNESIUM SERPL-MCNC: 1.8 MG/DL (ref 1.6–2.6)
MCH RBC QN AUTO: 31 PG (ref 27–31)
MCHC RBC AUTO-ENTMCNC: 32 G/DL (ref 32–36)
MCV RBC AUTO: 97 FL (ref 82–98)
MONOCYTES # BLD AUTO: 1.2 K/UL (ref 0.3–1)
MONOCYTES NFR BLD: 12.4 % (ref 4–15)
NEUTROPHILS # BLD AUTO: 6.5 K/UL (ref 1.8–7.7)
NEUTROPHILS NFR BLD: 66.8 % (ref 38–73)
NRBC BLD-RTO: 0 /100 WBC
PLATELET # BLD AUTO: 194 K/UL (ref 150–350)
PMV BLD AUTO: 10 FL (ref 9.2–12.9)
POTASSIUM SERPL-SCNC: 3.4 MMOL/L (ref 3.5–5.1)
PROT SERPL-MCNC: 4.8 G/DL (ref 6–8.4)
RBC # BLD AUTO: 2.77 M/UL (ref 4–5.4)
SODIUM SERPL-SCNC: 139 MMOL/L (ref 136–145)
WBC # BLD AUTO: 9.78 K/UL (ref 3.9–12.7)

## 2019-08-08 PROCEDURE — 83735 ASSAY OF MAGNESIUM: CPT

## 2019-08-08 PROCEDURE — 25000003 PHARM REV CODE 250: Performed by: STUDENT IN AN ORGANIZED HEALTH CARE EDUCATION/TRAINING PROGRAM

## 2019-08-08 PROCEDURE — 85025 COMPLETE CBC W/AUTO DIFF WBC: CPT

## 2019-08-08 PROCEDURE — 99232 SBSQ HOSP IP/OBS MODERATE 35: CPT | Mod: GC,,, | Performed by: HOSPITALIST

## 2019-08-08 PROCEDURE — 80053 COMPREHEN METABOLIC PANEL: CPT

## 2019-08-08 PROCEDURE — 20600001 HC STEP DOWN PRIVATE ROOM

## 2019-08-08 PROCEDURE — 99233 SBSQ HOSP IP/OBS HIGH 50: CPT | Mod: ,,, | Performed by: CLINICAL NURSE SPECIALIST

## 2019-08-08 PROCEDURE — A4216 STERILE WATER/SALINE, 10 ML: HCPCS | Performed by: STUDENT IN AN ORGANIZED HEALTH CARE EDUCATION/TRAINING PROGRAM

## 2019-08-08 PROCEDURE — 99233 PR SUBSEQUENT HOSPITAL CARE,LEVL III: ICD-10-PCS | Mod: ,,, | Performed by: CLINICAL NURSE SPECIALIST

## 2019-08-08 PROCEDURE — 97530 THERAPEUTIC ACTIVITIES: CPT

## 2019-08-08 PROCEDURE — 63600175 PHARM REV CODE 636 W HCPCS: Performed by: STUDENT IN AN ORGANIZED HEALTH CARE EDUCATION/TRAINING PROGRAM

## 2019-08-08 PROCEDURE — 97116 GAIT TRAINING THERAPY: CPT

## 2019-08-08 PROCEDURE — 25000242 PHARM REV CODE 250 ALT 637 W/ HCPCS: Performed by: STUDENT IN AN ORGANIZED HEALTH CARE EDUCATION/TRAINING PROGRAM

## 2019-08-08 PROCEDURE — 99232 PR SUBSEQUENT HOSPITAL CARE,LEVL II: ICD-10-PCS | Mod: GC,,, | Performed by: HOSPITALIST

## 2019-08-08 RX ORDER — POTASSIUM CHLORIDE 750 MG/1
10 CAPSULE, EXTENDED RELEASE ORAL ONCE
Status: COMPLETED | OUTPATIENT
Start: 2019-08-08 | End: 2019-08-08

## 2019-08-08 RX ORDER — IPRATROPIUM BROMIDE AND ALBUTEROL SULFATE 2.5; .5 MG/3ML; MG/3ML
3 SOLUTION RESPIRATORY (INHALATION) EVERY 4 HOURS PRN
Status: DISCONTINUED | OUTPATIENT
Start: 2019-08-08 | End: 2019-08-09 | Stop reason: HOSPADM

## 2019-08-08 RX ADMIN — Medication 10 ML: at 12:08

## 2019-08-08 RX ADMIN — FUROSEMIDE 40 MG: 40 TABLET ORAL at 09:08

## 2019-08-08 RX ADMIN — Medication 10 ML: at 06:08

## 2019-08-08 RX ADMIN — ASPIRIN 81 MG CHEWABLE TABLET 81 MG: 81 TABLET CHEWABLE at 09:08

## 2019-08-08 RX ADMIN — ACETAMINOPHEN 650 MG: 325 TABLET ORAL at 09:08

## 2019-08-08 RX ADMIN — ROPINIROLE HYDROCHLORIDE 0.25 MG: 0.25 TABLET, FILM COATED ORAL at 09:08

## 2019-08-08 RX ADMIN — THERA TABS 1 TABLET: TAB at 09:08

## 2019-08-08 RX ADMIN — POTASSIUM CHLORIDE 10 MEQ: 750 CAPSULE, EXTENDED RELEASE ORAL at 04:08

## 2019-08-08 RX ADMIN — HEPARIN SODIUM 5000 UNITS: 5000 INJECTION INTRAVENOUS; SUBCUTANEOUS at 04:08

## 2019-08-08 RX ADMIN — POTASSIUM CHLORIDE 10 MEQ: 750 CAPSULE, EXTENDED RELEASE ORAL at 09:08

## 2019-08-08 RX ADMIN — ACETAMINOPHEN 650 MG: 325 TABLET ORAL at 04:08

## 2019-08-08 RX ADMIN — TIOTROPIUM BROMIDE 18 MCG: 18 CAPSULE ORAL; RESPIRATORY (INHALATION) at 09:08

## 2019-08-08 RX ADMIN — FERROUS SULFATE TAB EC 325 MG (65 MG FE EQUIVALENT) 325 MG: 325 (65 FE) TABLET DELAYED RESPONSE at 09:08

## 2019-08-08 RX ADMIN — HEPARIN SODIUM 5000 UNITS: 5000 INJECTION INTRAVENOUS; SUBCUTANEOUS at 09:08

## 2019-08-08 RX ADMIN — HEPARIN SODIUM 5000 UNITS: 5000 INJECTION INTRAVENOUS; SUBCUTANEOUS at 05:08

## 2019-08-08 NOTE — ASSESSMENT & PLAN NOTE
Patient with COPD (on 2.5 L home oxygen) and with multiple pneumothoraxes s/p pleurodesis and chest tube placement last week presented with dyspnea and hypoxia. CXR demonstrated R pneumothorax.     Patient underwent surgery 8/5 by CTS for repair of pneumothorax. Air leak was unable to be isolated and patient is recovering with a chest tube with a heimlich valve.    Plan:  -pt cleared by CTS, plan to discharge to home with hospice care pending meeting with the family  -monitor patient's hemodynamic status  -pain management

## 2019-08-08 NOTE — PLAN OF CARE
Problem: Adult Inpatient Plan of Care  Goal: Plan of Care Review  Outcome: Ongoing (interventions implemented as appropriate)  Pt AAOX4. Pain managed with PRN tylenol. Cardiac diet. No complaints of nausea or vomiting. [place], adequate urine output overnight. Right CT to H2O seal x2, one clamped. VS stable on 2-3L NC. Daughter at bedside. SCD's. Pt slept between care. Bed low and locked, call bell within reach. Will continue to monitor.

## 2019-08-08 NOTE — PROGRESS NOTES
[facility] Hospital Medicine  Progress Note    Patient Name: Venus Mayer  MRN: [id number]  Patient Class: IP- Inpatient   Admission Date: 7/27/2019  Length of Stay: 11 days  Attending Physician: Robert Alfredo MD  Primary Care Provider: Jona Che MD    Hospital Medicine Team: [facility] HOSP MED 1 Марина Puente MD    Subjective:     Principal Problem:Pneumothorax on right      HPI:  Venus Mayer is 89 y.o. lady with COPD, (on 2.5 L home O2), 40 pack year smoking history (quit 30 years ago), multiple readmissions for spontaneous pneumothorax, chronic dCHFS/P AICD, PVD/carotid artery stenosis s/p L CEA 08,was brought to the ED because she was SOB.    She was Discharged from [facility] on 7/26/19 after being admitted for pneumothorax, improved after placing CT with return to baseline pulmonary function on discharge.  Then around evening of 7/27 started feeling SOB, not relieved with her Inhaler or Nebulization, progressively worse,  prompting her to come to the Hospital. No chest pain, fever , confusion.    In the ED she was hypoxic, CXR revelaed Right sided Pneumothorax. CTS was consulted and placed a Pig tail catheter, after repositioning showed air leak and patient symptomatically improved and is currently on a non re breather.     The patient's recurrent PTXs seem to have started during her admission on 05/22/2019 where she presented with a large R sided PTX requiring chest tube and pleurodesis on 5/27/2019. Since, she has had a small recurrence of PTX on 6/25/19 not requiring intervention, and admission to OSH 7/6-7/11 for acute on chronic dCHF and COPD exacerbation. She has been less mobile and uses a walker at home.     Overview/Hospital Course:   8/5/2019- patient was NPO this morning awaiting CTS VATS for pneumothorax repair later today. CT surgery performed VATS (didn't resect anything), performed pleurodesis and now has 2 chest tubes in which should be on suction for 2 days.   8/7/2019  patient and family agreed for home hospice, arrangement pending chest tube converting to plurex Vs heimlich.    Interval History: Patient with recurrent pneumothorax has CXR showing no expansion since yesterday. Currently has chest tube with heimlich valve in place and appears comfortable.     Review of Systems   Constitutional: Positive for fatigue. Negative for activity change, appetite change, chills and fever.   HENT: Positive for hearing loss. Negative for congestion and sore throat.    Eyes: Negative for photophobia and visual disturbance.   Respiratory: Positive for shortness of breath. Negative for cough and chest tightness.    Cardiovascular: Negative for chest pain and palpitations.   Gastrointestinal: Negative for abdominal distention, abdominal pain, constipation, diarrhea, nausea and vomiting.   Genitourinary: Negative for dysuria and hematuria.   Musculoskeletal: Negative for arthralgias and myalgias. Back pain: lower back pain, per patient caused by being bedridden    Skin: Negative for color change and rash.   Neurological: Negative for dizziness and headaches.     Objective:     Vital Signs (Most Recent):  Temp: 97.5 °F (36.4 °C) (08/08/19 0834)  Pulse: 88 (08/08/19 0930)  Resp: 20 (08/08/19 0930)  BP: (!) 106/49 (08/08/19 0834)  SpO2: (!) 90 % (08/08/19 0834) Vital Signs (24h Range):  Temp:  [97.5 °F (36.4 °C)-98.5 °F (36.9 °C)] 97.5 °F (36.4 °C)  Pulse:  [] 88  Resp:  [15-20] 20  SpO2:  [90 %-100 %] 90 %  BP: (102-149)/(49-68) 106/49     Weight: 40.4 kg (89 lb 2.1 oz)  Body mass index is 17.41 kg/m².    Intake/Output Summary (Last 24 hours) at 8/8/2019 1041  Last data filed at 8/8/2019 0600  Gross per 24 hour   Intake 730 ml   Output 600 ml   Net 130 ml      Physical Exam   Constitutional: She appears well-nourished. No distress.   HENT:   Head: Normocephalic and atraumatic.   Eyes: Pupils are equal, round, and reactive to light. EOM are normal.   Neck: Normal range of motion. Neck supple.    Cardiovascular: Normal rate and regular rhythm.   Pulmonary/Chest: Effort normal. No respiratory distress.   Abdominal: Soft. She exhibits no distension. There is no tenderness.   Musculoskeletal: Normal range of motion.   Neurological: She is alert.   Skin: Skin is warm and dry.   Psychiatric: She has a normal mood and affect. Her behavior is normal.       Significant Labs: All pertinent labs within the past 24 hours have been reviewed.      Significant Imaging: I have reviewed all pertinent imaging results/findings within the past 24 hours.      Assessment/Plan:      * Pneumothorax on right  Patient with COPD (on 2.5 L home oxygen) and with multiple pneumothoraxes s/p pleurodesis and chest tube placement last week presented with dyspnea and hypoxia. CXR demonstrated R pneumothorax.     Patient underwent surgery 8/5 by CTS for repair of pneumothorax. Air leak was unable to be isolated and patient is recovering with a chest tube with a heimlich valve.    Plan:  -pt cleared by CTS, plan to discharge to home with hospice care pending meeting with the family  -chest tube with heimlich valve  -monitor patient's hemodynamic status  -pain management     Restless leg syndrome  Continue home Ropinirole     Pneumothorax        COLD (chronic obstructive lung disease)  Patient with COPD on 2.5 L of home oxygen.     Plan:  - Continue Tiotropium   - Duonebs as needed  - Continue Supplemental oxygen      Heart failure, diastolic  ECHO on 7/19 showed EF of 65% with DD, PA pressure of 52 and several valvular abnormalities.   BNP - 1414, but not volume currently overloaded.     Plan:  - Continue Home Lasix 40mg Daily; can increase dose if she becomes volume overloaded  - Monitor I/O, Weights        Hypertension  Has been hypertensive during hospital stay.   Plan:  -Administer home Amlodipine 2.5mg PO daily        VTE Risk Mitigation (From admission, onward)        Ordered     heparin (porcine) injection 5,000 Units  Every 8 hours       07/28/19 0551     IP VTE HIGH RISK PATIENT  Once      07/28/19 0551                Марина Puente MD  Department of Hospital Medicine   [facility]

## 2019-08-08 NOTE — PLAN OF CARE
Problem: Physical Therapy Goal  Goal: Physical Therapy Goal  Goals to be met by: 2019    Patient will increase functional independence with mobility by performin. Supine to sit with Set-up [place]  2. Sit to supine with Set-up [place]  3. Sit to stand transfer with Stand-by Assistance- met 2019  Sit to stand transfer with supervision   4. Gait  x 100 feet with Stand-by Assistance using Rolling Walker.- met 2019  Gait x 150 feet with supervision using rolling walker  5. Ascend/descend 2 stair with right Handrails Supervision using no assistive device.      Outcome: Ongoing (interventions implemented as appropriate)  Pt is progressing toward goals. All goals remain appropriate.

## 2019-08-08 NOTE — SUBJECTIVE & OBJECTIVE
Interval History: NAEON. Palliative discussion with family yesterday and decision was made for home hospice. Anterior chest tube clamped yesterday, posterior remains to water seal. Stable basilar and apical PTX on morning CXR. Breathing stable     Medications:  Continuous Infusions:  Scheduled Meds:   amLODIPine  2.5 mg Oral Daily    aspirin  81 mg Oral Daily    ferrous sulfate  325 mg Oral Daily    furosemide  40 mg Oral Daily    heparin (porcine)  5,000 Units Subcutaneous Q8H    multivitamin  1 tablet Oral Daily    potassium chloride  10 mEq Oral Daily    rOPINIRole  0.25 mg Oral QHS    sodium chloride 0.9%  10 mL Intravenous Q6H    tiotropium  1 capsule Inhalation Daily     PRN Meds:acetaminophen, albuterol, Dextrose 10% Bolus, Dextrose 10% Bolus, glucagon (human recombinant), glucose, glucose, HYDROcodone-acetaminophen, polyethylene glycol, Flushing PICC Protocol **AND** sodium chloride 0.9% **AND** sodium chloride 0.9%     Review of patient's allergies indicates:   Allergen Reactions    Ancef in dextrose (iso-osm) Rash    Cefazolin Rash    Cefuroxime Rash    Sulfamethoxazole-trimethoprim Rash     Other reaction(s): Rash     Objective:     Vital Signs (Most Recent):  Temp: 98 °F (36.7 °C) (08/08/19 0520)  Pulse: 78 (08/08/19 0707)  Resp: 18 (08/08/19 0520)  BP: (!) 112/53 (08/08/19 0520)  SpO2: 98 % (08/08/19 0707) Vital Signs (24h Range):  Temp:  [96.1 °F (35.6 °C)-98.5 °F (36.9 °C)] 98 °F (36.7 °C)  Pulse:  [] 78  Resp:  [15-18] 18  SpO2:  [92 %-100 %] 98 %  BP: (102-149)/(51-68) 112/53     Intake/Output - Last 3 Shifts       08/06 0700 - 08/07 0659 08/07 0700 - 08/08 0659 08/08 0700 - 08/09 0659    P.O. 0 970     I.V. (mL/kg) 0 (0)      Other 0      Total Intake(mL/kg) 0 (0) 970 (24)     Urine (mL/kg/hr) 1050 (1.1) 600 (0.6)     Emesis/NG output 0      Other 0      Stool 0      Blood 0      Chest Tube 300      Total Output 1350 600     Net -1350 +370            Urine Occurrence 2 x       Stool Occurrence 1 x      Emesis Occurrence 0 x            SpO2: 98 %  O2 Device (Oxygen Therapy): nasal cannula    Physical Exam   Constitutional: No distress.   Frail elderly female    Cardiovascular: Normal rate and regular rhythm.   Pulmonary/Chest: Effort normal. No respiratory distress.   Chest tube air leak improving. Only with cough. Anterior chest tube clamped, Posterior remains to water seal.   Serosang output   Musculoskeletal: Normal range of motion.   Neurological: She is alert.   Skin: Skin is warm and dry.   Psychiatric: She has a normal mood and affect. Her behavior is normal.       Significant Labs:  BMP:   Recent Labs   Lab 08/08/19  0500   GLU 86      K 3.4*   CL 95   CO2 36*   BUN 16   CREATININE 0.7   CALCIUM 9.2   MG 1.8     CBC:   Recent Labs   Lab 08/08/19  0500   WBC 9.78   RBC 2.77*   HGB 8.6*   HCT 26.9*      MCV 97   MCH 31.0   MCHC 32.0       Significant Diagnostics:  CXR: I have reviewed all pertinent results/findings within the past 24 hours    VTE Risk Mitigation (From admission, onward)        Ordered     heparin (porcine) injection 5,000 Units  Every 8 hours      07/28/19 0551     IP VTE HIGH RISK PATIENT  Once      07/28/19 0551

## 2019-08-08 NOTE — PROGRESS NOTES
[facility]  Thoracic Surgery  Progress Note    Subjective:     History of Present Illness:  Venus Mayer is a 89 y.o. female with a hx of COPD and multiple readmissions for spontaneous pneumothorax. Numerous attempts at pleurodesis have been performed. Patient is not a surgical candidate. She presents to the ED with acute onset shortness of breath. Imaging showed large right sided pneumothorax. Pt otherwise stable.      No current facility-administered medications on file prior to encounter.        Post-Op Info:  Procedure(s) (LRB):  VATS, WITH PLEURAL TENT (Right)  PLEURODESIS, USING TALC (Right)   3 Days Post-Op     Interval History: NAEON. Palliative discussion with family yesterday and decision was made for home hospice. Anterior chest tube clamped yesterday, posterior remains to water seal. Stable basilar and apical PTX on morning CXR. Breathing stable     Medications:  Continuous Infusions:  Scheduled Meds:   amLODIPine  2.5 mg Oral Daily    aspirin  81 mg Oral Daily    ferrous sulfate  325 mg Oral Daily    furosemide  40 mg Oral Daily    heparin (porcine)  5,000 Units Subcutaneous Q8H    multivitamin  1 tablet Oral Daily    potassium chloride  10 mEq Oral Daily    rOPINIRole  0.25 mg Oral QHS    sodium chloride 0.9%  10 mL Intravenous Q6H    tiotropium  1 capsule Inhalation Daily     PRN Meds:acetaminophen, albuterol, Dextrose 10% Bolus, Dextrose 10% Bolus, glucagon (human recombinant), glucose, glucose, HYDROcodone-acetaminophen, polyethylene glycol, Flushing PICC Protocol **AND** sodium chloride 0.9% **AND** sodium chloride 0.9%     Review of patient's allergies indicates:   Allergen Reactions    Ancef in dextrose (iso-osm) Rash    Cefazolin Rash    Cefuroxime Rash    Sulfamethoxazole-trimethoprim Rash     Other reaction(s): Rash     Objective:     Vital Signs (Most Recent):  Temp: 98 °F (36.7 °C) (08/08/19 0520)  Pulse: 78 (08/08/19 0707)  Resp: 18 (08/08/19 0520)  BP:  (!) 112/53 (08/08/19 0520)  SpO2: 98 % (08/08/19 0707) Vital Signs (24h Range):  Temp:  [96.1 °F (35.6 °C)-98.5 °F (36.9 °C)] 98 °F (36.7 °C)  Pulse:  [] 78  Resp:  [15-18] 18  SpO2:  [92 %-100 %] 98 %  BP: (102-149)/(51-68) 112/53     Intake/Output - Last 3 Shifts       08/06 0700 - 08/07 0659 08/07 0700 - 08/08 0659 08/08 0700 - 08/09 0659    P.O. 0 970     I.V. (mL/kg) 0 (0)      Other 0      Total Intake(mL/kg) 0 (0) 970 (24)     Urine (mL/kg/hr) 1050 (1.1) 600 (0.6)     Emesis/NG output 0      Other 0      Stool 0      Blood 0      Chest Tube 300      Total Output 1350 600     Net -1350 +370            Urine Occurrence 2 x      Stool Occurrence 1 x      Emesis Occurrence 0 x            SpO2: 98 %  O2 Device (Oxygen Therapy): nasal cannula    Physical Exam   Constitutional: No distress.   Frail elderly female    Cardiovascular: Normal rate and regular rhythm.   Pulmonary/Chest: Effort normal. No respiratory distress.   Chest tube air leak improving. Only with cough. Anterior chest tube clamped, Posterior remains to water seal.   Serosang output   Musculoskeletal: Normal range of motion.   Neurological: She is alert.   Skin: Skin is warm and dry.   Psychiatric: She has a normal mood and affect. Her behavior is normal.       Significant Labs:  BMP:   Recent Labs   Lab 08/08/19  0500   GLU 86      K 3.4*   CL 95   CO2 36*   BUN 16   CREATININE 0.7   CALCIUM 9.2   MG 1.8     CBC:   Recent Labs   Lab 08/08/19  0500   WBC 9.78   RBC 2.77*   HGB 8.6*   HCT 26.9*      MCV 97   MCH 31.0   MCHC 32.0       Significant Diagnostics:  CXR: I have reviewed all pertinent results/findings within the past 24 hours    VTE Risk Mitigation (From admission, onward)        Ordered     heparin (porcine) injection 5,000 Units  Every 8 hours      07/28/19 0551     IP VTE HIGH RISK PATIENT  Once      07/28/19 0551        Assessment/Plan:     * Pneumothorax on right  Anterior chest tube remained clamped overnight. Remove  anterior tube this morning.   Posterior chest tube to water seal. Will connect posterior to pneumostat (one way valve with mini atrium). Repeat CXR midday. If PTX stable, patient stable to be discharged to home today with pneumostat.   - OOB to chair, IS, nebs as needed   - Daily CXR  - Pulmonary toilet        Home Hospice Pneumostat Instructions  Please drain Pneumostat every day and record the amount/color of drainage.    To drain: Use alcohol swab to clean bottom of pneumostat container. Attach an empty lure lock syringe to the bottom of pneumostat container. Aspirate contents of chamber. Okay to reuse syringe while draining during one sitting but please use new syringe each day. Initially may require draining twice per day but expect drainage to decrease over time.     Please change dressings daily or as needed if saturated.      If chest tube is pulled out/ falls out please cover insertion site immediately and go to Emergency Room or call Thoracic Surgery office.   If Pneumostat becomes disconnected please reconnect immediately.     Thoracic Surgery Clinic: [phone]              Arleen Rodriguez PA-C  Thoracic Surgery  [id number]

## 2019-08-08 NOTE — ASSESSMENT & PLAN NOTE
Palliative medicine follow up to goals of care.  Palliative medicine APRN met at bedside with patient and daughter Bri.      Impression: Mrs. Mayer is a 89 yo lady admitted with right spontaneous pneumothorax.  S/P  Right VATS procedure.  Chest tubes removed, Heimlich valve placed.  Patient is tolerating well - mild cough, no shortness of breath noted.  Mrs. Mayer is awake, alert and oriented x4 , sitting up in bed, converses freeely. Reports no pain or discomfort.  No acute distress          Advanced Care Planning:Advance Care Planning   - Advanced directives - HPOA and living will received. Placed in blue chart to be scanned in EMR  - Patient states her son Arthur is the  HPOA.    -  Mrs. Mayer states it is her wish not to be resuscitated.  Daughter Bri is at bedside who states Mrs. Mayer has been saying this for some time now.    - Resuscitation status  Has been changed to DNR.  Patient and family are in agreement.        Goals of Care  - Hospice education completed.  Hospice resources provided - list of hospice agencies given.  Also discussed inpatient hospice should this become a goal of care in the future.  Family has made decision for home hospice with [facility].   - Family has requested not to discharge at this time - would like additional time to determine how well Mrs. Mayer responds to having Heimlich Valve.   - From palliative care perspective this is a reasonable request.  If Mrs. Mayer would not have a good response with Heimlich Valve discussing inpatient hospice may be more appropriate.   - Family remains hopeful she will be discharged with home hospice.        Plan/Recommendations.  - Goals of care established and advanced care planning completed.   - Recommend [place] at discharge.  Requires patient and MD signature.  When signatures are obtained please give gold copy to patient and obtain copy for EMR  - Palliative medicine will continue to follow.   -  Emotional support.

## 2019-08-08 NOTE — PROGRESS NOTES
[facility]  Palliative Medicine  Progress Note    Patient Name: Venus Mayer  MRN: [id number]  Admission Date: 7/27/2019  Hospital Length of Stay: 11 days  Code Status: DNR   Attending Provider: Robert Alfredo MD  Consulting Provider: SERA Gonzales  Primary Care Physician: Jona Che MD  Principal Problem:Pneumothorax on right    Patient information was obtained from patient and relative(s).      Assessment/Plan:     Palliative care encounter    Palliative medicine follow up to goals of care.  Palliative medicine APRN met at bedside with patient and daughter Bri.      Impression: Mrs. Mayer is a 89 yo lady admitted with right spontaneous pneumothorax.  S/P  Right VATS procedure.  Chest tubes removed, Heimlich valve placed.  Patient is tolerating well - mild cough, no shortness of breath noted.  Mrs. Mayer is awake, alert and oriented x4 , sitting up in bed, converses freeely. Reports no pain or discomfort.  No acute distress          Advanced Care Planning:Advance Care Planning   - Advanced directives - HPOA and living will received. Placed in blue chart to be scanned in EMR  - Patient states her son Arthur is the  HPOA.    -  Mrs. Mayer states it is her wish not to be resuscitated.  Daughter Bri is at bedside who states Mrs. Mayer has been saying this for some time now.    - Resuscitation status  Has been changed to DNR.  Patient and family are in agreement.       Goals of Care  - Hospice education completed.  Hospice resources provided - list of hospice agencies given.  Also discussed inpatient hospice should this become a goal of care in the future.  Family has made decision for home hospice with [facility].   - Family has requested not to discharge at this time - would like additional time to determine how well Mrs. Mayer responds to having Heimlich Valve.   - From palliative care perspective this is a reasonable request.  If Mrs. Mayer would not have a good response  with Heimlich Valve discussing inpatient hospice may be more appropriate.   - Family remains hopeful she will be discharged with home hospice.        Plan/Recommendations.  - Goals of care established and advanced care planning completed.   - Recommend LaPOST at discharge.  Requires patient and MD signature.  When signatures are obtained please give gold copy to patient and obtain copy for EMR  - Palliative medicine will continue to follow.   -  Emotional support.            I will follow-up with patient. Please contact us if you have any additional questions.    Subjective:     Chief Complaint:   Chief Complaint   Patient presents with    Shortness of Breath     ems reports sob - ra sats 70% - placed on 15 l nrb now satting 92% - used home neb tx and rescue inhaler with no relief - reports incread wob - hc of chest tube and collapsed lung to right side- bandage still in place       HPI:   HPI obtained from chart review     Mrs. Mayer is a 89 lady with PMH of  COPD (home O2 at 2 L NC)  40 pack year smoking history,  (quit 30 years ago), HFpEF, HOCM s/p AICD, PVD, HTN, HLD, and CKD. Recent admit for t spontaneous pneumothorax with pleurodesis in May 2019. She  presented to [facility] ED with acute shortness of breath and found to have a small right sided pneumothorax. S/p CT tube placement at OSH and transferred to [facility] for for further evaluation.  Pulmonary was then consulted for management of chest tube.     7/21: acute dyspneic, tachycardic, and diaphoretic.   Transferred to critical care for closer monitoring. CT placed at OSH dislodged and replaced with re-expansion of right lung.    CT surgery consulted  And have been monitoring chest tube.  Plan:  water seal and clamping trials on 7/23.  If fails clamping trials, plan is for repeat bedside pleurodesis or bronchoscopy with endobronchial valve placement. She is not a surgical candidate due to cardiac co morbidities.    .      Transitioned from  critical care back to internal medicine.  CT surgery continues to follow and manage chest tube.   Palliative medicine consulted for goals of care.     Hospital Course:  No notes on file    Interval History:    Past Medical History:   Diagnosis Date    Acute on chronic congestive heart failure 7/6/2019    Cardiomyopathy     Carotid artery occlusion     CHF (congestive heart failure)     COPD (chronic obstructive pulmonary disease)     Coronary artery disease     Hyperlipidemia     Hypertension        Past Surgical History:   Procedure Laterality Date    BRONCHOSCOPY, FIBEROPTIC Flexible N/A 7/31/2019    [facility]    CARDIAC CATHETERIZATION      cardic cath      CAROTID ENDARTERECTOMY      PLEURODESIS N/A 7/31/2019    [facility]    PLEURODESIS, USING TALC Right 8/5/2019    [facility]    VATS, WITH PLEURAL TENT Right 8/5/2019    Performed by [facility] at [facility] OR [address] [place] [address]       Review of patient's allergies indicates:   Allergen Reactions    Ancef in dextrose (iso-osm) Rash    Cefazolin Rash    Cefuroxime Rash    Sulfamethoxazole-trimethoprim Rash     Other reaction(s): Rash       Medications:  Continuous Infusions:  Scheduled Meds:   amLODIPine  2.5 mg Oral Daily    aspirin  81 mg Oral Daily    ferrous sulfate  325 mg Oral Daily    furosemide  40 mg Oral Daily    heparin (porcine)  5,000 Units Subcutaneous Q8H    multivitamin  1 tablet Oral Daily    potassium chloride  10 mEq Oral Daily    rOPINIRole  0.25 mg Oral QHS    sodium chloride 0.9%  10 mL Intravenous Q6H    tiotropium  1 capsule Inhalation Daily     PRN Meds:acetaminophen, albuterol, Dextrose 10% Bolus, Dextrose 10% Bolus, glucagon (human recombinant), glucose, glucose, HYDROcodone-acetaminophen, polyethylene glycol, Flushing PICC Protocol **AND** sodium chloride 0.9% **AND** sodium  chloride 0.9%    Family History     Problem Relation (Age of Onset)    Hypertension Mother        Tobacco Use    Smoking status: Former Smoker     Packs/day: 2.00     Years: 20.00     Pack years: 40.00     Types: Cigarettes     Last attempt to quit: 1980     Years since quittin.5    Smokeless tobacco: Never Used   Substance and Sexual Activity    Alcohol use: Yes     Alcohol/week: 1.2 oz     Types: 2 Glasses of wine per week     Comment: social    Drug use: No    Sexual activity: Not Currently       Review of Systems   Constitutional: Positive for fatigue.   HENT: Positive for hearing loss.    Respiratory: Positive for shortness of breath.      Objective:     Vital Signs (Most Recent):  Temp: 97.5 °F (36.4 °C) (19)  Pulse: 88 (19)  Resp: 20 (19)  BP: (!) 106/49 (19)  SpO2: (!) 90 % (19) Vital Signs (24h Range):  Temp:  [97.5 °F (36.4 °C)-98.5 °F (36.9 °C)] 97.5 °F (36.4 °C)  Pulse:  [] 88  Resp:  [15-20] 20  SpO2:  [90 %-100 %] 90 %  BP: (102-149)/(49-68) 106/49     Weight: 40.4 kg (89 lb 2.1 oz)  Body mass index is 17.41 kg/m².    Review of Symptoms  Symptom Assessment (ESAS 0-10 scale)   ESAS 0 1 2 3 4 5 6 7 8 9 10   Pain              Dyspnea              Anxiety              Nausea              Depression               Anorexia              Fatigue              Insomnia              Restlessness               Agitation              CAM / Delirium __ --  ___+   Constipation     __ --  ___+   Diarrhea           __ --  ___+  Bowel Management Plan (BMP): No    Comments: no pain, dyspnea on exertion 3-4     Pain Assessment:     OME in 24 hours: 0    Performance Status: 50    ECOG Performance Status Grade: 2 - Ambulates, capable of self care only    Physical Exam   Constitutional: She is oriented to person, place, and time. She appears well-developed. No distress.   Appears frail    HENT:   [place], hearing aid on the right is broken, not at  bedside   Cardiovascular: Normal rate, regular rhythm and normal heart sounds.   Pulmonary/Chest:   Right chest tube x2  Intact, coarse breath sounds and chest tube sounds    Abdominal: Soft. Bowel sounds are normal.   Musculoskeletal:   Weak, s/p right hip fracture, uses walker    Neurological: She is alert and oriented to person, place, and time.   Skin: Skin is warm and dry. There is pallor.   Psychiatric: She has a normal mood and affect. Her behavior is normal. Judgment and thought content normal.   Nursing note and vitals reviewed.      Significant Labs: All pertinent labs within the past 24 hours have been reviewed.  CBC:   Recent Labs   Lab 08/08/19  0500   WBC 9.78   HGB 8.6*   HCT 26.9*   MCV 97        BMP:  Recent Labs   Lab 08/08/19  0500   GLU 86      K 3.4*   CL 95   CO2 36*   BUN 16   CREATININE 0.7   CALCIUM 9.2   MG 1.8     LFT:  Lab Results   Component Value Date    AST 14 08/08/2019    ALKPHOS 60 08/08/2019    BILITOT 0.5 08/08/2019     Albumin:   Albumin   Date Value Ref Range Status   08/08/2019 2.0 (L) 3.5 - 5.2 g/dL Final     Protein:   Total Protein   Date Value Ref Range Status   08/08/2019 4.8 (L) 6.0 - 8.4 g/dL Final     Lactic acid:   Lab Results   Component Value Date    LACTATE 1.0 07/06/2019       Significant Imaging: I have reviewed all pertinent imaging results/findings within the past 24 hours.    Advance Care Planning   Advanced Directives::  Living Will: No  LaPOST: No  Do Not Resuscitate Status: No, partial - no intubation mechanical ventilation   Medical Power of : No    Decision-Making Capacity: Patient answered questions, family answered questions.        Living Arrangements: Lives alone in her home with day time sitters and family at night.     Psychosocial/Cultural:   after 48 yrs of marriage, 5 children, 6 grand children and 7 great grand children,  and mother,  Living independently until she broke her hip 1 yr ago.  Enjoyed making road  trips [place] to [place].  Played cards and went to the [employer].  Goes to beauty parlor every week.         Spiritual:     F- Kylah and Belief: Adventist     I - Importance: went to [employer] every week until she was put on oxygen.  Now watches and participates with [employer] on TV  .  C - Community:     A - Address in Care: amenable to  visits and anointing of the sick.       > 50% of 35 min visit spent in chart review, face to face discussion of goals of care,  symptom assessment, coordination of care and emotional support.    Eveline Moses, CNS  Palliative Medicine  [facility]

## 2019-08-08 NOTE — SUBJECTIVE & OBJECTIVE
Interval History:    Past Medical History:   Diagnosis Date    Acute on chronic congestive heart failure 7/6/2019    Cardiomyopathy     Carotid artery occlusion     CHF (congestive heart failure)     COPD (chronic obstructive pulmonary disease)     Coronary artery disease     Hyperlipidemia     Hypertension        Past Surgical History:   Procedure Laterality Date    BRONCHOSCOPY, FIBEROPTIC Flexible N/A 7/31/2019    [facility]    CARDIAC CATHETERIZATION      cardic cath      CAROTID ENDARTERECTOMY      PLEURODESIS N/A 7/31/2019    [facility]    PLEURODESIS, USING TALC Right 8/5/2019    [facility]    VATS, WITH PLEURAL TENT Right 8/5/2019    Per[facility] by [facility] at [facility] OR [address] [place] [address]       Review of patient's allergies indicates:   Allergen Reactions    Ancef in dextrose (iso-osm) Rash    Cefazolin Rash    Cefuroxime Rash    Sulfamethoxazole-trimethoprim Rash     Other reaction(s): Rash       Medications:  Continuous Infusions:  Scheduled Meds:   amLODIPine  2.5 mg Oral Daily    aspirin  81 mg Oral Daily    ferrous sulfate  325 mg Oral Daily    furosemide  40 mg Oral Daily    heparin (porcine)  5,000 Units Subcutaneous Q8H    multivitamin  1 tablet Oral Daily    potassium chloride  10 mEq Oral Daily    rOPINIRole  0.25 mg Oral QHS    sodium chloride 0.9%  10 mL Intravenous Q6H    tiotropium  1 capsule Inhalation Daily     PRN Meds:acetaminophen, albuterol, Dextrose 10% Bolus, Dextrose 10% Bolus, glucagon (human recombinant), glucose, glucose, HYDROcodone-acetaminophen, polyethylene glycol, Flushing PICC Protocol **AND** sodium chloride 0.9% **AND** sodium chloride 0.9%    Family History     Problem Relation (Age of Onset)    Hypertension Mother        Tobacco Use    Smoking status: Former Smoker     Packs/day: 2.00     Years: 20.00     Pack  years: 40.00     Types: Cigarettes     Last attempt to quit: 1980     Years since quittin.5    Smokeless tobacco: Never Used   Substance and Sexual Activity    Alcohol use: Yes     Alcohol/week: 1.2 oz     Types: 2 Glasses of wine per week     Comment: social    Drug use: No    Sexual activity: Not Currently       Review of Systems   Constitutional: Positive for fatigue.   HENT: Positive for hearing loss.    Respiratory: Positive for shortness of breath.      Objective:     Vital Signs (Most Recent):  Temp: 97.5 °F (36.4 °C) (19)  Pulse: 88 (19)  Resp: 20 (19)  BP: (!) 106/49 (19)  SpO2: (!) 90 % (19) Vital Signs (24h Range):  Temp:  [97.5 °F (36.4 °C)-98.5 °F (36.9 °C)] 97.5 °F (36.4 °C)  Pulse:  [] 88  Resp:  [15-20] 20  SpO2:  [90 %-100 %] 90 %  BP: (102-149)/(49-68) 106/49     Weight: 40.4 kg (89 lb 2.1 oz)  Body mass index is 17.41 kg/m².    Review of Symptoms  Symptom Assessment (ESAS 0-10 scale)   ESAS 0 1 2 3 4 5 6 7 8 9 10   Pain              Dyspnea              Anxiety              Nausea              Depression               Anorexia              Fatigue              Insomnia              Restlessness               Agitation              CAM / Delirium __ --  ___+   Constipation     __ --  ___+   Diarrhea           __ --  ___+  Bowel Management Plan (BMP): No    Comments: no pain, dyspnea on exertion 3-4     Pain Assessment:     OME in 24 hours: 0    Performance Status: 50    ECOG Performance Status Grade: 2 - Ambulates, capable of self care only    Physical Exam   Constitutional: She is oriented to person, place, and time. She appears well-developed. No distress.   Appears frail    HENT:   [place], hearing aid on the right is broken, not at bedside   Cardiovascular: Normal rate, regular rhythm and normal heart sounds.   Pulmonary/Chest:   Right chest tube x2  Intact, coarse breath sounds and chest tube sounds    Abdominal: Soft.  Bowel sounds are normal.   Musculoskeletal:   Weak, s/p right hip fracture, uses walker    Neurological: She is alert and oriented to person, place, and time.   Skin: Skin is warm and dry. There is pallor.   Psychiatric: She has a normal mood and affect. Her behavior is normal. Judgment and thought content normal.   Nursing note and vitals reviewed.      Significant Labs: All pertinent labs within the past 24 hours have been reviewed.  CBC:   Recent Labs   Lab 08/08/19  0500   WBC 9.78   HGB 8.6*   HCT 26.9*   MCV 97        BMP:  Recent Labs   Lab 08/08/19  0500   GLU 86      K 3.4*   CL 95   CO2 36*   BUN 16   CREATININE 0.7   CALCIUM 9.2   MG 1.8     LFT:  Lab Results   Component Value Date    AST 14 08/08/2019    ALKPHOS 60 08/08/2019    BILITOT 0.5 08/08/2019     Albumin:   Albumin   Date Value Ref Range Status   08/08/2019 2.0 (L) 3.5 - 5.2 g/dL Final     Protein:   Total Protein   Date Value Ref Range Status   08/08/2019 4.8 (L) 6.0 - 8.4 g/dL Final     Lactic acid:   Lab Results   Component Value Date    LACTATE 1.0 07/06/2019       Significant Imaging: I have reviewed all pertinent imaging results/findings within the past 24 hours.    Advance Care Planning   Advanced Directives::  Living Will: No  LaPOST: No  Do Not Resuscitate Status: No, partial - no intubation mechanical ventilation   Medical Power of : No    Decision-Making Capacity: Patient answered questions, family answered questions.        Living Arrangements: Lives alone in her home with day time sitters and family at night.     Psychosocial/Cultural:   after 48 yrs of marriage, 5 children, 6 grand children and 7 great grand children,  and mother,  Living independently until she broke her hip 1 yr ago.  Enjoyed making road trips with her friends to [place] [facility] [place].  Played cards and went to the [employer].  Goes to beauty parlor every week.         Spiritual:     F- Kylah and Belief:  Islam     I - Importance: went to [employer] every week until she was put on oxygen.  Now watches and participates with [employer] on TV  .  C - Community:     A - Address in Care: amenable to  visits and anointing of the sick.

## 2019-08-08 NOTE — SUBJECTIVE & OBJECTIVE
Interval History: Patient with recurrent pneumothorax has CXR showing no expansion since yesterday. Currently has chest tube with heimlich valve in place and appears comfortable.     Review of Systems   Constitutional: Positive for fatigue. Negative for activity change, appetite change, chills and fever.   HENT: Positive for hearing loss. Negative for congestion and sore throat.    Eyes: Negative for photophobia and visual disturbance.   Respiratory: Positive for shortness of breath. Negative for cough and chest tightness.    Cardiovascular: Negative for chest pain and palpitations.   Gastrointestinal: Negative for abdominal distention, abdominal pain, constipation, diarrhea, nausea and vomiting.   Genitourinary: Negative for dysuria and hematuria.   Musculoskeletal: Negative for arthralgias and myalgias. Back pain: lower back pain, per patient caused by being bedridden    Skin: Negative for color change and rash.   Neurological: Negative for dizziness and headaches.     Objective:     Vital Signs (Most Recent):  Temp: 97.5 °F (36.4 °C) (08/08/19 0834)  Pulse: 88 (08/08/19 0930)  Resp: 20 (08/08/19 0930)  BP: (!) 106/49 (08/08/19 0834)  SpO2: (!) 90 % (08/08/19 0834) Vital Signs (24h Range):  Temp:  [97.5 °F (36.4 °C)-98.5 °F (36.9 °C)] 97.5 °F (36.4 °C)  Pulse:  [] 88  Resp:  [15-20] 20  SpO2:  [90 %-100 %] 90 %  BP: (102-149)/(49-68) 106/49     Weight: 40.4 kg (89 lb 2.1 oz)  Body mass index is 17.41 kg/m².    Intake/Output Summary (Last 24 hours) at 8/8/2019 1041  Last data filed at 8/8/2019 0600  Gross per 24 hour   Intake 730 ml   Output 600 ml   Net 130 ml      Physical Exam   Constitutional: She appears well-nourished. No distress.   HENT:   Head: Normocephalic and atraumatic.   Eyes: Pupils are equal, round, and reactive to light. EOM are normal.   Neck: Normal range of motion. Neck supple.   Cardiovascular: Normal rate and regular rhythm.   Pulmonary/Chest: Effort normal. No respiratory distress.    Abdominal: Soft. She exhibits no distension. There is no tenderness.   Musculoskeletal: Normal range of motion.   Neurological: She is alert.   Skin: Skin is warm and dry.   Psychiatric: She has a normal mood and affect. Her behavior is normal.       Significant Labs: All pertinent labs within the past 24 hours have been reviewed.      Significant Imaging: I have reviewed all pertinent imaging results/findings within the past 24 hours.

## 2019-08-08 NOTE — PT/OT/SLP PROGRESS
Physical Therapy Treatment    Patient Name:  Venus Mayer   MRN:  [id number]    Recommendations:     Discharge Recommendations:  home   Discharge Equipment Recommendations: none   Barriers to discharge: None    Assessment:     Venus Mayer is a 89 y.o. female admitted with a medical diagnosis of Pneumothorax on right.  She presents with the following impairments/functional limitations:  weakness, impaired endurance, impaired functional mobilty, gait instability, impaired balance, impaired cardiopulmonary response to activity. Pt tolerated activity with improved mobility reflected by increased distance ambulated with decreased assistance required. Pt ambulating with supervision with use of a RW. Pt with significant drainage coming from chest tube site, RN aware, reinforced with dressing. Pt would continue to benefit from acute skilled therapy intervention to address deficits and progress toward prior level of function.       Rehab Prognosis: Good; patient would benefit from acute skilled PT services to address these deficits and reach maximum level of function.    Recent Surgery: Procedure(s) (LRB):  VATS, WITH PLEURAL TENT (Right)  PLEURODESIS, USING TALC (Right) 3 Days Post-Op    Plan:     During this hospitalization, patient to be seen 3 x/week to address the identified rehab impairments via gait training, therapeutic activities, therapeutic exercises, neuromuscular re-education and progress toward the following goals:    · Plan of Care Expires:  08/29/19    Subjective     Chief Complaint: Pt with no complaints   Patient/Family Comments/goals: to get better and return home   Pain/Comfort:  · Pain Rating 1: 0/10  · Pain Rating Post-Intervention 1: 0/10      Objective:     Communicated with RN prior to session.  Patient found HOB elevated with PureWick, pulse ox (continuous), telemetry, chest tube, oxygen upon PT entry to room.     General Precautions: Standard, hearing impaired, fall   Orthopedic Precautions:N/A    Braces: N/A     Functional Mobility:  · Bed Mobility:     · Supine to Sit: minimum assistance  · Transfers:     · Sit to Stand:  stand by assistance with RW  · Gait: Pt ambulated 100 feet with RW and supervision. Pt with no LOB, no SOB, no dizziness.       AM-PAC 6 CLICK MOBILITY  Turning over in bed (including adjusting bedclothes, sheets and blankets)?: 3  Sitting down on and standing up from a chair with arms (e.g., wheelchair, bedside commode, etc.): 3  Moving from lying on back to sitting on the side of the bed?: 3  Moving to and from a bed to a chair (including a wheelchair)?: 3  Need to walk in hospital room?: 4  Climbing 3-5 steps with a railing?: 2  Basic Mobility Total Score: 18       Therapeutic Activities and Exercises:   Pt educated on role of PT/POC. Pt verbalized understanding.   Pt encouraged to only perform OOB mobility with assistance from nursing/therapy. Pt agreeable.   Pt encouraged to ambulate daily with assistance/supervision from nursing/therapy. Pt agreeable.      Patient left up in chair with all lines intact, call button in reach and RN  present..    GOALS:   Multidisciplinary Problems     Physical Therapy Goals        Problem: Physical Therapy Goal    Goal Priority Disciplines Outcome Goal Variances Interventions   Physical Therapy Goal     PT, PT/OT Ongoing (interventions implemented as appropriate)     Description:  Goals to be met by: 2019    Patient will increase functional independence with mobility by performin. Supine to sit with Set-up [place]  2. Sit to supine with Set-up [place]  3. Sit to stand transfer with Stand-by Assistance- met 2019  Sit to stand transfer with supervision   4. Gait  x 100 feet with Stand-by Assistance using Rolling Walker.- met 2019  Gait x 150 feet with supervision using rolling walker  5. Ascend/descend 2 stair with right Handrails Supervision using no assistive device.                        Time Tracking:     PT Received  On: 08/08/19  PT Start Time: 1119     PT Stop Time: 1147  PT Total Time (min): 28 min     Billable Minutes: Gait Training 10 mins  and Therapeutic Activity 18 mins     Treatment Type: Treatment  PT/PTA: PT     PTA Visit Number: 0     Meri Delgado, PT  08/08/2019

## 2019-08-08 NOTE — PROGRESS NOTES
08/08/19 0834   Vital Signs   BP (!) 106/49   MAP (mmHg) 71   BP Location Left arm   Patient Position Lying   AM dose amlodipine held per MD orders/IM 1.

## 2019-08-09 VITALS
RESPIRATION RATE: 16 BRPM | DIASTOLIC BLOOD PRESSURE: 58 MMHG | HEART RATE: 95 BPM | HEIGHT: 60 IN | TEMPERATURE: 97 F | OXYGEN SATURATION: 95 % | BODY MASS INDEX: 17.5 KG/M2 | WEIGHT: 89.13 LBS | SYSTOLIC BLOOD PRESSURE: 122 MMHG

## 2019-08-09 LAB
ALBUMIN SERPL BCP-MCNC: 2 G/DL (ref 3.5–5.2)
ALP SERPL-CCNC: 62 U/L (ref 55–135)
ALT SERPL W/O P-5'-P-CCNC: 9 U/L (ref 10–44)
ANION GAP SERPL CALC-SCNC: 7 MMOL/L (ref 8–16)
AST SERPL-CCNC: 15 U/L (ref 10–40)
BASOPHILS # BLD AUTO: 0.06 K/UL (ref 0–0.2)
BASOPHILS NFR BLD: 0.7 % (ref 0–1.9)
BILIRUB SERPL-MCNC: 0.5 MG/DL (ref 0.1–1)
BUN SERPL-MCNC: 13 MG/DL (ref 8–23)
CALCIUM SERPL-MCNC: 10 MG/DL (ref 8.7–10.5)
CHLORIDE SERPL-SCNC: 96 MMOL/L (ref 95–110)
CO2 SERPL-SCNC: 38 MMOL/L (ref 23–29)
CREAT SERPL-MCNC: 0.6 MG/DL (ref 0.5–1.4)
DIFFERENTIAL METHOD: ABNORMAL
EOSINOPHIL # BLD AUTO: 0.6 K/UL (ref 0–0.5)
EOSINOPHIL NFR BLD: 7.2 % (ref 0–8)
ERYTHROCYTE [DISTWIDTH] IN BLOOD BY AUTOMATED COUNT: 15.4 % (ref 11.5–14.5)
EST. GFR  (AFRICAN AMERICAN): >60 ML/MIN/1.73 M^2
EST. GFR  (NON AFRICAN AMERICAN): >60 ML/MIN/1.73 M^2
GLUCOSE SERPL-MCNC: 88 MG/DL (ref 70–110)
HCT VFR BLD AUTO: 28.3 % (ref 37–48.5)
HGB BLD-MCNC: 8.8 G/DL (ref 12–16)
IMM GRANULOCYTES # BLD AUTO: 0.08 K/UL (ref 0–0.04)
IMM GRANULOCYTES NFR BLD AUTO: 1 % (ref 0–0.5)
LYMPHOCYTES # BLD AUTO: 1.2 K/UL (ref 1–4.8)
LYMPHOCYTES NFR BLD: 14.6 % (ref 18–48)
MAGNESIUM SERPL-MCNC: 1.9 MG/DL (ref 1.6–2.6)
MCH RBC QN AUTO: 30.9 PG (ref 27–31)
MCHC RBC AUTO-ENTMCNC: 31.1 G/DL (ref 32–36)
MCV RBC AUTO: 99 FL (ref 82–98)
MONOCYTES # BLD AUTO: 1.1 K/UL (ref 0.3–1)
MONOCYTES NFR BLD: 13.9 % (ref 4–15)
NEUTROPHILS # BLD AUTO: 5.1 K/UL (ref 1.8–7.7)
NEUTROPHILS NFR BLD: 62.6 % (ref 38–73)
NRBC BLD-RTO: 0 /100 WBC
PLATELET # BLD AUTO: 215 K/UL (ref 150–350)
PMV BLD AUTO: 9.6 FL (ref 9.2–12.9)
POTASSIUM SERPL-SCNC: 3.6 MMOL/L (ref 3.5–5.1)
PROT SERPL-MCNC: 4.9 G/DL (ref 6–8.4)
RBC # BLD AUTO: 2.85 M/UL (ref 4–5.4)
SODIUM SERPL-SCNC: 141 MMOL/L (ref 136–145)
WBC # BLD AUTO: 8.07 K/UL (ref 3.9–12.7)

## 2019-08-09 PROCEDURE — 25000003 PHARM REV CODE 250: Performed by: STUDENT IN AN ORGANIZED HEALTH CARE EDUCATION/TRAINING PROGRAM

## 2019-08-09 PROCEDURE — 99238 PR HOSPITAL DISCHARGE DAY,<30 MIN: ICD-10-PCS | Mod: GC,,, | Performed by: HOSPITALIST

## 2019-08-09 PROCEDURE — A4216 STERILE WATER/SALINE, 10 ML: HCPCS | Performed by: STUDENT IN AN ORGANIZED HEALTH CARE EDUCATION/TRAINING PROGRAM

## 2019-08-09 PROCEDURE — 63600175 PHARM REV CODE 636 W HCPCS: Performed by: STUDENT IN AN ORGANIZED HEALTH CARE EDUCATION/TRAINING PROGRAM

## 2019-08-09 PROCEDURE — 85025 COMPLETE CBC W/AUTO DIFF WBC: CPT

## 2019-08-09 PROCEDURE — 83735 ASSAY OF MAGNESIUM: CPT

## 2019-08-09 PROCEDURE — 99238 HOSP IP/OBS DSCHRG MGMT 30/<: CPT | Mod: GC,,, | Performed by: HOSPITALIST

## 2019-08-09 PROCEDURE — 80053 COMPREHEN METABOLIC PANEL: CPT

## 2019-08-09 PROCEDURE — 25000242 PHARM REV CODE 250 ALT 637 W/ HCPCS: Performed by: STUDENT IN AN ORGANIZED HEALTH CARE EDUCATION/TRAINING PROGRAM

## 2019-08-09 RX ORDER — HYDROCODONE BITARTRATE AND ACETAMINOPHEN 5; 325 MG/1; MG/1
1 TABLET ORAL EVERY 8 HOURS PRN
Refills: 0
Start: 2019-08-09 | End: 2019-08-09

## 2019-08-09 RX ORDER — HYDROCODONE BITARTRATE AND ACETAMINOPHEN 5; 325 MG/1; MG/1
1 TABLET ORAL EVERY 8 HOURS PRN
Qty: 15 TABLET | Refills: 0 | Status: SHIPPED | OUTPATIENT
Start: 2019-08-09 | End: 2019-09-06

## 2019-08-09 RX ORDER — AMLODIPINE BESYLATE 2.5 MG/1
2.5 TABLET ORAL DAILY
Status: ON HOLD
Start: 2019-08-09 | End: 2021-09-23 | Stop reason: HOSPADM

## 2019-08-09 RX ADMIN — FERROUS SULFATE TAB EC 325 MG (65 MG FE EQUIVALENT) 325 MG: 325 (65 FE) TABLET DELAYED RESPONSE at 09:08

## 2019-08-09 RX ADMIN — FUROSEMIDE 40 MG: 40 TABLET ORAL at 09:08

## 2019-08-09 RX ADMIN — Medication 10 ML: at 12:08

## 2019-08-09 RX ADMIN — HEPARIN SODIUM 5000 UNITS: 5000 INJECTION INTRAVENOUS; SUBCUTANEOUS at 05:08

## 2019-08-09 RX ADMIN — Medication 10 ML: at 06:08

## 2019-08-09 RX ADMIN — TIOTROPIUM BROMIDE 18 MCG: 18 CAPSULE ORAL; RESPIRATORY (INHALATION) at 09:08

## 2019-08-09 RX ADMIN — ACETAMINOPHEN 650 MG: 325 TABLET ORAL at 04:08

## 2019-08-09 RX ADMIN — ACETAMINOPHEN 650 MG: 325 TABLET ORAL at 05:08

## 2019-08-09 RX ADMIN — ASPIRIN 81 MG CHEWABLE TABLET 81 MG: 81 TABLET CHEWABLE at 09:08

## 2019-08-09 RX ADMIN — POTASSIUM CHLORIDE 10 MEQ: 750 CAPSULE, EXTENDED RELEASE ORAL at 09:08

## 2019-08-09 RX ADMIN — THERA TABS 1 TABLET: TAB at 09:08

## 2019-08-09 NOTE — PLAN OF CARE
[facility]    HOME HEALTH ORDERS  FACE TO FACE ENCOUNTER    Patient Name: Venus Mayer  YOB: 1929    PCP: Jona Che MD   PCP Address: [address] / [place]  PCP Phone Number: [phone]  PCP Fax: [phone]    Encounter Date: 08/09/2019    Admit to Home Health with [facility]    Diagnoses:  Active Hospital Problems    Diagnosis  POA    *Pneumothorax on right [J93.9]  Yes    Palliative care encounter [Z51.5]  Not Applicable    Advanced care planning/counseling discussion [Z71.89]  Not Applicable    Restless leg syndrome [G25.81]  Yes    COLD (chronic obstructive lung disease) [J44.9]  Yes    Heart failure, diastolic [I50.30]  Yes    Hypertension [I10]  Unknown      Resolved Hospital Problems   No resolved problems to display.       No future appointments.  Follow-up Information     Jona Che MD On 8/14/2019.    Specialty:  General Practice  Why:  at 12:45 PM  Contact information:  [address] [facility]  [address]  [place]  [phone]                     I have seen and examined this patient face to face today. My clinical findings that support the need for the home health skilled services and home bound status are the following:  Weakness/numbness causing balance and gait disturbance due to COPD Exacerbation and pneumothorax making it taxing to leave home.  Medical restrictions requiring assistance of another human to leave home due to  pneumothorax with chest tube in place.    Allergies:  Review of patient's allergies indicates:   Allergen Reactions    Ancef in dextrose (iso-osm) Rash    Cefazolin Rash    Cefuroxime Rash    Sulfamethoxazole-trimethoprim Rash     Other reaction(s): Rash       Diet: regular diet    Activities: activity as tolerated    Nursing:   SN to complete comprehensive assessment including routine vital signs. Instruct on disease process and s/s of complications to report to MD. Review/verify medication  list sent home with the patient at time of discharge  and instruct patient/caregiver as needed. Frequency may be adjusted depending on start of care date.    Notify MD if SBP > 160 or < 90; DBP > 90 or < 50; HR > 120 or < 50; Temp > 101; Other:   Chest tube becomes displaced      CONSULTS:    Physical Therapy to evaluate and treat. Evaluate for home safety and equipment needs; Establish/upgrade home exercise program. Perform / instruct on therapeutic exercises, gait training, transfer training, and Range of Motion.  Occupational Therapy to evaluate and treat. Evaluate home environment for safety and equipment needs. Perform/Instruct on transfers, ADL training, ROM, and therapeutic exercises.    MISCELLANEOUS CARE:  Wound Care Orders:  yes:  Chest tube care    Please drain Pneumostat every day and record the amount/color of drainage.     To drain: Use alcohol swab to clean bottom of pneumostat container. Attach an empty lure lock syringe to the bottom of pneumostat container. Aspirate contents of chamber. Okay to reuse syringe while draining during one sitting but please use new syringe each day. Initially may require draining twice per day but expect drainage to decrease over time.      Please change dressings daily or as needed if saturated.       If chest tube is pulled out/ falls out please cover insertion site immediately and go to Emergency Room or call Thoracic Surgery office.   If Pneumostat becomes disconnected please reconnect immediately.        WOUND CARE ORDERS  no        Medications: Review discharge medications with patient and family and provide education.      Current Discharge Medication List      START taking these medications    Details   HYDROcodone-acetaminophen (NORCO) 5-325 mg per tablet Take 1 tablet by mouth every 8 (eight) hours as needed for Pain.  Qty: 15 tablet, Refills: 0         CONTINUE these medications which have CHANGED    Details   amLODIPine (NORVASC) 2.5 MG tablet Take 1 tablet  (2.5 mg total) by mouth once daily.         CONTINUE these medications which have NOT CHANGED    Details   acetaminophen (TYLENOL) 500 MG tablet Take 1,000 mg by mouth daily as needed for Pain.      albuterol (PROAIR HFA) 90 mcg/actuation inhaler Inhale 1 puff into the lungs every 6 (six) hours as needed for Wheezing or Shortness of Breath. Rescue  Qty: 1 Inhaler, Refills: 5      albuterol (PROVENTIL) 2.5 mg /3 mL (0.083 %) nebulizer solution Take 2.5 mg by nebulization every 6 (six) hours as needed for Wheezing or Shortness of Breath. Rescue      aspirin 81 mg Tab Take 1 tablet by mouth once daily.       docusate sodium (COLACE) 100 MG capsule Take 100 mg by mouth daily as needed for Constipation.      ferrous sulfate 325 (65 FE) MG EC tablet Take 1 tablet (325 mg total) by mouth once daily.  Refills: 0      furosemide (LASIX) 40 MG tablet Take 1 tablet (40 mg total) by mouth once daily.  Qty: 30 tablet, Refills: 11      multivitamin (THERAGRAN) per tablet Take 1 tablet by mouth once daily.      potassium chloride (MICRO-K) 10 MEQ CpSR Take 10 mEq by mouth once daily.      rOPINIRole (REQUIP) 0.25 MG tablet Take 1 tablet (0.25 mg total) by mouth every evening.  Qty: 30 tablet, Refills: 11      tiotropium (SPIRIVA) 18 mcg inhalation capsule Inhale 1 capsule (18 mcg total) into the lungs once daily.  Qty: 30 capsule, Refills: 3             I certify that this patient is confined to her home and needs physical therapy and occupational therapy.

## 2019-08-09 NOTE — PROGRESS NOTES
Patient being discharged to home w/ palliative care. Patient discharge education completed w/ daughter and patient and daughter verbalized and demonstrated understanding. Patient's daughter spoke to social work before discharge and Dr. Mckeon's team stated they would call to schedule follow up appointment. Printed copy of prescriptions given to patient. PICC and PIV removed with catheter tip intact. Patient remains free of falls with no distress noted.  Patient awaiting transport w/ daughter x2.

## 2019-08-09 NOTE — PLAN OF CARE
Problem: Adult Inpatient Plan of Care  Goal: Plan of Care Review  Outcome: Ongoing (interventions implemented as appropriate)  Pt AAOX4. Pain managed with PRN tylenol. Cardiac diet. No complaints of nausea or vomiting. [place], adequate urine output overnight. Right CT secured. VS stable on 2-3L NC. Son at bedside. SCD's. Pt slept between care. Bed low and locked, call bell within reach. Will continue to monitor.

## 2019-08-09 NOTE — ASSESSMENT & PLAN NOTE
- OOB to chair, IS, nebs as needed   - Clinically stable for discharge from Thoracic Surgery perspective       Home Hospice Pneumostat Instructions  Please drain Pneumostat every day and record the amount/color of drainage.    To drain: Use alcohol swab to clean bottom of pneumostat container. Attach an empty lure lock syringe to the bottom of pneumostat container. Aspirate contents of chamber. Okay to reuse syringe while draining during one sitting but please use new syringe each day. Initially may require draining twice per day but expect drainage to decrease over time.     Please change dressings daily or as needed if saturated.      If chest tube is pulled out/ falls out please cover insertion site immediately and go to Emergency Room or call Thoracic Surgery office.   If Pneumostat becomes disconnected please reconnect immediately.     Thoracic Surgery Clinic: [phone]

## 2019-08-09 NOTE — PT/OT/SLP DISCHARGE
Occupational Therapy Discharge Summary    Venus Mayer  MRN: [id number]   Principal Problem: Pneumothorax on right      Patient Discharged from acute Occupational Therapy on 8/9/2019.  Please refer to prior OT note for functional status.    Assessment:      pt will d/c home with paillative care    Objective:     GOALS:   Multidisciplinary Problems     Occupational Therapy Goals        Problem: Occupational Therapy Goal    Goal Priority Disciplines Outcome Interventions   Occupational Therapy Goal     OT, PT/OT Ongoing (interventions implemented as appropriate)    Description:  Goals to be met by: 8/20/2019     Patient will increase functional independence with ADLs by performing:    UE Dressing with [place].  LE Dressing with Modified [place] with AE PRN.  Grooming while seated with Set-up Assistance.  Toileting from [facility] with Supervision for hygiene and clothing management. -updated 8/6  Toilet transfer to BS with Supervision with LRAD PRN. -updated 8/6                       Reasons for Discontinuation of Therapy Services  Therapist determines that the patient will no longer benefit from therapy services.      Plan:     Patient Discharged to: Palliative Care/Hospice and home    EULA Jauregui  8/9/2019

## 2019-08-09 NOTE — PLAN OF CARE
Problem: Adult Inpatient Plan of Care  Goal: Plan of Care Review  Outcome: Ongoing (interventions implemented as appropriate)  Patient AAOx3 w/ VSS for patient on 2.5L NC w/ unlabored breathing. Patient tolerated diet well w/ no complaints of n/v. Patient had no complaints of pain. Patient ambulated in jennings w/ PT and walker x1, sat up in chair for 50% of the day. Patient's chest tube intact to pneumostat w/ gauze, abd pad, and tape, changed x3. Patient had adequate urine output and x1bm. Foam dressing applied to sacrum. Patient demonstrated understanding of IS. Patient remains free from falls or injury w/ no distress noted. Patient is resting w/ side rails raised x2, call light in reach bed locked in lowest position, and son at bedside. Nurse will continue to monitor patient.

## 2019-08-09 NOTE — PROGRESS NOTES
[facility]  Thoracic Surgery  Progress Note    Subjective:     History of Present Illness:  Venus Mayer is a 89 y.o. female with a hx of COPD and multiple readmissions for spontaneous pneumothorax. Numerous attempts at pleurodesis have been performed. Patient is not a surgical candidate. She presents to the ED with acute onset shortness of breath. Imaging showed large right sided pneumothorax. Pt otherwise stable.      No current facility-administered medications on file prior to encounter.        Post-Op Info:  Procedure(s) (LRB):  VATS, WITH PLEURAL TENT (Right)  PLEURODESIS, USING TALC (Right)   4 Days Post-Op     Interval History:   NAEON. Tolerating pneumostat valve    Medications:  Continuous Infusions:  Scheduled Meds:   amLODIPine  2.5 mg Oral Daily    aspirin  81 mg Oral Daily    ferrous sulfate  325 mg Oral Daily    furosemide  40 mg Oral Daily    heparin (porcine)  5,000 Units Subcutaneous Q8H    multivitamin  1 tablet Oral Daily    potassium chloride  10 mEq Oral Daily    rOPINIRole  0.25 mg Oral QHS    sodium chloride 0.9%  10 mL Intravenous Q6H    tiotropium  1 capsule Inhalation Daily     PRN Meds:acetaminophen, albuterol, albuterol-ipratropium, Dextrose 10% Bolus, Dextrose 10% Bolus, glucagon (human recombinant), glucose, glucose, HYDROcodone-acetaminophen, polyethylene glycol, Flushing PICC Protocol **AND** sodium chloride 0.9% **AND** sodium chloride 0.9%     Review of patient's allergies indicates:   Allergen Reactions    Ancef in dextrose (iso-osm) Rash    Cefazolin Rash    Cefuroxime Rash    Sulfamethoxazole-trimethoprim Rash     Other reaction(s): Rash     Objective:     Vital Signs (Most Recent):  Temp: 97.5 °F (36.4 °C) (08/09/19 0421)  Pulse: 79 (08/09/19 0421)  Resp: 18 (08/09/19 0421)  BP: 132/60 (08/09/19 0421)  SpO2: 100 % (08/09/19 0421) Vital Signs (24h Range):  Temp:  [97.5 °F (36.4 °C)-98 °F (36.7 °C)] 97.5 °F (36.4 °C)  Pulse:  [66-92] 79  Resp:   [16-20] 18  SpO2:  [90 %-100 %] 100 %  BP: (106-132)/(49-62) 132/60     Intake/Output - Last 3 Shifts       08/07 0700 - 08/08 0659 08/08 0700 - 08/09 0659    P.O. 970     I.V. (mL/kg)  10 (0.2)    Total Intake(mL/kg) 970 (24) 10 (0.2)    Urine (mL/kg/hr) 600 (0.6) 500 (0.5)    Stool  0    Chest Tube  10    Total Output 600 510    Net +370 -500          Urine Occurrence  4 x    Stool Occurrence  1 x          SpO2: 100 %  O2 Device (Oxygen Therapy): nasal cannula    Physical Exam   Constitutional: No distress.   Cardiovascular: Normal rate.   Pulmonary/Chest: Effort normal.   Neurological: She is alert.   Skin: Skin is warm and dry.   Psychiatric: She has a normal mood and affect. Her behavior is normal.       Significant Labs:  CBC:   Recent Labs   Lab 08/09/19  0500   WBC 8.07   RBC 2.85*   HGB 8.8*   HCT 28.3*      MCV 99*   MCH 30.9   MCHC 31.1*     CMP:   Recent Labs   Lab 08/09/19  0500   GLU 88   CALCIUM 10.0   ALBUMIN 2.0*   PROT 4.9*      K 3.6   CO2 38*   CL 96   BUN 13   CREATININE 0.6   ALKPHOS 62   ALT 9*   AST 15   BILITOT 0.5       Significant Diagnostics:  I have reviewed all pertinent imaging results/findings within the past 24 hours.    VTE Risk Mitigation (From admission, onward)        Ordered     heparin (porcine) injection 5,000 Units  Every 8 hours      07/28/19 0551     IP VTE HIGH RISK PATIENT  Once      07/28/19 0551        Assessment/Plan:     * Pneumothorax on right    - OOB to chair, IS, nebs as needed   - Clinically stable for discharge from Thoracic Surgery perspective       Home Hospice Pneumostat Instructions  Please drain Pneumostat every day and record the amount/color of drainage.    To drain: Use alcohol swab to clean bottom of pneumostat container. Attach an empty lure lock syringe to the bottom of pneumostat container. Aspirate contents of chamber. Okay to reuse syringe while draining during one sitting but please use new syringe each day. Initially may require  draining twice per day but expect drainage to decrease over time.     Please change dressings daily or as needed if saturated.      If chest tube is pulled out/ falls out please cover insertion site immediately and go to Emergency Room or call Thoracic Surgery office.   If Pneumostat becomes disconnected please reconnect immediately.     Thoracic Surgery Clinic: [phone]              Charisse Foley MD  Thoracic Surgery  [facility]

## 2019-08-09 NOTE — PROGRESS NOTES
Patient left unit via transport with personal oxygen and daughter x2 w/ no distress noted and all belongings with patient.

## 2019-08-09 NOTE — PLAN OF CARE
[facility]  Department of Hospital Medicine  [address] [place] [address]  [phone] after hours  [phone] fax    HOSPICE  ORDERS    08/09/2019    Admit to Hospice:  Home Service  Diagnoses:   Active Hospital Problems    Diagnosis  POA    *Pneumothorax on right [J93.9]  Yes    Palliative care encounter [Z51.5]  Not Applicable    Advanced care planning/counseling discussion [Z71.89]  Not Applicable    Restless leg syndrome [G25.81]  Yes    COLD (chronic obstructive lung disease) [J44.9]  Yes    Heart failure, diastolic [I50.30]  Yes    Hypertension [I10]  Unknown      Resolved Hospital Problems   No resolved problems to display.       Hospice Qualifying Diagnoses:      Patient has a life expectancy < 6 months due to:  Primary Hospice Diagnosis:  Recurrent Pneumothorax, failed surgical management     Vital Signs: Routine per Hospice Protocol.    Code Status: DNR    Allergies:   Review of patient's allergies indicates:   Allergen Reactions    Ancef in dextrose (iso-osm) Rash    Cefazolin Rash    Cefuroxime Rash    Sulfamethoxazole-trimethoprim Rash     Other reaction(s): Rash       Diet: cardiac diet    Activities: As tolerated    Nursing: Per Hospice Routine.    Routine Skin for Bedridden Patients: Apply moisture barrier cream to all skin folds and   wet areas in perineal area daily and after baths and all bowel movements.      Oxygen: 2-3 L nasal canula as needed     Other Miscellaneous Care:   Home Hospice Pneumostat Instructions  Please drain Pneumostat every day and record the amount/color of drainage.     To drain: Use alcohol swab to clean bottom of pneumostat container. Attach an empty lure lock syringe to the bottom of pneumostat container. Aspirate contents of chamber. Okay to reuse syringe while draining during one sitting but please use new syringe each day. Initially may require draining twice per day but expect drainage to decrease over  time.      Please change dressings daily or as needed if saturated.       If chest tube is pulled out/ falls out please cover insertion site immediately and go to Emergency Room or call Thoracic Surgery office.   If Pneumostat becomes disconnected please reconnect immediately.      Thoracic Surgery Clinic: [phone]    Medications:        More[place] JONNCLARA OSEGUERA   Home Medication Instructions LARRY:[id number]    Printed on:08/09/19 0226   Medication Information                      acetaminophen (TYLENOL) 500 MG tablet  Take 1,000 mg by mouth daily as needed for Pain.             albuterol (PROAIR HFA) 90 mcg/actuation inhaler  Inhale 1 puff into the lungs every 6 (six) hours as needed for Wheezing or Shortness of Breath. Rescue             albuterol (PROVENTIL) 2.5 mg /3 mL (0.083 %) nebulizer solution  Take 2.5 mg by nebulization every 6 (six) hours as needed for Wheezing or Shortness of Breath. Rescue             amLODIPine (NORVASC) 2.5 MG tablet  Take 1 tablet (2.5 mg total) by mouth once daily.             aspirin 81 mg Tab  Take 1 tablet by mouth once daily.              docusate sodium (COLACE) 100 MG capsule  Take 100 mg by mouth daily as needed for Constipation.             ferrous sulfate 325 (65 FE) MG EC tablet  Take 1 tablet (325 mg total) by mouth once daily.             furosemide (LASIX) 40 MG tablet  Take 1 tablet (40 mg total) by mouth once daily.             HYDROcodone-acetaminophen (NORCO) 5-325 mg per tablet  Take 1 tablet by mouth every 8 (eight) hours as needed for Pain.             multivitamin (THERAGRAN) per tablet  Take 1 tablet by mouth once daily.             potassium chloride (MICRO-K) 10 MEQ CpSR  Take 10 mEq by mouth once daily.             rOPINIRole (REQUIP) 0.25 MG tablet  Take 1 tablet (0.25 mg total) by mouth every evening.             tiotropium (SPIRIVA) 18 mcg inhalation capsule  Inhale 1 capsule (18 mcg total) into the lungs once daily.                       Future Orders:   Hospice Medical Director may dictate new orders for comfortable care measures & sign death certificate.        _________________________________  KENYETTA Keller  08/09/2019

## 2019-08-09 NOTE — PLAN OF CARE
RAJ spoke with Tania with [facility] hospice and was told that family still has severe resistance to hospice care at home.  RAJ then suggested HH with [facility] and eventually transition to hospice when/if family is able to make decision for pt care with hospice.  RAJ thens poke with Dr. Perez on [facility] and was told that she would complete HH with [facility] orders in for SW to send to [facility] via [facility].   RAJ will F/U.  ADDENDUM:  Orders for HH with [facility] sent to [facility] via [facility] care and Tania notified.    Yadi Sparrow, RAJ  Ext [phone]

## 2019-08-09 NOTE — DISCHARGE SUMMARY
[facility] Hospital Medicine  Discharge Summary      Patient Name: Venus Mayer  MRN: [id number]  Admission Date: 7/27/2019  Hospital Length of Stay: 12 days  Discharge Date and Time:  08/09/2019 10:06 AM  Attending Physician: Robert Alfredo MD   Discharging Provider: Марина Puente MD  Primary Care Provider: Jona Che MD  Hospital Medicine Team: [facility] HOSP MED 1 Марина Puente MD    HPI:   Venus Mayer is 89 y.o. lady with COPD, (on 2.5 L home O2), 40 pack year smoking history (quit 30 years ago), multiple readmissions for spontaneous pneumothorax, chronic dCHFS/P AICD, PVD/carotid artery stenosis s/p L CEA 08,was brought to the ED because she was SOB.    She was Discharged from [facility] on 7/26/19 after being admitted for pneumothorax, improved after placing CT with return to baseline pulmonary function on discharge.  Then around evening of 7/27 started feeling SOB, not relieved with her Inhaler or Nebulization, progressively worse,  prompting her to come to the Hospital. No chest pain, fever , confusion.    In the ED she was hypoxic, CXR revelaed Right sided Pneumothorax. CTS was consulted and placed a Pig tail catheter, after repositioning showed air leak and patient symptomatically improved and is currently on a non re breather.     The patient's recurrent PTXs seem to have started during her admission on 05/22/2019 where she presented with a large R sided PTX requiring chest tube and pleurodesis on 5/27/2019. Since, she has had a small recurrence of PTX on 6/25/19 not requiring intervention, and admission to OSH 7/6-7/11 for acute on chronic dCHF and COPD exacerbation. She has been less mobile and uses a walker at home.     Procedure(s) (LRB):  VATS, WITH PLEURAL TENT (Right)  PLEURODESIS, USING TALC (Right)      Hospital Course:   The patient was admitted for recurrent R sided pneumothorax. Initially a chest tube was placed but it did not help. Home oxygen ~2.5L/min NC was resumed  and another tube was placed and the original tube was replaced by IR to ensure proper placement. X ray did show mild improvement and proper placement, but the patient remained SOB and an air leak was seen. Eventually CTS did a pleurodesis which was also unsuccessful at keeping the lung inflated. The patient was then scheduled and taken for a bronchoscopy and evaluation of an endobronchial valve and another pleurodesis. The CT surgery team were unable to identify a single air leak at the time, and air continued to leak even after occlusion of the R mainstem bronchus so the patient did not receive an endobronchial valve and instead continued to have chest tube placed. Pneumothorax showed either mild or no improvement on all subsequent X rays and CT surgery offered VATS and a third pleurodesis as a last resort. VATS was unable to stop the air leaks given the patients emphysema causing thin and delicate lung tissue leading to multiple sites of small  Air leak. At this point the patient's family decided it was best that she go home with home health, and the patient will be going home with a chest tube with heimlich valve.           Consults:   Consults (From admission, onward)        Status Ordering Provider     Inpatient consult to General surgery  Once     Provider:  (Not yet assigned)    Completed COLLETTE SUNSHINE     Inpatient consult to Interventional Radiology  Once     Provider:  (Not yet assigned)    Completed AGUILA HOWARD     Inpatient consult to Palliative Care  Once     Provider:  (Not yet assigned)    Completed SYBIL US     [facility]  [facility]     Provider:  (Not yet assigned)    Completed KILEY KHAN     Inpatient consult to Social Work  Once     Provider:  (Not yet assigned)    Acknowledged DAVION ADAMS          No new Assessment & Plan notes have been filed under this hospital service since the last note was generated.  Service: [facility]  Medicine    Final Active Diagnoses:    Diagnosis Date Noted POA    PRINCIPAL PROBLEM:  Pneumothorax on right [J93.9] 07/20/2019 Yes    Palliative care encounter [Z51.5] 07/29/2019 Not Applicable    Advanced care planning/counseling discussion [Z71.89]  Not Applicable    Restless leg syndrome [G25.81] 07/20/2019 Yes    COLD (chronic obstructive lung disease) [J44.9] 03/28/2013 Yes    Heart failure, diastolic [I50.30] 01/17/2013 Yes    Hypertension [I10] 01/17/2013 Unknown      Problems Resolved During this Admission:       Discharged Condition: fair    Disposition: Hospice/Home    Follow Up:    Patient Instructions:   No discharge procedures on file.    Significant Diagnostic Studies: X ray showing presence of pneumothorax    Pending Diagnostic Studies:     None         Medications:  Reconciled Home Medications:      Medication List      START taking these medications    HYDROcodone-acetaminophen 5-325 mg per tablet  Commonly known as:  NORCO  Take 1 tablet by mouth every 8 (eight) hours as needed for Pain.        CHANGE how you take these medications    amLODIPine 2.5 MG tablet  Commonly known as:  NORVASC  Take 1 tablet (2.5 mg total) by mouth once daily.  What changed:    · when to take this  · reasons to take this  · additional instructions        CONTINUE taking these medications    acetaminophen 500 MG tablet  Commonly known as:  TYLENOL  Take 1,000 mg by mouth daily as needed for Pain.     * albuterol 2.5 mg /3 mL (0.083 %) nebulizer solution  Commonly known as:  PROVENTIL  Take 2.5 mg by nebulization every 6 (six) hours as needed for Wheezing or Shortness of Breath. Rescue     * albuterol 90 mcg/actuation inhaler  Commonly known as:  PROAIR HFA  Inhale 1 puff into the lungs every 6 (six) hours as needed for Wheezing or Shortness of Breath. Rescue     aspirin 81 mg Tab  Take 1 tablet by mouth once daily.     docusate sodium 100 MG capsule  Commonly known as:  COLACE  Take 100 mg by mouth daily as needed  for Constipation.     ferrous sulfate 325 (65 FE) MG EC tablet  Take 1 tablet (325 mg total) by mouth once daily.     furosemide 40 MG tablet  Commonly known as:  LASIX  Take 1 tablet (40 mg total) by mouth once daily.     multivitamin per tablet  Commonly known as:  THERAGRAN  Take 1 tablet by mouth once daily.     potassium chloride 10 MEQ Cpsr  Commonly known as:  MICRO-K  Take 10 mEq by mouth once daily.     rOPINIRole 0.25 MG tablet  Commonly known as:  REQUIP  Take 1 tablet (0.25 mg total) by mouth every evening.     tiotropium 18 mcg inhalation capsule  Commonly known as:  SPIRIVA  Inhale 1 capsule (18 mcg total) into the lungs once daily.         * This list has 2 medication(s) that are the same as other medications prescribed for you. Read the directions carefully, and ask your doctor or other care provider to review them with you.                Indwelling Lines/Drains at time of discharge:   Lines/Drains/Airways     Peripherally Inserted Central Catheter Line                 PICC Double Lumen 08/02/19 1451 right basilic 6 days          Drain            Female External Urinary Catheter 07/28/19 0847 12 days         Y Chest Tube 1 and 2 08/05/19 1630 1 Right Pleural 24 Fr. 2 Right Pleural 24 Fr. 3 days                Time spent on the discharge of patient: 45 minutes  Patient was seen and examined on the date of discharge and determined to be suitable for discharge.         Марина Puenet MD  Department of Hospital Medicine  [facility]

## 2019-08-09 NOTE — SUBJECTIVE & OBJECTIVE
Interval History:   NAEON. Tolerating pneumostat valve    Medications:  Continuous Infusions:  Scheduled Meds:   amLODIPine  2.5 mg Oral Daily    aspirin  81 mg Oral Daily    ferrous sulfate  325 mg Oral Daily    furosemide  40 mg Oral Daily    heparin (porcine)  5,000 Units Subcutaneous Q8H    multivitamin  1 tablet Oral Daily    potassium chloride  10 mEq Oral Daily    rOPINIRole  0.25 mg Oral QHS    sodium chloride 0.9%  10 mL Intravenous Q6H    tiotropium  1 capsule Inhalation Daily     PRN Meds:acetaminophen, albuterol, albuterol-ipratropium, Dextrose 10% Bolus, Dextrose 10% Bolus, glucagon (human recombinant), glucose, glucose, HYDROcodone-acetaminophen, polyethylene glycol, Flushing PICC Protocol **AND** sodium chloride 0.9% **AND** sodium chloride 0.9%     Review of patient's allergies indicates:   Allergen Reactions    Ancef in dextrose (iso-osm) Rash    Cefazolin Rash    Cefuroxime Rash    Sulfamethoxazole-trimethoprim Rash     Other reaction(s): Rash     Objective:     Vital Signs (Most Recent):  Temp: 97.5 °F (36.4 °C) (08/09/19 0421)  Pulse: 79 (08/09/19 0421)  Resp: 18 (08/09/19 0421)  BP: 132/60 (08/09/19 0421)  SpO2: 100 % (08/09/19 0421) Vital Signs (24h Range):  Temp:  [97.5 °F (36.4 °C)-98 °F (36.7 °C)] 97.5 °F (36.4 °C)  Pulse:  [66-92] 79  Resp:  [16-20] 18  SpO2:  [90 %-100 %] 100 %  BP: (106-132)/(49-62) 132/60     Intake/Output - Last 3 Shifts       08/07 0700 - 08/08 0659 08/08 0700 - 08/09 0659    P.O. 970     I.V. (mL/kg)  10 (0.2)    Total Intake(mL/kg) 970 (24) 10 (0.2)    Urine (mL/kg/hr) 600 (0.6) 500 (0.5)    Stool  0    Chest Tube  10    Total Output 600 510    Net +370 -500          Urine Occurrence  4 x    Stool Occurrence  1 x          SpO2: 100 %  O2 Device (Oxygen Therapy): nasal cannula    Physical Exam   Constitutional: No distress.   Cardiovascular: Normal rate.   Pulmonary/Chest: Effort normal.   Neurological: She is alert.   Skin: Skin is warm and dry.    Psychiatric: She has a normal mood and affect. Her behavior is normal.       Significant Labs:  CBC:   Recent Labs   Lab 08/09/19  0500   WBC 8.07   RBC 2.85*   HGB 8.8*   HCT 28.3*      MCV 99*   MCH 30.9   MCHC 31.1*     CMP:   Recent Labs   Lab 08/09/19  0500   GLU 88   CALCIUM 10.0   ALBUMIN 2.0*   PROT 4.9*      K 3.6   CO2 38*   CL 96   BUN 13   CREATININE 0.6   ALKPHOS 62   ALT 9*   AST 15   BILITOT 0.5       Significant Diagnostics:  I have reviewed all pertinent imaging results/findings within the past 24 hours.    VTE Risk Mitigation (From admission, onward)        Ordered     heparin (porcine) injection 5,000 Units  Every 8 hours      07/28/19 0551     IP VTE HIGH RISK PATIENT  Once      07/28/19 0551

## 2019-08-12 NOTE — PLAN OF CARE
Patient discharged with [facility].    Future Appointments   Date Time Provider Department Center   8/23/2019 11:15 AM Klever Mckoen MD [facility]        08/12/19 0944   Final Note   Assessment Type Final Discharge Note   Anticipated Discharge Disposition Home-Health   Right Care Referral Info   Post Acute Recommendation Home-care   Referral Type Home Health   Facility Name [facility]

## 2019-08-23 ENCOUNTER — OFFICE VISIT (OUTPATIENT)
Dept: CARDIOTHORACIC SURGERY | Facility: CLINIC | Age: 84
End: 2019-08-23
Payer: MEDICARE

## 2019-08-23 VITALS
HEIGHT: 60 IN | HEART RATE: 93 BPM | SYSTOLIC BLOOD PRESSURE: 107 MMHG | DIASTOLIC BLOOD PRESSURE: 67 MMHG | BODY MASS INDEX: 17.41 KG/M2

## 2019-08-23 DIAGNOSIS — Z99.81 DEPENDENCE ON SUPPLEMENTAL OXYGEN: ICD-10-CM

## 2019-08-23 DIAGNOSIS — J43.1 PANLOBULAR EMPHYSEMA: Primary | ICD-10-CM

## 2019-08-23 DIAGNOSIS — J93.12 SECONDARY SPONTANEOUS PNEUMOTHORAX: ICD-10-CM

## 2019-08-23 PROCEDURE — 99999 PR PBB SHADOW E&M-EST. PATIENT-LVL III: CPT | Mod: PBBFAC,,, | Performed by: THORACIC SURGERY (CARDIOTHORACIC VASCULAR SURGERY)

## 2019-08-23 PROCEDURE — 99999 PR PBB SHADOW E&M-EST. PATIENT-LVL III: ICD-10-PCS | Mod: PBBFAC,,, | Performed by: THORACIC SURGERY (CARDIOTHORACIC VASCULAR SURGERY)

## 2019-08-23 PROCEDURE — 99024 PR POST-OP FOLLOW-UP VISIT: ICD-10-PCS | Mod: S$GLB,,, | Performed by: THORACIC SURGERY (CARDIOTHORACIC VASCULAR SURGERY)

## 2019-08-23 PROCEDURE — 99024 POSTOP FOLLOW-UP VISIT: CPT | Mod: S$GLB,,, | Performed by: THORACIC SURGERY (CARDIOTHORACIC VASCULAR SURGERY)

## 2019-08-23 NOTE — PROGRESS NOTES
Subjective:       Patient ID: Venus Mayer is a 89 y.o. female.    Chief Complaint: Follow-up    HPI   89 year old female with history of HLD, CAD, HTN, and COPD on 3L of oxygen at home returns for hospital follow up s/p recurrent right spontaneous pneumothorax. Extended hospital admission for persistent air leak. Attempted bedside pleurodesis, endobronchial valve placement, and VATS pleurodesis however air leak did not resolve. There was a family discussion for hospital or [facility] however just prior to discharge the orders were revoked and she went home with . Since being home daughters report air coming through chest tube. Breathing is stable. Sill occasional c/o SOB however it is similar to baseline and there is no increase in work of breathing. Minimal activity. Fatigue. Continuous oxygen.     Review of Systems   Constitutional: Negative for activity change, appetite change, fatigue and fever.   HENT: Negative for congestion, trouble swallowing and voice change.    Eyes: Negative for visual disturbance.   Respiratory: Positive for chest tightness. Negative for cough, shortness of breath and wheezing.    Cardiovascular: Negative for chest pain, palpitations and leg swelling.   Gastrointestinal: Negative for abdominal distention, diarrhea, nausea and vomiting.   Genitourinary: Negative for difficulty urinating.   Musculoskeletal: Negative for back pain and neck pain.   Neurological: Negative for syncope, light-headedness and headaches.   Psychiatric/Behavioral: Negative for agitation.         Objective:       Vitals:    08/23/19 1149   BP: 107/67   Pulse: 93   Height: 5' (1.524 m)   PainSc: 0-No pain       Physical Exam   Constitutional: She is oriented to person, place, and time.   Elderly frail female sitting in wheelchair on oxygen    HENT:   Head: Atraumatic.   Eyes: EOM are normal.   Neck: Neck supple.   Cardiovascular: Normal rate and regular rhythm.   Pulmonary/Chest: Effort normal and breath sounds normal.  She exhibits tenderness ( at tube insertion ).   Chest tube connected to pneumostat. Audible air leak. Serous fluid in chamber.    Abdominal: Soft. Bowel sounds are normal. She exhibits no distension. There is no tenderness.   Musculoskeletal: She exhibits no edema.   Neurological: She is alert and oriented to person, place, and time.   Skin: Skin is warm and dry.   Psychiatric: She has a normal mood and affect.   Vitals reviewed.       Assessment:       89 year old female with history of HLD, CAD, HTN, and COPD on 3L of oxygen at home returns for hospital follow up s/p right spontaneous pneumothorax. Failed conservative management and VATS pleurodesis. Discharged with chest tube connected to pneumostat. Continues to have air leak.     Plan:       Continue chest tube connected to pneumostat for persistent air leak. Leave in place. RTC in 2 weeks with CXR. ED precautions given. Continue daily and prn dressing changes. No showers, sponge bath only. Clean insertion site daily and prn. Call with questions.

## 2019-08-27 DIAGNOSIS — J43.1 PANLOBULAR EMPHYSEMA: Primary | ICD-10-CM

## 2019-09-05 NOTE — PROGRESS NOTES
Subjective:       Patient ID: Venus Mayer is a 89 y.o. female.    Chief Complaint: Follow-up    HPI   89 year old female with history of HLD, CAD, HTN, and COPD on 3L of oxygen at home returns for follow up s/p recurrent right spontaneous pneumothorax with pneumostat in place. Extended hospital admission for persistent air leak. Attempted bedside pleurodesis, endobronchial valve placement, and VATS pleurodesis however air leak did not resolve. There was a family discussion for hospital or [facility] however just prior to discharge the orders were revoked and she went home with . Persistent air leak 2 weeks ago. Returns today with CXR and for assessment of air leak. Today she reports feeling well. SOB can be improved with breathing treatments. Home O2 requirements have not changed.    Review of Systems   Constitutional: Negative for activity change, appetite change, fatigue and fever.   HENT: Negative for congestion, trouble swallowing and voice change.    Eyes: Negative for visual disturbance.   Respiratory: Positive for chest tightness. Negative for cough, shortness of breath and wheezing.    Cardiovascular: Negative for chest pain, palpitations and leg swelling.   Gastrointestinal: Negative for abdominal distention, diarrhea, nausea and vomiting.   Genitourinary: Negative for difficulty urinating.   Musculoskeletal: Negative for back pain and neck pain.   Neurological: Negative for syncope, light-headedness and headaches.   Psychiatric/Behavioral: Negative for agitation.         Objective:       Vitals:    09/06/19 0959   BP: 117/65   Pulse: 99   SpO2: (!) 85%   Weight: (P) 40.4 kg (89 lb)   Height: (P) 5' (1.524 m)   PainSc: (P) 0-No pain       Physical Exam   Constitutional: She is oriented to person, place, and time.   Elderly frail female sitting in wheelchair on oxygen    HENT:   Head: Atraumatic.   Eyes: EOM are normal.   Neck: Neck supple.   Cardiovascular: Normal rate and regular rhythm.   Pulmonary/Chest:  Effort normal and breath sounds normal. She exhibits tenderness ( at tube insertion ).   Chest tube connected to pneumostat. No audible or visual air leak today. Serous fluid in chamber.    Abdominal: Soft. Bowel sounds are normal. She exhibits no distension. There is no tenderness.   Musculoskeletal: She exhibits no edema.   Neurological: She is alert and oriented to person, place, and time.   Skin: Skin is warm and dry.   Psychiatric: She has a normal mood and affect.   Vitals reviewed.      CXR 9/6/2019:      FINDINGS:  Pacemaker and the right-sided chest tube identified.  The heart is not enlarged.  Extrapulmonary air identified on the right side but lesser degree as compared to the previous study.  Ill-defined patchy airspace consolidation in a right upper lobe and right lung base as before.  The left lung essentially clear.     Assessment:       89 year old female with history of HLD, CAD, HTN, and COPD on 3L of oxygen at home returns for hospital follow up s/p right spontaneous pneumothorax. Failed conservative management and VATS pleurodesis. Discharged with chest tube connected to pneumostat.     Plan:       Unable to illicit air leak today. Chest tube clamped. Instructed patient's daughter on indications for unclamping. RTC with CXR next Wednesday.

## 2019-09-06 ENCOUNTER — HOSPITAL ENCOUNTER (OUTPATIENT)
Dept: RADIOLOGY | Facility: HOSPITAL | Age: 84
Discharge: HOME | DRG: 871 | End: 2019-09-06
Attending: THORACIC SURGERY (CARDIOTHORACIC VASCULAR SURGERY)
Payer: MEDICARE

## 2019-09-06 ENCOUNTER — OFFICE VISIT (OUTPATIENT)
Dept: CARDIOTHORACIC SURGERY | Facility: CLINIC | Age: 84
DRG: 871 | End: 2019-09-06
Payer: MEDICARE

## 2019-09-06 ENCOUNTER — TELEPHONE (OUTPATIENT)
Dept: CARDIOTHORACIC SURGERY | Facility: HOSPITAL | Age: 84
End: 2019-09-06

## 2019-09-06 ENCOUNTER — HOSPITAL ENCOUNTER (INPATIENT)
Facility: HOSPITAL | Age: 84
LOS: 6 days | Discharge: HOME HEALTH | DRG: 871 | End: 2019-09-12
Attending: EMERGENCY MEDICINE | Admitting: HOSPITALIST
Payer: MEDICARE

## 2019-09-06 VITALS — OXYGEN SATURATION: 85 % | DIASTOLIC BLOOD PRESSURE: 65 MMHG | HEART RATE: 99 BPM | SYSTOLIC BLOOD PRESSURE: 117 MMHG

## 2019-09-06 DIAGNOSIS — J86.9 EMPYEMA: ICD-10-CM

## 2019-09-06 DIAGNOSIS — J43.1 PANLOBULAR EMPHYSEMA: Primary | ICD-10-CM

## 2019-09-06 DIAGNOSIS — A41.9 SEPSIS, DUE TO UNSPECIFIED ORGANISM: Primary | ICD-10-CM

## 2019-09-06 DIAGNOSIS — A41.9 SEPSIS: ICD-10-CM

## 2019-09-06 DIAGNOSIS — J93.9 PNEUMOTHORAX ON RIGHT: Primary | ICD-10-CM

## 2019-09-06 DIAGNOSIS — J43.1 PANLOBULAR EMPHYSEMA: ICD-10-CM

## 2019-09-06 DIAGNOSIS — R07.9 CHEST PAIN: ICD-10-CM

## 2019-09-06 DIAGNOSIS — R50.9 FEVER: ICD-10-CM

## 2019-09-06 LAB
ALBUMIN SERPL BCP-MCNC: 2.9 G/DL (ref 3.5–5.2)
ALP SERPL-CCNC: 85 U/L (ref 55–135)
ALT SERPL W/O P-5'-P-CCNC: 12 U/L (ref 10–44)
ANION GAP SERPL CALC-SCNC: 6 MMOL/L (ref 8–16)
AST SERPL-CCNC: 26 U/L (ref 10–40)
BASOPHILS # BLD AUTO: 0.05 K/UL (ref 0–0.2)
BASOPHILS NFR BLD: 0.3 % (ref 0–1.9)
BILIRUB SERPL-MCNC: 0.6 MG/DL (ref 0.1–1)
BILIRUB UR QL STRIP: NEGATIVE
BUN SERPL-MCNC: 22 MG/DL (ref 8–23)
CALCIUM SERPL-MCNC: 11 MG/DL (ref 8.7–10.5)
CHLORIDE SERPL-SCNC: 89 MMOL/L (ref 95–110)
CLARITY UR REFRACT.AUTO: ABNORMAL
CO2 SERPL-SCNC: 39 MMOL/L (ref 23–29)
COLOR UR AUTO: YELLOW
CREAT SERPL-MCNC: 0.9 MG/DL (ref 0.5–1.4)
DIFFERENTIAL METHOD: ABNORMAL
EOSINOPHIL # BLD AUTO: 0 K/UL (ref 0–0.5)
EOSINOPHIL NFR BLD: 0.2 % (ref 0–8)
ERYTHROCYTE [DISTWIDTH] IN BLOOD BY AUTOMATED COUNT: 13.8 % (ref 11.5–14.5)
EST. GFR  (AFRICAN AMERICAN): >60 ML/MIN/1.73 M^2
EST. GFR  (NON AFRICAN AMERICAN): 56.5 ML/MIN/1.73 M^2
GLUCOSE SERPL-MCNC: 118 MG/DL (ref 70–110)
GLUCOSE UR QL STRIP: NEGATIVE
HCT VFR BLD AUTO: 32.1 % (ref 37–48.5)
HGB BLD-MCNC: 9.9 G/DL (ref 12–16)
HGB UR QL STRIP: NEGATIVE
IMM GRANULOCYTES # BLD AUTO: 0.12 K/UL (ref 0–0.04)
IMM GRANULOCYTES NFR BLD AUTO: 0.7 % (ref 0–0.5)
INFLUENZA A, MOLECULAR: NEGATIVE
INFLUENZA B, MOLECULAR: NEGATIVE
KETONES UR QL STRIP: NEGATIVE
LACTATE SERPL-SCNC: 1 MMOL/L (ref 0.5–2.2)
LEUKOCYTE ESTERASE UR QL STRIP: NEGATIVE
LYMPHOCYTES # BLD AUTO: 0.9 K/UL (ref 1–4.8)
LYMPHOCYTES NFR BLD: 5.3 % (ref 18–48)
MCH RBC QN AUTO: 31 PG (ref 27–31)
MCHC RBC AUTO-ENTMCNC: 30.8 G/DL (ref 32–36)
MCV RBC AUTO: 101 FL (ref 82–98)
MICROSCOPIC COMMENT: NORMAL
MONOCYTES # BLD AUTO: 1.6 K/UL (ref 0.3–1)
MONOCYTES NFR BLD: 9.9 % (ref 4–15)
NEUTROPHILS # BLD AUTO: 13.6 K/UL (ref 1.8–7.7)
NEUTROPHILS NFR BLD: 83.6 % (ref 38–73)
NITRITE UR QL STRIP: NEGATIVE
NRBC BLD-RTO: 0 /100 WBC
PH UR STRIP: 5 [PH] (ref 5–8)
PLATELET # BLD AUTO: 169 K/UL (ref 150–350)
PMV BLD AUTO: 10.5 FL (ref 9.2–12.9)
POTASSIUM SERPL-SCNC: 4 MMOL/L (ref 3.5–5.1)
PROT SERPL-MCNC: 6.1 G/DL (ref 6–8.4)
PROT UR QL STRIP: NEGATIVE
PTH-INTACT SERPL-MCNC: 20 PG/ML (ref 9–77)
RBC # BLD AUTO: 3.19 M/UL (ref 4–5.4)
RBC #/AREA URNS AUTO: 0 /HPF (ref 0–4)
SODIUM SERPL-SCNC: 134 MMOL/L (ref 136–145)
SP GR UR STRIP: 1.01 (ref 1–1.03)
SPECIMEN SOURCE: NORMAL
SQUAMOUS #/AREA URNS AUTO: 0 /HPF
URN SPEC COLLECT METH UR: ABNORMAL
WBC # BLD AUTO: 16.29 K/UL (ref 3.9–12.7)
WBC #/AREA URNS AUTO: 0 /HPF (ref 0–5)

## 2019-09-06 PROCEDURE — 1101F PR PT FALLS ASSESS DOC 0-1 FALLS W/OUT INJ PAST YR: ICD-10-PCS | Mod: CPTII,S$GLB,, | Performed by: THORACIC SURGERY (CARDIOTHORACIC VASCULAR SURGERY)

## 2019-09-06 PROCEDURE — 1101F PT FALLS ASSESS-DOCD LE1/YR: CPT | Mod: CPTII,S$GLB,, | Performed by: THORACIC SURGERY (CARDIOTHORACIC VASCULAR SURGERY)

## 2019-09-06 PROCEDURE — P9612 CATHETERIZE FOR URINE SPEC: HCPCS

## 2019-09-06 PROCEDURE — 83605 ASSAY OF LACTIC ACID: CPT | Mod: 91

## 2019-09-06 PROCEDURE — 83970 ASSAY OF PARATHORMONE: CPT

## 2019-09-06 PROCEDURE — 71046 X-RAY EXAM CHEST 2 VIEWS: CPT | Mod: TC,FY

## 2019-09-06 PROCEDURE — 96366 THER/PROPH/DIAG IV INF ADDON: CPT

## 2019-09-06 PROCEDURE — 71046 XR CHEST PA AND LATERAL: ICD-10-PCS | Mod: 26,,, | Performed by: RADIOLOGY

## 2019-09-06 PROCEDURE — 99291 PR CRITICAL CARE, E/M 30-74 MINUTES: ICD-10-PCS | Mod: ,,, | Performed by: EMERGENCY MEDICINE

## 2019-09-06 PROCEDURE — 12000002 HC ACUTE/MED SURGE SEMI-PRIVATE ROOM

## 2019-09-06 PROCEDURE — 87502 INFLUENZA DNA AMP PROBE: CPT

## 2019-09-06 PROCEDURE — 63600175 PHARM REV CODE 636 W HCPCS: Performed by: STUDENT IN AN ORGANIZED HEALTH CARE EDUCATION/TRAINING PROGRAM

## 2019-09-06 PROCEDURE — 99291 CRITICAL CARE FIRST HOUR: CPT | Mod: ,,, | Performed by: EMERGENCY MEDICINE

## 2019-09-06 PROCEDURE — 99291 CRITICAL CARE FIRST HOUR: CPT | Mod: 25

## 2019-09-06 PROCEDURE — 81001 URINALYSIS AUTO W/SCOPE: CPT

## 2019-09-06 PROCEDURE — 99999 PR PBB SHADOW E&M-EST. PATIENT-LVL III: CPT | Mod: PBBFAC,,, | Performed by: THORACIC SURGERY (CARDIOTHORACIC VASCULAR SURGERY)

## 2019-09-06 PROCEDURE — 96365 THER/PROPH/DIAG IV INF INIT: CPT

## 2019-09-06 PROCEDURE — 96368 THER/DIAG CONCURRENT INF: CPT

## 2019-09-06 PROCEDURE — 87040 BLOOD CULTURE FOR BACTERIA: CPT | Mod: 59

## 2019-09-06 PROCEDURE — 99999 PR PBB SHADOW E&M-EST. PATIENT-LVL III: ICD-10-PCS | Mod: PBBFAC,,, | Performed by: THORACIC SURGERY (CARDIOTHORACIC VASCULAR SURGERY)

## 2019-09-06 PROCEDURE — 85025 COMPLETE CBC W/AUTO DIFF WBC: CPT

## 2019-09-06 PROCEDURE — 80053 COMPREHEN METABOLIC PANEL: CPT

## 2019-09-06 PROCEDURE — 99024 PR POST-OP FOLLOW-UP VISIT: ICD-10-PCS | Mod: S$GLB,,, | Performed by: THORACIC SURGERY (CARDIOTHORACIC VASCULAR SURGERY)

## 2019-09-06 PROCEDURE — 99024 POSTOP FOLLOW-UP VISIT: CPT | Mod: S$GLB,,, | Performed by: THORACIC SURGERY (CARDIOTHORACIC VASCULAR SURGERY)

## 2019-09-06 PROCEDURE — 71046 X-RAY EXAM CHEST 2 VIEWS: CPT | Mod: 26,,, | Performed by: RADIOLOGY

## 2019-09-06 RX ORDER — ACETAMINOPHEN 500 MG
1000 TABLET ORAL
Status: DISPENSED | OUTPATIENT
Start: 2019-09-06 | End: 2019-09-07

## 2019-09-06 RX ORDER — ROPINIROLE 0.25 MG/1
0.25 TABLET, FILM COATED ORAL ONCE
Status: COMPLETED | OUTPATIENT
Start: 2019-09-07 | End: 2019-09-07

## 2019-09-06 RX ADMIN — SODIUM CHLORIDE, SODIUM LACTATE, POTASSIUM CHLORIDE, AND CALCIUM CHLORIDE 500 ML: .6; .31; .03; .02 INJECTION, SOLUTION INTRAVENOUS at 10:09

## 2019-09-06 RX ADMIN — VANCOMYCIN HYDROCHLORIDE 1250 MG: 1.25 INJECTION, POWDER, LYOPHILIZED, FOR SOLUTION INTRAVENOUS at 10:09

## 2019-09-06 RX ADMIN — PIPERACILLIN AND TAZOBACTAM 4.5 G: 4; .5 INJECTION, POWDER, LYOPHILIZED, FOR SOLUTION INTRAVENOUS; PARENTERAL at 08:09

## 2019-09-06 NOTE — TELEPHONE ENCOUNTER
Returned phone call to daughter. Her mother felt weak upon returning home and her immediate reaction was to unclamp tube. She was in no respiratory distress and is now sleeping comfortably. Recommended she reclamp tube when comfortable.    ----- Message from Jessy Joe sent at 9/6/2019 11:52 AM CDT -----  Contact: Pt.'s Daughter  [phone]   Needs Advice     Reason for call:    states she would like to speak with nurse in reference to questions about her mothers tube clamped today @ time of visit     Communication Preference:   Pt.'s Daughter  [phone]     Additional Information:     Thank You

## 2019-09-07 PROBLEM — I25.10 CORONARY ARTERY DISEASE: Status: ACTIVE | Noted: 2019-09-07

## 2019-09-07 PROBLEM — J86.9 EMPYEMA: Status: ACTIVE | Noted: 2019-09-07

## 2019-09-07 PROBLEM — E83.52 HYPERCALCEMIA: Status: ACTIVE | Noted: 2019-09-07

## 2019-09-07 PROBLEM — A41.9 SEPSIS: Status: ACTIVE | Noted: 2019-09-07

## 2019-09-07 LAB
25(OH)D3+25(OH)D2 SERPL-MCNC: 40 NG/ML (ref 30–96)
ALBUMIN SERPL BCP-MCNC: 2.7 G/DL (ref 3.5–5.2)
ALP SERPL-CCNC: 87 U/L (ref 55–135)
ALT SERPL W/O P-5'-P-CCNC: 16 U/L (ref 10–44)
ANION GAP SERPL CALC-SCNC: 8 MMOL/L (ref 8–16)
AST SERPL-CCNC: 31 U/L (ref 10–40)
BASOPHILS # BLD AUTO: 0.04 K/UL (ref 0–0.2)
BASOPHILS NFR BLD: 0.3 % (ref 0–1.9)
BILIRUB SERPL-MCNC: 0.8 MG/DL (ref 0.1–1)
BUN SERPL-MCNC: 22 MG/DL (ref 8–23)
CA-I BLDV-SCNC: 1.33 MMOL/L (ref 1.06–1.42)
CALCIUM SERPL-MCNC: 10.7 MG/DL (ref 8.7–10.5)
CHLORIDE SERPL-SCNC: 90 MMOL/L (ref 95–110)
CO2 SERPL-SCNC: 38 MMOL/L (ref 23–29)
CREAT SERPL-MCNC: 1 MG/DL (ref 0.5–1.4)
DIFFERENTIAL METHOD: ABNORMAL
EOSINOPHIL # BLD AUTO: 0.1 K/UL (ref 0–0.5)
EOSINOPHIL NFR BLD: 0.5 % (ref 0–8)
ERYTHROCYTE [DISTWIDTH] IN BLOOD BY AUTOMATED COUNT: 13.9 % (ref 11.5–14.5)
EST. GFR  (AFRICAN AMERICAN): 57.3 ML/MIN/1.73 M^2
EST. GFR  (NON AFRICAN AMERICAN): 49.7 ML/MIN/1.73 M^2
FOLATE SERPL-MCNC: 31.4 NG/ML (ref 4–24)
GLUCOSE SERPL-MCNC: 114 MG/DL (ref 70–110)
HCT VFR BLD AUTO: 32.1 % (ref 37–48.5)
HGB BLD-MCNC: 9.8 G/DL (ref 12–16)
IMM GRANULOCYTES # BLD AUTO: 0.09 K/UL (ref 0–0.04)
IMM GRANULOCYTES NFR BLD AUTO: 0.6 % (ref 0–0.5)
LACTATE SERPL-SCNC: 1.7 MMOL/L (ref 0.5–2.2)
LYMPHOCYTES # BLD AUTO: 1 K/UL (ref 1–4.8)
LYMPHOCYTES NFR BLD: 6.7 % (ref 18–48)
MAGNESIUM SERPL-MCNC: 2.2 MG/DL (ref 1.6–2.6)
MCH RBC QN AUTO: 30.6 PG (ref 27–31)
MCHC RBC AUTO-ENTMCNC: 30.5 G/DL (ref 32–36)
MCV RBC AUTO: 100 FL (ref 82–98)
MONOCYTES # BLD AUTO: 1.1 K/UL (ref 0.3–1)
MONOCYTES NFR BLD: 7.4 % (ref 4–15)
NEUTROPHILS # BLD AUTO: 12.8 K/UL (ref 1.8–7.7)
NEUTROPHILS NFR BLD: 84.5 % (ref 38–73)
NRBC BLD-RTO: 0 /100 WBC
PHOSPHATE SERPL-MCNC: 3.7 MG/DL (ref 2.7–4.5)
PLATELET # BLD AUTO: 166 K/UL (ref 150–350)
PMV BLD AUTO: 10.1 FL (ref 9.2–12.9)
POTASSIUM SERPL-SCNC: 4 MMOL/L (ref 3.5–5.1)
PROCALCITONIN SERPL IA-MCNC: 0.12 NG/ML
PROT SERPL-MCNC: 5.9 G/DL (ref 6–8.4)
RBC # BLD AUTO: 3.2 M/UL (ref 4–5.4)
SODIUM SERPL-SCNC: 136 MMOL/L (ref 136–145)
VANCOMYCIN SERPL-MCNC: 18.5 UG/ML
VIT B12 SERPL-MCNC: 577 PG/ML (ref 210–950)
WBC # BLD AUTO: 15.17 K/UL (ref 3.9–12.7)

## 2019-09-07 PROCEDURE — 82306 VITAMIN D 25 HYDROXY: CPT

## 2019-09-07 PROCEDURE — 25000003 PHARM REV CODE 250: Performed by: STUDENT IN AN ORGANIZED HEALTH CARE EDUCATION/TRAINING PROGRAM

## 2019-09-07 PROCEDURE — 82607 VITAMIN B-12: CPT

## 2019-09-07 PROCEDURE — 94761 N-INVAS EAR/PLS OXIMETRY MLT: CPT

## 2019-09-07 PROCEDURE — 25500020 PHARM REV CODE 255: Performed by: HOSPITALIST

## 2019-09-07 PROCEDURE — 11000001 HC ACUTE MED/SURG PRIVATE ROOM

## 2019-09-07 PROCEDURE — 99223 1ST HOSP IP/OBS HIGH 75: CPT | Mod: AI,GC,, | Performed by: HOSPITALIST

## 2019-09-07 PROCEDURE — 99223 PR INITIAL HOSPITAL CARE,LEVL III: ICD-10-PCS | Mod: AI,GC,, | Performed by: HOSPITALIST

## 2019-09-07 PROCEDURE — A4216 STERILE WATER/SALINE, 10 ML: HCPCS | Performed by: STUDENT IN AN ORGANIZED HEALTH CARE EDUCATION/TRAINING PROGRAM

## 2019-09-07 PROCEDURE — 63600175 PHARM REV CODE 636 W HCPCS: Performed by: STUDENT IN AN ORGANIZED HEALTH CARE EDUCATION/TRAINING PROGRAM

## 2019-09-07 PROCEDURE — 80202 ASSAY OF VANCOMYCIN: CPT

## 2019-09-07 PROCEDURE — 87205 SMEAR GRAM STAIN: CPT

## 2019-09-07 PROCEDURE — 87186 SC STD MICRODIL/AGAR DIL: CPT

## 2019-09-07 PROCEDURE — 94640 AIRWAY INHALATION TREATMENT: CPT

## 2019-09-07 PROCEDURE — 83735 ASSAY OF MAGNESIUM: CPT

## 2019-09-07 PROCEDURE — 84100 ASSAY OF PHOSPHORUS: CPT

## 2019-09-07 PROCEDURE — 25000242 PHARM REV CODE 250 ALT 637 W/ HCPCS: Performed by: STUDENT IN AN ORGANIZED HEALTH CARE EDUCATION/TRAINING PROGRAM

## 2019-09-07 PROCEDURE — 84145 PROCALCITONIN (PCT): CPT

## 2019-09-07 PROCEDURE — 87077 CULTURE AEROBIC IDENTIFY: CPT

## 2019-09-07 PROCEDURE — 80053 COMPREHEN METABOLIC PANEL: CPT

## 2019-09-07 PROCEDURE — 85025 COMPLETE CBC W/AUTO DIFF WBC: CPT

## 2019-09-07 PROCEDURE — 82330 ASSAY OF CALCIUM: CPT

## 2019-09-07 PROCEDURE — 27000221 HC OXYGEN, UP TO 24 HOURS

## 2019-09-07 PROCEDURE — 82746 ASSAY OF FOLIC ACID SERUM: CPT

## 2019-09-07 PROCEDURE — 87070 CULTURE OTHR SPECIMN AEROBIC: CPT

## 2019-09-07 PROCEDURE — 63600175 PHARM REV CODE 636 W HCPCS: Performed by: HOSPITALIST

## 2019-09-07 PROCEDURE — 36415 COLL VENOUS BLD VENIPUNCTURE: CPT

## 2019-09-07 RX ORDER — AMOXICILLIN 250 MG
1 CAPSULE ORAL DAILY PRN
Status: DISCONTINUED | OUTPATIENT
Start: 2019-09-07 | End: 2019-09-12 | Stop reason: HOSPADM

## 2019-09-07 RX ORDER — IBUPROFEN 200 MG
16 TABLET ORAL
Status: DISCONTINUED | OUTPATIENT
Start: 2019-09-07 | End: 2019-09-12 | Stop reason: HOSPADM

## 2019-09-07 RX ORDER — ENOXAPARIN SODIUM 100 MG/ML
30 INJECTION SUBCUTANEOUS EVERY 24 HOURS
Status: DISCONTINUED | OUTPATIENT
Start: 2019-09-07 | End: 2019-09-12 | Stop reason: HOSPADM

## 2019-09-07 RX ORDER — ALBUTEROL SULFATE 90 UG/1
1 AEROSOL, METERED RESPIRATORY (INHALATION) EVERY 6 HOURS PRN
Status: DISCONTINUED | OUTPATIENT
Start: 2019-09-07 | End: 2019-09-12 | Stop reason: HOSPADM

## 2019-09-07 RX ORDER — ENOXAPARIN SODIUM 100 MG/ML
40 INJECTION SUBCUTANEOUS EVERY 24 HOURS
Status: DISCONTINUED | OUTPATIENT
Start: 2019-09-07 | End: 2019-09-07

## 2019-09-07 RX ORDER — SODIUM CHLORIDE 0.9 % (FLUSH) 0.9 %
10 SYRINGE (ML) INJECTION
Status: DISCONTINUED | OUTPATIENT
Start: 2019-09-07 | End: 2019-09-12 | Stop reason: HOSPADM

## 2019-09-07 RX ORDER — GLUCAGON 1 MG
1 KIT INJECTION
Status: DISCONTINUED | OUTPATIENT
Start: 2019-09-07 | End: 2019-09-12 | Stop reason: HOSPADM

## 2019-09-07 RX ORDER — ONDANSETRON 2 MG/ML
4 INJECTION INTRAMUSCULAR; INTRAVENOUS EVERY 6 HOURS PRN
Status: DISCONTINUED | OUTPATIENT
Start: 2019-09-07 | End: 2019-09-12 | Stop reason: HOSPADM

## 2019-09-07 RX ORDER — ENOXAPARIN SODIUM 100 MG/ML
30 INJECTION SUBCUTANEOUS EVERY 24 HOURS
Status: DISCONTINUED | OUTPATIENT
Start: 2019-09-07 | End: 2019-09-07

## 2019-09-07 RX ORDER — FERROUS SULFATE 325(65) MG
325 TABLET, DELAYED RELEASE (ENTERIC COATED) ORAL DAILY
Status: DISCONTINUED | OUTPATIENT
Start: 2019-09-07 | End: 2019-09-12 | Stop reason: HOSPADM

## 2019-09-07 RX ORDER — IPRATROPIUM BROMIDE AND ALBUTEROL SULFATE 2.5; .5 MG/3ML; MG/3ML
3 SOLUTION RESPIRATORY (INHALATION) EVERY 4 HOURS PRN
Status: DISCONTINUED | OUTPATIENT
Start: 2019-09-07 | End: 2019-09-12 | Stop reason: HOSPADM

## 2019-09-07 RX ORDER — IBUPROFEN 200 MG
24 TABLET ORAL
Status: DISCONTINUED | OUTPATIENT
Start: 2019-09-07 | End: 2019-09-12 | Stop reason: HOSPADM

## 2019-09-07 RX ORDER — AZELASTINE 1 MG/ML
2 SPRAY, METERED NASAL 2 TIMES DAILY
Status: ON HOLD | COMMUNITY
End: 2022-03-30

## 2019-09-07 RX ORDER — ACETAMINOPHEN 325 MG/1
650 TABLET ORAL EVERY 6 HOURS PRN
Status: DISCONTINUED | OUTPATIENT
Start: 2019-09-07 | End: 2019-09-12 | Stop reason: HOSPADM

## 2019-09-07 RX ORDER — IPRATROPIUM BROMIDE AND ALBUTEROL SULFATE 2.5; .5 MG/3ML; MG/3ML
3 SOLUTION RESPIRATORY (INHALATION) EVERY 12 HOURS
Status: DISCONTINUED | OUTPATIENT
Start: 2019-09-07 | End: 2019-09-12 | Stop reason: HOSPADM

## 2019-09-07 RX ORDER — ROPINIROLE 0.25 MG/1
0.25 TABLET, FILM COATED ORAL NIGHTLY
Status: DISCONTINUED | OUTPATIENT
Start: 2019-09-07 | End: 2019-09-10

## 2019-09-07 RX ORDER — IPRATROPIUM BROMIDE 42 UG/1
2 SPRAY, METERED NASAL 2 TIMES DAILY
Status: ON HOLD | COMMUNITY
End: 2022-03-18 | Stop reason: HOSPADM

## 2019-09-07 RX ORDER — SODIUM CHLORIDE 9 MG/ML
INJECTION, SOLUTION INTRAVENOUS CONTINUOUS
Status: ACTIVE | OUTPATIENT
Start: 2019-09-07 | End: 2019-09-07

## 2019-09-07 RX ORDER — NAPROXEN SODIUM 220 MG/1
81 TABLET, FILM COATED ORAL DAILY
Status: DISCONTINUED | OUTPATIENT
Start: 2019-09-07 | End: 2019-09-12 | Stop reason: HOSPADM

## 2019-09-07 RX ORDER — TIOTROPIUM BROMIDE 18 UG/1
18 CAPSULE ORAL; RESPIRATORY (INHALATION) DAILY
Status: DISCONTINUED | OUTPATIENT
Start: 2019-09-07 | End: 2019-09-12 | Stop reason: HOSPADM

## 2019-09-07 RX ADMIN — IOHEXOL 75 ML: 350 INJECTION, SOLUTION INTRAVENOUS at 10:09

## 2019-09-07 RX ADMIN — ENOXAPARIN SODIUM 30 MG: 100 INJECTION SUBCUTANEOUS at 05:09

## 2019-09-07 RX ADMIN — ASPIRIN 81 MG CHEWABLE TABLET 81 MG: 81 TABLET CHEWABLE at 09:09

## 2019-09-07 RX ADMIN — ALBUTEROL SULFATE 1 PUFF: 90 AEROSOL, METERED RESPIRATORY (INHALATION) at 03:09

## 2019-09-07 RX ADMIN — PIPERACILLIN AND TAZOBACTAM 4.5 G: 4; .5 INJECTION, POWDER, LYOPHILIZED, FOR SOLUTION INTRAVENOUS; PARENTERAL at 12:09

## 2019-09-07 RX ADMIN — SODIUM CHLORIDE: 0.9 INJECTION, SOLUTION INTRAVENOUS at 02:09

## 2019-09-07 RX ADMIN — Medication 10 ML: at 05:09

## 2019-09-07 RX ADMIN — PIPERACILLIN AND TAZOBACTAM 4.5 G: 4; .5 INJECTION, POWDER, LYOPHILIZED, FOR SOLUTION INTRAVENOUS; PARENTERAL at 06:09

## 2019-09-07 RX ADMIN — ROPINIROLE HYDROCHLORIDE 0.25 MG: 0.25 TABLET, FILM COATED ORAL at 09:09

## 2019-09-07 RX ADMIN — FERROUS SULFATE TAB EC 325 MG (65 MG FE EQUIVALENT) 325 MG: 325 (65 FE) TABLET DELAYED RESPONSE at 09:09

## 2019-09-07 RX ADMIN — PIPERACILLIN AND TAZOBACTAM 4.5 G: 4; .5 INJECTION, POWDER, LYOPHILIZED, FOR SOLUTION INTRAVENOUS; PARENTERAL at 09:09

## 2019-09-07 RX ADMIN — IPRATROPIUM BROMIDE AND ALBUTEROL SULFATE 3 ML: .5; 3 SOLUTION RESPIRATORY (INHALATION) at 07:09

## 2019-09-07 RX ADMIN — THERA TABS 1 TABLET: TAB at 09:09

## 2019-09-07 RX ADMIN — ALBUTEROL SULFATE 1 PUFF: 90 AEROSOL, METERED RESPIRATORY (INHALATION) at 09:09

## 2019-09-07 RX ADMIN — TIOTROPIUM BROMIDE 18 MCG: 18 CAPSULE ORAL; RESPIRATORY (INHALATION) at 11:09

## 2019-09-07 RX ADMIN — ROPINIROLE HYDROCHLORIDE 0.25 MG: 0.25 TABLET, FILM COATED ORAL at 12:09

## 2019-09-07 NOTE — H&P
[facility] Hospital Medicine  History & Physical    Patient Name: Venus Mayer  MRN: [id number]  Admission Date: 9/6/2019  Attending Physician: Maria Elena Bolden MD   Primary Care Provider: Jona Che MD    [facility] Medicine Team: [facility] 4 Asiya Brewster MD     Patient information was obtained from patient, relative(s), past medical records and ER records.     Subjective:     Principal Problem:Fever    Chief Complaint:   Chief Complaint   Patient presents with    Weakness     Pt presents with generalized weakness and fever that began today.         HPI: Ms. Mayer is a 89-year-old lady with PMH COPD on 2.5 L O2 at home, recurrent pneumothoraces with failed conservative management and VATS pleurodesis with a chest tube in place connected to a pneumostat, HTN, CAD, HLD who presents with weakness. Per her daughter, Ms. Mayer was complaining of feeling cold, which is normal for her, however today she was visibly shaking. She went to a doctor's appointment, after which she was visibly weak in her knees, struggling to walk with her walker and had to sit down. She was lethargic but alert and oriented and felt hot to the touch, however without objective fevers at home.     She went to her doctor's appointment earlier that day on 9/6 with Dr. Mckeon for a CT surgery follow-up after being recently discharged from the hospital (7/28-8/09) for spontaneous right pneumothorax with persistent air leak that failed bedside pleurodesis, endobronchial valve replacement, and VATS pleurodesis. There was a family discussion for hospital or [facility] however just prior to discharge the orders were revoked and she went home with . Persistent air leak 2 weeks ago. She had a CXR at her appointment for assessment of air leak. Per chart review, Dr. Mckeon noted no air leak, clamped her chest tube, and scheduled her to follow-up on 9/11 for a repeat CXR. After the appointment, patient's daughter unclamped her  chest tube because she was instructed that she could do so if patient became short of breath. Of note, patient has not been short of breath at any point during the day or in the ED, although she endorses a chronic nonproductive cough that has not changed acutely. Fever in ED to 103. She denies chest pain and dysuria. She has a right-sided chest tube in place connected to a pneumostat that is filled with yellow fluid which her daughter says is normal/unchanged.     No new subjective & objective note has been filed under this hospital service since the last note was generated.    Assessment/Plan:     * Fever  Fever to 103.5, BP 98/54, leukocytosis to 16.29. BP improved to 130s after 500 cc bolus LR in ED and initiation of  cc/hr x 5 hours. Patient is AOx3 without meningeal signs. UA negative. CXR with chronic changes. Lactic acid 1. Received vancomycin and zosyn in the ED.   - blood cultures pending  - culture of fluid from pneumostat pending   - continue vancomycin and zosyn for now  - procalcitonin pending  - would consider repeating CXR on 9/8      Spontaneous pneumothorax  Patient with history of recurrent spontaneous pneumothoraces that have failed multiple treatment attempts. At CT surgery follow-up appointment on 9/6, Dr. Mckeon noted no air leak, clamped chest tube, and scheduled patient for CXR and follow-up on 9/11. Patient's daughter was instructed that she could unclamp chest tube if patient became short of breath. Daughter unclamped chest tube when patient became weak on 9/6 following appointment, although patient was not short of breath.   - would speak with CT surgery (not necessarily a formal consult) to discuss chest tube management       COLD (chronic obstructive lung disease)  - continue home albuterol 1 puff q6 PRN  - continue home tiotropium 18 micrograms daily      Heart failure, diastolic  TTE on 7/7/19 showed normal left ventricular systolic function, EF 65%, pulmonary hypertension  present with estimated PA systolic pressure is 52 mm Hg.   - hold home furosemide 40 for now in setting of suspected sepsis   - gentle IV fluid hydration for suspected sepsis with careful monitoring of volume status given CHF history    Hypercalcemia  Corrected calcium 10.9 upon admission. PTH 20.  - ionized calcium pending      Coronary artery disease  - continue home aspirin 81      Hypertension  Patient's daughter reports she checks her BP every night and only gives patient amlodipine 5 mg PO if her systolic BP is > 155. She rarely takes amlodipine. Normotensive in ED.  - hold home amlodipine 5 mg PO at this time      Iron deficiency  - continue home ferrous sulfate 325      Restless leg syndrome  - continue home ropinirole 0.25      Debility  - PT/OT to evaluate and treat      VTE Risk Mitigation (From admission, onward)        Ordered     enoxaparin injection 30 mg  Daily      09/07/19 0238     IP VTE HIGH RISK PATIENT  Once      09/07/19 0459     Place sequential compression device  Until discontinued      09/07/19 0450             Asiya Brewster MD  Department of Hospital Medicine   [facility]

## 2019-09-07 NOTE — ED NOTES
Hourly rounding complete. Patient resting in stretcher and is in NAD at this time. Pt is awake alert and oriented x4, VSS. Pt denies pain at this time. Pt updated on POC. Bed low and locked, SR up x2, call bell in patient reach. Pt remains on continuous cardiac monitor, continuous pulse ox, and auto BP cuff. Pt voices no needs at this time.

## 2019-09-07 NOTE — PLAN OF CARE
Pt VSS. Pt denies any pain or needs at this time. Family at bedside. Bed in lowest position, wheels locked, call light within reach. Continuing to monitor.

## 2019-09-07 NOTE — ASSESSMENT & PLAN NOTE
Patient with history of recurrent spontaneous pneumothoraces that have failed multiple treatment attempts. At CT surgery follow-up appointment on 9/6, Dr. Mckeon noted no air leak, clamped chest tube, and scheduled patient for CXR and follow-up on 9/11. Patient's daughter was instructed that she could unclamp chest tube if patient became short of breath. Daughter unclamped chest tube when patient became weak on 9/6 following appointment, although patient was not short of breath.   - would speak with CT surgery (not necessarily a formal consult) to discuss chest tube management

## 2019-09-07 NOTE — ED TRIAGE NOTES
Pt is being evaluated for generalized weakness. Pt has a chest tube for a recent pneumothorax that was xrayed earlier today that was fine and it was clamped by the doctor.After,  pt started to get chills with shaking and lethargy and fever of 103.    LOC: The patient is awake, alert, and oriented to place, time, situation. Affect is appropriate.  Speech is appropriate and clear.     APPEARANCE: Patient resting comfortably in no acute distress.  Patient is clean and well groomed.    SKIN: The skin is warm and dry; color consistent with ethnicity.  Patient has normal skin turgor and moist mucus membranes.  Skin intact; no breakdown or bruising noted. Has right chest tube    MUSCULOSKELETAL: Patient moving upper and lower extremities without difficulty.  Reports weakness.     RESPIRATORY: Airway is open and patent. Respirations spontaneous, even, easy, and non-labored.  Patient has a normal effort and rate.  No accessory muscle use noted. Denies cough.     CARDIAC:  Normal rhythm and rate noted.  No peripheral edema noted. No complaints of chest pain.      ABDOMEN: Soft and non tender to palpation.  No distention noted.     NEUROLOGIC: Eyes open spontaneously.  Behavior appropriate to situation.  Follows commands; facial expression symmetrical.  Purposeful motor response noted; normal sensation in all extremities.

## 2019-09-07 NOTE — PROGRESS NOTES
Pharmacokinetic Initial Assessment: IV Vancomycin    Assessment/Plan:    Initiate intravenous vancomycin with loading dose of 1250 mg once (already received in ED) with subsequent doses when random concentrations are less than 20 mcg/mL  Desired empiric serum trough concentration is 15 to 20 mcg/mL  Draw vancomycin random level on 09/07/2019 at 0900.  Pharmacy will continue to follow and monitor vancomycin.      Please contact pharmacy at extension [phone] with any questions regarding this assessment.     Thank you for the consult,   Andree Hennessy       Patient brief summary:  Venus Mayer is a 89 y.o. female initiated on antimicrobial therapy with IV Vancomycin for treatment of suspected bacteremia    Drug Allergies:   Review of patient's allergies indicates:   Allergen Reactions    Ancef in dextrose (iso-osm) Rash    Cefazolin Rash    Cefuroxime Rash    Sulfamethoxazole-trimethoprim Rash     Other reaction(s): Rash       Actual Body Weight:   40.4 kg    Renal Function:   Estimated Creatinine Clearance: 23.8 mL/min (based on SCr of 1 mg/dL).,     Dialysis Method (if applicable):  N/A    CBC (last 72 hours):  Recent Labs   Lab Result Units 09/06/19 2006 09/07/19  0246   WBC K/uL 16.29* 15.17*   Hemoglobin g/dL 9.9* 9.8*   Hematocrit % 32.1* 32.1*   Platelets K/uL 169 166   Gran% % 83.6* 84.5*   Lymph% % 5.3* 6.7*   Mono% % 9.9 7.4   Eosinophil% % 0.2 0.5   Basophil% % 0.3 0.3   Differential Method  Automated Automated       Metabolic Panel (last 72 hours):  Recent Labs   Lab Result Units 09/06/19 2006 09/06/19 2007 09/07/19  0246   Sodium mmol/L 134*  --  136   Potassium mmol/L 4.0  --  4.0   Chloride mmol/L 89*  --  90*   CO2 mmol/L 39*  --  38*   Glucose mg/dL 118*  --  114*   Glucose, UA   --  Negative  --    BUN, Bld mg/dL 22  --  22   Creatinine mg/dL 0.9  --  1.0   Albumin g/dL 2.9*  --  2.7*   Total Bilirubin mg/dL 0.6  --  0.8   Alkaline Phosphatase U/L 85  --  87   AST U/L 26  --  31   ALT U/L  12  --  16   Magnesium mg/dL  --   --  2.2   Phosphorus mg/dL  --   --  3.7       Drug levels (last 3 results):  No results for input(s): VANCOMYCINRA, VANCOMYCINPE, VANCOMYCINTR in the last 72 hours.    Microbiologic Results:  Microbiology Results (last 7 days)     Procedure Component Value Units Date/Time    Culture, Fluid  (Aerobic) [[id number]] Collected:  09/07/19 0246    Order Status:  Sent Specimen:  Body Fluid from Pleural Fluid Updated:  09/07/19 0300    Influenza A & B by Molecular [[id number]] Collected:  09/06/19 2041    Order Status:  Completed Specimen:  Nasopharyngeal Swab Updated:  09/06/19 2232     Influenza A, Molecular Negative     Influenza B, Molecular Negative     Flu A & B Source NP    Blood culture x two cultures. Draw prior to antibiotics. [[id number]] Collected:  09/06/19 2007    Order Status:  Sent Specimen:  Blood from Peripheral, Antecubital, Left Updated:  09/06/19 2021    Blood culture x two cultures. Draw prior to antibiotics. [[id number]] Collected:  09/06/19 2006    Order Status:  Sent Specimen:  Blood from Peripheral, Antecubital, Right Updated:  09/06/19 2021

## 2019-09-07 NOTE — ASSESSMENT & PLAN NOTE
TTE on 7/7/19 showed normal left ventricular systolic function, EF 65%, pulmonary hypertension present with estimated PA systolic pressure is 52 mm Hg.   - hold home furosemide 40 for now in setting of suspected sepsis   - gentle IV fluid hydration for suspected sepsis with careful monitoring of volume status given CHF history

## 2019-09-07 NOTE — PROGRESS NOTES
Pt requests dressing change at chest tube site. No orders seen for dressing. IM4 MD notified. Spoke to get dressing change orders. He said team will look at site and put in orders.

## 2019-09-07 NOTE — ASSESSMENT & PLAN NOTE
Patient's daughter reports she checks her BP every night and only gives patient amlodipine 5 mg PO if her systolic BP is > 155. She rarely takes amlodipine. Normotensive in ED.  - hold home amlodipine 5 mg PO at this time

## 2019-09-07 NOTE — SUBJECTIVE & OBJECTIVE
Past Medical History:   Diagnosis Date    Acute on chronic congestive heart failure 7/6/2019    Cardiomyopathy     Carotid artery occlusion     CHF (congestive heart failure)     COPD (chronic obstructive pulmonary disease)     Coronary artery disease     Hyperlipidemia     Hypertension        Past Surgical History:   Procedure Laterality Date    BRONCHOSCOPY, FIBEROPTIC Flexible N/A 7/31/2019    [facility]    CARDIAC CATHETERIZATION      cardic cath      CAROTID ENDARTERECTOMY      HIP SURGERY      PLEURODESIS N/A 7/31/2019    [facility]    PLEURODESIS, USING TALC Right 8/5/2019    Performed by Klever [facility] at [facility] OR [address] [place] [address]    VATS, WITH PLEURAL TENT Right 8/5/2019    Performed by Klever Mckeon MD at [facility] OR [address] [place] [address]       Review of patient's allergies indicates:   Allergen Reactions    Ancef in dextrose (iso-osm) Rash    Cefazolin Rash    Cefuroxime Rash    Sulfamethoxazole-trimethoprim Rash     Other reaction(s): Rash       No current facility-administered medications on file prior to encounter.      Current Outpatient Medications on File Prior to Encounter   Medication Sig    acetaminophen (TYLENOL) 500 MG tablet Take 1,000 mg by mouth daily as needed for Pain.    albuterol (PROAIR HFA) 90 mcg/actuation inhaler Inhale 1 puff into the lungs every 6 (six) hours as needed for Wheezing or Shortness of Breath. Rescue    amLODIPine (NORVASC) 2.5 MG tablet Take 1 tablet (2.5 mg total) by mouth once daily.    aspirin 81 mg Tab Take 1 tablet by mouth once daily.     ferrous sulfate 325 (65 FE) MG EC tablet Take 1 tablet (325 mg total) by mouth once daily.    furosemide (LASIX) 40 MG tablet Take 1 tablet (40 mg total) by mouth once daily.    multivitamin (THERAGRAN) per tablet Take 1 tablet by mouth once daily.    potassium chloride (MICRO-K) 10 MEQ CpSR Take 10 mEq by mouth once daily.     rOPINIRole (REQUIP) 0.25 MG tablet Take 1 tablet (0.25 mg total) by mouth every evening.    tiotropium (SPIRIVA) 18 mcg inhalation capsule Inhale 1 capsule (18 mcg total) into the lungs once daily.    albuterol (PROVENTIL) 2.5 mg /3 mL (0.083 %) nebulizer solution Take 2.5 mg by nebulization every 6 (six) hours as needed for Wheezing or Shortness of Breath. Rescue     Family History     Problem Relation (Age of Onset)    Hypertension Mother        Tobacco Use    Smoking status: Former Smoker     Packs/day: 2.00     Years: 20.00     Pack years: 40.00     Types: Cigarettes     Last attempt to quit: 1980     Years since quittin.6    Smokeless tobacco: Never Used   Substance and Sexual Activity    Alcohol use: Yes     Alcohol/week: 1.2 oz     Types: 2 Glasses of wine per week     Comment: social    Drug use: No    Sexual activity: Not Currently     Review of Systems   Constitutional: Positive for activity change, chills and fever.   HENT: Negative for sore throat and trouble swallowing.    Eyes: Negative for pain and redness.   Respiratory: Positive for cough. Negative for shortness of breath.    Cardiovascular: Positive for leg swelling. Negative for chest pain.   Gastrointestinal: Negative for abdominal distention, abdominal pain, blood in stool, constipation, diarrhea, nausea and vomiting.   Endocrine: Positive for cold intolerance. Negative for heat intolerance.   Genitourinary: Negative for decreased urine volume, difficulty urinating, dysuria and frequency.   Musculoskeletal: Negative for arthralgias and back pain.   Skin: Negative for color change and rash.   Neurological: Positive for weakness. Negative for speech difficulty.   Psychiatric/Behavioral: Negative for behavioral problems and confusion.     Objective:     Vital Signs (Most Recent):  Temp: 98.9 °F (37.2 °C) (19)  Pulse: 83 (19)  Resp: 18 (19 180)  BP: (!) 123/58 (19)  SpO2: 100 % (19)  Vital Signs (24h Range):  Temp:  [98.9 °F (37.2 °C)-103.5 °F (39.7 °C)] 98.9 °F (37.2 °C)  Pulse:  [83-99] 83  Resp:  [18] 18  SpO2:  [85 %-100 %] 100 %  BP: ()/(52-86) 123/58     Weight: 40.4 kg (89 lb 1.1 oz)  Body mass index is 17.39 kg/m².    Physical Exam   Constitutional: She is oriented to person, place, and time.   Thin   HENT:   Head: Normocephalic and atraumatic.   Eyes: EOM are normal. No scleral icterus.   Neck: Normal range of motion.   Cardiovascular: Normal rate and regular rhythm.   AICD   Pulmonary/Chest: No respiratory distress. She has no wheezes.   Decreased effort, no increased work of breathing, right-sided chest tube in place connected to pneumostat that is filled with yellow fluid   Abdominal: Soft. Bowel sounds are normal. She exhibits no distension. There is no tenderness.   Neurological: She is alert and oriented to person, place, and time.   Skin: Skin is warm and dry.         CRANIAL NERVES     CN III, IV, VI   Extraocular motions are normal.        Significant Labs: All pertinent labs within the past 24 hours have been reviewed.    Significant Imaging: I have reviewed and interpreted all pertinent imaging results/findings within the past 24 hours.

## 2019-09-07 NOTE — ED PROVIDER NOTES
Encounter Date: 9/6/2019       History     Chief Complaint   Patient presents with    Weakness     Pt presents with generalized weakness and fever that began today.      HPI     The patient is a 89F w/ PMH HTN, COPD on 2L home O2 with right chest tube in place for recurrent pneumo and persistent air leak (recently resolved, tube clamped today in office by Dr. Mckeon) presenting with fever. She is brought in by her daughters who report excessive fatigue, chills, and fever with T-max 103 x 1 day. State that she endorses chills prior to the office visit, but spiked temp/got worse after her doctors appointment this morning. Her daughters state that she has been lethargic today but not confused. The patient denies pain, chest pain, shortness of breath. No abdominal pain, dysuria. No increase in O2 requirements.  ROS otherwise negative.    Review of patient's allergies indicates:   Allergen Reactions    Ancef in dextrose (iso-osm) Rash    Cefazolin Rash    Cefuroxime Rash    Sulfamethoxazole-trimethoprim Rash     Other reaction(s): Rash     Past Medical History:   Diagnosis Date    Acute on chronic congestive heart failure 7/6/2019    Cardiomyopathy     Carotid artery occlusion     CHF (congestive heart failure)     COPD (chronic obstructive pulmonary disease)     Coronary artery disease     Hyperlipidemia     Hypertension      Past Surgical History:   Procedure Laterality Date    BRONCHOSCOPY, FIBEROPTIC Flexible N/A 7/31/2019    [facility]    CARDIAC CATHETERIZATION      cardic cath      CAROTID ENDARTERECTOMY      HIP SURGERY      PLEURODESIS N/A 7/31/2019    [facility]    PLEURODESIS, USING TALC Right 8/5/2019    [facility]    VATS, WITH PLEURAL TENT Right 8/5/2019    Per[facility] by [facility] at [facility]     Family History   Problem Relation Age  of Onset    Hypertension Mother      Social History     Tobacco Use    Smoking status: Former Smoker     Packs/day: 2.00     Years: 20.00     Pack years: 40.00     Types: Cigarettes     Last attempt to quit: 1980     Years since quittin.6    Smokeless tobacco: Never Used   Substance Use Topics    Alcohol use: Yes     Alcohol/week: 1.2 oz     Types: 2 Glasses of wine per week     Comment: social    Drug use: No     Review of Systems   Constitutional: Positive for chills, fatigue and fever.   HENT: Negative for drooling and facial swelling.    Eyes: Negative for discharge and redness.   Respiratory: Negative for shortness of breath.    Cardiovascular: Negative for chest pain.   Gastrointestinal: Negative for vomiting.   Musculoskeletal: Negative for joint swelling.   Skin: Negative for rash.   Neurological: Negative for facial asymmetry and speech difficulty.   Psychiatric/Behavioral: Negative for behavioral problems.       Physical Exam     Initial Vitals [19 1802]   BP Pulse Resp Temp SpO2   112/86 84 18 (!) 103.5 °F (39.7 °C) 95 %      MAP       --         Physical Exam    Constitutional: She appears well-nourished. No distress.   Frail appearing elderly woman   HENT:   Head: Normocephalic and atraumatic.   Eyes: EOM are normal. Right eye exhibits no chemosis, no discharge and no exudate. Left eye exhibits no chemosis, no discharge and no exudate.   Neck: Normal range of motion. No stridor present.   Cardiovascular: Normal rate, regular rhythm and normal heart sounds.   No murmur heard.  Pulmonary/Chest: Breath sounds normal. No respiratory distress. She has no wheezes. She has no rales.   Abdominal: Soft. She exhibits no distension. There is no tenderness.   Musculoskeletal: She exhibits no edema.   Neurological: She is alert.   Skin: Skin is warm and dry.   Psychiatric: She has a normal mood and affect. Her speech is normal and behavior is normal.         ED Course   Critical Care  Date/Time:  9/6/2019 10:30 PM  Performed by: Jennifer Kingston MD  Authorized by: Jennifer Kingston MD   Direct patient critical care time: 10 minutes  Additional history critical care time: 10 minutes  Ordering / reviewing critical care time: 5 minutes  Documentation critical care time: 5 minutes  Total critical care time (exclusive of procedural time) : 30 minutes  Critical care was necessary to treat or prevent imminent or life-threatening deterioration of the following conditions: sepsis.  Critical care was time spent personally by me on the following activities: development of treatment plan with patient or surrogate, evaluation of patient's response to treatment, examination of patient, ordering and review of laboratory studies, ordering and review of radiographic studies, pulse oximetry and re-evaluation of patient's condition.        Labs Reviewed   CBC W/ AUTO DIFFERENTIAL - Abnormal; Notable for the following components:       Result Value    WBC 16.29 (*)     RBC 3.19 (*)     Hemoglobin 9.9 (*)     Hematocrit 32.1 (*)     Mean Corpuscular Volume 101 (*)     Mean Corpuscular Hemoglobin Conc 30.8 (*)     Immature Granulocytes 0.7 (*)     Gran # (ANC) 13.6 (*)     Immature Grans (Abs) 0.12 (*)     Lymph # 0.9 (*)     Mono # 1.6 (*)     Gran% 83.6 (*)     Lymph% 5.3 (*)     All other components within normal limits   COMPREHENSIVE METABOLIC PANEL - Abnormal; Notable for the following components:    Sodium 134 (*)     Chloride 89 (*)     CO2 39 (*)     Glucose 118 (*)     Calcium 11.0 (*)     Albumin 2.9 (*)     Anion Gap 6 (*)     eGFR if non  56.5 (*)     All other components within normal limits   URINALYSIS, REFLEX TO URINE CULTURE - Abnormal; Notable for the following components:    Appearance, UA Hazy (*)     All other components within normal limits    Narrative:     Preferred Collection Type->Urine, Catheterized   COMPREHENSIVE METABOLIC PANEL - Abnormal; Notable for the following components:     Chloride 90 (*)     CO2 38 (*)     Glucose 114 (*)     Calcium 10.7 (*)     Total Protein 5.9 (*)     Albumin 2.7 (*)     eGFR if  57.3 (*)     eGFR if non  49.7 (*)     All other components within normal limits   CBC W/ AUTO DIFFERENTIAL - Abnormal; Notable for the following components:    WBC 15.17 (*)     RBC 3.20 (*)     Hemoglobin 9.8 (*)     Hematocrit 32.1 (*)     Mean Corpuscular Volume 100 (*)     Mean Corpuscular Hemoglobin Conc 30.5 (*)     Immature Granulocytes 0.6 (*)     Gran # (ANC) 12.8 (*)     Immature Grans (Abs) 0.09 (*)     Mono # 1.1 (*)     Gran% 84.5 (*)     Lymph% 6.7 (*)     All other components within normal limits   INFLUENZA A & B BY MOLECULAR   CULTURE, FLUID  (AEROBIC) WITH GRAM STAIN    Narrative:     Please collect fluid sample from pneumostat and send to lab  for culture.   LACTIC ACID, PLASMA   PTH, INTACT   URINALYSIS MICROSCOPIC    Narrative:     Preferred Collection Type->Urine, Catheterized   LACTIC ACID, PLASMA   PROCALCITONIN   MAGNESIUM   PHOSPHORUS   VITAMIN D   CALCIUM, IONIZED   CALCIUM, IONIZED    Narrative:     ADD ON IONIZED CALCIUM PER DR JINA OWENS/ORDER# [id number] @ 4:14AM   9/7/19   VITAMIN D    Narrative:     ADD ON IONIZED CALCIUM PER DR JINA OWENS/ORDER# [id number] @ 4:14AM   9/7/19  ADD ON VID25 [id number] PER DR. JINA OWENS  09/07/2019  04:54             X-Rays:   Independently Interpreted Readings:   Chest X-Ray: Patchy oacities RUL in vicinity of chest tube, no ptx, stabel when compared to cxr earlier today.     Medical Decision Making:   History:   Old Medical Records: I decided to obtain old medical records.  Clinical Tests:   Lab Tests: Ordered and Reviewed  Radiological Study: Ordered and Reviewed  Medical Tests: Ordered and Reviewed  Sepsis Perfusion Assessment: I attest, a sepsis perfusion exam was performed within 6 hours of Septic Shock presentation, following fluid resuscitation.  Other:   I have discussed this  case with another health care provider.       <> Summary of the Discussion: Hospital medicine       APC / Resident Notes:   PGY-3 MDM:     Assessment: 89F w/ COPD on home O2, right chest tube clamped in office today for pneumo presenting with fever, fatigue x 1 day. Vital signs stable, patient non-tachycardic and non-hypoxic on home O2 but febrile to 103.5. Physical exam as above, patient in NAD, alert/oriented x 3.    Ddx: UTI, pneumonia, flu/viral illness. Less likely meningitis, intraabdominal infection given benign physical exam.    Plan: Given recent hospitalization and chest tube will treat with broad spectrum antibiotics. Patient not technically septic based on vitals but given high fever + age will initiate septic work-up. Will hold on fluids as patient is clinically euvolemic and normotensive.    Workup ongoing, will continue to re-assess patient and update management as needed.     Qi Pathak  [facility] Emergency Medicine, PGY3   9/6/2019 7:37 PM                   Attending Attestation:   Physician Attestation Statement for Resident:  As the supervising MD   Physician Attestation Statement: I have personally seen and examined this patient.   I agree with the above history. -:   As the supervising MD I agree with the above PE.    As the supervising MD I agree with the above treatment, course, plan, and disposition.   -: 11:45 PM  Pt initially stable but BP drop and concern for septic shock- fluid bolus ordered as well as repeat lactate    BP improved after fluid bolus. Lactate slightly increased at 1.7. Admit to \[facility]\"" medicine for IV antibiotics.  I have reviewed and agree with the residents interpretation of the following: lab data and x-rays.                   Clinical Impression:       ICD-10-CM ICD-9-CM   1. Sepsis, due to unspecified organism A41.9 038.9     995.91   2. Fever R50.9 780.60   3. Chest pain R07.9 786.50         Disposition:   Disposition: Admitted  Condition: Stable                         Jennifer Kingston MD  09/08/19 0665

## 2019-09-07 NOTE — HPI
Ms. Mayer is a 89-year-old lady with PMH COPD on 2.5 L O2 at home, recurrent pneumothoraces with failed conservative management and VATS pleurodesis with a chest tube in place connected to a pneumostat, HTN, CAD, HLD who presents with weakness. Per her daughter, Ms. Mayer was complaining of feeling cold, which is normal for her, however today she was visibly shaking. She went to a doctor's appointment, after which she was visibly weak in her knees, struggling to walk with her walker and had to sit down. She was lethargic but alert and oriented and felt hot to the touch, however without objective fevers at home.     She went to her doctor's appointment earlier that day on 9/6 with Dr. Mckeon for a CT surgery follow-up after being recently discharged from the hospital (7/28-8/09) for spontaneous right pneumothorax with persistent air leak that failed bedside pleurodesis, endobronchial valve replacement, and VATS pleurodesis. There was a family discussion for hospital or AIM however just prior to discharge the orders were revoked and she went home with . Persistent air leak 2 weeks ago. She had a CXR at her appointment for assessment of air leak. Per chart review, Dr. Mckeon noted no air leak, clamped her chest tube, and scheduled her to follow-up on 9/11 for a repeat CXR. After the appointment, patient's daughter unclamped her chest tube because she was instructed that she could do so if patient became short of breath. Of note, patient has not been short of breath at any point during the day or in the ED, although she endorses a chronic nonproductive cough that has not changed acutely. Fever in ED to 103. She denies chest pain and dysuria. She has a right-sided chest tube in place connected to a pneumostat that is filled with yellow fluid which her daughter says is normal/unchanged.

## 2019-09-07 NOTE — SUBJECTIVE & OBJECTIVE
Past Medical History:   Diagnosis Date    Acute on chronic congestive heart failure 7/6/2019    Cardiomyopathy     Carotid artery occlusion     CHF (congestive heart failure)     COPD (chronic obstructive pulmonary disease)     Coronary artery disease     Hyperlipidemia     Hypertension        Past Surgical History:   Procedure Laterality Date    BRONCHOSCOPY, FIBEROPTIC Flexible N/A 7/31/2019    [facility]    CARDIAC CATHETERIZATION      cardic cath      CAROTID ENDARTERECTOMY      HIP SURGERY      PLEURODESIS N/A 7/31/2019    [facility]    PLEURODESIS, USING TALC Right 8/5/2019    Performed by [facility]keon [facility] at [facility] OR [address] [place] [address]    VATS, WITH PLEURAL TENT Right 8/5/2019    Performed by Klever Mckeon MD at [facility] OR [address] [place] [address]       Review of patient's allergies indicates:   Allergen Reactions    Ancef in dextrose (iso-osm) Rash    Cefazolin Rash    Cefuroxime Rash    Sulfamethoxazole-trimethoprim Rash     Other reaction(s): Rash       No current facility-administered medications on file prior to encounter.      Current Outpatient Medications on File Prior to Encounter   Medication Sig    acetaminophen (TYLENOL) 500 MG tablet Take 1,000 mg by mouth daily as needed for Pain.    albuterol (PROAIR HFA) 90 mcg/actuation inhaler Inhale 1 puff into the lungs every 6 (six) hours as needed for Wheezing or Shortness of Breath. Rescue    amLODIPine (NORVASC) 2.5 MG tablet Take 1 tablet (2.5 mg total) by mouth once daily.    aspirin 81 mg Tab Take 1 tablet by mouth once daily.     ferrous sulfate 325 (65 FE) MG EC tablet Take 1 tablet (325 mg total) by mouth once daily.    furosemide (LASIX) 40 MG tablet Take 1 tablet (40 mg total) by mouth once daily.    multivitamin (THERAGRAN) per tablet Take 1 tablet by mouth once daily.    potassium chloride (MICRO-K) 10 MEQ CpSR Take 10 mEq by mouth once daily.     rOPINIRole (REQUIP) 0.25 MG tablet Take 1 tablet (0.25 mg total) by mouth every evening.    tiotropium (SPIRIVA) 18 mcg inhalation capsule Inhale 1 capsule (18 mcg total) into the lungs once daily.    albuterol (PROVENTIL) 2.5 mg /3 mL (0.083 %) nebulizer solution Take 2.5 mg by nebulization every 6 (six) hours as needed for Wheezing or Shortness of Breath. Rescue     Family History     Problem Relation (Age of Onset)    Hypertension Mother        Tobacco Use    Smoking status: Former Smoker     Packs/day: 2.00     Years: 20.00     Pack years: 40.00     Types: Cigarettes     Last attempt to quit: 1980     Years since quittin.6    Smokeless tobacco: Never Used   Substance and Sexual Activity    Alcohol use: Yes     Alcohol/week: 1.2 oz     Types: 2 Glasses of wine per week     Comment: social    Drug use: No    Sexual activity: Not Currently     Review of Systems   Constitutional: Positive for activity change, chills and fever.   HENT: Negative for sore throat and trouble swallowing.    Eyes: Negative for pain and redness.   Respiratory: Positive for cough. Negative for shortness of breath.    Cardiovascular: Positive for leg swelling. Negative for chest pain.   Gastrointestinal: Negative for abdominal distention, abdominal pain, blood in stool, constipation, diarrhea, nausea and vomiting.   Endocrine: Positive for cold intolerance. Negative for heat intolerance.   Genitourinary: Negative for decreased urine volume, difficulty urinating, dysuria and frequency.   Musculoskeletal: Negative for arthralgias and back pain.   Skin: Negative for color change and rash.   Neurological: Positive for weakness. Negative for speech difficulty.   Psychiatric/Behavioral: Negative for behavioral problems and confusion.     Objective:     Vital Signs (Most Recent):  Temp: 98.5 °F (36.9 °C) (19)  Pulse: 96 (19)  Resp: 16 (19)  BP: (!) 142/63 (19)  SpO2: 96 % (19)  Vital Signs (24h Range):  Temp:  [98.1 °F (36.7 °C)-103.5 °F (39.7 °C)] 98.5 °F (36.9 °C)  Pulse:  [83-97] 96  Resp:  [15-18] 16  SpO2:  [94 %-100 %] 96 %  BP: ()/(52-86) 142/63     Weight: 40.4 kg (89 lb 1.1 oz)  Body mass index is 17.39 kg/m².    Physical Exam   Constitutional: She is oriented to person, place, and time.   Thin   HENT:   Head: Normocephalic and atraumatic.   Eyes: EOM are normal. No scleral icterus.   Neck: Normal range of motion.   Cardiovascular: Normal rate and regular rhythm.   AICD   Pulmonary/Chest: No respiratory distress. She has no wheezes.   Decreased effort, no increased work of breathing, right-sided chest tube in place connected to pneumostat that is filled with yellow fluid   Abdominal: Soft. Bowel sounds are normal. She exhibits no distension. There is no tenderness.   Neurological: She is alert and oriented to person, place, and time.   Skin: Skin is warm and dry.         CRANIAL NERVES     CN III, IV, VI   Extraocular motions are normal.        Significant Labs: All pertinent labs within the past 24 hours have been reviewed.    Significant Imaging: I have reviewed and interpreted all pertinent imaging results/findings within the past 24 hours.   164.9

## 2019-09-07 NOTE — ED NOTES
Tele box [id number] applied to pt. Conrado in war room states able to see pt on monitor, rhythm NSR with HR 79.

## 2019-09-07 NOTE — CONSULTS
[facility]  General Surgery  History & Physical    Patient Name: Venus Mayer  MRN: [id number]  Admission Date: 9/6/2019  Attending Physician: Maria Elena Bolden MD   Primary Care Provider: Jona Che MD    Patient information was obtained from relative(s), past medical records and ER records.     Subjective:     Chief Complaint/Reason for Admission: Fever and weakness    History of Present Illness:  Patient is a 89 y.o. female with HTN, COPD on 2L home O2 and recurrent pneumothoraces s/p VATs pleurodesis with persistent air leak who presented to the ED with generalized weakness and a fever to 103 at home. She was found to have a leukocytosis and was pan cultured. Pleural fluid grew gram negative rods. Thoracic surgery consulted for tube management.    No current facility-administered medications on file prior to encounter.      Current Outpatient Medications on File Prior to Encounter   Medication Sig    acetaminophen (TYLENOL) 500 MG tablet Take 1,000 mg by mouth daily as needed for Pain.    albuterol (PROAIR HFA) 90 mcg/actuation inhaler Inhale 1 puff into the lungs every 6 (six) hours as needed for Wheezing or Shortness of Breath. Rescue    albuterol (PROVENTIL) 2.5 mg /3 mL (0.083 %) nebulizer solution Take 2.5 mg by nebulization every 6 (six) hours as needed for Wheezing or Shortness of Breath. Rescue    amLODIPine (NORVASC) 2.5 MG tablet Take 1 tablet (2.5 mg total) by mouth once daily. (Patient taking differently: Take 2.5 mg by mouth daily as needed (for blood pressure >155). )    aspirin 81 mg Tab Take 1 tablet by mouth once daily.     azelastine (ASTELIN) 137 mcg (0.1 %) nasal spray 2 sprays by Nasal route 2 (two) times daily.    ferrous sulfate 325 (65 FE) MG EC tablet Take 1 tablet (325 mg total) by mouth once daily.    furosemide (LASIX) 40 MG tablet Take 1 tablet (40 mg total) by mouth once daily.    ipratropium (ATROVENT) 42 mcg (0.06 %) nasal spray 2 sprays by  Nasal route 2 (two) times daily.    multivitamin (THERAGRAN) per tablet Take 1 tablet by mouth once daily.    potassium chloride (MICRO-K) 10 MEQ CpSR Take 10 mEq by mouth once daily.    rOPINIRole (REQUIP) 0.25 MG tablet Take 1 tablet (0.25 mg total) by mouth every evening.    tiotropium (SPIRIVA) 18 mcg inhalation capsule Inhale 1 capsule (18 mcg total) into the lungs once daily.       Review of patient's allergies indicates:   Allergen Reactions    Ancef in dextrose (iso-osm) Rash    Cefazolin Rash    Cefuroxime Rash    Sulfamethoxazole-trimethoprim Rash     Other reaction(s): Rash       Past Medical History:   Diagnosis Date    Acute on chronic congestive heart failure 2019    Cardiomyopathy     Carotid artery occlusion     CHF (congestive heart failure)     COPD (chronic obstructive pulmonary disease)     Coronary artery disease     Hyperlipidemia     Hypertension      Past Surgical History:   Procedure Laterality Date    BRONCHOSCOPY, FIBEROPTIC Flexible N/A 2019    Performed by [facility] at [facility] OR [address] [place] [address]    CARDIAC CATHETERIZATION      cardi cath      CAROTID ENDARTERECTOMY      HIP SURGERY      PLEURODESIS N/A 2019    Performed by Klever Mckeon MD at [facility] OR [address] [place] [address]    PLEURODESIS, USING TALC Right 2019    Performed by Klever Mckeon MD at [facility] OR [address] [place] [address]    VATS, WITH PLEURAL TENT Right 2019    Performed by Klever Mckeon MD at [facility] OR [address] [place] [address]     Family History     Problem Relation (Age of Onset)    Hypertension Mother        Tobacco Use    Smoking status: Former Smoker     Packs/day: 2.00     Years: 20.00     Pack years: 40.00     Types: Cigarettes     Last attempt to quit: 1980     Years since quittin.6    Smokeless tobacco: Never Used   Substance and Sexual Activity    Alcohol use: Yes     Alcohol/week: 1.2 oz     Types: 2 Glasses of wine per week     Comment: social    Drug use: No    Sexual activity: Not  "Currently     Review of Systems   Constitutional: Positive for fatigue and fever. Negative for activity change.   Respiratory: Negative for cough and shortness of breath.    Cardiovascular: Negative for chest pain and palpitations.   Gastrointestinal: Negative for abdominal pain, nausea and vomiting.     Objective:     Vital Signs (Most Recent):  Temp: 98 °F (36.7 °C) (09/07/19 1600)  Pulse: 109 (09/07/19 1600)  Resp: (!) 22 (09/07/19 1600)  BP: 135/84 (09/07/19 1600)  SpO2: (!) 90 % (09/07/19 1600) Vital Signs (24h Range):  Temp:  [97.6 °F (36.4 °C)-103.5 °F (39.7 °C)] 98 °F (36.7 °C)  Pulse:  [] 109  Resp:  [15-22] 22  SpO2:  [90 %-100 %] 90 %  BP: ()/(52-86) 135/84     Weight: 40.4 kg (89 lb 1.1 oz)  Body mass index is 17.39 kg/m².    Physical Exam   Constitutional: She appears well-developed and well-nourished. No distress.   Cardiovascular: Normal rate and regular rhythm.   Pulmonary/Chest: Effort normal. No respiratory distress.   Chest tube in place draining straw colored fluid into chamber         Significant Labs:  CBC:   Recent Labs   Lab 09/07/19  0246   WBC 15.17*   RBC 3.20*   HGB 9.8*   HCT 32.1*      *   MCH 30.6   MCHC 30.5*     CMP:   Recent Labs   Lab 09/07/19  0246   *   CALCIUM 10.7*   ALBUMIN 2.7*   PROT 5.9*      K 4.0   CO2 38*   CL 90*   BUN 22   CREATININE 1.0   ALKPHOS 87   ALT 16   AST 31   BILITOT 0.8       Significant Diagnostics:  CXR:  EXAMINATION:  XR CHEST PA AND LATERAL    CLINICAL HISTORY:  Provided history is "  Fever, unspecified".    TECHNIQUE:  Frontal and lateral views of the chest were performed.    COMPARISON:  09/06/2019 at 09:29.    FINDINGS:  Cardiac silhouette is stable.  Atherosclerotic calcifications overlie the aortic arch.  Stable positioning of right-sided chest tube with the tip overlying the right apex.  Stable small volume of extrapulmonary air on the right.  Ill-defined patchy consolidation and/or airspace disease in the " right lung is similar to the prior study.  Left lung aeration is stable as well.  No sizable pleural effusion.  No detrimental change in lung aeration.      Impression       No detrimental change when compared with 09:29.      Electronically signed by: Ilya Villalba MD  Date: 09/06/2019  Time: 20:54         Assessment/Plan:     Active Diagnoses:    Diagnosis Date Noted POA    PRINCIPAL PROBLEM:  Fever [R50.9] 09/06/2019 Yes    Coronary artery disease [I25.10] 09/07/2019 Unknown    Hypercalcemia [E83.52] 09/07/2019 Yes    Empyema [J86.9] 09/07/2019 Unknown    Sepsis [A41.9] 09/07/2019 Unknown    Restless leg syndrome [G25.81] 07/20/2019 Yes    Iron deficiency [E61.1] 05/24/2019 Yes    Spontaneous pneumothorax [J93.83] 05/22/2019 Yes    Debility [R53.81] 05/22/2019 Yes    COLD (chronic obstructive lung disease) [J44.9] 03/28/2013 Yes    Hypertension [I10] 01/17/2013 Yes    Heart failure, diastolic [I50.30] 01/17/2013 Yes      Problems Resolved During this Admission:     VTE Risk Mitigation (From admission, onward)        Ordered     enoxaparin injection 30 mg  Daily      09/07/19 1714     IP VTE HIGH RISK PATIENT  Once      09/07/19 0459     Place sequential compression device  Until discontinued      09/07/19 0459      89 yo female known to the thoracic surgery service who was admitted for generalized weakness and fevers. Pleural fluid growing gram negative rods    - Leave chest tube for now. Pleural fluid likely colonized due to chronicity of tube and very unlikely to be cause of patient's white count/fevers. No suspicion for empyema on imaging. No need for further imaging from a thoracic surgery standpoint  - Will follow for chest tube management  - Discussed with Dr. Linda Pizarro MD  General Surgery  [facility]

## 2019-09-07 NOTE — PROGRESS NOTES
Pharmacokinetic Assessment Follow Up: IV Vancomycin    Vancomycin serum concentration assessment(s):    · The random level of 18.5mcg/mL was a 11.5 hour level from first dose.   · The measurement is within the desired definitive target range of 15 to 20 mcg/mL.    Vancomycin Regimen Plan:    · No re-dose today given 11.5h level was 18.5mcg/mL. Next level with AM labs tomorrow and re-dose if patient is clearing and level approaches close to or <15mcg/mL.     Drug levels (last 3 results):  Recent Labs   Lab Result Units 09/07/19  0924   Vancomycin, Random ug/mL 18.5       Pharmacy will continue to follow and monitor vancomycin.    Please contact pharmacy at extension [phone] for questions regarding this assessment.    Thank you for the consult,   Alda De Dios, PharmD, BCPS, Internal Medicine Clinical Pharmacy Specialist  EXT [phone]     Patient brief summary:  Venus Mayer is a 89 y.o. female initiated on antimicrobial therapy with IV Vancomycin for treatment of suspected bacteremia/fever      Drug Allergies:   Review of patient's allergies indicates:   Allergen Reactions    Ancef in dextrose (iso-osm) Rash    Cefazolin Rash    Cefuroxime Rash    Sulfamethoxazole-trimethoprim Rash     Other reaction(s): Rash       Actual Body Weight:   40.4kg    Renal Function:   Estimated Creatinine Clearance: 23.8 mL/min (based on SCr of 1 mg/dL).,     Dialysis Method (if applicable):  No dialysis     CBC (last 72 hours):  Recent Labs   Lab Result Units 09/06/19 2006 09/07/19  0246   WBC K/uL 16.29* 15.17*   Hemoglobin g/dL 9.9* 9.8*   Hematocrit % 32.1* 32.1*   Platelets K/uL 169 166   Gran% % 83.6* 84.5*   Lymph% % 5.3* 6.7*   Mono% % 9.9 7.4   Eosinophil% % 0.2 0.5   Basophil% % 0.3 0.3   Differential Method  Automated Automated       Metabolic Panel (last 72 hours):  Recent Labs   Lab Result Units 09/06/19 2006 09/06/19 2007 09/07/19  0246   Sodium mmol/L 134*  --  136   Potassium mmol/L 4.0  --  4.0   Chloride  mmol/L 89*  --  90*   CO2 mmol/L 39*  --  38*   Glucose mg/dL 118*  --  114*   Glucose, UA   --  Negative  --    BUN, Bld mg/dL 22  --  22   Creatinine mg/dL 0.9  --  1.0   Albumin g/dL 2.9*  --  2.7*   Total Bilirubin mg/dL 0.6  --  0.8   Alkaline Phosphatase U/L 85  --  87   AST U/L 26  --  31   ALT U/L 12  --  16   Magnesium mg/dL  --   --  2.2   Phosphorus mg/dL  --   --  3.7       Vancomycin Administrations:  vancomycin given in the last 96 hours                   vancomycin 1.25 g in dextrose 5% 250 mL IVPB (ready to mix) (mg) 1,250 mg New Bag 09/06/19 2209                Microbiologic Results:  Microbiology Results (last 7 days)     Procedure Component Value Units Date/Time    Blood culture x two cultures. Draw prior to antibiotics. [[id number]] Collected:  09/06/19 2006    Order Status:  Completed Specimen:  Blood from Peripheral, Antecubital, Right Updated:  09/07/19 0515     Blood Culture, Routine No Growth to date    Narrative:       Aerobic and anaerobic    Blood culture x two cultures. Draw prior to antibiotics. [[id number]] Collected:  09/06/19 2007    Order Status:  Completed Specimen:  Blood from Peripheral, Antecubital, Left Updated:  09/07/19 0515     Blood Culture, Routine No Growth to date    Narrative:       Aerobic and anaerobic    Culture, Fluid  (Aerobic) [[id number]] Collected:  09/07/19 0246    Order Status:  Completed Specimen:  Body Fluid from Pleural Fluid Updated:  09/07/19 0514     Gram Stain Result Rare WBC's      Many Gram negative rods    Narrative:       Please collect fluid sample from pneumostat and send to lab  for culture.    Influenza A & B by Molecular [[id number]] Collected:  09/06/19 2041    Order Status:  Completed Specimen:  Nasopharyngeal Swab Updated:  09/06/19 2232     Influenza A, Molecular Negative     Influenza B, Molecular Negative     Flu A & B Source NP

## 2019-09-07 NOTE — ASSESSMENT & PLAN NOTE
Fever to 103.5, BP 98/54, leukocytosis to 16.29. BP improved to 130s after 500 cc bolus LR in ED and initiation of  cc/hr x 5 hours. Patient is AOx3 without meningeal signs. UA negative. CXR with chronic changes. Lactic acid 1. Received vancomycin and zosyn in the ED.   - blood cultures pending  - culture of fluid from pneumostat pending   - continue vancomycin and zosyn for now  - procalcitonin pending  - would consider repeating CXR on 9/8

## 2019-09-08 PROBLEM — E87.6 HYPOKALEMIA: Status: ACTIVE | Noted: 2019-09-08

## 2019-09-08 PROBLEM — J96.11 CHRONIC RESPIRATORY FAILURE WITH HYPOXIA AND HYPERCAPNIA: Status: ACTIVE | Noted: 2019-07-06

## 2019-09-08 PROBLEM — K59.00 CONSTIPATION: Status: ACTIVE | Noted: 2019-09-08

## 2019-09-08 PROBLEM — J96.12 CHRONIC RESPIRATORY FAILURE WITH HYPOXIA AND HYPERCAPNIA: Status: ACTIVE | Noted: 2019-07-06

## 2019-09-08 PROBLEM — N39.0 UTI (URINARY TRACT INFECTION): Status: RESOLVED | Noted: 2019-07-09 | Resolved: 2019-09-08

## 2019-09-08 LAB
ALBUMIN SERPL BCP-MCNC: 2.3 G/DL (ref 3.5–5.2)
ALP SERPL-CCNC: 87 U/L (ref 55–135)
ALT SERPL W/O P-5'-P-CCNC: 16 U/L (ref 10–44)
ANION GAP SERPL CALC-SCNC: 9 MMOL/L (ref 8–16)
AST SERPL-CCNC: 21 U/L (ref 10–40)
BASOPHILS # BLD AUTO: 0.04 K/UL (ref 0–0.2)
BASOPHILS NFR BLD: 0.4 % (ref 0–1.9)
BILIRUB SERPL-MCNC: 0.5 MG/DL (ref 0.1–1)
BUN SERPL-MCNC: 21 MG/DL (ref 8–23)
CALCIUM SERPL-MCNC: 9.9 MG/DL (ref 8.7–10.5)
CHLORIDE SERPL-SCNC: 94 MMOL/L (ref 95–110)
CO2 SERPL-SCNC: 34 MMOL/L (ref 23–29)
CREAT SERPL-MCNC: 0.9 MG/DL (ref 0.5–1.4)
DIFFERENTIAL METHOD: ABNORMAL
EOSINOPHIL # BLD AUTO: 0.4 K/UL (ref 0–0.5)
EOSINOPHIL NFR BLD: 3.6 % (ref 0–8)
ERYTHROCYTE [DISTWIDTH] IN BLOOD BY AUTOMATED COUNT: 13.9 % (ref 11.5–14.5)
EST. GFR  (AFRICAN AMERICAN): >60 ML/MIN/1.73 M^2
EST. GFR  (NON AFRICAN AMERICAN): 56.5 ML/MIN/1.73 M^2
GLUCOSE SERPL-MCNC: 87 MG/DL (ref 70–110)
HCT VFR BLD AUTO: 27.4 % (ref 37–48.5)
HGB BLD-MCNC: 8.5 G/DL (ref 12–16)
IMM GRANULOCYTES # BLD AUTO: 0.04 K/UL (ref 0–0.04)
IMM GRANULOCYTES NFR BLD AUTO: 0.4 % (ref 0–0.5)
LYMPHOCYTES # BLD AUTO: 1 K/UL (ref 1–4.8)
LYMPHOCYTES NFR BLD: 9.6 % (ref 18–48)
MAGNESIUM SERPL-MCNC: 2.2 MG/DL (ref 1.6–2.6)
MCH RBC QN AUTO: 31.5 PG (ref 27–31)
MCHC RBC AUTO-ENTMCNC: 31 G/DL (ref 32–36)
MCV RBC AUTO: 102 FL (ref 82–98)
MONOCYTES # BLD AUTO: 1.2 K/UL (ref 0.3–1)
MONOCYTES NFR BLD: 11.9 % (ref 4–15)
NEUTROPHILS # BLD AUTO: 7.4 K/UL (ref 1.8–7.7)
NEUTROPHILS NFR BLD: 74.1 % (ref 38–73)
NRBC BLD-RTO: 0 /100 WBC
PHOSPHATE SERPL-MCNC: 3.7 MG/DL (ref 2.7–4.5)
PLATELET # BLD AUTO: 149 K/UL (ref 150–350)
PMV BLD AUTO: 10.5 FL (ref 9.2–12.9)
POTASSIUM SERPL-SCNC: 3.4 MMOL/L (ref 3.5–5.1)
PROT SERPL-MCNC: 5.4 G/DL (ref 6–8.4)
RBC # BLD AUTO: 2.7 M/UL (ref 4–5.4)
SODIUM SERPL-SCNC: 137 MMOL/L (ref 136–145)
VANCOMYCIN SERPL-MCNC: 11.7 UG/ML
WBC # BLD AUTO: 9.96 K/UL (ref 3.9–12.7)

## 2019-09-08 PROCEDURE — 63600175 PHARM REV CODE 636 W HCPCS: Performed by: STUDENT IN AN ORGANIZED HEALTH CARE EDUCATION/TRAINING PROGRAM

## 2019-09-08 PROCEDURE — 94640 AIRWAY INHALATION TREATMENT: CPT

## 2019-09-08 PROCEDURE — 85025 COMPLETE CBC W/AUTO DIFF WBC: CPT

## 2019-09-08 PROCEDURE — 80053 COMPREHEN METABOLIC PANEL: CPT

## 2019-09-08 PROCEDURE — 25000242 PHARM REV CODE 250 ALT 637 W/ HCPCS: Performed by: STUDENT IN AN ORGANIZED HEALTH CARE EDUCATION/TRAINING PROGRAM

## 2019-09-08 PROCEDURE — 80202 ASSAY OF VANCOMYCIN: CPT

## 2019-09-08 PROCEDURE — 25000003 PHARM REV CODE 250: Performed by: HOSPITALIST

## 2019-09-08 PROCEDURE — 63600175 PHARM REV CODE 636 W HCPCS: Performed by: HOSPITALIST

## 2019-09-08 PROCEDURE — 99232 SBSQ HOSP IP/OBS MODERATE 35: CPT | Mod: GC,,, | Performed by: HOSPITALIST

## 2019-09-08 PROCEDURE — 83735 ASSAY OF MAGNESIUM: CPT

## 2019-09-08 PROCEDURE — 25000003 PHARM REV CODE 250: Performed by: STUDENT IN AN ORGANIZED HEALTH CARE EDUCATION/TRAINING PROGRAM

## 2019-09-08 PROCEDURE — 94761 N-INVAS EAR/PLS OXIMETRY MLT: CPT

## 2019-09-08 PROCEDURE — 11000001 HC ACUTE MED/SURG PRIVATE ROOM

## 2019-09-08 PROCEDURE — 36415 COLL VENOUS BLD VENIPUNCTURE: CPT

## 2019-09-08 PROCEDURE — 27000221 HC OXYGEN, UP TO 24 HOURS

## 2019-09-08 PROCEDURE — 84100 ASSAY OF PHOSPHORUS: CPT

## 2019-09-08 PROCEDURE — 99900035 HC TECH TIME PER 15 MIN (STAT)

## 2019-09-08 PROCEDURE — 99232 PR SUBSEQUENT HOSPITAL CARE,LEVL II: ICD-10-PCS | Mod: GC,,, | Performed by: HOSPITALIST

## 2019-09-08 RX ORDER — FUROSEMIDE 40 MG/1
40 TABLET ORAL DAILY
Status: DISCONTINUED | OUTPATIENT
Start: 2019-09-08 | End: 2019-09-12 | Stop reason: HOSPADM

## 2019-09-08 RX ORDER — AMOXICILLIN 250 MG
1 CAPSULE ORAL ONCE
Status: COMPLETED | OUTPATIENT
Start: 2019-09-08 | End: 2019-09-08

## 2019-09-08 RX ORDER — POLYETHYLENE GLYCOL 3350 17 G/17G
17 POWDER, FOR SOLUTION ORAL ONCE
Status: COMPLETED | OUTPATIENT
Start: 2019-09-08 | End: 2019-09-08

## 2019-09-08 RX ORDER — POTASSIUM CHLORIDE 20 MEQ/1
20 TABLET, EXTENDED RELEASE ORAL ONCE
Status: COMPLETED | OUTPATIENT
Start: 2019-09-08 | End: 2019-09-08

## 2019-09-08 RX ADMIN — IPRATROPIUM BROMIDE AND ALBUTEROL SULFATE 3 ML: .5; 3 SOLUTION RESPIRATORY (INHALATION) at 07:09

## 2019-09-08 RX ADMIN — ACETAMINOPHEN 650 MG: 325 TABLET ORAL at 11:09

## 2019-09-08 RX ADMIN — FERROUS SULFATE TAB EC 325 MG (65 MG FE EQUIVALENT) 325 MG: 325 (65 FE) TABLET DELAYED RESPONSE at 08:09

## 2019-09-08 RX ADMIN — ACETAMINOPHEN 650 MG: 325 TABLET ORAL at 06:09

## 2019-09-08 RX ADMIN — PIPERACILLIN AND TAZOBACTAM 4.5 G: 4; .5 INJECTION, POWDER, LYOPHILIZED, FOR SOLUTION INTRAVENOUS; PARENTERAL at 12:09

## 2019-09-08 RX ADMIN — POLYETHYLENE GLYCOL 3350 17 G: 17 POWDER, FOR SOLUTION ORAL at 11:09

## 2019-09-08 RX ADMIN — PIPERACILLIN AND TAZOBACTAM 4.5 G: 4; .5 INJECTION, POWDER, LYOPHILIZED, FOR SOLUTION INTRAVENOUS; PARENTERAL at 09:09

## 2019-09-08 RX ADMIN — ROPINIROLE HYDROCHLORIDE 0.25 MG: 0.25 TABLET, FILM COATED ORAL at 09:09

## 2019-09-08 RX ADMIN — SENNOSIDES,DOCUSATE SODIUM 1 TABLET: 8.6; 5 TABLET, FILM COATED ORAL at 11:09

## 2019-09-08 RX ADMIN — IPRATROPIUM BROMIDE AND ALBUTEROL SULFATE 3 ML: .5; 3 SOLUTION RESPIRATORY (INHALATION) at 09:09

## 2019-09-08 RX ADMIN — FUROSEMIDE 40 MG: 40 TABLET ORAL at 12:09

## 2019-09-08 RX ADMIN — TIOTROPIUM BROMIDE 18 MCG: 18 CAPSULE ORAL; RESPIRATORY (INHALATION) at 09:09

## 2019-09-08 RX ADMIN — PIPERACILLIN AND TAZOBACTAM 4.5 G: 4; .5 INJECTION, POWDER, LYOPHILIZED, FOR SOLUTION INTRAVENOUS; PARENTERAL at 06:09

## 2019-09-08 RX ADMIN — ENOXAPARIN SODIUM 30 MG: 100 INJECTION SUBCUTANEOUS at 05:09

## 2019-09-08 RX ADMIN — THERA TABS 1 TABLET: TAB at 09:09

## 2019-09-08 RX ADMIN — ASPIRIN 81 MG CHEWABLE TABLET 81 MG: 81 TABLET CHEWABLE at 08:09

## 2019-09-08 RX ADMIN — POTASSIUM CHLORIDE 20 MEQ: 20 TABLET, EXTENDED RELEASE ORAL at 09:09

## 2019-09-08 RX ADMIN — IPRATROPIUM BROMIDE AND ALBUTEROL SULFATE 3 ML: .5; 3 SOLUTION RESPIRATORY (INHALATION) at 05:09

## 2019-09-08 RX ADMIN — VANCOMYCIN HYDROCHLORIDE 500 MG: 500 INJECTION, POWDER, LYOPHILIZED, FOR SOLUTION INTRAVENOUS at 10:09

## 2019-09-08 NOTE — HOSPITAL COURSE
She was admitted to hospital medicine for concerns of sepsis, secondary to empyema. Managed with Zosyn. Cultures from Pleural fluid grew pansensitive Pseudomonas and transitioned her to Ciprofloxacin on 9/9/19. CTS consulted for chest tube management and removed it on 09/09. ID Service consulted for management regarding both the appropriate antibiotic therapy as well as the length of therapy. They recommend a 2 week course of antibiotics. Opted to use piperacillin-tazobactam, given documented hx of allergic reactions to the alternate antibiotics. Placed a midline in L brachial V on 09/11. The patient is stable for discharge home with home health.      Of note, on 09/08/2019, the patient and two of her daughters (one present in the room and the other, Bri, on speaker phone) were asked regarding the patient's code status and stated she was DNR. This information was recorded in EMR and in the patient's chart.

## 2019-09-08 NOTE — SUBJECTIVE & OBJECTIVE
Interval History: Started on pip-tazo yesterday for GNR coverage, pending speciation and sensitivities. Patient has remained hemodynamically stable and afebrile. Today she only complains of constipation.     Review of Systems   Constitutional: Negative for activity change, appetite change, chills and fever.   HENT: Negative for congestion and sore throat.    Respiratory: Negative for cough, chest tightness and shortness of breath.    Cardiovascular: Negative for chest pain and palpitations.   Gastrointestinal: Positive for constipation (last BM on thursday or friday). Negative for abdominal pain, diarrhea, nausea and vomiting.   Genitourinary: Negative for dysuria and hematuria.   Musculoskeletal: Positive for back pain (according to patient from lying down in bed). Negative for neck pain.   Skin: Negative for color change and rash.   Neurological: Negative for dizziness and headaches.     Objective:     Vital Signs (Most Recent):  Temp: 97.2 °F (36.2 °C) (09/08/19 1523)  Pulse: 71 (09/08/19 1541)  Resp: 20 (09/08/19 0852)  BP: (!) 118/58 (09/08/19 1523)  SpO2: 97 % (09/08/19 1523) Vital Signs (24h Range):  Temp:  [97.2 °F (36.2 °C)-98.8 °F (37.1 °C)] 97.2 °F (36.2 °C)  Pulse:  [] 71  Resp:  [14-22] 20  SpO2:  [90 %-100 %] 97 %  BP: (118-158)/(56-84) 118/58     Weight: 40.4 kg (89 lb 1.1 oz)  Body mass index is 17.39 kg/m².    Intake/Output Summary (Last 24 hours) at 9/8/2019 1558  Last data filed at 9/8/2019 1200  Gross per 24 hour   Intake 600 ml   Output 588 ml   Net 12 ml      Physical Exam   Constitutional: Vital signs are normal. She appears cachectic. She does not appear ill. No distress. Nasal cannula in place.   HENT:   Head: Normocephalic and atraumatic.   Eyes: Conjunctivae are normal. No scleral icterus.   Cardiovascular: Normal rate, regular rhythm and normal heart sounds.   No murmur heard.  Pulses:       Radial pulses are 2+ on the right side, and 2+ on the left side.   Pulmonary/Chest: Effort  normal and breath sounds normal.   Chest tube site clean, no secretions/discharge. It is not warm or erythematous. Mild tenderness observed at the site.    Abdominal: Soft. Normal appearance and bowel sounds are normal. She exhibits no distension. There is no tenderness.   Musculoskeletal: She exhibits no edema, tenderness or deformity.   Neurological: She is alert. She is not disoriented.   Skin: Skin is warm. No rash noted. She is not diaphoretic.       Significant Labs:   BMP:   Recent Labs   Lab 09/08/19  0458   GLU 87      K 3.4*   CL 94*   CO2 34*   BUN 21   CREATININE 0.9   CALCIUM 9.9   MG 2.2     CBC:   Recent Labs   Lab 09/06/19  2006 09/07/19  0246 09/08/19  0458   WBC 16.29* 15.17* 9.96   HGB 9.9* 9.8* 8.5*   HCT 32.1* 32.1* 27.4*    166 149*       Significant Imaging: I have reviewed all pertinent imaging results/findings within the past 24 hours.

## 2019-09-08 NOTE — PHARMACY MED REC
"Admission Medication Reconciliation - Pharmacy Consult Note    The home medication history was taken by Miriam Santiago, Pharmacy Technician.  Based on information gathered and subsequent review by the clinical pharmacist, the items below may need attention.     You may go to "Admission" then "Reconcile Home Medications" tabs to review and/or act upon these items.     Potentially problematic discrepancies with current MAR  o Patient IS taking the following which was not ordered upon admit  o Azelastine nasal spray - 2 sprays in each nostril twice daily - This medication is non-formulary, if something is needed inpatient may consider Flonase nasal spray   o Furosemide 40mg by mouth daily   o Ipratropium nasal spray 2 sprays in each nostril twice daily       Potential issues to be addressed PRIOR TO DISCHARGE  o Patient is taking amlodipine 2.5mg daily as needed for SBP >155    Please address this information as you see fit.  Feel free to contact us if you have any questions or require assistance.    Alda De Dios, PharmD, BCPS, Internal Medicine Clinical Pharmacy Specialist  EXT [phone]                  .  .            "

## 2019-09-08 NOTE — ASSESSMENT & PLAN NOTE
"On arrival at the ED, the patient was febrile to 103.5, BP 98/54, leukocytosis to 16.29. Met SIRS criteria. BP improved to 130s after 500 cc bolus LR in ED and initiation of  cc/hr x 5 hours. UA negative. CXR with chronic changes. Lactic acid 1. Received vancomycin and zosyn in the ED.     Infectious workup performed:  -Blood cultures: NGTD  -Pneumostat fliud culture: preliminary gram negative rods; pending speciation and sentitivities     Plan:  -Continue on pip-tazo for coverage of gram negative rods; can de-escalate following speciation and sensitivities  -Follow-up blood cultures  -CTS consulted for possible chest tube removal   -See "empyema"   "

## 2019-09-08 NOTE — PROGRESS NOTES
Pharmacokinetic Assessment Follow Up: IV Vancomycin    Vancomycin serum concentration assessment(s):    · The random level of 11.7mcg/mL was drawn correctly and can be used to guide therapy at this time.   · The measurement is below the desired definitive target range of 15 to 20 mcg/mL.     Vancomycin Regimen Plan:    · Redose vancomycin 500mg (15mg/kg)  IV x 1 dose   · Re-dose when the random level is less than 20mcg/mL, next level to be drawn with AM labs on 09/09/19    Drug levels (last 3 results):  Recent Labs   Lab Result Units 09/07/19  0924 09/08/19  0458   Vancomycin, Random ug/mL 18.5 11.7       Pharmacy will continue to follow and monitor vancomycin.    Please contact pharmacy at extension [phone] for questions regarding this assessment.    Thank you for the consult,   Alda De Dios, PharmD, BCPS, Internal Medicine Clinical Pharmacy Specialist  EXT [phone]         Patient brief summary:  Venus Mayer is a 89 y.o. female initiated on antimicrobial therapy with IV Vancomycin for treatment of bacteremia/Lower respiratory tract infection.    Drug Allergies:   Review of patient's allergies indicates:   Allergen Reactions    Ancef in dextrose (iso-osm) Rash    Cefazolin Rash    Cefuroxime Rash    Sulfamethoxazole-trimethoprim Rash     Other reaction(s): Rash       Actual Body Weight:   40.4kg    Renal Function:   Estimated Creatinine Clearance: 26.5 mL/min (based on SCr of 0.9 mg/dL).,     Dialysis Method (if applicable):  No dialysis     CBC (last 72 hours):  Recent Labs   Lab Result Units 09/06/19 2006 09/07/19  0246 09/08/19  0458   WBC K/uL 16.29* 15.17* 9.96   Hemoglobin g/dL 9.9* 9.8* 8.5*   Hematocrit % 32.1* 32.1* 27.4*   Platelets K/uL 169 166 149*   Gran% % 83.6* 84.5* 74.1*   Lymph% % 5.3* 6.7* 9.6*   Mono% % 9.9 7.4 11.9   Eosinophil% % 0.2 0.5 3.6   Basophil% % 0.3 0.3 0.4   Differential Method  Automated Automated Automated       Metabolic Panel (last 72 hours):  Recent Labs   Lab  Result Units 09/06/19 2006 09/06/19 2007 09/07/19 0246 09/08/19  0458   Sodium mmol/L 134*  --  136 137   Potassium mmol/L 4.0  --  4.0 3.4*   Chloride mmol/L 89*  --  90* 94*   CO2 mmol/L 39*  --  38* 34*   Glucose mg/dL 118*  --  114* 87   Glucose, UA   --  Negative  --   --    BUN, Bld mg/dL 22  --  22 21   Creatinine mg/dL 0.9  --  1.0 0.9   Albumin g/dL 2.9*  --  2.7* 2.3*   Total Bilirubin mg/dL 0.6  --  0.8 0.5   Alkaline Phosphatase U/L 85  --  87 87   AST U/L 26  --  31 21   ALT U/L 12  --  16 16   Magnesium mg/dL  --   --  2.2 2.2   Phosphorus mg/dL  --   --  3.7 3.7       Vancomycin Administrations:  vancomycin given in the last 96 hours                   vancomycin 1.25 g in dextrose 5% 250 mL IVPB (ready to mix) (mg) 1,250 mg New Bag 09/06/19 2209                Microbiologic Results:  Microbiology Results (last 7 days)     Procedure Component Value Units Date/Time    Blood culture x two cultures. Draw prior to antibiotics. [[id number]] Collected:  09/06/19 2007    Order Status:  Completed Specimen:  Blood from Peripheral, Antecubital, Left Updated:  09/07/19 2222     Blood Culture, Routine No Growth to date      No Growth to date    Narrative:       Aerobic and anaerobic    Blood culture x two cultures. Draw prior to antibiotics. [[id number]] Collected:  09/06/19 2006    Order Status:  Completed Specimen:  Blood from Peripheral, Antecubital, Right Updated:  09/07/19 2222     Blood Culture, Routine No Growth to date      No Growth to date    Narrative:       Aerobic and anaerobic    Culture, Fluid  (Aerobic) [[id number]] Collected:  09/07/19 0246    Order Status:  Completed Specimen:  Body Fluid from Pleural Fluid Updated:  09/07/19 0514     Gram Stain Result Rare WBC's      Many Gram negative rods    Narrative:       Please collect fluid sample from pneumostat and send to lab  for culture.    Influenza A & B by Molecular [[id number]] Collected:  09/06/19 2041    Order Status:  Completed Specimen:   Nasopharyngeal Swab Updated:  09/06/19 6412     Influenza A, Molecular Negative     Influenza B, Molecular Negative     Flu A & B Source NP

## 2019-09-08 NOTE — ASSESSMENT & PLAN NOTE
On home amlodipine 5mg qhs. Initially held due to concerns of sepsis. Normotensive and hypertensive in ED, and following admission to hospital medicine. Will resume antihypertensive medication.    Plan:  -Continue w home amlodipine 5mg

## 2019-09-08 NOTE — ASSESSMENT & PLAN NOTE
TTE on 7/7/19 showed normal left ventricular systolic function, EF 65%, pulmonary hypertension present with estimated PA systolic pressure is 52 mm Hg. Initially held home furosemide in the setting of suspected sepsis; now okay to continue home furosemide.     Plan:  -Gentle IV fluid hydration for suspected sepsis with careful monitoring of volume status given CHF history  -Can continue home furosemide

## 2019-09-08 NOTE — ASSESSMENT & PLAN NOTE
On 2.5L home oxygen.     Plan:  -Wean off O2 as tolerated   -Continue home albuterol 1 puff q6 PRN  -Continue home tiotropium 18 micrograms daily

## 2019-09-08 NOTE — PROGRESS NOTES
[facility]  Thoracic Surgery  Progress Note    Subjective:     History of Present Illness:  No notes on file    Post-Op Info:  * No surgery found *         Interval History: NAEON. CT w/serous output. WBC 9.9 from 15.    Medications:  Continuous Infusions:  Scheduled Meds:   albuterol-ipratropium  3 mL Nebulization Q12H    aspirin  81 mg Oral Daily    enoxaparin  30 mg Subcutaneous Daily    ferrous sulfate  325 mg Oral Daily    multivitamin  1 tablet Oral Daily    piperacillin-tazobactam (ZOSYN) IVPB  4.5 g Intravenous Q8H    rOPINIRole  0.25 mg Oral QHS    tiotropium  18 mcg Inhalation Daily    vancomycin (VANCOCIN) IVPB  15 mg/kg Intravenous Once     PRN Meds:acetaminophen, albuterol, albuterol-ipratropium, Dextrose 10% Bolus, Dextrose 10% Bolus, glucagon (human recombinant), glucose, glucose, ondansetron, senna-docusate 8.6-50 mg, sodium chloride 0.9%, sodium chloride 0.9%     Review of patient's allergies indicates:   Allergen Reactions    Ancef in dextrose (iso-osm) Rash    Cefazolin Rash    Cefuroxime Rash    Sulfamethoxazole-trimethoprim Rash     Other reaction(s): Rash     Objective:     Vital Signs (Most Recent):  Temp: 97.7 °F (36.5 °C) (09/08/19 0852)  Pulse: 96 (09/08/19 0852)  Resp: 20 (09/08/19 0852)  BP: 139/63 (09/08/19 0852)  SpO2: (!) 94 % (09/08/19 0852) Vital Signs (24h Range):  Temp:  [97.5 °F (36.4 °C)-98.8 °F (37.1 °C)] 97.7 °F (36.5 °C)  Pulse:  [] 96  Resp:  [14-22] 20  SpO2:  [90 %-100 %] 94 %  BP: (118-158)/(54-84) 139/63     Intake/Output - Last 3 Shifts       09/06 0700 - 09/07 0659 09/07 0700 - 09/08 0659 09/08 0700 - 09/09 0659    I.V. (mL/kg)  600 (14.9)     Total Intake(mL/kg)  600 (14.9)     Urine (mL/kg/hr)  200 (0.2)     Chest Tube  7     Total Output  207     Net  +393            Urine Occurrence  1 x           SpO2: (!) 94 %  O2 Device (Oxygen Therapy): nasal cannula    Physical Exam   Constitutional: She appears well-developed.    Cardiovascular: Normal rate and regular rhythm.   Pulmonary/Chest: Effort normal.   CT w/serous output       Significant Labs:  CBC:   Recent Labs   Lab 09/08/19 0458   WBC 9.96   RBC 2.70*   HGB 8.5*   HCT 27.4*   *   *   MCH 31.5*   MCHC 31.0*     CMP:   Recent Labs   Lab 09/08/19 0458   GLU 87   CALCIUM 9.9   ALBUMIN 2.3*   PROT 5.4*      K 3.4*   CO2 34*   CL 94*   BUN 21   CREATININE 0.9   ALKPHOS 87   ALT 16   AST 21   BILITOT 0.5       Significant Diagnostics:  CT: Interval placement of right-sided chest tube significant diminished volume of previously identified pneumothorax with re-expansion of the right lung, there remains right-sided hydropneumothorax with small amount of extrapulmonary air and small to moderate amount of pleural fluid including some loculated pleural fluid along the major fissure.    There are areas of pulmonary confluent infiltrate and consolidation involving the right lung most notably right upper lobe posteriorly and right lung base medially.    The left hemithorax is predominantly stable including spiculated focus at the left apex, although there is some mild atelectasis seen at the left base that has developed in the interim.    Precarinal enlarged lymph node appears to have increased in size and there is interval development of enlarged subcarinal lymph node.    Bilateral adrenal gland thickening more prominent on the left the possibility of underlying nodule is a consideration although the not well delineated.    VTE Risk Mitigation (From admission, onward)        Ordered     enoxaparin injection 30 mg  Daily      09/07/19 1714     IP VTE HIGH RISK PATIENT  Once      09/07/19 0459     Place sequential compression device  Until discontinued      09/07/19 0459        Assessment/Plan:     Spontaneous pneumothorax  89yo F admitted for sepsis    - CT to pneumostat  - Plan to clamp CT 9/9/19  - WBC 9.9 from 15; fluid is likely colonized, low suspicion that this  is source of infection  - Will continue to follow CT        Mesfin Benson MD  Thoracic Surgery  [facility]

## 2019-09-08 NOTE — PLAN OF CARE
Problem: Adult Inpatient Plan of Care  Goal: Plan of Care Review  Outcome: Ongoing (interventions implemented as appropriate)  Infection: Pt. continue on multiple IV Abx. Temp WNL.   Pain: Pt. c/o pain/cramps in legs-managing with Tylenol.   Pt. has a Pneumostat on R Chest. Dressing is CDI.   Oral/Bethanie/SKin care completed.   Pt. assisted OOB to chair. Fall safety and pt. care rounds maintained. Wctm.

## 2019-09-08 NOTE — PLAN OF CARE
Problem: Adult Inpatient Plan of Care  Goal: Plan of Care Review  Outcome: Ongoing (interventions implemented as appropriate)  Pt aaox4. Vss. No complaints of pain at this time. Afebrile. Medications reviewed.Daughter at bedside. Free of falls and injuries this shift. Bed in lowest position. Call bell within reach. Side rails up x2. Will continue to monitor.

## 2019-09-08 NOTE — PROGRESS NOTES
[facility] Hospital Medicine  Progress Note    Patient Name: Venus Mayer  MRN: [id number]  Patient Class: IP- Inpatient   Admission Date: 9/6/2019  Length of Stay: 2 days  Attending Physician: Maria Elena Bolden MD  Primary Care Provider: Jona Che MD    [facility] Medicine Team: [facility] HOSP MED 4 Tawny Dotson MD    Subjective:     Principal Problem:Sepsis    HPI:  Ms. Mayer is a 89-year-old lady with PMH COPD on 2.5 L O2 at home, recurrent pneumothoraces with failed conservative management and VATS pleurodesis with a chest tube in place connected to a pneumostat, HTN, CAD, HLD who presents with weakness. Per her daughter, Ms. Mayer was complaining of feeling cold, which is normal for her, however today she was visibly shaking. She went to a doctor's appointment, after which she was visibly weak in her knees, struggling to walk with her walker and had to sit down. She was lethargic but alert and oriented and felt hot to the touch, however without objective fevers at home.     She went to her doctor's appointment earlier that day on 9/6 with Dr. Mckeon for a CT surgery follow-up after being recently discharged from the hospital (7/28-8/09) for spontaneous right pneumothorax with persistent air leak that failed bedside pleurodesis, endobronchial valve replacement, and VATS pleurodesis. There was a family discussion for hospital or AIM however just prior to discharge the orders were revoked and she went home with . Persistent air leak 2 weeks ago. She had a CXR at her appointment for assessment of air leak. Per chart review, Dr. Mckeon noted no air leak, clamped her chest tube, and scheduled her to follow-up on 9/11 for a repeat CXR. After the appointment, patient's daughter unclamped her chest tube because she was instructed that she could do so if patient became short of breath. Of note, patient has not been short of breath at any point during the day or in the ED, although she  endorses a chronic nonproductive cough that has not changed acutely. Fever in ED to 103. She denies chest pain and dysuria. She has a right-sided chest tube in place connected to a pneumostat that is filled with yellow fluid which her daughter says is normal/unchanged.     Overview/Hospital Course:  She was admitted to hospital medicine for concerns of infection. Workup significant for pleural fluid cultures growing gram negative rods. Continued administering pip-tazo for coverage pending speciation and sensitivities. CTS additionally consulted for management of chest tube.     On 09/08/2019, the patient and two of her daughters (one present in the room and the other one on speaker phone) were asked regarding her code status and stated she was DNR. This information was recorded in EMR and in the patient's chart.     Interval History: Started on pip-tazo yesterday for GNR coverage, pending speciation and sensitivities. Patient has remained hemodynamically stable and afebrile. Today she only complains of constipation.     Review of Systems   Constitutional: Negative for activity change, appetite change, chills and fever.   HENT: Negative for congestion and sore throat.    Respiratory: Negative for cough, chest tightness and shortness of breath.    Cardiovascular: Negative for chest pain and palpitations.   Gastrointestinal: Positive for constipation (last BM on thursday or friday). Negative for abdominal pain, diarrhea, nausea and vomiting.   Genitourinary: Negative for dysuria and hematuria.   Musculoskeletal: Positive for back pain (according to patient from lying down in bed). Negative for neck pain.   Skin: Negative for color change and rash.   Neurological: Negative for dizziness and headaches.     Objective:     Vital Signs (Most Recent):  Temp: 97.2 °F (36.2 °C) (09/08/19 1523)  Pulse: 71 (09/08/19 1541)  Resp: 20 (09/08/19 0852)  BP: (!) 118/58 (09/08/19 1523)  SpO2: 97 % (09/08/19 1523) Vital Signs (24h  Range):  Temp:  [97.2 °F (36.2 °C)-98.8 °F (37.1 °C)] 97.2 °F (36.2 °C)  Pulse:  [] 71  Resp:  [14-22] 20  SpO2:  [90 %-100 %] 97 %  BP: (118-158)/(56-84) 118/58     Weight: 40.4 kg (89 lb 1.1 oz)  Body mass index is 17.39 kg/m².    Intake/Output Summary (Last 24 hours) at 9/8/2019 1558  Last data filed at 9/8/2019 1200  Gross per 24 hour   Intake 600 ml   Output 588 ml   Net 12 ml      Physical Exam   Constitutional: Vital signs are normal. She appears cachectic. She does not appear ill. No distress. Nasal cannula in place.   HENT:   Head: Normocephalic and atraumatic.   Eyes: Conjunctivae are normal. No scleral icterus.   Cardiovascular: Normal rate, regular rhythm and normal heart sounds.   No murmur heard.  Pulses:       Radial pulses are 2+ on the right side, and 2+ on the left side.   Pulmonary/Chest: Effort normal and breath sounds normal.   Chest tube site clean, no secretions/discharge. It is not warm or erythematous. Mild tenderness observed at the site.    Abdominal: Soft. Normal appearance and bowel sounds are normal. She exhibits no distension. There is no tenderness.   Musculoskeletal: She exhibits no edema, tenderness or deformity.   Neurological: She is alert. She is not disoriented.   Skin: Skin is warm. No rash noted. She is not diaphoretic.       Significant Labs:   BMP:   Recent Labs   Lab 09/08/19  0458   GLU 87      K 3.4*   CL 94*   CO2 34*   BUN 21   CREATININE 0.9   CALCIUM 9.9   MG 2.2     CBC:   Recent Labs   Lab 09/06/19  2006 09/07/19  0246 09/08/19  0458   WBC 16.29* 15.17* 9.96   HGB 9.9* 9.8* 8.5*   HCT 32.1* 32.1* 27.4*    166 149*       Significant Imaging: I have reviewed all pertinent imaging results/findings within the past 24 hours.      Assessment/Plan:      * Sepsis  On arrival at the ED, the patient was febrile to 103.5, BP 98/54, leukocytosis to 16.29. Met SIRS criteria. BP improved to 130s after 500 cc bolus LR in ED and initiation of  cc/hr x 5  "hours. UA negative. CXR with chronic changes. Lactic acid 1. Received vancomycin and zosyn in the ED.     Infectious workup performed:  -Blood cultures: NGTD  -Pneumostat fliud culture: preliminary gram negative rods; pending speciation and sentitivities     Plan:  -Continue on pip-tazo for coverage of gram negative rods; can de-escalate following speciation and sensitivities  -Follow-up blood cultures  -CTS consulted for possible chest tube removal   -See "empyema"     Empyema  Pleural fluid cultures obtained on admission grew gram negative rods, for which the patient was started on pip-tazo. Currently pending speciation and sensitivities. Can consider ID consult if speciation and sensitivities don't result in order to determine both the optimal treatment regimen as well as treatment duration.      CTS consulted for possibility of source control with chest tube removal; they suspect chest tube is not likely the source of infection but will clamp the tube on 09/09/2019, wait 24hrs, and determine whether it will be removed or not.     Plan:  -Follow-up pleural fluid cultures and blood cultures  -Follow-up CTS recommendations  -Consider ID consult if fluid cultures don't speciate    Fever  See "Sepsis"    Spontaneous pneumothorax  Patient with history of recurrent spontaneous pneumothoraces that have failed multiple treatment attempts. CTS consulted on admission to determine chest tube management.     Plan:  -Continue working with CTS     COLD (chronic obstructive lung disease)  On 2.5L home oxygen.     Plan:  -Wean off O2 as tolerated   -Continue home albuterol 1 puff q6 PRN  -Continue home tiotropium 18 micrograms daily    Chronic diastolic heart failure  TTE on 7/7/19 showed normal left ventricular systolic function, EF 65%, pulmonary hypertension present with estimated PA systolic pressure is 52 mm Hg. Initially held home furosemide in the setting of suspected sepsis; now okay to continue home furosemide. "     Plan:  -Gentle IV fluid hydration for suspected sepsis with careful monitoring of volume status given CHF history  -Can continue home furosemide    Hypertension  On home amlodipine 5mg qhs. Initially held due to concerns of sepsis. Normotensive and hypertensive in ED, and following admission to hospital medicine. Will resume antihypertensive medication.    Plan:  -Continue w home amlodipine 5mg    Debility  Plan:  -PT/OT following    Coronary artery disease  Plan:  -Continue home aspirin 81    Hypokalemia  Plan:  -Monitor and replace as needed    Hypercalcemia  Corrected calcium 10.9 upon admission. PTH 20.  Normal ionized calcium.    Restless leg syndrome  Plan:  -Continue home ropinirole     Iron deficiency  Plan:  -Continue home ferrous sulfate 325    VTE Risk Mitigation (From admission, onward)        Ordered     enoxaparin injection 30 mg  Daily      09/07/19 1714     IP VTE HIGH RISK PATIENT  Once      09/07/19 0459     Place sequential compression device  Until discontinued      09/07/19 0459          Tawny Dotson MD  Department of Hospital Medicine   [facility]

## 2019-09-08 NOTE — ASSESSMENT & PLAN NOTE
Pleural fluid cultures obtained on admission grew gram negative rods, for which the patient was started on pip-tazo. Currently pending speciation and sensitivities. Can consider ID consult if speciation and sensitivities don't result in order to determine both the optimal treatment regimen as well as treatment duration.      CTS consulted for possibility of source control with chest tube removal; they suspect chest tube is not likely the source of infection but will clamp the tube on 09/09/2019, wait 24hrs, and determine whether it will be removed or not.     Plan:  -Follow-up pleural fluid cultures and blood cultures  -Follow-up CTS recommendations  -Consider ID consult if fluid cultures don't speciate

## 2019-09-08 NOTE — ASSESSMENT & PLAN NOTE
89yo F admitted for sepsis    - CT to pneumostat  - Plan to clamp CT 9/9/19  - WBC 9.9 from 15; fluid is likely colonized, low suspicion that this is source of infection  - Will continue to follow CT

## 2019-09-09 LAB
ANION GAP SERPL CALC-SCNC: 10 MMOL/L (ref 8–16)
BACTERIA FLD AEROBE CULT: ABNORMAL
BASOPHILS # BLD AUTO: 0.09 K/UL (ref 0–0.2)
BASOPHILS NFR BLD: 0.8 % (ref 0–1.9)
BUN SERPL-MCNC: 18 MG/DL (ref 8–23)
CALCIUM SERPL-MCNC: 10 MG/DL (ref 8.7–10.5)
CHLORIDE SERPL-SCNC: 94 MMOL/L (ref 95–110)
CO2 SERPL-SCNC: 33 MMOL/L (ref 23–29)
CREAT SERPL-MCNC: 1 MG/DL (ref 0.5–1.4)
DIFFERENTIAL METHOD: ABNORMAL
EOSINOPHIL # BLD AUTO: 0.6 K/UL (ref 0–0.5)
EOSINOPHIL NFR BLD: 5.5 % (ref 0–8)
ERYTHROCYTE [DISTWIDTH] IN BLOOD BY AUTOMATED COUNT: 13.9 % (ref 11.5–14.5)
EST. GFR  (AFRICAN AMERICAN): 57.3 ML/MIN/1.73 M^2
EST. GFR  (NON AFRICAN AMERICAN): 49.7 ML/MIN/1.73 M^2
GLUCOSE SERPL-MCNC: 86 MG/DL (ref 70–110)
GRAM STN SPEC: ABNORMAL
GRAM STN SPEC: ABNORMAL
HCT VFR BLD AUTO: 28.4 % (ref 37–48.5)
HGB BLD-MCNC: 9.1 G/DL (ref 12–16)
IMM GRANULOCYTES # BLD AUTO: 0.04 K/UL (ref 0–0.04)
IMM GRANULOCYTES NFR BLD AUTO: 0.4 % (ref 0–0.5)
LYMPHOCYTES # BLD AUTO: 0.7 K/UL (ref 1–4.8)
LYMPHOCYTES NFR BLD: 6.1 % (ref 18–48)
MCH RBC QN AUTO: 31.1 PG (ref 27–31)
MCHC RBC AUTO-ENTMCNC: 32 G/DL (ref 32–36)
MCV RBC AUTO: 97 FL (ref 82–98)
MONOCYTES # BLD AUTO: 1.1 K/UL (ref 0.3–1)
MONOCYTES NFR BLD: 10.8 % (ref 4–15)
NEUTROPHILS # BLD AUTO: 8.1 K/UL (ref 1.8–7.7)
NEUTROPHILS NFR BLD: 76.4 % (ref 38–73)
NRBC BLD-RTO: 0 /100 WBC
PLATELET # BLD AUTO: 164 K/UL (ref 150–350)
PMV BLD AUTO: 10.4 FL (ref 9.2–12.9)
POTASSIUM SERPL-SCNC: 3.7 MMOL/L (ref 3.5–5.1)
RBC # BLD AUTO: 2.93 M/UL (ref 4–5.4)
SODIUM SERPL-SCNC: 137 MMOL/L (ref 136–145)
WBC # BLD AUTO: 10.59 K/UL (ref 3.9–12.7)

## 2019-09-09 PROCEDURE — 94640 AIRWAY INHALATION TREATMENT: CPT

## 2019-09-09 PROCEDURE — 99900035 HC TECH TIME PER 15 MIN (STAT)

## 2019-09-09 PROCEDURE — 80048 BASIC METABOLIC PNL TOTAL CA: CPT

## 2019-09-09 PROCEDURE — 25000003 PHARM REV CODE 250: Performed by: STUDENT IN AN ORGANIZED HEALTH CARE EDUCATION/TRAINING PROGRAM

## 2019-09-09 PROCEDURE — 85025 COMPLETE CBC W/AUTO DIFF WBC: CPT

## 2019-09-09 PROCEDURE — 94761 N-INVAS EAR/PLS OXIMETRY MLT: CPT

## 2019-09-09 PROCEDURE — 99232 SBSQ HOSP IP/OBS MODERATE 35: CPT | Mod: GC,,, | Performed by: HOSPITALIST

## 2019-09-09 PROCEDURE — 99232 PR SUBSEQUENT HOSPITAL CARE,LEVL II: ICD-10-PCS | Mod: GC,,, | Performed by: HOSPITALIST

## 2019-09-09 PROCEDURE — 97530 THERAPEUTIC ACTIVITIES: CPT

## 2019-09-09 PROCEDURE — 36415 COLL VENOUS BLD VENIPUNCTURE: CPT

## 2019-09-09 PROCEDURE — 99222 PR INITIAL HOSPITAL CARE,LEVL II: ICD-10-PCS | Mod: GC,,, | Performed by: INTERNAL MEDICINE

## 2019-09-09 PROCEDURE — 63600175 PHARM REV CODE 636 W HCPCS: Performed by: STUDENT IN AN ORGANIZED HEALTH CARE EDUCATION/TRAINING PROGRAM

## 2019-09-09 PROCEDURE — 25000242 PHARM REV CODE 250 ALT 637 W/ HCPCS: Performed by: STUDENT IN AN ORGANIZED HEALTH CARE EDUCATION/TRAINING PROGRAM

## 2019-09-09 PROCEDURE — 97535 SELF CARE MNGMENT TRAINING: CPT

## 2019-09-09 PROCEDURE — 97165 OT EVAL LOW COMPLEX 30 MIN: CPT

## 2019-09-09 PROCEDURE — 99222 1ST HOSP IP/OBS MODERATE 55: CPT | Mod: GC,,, | Performed by: INTERNAL MEDICINE

## 2019-09-09 PROCEDURE — 97161 PT EVAL LOW COMPLEX 20 MIN: CPT

## 2019-09-09 PROCEDURE — 11000001 HC ACUTE MED/SURG PRIVATE ROOM

## 2019-09-09 PROCEDURE — 63600175 PHARM REV CODE 636 W HCPCS: Performed by: HOSPITALIST

## 2019-09-09 PROCEDURE — 27000221 HC OXYGEN, UP TO 24 HOURS

## 2019-09-09 RX ORDER — CIPROFLOXACIN 500 MG/1
500 TABLET ORAL EVERY 24 HOURS
Status: DISCONTINUED | OUTPATIENT
Start: 2019-09-09 | End: 2019-09-10

## 2019-09-09 RX ORDER — POLYETHYLENE GLYCOL 3350 17 G/17G
17 POWDER, FOR SOLUTION ORAL DAILY PRN
Status: DISCONTINUED | OUTPATIENT
Start: 2019-09-09 | End: 2019-09-11

## 2019-09-09 RX ADMIN — THERA TABS 1 TABLET: TAB at 09:09

## 2019-09-09 RX ADMIN — ASPIRIN 81 MG CHEWABLE TABLET 81 MG: 81 TABLET CHEWABLE at 09:09

## 2019-09-09 RX ADMIN — CIPROFLOXACIN HYDROCHLORIDE 500 MG: 500 TABLET, FILM COATED ORAL at 03:09

## 2019-09-09 RX ADMIN — TIOTROPIUM BROMIDE 18 MCG: 18 CAPSULE ORAL; RESPIRATORY (INHALATION) at 09:09

## 2019-09-09 RX ADMIN — FERROUS SULFATE TAB EC 325 MG (65 MG FE EQUIVALENT) 325 MG: 325 (65 FE) TABLET DELAYED RESPONSE at 09:09

## 2019-09-09 RX ADMIN — PIPERACILLIN AND TAZOBACTAM 4.5 G: 4; .5 INJECTION, POWDER, LYOPHILIZED, FOR SOLUTION INTRAVENOUS; PARENTERAL at 05:09

## 2019-09-09 RX ADMIN — ROPINIROLE HYDROCHLORIDE 0.25 MG: 0.25 TABLET, FILM COATED ORAL at 09:09

## 2019-09-09 RX ADMIN — ENOXAPARIN SODIUM 30 MG: 100 INJECTION SUBCUTANEOUS at 05:09

## 2019-09-09 RX ADMIN — FUROSEMIDE 40 MG: 40 TABLET ORAL at 09:09

## 2019-09-09 RX ADMIN — IPRATROPIUM BROMIDE AND ALBUTEROL SULFATE 3 ML: .5; 3 SOLUTION RESPIRATORY (INHALATION) at 08:09

## 2019-09-09 NOTE — PT/OT/SLP EVAL
Physical Therapy Evaluation    Patient Name:  Venus Mayer   MRN:  [id number]    Recommendations:     Discharge Recommendations:      Discharge Equipment Recommendations:     Barriers to discharge: None    Assessment:     Venus Mayer is a 89 y.o. female admitted with a medical diagnosis of Sepsis.  She presents with the following impairments/functional limitations:   .  Tolerated session c c/o weakness.  Performed mobility c S - min A.  Pt able to amb short distance c RW - demo decreased gait speed, unsteadiness and c/o fatigue.  Pt safe to amb c assistance of 1x person + RW.  Pt would benefit from continued skilled acute PT 3x/wk to improve functional mobility.  Recommending pt receive PT services in HH setting following d/c from hospital once medically cleared.      Rehab Prognosis: Good; patient would benefit from acute skilled PT services to address these deficits and reach maximum level of function.    Recent Surgery: * No surgery found *      Plan:     During this hospitalization, patient to be seen 3 x/week to address the identified rehab impairments via gait training, therapeutic activities, therapeutic exercises, neuromuscular re-education and progress toward the following goals:    · Plan of Care Expires:  10/04/19    Subjective     Chief Complaint: fatigue; weakness  Patient/Family Comments/goals: fatigue  Pain/Comfort:  ·      Patients cultural, spiritual, Temple conflicts given the current situation:      Living Environment:  Pt lives alone in [facility] [address] R.  PTA has sitter and family support 24/7.  Pt not driving/working and reports no falls - pt's daughter states pt had 1x episode of near fall d/t B knee buckling but was caught by daughter and lowered to chair.  Prior to admission, patients level of function was requiring S for ADLs and mobility for safety.  Equipment used at home:  .  DME owned (not currently used): none.  Upon discharge, patient will have assistance from  family.    Objective:     Communicated with RN and OT prior to session.  Patient found HOB elevated with    upon PT entry to room.    General Precautions: Standard,     Orthopedic Precautions:    Braces: N/A     Exams:  · Cognitive Exam:  Patient is oriented to Person, Place, Time and Situation  · RLE ROM: WFL  · RLE Strength: grossly 4/5  · LLE ROM: WFL  · LLE Strength: grossly 4/5    Functional Mobility:  · Bed Mobility:     · Rolling Left:  supervision  · Scooting: supervision  · Supine to Sit: supervision  · Sit to Supine: supervision  · Transfers:     · Sit to Stand:  contact guard assistance with rolling walker  · Gait: 60ft c RW CGA-min A  · Decreased gait speed, unsteadiness, c/o fatigue and dizziness  · Balance: sitting (S); standing (CGA-min A)      Therapeutic Activities and Exercises:  Pt educated on: PT role/POC; safety c mobility; benefits of OOB activities; performing therex; d/c recs - v/u  -sat EOB x5min  -sit<>stand 3x from bed in lowest position        AM-PAC 6 CLICK MOBILITY  Total Score:18     Patient left HOB elevated with all lines intact, call button in reach, RN notified and daughter present.    GOALS:   Multidisciplinary Problems     Physical Therapy Goals        Problem: Physical Therapy Goal    Goal Priority Disciplines Outcome Goal Variances Interventions   Physical Therapy Goal     PT, PT/OT Ongoing (interventions implemented as appropriate)     Description:  Goals to be met by: 2019     Patient will increase functional independence with mobility by performin. Supine to sit with Modified [place]  2. Sit to supine with Modified [place]  3. Sit to stand transfer with Supervision  4. Gait  x 150 feet with Supervision using Rolling Walker.   5. Ascend/descend 4 stair with bilateral Handrails Stand-by Assistance                    History:     Past Medical History:   Diagnosis Date    Acute on chronic congestive heart failure 2019    Cardiomyopathy     Carotid  artery occlusion     CHF (congestive heart failure)     COPD (chronic obstructive pulmonary disease)     Coronary artery disease     Hyperlipidemia     Hypertension        Past Surgical History:   Procedure Laterality Date    BRONCHOSCOPY, FIBEROPTIC Flexible N/A 7/31/2019    [facility]    CARDIAC CATHETERIZATION      cardic cath      CAROTID ENDARTERECTOMY      HIP SURGERY      PLEURODESIS N/A 7/31/2019    [facility]    PLEURODESIS, USING TALC Right 8/5/2019    [facility]    VATS, WITH PLEURAL TENT Right 8/5/2019    Performed by Klever Mckeon MD at [facility] OR [address] [place] [address]       Time Tracking:     PT Received On: 09/09/19  PT Start Time: 1025     PT Stop Time: 1044  PT Total Time (min): 19 min     Billable Minutes: Evaluation 10 min and Therapeutic Activity 8 min      Aristeo Flood, PT  09/09/2019

## 2019-09-09 NOTE — ASSESSMENT & PLAN NOTE
TTE on 7/7/19 showed normal left ventricular systolic function, EF 65%, pulmonary hypertension present with estimated PA systolic pressure is 52 mm Hg. Initially held home furosemide in the setting of suspected sepsis; now okay to continue home furosemide.     Plan:  -Can continue home furosemide

## 2019-09-09 NOTE — HPI
89 year old female with history of HLD, CAD, HTN, and COPD on 3L of oxygen at home returns for follow up s/p recurrent right spontaneous pneumothorax with pneumostat in place. Extended hospital admission for persistent air leak. Attempted bedside pleurodesis, endobronchial valve placement, and VATS pleurodesis however air leak did not resolve. There was a family discussion for hospital or AIM however just prior to discharge the orders were revoked and she went home with . Persistent air leak 2 weeks ago. Returns today with CXR and for assessment of air leak. Today she reports feeling well. SOB can be improved with breathing treatments. Home O2 requirements have not changed.

## 2019-09-09 NOTE — CONSULTS
[facility]  Infectious Disease  Consult Note    Patient Name: Venus Mayer  MRN: [id number]  Admission Date: 9/6/2019  Hospital Length of Stay: 3 days  Attending Physician: Maria Elena Bolden MD  Primary Care Provider: Jona Che MD     Isolation Status: No active isolations    Patient information was obtained from patient, relative(s) and ER records.      Inpatient consult to Infectious Diseases  Consult performed by: Keysha Bermudez MD  Consult ordered by: Zay Farrell MD        Assessment/Plan:     * Sepsis  89 year old female with PMH of HLD, CAD, HTN, and COPD on 2.5L O2 s/p recurrent right spontaneous pneumothorax with chest tube pneumostat in place for 1 month presents initially with sepsis, fever, leukocytosis, hypotension. She has clinically improved with broad spectrum abx over the course of the past 2 days. Pleural fluid growing Pseudomonas. S/p chest tube removal today. Currently HDS.     CXR: small right sided pleural effusion  BLcx NGTD, UA clean    Assessment: She has had the chest tube for 1 month. Likely to be colonization per surgery. Pseudomonas likely to be contaminant but patient has improved considerably on bsabx without other known cause.     Recommendations:   -Agree with discontinuing vancomycin  -Continue zosyn and await further susceptibilities    Thank you for your consult. I will follow-up with patient. Please contact us if you have any additional questions.    Keysha Bermudez MD  Infectious Disease  [facility]    Subjective:     Principal Problem: Sepsis    HPI: Ms. Mayer is a pleasant 89yo woman with PMH of HLD, CAD, HTN, and COPD on 2.5L of oxygen at home s/p recurrent right spontaneous PTX with pneumostat in place for the past 1 month.  ID was consulted for pseudomonas growing in pleural fluid and evaluation for colonization.     She initially presented with lethargy and F 103.5 with WBC 16 and was started on vancomycin & zosyn.      Before she came into the hospital she went to Thoracic surgery clinic on 9/6 with Dr. Mckeon for a CT surgery follow-up after being recently discharged from the hospital (7/28-8/09) for spontaneous right pneumothorax with persistent air leak that failed bedside pleurodesis, endobronchial valve replacement, and VATS pleurodesis. No air leak was noted and her chest tube was clamped. After the appointment, patient's daughter unclamped her chest tube because she was instructed that she could do so if patient became short of breath.     She denies shortness of breath at this time. She states she has a chronic cough but there has been no change. She had the chest tube removed earlier today and and breathing comfortably on her home oxygen. She is in no distress.     Past Medical History:   Diagnosis Date    Acute on chronic congestive heart failure 7/6/2019    Cardiomyopathy     Carotid artery occlusion     CHF (congestive heart failure)     COPD (chronic obstructive pulmonary disease)     Coronary artery disease     Hyperlipidemia     Hypertension        Past Surgical History:   Procedure Laterality Date    BRONCHOSCOPY, FIBEROPTIC Flexible N/A 7/31/2019    Performed by [facility] at [facility] OR [address] [place] [address]    CARDIAC CATHETERIZATION      cardic cath      CAROTID ENDARTERECTOMY      HIP SURGERY      PLEURODESIS N/A 7/31/2019    Performed by Klever [facility]on MD at [facility] OR [address] [place] [address]    PLEURODESIS, USING TALC Right 8/5/2019    Performed by Klever [facility]on MD at [facility] OR [address] [place] [address]    VATS, WITH PLEURAL TENT Right 8/5/2019    Performed by Klever Mckeon MD at [facility] OR [address] [place] [address]       Review of patient's allergies indicates:   Allergen Reactions    Ancef in dextrose (iso-osm) Rash    Cefazolin Rash    Cefuroxime Rash    Sulfamethoxazole-trimethoprim Rash     Other reaction(s): Rash       Medications:  Medications Prior to Admission   Medication Sig    acetaminophen (TYLENOL) 500 MG tablet  Take 1,000 mg by mouth daily as needed for Pain.    albuterol (PROAIR HFA) 90 mcg/actuation inhaler Inhale 1 puff into the lungs every 6 (six) hours as needed for Wheezing or Shortness of Breath. Rescue    albuterol (PROVENTIL) 2.5 mg /3 mL (0.083 %) nebulizer solution Take 2.5 mg by nebulization every 6 (six) hours as needed for Wheezing or Shortness of Breath. Rescue    amLODIPine (NORVASC) 2.5 MG tablet Take 1 tablet (2.5 mg total) by mouth once daily. (Patient taking differently: Take 2.5 mg by mouth daily as needed (for blood pressure >155). )    aspirin 81 mg Tab Take 1 tablet by mouth once daily.     azelastine (ASTELIN) 137 mcg (0.1 %) nasal spray 2 sprays by Nasal route 2 (two) times daily.    ferrous sulfate 325 (65 FE) MG EC tablet Take 1 tablet (325 mg total) by mouth once daily.    furosemide (LASIX) 40 MG tablet Take 1 tablet (40 mg total) by mouth once daily.    ipratropium (ATROVENT) 42 mcg (0.06 %) nasal spray 2 sprays by Nasal route 2 (two) times daily.    multivitamin (THERAGRAN) per tablet Take 1 tablet by mouth once daily.    potassium chloride (MICRO-K) 10 MEQ CpSR Take 10 mEq by mouth once daily.    rOPINIRole (REQUIP) 0.25 MG tablet Take 1 tablet (0.25 mg total) by mouth every evening.    tiotropium (SPIRIVA) 18 mcg inhalation capsule Inhale 1 capsule (18 mcg total) into the lungs once daily.     Antibiotics (From admission, onward)    Start     Stop Route Frequency Ordered    09/07/19 0515  piperacillin-tazobactam 4.5 g in sodium chloride 0.9% 100 mL IVPB (ready to mix system)      -- IV Every 8 hours (non-standard times) 09/07/19 0410        Antifungals (From admission, onward)    None        Antivirals (From admission, onward)    None           Immunization History   Administered Date(s) Administered    Tdap 02/27/2015       Family History     Problem Relation (Age of Onset)    Hypertension Mother        Social History     Socioeconomic History    Marital status:       Spouse name: Not on file    Number of children: Not on file    Years of education: Not on file    Highest education level: Not on file   Occupational History    Not on file   Social Needs    Financial resource strain: Not on file    Food insecurity:     Worry: Not on file     Inability: Not on file    Transportation needs:     Medical: Not on file     Non-medical: Not on file   Tobacco Use    Smoking status: Former Smoker     Packs/day: 2.00     Years: 20.00     Pack years: 40.00     Types: Cigarettes     Last attempt to quit: 1980     Years since quittin.6    Smokeless tobacco: Never Used   Substance and Sexual Activity    Alcohol use: Yes     Alcohol/week: 1.2 oz     Types: 2 Glasses of wine per week     Comment: social    Drug use: No    Sexual activity: Not Currently   Lifestyle    Physical activity:     Days per week: Not on file     Minutes per session: Not on file    Stress: Not on file   Relationships    Social connections:     Talks on phone: Not on file     Gets together: Not on file     Attends [employer] service: Not on file     Active member of club or organization: Not on file     Attends meetings of clubs or organizations: Not on file     Relationship status: Not on file   Other Topics Concern    Not on file   Social History Narrative    Not on file     Review of Systems   Constitutional: Negative for activity change, chills, fatigue, fever and unexpected weight change.   HENT: Negative for congestion.    Eyes: Negative for visual disturbance.   Respiratory: Negative for cough, choking, chest tightness, shortness of breath, wheezing and stridor.    Cardiovascular: Negative for chest pain, palpitations and leg swelling.   Gastrointestinal: Negative for abdominal distention, abdominal pain, blood in stool, constipation, diarrhea, nausea and vomiting.   Endocrine: Negative for polyuria.   Genitourinary: Negative for difficulty urinating, dysuria, flank pain, frequency, urgency  and vaginal discharge.   Musculoskeletal: Negative for arthralgias and gait problem.   Neurological: Negative for dizziness, tremors, seizures, facial asymmetry, weakness, numbness and headaches.   Psychiatric/Behavioral: Negative for agitation, behavioral problems, confusion and decreased concentration.     Objective:     Vital Signs (Most Recent):  Temp: 98.6 °F (37 °C) (09/09/19 1107)  Pulse: 95 (09/09/19 1110)  Resp: 20 (09/09/19 1107)  BP: (!) 130/59 (09/09/19 1107)  SpO2: 98 % (09/09/19 1107) Vital Signs (24h Range):  Temp:  [97.2 °F (36.2 °C)-98.6 °F (37 °C)] 98.6 °F (37 °C)  Pulse:  [] 95  Resp:  [14-20] 20  SpO2:  [90 %-99 %] 98 %  BP: (118-151)/(58-68) 130/59     Weight: 40.4 kg (89 lb 1.1 oz)  Body mass index is 17.39 kg/m².    Estimated Creatinine Clearance: 23.8 mL/min (based on SCr of 1 mg/dL).    Physical Exam   Constitutional: She is oriented to person, place, and time. She appears well-developed and well-nourished. No distress.   HENT:   Head: Normocephalic and atraumatic.   Eyes: Pupils are equal, round, and reactive to light. EOM are normal.   Neck: Normal range of motion. Neck supple. No JVD present. No thyromegaly present.   Cardiovascular: Normal rate, regular rhythm, normal heart sounds and intact distal pulses. Exam reveals no gallop and no friction rub.   No murmur heard.  Pulmonary/Chest: Effort normal and breath sounds normal. No stridor. No respiratory distress. She has no wheezes. She has no rales. She exhibits no tenderness.   On nasal cannula    Abdominal: Soft. Bowel sounds are normal. She exhibits no distension. There is no tenderness. There is no guarding.   Musculoskeletal: Normal range of motion.   Neurological: She is alert and oriented to person, place, and time. She displays normal reflexes. No cranial nerve deficit or sensory deficit. She exhibits normal muscle tone. Coordination normal.   Skin: Skin is warm and dry. Capillary refill takes less than 2 seconds. She is not  diaphoretic.   Psychiatric: She has a normal mood and affect. Her behavior is normal. Judgment and thought content normal.       Significant Labs:   CBC:   Recent Labs   Lab 09/08/19 0458 09/09/19  0502   WBC 9.96 10.59   HGB 8.5* 9.1*   HCT 27.4* 28.4*   * 164     CMP:   Recent Labs   Lab 09/08/19 0458 09/09/19  0502    137   K 3.4* 3.7   CL 94* 94*   CO2 34* 33*   GLU 87 86   BUN 21 18   CREATININE 0.9 1.0   CALCIUM 9.9 10.0   PROT 5.4*  --    ALBUMIN 2.3*  --    BILITOT 0.5  --    ALKPHOS 87  --    AST 21  --    ALT 16  --    ANIONGAP 9 10   EGFRNONAA 56.5* 49.7*     Coagulation: No results for input(s): PT, INR, APTT in the last 48 hours.    Significant Imaging: I have reviewed all pertinent imaging results/findings within the past 24 hours.

## 2019-09-09 NOTE — PROGRESS NOTES
[facility] Hospital Medicine  Progress Note    Patient Name: Venus Mayer  MRN: [id number]  Patient Class: IP- Inpatient   Admission Date: 9/6/2019  Length of Stay: 3 days  Attending Physician: Maria Elena Bolden MD  Primary Care Provider: Jona Che MD    [facility] Medicine Team: [facility] HOSP MED 4 Zay Farrell MD    Subjective:     Principal Problem:Sepsis        HPI:  Ms. Mayer is a 89-year-old lady with PMH COPD on 2.5 L O2 at home, recurrent pneumothoraces with failed conservative management and VATS pleurodesis with a chest tube in place connected to a pneumostat, HTN, CAD, HLD who presents with weakness. Per her daughter, Ms. Mayer was complaining of feeling cold, which is normal for her, however today she was visibly shaking. She went to a doctor's appointment, after which she was visibly weak in her knees, struggling to walk with her walker and had to sit down. She was lethargic but alert and oriented and felt hot to the touch, however without objective fevers at home.     She went to her doctor's appointment earlier that day on 9/6 with Dr. Mckeon for a CT surgery follow-up after being recently discharged from the hospital (7/28-8/09) for spontaneous right pneumothorax with persistent air leak that failed bedside pleurodesis, endobronchial valve replacement, and VATS pleurodesis. There was a family discussion for hospital or AIM however just prior to discharge the orders were revoked and she went home with . Persistent air leak 2 weeks ago. She had a CXR at her appointment for assessment of air leak. Per chart review, Dr. Mckeon noted no air leak, clamped her chest tube, and scheduled her to follow-up on 9/11 for a repeat CXR. After the appointment, patient's daughter unclamped her chest tube because she was instructed that she could do so if patient became short of breath. Of note, patient has not been short of breath at any point during the day or in the ED, although  she endorses a chronic nonproductive cough that has not changed acutely. Fever in ED to 103. She denies chest pain and dysuria. She has a right-sided chest tube in place connected to a pneumostat that is filled with yellow fluid which her daughter says is normal/unchanged.     Overview/Hospital Course:  She was admitted to hospital medicine for concerns of sepsis, secondary to empyema. Managed with Zosyn. Cultures from Pleural fluid grew  Pseudomonas and transitioned to Ciprofloxacin on 9/9/19. ID following and to determine duration. CTS consulted for CT management and have clamped tube in anticipation of removal of CT.   On 09/08/2019, the patient and two of her daughters (one present in the room and the other one on speaker phone) were asked regarding her code status and stated she was DNR. This information was recorded in EMR and in the patient's chart.     Interval History:   Patients CT is clamped, currently denies and increased SOB.    Review of Systems   Constitutional: Negative for activity change, appetite change, chills and fever.   HENT: Negative for congestion and sore throat.    Respiratory: Negative for cough, chest tightness and shortness of breath.    Cardiovascular: Negative for chest pain and palpitations.   Gastrointestinal: Positive for constipation. Negative for abdominal pain, diarrhea, nausea and vomiting.   Genitourinary: Negative for dysuria and hematuria.   Musculoskeletal: Positive for back pain (according to patient from lying down in bed). Negative for neck pain.   Skin: Negative for color change and rash.   Neurological: Negative for dizziness and headaches.     Objective:     Vital Signs (Most Recent):  Temp: 98.6 °F (37 °C) (09/09/19 1107)  Pulse: 83 (09/09/19 1500)  Resp: 20 (09/09/19 1107)  BP: (!) 130/59 (09/09/19 1107)  SpO2: (!) 93 % (09/09/19 1700) Vital Signs (24h Range):  Temp:  [97.3 °F (36.3 °C)-98.6 °F (37 °C)] 98.6 °F (37 °C)  Pulse:  [] 83  Resp:  [14-20] 20  SpO2:   [90 %-99 %] 93 %  BP: (123-151)/(58-68) 130/59     Weight: 40.4 kg (89 lb 1.1 oz)  Body mass index is 17.39 kg/m².  No intake or output data in the 24 hours ending 09/09/19 1757   Physical Exam   Constitutional: Vital signs are normal. She appears cachectic. She does not appear ill. No distress. Nasal cannula in place.   HENT:   Head: Normocephalic and atraumatic.   Eyes: Conjunctivae are normal. No scleral icterus.   Cardiovascular: Normal rate, regular rhythm and normal heart sounds.   No murmur heard.  Pulses:       Radial pulses are 2+ on the right side, and 2+ on the left side.   Pulmonary/Chest: Breath sounds normal.   Chest tube site clean.   On NC   Abdominal: Soft. Normal appearance and bowel sounds are normal. She exhibits no distension. There is no tenderness.   Musculoskeletal: She exhibits no edema, tenderness or deformity.   Neurological: She is alert. She is not disoriented.   Skin: Skin is warm. No rash noted. She is not diaphoretic.       Significant Labs:   BMP:   Recent Labs   Lab 09/08/19  0458 09/09/19  0502   GLU 87 86    137   K 3.4* 3.7   CL 94* 94*   CO2 34* 33*   BUN 21 18   CREATININE 0.9 1.0   CALCIUM 9.9 10.0   MG 2.2  --      CBC:   Recent Labs   Lab 09/08/19  0458 09/09/19  0502   WBC 9.96 10.59   HGB 8.5* 9.1*   HCT 27.4* 28.4*   * 164       Significant Imaging: I have reviewed all pertinent imaging results/findings within the past 24 hours.      Assessment/Plan:      * Sepsis  On arrival at the ED, the patient was febrile to 103.5, BP 98/54, leukocytosis to 16.29. Met SIRS criteria. BP improved to 130s after 500 cc bolus LR in ED and initiation of  cc/hr x 5 hours. UA negative. CXR with chronic changes. Lactic acid 1. Received vancomycin and zosyn in the ED.     Infectious workup performed:  -Blood cultures: NGTD  -Pneumostat fliud culture: Pseudomonas     Plan:  -Change zosyn to Ciprofloxacin  - IS consulted for durations of antibiotics  -Follow-up blood  cultures  -CTS consulted for possible chest tube removal ( CT clamped)    Empyema  CTS consulted for possibility of source control with chest tube removal; they suspect chest tube is not likely the source of infection but will clamp the tube on 09/09/2019, wait 24hrs, and determine whether it will be removed or not.     - See Sepsis    Spontaneous pneumothorax  Patient with history of recurrent spontaneous pneumothoraces that have failed multiple treatment attempts. CTS consulted on admission to determine chest tube management.     Plan:  -Continue working with CTS       COLD (chronic obstructive lung disease)  On 2.5L home oxygen.     Plan:  -Wean off O2 as tolerated   -Continue home albuterol 1 puff q6 PRN  -Continue home tiotropium 18 micrograms daily      Chronic diastolic heart failure  TTE on 7/7/19 showed normal left ventricular systolic function, EF 65%, pulmonary hypertension present with estimated PA systolic pressure is 52 mm Hg. Initially held home furosemide in the setting of suspected sepsis; now okay to continue home furosemide.     Plan:  -Can continue home furosemide      Hypercalcemia  Corrected calcium 10.9 upon admission. PTH 20.  Normal ionized calcium.       Coronary artery disease  Plan:  -Continue home aspirin 81    Hypertension  On home amlodipine 5mg qhs. Initially held due to concerns of sepsis. Normotensive and hypertensive in ED, and following admission to hospital medicine. Will resume antihypertensive medication.    Plan:  -Continue w home amlodipine 5mg    Restless leg syndrome  Plan:  -Continue home ropinirole       Iron deficiency  Plan:  -Continue home ferrous sulfate 325      Debility  Plan:  -PT/OT following        Constipation  No BMs for the past 3-4 days.     Plan:  -S/p senna-docusate and miralax          VTE Risk Mitigation (From admission, onward)        Ordered     enoxaparin injection 30 mg  Daily      09/07/19 8277     IP VTE HIGH RISK PATIENT  Once      09/07/19 7597      Place sequential compression device  Until discontinued      09/07/19 0459                Zay Farrell MD  Department of Hospital Medicine   [facility]

## 2019-09-09 NOTE — ASSESSMENT & PLAN NOTE
89 year old female with PMH of HLD, CAD, HTN, and COPD on 2.5L O2 s/p recurrent right spontaneous pneumothorax with chest tube pneumostat in place for 1 month presents initially with sepsis, fever, leukocytosis, hypotension. She has clinically improved with broad spectrum abx over the course of the past 2 days. Pleural fluid growing Pseudomonas. S/p chest tube removal today. Currently HDS.     CXR: small right sided pleural effusion  BLcx NGTD, UA clean    Assessment: She has had the chest tube for 1 month. Likely to be colonization per surgery. Pseudomonas likely to be contaminant but patient has improved considerably on bsabx without other known cause.     Recommendations:   -Agree with discontinuing vancomycin  -Continue zosyn and await further susceptibilities

## 2019-09-09 NOTE — PLAN OF CARE
09/09/19 1506   Discharge Assessment   Assessment Type Discharge Planning Assessment   Confirmed/corrected address and phone number on facesheet? Yes   Assessment information obtained from? Patient;Caregiver   Expected Length of Stay (days) 5   Communicated expected length of stay with patient/caregiver yes   Prior to hospitilization cognitive status: Alert/Oriented   Prior to hospitalization functional status: Assistive Equipment   Current cognitive status: Alert/Oriented   Current Functional Status: Assistive Equipment   Lives With alone   Able to Return to Prior Arrangements yes   Is patient able to care for self after discharge? Unable to determine at this time (comments)   Who are your caregiver(s) and their phone number(s)? [place] [phone]   Patient's perception of discharge disposition home health   Readmission Within the Last 30 Days current reason for admission unrelated to previous admission   If yes, most recent facility name: [facility]    Patient currently being followed by outpatient case management? No   Patient currently receives any other outside agency services? Yes   Name and contact number of agency or person providing outside services [facility]    Is it the patient/care giver preference to resume care with the current outside agency? Yes   Equipment Currently Used at Home bedside commode;bath bench;walker, rolling;oxygen;nebulizer   Do you have any problems affording any of your prescribed medications? No   Is the patient taking medications as prescribed? yes   Does the patient have transportation home? Yes   Transportation Anticipated family or friend will provide   Does the patient receive services at the [facility]? No   Discharge Plan A Home Health   Discharge Plan B Skilled Nursing Facility   DME Needed Upon Discharge  none   Patient/Family in Agreement with Plan yes       O2 provided through [facility]

## 2019-09-09 NOTE — SUBJECTIVE & OBJECTIVE
Interval History:   Patients CT is clamped, currently denies and increased SOB.    Review of Systems   Constitutional: Negative for activity change, appetite change, chills and fever.   HENT: Negative for congestion and sore throat.    Respiratory: Negative for cough, chest tightness and shortness of breath.    Cardiovascular: Negative for chest pain and palpitations.   Gastrointestinal: Positive for constipation. Negative for abdominal pain, diarrhea, nausea and vomiting.   Genitourinary: Negative for dysuria and hematuria.   Musculoskeletal: Positive for back pain (according to patient from lying down in bed). Negative for neck pain.   Skin: Negative for color change and rash.   Neurological: Negative for dizziness and headaches.     Objective:     Vital Signs (Most Recent):  Temp: 98.6 °F (37 °C) (09/09/19 1107)  Pulse: 83 (09/09/19 1500)  Resp: 20 (09/09/19 1107)  BP: (!) 130/59 (09/09/19 1107)  SpO2: (!) 93 % (09/09/19 1700) Vital Signs (24h Range):  Temp:  [97.3 °F (36.3 °C)-98.6 °F (37 °C)] 98.6 °F (37 °C)  Pulse:  [] 83  Resp:  [14-20] 20  SpO2:  [90 %-99 %] 93 %  BP: (123-151)/(58-68) 130/59     Weight: 40.4 kg (89 lb 1.1 oz)  Body mass index is 17.39 kg/m².  No intake or output data in the 24 hours ending 09/09/19 1757   Physical Exam   Constitutional: Vital signs are normal. She appears cachectic. She does not appear ill. No distress. Nasal cannula in place.   HENT:   Head: Normocephalic and atraumatic.   Eyes: Conjunctivae are normal. No scleral icterus.   Cardiovascular: Normal rate, regular rhythm and normal heart sounds.   No murmur heard.  Pulses:       Radial pulses are 2+ on the right side, and 2+ on the left side.   Pulmonary/Chest: Breath sounds normal.   Chest tube site clean.   On NC   Abdominal: Soft. Normal appearance and bowel sounds are normal. She exhibits no distension. There is no tenderness.   Musculoskeletal: She exhibits no edema, tenderness or deformity.   Neurological: She is  alert. She is not disoriented.   Skin: Skin is warm. No rash noted. She is not diaphoretic.       Significant Labs:   BMP:   Recent Labs   Lab 09/08/19  0458 09/09/19  0502   GLU 87 86    137   K 3.4* 3.7   CL 94* 94*   CO2 34* 33*   BUN 21 18   CREATININE 0.9 1.0   CALCIUM 9.9 10.0   MG 2.2  --      CBC:   Recent Labs   Lab 09/08/19 0458 09/09/19  0502   WBC 9.96 10.59   HGB 8.5* 9.1*   HCT 27.4* 28.4*   * 164       Significant Imaging: I have reviewed all pertinent imaging results/findings within the past 24 hours.

## 2019-09-09 NOTE — PT/OT/SLP EVAL
Occupational Therapy   Evaluation    Name: Venus Mayer  MRN: [id number]  Admitting Diagnosis:  Sepsis      Recommendations:     Discharge Recommendations: home health OT, home with home health(Simultaneous filing. User may not have seen previous data.)  Discharge Equipment Recommendations:  none(Simultaneous filing. User may not have seen previous data.)  Barriers to discharge:  None    Assessment:     Venus Mayer is a 89 y.o. female with a medical diagnosis of Sepsis.  She presents supine with daughter present.  Pt lives alone but has had 24 hour supervision and assistance since March when she broke her L hip after a fall.  Caregivers provided meals and assist with home management and self care as well as safety supervision.  Pt with poor endurance and decreased performance with self care at present and will benefit from skilled OT for max gains and return to PLOF. Max A for lower extremity dressing and CGA for grooming and oral care in bed.  Pt with previous home care and would benefit from continued care. Family very supportive and available to assist.  Performance deficits affecting function: impaired endurance, impaired self care skills, impaired functional mobilty, gait instability(Simultaneous filing. User may not have seen previous data.).      Rehab Prognosis: Good; patient would benefit from acute skilled OT services to address these deficits and reach maximum level of function.       Plan:     Patient to be seen 3 x/week to address the above listed problems via self-care/home management, therapeutic activities, therapeutic exercises  · Plan of Care Expires:    · Plan of Care Reviewed with: patient, daughter(Simultaneous filing. User may not have seen previous data.)    Subjective     Chief Complaint: decreased self care and safety   Patient/Family Comments/goals: return to home and PLOF     Occupational Profile:  Living Environment: Pt lives in her 1 story home with 24 supervision and care from family  and paid caregivers   Previous level of function: Pt with overall CGA/min A for self care completion prior to admission   Equipment Used at Home:  3-in-1 commode, bath bench, cane, straight, walker, rolling(Simultaneous filing. User may not have seen previous data.)  Assistance upon Discharge: 24 hour assist     Pain/Comfort:  · Pain Rating 1: 0/10(Simultaneous filing. User may not have seen previous data.)    Patients cultural, spiritual, [employer] conflicts given the current situation: yes(Simultaneous filing. User may not have seen previous data.)    Objective:     Communicated with: RN prior to session.  Patient found supine with oxygen(Simultaneous filing. User may not have seen previous data.) upon OT entry to room.    General Precautions: Standard, fall(Simultaneous filing. User may not have seen previous data.)   Orthopedic Precautions:N/A(Simultaneous filing. User may not have seen previous data.)   Braces:       Occupational Performance:    Bed Mobility:    · Pt min A for supine to sit edge of bed with railing use     Functional Mobility/Transfers:  · Patient completed Sit <> Stand Transfer with minimum assistance  with  rolling walker   · Patient completed Toilet Transfer Step Transfer technique with minimum assistance with  4 wheeled walker  · Functional Mobility: Pt with rolling walker and Min A for safe mobility in room and bathroom    Activities of Daily Living:  · Feeding:  independence    · Grooming: minimum assistance    · Upper Body Dressing: minimum assistance    · Lower Body Dressing: moderate assistance    · Toileting: minimum assistance      Cognitive/Visual Perceptual:  Cognitive/Psychosocial Skills:     -       Oriented to: Person, Place, Time and Situation   -       Follows Commands/attention:Follows two-step commands  -       Communication: clear/fluent  -       Memory: No Deficits noted  -       Safety awareness/insight to disability: intact   -       Mood/Affect/Coping skills/emotional  control: Appropriate to situation    Physical Exam:  Upper Extremity Range of Motion:     -       Right Upper Extremity: WFL  -       Left Upper Extremity: WFL  Upper Extremity Strength:    -       Right Upper Extremity: WFL  -       Left Upper Extremity: WFL    AMPAC 6 Click ADL:  AMPAC Total Score: 16    Treatment & Education:  Evaluation complete and goals set.  Cont with POC  Pt educated on safety, role of OT, importance of increased participation in self care for gains , expectations for participation, expectations for gains, POC, energy conservation, caregiver strain. White board updated.   - ADL training for safe toilet transfer and completion and clothing management   Education:    Patient left supine with all lines intact    GOALS:   Multidisciplinary Problems     Occupational Therapy Goals        Problem: Occupational Therapy Goal    Goal Priority Disciplines Outcome Interventions   Occupational Therapy Goal     OT, PT/OT Ongoing (interventions implemented as appropriate)    Description:  Goals to be met by: 9/29/19    Patient will increase functional independence with ADLs by performing:    UE Dressing with [place].  LE Dressing with Stand-by Assistance.  Grooming while seated with Contact Guard Assistance.  Toileting from toilet with Contact Guard Assistance for hygiene and clothing management.   Bathing from  shower chair/bench with Minimal Assistance.                      History:     Past Medical History:   Diagnosis Date    Acute on chronic congestive heart failure 7/6/2019    Cardiomyopathy     Carotid artery occlusion     CHF (congestive heart failure)     COPD (chronic obstructive pulmonary disease)     Coronary artery disease     Hyperlipidemia     Hypertension        Past Surgical History:   Procedure Laterality Date    BRONCHOSCOPY, FIBEROPTIC Flexible N/A 7/31/2019    [facility]    CARDIAC CATHETERIZATION      [facility] cath       CAROTID ENDARTERECTOMY      HIP SURGERY      PLEURODESIS N/A 7/31/2019    [facility] at [facility]    PLEURODESIS, USING TALC Right 8/5/2019    Performed by [facility] at [facility] OR [facility]    VATS, WITH PLEURAL TENT Right 8/5/2019    Performed by Klever [facility] MD at [facility] OR [facility]       Time Tracking:     OT Date of Treatment: 09/09/19  OT Start Time: 1020  OT Stop Time: 1046  OT Total Time (min): 26 min    Billable Minutes:Evaluation 10  Self Care/Home Management 16    Jovanna Mccullough, OT  9/9/2019

## 2019-09-09 NOTE — SUBJECTIVE & OBJECTIVE
Past Medical History:   Diagnosis Date    Acute on chronic congestive heart failure 7/6/2019    Cardiomyopathy     Carotid artery occlusion     CHF (congestive heart failure)     COPD (chronic obstructive pulmonary disease)     Coronary artery disease     Hyperlipidemia     Hypertension        Past Surgical History:   Procedure Laterality Date    BRONCHOSCOPY, FIBEROPTIC Flexible N/A 7/31/2019    [facility]    CARDIAC CATHETERIZATION      cardic cath      CAROTID ENDARTERECTOMY      HIP SURGERY      PLEURODESIS N/A 7/31/2019    [facility]    PLEURODESIS, USING TALC Right 8/5/2019    Performed by Klever Mcekon MD at [facility] OR [address] [place] [address]    VATS, WITH PLEURAL TENT Right 8/5/2019    Performed by Klever Mckeon MD at [facility] OR [address] [place] [address]       Review of patient's allergies indicates:   Allergen Reactions    Ancef in dextrose (iso-osm) Rash    Cefazolin Rash    Cefuroxime Rash    Sulfamethoxazole-trimethoprim Rash     Other reaction(s): Rash       Medications:  Medications Prior to Admission   Medication Sig    acetaminophen (TYLENOL) 500 MG tablet Take 1,000 mg by mouth daily as needed for Pain.    albuterol (PROAIR HFA) 90 mcg/actuation inhaler Inhale 1 puff into the lungs every 6 (six) hours as needed for Wheezing or Shortness of Breath. Rescue    albuterol (PROVENTIL) 2.5 mg /3 mL (0.083 %) nebulizer solution Take 2.5 mg by nebulization every 6 (six) hours as needed for Wheezing or Shortness of Breath. Rescue    amLODIPine (NORVASC) 2.5 MG tablet Take 1 tablet (2.5 mg total) by mouth once daily. (Patient taking differently: Take 2.5 mg by mouth daily as needed (for blood pressure >155). )    aspirin 81 mg Tab Take 1 tablet by mouth once daily.     azelastine (ASTELIN) 137 mcg (0.1 %) nasal spray 2 sprays by Nasal route 2 (two) times daily.    ferrous sulfate 325 (65 FE) MG EC tablet Take 1 tablet (325  mg total) by mouth once daily.    furosemide (LASIX) 40 MG tablet Take 1 tablet (40 mg total) by mouth once daily.    ipratropium (ATROVENT) 42 mcg (0.06 %) nasal spray 2 sprays by Nasal route 2 (two) times daily.    multivitamin (THERAGRAN) per tablet Take 1 tablet by mouth once daily.    potassium chloride (MICRO-K) 10 MEQ CpSR Take 10 mEq by mouth once daily.    rOPINIRole (REQUIP) 0.25 MG tablet Take 1 tablet (0.25 mg total) by mouth every evening.    tiotropium (SPIRIVA) 18 mcg inhalation capsule Inhale 1 capsule (18 mcg total) into the lungs once daily.     Antibiotics (From admission, onward)    Start     Stop Route Frequency Ordered    19 0515  piperacillin-tazobactam 4.5 g in sodium chloride 0.9% 100 mL IVPB (ready to mix system)      -- IV Every 8 hours (non-standard times) 19 0410        Antifungals (From admission, onward)    None        Antivirals (From admission, onward)    None           Immunization History   Administered Date(s) Administered    Tdap 2015       Family History     Problem Relation (Age of Onset)    Hypertension Mother        Social History     Socioeconomic History    Marital status:      Spouse name: Not on file    Number of children: Not on file    Years of education: Not on file    Highest education level: Not on file   Occupational History    Not on file   Social Needs    Financial resource strain: Not on file    Food insecurity:     Worry: Not on file     Inability: Not on file    Transportation needs:     Medical: Not on file     Non-medical: Not on file   Tobacco Use    Smoking status: Former Smoker     Packs/day: 2.00     Years: 20.00     Pack years: 40.00     Types: Cigarettes     Last attempt to quit: 1980     Years since quittin.6    Smokeless tobacco: Never Used   Substance and Sexual Activity    Alcohol use: Yes     Alcohol/week: 1.2 oz     Types: 2 Glasses of wine per week     Comment: social    Drug use: No     Sexual activity: Not Currently   Lifestyle    Physical activity:     Days per week: Not on file     Minutes per session: Not on file    Stress: Not on file   Relationships    Social connections:     Talks on phone: Not on file     Gets together: Not on file     Attends [employer] service: Not on file     Active member of club or organization: Not on file     Attends meetings of clubs or organizations: Not on file     Relationship status: Not on file   Other Topics Concern    Not on file   Social History Narrative    Not on file     Review of Systems   Constitutional: Negative for activity change, chills, fatigue, fever and unexpected weight change.   HENT: Negative for congestion.    Eyes: Negative for visual disturbance.   Respiratory: Negative for cough, choking, chest tightness, shortness of breath, wheezing and stridor.    Cardiovascular: Negative for chest pain, palpitations and leg swelling.   Gastrointestinal: Negative for abdominal distention, abdominal pain, blood in stool, constipation, diarrhea, nausea and vomiting.   Endocrine: Negative for polyuria.   Genitourinary: Negative for difficulty urinating, dysuria, flank pain, frequency, urgency and vaginal discharge.   Musculoskeletal: Negative for arthralgias and gait problem.   Neurological: Negative for dizziness, tremors, seizures, facial asymmetry, weakness, numbness and headaches.   Psychiatric/Behavioral: Negative for agitation, behavioral problems, confusion and decreased concentration.     Objective:     Vital Signs (Most Recent):  Temp: 98.6 °F (37 °C) (09/09/19 1107)  Pulse: 95 (09/09/19 1110)  Resp: 20 (09/09/19 1107)  BP: (!) 130/59 (09/09/19 1107)  SpO2: 98 % (09/09/19 1107) Vital Signs (24h Range):  Temp:  [97.2 °F (36.2 °C)-98.6 °F (37 °C)] 98.6 °F (37 °C)  Pulse:  [] 95  Resp:  [14-20] 20  SpO2:  [90 %-99 %] 98 %  BP: (118-151)/(58-68) 130/59     Weight: 40.4 kg (89 lb 1.1 oz)  Body mass index is 17.39 kg/m².    Estimated  Creatinine Clearance: 23.8 mL/min (based on SCr of 1 mg/dL).    Physical Exam   Constitutional: She is oriented to person, place, and time. She appears well-developed and well-nourished. No distress.   HENT:   Head: Normocephalic and atraumatic.   Eyes: Pupils are equal, round, and reactive to light. EOM are normal.   Neck: Normal range of motion. Neck supple. No JVD present. No thyromegaly present.   Cardiovascular: Normal rate, regular rhythm, normal heart sounds and intact distal pulses. Exam reveals no gallop and no friction rub.   No murmur heard.  Pulmonary/Chest: Effort normal and breath sounds normal. No stridor. No respiratory distress. She has no wheezes. She has no rales. She exhibits no tenderness.   On nasal cannula    Abdominal: Soft. Bowel sounds are normal. She exhibits no distension. There is no tenderness. There is no guarding.   Musculoskeletal: Normal range of motion.   Neurological: She is alert and oriented to person, place, and time. She displays normal reflexes. No cranial nerve deficit or sensory deficit. She exhibits normal muscle tone. Coordination normal.   Skin: Skin is warm and dry. Capillary refill takes less than 2 seconds. She is not diaphoretic.   Psychiatric: She has a normal mood and affect. Her behavior is normal. Judgment and thought content normal.       Significant Labs:   CBC:   Recent Labs   Lab 09/08/19  0458 09/09/19  0502   WBC 9.96 10.59   HGB 8.5* 9.1*   HCT 27.4* 28.4*   * 164     CMP:   Recent Labs   Lab 09/08/19  0458 09/09/19  0502    137   K 3.4* 3.7   CL 94* 94*   CO2 34* 33*   GLU 87 86   BUN 21 18   CREATININE 0.9 1.0   CALCIUM 9.9 10.0   PROT 5.4*  --    ALBUMIN 2.3*  --    BILITOT 0.5  --    ALKPHOS 87  --    AST 21  --    ALT 16  --    ANIONGAP 9 10   EGFRNONAA 56.5* 49.7*     Coagulation: No results for input(s): PT, INR, APTT in the last 48 hours.    Significant Imaging: I have reviewed all pertinent imaging results/findings within the past 24  hours.

## 2019-09-09 NOTE — ASSESSMENT & PLAN NOTE
CTS consulted for possibility of source control with chest tube removal; they suspect chest tube is not likely the source of infection but will clamp the tube on 09/09/2019, wait 24hrs, and determine whether it will be removed or not.     - See Sepsis

## 2019-09-09 NOTE — ASSESSMENT & PLAN NOTE
89yo F admitted for sepsis    - CT to pneumostat  - chest tube clamped this morning. Repeat CXR midday. If CXR stable will likely keep tube clamped overnight and consider removal in the morning.   - no leukocytosis, fluid and tube are likely colonized. Will work towards tube removal.   - Will continue to follow for chest tube management CT

## 2019-09-09 NOTE — PROGRESS NOTES
Pharmacokinetic Assessment Follow Up: IV Vancomycin    Therapy with vancomycin and consult discontinued by provider.  Pharmacy will sign off, please re-consult as needed.      Alda De Dios, PharmD, BCPS, Internal Medicine Clinical Pharmacy Specialist  [phone]

## 2019-09-09 NOTE — HPI
Ms. Mayer is a pleasant 89yo woman with PMH of HLD, CAD, HTN, and COPD on 2.5L of oxygen at home s/p recurrent right spontaneous PTX with pneumostat in place for the past 1 month.  ID was consulted for pseudomonas growing in pleural fluid and evaluation for colonization.     She initially presented with lethargy and F 103.5 with WBC 16 and was started on vancomycin & zosyn.     Before she came into the hospital she went to Thoracic surgery clinic on 9/6 with Dr. Mckeon for a CT surgery follow-up after being recently discharged from the hospital (7/28-8/09) for spontaneous right pneumothorax with persistent air leak that failed bedside pleurodesis, endobronchial valve replacement, and VATS pleurodesis. No air leak was noted and her chest tube was clamped. After the appointment, patient's daughter unclamped her chest tube because she was instructed that she could do so if patient became short of breath.     She denies shortness of breath at this time. She states she has a chronic cough but there has been no change. She had the chest tube removed earlier today and and breathing comfortably on her home oxygen. She is in no distress.

## 2019-09-09 NOTE — PLAN OF CARE
Problem: Physical Therapy Goal  Goal: Physical Therapy Goal  Goals to be met by: 2019     Patient will increase functional independence with mobility by performin. Supine to sit with Modified [place]  2. Sit to supine with Modified [place]  3. Sit to stand transfer with Supervision  4. Gait  x 150 feet with Supervision using Rolling Walker.   5. Ascend/descend 4 stair with bilateral Handrails Stand-by Assistance  Outcome: Ongoing (interventions implemented as appropriate)  Eval completed and POC established    Aristeo Flood PT,DPT  2019

## 2019-09-09 NOTE — PLAN OF CARE
Problem: Occupational Therapy Goal  Goal: Occupational Therapy Goal  Goals to be met by: 9/29/19    Patient will increase functional independence with ADLs by performing:    UE Dressing with [place].  LE Dressing with Stand-by Assistance.  Grooming while seated with Contact Guard Assistance.  Toileting from toilet with Contact Guard Assistance for hygiene and clothing management.   Bathing from  shower chair/bench with Minimal Assistance.    Outcome: Ongoing (interventions implemented as appropriate)  Evaluation complete and goals set.  Cont with POC  Jovanna Mccullough OT  9/9/2019

## 2019-09-09 NOTE — PROGRESS NOTES
[facility]  Thoracic Surgery  Progress Note    Subjective:     History of Present Illness:  89 year old female with history of HLD, CAD, HTN, and COPD on 3L of oxygen at home returns for follow up s/p recurrent right spontaneous pneumothorax with pneumostat in place. Extended hospital admission for persistent air leak. Attempted bedside pleurodesis, endobronchial valve placement, and VATS pleurodesis however air leak did not resolve. There was a family discussion for hospital or AIM however just prior to discharge the orders were revoked and she went home with . Persistent air leak 2 weeks ago. Returns today with CXR and for assessment of air leak. Today she reports feeling well. SOB can be improved with breathing treatments. Home O2 requirements have not changed.    Post-Op Info:  * No surgery found *         Interval History: NAEON. Leukocytosis resolved on vanc/zosyn. Chest tube with minimal output connected to pneumostat. No air leak. CXR stable.     Medications:  Continuous Infusions:  Scheduled Meds:   albuterol-ipratropium  3 mL Nebulization Q12H    aspirin  81 mg Oral Daily    enoxaparin  30 mg Subcutaneous Daily    ferrous sulfate  325 mg Oral Daily    furosemide  40 mg Oral Daily    multivitamin  1 tablet Oral Daily    piperacillin-tazobactam (ZOSYN) IVPB  4.5 g Intravenous Q8H    rOPINIRole  0.25 mg Oral QHS    tiotropium  18 mcg Inhalation Daily     PRN Meds:acetaminophen, albuterol, albuterol-ipratropium, Dextrose 10% Bolus, Dextrose 10% Bolus, glucagon (human recombinant), glucose, glucose, ondansetron, senna-docusate 8.6-50 mg, sodium chloride 0.9%, sodium chloride 0.9%     Review of patient's allergies indicates:   Allergen Reactions    Ancef in dextrose (iso-osm) Rash    Cefazolin Rash    Cefuroxime Rash    Sulfamethoxazole-trimethoprim Rash     Other reaction(s): Rash     Objective:     Vital Signs (Most Recent):  Temp: 97.9 °F (36.6 °C) (09/09/19 0347)  Pulse:  (!) 119 (09/09/19 0809)  Resp: 18 (09/09/19 0809)  BP: (!) 151/68 (09/09/19 0347)  SpO2: (!) 90 % (09/09/19 0809) Vital Signs (24h Range):  Temp:  [97.2 °F (36.2 °C)-97.9 °F (36.6 °C)] 97.9 °F (36.6 °C)  Pulse:  [] 119  Resp:  [14-20] 18  SpO2:  [90 %-99 %] 90 %  BP: (118-151)/(58-68) 151/68     Intake/Output - Last 3 Shifts       09/07 0700 - 09/08 0659 09/08 0700 - 09/09 0659 09/09 0700 - 09/10 0659    P.O.  600     I.V. (mL/kg) 600 (14.9)      IV Piggyback  300     Total Intake(mL/kg) 600 (14.9) 900 (22.3)     Urine (mL/kg/hr) 200 (0.2) 480 (0.5)     Chest Tube 7 18     Total Output 207 498     Net +393 +402            Urine Occurrence 1 x            SpO2: (!) 90 %  O2 Device (Oxygen Therapy): nasal cannula    Physical Exam   Constitutional: Vital signs are normal. She appears cachectic. Nasal cannula in place.   Cardiovascular: Normal rate and regular rhythm.   No murmur heard.  Pulses:       Radial pulses are 2+ on the right side, and 2+ on the left side.   Pulmonary/Chest:   Chest tube site clean, no secretions/discharge. It is not warm or erythematous. Mild tenderness observed at the site.    Abdominal: Soft. Normal appearance.   Musculoskeletal: She exhibits no edema or deformity.   Neurological: She is alert. She is not disoriented.   Skin: Skin is warm and dry.       Significant Labs:  BMP:   Recent Labs   Lab 09/08/19  0458 09/09/19  0502   GLU 87 86    137   K 3.4* 3.7   CL 94* 94*   CO2 34* 33*   BUN 21 18   CREATININE 0.9 1.0   CALCIUM 9.9 10.0   MG 2.2  --      CBC:   Recent Labs   Lab 09/09/19  0502   WBC 10.59   RBC 2.93*   HGB 9.1*   HCT 28.4*      MCV 97   MCH 31.1*   MCHC 32.0       Significant Diagnostics:  CXR: I have reviewed all pertinent results/findings within the past 24 hours    VTE Risk Mitigation (From admission, onward)        Ordered     enoxaparin injection 30 mg  Daily      09/07/19 1714     IP VTE HIGH RISK PATIENT  Once      09/07/19 0459     Place sequential  compression device  Until discontinued      09/07/19 5837        Assessment/Plan:     Spontaneous pneumothorax  89yo F admitted for sepsis    - CT to pneumostat  - chest tube clamped this morning. Repeat CXR midday. If CXR stable will likely keep tube clamped overnight and consider removal in the morning.   - no leukocytosis, fluid and tube are likely colonized. Will work towards tube removal.   - Will continue to follow for chest tube management CT    Addendum:   CXR stable after clamp trial. Removed chest tube without complication. Keep chest tube site covered x 48 hours. Prn dressing changes. CXR this afternoon.     Arleen Rodriguez PA-C  Thoracic Surgery  [facility]

## 2019-09-09 NOTE — PLAN OF CARE
Problem: Adult Inpatient Plan of Care  Goal: Patient-Specific Goal (Individualization)  Outcome: Ongoing (interventions implemented as appropriate)  Pt vss. No complaints of pain at this time. Medications reviewed. Pt in no apparent acute distress at this time. Pt afebrile. Pt free of falls and injuries. Side rails up x 2. Bed wheels locked. Bed in lowest position. Call bell within reach. Personal items within reach.Safety bands on. Will continue to monitor.

## 2019-09-09 NOTE — ASSESSMENT & PLAN NOTE
On arrival at the ED, the patient was febrile to 103.5, BP 98/54, leukocytosis to 16.29. Met SIRS criteria. BP improved to 130s after 500 cc bolus LR in ED and initiation of  cc/hr x 5 hours. UA negative. CXR with chronic changes. Lactic acid 1. Received vancomycin and zosyn in the ED.     Infectious workup performed:  -Blood cultures: NGTD  -Pneumostat fliud culture: Pseudomonas     Plan:  -Change zosyn to Ciprofloxacin  - IS consulted for durations of antibiotics  -Follow-up blood cultures  -CTS consulted for possible chest tube removal ( CT clamped)

## 2019-09-10 LAB
ANION GAP SERPL CALC-SCNC: 9 MMOL/L (ref 8–16)
BASOPHILS # BLD AUTO: 0.08 K/UL (ref 0–0.2)
BASOPHILS NFR BLD: 0.8 % (ref 0–1.9)
BUN SERPL-MCNC: 16 MG/DL (ref 8–23)
CALCIUM SERPL-MCNC: 10.1 MG/DL (ref 8.7–10.5)
CHLORIDE SERPL-SCNC: 94 MMOL/L (ref 95–110)
CO2 SERPL-SCNC: 35 MMOL/L (ref 23–29)
CREAT SERPL-MCNC: 0.8 MG/DL (ref 0.5–1.4)
DIFFERENTIAL METHOD: ABNORMAL
EOSINOPHIL # BLD AUTO: 0.3 K/UL (ref 0–0.5)
EOSINOPHIL NFR BLD: 3.2 % (ref 0–8)
ERYTHROCYTE [DISTWIDTH] IN BLOOD BY AUTOMATED COUNT: 13.8 % (ref 11.5–14.5)
EST. GFR  (AFRICAN AMERICAN): >60 ML/MIN/1.73 M^2
EST. GFR  (NON AFRICAN AMERICAN): >60 ML/MIN/1.73 M^2
GLUCOSE SERPL-MCNC: 103 MG/DL (ref 70–110)
HCT VFR BLD AUTO: 31.9 % (ref 37–48.5)
HGB BLD-MCNC: 9.9 G/DL (ref 12–16)
IMM GRANULOCYTES # BLD AUTO: 0.07 K/UL (ref 0–0.04)
IMM GRANULOCYTES NFR BLD AUTO: 0.7 % (ref 0–0.5)
LYMPHOCYTES # BLD AUTO: 0.9 K/UL (ref 1–4.8)
LYMPHOCYTES NFR BLD: 8.6 % (ref 18–48)
MCH RBC QN AUTO: 30.7 PG (ref 27–31)
MCHC RBC AUTO-ENTMCNC: 31 G/DL (ref 32–36)
MCV RBC AUTO: 99 FL (ref 82–98)
MONOCYTES # BLD AUTO: 1.3 K/UL (ref 0.3–1)
MONOCYTES NFR BLD: 12.1 % (ref 4–15)
NEUTROPHILS # BLD AUTO: 7.9 K/UL (ref 1.8–7.7)
NEUTROPHILS NFR BLD: 74.6 % (ref 38–73)
NRBC BLD-RTO: 0 /100 WBC
PLATELET # BLD AUTO: 219 K/UL (ref 150–350)
PMV BLD AUTO: 10 FL (ref 9.2–12.9)
POTASSIUM SERPL-SCNC: 3.2 MMOL/L (ref 3.5–5.1)
RBC # BLD AUTO: 3.22 M/UL (ref 4–5.4)
SODIUM SERPL-SCNC: 138 MMOL/L (ref 136–145)
WBC # BLD AUTO: 10.61 K/UL (ref 3.9–12.7)

## 2019-09-10 PROCEDURE — 94640 AIRWAY INHALATION TREATMENT: CPT

## 2019-09-10 PROCEDURE — 93010 EKG 12-LEAD: ICD-10-PCS | Mod: ,,, | Performed by: INTERNAL MEDICINE

## 2019-09-10 PROCEDURE — 99232 PR SUBSEQUENT HOSPITAL CARE,LEVL II: ICD-10-PCS | Mod: GC,,, | Performed by: INTERNAL MEDICINE

## 2019-09-10 PROCEDURE — 93005 ELECTROCARDIOGRAM TRACING: CPT

## 2019-09-10 PROCEDURE — 25000003 PHARM REV CODE 250: Performed by: STUDENT IN AN ORGANIZED HEALTH CARE EDUCATION/TRAINING PROGRAM

## 2019-09-10 PROCEDURE — 25000242 PHARM REV CODE 250 ALT 637 W/ HCPCS: Performed by: STUDENT IN AN ORGANIZED HEALTH CARE EDUCATION/TRAINING PROGRAM

## 2019-09-10 PROCEDURE — 80048 BASIC METABOLIC PNL TOTAL CA: CPT

## 2019-09-10 PROCEDURE — 27000221 HC OXYGEN, UP TO 24 HOURS

## 2019-09-10 PROCEDURE — 99232 SBSQ HOSP IP/OBS MODERATE 35: CPT | Mod: GC,,, | Performed by: INTERNAL MEDICINE

## 2019-09-10 PROCEDURE — 11000001 HC ACUTE MED/SURG PRIVATE ROOM

## 2019-09-10 PROCEDURE — 36415 COLL VENOUS BLD VENIPUNCTURE: CPT

## 2019-09-10 PROCEDURE — 99232 SBSQ HOSP IP/OBS MODERATE 35: CPT | Mod: GC,,, | Performed by: HOSPITALIST

## 2019-09-10 PROCEDURE — 93010 ELECTROCARDIOGRAM REPORT: CPT | Mod: ,,, | Performed by: INTERNAL MEDICINE

## 2019-09-10 PROCEDURE — 63600175 PHARM REV CODE 636 W HCPCS: Performed by: HOSPITALIST

## 2019-09-10 PROCEDURE — 85025 COMPLETE CBC W/AUTO DIFF WBC: CPT

## 2019-09-10 PROCEDURE — 94761 N-INVAS EAR/PLS OXIMETRY MLT: CPT

## 2019-09-10 PROCEDURE — 99232 PR SUBSEQUENT HOSPITAL CARE,LEVL II: ICD-10-PCS | Mod: GC,,, | Performed by: HOSPITALIST

## 2019-09-10 RX ORDER — AMLODIPINE BESYLATE 2.5 MG/1
2.5 TABLET ORAL ONCE
Status: COMPLETED | OUTPATIENT
Start: 2019-09-10 | End: 2019-09-10

## 2019-09-10 RX ORDER — AMLODIPINE BESYLATE 2.5 MG/1
2.5 TABLET ORAL DAILY
Status: DISCONTINUED | OUTPATIENT
Start: 2019-09-10 | End: 2019-09-10

## 2019-09-10 RX ORDER — CIPROFLOXACIN 500 MG/1
500 TABLET ORAL EVERY 24 HOURS
Status: DISCONTINUED | OUTPATIENT
Start: 2019-09-10 | End: 2019-09-11

## 2019-09-10 RX ORDER — POTASSIUM CHLORIDE 20 MEQ/1
40 TABLET, EXTENDED RELEASE ORAL ONCE
Status: DISCONTINUED | OUTPATIENT
Start: 2019-09-10 | End: 2019-09-10

## 2019-09-10 RX ORDER — LEVALBUTEROL 1.25 MG/.5ML
1.25 SOLUTION, CONCENTRATE RESPIRATORY (INHALATION)
Status: DISCONTINUED | OUTPATIENT
Start: 2019-09-10 | End: 2019-09-12 | Stop reason: HOSPADM

## 2019-09-10 RX ORDER — POTASSIUM CHLORIDE 750 MG/1
40 CAPSULE, EXTENDED RELEASE ORAL ONCE
Status: COMPLETED | OUTPATIENT
Start: 2019-09-10 | End: 2019-09-10

## 2019-09-10 RX ORDER — AMLODIPINE BESYLATE 2.5 MG/1
2.5 TABLET ORAL DAILY
Status: DISCONTINUED | OUTPATIENT
Start: 2019-09-10 | End: 2019-09-12 | Stop reason: HOSPADM

## 2019-09-10 RX ORDER — AMLODIPINE BESYLATE 5 MG/1
5 TABLET ORAL DAILY
Status: DISCONTINUED | OUTPATIENT
Start: 2019-09-10 | End: 2019-09-10

## 2019-09-10 RX ADMIN — IPRATROPIUM BROMIDE AND ALBUTEROL SULFATE 3 ML: .5; 3 SOLUTION RESPIRATORY (INHALATION) at 07:09

## 2019-09-10 RX ADMIN — TIOTROPIUM BROMIDE 18 MCG: 18 CAPSULE ORAL; RESPIRATORY (INHALATION) at 09:09

## 2019-09-10 RX ADMIN — AMLODIPINE BESYLATE 2.5 MG: 2.5 TABLET ORAL at 02:09

## 2019-09-10 RX ADMIN — LEVALBUTEROL 1.25 MG: 1.25 SOLUTION, CONCENTRATE RESPIRATORY (INHALATION) at 12:09

## 2019-09-10 RX ADMIN — ASPIRIN 81 MG CHEWABLE TABLET 81 MG: 81 TABLET CHEWABLE at 09:09

## 2019-09-10 RX ADMIN — CIPROFLOXACIN HYDROCHLORIDE 500 MG: 500 TABLET, FILM COATED ORAL at 03:09

## 2019-09-10 RX ADMIN — THERA TABS 1 TABLET: TAB at 09:09

## 2019-09-10 RX ADMIN — FUROSEMIDE 40 MG: 40 TABLET ORAL at 09:09

## 2019-09-10 RX ADMIN — FERROUS SULFATE TAB EC 325 MG (65 MG FE EQUIVALENT) 325 MG: 325 (65 FE) TABLET DELAYED RESPONSE at 09:09

## 2019-09-10 RX ADMIN — AMLODIPINE BESYLATE 2.5 MG: 2.5 TABLET ORAL at 06:09

## 2019-09-10 RX ADMIN — POTASSIUM CHLORIDE 40 MEQ: 750 CAPSULE, EXTENDED RELEASE ORAL at 01:09

## 2019-09-10 RX ADMIN — ENOXAPARIN SODIUM 30 MG: 100 INJECTION SUBCUTANEOUS at 04:09

## 2019-09-10 NOTE — ASSESSMENT & PLAN NOTE
On arrival at the ED, the patient was febrile to 103.5, BP 98/54, leukocytosis to 16.29. Met SIRS criteria. BP improved to 130s after 500 cc bolus LR in ED and initiation of  cc/hr x 5 hours. UA negative. CXR with chronic changes. Lactic acid 1. Received vancomycin and zosyn in the ED.     Infectious workup performed:  -Blood cultures: NGTD  -Pneumostat fliud culture: Pseudomonas; pansensitive: cipro, amikacin, cefepime, gentamycin, pip-tazo, tobramycin    CTS consulted for chest tube removal to achieve source control. Removed chest tube on 09/09. ID consulted for antibiotic recommendations regarding both appropriate regimen and duration of therapy; recommended a 2-week course of ciprofloxacin.     Plan:  -De-escalate antibiotic therapy from pip-tazo to ciprofloxacin  -ID recommended a treatment length of a total of 2 weeks  -Follow-up blood cultures

## 2019-09-10 NOTE — PHYSICIAN QUERY
PT Name: Venus Mayer  MR #: [id number]    Physician Query Form - Cause and Effect Relationship Clarification      CDS/: Yoselyn Lares               Contact information:    This form is a permanent document in the medical record.     Query Date: September 10, 2019    By submitting this query, we are merely seeking further clarification of documentation. Please utilize your independent clinical judgment when addressing the question(s) below.    The Medical record contains the following:  Supporting Clinical Findings   Location in record     Pleural fluid - 9/7   [facility]   Many                                                                                                                                                                                      Labs    Sepsis   Infectious workup performed:  -Blood cultures: NGTD  -Pneumostat fliud culture: [facility] medicine chart   9/9 605 pm  REGINO Woodruff MD/MILA Bolden MD         Provider, please clarify if there is any correlation between Sepsis  And  Pseudomonas aeruginosa.           Are the conditions:      [X  ] Due to or associated with each other   [  ] Unrelated to each other   [  ] Other (Please Specify): _________________________   [  ] Clinically Undetermined

## 2019-09-10 NOTE — ASSESSMENT & PLAN NOTE
Patient with history of recurrent spontaneous pneumothoraces that have failed multiple treatment attempts. CTS consulted on admission to determine chest tube management. CTS opted for chest tube removal on 09/09 for potential source control.     Plan:  -Monitor closely  -STAT CXR if patient becomes hypoxic to evaluate for PTX

## 2019-09-10 NOTE — PROGRESS NOTES
[facility] Hospital Medicine  Progress Note    Patient Name: Venus Mayer  MRN: [id number]  Patient Class: IP- Inpatient   Admission Date: 9/6/2019  Length of Stay: 4 days  Attending Physician: Tonya Viera MD  Primary Care Provider: Jona Che MD    [facility] Medicine Team: [facility] MED 4 Tawny Dotson MD    Subjective:     Principal Problem:Sepsis    HPI:  Ms. Mayer is a 89-year-old lady with PMH COPD on 2.5 L O2 at home, recurrent pneumothoraces with failed conservative management and VATS pleurodesis with a chest tube in place connected to a pneumostat, HTN, CAD, HLD who presents with weakness. Per her daughter, Ms. Mayer was complaining of feeling cold, which is normal for her, however today she was visibly shaking. She went to a doctor's appointment, after which she was visibly weak in her knees, struggling to walk with her walker and had to sit down. She was lethargic but alert and oriented and felt hot to the touch, however without objective fevers at home.     She went to her doctor's appointment earlier that day on 9/6 with Dr. Mckeon for a CT surgery follow-up after being recently discharged from the hospital (7/28-8/09) for spontaneous right pneumothorax with persistent air leak that failed bedside pleurodesis, endobronchial valve replacement, and VATS pleurodesis. There was a family discussion for hospital or AIM however just prior to discharge the orders were revoked and she went home with . Persistent air leak 2 weeks ago. She had a CXR at her appointment for assessment of air leak. Per chart review, Dr. Mckeon noted no air leak, clamped her chest tube, and scheduled her to follow-up on 9/11 for a repeat CXR. After the appointment, patient's daughter unclamped her chest tube because she was instructed that she could do so if patient became short of breath. Of note, patient has not been short of breath at any point during the day or in the ED, although she endorses  a chronic nonproductive cough that has not changed acutely. Fever in ED to 103. She denies chest pain and dysuria. She has a right-sided chest tube in place connected to a pneumostat that is filled with yellow fluid which her daughter says is normal/unchanged.     Overview/Hospital Course:  She was admitted to hospital medicine for concerns of sepsis, secondary to empyema. Managed with Zosyn. Cultures from Pleural fluid grew pansensitive Pseudomonas and transitioned her to Ciprofloxacin on 9/9/19. CTS consulted for chest tube management and removed it on 09/09. ID Service consulted for management regarding appropriate antibiotic therapy and length of therapy. They recommend a 2 week course of cipro.     On 09/08/2019, the patient and two of her daughters (one present in the room and the other one on speaker phone) were asked regarding her code status and stated she was DNR. This information was recorded in EMR and in the patient's chart.     Interval History: De-escalated therapy yesterday from pip-tazo to cipro to cover for pan-sensitive Pseudomonas that grew in pleural fluid culture. ~3hrs after taking cipro, the patient became hypertensive, tachycardic, and had facial flushing. Daughters expressed concern regarding possible interactions between ropinirole (which the patient takes for RLS) and cipro. Decided to discontinue ropinirole and observe how the patient tolerates cipro. ID service recommended placing PICC line, but family would rather have her taking oral medications.       Review of Systems   Constitutional: Negative for activity change, appetite change, chills and fever.   HENT: Negative for congestion and sore throat.    Respiratory: Negative for cough, chest tightness and shortness of breath.    Cardiovascular: Negative for chest pain and palpitations.   Gastrointestinal: Negative for abdominal pain, constipation (improved following senna-docusate and miralax), diarrhea, nausea and vomiting.    Genitourinary: Negative for dysuria and hematuria.   Musculoskeletal: Positive for back pain (according to patient this is due to lying down in bed). Negative for neck pain.   Skin: Negative for color change and rash.   Neurological: Negative for dizziness and headaches.     Objective:     Vital Signs (Most Recent):  Temp: 97.8 °F (36.6 °C) (09/10/19 1154)  Pulse: 86 (09/10/19 1235)  Resp: 18 (09/10/19 1235)  BP: (!) 158/74 (09/10/19 1154)  SpO2: 95 % (09/10/19 1235) Vital Signs (24h Range):  Temp:  [97.6 °F (36.4 °C)-100 °F (37.8 °C)] 97.8 °F (36.6 °C)  Pulse:  [] 86  Resp:  [16-26] 18  SpO2:  [90 %-97 %] 95 %  BP: (146-194)/(62-78) 158/74     Weight: 40.4 kg (89 lb 1.1 oz)  Body mass index is 17.39 kg/m².    Intake/Output Summary (Last 24 hours) at 9/10/2019 1514  Last data filed at 9/10/2019 1200  Gross per 24 hour   Intake 340 ml   Output 1300 ml   Net -960 ml      Physical Exam   Constitutional: Vital signs are normal. She appears cachectic. She does not appear ill. No distress. Nasal cannula in place.   HENT:   Head: Normocephalic and atraumatic.   Mouth/Throat: No oropharyngeal exudate.   Eyes: Conjunctivae are normal. No scleral icterus.   Cardiovascular: Normal rate, regular rhythm and normal heart sounds.   No murmur heard.  Pulses:       Radial pulses are 2+ on the right side, and 2+ on the left side.   Pulmonary/Chest: Effort normal and breath sounds normal. No respiratory distress. She has no wheezes.           Chest tube site clean, no secretions/discharge. It is not warm or erythematous. Mild tenderness observed at the site.    Abdominal: Soft. Normal appearance and bowel sounds are normal. She exhibits no distension. There is no tenderness.   Musculoskeletal: She exhibits no edema, tenderness or deformity.   Neurological: She is alert. She is not disoriented.   Skin: Skin is warm. No rash noted. She is not diaphoretic.       Significant Labs:   BMP:   Recent Labs   Lab 09/10/19  0725   GLU  103      K 3.2*   CL 94*   CO2 35*   BUN 16   CREATININE 0.8   CALCIUM 10.1     CBC:   Recent Labs   Lab 09/09/19  0502 09/10/19  0725   WBC 10.59 10.61   HGB 9.1* 9.9*   HCT 28.4* 31.9*    219       Significant Imaging: I have reviewed all pertinent imaging results/findings within the past 24 hours.      Assessment/Plan:      * Sepsis  On arrival at the ED, the patient was febrile to 103.5, BP 98/54, leukocytosis to 16.29. Met SIRS criteria. BP improved to 130s after 500 cc bolus LR in ED and initiation of  cc/hr x 5 hours. UA negative. CXR with chronic changes. Lactic acid 1. Received vancomycin and zosyn in the ED.     Infectious workup performed:  -Blood cultures: NGTD  -Pneumostat fliud culture: Pseudomonas; pansensitive: cipro, amikacin, cefepime, gentamycin, pip-tazo, tobramycin    Plan:  -De-escalate antibiotic therapy from pip-tazo to ciprofloxacin  -ID recommended a treatment length of a total of 2 weeks  -Follow-up blood cultures    Empyema  On arrival at the ED, the patient was febrile to 103.5, BP 98/54, leukocytosis to 16.29. Met SIRS criteria. BP improved to 130s after 500 cc bolus LR in ED and initiation of  cc/hr x 5 hours. UA negative. CXR with chronic changes. Lactic acid 1. Received vancomycin and zosyn in the ED.     Infectious workup performed:  -Blood cultures: NGTD  -Pneumostat fliud culture: Pseudomonas; pansensitive: cipro, amikacin, cefepime, gentamycin, pip-tazo, tobramycin    CTS consulted for chest tube removal to achieve source control. Removed chest tube on 09/09. ID consulted for antibiotic recommendations regarding both appropriate regimen and duration of therapy; recommended a 2-week course of ciprofloxacin.     Plan:  -De-escalate antibiotic therapy from pip-tazo to ciprofloxacin  -ID recommended a treatment length of a total of 2 weeks  -Follow-up blood cultures    Spontaneous pneumothorax  Patient with history of recurrent spontaneous pneumothoraces that  have failed multiple treatment attempts. CTS consulted on admission to determine chest tube management. CTS opted for chest tube removal on 09/09 for potential source control.     Plan:  -Monitor closely  -STAT CXR if patient becomes hypoxic to evaluate for PTX    COLD (chronic obstructive lung disease)  On 2.5L home oxygen.     Plan:  -Wean off O2 as tolerated   -Continue home albuterol 1 puff q6 PRN//levalbuterol 1.25 if patient feels tachycardic.   -Continue home tiotropium 18 micrograms daily    Chronic diastolic heart failure  TTE on 7/7/19 showed normal left ventricular systolic function, EF 65%, pulmonary hypertension present with estimated PA systolic pressure is 52 mm Hg. Initially held home furosemide in the setting of suspected sepsis; now okay to continue home furosemide.     Plan:  -Can continue home furosemide    Hypertension  On home amlodipine 2.5mg qhs. Initially held due to concerns of sepsis. Normotensive and hypertensive in ED, and following admission to hospital medicine. Will resume antihypertensive medication.    Plan:  -Continue w/ home amlodipine 2.5mg    Debility  Plan:  -PT/OT following    Coronary artery disease  Plan:  -Continue home aspirin 81    Constipation  No BMs 3-4 days after admission. Improved following senna-docusate and miralax.       Hypokalemia  Plan:  -Monitor and replace as needed    Hypercalcemia  Corrected calcium 10.9 upon admission. PTH 20.  Normal ionized calcium.    Restless leg syndrome  On home ropinirole. Will d/c ropinirole given daughters' concern regarding its concomitant use with ciprofloxacin. Will monitor closely.    Plan:  -Discontinue home ropinirole     Iron deficiency  Plan:  -Continue home ferrous sulfate 325    VTE Risk Mitigation (From admission, onward)        Ordered     enoxaparin injection 30 mg  Daily      09/07/19 1714     IP VTE HIGH RISK PATIENT  Once      09/07/19 0459     Place sequential compression device  Until discontinued      09/07/19  0453          Tawny Dotson MD  Department of Hospital Medicine   [facility]

## 2019-09-10 NOTE — SUBJECTIVE & OBJECTIVE
Past Medical History:   Diagnosis Date    Acute on chronic congestive heart failure 7/6/2019    Cardiomyopathy     Carotid artery occlusion     CHF (congestive heart failure)     COPD (chronic obstructive pulmonary disease)     Coronary artery disease     Hyperlipidemia     Hypertension        Past Surgical History:   Procedure Laterality Date    BRONCHOSCOPY, FIBEROPTIC Flexible N/A 7/31/2019    [facility]    CARDIAC CATHETERIZATION      cardic cath      CAROTID ENDARTERECTOMY      HIP SURGERY      PLEURODESIS N/A 7/31/2019    [facility]    PLEURODESIS, USING TALC Right 8/5/2019    Performed by Klever Mckeon MD at [facility] OR [address] [place] [address]    VATS, WITH PLEURAL TENT Right 8/5/2019    Performed by Klever Mckeon MD at [facility] OR [address] [place] [address]       Review of patient's allergies indicates:   Allergen Reactions    Ancef in dextrose (iso-osm) Rash    Cefazolin Rash    Cefuroxime Rash    Sulfamethoxazole-trimethoprim Rash     Other reaction(s): Rash       Medications:  Medications Prior to Admission   Medication Sig    acetaminophen (TYLENOL) 500 MG tablet Take 1,000 mg by mouth daily as needed for Pain.    albuterol (PROAIR HFA) 90 mcg/actuation inhaler Inhale 1 puff into the lungs every 6 (six) hours as needed for Wheezing or Shortness of Breath. Rescue    albuterol (PROVENTIL) 2.5 mg /3 mL (0.083 %) nebulizer solution Take 2.5 mg by nebulization every 6 (six) hours as needed for Wheezing or Shortness of Breath. Rescue    amLODIPine (NORVASC) 2.5 MG tablet Take 1 tablet (2.5 mg total) by mouth once daily. (Patient taking differently: Take 2.5 mg by mouth daily as needed (for blood pressure >155). )    aspirin 81 mg Tab Take 1 tablet by mouth once daily.     azelastine (ASTELIN) 137 mcg (0.1 %) nasal spray 2 sprays by Nasal route 2 (two) times daily.    ferrous sulfate 325 (65 FE) MG EC tablet Take 1 tablet (325  mg total) by mouth once daily.    furosemide (LASIX) 40 MG tablet Take 1 tablet (40 mg total) by mouth once daily.    ipratropium (ATROVENT) 42 mcg (0.06 %) nasal spray 2 sprays by Nasal route 2 (two) times daily.    multivitamin (THERAGRAN) per tablet Take 1 tablet by mouth once daily.    potassium chloride (MICRO-K) 10 MEQ CpSR Take 10 mEq by mouth once daily.    rOPINIRole (REQUIP) 0.25 MG tablet Take 1 tablet (0.25 mg total) by mouth every evening.    tiotropium (SPIRIVA) 18 mcg inhalation capsule Inhale 1 capsule (18 mcg total) into the lungs once daily.     Antibiotics (From admission, onward)    Start     Stop Route Frequency Ordered    09/10/19 1500  ciprofloxacin HCl tablet 500 mg      -- Oral Daily 09/10/19 0809        Antifungals (From admission, onward)    None        Antivirals (From admission, onward)    None           Immunization History   Administered Date(s) Administered    Tdap 2015       Family History     Problem Relation (Age of Onset)    Hypertension Mother        Social History     Socioeconomic History    Marital status:      Spouse name: Not on file    Number of children: Not on file    Years of education: Not on file    Highest education level: Not on file   Occupational History    Not on file   Social Needs    Financial resource strain: Not on file    Food insecurity:     Worry: Not on file     Inability: Not on file    Transportation needs:     Medical: Not on file     Non-medical: Not on file   Tobacco Use    Smoking status: Former Smoker     Packs/day: 2.00     Years: 20.00     Pack years: 40.00     Types: Cigarettes     Last attempt to quit: 1980     Years since quittin.6    Smokeless tobacco: Never Used   Substance and Sexual Activity    Alcohol use: Yes     Alcohol/week: 1.2 oz     Types: 2 Glasses of wine per week     Comment: social    Drug use: No    Sexual activity: Not Currently   Lifestyle    Physical activity:     Days per week: Not  on file     Minutes per session: Not on file    Stress: Not on file   Relationships    Social connections:     Talks on phone: Not on file     Gets together: Not on file     Attends Scientologist service: Not on file     Active member of club or organization: Not on file     Attends meetings of clubs or organizations: Not on file     Relationship status: Not on file   Other Topics Concern    Not on file   Social History Narrative    Not on file     Review of Systems   Constitutional: Negative for activity change, chills, fatigue, fever and unexpected weight change.   HENT: Negative for congestion.    Eyes: Negative for visual disturbance.   Respiratory: Negative for cough, choking, chest tightness, shortness of breath, wheezing and stridor.    Cardiovascular: Negative for chest pain, palpitations and leg swelling.   Gastrointestinal: Negative for abdominal distention, abdominal pain, blood in stool, constipation, diarrhea, nausea and vomiting.   Endocrine: Negative for polyuria.   Genitourinary: Negative for difficulty urinating, dysuria, flank pain, frequency, urgency and vaginal discharge.   Musculoskeletal: Negative for arthralgias and gait problem.   Neurological: Negative for dizziness, tremors, seizures, facial asymmetry, weakness, numbness and headaches.   Psychiatric/Behavioral: Negative for agitation, behavioral problems, confusion and decreased concentration.     Objective:     Vital Signs (Most Recent):  Temp: 97.6 °F (36.4 °C) (09/10/19 0727)  Pulse: 103 (09/10/19 0901)  Resp: (!) 24 (09/10/19 0901)  BP: (!) 160/72 (09/10/19 0757)  SpO2: (!) 90 % (09/10/19 0735) Vital Signs (24h Range):  Temp:  [97.6 °F (36.4 °C)-100 °F (37.8 °C)] 97.6 °F (36.4 °C)  Pulse:  [] 103  Resp:  [18-26] 24  SpO2:  [90 %-98 %] 90 %  BP: (130-194)/(59-78) 160/72     Weight: 40.4 kg (89 lb 1.1 oz)  Body mass index is 17.39 kg/m².    Estimated Creatinine Clearance: 29.8 mL/min (based on SCr of 0.8 mg/dL).    Physical Exam    Constitutional: She is oriented to person, place, and time. She appears well-developed and well-nourished. No distress.   HENT:   Head: Normocephalic and atraumatic.   Eyes: Pupils are equal, round, and reactive to light. EOM are normal.   Neck: Normal range of motion. Neck supple. No JVD present. No thyromegaly present.   Cardiovascular: Normal rate, regular rhythm, normal heart sounds and intact distal pulses. Exam reveals no gallop and no friction rub.   No murmur heard.  Pulmonary/Chest: Effort normal and breath sounds normal. No stridor. No respiratory distress. She has no wheezes. She has no rales. She exhibits no tenderness.   On nasal cannula    Abdominal: Soft. Bowel sounds are normal. She exhibits no distension. There is no tenderness. There is no guarding.   Musculoskeletal: Normal range of motion.   Neurological: She is alert and oriented to person, place, and time. She displays normal reflexes. No cranial nerve deficit or sensory deficit. She exhibits normal muscle tone. Coordination normal.   Skin: Skin is warm and dry. Capillary refill takes less than 2 seconds. She is not diaphoretic.   Psychiatric: She has a normal mood and affect. Her behavior is normal. Judgment and thought content normal.       Significant Labs:   CBC:   Recent Labs   Lab 09/09/19  0502 09/10/19  0725   WBC 10.59 10.61   HGB 9.1* 9.9*   HCT 28.4* 31.9*    219     CMP:   Recent Labs   Lab 09/09/19  0502 09/10/19  0725    138   K 3.7 3.2*   CL 94* 94*   CO2 33* 35*   GLU 86 103   BUN 18 16   CREATININE 1.0 0.8   CALCIUM 10.0 10.1   ANIONGAP 10 9   EGFRNONAA 49.7* >60.0     Coagulation: No results for input(s): PT, INR, APTT in the last 48 hours.    Significant Imaging: I have reviewed all pertinent imaging results/findings within the past 24 hours.

## 2019-09-10 NOTE — CONSULTS
[facility]  Infectious Disease  Consult Note    Patient Name: Venus Mayer  MRN: [id number]  Admission Date: 9/6/2019  Hospital Length of Stay: 4 days  Attending Physician: Tonya Viera MD  Primary Care Provider: Jona Che MD     Isolation Status: No active isolations    Patient information was obtained from patient, caregiver / friend, past medical records and ER records.      Consults  Assessment/Plan:     * Sepsis  89 year old female with PMH of HLD, CAD, HTN, and COPD on 2.5L O2 s/p recurrent right spontaneous pneumothorax with chest tube pneumostat in place for 1 month presents initially with sepsis, fever, leukocytosis, hypotension. She has clinically improved with broad spectrum abx over the course of the past 2 days. S/p chest tube removal yesterday.  Pleural fluid growing pansensitive Pseudomonas. Tmax 100 overnight, patient restless and confused overnight after de-escalation of abx (zosyn to cipro).    CXR: small right sided pleural effusion  BLcx NGTD, UA clean    Assessment: She has had the chest tube for 1 month. Likely to be colonization per surgery. Pseudomonas likely to be contaminant but patient has improved considerably on bsabx without other known cause.     Recommendations:   -Discussed with daughter. Recommended switching to IV cefepime to avoid adverse reactions but daughter was opposed to PICC line. She wants to try to hold the ropinirole tonight or try to give cipro earlier in the day so it does not affect patient at night.   -Continue ciprofloxacin, for total antibiotic coverage for 2 weeks     Thank you for your consult. I will sign off. Please contact us if you have any additional questions.    Keysha Bermudez MD  Infectious Disease  [facility]    Subjective:     Principal Problem: Sepsis    HPI: Ms. Mayer is a pleasant 89yo woman with PMH of HLD, CAD, HTN, and COPD on 2.5L of oxygen at home s/p recurrent right spontaneous PTX with pneumostat  in place for the past 1 month.  ID was consulted for pseudomonas growing in pleural fluid and evaluation for colonization.     She initially presented with lethargy and F 103.5 with WBC 16 and was started on vancomycin & zosyn.     Before she came into the hospital she went to Thoracic surgery clinic on 9/6 with Dr. Mckeon for a CT surgery follow-up after being recently discharged from the hospital (7/28-8/09) for spontaneous right pneumothorax with persistent air leak that failed bedside pleurodesis, endobronchial valve replacement, and VATS pleurodesis. No air leak was noted and her chest tube was clamped. After the appointment, patient's daughter unclamped her chest tube because she was instructed that she could do so if patient became short of breath.     She denies shortness of breath at this time. She states she has a chronic cough but there has been no change. She had the chest tube removed earlier today and and breathing comfortably on her home oxygen. She is in no distress.     Past Medical History:   Diagnosis Date    Acute on chronic congestive heart failure 7/6/2019    Cardiomyopathy     Carotid artery occlusion     CHF (congestive heart failure)     COPD (chronic obstructive pulmonary disease)     Coronary artery disease     Hyperlipidemia     Hypertension        Past Surgical History:   Procedure Laterality Date    BRONCHOSCOPY, FIBEROPTIC Flexible N/A 7/31/2019    Performed by Klever Mckeon MD at [facility] OR [address] [place] [address]    CARDIAC CATHETERIZATION      cardic cath      CAROTID ENDARTERECTOMY      HIP SURGERY      PLEURODESIS N/A 7/31/2019    Performed by Klever Mckeon MD at [facility] OR [facility]    PLEURODESIS, USING TALC Right 8/5/2019    Performed by Klever Mckeon MD at [facility] OR 2ND [facility]LR    VATS, WITH PLEURAL TENT Right 8/5/2019    Performed by Klever Mckeon MD at [facility] OR [address] [place] [address]       Review of patient's allergies indicates:   Allergen Reactions    Ancef in dextrose  (iso-osm) Rash    Cefazolin Rash    Cefuroxime Rash    Sulfamethoxazole-trimethoprim Rash     Other reaction(s): Rash       Medications:  Medications Prior to Admission   Medication Sig    acetaminophen (TYLENOL) 500 MG tablet Take 1,000 mg by mouth daily as needed for Pain.    albuterol (PROAIR HFA) 90 mcg/actuation inhaler Inhale 1 puff into the lungs every 6 (six) hours as needed for Wheezing or Shortness of Breath. Rescue    albuterol (PROVENTIL) 2.5 mg /3 mL (0.083 %) nebulizer solution Take 2.5 mg by nebulization every 6 (six) hours as needed for Wheezing or Shortness of Breath. Rescue    amLODIPine (NORVASC) 2.5 MG tablet Take 1 tablet (2.5 mg total) by mouth once daily. (Patient taking differently: Take 2.5 mg by mouth daily as needed (for blood pressure >155). )    aspirin 81 mg Tab Take 1 tablet by mouth once daily.     azelastine (ASTELIN) 137 mcg (0.1 %) nasal spray 2 sprays by Nasal route 2 (two) times daily.    ferrous sulfate 325 (65 FE) MG EC tablet Take 1 tablet (325 mg total) by mouth once daily.    furosemide (LASIX) 40 MG tablet Take 1 tablet (40 mg total) by mouth once daily.    ipratropium (ATROVENT) 42 mcg (0.06 %) nasal spray 2 sprays by Nasal route 2 (two) times daily.    multivitamin (THERAGRAN) per tablet Take 1 tablet by mouth once daily.    potassium chloride (MICRO-K) 10 MEQ CpSR Take 10 mEq by mouth once daily.    rOPINIRole (REQUIP) 0.25 MG tablet Take 1 tablet (0.25 mg total) by mouth every evening.    tiotropium (SPIRIVA) 18 mcg inhalation capsule Inhale 1 capsule (18 mcg total) into the lungs once daily.     Antibiotics (From admission, onward)    Start     Stop Route Frequency Ordered    09/10/19 1500  ciprofloxacin HCl tablet 500 mg      -- Oral Daily 09/10/19 0809        Antifungals (From admission, onward)    None        Antivirals (From admission, onward)    None           Immunization History   Administered Date(s) Administered    Tdap 02/27/2015       Family  History     Problem Relation (Age of Onset)    Hypertension Mother        Social History     Socioeconomic History    Marital status:      Spouse name: Not on file    Number of children: Not on file    Years of education: Not on file    Highest education level: Not on file   Occupational History    Not on file   Social Needs    Financial resource strain: Not on file    Food insecurity:     Worry: Not on file     Inability: Not on file    Transportation needs:     Medical: Not on file     Non-medical: Not on file   Tobacco Use    Smoking status: Former Smoker     Packs/day: 2.00     Years: 20.00     Pack years: 40.00     Types: Cigarettes     Last attempt to quit: 1980     Years since quittin.6    Smokeless tobacco: Never Used   Substance and Sexual Activity    Alcohol use: Yes     Alcohol/week: 1.2 oz     Types: 2 Glasses of wine per week     Comment: social    Drug use: No    Sexual activity: Not Currently   Lifestyle    Physical activity:     Days per week: Not on file     Minutes per session: Not on file    Stress: Not on file   Relationships    Social connections:     Talks on phone: Not on file     Gets together: Not on file     Attends Quaker service: Not on file     Active member of club or organization: Not on file     Attends meetings of clubs or organizations: Not on file     Relationship status: Not on file   Other Topics Concern    Not on file   Social History Narrative    Not on file     Review of Systems   Constitutional: Negative for activity change, chills, fatigue, fever and unexpected weight change.   HENT: Negative for congestion.    Eyes: Negative for visual disturbance.   Respiratory: Negative for cough, choking, chest tightness, shortness of breath, wheezing and stridor.    Cardiovascular: Negative for chest pain, palpitations and leg swelling.   Gastrointestinal: Negative for abdominal distention, abdominal pain, blood in stool, constipation, diarrhea,  nausea and vomiting.   Endocrine: Negative for polyuria.   Genitourinary: Negative for difficulty urinating, dysuria, flank pain, frequency, urgency and vaginal discharge.   Musculoskeletal: Negative for arthralgias and gait problem.   Neurological: Negative for dizziness, tremors, seizures, facial asymmetry, weakness, numbness and headaches.   Psychiatric/Behavioral: Negative for agitation, behavioral problems, confusion and decreased concentration.     Objective:     Vital Signs (Most Recent):  Temp: 97.6 °F (36.4 °C) (09/10/19 0727)  Pulse: 103 (09/10/19 0901)  Resp: (!) 24 (09/10/19 0901)  BP: (!) 160/72 (09/10/19 0757)  SpO2: (!) 90 % (09/10/19 0735) Vital Signs (24h Range):  Temp:  [97.6 °F (36.4 °C)-100 °F (37.8 °C)] 97.6 °F (36.4 °C)  Pulse:  [] 103  Resp:  [18-26] 24  SpO2:  [90 %-98 %] 90 %  BP: (130-194)/(59-78) 160/72     Weight: 40.4 kg (89 lb 1.1 oz)  Body mass index is 17.39 kg/m².    Estimated Creatinine Clearance: 29.8 mL/min (based on SCr of 0.8 mg/dL).    Physical Exam   Constitutional: She is oriented to person, place, and time. She appears well-developed and well-nourished. No distress.   HENT:   Head: Normocephalic and atraumatic.   Eyes: Pupils are equal, round, and reactive to light. EOM are normal.   Neck: Normal range of motion. Neck supple. No JVD present. No thyromegaly present.   Cardiovascular: Normal rate, regular rhythm, normal heart sounds and intact distal pulses. Exam reveals no gallop and no friction rub.   No murmur heard.  Pulmonary/Chest: Effort normal and breath sounds normal. No stridor. No respiratory distress. She has no wheezes. She has no rales. She exhibits no tenderness.   On nasal cannula    Abdominal: Soft. Bowel sounds are normal. She exhibits no distension. There is no tenderness. There is no guarding.   Musculoskeletal: Normal range of motion.   Neurological: She is alert and oriented to person, place, and time. She displays normal reflexes. No cranial nerve  deficit or sensory deficit. She exhibits normal muscle tone. Coordination normal.   Skin: Skin is warm and dry. Capillary refill takes less than 2 seconds. She is not diaphoretic.   Psychiatric: She has a normal mood and affect. Her behavior is normal. Judgment and thought content normal.       Significant Labs:   CBC:   Recent Labs   Lab 09/09/19  0502 09/10/19  0725   WBC 10.59 10.61   HGB 9.1* 9.9*   HCT 28.4* 31.9*    219     CMP:   Recent Labs   Lab 09/09/19  0502 09/10/19  0725    138   K 3.7 3.2*   CL 94* 94*   CO2 33* 35*   GLU 86 103   BUN 18 16   CREATININE 1.0 0.8   CALCIUM 10.0 10.1   ANIONGAP 10 9   EGFRNONAA 49.7* >60.0     Coagulation: No results for input(s): PT, INR, APTT in the last 48 hours.    Significant Imaging: I have reviewed all pertinent imaging results/findings within the past 24 hours.

## 2019-09-10 NOTE — ASSESSMENT & PLAN NOTE
89 year old female with PMH of HLD, CAD, HTN, and COPD on 2.5L O2 s/p recurrent right spontaneous pneumothorax with chest tube pneumostat in place for 1 month presents initially with sepsis, fever, leukocytosis, hypotension. She has clinically improved with broad spectrum abx over the course of the past 2 days. S/p chest tube removal yesterday.  Pleural fluid growing pansensitive Pseudomonas. Tmax 100 overnight, patient restless and confused overnight after de-escalation of abx (zosyn to cipro).    CXR: small right sided pleural effusion  BLcx NGTD, UA clean    Assessment: She has had the chest tube for 1 month. Likely to be colonization per surgery. Pseudomonas likely to be contaminant but patient has improved considerably on bsabx without other known cause.     Recommendations:   -Discussed with daughter. Recommended switching to IV cefepime to avoid adverse reactions but daughter was opposed to PICC line. She wants to try to hold the ropinirole tonight or try to give cipro earlier in the day so it does not affect patient at night.   -Continue ciprofloxacin, for total antibiotic coverage for 2 weeks

## 2019-09-10 NOTE — ASSESSMENT & PLAN NOTE
On 2.5L home oxygen.     Plan:  -Wean off O2 as tolerated   -Continue home albuterol 1 puff q6 PRN//levalbuterol 1.25 if patient feels tachycardic.   -Continue home tiotropium 18 micrograms daily

## 2019-09-10 NOTE — ASSESSMENT & PLAN NOTE
On arrival at the ED, the patient was febrile to 103.5, BP 98/54, leukocytosis to 16.29. Met SIRS criteria. BP improved to 130s after 500 cc bolus LR in ED and initiation of  cc/hr x 5 hours. UA negative. CXR with chronic changes. Lactic acid 1. Received vancomycin and zosyn in the ED.     Infectious workup performed:  -Blood cultures: NGTD  -Pneumostat fliud culture: Pseudomonas; pansensitive: cipro, amikacin, cefepime, gentamycin, pip-tazo, tobramycin    Plan:  -De-escalate antibiotic therapy from pip-tazo to ciprofloxacin  -ID recommended a treatment length of a total of 2 weeks  -Follow-up blood cultures

## 2019-09-10 NOTE — SUBJECTIVE & OBJECTIVE
Interval History: De-escalated therapy yesterday from pip-tazo to cipro to cover for pan-sensitive Pseudomonas that grew in pleural fluid culture. ~3hrs after taking cipro, the patient became hypertensive, tachycardic, and had facial flushing. Daughters expressed concern regarding possible interactions between ropinirole (which the patient takes for RLS) and cipro. Decided to discontinue ropinirole and observe how the patient tolerates cipro. ID service recommended placing PICC line, but family would rather have her taking oral medications.       Review of Systems   Constitutional: Negative for activity change, appetite change, chills and fever.   HENT: Negative for congestion and sore throat.    Respiratory: Negative for cough, chest tightness and shortness of breath.    Cardiovascular: Negative for chest pain and palpitations.   Gastrointestinal: Negative for abdominal pain, constipation (improved following senna-docusate and miralax), diarrhea, nausea and vomiting.   Genitourinary: Negative for dysuria and hematuria.   Musculoskeletal: Positive for back pain (according to patient this is due to lying down in bed). Negative for neck pain.   Skin: Negative for color change and rash.   Neurological: Negative for dizziness and headaches.     Objective:     Vital Signs (Most Recent):  Temp: 97.8 °F (36.6 °C) (09/10/19 1154)  Pulse: 86 (09/10/19 1235)  Resp: 18 (09/10/19 1235)  BP: (!) 158/74 (09/10/19 1154)  SpO2: 95 % (09/10/19 1235) Vital Signs (24h Range):  Temp:  [97.6 °F (36.4 °C)-100 °F (37.8 °C)] 97.8 °F (36.6 °C)  Pulse:  [] 86  Resp:  [16-26] 18  SpO2:  [90 %-97 %] 95 %  BP: (146-194)/(62-78) 158/74     Weight: 40.4 kg (89 lb 1.1 oz)  Body mass index is 17.39 kg/m².    Intake/Output Summary (Last 24 hours) at 9/10/2019 1514  Last data filed at 9/10/2019 1200  Gross per 24 hour   Intake 340 ml   Output 1300 ml   Net -960 ml      Physical Exam   Constitutional: Vital signs are normal. She appears  cachectic. She does not appear ill. No distress. Nasal cannula in place.   HENT:   Head: Normocephalic and atraumatic.   Mouth/Throat: No oropharyngeal exudate.   Eyes: Conjunctivae are normal. No scleral icterus.   Cardiovascular: Normal rate, regular rhythm and normal heart sounds.   No murmur heard.  Pulses:       Radial pulses are 2+ on the right side, and 2+ on the left side.   Pulmonary/Chest: Effort normal and breath sounds normal. No respiratory distress. She has no wheezes.           Chest tube site clean, no secretions/discharge. It is not warm or erythematous. Mild tenderness observed at the site.    Abdominal: Soft. Normal appearance and bowel sounds are normal. She exhibits no distension. There is no tenderness.   Musculoskeletal: She exhibits no edema, tenderness or deformity.   Neurological: She is alert. She is not disoriented.   Skin: Skin is warm. No rash noted. She is not diaphoretic.       Significant Labs:   BMP:   Recent Labs   Lab 09/10/19  0725         K 3.2*   CL 94*   CO2 35*   BUN 16   CREATININE 0.8   CALCIUM 10.1     CBC:   Recent Labs   Lab 09/09/19  0502 09/10/19  0725   WBC 10.59 10.61   HGB 9.1* 9.9*   HCT 28.4* 31.9*    219       Significant Imaging: I have reviewed all pertinent imaging results/findings within the past 24 hours.

## 2019-09-10 NOTE — PROGRESS NOTES
Pt with a manual b/p of 174/78. Pt has been restless and confused off and on throughout night. Pt daughter at bedside & states that pt was started on po Cipro yesterday which could possibly be the cause of her change in mental status, increased b/p and restlessness. IM4 paged and MD notified of b/p. Order given to admin an additional dose of Amlodipine 2.5 mg po now. Will cont to monitor pt.

## 2019-09-10 NOTE — PLAN OF CARE
Problem: Adult Inpatient Plan of Care  Goal: Plan of Care Review  Outcome: Ongoing (interventions implemented as appropriate)  Patient has had an uneventful shift. Orientation waxing and weaning but patient appears pleasant and easily redirectable. Vital signs within normal limits. Patient and daughter able to voice concerns. Bed locked and in lowest position with call bell and all personal items within reach. Will continue to monitor.

## 2019-09-10 NOTE — ASSESSMENT & PLAN NOTE
On home ropinirole. Will d/c ropinirole given daughters' concern regarding its concomitant use with ciprofloxacin. Will monitor closely.    Plan:  -Discontinue home ropinirole

## 2019-09-11 LAB
ANION GAP SERPL CALC-SCNC: 11 MMOL/L (ref 8–16)
BACTERIA BLD CULT: NORMAL
BACTERIA BLD CULT: NORMAL
BASOPHILS # BLD AUTO: 0.07 K/UL (ref 0–0.2)
BASOPHILS NFR BLD: 0.8 % (ref 0–1.9)
BUN SERPL-MCNC: 17 MG/DL (ref 8–23)
CALCIUM SERPL-MCNC: 10 MG/DL (ref 8.7–10.5)
CHLORIDE SERPL-SCNC: 97 MMOL/L (ref 95–110)
CO2 SERPL-SCNC: 36 MMOL/L (ref 23–29)
CREAT SERPL-MCNC: 0.8 MG/DL (ref 0.5–1.4)
DIFFERENTIAL METHOD: ABNORMAL
EOSINOPHIL # BLD AUTO: 0.4 K/UL (ref 0–0.5)
EOSINOPHIL NFR BLD: 4.3 % (ref 0–8)
ERYTHROCYTE [DISTWIDTH] IN BLOOD BY AUTOMATED COUNT: 13.6 % (ref 11.5–14.5)
EST. GFR  (AFRICAN AMERICAN): >60 ML/MIN/1.73 M^2
EST. GFR  (NON AFRICAN AMERICAN): >60 ML/MIN/1.73 M^2
GLUCOSE SERPL-MCNC: 88 MG/DL (ref 70–110)
HCT VFR BLD AUTO: 28.8 % (ref 37–48.5)
HGB BLD-MCNC: 8.8 G/DL (ref 12–16)
IMM GRANULOCYTES # BLD AUTO: 0.05 K/UL (ref 0–0.04)
IMM GRANULOCYTES NFR BLD AUTO: 0.6 % (ref 0–0.5)
LYMPHOCYTES # BLD AUTO: 1.2 K/UL (ref 1–4.8)
LYMPHOCYTES NFR BLD: 13.1 % (ref 18–48)
MCH RBC QN AUTO: 30.6 PG (ref 27–31)
MCHC RBC AUTO-ENTMCNC: 30.6 G/DL (ref 32–36)
MCV RBC AUTO: 100 FL (ref 82–98)
MONOCYTES # BLD AUTO: 1.3 K/UL (ref 0.3–1)
MONOCYTES NFR BLD: 14 % (ref 4–15)
NEUTROPHILS # BLD AUTO: 6.1 K/UL (ref 1.8–7.7)
NEUTROPHILS NFR BLD: 67.2 % (ref 38–73)
NRBC BLD-RTO: 0 /100 WBC
PLATELET # BLD AUTO: 210 K/UL (ref 150–350)
PMV BLD AUTO: 10.2 FL (ref 9.2–12.9)
POTASSIUM SERPL-SCNC: 3.6 MMOL/L (ref 3.5–5.1)
RBC # BLD AUTO: 2.88 M/UL (ref 4–5.4)
SODIUM SERPL-SCNC: 144 MMOL/L (ref 136–145)
WBC # BLD AUTO: 9.08 K/UL (ref 3.9–12.7)

## 2019-09-11 PROCEDURE — 80048 BASIC METABOLIC PNL TOTAL CA: CPT

## 2019-09-11 PROCEDURE — 36415 COLL VENOUS BLD VENIPUNCTURE: CPT

## 2019-09-11 PROCEDURE — 25000003 PHARM REV CODE 250: Performed by: STUDENT IN AN ORGANIZED HEALTH CARE EDUCATION/TRAINING PROGRAM

## 2019-09-11 PROCEDURE — 99900035 HC TECH TIME PER 15 MIN (STAT)

## 2019-09-11 PROCEDURE — 97110 THERAPEUTIC EXERCISES: CPT

## 2019-09-11 PROCEDURE — 94761 N-INVAS EAR/PLS OXIMETRY MLT: CPT

## 2019-09-11 PROCEDURE — 85025 COMPLETE CBC W/AUTO DIFF WBC: CPT

## 2019-09-11 PROCEDURE — 25000242 PHARM REV CODE 250 ALT 637 W/ HCPCS: Performed by: STUDENT IN AN ORGANIZED HEALTH CARE EDUCATION/TRAINING PROGRAM

## 2019-09-11 PROCEDURE — 99233 PR SUBSEQUENT HOSPITAL CARE,LEVL III: ICD-10-PCS | Mod: ,,, | Performed by: HOSPITALIST

## 2019-09-11 PROCEDURE — 97116 GAIT TRAINING THERAPY: CPT

## 2019-09-11 PROCEDURE — 76937 US GUIDE VASCULAR ACCESS: CPT

## 2019-09-11 PROCEDURE — 36410 VNPNXR 3YR/> PHY/QHP DX/THER: CPT

## 2019-09-11 PROCEDURE — 63600175 PHARM REV CODE 636 W HCPCS: Performed by: STUDENT IN AN ORGANIZED HEALTH CARE EDUCATION/TRAINING PROGRAM

## 2019-09-11 PROCEDURE — 11000001 HC ACUTE MED/SURG PRIVATE ROOM

## 2019-09-11 PROCEDURE — 99233 SBSQ HOSP IP/OBS HIGH 50: CPT | Mod: ,,, | Performed by: HOSPITALIST

## 2019-09-11 PROCEDURE — 94640 AIRWAY INHALATION TREATMENT: CPT

## 2019-09-11 PROCEDURE — 27000221 HC OXYGEN, UP TO 24 HOURS

## 2019-09-11 PROCEDURE — 63600175 PHARM REV CODE 636 W HCPCS: Performed by: HOSPITALIST

## 2019-09-11 PROCEDURE — C1751 CATH, INF, PER/CENT/MIDLINE: HCPCS

## 2019-09-11 RX ADMIN — ASPIRIN 81 MG CHEWABLE TABLET 81 MG: 81 TABLET CHEWABLE at 08:09

## 2019-09-11 RX ADMIN — TIOTROPIUM BROMIDE 18 MCG: 18 CAPSULE ORAL; RESPIRATORY (INHALATION) at 08:09

## 2019-09-11 RX ADMIN — IPRATROPIUM BROMIDE AND ALBUTEROL SULFATE 3 ML: .5; 3 SOLUTION RESPIRATORY (INHALATION) at 08:09

## 2019-09-11 RX ADMIN — FUROSEMIDE 40 MG: 40 TABLET ORAL at 08:09

## 2019-09-11 RX ADMIN — FERROUS SULFATE TAB EC 325 MG (65 MG FE EQUIVALENT) 325 MG: 325 (65 FE) TABLET DELAYED RESPONSE at 08:09

## 2019-09-11 RX ADMIN — ENOXAPARIN SODIUM 30 MG: 100 INJECTION SUBCUTANEOUS at 06:09

## 2019-09-11 RX ADMIN — PIPERACILLIN AND TAZOBACTAM 4.5 G: 4; .5 INJECTION, POWDER, LYOPHILIZED, FOR SOLUTION INTRAVENOUS; PARENTERAL at 04:09

## 2019-09-11 RX ADMIN — AMLODIPINE BESYLATE 2.5 MG: 2.5 TABLET ORAL at 08:09

## 2019-09-11 RX ADMIN — THERA TABS 1 TABLET: TAB at 08:09

## 2019-09-11 RX ADMIN — IPRATROPIUM BROMIDE AND ALBUTEROL SULFATE 3 ML: .5; 3 SOLUTION RESPIRATORY (INHALATION) at 06:09

## 2019-09-11 RX ADMIN — PIPERACILLIN AND TAZOBACTAM 4.5 G: 4; .5 INJECTION, POWDER, LYOPHILIZED, FOR SOLUTION INTRAVENOUS; PARENTERAL at 10:09

## 2019-09-11 NOTE — ASSESSMENT & PLAN NOTE
Pneumostat fluid culture grew pan-sensitive Pseudomonas. Sensitive to cipro, amikacin, cefepime, gentamycin, pip-tazo, tobramycin    Started on ciprofloxacin initially and planned to go home on it but repeat EKG significant for prolonged QTc. Will transition to pip-tazo and place a midline for outpatient administration.     Plan:  -ID recommended a total of 2 weeks of treatment  -Will place midline today for home IV antibiotics  -Change therapy from cipro to pip-tazo given prolonged QTc on EKG

## 2019-09-11 NOTE — CONSULTS
Placed 20g X 8cm midline in left brachial vein of LUE using realtime ultrasound guidance. Lot#[id number]. Remove on or before 10/10/2019.

## 2019-09-11 NOTE — PLAN OF CARE
[facility]    HOME HEALTH ORDERS  FACE TO FACE ENCOUNTER    Patient Name: Venus Mayer  YOB: 1929    PCP: Jona Che MD   PCP Address: [address] / [place]  PCP Phone Number: [phone]  PCP Fax: [phone]    Encounter Date: 09/12/2019    Admit to Home Health    Diagnoses:  Active Hospital Problems    Diagnosis  POA    *Sepsis [A41.9]  Yes     Priority: 1 - High    Empyema [J86.9]  Yes     Priority: 2     Hypokalemia [E87.6]  No    Constipation [K59.00]  Yes    Coronary artery disease [I25.10]  Yes    Hypercalcemia [E83.52]  Yes    Restless leg syndrome [G25.81]  Yes    Severe malnutrition [E43]  Yes    Iron deficiency [E61.1]  Yes    Spontaneous pneumothorax [J93.83]  Yes    Debility [R53.81]  Yes    COLD (chronic obstructive lung disease) [J44.9]  Yes    Stage 3 chronic kidney disease [N18.3]  Yes    Hypertension [I10]  Yes    Chronic diastolic heart failure [I50.32]  Yes      Resolved Hospital Problems   No resolved problems to display.     No future appointments.    I have seen and examined this patient face to face today. My clinical findings that support the need for the home health skilled services and home bound status are the following:  Medical restrictions requiring assistance of another human to leave home due to  IV infusion Needs.    Allergies:  Review of patient's allergies indicates:   Allergen Reactions    Ancef in dextrose (iso-osm) Rash    Cefazolin Rash    Cefuroxime Rash    Sulfamethoxazole-trimethoprim Rash     Other reaction(s): Rash       Diet: cardiac diet    Activities: activity as tolerated    Nursing:   SN to complete comprehensive assessment including routine vital signs. Instruct on disease process and s/s of complications to report to MD. Review/verify medication list sent home with the patient at time of discharge  and instruct patient/caregiver as needed. Frequency may be adjusted depending  on start of care date.    Notify MD if SBP > 160 or < 90; DBP > 90 or < 50; HR > 120 or < 50; Temp > 101;     CONSULTS:    Physical Therapy to evaluate and treat. Evaluate for home safety and equipment needs; Establish/upgrade home exercise program. Perform / instruct on therapeutic exercises, gait training, transfer training, and Range of Motion.  Occupational Therapy to evaluate and treat. Evaluate home environment for safety and equipment needs. Perform/Instruct on transfers, ADL training, ROM, and therapeutic exercises.  Aide to provide assistance with personal care, ADLs, and vital signs.    MISCELLANEOUS CARE:  Home Infusion Therapy:   SN to perform Infusion Therapy/Central Line Care.  Review Central Line Care & Central Line Flush with patient.    Administer (drug and dose): IV  Zosyn 4.5 mg every 8 hours   Last dose given: 9/12/19                    Home dose due: 9/12/19    END date 9/21/19    Scrub the Hub: Prior to accessing the line, always perform a 30 second alcohol scrub  Each lumen of the central line is to be flushed at least daily with 10 mL Normal Saline and 3 mL Heparin flush (10 units/mL)  Skilled Nurse (SN) may draw blood from IV access  Blood Draw Procedure:   - Aspirate at least 5 mL of blood   - Discard   - Obtain specimen   - Change injection cap   - Flush with 20 mL Normal Saline followed by a                 3-5 mL Heparin flush (10 units/mL)  Central :   - Sterile dressing changes are done weekly and as needed.   - Use chlor-hexadine scrub to cleanse site, apply Biopatch to insertion site,       apply securement device dressing   - Injection caps are changed weekly and after EVERY lab draw.   - If sterile gauze is under dressing to control oozing,                 dressing change must be performed every 24 hours until gauze is not needed.    WOUND CARE ORDERS  n/a    Medications: Review discharge medications with patient and family and provide education.      Current  Discharge Medication List      START taking these medications    Details   piperacillin sodium/tazobactam (PIPERACILLIN-TAZOBACTAM 4.5G/100ML SODIUM CHLORIDE 0.9%-READY TO MIX) Inject 100 mLs (4.5 g total) into the vein every 8 (eight) hours. for 10 days  Qty: 3000 mL, Refills: 0         CONTINUE these medications which have NOT CHANGED    Details   acetaminophen (TYLENOL) 500 MG tablet Take 1,000 mg by mouth daily as needed for Pain.      albuterol (PROAIR HFA) 90 mcg/actuation inhaler Inhale 1 puff into the lungs every 6 (six) hours as needed for Wheezing or Shortness of Breath. Rescue  Qty: 1 Inhaler, Refills: 5      albuterol (PROVENTIL) 2.5 mg /3 mL (0.083 %) nebulizer solution Take 2.5 mg by nebulization every 6 (six) hours as needed for Wheezing or Shortness of Breath. Rescue      amLODIPine (NORVASC) 2.5 MG tablet Take 1 tablet (2.5 mg total) by mouth once daily.      aspirin 81 mg Tab Take 1 tablet by mouth once daily.       azelastine (ASTELIN) 137 mcg (0.1 %) nasal spray 2 sprays by Nasal route 2 (two) times daily.      ferrous sulfate 325 (65 FE) MG EC tablet Take 1 tablet (325 mg total) by mouth once daily.  Refills: 0      furosemide (LASIX) 40 MG tablet Take 1 tablet (40 mg total) by mouth once daily.  Qty: 30 tablet, Refills: 11      ipratropium (ATROVENT) 42 mcg (0.06 %) nasal spray 2 sprays by Nasal route 2 (two) times daily.      multivitamin (THERAGRAN) per tablet Take 1 tablet by mouth once daily.      potassium chloride (MICRO-K) 10 MEQ CpSR Take 10 mEq by mouth once daily.      rOPINIRole (REQUIP) 0.25 MG tablet Take 1 tablet (0.25 mg total) by mouth every evening.  Qty: 30 tablet, Refills: 11      tiotropium (SPIRIVA) 18 mcg inhalation capsule Inhale 1 capsule (18 mcg total) into the lungs once daily.  Qty: 30 capsule, Refills: 3           I certify that this patient is confined to her home and needs intermittent skilled nursing care, physical therapy and speech therapy.

## 2019-09-11 NOTE — PT/OT/SLP PROGRESS
"Physical Therapy Treatment    Patient Name:  Venus Mayer   MRN:  [id number]    Recommendations:     Discharge Recommendations:    HH  Discharge Equipment Recommendations:     Barriers to discharge: None    Assessment:     Venus Mayer is a 89 y.o. female admitted with a medical diagnosis of Sepsis.  She presents with the following impairments/functional limitations:   .  Tolerated session c c/o BLE weakness.  Performed mobility c CGA.  Pt able to amb household distance and demo decreased gait speed, narrow FABIAN, mild unsteadiness and c/o BLE weakness.  Pt safe to amb c assistance of 1x person. Pt would benefit from continued skilled acute PT 3x/wk to improve functional mobility.      Rehab Prognosis: Good; patient would benefit from acute skilled PT services to address these deficits and reach maximum level of function.    Recent Surgery: * No surgery found *      Plan:     During this hospitalization, patient to be seen 3 x/week to address the identified rehab impairments via gait training, therapeutic activities, therapeutic exercises, neuromuscular re-education and progress toward the following goals:    · Plan of Care Expires:  10/04/19    Subjective     Chief Complaint: BLE weakness  Patient/Family Comments/goals: "My legs feel weak."  Pain/Comfort:  · Pain Rating 1: 0/10      Objective:     Communicated with RN prior to session.  Patient found up in chair with oxygen upon PT entry to room.     General Precautions: Standard, fall   Orthopedic Precautions:N/A   Braces: N/A     Functional Mobility:  · Transfers:     · Sit to Stand:  contact guard assistance with rolling walker  · Gait: 70ft x2 RW CGA  · Balance: sitting (S); standing (CGA-SBA)      AM-PAC 6 CLICK MOBILITY  Turning over in bed (including adjusting bedclothes, sheets and blankets)?: 3  Sitting down on and standing up from a chair with arms (e.g., wheelchair, bedside commode, etc.): 3  Moving from lying on back to sitting on the side of the bed?: " 3  Moving to and from a bed to a chair (including a wheelchair)?: 3  Need to walk in hospital room?: 3  Climbing 3-5 steps with a railing?: 3  Basic Mobility Total Score: 18       Therapeutic Activities and Exercises:  Pt educated on: PT role/POC; safety c mobility; benefits of OOB activities; performing therex; d/c recs - v/u  -sit<>stand from bedside chair 5x  -standing ~30sec/stand  -therex (LAQ, hip flex, AP) 2x15 ea    Patient left up in chair with all lines intact, call button in reach, RN notified and caregiver present..    GOALS:   Multidisciplinary Problems     Physical Therapy Goals        Problem: Physical Therapy Goal    Goal Priority Disciplines Outcome Goal Variances Interventions   Physical Therapy Goal     PT, PT/OT Ongoing (interventions implemented as appropriate)     Description:  Goals to be met by: 2019     Patient will increase functional independence with mobility by performin. Supine to sit with Modified [place]  2. Sit to supine with Modified [place]  3. Sit to stand transfer with Supervision  4. Gait  x 150 feet with Supervision using Rolling Walker.   5. Ascend/descend 4 stair with bilateral Handrails Stand-by Assistance                    Time Tracking:     PT Received On: 19  PT Start Time: 916     PT Stop Time: 940  PT Total Time (min): 24 min     Billable Minutes: Gait Training 12 min and Therapeutic Exercise 12 min    Treatment Type: Treatment  PT/PTA: PT           Aristeo Flood, PT  2019

## 2019-09-11 NOTE — PROGRESS NOTES
[facility] Hospital Medicine  Progress Note    Patient Name: Venus Mayer  MRN: [id number]  Patient Class: IP- Inpatient   Admission Date: 9/6/2019  Length of Stay: 5 days  Attending Physician: Tonya Viera MD  Primary Care Provider: Jona Che MD    [facility] Medicine Team: [facility] 4 Tawny Dotson MD    Subjective:     Principal Problem:Sepsis        HPI:  Ms. Mayer is a 89-year-old lady with PMH COPD on 2.5 L O2 at home, recurrent pneumothoraces with failed conservative management and VATS pleurodesis with a chest tube in place connected to a pneumostat, HTN, CAD, HLD who presents with weakness. Per her daughter, Ms. Mayer was complaining of feeling cold, which is normal for her, however today she was visibly shaking. She went to a doctor's appointment, after which she was visibly weak in her knees, struggling to walk with her walker and had to sit down. She was lethargic but alert and oriented and felt hot to the touch, however without objective fevers at home.     She went to her doctor's appointment earlier that day on 9/6 with Dr. Mckeon for a CT surgery follow-up after being recently discharged from the hospital (7/28-8/09) for spontaneous right pneumothorax with persistent air leak that failed bedside pleurodesis, endobronchial valve replacement, and VATS pleurodesis. There was a family discussion for hospital or AIM however just prior to discharge the orders were revoked and she went home with . Persistent air leak 2 weeks ago. She had a CXR at her appointment for assessment of air leak. Per chart review, Dr. Mckeon noted no air leak, clamped her chest tube, and scheduled her to follow-up on 9/11 for a repeat CXR. After the appointment, patient's daughter unclamped her chest tube because she was instructed that she could do so if patient became short of breath. Of note, patient has not been short of breath at any point during the day or in the ED, although she  endorses a chronic nonproductive cough that has not changed acutely. Fever in ED to 103. She denies chest pain and dysuria. She has a right-sided chest tube in place connected to a pneumostat that is filled with yellow fluid which her daughter says is normal/unchanged.     Overview/Hospital Course:  She was admitted to hospital medicine for concerns of sepsis, secondary to empyema. Managed with Zosyn. Cultures from Pleural fluid grew pansensitive Pseudomonas and transitioned her to Ciprofloxacin on 9/9/19. CTS consulted for chest tube management and removed it on 09/09. ID Service consulted for management regarding appropriate antibiotic therapy and length of therapy. They recommend a 2 week course of cipro.     On 09/08/2019, the patient and two of her daughters (one present in the room and the other one on speaker phone) were asked regarding her code status and stated she was DNR. This information was recorded in EMR and in the patient's chart.     Interval History: Patient on anti-pseudomonal therapy for pan-sensitive Pseudomonas with Cipro. Will change to IV therapy with Pip-tazo given prolonged QTc on EKG yesterday and reported cephalosporin allergy. Placing midline today for 10 day antibiotic course at home. Currently coordinating discharge with family.     Review of Systems   Constitutional: Negative for activity change, appetite change, chills and fever.   HENT: Negative for congestion and sore throat.    Respiratory: Negative for cough, chest tightness and shortness of breath.    Cardiovascular: Negative for chest pain and palpitations.   Gastrointestinal: Negative for abdominal pain, constipation (improved following senna-docusate and miralax), diarrhea, nausea and vomiting.   Genitourinary: Negative for dysuria and hematuria.   Musculoskeletal: Negative for neck pain.   Skin: Negative for color change and rash.   Neurological: Negative for dizziness and headaches.     Objective:     Vital Signs (Most  Recent):  Temp: 97.9 °F (36.6 °C) (09/11/19 0807)  Pulse: 93 (09/11/19 0842)  Resp: 20 (09/11/19 0842)  BP: (!) 146/70 (09/11/19 0807)  SpO2: 97 % (09/11/19 0842) Vital Signs (24h Range):  Temp:  [97.8 °F (36.6 °C)-99.1 °F (37.3 °C)] 97.9 °F (36.6 °C)  Pulse:  [] 93  Resp:  [16-20] 20  SpO2:  [93 %-98 %] 97 %  BP: (133-158)/(60-75) 146/70     Weight: 40.4 kg (89 lb 1.1 oz)  Body mass index is 17.39 kg/m².    Intake/Output Summary (Last 24 hours) at 9/11/2019 1036  Last data filed at 9/11/2019 0700  Gross per 24 hour   Intake --   Output 1050 ml   Net -1050 ml      Physical Exam   Constitutional: Vital signs are normal. She does not appear ill. No distress. Nasal cannula in place.   HENT:   Head: Normocephalic and atraumatic.   Mouth/Throat: No oropharyngeal exudate.   Eyes: Conjunctivae are normal. No scleral icterus.   Cardiovascular: Normal rate, regular rhythm and normal heart sounds.   No murmur heard.  Pulses:       Radial pulses are 2+ on the right side, and 2+ on the left side.   Pulmonary/Chest: Effort normal and breath sounds normal. No respiratory distress. She has no wheezes.               Abdominal: Soft. Normal appearance and bowel sounds are normal. She exhibits no distension. There is no tenderness.   Musculoskeletal: She exhibits no edema, tenderness or deformity.   Neurological: She is alert. She is not disoriented.   Skin: Skin is warm. No rash noted. She is not diaphoretic.       Significant Labs:   BMP:   Recent Labs   Lab 09/11/19  0332   GLU 88      K 3.6   CL 97   CO2 36*   BUN 17   CREATININE 0.8   CALCIUM 10.0     CBC:   Recent Labs   Lab 09/10/19  0725 09/11/19  0332   WBC 10.61 9.08   HGB 9.9* 8.8*   HCT 31.9* 28.8*    210       Significant Imaging: I have reviewed all pertinent imaging results/findings within the past 24 hours.      Assessment/Plan:      * Sepsis  On arrival at the ED, the patient was febrile to 103.5, BP 98/54, leukocytosis to 16.29. Met SIRS criteria.  "BP improved to 130s after 500 cc bolus LR in ED and initiation of  cc/hr x 5 hours. UA negative. CXR with chronic changes. Lactic acid 1. Received vancomycin and zosyn in the ED.    See "empyema"     Empyema  Pneumostat fluid culture grew pan-sensitive Pseudomonas. Sensitive to cipro, amikacin, cefepime, gentamycin, pip-tazo, tobramycin    Started on ciprofloxacin initially and planned to go home on it but repeat EKG significant for prolonged QTc. Will transition to pip-tazo and place a midline for outpatient administration.     Plan:  -ID recommended a total of 2 weeks of treatment  -Will place midline today for home IV antibiotics  -Change therapy from cipro to pip-tazo given prolonged QTc on EKG    Spontaneous pneumothorax  Patient with history of recurrent spontaneous pneumothoraces that have failed multiple treatment attempts. CTS consulted on admission to determine chest tube management. CTS removed chest tube on 09/09.    Plan:  -Monitor closely  -STAT CXR if patient becomes hypoxic to evaluate for PTX  -Follow-up with CTS in outpatient setting      COLD (chronic obstructive lung disease)  On 2.5L home oxygen.     Plan:  -Wean off O2 as tolerated   -Continue home albuterol 1 puff q6 PRN//levalbuterol 1.25 if patient feels tachycardic.   -Continue home tiotropium 18 micrograms daily      Chronic diastolic heart failure  TTE on 7/7/19 showed normal left ventricular systolic function, EF 65%, pulmonary hypertension present with estimated PA systolic pressure is 52 mm Hg. Initially held home furosemide in the setting of suspected sepsis; now okay to continue home furosemide.     Plan:  -Can continue home furosemide    Hypertension  On home amlodipine 2.5mg qhs. Initially held due to concerns of sepsis. Normotensive and hypertensive in ED, and following admission to hospital medicine. Will resume antihypertensive medication.    Plan:  -Continue w/ home amlodipine 2.5mg    Debility  Plan:  -PT/OT " following      Coronary artery disease  Plan:  -Continue home aspirin 81    Constipation  No BMs 3-4 days after admission. Improved following senna-docusate and miralax.       Hypokalemia  Plan:  -Monitor and replace as needed    Hypercalcemia  Corrected calcium 10.9 upon admission. PTH 20.  Normal ionized calcium.    Restless leg syndrome  On home ropinirole. Will d/c ropinirole given daughters' concern regarding its concomitant use with ciprofloxacin. Will monitor closely.    Plan:  -Discontinue home ropinirole     Iron deficiency  Plan:  -Continue home ferrous sulfate 325    VTE Risk Mitigation (From admission, onward)        Ordered     enoxaparin injection 30 mg  Daily      09/07/19 1714     IP VTE HIGH RISK PATIENT  Once      09/07/19 0459     Place sequential compression device  Until discontinued      09/07/19 9923          Tawny Dotson MD  Department of Hospital Medicine   [facility]

## 2019-09-11 NOTE — ASSESSMENT & PLAN NOTE
"On arrival at the ED, the patient was febrile to 103.5, BP 98/54, leukocytosis to 16.29. Met SIRS criteria. BP improved to 130s after 500 cc bolus LR in ED and initiation of  cc/hr x 5 hours. UA negative. CXR with chronic changes. Lactic acid 1. Received vancomycin and zosyn in the ED.    See "empyema"   "

## 2019-09-11 NOTE — ASSESSMENT & PLAN NOTE
On home amlodipine 2.5mg qhs. Initially held due to concerns of sepsis. Normotensive and hypertensive in ED, and following admission to hospital medicine. Will resume antihypertensive medication.    Plan:  -Continue w/ home amlodipine 2.5mg

## 2019-09-11 NOTE — SUBJECTIVE & OBJECTIVE
Interval History: Patient on anti-pseudomonal therapy for pan-sensitive Pseudomonas with Cipro. Will change to IV therapy with Pip-tazo given prolonged QTc on EKG yesterday and reported cephalosporin allergy. Placing midline today for 10 day antibiotic course at home. Currently coordinating discharge with family.     Review of Systems   Constitutional: Negative for activity change, appetite change, chills and fever.   HENT: Negative for congestion and sore throat.    Respiratory: Negative for cough, chest tightness and shortness of breath.    Cardiovascular: Negative for chest pain and palpitations.   Gastrointestinal: Negative for abdominal pain, constipation (improved following senna-docusate and miralax), diarrhea, nausea and vomiting.   Genitourinary: Negative for dysuria and hematuria.   Musculoskeletal: Negative for neck pain.   Skin: Negative for color change and rash.   Neurological: Negative for dizziness and headaches.     Objective:     Vital Signs (Most Recent):  Temp: 97.9 °F (36.6 °C) (09/11/19 0807)  Pulse: 93 (09/11/19 0842)  Resp: 20 (09/11/19 0842)  BP: (!) 146/70 (09/11/19 0807)  SpO2: 97 % (09/11/19 0842) Vital Signs (24h Range):  Temp:  [97.8 °F (36.6 °C)-99.1 °F (37.3 °C)] 97.9 °F (36.6 °C)  Pulse:  [] 93  Resp:  [16-20] 20  SpO2:  [93 %-98 %] 97 %  BP: (133-158)/(60-75) 146/70     Weight: 40.4 kg (89 lb 1.1 oz)  Body mass index is 17.39 kg/m².    Intake/Output Summary (Last 24 hours) at 9/11/2019 1036  Last data filed at 9/11/2019 0700  Gross per 24 hour   Intake --   Output 1050 ml   Net -1050 ml      Physical Exam   Constitutional: Vital signs are normal. She does not appear ill. No distress. Nasal cannula in place.   HENT:   Head: Normocephalic and atraumatic.   Mouth/Throat: No oropharyngeal exudate.   Eyes: Conjunctivae are normal. No scleral icterus.   Cardiovascular: Normal rate, regular rhythm and normal heart sounds.   No murmur heard.  Pulses:       Radial pulses are 2+ on the  right side, and 2+ on the left side.   Pulmonary/Chest: Effort normal and breath sounds normal. No respiratory distress. She has no wheezes.               Abdominal: Soft. Normal appearance and bowel sounds are normal. She exhibits no distension. There is no tenderness.   Musculoskeletal: She exhibits no edema, tenderness or deformity.   Neurological: She is alert. She is not disoriented.   Skin: Skin is warm. No rash noted. She is not diaphoretic.       Significant Labs:   BMP:   Recent Labs   Lab 09/11/19  0332   GLU 88      K 3.6   CL 97   CO2 36*   BUN 17   CREATININE 0.8   CALCIUM 10.0     CBC:   Recent Labs   Lab 09/10/19  0725 09/11/19  0332   WBC 10.61 9.08   HGB 9.9* 8.8*   HCT 31.9* 28.8*    210       Significant Imaging: I have reviewed all pertinent imaging results/findings within the past 24 hours.

## 2019-09-11 NOTE — PLAN OF CARE
Problem: Physical Therapy Goal  Goal: Physical Therapy Goal  Goals to be met by: 2019     Patient will increase functional independence with mobility by performin. Supine to sit with Modified [place]  2. Sit to supine with Modified [place]  3. Sit to stand transfer with Supervision  4. Gait  x 150 feet with Supervision using Rolling Walker.   5. Ascend/descend 4 stair with bilateral Handrails Stand-by Assistance   Outcome: Ongoing (interventions implemented as appropriate)  Progressing towards goals    Aristeo Flood PT,DPT  2019

## 2019-09-11 NOTE — PLAN OF CARE
Problem: Adult Inpatient Plan of Care  Goal: Plan of Care Review  Outcome: Ongoing (interventions implemented as appropriate)  Patient remained in stable condition today.  Sitter or family at bedside throughout shift. Oral ABT discontinued and IV zosyn initiated after Midline was placed to left upper arm. Team has been in contact with patient's daughter today regarding POC and discharge. Patient up in chair today for lunch.  Pure wick in place and patient had adequate urine output this shift. Able to make needs known to staff.

## 2019-09-11 NOTE — ASSESSMENT & PLAN NOTE
Patient with history of recurrent spontaneous pneumothoraces that have failed multiple treatment attempts. CTS consulted on admission to determine chest tube management. CTS removed chest tube on 09/09.    Plan:  -Monitor closely  -STAT CXR if patient becomes hypoxic to evaluate for PTX  -Follow-up with CTS in outpatient setting

## 2019-09-11 NOTE — PROGRESS NOTES
"Patient's daughter, Bri, concerned about patient being discharged today on new antibiotic and would like her to stay another day.  States she will appeal it if necessary.  Apparently when she was started on the Cipro she had a "reaction" and per the KATRIN, it exacerbated the side effects of the ropinole that she is on for RLS. Notified Dr. Dorsey with IM 4 and he stated the resident taking care of her will call Bri and speak with her about the POC.  WCTM.   "

## 2019-09-12 VITALS
WEIGHT: 89.06 LBS | SYSTOLIC BLOOD PRESSURE: 128 MMHG | BODY MASS INDEX: 17.49 KG/M2 | DIASTOLIC BLOOD PRESSURE: 59 MMHG | HEART RATE: 89 BPM | RESPIRATION RATE: 16 BRPM | OXYGEN SATURATION: 96 % | TEMPERATURE: 98 F | HEIGHT: 60 IN

## 2019-09-12 LAB
ANION GAP SERPL CALC-SCNC: 8 MMOL/L (ref 8–16)
BASOPHILS # BLD AUTO: 0.08 K/UL (ref 0–0.2)
BASOPHILS NFR BLD: 0.8 % (ref 0–1.9)
BUN SERPL-MCNC: 17 MG/DL (ref 8–23)
CALCIUM SERPL-MCNC: 9.7 MG/DL (ref 8.7–10.5)
CHLORIDE SERPL-SCNC: 94 MMOL/L (ref 95–110)
CO2 SERPL-SCNC: 38 MMOL/L (ref 23–29)
CREAT SERPL-MCNC: 0.8 MG/DL (ref 0.5–1.4)
DIFFERENTIAL METHOD: ABNORMAL
EOSINOPHIL # BLD AUTO: 0.5 K/UL (ref 0–0.5)
EOSINOPHIL NFR BLD: 5.2 % (ref 0–8)
ERYTHROCYTE [DISTWIDTH] IN BLOOD BY AUTOMATED COUNT: 13.6 % (ref 11.5–14.5)
EST. GFR  (AFRICAN AMERICAN): >60 ML/MIN/1.73 M^2
EST. GFR  (NON AFRICAN AMERICAN): >60 ML/MIN/1.73 M^2
GLUCOSE SERPL-MCNC: 108 MG/DL (ref 70–110)
HCT VFR BLD AUTO: 30.6 % (ref 37–48.5)
HGB BLD-MCNC: 9.5 G/DL (ref 12–16)
IMM GRANULOCYTES # BLD AUTO: 0.06 K/UL (ref 0–0.04)
IMM GRANULOCYTES NFR BLD AUTO: 0.6 % (ref 0–0.5)
LYMPHOCYTES # BLD AUTO: 1 K/UL (ref 1–4.8)
LYMPHOCYTES NFR BLD: 9.3 % (ref 18–48)
MCH RBC QN AUTO: 31.1 PG (ref 27–31)
MCHC RBC AUTO-ENTMCNC: 31 G/DL (ref 32–36)
MCV RBC AUTO: 100 FL (ref 82–98)
MONOCYTES # BLD AUTO: 1.1 K/UL (ref 0.3–1)
MONOCYTES NFR BLD: 10.4 % (ref 4–15)
NEUTROPHILS # BLD AUTO: 7.6 K/UL (ref 1.8–7.7)
NEUTROPHILS NFR BLD: 73.7 % (ref 38–73)
NRBC BLD-RTO: 0 /100 WBC
PLATELET # BLD AUTO: 214 K/UL (ref 150–350)
PMV BLD AUTO: 9.9 FL (ref 9.2–12.9)
POTASSIUM SERPL-SCNC: 3.5 MMOL/L (ref 3.5–5.1)
RBC # BLD AUTO: 3.05 M/UL (ref 4–5.4)
SODIUM SERPL-SCNC: 140 MMOL/L (ref 136–145)
WBC # BLD AUTO: 10.28 K/UL (ref 3.9–12.7)

## 2019-09-12 PROCEDURE — 94761 N-INVAS EAR/PLS OXIMETRY MLT: CPT

## 2019-09-12 PROCEDURE — 25000242 PHARM REV CODE 250 ALT 637 W/ HCPCS: Performed by: STUDENT IN AN ORGANIZED HEALTH CARE EDUCATION/TRAINING PROGRAM

## 2019-09-12 PROCEDURE — 25000003 PHARM REV CODE 250: Performed by: STUDENT IN AN ORGANIZED HEALTH CARE EDUCATION/TRAINING PROGRAM

## 2019-09-12 PROCEDURE — 63600175 PHARM REV CODE 636 W HCPCS: Performed by: STUDENT IN AN ORGANIZED HEALTH CARE EDUCATION/TRAINING PROGRAM

## 2019-09-12 PROCEDURE — 85025 COMPLETE CBC W/AUTO DIFF WBC: CPT

## 2019-09-12 PROCEDURE — 80048 BASIC METABOLIC PNL TOTAL CA: CPT

## 2019-09-12 PROCEDURE — 36415 COLL VENOUS BLD VENIPUNCTURE: CPT

## 2019-09-12 PROCEDURE — 27000221 HC OXYGEN, UP TO 24 HOURS

## 2019-09-12 PROCEDURE — 94640 AIRWAY INHALATION TREATMENT: CPT

## 2019-09-12 PROCEDURE — 97535 SELF CARE MNGMENT TRAINING: CPT

## 2019-09-12 RX ADMIN — ASPIRIN 81 MG CHEWABLE TABLET 81 MG: 81 TABLET CHEWABLE at 09:09

## 2019-09-12 RX ADMIN — FUROSEMIDE 40 MG: 40 TABLET ORAL at 09:09

## 2019-09-12 RX ADMIN — THERA TABS 1 TABLET: TAB at 09:09

## 2019-09-12 RX ADMIN — TIOTROPIUM BROMIDE 18 MCG: 18 CAPSULE ORAL; RESPIRATORY (INHALATION) at 09:09

## 2019-09-12 RX ADMIN — IPRATROPIUM BROMIDE AND ALBUTEROL SULFATE 3 ML: .5; 3 SOLUTION RESPIRATORY (INHALATION) at 05:09

## 2019-09-12 RX ADMIN — FERROUS SULFATE TAB EC 325 MG (65 MG FE EQUIVALENT) 325 MG: 325 (65 FE) TABLET DELAYED RESPONSE at 09:09

## 2019-09-12 RX ADMIN — PIPERACILLIN AND TAZOBACTAM 4.5 G: 4; .5 INJECTION, POWDER, LYOPHILIZED, FOR SOLUTION INTRAVENOUS; PARENTERAL at 06:09

## 2019-09-12 RX ADMIN — IPRATROPIUM BROMIDE AND ALBUTEROL SULFATE 3 ML: .5; 3 SOLUTION RESPIRATORY (INHALATION) at 10:09

## 2019-09-12 RX ADMIN — PIPERACILLIN AND TAZOBACTAM 4.5 G: 4; .5 INJECTION, POWDER, LYOPHILIZED, FOR SOLUTION INTRAVENOUS; PARENTERAL at 01:09

## 2019-09-12 NOTE — ASSESSMENT & PLAN NOTE
Patient with history of recurrent spontaneous pneumothoraces that have failed multiple treatment attempts. CTS consulted on admission to determine chest tube management. CTS removed chest tube on 09/09. Since removal, the patient states relief in the area and states it makes her feel better.     Plan:  -Follow-up with CTS in OP

## 2019-09-12 NOTE — PROGRESS NOTES
This nurse completed discharge education with PT and both daughters. PT and daughters expressed medication regimen, follow up appointments, and warning signs to look out for including fever and change in mental status. This nurse provided extra teaching regarding home IV antibiotic administration to daughters on top of infusion representative education. PT and daughters expressed satisfaction with education and instruction explanation. This nurse instructed PT and daughters to contact home health or [facility] nurse hotline for any questions no matter how small. PT belongings accounted for; PT transported home via private vehicle.

## 2019-09-12 NOTE — PLAN OF CARE
Problem: Occupational Therapy Goal  Goal: Occupational Therapy Goal  Goals to be met by: 9/29/19    Patient will increase functional independence with ADLs by performing:    UE Dressing with [place].  LE Dressing with Stand-by Assistance.  Grooming while seated with Contact Guard Assistance.  Toileting from toilet with Contact Guard Assistance for hygiene and clothing management.   Bathing from  shower chair/bench with Minimal Assistance.     Outcome: Ongoing (interventions implemented as appropriate)  Goals addressed and unmet.  Cont with POC  Jovanna Mccullough, OT  9/12/2019

## 2019-09-12 NOTE — PLAN OF CARE
09/12/19 1058   Final Note   Anticipated Discharge Disposition Home-Health   What phone number can be called within the next 1-3 days to see how you are doing after discharge? [phone]   Discharge plans and expectations educations in teach back method with documentation complete? Yes   Right Care Referral Info   Post Acute Recommendation Home-care   Referral Type Home Infusion/Home Health    Facility Name [facility]

## 2019-09-12 NOTE — ASSESSMENT & PLAN NOTE
On home ropinirole. During the hospitalization, ropinirole was discontinued on 09/11 given daughters' concern regarding its concomitant use with ciprofloxacin. The next day the patient's sole complaint was discomfort in her legs, consistent with her previous RLS complains.     When the decision was made to discontinue ciprofloxacin (due to its QTc prolonging effects) and she was re-started on pip-tazo, ropinirole was re-started. It was administered along with pip-tazo during the first three days of admission and she experienced no side effects. Prior to re-starting the ropinirole, staff explained it to the daughters and they agreed to re-start it.     Plan:  -Continue home ropinirole

## 2019-09-12 NOTE — PT/OT/SLP PROGRESS
Occupational Therapy   Treatment    Name: Venus Mayer  MRN: [id number]  Admitting Diagnosis:  Sepsis       Recommendations:     Discharge Recommendations: home with home health  Discharge Equipment Recommendations:  none(Simultaneous filing. User may not have seen previous data.)  Barriers to discharge:  None    Assessment:     Venus Mayer is a 89 y.o. female with a medical diagnosis of Sepsis.  She presents up in chair with daughter present.  Pt desired bathing prior to discharge and required set up for seated in chair bathing.  Sit to stand with hand held and min A and Mod A for balancing while washing heri area. Pt abl eto clean areas without assist and only requires assist for standing to complete task. CGA for dressing and max A for donning socks. Performance deficits affecting function are impaired endurance, impaired self care skills, impaired functional mobilty, gait instability.     Rehab Prognosis:  Good; patient would benefit from acute skilled OT services to address these deficits and reach maximum level of function.       Plan:     Patient to be seen 3 x/week to address the above listed problems via self-care/home management, therapeutic activities, therapeutic exercises  · Plan of Care Expires:    · Plan of Care Reviewed with: patient    Subjective     Pain/Comfort:  · Pain Rating 1: 0/10    Objective:     Communicated with: RN prior to session.  Patient found up in chair with oxygen upon OT entry to room.    General Precautions: Standard, fall   Orthopedic Precautions:N/A   Braces: N/A     Occupational Performance:     Functional Mobility/Transfers:  · Patient completed Sit <> Stand Transfer with minimum assistance  with  hand-held assist   · Functional Mobility: Mod A for standing balance     Activities of Daily Living:  · Grooming: modified independence set up  · Bathing: contact guard assistance    · Upper Body Dressing: modified independence    · Lower Body Dressing: minimum assistance     · Toileting: moderate assistance        [facility] 6 Click ADL: 16    Treatment & Education:  Pt educated on safety, role of OT, importance of increased participation in self care for gains , expectations for participation, expectations for gains, POC, energy conservation, caregiver strain. White board updated.   ADL training   - toileting training     Patient left up in chair with all lines intactEducation:      GOALS:   Multidisciplinary Problems     Occupational Therapy Goals        Problem: Occupational Therapy Goal    Goal Priority Disciplines Outcome Interventions   Occupational Therapy Goal     OT, PT/OT Ongoing (interventions implemented as appropriate)    Description:  Goals to be met by: 9/29/19    Patient will increase functional independence with ADLs by performing:    UE Dressing with [place].  LE Dressing with Stand-by Assistance.  Grooming while seated with Contact Guard Assistance.  Toileting from toilet with Contact Guard Assistance for hygiene and clothing management.   Bathing from  shower chair/bench with Minimal Assistance.                      Time Tracking:     OT Date of Treatment: 09/12/19  OT Start Time: 1020  OT Stop Time: 1050  OT Total Time (min): 30 min    Billable Minutes:Self Care/Home Management 30    Jovanna Mccullough, CHIOMA  9/12/2019

## 2019-09-12 NOTE — DISCHARGE SUMMARY
[facility] Hospital Medicine  Discharge Summary      Patient Name: Venus Mayer  MRN: [id number]  Admission Date: 9/6/2019  Hospital Length of Stay: 6 days  Discharge Date and Time:  09/12/2019 5:25 PM  Attending Physician: Tonya Viera MD   Discharging Provider: Tawny Dotson MD  Primary Care Provider: Jona Che MD  [place] [facility] Medicine Team: [facility] HOSP MED 4 Tawny Dotson MD    HPI:   Ms. Mayer is a 89-year-old lady with PMH COPD on 2.5 L O2 at home, recurrent pneumothoraces with failed conservative management and VATS pleurodesis with a chest tube in place connected to a pneumostat, HTN, CAD, HLD who presents with weakness. Per her daughter, Ms. Mayer was complaining of feeling cold, which is normal for her, however today she was visibly shaking. She went to a doctor's appointment, after which she was visibly weak in her knees, struggling to walk with her walker and had to sit down. She was lethargic but alert and oriented and felt hot to the touch, however without objective fevers at home.     She went to her doctor's appointment earlier that day on 9/6 with Dr. Mckeon for a CT surgery follow-up after being recently discharged from the hospital (7/28-8/09) for spontaneous right pneumothorax with persistent air leak that failed bedside pleurodesis, endobronchial valve replacement, and VATS pleurodesis. There was a family discussion for hospital or AIM however just prior to discharge the orders were revoked and she went home with . Persistent air leak 2 weeks ago. She had a CXR at her appointment for assessment of air leak. Per chart review, Dr. Mckeon noted no air leak, clamped her chest tube, and scheduled her to follow-up on 9/11 for a repeat CXR. After the appointment, patient's daughter unclamped her chest tube because she was instructed that she could do so if patient became short of breath. Of note, patient has not been short of breath at any point during the day or in the  ED, although she endorses a chronic nonproductive cough that has not changed acutely. Fever in ED to 103. She denies chest pain and dysuria. She has a right-sided chest tube in place connected to a pneumostat that is filled with yellow fluid which her daughter says is normal/unchanged.     * No surgery found *      Hospital Course:   She was admitted to hospital medicine for concerns of sepsis, secondary to empyema. Managed with Zosyn. Cultures from Pleural fluid grew pansensitive Pseudomonas and transitioned her to Ciprofloxacin on 9/9/19. CTS consulted for chest tube management and removed it on 09/09. ID Service consulted for management regarding both the appropriate antibiotic therapy as well as the length of therapy. They recommend a 2 week course of antibiotics. Opted to use piperacillin-tazobactam, given documented hx of allergic reactions to the alternate antibiotics. Placed a midline in L brachial V on 09/11. The patient is stable for discharge home with home health.      Of note, on 09/08/2019, the patient and two of her daughters (one present in the room and the other, Bri, on speaker phone) were asked regarding the patient's code status and stated she was DNR. This information was recorded in EMR and in the patient's chart.      Consults:   Consults (From admission, onward)        Status Ordering Provider     Inpatient consult to Cardiothoracic Surgery  Once     Provider:  (Not yet assigned)    Completed SHELLEY ISAACS     Inpatient consult to Infectious Diseases  Once     Provider:  (Not yet assigned)    Completed ANAM CROSS     Inpatient consult to Midline team  Once     Provider:  (Not yet assigned)    Completed BRET HOLLEY          Subjective:    Review of Systems   Constitutional: Negative for activity change, appetite change, chills and fever.   HENT: Negative for congestion and sore throat.    Respiratory: Negative for cough, chest tightness and shortness of breath.     Cardiovascular: Negative for chest pain and palpitations.   Gastrointestinal: Negative for abdominal pain, constipation, diarrhea, nausea and vomiting.   Genitourinary: Negative for dysuria, flank pain and hematuria.   Musculoskeletal: Positive for back pain (per patient, from lying down in bed). Negative for arthralgias and neck pain.   Skin: Negative for color change and rash.   Neurological: Negative for dizziness and headaches.     Objective:     Vital Signs (Most Recent):  Temp: 97.8 °F (36.6 °C) (09/12/19 0830)  Pulse: 89 (09/12/19 1528)  Resp: 16 (09/12/19 1021)  BP: (!) 128/59 (09/12/19 0830)  SpO2: 96 % (09/12/19 1021) Vital Signs (24h Range):  Temp:  [97.8 °F (36.6 °C)-98.5 °F (36.9 °C)] 97.8 °F (36.6 °C)  Pulse:  [] 89  Resp:  [16-18] 16  SpO2:  [94 %-99 %] 96 %  BP: (128-177)/(59-79) 128/59     Weight: 40.4 kg (89 lb 1.1 oz)  Body mass index is 17.39 kg/m².    Intake/Output Summary (Last 24 hours) at 9/12/2019 1710  Last data filed at 9/12/2019 0700  Gross per 24 hour   Intake --   Output 650 ml   Net -650 ml      Physical Exam   Constitutional: Vital signs are normal. She does not appear ill. No distress. Nasal cannula in place.   HENT:   Head: Normocephalic and atraumatic.   Mouth/Throat: No oropharyngeal exudate.   Eyes: Conjunctivae are normal. No scleral icterus.   Cardiovascular: Normal rate, regular rhythm and normal heart sounds.   No murmur heard.  Pulses:       Radial pulses are 2+ on the right side, and 2+ on the left side.   Pulmonary/Chest: Effort normal and breath sounds normal. No respiratory distress. She has no wheezes. She has no rales.         She exhibits no tenderness.       Abdominal: Soft. Normal appearance and bowel sounds are normal. She exhibits no distension. There is no tenderness.   Musculoskeletal: She exhibits no edema, tenderness or deformity.   Neurological: She is alert. She is not disoriented.   Skin: Skin is warm. No rash noted. She is not diaphoretic.  "  Nursing note and vitals reviewed.      Assessment and Plan:    * Sepsis  On arrival at the ED, the patient was febrile to 103.5, BP 98/54, leukocytosis to 16.29. Met SIRS criteria. BP improved to 130s after 500 cc bolus LR in ED and initiation of  cc/hr x 5 hours. UA negative. CXR with chronic changes. Lactic acid 1. Received vancomycin and zosyn in the ED.    See "empyema"     Empyema  Pneumostat fluid culture grew pan-sensitive Pseudomonas. Sensitive to cipro, amikacin, cefepime, gentamycin, pip-tazo, tobramycin    Started on ciprofloxacin initially and planned to go home on it but repeat EKG significant for prolonged QTc. Transitioned to pip-tazo and placed a midline for outpatient administration.     Plan:  -Continue pip-tazo for a total course of 14 days    Spontaneous pneumothorax  Patient with history of recurrent spontaneous pneumothoraces that have failed multiple treatment attempts. CTS consulted on admission to determine chest tube management. CTS removed chest tube on 09/09. Since removal, the patient states relief in the area and states it makes her feel better.     Plan:  -Follow-up with CTS in OP    Restless leg syndrome  On home ropinirole. During the hospitalization, ropinirole was discontinued on 09/11 given daughters' concern regarding its concomitant use with ciprofloxacin. The next day the patient's sole complaint was discomfort in her legs, consistent with her previous RLS complains.     When the decision was made to discontinue ciprofloxacin (due to its QTc prolonging effects) and she was re-started on pip-tazo, ropinirole was re-started. It was administered along with pip-tazo during the first three days of admission and she experienced no side effects. Prior to re-starting the ropinirole, staff explained it to the daughters and they agreed to re-start it.     Plan:  -Continue home ropinirole     COLD (chronic obstructive lung disease)  On 2.5L home oxygen.     Plan:  -Wean off O2 as " tolerated   -Continue home albuterol 1 puff q6 PRN//levalbuterol 1.25 if patient feels tachycardic.   -Continue home tiotropium 18 micrograms daily    Chronic diastolic heart failure  TTE on 7/7/19 showed normal left ventricular systolic function, EF 65%, pulmonary hypertension present with estimated PA systolic pressure is 52 mm Hg. Initially held home furosemide in the setting of suspected sepsis; now okay to continue home furosemide.     Plan:  -Can continue home furosemide    Hypertension  On home amlodipine 2.5mg qhs. Initially held due to concerns of sepsis. Normotensive and hypertensive in ED, and following admission to hospital medicine. Will resume antihypertensive medication.    Plan:  -Continue w/ home amlodipine 2.5mg    Debility  Plan:  -PT/OT following    Coronary artery disease  Plan:  -Continue home aspirin 81    Constipation  No BMs 3-4 days after admission. Improved following senna-docusate and miralax.       Hypokalemia  Plan:  -Monitor and replace as needed    Hypercalcemia  Corrected calcium 10.9 upon admission. PTH 20.  Normal ionized calcium.    Iron deficiency  Plan:  -Continue home ferrous sulfate 325    Final Active Diagnoses:    Diagnosis Date Noted POA    PRINCIPAL PROBLEM:  Sepsis [A41.9] 09/07/2019 Yes    Empyema [J86.9] 09/07/2019 Yes    Spontaneous pneumothorax [J93.83] 05/22/2019 Yes    COLD (chronic obstructive lung disease) [J44.9] 03/28/2013 Yes    Chronic diastolic heart failure [I50.32] 01/17/2013 Yes    Hypertension [I10] 01/17/2013 Yes    Debility [R53.81] 05/22/2019 Yes    Coronary artery disease [I25.10] 09/07/2019 Yes    Hypokalemia [E87.6] 09/08/2019 No    Constipation [K59.00] 09/08/2019 Yes    Hypercalcemia [E83.52] 09/07/2019 Yes    Restless leg syndrome [G25.81] 07/20/2019 Yes    Severe malnutrition [E43] 07/07/2019 Yes    Iron deficiency [E61.1] 05/24/2019 Yes    Stage 3 chronic kidney disease [N18.3] 03/28/2013 Yes      Problems Resolved During this  Admission:       Discharged Condition: fair    Disposition: Home-Health Care [facility]    Follow Up:  Follow-up Information     Jona Che MD. Schedule an appointment as soon as possible for a visit in 1 week.    Specialty:  General Practice  Contact information:  [address]  [address]  [place] [id number] [phone]                 Patient Instructions:      Ambulatory Referral to Infectious Disease   Referral Priority: Urgent Referral Type: Consultation   Referral Reason: Specialty Services Required   Requested Specialty: Infectious Diseases   Number of Visits Requested: 1       Significant Diagnostic Studies: Labs:   BMP:   Recent Labs   Lab 09/11/19  0332 09/12/19  0657   GLU 88 108    140   K 3.6 3.5   CL 97 94*   CO2 36* 38*   BUN 17 17   CREATININE 0.8 0.8   CALCIUM 10.0 9.7    and CBC   Recent Labs   Lab 09/11/19  0332 09/12/19  0657   WBC 9.08 10.28   HGB 8.8* 9.5*   HCT 28.8* 30.6*    214       Pending Diagnostic Studies:     None         Medications:  Reconciled Home Medications:      Medication List      START taking these medications    PIPERACILLIN-TAZOBACTAM 4.5G/100ML SODIUM CHLORIDE 0.9%-READY TO MIX  Inject 100 mLs (4.5 g total) into the vein every 8 (eight) hours. for 10 days        CHANGE how you take these medications    amLODIPine 2.5 MG tablet  Commonly known as:  NORVASC  Take 1 tablet (2.5 mg total) by mouth once daily.  What changed:    · when to take this  · reasons to take this        CONTINUE taking these medications    acetaminophen 500 MG tablet  Commonly known as:  TYLENOL  Take 1,000 mg by mouth daily as needed for Pain.     * albuterol 2.5 mg /3 mL (0.083 %) nebulizer solution  Commonly known as:  PROVENTIL  Take 2.5 mg by nebulization every 6 (six) hours as needed for Wheezing or Shortness of Breath. Rescue     * albuterol 90 mcg/actuation inhaler  Commonly known as:  PROAIR HFA  Inhale 1 puff into the lungs every 6 (six) hours as needed for Wheezing or  Shortness of Breath. Rescue     aspirin 81 mg Tab  Take 1 tablet by mouth once daily.     azelastine 137 mcg (0.1 %) nasal spray  Commonly known as:  ASTELIN  2 sprays by Nasal route 2 (two) times daily.     ferrous sulfate 325 (65 FE) MG EC tablet  Take 1 tablet (325 mg total) by mouth once daily.     furosemide 40 MG tablet  Commonly known as:  LASIX  Take 1 tablet (40 mg total) by mouth once daily.     ipratropium 42 mcg (0.06 %) nasal spray  Commonly known as:  ATROVENT  2 sprays by Nasal route 2 (two) times daily.     multivitamin per tablet  Commonly known as:  THERAGRAN  Take 1 tablet by mouth once daily.     potassium chloride 10 MEQ Cpsr  Commonly known as:  MICRO-K  Take 10 mEq by mouth once daily.     rOPINIRole 0.25 MG tablet  Commonly known as:  REQUIP  Take 1 tablet (0.25 mg total) by mouth every evening.     tiotropium 18 mcg inhalation capsule  Commonly known as:  SPIRIVA  Inhale 1 capsule (18 mcg total) into the lungs once daily.         * This list has 2 medication(s) that are the same as other medications prescribed for you. Read the directions carefully, and ask your doctor or other care provider to review them with you.              Indwelling Lines/Drains at time of discharge:   Lines/Drains/Airways     Drain            Female External Urinary Catheter 09/08/19 0830 4 days              Time spent on the discharge of patient: 35 minutes  Patient was seen and examined on the date of discharge and determined to be suitable for discharge.    Tawny Dotson MD  Department of Hospital Medicine  [facility]

## 2019-09-12 NOTE — ASSESSMENT & PLAN NOTE
Pneumostat fluid culture grew pan-sensitive Pseudomonas. Sensitive to cipro, amikacin, cefepime, gentamycin, pip-tazo, tobramycin    Started on ciprofloxacin initially and planned to go home on it but repeat EKG significant for prolonged QTc. Transitioned to pip-tazo and placed a midline for outpatient administration.     Plan:  -Continue pip-tazo for a total course of 14 days

## 2019-09-12 NOTE — SUBJECTIVE & OBJECTIVE
Interval History: Patient on anti-pseudomonal therapy for pan-sensitive Pseudomonas with Cipro. Will change to IV therapy with Pip-tazo given prolonged QTc on EKG yesterday and reported cephalosporin allergy. Placing midline today for 10 day antibiotic course at home. Currently coordinating discharge with family.     Review of Systems   Constitutional: Negative for activity change, appetite change, chills and fever.   HENT: Negative for congestion and sore throat.    Respiratory: Negative for cough, chest tightness and shortness of breath.    Cardiovascular: Negative for chest pain and palpitations.   Gastrointestinal: Negative for abdominal pain, constipation, diarrhea, nausea and vomiting.   Genitourinary: Negative for dysuria, flank pain and hematuria.   Musculoskeletal: Positive for back pain (per patient, from lying down in bed). Negative for arthralgias and neck pain.   Skin: Negative for color change and rash.   Neurological: Negative for dizziness and headaches.     Objective:     Vital Signs (Most Recent):  Temp: 97.8 °F (36.6 °C) (09/12/19 0830)  Pulse: 89 (09/12/19 1528)  Resp: 16 (09/12/19 1021)  BP: (!) 128/59 (09/12/19 0830)  SpO2: 96 % (09/12/19 1021) Vital Signs (24h Range):  Temp:  [97.8 °F (36.6 °C)-98.5 °F (36.9 °C)] 97.8 °F (36.6 °C)  Pulse:  [] 89  Resp:  [16-18] 16  SpO2:  [94 %-99 %] 96 %  BP: (128-177)/(59-79) 128/59     Weight: 40.4 kg (89 lb 1.1 oz)  Body mass index is 17.39 kg/m².    Intake/Output Summary (Last 24 hours) at 9/12/2019 1710  Last data filed at 9/12/2019 0700  Gross per 24 hour   Intake --   Output 650 ml   Net -650 ml      Physical Exam   Constitutional: Vital signs are normal. She does not appear ill. No distress. Nasal cannula in place.   HENT:   Head: Normocephalic and atraumatic.   Mouth/Throat: No oropharyngeal exudate.   Eyes: Conjunctivae are normal. No scleral icterus.   Cardiovascular: Normal rate, regular rhythm and normal heart sounds.   No murmur  heard.  Pulses:       Radial pulses are 2+ on the right side, and 2+ on the left side.   Pulmonary/Chest: Effort normal and breath sounds normal. No respiratory distress. She has no wheezes. She has no rales.         She exhibits no tenderness.       Abdominal: Soft. Normal appearance and bowel sounds are normal. She exhibits no distension. There is no tenderness.   Musculoskeletal: She exhibits no edema, tenderness or deformity.   Neurological: She is alert. She is not disoriented.   Skin: Skin is warm. No rash noted. She is not diaphoretic.   Nursing note and vitals reviewed.      Significant Labs:   BMP:   Recent Labs   Lab 09/12/19  0657         K 3.5   CL 94*   CO2 38*   BUN 17   CREATININE 0.8   CALCIUM 9.7     CBC:   Recent Labs   Lab 09/11/19  0332 09/12/19  0657   WBC 9.08 10.28   HGB 8.8* 9.5*   HCT 28.8* 30.6*    214       Significant Imaging: I have reviewed all pertinent imaging results/findings within the past 24 hours.

## 2019-09-16 ENCOUNTER — PATIENT OUTREACH (OUTPATIENT)
Dept: ADMINISTRATIVE | Facility: CLINIC | Age: 84
End: 2019-09-16

## 2019-09-16 ENCOUNTER — TELEPHONE (OUTPATIENT)
Dept: INFECTIOUS DISEASES | Facility: CLINIC | Age: 84
End: 2019-09-16

## 2019-09-16 DIAGNOSIS — J86.9 EMPYEMA: Primary | ICD-10-CM

## 2019-09-16 NOTE — TELEPHONE ENCOUNTER
----- Message from Francisca Daugherty MA sent at 9/16/2019 11:58 AM CDT -----  Contact: Martín /[phone]      ----- Message -----  From: Jayde Adam MA  Sent: 9/16/2019   9:49 AM  To: [facility] Staff    Pt daughter has scheduled a hospital discharge appt for this Friday 9/20 and wants to know will that appt be okay. Pt will be off her antibiotics this Saturday and also wants to know will she have any labs to complete before or after her appt.

## 2019-09-16 NOTE — TELEPHONE ENCOUNTER
Spoke with pt daughter, Bri and informed her we got her message and I am waiting on response from provider

## 2019-09-16 NOTE — TELEPHONE ENCOUNTER
----- Message from Jayde Adam MA sent at 9/16/2019  9:49 AM CDT -----  Contact: Martín /[phone]  Pt daughter has scheduled a hospital discharge appt for this Friday 9/20 and wants to know will that appt be okay. Pt will be off her antibiotics this Saturday and also wants to know will she have any labs to complete before or after her appt.

## 2019-09-16 NOTE — PATIENT INSTRUCTIONS
Sepsis     To treat sepsis, antibiotics and fluids may by given through an intravenous (IV) line.     Sepsis happens when your body responds with widespread inflammation to a bad infection or bacteremia--the presence of bacteria in your bloodstream. Sepsis can be deadly. Blood pressure may drop and the lungs and kidneys may start to fail. Emergency care for sepsis is crucial.  Risk factors  Those most at risk for sepsis are:  · Infants or older adults  · People who have an illness that weakens their immune system, such as cancer, AIDS, or diabetes  · People being treated with chemotherapy medicines or radiation, which weakens the immune system  · People who have had a transplant  · People with a very severe infection such as pneumonia, meningitis, or a urinary tract infection  When to go to the emergency department (ED)  Sepsis is an emergency. Go to the nearest ED if you have a fever with any of these symptoms:  · Chills and shaking  · Rapid heartbeat and breathing  · Trouble breathing  · Severe nausea or uncontrolled vomiting  · Confusion, disorientation, drowsiness, or dizziness  · Decreased urination  · Severe pain, including in the back or joints   What to expect in the ED  · Blood and urine tests are done to look for bacteria. They also check for organ failure.  · Blood, urine, or sputum cultures may be taken. The samples are sent to a lab. They are placed in a special container. Any bacteria should grow in 24 hours.  · X-rays or other imaging tests may be done.  A person with sepsis will be admitted to the hospital and treated with antibiotics. Treatment may also include oxygen and intravenous fluids.  Date Last Reviewed: 10/1/2016  © 7681-5068 [employer]. [address] [place] [address], [place] [address]. All rights reserved. This information is not intended as a substitute for professional medical care. Always follow your healthcare professional's instructions.

## 2019-09-17 ENCOUNTER — TELEPHONE (OUTPATIENT)
Dept: INFECTIOUS DISEASES | Facility: CLINIC | Age: 84
End: 2019-09-17

## 2019-09-17 LAB
ALBUMIN: 3.6 GRAM/DL (ref 3.5–5)
ALP SERPL-CCNC: 63 UNIT/L (ref 38–126)
ALT SERPL W P-5'-P-CCNC: 12 UNIT/L (ref 7–56)
ANION GAP SERPL CALC-SCNC: 16 MEQ/L (ref 9–18)
AST SERPL-CCNC: 18 UNIT/L (ref 7–40)
BASOPHILS ABSOLUTE COUNT: 0.1 K/UL (ref 0–0.2)
BASOPHILS NFR BLD: 0.9 % (ref 0–2)
BILIRUB SERPL-MCNC: 0.4 MG/DL (ref 0–1.2)
BUN BLD-MCNC: 13 MG/DL (ref 7–21)
BUN/CREAT SERPL: 16 RATIO (ref 6–22)
CALC OSMOLALITY: 289 MOSM/KG (ref 275–295)
CALCIUM SERPL-MCNC: 10 MG/DL (ref 8.5–10.3)
CHLORIDE SERPL-SCNC: 92 MEQ/L (ref 98–107)
CO2 SERPL-SCNC: 40 MEQ/L (ref 21–31)
CREAT SERPL-MCNC: 0.8 MG/DL (ref 0.5–1)
DIFFERENTIAL TYPE: ABNORMAL
EOSINOPHIL NFR BLD: 3.8 % (ref 0–4)
EOSINOPHILS ABSOLUTE COUNT: 0.4 K/UL (ref 0–0.7)
ERYTHROCYTE [DISTWIDTH] IN BLOOD BY AUTOMATED COUNT: 14.2 GRAM/DL (ref 12–15.3)
GFR: 62.8 ML/MIN/1.73M2
GLUCOSE SERPL-MCNC: 142 MG/DL (ref 70–100)
HCT VFR BLD AUTO: 32.9 % (ref 37–47)
HGB BLD-MCNC: 10.8 GRAM/DL (ref 12–16)
LYMPHOCYTES %: 8.9 % (ref 15–45)
LYMPHOCYTES ABSOLUTE COUNT: 0.9 K/UL (ref 1–4.2)
MCH RBC QN AUTO: 30.6 PICOGRAM (ref 27–33)
MCHC RBC AUTO-ENTMCNC: 32.9 GRAM/DL (ref 32–36)
MCV RBC AUTO: 92.9 FEMTOLITER (ref 81–99)
MONOCYTES %: 11.8 % (ref 3–13)
MONOCYTES ABSOLUTE COUNT: 1.2 K/UL (ref 0.1–0.8)
NEUTROPHILS ABSOLUTE COUNT: 7.4 K/UL (ref 2.1–7.6)
NEUTROPHILS RELATIVE PERCENT: 74.6 % (ref 32–80)
PLATELET # BLD AUTO: 255 K/UL (ref 150–350)
PMV BLD AUTO: 7.8 FEMTOLITER (ref 7–10.2)
POTASSIUM SERPL-SCNC: 3.6 MEQ/L (ref 3.5–5)
RBC # BLD AUTO: 3.54 MIL/UL (ref 4.2–5.4)
SODIUM BLD-SCNC: 144 MEQ/L (ref 135–145)
TOTAL PROTEIN: 6.3 GRAM/DL (ref 6.3–8.2)
WBC # BLD AUTO: 10 K/UL (ref 4.5–11)

## 2019-09-17 NOTE — TELEPHONE ENCOUNTER
Spoke with Ashleigh at [employer] and she says that the pt can take the medication earlier, right before the appointment and that would be fine. I called the the pt daughter, Bri and informed her of what Ashleigh says and she says they will do so

## 2019-09-17 NOTE — TELEPHONE ENCOUNTER
----- Message from Danielle Dumont sent at 9/17/2019 11:21 AM CDT -----  Contact: nnedu  pt's daughter   Bri   = daughter called   Ph [phone]  Pt has appt Friday    Pt is scheduled to have IV antioncotics  at 2:00  Same time as doctor's appt      What solution do you have     Can they come earlier ??     Take it a lot later ??      Please call      Thanks

## 2019-09-20 ENCOUNTER — INFUSION (OUTPATIENT)
Dept: INFECTIOUS DISEASES | Facility: HOSPITAL | Age: 84
End: 2019-09-20
Attending: INTERNAL MEDICINE
Payer: MEDICARE

## 2019-09-20 ENCOUNTER — TELEPHONE (OUTPATIENT)
Dept: INFECTIOUS DISEASES | Facility: CLINIC | Age: 84
End: 2019-09-20

## 2019-09-20 ENCOUNTER — OFFICE VISIT (OUTPATIENT)
Dept: INFECTIOUS DISEASES | Facility: CLINIC | Age: 84
End: 2019-09-20
Payer: MEDICARE

## 2019-09-20 ENCOUNTER — HOSPITAL ENCOUNTER (OUTPATIENT)
Dept: RADIOLOGY | Facility: HOSPITAL | Age: 84
Discharge: HOME | End: 2019-09-20
Attending: INTERNAL MEDICINE
Payer: MEDICARE

## 2019-09-20 VITALS
SYSTOLIC BLOOD PRESSURE: 117 MMHG | HEIGHT: 60 IN | DIASTOLIC BLOOD PRESSURE: 57 MMHG | HEART RATE: 100 BPM | BODY MASS INDEX: 17.39 KG/M2 | TEMPERATURE: 98 F

## 2019-09-20 DIAGNOSIS — J86.9 EMPYEMA: Primary | ICD-10-CM

## 2019-09-20 DIAGNOSIS — J86.9 EMPYEMA: ICD-10-CM

## 2019-09-20 PROCEDURE — 71046 X-RAY EXAM CHEST 2 VIEWS: CPT | Mod: 26,,, | Performed by: RADIOLOGY

## 2019-09-20 PROCEDURE — 71046 X-RAY EXAM CHEST 2 VIEWS: CPT | Mod: TC

## 2019-09-20 PROCEDURE — 3288F FALL RISK ASSESSMENT DOCD: CPT | Mod: CPTII,S$GLB,, | Performed by: INTERNAL MEDICINE

## 2019-09-20 PROCEDURE — 99999 PR PBB SHADOW E&M-EST. PATIENT-LVL III: CPT | Mod: PBBFAC,,, | Performed by: INTERNAL MEDICINE

## 2019-09-20 PROCEDURE — 99999 PR PBB SHADOW E&M-EST. PATIENT-LVL III: ICD-10-PCS | Mod: PBBFAC,,, | Performed by: INTERNAL MEDICINE

## 2019-09-20 PROCEDURE — 99215 PR OFFICE/OUTPT VISIT, EST, LEVL V, 40-54 MIN: ICD-10-PCS | Mod: S$GLB,,, | Performed by: INTERNAL MEDICINE

## 2019-09-20 PROCEDURE — 3288F PR FALLS RISK ASSESSMENT DOCUMENTED: ICD-10-PCS | Mod: CPTII,S$GLB,, | Performed by: INTERNAL MEDICINE

## 2019-09-20 PROCEDURE — 71046 XR CHEST PA AND LATERAL: ICD-10-PCS | Mod: 26,,, | Performed by: RADIOLOGY

## 2019-09-20 PROCEDURE — 1100F PTFALLS ASSESS-DOCD GE2>/YR: CPT | Mod: CPTII,S$GLB,, | Performed by: INTERNAL MEDICINE

## 2019-09-20 PROCEDURE — 99215 OFFICE O/P EST HI 40 MIN: CPT | Mod: S$GLB,,, | Performed by: INTERNAL MEDICINE

## 2019-09-20 PROCEDURE — 1100F PR PT FALLS ASSESS DOC 2+ FALLS/FALL W/INJURY/YR: ICD-10-PCS | Mod: CPTII,S$GLB,, | Performed by: INTERNAL MEDICINE

## 2019-09-20 NOTE — LETTER
September 22, 2019      Tonya Viera MD  [address]  [facility] [address]           [facility]  [address]  [facility] [address]  Phone: [phone]  Fax: [phone]          Patient: Venus Mayer   MR Number: [id number]   YOB: 1929   Date of Visit: 9/20/2019       Dear Dr. Tonya Viera:    Thank you for referring Venus Mayer to me for evaluation. Attached you will find relevant portions of my assessment and plan of care.    If you have questions, please do not hesitate to call me. I look forward to following Venus Mayer along with you.    Sincerely,    Abdiaziz Bell MD    Enclosure  CC:  No Recipients    If you would like to receive this communication electronically, please contact [email or web address] or [phone] to request more information on [email or web address] Link access.    For providers and/or their staff who would like to refer a patient to [facility], please contact us through our one-stop-shop provider referral line, [facility], at [phone].    If you feel you have received this communication in error or would no longer like to receive these types of communications, please e-mail [email or web address]

## 2019-09-20 NOTE — PROGRESS NOTES
Subjective:      Patient ID: Venus Mayer is a 89 y.o. female.    Chief Complaint:hosp. f/u    History of Present Illness    89 y/o female with a history of COPD (on 2L home oxygen) and recurrent pneumothoraces that failed conservative management.  She had a right pneumothorax in late July that was complicated by a persistent air leak.  She underwent VATS with talc pleurodesis on august 5, 2019. A chest tube was left in place connected to a pneumostat.    At her follow up with Dr. Mckeon on September 6, there was no air leak noted and so the chest tube was clamped.  Later that day, her daughter felt she was having a hard time breathing and unclamped the tube.  She was also noted to have a fever of 103.  She was brought back to the hospital and admitted.  Pleural fluid cultures were obtained from the chest tube and were positive for pseudomonas.  She was placed on IV zosyn and discharged on this.  She is here today for follow up.      Review of Systems   Constitution: Positive for malaise/fatigue. Negative for chills, decreased appetite, fever, night sweats, weight gain and weight loss.   HENT: Positive for congestion and sore throat. Negative for ear pain, hearing loss, hoarse voice and tinnitus.    Eyes: Positive for blurred vision and redness. Negative for visual disturbance.   Cardiovascular: Negative for chest pain, leg swelling and palpitations.   Respiratory: Positive for cough and shortness of breath. Negative for hemoptysis and sputum production.    Hematologic/Lymphatic: Negative for adenopathy. Does not bruise/bleed easily.   Skin: Negative for dry skin, itching, rash and suspicious lesions.   Musculoskeletal: Positive for back pain. Negative for joint pain, myalgias and neck pain.   Gastrointestinal: Negative for abdominal pain, constipation, diarrhea, heartburn, nausea and vomiting.   Genitourinary: Positive for frequency and urgency. Negative for dysuria, flank pain, hematuria and hesitancy.    Neurological: Positive for weakness. Negative for dizziness, headaches, numbness and paresthesias.   Psychiatric/Behavioral: Positive for depression. Negative for memory loss. The patient is nervous/anxious. The patient does not have insomnia.      Objective:   Physical Exam   Constitutional: She is oriented to person, place, and time. No distress.   Frail appearing   HENT:   Head: Normocephalic and atraumatic.   Right Ear: External ear normal.   Left Ear: External ear normal.   Nose: Nose normal.   Mouth/Throat: Oropharynx is clear and moist. No oropharyngeal exudate.   1.  Difficulty hearing  2.  Nasal canula oxygen in place   Eyes: Pupils are equal, round, and reactive to light. Conjunctivae and EOM are normal. Right eye exhibits no discharge. Left eye exhibits no discharge. No scleral icterus.   Neck: Normal range of motion. Neck supple. No JVD present. No tracheal deviation present. No thyromegaly present.   Cardiovascular: Normal rate, regular rhythm, normal heart sounds and intact distal pulses. Exam reveals no gallop and no friction rub.   No murmur heard.  Pulmonary/Chest: Effort normal. No stridor. No respiratory distress. She has no wheezes. She has rales. She exhibits no tenderness.   Abdominal: Soft. Bowel sounds are normal. She exhibits no distension and no mass. There is no tenderness. There is no rebound and no guarding.   Musculoskeletal: Normal range of motion. She exhibits no edema or tenderness.   Lymphadenopathy:     She has no cervical adenopathy.   Neurological: She is alert and oriented to person, place, and time. She displays normal reflexes. No cranial nerve deficit. She exhibits normal muscle tone. Coordination normal.   Skin: Skin is warm. No rash noted. She is not diaphoretic. No erythema. No pallor.   Psychiatric: She has a normal mood and affect. Her behavior is normal. Judgment and thought content normal.   Nursing note and vitals reviewed.    Assessment:       1. Empyema        89  y/o female with a history of COPD (on 2L home oxygen) and recurrent pneumothoraces that failed conservative management.  She had a right pneumothorax in late July that was complicated by a persistent air leak.  She underwent VATS with talc pleurodesis on august 5, 2019. A chest tube was left in place connected to a pneumostat.    At her follow up with Dr. Mckeon on September 6, there was no air leak noted and so the chest tube was clamped.  Later that day, her daughter felt she was having a hard time breathing and unclamped the tube.  She was also noted to have a fever of 103.  She was brought back to the hospital and admitted.  Pleural fluid cultures were obtained from the chest tube and were positive for pseudomonas.  She was placed on IV zosyn and discharged on this.  She is here today for follow up.    Her labs and studies were reviewed.  She has completed 14 days of antibiotics with IV zosyn.  I will remove her picc line and stop IV antibiotics today.  I will check a chest x-ray today.  Depending on the x-ray, I will determine if patient would benefit from continued therapy with oral antibiotics.  I will order labs to be done in 2 weeks.  Plan:       Empyema  -     Nursing communication; Future; Expected date: 09/20/2019  -     X-Ray Chest PA And Lateral; Future; Expected date: 09/20/2019  -     Comprehensive metabolic panel; Future; Expected date: 09/20/2019  -     CBC auto differential; Future; Expected date: 09/20/2019

## 2019-09-20 NOTE — PROGRESS NOTES
SUNNY  Midline removed, pt tolerated well. Pt instructed to leave dressing on for 24hrs, verbalized understanding.  Pt left in NAD.

## 2019-09-20 NOTE — TELEPHONE ENCOUNTER
Spoke with pt daughter and explained the pt labs will be electronically generated and I will call her when they are ready

## 2019-09-20 NOTE — TELEPHONE ENCOUNTER
----- Message from Myrna Ashleigh sent at 9/20/2019  3:30 PM CDT -----  Contact: Daughter : Bri       [phone]    Caller says they just left your office and did you forget to give them a paper to do blood work in a couple of weeks?    Daughter says to call her .   She has questions .

## 2019-09-22 ENCOUNTER — TELEPHONE (OUTPATIENT)
Dept: INFECTIOUS DISEASES | Facility: CLINIC | Age: 84
End: 2019-09-22

## 2019-09-23 NOTE — TELEPHONE ENCOUNTER
Spoke to the patient's daughter.  Reviewed chest x-ray results with her. Chest x-ray shows some small right pleural effusion with possible loculations.  There is some doubt as to whether the pseudomonas cultured from pleural fluid was truly in the pleural space or just colonization of the chest tube.  Risks of oral ciprofloxacin was reviewed with the patient's daughter.  Risk of qt interval prolongation, tendon rupture, glucose disturbance was reviewed with the patient's daughter.    Plan  1.  Will hold off on any additional antibiotics for now.  2.  Patient's daughter will watch out for fevers, increased oxygen requirements or increased lethargy and notify us if this happens.  3.  Will check labs in 2 months.

## 2019-09-25 ENCOUNTER — TELEPHONE (OUTPATIENT)
Dept: INFECTIOUS DISEASES | Facility: CLINIC | Age: 84
End: 2019-09-25

## 2019-09-25 DIAGNOSIS — A41.9 SEPTICEMIA: Primary | ICD-10-CM

## 2019-09-25 NOTE — TELEPHONE ENCOUNTER
----- Message from Myrna Sotelo sent at 9/25/2019 11:30 AM CDT -----  Contact: Bri / Daughter   [phone]  You wanted her to repeat blood work.  Pt. Is very fatigued.  Daughter is concenred since she is 89.   Wants her to have the labs done this week instead, since pt. Is not feeling well.   Wants to go to [facility] this week.  Located on [address] behind [facility].  pls  Have the orders there, so that she can  Go any time this week.     Pls . Call and confirm this is ok, since they want to go  Sooner that 2 wks.

## 2019-09-25 NOTE — TELEPHONE ENCOUNTER
----- Message from Francisca Daugherty MA sent at 9/25/2019  8:35 AM CDT -----  Contact: ray       ----- Message -----  From: Danielle Dumont  Sent: 9/25/2019   8:11 AM CDT  To: Ray Freed Staff    Doctor sent orders to [facility] for lab work in two weeks     Daughter calling    Bri       Pt is getting weaker   Can she do the labs this week?   [phone]     Thanks

## 2019-09-25 NOTE — TELEPHONE ENCOUNTER
Called pt daughter, Bri, and left a message to give me a call and that to get the labs done this week is fine

## 2019-09-27 ENCOUNTER — TELEPHONE (OUTPATIENT)
Dept: INFECTIOUS DISEASES | Facility: CLINIC | Age: 84
End: 2019-09-27

## 2019-09-27 NOTE — TELEPHONE ENCOUNTER
----- Message from Francisca Daugherty MA sent at 9/27/2019  3:16 PM CDT -----  Contact: daughter Bri/[phone]      ----- Message -----  From: Jayde Adam MA  Sent: 9/27/2019   3:00 PM CDT  To: Ray Freed Staff    Pt daughter is calling for lab results

## 2019-09-27 NOTE — TELEPHONE ENCOUNTER
Called and spoke to her daughter.  Labs are okay.  Blood cultures are still pending.  Please check with [facility] labs on Monday to see if the blood cultures are still negative and notify me.

## 2019-10-01 ENCOUNTER — TELEPHONE (OUTPATIENT)
Dept: INFECTIOUS DISEASES | Facility: CLINIC | Age: 84
End: 2019-10-01

## 2019-10-01 NOTE — TELEPHONE ENCOUNTER
----- Message from Kirstin Husain MA sent at 10/1/2019  8:22 AM CDT -----   [facility] on [address].  [phone]    I tried calling them yesterday, no luck. I tried this morning with no luck also.    He is needing to know if the blood culture results are finalized yet.        MD Kirstin Humphrey MA   Caller: Bri / Daughter [phone]           Please call [facility] and find out the culture results are finalized yet.  Thanks.   Previous Messages         ----- Message -----   From: Kirstin Husain MA   Sent: 9/30/2019   2:04 PM CDT   To: Abdiaziz Bell MD         ----- Message -----   From: Marti Thomas   Sent: 9/30/2019   1:58 PM CDT   To: Ray Freed Staff     Bri called to verify if blood cultures are in. She can be reached at [phone].       ----- Message -----  From: Kirstin Husain MA  Sent: 9/30/2019   4:30 PM CDT  To: KAYLEE Juarez MD Richelle F. Pena, MA  Caller: Bri / Daughter [phone]         Please call [facility] and find out the culture results are finalized yet.  Thanks.   Previous Messages        ----- Message -----   From: Kirstin Husain MA   Sent: 9/30/2019   2:04 PM CDT   To: Abdiaziz Bell MD         ----- Message -----   From: Marti Thomas   Sent: 9/30/2019   1:58 PM CDT   To: Ray Freed Staff     Bri called to verify if blood cultures are in. She can be reached at [phone].

## 2019-10-01 NOTE — TELEPHONE ENCOUNTER
Spoke with Bri, pt daughter and informed her that I spoke with Shanell from [facility] and she says that the specimen does not finalize until Friday

## 2019-10-04 ENCOUNTER — TELEPHONE (OUTPATIENT)
Dept: INFECTIOUS DISEASES | Facility: CLINIC | Age: 84
End: 2019-10-04

## 2019-10-04 LAB
ALBUMIN SERPL-MCNC: 3.4 G/DL (ref 3.6–5.1)
ALBUMIN/GLOB SERPL: 1.2 (CALC) (ref 1–2.5)
ALP SERPL-CCNC: 76 U/L (ref 33–130)
ALT SERPL-CCNC: 10 U/L (ref 6–29)
AST SERPL-CCNC: 24 U/L (ref 10–35)
BACTERIA BLD CULT: NORMAL
BASOPHILS # BLD AUTO: 58 CELLS/UL (ref 0–200)
BASOPHILS NFR BLD AUTO: 0.8 %
BILIRUB SERPL-MCNC: 0.4 MG/DL (ref 0.2–1.2)
BUN SERPL-MCNC: 23 MG/DL (ref 7–25)
BUN/CREAT SERPL: 25 (CALC) (ref 6–22)
CALCIUM SERPL-MCNC: 10.4 MG/DL (ref 8.6–10.4)
CHLORIDE SERPL-SCNC: 93 MMOL/L (ref 98–110)
CO2 SERPL-SCNC: 40 MMOL/L (ref 20–32)
CREAT SERPL-MCNC: 0.91 MG/DL (ref 0.6–0.88)
EOSINOPHIL # BLD AUTO: 274 CELLS/UL (ref 15–500)
EOSINOPHIL NFR BLD AUTO: 3.8 %
ERYTHROCYTE [DISTWIDTH] IN BLOOD BY AUTOMATED COUNT: 13.7 % (ref 11–15)
GFRSERPLBLD MDRD-ARVRAT: 56 ML/MIN/1.73M2
GLOBULIN SER CALC-MCNC: 2.9 G/DL (CALC) (ref 1.9–3.7)
GLUCOSE SERPL-MCNC: 145 MG/DL (ref 65–99)
HCT VFR BLD AUTO: 31.1 % (ref 35–45)
HGB BLD-MCNC: 10 G/DL (ref 11.7–15.5)
LYMPHOCYTES # BLD AUTO: 785 CELLS/UL (ref 850–3900)
LYMPHOCYTES NFR BLD AUTO: 10.9 %
MCH RBC QN AUTO: 30.9 PG (ref 27–33)
MCHC RBC AUTO-ENTMCNC: 32.2 G/DL (ref 32–36)
MCV RBC AUTO: 96 FL (ref 80–100)
MONOCYTES # BLD AUTO: 706 CELLS/UL (ref 200–950)
MONOCYTES NFR BLD AUTO: 9.8 %
NEUTROPHILS # BLD AUTO: 5378 CELLS/UL (ref 1500–7800)
NEUTROPHILS NFR BLD AUTO: 74.7 %
PLATELET # BLD AUTO: 219 THOUSAND/UL (ref 140–400)
PMV BLD REES-ECKER: 11.8 FL (ref 7.5–12.5)
POTASSIUM SERPL-SCNC: 3.6 MMOL/L (ref 3.5–5.3)
PROT SERPL-MCNC: 6.3 G/DL (ref 6.1–8.1)
RBC # BLD AUTO: 3.24 MILLION/UL (ref 3.8–5.1)
SODIUM SERPL-SCNC: 143 MMOL/L (ref 135–146)
WBC # BLD AUTO: 7.2 THOUSAND/UL (ref 3.8–10.8)

## 2019-10-04 NOTE — TELEPHONE ENCOUNTER
----- Message from Daniel Sparks sent at 10/4/2019  2:25 PM CDT -----  Contact: Bri (daughter) @ [phone]  Bri is asking to speak w/ the nurse regarding lab work

## 2019-10-08 ENCOUNTER — TELEPHONE (OUTPATIENT)
Dept: INFECTIOUS DISEASES | Facility: CLINIC | Age: 84
End: 2019-10-08

## 2019-10-08 NOTE — TELEPHONE ENCOUNTER
----- Message from Francisca Daugherty MA sent at 10/8/2019 11:30 AM CDT -----  Contact: Bri/[phone]      ----- Message -----  From: Jayde Adam MA  Sent: 10/8/2019   9:34 AM CDT  To: Ray Freed Staff    Pt daughter is calling to get the results of her mom's labs

## 2019-10-08 NOTE — TELEPHONE ENCOUNTER
Blood cultures are negative.  White blood cell count is 7.2.  All her other lab parameters are fine.

## 2019-10-09 ENCOUNTER — TELEPHONE (OUTPATIENT)
Dept: INFECTIOUS DISEASES | Facility: CLINIC | Age: 84
End: 2019-10-09

## 2019-10-09 NOTE — TELEPHONE ENCOUNTER
----- Message from Francisca Daugherty MA sent at 10/9/2019  1:49 PM CDT -----  Contact: pts shahab Gregory       ----- Message -----  From: Adrianna Mcqueen  Sent: 10/9/2019   1:30 PM CDT  To: Ray Freed Staff    Bri is calling to speak with the nurse in ref to the pts culture and lab results Bri had a left a message on Monday they haven't heard back from the nurse can you please call pts daughter [phone]    ЕКАТЕРИНА

## 2019-10-15 ENCOUNTER — TELEPHONE (OUTPATIENT)
Dept: INFECTIOUS DISEASES | Facility: CLINIC | Age: 84
End: 2019-10-15

## 2019-10-15 NOTE — TELEPHONE ENCOUNTER
----- Message from Myrna Sotelo sent at 10/15/2019  4:28 PM CDT -----  Contact: Daughter :  Bri  tel:   [phone]    Caller says she call days ago and did not hear from you.    Needs you to send her lab work to the pcp:  Dr. Jona Che  .  Says she will have the info ready when you return her call.    Pls call today, as per caller.

## 2019-10-16 ENCOUNTER — TELEPHONE (OUTPATIENT)
Dept: INFECTIOUS DISEASES | Facility: CLINIC | Age: 84
End: 2019-10-16

## 2019-10-16 NOTE — TELEPHONE ENCOUNTER
Called patient daughter (Bri) back at #[phone]. She stated she would like patient labs from 09/16 and 09/17 faxed over to Dr. Isiah Loco's officer (pt PCP) fax number is [phone] and tele #[phone]. Faxed over requested labs at 9:41am and received fax confirmation. Called  office at 10:38am and received confirmation from  that they received the fax as well.

## 2020-06-08 NOTE — ASSESSMENT & PLAN NOTE
Anterior chest tube remained clamped overnight. Remove anterior tube this morning.   Posterior chest tube to water seal. Will connect posterior to pneumostat (one way valve with mini atrium). Repeat CXR midday. If PTX stable, patient stable to be discharged to home today with pneumostat.   - OOB to chair, IS, nebs as needed   - Daily CXR  - Pulmonary toilet        Home Hospice Pneumostat Instructions  Please drain Pneumostat every day and record the amount/color of drainage.    To drain: Use alcohol swab to clean bottom of pneumostat container. Attach an empty lure lock syringe to the bottom of pneumostat container. Aspirate contents of chamber. Okay to reuse syringe while draining during one sitting but please use new syringe each day. Initially may require draining twice per day but expect drainage to decrease over time.     Please change dressings daily or as needed if saturated.      If chest tube is pulled out/ falls out please cover insertion site immediately and go to Emergency Room or call Thoracic Surgery office.   If Pneumostat becomes disconnected please reconnect immediately.     [facility]: [phone]         Ftsg Text: The defect edges were debeveled with a #15 scalpel blade.  Given the location of the defect, shape of the defect and the proximity to free margins a full thickness skin graft was deemed most appropriate.  Using a sterile surgical marker, the primary defect shape was transferred to the donor site. The area thus outlined was incised deep to adipose tissue with a #15 scalpel blade.  The harvested graft was then trimmed of adipose tissue until only dermis and epidermis was left.  The skin margins of the secondary defect were undermined to an appropriate distance in all directions utilizing iris scissors.  The secondary defect was closed with interrupted buried subcutaneous sutures.  The skin edges were then re-apposed with running  sutures.  The skin graft was then placed in the primary defect and oriented appropriately.

## 2020-06-16 ENCOUNTER — TELEPHONE (OUTPATIENT)
Dept: NEUROLOGY | Facility: CLINIC | Age: 85
End: 2020-06-16

## 2020-06-16 NOTE — TELEPHONE ENCOUNTER
----- Message from Dayna Kendrick MA sent at 6/16/2020  9:45 AM CDT -----  Regarding: NP Appointment  Type: Needs Medical Advice  Who Called:  Bri gudino  Best Call Back Number:   Additional Information: patient's daughter would like to set up a NP appointment.  Patient's PCP is faxing over a referral for treatment.  Please call to discuss appointment.

## 2020-06-16 NOTE — TELEPHONE ENCOUNTER
Returned call to pt daughter in law and clarified that Dr. Walters only sees pts in hospital also that referral not received. Based off pts sxs, suggested she reach out to movement disorders clinic. Verbalized understanding.

## 2020-09-29 ENCOUNTER — TELEPHONE (OUTPATIENT)
Dept: PULMONOLOGY | Facility: CLINIC | Age: 85
End: 2020-09-29

## 2020-09-29 NOTE — TELEPHONE ENCOUNTER
I spoke patient's daughter, Bri to let her know we have not received any records. She verbalized understanding.

## 2020-09-29 NOTE — TELEPHONE ENCOUNTER
----- Message from Kate Triana sent at 9/29/2020  4:45 PM CDT -----  Regarding: PT  Contact: PT's Daughter Bri  PT's daughter called to see if the PT's records were sent over yet. Please call back     Callback: [phone]

## 2020-10-11 PROBLEM — R00.2 PALPITATIONS: Status: ACTIVE | Noted: 2020-10-11

## 2020-10-11 PROBLEM — Z71.89 ACP (ADVANCE CARE PLANNING): Status: ACTIVE | Noted: 2020-10-11

## 2021-01-30 NOTE — H&P
Nephrology Daily Progress Note    Assessment & Plan    # KUNAL - likely contrast nephropathy + NSAIDs     - Cr started to rise day after CTA chest and 2 doses IV toradol on 1/27     - baseline Cr ~0.7 ; renal US 1/16/21 : unremarkable other than partially exophytic L renal cyst / small bilateral nonobstr renal calculi      - UA on admission 1/26 noted     - Cr 1.0 today, Cr still uptrending but slope of rise has declined and remains non oliguric / no critical lytes     - looks euvolemic / good po intake  - ok to continue current torsemide dose today;  consider attenuating if Cr continues to trend up tomorrow     - would avoid giving Toradol and other NSAIDs   Â   Â   # Proteinuria - in the setting of HTN. No prior quantification but given serum Albumin 4.1 on admission, doubt nephrotic range. No stigmata for vasculitic process on Hx/Exam and pulmoHTN etiology assessed as idiopathic. However, in 9/2019, RF slightly elevated (29) and ARIANNA titer 1:1280 nucleolar / 1:80 speckled. Also, Elevated globulin noted     - UA on 1/26 (was not clean catch) showed ketones+ , protein >500 (but concentrated sample), few RBC, no pyuria. UA on 1/16/21 also showed protein 100 mg/dl but was also concentrated sample     - repeat urinalysis noted. Unremarkable without proteinuria  Â   Â   # Severe pulmonary HTN , idiopathic / Right heart failure     - Echo1/28/21: EF 65%, reduced RV ,IVC normal, small-mod pericardial effusion    - on tadalafil  , remodulin per cardiology      - on torsemide   Â   # Hyponatremia - in the setting of Right HF - continue diuresis. Add oral fluid restrict 1.2L/day   Â   # Right lower back pain pain with radiation to RLQ  -- could be from Lumbosacral radiculopathy (has R paralumbar tenderness; pain aggravated by certain movt/position)      - CT scan / abd/pelvic USD reviewed; R ovarian cyst and nephrolithiasis would not explain symptom      - MRI thoracic spine noted.  No significant abnormality  Â   # Hypertension - [facility] Hospital Medicine  History & Physical    Patient Name: Venus Mayer  MRN: [id number]  Admission Date: 7/20/2019  Attending Physician: Kathia Gordillo MD   Primary Care Provider: Jona Che MD    [facility] Medicine Team: [facility] 1 Aristeo Aldana MD     Patient information was obtained from patient, relative(s), past medical records and ER records.     Subjective:     Principal Problem:Pneumothorax    Chief Complaint: No chief complaint on file.       HPI: Patient is a 89F with PMH of COPD (on 2.5 L home O2), 40 pack year smoking history (quit 30 years ago), recurrent spontaneous PTX, chronic dCHF, and HOCM s/p AICD, PVD/carotid artery stenosis s/p L CEA 08, HTN, HLD, and CKD-2L hip Fx (03/2019), who presented to [facility] ED today at 1230 with dyspnea that started abruptly yesterday. When seen, the patient was resting comfortably in bed on 3 L O2 NC with her daughter, son (with whom she lives), and daughter in law at bedside. A R side chest tube was in place and the patient denied any dyspnea, though she endorsed pain at the site of chest tube insertion.     At OSH ED, O2 sats were 91% on home 2.5 L of O2 and no breath sounds were heard in the R upper and middle lung. CXR showed a R-sided PTX for which a 12f chest tube was placed. Patient's O2 sats improved to 100% and she reported immediate relief of discomfort. She reported chest pain at the site of insertion of chest tube and responded well to 500 mg of tylenol. BNP was 1340.    The patient's recurrent PTXs seem to have started during her admission on 05/22/2019 where she presented with a large R sided PTX requiring chest tube and pleurodesis on 5/27/2019. Since, she has had a small recurrence of PTX on 6/25/19 not requiring intervention, and admission to OSH 7/6-7/11 for acute on chronic dCHF and COPD exacerbation.    Per Dr Cardozo, Family was unsure whether they wanted her to be transferred to [facility]  BP controlled , on torsemide alone   Â   # Anemia - chronic illness ;no indication for LEON. continue oral iron   Â   # Leukocytosis - likely reactive per ID         Subjective     Had MRI thoracic spine done yesterday which was unremarkable  Pain better with pain medications  On 2l/min NC  Labs noted      Objective        Vital signs in last 24 hours:  Temp:  [98.1 Â°F (36.7 Â°C)-98.4 Â°F (36.9 Â°C)] 98.2 Â°F (36.8 Â°C)  Heart Rate:  [] 93  Resp:  [16-17] 16  BP: ()/(57-66) 99/65    Intake/Output last 3 shifts:  I/O last 3 completed shifts:   In: 1028.7 [P.O.:990; I.V.:38.7]  Out: 700 [Urine:700]      Physical Exam:  General appearance: alert, cooperative and no distress  Head: Normocephalic, without obvious abnormality, atraumatic  Neck: no adenopathy and no JVD  Lungs: clear to auscultation bilaterally  Chest wall: no tenderness  Heart: regular rate and rhythm, S1, S2 normal, no murmur, click, rub or gallop  Abdomen: soft, non-tender; bowel sounds normal; no masses,  no organomegaly  Extremities: extremities normal, atraumatic, no cyanosis or edema  Skin: Skin color, texture, turgor normal. No rashes or lesions  Neurologic: Grossly normal    Medications:  â¢ docusate sodium-sennosides  2 tablet Oral Nightly   â¢ polyethylene glycol  17 g Oral Daily   â¢ folic acid  1 mg Oral Daily   â¢ sodium chloride (PF)  10 mL Injection 2 times per day   â¢ sodium chloride (PF)  2 mL Intracatheter 2 times per day   â¢ umeclidinium-vilanterol  1 puff Inhalation Daily Resp   â¢ heparin (porcine)  5,000 Units Subcutaneous 3 times per day   â¢ Potassium Standard Replacement Protocol   Does not apply See Admin Instructions   â¢ Magnesium Standard Replacement Protocol   Does not apply See Admin Instructions   â¢ tadalafil  40 mg Oral Daily   â¢ ferrous sulfate  325 mg Oral TID   â¢ torsemide  40 mg Oral BID     â¢ treprostinil (REMODULIN) 10 mg in sodium chloride 0.9 % 250 mL infusion 80 ng/kg/min (01/29/21 1826)     oxyCODONE-acetaminophen, versus wanting her to go back to [facility] where she was admitted most recently for the same issue.  After discussion with several family members they have decided they do want transferred to [facility] and I have initiated contacting the regional transfer center    Past Medical History:   Diagnosis Date    Acute on chronic congestive heart failure 7/6/2019    Cardiomyopathy     Carotid artery occlusion     COPD (chronic obstructive pulmonary disease)     Hyperlipidemia     Hypertension        Past Surgical History:   Procedure Laterality Date    CARDIAC CATHETERIZATION      CAROTID ENDARTERECTOMY         Review of patient's allergies indicates:   Allergen Reactions    Sulfamethoxazole-trimethoprim      Other reaction(s): Rash    Cefazolin Rash    Cefuroxime Rash       Current Facility-Administered Medications on File Prior to Encounter   Medication    [COMPLETED] acetaminophen tablet 500 mg    [COMPLETED] lidocaine (PF) 10 mg/ml (1%) 10 mg/mL (1 %) injection    [COMPLETED] morphine injection 1 mg    [COMPLETED] ondansetron injection 4 mg    [DISCONTINUED] lidocaine-EPINEPHrine 1%-1:100,000 injection 5 mL    [DISCONTINUED] morphine injection 1 mg    [DISCONTINUED] ondansetron injection 4 mg     Current Outpatient Medications on File Prior to Encounter   Medication Sig    acetaminophen (TYLENOL) 500 MG tablet Take 1,000 mg by mouth daily as needed for Pain.    albuterol (PROAIR HFA) 90 mcg/actuation inhaler Inhale 1 puff into the lungs every 6 (six) hours as needed for Wheezing or Shortness of Breath. Rescue    amLODIPine (NORVASC) 2.5 MG tablet Take 2.5 mg by mouth nightly as needed for SBP greater than. SBP greater than 155    aspirin 81 mg Tab Take by mouth. 1 Tablet Oral Every day    ferrous sulfate 325 (65 FE) MG EC tablet Take 1 tablet (325 mg total) by mouth once daily.    furosemide (LASIX) 40 MG tablet Take 1 tablet (40 mg total) by mouth once daily.    multivitamin (THERAGRAN)  morphine injection, sodium chloride (PF), sodium chloride, sodium chloride, ondansetron, acetaminophen     Labs:  Recent Labs   Lab 01/30/21  0452   WBC 9.8   RBC 4.58   HGB 9.9*   HCT 33.9*     Recent Labs   Lab 01/30/21  0452 01/29/21  0454   CO2 22 20*   BUN 15 18   CREATININE 0.90 1.01*   GLUCOSE 74 75   CALCIUM 9.2 9.2   MG  --  2.2     No results found    Invalid input(s): [address], [address] [facility] [place], [facility], [address] [facility], [facility], [facility], [address]    Radiology per tablet Take 1 tablet by mouth once daily.    potassium chloride (MICRO-K) 10 MEQ CpSR Take 10 mEq by mouth once daily.    rOPINIRole (REQUIP) 0.25 MG tablet Take 1 tablet (0.25 mg total) by mouth every evening.    sodium chloride 0.9 % Soln Uses as needed by nebulization route    tiotropium (SPIRIVA) 18 mcg inhalation capsule Inhale 1 capsule (18 mcg total) into the lungs once daily.    triamcinolone (NASACORT) 55 mcg nasal inhaler Mixes 1/2 ml with phenylephrine     Family History     Problem Relation (Age of Onset)    Hypertension Mother        Tobacco Use    Smoking status: Former Smoker     Packs/day: 2.00     Years: 20.00     Pack years: 40.00     Types: Cigarettes     Last attempt to quit: 1980     Years since quittin.5    Smokeless tobacco: Never Used   Substance and Sexual Activity    Alcohol use: Yes     Alcohol/week: 1.2 oz     Types: 2 Glasses of wine per week     Comment: social    Drug use: No    Sexual activity: Not Currently     Review of Systems   Constitutional: Positive for fatigue. Negative for chills, diaphoresis and fever.   HENT: Negative for rhinorrhea and sore throat.    Respiratory: Negative for cough, shortness of breath (no dyspnea at time of exam) and wheezing.    Cardiovascular: Positive for leg swelling (daughter reported R leg swelling, absent on exam). Negative for chest pain.   Gastrointestinal: Negative for abdominal distention, abdominal pain, nausea and vomiting.   Genitourinary: Negative for dysuria.   Neurological: Negative for headaches.     Objective:     Vital Signs (Most Recent):  Temp: 98.2 °F (36.8 °C) (19)  Pulse: 70 (19)  Resp: 20 (19)  BP: 128/67 (19)  SpO2: 99 % (19) Vital Signs (24h Range):  Temp:  [98.2 °F (36.8 °C)-98.7 °F (37.1 °C)] 98.2 °F (36.8 °C)  Pulse:  [] 70  Resp:  [14-30] 20  SpO2:  [91 %-100 %] 99 %  BP: (128-166)/(63-95) 128/67        There is no height or weight on file  to calculate BMI.    Physical Exam   Constitutional: She is oriented to person, place, and time. She appears well-developed. No distress.   HENT:   Head: Normocephalic.   Right Ear: External ear normal.   Left Ear: External ear normal.   Mouth/Throat: No oropharyngeal exudate.   Eyes: Pupils are equal, round, and reactive to light.   Cardiovascular: Normal rate and regular rhythm.   Pulmonary/Chest: Effort normal. No stridor. No respiratory distress. She has no rales. She exhibits tenderness (pain at site of chest tube insertion).   Abdominal: Soft. Bowel sounds are normal. She exhibits no distension. There is no tenderness. There is no guarding.   Neurological: She is alert and oriented to person, place, and time.   Skin: Skin is warm and dry. She is not diaphoretic.         CRANIAL NERVES     CN III, IV, VI   Pupils are equal, round, and reactive to light.       Significant Labs: All pertinent labs within the past 24 hours have been reviewed.    Significant Imaging: I have reviewed all pertinent imaging results/findings within the past 24 hours.    Assessment/Plan:     * Pneumothorax  89F with h/o COPD, 40 pack year smoking hx, and chronic dCHF who was transferred from OSH for R sided PTX s/p chest tube satting 99% on 3 L O2 NC    - Titrate O2 NC to maintain >88% O2 sats  - Continue home tiotropium and fluticasone  - Will consult pulm in the AM    Restless leg syndrome  - Continue home ropinirole 0.25 mg      Iron deficiency  Cont home ferrous sulfate 325       Debility  Deconditioned state, pt reports progressive fatigue over the past several months    - PT/OT eval and treat. Appreciate help  - Scheduled bowel regimen (polyethylene glycol 17g)      Chronic obstructive pulmonary disease with acute exacerbation  - See Pneumothorax    Heart failure, diastolic  - Continue home furosemide 40 mg      Essential hypertension  - Continue home prn amlodipine 2.5 mg if SBP >155      Carotid artery stenosis:Asx; S/P L. CEA  2008 with restenosis.  Cont ASA 81        VTE Risk Mitigation (From admission, onward)        Ordered     enoxaparin injection 40 mg  Daily      07/20/19 2005     IP VTE HIGH RISK PATIENT  Once      07/20/19 2005             Aristeo Aldana MD  Department of Hospital Medicine   [facility]                    07/21/2019                             STAFF PHYSICIAN NOTE                                   Attending Attestation for Rounds with Resident  I have reviewed and concur with the resident's history, physical, assessment, and plan.  I have personally interviewed and examined the patient at bedside and agree with the resident's findings.                                  ________________________________________                                     REASON FOR ADMISSION:     Patient is 89 y.o.female    Body mass index is 17.58 kg/m².,  Pneumothorax

## 2021-02-10 ENCOUNTER — IMMUNIZATION (OUTPATIENT)
Dept: PRIMARY CARE CLINIC | Facility: CLINIC | Age: 86
End: 2021-02-10
Payer: MEDICARE

## 2021-02-10 DIAGNOSIS — Z23 NEED FOR VACCINATION: Primary | ICD-10-CM

## 2021-02-10 PROCEDURE — 91300 PR SARS-COV- 2 COVID-19 VACCINE, NO PRSV, 30MCG/0.3ML, IM: CPT | Mod: PBBFAC | Performed by: INTERNAL MEDICINE

## 2021-02-10 PROCEDURE — 0001A PR IMMUNIZ ADMIN, SARS-COV-2 COVID-19 VACC, 30MCG/0.3ML, 1ST DOSE: CPT | Mod: PBBFAC | Performed by: INTERNAL MEDICINE

## 2021-02-10 RX ADMIN — RNA INGREDIENT BNT-162B2 0.3 ML: 0.23 INJECTION, SUSPENSION INTRAMUSCULAR at 02:02

## 2021-03-03 ENCOUNTER — IMMUNIZATION (OUTPATIENT)
Dept: PRIMARY CARE CLINIC | Facility: CLINIC | Age: 86
End: 2021-03-03
Payer: MEDICARE

## 2021-03-03 DIAGNOSIS — Z23 NEED FOR VACCINATION: Primary | ICD-10-CM

## 2021-03-03 PROCEDURE — 91300 PR SARS-COV- 2 COVID-19 VACCINE, NO PRSV, 30MCG/0.3ML, IM: ICD-10-PCS | Mod: S$GLB,,, | Performed by: INTERNAL MEDICINE

## 2021-03-03 PROCEDURE — 0002A PR IMMUNIZ ADMIN, SARS-COV-2 COVID-19 VACC, 30MCG/0.3ML, 2ND DOSE: ICD-10-PCS | Mod: CV19,S$GLB,, | Performed by: INTERNAL MEDICINE

## 2021-03-03 PROCEDURE — 0002A PR IMMUNIZ ADMIN, SARS-COV-2 COVID-19 VACC, 30MCG/0.3ML, 2ND DOSE: CPT | Mod: CV19,S$GLB,, | Performed by: INTERNAL MEDICINE

## 2021-03-03 PROCEDURE — 91300 PR SARS-COV- 2 COVID-19 VACCINE, NO PRSV, 30MCG/0.3ML, IM: CPT | Mod: S$GLB,,, | Performed by: INTERNAL MEDICINE

## 2021-03-03 RX ADMIN — Medication 0.3 ML: at 02:03

## 2021-03-08 NOTE — ED NOTES
"Cardiothoracic at bedside. Pt weaned off of bipap to 5L NC. Pt maintaining O2 saturation of 100%. Pt appears to be in no distress. Pt states "much better". Will continue to monitor.   "
Pt resting comfortably and independently repositioned in stretcher with bed locked in lowest position for safety. NAD noted at this time. Respirations even and unlabored and visible chest rise noted.  Pt on continuous cardiac, BP, and O2 monitoring. Call light within reach. Family at bedside. No needs at this time. Will continue to monitor.   
No difficulties

## 2021-07-01 ENCOUNTER — PATIENT MESSAGE (OUTPATIENT)
Dept: ADMINISTRATIVE | Facility: OTHER | Age: 86
End: 2021-07-01

## 2021-07-15 ENCOUNTER — HOSPITAL ENCOUNTER (OUTPATIENT)
Facility: HOSPITAL | Age: 86
Discharge: HOME HEALTH | End: 2021-07-16
Attending: EMERGENCY MEDICINE | Admitting: HOSPITALIST
Payer: MEDICARE

## 2021-07-15 DIAGNOSIS — J96.22 ACUTE ON CHRONIC RESPIRATORY FAILURE WITH HYPOXIA AND HYPERCAPNIA: ICD-10-CM

## 2021-07-15 DIAGNOSIS — I50.9 CONGESTIVE HEART FAILURE, UNSPECIFIED HF CHRONICITY, UNSPECIFIED HEART FAILURE TYPE: Primary | ICD-10-CM

## 2021-07-15 DIAGNOSIS — I50.9 HEART FAILURE: ICD-10-CM

## 2021-07-15 DIAGNOSIS — J44.1 COPD EXACERBATION: ICD-10-CM

## 2021-07-15 DIAGNOSIS — J96.21 ACUTE ON CHRONIC RESPIRATORY FAILURE WITH HYPOXIA AND HYPERCAPNIA: ICD-10-CM

## 2021-07-15 DIAGNOSIS — R06.02 SHORTNESS OF BREATH: ICD-10-CM

## 2021-07-15 PROBLEM — R79.89 ELEVATED TROPONIN: Status: ACTIVE | Noted: 2021-07-15

## 2021-07-15 LAB
ALBUMIN SERPL BCP-MCNC: 2.5 G/DL (ref 3.5–5.2)
ALLENS TEST: ABNORMAL
ALP SERPL-CCNC: 63 U/L (ref 55–135)
ALT SERPL W/O P-5'-P-CCNC: 11 U/L (ref 10–44)
ANION GAP SERPL CALC-SCNC: 4 MMOL/L (ref 8–16)
AST SERPL-CCNC: 20 U/L (ref 10–40)
BACTERIA #/AREA URNS AUTO: ABNORMAL /HPF
BASOPHILS # BLD AUTO: 0.05 K/UL (ref 0–0.2)
BASOPHILS NFR BLD: 0.6 % (ref 0–1.9)
BILIRUB SERPL-MCNC: 0.4 MG/DL (ref 0.1–1)
BILIRUB UR QL STRIP: NEGATIVE
BNP SERPL-MCNC: 1493 PG/ML (ref 0–99)
BUN SERPL-MCNC: 19 MG/DL (ref 10–30)
CALCIUM SERPL-MCNC: 7.1 MG/DL (ref 8.7–10.5)
CHLORIDE SERPL-SCNC: 109 MMOL/L (ref 95–110)
CLARITY UR REFRACT.AUTO: CLEAR
CO2 SERPL-SCNC: 31 MMOL/L (ref 23–29)
COLOR UR AUTO: ABNORMAL
CREAT SERPL-MCNC: 0.6 MG/DL (ref 0.5–1.4)
CTP QC/QA: YES
DELSYS: ABNORMAL
DIFFERENTIAL METHOD: ABNORMAL
EOSINOPHIL # BLD AUTO: 0.2 K/UL (ref 0–0.5)
EOSINOPHIL NFR BLD: 2.2 % (ref 0–8)
ERYTHROCYTE [DISTWIDTH] IN BLOOD BY AUTOMATED COUNT: 13.7 % (ref 11.5–14.5)
EST. GFR  (AFRICAN AMERICAN): >60 ML/MIN/1.73 M^2
EST. GFR  (NON AFRICAN AMERICAN): >60 ML/MIN/1.73 M^2
GLUCOSE SERPL-MCNC: 79 MG/DL (ref 70–110)
GLUCOSE UR QL STRIP: NEGATIVE
HCO3 UR-SCNC: 43.5 MMOL/L (ref 24–28)
HCT VFR BLD AUTO: 37.5 % (ref 37–48.5)
HGB BLD-MCNC: 11.9 G/DL (ref 12–16)
HGB UR QL STRIP: ABNORMAL
IMM GRANULOCYTES # BLD AUTO: 0.05 K/UL (ref 0–0.04)
IMM GRANULOCYTES NFR BLD AUTO: 0.6 % (ref 0–0.5)
KETONES UR QL STRIP: NEGATIVE
LEUKOCYTE ESTERASE UR QL STRIP: ABNORMAL
LYMPHOCYTES # BLD AUTO: 0.8 K/UL (ref 1–4.8)
LYMPHOCYTES NFR BLD: 9.9 % (ref 18–48)
MAGNESIUM SERPL-MCNC: 2.1 MG/DL (ref 1.6–2.6)
MCH RBC QN AUTO: 32.1 PG (ref 27–31)
MCHC RBC AUTO-ENTMCNC: 31.7 G/DL (ref 32–36)
MCV RBC AUTO: 101 FL (ref 82–98)
MICROSCOPIC COMMENT: ABNORMAL
MONOCYTES # BLD AUTO: 0.8 K/UL (ref 0.3–1)
MONOCYTES NFR BLD: 10 % (ref 4–15)
NEUTROPHILS # BLD AUTO: 6.1 K/UL (ref 1.8–7.7)
NEUTROPHILS NFR BLD: 76.7 % (ref 38–73)
NITRITE UR QL STRIP: NEGATIVE
NRBC BLD-RTO: 0 /100 WBC
PCO2 BLDA: 66.8 MMHG (ref 35–45)
PH SMN: 7.42 [PH] (ref 7.35–7.45)
PH UR STRIP: 7 [PH] (ref 5–8)
PLATELET # BLD AUTO: 121 K/UL (ref 150–450)
PMV BLD AUTO: 10.3 FL (ref 9.2–12.9)
PO2 BLDA: 71 MMHG (ref 80–100)
POC BE: 19 MMOL/L
POC SATURATED O2: 94 % (ref 95–100)
POC TCO2: 46 MMOL/L (ref 23–27)
POTASSIUM SERPL-SCNC: 3 MMOL/L (ref 3.5–5.1)
PROCALCITONIN SERPL IA-MCNC: 0.06 NG/ML
PROT SERPL-MCNC: 4.5 G/DL (ref 6–8.4)
PROT UR QL STRIP: NEGATIVE
RBC # BLD AUTO: 3.71 M/UL (ref 4–5.4)
RBC #/AREA URNS AUTO: 3 /HPF (ref 0–4)
SAMPLE: ABNORMAL
SARS-COV-2 RDRP RESP QL NAA+PROBE: NEGATIVE
SITE: ABNORMAL
SODIUM SERPL-SCNC: 144 MMOL/L (ref 136–145)
SP GR UR STRIP: 1 (ref 1–1.03)
TROPONIN I SERPL DL<=0.01 NG/ML-MCNC: 0.1 NG/ML (ref 0–0.03)
TROPONIN I SERPL DL<=0.01 NG/ML-MCNC: 0.13 NG/ML (ref 0–0.03)
TROPONIN I SERPL DL<=0.01 NG/ML-MCNC: 0.17 NG/ML (ref 0–0.03)
URN SPEC COLLECT METH UR: ABNORMAL
WBC # BLD AUTO: 8.01 K/UL (ref 3.9–12.7)
WBC #/AREA URNS AUTO: 48 /HPF (ref 0–5)

## 2021-07-15 PROCEDURE — 82803 BLOOD GASES ANY COMBINATION: CPT

## 2021-07-15 PROCEDURE — 84484 ASSAY OF TROPONIN QUANT: CPT | Mod: 91 | Performed by: HOSPITALIST

## 2021-07-15 PROCEDURE — 84484 ASSAY OF TROPONIN QUANT: CPT | Performed by: PHYSICIAN ASSISTANT

## 2021-07-15 PROCEDURE — 93010 ELECTROCARDIOGRAM REPORT: CPT | Mod: ,,, | Performed by: INTERNAL MEDICINE

## 2021-07-15 PROCEDURE — 99220 PR INITIAL OBSERVATION CARE,LEVL III: ICD-10-PCS | Mod: ,,, | Performed by: HOSPITALIST

## 2021-07-15 PROCEDURE — 87186 SC STD MICRODIL/AGAR DIL: CPT | Performed by: PHYSICIAN ASSISTANT

## 2021-07-15 PROCEDURE — 87086 URINE CULTURE/COLONY COUNT: CPT | Performed by: PHYSICIAN ASSISTANT

## 2021-07-15 PROCEDURE — 25000242 PHARM REV CODE 250 ALT 637 W/ HCPCS: Performed by: PHYSICIAN ASSISTANT

## 2021-07-15 PROCEDURE — G0378 HOSPITAL OBSERVATION PER HR: HCPCS

## 2021-07-15 PROCEDURE — 96375 TX/PRO/DX INJ NEW DRUG ADDON: CPT

## 2021-07-15 PROCEDURE — 94640 AIRWAY INHALATION TREATMENT: CPT

## 2021-07-15 PROCEDURE — 99220 PR INITIAL OBSERVATION CARE,LEVL III: CPT | Mod: ,,, | Performed by: HOSPITALIST

## 2021-07-15 PROCEDURE — 25000003 PHARM REV CODE 250: Performed by: HOSPITALIST

## 2021-07-15 PROCEDURE — 96374 THER/PROPH/DIAG INJ IV PUSH: CPT

## 2021-07-15 PROCEDURE — 99284 PR EMERGENCY DEPT VISIT,LEVEL IV: ICD-10-PCS | Mod: CS,,, | Performed by: EMERGENCY MEDICINE

## 2021-07-15 PROCEDURE — 94761 N-INVAS EAR/PLS OXIMETRY MLT: CPT

## 2021-07-15 PROCEDURE — 63600175 PHARM REV CODE 636 W HCPCS: Performed by: HOSPITALIST

## 2021-07-15 PROCEDURE — 83880 ASSAY OF NATRIURETIC PEPTIDE: CPT | Performed by: PHYSICIAN ASSISTANT

## 2021-07-15 PROCEDURE — 25000003 PHARM REV CODE 250: Performed by: PHYSICIAN ASSISTANT

## 2021-07-15 PROCEDURE — 93005 ELECTROCARDIOGRAM TRACING: CPT

## 2021-07-15 PROCEDURE — U0002 COVID-19 LAB TEST NON-CDC: HCPCS | Performed by: PHYSICIAN ASSISTANT

## 2021-07-15 PROCEDURE — 81001 URINALYSIS AUTO W/SCOPE: CPT | Performed by: PHYSICIAN ASSISTANT

## 2021-07-15 PROCEDURE — 84145 PROCALCITONIN (PCT): CPT | Performed by: EMERGENCY MEDICINE

## 2021-07-15 PROCEDURE — 99285 EMERGENCY DEPT VISIT HI MDM: CPT | Mod: 25

## 2021-07-15 PROCEDURE — 83735 ASSAY OF MAGNESIUM: CPT | Performed by: PHYSICIAN ASSISTANT

## 2021-07-15 PROCEDURE — A4216 STERILE WATER/SALINE, 10 ML: HCPCS | Performed by: HOSPITALIST

## 2021-07-15 PROCEDURE — 99900035 HC TECH TIME PER 15 MIN (STAT)

## 2021-07-15 PROCEDURE — 80053 COMPREHEN METABOLIC PANEL: CPT | Performed by: PHYSICIAN ASSISTANT

## 2021-07-15 PROCEDURE — 87088 URINE BACTERIA CULTURE: CPT | Performed by: PHYSICIAN ASSISTANT

## 2021-07-15 PROCEDURE — 99284 EMERGENCY DEPT VISIT MOD MDM: CPT | Mod: CS,,, | Performed by: EMERGENCY MEDICINE

## 2021-07-15 PROCEDURE — 27000221 HC OXYGEN, UP TO 24 HOURS

## 2021-07-15 PROCEDURE — 93010 EKG 12-LEAD: ICD-10-PCS | Mod: ,,, | Performed by: INTERNAL MEDICINE

## 2021-07-15 PROCEDURE — 36600 WITHDRAWAL OF ARTERIAL BLOOD: CPT

## 2021-07-15 PROCEDURE — 85025 COMPLETE CBC W/AUTO DIFF WBC: CPT | Performed by: PHYSICIAN ASSISTANT

## 2021-07-15 PROCEDURE — 87077 CULTURE AEROBIC IDENTIFY: CPT | Performed by: PHYSICIAN ASSISTANT

## 2021-07-15 PROCEDURE — 63600175 PHARM REV CODE 636 W HCPCS: Performed by: PHYSICIAN ASSISTANT

## 2021-07-15 PROCEDURE — 25000003 PHARM REV CODE 250: Performed by: EMERGENCY MEDICINE

## 2021-07-15 PROCEDURE — 96372 THER/PROPH/DIAG INJ SC/IM: CPT | Mod: 59

## 2021-07-15 RX ORDER — LEVALBUTEROL INHALATION SOLUTION 0.63 MG/3ML
0.63 SOLUTION RESPIRATORY (INHALATION)
Status: DISCONTINUED | OUTPATIENT
Start: 2021-07-15 | End: 2021-07-15

## 2021-07-15 RX ORDER — FLUTICASONE FUROATE AND VILANTEROL 100; 25 UG/1; UG/1
1 POWDER RESPIRATORY (INHALATION) DAILY
Status: DISCONTINUED | OUTPATIENT
Start: 2021-07-16 | End: 2021-07-16 | Stop reason: HOSPADM

## 2021-07-15 RX ORDER — ACETAMINOPHEN 325 MG/1
650 TABLET ORAL EVERY 4 HOURS PRN
Status: DISCONTINUED | OUTPATIENT
Start: 2021-07-15 | End: 2021-07-16 | Stop reason: HOSPADM

## 2021-07-15 RX ORDER — FUROSEMIDE 10 MG/ML
40 INJECTION INTRAMUSCULAR; INTRAVENOUS 2 TIMES DAILY
Status: DISCONTINUED | OUTPATIENT
Start: 2021-07-16 | End: 2021-07-16 | Stop reason: HOSPADM

## 2021-07-15 RX ORDER — BACLOFEN 10 MG/1
10 TABLET ORAL NIGHTLY
Status: DISCONTINUED | OUTPATIENT
Start: 2021-07-15 | End: 2021-07-16 | Stop reason: HOSPADM

## 2021-07-15 RX ORDER — SODIUM CHLORIDE 0.9 % (FLUSH) 0.9 %
10 SYRINGE (ML) INJECTION
Status: DISCONTINUED | OUTPATIENT
Start: 2021-07-15 | End: 2021-07-16 | Stop reason: HOSPADM

## 2021-07-15 RX ORDER — POTASSIUM CHLORIDE 20 MEQ/1
40 TABLET, EXTENDED RELEASE ORAL EVERY 4 HOURS
Status: COMPLETED | OUTPATIENT
Start: 2021-07-15 | End: 2021-07-15

## 2021-07-15 RX ORDER — IPRATROPIUM BROMIDE AND ALBUTEROL SULFATE 2.5; .5 MG/3ML; MG/3ML
3 SOLUTION RESPIRATORY (INHALATION) EVERY 6 HOURS PRN
Status: DISCONTINUED | OUTPATIENT
Start: 2021-07-15 | End: 2021-07-15

## 2021-07-15 RX ORDER — IBUPROFEN 200 MG
16 TABLET ORAL
Status: DISCONTINUED | OUTPATIENT
Start: 2021-07-15 | End: 2021-07-16 | Stop reason: HOSPADM

## 2021-07-15 RX ORDER — POTASSIUM CHLORIDE 20 MEQ/1
40 TABLET, EXTENDED RELEASE ORAL EVERY 4 HOURS
Status: DISCONTINUED | OUTPATIENT
Start: 2021-07-15 | End: 2021-07-15

## 2021-07-15 RX ORDER — PREDNISONE 20 MG/1
40 TABLET ORAL DAILY
Status: DISCONTINUED | OUTPATIENT
Start: 2021-07-16 | End: 2021-07-16 | Stop reason: HOSPADM

## 2021-07-15 RX ORDER — FUROSEMIDE 10 MG/ML
40 INJECTION INTRAMUSCULAR; INTRAVENOUS ONCE
Status: COMPLETED | OUTPATIENT
Start: 2021-07-15 | End: 2021-07-15

## 2021-07-15 RX ORDER — PREDNISONE 20 MG/1
40 TABLET ORAL DAILY
Status: DISCONTINUED | OUTPATIENT
Start: 2021-07-15 | End: 2021-07-15

## 2021-07-15 RX ORDER — TALC
6 POWDER (GRAM) TOPICAL NIGHTLY PRN
Status: CANCELLED | OUTPATIENT
Start: 2021-07-15

## 2021-07-15 RX ORDER — AZELASTINE 1 MG/ML
2 SPRAY, METERED NASAL 2 TIMES DAILY
Status: DISCONTINUED | OUTPATIENT
Start: 2021-07-15 | End: 2021-07-16 | Stop reason: HOSPADM

## 2021-07-15 RX ORDER — LEVALBUTEROL INHALATION SOLUTION 0.63 MG/3ML
0.63 SOLUTION RESPIRATORY (INHALATION)
Status: DISCONTINUED | OUTPATIENT
Start: 2021-07-16 | End: 2021-07-16 | Stop reason: HOSPADM

## 2021-07-15 RX ORDER — TALC
6 POWDER (GRAM) TOPICAL NIGHTLY PRN
Status: DISCONTINUED | OUTPATIENT
Start: 2021-07-15 | End: 2021-07-16 | Stop reason: HOSPADM

## 2021-07-15 RX ORDER — ENOXAPARIN SODIUM 100 MG/ML
40 INJECTION SUBCUTANEOUS EVERY 24 HOURS
Status: DISCONTINUED | OUTPATIENT
Start: 2021-07-15 | End: 2021-07-16 | Stop reason: HOSPADM

## 2021-07-15 RX ORDER — FLUTICASONE FUROATE AND VILANTEROL 100; 25 UG/1; UG/1
1 POWDER RESPIRATORY (INHALATION) DAILY
Refills: 1 | Status: DISCONTINUED | OUTPATIENT
Start: 2021-07-16 | End: 2021-07-15

## 2021-07-15 RX ORDER — FLUTICASONE FUROATE AND VILANTEROL 100; 25 UG/1; UG/1
1 POWDER RESPIRATORY (INHALATION) 2 TIMES DAILY
Status: DISCONTINUED | OUTPATIENT
Start: 2021-07-15 | End: 2021-07-15

## 2021-07-15 RX ORDER — IPRATROPIUM BROMIDE AND ALBUTEROL SULFATE 2.5; .5 MG/3ML; MG/3ML
3 SOLUTION RESPIRATORY (INHALATION)
Status: DISCONTINUED | OUTPATIENT
Start: 2021-07-15 | End: 2021-07-15

## 2021-07-15 RX ORDER — LEVALBUTEROL INHALATION SOLUTION 0.63 MG/3ML
0.63 SOLUTION RESPIRATORY (INHALATION)
Status: COMPLETED | OUTPATIENT
Start: 2021-07-15 | End: 2021-07-15

## 2021-07-15 RX ORDER — IBUPROFEN 200 MG
24 TABLET ORAL
Status: DISCONTINUED | OUTPATIENT
Start: 2021-07-15 | End: 2021-07-16 | Stop reason: HOSPADM

## 2021-07-15 RX ORDER — SODIUM CHLORIDE 0.9 % (FLUSH) 0.9 %
10 SYRINGE (ML) INJECTION
Status: CANCELLED | OUTPATIENT
Start: 2021-07-15

## 2021-07-15 RX ORDER — PROCHLORPERAZINE EDISYLATE 5 MG/ML
5 INJECTION INTRAMUSCULAR; INTRAVENOUS EVERY 6 HOURS PRN
Status: DISCONTINUED | OUTPATIENT
Start: 2021-07-15 | End: 2021-07-16 | Stop reason: HOSPADM

## 2021-07-15 RX ORDER — ONDANSETRON 2 MG/ML
4 INJECTION INTRAMUSCULAR; INTRAVENOUS EVERY 8 HOURS PRN
Status: DISCONTINUED | OUTPATIENT
Start: 2021-07-15 | End: 2021-07-16 | Stop reason: HOSPADM

## 2021-07-15 RX ORDER — NAPROXEN SODIUM 220 MG/1
81 TABLET, FILM COATED ORAL DAILY
Status: DISCONTINUED | OUTPATIENT
Start: 2021-07-16 | End: 2021-07-16 | Stop reason: HOSPADM

## 2021-07-15 RX ADMIN — Medication 10 ML: at 11:07

## 2021-07-15 RX ADMIN — LEVALBUTEROL HYDROCHLORIDE 0.63 MG: 0.63 SOLUTION RESPIRATORY (INHALATION) at 01:07

## 2021-07-15 RX ADMIN — ENOXAPARIN SODIUM 40 MG: 40 INJECTION SUBCUTANEOUS at 08:07

## 2021-07-15 RX ADMIN — FUROSEMIDE 40 MG: 10 INJECTION, SOLUTION INTRAMUSCULAR; INTRAVENOUS at 03:07

## 2021-07-15 RX ADMIN — LEVALBUTEROL HYDROCHLORIDE 0.63 MG: 0.63 SOLUTION RESPIRATORY (INHALATION) at 09:07

## 2021-07-15 RX ADMIN — METHYLPREDNISOLONE SODIUM SUCCINATE 40 MG: 40 INJECTION, POWDER, FOR SOLUTION INTRAMUSCULAR; INTRAVENOUS at 11:07

## 2021-07-15 RX ADMIN — POTASSIUM CHLORIDE 40 MEQ: 1500 TABLET, EXTENDED RELEASE ORAL at 08:07

## 2021-07-15 RX ADMIN — BACLOFEN 10 MG: 10 TABLET ORAL at 11:07

## 2021-07-16 VITALS
WEIGHT: 96 LBS | HEART RATE: 70 BPM | SYSTOLIC BLOOD PRESSURE: 109 MMHG | HEIGHT: 60 IN | TEMPERATURE: 98 F | RESPIRATION RATE: 16 BRPM | BODY MASS INDEX: 18.85 KG/M2 | OXYGEN SATURATION: 100 % | DIASTOLIC BLOOD PRESSURE: 53 MMHG

## 2021-07-16 LAB
ANION GAP SERPL CALC-SCNC: 11 MMOL/L (ref 8–16)
ASCENDING AORTA: 2.12 CM
AV MEAN GRADIENT: 2 MMHG
BASOPHILS # BLD AUTO: 0.04 K/UL (ref 0–0.2)
BASOPHILS NFR BLD: 0.5 % (ref 0–1.9)
BSA FOR ECHO PROCEDURE: 1.36 M2
BUN SERPL-MCNC: 26 MG/DL (ref 10–30)
CALCIUM SERPL-MCNC: 10.4 MG/DL (ref 8.7–10.5)
CHLORIDE SERPL-SCNC: 98 MMOL/L (ref 95–110)
CO2 SERPL-SCNC: 36 MMOL/L (ref 23–29)
CREAT SERPL-MCNC: 1 MG/DL (ref 0.5–1.4)
CV ECHO LV RWT: 0.42 CM
DIFFERENTIAL METHOD: ABNORMAL
DOP CALC LVOT AREA: 1.4 CM2
DOP CALC LVOT DIAMETER: 1.35 CM
DOP CALC LVOT PEAK VEL: 0.7 M/S
DOP CALC LVOT STROKE VOLUME: 22.5 CM3
DOP CALCLVOT PEAK VEL VTI: 15.73 CM
E WAVE DECELERATION TIME: 155.07 MSEC
E/A RATIO: 1.48
E/E' RATIO: 40.29 M/S
ECHO LV POSTERIOR WALL: 0.85 CM (ref 0.6–1.1)
EJECTION FRACTION: 68 %
EOSINOPHIL # BLD AUTO: 0 K/UL (ref 0–0.5)
EOSINOPHIL NFR BLD: 0.3 % (ref 0–8)
ERYTHROCYTE [DISTWIDTH] IN BLOOD BY AUTOMATED COUNT: 13.7 % (ref 11.5–14.5)
EST. GFR  (AFRICAN AMERICAN): 56.5 ML/MIN/1.73 M^2
EST. GFR  (NON AFRICAN AMERICAN): 49 ML/MIN/1.73 M^2
FRACTIONAL SHORTENING: 59 % (ref 28–44)
GLUCOSE SERPL-MCNC: 137 MG/DL (ref 70–110)
HCT VFR BLD AUTO: 44.4 % (ref 37–48.5)
HGB BLD-MCNC: 14.8 G/DL (ref 12–16)
IMM GRANULOCYTES # BLD AUTO: 0.03 K/UL (ref 0–0.04)
IMM GRANULOCYTES NFR BLD AUTO: 0.4 % (ref 0–0.5)
INTERVENTRICULAR SEPTUM: 1.47 CM (ref 0.6–1.1)
LA MAJOR: 5.64 CM
LA MINOR: 5.16 CM
LA WIDTH: 3.95 CM
LEFT ATRIUM SIZE: 2.76 CM
LEFT ATRIUM VOLUME INDEX MOD: 53.1 ML/M2
LEFT ATRIUM VOLUME INDEX: 36.5 ML/M2
LEFT ATRIUM VOLUME MOD: 72.7 CM3
LEFT ATRIUM VOLUME: 49.94 CM3
LEFT INTERNAL DIMENSION IN SYSTOLE: 1.65 CM (ref 2.1–4)
LEFT VENTRICLE DIASTOLIC VOLUME INDEX: 53.3 ML/M2
LEFT VENTRICLE DIASTOLIC VOLUME: 73.02 ML
LEFT VENTRICLE MASS INDEX: 118 G/M2
LEFT VENTRICLE SYSTOLIC VOLUME INDEX: 5.6 ML/M2
LEFT VENTRICLE SYSTOLIC VOLUME: 7.73 ML
LEFT VENTRICULAR INTERNAL DIMENSION IN DIASTOLE: 4.07 CM (ref 3.5–6)
LEFT VENTRICULAR MASS: 161.59 G
LV LATERAL E/E' RATIO: 35.25 M/S
LV SEPTAL E/E' RATIO: 47 M/S
LYMPHOCYTES # BLD AUTO: 0.6 K/UL (ref 1–4.8)
LYMPHOCYTES NFR BLD: 7.7 % (ref 18–48)
MAGNESIUM SERPL-MCNC: 2.5 MG/DL (ref 1.6–2.6)
MCH RBC QN AUTO: 33.2 PG (ref 27–31)
MCHC RBC AUTO-ENTMCNC: 33.3 G/DL (ref 32–36)
MCV RBC AUTO: 100 FL (ref 82–98)
MONOCYTES # BLD AUTO: 0.1 K/UL (ref 0.3–1)
MONOCYTES NFR BLD: 1.6 % (ref 4–15)
MV A" WAVE DURATION": 9.13 MSEC
MV MEAN GRADIENT: 5 MMHG
MV PEAK A VEL: 0.95 M/S
MV PEAK E VEL: 1.41 M/S
MV STENOSIS PRESSURE HALF TIME: 73 MS
MV VALVE AREA P 1/2 METHOD: 3.01 CM2
NEUTROPHILS # BLD AUTO: 6.5 K/UL (ref 1.8–7.7)
NEUTROPHILS NFR BLD: 89.5 % (ref 38–73)
NRBC BLD-RTO: 0 /100 WBC
PLATELET # BLD AUTO: 142 K/UL (ref 150–450)
PMV BLD AUTO: 10.7 FL (ref 9.2–12.9)
POTASSIUM SERPL-SCNC: 4.8 MMOL/L (ref 3.5–5.1)
PULM VEIN S/D RATIO: 1.48
PV PEAK D VEL: 0.27 M/S
PV PEAK S VEL: 0.4 M/S
RA MAJOR: 4.85 CM
RA PRESSURE: 3 MMHG
RBC # BLD AUTO: 4.46 M/UL (ref 4–5.4)
RV TISSUE DOPPLER FREE WALL SYSTOLIC VELOCITY 1 (APICAL 4 CHAMBER VIEW): 5.78 CM/S
SINUS: 2.58 CM
SODIUM SERPL-SCNC: 145 MMOL/L (ref 136–145)
STJ: 2.19 CM
TDI LATERAL: 0.04 M/S
TDI SEPTAL: 0.03 M/S
TDI: 0.04 M/S
TRICUSPID ANNULAR PLANE SYSTOLIC EXCURSION: 1.41 CM
WBC # BLD AUTO: 7.29 K/UL (ref 3.9–12.7)

## 2021-07-16 PROCEDURE — 97530 THERAPEUTIC ACTIVITIES: CPT

## 2021-07-16 PROCEDURE — G0378 HOSPITAL OBSERVATION PER HR: HCPCS

## 2021-07-16 PROCEDURE — 25000242 PHARM REV CODE 250 ALT 637 W/ HCPCS: Performed by: HOSPITALIST

## 2021-07-16 PROCEDURE — 94640 AIRWAY INHALATION TREATMENT: CPT

## 2021-07-16 PROCEDURE — 25000242 PHARM REV CODE 250 ALT 637 W/ HCPCS: Performed by: PHYSICIAN ASSISTANT

## 2021-07-16 PROCEDURE — 97161 PT EVAL LOW COMPLEX 20 MIN: CPT

## 2021-07-16 PROCEDURE — 36415 COLL VENOUS BLD VENIPUNCTURE: CPT | Performed by: HOSPITALIST

## 2021-07-16 PROCEDURE — 25000003 PHARM REV CODE 250: Performed by: PHYSICIAN ASSISTANT

## 2021-07-16 PROCEDURE — 83735 ASSAY OF MAGNESIUM: CPT | Performed by: HOSPITALIST

## 2021-07-16 PROCEDURE — 96376 TX/PRO/DX INJ SAME DRUG ADON: CPT | Mod: 59

## 2021-07-16 PROCEDURE — 85025 COMPLETE CBC W/AUTO DIFF WBC: CPT | Performed by: HOSPITALIST

## 2021-07-16 PROCEDURE — 25000003 PHARM REV CODE 250: Performed by: HOSPITALIST

## 2021-07-16 PROCEDURE — 99217 PR OBSERVATION CARE DISCHARGE: ICD-10-PCS | Mod: ,,, | Performed by: PHYSICIAN ASSISTANT

## 2021-07-16 PROCEDURE — 63600175 PHARM REV CODE 636 W HCPCS: Performed by: HOSPITALIST

## 2021-07-16 PROCEDURE — 97165 OT EVAL LOW COMPLEX 30 MIN: CPT

## 2021-07-16 PROCEDURE — 99217 PR OBSERVATION CARE DISCHARGE: CPT | Mod: ,,, | Performed by: PHYSICIAN ASSISTANT

## 2021-07-16 PROCEDURE — 80048 BASIC METABOLIC PNL TOTAL CA: CPT | Performed by: HOSPITALIST

## 2021-07-16 RX ORDER — FERROUS SULFATE 325(65) MG
325 TABLET, DELAYED RELEASE (ENTERIC COATED) ORAL DAILY
Status: DISCONTINUED | OUTPATIENT
Start: 2021-07-16 | End: 2021-07-16 | Stop reason: HOSPADM

## 2021-07-16 RX ORDER — PREDNISONE 20 MG/1
40 TABLET ORAL DAILY
Qty: 6 TABLET | Refills: 0 | Status: SHIPPED | OUTPATIENT
Start: 2021-07-16 | End: 2021-07-19

## 2021-07-16 RX ADMIN — FERROUS SULFATE TAB EC 325 MG (65 MG FE EQUIVALENT) 325 MG: 325 (65 FE) TABLET DELAYED RESPONSE at 11:07

## 2021-07-16 RX ADMIN — AZELASTINE HYDROCHLORIDE 274 MCG: 137 SPRAY, METERED NASAL at 12:07

## 2021-07-16 RX ADMIN — TIOTROPIUM BROMIDE INHALATION SPRAY 2 PUFF: 3.12 SPRAY, METERED RESPIRATORY (INHALATION) at 12:07

## 2021-07-16 RX ADMIN — FUROSEMIDE 40 MG: 10 INJECTION, SOLUTION INTRAMUSCULAR; INTRAVENOUS at 09:07

## 2021-07-16 RX ADMIN — FLUTICASONE FUROATE AND VILANTEROL TRIFENATATE 1 PUFF: 100; 25 POWDER RESPIRATORY (INHALATION) at 09:07

## 2021-07-16 RX ADMIN — Medication 1 TABLET: at 09:07

## 2021-07-16 RX ADMIN — LEVALBUTEROL HYDROCHLORIDE 0.63 MG: 0.63 SOLUTION RESPIRATORY (INHALATION) at 11:07

## 2021-07-16 RX ADMIN — ASPIRIN 81 MG: 81 TABLET, CHEWABLE ORAL at 09:07

## 2021-07-16 RX ADMIN — AZELASTINE HYDROCHLORIDE 274 MCG: 137 SPRAY, METERED NASAL at 09:07

## 2021-07-17 LAB — BACTERIA UR CULT: ABNORMAL

## 2021-07-24 RX ORDER — LEVOFLOXACIN 500 MG/1
500 TABLET, FILM COATED ORAL DAILY
Qty: 5 TABLET | Refills: 0 | Status: SHIPPED | OUTPATIENT
Start: 2021-07-24 | End: 2021-07-29

## 2021-08-09 ENCOUNTER — TELEPHONE (OUTPATIENT)
Dept: PULMONOLOGY | Facility: CLINIC | Age: 86
End: 2021-08-09

## 2021-08-16 ENCOUNTER — TELEPHONE (OUTPATIENT)
Dept: PULMONOLOGY | Facility: CLINIC | Age: 86
End: 2021-08-16

## 2021-08-16 ENCOUNTER — OFFICE VISIT (OUTPATIENT)
Dept: PULMONOLOGY | Facility: CLINIC | Age: 86
End: 2021-08-16
Payer: MEDICARE

## 2021-08-16 DIAGNOSIS — J96.12 CHRONIC RESPIRATORY FAILURE WITH HYPOXIA AND HYPERCAPNIA: ICD-10-CM

## 2021-08-16 DIAGNOSIS — J96.11 CHRONIC RESPIRATORY FAILURE WITH HYPOXIA AND HYPERCAPNIA: ICD-10-CM

## 2021-08-16 DIAGNOSIS — J43.2 CENTRILOBULAR EMPHYSEMA: ICD-10-CM

## 2021-08-16 PROCEDURE — 1157F PR ADVANCE CARE PLAN OR EQUIV PRESENT IN MEDICAL RECORD: ICD-10-PCS | Mod: CPTII,95,, | Performed by: INTERNAL MEDICINE

## 2021-08-16 PROCEDURE — 1160F RVW MEDS BY RX/DR IN RCRD: CPT | Mod: CPTII,95,, | Performed by: INTERNAL MEDICINE

## 2021-08-16 PROCEDURE — 1157F ADVNC CARE PLAN IN RCRD: CPT | Mod: CPTII,95,, | Performed by: INTERNAL MEDICINE

## 2021-08-16 PROCEDURE — 99203 OFFICE O/P NEW LOW 30 MIN: CPT | Mod: 95,,, | Performed by: INTERNAL MEDICINE

## 2021-08-16 PROCEDURE — 1159F PR MEDICATION LIST DOCUMENTED IN MEDICAL RECORD: ICD-10-PCS | Mod: CPTII,95,, | Performed by: INTERNAL MEDICINE

## 2021-08-16 PROCEDURE — 1159F MED LIST DOCD IN RCRD: CPT | Mod: CPTII,95,, | Performed by: INTERNAL MEDICINE

## 2021-08-16 PROCEDURE — 1160F PR REVIEW ALL MEDS BY PRESCRIBER/CLIN PHARMACIST DOCUMENTED: ICD-10-PCS | Mod: CPTII,95,, | Performed by: INTERNAL MEDICINE

## 2021-08-16 PROCEDURE — 99203 PR OFFICE/OUTPT VISIT, NEW, LEVL III, 30-44 MIN: ICD-10-PCS | Mod: 95,,, | Performed by: INTERNAL MEDICINE

## 2021-08-16 RX ORDER — TIOTROPIUM BROMIDE INHALATION SPRAY 3.12 UG/1
2 SPRAY, METERED RESPIRATORY (INHALATION) DAILY
Qty: 4 G | Refills: 11 | Status: ON HOLD | OUTPATIENT
Start: 2021-08-16 | End: 2022-05-29 | Stop reason: HOSPADM

## 2021-09-21 ENCOUNTER — HOSPITAL ENCOUNTER (INPATIENT)
Facility: HOSPITAL | Age: 86
LOS: 1 days | Discharge: HOME | DRG: 291 | End: 2021-09-23
Attending: EMERGENCY MEDICINE | Admitting: INTERNAL MEDICINE
Payer: MEDICARE

## 2021-09-21 DIAGNOSIS — I50.33 ACUTE ON CHRONIC DIASTOLIC HEART FAILURE: ICD-10-CM

## 2021-09-21 DIAGNOSIS — I50.9 CHF (CONGESTIVE HEART FAILURE): ICD-10-CM

## 2021-09-21 DIAGNOSIS — J96.12 CHRONIC RESPIRATORY FAILURE WITH HYPOXIA AND HYPERCAPNIA: Primary | ICD-10-CM

## 2021-09-21 DIAGNOSIS — I50.33 ACUTE ON CHRONIC DIASTOLIC CONGESTIVE HEART FAILURE: ICD-10-CM

## 2021-09-21 DIAGNOSIS — R07.9 CHEST PAIN: ICD-10-CM

## 2021-09-21 DIAGNOSIS — J96.11 CHRONIC RESPIRATORY FAILURE WITH HYPOXIA AND HYPERCAPNIA: Primary | ICD-10-CM

## 2021-09-21 LAB
ALBUMIN SERPL BCP-MCNC: 3.6 G/DL (ref 3.5–5.2)
ALLENS TEST: ABNORMAL
ALP SERPL-CCNC: 89 U/L (ref 55–135)
ALT SERPL W/O P-5'-P-CCNC: 15 U/L (ref 10–44)
ANION GAP SERPL CALC-SCNC: 13 MMOL/L (ref 8–16)
AST SERPL-CCNC: 29 U/L (ref 10–40)
BASOPHILS # BLD AUTO: 0.05 K/UL (ref 0–0.2)
BASOPHILS NFR BLD: 0.6 % (ref 0–1.9)
BILIRUB SERPL-MCNC: 0.7 MG/DL (ref 0.1–1)
BNP SERPL-MCNC: 1994 PG/ML (ref 0–99)
BUN SERPL-MCNC: 21 MG/DL (ref 10–30)
CALCIUM SERPL-MCNC: 9.8 MG/DL (ref 8.7–10.5)
CHLORIDE SERPL-SCNC: 98 MMOL/L (ref 95–110)
CO2 SERPL-SCNC: 33 MMOL/L (ref 23–29)
CREAT SERPL-MCNC: 1 MG/DL (ref 0.5–1.4)
CTP QC/QA: YES
DELSYS: ABNORMAL
DIFFERENTIAL METHOD: ABNORMAL
EOSINOPHIL # BLD AUTO: 0.2 K/UL (ref 0–0.5)
EOSINOPHIL NFR BLD: 2.4 % (ref 0–8)
ERYTHROCYTE [DISTWIDTH] IN BLOOD BY AUTOMATED COUNT: 13.2 % (ref 11.5–14.5)
EST. GFR  (AFRICAN AMERICAN): 56.5 ML/MIN/1.73 M^2
EST. GFR  (NON AFRICAN AMERICAN): 49 ML/MIN/1.73 M^2
FLOW: 2.5
GLUCOSE SERPL-MCNC: 111 MG/DL (ref 70–110)
HCO3 UR-SCNC: 45.4 MMOL/L (ref 24–28)
HCT VFR BLD AUTO: 41.9 % (ref 37–48.5)
HGB BLD-MCNC: 14 G/DL (ref 12–16)
IMM GRANULOCYTES # BLD AUTO: 0.02 K/UL (ref 0–0.04)
IMM GRANULOCYTES NFR BLD AUTO: 0.2 % (ref 0–0.5)
LYMPHOCYTES # BLD AUTO: 1 K/UL (ref 1–4.8)
LYMPHOCYTES NFR BLD: 12.4 % (ref 18–48)
MCH RBC QN AUTO: 32.6 PG (ref 27–31)
MCHC RBC AUTO-ENTMCNC: 33.4 G/DL (ref 32–36)
MCV RBC AUTO: 97 FL (ref 82–98)
MODE: ABNORMAL
MONOCYTES # BLD AUTO: 1 K/UL (ref 0.3–1)
MONOCYTES NFR BLD: 11.8 % (ref 4–15)
NEUTROPHILS # BLD AUTO: 6 K/UL (ref 1.8–7.7)
NEUTROPHILS NFR BLD: 72.6 % (ref 38–73)
NRBC BLD-RTO: 0 /100 WBC
PCO2 BLDA: 63.5 MMHG (ref 35–45)
PH SMN: 7.46 [PH] (ref 7.35–7.45)
PLATELET # BLD AUTO: 128 K/UL (ref 150–450)
PMV BLD AUTO: 10.3 FL (ref 9.2–12.9)
PO2 BLDA: 44 MMHG (ref 40–60)
POC BE: 22 MMOL/L
POC SATURATED O2: 80 % (ref 95–100)
POC TCO2: 47 MMOL/L (ref 24–29)
POCT GLUCOSE: 130 MG/DL (ref 70–110)
POTASSIUM SERPL-SCNC: 4 MMOL/L (ref 3.5–5.1)
PROT SERPL-MCNC: 6.8 G/DL (ref 6–8.4)
RBC # BLD AUTO: 4.3 M/UL (ref 4–5.4)
SAMPLE: ABNORMAL
SARS-COV-2 RDRP RESP QL NAA+PROBE: NEGATIVE
SITE: ABNORMAL
SODIUM SERPL-SCNC: 144 MMOL/L (ref 136–145)
TROPONIN I SERPL DL<=0.01 NG/ML-MCNC: 0.12 NG/ML (ref 0–0.03)
TROPONIN I SERPL DL<=0.01 NG/ML-MCNC: 0.15 NG/ML (ref 0–0.03)
WBC # BLD AUTO: 8.3 K/UL (ref 3.9–12.7)

## 2021-09-21 PROCEDURE — 99900035 HC TECH TIME PER 15 MIN (STAT)

## 2021-09-21 PROCEDURE — 94761 N-INVAS EAR/PLS OXIMETRY MLT: CPT

## 2021-09-21 PROCEDURE — 82962 GLUCOSE BLOOD TEST: CPT

## 2021-09-21 PROCEDURE — U0002 COVID-19 LAB TEST NON-CDC: HCPCS | Performed by: EMERGENCY MEDICINE

## 2021-09-21 PROCEDURE — 85025 COMPLETE CBC W/AUTO DIFF WBC: CPT | Performed by: EMERGENCY MEDICINE

## 2021-09-21 PROCEDURE — 63600175 PHARM REV CODE 636 W HCPCS: Performed by: PHYSICIAN ASSISTANT

## 2021-09-21 PROCEDURE — 99220 PR INITIAL OBSERVATION CARE,LEVL III: CPT | Mod: ,,, | Performed by: PHYSICIAN ASSISTANT

## 2021-09-21 PROCEDURE — G0378 HOSPITAL OBSERVATION PER HR: HCPCS

## 2021-09-21 PROCEDURE — 94640 AIRWAY INHALATION TREATMENT: CPT

## 2021-09-21 PROCEDURE — 96375 TX/PRO/DX INJ NEW DRUG ADDON: CPT

## 2021-09-21 PROCEDURE — 96374 THER/PROPH/DIAG INJ IV PUSH: CPT

## 2021-09-21 PROCEDURE — 25000242 PHARM REV CODE 250 ALT 637 W/ HCPCS: Performed by: PHYSICIAN ASSISTANT

## 2021-09-21 PROCEDURE — 80053 COMPREHEN METABOLIC PANEL: CPT | Performed by: PHYSICIAN ASSISTANT

## 2021-09-21 PROCEDURE — 27000221 HC OXYGEN, UP TO 24 HOURS

## 2021-09-21 PROCEDURE — 93005 ELECTROCARDIOGRAM TRACING: CPT

## 2021-09-21 PROCEDURE — 83880 ASSAY OF NATRIURETIC PEPTIDE: CPT | Performed by: PHYSICIAN ASSISTANT

## 2021-09-21 PROCEDURE — 93010 ELECTROCARDIOGRAM REPORT: CPT | Mod: ,,, | Performed by: INTERNAL MEDICINE

## 2021-09-21 PROCEDURE — 25000003 PHARM REV CODE 250: Performed by: PHYSICIAN ASSISTANT

## 2021-09-21 PROCEDURE — 99220 PR INITIAL OBSERVATION CARE,LEVL III: ICD-10-PCS | Mod: ,,, | Performed by: PHYSICIAN ASSISTANT

## 2021-09-21 PROCEDURE — 93010 EKG 12-LEAD: ICD-10-PCS | Mod: ,,, | Performed by: INTERNAL MEDICINE

## 2021-09-21 PROCEDURE — 82803 BLOOD GASES ANY COMBINATION: CPT

## 2021-09-21 PROCEDURE — 84484 ASSAY OF TROPONIN QUANT: CPT | Performed by: PHYSICIAN ASSISTANT

## 2021-09-21 PROCEDURE — 99285 PR EMERGENCY DEPT VISIT,LEVEL V: ICD-10-PCS | Mod: CS,,, | Performed by: PHYSICIAN ASSISTANT

## 2021-09-21 PROCEDURE — 99285 EMERGENCY DEPT VISIT HI MDM: CPT | Mod: 25

## 2021-09-21 PROCEDURE — 96372 THER/PROPH/DIAG INJ SC/IM: CPT | Mod: 59

## 2021-09-21 PROCEDURE — 84484 ASSAY OF TROPONIN QUANT: CPT | Mod: 91 | Performed by: PHYSICIAN ASSISTANT

## 2021-09-21 PROCEDURE — 99285 EMERGENCY DEPT VISIT HI MDM: CPT | Mod: CS,,, | Performed by: PHYSICIAN ASSISTANT

## 2021-09-21 RX ORDER — PREDNISONE 20 MG/1
40 TABLET ORAL DAILY
Status: DISCONTINUED | OUTPATIENT
Start: 2021-09-22 | End: 2021-09-23 | Stop reason: HOSPADM

## 2021-09-21 RX ORDER — FUROSEMIDE 10 MG/ML
40 INJECTION INTRAMUSCULAR; INTRAVENOUS
Status: DISCONTINUED | OUTPATIENT
Start: 2021-09-22 | End: 2021-09-23

## 2021-09-21 RX ORDER — AMLODIPINE BESYLATE 2.5 MG/1
2.5 TABLET ORAL NIGHTLY
Status: DISCONTINUED | OUTPATIENT
Start: 2021-09-21 | End: 2021-09-22

## 2021-09-21 RX ORDER — BACLOFEN 5 MG/1
5 TABLET ORAL NIGHTLY
Status: DISCONTINUED | OUTPATIENT
Start: 2021-09-21 | End: 2021-09-23 | Stop reason: HOSPADM

## 2021-09-21 RX ORDER — LANOLIN ALCOHOL/MO/W.PET/CERES
1 CREAM (GRAM) TOPICAL DAILY
Status: DISCONTINUED | OUTPATIENT
Start: 2021-09-22 | End: 2021-09-23 | Stop reason: HOSPADM

## 2021-09-21 RX ORDER — IPRATROPIUM BROMIDE AND ALBUTEROL SULFATE 2.5; .5 MG/3ML; MG/3ML
3 SOLUTION RESPIRATORY (INHALATION) EVERY 4 HOURS PRN
Status: DISCONTINUED | OUTPATIENT
Start: 2021-09-21 | End: 2021-09-23 | Stop reason: HOSPADM

## 2021-09-21 RX ORDER — TALC
6 POWDER (GRAM) TOPICAL NIGHTLY PRN
Status: DISCONTINUED | OUTPATIENT
Start: 2021-09-21 | End: 2021-09-23 | Stop reason: HOSPADM

## 2021-09-21 RX ORDER — AZELASTINE 1 MG/ML
2 SPRAY, METERED NASAL 2 TIMES DAILY
Status: DISCONTINUED | OUTPATIENT
Start: 2021-09-21 | End: 2021-09-23 | Stop reason: HOSPADM

## 2021-09-21 RX ORDER — HYDRALAZINE HYDROCHLORIDE 20 MG/ML
10 INJECTION INTRAMUSCULAR; INTRAVENOUS
Status: COMPLETED | OUTPATIENT
Start: 2021-09-21 | End: 2021-09-21

## 2021-09-21 RX ORDER — POLYETHYLENE GLYCOL 3350 17 G/17G
17 POWDER, FOR SOLUTION ORAL DAILY PRN
Status: DISCONTINUED | OUTPATIENT
Start: 2021-09-21 | End: 2021-09-23 | Stop reason: HOSPADM

## 2021-09-21 RX ORDER — PROMETHAZINE HYDROCHLORIDE 25 MG/1
25 TABLET ORAL EVERY 6 HOURS PRN
Status: DISCONTINUED | OUTPATIENT
Start: 2021-09-21 | End: 2021-09-23 | Stop reason: HOSPADM

## 2021-09-21 RX ORDER — GLUCAGON 1 MG
1 KIT INJECTION
Status: DISCONTINUED | OUTPATIENT
Start: 2021-09-21 | End: 2021-09-23 | Stop reason: HOSPADM

## 2021-09-21 RX ORDER — BISACODYL 10 MG
10 SUPPOSITORY, RECTAL RECTAL DAILY PRN
Status: DISCONTINUED | OUTPATIENT
Start: 2021-09-21 | End: 2021-09-23 | Stop reason: HOSPADM

## 2021-09-21 RX ORDER — IBUPROFEN 200 MG
24 TABLET ORAL
Status: DISCONTINUED | OUTPATIENT
Start: 2021-09-21 | End: 2021-09-23 | Stop reason: HOSPADM

## 2021-09-21 RX ORDER — POTASSIUM CHLORIDE 750 MG/1
10 CAPSULE, EXTENDED RELEASE ORAL DAILY
Status: DISCONTINUED | OUTPATIENT
Start: 2021-09-22 | End: 2021-09-23 | Stop reason: HOSPADM

## 2021-09-21 RX ORDER — FUROSEMIDE 10 MG/ML
40 INJECTION INTRAMUSCULAR; INTRAVENOUS
Status: COMPLETED | OUTPATIENT
Start: 2021-09-21 | End: 2021-09-21

## 2021-09-21 RX ORDER — ONDANSETRON 8 MG/1
8 TABLET, ORALLY DISINTEGRATING ORAL EVERY 8 HOURS PRN
Status: DISCONTINUED | OUTPATIENT
Start: 2021-09-21 | End: 2021-09-23 | Stop reason: HOSPADM

## 2021-09-21 RX ORDER — IBUPROFEN 200 MG
16 TABLET ORAL
Status: DISCONTINUED | OUTPATIENT
Start: 2021-09-21 | End: 2021-09-23 | Stop reason: HOSPADM

## 2021-09-21 RX ORDER — DIPHENHYDRAMINE HYDROCHLORIDE 50 MG/ML
25 INJECTION INTRAMUSCULAR; INTRAVENOUS ONCE
Status: DISCONTINUED | OUTPATIENT
Start: 2021-09-21 | End: 2021-09-21

## 2021-09-21 RX ORDER — NAPROXEN SODIUM 220 MG/1
81 TABLET, FILM COATED ORAL DAILY
Status: DISCONTINUED | OUTPATIENT
Start: 2021-09-22 | End: 2021-09-23 | Stop reason: HOSPADM

## 2021-09-21 RX ORDER — LEVALBUTEROL INHALATION SOLUTION 0.63 MG/3ML
0.63 SOLUTION RESPIRATORY (INHALATION) EVERY 8 HOURS
Status: DISCONTINUED | OUTPATIENT
Start: 2021-09-21 | End: 2021-09-23 | Stop reason: HOSPADM

## 2021-09-21 RX ORDER — ENOXAPARIN SODIUM 100 MG/ML
30 INJECTION SUBCUTANEOUS EVERY 24 HOURS
Status: DISCONTINUED | OUTPATIENT
Start: 2021-09-21 | End: 2021-09-23 | Stop reason: HOSPADM

## 2021-09-21 RX ORDER — METHYLPREDNISOLONE SOD SUCC 125 MG
125 VIAL (EA) INJECTION ONCE
Status: COMPLETED | OUTPATIENT
Start: 2021-09-21 | End: 2021-09-21

## 2021-09-21 RX ORDER — IPRATROPIUM BROMIDE 42 UG/1
2 SPRAY, METERED NASAL 2 TIMES DAILY
Status: DISCONTINUED | OUTPATIENT
Start: 2021-09-21 | End: 2021-09-23 | Stop reason: HOSPADM

## 2021-09-21 RX ORDER — FLUTICASONE FUROATE AND VILANTEROL 100; 25 UG/1; UG/1
1 POWDER RESPIRATORY (INHALATION) DAILY
Refills: 1 | Status: DISCONTINUED | OUTPATIENT
Start: 2021-09-22 | End: 2021-09-23 | Stop reason: HOSPADM

## 2021-09-21 RX ORDER — ACETAMINOPHEN 325 MG/1
650 TABLET ORAL EVERY 4 HOURS PRN
Status: DISCONTINUED | OUTPATIENT
Start: 2021-09-21 | End: 2021-09-23 | Stop reason: HOSPADM

## 2021-09-21 RX ADMIN — HYDRALAZINE HYDROCHLORIDE 10 MG: 20 INJECTION, SOLUTION INTRAMUSCULAR; INTRAVENOUS at 06:09

## 2021-09-21 RX ADMIN — AMLODIPINE BESYLATE 2.5 MG: 2.5 TABLET ORAL at 10:09

## 2021-09-21 RX ADMIN — ENOXAPARIN SODIUM 30 MG: 30 INJECTION SUBCUTANEOUS at 09:09

## 2021-09-21 RX ADMIN — FUROSEMIDE 40 MG: 10 INJECTION, SOLUTION INTRAMUSCULAR; INTRAVENOUS at 06:09

## 2021-09-21 RX ADMIN — METHYLPREDNISOLONE SODIUM SUCCINATE 125 MG: 125 INJECTION, POWDER, FOR SOLUTION INTRAMUSCULAR; INTRAVENOUS at 08:09

## 2021-09-21 RX ADMIN — BACLOFEN 5 MG: 5 TABLET ORAL at 11:09

## 2021-09-21 RX ADMIN — LEVALBUTEROL HYDROCHLORIDE 0.63 MG: 0.63 SOLUTION RESPIRATORY (INHALATION) at 08:09

## 2021-09-22 PROBLEM — R07.9 CHEST PAIN: Status: ACTIVE | Noted: 2021-09-22

## 2021-09-22 LAB
ANION GAP SERPL CALC-SCNC: 16 MMOL/L (ref 8–16)
ASCENDING AORTA: 2.58 CM
AV INDEX (PROSTH): 0.8
AV MEAN GRADIENT: 3 MMHG
AV PEAK GRADIENT: 7 MMHG
AV VALVE AREA: 1.9 CM2
AV VELOCITY RATIO: 0.72
BASOPHILS # BLD AUTO: 0.01 K/UL (ref 0–0.2)
BASOPHILS NFR BLD: 0.2 % (ref 0–1.9)
BSA FOR ECHO PROCEDURE: 1.43 M2
BUN SERPL-MCNC: 22 MG/DL (ref 10–30)
CALCIUM SERPL-MCNC: 9.8 MG/DL (ref 8.7–10.5)
CHLORIDE SERPL-SCNC: 93 MMOL/L (ref 95–110)
CHOLEST SERPL-MCNC: 176 MG/DL (ref 120–199)
CHOLEST/HDLC SERPL: 2.2 {RATIO} (ref 2–5)
CO2 SERPL-SCNC: 34 MMOL/L (ref 23–29)
CREAT SERPL-MCNC: 1 MG/DL (ref 0.5–1.4)
CV ECHO LV RWT: 0.47 CM
DIFFERENTIAL METHOD: ABNORMAL
DOP CALC AO PEAK VEL: 1.3 M/S
DOP CALC AO VTI: 24.6 CM
DOP CALC LVOT AREA: 2.4 CM2
DOP CALC LVOT DIAMETER: 1.74 CM
DOP CALC LVOT PEAK VEL: 0.94 M/S
DOP CALC LVOT STROKE VOLUME: 46.82 CM3
DOP CALCLVOT PEAK VEL VTI: 19.7 CM
E WAVE DECELERATION TIME: 191.14 MSEC
E/A RATIO: 0.88
E/E' RATIO: 26.44 M/S
ECHO LV POSTERIOR WALL: 0.9 CM (ref 0.6–1.1)
EJECTION FRACTION: 55 %
EOSINOPHIL # BLD AUTO: 0 K/UL (ref 0–0.5)
EOSINOPHIL NFR BLD: 0 % (ref 0–8)
ERYTHROCYTE [DISTWIDTH] IN BLOOD BY AUTOMATED COUNT: 13.2 % (ref 11.5–14.5)
EST. GFR  (AFRICAN AMERICAN): 56.5 ML/MIN/1.73 M^2
EST. GFR  (NON AFRICAN AMERICAN): 49 ML/MIN/1.73 M^2
ESTIMATED AVG GLUCOSE: 100 MG/DL (ref 68–131)
FRACTIONAL SHORTENING: 41 % (ref 28–44)
GLUCOSE SERPL-MCNC: 123 MG/DL (ref 70–110)
HBA1C MFR BLD: 5.1 % (ref 4–5.6)
HCT VFR BLD AUTO: 41.9 % (ref 37–48.5)
HDLC SERPL-MCNC: 80 MG/DL (ref 40–75)
HDLC SERPL: 45.5 % (ref 20–50)
HGB BLD-MCNC: 13.8 G/DL (ref 12–16)
IMM GRANULOCYTES # BLD AUTO: 0.03 K/UL (ref 0–0.04)
IMM GRANULOCYTES NFR BLD AUTO: 0.5 % (ref 0–0.5)
INTERVENTRICULAR SEPTUM: 1.29 CM (ref 0.6–1.1)
LA MAJOR: 5.36 CM
LA MINOR: 5.66 CM
LA WIDTH: 4.51 CM
LDLC SERPL CALC-MCNC: 85.2 MG/DL (ref 63–159)
LEFT ATRIUM SIZE: 3.5 CM
LEFT ATRIUM VOLUME INDEX MOD: 60.3 ML/M2
LEFT ATRIUM VOLUME INDEX: 50.6 ML/M2
LEFT ATRIUM VOLUME MOD: 88 CM3
LEFT ATRIUM VOLUME: 73.87 CM3
LEFT INTERNAL DIMENSION IN SYSTOLE: 2.23 CM (ref 2.1–4)
LEFT VENTRICLE DIASTOLIC VOLUME INDEX: 31.89 ML/M2
LEFT VENTRICLE DIASTOLIC VOLUME: 46.56 ML
LEFT VENTRICLE MASS INDEX: 92 G/M2
LEFT VENTRICLE SYSTOLIC VOLUME INDEX: 11.4 ML/M2
LEFT VENTRICLE SYSTOLIC VOLUME: 16.71 ML
LEFT VENTRICULAR INTERNAL DIMENSION IN DIASTOLE: 3.8 CM (ref 3.5–6)
LEFT VENTRICULAR MASS: 133.76 G
LV LATERAL E/E' RATIO: 19.83 M/S
LV SEPTAL E/E' RATIO: 39.67 M/S
LYMPHOCYTES # BLD AUTO: 0.5 K/UL (ref 1–4.8)
LYMPHOCYTES NFR BLD: 7.9 % (ref 18–48)
MAGNESIUM SERPL-MCNC: 2.2 MG/DL (ref 1.6–2.6)
MCH RBC QN AUTO: 31.7 PG (ref 27–31)
MCHC RBC AUTO-ENTMCNC: 32.9 G/DL (ref 32–36)
MCV RBC AUTO: 96 FL (ref 82–98)
MONOCYTES # BLD AUTO: 0.1 K/UL (ref 0.3–1)
MONOCYTES NFR BLD: 1.9 % (ref 4–15)
MV PEAK A VEL: 1.35 M/S
MV PEAK E VEL: 1.19 M/S
MV STENOSIS PRESSURE HALF TIME: 55.43 MS
MV VALVE AREA P 1/2 METHOD: 3.97 CM2
NEUTROPHILS # BLD AUTO: 5.2 K/UL (ref 1.8–7.7)
NEUTROPHILS NFR BLD: 89.5 % (ref 38–73)
NONHDLC SERPL-MCNC: 96 MG/DL
NRBC BLD-RTO: 0 /100 WBC
PHOSPHATE SERPL-MCNC: 4.2 MG/DL (ref 2.7–4.5)
PLATELET # BLD AUTO: 121 K/UL (ref 150–450)
PMV BLD AUTO: 10.9 FL (ref 9.2–12.9)
POCT GLUCOSE: 126 MG/DL (ref 70–110)
POCT GLUCOSE: 130 MG/DL (ref 70–110)
POTASSIUM SERPL-SCNC: 3.8 MMOL/L (ref 3.5–5.1)
PROCALCITONIN SERPL IA-MCNC: 0.07 NG/ML
RA PRESSURE: 8 MMHG
RBC # BLD AUTO: 4.35 M/UL (ref 4–5.4)
RV TISSUE DOPPLER FREE WALL SYSTOLIC VELOCITY 1 (APICAL 4 CHAMBER VIEW): 10.97 CM/S
SINUS: 2.46 CM
SODIUM SERPL-SCNC: 143 MMOL/L (ref 136–145)
STJ: 1.78 CM
TDI LATERAL: 0.06 M/S
TDI SEPTAL: 0.03 M/S
TDI: 0.05 M/S
TRICUSPID ANNULAR PLANE SYSTOLIC EXCURSION: 2.34 CM
TRIGL SERPL-MCNC: 54 MG/DL (ref 30–150)
TROPONIN I SERPL DL<=0.01 NG/ML-MCNC: 0.16 NG/ML (ref 0–0.03)
TSH SERPL DL<=0.005 MIU/L-ACNC: 0.55 UIU/ML (ref 0.4–4)
WBC # BLD AUTO: 5.81 K/UL (ref 3.9–12.7)

## 2021-09-22 PROCEDURE — 94761 N-INVAS EAR/PLS OXIMETRY MLT: CPT

## 2021-09-22 PROCEDURE — 20600001 HC STEP DOWN PRIVATE ROOM

## 2021-09-22 PROCEDURE — 25000003 PHARM REV CODE 250: Performed by: PHYSICIAN ASSISTANT

## 2021-09-22 PROCEDURE — 99233 PR SUBSEQUENT HOSPITAL CARE,LEVL III: ICD-10-PCS | Mod: ,,, | Performed by: NURSE PRACTITIONER

## 2021-09-22 PROCEDURE — 80048 BASIC METABOLIC PNL TOTAL CA: CPT | Performed by: PHYSICIAN ASSISTANT

## 2021-09-22 PROCEDURE — 63600175 PHARM REV CODE 636 W HCPCS: Performed by: PHYSICIAN ASSISTANT

## 2021-09-22 PROCEDURE — 83735 ASSAY OF MAGNESIUM: CPT | Performed by: PHYSICIAN ASSISTANT

## 2021-09-22 PROCEDURE — 80061 LIPID PANEL: CPT | Performed by: PHYSICIAN ASSISTANT

## 2021-09-22 PROCEDURE — 94799 UNLISTED PULMONARY SVC/PX: CPT

## 2021-09-22 PROCEDURE — 83036 HEMOGLOBIN GLYCOSYLATED A1C: CPT | Performed by: PHYSICIAN ASSISTANT

## 2021-09-22 PROCEDURE — 94640 AIRWAY INHALATION TREATMENT: CPT

## 2021-09-22 PROCEDURE — 84443 ASSAY THYROID STIM HORMONE: CPT | Performed by: PHYSICIAN ASSISTANT

## 2021-09-22 PROCEDURE — 27000221 HC OXYGEN, UP TO 24 HOURS

## 2021-09-22 PROCEDURE — 99900035 HC TECH TIME PER 15 MIN (STAT)

## 2021-09-22 PROCEDURE — 84100 ASSAY OF PHOSPHORUS: CPT | Performed by: PHYSICIAN ASSISTANT

## 2021-09-22 PROCEDURE — 84484 ASSAY OF TROPONIN QUANT: CPT | Performed by: PHYSICIAN ASSISTANT

## 2021-09-22 PROCEDURE — 25000242 PHARM REV CODE 250 ALT 637 W/ HCPCS: Performed by: PHYSICIAN ASSISTANT

## 2021-09-22 PROCEDURE — 99233 SBSQ HOSP IP/OBS HIGH 50: CPT | Mod: ,,, | Performed by: NURSE PRACTITIONER

## 2021-09-22 PROCEDURE — 84145 PROCALCITONIN (PCT): CPT | Performed by: PHYSICIAN ASSISTANT

## 2021-09-22 PROCEDURE — 96375 TX/PRO/DX INJ NEW DRUG ADDON: CPT

## 2021-09-22 PROCEDURE — 85025 COMPLETE CBC W/AUTO DIFF WBC: CPT | Performed by: PHYSICIAN ASSISTANT

## 2021-09-22 PROCEDURE — 25000003 PHARM REV CODE 250: Performed by: NURSE PRACTITIONER

## 2021-09-22 RX ORDER — SODIUM CHLORIDE 0.9 % (FLUSH) 0.9 %
10 SYRINGE (ML) INJECTION
Status: DISCONTINUED | OUTPATIENT
Start: 2021-09-22 | End: 2021-09-23 | Stop reason: HOSPADM

## 2021-09-22 RX ORDER — POTASSIUM CHLORIDE 750 MG/1
20 CAPSULE, EXTENDED RELEASE ORAL ONCE
Status: COMPLETED | OUTPATIENT
Start: 2021-09-22 | End: 2021-09-22

## 2021-09-22 RX ORDER — METOPROLOL TARTRATE 25 MG/1
12.5 TABLET ORAL 2 TIMES DAILY
Status: DISCONTINUED | OUTPATIENT
Start: 2021-09-22 | End: 2021-09-23 | Stop reason: HOSPADM

## 2021-09-22 RX ADMIN — LEVALBUTEROL HYDROCHLORIDE 0.63 MG: 0.63 SOLUTION RESPIRATORY (INHALATION) at 03:09

## 2021-09-22 RX ADMIN — PREDNISONE 40 MG: 20 TABLET ORAL at 10:09

## 2021-09-22 RX ADMIN — Medication 6 MG: at 12:09

## 2021-09-22 RX ADMIN — ENOXAPARIN SODIUM 30 MG: 30 INJECTION SUBCUTANEOUS at 05:09

## 2021-09-22 RX ADMIN — ACETAMINOPHEN 650 MG: 325 TABLET ORAL at 03:09

## 2021-09-22 RX ADMIN — ONDANSETRON 8 MG: 8 TABLET, ORALLY DISINTEGRATING ORAL at 08:09

## 2021-09-22 RX ADMIN — ASPIRIN 81 MG: 81 TABLET, CHEWABLE ORAL at 10:09

## 2021-09-22 RX ADMIN — TIOTROPIUM BROMIDE INHALATION SPRAY 2 PUFF: 3.12 SPRAY, METERED RESPIRATORY (INHALATION) at 03:09

## 2021-09-22 RX ADMIN — Medication 1 TABLET: at 10:09

## 2021-09-22 RX ADMIN — POTASSIUM CHLORIDE 10 MEQ: 10 CAPSULE, COATED, EXTENDED RELEASE ORAL at 10:09

## 2021-09-22 RX ADMIN — LEVALBUTEROL HYDROCHLORIDE 0.63 MG: 0.63 SOLUTION RESPIRATORY (INHALATION) at 11:09

## 2021-09-22 RX ADMIN — FLUTICASONE FUROATE AND VILANTEROL TRIFENATATE 1 PUFF: 100; 25 POWDER RESPIRATORY (INHALATION) at 01:09

## 2021-09-22 RX ADMIN — FERROUS SULFATE TAB EC 325 MG (65 MG FE EQUIVALENT) 1 EACH: 325 (65 FE) TABLET DELAYED RESPONSE at 10:09

## 2021-09-22 RX ADMIN — LEVALBUTEROL HYDROCHLORIDE 0.63 MG: 0.63 SOLUTION RESPIRATORY (INHALATION) at 08:09

## 2021-09-22 RX ADMIN — AZELASTINE HYDROCHLORIDE 274 MCG: 137 SPRAY, METERED NASAL at 11:09

## 2021-09-22 RX ADMIN — BACLOFEN 5 MG: 5 TABLET ORAL at 08:09

## 2021-09-22 RX ADMIN — FUROSEMIDE 40 MG: 10 INJECTION, SOLUTION INTRAMUSCULAR; INTRAVENOUS at 10:09

## 2021-09-22 RX ADMIN — METOPROLOL TARTRATE 12.5 MG: 25 TABLET, FILM COATED ORAL at 08:09

## 2021-09-22 RX ADMIN — POTASSIUM CHLORIDE 20 MEQ: 10 CAPSULE, COATED, EXTENDED RELEASE ORAL at 10:09

## 2021-09-22 RX ADMIN — IPRATROPIUM BROMIDE 2 SPRAY: 42 SPRAY, METERED NASAL at 01:09

## 2021-09-22 RX ADMIN — FUROSEMIDE 40 MG: 10 INJECTION, SOLUTION INTRAMUSCULAR; INTRAVENOUS at 08:09

## 2021-09-23 VITALS
HEIGHT: 65 IN | HEART RATE: 81 BPM | DIASTOLIC BLOOD PRESSURE: 60 MMHG | WEIGHT: 94.25 LBS | SYSTOLIC BLOOD PRESSURE: 114 MMHG | RESPIRATION RATE: 18 BRPM | TEMPERATURE: 98 F | OXYGEN SATURATION: 98 % | BODY MASS INDEX: 15.7 KG/M2

## 2021-09-23 LAB
ANION GAP SERPL CALC-SCNC: 14 MMOL/L (ref 8–16)
BASOPHILS # BLD AUTO: 0.01 K/UL (ref 0–0.2)
BASOPHILS NFR BLD: 0.1 % (ref 0–1.9)
BUN SERPL-MCNC: 29 MG/DL (ref 10–30)
CALCIUM SERPL-MCNC: 9.5 MG/DL (ref 8.7–10.5)
CHLORIDE SERPL-SCNC: 92 MMOL/L (ref 95–110)
CO2 SERPL-SCNC: 34 MMOL/L (ref 23–29)
CREAT SERPL-MCNC: 1.2 MG/DL (ref 0.5–1.4)
DIFFERENTIAL METHOD: ABNORMAL
EOSINOPHIL # BLD AUTO: 0 K/UL (ref 0–0.5)
EOSINOPHIL NFR BLD: 0.1 % (ref 0–8)
ERYTHROCYTE [DISTWIDTH] IN BLOOD BY AUTOMATED COUNT: 13.4 % (ref 11.5–14.5)
EST. GFR  (AFRICAN AMERICAN): 45.3 ML/MIN/1.73 M^2
EST. GFR  (NON AFRICAN AMERICAN): 39.3 ML/MIN/1.73 M^2
GLUCOSE SERPL-MCNC: 94 MG/DL (ref 70–110)
HCT VFR BLD AUTO: 40.8 % (ref 37–48.5)
HGB BLD-MCNC: 13.4 G/DL (ref 12–16)
IMM GRANULOCYTES # BLD AUTO: 0.05 K/UL (ref 0–0.04)
IMM GRANULOCYTES NFR BLD AUTO: 0.5 % (ref 0–0.5)
LYMPHOCYTES # BLD AUTO: 0.8 K/UL (ref 1–4.8)
LYMPHOCYTES NFR BLD: 6.9 % (ref 18–48)
MAGNESIUM SERPL-MCNC: 2.3 MG/DL (ref 1.6–2.6)
MCH RBC QN AUTO: 32.1 PG (ref 27–31)
MCHC RBC AUTO-ENTMCNC: 32.8 G/DL (ref 32–36)
MCV RBC AUTO: 98 FL (ref 82–98)
MONOCYTES # BLD AUTO: 1.4 K/UL (ref 0.3–1)
MONOCYTES NFR BLD: 12.7 % (ref 4–15)
NEUTROPHILS # BLD AUTO: 8.7 K/UL (ref 1.8–7.7)
NEUTROPHILS NFR BLD: 79.7 % (ref 38–73)
NRBC BLD-RTO: 0 /100 WBC
PHOSPHATE SERPL-MCNC: 3.8 MG/DL (ref 2.7–4.5)
PLATELET # BLD AUTO: 125 K/UL (ref 150–450)
PMV BLD AUTO: 11.1 FL (ref 9.2–12.9)
POCT GLUCOSE: 111 MG/DL (ref 70–110)
POCT GLUCOSE: 89 MG/DL (ref 70–110)
POTASSIUM SERPL-SCNC: 3.8 MMOL/L (ref 3.5–5.1)
RBC # BLD AUTO: 4.18 M/UL (ref 4–5.4)
SODIUM SERPL-SCNC: 140 MMOL/L (ref 136–145)
WBC # BLD AUTO: 10.91 K/UL (ref 3.9–12.7)

## 2021-09-23 PROCEDURE — 1111F DSCHRG MED/CURRENT MED MERGE: CPT | Mod: CPTII,,, | Performed by: PHYSICIAN ASSISTANT

## 2021-09-23 PROCEDURE — 25000003 PHARM REV CODE 250: Performed by: NURSE PRACTITIONER

## 2021-09-23 PROCEDURE — 25000003 PHARM REV CODE 250: Performed by: PHYSICIAN ASSISTANT

## 2021-09-23 PROCEDURE — 25000242 PHARM REV CODE 250 ALT 637 W/ HCPCS: Performed by: INTERNAL MEDICINE

## 2021-09-23 PROCEDURE — 99239 HOSP IP/OBS DSCHRG MGMT >30: CPT | Mod: ,,, | Performed by: PHYSICIAN ASSISTANT

## 2021-09-23 PROCEDURE — 83735 ASSAY OF MAGNESIUM: CPT | Performed by: PHYSICIAN ASSISTANT

## 2021-09-23 PROCEDURE — 1111F PR DISCHARGE MEDS RECONCILED W/ CURRENT OUTPATIENT MED LIST: ICD-10-PCS | Mod: CPTII,,, | Performed by: PHYSICIAN ASSISTANT

## 2021-09-23 PROCEDURE — 94640 AIRWAY INHALATION TREATMENT: CPT

## 2021-09-23 PROCEDURE — 27000221 HC OXYGEN, UP TO 24 HOURS

## 2021-09-23 PROCEDURE — 63600175 PHARM REV CODE 636 W HCPCS: Performed by: PHYSICIAN ASSISTANT

## 2021-09-23 PROCEDURE — 94761 N-INVAS EAR/PLS OXIMETRY MLT: CPT

## 2021-09-23 PROCEDURE — 85025 COMPLETE CBC W/AUTO DIFF WBC: CPT | Performed by: PHYSICIAN ASSISTANT

## 2021-09-23 PROCEDURE — 36415 COLL VENOUS BLD VENIPUNCTURE: CPT | Performed by: PHYSICIAN ASSISTANT

## 2021-09-23 PROCEDURE — 99239 PR HOSPITAL DISCHARGE DAY,>30 MIN: ICD-10-PCS | Mod: ,,, | Performed by: PHYSICIAN ASSISTANT

## 2021-09-23 PROCEDURE — 84100 ASSAY OF PHOSPHORUS: CPT | Performed by: PHYSICIAN ASSISTANT

## 2021-09-23 PROCEDURE — 25000242 PHARM REV CODE 250 ALT 637 W/ HCPCS: Performed by: PHYSICIAN ASSISTANT

## 2021-09-23 PROCEDURE — 80048 BASIC METABOLIC PNL TOTAL CA: CPT | Performed by: PHYSICIAN ASSISTANT

## 2021-09-23 RX ORDER — METOPROLOL TARTRATE 25 MG/1
12.5 TABLET, FILM COATED ORAL 2 TIMES DAILY
Qty: 30 TABLET | Refills: 0 | Status: SHIPPED | OUTPATIENT
Start: 2021-09-23 | End: 2021-10-23

## 2021-09-23 RX ORDER — FUROSEMIDE 40 MG/1
40 TABLET ORAL DAILY
Qty: 30 TABLET | Refills: 2 | Status: SHIPPED | OUTPATIENT
Start: 2021-09-23 | End: 2022-03-12 | Stop reason: CLARIF

## 2021-09-23 RX ORDER — FUROSEMIDE 40 MG/1
40 TABLET ORAL DAILY
Status: DISCONTINUED | OUTPATIENT
Start: 2021-09-23 | End: 2021-09-23 | Stop reason: HOSPADM

## 2021-09-23 RX ADMIN — IPRATROPIUM BROMIDE 2 SPRAY: 42 SPRAY, METERED NASAL at 09:09

## 2021-09-23 RX ADMIN — Medication 1 TABLET: at 09:09

## 2021-09-23 RX ADMIN — LEVALBUTEROL HYDROCHLORIDE 0.63 MG: 0.63 SOLUTION RESPIRATORY (INHALATION) at 10:09

## 2021-09-23 RX ADMIN — POTASSIUM CHLORIDE 10 MEQ: 10 CAPSULE, COATED, EXTENDED RELEASE ORAL at 09:09

## 2021-09-23 RX ADMIN — ASPIRIN 81 MG: 81 TABLET, CHEWABLE ORAL at 09:09

## 2021-09-23 RX ADMIN — FERROUS SULFATE TAB EC 325 MG (65 MG FE EQUIVALENT) 1 EACH: 325 (65 FE) TABLET DELAYED RESPONSE at 09:09

## 2021-09-23 RX ADMIN — PREDNISONE 40 MG: 20 TABLET ORAL at 09:09

## 2021-09-23 RX ADMIN — TIOTROPIUM BROMIDE INHALATION SPRAY 2 PUFF: 3.12 SPRAY, METERED RESPIRATORY (INHALATION) at 09:09

## 2021-09-23 RX ADMIN — FUROSEMIDE 40 MG: 40 TABLET ORAL at 02:09

## 2021-09-23 RX ADMIN — FLUTICASONE FUROATE AND VILANTEROL TRIFENATATE 1 PUFF: 100; 25 POWDER RESPIRATORY (INHALATION) at 09:09

## 2021-09-23 RX ADMIN — METOPROLOL TARTRATE 12.5 MG: 25 TABLET, FILM COATED ORAL at 09:09

## 2021-09-23 RX ADMIN — AZELASTINE HYDROCHLORIDE 274 MCG: 137 SPRAY, METERED NASAL at 09:09

## 2021-10-07 ENCOUNTER — TELEPHONE (OUTPATIENT)
Dept: PULMONOLOGY | Facility: CLINIC | Age: 86
End: 2021-10-07

## 2021-10-27 ENCOUNTER — OFFICE VISIT (OUTPATIENT)
Dept: PULMONOLOGY | Facility: CLINIC | Age: 86
End: 2021-10-27
Payer: MEDICARE

## 2021-10-27 VITALS
WEIGHT: 94.38 LBS | HEIGHT: 65 IN | OXYGEN SATURATION: 89 % | BODY MASS INDEX: 15.72 KG/M2 | SYSTOLIC BLOOD PRESSURE: 122 MMHG | DIASTOLIC BLOOD PRESSURE: 62 MMHG | HEART RATE: 83 BPM

## 2021-10-27 DIAGNOSIS — J43.2 CENTRILOBULAR EMPHYSEMA: Primary | ICD-10-CM

## 2021-10-27 DIAGNOSIS — R13.10 DYSPHAGIA, UNSPECIFIED TYPE: ICD-10-CM

## 2021-10-27 DIAGNOSIS — I42.1 HOCM (HYPERTROPHIC OBSTRUCTIVE CARDIOMYOPATHY): ICD-10-CM

## 2021-10-27 DIAGNOSIS — J96.22 ACUTE ON CHRONIC RESPIRATORY FAILURE WITH HYPOXIA AND HYPERCAPNIA: ICD-10-CM

## 2021-10-27 DIAGNOSIS — K80.70 CALCULUS OF GALLBLADDER AND BILE DUCT WITHOUT CHOLECYSTITIS OR OBSTRUCTION: ICD-10-CM

## 2021-10-27 DIAGNOSIS — J96.21 ACUTE ON CHRONIC RESPIRATORY FAILURE WITH HYPOXIA AND HYPERCAPNIA: ICD-10-CM

## 2021-10-27 PROBLEM — K80.20 CHOLELITHIASIS: Status: ACTIVE | Noted: 2021-10-27

## 2021-10-27 PROCEDURE — 1101F PT FALLS ASSESS-DOCD LE1/YR: CPT | Mod: CPTII,S$GLB,, | Performed by: INTERNAL MEDICINE

## 2021-10-27 PROCEDURE — 1159F MED LIST DOCD IN RCRD: CPT | Mod: CPTII,S$GLB,, | Performed by: INTERNAL MEDICINE

## 2021-10-27 PROCEDURE — 99214 PR OFFICE/OUTPT VISIT, EST, LEVL IV, 30-39 MIN: ICD-10-PCS | Mod: S$GLB,,, | Performed by: INTERNAL MEDICINE

## 2021-10-27 PROCEDURE — 3288F PR FALLS RISK ASSESSMENT DOCUMENTED: ICD-10-PCS | Mod: CPTII,S$GLB,, | Performed by: INTERNAL MEDICINE

## 2021-10-27 PROCEDURE — 1101F PR PT FALLS ASSESS DOC 0-1 FALLS W/OUT INJ PAST YR: ICD-10-PCS | Mod: CPTII,S$GLB,, | Performed by: INTERNAL MEDICINE

## 2021-10-27 PROCEDURE — 1160F RVW MEDS BY RX/DR IN RCRD: CPT | Mod: CPTII,S$GLB,, | Performed by: INTERNAL MEDICINE

## 2021-10-27 PROCEDURE — 1126F AMNT PAIN NOTED NONE PRSNT: CPT | Mod: CPTII,S$GLB,, | Performed by: INTERNAL MEDICINE

## 2021-10-27 PROCEDURE — 1126F PR PAIN SEVERITY QUANTIFIED, NO PAIN PRESENT: ICD-10-PCS | Mod: CPTII,S$GLB,, | Performed by: INTERNAL MEDICINE

## 2021-10-27 PROCEDURE — 1157F PR ADVANCE CARE PLAN OR EQUIV PRESENT IN MEDICAL RECORD: ICD-10-PCS | Mod: CPTII,S$GLB,, | Performed by: INTERNAL MEDICINE

## 2021-10-27 PROCEDURE — 99214 OFFICE O/P EST MOD 30 MIN: CPT | Mod: S$GLB,,, | Performed by: INTERNAL MEDICINE

## 2021-10-27 PROCEDURE — 1160F PR REVIEW ALL MEDS BY PRESCRIBER/CLIN PHARMACIST DOCUMENTED: ICD-10-PCS | Mod: CPTII,S$GLB,, | Performed by: INTERNAL MEDICINE

## 2021-10-27 PROCEDURE — 1159F PR MEDICATION LIST DOCUMENTED IN MEDICAL RECORD: ICD-10-PCS | Mod: CPTII,S$GLB,, | Performed by: INTERNAL MEDICINE

## 2021-10-27 PROCEDURE — 1157F ADVNC CARE PLAN IN RCRD: CPT | Mod: CPTII,S$GLB,, | Performed by: INTERNAL MEDICINE

## 2021-10-27 PROCEDURE — 3288F FALL RISK ASSESSMENT DOCD: CPT | Mod: CPTII,S$GLB,, | Performed by: INTERNAL MEDICINE

## 2021-10-27 PROCEDURE — 99999 PR PBB SHADOW E&M-EST. PATIENT-LVL IV: ICD-10-PCS | Mod: PBBFAC,,, | Performed by: INTERNAL MEDICINE

## 2021-10-27 PROCEDURE — 99999 PR PBB SHADOW E&M-EST. PATIENT-LVL IV: CPT | Mod: PBBFAC,,, | Performed by: INTERNAL MEDICINE

## 2022-03-03 ENCOUNTER — HOSPITAL ENCOUNTER (EMERGENCY)
Facility: HOSPITAL | Age: 87
Discharge: HOME | End: 2022-03-03
Attending: EMERGENCY MEDICINE
Payer: MEDICARE

## 2022-03-03 VITALS
HEART RATE: 96 BPM | RESPIRATION RATE: 18 BRPM | OXYGEN SATURATION: 96 % | DIASTOLIC BLOOD PRESSURE: 80 MMHG | BODY MASS INDEX: 16.88 KG/M2 | HEIGHT: 60 IN | TEMPERATURE: 98 F | WEIGHT: 86 LBS | SYSTOLIC BLOOD PRESSURE: 162 MMHG

## 2022-03-03 DIAGNOSIS — E87.70 HYPERVOLEMIA, UNSPECIFIED HYPERVOLEMIA TYPE: Primary | ICD-10-CM

## 2022-03-03 DIAGNOSIS — Z99.81 REQUIRES CONTINUOUS AT HOME SUPPLEMENTAL OXYGEN: ICD-10-CM

## 2022-03-03 LAB
ALBUMIN SERPL BCP-MCNC: 3.2 G/DL (ref 3.5–5.2)
ALP SERPL-CCNC: 214 U/L (ref 55–135)
ALT SERPL W/O P-5'-P-CCNC: 29 U/L (ref 10–44)
ANION GAP SERPL CALC-SCNC: 11 MMOL/L (ref 8–16)
AST SERPL-CCNC: 22 U/L (ref 10–40)
BASOPHILS # BLD AUTO: 0.1 K/UL (ref 0–0.2)
BASOPHILS NFR BLD: 1.2 % (ref 0–1.9)
BILIRUB SERPL-MCNC: 0.9 MG/DL (ref 0.1–1)
BNP SERPL-MCNC: 2378 PG/ML (ref 0–99)
BUN SERPL-MCNC: 19 MG/DL (ref 10–30)
CALCIUM SERPL-MCNC: 9.5 MG/DL (ref 8.7–10.5)
CHLORIDE SERPL-SCNC: 92 MMOL/L (ref 95–110)
CO2 SERPL-SCNC: 35 MMOL/L (ref 23–29)
CREAT SERPL-MCNC: 1.2 MG/DL (ref 0.5–1.4)
DIFFERENTIAL METHOD: ABNORMAL
EOSINOPHIL # BLD AUTO: 0.3 K/UL (ref 0–0.5)
EOSINOPHIL NFR BLD: 3.1 % (ref 0–8)
ERYTHROCYTE [DISTWIDTH] IN BLOOD BY AUTOMATED COUNT: 13.9 % (ref 11.5–14.5)
EST. GFR  (AFRICAN AMERICAN): 45 ML/MIN/1.73 M^2
EST. GFR  (NON AFRICAN AMERICAN): 39.1 ML/MIN/1.73 M^2
GLUCOSE SERPL-MCNC: 102 MG/DL (ref 70–110)
HCT VFR BLD AUTO: 42.7 % (ref 37–48.5)
HGB BLD-MCNC: 13.9 G/DL (ref 12–16)
IMM GRANULOCYTES # BLD AUTO: 0.12 K/UL (ref 0–0.04)
IMM GRANULOCYTES NFR BLD AUTO: 1.4 % (ref 0–0.5)
LYMPHOCYTES # BLD AUTO: 1.2 K/UL (ref 1–4.8)
LYMPHOCYTES NFR BLD: 13.5 % (ref 18–48)
MCH RBC QN AUTO: 32.2 PG (ref 27–31)
MCHC RBC AUTO-ENTMCNC: 32.6 G/DL (ref 32–36)
MCV RBC AUTO: 99 FL (ref 82–98)
MONOCYTES # BLD AUTO: 0.9 K/UL (ref 0.3–1)
MONOCYTES NFR BLD: 10.6 % (ref 4–15)
NEUTROPHILS # BLD AUTO: 6.1 K/UL (ref 1.8–7.7)
NEUTROPHILS NFR BLD: 70.2 % (ref 38–73)
NRBC BLD-RTO: 0 /100 WBC
PLATELET # BLD AUTO: 165 K/UL (ref 150–450)
PMV BLD AUTO: 10.7 FL (ref 9.2–12.9)
POTASSIUM SERPL-SCNC: 3.5 MMOL/L (ref 3.5–5.1)
PROT SERPL-MCNC: 6.7 G/DL (ref 6–8.4)
RBC # BLD AUTO: 4.32 M/UL (ref 4–5.4)
SODIUM SERPL-SCNC: 138 MMOL/L (ref 136–145)
WBC # BLD AUTO: 8.69 K/UL (ref 3.9–12.7)

## 2022-03-03 PROCEDURE — 83880 ASSAY OF NATRIURETIC PEPTIDE: CPT | Performed by: EMERGENCY MEDICINE

## 2022-03-03 PROCEDURE — 25000242 PHARM REV CODE 250 ALT 637 W/ HCPCS: Performed by: EMERGENCY MEDICINE

## 2022-03-03 PROCEDURE — 99284 PR EMERGENCY DEPT VISIT,LEVEL IV: ICD-10-PCS | Mod: ,,, | Performed by: EMERGENCY MEDICINE

## 2022-03-03 PROCEDURE — 94761 N-INVAS EAR/PLS OXIMETRY MLT: CPT

## 2022-03-03 PROCEDURE — 85025 COMPLETE CBC W/AUTO DIFF WBC: CPT | Performed by: EMERGENCY MEDICINE

## 2022-03-03 PROCEDURE — 99284 EMERGENCY DEPT VISIT MOD MDM: CPT | Mod: ,,, | Performed by: EMERGENCY MEDICINE

## 2022-03-03 PROCEDURE — 99283 EMERGENCY DEPT VISIT LOW MDM: CPT | Mod: 25

## 2022-03-03 PROCEDURE — 80053 COMPREHEN METABOLIC PANEL: CPT | Performed by: EMERGENCY MEDICINE

## 2022-03-03 PROCEDURE — 94640 AIRWAY INHALATION TREATMENT: CPT

## 2022-03-03 RX ORDER — LEVALBUTEROL INHALATION SOLUTION 0.63 MG/3ML
0.63 SOLUTION RESPIRATORY (INHALATION)
Status: COMPLETED | OUTPATIENT
Start: 2022-03-03 | End: 2022-03-03

## 2022-03-03 RX ADMIN — LEVALBUTEROL HYDROCHLORIDE 0.63 MG: 0.63 SOLUTION RESPIRATORY (INHALATION) at 09:03

## 2022-03-04 NOTE — PLAN OF CARE
03/03/22 2377   Post-Acute Status   Post-Acute Authorization Other;HME   HME Status Set-up Complete/Auth obtained   Other Status No Post-Acute Service Needs   Hospital Resources/Appts/Education Provided Provided patient/caregiver with written discharge plan information   Discharge Plan   Discharge Plan A Home with family     RAJ spoke with family on Oxygen needs. Family has been with [facility] for a while but wasn't getting their needs et in times of emergencies. RAJ spoke with supervisor Ludmila Sewell. Supervisor has agreed to pay for patient to get oxygen now from [facility]. Patient is using 3L, Mr. Ryan has agreed to bring concentrator at bedside. Concentrator was received by RAJ and given to patient along with paperwork and follow up information.     ESTEPHANIE Hanley, MSW-SW  Medical Social Worker/   Department   [phone]

## 2022-03-04 NOTE — ED PROVIDER NOTES
Encounter Date: 3/3/2022    SCRIBE #1 NOTE: I, Patria Becker , am scribing for, and in the presence of,  Aristeo Delong MD. I have scribed the following portions of the note - Other sections scribed: HPI ROS PE .       Source of History:  The son in law, the son, and daughter (on the phone)    Chief complaint:  Medication Refill (Pt states she is running low on oxygen tank, has been trying to contact company to get oxygen tank but has been unable to get in touch. Pt wears 3 L at home. Pt denies any symptoms at this time, denies sob. )      HPI:  Venus Mayer is a 89 y.o. female presenting with medication refill. Patient son in law states that her oxygen concentrator stopped working tonight. The son in law states that they contacted the company that provides her oxygen and the call went straight to [employer]. Bri (her daughter) states that her feet has been swelling intermittently. Patient was recently changed from lasix to bumex because the lasix was not working. Patient's daughter states that her weight has decreased from 98 to 86 but, the edema in her legs has not decreased and her weight has not increased. Patient has been on bumex for a month now. Bri states that the patient doctor recently decreased prescription to only 1 tablet a day instead of 2 tablets a day. Patient's son in law states she is on 2.5 L 100 % with concentrator. Her family notes that they do not have enough oxygen tanks at home to last until they get a new concentrator. This is the extent to the patients complaints today here in the emergency department.      ROS: As per HPI and below:    General: No fever.  No chills.  Eyes: No visual changes.  Head: No headache.    Integument: No rashes or lesions.  Chest: No shortness of breath.  Cardiovascular: No chest pain. POS leg swelling.  Abdomen: No abdominal pain.  No nausea or vomiting.  Urinary: No abnormal urination.  Neurologic: No focal weakness.  No numbness.  Hematologic: No easy  bruising.  Endocrine: No excessive thirst or urination.      Review of patient's allergies indicates:   Allergen Reactions    Ancef in dextrose (iso-osm) Rash    Cefazolin Rash    Cefuroxime Rash    Sulfamethoxazole-trimethoprim Rash     Other reaction(s): Rash       No current facility-administered medications on file prior to encounter.     Current Outpatient Medications on File Prior to Encounter   Medication Sig Dispense Refill    acetaminophen (TYLENOL) 500 MG tablet Take 1,000 mg by mouth daily as needed for Pain.      albuterol (PROAIR HFA) 90 mcg/actuation inhaler Inhale 1 puff into the lungs every 6 (six) hours as needed for Wheezing or Shortness of Breath. Rescue 1 Inhaler 5    aspirin 81 mg Tab Take 1 tablet by mouth once daily.       azelastine (ASTELIN) 137 mcg (0.1 %) nasal spray 2 sprays by Nasal route 2 (two) times daily.      baclofen (LIORESAL) 10 MG tablet TK 1 T PO TID      budesonide-formoterol 160-4.5 mcg (SYMBICORT) 160-4.5 mcg/actuation HFAA Inhale 2 puffs into the lungs every 12 (twelve) hours. Controller 10.2 g 1    ferrous sulfate 325 (65 FE) MG EC tablet Take 1 tablet (325 mg total) by mouth once daily.  0    furosemide (LASIX) 40 MG tablet Take 1 tablet (40 mg total) by mouth once daily. 30 tablet 2    ipratropium (ATROVENT) 42 mcg (0.06 %) nasal spray 2 sprays by Nasal route 2 (two) times daily.      levalbuterol (XOPENEX) 0.63 mg/3 mL nebulizer solution Take 3 mLs (0.63 mg total) by nebulization every 4 (four) hours as needed for Wheezing or Shortness of Breath. Rescue 3 mL 1    multivitamin (THERAGRAN) per tablet Take 1 tablet by mouth once daily.      potassium chloride (MICRO-K) 10 MEQ CpSR Take 10 mEq by mouth once daily.      rOPINIRole (REQUIP) 0.25 MG tablet Take 1 tablet (0.25 mg total) by mouth every evening. (Patient taking differently: Take 0.5 mg by mouth every evening. ) 30 tablet 11    tiotropium bromide (SPIRIVA RESPIMAT) 2.5 mcg/actuation inhaler  Inhale 2 puffs into the lungs Daily. Controller 4 g 11       PMH:  As per HPI and below:  Past Medical History:   Diagnosis Date    Acute on chronic congestive heart failure 2019    Cardiomyopathy     Carotid artery occlusion     CHF (congestive heart failure)     COPD (chronic obstructive pulmonary disease)     Coronary artery disease     Hyperlipidemia     Hypertension      Past Surgical History:   Procedure Laterality Date    CARDIAC CATHETERIZATION      cardic cath      CAROTID ENDARTERECTOMY      FLEXIBLE BRONCHOSCOPY N/A 2019    Procedure: BRONCHOSCOPY, FIBEROPTIC Flexible;  Surgeon: Klever Mckeon MD;  [facility];  Service: Thoracic;  Laterality: N/A;    HIP SURGERY      PLEURODESIS USING TALC N/A 2019    Procedure: PLEURODESIS;  Surgeon: Klever Mckeon MD;  [facility];  Service: Thoracic;  Laterality: N/A;    PLEURODESIS USING TALC Right 2019    Procedure: PLEURODESIS, USING TALC;  Surgeon: Klever Mckeon MD;  [facility];  Service: Thoracic;  Laterality: Right;    PLEURODESIS WITH VIDEO-ASSISTED THORACOSCOPIC SURGERY (VATS) Right 2019    Procedure: VATS, WITH PLEURAL TENT;  Surgeon: Klever Mckeon MD;  Location: [facility] OR [address] [place] [address];  Service: Thoracic;  Laterality: Right;       Social History     Socioeconomic History    Marital status:    Tobacco Use    Smoking status: Former Smoker     Packs/day: 2.00     Years: 20.00     Pack years: 40.00     Types: Cigarettes     Quit date: 1980     Years since quittin.1    Smokeless tobacco: Never Used   Substance and Sexual Activity    Alcohol use: Yes     Alcohol/week: 2.0 standard drinks     Types: 2 Glasses of wine per week     Comment: social    Drug use: No    Sexual activity: Not Currently       Family History   Problem Relation Age of Onset    Hypertension Mother        Physical Exam:    Vitals:    22 2157   BP:    Pulse: 96   Resp: 18    Temp:      Appearance: No acute distress.  Skin: No rashes seen.  Good turgor.  No abrasions.  No ecchymoses.  Eyes: No conjunctival injection. EOMI, PERRL.  ENT: Oropharynx clear.    Chest:  No increased work of breathing, bilateral chest rise.  Cardiovascular: Regular rate and rhythm.  Normal equal bilateral radial pulses.  Abdomen: Soft.  Not distended.  Nontender.  No guarding.  No rebound. No Masses  Musculoskeletal: Good range of motion all joints.  No deformities.  Neck supple, full range of motion, no obvious deformity.  Neurologic: Moves all extremities.  Normal sensation.  No facial droop.  Normal speech.    Mental Status:  Alert and oriented.  Appropriate, conversant.  Skin: bilateral pitting edema up to mid shin.         Laboratory Studies:  Labs Reviewed   CBC W/ AUTO DIFFERENTIAL - Abnormal; Notable for the following components:       Result Value    MCV 99 (*)     MCH 32.2 (*)     Immature Granulocytes 1.4 (*)     Immature Grans (Abs) 0.12 (*)     Lymph % 13.5 (*)     All other components within normal limits   COMPREHENSIVE METABOLIC PANEL - Abnormal; Notable for the following components:    Chloride 92 (*)     CO2 35 (*)     Albumin 3.2 (*)     Alkaline Phosphatase 214 (*)     eGFR if  45.0 (*)     eGFR if non  39.1 (*)     All other components within normal limits   B-TYPE NATRIURETIC PEPTIDE - Abnormal; Notable for the following components:    BNP 2,378 (*)     All other components within normal limits       I decided to obtain the old medical records.  Reviewed and summarized the old medical record and it showed previous BMP is ranging between 1564-6043    Imaging Results    None         Medications Given:  Medications   levalbuterol nebulizer solution 0.63 mg (0.63 mg Nebulization Given 3/3/22 2157)       Discussed with: pt and son and daughter    MDM:    89 y.o. female with mild leg swelling concerning for CHF, less likely lymphedema, no sign of infection.  BNP  elevated, other labs stable compared to previous.  I suspect patient will need continued diuresis, but no shortness since of breath or increase in oxygen demand to require admission.  Patient does not have an O2 concentrator at home, but social Work was able to procure one for them tonight.  Advised pt to follow up with PCP or return if concerning symptoms arise. Pt understands and agrees with plan. Will d/c home.  Advised pt to follow up with PCP or return if concerning symptoms arise. Pt understands and agrees with plan. Will d/c home.                Diagnostic Impression:    1. Hypervolemia, unspecified hypervolemia type    2. Requires continuous at home supplemental oxygen               ED Disposition Condition    Discharge Stable        ED Prescriptions     None        Follow-up Information     Follow up With Specialties Details Why Contact Info    Jona Che III, MD General Practice   [address] [place] [address]  [id number]  [facility] [id number]  [phone]             Aristeo Delong MD  03/04/22 0019

## 2022-03-04 NOTE — ED NOTES
Patient identifiers verified and correct for Venus ZULEIMA Mayer.    LOC: The patient is awake, alert and oriented.  APPEARANCE: Patient resting comfortably and in no acute distress.   SKIN: Skin is warm dry and intact, and color is consistent with ethnicity. No tenting observed and capillary refill <3 seconds. No clubbing noted to nail beds. No breakdown or brusing visible and mucus membranes moist and acyanotic.  MUSCULOSKELETAL: Full range of motion present in all extremities. Hand  equal and leg strength strong +2 bilaterally. C/o leg swelling   RESPIRATORY: Airway is open and patent. Respirations-unlabored, regular rate, equal bilaterally on inspiration and expiration. On 3L NC   CARDIAC: Patient has regular heart rate and rhythm.  No peripheral edema noted, and patient has no c/o chest pain.  ABDOMEN: Soft and non-tender to palpation with no distention noted. Normoactive bowel sounds X4 quadrants.  NEUROLOGIC: Eyes open spontaneously and facial expression symmetrical. Pt behavior appropriate to situation, and pt follows commands.  Pt reports sensation present in all extremities when touched with a finger. No s/s of ischemic stroke. PERRLA  : No complaints of frequency, burning, urgency or blood in the urine.

## 2022-03-04 NOTE — ED TRIAGE NOTES
Venus Mayer, a 89 y.o. female presents to the ED w/ no complaints. Pt in need of oxygen tanks. Unable to get oxygen, presents to ED in need of more oxygen. Pt disconnected from home oxygen to ED oxygen.     Triage note:  Chief Complaint   Patient presents with    Medication Refill     Pt states she is running low on oxygen tank, has been trying to contact company to get oxygen tank but has been unable to get in touch. Pt wears 3 L at home. Pt denies any symptoms at this time, denies sob.      Review of patient's allergies indicates:   Allergen Reactions    Ancef in dextrose (iso-osm) Rash    Cefazolin Rash    Cefuroxime Rash    Sulfamethoxazole-trimethoprim Rash     Other reaction(s): Rash     Past Medical History:   Diagnosis Date    Acute on chronic congestive heart failure 7/6/2019    Cardiomyopathy     Carotid artery occlusion     CHF (congestive heart failure)     COPD (chronic obstructive pulmonary disease)     Coronary artery disease     Hyperlipidemia     Hypertension

## 2022-03-12 ENCOUNTER — HOSPITAL ENCOUNTER (OUTPATIENT)
Facility: HOSPITAL | Age: 87
Discharge: HOME | End: 2022-03-14
Attending: EMERGENCY MEDICINE | Admitting: EMERGENCY MEDICINE
Payer: MEDICARE

## 2022-03-12 DIAGNOSIS — I50.31 ACUTE DIASTOLIC CHF (CONGESTIVE HEART FAILURE): ICD-10-CM

## 2022-03-12 DIAGNOSIS — R07.9 CHEST PAIN: ICD-10-CM

## 2022-03-12 DIAGNOSIS — I50.9 ACUTE ON CHRONIC CONGESTIVE HEART FAILURE, UNSPECIFIED HEART FAILURE TYPE: ICD-10-CM

## 2022-03-12 DIAGNOSIS — N18.31 STAGE 3A CHRONIC KIDNEY DISEASE: ICD-10-CM

## 2022-03-12 DIAGNOSIS — I49.5 SICK SINUS SYNDROME: ICD-10-CM

## 2022-03-12 DIAGNOSIS — R06.00 DYSPNEA: ICD-10-CM

## 2022-03-12 DIAGNOSIS — J44.1 CHRONIC OBSTRUCTIVE PULMONARY DISEASE WITH ACUTE EXACERBATION: ICD-10-CM

## 2022-03-12 DIAGNOSIS — R06.02 SHORTNESS OF BREATH: Primary | ICD-10-CM

## 2022-03-12 DIAGNOSIS — R79.89 ELEVATED TROPONIN: ICD-10-CM

## 2022-03-12 PROBLEM — Z95.0 PACEMAKER: Status: ACTIVE | Noted: 2022-03-12

## 2022-03-12 LAB
ALBUMIN SERPL BCP-MCNC: 2.8 G/DL (ref 3.5–5.2)
ALP SERPL-CCNC: 128 U/L (ref 55–135)
ALT SERPL W/O P-5'-P-CCNC: 13 U/L (ref 10–44)
ANION GAP SERPL CALC-SCNC: 12 MMOL/L (ref 8–16)
AST SERPL-CCNC: 23 U/L (ref 10–40)
BASOPHILS # BLD AUTO: 0.04 K/UL (ref 0–0.2)
BASOPHILS NFR BLD: 0.6 % (ref 0–1.9)
BILIRUB SERPL-MCNC: 0.9 MG/DL (ref 0.1–1)
BILIRUB UR QL STRIP: NEGATIVE
BNP SERPL-MCNC: 2684 PG/ML (ref 0–99)
BUN SERPL-MCNC: 23 MG/DL (ref 10–30)
CALCIUM SERPL-MCNC: 9.6 MG/DL (ref 8.7–10.5)
CHLORIDE SERPL-SCNC: 90 MMOL/L (ref 95–110)
CLARITY UR REFRACT.AUTO: CLEAR
CO2 SERPL-SCNC: 34 MMOL/L (ref 23–29)
COLOR UR AUTO: NORMAL
CREAT SERPL-MCNC: 1.2 MG/DL (ref 0.5–1.4)
CRP SERPL-MCNC: 40.7 MG/L (ref 0–8.2)
CTP QC/QA: YES
DIFFERENTIAL METHOD: ABNORMAL
EOSINOPHIL # BLD AUTO: 0 K/UL (ref 0–0.5)
EOSINOPHIL NFR BLD: 0.1 % (ref 0–8)
ERYTHROCYTE [DISTWIDTH] IN BLOOD BY AUTOMATED COUNT: 13.7 % (ref 11.5–14.5)
ERYTHROCYTE [SEDIMENTATION RATE] IN BLOOD BY WESTERGREN METHOD: 27 MM/HR (ref 0–36)
EST. GFR  (AFRICAN AMERICAN): 45 ML/MIN/1.73 M^2
EST. GFR  (NON AFRICAN AMERICAN): 39.1 ML/MIN/1.73 M^2
GLUCOSE SERPL-MCNC: 142 MG/DL (ref 70–110)
GLUCOSE UR QL STRIP: NEGATIVE
HCT VFR BLD AUTO: 39.3 % (ref 37–48.5)
HGB BLD-MCNC: 12.6 G/DL (ref 12–16)
HGB UR QL STRIP: NEGATIVE
IMM GRANULOCYTES # BLD AUTO: 0.05 K/UL (ref 0–0.04)
IMM GRANULOCYTES NFR BLD AUTO: 0.7 % (ref 0–0.5)
KETONES UR QL STRIP: NEGATIVE
LEUKOCYTE ESTERASE UR QL STRIP: NEGATIVE
LYMPHOCYTES # BLD AUTO: 0.5 K/UL (ref 1–4.8)
LYMPHOCYTES NFR BLD: 7.7 % (ref 18–48)
MAGNESIUM SERPL-MCNC: 1.9 MG/DL (ref 1.6–2.6)
MCH RBC QN AUTO: 32.2 PG (ref 27–31)
MCHC RBC AUTO-ENTMCNC: 32.1 G/DL (ref 32–36)
MCV RBC AUTO: 101 FL (ref 82–98)
MONOCYTES # BLD AUTO: 0.6 K/UL (ref 0.3–1)
MONOCYTES NFR BLD: 9.1 % (ref 4–15)
NEUTROPHILS # BLD AUTO: 5.8 K/UL (ref 1.8–7.7)
NEUTROPHILS NFR BLD: 81.8 % (ref 38–73)
NITRITE UR QL STRIP: NEGATIVE
NRBC BLD-RTO: 0 /100 WBC
PH UR STRIP: 7 [PH] (ref 5–8)
PLATELET # BLD AUTO: 123 K/UL (ref 150–450)
PMV BLD AUTO: 10.7 FL (ref 9.2–12.9)
POTASSIUM SERPL-SCNC: 3.4 MMOL/L (ref 3.5–5.1)
PROT SERPL-MCNC: 5.8 G/DL (ref 6–8.4)
PROT UR QL STRIP: NEGATIVE
RBC # BLD AUTO: 3.91 M/UL (ref 4–5.4)
SARS-COV-2 RDRP RESP QL NAA+PROBE: NEGATIVE
SODIUM SERPL-SCNC: 136 MMOL/L (ref 136–145)
SP GR UR STRIP: 1 (ref 1–1.03)
TROPONIN I SERPL DL<=0.01 NG/ML-MCNC: 0.21 NG/ML (ref 0–0.03)
TROPONIN I SERPL DL<=0.01 NG/ML-MCNC: 0.24 NG/ML (ref 0–0.03)
TROPONIN I SERPL DL<=0.01 NG/ML-MCNC: 0.25 NG/ML (ref 0–0.03)
URN SPEC COLLECT METH UR: NORMAL
WBC # BLD AUTO: 7.05 K/UL (ref 3.9–12.7)

## 2022-03-12 PROCEDURE — 99214 OFFICE O/P EST MOD 30 MIN: CPT | Mod: GC,,, | Performed by: INTERNAL MEDICINE

## 2022-03-12 PROCEDURE — 85025 COMPLETE CBC W/AUTO DIFF WBC: CPT | Performed by: EMERGENCY MEDICINE

## 2022-03-12 PROCEDURE — 99285 EMERGENCY DEPT VISIT HI MDM: CPT | Mod: CS,,, | Performed by: EMERGENCY MEDICINE

## 2022-03-12 PROCEDURE — 93010 ELECTROCARDIOGRAM REPORT: CPT | Mod: 76,,, | Performed by: INTERNAL MEDICINE

## 2022-03-12 PROCEDURE — G0378 HOSPITAL OBSERVATION PER HR: HCPCS

## 2022-03-12 PROCEDURE — 99220 PR INITIAL OBSERVATION CARE,LEVL III: ICD-10-PCS | Mod: ,,, | Performed by: PHYSICIAN ASSISTANT

## 2022-03-12 PROCEDURE — 81003 URINALYSIS AUTO W/O SCOPE: CPT | Performed by: EMERGENCY MEDICINE

## 2022-03-12 PROCEDURE — 99285 EMERGENCY DEPT VISIT HI MDM: CPT | Mod: 25,CS

## 2022-03-12 PROCEDURE — 93005 ELECTROCARDIOGRAM TRACING: CPT

## 2022-03-12 PROCEDURE — 93010 ELECTROCARDIOGRAM REPORT: CPT | Mod: ,,, | Performed by: INTERNAL MEDICINE

## 2022-03-12 PROCEDURE — 63600175 PHARM REV CODE 636 W HCPCS: Performed by: PHYSICIAN ASSISTANT

## 2022-03-12 PROCEDURE — 96372 THER/PROPH/DIAG INJ SC/IM: CPT | Mod: 59 | Performed by: PHYSICIAN ASSISTANT

## 2022-03-12 PROCEDURE — 96376 TX/PRO/DX INJ SAME DRUG ADON: CPT

## 2022-03-12 PROCEDURE — 83735 ASSAY OF MAGNESIUM: CPT | Performed by: EMERGENCY MEDICINE

## 2022-03-12 PROCEDURE — 63600175 PHARM REV CODE 636 W HCPCS: Performed by: EMERGENCY MEDICINE

## 2022-03-12 PROCEDURE — U0002 COVID-19 LAB TEST NON-CDC: HCPCS | Performed by: EMERGENCY MEDICINE

## 2022-03-12 PROCEDURE — 25000003 PHARM REV CODE 250: Performed by: PHYSICIAN ASSISTANT

## 2022-03-12 PROCEDURE — 99285 PR EMERGENCY DEPT VISIT,LEVEL V: ICD-10-PCS | Mod: CS,,, | Performed by: EMERGENCY MEDICINE

## 2022-03-12 PROCEDURE — 84484 ASSAY OF TROPONIN QUANT: CPT | Performed by: EMERGENCY MEDICINE

## 2022-03-12 PROCEDURE — 27000221 HC OXYGEN, UP TO 24 HOURS

## 2022-03-12 PROCEDURE — 80053 COMPREHEN METABOLIC PANEL: CPT | Performed by: EMERGENCY MEDICINE

## 2022-03-12 PROCEDURE — 83880 ASSAY OF NATRIURETIC PEPTIDE: CPT | Performed by: EMERGENCY MEDICINE

## 2022-03-12 PROCEDURE — 99214 PR OFFICE/OUTPT VISIT, EST, LEVL IV, 30-39 MIN: ICD-10-PCS | Mod: GC,,, | Performed by: INTERNAL MEDICINE

## 2022-03-12 PROCEDURE — 96374 THER/PROPH/DIAG INJ IV PUSH: CPT

## 2022-03-12 PROCEDURE — 99220 PR INITIAL OBSERVATION CARE,LEVL III: CPT | Mod: ,,, | Performed by: PHYSICIAN ASSISTANT

## 2022-03-12 PROCEDURE — 85652 RBC SED RATE AUTOMATED: CPT | Performed by: PHYSICIAN ASSISTANT

## 2022-03-12 PROCEDURE — 25000242 PHARM REV CODE 250 ALT 637 W/ HCPCS: Performed by: PHYSICIAN ASSISTANT

## 2022-03-12 PROCEDURE — 94761 N-INVAS EAR/PLS OXIMETRY MLT: CPT

## 2022-03-12 PROCEDURE — 86140 C-REACTIVE PROTEIN: CPT | Performed by: PHYSICIAN ASSISTANT

## 2022-03-12 PROCEDURE — 93010 EKG 12-LEAD: ICD-10-PCS | Mod: 76,,, | Performed by: INTERNAL MEDICINE

## 2022-03-12 PROCEDURE — 84484 ASSAY OF TROPONIN QUANT: CPT | Mod: 91 | Performed by: PHYSICIAN ASSISTANT

## 2022-03-12 RX ORDER — MAG HYDROX/ALUMINUM HYD/SIMETH 200-200-20
30 SUSPENSION, ORAL (FINAL DOSE FORM) ORAL 4 TIMES DAILY PRN
Status: DISCONTINUED | OUTPATIENT
Start: 2022-03-12 | End: 2022-03-14 | Stop reason: HOSPADM

## 2022-03-12 RX ORDER — PANTOPRAZOLE SODIUM 40 MG/1
40 TABLET, DELAYED RELEASE ORAL EVERY MORNING
Status: ON HOLD | COMMUNITY
Start: 2022-01-10 | End: 2022-05-29 | Stop reason: HOSPADM

## 2022-03-12 RX ORDER — IBUPROFEN 200 MG
16 TABLET ORAL
Status: DISCONTINUED | OUTPATIENT
Start: 2022-03-12 | End: 2022-03-14 | Stop reason: HOSPADM

## 2022-03-12 RX ORDER — BISACODYL 10 MG
10 SUPPOSITORY, RECTAL RECTAL DAILY PRN
Status: DISCONTINUED | OUTPATIENT
Start: 2022-03-12 | End: 2022-03-14 | Stop reason: HOSPADM

## 2022-03-12 RX ORDER — FLUTICASONE FUROATE AND VILANTEROL 100; 25 UG/1; UG/1
1 POWDER RESPIRATORY (INHALATION) DAILY
Refills: 1 | Status: DISCONTINUED | OUTPATIENT
Start: 2022-03-12 | End: 2022-03-14 | Stop reason: HOSPADM

## 2022-03-12 RX ORDER — ACETAMINOPHEN 325 MG/1
650 TABLET ORAL EVERY 4 HOURS PRN
Status: DISCONTINUED | OUTPATIENT
Start: 2022-03-12 | End: 2022-03-14 | Stop reason: HOSPADM

## 2022-03-12 RX ORDER — LANOLIN ALCOHOL/MO/W.PET/CERES
1 CREAM (GRAM) TOPICAL DAILY
Refills: 0 | Status: DISCONTINUED | OUTPATIENT
Start: 2022-03-13 | End: 2022-03-14 | Stop reason: HOSPADM

## 2022-03-12 RX ORDER — METOPROLOL TARTRATE 25 MG/1
12.5 TABLET, FILM COATED ORAL 2 TIMES DAILY
Status: ON HOLD | COMMUNITY
Start: 2022-01-18 | End: 2022-05-29 | Stop reason: HOSPADM

## 2022-03-12 RX ORDER — IPRATROPIUM BROMIDE AND ALBUTEROL SULFATE 2.5; .5 MG/3ML; MG/3ML
3 SOLUTION RESPIRATORY (INHALATION) EVERY 4 HOURS PRN
Status: DISCONTINUED | OUTPATIENT
Start: 2022-03-12 | End: 2022-03-12

## 2022-03-12 RX ORDER — TALC
9 POWDER (GRAM) TOPICAL NIGHTLY PRN
Status: DISCONTINUED | OUTPATIENT
Start: 2022-03-12 | End: 2022-03-14 | Stop reason: HOSPADM

## 2022-03-12 RX ORDER — FUROSEMIDE 10 MG/ML
40 INJECTION INTRAMUSCULAR; INTRAVENOUS EVERY 12 HOURS
Status: DISCONTINUED | OUTPATIENT
Start: 2022-03-12 | End: 2022-03-13

## 2022-03-12 RX ORDER — IBUPROFEN 200 MG
24 TABLET ORAL
Status: DISCONTINUED | OUTPATIENT
Start: 2022-03-12 | End: 2022-03-14 | Stop reason: HOSPADM

## 2022-03-12 RX ORDER — ACETAMINOPHEN 325 MG/1
650 TABLET ORAL EVERY 8 HOURS PRN
Status: DISCONTINUED | OUTPATIENT
Start: 2022-03-12 | End: 2022-03-14 | Stop reason: HOSPADM

## 2022-03-12 RX ORDER — GLUCAGON 1 MG
1 KIT INJECTION
Status: DISCONTINUED | OUTPATIENT
Start: 2022-03-12 | End: 2022-03-14 | Stop reason: HOSPADM

## 2022-03-12 RX ORDER — NALOXONE HCL 0.4 MG/ML
0.02 VIAL (ML) INJECTION
Status: DISCONTINUED | OUTPATIENT
Start: 2022-03-12 | End: 2022-03-14 | Stop reason: HOSPADM

## 2022-03-12 RX ORDER — POLYETHYLENE GLYCOL 3350 17 G/17G
17 POWDER, FOR SOLUTION ORAL DAILY
Status: DISCONTINUED | OUTPATIENT
Start: 2022-03-12 | End: 2022-03-14 | Stop reason: HOSPADM

## 2022-03-12 RX ORDER — ONDANSETRON 4 MG/1
4 TABLET, ORALLY DISINTEGRATING ORAL EVERY 8 HOURS PRN
Status: DISCONTINUED | OUTPATIENT
Start: 2022-03-12 | End: 2022-03-14 | Stop reason: HOSPADM

## 2022-03-12 RX ORDER — LEVALBUTEROL INHALATION SOLUTION 0.63 MG/3ML
1 SOLUTION RESPIRATORY (INHALATION) EVERY 4 HOURS PRN
Status: DISCONTINUED | OUTPATIENT
Start: 2022-03-12 | End: 2022-03-14 | Stop reason: HOSPADM

## 2022-03-12 RX ORDER — ENOXAPARIN SODIUM 100 MG/ML
30 INJECTION SUBCUTANEOUS EVERY 24 HOURS
Status: DISCONTINUED | OUTPATIENT
Start: 2022-03-12 | End: 2022-03-14 | Stop reason: HOSPADM

## 2022-03-12 RX ORDER — SIMETHICONE 80 MG
1 TABLET,CHEWABLE ORAL 4 TIMES DAILY PRN
Status: DISCONTINUED | OUTPATIENT
Start: 2022-03-12 | End: 2022-03-14 | Stop reason: HOSPADM

## 2022-03-12 RX ORDER — HYDROCODONE BITARTRATE AND ACETAMINOPHEN 5; 325 MG/1; MG/1
1 TABLET ORAL EVERY 6 HOURS PRN
Status: DISCONTINUED | OUTPATIENT
Start: 2022-03-12 | End: 2022-03-14 | Stop reason: HOSPADM

## 2022-03-12 RX ORDER — NAPROXEN SODIUM 220 MG/1
81 TABLET, FILM COATED ORAL DAILY
Status: DISCONTINUED | OUTPATIENT
Start: 2022-03-13 | End: 2022-03-14 | Stop reason: HOSPADM

## 2022-03-12 RX ORDER — BUMETANIDE 1 MG/1
1 TABLET ORAL DAILY
Status: ON HOLD | COMMUNITY
Start: 2022-02-03 | End: 2022-03-14 | Stop reason: SDUPTHER

## 2022-03-12 RX ORDER — PANTOPRAZOLE SODIUM 40 MG/1
40 TABLET, DELAYED RELEASE ORAL EVERY MORNING
Status: DISCONTINUED | OUTPATIENT
Start: 2022-03-13 | End: 2022-03-14 | Stop reason: HOSPADM

## 2022-03-12 RX ORDER — FUROSEMIDE 10 MG/ML
40 INJECTION INTRAMUSCULAR; INTRAVENOUS
Status: COMPLETED | OUTPATIENT
Start: 2022-03-12 | End: 2022-03-12

## 2022-03-12 RX ORDER — METOPROLOL TARTRATE 25 MG/1
12.5 TABLET ORAL 2 TIMES DAILY
Status: DISCONTINUED | OUTPATIENT
Start: 2022-03-12 | End: 2022-03-14 | Stop reason: HOSPADM

## 2022-03-12 RX ORDER — SODIUM CHLORIDE 0.9 % (FLUSH) 0.9 %
10 SYRINGE (ML) INJECTION
Status: DISCONTINUED | OUTPATIENT
Start: 2022-03-12 | End: 2022-03-14 | Stop reason: HOSPADM

## 2022-03-12 RX ADMIN — FUROSEMIDE 40 MG: 40 INJECTION, SOLUTION INTRAMUSCULAR; INTRAVENOUS at 11:03

## 2022-03-12 RX ADMIN — METOPROLOL TARTRATE 12.5 MG: 25 TABLET, FILM COATED ORAL at 11:03

## 2022-03-12 RX ADMIN — ENOXAPARIN SODIUM 30 MG: 30 INJECTION, SOLUTION INTRAVENOUS; SUBCUTANEOUS at 06:03

## 2022-03-12 RX ADMIN — FLUTICASONE FUROATE AND VILANTEROL TRIFENATATE 1 PUFF: 100; 25 POWDER RESPIRATORY (INHALATION) at 06:03

## 2022-03-12 RX ADMIN — FUROSEMIDE 40 MG: 10 INJECTION, SOLUTION INTRAMUSCULAR; INTRAVENOUS at 11:03

## 2022-03-12 NOTE — ED PROVIDER NOTES
Encounter Date: 3/12/2022       History     Chief Complaint   Patient presents with    Shortness of Breath     For 12 hours, on bumex and 2L O2 for CHF. On 4 per EMS this AM      89-year-old female with a history of COPD and CHF presents with shortness of breath.  Started this morning.  Daughter gave breathing treatments with improvement, but not resolution.  Weight recently is decreased.  Brought in by EMS.  Patient is on 2.5 L of oxygen chronically.  EMS put her on 4 L. patient is unable to give history secondary to age and being hard of hearing.  Daughter provides history.  Unable to get review of systems for the same reason.  The patients available PMH, PSH, Social History, medications, allergies, and triage vital signs were reviewed just prior to their medical evaluation.          Review of patient's allergies indicates:   Allergen Reactions    Ancef in dextrose (iso-osm) Rash    Cefazolin Rash    Cefuroxime Rash    Sulfamethoxazole-trimethoprim Rash     Other reaction(s): Rash     Past Medical History:   Diagnosis Date    Acute on chronic congestive heart failure 7/6/2019    Cardiomyopathy     Carotid artery occlusion     CHF (congestive heart failure)     COPD (chronic obstructive pulmonary disease)     Coronary artery disease     Hyperlipidemia     Hypertension      Past Surgical History:   Procedure Laterality Date    CARDIAC CATHETERIZATION      cardic cath      CAROTID ENDARTERECTOMY      FLEXIBLE BRONCHOSCOPY N/A 7/31/2019    Procedure: BRONCHOSCOPY, FIBEROPTIC Flexible;  Surgeon: Klever Mckeon MD;  Location: [address];  Service: Thoracic;  Laterality: N/A;    HIP SURGERY      PLEURODESIS USING TALC N/A 7/31/2019    Procedure: PLEURODESIS;  Surgeon: Klever Mckeon MD;  Location: [address];  Service: Thoracic;  Laterality: N/A;    PLEURODESIS USING TALC Right 8/5/2019    Procedure: PLEURODESIS, USING TALC;  Surgeon: Klever Mckeon MD;  Location: [address]  [facility];  Service: Thoracic;  Laterality: Right;    PLEURODESIS WITH VIDEO-ASSISTED THORACOSCOPIC SURGERY (VATS) Right 2019    Procedure: VATS, WITH PLEURAL TENT;  Surgeon: Klever Mckeon MD;  Location: [facility] OR [address] [place] [address];  Service: Thoracic;  Laterality: Right;     Family History   Problem Relation Age of Onset    Hypertension Mother      Social History     Tobacco Use    Smoking status: Former Smoker     Packs/day: 2.00     Years: 20.00     Pack years: 40.00     Types: Cigarettes     Quit date: 1980     Years since quittin.1    Smokeless tobacco: Never Used   Substance Use Topics    Alcohol use: Yes     Alcohol/week: 2.0 standard drinks     Types: 2 Glasses of wine per week     Comment: social    Drug use: No     Review of Systems   Unable to perform ROS: Age       Physical Exam     Initial Vitals [22 1033]   BP Pulse Resp Temp SpO2   (!) 148/82 98 20 98.1 °F (36.7 °C) (!) 94 %      MAP       --         Physical Exam    Nursing note and vitals reviewed.  Constitutional: She appears well-developed and well-nourished. She is not diaphoretic. No distress.   cachexia   HENT:   Head: Normocephalic and atraumatic.   Nose: Nose normal.   Eyes: Conjunctivae are normal. Right eye exhibits no discharge. Left eye exhibits no discharge.   Neck: Neck supple.   Normal range of motion.  Cardiovascular: Normal rate, regular rhythm and normal heart sounds. Exam reveals no gallop and no friction rub.    No murmur heard.  Pulmonary/Chest: No respiratory distress. She has no wheezes. She has rhonchi. She has no rales.   Mild diffuse rhonchi   Abdominal: Abdomen is soft. She exhibits no distension. There is no abdominal tenderness. There is no rebound and no guarding.   Musculoskeletal:         General: Edema present. No tenderness. Normal range of motion.      Cervical back: Normal range of motion and neck supple.      Comments: Trace BLE edema     Neurological: She is alert and oriented to person, place, and  time. She has normal strength. GCS score is 15. GCS eye subscore is 4. GCS verbal subscore is 5. GCS motor subscore is 6.   Skin: Skin is warm and dry. No rash noted. No erythema.         ED Course   Procedures  Labs Reviewed   B-TYPE NATRIURETIC PEPTIDE - Abnormal; Notable for the following components:       Result Value    BNP 2,684 (*)     All other components within normal limits   CBC W/ AUTO DIFFERENTIAL - Abnormal; Notable for the following components:    RBC 3.91 (*)      (*)     MCH 32.2 (*)     Platelets 123 (*)     Immature Granulocytes 0.7 (*)     Immature Grans (Abs) 0.05 (*)     Lymph # 0.5 (*)     Gran % 81.8 (*)     Lymph % 7.7 (*)     All other components within normal limits   COMPREHENSIVE METABOLIC PANEL - Abnormal; Notable for the following components:    Potassium 3.4 (*)     Chloride 90 (*)     CO2 34 (*)     Glucose 142 (*)     Total Protein 5.8 (*)     Albumin 2.8 (*)     eGFR if  45.0 (*)     eGFR if non  39.1 (*)     All other components within normal limits   TROPONIN I - Abnormal; Notable for the following components:    Troponin I 0.247 (*)     All other components within normal limits   MAGNESIUM   URINALYSIS, REFLEX TO URINE CULTURE   SARS-COV-2 RDRP GENE    Narrative:     This test utilizes isothermal nucleic acid amplification   technology to detect the SARS-CoV-2 RdRp nucleic acid segment.   The analytical sensitivity (limit of detection) is 125 genome   equivalents/mL.   A POSITIVE result implies infection with the SARS-CoV-2 virus;   the patient is presumed to be contagious.     A NEGATIVE result means that SARS-CoV-2 nucleic acids are not   present above the limit of detection. A NEGATIVE result should be   treated as presumptive. It does not rule out the possibility of   COVID-19 and should not be the sole basis for treatment decisions.   If COVID-19 is strongly suspected based on clinical and exposure   history, re-testing using an  alternate molecular assay should be   considered.   This test is only for use under the Food and Drug   Administration s Emergency Use Authorization (EUA).   Commercial kits are provided by [employer].   Performance characteristics of the EUA have been independently   verified by [facility] Department of   Pathology and Laboratory Medicine.   _________________________________________________________________   The authorized Fact Sheet for Healthcare Providers and the authorized Fact   Sheet for Patients of the ID NOW COVID-19 are available on the FDA   website:     https://www.fda.gov/media/369990/download  https://www.fda.gov/media/269797/download             EKG Readings: (Independently Interpreted)   Initial Reading: No STEMI. Heart Rate: 67. ST Segment Depression: II. T Waves Flipped: III.   Mix of NS, atrial paced, and ventricular paced rhythm       Imaging Results          X-Ray Chest AP Portable (Final result)  Result time 03/12/22 11:38:29    Final result by Jaycob Monge DO (03/12/22 11:38:29)                 Impression:      Hyperexpanded lungs and emphysematous changes.  Continued coarse chronic interstitial prominence without focal consolidation.      Electronically signed by: Jaycob Monge  Date:    03/12/2022  Time:    11:38             Narrative:    EXAMINATION:  XR CHEST AP PORTABLE    CLINICAL HISTORY:  Shortness of breath    TECHNIQUE:  Single frontal view of the chest was performed.    COMPARISON:  09/21/2021.    FINDINGS:  There is a cardiac pacer, unchanged in position from prior.    The lungs are hyperexpanded and there is flattening of the hemidiaphragms.  There is continued coarse interstitial prominence, similar to prior.  There are emphysematous changes.  There is no new large focal consolidation.  Pleural spaces are clear.    The cardiac silhouette is mildly prominent, unchanged.  There are atherosclerotic changes of the aortic arch.    Visualized osseous structures  demonstrate osteopenia and degenerative change.                                 Medications   furosemide injection 40 mg (40 mg Intravenous Given 3/12/22 1153)     Medical Decision Making:   History:   I obtained history from: someone other than patient.       <> Summary of History: Daughter provides history  Old Medical Records: I decided to obtain old medical records.  Independently Interpreted Test(s):   I have ordered and independently interpreted X-rays - see prior notes.  I have ordered and independently interpreted EKG Reading(s) - see prior notes  Clinical Tests:   Lab Tests: Ordered and Reviewed  Radiological Study: Ordered and Reviewed  Medical Tests: Ordered and Reviewed  ED Management:  89-year-old female with known COPD and CHF presents with shortness of breath.  Vitals with tachypnea.  Physical exam as above.  Lung sounds are coarse.  EKG without acute ischemia.  Labs consistent with CHF exacerbation.  Treated with 40 mg of Lasix IV x1.  Admitted to Hospital Medicine for observation and further evaluation.  Did bedside teaching.  All questions answered.  Daughters acknowledge understanding.                      Clinical Impression:   Final diagnoses:  [R06.02] Shortness of breath (Primary)  [I50.9] Acute on chronic congestive heart failure, unspecified heart failure type          ED Disposition Condition    Observation         Level of Complexity:  High, level 5.        Edgardo Monae MD  03/12/22 5368

## 2022-03-12 NOTE — ASSESSMENT & PLAN NOTE
2/2 hypertrophic cardiomyopathy  Acute on chronic respiratory failure  Patient presents with worsening dyspnea and increased oxygen needs X 1 day after home bumex dose reduced to 1 mg daily 1 week prior.   - started on CHF pathway  - 94% on 4L on admit--> able to wean to home 2.5 L  - BNP 2684  - CXR with emphysematous changes  - home diuretic: bumex 1 mg daily  - given lasix 40 mg IV in ED, continue 40 mg IV BID  - continue home BB  - repeat TTE pending  - strict I/Os, daily weights  - cardiac diet, 1.5 L fluid restriction    - Results for orders placed during the hospital encounter of 09/21/21    Echo    Interpretation Summary  · Severe left atrial enlargement.  · The left ventricle is normal in size with concentric remodeling and normal systolic function.  · The estimated ejection fraction is 55%.  · Grade II left ventricular diastolic dysfunction.  · Normal right ventricular size with low normal right ventricular systolic function.  · Mild-to-moderate mitral regurgitation.  · Intermediate central venous pressure (8 mmHg).

## 2022-03-12 NOTE — ASSESSMENT & PLAN NOTE
Placed in 2017 after multiple 4 sec sinus arrest pauses noted on 24 hour Holter monitor  - interrogation ordered

## 2022-03-12 NOTE — CONSULTS
[facility] [place]  Consultation  Cardiology      Venus Mayer  YOB: 1929  Medical Record Number:  [id number]  Attending Physician:  Edgardo Monae MD   Date of Admission: 3/12/2022       Hospital Day:  0  Current Principal Problem:  <principal problem not specified>      History     Cc:  Dyspnea    HPI  89 yo female with a history of diastolic heart failure, COPD on home oxygen 2 L, hypertension, and carotid artery disease s/p L CEA in 2008 with restenosis, and Apical and septal HCM who presents to hospital secondary to dyspnea.  Patient's caretakers are her daughters who noticed patient was becoming more short of breath since last night at around 7:00 p.m..  Patient reports some difficulty eating and swallowing secondary to her dyspnea.  Her symptoms are similar to prior episodes were she was admitted for heart failure.  Issue has been having a productive cough which has now turned to dry cough.  She denies any fevers, chills, sore throat, headaches, nausea, vomiting, or diarrhea.  Patient also denies any chest pain, tightness, or pressure at rest or on exertion.    In the ED, patient is on 4 L oxygen nasal cannula satting in the 90s.  CBC grossly unremarkable.  CMP also grossly unremarkable.  BNP elevated at around 2000.  Troponins very minimally elevated.  Patient lying in bed comfortable at approximately 30 degree angle.  EKG notable for intermittently a paced V sensed rhythm with PVCs and ST elevations in V3 with mild 0.5 mm ST depressions inferiorly.  She was given IV Lasix in the ED.  Cardiology was consulted secondary for abnormal in a EKG.      Medications - Outpatient  Prior to Admission medications    Medication Sig Start Date End Date Taking? Authorizing Provider   acetaminophen (TYLENOL) 500 MG tablet Take 1,000 mg by mouth daily as needed for Pain.   Yes Historical Provider   albuterol (PROAIR HFA) 90 mcg/actuation inhaler Inhale 1 puff into the lungs every 6 (six)  hours as needed for Wheezing or Shortness of Breath. Rescue 7/11/19  Yes Soham Stevenson MD   aspirin 81 mg Tab Take 1 tablet by mouth once daily.    Yes Historical Provider   budesonide-formoterol 160-4.5 mcg (SYMBICORT) 160-4.5 mcg/actuation HFAA Inhale 2 puffs into the lungs every 12 (twelve) hours. Controller 10/13/20 10/13/21 Yes Sampson Perez MD   bumetanide (BUMEX) 1 MG tablet Take 1 mg by mouth 2 (two) times daily. 2/3/22  Yes Historical Provider   ferrous sulfate 325 (65 FE) MG EC tablet Take 1 tablet (325 mg total) by mouth once daily. 6/1/19  Yes Shane Jo MD   levalbuterol (XOPENEX) 0.63 mg/3 mL nebulizer solution Take 3 mLs (0.63 mg total) by nebulization every 4 (four) hours as needed for Wheezing or Shortness of Breath. Rescue 10/13/20  Yes Sampson Perez MD   metoprolol tartrate (LOPRESSOR) 25 MG tablet Take 25 mg by mouth once daily. 1/18/22  Yes Historical Provider   pantoprazole (PROTONIX) 40 MG tablet Take 40 mg by mouth every morning. 1/10/22  Yes Historical Provider   tiotropium bromide (SPIRIVA RESPIMAT) 2.5 mcg/actuation inhaler Inhale 2 puffs into the lungs Daily. Controller 8/16/21  Yes Sruthi Barnes MD   azelastine (ASTELIN) 137 mcg (0.1 %) nasal spray 2 sprays by Nasal route 2 (two) times daily.    Historical Provider   baclofen (LIORESAL) 10 MG tablet TK 1 T PO TID 7/27/20   Historical Provider   ipratropium (ATROVENT) 42 mcg (0.06 %) nasal spray 2 sprays by Nasal route 2 (two) times daily.    Historical Provider   multivitamin (THERAGRAN) per tablet Take 1 tablet by mouth once daily.    Historical Provider   potassium chloride (MICRO-K) 10 MEQ CpSR Take 10 mEq by mouth once daily.    Historical Provider   rOPINIRole (REQUIP) 0.25 MG tablet Take 1 tablet (0.25 mg total) by mouth every evening.  Patient taking differently: No sig reported 5/31/19 5/30/20  Shane Jo MD   furosemide (LASIX) 40 MG tablet Take 1 tablet (40 mg total) by mouth once daily. 9/23/21 3/12/22   Alka Victor PA-C         Medications - Current  Scheduled Meds:   enoxaparin  30 mg Subcutaneous Daily    furosemide (LASIX) injection  40 mg Intravenous Q12H    polyethylene glycol  17 g Oral Daily     Continuous Infusions:  PRN Meds:.acetaminophen, acetaminophen, albuterol-ipratropium, aluminum-magnesium hydroxide-simethicone, bisacodyL, dextrose 10%, dextrose 10%, glucagon (human recombinant), glucose, glucose, HYDROcodone-acetaminophen, melatonin, naloxone, ondansetron, simethicone, sodium chloride 0.9%      Allergies  Review of patient's allergies indicates:   Allergen Reactions    Ancef in dextrose (iso-osm) Rash    Cefazolin Rash    Cefuroxime Rash    Sulfamethoxazole-trimethoprim Rash     Other reaction(s): Rash         Past Medical History  Past Medical History:   Diagnosis Date    Acute on chronic congestive heart failure 7/6/2019    Cardiomyopathy     Carotid artery occlusion     CHF (congestive heart failure)     COPD (chronic obstructive pulmonary disease)     Coronary artery disease     Hyperlipidemia     Hypertension          Past Surgical History  Past Surgical History:   Procedure Laterality Date    CARDIAC CATHETERIZATION      cardic cath      CAROTID ENDARTERECTOMY      FLEXIBLE BRONCHOSCOPY N/A 7/31/2019    Procedure: BRONCHOSCOPY, FIBEROPTIC Flexible;  Surgeon: Klever Mckeon MD;  Location: [address];  Service: Thoracic;  Laterality: N/A;    HIP SURGERY      PLEURODESIS USING TALC N/A 7/31/2019    Procedure: PLEURODESIS;  Surgeon: Klever Mckeon MD;  Location: [address];  Service: Thoracic;  Laterality: N/A;    PLEURODESIS USING TALC Right 8/5/2019    Procedure: PLEURODESIS, USING TALC;  Surgeon: Klever Mckeon MD;  Location: [address];  Service: Thoracic;  Laterality: Right;    PLEURODESIS WITH VIDEO-ASSISTED THORACOSCOPIC SURGERY (VATS) Right 8/5/2019    Procedure: VATS, WITH PLEURAL TENT;  Surgeon: Klever Mckeon MD;   Location: [facility] OR [address] [place] [address];  Service: Thoracic;  Laterality: Right;         Social History  Social History     Socioeconomic History    Marital status:    Tobacco Use    Smoking status: Former Smoker     Packs/day: 2.00     Years: 20.00     Pack years: 40.00     Types: Cigarettes     Quit date: 1980     Years since quittin.1    Smokeless tobacco: Never Used   Substance and Sexual Activity    Alcohol use: Yes     Alcohol/week: 2.0 standard drinks     Types: 2 Glasses of wine per week     Comment: social    Drug use: No    Sexual activity: Not Currently         ROS   Admits Denies   Constitutional  Chills, diaphoresis, malaise   Eyes  Visual changes   ENMT  Dysphagia, Epistaxis, nasal congestion, hearing loss   Cardiovascular  Chest pain, palpitations, edema   Respiratory Dyspnea Cough, dyspnea, wheezing   Gastrointestinal  Nausea, vomiting, constipation, diarrhea, anorexia.     Genitourinary  Dysuria, incontinence   Musculoskeletal  Myalgias, joint pain, joint swelling   Integumentary  Rash, inflammation, burning   Neruo-Psychiatric  Anxiety, insomnia.  Changes in speech, strength, sensation.     Endocrine     Hematologic  Abnormal bruising, bleeding   Immunologic  Inflammation, pain at IV sites.  Pruritis.         Physical Examination         Vital Signs  Vitals  Temp: 98.1 °F (36.7 °C)  Temp src: Oral  Pulse: 72  Resp: (!) 32  SpO2: 98 %  Flow (L/min): 4  BP: (!) 108/54  MAP (mmHg): 75  BP Location: Right arm  BP Method: Automatic  Patient Position: Sitting          24 Hour VS Range    Temp:  [98.1 °F (36.7 °C)]   Pulse:  [67-98]   Resp:  [19-32]   BP: (108-148)/(54-82)   SpO2:  [94 %-100 %]   No intake or output data in the 24 hours ending 22 1358      General:  Lying in bed no acute distress  Head: NCAT  Eyes: conjunctivae and lids normal, no scleral icterus, EOMI.  ENMT:  no gingival bleeding, normal oral mucosa without pallor or cyanosis.   Neck:  JVP normal.  Trachea non-displaced.      Chest:  Inspiratory crackles noted posteriorly inferiorly bilaterally.  No rhonchi noted.  Heart:  Normal PMI, S1 S2 normal quality, splitting.  No murmurs rubs or gallops.  Peripheral pulses 2+.  Abdomen:  Non-distended, normal bowel sounds, non-tender.  No hepato-jugular reflux.  Extremities:  1+ edema in right foot.  Remainder extremity exam unremarkable patient.   Skin:  Warm and dry.  No cyanosis or pallor.  No ulcers, stasis.  IV sites without tenderness or inflammation.    Neurological / Psychiatric:  Oriented to person, time, and place.  No facial asymmetry, drift.  Fluent without dysarthria.  Mood euthymic, affect normal.         Data       Recent Labs   Lab 03/12/22  1103   WBC 7.05   HGB 12.6   HCT 39.3   *        No results for input(s): PROTIME, INR in the last 168 hours.     Recent Labs   Lab 03/12/22  1103      K 3.4*   CL 90*   CO2 34*   BUN 23   CREATININE 1.2   ANIONGAP 12   CALCIUM 9.6        Recent Labs   Lab 03/12/22  1103   PROT 5.8*   ALBUMIN 2.8*   BILITOT 0.9   ALKPHOS 128   AST 23   ALT 13        Recent Labs   Lab 03/12/22  1103   TROPONINI 0.247*        BNP (pg/mL)   Date Value   03/12/2022 2,684 (H)   03/03/2022 2,378 (H)   09/21/2021 1,994 (H)   07/15/2021 1,493 (H)   07/27/2019 1,141 (H)           Assessment & Plan     Acute on chronic diastolic heart failure:  Secondary to hypertrophic cardiomyopathy  Bedside echo showing the EF of 65% with no wall motion abnormalities.  IVC collapsible.  -recommend admission to the hospital medicine  -recommend diuresis with 40 mg IV b.i.d.  -recommend restarting home medications including beta-blocker  -continue to trend troponins  -treat underlying COPD  -obtain formal echo    Hypertrophic cardiomyopathy  -diuresis and beta-blocker as stated above    Abnormal EKG:  Initial EKG reading anterior STEMI with isolated ST elevations in V3, PVCs, and intermittent a paced V sensed rhythm.  -no need to repeat unless patient is having chest  pain    Status post permanent pacemaker placement  Indication 2017 was multiple 4 sec sinus arrest pauses noted on 24 hour Holter monitor  -will place order for device interrogation          Signed:  Yang Ahumada M.D.  Page # [phone]  Cardiovascular Fellow  [facility]

## 2022-03-12 NOTE — SUBJECTIVE & OBJECTIVE
Past Medical History:   Diagnosis Date    Acute on chronic congestive heart failure 7/6/2019    Cardiomyopathy     Carotid artery occlusion     CHF (congestive heart failure)     COPD (chronic obstructive pulmonary disease)     Coronary artery disease     Hyperlipidemia     Hypertension        Past Surgical History:   Procedure Laterality Date    CARDIAC CATHETERIZATION      cardic cath      CAROTID ENDARTERECTOMY      FLEXIBLE BRONCHOSCOPY N/A 7/31/2019    Procedure: BRONCHOSCOPY, FIBEROPTIC Flexible;  Surgeon: Klever Mckeon MD;  [facility];  Service: Thoracic;  Laterality: N/A;    HIP SURGERY      PLEURODESIS USING TALC N/A 7/31/2019    Procedure: PLEURODESIS;  Surgeon: Klever Mckeon MD;  [facility];  Service: Thoracic;  Laterality: N/A;    PLEURODESIS USING TALC Right 8/5/2019    Procedure: PLEURODESIS, USING TALC;  Surgeon: Klever Mckeon MD;  [facility];  Service: Thoracic;  Laterality: Right;    PLEURODESIS WITH VIDEO-ASSISTED THORACOSCOPIC SURGERY (VATS) Right 8/5/2019    Procedure: VATS, WITH PLEURAL TENT;  Surgeon: Klever Mckeon MD;  Location: [facility] OR [address] [place] [address];  Service: Thoracic;  Laterality: Right;       Review of patient's allergies indicates:   Allergen Reactions    Ancef in dextrose (iso-osm) Rash    Cefazolin Rash    Cefuroxime Rash    Sulfamethoxazole-trimethoprim Rash     Other reaction(s): Rash       No current facility-administered medications on file prior to encounter.     Current Outpatient Medications on File Prior to Encounter   Medication Sig    acetaminophen (TYLENOL) 500 MG tablet Take 1,000 mg by mouth daily as needed for Pain.    albuterol (PROAIR HFA) 90 mcg/actuation inhaler Inhale 1 puff into the lungs every 6 (six) hours as needed for Wheezing or Shortness of Breath. Rescue    aspirin 81 mg Tab Take 1 tablet by mouth once daily.     azelastine (ASTELIN) 137 mcg (0.1 %) nasal spray 2 sprays by Nasal route 2 (two) times  daily.    budesonide-formoterol 160-4.5 mcg (SYMBICORT) 160-4.5 mcg/actuation HFAA Inhale 2 puffs into the lungs every 12 (twelve) hours. Controller    bumetanide (BUMEX) 1 MG tablet Take 1 mg by mouth once daily.    ferrous sulfate 325 (65 FE) MG EC tablet Take 1 tablet (325 mg total) by mouth once daily.    ipratropium (ATROVENT) 42 mcg (0.06 %) nasal spray 2 sprays by Nasal route 2 (two) times daily.    levalbuterol (XOPENEX) 0.63 mg/3 mL nebulizer solution Take 3 mLs (0.63 mg total) by nebulization every 4 (four) hours as needed for Wheezing or Shortness of Breath. Rescue    metoprolol tartrate (LOPRESSOR) 25 MG tablet Take 12.5 mg by mouth 2 (two) times daily.    pantoprazole (PROTONIX) 40 MG tablet Take 40 mg by mouth every morning.    potassium chloride (MICRO-K) 10 MEQ CpSR Take 10 mEq by mouth once daily.    tiotropium bromide (SPIRIVA RESPIMAT) 2.5 mcg/actuation inhaler Inhale 2 puffs into the lungs Daily. Controller    baclofen (LIORESAL) 10 MG tablet TK 1 T PO TID    multivitamin (THERAGRAN) per tablet Take 1 tablet by mouth once daily.    rOPINIRole (REQUIP) 0.25 MG tablet Take 1 tablet (0.25 mg total) by mouth every evening. (Patient taking differently: No sig reported)    [DISCONTINUED] furosemide (LASIX) 40 MG tablet Take 1 tablet (40 mg total) by mouth once daily.     Family History       Problem Relation (Age of Onset)    Hypertension Mother          Tobacco Use    Smoking status: Former Smoker     Packs/day: 2.00     Years: 20.00     Pack years: 40.00     Types: Cigarettes     Quit date: 1980     Years since quittin.1    Smokeless tobacco: Never Used   Substance and Sexual Activity    Alcohol use: Yes     Alcohol/week: 2.0 standard drinks     Types: 2 Glasses of wine per week     Comment: social    Drug use: No    Sexual activity: Not Currently     Review of Systems   Constitutional:  Negative for appetite change, chills, fatigue and fever.   HENT:  Positive for congestion. Negative for  rhinorrhea, sinus pain and sore throat.    Respiratory:  Positive for cough and shortness of breath. Negative for chest tightness and wheezing.    Cardiovascular:  Positive for leg swelling. Negative for chest pain and palpitations.   Gastrointestinal:  Negative for abdominal distention, abdominal pain, constipation, diarrhea, nausea and vomiting.   Genitourinary:  Negative for difficulty urinating, dysuria, flank pain and hematuria.   Musculoskeletal:  Negative for arthralgias.   Neurological:  Negative for dizziness, weakness, light-headedness, numbness and headaches.   Psychiatric/Behavioral:  Negative for agitation, behavioral problems and confusion.    Objective:     Vital Signs (Most Recent):  Temp: 98.1 °F (36.7 °C) (03/12/22 1033)  Pulse: 72 (03/12/22 1303)  Resp: (!) 32 (03/12/22 1253)  BP: (!) 108/54 (03/12/22 1303)  SpO2: 98 % (03/12/22 1313)   Vital Signs (24h Range):  Temp:  [98.1 °F (36.7 °C)] 98.1 °F (36.7 °C)  Pulse:  [67-98] 72  Resp:  [19-32] 32  SpO2:  [94 %-100 %] 98 %  BP: (108-148)/(54-82) 108/54        There is no height or weight on file to calculate BMI.    Physical Exam  Vitals and nursing note reviewed.   Constitutional:       General: She is not in acute distress.     Appearance: Normal appearance. She is not ill-appearing.   HENT:      Head: Normocephalic and atraumatic.      Nose: Nose normal. No congestion.      Mouth/Throat:      Mouth: Mucous membranes are moist.      Pharynx: Oropharynx is clear.   Eyes:      General:         Right eye: No discharge.         Left eye: No discharge.      Conjunctiva/sclera: Conjunctivae normal.   Cardiovascular:      Rate and Rhythm: Normal rate and regular rhythm.      Pulses: Normal pulses.      Heart sounds: Normal heart sounds. No murmur heard.    No friction rub.   Pulmonary:      Effort: Pulmonary effort is normal.      Breath sounds: Rales (faint, bibasilar) present. No wheezing or rhonchi.      Comments: 94% on 2.5L NC  Abdominal:       General: Abdomen is flat. Bowel sounds are normal.      Palpations: Abdomen is soft.      Tenderness: There is no abdominal tenderness. There is no guarding or rebound.   Musculoskeletal:         General: Normal range of motion.      Right lower leg: Edema (2+ to ankle) present.      Left lower leg: Edema (faint) present.   Skin:     General: Skin is warm and dry.      Findings: No lesion or rash.   Neurological:      General: No focal deficit present.      Mental Status: She is alert and oriented to person, place, and time. Mental status is at baseline.      Comments: Oriented to person, place, year   Psychiatric:         Mood and Affect: Mood normal.         Behavior: Behavior normal.           Significant Labs: All pertinent labs within the past 24 hours have been reviewed.  CBC:   Recent Labs   Lab 03/12/22  1103   WBC 7.05   HGB 12.6   HCT 39.3   *     CMP:   Recent Labs   Lab 03/12/22  1103      K 3.4*   CL 90*   CO2 34*   *   BUN 23   CREATININE 1.2   CALCIUM 9.6   PROT 5.8*   ALBUMIN 2.8*   BILITOT 0.9   ALKPHOS 128   AST 23   ALT 13   ANIONGAP 12   EGFRNONAA 39.1*     Cardiac Markers:   Recent Labs   Lab 03/12/22  1103   BNP 2,684*     Magnesium:   Recent Labs   Lab 03/12/22  1103   MG 1.9     POCT Glucose: No results for input(s): POCTGLUCOSE in the last 48 hours.  Troponin:   Recent Labs   Lab 03/12/22  1103 03/12/22  1359   TROPONINI 0.247* 0.237*     Urine Studies:   Recent Labs   Lab 03/12/22  1258   COLORU Straw   APPEARANCEUA Clear   PHUR 7.0   SPECGRAV 1.005   PROTEINUA Negative   GLUCUA Negative   KETONESU Negative   BILIRUBINUA Negative   OCCULTUA Negative   NITRITE Negative   LEUKOCYTESUR Negative       Significant Imaging: I have reviewed all pertinent imaging results/findings within the past 24 hours.

## 2022-03-12 NOTE — H&P
[facility] - Emergency Dept  Hospital Medicine  History & Physical    Patient Name: Venus Mayer  MRN: [id number]  Patient Class: OP- Observation  Admission Date: 3/12/2022  Attending Physician: Marce Stephenson PA-C  Primary Care Provider: Jona Che MD         Patient information was obtained from patient, relative(s) and ER records.     Subjective:     Principal Problem:Acute on chronic diastolic heart failure    Chief Complaint:   Chief Complaint   Patient presents with    Shortness of Breath     For 12 hours, on bumex and 2L O2 for CHF. On 4 per EMS this AM         HPI: Mrs. Mayer is a 89 year old with a history of diastolic heart failure, COPD on home oxygen 2.5 L, hypertension, and carotid artery disease s/p L CEA in 2008 with restenosis, and Apical and septal HCM who presents with acute onset of worsening dyspnea yesterday evening at 7pm. Daughter Bri at bedside assists with HPI. Patient reports SOB is worse with lying down. It is associated with mild swelling in her RLE, chronically her RLE gets more swollen than her left. She states she has been having some difficulty with swallowing 2/2 to dyspnea. Daughter noticed a productive cough for one day three days PTA which is now dry, congestion, and watery eyes which she related to allergies. Patient was on bumex 1 mg BID, but dose was reduced to 1 mg daily about 1 week prior due to weight loss from 90-91 lbs to 85 lbs and concern for dehydration. Weight has been stable around 85 lbs since reducing dose. Patient denies fever, chills, CP, palpitations, wheezing, chest tightness, abdominal pain, N/V, D/C, urinary symptoms, new numbness or tingling. She lives alone but has 24 hour care. At baseline she is able to ambulate with help of walker.     In ED, VSS. 94% on 4L. Afebrile without leukocytosis. CXR with emphysematous changes. BNP 2684. Troponin 0.247 (prior 0.1). EKG notable for intermittently a paced V sensed rhythm with PVCs and ST elevations in V3  with mild 0.5 mm ST depressions inferiorly. Cardiology evaluated in ED for abnormal EKG. Bedside echo with EF 65%, no WMA abnormalities. Patient given lasix 40 mg IV and able to wean to home O2 of 2.5 L. Patient admitted to observation for CHF exacerbation.       Past Medical History:   Diagnosis Date    Acute on chronic congestive heart failure 7/6/2019    Cardiomyopathy     Carotid artery occlusion     CHF (congestive heart failure)     COPD (chronic obstructive pulmonary disease)     Coronary artery disease     Hyperlipidemia     Hypertension        Past Surgical History:   Procedure Laterality Date    CARDIAC CATHETERIZATION      cardic cath      CAROTID ENDARTERECTOMY      FLEXIBLE BRONCHOSCOPY N/A 7/31/2019    Procedure: BRONCHOSCOPY, FIBEROPTIC Flexible;  Surgeon: Klever Mckeon MD;  Location: [facility] OR [address] [place] [address];  Service: Thoracic;  Laterality: N/A;    HIP SURGERY      PLEURODESIS USING TALC N/A 7/31/2019    Procedure: PLEURODESIS;  Surgeon: Klever Mckeon MD;  Location: [facility] OR [address] [place] [address];  Service: Thoracic;  Laterality: N/A;    PLEURODESIS USING TALC Right 8/5/2019    Procedure: PLEURODESIS, USING TALC;  Surgeon: Klever Mckeon MD;  Location: [facility] OR [address] [place] [address];  Service: Thoracic;  Laterality: Right;    PLEURODESIS WITH VIDEO-ASSISTED THORACOSCOPIC SURGERY (VATS) Right 8/5/2019    Procedure: VATS, WITH PLEURAL TENT;  Surgeon: Klever Mckeon MD;  Location: [facility] OR [address] [place] [address];  Service: Thoracic;  Laterality: Right;       Review of patient's allergies indicates:   Allergen Reactions    Ancef in dextrose (iso-osm) Rash    Cefazolin Rash    Cefuroxime Rash    Sulfamethoxazole-trimethoprim Rash     Other reaction(s): Rash       No current facility-administered medications on file prior to encounter.     Current Outpatient Medications on File Prior to Encounter   Medication Sig    acetaminophen (TYLENOL) 500 MG tablet Take 1,000 mg by mouth daily as needed for Pain.    albuterol  (PROAIR HFA) 90 mcg/actuation inhaler Inhale 1 puff into the lungs every 6 (six) hours as needed for Wheezing or Shortness of Breath. Rescue    aspirin 81 mg Tab Take 1 tablet by mouth once daily.     azelastine (ASTELIN) 137 mcg (0.1 %) nasal spray 2 sprays by Nasal route 2 (two) times daily.    budesonide-formoterol 160-4.5 mcg (SYMBICORT) 160-4.5 mcg/actuation HFAA Inhale 2 puffs into the lungs every 12 (twelve) hours. Controller    bumetanide (BUMEX) 1 MG tablet Take 1 mg by mouth once daily.    ferrous sulfate 325 (65 FE) MG EC tablet Take 1 tablet (325 mg total) by mouth once daily.    ipratropium (ATROVENT) 42 mcg (0.06 %) nasal spray 2 sprays by Nasal route 2 (two) times daily.    levalbuterol (XOPENEX) 0.63 mg/3 mL nebulizer solution Take 3 mLs (0.63 mg total) by nebulization every 4 (four) hours as needed for Wheezing or Shortness of Breath. Rescue    metoprolol tartrate (LOPRESSOR) 25 MG tablet Take 12.5 mg by mouth 2 (two) times daily.    pantoprazole (PROTONIX) 40 MG tablet Take 40 mg by mouth every morning.    potassium chloride (MICRO-K) 10 MEQ CpSR Take 10 mEq by mouth once daily.    tiotropium bromide (SPIRIVA RESPIMAT) 2.5 mcg/actuation inhaler Inhale 2 puffs into the lungs Daily. Controller    baclofen (LIORESAL) 10 MG tablet TK 1 T PO TID    multivitamin (THERAGRAN) per tablet Take 1 tablet by mouth once daily.    rOPINIRole (REQUIP) 0.25 MG tablet Take 1 tablet (0.25 mg total) by mouth every evening. (Patient taking differently: No sig reported)    [DISCONTINUED] furosemide (LASIX) 40 MG tablet Take 1 tablet (40 mg total) by mouth once daily.     Family History       Problem Relation (Age of Onset)    Hypertension Mother          Tobacco Use    Smoking status: Former Smoker     Packs/day: 2.00     Years: 20.00     Pack years: 40.00     Types: Cigarettes     Quit date: 1980     Years since quittin.1    Smokeless tobacco: Never Used   Substance and Sexual Activity     Alcohol use: Yes     Alcohol/week: 2.0 standard drinks     Types: 2 Glasses of wine per week     Comment: social    Drug use: No    Sexual activity: Not Currently     Review of Systems   Constitutional:  Negative for appetite change, chills, fatigue and fever.   HENT:  Positive for congestion. Negative for rhinorrhea, sinus pain and sore throat.    Respiratory:  Positive for cough and shortness of breath. Negative for chest tightness and wheezing.    Cardiovascular:  Positive for leg swelling. Negative for chest pain and palpitations.   Gastrointestinal:  Negative for abdominal distention, abdominal pain, constipation, diarrhea, nausea and vomiting.   Genitourinary:  Negative for difficulty urinating, dysuria, flank pain and hematuria.   Musculoskeletal:  Negative for arthralgias.   Neurological:  Negative for dizziness, weakness, light-headedness, numbness and headaches.   Psychiatric/Behavioral:  Negative for agitation, behavioral problems and confusion.    Objective:     Vital Signs (Most Recent):  Temp: 98.1 °F (36.7 °C) (03/12/22 1033)  Pulse: 72 (03/12/22 1303)  Resp: (!) 32 (03/12/22 1253)  BP: (!) 108/54 (03/12/22 1303)  SpO2: 98 % (03/12/22 1313)   Vital Signs (24h Range):  Temp:  [98.1 °F (36.7 °C)] 98.1 °F (36.7 °C)  Pulse:  [67-98] 72  Resp:  [19-32] 32  SpO2:  [94 %-100 %] 98 %  BP: (108-148)/(54-82) 108/54        There is no height or weight on file to calculate BMI.    Physical Exam  Vitals and nursing note reviewed.   Constitutional:       General: She is not in acute distress.     Appearance: Normal appearance. She is not ill-appearing.   HENT:      Head: Normocephalic and atraumatic.      Nose: Nose normal. No congestion.      Mouth/Throat:      Mouth: Mucous membranes are moist.      Pharynx: Oropharynx is clear.   Eyes:      General:         Right eye: No discharge.         Left eye: No discharge.      Conjunctiva/sclera: Conjunctivae normal.   Cardiovascular:      Rate and Rhythm: Normal rate  and regular rhythm.      Pulses: Normal pulses.      Heart sounds: Normal heart sounds. No murmur heard.    No friction rub.   Pulmonary:      Effort: Pulmonary effort is normal.      Breath sounds: Rales (faint, bibasilar) present. No wheezing or rhonchi.      Comments: 94% on 2.5L NC  Abdominal:      General: Abdomen is flat. Bowel sounds are normal.      Palpations: Abdomen is soft.      Tenderness: There is no abdominal tenderness. There is no guarding or rebound.   Musculoskeletal:         General: Normal range of motion.      Right lower leg: Edema (2+ to ankle) present.      Left lower leg: Edema (faint) present.   Skin:     General: Skin is warm and dry.      Findings: No lesion or rash.   Neurological:      General: No focal deficit present.      Mental Status: She is alert and oriented to person, place, and time. Mental status is at baseline.      Comments: Oriented to person, place, year   Psychiatric:         Mood and Affect: Mood normal.         Behavior: Behavior normal.           Significant Labs: All pertinent labs within the past 24 hours have been reviewed.  CBC:   Recent Labs   Lab 03/12/22  1103   WBC 7.05   HGB 12.6   HCT 39.3   *     CMP:   Recent Labs   Lab 03/12/22  1103      K 3.4*   CL 90*   CO2 34*   *   BUN 23   CREATININE 1.2   CALCIUM 9.6   PROT 5.8*   ALBUMIN 2.8*   BILITOT 0.9   ALKPHOS 128   AST 23   ALT 13   ANIONGAP 12   EGFRNONAA 39.1*     Cardiac Markers:   Recent Labs   Lab 03/12/22  1103   BNP 2,684*     Magnesium:   Recent Labs   Lab 03/12/22  1103   MG 1.9     POCT Glucose: No results for input(s): POCTGLUCOSE in the last 48 hours.  Troponin:   Recent Labs   Lab 03/12/22  1103 03/12/22  1359   TROPONINI 0.247* 0.237*     Urine Studies:   Recent Labs   Lab 03/12/22  1258   COLORU Straw   APPEARANCEUA Clear   PHUR 7.0   SPECGRAV 1.005   PROTEINUA Negative   GLUCUA Negative   KETONESU Negative   BILIRUBINUA Negative   OCCULTUA Negative   NITRITE Negative    LEUKOCYTESUR Negative       Significant Imaging: I have reviewed all pertinent imaging results/findings within the past 24 hours.    Assessment/Plan:     * Acute on chronic diastolic heart failure  2/2 hypertrophic cardiomyopathy  Acute on chronic respiratory failure  Patient presents with worsening dyspnea and increased oxygen needs X 1 day after home bumex dose reduced to 1 mg daily 1 week prior.   - started on CHF pathway  - 94% on 4L on admit--> able to wean to home 2.5 L  - BNP 2684  - CXR with emphysematous changes  - home diuretic: bumex 1 mg daily  - given lasix 40 mg IV in ED, continue 40 mg IV BID  - continue home BB  - repeat TTE pending  - strict I/Os, daily weights  - cardiac diet, 1.5 L fluid restriction    - Results for orders placed during the hospital encounter of 09/21/21    Echo    Interpretation Summary  · Severe left atrial enlargement.  · The left ventricle is normal in size with concentric remodeling and normal systolic function.  · The estimated ejection fraction is 55%.  · Grade II left ventricular diastolic dysfunction.  · Normal right ventricular size with low normal right ventricular systolic function.  · Mild-to-moderate mitral regurgitation.  · Intermediate central venous pressure (8 mmHg).    Elevated troponin  - troponin 0.247, will trend X 3  - EKG with Initial EKG reading anterior STEMI with isolated ST elevations in V3, PVCs, and intermittent a paced V sensed rhythm.  - cardiology consulted in ED, appreciate assistance  - bedside echo with EF of 65% with no WMA, IVC collapsible, will obtain formal echo  - no need to repeat EKG unless patient has chest pain  - device interrogation pending     Pacemaker  Placed in 2017 after multiple 4 sec sinus arrest pauses noted on 24 hour Holter monitor  - interrogation ordered    Centrilobular emphysema  - continue home inhalers  - xopenex PRN  - no evidence of underlying exacerbation currently    Coronary artery disease  - continue home  ASA    Iron deficiency  - Hgb 12.6  - continue ferrous sulfate    Stage 3 chronic kidney disease  - Cr 1.2, at baseline  - monitor with diuresis    Hypertension  - chronic and stable  - continue home metoprolol 25 mg daily  - cardiac diet      VTE Risk Mitigation (From admission, onward)         Ordered     enoxaparin injection 30 mg  Daily         03/12/22 1309     IP VTE HIGH RISK PATIENT  Once         03/12/22 1309     Place sequential compression device  Until discontinued         03/12/22 1309     Place sequential compression device  Until discontinued         03/12/22 1306                   Marce Stephenson PA-C  Department of Hospital Medicine   [facility] - Emergency Dept

## 2022-03-12 NOTE — ASSESSMENT & PLAN NOTE
- troponin 0.247, will trend X 3  - EKG with Initial EKG reading anterior STEMI with isolated ST elevations in V3, PVCs, and intermittent a paced V sensed rhythm.  - cardiology consulted in ED, appreciate assistance  - bedside echo with EF of 65% with no WMA, IVC collapsible, will obtain formal echo  - no need to repeat EKG unless patient has chest pain  - device interrogation pending

## 2022-03-12 NOTE — HPI
Mrs. Mayer is a 89 year old with a history of diastolic heart failure, COPD on home oxygen 2.5 L, hypertension, and carotid artery disease s/p L CEA in 2008 with restenosis, and Apical and septal HCM who presents with acute onset of worsening dyspnea yesterday evening at 7pm. Daughter Bri at bedside assists with HPI. Patient reports SOB is worse with lying down. It is associated with mild swelling in her RLE, chronically her RLE gets more swollen than her left. She states she has been having some difficulty with swallowing 2/2 to dyspnea. Daughter noticed a productive cough for one day three days PTA which is now dry, congestion, and watery eyes which she related to allergies. Patient was on bumex 1 mg BID, but dose was reduced to 1 mg daily about 1 week prior due to weight loss from 90-91 lbs to 85 lbs and concern for dehydration. Weight has been stable around 85 lbs since reducing dose. Patient denies fever, chills, CP, palpitations, wheezing, chest tightness, abdominal pain, N/V, D/C, urinary symptoms, new numbness or tingling. She lives alone but has 24 hour care. At baseline she is able to ambulate with help of walker.     In ED, VSS. 94% on 4L. Afebrile without leukocytosis. CXR with emphysematous changes. BNP 2684. Troponin 0.247 (prior 0.1). EKG notable for intermittently a paced V sensed rhythm with PVCs and ST elevations in V3 with mild 0.5 mm ST depressions inferiorly. Cardiology evaluated in ED for abnormal EKG. Bedside echo with EF 65%, no WMA abnormalities. Patient given lasix 40 mg IV and able to wean to home O2 of 2.5 L. Patient admitted to observation for CHF exacerbation.

## 2022-03-12 NOTE — ED NOTES
Pt arrived from home for eval of SOB. On home O2 2.5L. daughter at bedside. Pt lethargic and responds to voice. Other daughter called and reports sneezing and runny nose over the past few days. Fingers dusky. SPO2 probe placed on ear and reading 96% to 99% on 2.5L O2 NC with good waveform. Has BLE edema present. Resp shallow. Diminished lung sounds. On cont cardiac, bp, and spo2 monitors. WCTM.

## 2022-03-12 NOTE — EKG INTERPRETATIONS - EMERGENCY DEPT.
Independently interpreted by MD:  Rate 73, mix of NS, A pace, and V pace, RBBB, KELSIE, ST depressions in II/III, PVC

## 2022-03-13 LAB
ANION GAP SERPL CALC-SCNC: 15 MMOL/L (ref 8–16)
ASCENDING AORTA: 2.43 CM
AV INDEX (PROSTH): 0.93
AV MEAN GRADIENT: 2 MMHG
AV PEAK GRADIENT: 4 MMHG
AV VALVE AREA: 2.4 CM2
AV VELOCITY RATIO: 0.88
BSA FOR ECHO PROCEDURE: 1.32 M2
BUN SERPL-MCNC: 22 MG/DL (ref 10–30)
CALCIUM SERPL-MCNC: 9.4 MG/DL (ref 8.7–10.5)
CHLORIDE SERPL-SCNC: 88 MMOL/L (ref 95–110)
CO2 SERPL-SCNC: 40 MMOL/L (ref 23–29)
CREAT SERPL-MCNC: 1 MG/DL (ref 0.5–1.4)
CV ECHO LV RWT: 0.56 CM
DOP CALC AO PEAK VEL: 1.04 M/S
DOP CALC AO VTI: 17.8 CM
DOP CALC LVOT AREA: 2.6 CM2
DOP CALC LVOT DIAMETER: 1.81 CM
DOP CALC LVOT PEAK VEL: 0.92 M/S
DOP CALC LVOT STROKE VOLUME: 42.79 CM3
DOP CALCLVOT PEAK VEL VTI: 16.64 CM
E/E' RATIO: 34.29 M/S
ECHO LV POSTERIOR WALL: 1 CM (ref 0.6–1.1)
EJECTION FRACTION: 60 %
EST. GFR  (AFRICAN AMERICAN): 56.1 ML/MIN/1.73 M^2
EST. GFR  (NON AFRICAN AMERICAN): 48.7 ML/MIN/1.73 M^2
FRACTIONAL SHORTENING: 42 % (ref 28–44)
GLUCOSE SERPL-MCNC: 79 MG/DL (ref 70–110)
INTERVENTRICULAR SEPTUM: 0.98 CM (ref 0.6–1.1)
LA MAJOR: 4.91 CM
LA MINOR: 5.39 CM
LA WIDTH: 4.03 CM
LEFT ATRIUM SIZE: 3.83 CM
LEFT ATRIUM VOLUME INDEX MOD: 40.3 ML/M2
LEFT ATRIUM VOLUME INDEX: 50.3 ML/M2
LEFT ATRIUM VOLUME MOD: 53.96 CM3
LEFT ATRIUM VOLUME: 67.42 CM3
LEFT INTERNAL DIMENSION IN SYSTOLE: 2.06 CM (ref 2.1–4)
LEFT VENTRICLE DIASTOLIC VOLUME INDEX: 39.01 ML/M2
LEFT VENTRICLE DIASTOLIC VOLUME: 52.28 ML
LEFT VENTRICLE MASS INDEX: 77 G/M2
LEFT VENTRICLE SYSTOLIC VOLUME INDEX: 10.3 ML/M2
LEFT VENTRICLE SYSTOLIC VOLUME: 13.74 ML
LEFT VENTRICULAR INTERNAL DIMENSION IN DIASTOLE: 3.54 CM (ref 3.5–6)
LEFT VENTRICULAR MASS: 103.63 G
LV LATERAL E/E' RATIO: 30 M/S
LV SEPTAL E/E' RATIO: 40 M/S
MAGNESIUM SERPL-MCNC: 1.9 MG/DL (ref 1.6–2.6)
MV PEAK E VEL: 1.2 M/S
PISA TR MAX VEL: 3.03 M/S
POCT GLUCOSE: 100 MG/DL (ref 70–110)
POTASSIUM SERPL-SCNC: 3.3 MMOL/L (ref 3.5–5.1)
RA MAJOR: 4.49 CM
RA PRESSURE: 8 MMHG
RA WIDTH: 3.11 CM
RV TISSUE DOPPLER FREE WALL SYSTOLIC VELOCITY 1 (APICAL 4 CHAMBER VIEW): 7.45 CM/S
SINUS: 2.46 CM
SODIUM SERPL-SCNC: 143 MMOL/L (ref 136–145)
STJ: 1.86 CM
TDI LATERAL: 0.04 M/S
TDI SEPTAL: 0.03 M/S
TDI: 0.04 M/S
TR MAX PG: 37 MMHG
TV REST PULMONARY ARTERY PRESSURE: 45 MMHG

## 2022-03-13 PROCEDURE — 99214 PR OFFICE/OUTPT VISIT, EST, LEVL IV, 30-39 MIN: ICD-10-PCS | Mod: GC,,, | Performed by: INTERNAL MEDICINE

## 2022-03-13 PROCEDURE — 96372 THER/PROPH/DIAG INJ SC/IM: CPT | Performed by: PHYSICIAN ASSISTANT

## 2022-03-13 PROCEDURE — 80048 BASIC METABOLIC PNL TOTAL CA: CPT | Performed by: PHYSICIAN ASSISTANT

## 2022-03-13 PROCEDURE — 99226 PR SUBSEQUENT OBSERVATION CARE,LEVEL III: ICD-10-PCS | Mod: ,,, | Performed by: PHYSICIAN ASSISTANT

## 2022-03-13 PROCEDURE — 36415 COLL VENOUS BLD VENIPUNCTURE: CPT | Performed by: PHYSICIAN ASSISTANT

## 2022-03-13 PROCEDURE — 94640 AIRWAY INHALATION TREATMENT: CPT

## 2022-03-13 PROCEDURE — 25000242 PHARM REV CODE 250 ALT 637 W/ HCPCS: Performed by: INTERNAL MEDICINE

## 2022-03-13 PROCEDURE — 96376 TX/PRO/DX INJ SAME DRUG ADON: CPT | Mod: 59

## 2022-03-13 PROCEDURE — 99900035 HC TECH TIME PER 15 MIN (STAT)

## 2022-03-13 PROCEDURE — 27000221 HC OXYGEN, UP TO 24 HOURS

## 2022-03-13 PROCEDURE — 94761 N-INVAS EAR/PLS OXIMETRY MLT: CPT

## 2022-03-13 PROCEDURE — 99214 OFFICE O/P EST MOD 30 MIN: CPT | Mod: GC,,, | Performed by: INTERNAL MEDICINE

## 2022-03-13 PROCEDURE — 83735 ASSAY OF MAGNESIUM: CPT | Performed by: PHYSICIAN ASSISTANT

## 2022-03-13 PROCEDURE — 63600175 PHARM REV CODE 636 W HCPCS: Performed by: PHYSICIAN ASSISTANT

## 2022-03-13 PROCEDURE — G0378 HOSPITAL OBSERVATION PER HR: HCPCS

## 2022-03-13 PROCEDURE — 25000003 PHARM REV CODE 250: Performed by: PHYSICIAN ASSISTANT

## 2022-03-13 PROCEDURE — 99226 PR SUBSEQUENT OBSERVATION CARE,LEVEL III: CPT | Mod: ,,, | Performed by: PHYSICIAN ASSISTANT

## 2022-03-13 RX ORDER — POTASSIUM CHLORIDE 20 MEQ/1
40 TABLET, EXTENDED RELEASE ORAL ONCE
Status: COMPLETED | OUTPATIENT
Start: 2022-03-13 | End: 2022-03-13

## 2022-03-13 RX ORDER — GUAIFENESIN 600 MG/1
600 TABLET, EXTENDED RELEASE ORAL 2 TIMES DAILY
Status: DISCONTINUED | OUTPATIENT
Start: 2022-03-13 | End: 2022-03-14 | Stop reason: HOSPADM

## 2022-03-13 RX ORDER — BUMETANIDE 1 MG/1
1 TABLET ORAL 2 TIMES DAILY
Status: DISCONTINUED | OUTPATIENT
Start: 2022-03-13 | End: 2022-03-14 | Stop reason: HOSPADM

## 2022-03-13 RX ADMIN — POLYETHYLENE GLYCOL 3350 17 G: 17 POWDER, FOR SOLUTION ORAL at 09:03

## 2022-03-13 RX ADMIN — FUROSEMIDE 40 MG: 40 INJECTION, SOLUTION INTRAMUSCULAR; INTRAVENOUS at 09:03

## 2022-03-13 RX ADMIN — POTASSIUM CHLORIDE 40 MEQ: 1500 TABLET, EXTENDED RELEASE ORAL at 09:03

## 2022-03-13 RX ADMIN — ACETAMINOPHEN 650 MG: 325 TABLET ORAL at 09:03

## 2022-03-13 RX ADMIN — FERROUS SULFATE TAB 325 MG (65 MG ELEMENTAL FE) 1 EACH: 325 (65 FE) TAB at 09:03

## 2022-03-13 RX ADMIN — PANTOPRAZOLE SODIUM 40 MG: 40 TABLET, DELAYED RELEASE ORAL at 07:03

## 2022-03-13 RX ADMIN — ENOXAPARIN SODIUM 30 MG: 30 INJECTION, SOLUTION INTRAVENOUS; SUBCUTANEOUS at 05:03

## 2022-03-13 RX ADMIN — FLUTICASONE FUROATE AND VILANTEROL TRIFENATATE 1 PUFF: 100; 25 POWDER RESPIRATORY (INHALATION) at 08:03

## 2022-03-13 RX ADMIN — METOPROLOL TARTRATE 12.5 MG: 25 TABLET, FILM COATED ORAL at 08:03

## 2022-03-13 RX ADMIN — ASPIRIN 81 MG CHEWABLE TABLET 81 MG: 81 TABLET CHEWABLE at 09:03

## 2022-03-13 RX ADMIN — TIOTROPIUM BROMIDE INHALATION SPRAY 2 PUFF: 3.12 SPRAY, METERED RESPIRATORY (INHALATION) at 08:03

## 2022-03-13 RX ADMIN — BUMETANIDE 1 MG: 1 TABLET ORAL at 08:03

## 2022-03-13 RX ADMIN — METOPROLOL TARTRATE 12.5 MG: 25 TABLET, FILM COATED ORAL at 09:03

## 2022-03-13 NOTE — PLAN OF CARE
Patient alert x4 ,  Hard of hearing even with hearing aids . Family by bedside.  Patient slept without distress.   Problem: Adult Inpatient Plan of Care  Goal: Optimal Comfort and Wellbeing  Outcome: Ongoing, Progressing     Problem: Adult Inpatient Plan of Care  Goal: Patient-Specific Goal (Individualized)  Outcome: Ongoing, Progressing

## 2022-03-13 NOTE — CONSULTS
Food & Nutrition  Education    Diet Education: Low sodium   Time Spent: 10 minutes  Learners: Patient, 1 family member    Family member at bedside reports pt very familiar with low sodium diet. Nutrition Education provided with handouts. All questions and concerns answered. Dietitian's contact information provided.     Reports good appetite PTA/currently & UBW of 95#; chart review confirms. Appears thin, however no indicators of malnutrition noted. Will monitor.    Follow-Up: Yes    Please re-consult as needed.    Thanks!  MS Clarice, RD, LDN

## 2022-03-13 NOTE — SUBJECTIVE & OBJECTIVE
Interval History: Patient seen at bedside with son. Reports improvement in SOB, on home O2. Down ~700cc IVF. TTE pending. Cardiac device interrogation tomorrow. Continue IV diuresis, nearing euvolemia.     Review of Systems   Constitutional:  Negative for chills and fever.   HENT:  Negative for congestion and rhinorrhea.    Respiratory:  Positive for cough and shortness of breath. Negative for wheezing.    Cardiovascular:  Negative for chest pain and leg swelling.   Gastrointestinal:  Negative for abdominal distention and abdominal pain.   Genitourinary:  Negative for difficulty urinating, flank pain and hematuria.   Musculoskeletal:  Negative for arthralgias and back pain.   Neurological:  Negative for dizziness.   Psychiatric/Behavioral:  Negative for agitation and behavioral problems.    Objective:     Vital Signs (Most Recent):  Temp: 97.4 °F (36.3 °C) (03/13/22 1135)  Pulse: 66 (03/13/22 1400)  Resp: 18 (03/13/22 1135)  BP: (!) 101/54 (03/13/22 1400)  SpO2: 100 % (03/13/22 1135)   Vital Signs (24h Range):  Temp:  [97.3 °F (36.3 °C)-98.4 °F (36.9 °C)] 97.4 °F (36.3 °C)  Pulse:  [61-94] 66  Resp:  [16-22] 18  SpO2:  [91 %-100 %] 100 %  BP: (101-145)/(54-64) 101/54     Weight: 41.3 kg (91 lb)  Body mass index is 17.77 kg/m².    Intake/Output Summary (Last 24 hours) at 3/13/2022 1549  Last data filed at 3/13/2022 1000  Gross per 24 hour   Intake 120 ml   Output 700 ml   Net -580 ml      Physical Exam  Vitals and nursing note reviewed.   Constitutional:       General: She is not in acute distress.     Appearance: Normal appearance. She is not ill-appearing.   HENT:      Head: Normocephalic and atraumatic.      Nose: Nose normal. No congestion.      Mouth/Throat:      Mouth: Mucous membranes are moist.      Pharynx: Oropharynx is clear.   Eyes:      General:         Right eye: No discharge.         Left eye: No discharge.      Conjunctiva/sclera: Conjunctivae normal.   Cardiovascular:      Rate and Rhythm: Normal  rate and regular rhythm.      Pulses: Normal pulses.      Heart sounds: Normal heart sounds. No murmur heard.    No friction rub.   Pulmonary:      Effort: Pulmonary effort is normal.      Breath sounds: No wheezing, rhonchi or rales.      Comments: 94% on 2.5L NC  Abdominal:      General: Abdomen is flat. Bowel sounds are normal.      Palpations: Abdomen is soft.      Tenderness: There is no abdominal tenderness. There is no guarding or rebound.   Musculoskeletal:         General: Normal range of motion.      Right lower leg: Edema (faint to ankle) present.      Left lower leg: No edema (faint).   Skin:     General: Skin is warm and dry.      Findings: No lesion or rash.   Neurological:      General: No focal deficit present.      Mental Status: She is alert and oriented to person, place, and time. Mental status is at baseline.      Comments: Oriented to person, place, year   Psychiatric:         Mood and Affect: Mood normal.         Behavior: Behavior normal.       Significant Labs: All pertinent labs within the past 24 hours have been reviewed.  CBC:   Recent Labs   Lab 03/12/22  1103   WBC 7.05   HGB 12.6   HCT 39.3   *     CMP:   Recent Labs   Lab 03/12/22  1103 03/13/22  0425    143   K 3.4* 3.3*   CL 90* 88*   CO2 34* 40*   * 79   BUN 23 22   CREATININE 1.2 1.0   CALCIUM 9.6 9.4   PROT 5.8*  --    ALBUMIN 2.8*  --    BILITOT 0.9  --    ALKPHOS 128  --    AST 23  --    ALT 13  --    ANIONGAP 12 15   EGFRNONAA 39.1* 48.7*       Significant Imaging: I have reviewed all pertinent imaging results/findings within the past 24 hours.

## 2022-03-13 NOTE — HOSPITAL COURSE
Mrs. Mayer was admitted to observation for CHF exacerbation with acute on chronic respiratory failure. Started on Lasix IV BID and able to wean to home 2.5L O2 NC. Cardiology consulted for abnormal EKG, bedside echo with EF 65%, no WMA. Troponin elevated but flat, likely demand in setting of CHF exacerbation. Formal TTE with EF 60%, stable septal hypertrophy, grade II diastolic dysfunction, intermediate CVP (8 mmHg), pulmonary hypertension, mild right ventricular enlargement with mildly reduced systolic function, mild TR, MR. Cardiac device interrogation complete, no concerning findings, report updated. Patient transitioned to home oral bumex. Patient discharged on bumex 1 mg BID X 5 days, then instructed to return to 1 mg daily and use PRN BID. BMP in 1 week. Close follow up with CHF clinic scheduled. Patient and daughter verbalized understanding. All questions answered.

## 2022-03-13 NOTE — PROGRESS NOTES
[facility]  Cardiology  Progress Note     Hospital Length of Stay: 0    Interval History: No acute events overnight. Patient denies chest pain or palpitations this morning. No new issues.     Vitals:  Temp:  [97.3 °F (36.3 °C)-98.4 °F (36.9 °C)] 97.4 °F (36.3 °C)  Pulse:  [61-94] 61  Resp:  [16-32] 18  SpO2:  [91 %-100 %] 100 %  BP: (101-145)/(51-67) 101/54    Intake/Output    Intake/Output Summary (Last 24 hours) at 3/13/2022 1206  Last data filed at 3/13/2022 1000  Gross per 24 hour   Intake 120 ml   Output 700 ml   Net -580 ml       Physical Exam  Gen: No apparent distress, resting comfortably  HEENT: Pupils equal and reactive to light  Cardio: Regular rate, point of maximal impulse not displaced, no murmur noted, 2+ radial pulses bilaterally, 2+ DP pulses bilaterally  Resp: CTAB, no wheezing  Abd: Soft, non-tender, non-distended  Skin: Warm, dry, no peripheral edema noted  Neuro: Alert and oriented x3  Psych: Normal mood and affect    Labs:  Recent Labs   Lab 03/12/22  1103   WBC 7.05   HGB 12.6   HCT 39.3   *     Recent Labs   Lab 03/12/22  1103 03/13/22  0425    143   K 3.4* 3.3*   CL 90* 88*   CO2 34* 40*   BUN 23 22   CREATININE 1.2 1.0   * 79   CALCIUM 9.6 9.4   MG 1.9 1.9          Current Meds:   aspirin  81 mg Oral Daily    enoxaparin  30 mg Subcutaneous Daily    ferrous sulfate  1 tablet Oral Daily    fluticasone furoate-vilanteroL  1 puff Inhalation Daily    furosemide (LASIX) injection  40 mg Intravenous Q12H    metoprolol tartrate  12.5 mg Oral BID    pantoprazole  40 mg Oral QAM    polyethylene glycol  17 g Oral Daily    tiotropium bromide  2 puff Inhalation Daily       Continuous Infusions:      PRN:  acetaminophen, acetaminophen, aluminum-magnesium hydroxide-simethicone, bisacodyL, dextrose 10%, dextrose 10%, glucagon (human recombinant), glucose, glucose, HYDROcodone-acetaminophen, levalbuterol, melatonin, naloxone, ondansetron, simethicone, sodium  chloride 0.9%    Assessment and Plan:    Acute on chronic diastolic heart failure:  Secondary to hypertrophic cardiomyopathy  Bedside echo showing the EF of 65% with no wall motion abnormalities.  IVC collapsible.  -Continue IV diuresis  -Continue metoprolol  -Follow up formal Echo     Hypertrophic cardiomyopathy  -diuresis and beta-blocker as stated above     Abnormal EKG/Demand ischemia:  Initial EKG reading anterior STEMI with isolated ST elevations in V3, PVCs, and intermittent a paced V sensed rhythm.  -no need to repeat unless patient is having chest pain  -Troponin likely elevated due to heart failure exacerbation. Has peaked and downtrended     Status post permanent pacemaker placement  Indication 2017 was multiple 4 sec sinus arrest pauses noted on 24 hour Holter monitor  -Device interrogation pending    Case staffed with Dr. Lozano. Will continue to follow.      Lloyd Dorsey MD  [facility] PGY-4    Pager number: [phone]    Staff attestation to follow.    This note was prepared using voice recognition system and may have sound alike errors that may have been overlooked even with proof reading.

## 2022-03-13 NOTE — PROGRESS NOTES
[facility] - Tele[facility] Stepdown ([place] [address])  [facility]  Progress Note    Patient Name: Venus Mayer  MRN: [id number]  Patient Class: OP- Observation   Admission Date: 3/12/2022  Length of Stay: 0 days  Attending Physician: Lacy Cifuentes MD  Primary Care Provider: Jona Che MD        Subjective:     Principal Problem:Acute on chronic diastolic heart failure        HPI:  Mrs. Mayer is a 89 year old with a history of diastolic heart failure, COPD on home oxygen 2.5 L, hypertension, and carotid artery disease s/p L CEA in 2008 with restenosis, and Apical and septal HCM who presents with acute onset of worsening dyspnea yesterday evening at 7pm. Daughter Bri at bedside assists with HPI. Patient reports SOB is worse with lying down. It is associated with mild swelling in her RLE, chronically her RLE gets more swollen than her left. She states she has been having some difficulty with swallowing 2/2 to dyspnea. Daughter noticed a productive cough for one day three days PTA which is now dry, congestion, and watery eyes which she related to allergies. Patient was on bumex 1 mg BID, but dose was reduced to 1 mg daily about 1 week prior due to weight loss from 90-91 lbs to 85 lbs and concern for dehydration. Weight has been stable around 85 lbs since reducing dose. Patient denies fever, chills, CP, palpitations, wheezing, chest tightness, abdominal pain, N/V, D/C, urinary symptoms, new numbness or tingling. She lives alone but has 24 hour care. At baseline she is able to ambulate with help of walker.     In ED, VSS. 94% on 4L. Afebrile without leukocytosis. CXR with emphysematous changes. BNP 2684. Troponin 0.247 (prior 0.1). EKG notable for intermittently a paced V sensed rhythm with PVCs and ST elevations in V3 with mild 0.5 mm ST depressions inferiorly. Cardiology evaluated in ED for abnormal EKG. Bedside echo with EF 65%, no WMA abnormalities. Patient given lasix 40 mg IV and able to wean to home O2  of 2.5 L. Patient admitted to observation for CHF exacerbation.       Overview/Hospital Course:  Mrs. Mayer was admitted to observation for CHF exacerbation with acute on chronic respiratory failure. Started on Lasix IV BID and able to wean to home 2.5L O2 NC. Cardiology consulted for abnormal EKG, bedside echo with EF 65%, no WMA. Troponin elevated but flat, likely demand in setting of CHF exacerbation. Formal TTE pending. Cardiac device interrogation pending.       Interval History: Patient seen at bedside with son. Reports improvement in SOB, on home O2. Down ~700cc IVF. TTE pending. Cardiac device interrogation tomorrow. Continue IV diuresis, nearing euvolemia.     Review of Systems   Constitutional:  Negative for chills and fever.   HENT:  Negative for congestion and rhinorrhea.    Respiratory:  Positive for cough and shortness of breath. Negative for wheezing.    Cardiovascular:  Negative for chest pain and leg swelling.   Gastrointestinal:  Negative for abdominal distention and abdominal pain.   Genitourinary:  Negative for difficulty urinating, flank pain and hematuria.   Musculoskeletal:  Negative for arthralgias and back pain.   Neurological:  Negative for dizziness.   Psychiatric/Behavioral:  Negative for agitation and behavioral problems.    Objective:     Vital Signs (Most Recent):  Temp: 97.4 °F (36.3 °C) (03/13/22 1135)  Pulse: 66 (03/13/22 1400)  Resp: 18 (03/13/22 1135)  BP: (!) 101/54 (03/13/22 1400)  SpO2: 100 % (03/13/22 1135)   Vital Signs (24h Range):  Temp:  [97.3 °F (36.3 °C)-98.4 °F (36.9 °C)] 97.4 °F (36.3 °C)  Pulse:  [61-94] 66  Resp:  [16-22] 18  SpO2:  [91 %-100 %] 100 %  BP: (101-145)/(54-64) 101/54     Weight: 41.3 kg (91 lb)  Body mass index is 17.77 kg/m².    Intake/Output Summary (Last 24 hours) at 3/13/2022 1543  Last data filed at 3/13/2022 1000  Gross per 24 hour   Intake 120 ml   Output 700 ml   Net -580 ml      Physical Exam  Vitals and nursing note reviewed.   Constitutional:        General: She is not in acute distress.     Appearance: Normal appearance. She is not ill-appearing.   HENT:      Head: Normocephalic and atraumatic.      Nose: Nose normal. No congestion.      Mouth/Throat:      Mouth: Mucous membranes are moist.      Pharynx: Oropharynx is clear.   Eyes:      General:         Right eye: No discharge.         Left eye: No discharge.      Conjunctiva/sclera: Conjunctivae normal.   Cardiovascular:      Rate and Rhythm: Normal rate and regular rhythm.      Pulses: Normal pulses.      Heart sounds: Normal heart sounds. No murmur heard.    No friction rub.   Pulmonary:      Effort: Pulmonary effort is normal.      Breath sounds: No wheezing, rhonchi or rales.      Comments: 94% on 2.5L NC  Abdominal:      General: Abdomen is flat. Bowel sounds are normal.      Palpations: Abdomen is soft.      Tenderness: There is no abdominal tenderness. There is no guarding or rebound.   Musculoskeletal:         General: Normal range of motion.      Right lower leg: Edema (faint to ankle) present.      Left lower leg: No edema (faint).   Skin:     General: Skin is warm and dry.      Findings: No lesion or rash.   Neurological:      General: No focal deficit present.      Mental Status: She is alert and oriented to person, place, and time. Mental status is at baseline.      Comments: Oriented to person, place, year   Psychiatric:         Mood and Affect: Mood normal.         Behavior: Behavior normal.       Significant Labs: All pertinent labs within the past 24 hours have been reviewed.  CBC:   Recent Labs   Lab 03/12/22  1103   WBC 7.05   HGB 12.6   HCT 39.3   *     CMP:   Recent Labs   Lab 03/12/22  1103 03/13/22  0425    143   K 3.4* 3.3*   CL 90* 88*   CO2 34* 40*   * 79   BUN 23 22   CREATININE 1.2 1.0   CALCIUM 9.6 9.4   PROT 5.8*  --    ALBUMIN 2.8*  --    BILITOT 0.9  --    ALKPHOS 128  --    AST 23  --    ALT 13  --    ANIONGAP 12 15   EGFRNONAA 39.1* 48.7*        Significant Imaging: I have reviewed all pertinent imaging results/findings within the past 24 hours.      Assessment/Plan:      * Acute on chronic diastolic heart failure  2/2 hypertrophic cardiomyopathy  Acute on chronic respiratory failure  Patient presents with worsening dyspnea and increased oxygen needs X 1 day after home bumex dose reduced to 1 mg daily 1 week prior.   - started on CHF pathway  - 94% on 4L on admit--> able to wean to home 2.5 L  - BNP 2684  - CXR with emphysematous changes  - home diuretic: bumex 1 mg daily  - given lasix 40 mg IV in ED, continue 40 mg IV BID  - continue home BB  - repeat TTE pending  - strict I/Os, daily weights  - cardiac diet, 1.5 L fluid restriction    - Results for orders placed during the hospital encounter of 09/21/21    Echo    Interpretation Summary  · Severe left atrial enlargement.  · The left ventricle is normal in size with concentric remodeling and normal systolic function.  · The estimated ejection fraction is 55%.  · Grade II left ventricular diastolic dysfunction.  · Normal right ventricular size with low normal right ventricular systolic function.  · Mild-to-moderate mitral regurgitation.  · Intermediate central venous pressure (8 mmHg).    Elevated troponin  - troponin 0.247> 0.23>.21   - EKG with Initial EKG reading anterior STEMI with isolated ST elevations in V3, PVCs, and intermittent a paced V sensed rhythm.  - cardiology consulted in ED, appreciate assistance  - bedside echo with EF of 65% with no WMA, IVC collapsible, will obtain formal echo  - no need to repeat EKG unless patient has chest pain  - repeat TTE pending   - device interrogation pending     Pacemaker  Placed in 2017 after multiple 4 sec sinus arrest pauses noted on 24 hour Holter monitor  - interrogation ordered    Centrilobular emphysema  - continue home inhalers  - xopenex PRN  - no evidence of underlying exacerbation currently    Coronary artery disease  - continue home  ASA    Iron deficiency  - Hgb 12.6  - continue ferrous sulfate    Stage 3 chronic kidney disease  - Cr 1.2, at baseline  - monitor with diuresis    Hypertension  - chronic and stable  - continue home metoprolol 25 mg daily  - cardiac diet      VTE Risk Mitigation (From admission, onward)         Ordered     enoxaparin injection 30 mg  Daily         03/12/22 1309     IP VTE HIGH RISK PATIENT  Once         03/12/22 1309     Place sequential compression device  Until discontinued         03/12/22 1309     Place sequential compression device  Until discontinued         03/12/22 1306                Discharge Planning   TRISTON:      Code Status: DNR   Is the patient medically ready for discharge?:     Reason for patient still in hospital (select all that apply): Patient trending condition, Laboratory test, Treatment, Imaging and Consult recommendations                     Marce Stephenson PA-C  Department of Hospital Medicine   [facility] - Telemetry Stepdown ([place])

## 2022-03-13 NOTE — ASSESSMENT & PLAN NOTE
- troponin 0.247> 0.23>.21   - EKG with Initial EKG reading anterior STEMI with isolated ST elevations in V3, PVCs, and intermittent a paced V sensed rhythm.  - cardiology consulted in ED, appreciate assistance  - bedside echo with EF of 65% with no WMA, IVC collapsible, will obtain formal echo  - no need to repeat EKG unless patient has chest pain  - repeat TTE pending   - device interrogation pending

## 2022-03-13 NOTE — NURSING
Report received from DAE Ornelas RN. Patient awake, alert, and oriented x 4. Lying in bed-semi fowlers position. NAD noted. Respirations are even and unlabored. 2L O2 via nasal canula. Continuous pulse oximetry in place- Sats at 95% .Tele monitor in place. Plan of care reviewed. Son at bedside. Education provided. Instruction given on use of call light. Bed locked and in lowest position. All safety measures are in place. Communication board updated with direct nursing extension. RN will continue to monitor.

## 2022-03-13 NOTE — NURSING
Home Oxygen Evaluation    Date Performed: 3/13/2022  Ms. Mayer is not a room air candidate. She requires home O2 of 2.5L via nasal canula.  1)O2 Sat on room air, while at rest (O2 removed) is 85%.        If O2 sats on room air at rest are 88% or below, patient qualifies. No additional testing needed. Document N/A in steps 2 and 3. If 89% or above, complete steps 2.      2.) Ms. Limas O2 Sat on room air while exercising was not performed as this is an unsafe action.        3) Ms. Alex O2 Sat while exercising on O2: at 2.5 LPM remained between 92-94%.         (Must show improvement from #2 for patients to qualify)     If O2 sats improve on oxygen, patient qualifies for portable oxygen. If not, the patient does not qualify.

## 2022-03-14 ENCOUNTER — DOCUMENTATION ONLY (OUTPATIENT)
Dept: CARDIOLOGY | Facility: HOSPITAL | Age: 87
End: 2022-03-14
Payer: MEDICARE

## 2022-03-14 VITALS
WEIGHT: 91 LBS | BODY MASS INDEX: 17.87 KG/M2 | TEMPERATURE: 98 F | HEART RATE: 84 BPM | OXYGEN SATURATION: 95 % | SYSTOLIC BLOOD PRESSURE: 134 MMHG | DIASTOLIC BLOOD PRESSURE: 63 MMHG | RESPIRATION RATE: 18 BRPM | HEIGHT: 60 IN

## 2022-03-14 DIAGNOSIS — I50.33 ACUTE ON CHRONIC DIASTOLIC HEART FAILURE: Primary | ICD-10-CM

## 2022-03-14 LAB
ANION GAP SERPL CALC-SCNC: 14 MMOL/L (ref 8–16)
BUN SERPL-MCNC: 26 MG/DL (ref 10–30)
CALCIUM SERPL-MCNC: 9.7 MG/DL (ref 8.7–10.5)
CHLORIDE SERPL-SCNC: 89 MMOL/L (ref 95–110)
CO2 SERPL-SCNC: 36 MMOL/L (ref 23–29)
CREAT SERPL-MCNC: 1.2 MG/DL (ref 0.5–1.4)
EST. GFR  (AFRICAN AMERICAN): 45 ML/MIN/1.73 M^2
EST. GFR  (NON AFRICAN AMERICAN): 39.1 ML/MIN/1.73 M^2
GLUCOSE SERPL-MCNC: 75 MG/DL (ref 70–110)
MAGNESIUM SERPL-MCNC: 1.8 MG/DL (ref 1.6–2.6)
POCT GLUCOSE: 107 MG/DL (ref 70–110)
POCT GLUCOSE: 131 MG/DL (ref 70–110)
POCT GLUCOSE: 89 MG/DL (ref 70–110)
POTASSIUM SERPL-SCNC: 4 MMOL/L (ref 3.5–5.1)
SODIUM SERPL-SCNC: 139 MMOL/L (ref 136–145)

## 2022-03-14 PROCEDURE — 80048 BASIC METABOLIC PNL TOTAL CA: CPT | Performed by: PHYSICIAN ASSISTANT

## 2022-03-14 PROCEDURE — 25000242 PHARM REV CODE 250 ALT 637 W/ HCPCS: Performed by: INTERNAL MEDICINE

## 2022-03-14 PROCEDURE — 99900035 HC TECH TIME PER 15 MIN (STAT)

## 2022-03-14 PROCEDURE — 27000221 HC OXYGEN, UP TO 24 HOURS

## 2022-03-14 PROCEDURE — G0378 HOSPITAL OBSERVATION PER HR: HCPCS

## 2022-03-14 PROCEDURE — 99217 PR OBSERVATION CARE DISCHARGE: CPT | Mod: ,,, | Performed by: PHYSICIAN ASSISTANT

## 2022-03-14 PROCEDURE — 83735 ASSAY OF MAGNESIUM: CPT | Performed by: PHYSICIAN ASSISTANT

## 2022-03-14 PROCEDURE — 25000003 PHARM REV CODE 250: Performed by: PHYSICIAN ASSISTANT

## 2022-03-14 PROCEDURE — 99217 PR OBSERVATION CARE DISCHARGE: ICD-10-PCS | Mod: ,,, | Performed by: PHYSICIAN ASSISTANT

## 2022-03-14 PROCEDURE — 36415 COLL VENOUS BLD VENIPUNCTURE: CPT | Performed by: PHYSICIAN ASSISTANT

## 2022-03-14 PROCEDURE — 94640 AIRWAY INHALATION TREATMENT: CPT

## 2022-03-14 RX ORDER — BUMETANIDE 1 MG/1
1 TABLET ORAL 2 TIMES DAILY
Qty: 60 TABLET | Refills: 3 | Status: ON HOLD | OUTPATIENT
Start: 2022-03-14 | End: 2022-04-11 | Stop reason: HOSPADM

## 2022-03-14 RX ADMIN — METOPROLOL TARTRATE 12.5 MG: 25 TABLET, FILM COATED ORAL at 08:03

## 2022-03-14 RX ADMIN — GUAIFENESIN 600 MG: 600 TABLET, EXTENDED RELEASE ORAL at 12:03

## 2022-03-14 RX ADMIN — PANTOPRAZOLE SODIUM 40 MG: 40 TABLET, DELAYED RELEASE ORAL at 07:03

## 2022-03-14 RX ADMIN — TIOTROPIUM BROMIDE INHALATION SPRAY 2 PUFF: 3.12 SPRAY, METERED RESPIRATORY (INHALATION) at 10:03

## 2022-03-14 RX ADMIN — ASPIRIN 81 MG CHEWABLE TABLET 81 MG: 81 TABLET CHEWABLE at 08:03

## 2022-03-14 RX ADMIN — FERROUS SULFATE TAB 325 MG (65 MG ELEMENTAL FE) 1 EACH: 325 (65 FE) TAB at 08:03

## 2022-03-14 RX ADMIN — FLUTICASONE FUROATE AND VILANTEROL TRIFENATATE 1 PUFF: 100; 25 POWDER RESPIRATORY (INHALATION) at 10:03

## 2022-03-14 RX ADMIN — BUMETANIDE 1 MG: 1 TABLET ORAL at 08:03

## 2022-03-14 RX ADMIN — POLYETHYLENE GLYCOL 3350 17 G: 17 POWDER, FOR SOLUTION ORAL at 08:03

## 2022-03-14 RX ADMIN — GUAIFENESIN 600 MG: 600 TABLET, EXTENDED RELEASE ORAL at 08:03

## 2022-03-14 NOTE — PROGRESS NOTES
"[facility]) nurse navigator notified of HFTCC candidate in need of education and introduction to 4-6 week program.      PT aao x 3 while sitting in chair with 2 daughters at bedside. Introduced self to pt as HFT[facility] nurse navigator.     Patient given "Home Care Guide for Heart Failure Patients" , "Heart Failure [facility]" flyer and "Daily weight and symptom tracker".  Encouraged pt and caregiver to review information.      Reviewed the following key points of HFTCC program with pt and family:   1.) Take your medications as directed.    2.) Weight yourself daily   3.) Follow low salt and limited fluid diet.    4.) Stop smoking and start exercising   5.) Go to your appointments and call your team.      Pt reminded to follow Symptom tracker and to call at the onset of symptoms according to tracker.     Reviewed plan for follow up once discharged to include phone calls, in person and virtual visits to assist pt optimizing their heart failure medication regimen and encouraging healthy lifestyle modifications.  Reminded pt that program will assist them over the next 4-6 weeks and then patient will be transferred to long term care provider .  Reminded pt how to contact HFTCC navigator via phone and or via [employer].     Pt given appointment or instructed appointment will be printed on hospital discharge paperwork.     Pt also reminded RN will call 48-72 hours after discharge to check on them.     PT and family verbalize read back of information given.  Encouraged pt and family to read over information often and contact team with any questions or concerns.       "

## 2022-03-14 NOTE — ASSESSMENT & PLAN NOTE
- troponin 0.247> 0.23>.21   - EKG with Initial EKG reading anterior STEMI with isolated ST elevations in V3, PVCs, and intermittent a paced V sensed rhythm.  - cardiology consulted in ED, appreciate assistance  - bedside echo with EF of 65% with no WMA, IVC collapsible, will obtain formal echo  - no need to repeat EKG unless patient has chest pain  - repeat TTE without WMA  - device interrogation complete, no concerning findings  - suspect elevated troponin in setting of CHF exacerbation

## 2022-03-14 NOTE — PLAN OF CARE
[facility] - Telemetry Stepdown ([place] [id number])      HOME HEALTH ORDERS  FACE TO FACE ENCOUNTER    Patient Name: Venus Mayer  YOB: 1929    PCP: Jona Che MD   PCP Address: [address] [facility] [id number] / [place] [address]  PCP Phone Number: [phone]  PCP Fax: [phone]    Encounter Date: 3/12/22    Admit to Home Health    Diagnoses:  Active Hospital Problems    Diagnosis  POA    *Acute on chronic diastolic heart failure [I50.33]  Yes     Priority: 1 - High    Acute on chronic respiratory failure with hypoxia and hypercapnia [J96.21, J96.22]  Yes     Priority: 1 - High     Patient needs to maintain SpO2 88% and above, do not increase.  Continues to benefit from a portable oxygen concentrator of choice.          Elevated troponin [R77.8]  Yes     Priority: 2     Pacemaker [Z95.0]  Yes    Centrilobular emphysema [J43.2]  Yes     Change spiriva handihaler to respimat  Continue symbicort 2 puffs twice daily  xopenex every 4-6 hours.        Coronary artery disease [I25.10]  Yes    Iron deficiency [E61.1]  Yes    Stage 3 chronic kidney disease [N18.30]  Yes    Hypertension [I10]  Yes    HOCM (hypertrophic obstructive cardiomyopathy): Apical HCM [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  Nl EF 70%        Resolved Hospital Problems   No resolved problems to display.       Follow Up Appointments:  Future Appointments   Date Time Provider Department Center   3/22/2022 10:30 AM LAB, APPOINTMENT [facility]   3/22/2022 11:00 AM Adelita Emmanuel PA-C [facility]   3/22/2022 11:00 AM [facility] HEART FAILURE NURSE [facility] Wisemany       Allergies:  Review of patient's allergies indicates:   Allergen Reactions    Ancef in dextrose (iso-osm) Rash    Cefazolin Rash    Cefuroxime Rash    Sulfamethoxazole-trimethoprim Rash     Other reaction(s): Rash       Medications: Review discharge medications with patient and family and provide education.    Current  Facility-Administered Medications   Medication Dose Route Frequency Provider Last Rate Last Admin    acetaminophen tablet 650 mg  650 mg Oral Q8H PRN Marce Stephenson PA-C        acetaminophen tablet 650 mg  650 mg Oral Q4H PRN BHUPINDER Aldridge-C   650 mg at 03/13/22 0921    aluminum-magnesium hydroxide-simethicone 200-200-20 mg/5 mL suspension 30 mL  30 mL Oral QID PRN SANYA AdlridgeC        aspirin chewable tablet 81 mg  81 mg Oral Daily BHUPINDER Aldridge-C   81 mg at 03/14/22 0841    bisacodyL suppository 10 mg  10 mg Rectal Daily PRN Marce Stephenson PA-C        bumetanide tablet 1 mg  1 mg Oral BID BHUPINDER Aldridge-C   1 mg at 03/14/22 0841    dextrose 10% bolus 125 mL  12.5 g Intravenous PRN Edgardo Monae MD        dextrose 10% bolus 250 mL  25 g Intravenous PRN Edgardo Monae MD        enoxaparin injection 30 mg  30 mg Subcutaneous Daily BHUPINDER Aldridge-C   30 mg at 03/13/22 1755    ferrous sulfate tablet 1 each  1 tablet Oral Daily BHUPINDER Aldridge-C   1 each at 03/14/22 0841    fluticasone furoate-vilanteroL 100-25 mcg/dose diskus inhaler 1 puff  1 puff Inhalation Daily BHUPINDER Aldridge-C   1 puff at 03/14/22 1010    glucagon (human recombinant) injection 1 mg  1 mg Intramuscular PRN Marce Stephenson PA-C        glucose chewable tablet 16 g  16 g Oral PRN BHUPINDER Aldridge-C        glucose chewable tablet 24 g  24 g Oral PRN BHUPINDER Aldridge-C        guaiFENesin 12 hr tablet 600 mg  600 mg Oral BID Belia Gagnon PA-C   600 mg at 03/14/22 0841    HYDROcodone-acetaminophen 5-325 mg per tablet 1 tablet  1 tablet Oral Q6H PRN BHUPINDER Aldridge-C        levalbuterol nebulizer solution 0.63 mg  1 ampule Nebulization Q4H PRN Marce Stephenson PA-C        melatonin tablet 9 mg  9 mg Oral Nightly PRN Marce Stephenson PA-C        metoprolol tartrate (LOPRESSOR) split tablet 12.5 mg  12.5 mg Oral BID Marce Stephenson,  PA-C   12.5 mg at 03/14/22 0840    naloxone 0.4 mg/mL injection 0.02 mg  0.02 mg Intravenous PRN BHUPINDER Aldridge-C        ondansetron disintegrating tablet 4 mg  4 mg Oral Q8H PRN Marce Stephenson PA-C        pantoprazole EC tablet 40 mg  40 mg Oral QAM BHUPINDER Aldridge-C   40 mg at 03/14/22 0749    polyethylene glycol packet 17 g  17 g Oral Daily BHUPINDER Aldridge-C   17 g at 03/14/22 0841    simethicone chewable tablet 80 mg  1 tablet Oral QID PRN BHUPINDER Aldridge-C        sodium chloride 0.9% flush 10 mL  10 mL Intravenous PRN BHUPINDER Aldridge-C        tiotropium bromide 2.5 mcg/actuation inhaler 2 puff  2 puff Inhalation Daily Lacy Cifuentes MD   2 puff at 03/14/22 1010     Current Discharge Medication List      CONTINUE these medications which have CHANGED    Details   bumetanide (BUMEX) 1 MG tablet Take 1 tablet (1 mg total) by mouth 2 (two) times daily.  Qty: 60 tablet, Refills: 3         CONTINUE these medications which have NOT CHANGED    Details   acetaminophen (TYLENOL) 500 MG tablet Take 1,000 mg by mouth daily as needed for Pain.      albuterol (PROAIR HFA) 90 mcg/actuation inhaler Inhale 1 puff into the lungs every 6 (six) hours as needed for Wheezing or Shortness of Breath. Rescue  Qty: 1 Inhaler, Refills: 5      aspirin 81 mg Tab Take 1 tablet by mouth once daily.       azelastine (ASTELIN) 137 mcg (0.1 %) nasal spray 2 sprays by Nasal route 2 (two) times daily.      budesonide-formoterol 160-4.5 mcg (SYMBICORT) 160-4.5 mcg/actuation HFAA Inhale 2 puffs into the lungs every 12 (twelve) hours. Controller  Qty: 10.2 g, Refills: 1      ferrous sulfate 325 (65 FE) MG EC tablet Take 1 tablet (325 mg total) by mouth once daily.  Refills: 0      ipratropium (ATROVENT) 42 mcg (0.06 %) nasal spray 2 sprays by Nasal route 2 (two) times daily.      levalbuterol (XOPENEX) 0.63 mg/3 mL nebulizer solution Take 3 mLs (0.63 mg total) by nebulization every 4 (four) hours as  needed for Wheezing or Shortness of Breath. Rescue  Qty: 3 mL, Refills: 1      metoprolol tartrate (LOPRESSOR) 25 MG tablet Take 12.5 mg by mouth 2 (two) times daily.      pantoprazole (PROTONIX) 40 MG tablet Take 40 mg by mouth every morning.      potassium chloride (MICRO-K) 10 MEQ CpSR Take 10 mEq by mouth once daily.      tiotropium bromide (SPIRIVA RESPIMAT) 2.5 mcg/actuation inhaler Inhale 2 puffs into the lungs Daily. Controller  Qty: 4 g, Refills: 11      baclofen (LIORESAL) 10 MG tablet TK 1 T PO TID      multivitamin (THERAGRAN) per tablet Take 1 tablet by mouth once daily.      rOPINIRole (REQUIP) 0.25 MG tablet Take 1 tablet (0.25 mg total) by mouth every evening.  Qty: 30 tablet, Refills: 11               I have seen and examined this patient within the last 30 days. My clinical findings that support the need for the home health skilled services and home bound status are the following:no   Weakness/numbness causing balance and gait disturbance due to Weakness/Debility making it taxing to leave home.     Diet:   cardiac diet    Labs:  SN to perform labs:  BMP: Weekly; once       Referrals/ Consults  Physical Therapy to evaluate and treat. Evaluate for home safety and equipment needs; Establish/upgrade home exercise program. Perform / instruct on therapeutic exercises, gait training, transfer training, and Range of Motion.  Occupational Therapy to evaluate and treat. Evaluate home environment for safety and equipment needs. Perform/Instruct on transfers, ADL training, ROM, and therapeutic exercises.   to evaluate for community resources/long-range planning.  Aide to provide assistance with personal care, ADLs, and vital signs.    Activities:   activity as tolerated    Nursing:   Agency to admit patient within 24 hours of hospital discharge unless specified on physician order or at patient request    SN to complete comprehensive assessment including routine vital signs. Instruct on disease  process and s/s of complications to report to MD. Review/verify medication list sent home with the patient at time of discharge  and instruct patient/caregiver as needed. Frequency may be adjusted depending on start of care date.     Skilled nurse to perform up to 3 visits PRN for symptoms related to diagnosis    Notify MD if SBP > 160 or < 90; DBP > 90 or < 50; HR > 120 or < 50; Temp > 101; O2 < 88%;     Ok to schedule additional visits based on staff availability and patient request on consecutive days within the home health episode.    When multiple disciplines ordered:    Start of Care occurs on Sunday - Wednesday schedule remaining discipline evaluations as ordered on separate consecutive days following the start of care.    Thursday SOC -schedule subsequent evaluations Friday and Monday the following week.     Friday - Saturday SOC - schedule subsequent discipline evaluations on consecutive days starting Monday of the following week.    For all post-discharge communication and subsequent orders please contact patient's primary care physician.     Miscellaneous   Routine Skin for Bedridden Patients: Instruct patient/caregiver to apply moisture barrier cream to all skin folds and wet areas in perineal area daily and after baths and all bowel movements.  Heart Failure:      SN to instruct on the following:    Instruct on the definition of CHF.   Instruct on the signs/sympoms of CHF to be reported.   Instruct on and monitor daily weights.   Instruct on factors that cause exacerbation.   Instruct on action, dose, schedule, and side effects of medications.   Instruct on diet as prescribed.   Instruct on activity allowed.   Instruct on life-style modifications for life long management of CHF   SN to assess compliance with daily weights, diet, medications, fluid retention,    safety precautions, activities permitted and life-style modifications.   Additional 1-2 SN visits per week as needed for signs and symptoms      of CHF exacerbation.      For Weight Gain > 2-3 lbs in 1 day or 4-6 lbs over 1 week notify PCP:      Wound Care Orders  no    I certify that this patient is confined to her home and needs intermittent skilled nursing care, physical therapy and occupational therapy.

## 2022-03-14 NOTE — PLAN OF CARE
[facility] - Telemetry Stepdown ([place])  Initial Discharge Assessment       Primary Care Provider: Jona Che MD    Admission Diagnosis: Shortness of breath [R06.02]  Sick sinus syndrome [I49.5]  Dyspnea [R06.00]  Elevated troponin [R77.8]  Chest pain [R07.9]  Acute diastolic CHF (congestive heart failure) [I50.31]  Acute on chronic congestive heart failure, unspecified heart failure type [I50.9]    Admission Date: 3/12/2022  Expected Discharge Date: 3/14/2022    Discharge Barriers Identified: None    Payor: [employer] MANAGED MEDICARE / Plan: [employer] MEDICARE HMO / Product Type: Capitation /     Extended Emergency Contact Information  Primary Emergency Contact: Bri Cao  Mobile Phone: [phone]  Relation: Daughter  Secondary Emergency Contact: RAJINDER FLYNN  Home Phone: [phone]  Mobile Phone: [phone]  Relation: Daughter  Preferred language: English   needed? No    Discharge Plan A: Home Health, Home with family  Discharge Plan B: Home with family      [id number] - [place] - [facility] AT [facility] [address]  [facility] [place] [address]  Phone: [phone] Fax: [phone]      Initial Assessment (most recent)     Adult Discharge Assessment - 03/14/22 1445        Discharge Assessment    Assessment Type Discharge Planning Assessment     Confirmed/corrected address, phone number and insurance Yes     Confirmed Demographics Correct on Facesheet     Source of Information family     If unable to respond/provide information was family/caregiver contacted? Yes     Contact Name/Number Bri Sam (daughter) [phone]     Does patient/caregiver understand observation status --   Reviewed w/ CARR    Communicated TRISTON with patient/caregiver Yes     Reason For Admission see admitting DX     Lives With child([place]), adult;other (see comments)   [facility]    Facility Arrived From: home     Do you expect to return to your  current living situation? Yes     Do you have help at home or someone to help you manage your care at home? Yes     Who are your caregiver(s) and their phone number(s)? [employer] ; Bri (daughter) [phone]     Prior to hospitilization cognitive status: Unable to Assess     Current cognitive status: Unable to Assess   Pt would only watch soap operas and SW spoke with patient's daughter    Walking or Climbing Stairs Difficulty ambulation difficulty, requires equipment     Mobility Management walker     Dressing/Bathing Difficulty bathing difficulty, assistance 1 person;dressing difficulty, assistance 1 person     Dressing/Bathing Management needs help due to getting exhausted     Home Accessibility stairs to enter home     Number of Stairs, Main Entrance three     Home Layout Able to live on 1st floor     Equipment Currently Used at Home walker, standard;oxygen     Readmission within 30 days? Yes     Patient currently being followed by outpatient case management? No     Do you currently have service(s) that help you manage your care at home? Yes     Name and Contact number of agency [employer]     Is the pt/caregiver preference to resume services with current agency Yes     Do you take prescription medications? Yes     Do you have prescription coverage? Yes     Coverage [employer]     Do you have any problems affording any of your prescribed medications? No     Is the patient taking medications as prescribed? yes     Who is going to help you get home at discharge? family     How do you get to doctors appointments? family or friend will provide     Are you on dialysis? No     Do you take coumadin? No     Discharge Plan A Home Health;Home with family     Discharge Plan B Home with family     DME Needed Upon Discharge  none     Discharge Plan discussed with: Adult children     Discharge Barriers Identified None        Relationship/Environment    Name(s) of Who Lives With Patient Bri (daughter) [phone]                        SW completed Discharge Planning Assessment with patient's daughter and patient via bedside. Patient wanted to watch soap operas and discharge assessment completed with patient's daughter. Discharge planning booklet given to patient/family and whiteboard updated with TRISTON and phone #. All questions answered.    Patient reported that family will provide transportation upon discharge.     Functional status was dependent prior and patient utilized walker and [employer] assistive equipment.     Patient lives in a home with 3 steps to enter with daughter.     Preference for Home health is [facility] and family would like [facility] referral.     Follow-up appts best on Thursday around lunch time.     Hollie Miller LMSW   - Case

## 2022-03-14 NOTE — DISCHARGE SUMMARY
[facility] [address]  [facility] Medicine  Discharge Summary      Patient Name: Venus Mayer  MRN: [id number]  Patient Class: OP- Observation  Admission Date: 3/12/2022  Hospital Length of Stay: 0 days  Discharge Date and Time: 3/14/2022  7:34 PM  Attending Physician: Lacy Cifuentes MD   Discharging Provider: Marce Stephenson PA-C  Primary Care Provider: Jona Che MD  [facility] Medicine Team: [facility] E Marce Stephenson PA-C    HPI:   Mrs. Mayer is a 89 year old with a history of diastolic heart failure, COPD on home oxygen 2.5 L, hypertension, and carotid artery disease s/p L CEA in 2008 with restenosis, and Apical and septal HCM who presents with acute onset of worsening dyspnea yesterday evening at 7pm. Daughter Bri at bedside assists with HPI. Patient reports SOB is worse with lying down. It is associated with mild swelling in her RLE, chronically her RLE gets more swollen than her left. She states she has been having some difficulty with swallowing 2/2 to dyspnea. Daughter noticed a productive cough for one day three days PTA which is now dry, congestion, and watery eyes which she related to allergies. Patient was on bumex 1 mg BID, but dose was reduced to 1 mg daily about 1 week prior due to weight loss from 90-91 lbs to 85 lbs and concern for dehydration. Weight has been stable around 85 lbs since reducing dose. Patient denies fever, chills, CP, palpitations, wheezing, chest tightness, abdominal pain, N/V, D/C, urinary symptoms, new numbness or tingling. She lives alone but has 24 hour care. At baseline she is able to ambulate with help of walker.     In ED, VSS. 94% on 4L. Afebrile without leukocytosis. CXR with emphysematous changes. BNP 2684. Troponin 0.247 (prior 0.1). EKG notable for intermittently a paced V sensed rhythm with PVCs and ST elevations in V3 with mild 0.5 mm ST depressions inferiorly. Cardiology evaluated in ED for abnormal EKG. Bedside echo with EF  65%, no WMA abnormalities. Patient given lasix 40 mg IV and able to wean to home O2 of 2.5 L. Patient admitted to observation for CHF exacerbation.       * No surgery found *      Hospital Course:   Mrs. Mayer was admitted to observation for CHF exacerbation with acute on chronic respiratory failure. Started on Lasix IV BID and able to wean to home 2.5L O2 NC. Cardiology consulted for abnormal EKG, bedside echo with EF 65%, no WMA. Troponin elevated but flat, likely demand in setting of CHF exacerbation. Formal TTE with EF 60%, stable septal hypertrophy, grade II diastolic dysfunction, intermediate CVP (8 mmHg), pulmonary hypertension, mild right ventricular enlargement with mildly reduced systolic function, mild TR, MR. Cardiac device interrogation complete, no concerning findings, report updated. Patient transitioned to home oral bumex. Patient discharged on bumex 1 mg BID X 5 days, then instructed to return to 1 mg daily and use PRN BID. BMP in 1 week. Close follow up with CHF clinic scheduled. Patient and daughter verbalized understanding. All questions answered.       Goals of Care Treatment Preferences:  Code Status: DNR       LaPOST: Yes           Consults:   Consults (From admission, onward)          Status Ordering Provider     Inpatient consult to Cardiology  Once        Provider:  (Not yet assigned)    Completed ORVILLE ONOFRE     Inpatient consult to Social Work/Case Management  Once        Provider:  (Not yet assigned)    Acknowledged CHANI MUNGUIA     Inpatient consult to Registered Dietitian/Nutritionist  Once        Provider:  (Not yet assigned)    Completed CHANI MUNGUIA            * Acute on chronic diastolic heart failure  2/2 hypertrophic cardiomyopathy  Acute on chronic respiratory failure  Patient presents with worsening dyspnea and increased oxygen needs X 1 day after home bumex dose reduced to 1 mg daily 1 week prior.   - started on CHF pathway  - 94% on 4L on admit--> able to  wean to home 2.5 L  - BNP 2684  - CXR with emphysematous changes  - home diuretic: bumex 1 mg daily  - given lasix 40 mg IV in ED, continue 40 mg IV BID  - continue home BB  - repeat TTE with preserved EF, grade II DD, see below  - strict I/Os, daily weights  - cardiac diet, 1.5 L fluid restriction  - discharged on bumex 1 mg BID X 5 days, then instructed to daily with PRN evening dose  - close follow up with CHF clinic  - [facility] 1 week    Results for orders placed during the hospital encounter of 03/12/22    Echo    Interpretation Summary  · The left ventricle is normal in size with normal systolic function.  · Moderate to severe left atrial enlargement.  · Asymmetric septal hypertrophy as noted in 2019, septum measuring 1.5cm (unchanged from prior measurement, unable to view images) without obstruction  · Grade II left ventricular diastolic dysfunction.  · The estimated ejection fraction is 60%.  · Intermediate central venous pressure (8 mmHg).  · The estimated PA systolic pressure is 45 mmHg.  · There is pulmonary hypertension.  · Mild right ventricular enlargement with mildly reduced right ventricular systolic function.  · Mild tricuspid regurgitation.  · Mild mitral regurgitation.      - Results for orders placed during the hospital encounter of 09/21/21    Echo    Interpretation Summary  · Severe left atrial enlargement.  · The left ventricle is normal in size with concentric remodeling and normal systolic function.  · The estimated ejection fraction is 55%.  · Grade II left ventricular diastolic dysfunction.  · Normal right ventricular size with low normal right ventricular systolic function.  · Mild-to-moderate mitral regurgitation.  · Intermediate central venous pressure (8 mmHg).    Elevated troponin  - troponin 0.247> 0.23>.21   - EKG with Initial EKG reading anterior STEMI with isolated ST elevations in V3, PVCs, and intermittent a paced V sensed rhythm.  - cardiology consulted in ED, appreciate  assistance  - bedside echo with EF of 65% with no WMA, IVC collapsible, will obtain formal echo  - no need to repeat EKG unless patient has chest pain  - repeat TTE without WMA  - device interrogation complete, no concerning findings  - suspect elevated troponin in setting of CHF exacerbation      Final Active Diagnoses:    Diagnosis Date Noted POA    PRINCIPAL PROBLEM:  Acute on chronic diastolic heart failure [I50.33] 01/17/2013 Yes    Acute on chronic respiratory failure with hypoxia and hypercapnia [J96.21, J96.22] 07/15/2021 Yes    Elevated troponin [R77.8] 07/15/2021 Yes    Pacemaker [Z95.0] 03/12/2022 Yes    Centrilobular emphysema [J43.2] 08/16/2021 Yes    Coronary artery disease [I25.10] 09/07/2019 Yes    Iron deficiency [E61.1] 05/24/2019 Yes    Stage 3 chronic kidney disease [N18.30] 03/28/2013 Yes    Hypertension [I10] 01/17/2013 Yes    HOCM (hypertrophic obstructive cardiomyopathy): Apical HCM [I42.1] 01/17/2013 Yes      Problems Resolved During this Admission:       Discharged Condition: good    Disposition: Home or Self Care    Follow Up:   Follow-up Information       Jona Che MD Follow up on 3/21/2022.    Specialty: General Practice  Why: Hospital follow up visit at 11:00am. Please bring your discharge summary, insurance card, ID and current medications  Contact information:  [address]  [address]  [place] [address]  [phone]                           Patient Instructions:      Basic Metabolic Panel   Standing Status: Future Standing Exp. Date: 05/13/23     Ambulatory referral/consult to [facility]   Standing Status: Future   Referral Priority: Routine Referral Type: Consultation   Referral Reason: Specialty Services Required   Number of Visits Requested: 1     Diet Cardiac     Notify your health care provider if you experience any of the following:  temperature >100.4     Notify your health care provider if you experience any of the following:   increased confusion or weakness     Notify your health care provider if you experience any of the following:  persistent dizziness, light-headedness, or visual disturbances     Notify your health care provider if you experience any of the following:  difficulty breathing or increased cough     Call MD for:  temperature >100.4   Standing Status:      Call MD for:  persistent nausea and vomiting or diarrhea   Standing Status:      Call MD for:  severe uncontrolled pain   Standing Status:      Call MD for:  redness, tenderness, or signs of infection (pain, swelling, redness, odor or green/yellow discharge around incision site)   Standing Status:      Call MD for:  difficulty breathing or increased cough   Standing Status:      Call MD for:  severe persistent headache   Standing Status:      Call MD for:  worsening rash   Standing Status:      Call MD for:  persistent dizziness, light-headedness, or visual disturbances   Standing Status:      Call MD for:  increased confusion or weakness   Standing Status:      Activity as tolerated     Ambulatory Request for Ready Responder Services   Standing Status: Future Standing Exp. Date: 03/14/23     Order Specific Question Answer Comments   Program referred to: COVID-19 Vaccine        Significant Diagnostic Studies: Labs:   CMP   Recent Labs   Lab 03/13/22  0425 03/14/22  0245    139   K 3.3* 4.0   CL 88* 89*   CO2 40* 36*   GLU 79 75   BUN 22 26   CREATININE 1.0 1.2   CALCIUM 9.4 9.7   ANIONGAP 15 14   ESTGFRAFRICA 56.1* 45.0*   EGFRNONAA 48.7* 39.1*   , CBC No results for input(s): WBC, HGB, HCT, PLT in the last 48 hours., Lipid Panel   Lab Results   Component Value Date    CHOL 176 09/22/2021    HDL 80 (H) 09/22/2021    LDLCALC 85.2 09/22/2021    TRIG 54 09/22/2021    CHOLHDL 45.5 09/22/2021    and Troponin   Recent Labs   Lab 03/12/22  1103 03/12/22  1359 03/12/22  1816   TROPONINI 0.247* 0.237* 0.211*     Cardiac Graphics: Echocardiogram:   Transthoracic echo (TTE)  complete (Cupid Only):   Results for orders placed or performed during the hospital encounter of 03/12/22   Echo   Result Value Ref Range    BSA 1.32 m2    TDI SEPTAL 0.03 m/s    LA WIDTH 4.03 cm    TDI LATERAL 0.04 m/s    LVIDd 3.54 3.5 - 6.0 cm    IVS 0.98 0.6 - 1.1 cm    Posterior Wall 1.00 0.6 - 1.1 cm    LVIDs 2.06 (A) 2.1 - 4.0 cm    FS 42 28 - 44 %    LA volume 67.42 cm3    Sinus 2.46 cm    STJ 1.86 cm    Ascending aorta 2.43 cm    LV mass 103.63 g    LA size 3.83 cm    RV S' 7.45 cm/s    Left Ventricle Relative Wall Thickness 0.56 cm    AV mean gradient 2 mmHg    AV valve area 2.40 cm2    AV Velocity Ratio 0.88     AV index (prosthetic) 0.93     Mean e' 0.04 m/s    LVOT diameter 1.81 cm    LVOT area 2.6 cm2    LVOT peak steve 0.92 m/s    LVOT peak VTI 16.64 cm    Ao peak steve 1.04 m/s    Ao VTI 17.80 cm    LVOT stroke volume 42.79 cm3    AV peak gradient 4 mmHg    TR Max Steve 3.03 m/s    LV Systolic Volume 13.74 mL    LV Systolic Volume Index 10.3 mL/m2    LV Diastolic Volume 52.28 mL    LV Diastolic Volume Index 39.01 mL/m2    LA Volume Index 50.3 mL/m2    LV Mass Index 77 g/m2    RA Major Axis 4.49 cm    Left Atrium Minor Axis 5.39 cm    Left Atrium Major Axis 4.91 cm    Triscuspid Valve Regurgitation Peak Gradient 37 mmHg    LA Volume Index (Mod) 40.3 mL/m2    LA volume (mod) 53.96 cm3    RA Width 3.11 cm    LV LATERAL E/E' RATIO 30.00 m/s    LV SEPTAL E/E' RATIO 40.00 m/s    Right Atrial Pressure (from IVC) 8 mmHg    EF 60 %    TV rest pulmonary artery pressure 45 mmHg    E/E' ratio 34.29 m/s    MV Peak E Steve 1.20 m/s    Narrative    · The left ventricle is normal in size with normal systolic function.  · Moderate to severe left atrial enlargement.  · Asymmetric septal hypertrophy as noted in 2019, septum measuring 1.5cm   (unchanged from prior measurement, unable to view images) without   obstruction  · Grade II left ventricular diastolic dysfunction.  · The estimated ejection fraction is 60%.  ·  Intermediate central venous pressure (8 mmHg).  · The estimated PA systolic pressure is 45 mmHg.  · There is pulmonary hypertension.  · Mild right ventricular enlargement with mildly reduced right ventricular   systolic function.  · Mild tricuspid regurgitation.  · Mild mitral regurgitation.          Pending Diagnostic Studies:       None           Medications:  Reconciled Home Medications:      Medication List        CHANGE how you take these medications      bumetanide 1 MG tablet  Commonly known as: BUMEX  Take 1 tablet (1 mg total) by mouth 2 (two) times daily.  What changed: when to take this     rOPINIRole 0.25 MG tablet  Commonly known as: REQUIP  Take 1 tablet (0.25 mg total) by mouth every evening.  What changed: how much to take            CONTINUE taking these medications      acetaminophen 500 MG tablet  Commonly known as: TYLENOL  Take 1,000 mg by mouth daily as needed for Pain.     albuterol 90 mcg/actuation inhaler  Commonly known as: PROAIR HFA  Inhale 1 puff into the lungs every 6 (six) hours as needed for Wheezing or Shortness of Breath. Rescue     aspirin 81 mg Tab  Take 1 tablet by mouth once daily.     azelastine 137 mcg (0.1 %) nasal spray  Commonly known as: ASTELIN  2 sprays by Nasal route 2 (two) times daily.     baclofen 10 MG tablet  Commonly known as: LIORESAL  TK 1 T PO TID     budesonide-formoterol 160-4.5 mcg 160-4.5 mcg/actuation Hfaa  Commonly known as: SYMBICORT  Inhale 2 puffs into the lungs every 12 (twelve) hours. Controller     ferrous sulfate 325 (65 FE) MG EC tablet  Take 1 tablet (325 mg total) by mouth once daily.     ipratropium 42 mcg (0.06 %) nasal spray  Commonly known as: ATROVENT  2 sprays by Nasal route 2 (two) times daily.     levalbuterol 0.63 mg/3 mL nebulizer solution  Commonly known as: XOPENEX  Take 3 mLs (0.63 mg total) by nebulization every 4 (four) hours as needed for Wheezing or Shortness of Breath. Rescue     metoprolol tartrate 25 MG tablet  Commonly known  as: LOPRESSOR  Take 12.5 mg by mouth 2 (two) times daily.     multivitamin per tablet  Commonly known as: THERAGRAN  Take 1 tablet by mouth once daily.     pantoprazole 40 MG tablet  Commonly known as: PROTONIX  Take 40 mg by mouth every morning.     potassium chloride 10 MEQ Cpsr  Commonly known as: MICRO-K  Take 10 mEq by mouth once daily.     SPIRIVA RESPIMAT 2.5 mcg/actuation inhaler  Generic drug: tiotropium bromide  Inhale 2 puffs into the lungs Daily. Controller              Indwelling Lines/Drains at time of discharge:   Lines/Drains/Airways       None                   Time spent on the discharge of patient:   36 minutes         Marce Stephenson PA-C  Department of Hospital Medicine  [facility] - Telemetry Stepdown ([place]

## 2022-03-14 NOTE — PROGRESS NOTES
Cardiac device interrogation and/or reprogramming completed by industry representative, Ava OLIVER with [employer]; please refer to report located in the Media tab.

## 2022-03-14 NOTE — ASSESSMENT & PLAN NOTE
2/2 hypertrophic cardiomyopathy  Acute on chronic respiratory failure  Patient presents with worsening dyspnea and increased oxygen needs X 1 day after home bumex dose reduced to 1 mg daily 1 week prior.   - started on CHF pathway  - 94% on 4L on admit--> able to wean to home 2.5 L  - BNP 2684  - CXR with emphysematous changes  - home diuretic: bumex 1 mg daily  - given lasix 40 mg IV in ED, continue 40 mg IV BID  - continue home BB  - repeat TTE with preserved EF, grade II DD, see below  - strict I/Os, daily weights  - cardiac diet, 1.5 L fluid restriction  - discharged on bumex 1 mg BID X 5 days, then instructed to daily with PRN evening dose  - close follow up with CHF clinic  - BMP 1 week    Results for orders placed during the hospital encounter of 03/12/22    Echo    Interpretation Summary  · The left ventricle is normal in size with normal systolic function.  · Moderate to severe left atrial enlargement.  · Asymmetric septal hypertrophy as noted in 2019, septum measuring 1.5cm (unchanged from prior measurement, unable to view images) without obstruction  · Grade II left ventricular diastolic dysfunction.  · The estimated ejection fraction is 60%.  · Intermediate central venous pressure (8 mmHg).  · The estimated PA systolic pressure is 45 mmHg.  · There is pulmonary hypertension.  · Mild right ventricular enlargement with mildly reduced right ventricular systolic function.  · Mild tricuspid regurgitation.  · Mild mitral regurgitation.      - Results for orders placed during the hospital encounter of 09/21/21    Echo    Interpretation Summary  · Severe left atrial enlargement.  · The left ventricle is normal in size with concentric remodeling and normal systolic function.  · The estimated ejection fraction is 55%.  · Grade II left ventricular diastolic dysfunction.  · Normal right ventricular size with low normal right ventricular systolic function.  · Mild-to-moderate mitral regurgitation.  · Intermediate  central venous pressure (8 mmHg).

## 2022-03-14 NOTE — PLAN OF CARE
Roseline sent to [facility] per Pt request.     03/14/22 2598   Post-Acute Status   Post-Acute Authorization Home Health   Home Health Status Referrals Sent   Discharge Plan   Discharge Plan A Home Health   Discharge Plan B Home Health       Thor Garcia RN,BSN

## 2022-03-14 NOTE — NURSING
Pacemaker device information received from daughter is as follows:    [employer] Model# [id number]  Mauribisa SANABRIA MRI  Serial # [id number]

## 2022-03-14 NOTE — NURSING
Report received from MEE Benton RN. RN. Patient awake, alert, and oriented x 4. Lying in bed-semi fowlers position. NAD noted. Respirations are even and unlabored. 2.5L O2 via nasal canula. Continuous pulse oximetry in place- Sats at 95%. Tele monitor in place. Plan of care reviewed. Daughter in law at bedside. Education provided. Instruction given on use of call light. Bed locked and in lowest position. All safety measures are in place. Communication board updated with direct nursing extension. RN will continue to monitor.

## 2022-03-15 ENCOUNTER — TELEPHONE (OUTPATIENT)
Dept: CARDIOLOGY | Facility: CLINIC | Age: 87
End: 2022-03-15
Payer: MEDICARE

## 2022-03-15 NOTE — PLAN OF CARE
[facility] - Telemetry Stepdown ([facility])  Discharge Final Note    Primary Care Provider: Jona Che MD     Future Appointments   Date Time Provider Department Center   3/22/2022 10:30 AM LAB, APPOINTMENT [facility] LAB [facility]   3/22/2022 11:00 AM Adelita Emmanuel PA-C [facility] [address]   3/22/2022 11:00 AM [facility] HEART FAILURE NURSE [facility] [address]       Pt dischArged home with [facility]. They are scheduled to see pt on 3/16/22.    Thor Garcia, RN,BSN        Expected Discharge Date: 3/14/2022    Final Discharge Note (most recent)     Final Note - 03/15/22 0856        Final Note    Anticipated Discharge Disposition Home-Health Care [facility]     Hospital Resources/Appts/Education Provided Provided patient/caregiver with written discharge plan information;Appointments scheduled and added to AVS        Post-Acute Status    Home Health Status Set-up Complete/Auth obtained     Discharge Delays None known at this time                 Important Message from Medicare             Contact Info     Jona Che MD   Specialty: General Practice   Relationship: PCP - General    [address] [place], [place] [address] [facility]  [address]  [place] [address]   Phone: [phone]       Next Steps: Follow up on 3/21/2022    Instructions: Hospital follow up visit at 11:00am. Please bring your discharge summary, insurance card, ID and current medications

## 2022-03-15 NOTE — TELEPHONE ENCOUNTER
"Heart Failure [facility]) hospital discharge 48-72 hour phone follow up completed.     Most Recent Hospital Discharge Date: 3/14/22  Last admission Diagnosis/chief complaint:SOB    TCC RN Navigator spoke with daughterBri    Current Patient reported weight: 80 lbs (home scale)    Pt reports the following:  []  Shortness of Breath with Activity  []  Shortness of Breath at rest   []  Fatigue  []  Edema   [] Chest pain or tightness  [] Weight Increase since discharge  [x] Wet Cough    Medications:    Discharge medication reviewed with pt.  Pt reports having medication list available and has all medications at home for use per list.     Education:   Confirmed pt received "Home Care Guide for Heart Failure Patients" while admitted.   Reviewed key points as listed below.      Recommend 2 -3 gram sodium restriction and 1500 cc-2000 cc fluid restriction.   Encourage physical activity with graded exercise program.   Requested patient to weigh themselves daily, and to notify us if their weight increases by more than 3 lbs in 1 day or 5 lbs in 3 days.   o Reminded patient to use "Daily weight and symptom tracker" to record and guide patient on when and how to call HFTCC. PT may also use symptom tracker if no scale available  o Pt reports being in the GREEN (color) Zone. If in yellow/red, reminded that they should be calling HFTCC today or now.     Watch for these Signs and Symptoms: If any of these occur, contact HFTCC immediately:   Increase in shortness of breath with movement   Increase in swelling in your legs and ankles   Weight gain of more than 3 pounds in a day or 5 pounds in 3 days.   Difficulty breathing when you are lying down   Worsening fatigue or tiredness   Stomach bloating, a full feeling or a loss of appetite   Increased coughing--especially when you are lying down      Pt was able to verbalize back to RN in their own words correct diet/fluid restrictions, necessity for " exercise, warning signs and symptoms, when and how to contact their TCC team.      Pt educated on follow-up plan while in HFTCC program to include:   Week 1 -  F/u appt with Provider and RN (Date) 3/22/22   Week 2-5 - In person/ Virtual/ phone call check ins    Week 5-7 - Pt will discharge from HFTCC and transition to longterm care provider (Cardiology/PCP/ Advanced Heart Failure).      Patient active on myChart? yes      Pt given the following contact information for ease of communication: [phone] (Mon-Fri, 8a-5p) & for urgent issues on the weekend to page the Heart Transplant MD on call.  Pt also encouraged utilize [facility] messaging as well.      Will follow up with pt at first clinic visit and RN navigator available for pt questions, issues or concerns.

## 2022-03-15 NOTE — NURSING
IV x 1 removed. Pt/Daughter (Bri) given discharge instruction/teaching. Opportunity given to ask questions. All questions that were asked have been answered.  Discharge prescriptions have been electronically sent to patients preferred Pharmacy. Pt informed case mgmt will call with follow up appt information. Pt asked questions and has verbalized understanding.  No complaints upon discharge. Pt escort called to transfer to front entrance.

## 2022-03-16 PROBLEM — H66.90 ACUTE OTITIS MEDIA: Status: ACTIVE | Noted: 2022-03-16

## 2022-03-16 PROBLEM — G93.41 ACUTE METABOLIC ENCEPHALOPATHY: Status: ACTIVE | Noted: 2022-03-16

## 2022-03-16 PROBLEM — I27.81 COR PULMONALE: Status: ACTIVE | Noted: 2022-03-16

## 2022-03-19 ENCOUNTER — NURSE TRIAGE (OUTPATIENT)
Dept: ADMINISTRATIVE | Facility: CLINIC | Age: 87
End: 2022-03-19
Payer: MEDICARE

## 2022-03-19 NOTE — TELEPHONE ENCOUNTER
Daughter, Bri calling. Per high alert notification for pt, transferred to  to get in touch with HTS staff.    If after hours or weekend, page Heart Transplant Service (HTS) staff MD on call.    Reason for Disposition   Nursing judgment or information in reference    Protocols used: NO GUIDELINE [id number]

## 2022-03-19 NOTE — TELEPHONE ENCOUNTER
Reason for Disposition   [1] Follow-up call from patient regarding patient's clinical status AND [2] information urgent    Protocols used: PCP CALL - NO TRIAGE-A-SHEMAR    Venus DEWEY is enrolled in [facility], and alert listed on chart upon opening that calls are to be referred to Heart Transplant Staff MD on call.  Bri, her daughter, is on the phone, and has been warm transferred to Dr Joyner, on call for HT for triage.     negative...

## 2022-03-20 ENCOUNTER — HOSPITAL ENCOUNTER (INPATIENT)
Facility: HOSPITAL | Age: 87
LOS: 3 days | Discharge: HOME HEALTH | DRG: 177 | End: 2022-03-24
Attending: EMERGENCY MEDICINE | Admitting: EMERGENCY MEDICINE
Payer: MEDICARE

## 2022-03-20 DIAGNOSIS — G93.41 ACUTE METABOLIC ENCEPHALOPATHY: ICD-10-CM

## 2022-03-20 DIAGNOSIS — R07.9 CHEST PAIN: ICD-10-CM

## 2022-03-20 DIAGNOSIS — R40.0 SOMNOLENCE: Primary | ICD-10-CM

## 2022-03-20 LAB
ALBUMIN SERPL BCP-MCNC: 2.7 G/DL (ref 3.5–5.2)
ALP SERPL-CCNC: 107 U/L (ref 55–135)
ALT SERPL W/O P-5'-P-CCNC: 17 U/L (ref 10–44)
AMMONIA PLAS-SCNC: 17 UMOL/L (ref 10–50)
ANION GAP SERPL CALC-SCNC: 11 MMOL/L (ref 8–16)
AST SERPL-CCNC: 26 U/L (ref 10–40)
BASOPHILS # BLD AUTO: 0.11 K/UL (ref 0–0.2)
BASOPHILS NFR BLD: 0.9 % (ref 0–1.9)
BILIRUB SERPL-MCNC: 0.6 MG/DL (ref 0.1–1)
BILIRUB UR QL STRIP: NEGATIVE
BNP SERPL-MCNC: 3484 PG/ML (ref 0–99)
BUN SERPL-MCNC: 26 MG/DL (ref 10–30)
CALCIUM SERPL-MCNC: 9.6 MG/DL (ref 8.7–10.5)
CHLORIDE SERPL-SCNC: 91 MMOL/L (ref 95–110)
CLARITY UR REFRACT.AUTO: ABNORMAL
CO2 SERPL-SCNC: 39 MMOL/L (ref 23–29)
COLOR UR AUTO: YELLOW
CREAT SERPL-MCNC: 1.1 MG/DL (ref 0.5–1.4)
CTP QC/QA: YES
DIFFERENTIAL METHOD: ABNORMAL
EOSINOPHIL # BLD AUTO: 0.4 K/UL (ref 0–0.5)
EOSINOPHIL NFR BLD: 2.9 % (ref 0–8)
ERYTHROCYTE [DISTWIDTH] IN BLOOD BY AUTOMATED COUNT: 13.4 % (ref 11.5–14.5)
EST. GFR  (AFRICAN AMERICAN): 50 ML/MIN/1.73 M^2
EST. GFR  (NON AFRICAN AMERICAN): 43.4 ML/MIN/1.73 M^2
GLUCOSE SERPL-MCNC: 83 MG/DL (ref 70–110)
GLUCOSE UR QL STRIP: NEGATIVE
HCT VFR BLD AUTO: 42.9 % (ref 37–48.5)
HGB BLD-MCNC: 13.7 G/DL (ref 12–16)
HGB UR QL STRIP: NEGATIVE
IMM GRANULOCYTES # BLD AUTO: 0.27 K/UL (ref 0–0.04)
IMM GRANULOCYTES NFR BLD AUTO: 2.3 % (ref 0–0.5)
KETONES UR QL STRIP: NEGATIVE
LACTATE SERPL-SCNC: 1 MMOL/L (ref 0.5–2.2)
LACTATE SERPL-SCNC: 1.7 MMOL/L (ref 0.5–2.2)
LEUKOCYTE ESTERASE UR QL STRIP: ABNORMAL
LYMPHOCYTES # BLD AUTO: 1.3 K/UL (ref 1–4.8)
LYMPHOCYTES NFR BLD: 11.2 % (ref 18–48)
MCH RBC QN AUTO: 31.8 PG (ref 27–31)
MCHC RBC AUTO-ENTMCNC: 31.9 G/DL (ref 32–36)
MCV RBC AUTO: 100 FL (ref 82–98)
MICROSCOPIC COMMENT: ABNORMAL
MONOCYTES # BLD AUTO: 1.1 K/UL (ref 0.3–1)
MONOCYTES NFR BLD: 9.4 % (ref 4–15)
NEUTROPHILS # BLD AUTO: 8.7 K/UL (ref 1.8–7.7)
NEUTROPHILS NFR BLD: 73.3 % (ref 38–73)
NITRITE UR QL STRIP: NEGATIVE
NON-SQ EPI CELLS #/AREA URNS AUTO: 3 /HPF
NRBC BLD-RTO: 0 /100 WBC
PH UR STRIP: 7 [PH] (ref 5–8)
PLATELET # BLD AUTO: 204 K/UL (ref 150–450)
PMV BLD AUTO: 10.4 FL (ref 9.2–12.9)
POTASSIUM SERPL-SCNC: 4.3 MMOL/L (ref 3.5–5.1)
PROT SERPL-MCNC: 6.8 G/DL (ref 6–8.4)
PROT UR QL STRIP: NEGATIVE
RBC # BLD AUTO: 4.31 M/UL (ref 4–5.4)
RBC #/AREA URNS AUTO: 2 /HPF (ref 0–4)
SARS-COV-2 RDRP RESP QL NAA+PROBE: NEGATIVE
SODIUM SERPL-SCNC: 141 MMOL/L (ref 136–145)
SP GR UR STRIP: 1.01 (ref 1–1.03)
SQUAMOUS #/AREA URNS AUTO: 5 /HPF
TROPONIN I SERPL DL<=0.01 NG/ML-MCNC: 0.16 NG/ML (ref 0–0.03)
URN SPEC COLLECT METH UR: ABNORMAL
WBC # BLD AUTO: 11.91 K/UL (ref 3.9–12.7)
WBC #/AREA URNS AUTO: 1 /HPF (ref 0–5)

## 2022-03-20 PROCEDURE — 80053 COMPREHEN METABOLIC PANEL: CPT | Performed by: EMERGENCY MEDICINE

## 2022-03-20 PROCEDURE — 83880 ASSAY OF NATRIURETIC PEPTIDE: CPT | Performed by: EMERGENCY MEDICINE

## 2022-03-20 PROCEDURE — 85025 COMPLETE CBC W/AUTO DIFF WBC: CPT | Performed by: EMERGENCY MEDICINE

## 2022-03-20 PROCEDURE — 96374 THER/PROPH/DIAG INJ IV PUSH: CPT

## 2022-03-20 PROCEDURE — 82803 BLOOD GASES ANY COMBINATION: CPT

## 2022-03-20 PROCEDURE — 96376 TX/PRO/DX INJ SAME DRUG ADON: CPT

## 2022-03-20 PROCEDURE — 84484 ASSAY OF TROPONIN QUANT: CPT | Performed by: EMERGENCY MEDICINE

## 2022-03-20 PROCEDURE — 99285 EMERGENCY DEPT VISIT HI MDM: CPT | Mod: CS,,, | Performed by: EMERGENCY MEDICINE

## 2022-03-20 PROCEDURE — 99285 EMERGENCY DEPT VISIT HI MDM: CPT | Mod: 25

## 2022-03-20 PROCEDURE — 93005 ELECTROCARDIOGRAM TRACING: CPT

## 2022-03-20 PROCEDURE — 82140 ASSAY OF AMMONIA: CPT | Performed by: EMERGENCY MEDICINE

## 2022-03-20 PROCEDURE — 93010 EKG 12-LEAD: ICD-10-PCS | Mod: ,,, | Performed by: INTERNAL MEDICINE

## 2022-03-20 PROCEDURE — U0002 COVID-19 LAB TEST NON-CDC: HCPCS | Performed by: EMERGENCY MEDICINE

## 2022-03-20 PROCEDURE — 83605 ASSAY OF LACTIC ACID: CPT | Mod: 91 | Performed by: EMERGENCY MEDICINE

## 2022-03-20 PROCEDURE — 93010 ELECTROCARDIOGRAM REPORT: CPT | Mod: ,,, | Performed by: INTERNAL MEDICINE

## 2022-03-20 PROCEDURE — 87040 BLOOD CULTURE FOR BACTERIA: CPT | Performed by: EMERGENCY MEDICINE

## 2022-03-20 PROCEDURE — 99285 PR EMERGENCY DEPT VISIT,LEVEL V: ICD-10-PCS | Mod: CS,,, | Performed by: EMERGENCY MEDICINE

## 2022-03-20 PROCEDURE — 63600175 PHARM REV CODE 636 W HCPCS: Performed by: EMERGENCY MEDICINE

## 2022-03-20 PROCEDURE — 99900035 HC TECH TIME PER 15 MIN (STAT)

## 2022-03-20 PROCEDURE — 81001 URINALYSIS AUTO W/SCOPE: CPT | Performed by: EMERGENCY MEDICINE

## 2022-03-20 RX ORDER — FUROSEMIDE 10 MG/ML
40 INJECTION INTRAMUSCULAR; INTRAVENOUS
Status: COMPLETED | OUTPATIENT
Start: 2022-03-20 | End: 2022-03-20

## 2022-03-20 RX ADMIN — FUROSEMIDE 40 MG: 10 INJECTION, SOLUTION INTRAVENOUS at 08:03

## 2022-03-20 RX ADMIN — FUROSEMIDE 40 MG: 10 INJECTION, SOLUTION INTRAVENOUS at 11:03

## 2022-03-21 PROBLEM — J44.9 COPD (CHRONIC OBSTRUCTIVE PULMONARY DISEASE): Status: ACTIVE | Noted: 2022-03-21

## 2022-03-21 LAB
ALBUMIN SERPL BCP-MCNC: 2.3 G/DL (ref 3.5–5.2)
ALLENS TEST: ABNORMAL
ALP SERPL-CCNC: 86 U/L (ref 55–135)
ALT SERPL W/O P-5'-P-CCNC: 15 U/L (ref 10–44)
ANION GAP SERPL CALC-SCNC: 10 MMOL/L (ref 8–16)
AST SERPL-CCNC: 25 U/L (ref 10–40)
BASOPHILS # BLD AUTO: 0.04 K/UL (ref 0–0.2)
BASOPHILS NFR BLD: 0.3 % (ref 0–1.9)
BILIRUB SERPL-MCNC: 0.6 MG/DL (ref 0.1–1)
BILIRUB UR QL STRIP: NEGATIVE
BUN SERPL-MCNC: 24 MG/DL (ref 10–30)
CALCIUM SERPL-MCNC: 9.2 MG/DL (ref 8.7–10.5)
CHLORIDE SERPL-SCNC: 93 MMOL/L (ref 95–110)
CLARITY UR REFRACT.AUTO: CLEAR
CO2 SERPL-SCNC: 39 MMOL/L (ref 23–29)
COLOR UR AUTO: YELLOW
CREAT SERPL-MCNC: 1.1 MG/DL (ref 0.5–1.4)
DIFFERENTIAL METHOD: ABNORMAL
EOSINOPHIL # BLD AUTO: 0.4 K/UL (ref 0–0.5)
EOSINOPHIL NFR BLD: 3.8 % (ref 0–8)
ERYTHROCYTE [DISTWIDTH] IN BLOOD BY AUTOMATED COUNT: 13.4 % (ref 11.5–14.5)
EST. GFR  (AFRICAN AMERICAN): 50 ML/MIN/1.73 M^2
EST. GFR  (NON AFRICAN AMERICAN): 43.4 ML/MIN/1.73 M^2
GLUCOSE SERPL-MCNC: 73 MG/DL (ref 70–110)
GLUCOSE UR QL STRIP: NEGATIVE
HCO3 UR-SCNC: 48.5 MMOL/L (ref 24–28)
HCT VFR BLD AUTO: 38.8 % (ref 37–48.5)
HGB BLD-MCNC: 12.2 G/DL (ref 12–16)
HGB UR QL STRIP: NEGATIVE
IMM GRANULOCYTES # BLD AUTO: 0.29 K/UL (ref 0–0.04)
IMM GRANULOCYTES NFR BLD AUTO: 2.5 % (ref 0–0.5)
KETONES UR QL STRIP: NEGATIVE
LEUKOCYTE ESTERASE UR QL STRIP: NEGATIVE
LYMPHOCYTES # BLD AUTO: 1.3 K/UL (ref 1–4.8)
LYMPHOCYTES NFR BLD: 11 % (ref 18–48)
MCH RBC QN AUTO: 31.5 PG (ref 27–31)
MCHC RBC AUTO-ENTMCNC: 31.4 G/DL (ref 32–36)
MCV RBC AUTO: 100 FL (ref 82–98)
MONOCYTES # BLD AUTO: 1.1 K/UL (ref 0.3–1)
MONOCYTES NFR BLD: 9.3 % (ref 4–15)
NEUTROPHILS # BLD AUTO: 8.3 K/UL (ref 1.8–7.7)
NEUTROPHILS NFR BLD: 73.1 % (ref 38–73)
NITRITE UR QL STRIP: NEGATIVE
NRBC BLD-RTO: 0 /100 WBC
PCO2 BLDA: 82.9 MMHG (ref 35–45)
PH SMN: 7.38 [PH] (ref 7.35–7.45)
PH UR STRIP: 6 [PH] (ref 5–8)
PLATELET # BLD AUTO: 192 K/UL (ref 150–450)
PLATELET BLD QL SMEAR: ABNORMAL
PMV BLD AUTO: 10.2 FL (ref 9.2–12.9)
PO2 BLDA: 14 MMHG (ref 40–60)
POC BE: 23 MMOL/L
POC SATURATED O2: 15 % (ref 95–100)
POC TCO2: >50 MMOL/L (ref 24–29)
POTASSIUM SERPL-SCNC: 4.2 MMOL/L (ref 3.5–5.1)
PROT SERPL-MCNC: 5.7 G/DL (ref 6–8.4)
PROT UR QL STRIP: NEGATIVE
RBC # BLD AUTO: 3.87 M/UL (ref 4–5.4)
SAMPLE: ABNORMAL
SITE: ABNORMAL
SODIUM SERPL-SCNC: 142 MMOL/L (ref 136–145)
SP GR UR STRIP: 1.01 (ref 1–1.03)
TROPONIN I SERPL DL<=0.01 NG/ML-MCNC: 0.15 NG/ML (ref 0–0.03)
URN SPEC COLLECT METH UR: NORMAL
WBC # BLD AUTO: 11.43 K/UL (ref 3.9–12.7)

## 2022-03-21 PROCEDURE — 80053 COMPREHEN METABOLIC PANEL: CPT

## 2022-03-21 PROCEDURE — 95819 PR EEG,W/AWAKE & ASLEEP RECORD: ICD-10-PCS | Mod: 26,,, | Performed by: PSYCHIATRY & NEUROLOGY

## 2022-03-21 PROCEDURE — 95819 EEG AWAKE AND ASLEEP: CPT | Mod: 26,,, | Performed by: PSYCHIATRY & NEUROLOGY

## 2022-03-21 PROCEDURE — 99223 1ST HOSP IP/OBS HIGH 75: CPT | Mod: AI,GC,, | Performed by: HOSPITALIST

## 2022-03-21 PROCEDURE — 25000003 PHARM REV CODE 250

## 2022-03-21 PROCEDURE — 25000003 PHARM REV CODE 250: Performed by: STUDENT IN AN ORGANIZED HEALTH CARE EDUCATION/TRAINING PROGRAM

## 2022-03-21 PROCEDURE — 20600001 HC STEP DOWN PRIVATE ROOM

## 2022-03-21 PROCEDURE — 63600175 PHARM REV CODE 636 W HCPCS

## 2022-03-21 PROCEDURE — 25000242 PHARM REV CODE 250 ALT 637 W/ HCPCS

## 2022-03-21 PROCEDURE — 27000221 HC OXYGEN, UP TO 24 HOURS: Mod: ER

## 2022-03-21 PROCEDURE — 95819 EEG AWAKE AND ASLEEP: CPT

## 2022-03-21 PROCEDURE — 94761 N-INVAS EAR/PLS OXIMETRY MLT: CPT | Mod: ER

## 2022-03-21 PROCEDURE — 81003 URINALYSIS AUTO W/O SCOPE: CPT

## 2022-03-21 PROCEDURE — 99223 PR INITIAL HOSPITAL CARE,LEVL III: ICD-10-PCS | Mod: AI,GC,, | Performed by: HOSPITALIST

## 2022-03-21 PROCEDURE — 63600175 PHARM REV CODE 636 W HCPCS: Performed by: STUDENT IN AN ORGANIZED HEALTH CARE EDUCATION/TRAINING PROGRAM

## 2022-03-21 PROCEDURE — 84484 ASSAY OF TROPONIN QUANT: CPT

## 2022-03-21 PROCEDURE — 85025 COMPLETE CBC W/AUTO DIFF WBC: CPT

## 2022-03-21 RX ORDER — IBUPROFEN 200 MG
16 TABLET ORAL
Status: DISCONTINUED | OUTPATIENT
Start: 2022-03-21 | End: 2022-03-24 | Stop reason: HOSPADM

## 2022-03-21 RX ORDER — ACETAMINOPHEN 325 MG/1
650 TABLET ORAL EVERY 4 HOURS PRN
Status: DISCONTINUED | OUTPATIENT
Start: 2022-03-21 | End: 2022-03-24 | Stop reason: HOSPADM

## 2022-03-21 RX ORDER — PANTOPRAZOLE SODIUM 40 MG/1
40 TABLET, DELAYED RELEASE ORAL EVERY MORNING
Status: DISCONTINUED | OUTPATIENT
Start: 2022-03-21 | End: 2022-03-24 | Stop reason: HOSPADM

## 2022-03-21 RX ORDER — GUAIFENESIN 600 MG/1
600 TABLET, EXTENDED RELEASE ORAL 2 TIMES DAILY
Status: DISCONTINUED | OUTPATIENT
Start: 2022-03-21 | End: 2022-03-24 | Stop reason: HOSPADM

## 2022-03-21 RX ORDER — ONDANSETRON 2 MG/ML
4 INJECTION INTRAMUSCULAR; INTRAVENOUS EVERY 8 HOURS PRN
Status: DISCONTINUED | OUTPATIENT
Start: 2022-03-21 | End: 2022-03-24 | Stop reason: HOSPADM

## 2022-03-21 RX ORDER — GLUCAGON 1 MG
1 KIT INJECTION
Status: DISCONTINUED | OUTPATIENT
Start: 2022-03-21 | End: 2022-03-24 | Stop reason: HOSPADM

## 2022-03-21 RX ORDER — TRIAMCINOLONE ACETONIDE 55 UG/1
2 SPRAY, METERED NASAL DAILY
Status: ON HOLD | COMMUNITY
End: 2022-05-29 | Stop reason: HOSPADM

## 2022-03-21 RX ORDER — TALC
3 POWDER (GRAM) TOPICAL NIGHTLY PRN
Status: DISCONTINUED | OUTPATIENT
Start: 2022-03-21 | End: 2022-03-24 | Stop reason: HOSPADM

## 2022-03-21 RX ORDER — NAPROXEN SODIUM 220 MG/1
81 TABLET, FILM COATED ORAL DAILY
Status: DISCONTINUED | OUTPATIENT
Start: 2022-03-21 | End: 2022-03-24 | Stop reason: HOSPADM

## 2022-03-21 RX ORDER — LEVALBUTEROL INHALATION SOLUTION 0.63 MG/3ML
0.63 SOLUTION RESPIRATORY (INHALATION) EVERY 8 HOURS PRN
Status: DISCONTINUED | OUTPATIENT
Start: 2022-03-21 | End: 2022-03-21

## 2022-03-21 RX ORDER — FLUTICASONE FUROATE AND VILANTEROL 100; 25 UG/1; UG/1
1 POWDER RESPIRATORY (INHALATION) DAILY
Refills: 1 | Status: DISCONTINUED | OUTPATIENT
Start: 2022-03-21 | End: 2022-03-24 | Stop reason: HOSPADM

## 2022-03-21 RX ORDER — BUMETANIDE 1 MG/1
1 TABLET ORAL 2 TIMES DAILY
Status: DISCONTINUED | OUTPATIENT
Start: 2022-03-21 | End: 2022-03-21

## 2022-03-21 RX ORDER — ENOXAPARIN SODIUM 100 MG/ML
30 INJECTION SUBCUTANEOUS EVERY 24 HOURS
Status: DISCONTINUED | OUTPATIENT
Start: 2022-03-21 | End: 2022-03-24 | Stop reason: HOSPADM

## 2022-03-21 RX ORDER — SODIUM CHLORIDE 0.9 % (FLUSH) 0.9 %
10 SYRINGE (ML) INJECTION EVERY 12 HOURS PRN
Status: DISCONTINUED | OUTPATIENT
Start: 2022-03-21 | End: 2022-03-24 | Stop reason: HOSPADM

## 2022-03-21 RX ORDER — TETRAHYDROZOLINE HCL 0.05 %
DROPS OPHTHALMIC (EYE)
Status: ON HOLD | COMMUNITY
End: 2022-05-29 | Stop reason: HOSPADM

## 2022-03-21 RX ORDER — NALOXONE HCL 0.4 MG/ML
0.02 VIAL (ML) INJECTION
Status: DISCONTINUED | OUTPATIENT
Start: 2022-03-21 | End: 2022-03-24 | Stop reason: HOSPADM

## 2022-03-21 RX ORDER — IBUPROFEN 200 MG
24 TABLET ORAL
Status: DISCONTINUED | OUTPATIENT
Start: 2022-03-21 | End: 2022-03-24 | Stop reason: HOSPADM

## 2022-03-21 RX ORDER — IPRATROPIUM BROMIDE AND ALBUTEROL SULFATE 2.5; .5 MG/3ML; MG/3ML
3 SOLUTION RESPIRATORY (INHALATION) EVERY 8 HOURS PRN
Status: DISCONTINUED | OUTPATIENT
Start: 2022-03-21 | End: 2022-03-24 | Stop reason: HOSPADM

## 2022-03-21 RX ORDER — FUROSEMIDE 10 MG/ML
40 INJECTION INTRAMUSCULAR; INTRAVENOUS DAILY
Status: DISCONTINUED | OUTPATIENT
Start: 2022-03-21 | End: 2022-03-22

## 2022-03-21 RX ORDER — ACETAMINOPHEN 500 MG
500 TABLET ORAL DAILY PRN
Status: ON HOLD | COMMUNITY
End: 2022-05-29 | Stop reason: HOSPADM

## 2022-03-21 RX ORDER — POLYETHYLENE GLYCOL 3350 17 G/17G
17 POWDER, FOR SOLUTION ORAL DAILY PRN
Status: ON HOLD | COMMUNITY
End: 2022-05-29 | Stop reason: HOSPADM

## 2022-03-21 RX ORDER — FUROSEMIDE 10 MG/ML
40 INJECTION INTRAMUSCULAR; INTRAVENOUS
Status: DISCONTINUED | OUTPATIENT
Start: 2022-03-21 | End: 2022-03-21

## 2022-03-21 RX ADMIN — ASPIRIN 81 MG CHEWABLE TABLET 81 MG: 81 TABLET CHEWABLE at 11:03

## 2022-03-21 RX ADMIN — BUMETANIDE 1 MG: 1 TABLET ORAL at 11:03

## 2022-03-21 RX ADMIN — FLUTICASONE FUROATE AND VILANTEROL TRIFENATATE 1 PUFF: 100; 25 POWDER RESPIRATORY (INHALATION) at 11:03

## 2022-03-21 RX ADMIN — GUAIFENESIN 600 MG: 600 TABLET, EXTENDED RELEASE ORAL at 02:03

## 2022-03-21 RX ADMIN — GUAIFENESIN 600 MG: 600 TABLET, EXTENDED RELEASE ORAL at 09:03

## 2022-03-21 RX ADMIN — PIPERACILLIN SODIUM AND TAZOBACTAM SODIUM 4.5 G: 4; .5 INJECTION, POWDER, FOR SOLUTION INTRAVENOUS at 10:03

## 2022-03-21 RX ADMIN — ENOXAPARIN SODIUM 30 MG: 100 INJECTION SUBCUTANEOUS at 05:03

## 2022-03-21 RX ADMIN — PIPERACILLIN AND TAZOBACTAM 4.5 G: 4; .5 INJECTION, POWDER, LYOPHILIZED, FOR SOLUTION INTRAVENOUS; PARENTERAL at 03:03

## 2022-03-21 RX ADMIN — FUROSEMIDE 40 MG: 10 INJECTION, SOLUTION INTRAMUSCULAR; INTRAVENOUS at 02:03

## 2022-03-21 RX ADMIN — PANTOPRAZOLE SODIUM 40 MG: 40 TABLET, DELAYED RELEASE ORAL at 11:03

## 2022-03-21 RX ADMIN — PIPERACILLIN SODIUM AND TAZOBACTAM SODIUM 4.5 G: 4; .5 INJECTION, POWDER, FOR SOLUTION INTRAVENOUS at 09:03

## 2022-03-21 NOTE — ED NOTES
Pt care assumed at this time. Patient resting in stretcher and is in NAD at this time. Pt is somnolent but responsive to voice and oriented to everything but situation, VSS, respirations even and unlabored. Pt updated on plan of care. Bed low and locked with side rails up x2, call bell in pt reach. Pt voices no needs at this time, daughter at bedside.  Will continue to monitor.    LOC: The patient is awake, alert, and oriented to self, place, time, but not situation. Pt is calm and cooperative. Affect is appropriate.  Speech is intermittently garbled but pt is responding to questions. Pt has significant hearing loss.     APPEARANCE: Patient resting comfortably in no acute distress.  Patient is clean and well groomed.    SKIN: The skin is warm and dry; color consistent with ethnicity.  Patient has normal skin turgor and moist mucus membranes.  Skin intact; bruising noted to upper extremities bilaterally, as well as LLQ of abdomen.    MUSCULOSKELETAL: Patient moving upper and lower extremities with some difficulty; denies pain in the extremities or back.  Appears weak.     RESPIRATORY: Airway is open and patent. Respirations spontaneous, even, easy, and non-labored.  Patient has a normal effort and rate.  No accessory muscle use noted. Denies cough.     CARDIAC:  Normal rate noted.  No peripheral edema noted. No complaints of chest pain.      ABDOMEN: Soft and non tender to palpation.  No distention noted. Pt denies abdominal pain; denies nausea, vomiting, diarrhea, or constipation.    NEUROLOGIC: Eyes open spontaneously.  Follows commands; facial expression symmetrical.  Purposeful motor response noted; normal sensation in all extremities. Pt denies headache; denies lightheadedness or dizziness; denies visual disturbances; denies loss of balance; denies unilateral weakness.

## 2022-03-21 NOTE — PLAN OF CARE
Patient AAOx4. No complaints this shift. UA and sputum culture set to lab. 30min EEG completed. Purewick in place. IV zosyn administered. Maintaining O2 sats on 2L NC. Bed in low locked position, call light and personal items within reach. Instructed to call if needed.

## 2022-03-21 NOTE — ED NOTES
IV access lost and unable to place line at this time or draw labs, give meds. Dr. Pacheco aware. Seeking help for US IV

## 2022-03-21 NOTE — PROVIDER PROGRESS NOTES - EMERGENCY DEPT.
Signout Note    I received signout from the previous provider.     Chief complaint:  Altered Mental Status (History of dementia. More confused per family)      Pertinent history &exam:  Venus Mayer is a 89 y.o. female with pertinent PMH of multiple recent hospital admissions who presented to emergency department with complaint of increased somnolence.    Chest x-ray questionable for pneumonia, however clinically not consistent so antibiotics were not given.    Patient COVID negative.  Elevated BNP, received 80 of Lasix.    Patient signed out pending CT head and CT chest for final disposition    Vitals:    03/21/22 0040   BP: (!) 147/69   Pulse: 63   Resp: (!) 26   Temp:        Imaging Studies:    CT Chest Without Contrast   Final Result      Resolution of large infiltrate and effusion in the right lung since the prior exam in 2019.      Interval removal of chest tube.      No acute findings evident in the chest with severe emphysematous and fibrotic lung disease.      Stable nonobstructing calculus in the upper pole of the right kidney.         Electronically signed by: Juan Luis Mae   Date:    03/20/2022   Time:    22:48      CT Head Without Contrast   Final Result      No evidence of acute hemorrhage, mass or infarction.         Electronically signed by: Juan Luis Mae   Date:    03/20/2022   Time:    22:34      X-Ray Chest AP Portable   Final Result      New airspace opacities within the mid and lower lung zones.  The findings are suggestive of evolving multifocal pneumonia.         Electronically signed by: Lavell Ford MD   Date:    03/20/2022   Time:    20:37          Medications Given:  Medications   aspirin chewable tablet 81 mg (has no administration in time range)   fluticasone furoate-vilanteroL 100-25 mcg/dose diskus inhaler 1 puff (has no administration in time range)   melatonin tablet 3 mg (has no administration in time range)   pantoprazole EC tablet 40 mg (has no administration in time range)    sodium chloride 0.9% flush 10 mL (has no administration in time range)   naloxone 0.4 mg/mL injection 0.02 mg (has no administration in time range)   glucose chewable tablet 16 g (has no administration in time range)   glucose chewable tablet 24 g (has no administration in time range)   dextrose 10% bolus 125 mL (has no administration in time range)   dextrose 10% bolus 250 mL (has no administration in time range)   glucagon (human recombinant) injection 1 mg (has no administration in time range)   enoxaparin injection 30 mg (has no administration in time range)   acetaminophen tablet 650 mg (has no administration in time range)   ondansetron injection 4 mg (has no administration in time range)   piperacillin-tazobactam 4.5 g in sodium chloride 0.9% 100 mL IVPB (ready to mix system) (has no administration in time range)   furosemide injection 40 mg (40 mg Intravenous Given 3/20/22 2056)   furosemide injection 40 mg (40 mg Intravenous Given 3/20/22 2307)       Pending Items/ MDM:  89 y.o. female with increased somnolence.    CT head without acute abnormality.  CT chest does not demonstrate multifocal pneumonia as noted on chest x-ray.    Admitted to Internal Medicine    Diagnostic Impression:    1. Somnolence    2. Chest pain         ED Disposition Condition    Admit              Anabelle Pacheco MD  Emergency Medicine

## 2022-03-21 NOTE — ED NOTES
Patient resting in stretcher and is in NAD at this time. Pt is sleeping, VSS, respirations even and unlabored. Pt updated on plan of care. Bed low and locked with side rails up x2, call bell in pt reach. Pt voices no needs at this time, daughter at bedside.  Will continue to monitor.

## 2022-03-21 NOTE — PROGRESS NOTES
Pharmacist Renal Dose Adjustment Note    Venus Mayer is a 89 y.o. female being treated with the medication piperacillin-tazobactam    Patient Data:    Vital Signs (Most Recent):  Temp: 97.7 °F (36.5 °C) (03/20/22 1929)  Pulse: 63 (03/21/22 0040)  Resp: (!) 26 (03/21/22 0040)  BP: (!) 147/69 (03/21/22 0040)  SpO2: 98 % (03/21/22 0040)   Vital Signs (72h Range):  Temp:  [97.7 °F (36.5 °C)]   Pulse:  []   Resp:  [14-26]   BP: (106-160)/(56-73)   SpO2:  [95 %-100 %]      Recent Labs   Lab 03/17/22  0743 03/18/22  0523 03/20/22 2019   CREATININE 1.11 1.16 1.1     Serum creatinine: 1.1 mg/dL 03/20/22 2019  Estimated creatinine clearance: 19.2 mL/min    Medication: piperacillin-tazobactam 4.5 grams every 8 hours will be changed to 4.5 grams every 12 hours    Pharmacist's Name: Chica Jimenez

## 2022-03-21 NOTE — SUBJECTIVE & OBJECTIVE
Past Medical History:   Diagnosis Date    Acute on chronic congestive heart failure 7/6/2019    Cardiomyopathy     Carotid artery occlusion     CHF (congestive heart failure)     COPD (chronic obstructive pulmonary disease)     Coronary artery disease     Hyperlipidemia     Hypertension        Past Surgical History:   Procedure Laterality Date    CARDIAC CATHETERIZATION      cardic cath      CAROTID ENDARTERECTOMY      FLEXIBLE BRONCHOSCOPY N/A 7/31/2019    Procedure: BRONCHOSCOPY, FIBEROPTIC Flexible;  Surgeon: Klever Mckeon MD;  [facility];  Service: Thoracic;  Laterality: N/A;    HIP SURGERY      PLEURODESIS USING TALC N/A 7/31/2019    Procedure: PLEURODESIS;  Surgeon: Klever Mckeon MD;  [facility];  Service: Thoracic;  Laterality: N/A;    PLEURODESIS USING TALC Right 8/5/2019    Procedure: PLEURODESIS, USING TALC;  Surgeon: Klever Mckeon MD;  [facility];  Service: Thoracic;  Laterality: Right;    PLEURODESIS WITH VIDEO-ASSISTED THORACOSCOPIC SURGERY (VATS) Right 8/5/2019    Procedure: VATS, WITH PLEURAL TENT;  Surgeon: Klever Mckeon MD;  Location: [facility] OR [address] [place] [address];  Service: Thoracic;  Laterality: Right;       Review of patient's allergies indicates:   Allergen Reactions    Ancef in dextrose (iso-osm) Rash    Cefazolin Rash     Tolerating Zosyn admission 3/2022    Cefuroxime Rash    Sulfamethoxazole-trimethoprim Rash     Other reaction(s): Rash       No current facility-administered medications on file prior to encounter.     Current Outpatient Medications on File Prior to Encounter   Medication Sig    acetaminophen (TYLENOL) 500 MG tablet Take 500 mg by mouth daily as needed (BACK PAIN).    amoxicillin-clavulanate 500-125mg (AUGMENTIN) 500-125 mg Tab Take 1 tablet (500 mg total) by mouth every 12 (twelve) hours. for 7 days    aspirin 81 mg Tab Take 1 tablet by mouth once daily.     budesonide-formoterol 160-4.5 mcg (SYMBICORT) 160-4.5  mcg/actuation HFAA Inhale 2 puffs into the lungs every 12 (twelve) hours. Controller    bumetanide (BUMEX) 1 MG tablet Take 1 tablet (1 mg total) by mouth 2 (two) times daily.    ferrous sulfate 325 (65 FE) MG EC tablet Take 1 tablet (325 mg total) by mouth once daily.    guaiFENesin (MUCINEX) 600 mg 12 hr tablet Take 600 mg by mouth 2 (two) times daily as needed for Congestion.    Lactobacillus rhamnosus GG (CULTURELLE) 10 billion cell capsule Take 1 capsule by mouth once daily.    levalbuterol (XOPENEX) 0.63 mg/3 mL nebulizer solution Take 3 mLs (0.63 mg total) by nebulization every 4 (four) hours as needed for Wheezing or Shortness of Breath. Rescue    MELATONIN ORAL Take 3 mg by mouth nightly as needed (sleep).    metoprolol tartrate (LOPRESSOR) 25 MG tablet Take 12.5 mg by mouth 2 (two) times daily.    multivitamin (THERAGRAN) per tablet Take 1 tablet by mouth once daily.    pantoprazole (PROTONIX) 40 MG tablet Take 40 mg by mouth every morning.    polyethylene glycol (GLYCOLAX) 17 gram/dose powder Take 17 g by mouth daily as needed (CONSTIPATION).    potassium chloride (MICRO-K) 10 MEQ CpSR Take 10 mEq by mouth once daily.    tetrahydrozoline 0.05% (VISINE) 0.05 % Drop PLACE 1 DROP INTO AFFECTED EYE(S) AS NEEDED FOR REDNESS OR ITCHING    tiotropium bromide (SPIRIVA RESPIMAT) 2.5 mcg/actuation inhaler Inhale 2 puffs into the lungs Daily. Controller    triamcinolone (NASACORT) 55 mcg nasal inhaler 2 sprays by Nasal route once daily.    UNABLE TO FIND MAGNILIFE RELAXING LEG CREAM- APPLY AT BEDTIME TO LEGS AND FEET    [DISCONTINUED] albuterol (PROAIR HFA) 90 mcg/actuation inhaler Inhale 1 puff into the lungs every 6 (six) hours as needed for Wheezing or Shortness of Breath. Rescue (Patient not taking: Reported on 3/16/2022)    [DISCONTINUED] azelastine (ASTELIN) 137 mcg (0.1 %) nasal spray 2 sprays by Nasal route 2 (two) times daily.    [DISCONTINUED] baclofen (LIORESAL) 10 MG tablet TK 1 T PO TID     [DISCONTINUED] rOPINIRole (REQUIP) 0.25 MG tablet Take 1 tablet (0.25 mg total) by mouth every evening. (Patient not taking: Reported on 3/16/2022)     Family History       Problem Relation (Age of Onset)    Hypertension Mother          Tobacco Use    Smoking status: Former Smoker     Packs/day: 2.00     Years: 20.00     Pack years: 40.00     Types: Cigarettes     Quit date: 1980     Years since quittin.2    Smokeless tobacco: Never Used   Substance and Sexual Activity    Alcohol use: Yes     Alcohol/week: 2.0 standard drinks     Types: 2 Glasses of wine per week     Comment: social    Drug use: No    Sexual activity: Not Currently     Review of Systems   Constitutional:  Positive for fatigue. Negative for chills and fever.   HENT:  Positive for congestion, sinus pressure and sinus pain.    Respiratory:  Positive for cough. Negative for shortness of breath.    Cardiovascular:  Negative for chest pain and palpitations.   Gastrointestinal:  Negative for abdominal pain, constipation and diarrhea.   Genitourinary:  Negative for difficulty urinating and enuresis.   Musculoskeletal:  Negative for back pain and myalgias.   Skin:  Negative for rash and wound.   Neurological:  Negative for dizziness, light-headedness and headaches.   Psychiatric/Behavioral:  Negative for agitation and behavioral problems.    All other systems reviewed and are negative.  Objective:     Vital Signs (Most Recent):  Temp: 98.3 °F (36.8 °C) (22 1650)  Pulse: 61 (22 1650)  Resp: 18 (22 1650)  BP: 134/68 (22 1650)  SpO2: 95 % (22 1650) Vital Signs (24h Range):  Temp:  [97.5 °F (36.4 °C)-98.3 °F (36.8 °C)] 98.3 °F (36.8 °C)  Pulse:  [] 61  Resp:  [14-26] 18  SpO2:  [95 %-100 %] 95 %  BP: (100-160)/(54-73) 134/68     Weight: 38.1 kg (84 lb)  Body mass index is 16.41 kg/m².    Physical Exam  Vitals and nursing note reviewed.   Constitutional:       General: She is sleeping.   HENT:      Head: Normocephalic  and atraumatic.      Mouth/Throat:      Mouth: Mucous membranes are dry.      Pharynx: Oropharynx is clear.   Eyes:      Extraocular Movements: Extraocular movements intact.   Cardiovascular:      Rate and Rhythm: Normal rate and regular rhythm.   Pulmonary:      Effort: Pulmonary effort is normal.      Breath sounds: Wheezing present. No rales.   Abdominal:      General: Bowel sounds are normal. There is no distension.      Palpations: Abdomen is soft.      Tenderness: There is no abdominal tenderness.   Musculoskeletal:      Cervical back: Neck supple.      Right lower leg: No edema.      Left lower leg: No edema.   Skin:     General: Skin is warm and dry.   Neurological:      General: No focal deficit present.      Mental Status: She is oriented to person, place, and time. She is lethargic.   Psychiatric:         Mood and Affect: Mood normal.         Behavior: Behavior normal.         Significant Labs: All pertinent labs within the past 24 hours have been reviewed.    Significant Imaging: I have reviewed all pertinent imaging results/findings within the past 24 hours.

## 2022-03-21 NOTE — ASSESSMENT & PLAN NOTE
"Pt A&O x4, able to ambulate with a walker, lives independently with sitters, family keeping watch on pt. After DC from prior admission, pt was "talking out of her head", with auditory and visual hallucinations, and spent the past two nights talking constantly throughout the night. Encephalopathy has occurred in the past with infection association per daughter at bedside. No hx of dementia, meds review without contributory agents. Etiology believed to be infection, in spite of being afebrile and without leukocytosis. CXR concerning for possible mid or lower lobe PNA.    - Pt on Zosyn with good response on prior hospitalization. Will continue for broad coverage and monitor mental status.    "

## 2022-03-21 NOTE — ED TRIAGE NOTES
Venus Mayer, a 89 y.o. female presents to the ED w/ complaint of altered mental status. Pt's daughter is at bedside and states that pt has deviated from neurological baseline over the past couple of days since she was d/c'ed from [facility]. Pt is able to answer all orientation questions and knows why she was taken to the hospital at this time, but she does not remember anything that her daughter is recalling. Pt denies n/v/d, constipation, SOB, HA, chest pain, recent falls, fever/chills, vision changes. Pt has hx of COPD, HTN, HF and is no 2L of O2 per NC at home.     Triage note:  Chief Complaint   Patient presents with    Altered Mental Status     History of dementia. More confused per family     Review of patient's allergies indicates:   Allergen Reactions    Ancef in dextrose (iso-osm) Rash    Cefazolin Rash    Cefuroxime Rash    Sulfamethoxazole-trimethoprim Rash     Other reaction(s): Rash     Past Medical History:   Diagnosis Date    Acute on chronic congestive heart failure 7/6/2019    Cardiomyopathy     Carotid artery occlusion     CHF (congestive heart failure)     COPD (chronic obstructive pulmonary disease)     Coronary artery disease     Hyperlipidemia     Hypertension

## 2022-03-21 NOTE — H&P
"[facility] - Emergency Dept  Hospital Medicine  History & Physical    Patient Name: Venus Mayer  MRN: [id number]  Patient Class: IP- Inpatient  Admission Date: 3/20/2022  Attending Physician: Zahida Guo MD   Primary Care Provider: Jona Che MD         Patient information was obtained from relative(s), past medical records and ER records.     Subjective:     Principal Problem:Acute metabolic encephalopathy    Chief Complaint:   Chief Complaint   Patient presents with    Altered Mental Status     History of dementia. More confused per family        HPI: Ms. Mayer is a 89 yo W PMHx diastolic heart failure (EF 60 in 03/22), HOCM, pulmonary HTN, COPD on home O2 2.5 L, HLD, HTN presenting with altered mental status. Pt asleep and presented altered, so hx provided by daughter at bedside.     She was previously hospitalized for three days at [facility] where she was diuresed, given IV Zosyn for two days, started on Augmentin for sinusitis. She was discharged on Friday and later that day "started talking out of her head" with non harmful visual and auditory hallucinations. Hallucinations resolved, but for the past two nights pt has been talking all night. Some intermittent cough, but no fevers, chills, night sweats, has lost 9 lbs over past 2 months, but this is attributed to starting Bumex. No SOB, LE swelling, no bowel nor urinary changes.     On admit, pt somnolent. HD stable, afebrile, on 2 L NC. No leukocytosis, Lactate 1, BNP 3484, Troponin 0.162, UA noncontributory, CXR with mid and LL opacities concerning for possible PNA. Diuresed Lasix 40mg x1 in ED with 300mL urine output. Admitted for continued workup of encephalopathy.      Past Medical History:   Diagnosis Date    Acute on chronic congestive heart failure 7/6/2019    Cardiomyopathy     Carotid artery occlusion     CHF (congestive heart failure)     COPD (chronic obstructive pulmonary disease)     Coronary artery disease     Hyperlipidemia     " Hypertension        Past Surgical History:   Procedure Laterality Date    CARDIAC CATHETERIZATION      cardi cath      CAROTID ENDARTERECTOMY      FLEXIBLE BRONCHOSCOPY N/A 7/31/2019    Procedure: BRONCHOSCOPY, FIBEROPTIC Flexible;  Surgeon: Klever Mckeon MD;  Location: [facility] OR 2ND FLR;  Service: Thoracic;  Laterality: N/A;    HIP SURGERY      PLEURODESIS USING TALC N/A 7/31/2019    Procedure: PLEURODESIS;  Surgeon: Klever Mckeon MD;  Location: [facility] OR 2ND FLR;  Service: Thoracic;  Laterality: N/A;    PLEURODESIS USING TALC Right 8/5/2019    Procedure: PLEURODESIS, USING TALC;  Surgeon: Klever Mckeon MD;  Location: [facility] OR 2ND FLR;  Service: Thoracic;  Laterality: Right;    PLEURODESIS WITH VIDEO-ASSISTED THORACOSCOPIC SURGERY (VATS) Right 8/5/2019    Procedure: VATS, WITH PLEURAL TENT;  Surgeon: Klever Mckeon MD;  [facility];  Service: Thoracic;  Laterality: Right;       Review of patient's allergies indicates:   Allergen Reactions    Ancef in dextrose (iso-osm) Rash    Cefazolin Rash    Cefuroxime Rash    Sulfamethoxazole-trimethoprim Rash     Other reaction(s): Rash       No current facility-administered medications on file prior to encounter.     Current Outpatient Medications on File Prior to Encounter   Medication Sig    amoxicillin-clavulanate 500-125mg (AUGMENTIN) 500-125 mg Tab Take 1 tablet (500 mg total) by mouth every 12 (twelve) hours. for 7 days    aspirin 81 mg Tab Take 1 tablet by mouth once daily.     budesonide-formoterol 160-4.5 mcg (SYMBICORT) 160-4.5 mcg/actuation HF Inhale 2 puffs into the lungs every 12 (twelve) hours. Controller    bumetanide (BUMEX) 1 MG tablet Take 1 tablet (1 mg total) by mouth 2 (two) times daily.    ferrous sulfate 325 (65 FE) MG EC tablet Take 1 tablet (325 mg total) by mouth once daily.    guaiFENesin (MUCINEX) 600 mg 12 hr tablet Take 600 mg by mouth 2 (two) times daily as needed for Congestion.     Lactobacillus rhamnosus GG (CULTURELLE) 10 billion cell capsule Take 1 capsule by mouth once daily.    levalbuterol (XOPENEX) 0.63 mg/3 mL nebulizer solution Take 3 mLs (0.63 mg total) by nebulization every 4 (four) hours as needed for Wheezing or Shortness of Breath. Rescue    MELATONIN ORAL Take 3 mg by mouth nightly as needed (sleep).    metoprolol tartrate (LOPRESSOR) 25 MG tablet Take 12.5 mg by mouth 2 (two) times daily.    multivitamin (THERAGRAN) per tablet Take 1 tablet by mouth once daily.    pantoprazole (PROTONIX) 40 MG tablet Take 40 mg by mouth every morning.    potassium chloride (MICRO-K) 10 MEQ CpSR Take 10 mEq by mouth once daily.    tiotropium bromide (SPIRIVA RESPIMAT) 2.5 mcg/actuation inhaler Inhale 2 puffs into the lungs Daily. Controller    [DISCONTINUED] albuterol (PROAIR HFA) 90 mcg/actuation inhaler Inhale 1 puff into the lungs every 6 (six) hours as needed for Wheezing or Shortness of Breath. Rescue (Patient not taking: Reported on 3/16/2022)    [DISCONTINUED] azelastine (ASTELIN) 137 mcg (0.1 %) nasal spray 2 sprays by Nasal route 2 (two) times daily.    [DISCONTINUED] baclofen (LIORESAL) 10 MG tablet TK 1 T PO TID    [DISCONTINUED] rOPINIRole (REQUIP) 0.25 MG tablet Take 1 tablet (0.25 mg total) by mouth every evening. (Patient not taking: Reported on 3/16/2022)     Family History       Problem Relation (Age of Onset)    Hypertension Mother          Tobacco Use    Smoking status: Former Smoker     Packs/day: 2.00     Years: 20.00     Pack years: 40.00     Types: Cigarettes     Quit date: 1980     Years since quittin.2    Smokeless tobacco: Never Used   Substance and Sexual Activity    Alcohol use: Yes     Alcohol/week: 2.0 standard drinks     Types: 2 Glasses of wine per week     Comment: social    Drug use: No    Sexual activity: Not Currently     Review of Systems   Unable to perform ROS: Mental status change   Objective:     Vital Signs (Most Recent):  Temp:  "97.7 °F (36.5 °C) (03/20/22 1929)  Pulse: 63 (03/21/22 0040)  Resp: (!) 26 (03/21/22 0040)  BP: (!) 147/69 (03/21/22 0040)  SpO2: 98 % (03/21/22 0040)   Vital Signs (24h Range):  Temp:  [97.7 °F (36.5 °C)] 97.7 °F (36.5 °C)  Pulse:  [] 63  Resp:  [14-26] 26  SpO2:  [95 %-100 %] 98 %  BP: (106-160)/(56-73) 147/69     Weight: 38.1 kg (84 lb)  Body mass index is 16.41 kg/m².    Physical Exam  Constitutional:       General: She is sleeping.   HENT:      Head: Normocephalic and atraumatic.      Right Ear: No drainage or tenderness.      Left Ear: No drainage or tenderness.      Nose: Nose normal.      Mouth/Throat:      Mouth: Mucous membranes are dry.      Pharynx: Oropharynx is clear.   Cardiovascular:      Rate and Rhythm: Normal rate and regular rhythm.      Pulses: Normal pulses.      Heart sounds: Normal heart sounds.   Pulmonary:      Effort: Pulmonary effort is normal. No respiratory distress.      Breath sounds: Normal breath sounds. No rales.   Abdominal:      General: Bowel sounds are normal. There is no distension.      Palpations: Abdomen is soft.      Tenderness: There is no abdominal tenderness.   Musculoskeletal:      Cervical back: Neck supple.      Right lower leg: No edema.      Left lower leg: No edema.   Skin:     General: Skin is warm and dry.   Neurological:      Mental Status: She is lethargic and disoriented.         Significant Labs: All pertinent labs within the past 24 hours have been reviewed.    Significant Imaging: I have reviewed all pertinent imaging results/findings within the past 24 hours.    Assessment/Plan:     * Acute metabolic encephalopathy  Pt A&O x4, able to ambulate with a walker, lives independently with sitters, family keeping watch on pt. After DC from prior admission, pt was "talking out of her head", with auditory and visual hallucinations, and spent the past two nights talking constantly throughout the night. Encephalopathy has occurred in the past with infection " association per daughter at bedside. No hx of dementia, meds review without contributory agents. Etiology believed to be infection, in spite of being afebrile and without leukocytosis. CXR concerning for possible mid or lower lobe PNA.    - Pt on Zosyn with good response on prior hospitalization. Will continue for broad coverage and monitor mental status.      COPD (chronic obstructive pulmonary disease)  Prior notes (2019) identify hypercarbia, CMP with chronic bicarb elevation. Likely compensated.    - Continue home Symbicort      DNR (do not resuscitate)  Status confirmed via [facility]. DNR status ordered for this admission.      Acute on chronic diastolic heart failure  Pt diuresed on prior admission. Takes Bumex as home diuretic after no longer responsive to po Lasix. Pt on 2L NC at home. BNP 3484 on admission, but pt without increase in O2, no increased work of breathing per daughter at bedside. Pt able to sit up and lie flat without dysnpea. No appreciable JVD, no rales. Pt appears dry on exam.    - Hold diuresis at this time, consider resuming later on 3/21  - If pt with increasing dyspnea or O2 requirements resume diuresis      Hypertension  Holding home antihypertensive regimen (Lopressor 12.5mg po bid) in setting of normotension.          VTE Risk Mitigation (From admission, onward)         Ordered     enoxaparin injection 30 mg  Daily         03/21/22 0153     IP VTE HIGH RISK PATIENT  Once         03/21/22 0153     Place sequential compression device  Until discontinued         03/21/22 0153                   Danish Lainez MD  Department of Hospital Medicine   [facility] - Emergency Dept

## 2022-03-21 NOTE — PHARMACY MED REC
"Admission Medication History     The home medication history was taken by Miriam Santiago.    You may go to "Admission" then "Reconcile Home Medications" tabs to review and/or act upon these items.      The home medication list has been updated by the Pharmacy department.    Please read ALL comments highlighted in yellow.    Please address this information as you see fit.     Feel free to contact us if you have any questions or require assistance.      The medications listed below were removed from the home medication list. Please reorder if appropriate:  Patient reports no longer taking the following medication(s):   ALBUTEROL HFA   AZELASTINE 137 MCG NASAL SPRAY    BACLOFEN 10 MG   ROPINIROLE 0.25 MG      Medications listed below were obtained from: Patient/family  PTA Medications   Medication Sig    acetaminophen (TYLENOL) 500 MG tablet Take 500 mg by mouth daily as needed (BACK PAIN).      amoxicillin-clavulanate 500-125mg (AUGMENTIN) 500-125 mg Tab Take 1 tablet (500 mg total) by mouth every 12 (twelve) hours. for 7 days        aspirin 81 mg Tab Take 1 tablet by mouth once daily.       budesonide-formoterol 160-4.5 mcg (SYMBICORT) 160-4.5 mcg/actuation HFAA Inhale 2 puffs into the lungs every 12 (twelve) hours. Controller        bumetanide (BUMEX) 1 MG tablet Take 1 tablet (1 mg total) by mouth 2 (two) times daily.      ferrous sulfate 325 (65 FE) MG EC tablet Take 1 tablet (325 mg total) by mouth once daily.        guaiFENesin (MUCINEX) 600 mg 12 hr tablet Take 600 mg by mouth 2 (two) times daily as needed for Congestion.      Lactobacillus rhamnosus GG (CULTURELLE) 10 billion cell capsule Take 1 capsule by mouth once daily.          levalbuterol (XOPENEX) 0.63 mg/3 mL nebulizer solution Take 3 mLs (0.63 mg total) by nebulization every 4 (four) hours as needed for Wheezing or Shortness of Breath. Rescue      MELATONIN ORAL Take 3 mg by mouth nightly as needed (sleep).      metoprolol tartrate " (LOPRESSOR) 25 MG tablet   Take 12.5 mg by mouth 2 (two) times daily.    multivitamin (THERAGRAN) per tablet   Take 1 tablet by mouth once daily.    pantoprazole (PROTONIX) 40 MG tablet   Take 40 mg by mouth every morning.    polyethylene glycol (GLYCOLAX) 17 gram/dose powder   Take 17 g by mouth daily as needed (CONSTIPATION).    potassium chloride (MICRO-K) 10 MEQ CpSR   Take 10 mEq by mouth once daily.    tetrahydrozoline 0.05% (VISINE) 0.05 % Drop PLACE 1 DROP INTO AFFECTED EYE(S) AS NEEDED FOR REDNESS OR ITCHING      tiotropium bromide (SPIRIVA RESPIMAT) 2.5 mcg/actuation inhaler   Inhale 2 puffs into the lungs Daily. Controller    triamcinolone (NASACORT) 55 mcg nasal inhaler   2 sprays by Nasal route once daily.    UNABLE TO FIND MAGNILIFE RELAXING LEG CREAM- APPLY AT BEDTIME TO LEGS AND FEET         Miriam Santiago  EXT [id number]                .

## 2022-03-21 NOTE — PROGRESS NOTES
Pharmacist Renal Dose Adjustment Note    Venus Mayer is a 89 y.o. female being treated with the medication enoxaparin    Patient Data:    Vital Signs (Most Recent):  Temp: 97.7 °F (36.5 °C) (03/20/22 1929)  Pulse: 63 (03/21/22 0040)  Resp: (!) 26 (03/21/22 0040)  BP: (!) 147/69 (03/21/22 0040)  SpO2: 98 % (03/21/22 0040)   Vital Signs (72h Range):  Temp:  [97.7 °F (36.5 °C)]   Pulse:  []   Resp:  [14-26]   BP: (106-160)/(56-73)   SpO2:  [95 %-100 %]      Recent Labs   Lab 03/17/22  0743 03/18/22  0523 03/20/22 2019   CREATININE 1.11 1.16 1.1     Serum creatinine: 1.1 mg/dL 03/20/22 2019  Estimated creatinine clearance: 19.2 mL/min    Medication:enoxaparin dose: 40mg frequency every 24 hours will be changed to medication:enoxaparin dose:30 mg frequency:every 24 hours    Pharmacist's Name: Thor Ozuna  Pharmacist's Extension: [id number]

## 2022-03-21 NOTE — PLAN OF CARE
Case  was contacted by Preethi with [facility]. [facility] is no longer able to take care of the patient after this hospitalization. They have tried to take care of her multiple times but she never is able to stay out of the hospital for very long. The patient will need another HH when she discharges. Myrna BENITEZ notified of all of the above information.

## 2022-03-21 NOTE — ASSESSMENT & PLAN NOTE
Prior notes (2019) identify hypercarbia, CMP with chronic bicarb elevation. Likely compensated.    - Continue home Symbicort

## 2022-03-21 NOTE — PROCEDURES
Routine EEG Report    Venus Mayer  [id number]  2/7/1929    DATE OF SERVICE:  03/21/2022  REASON FOR CONSULT:  89-year-old woman with multiple medical comorbidities including cardiac, respiratory, and renal dysfunction admitted with lethargy and decreased responsiveness.  Evaluate for evidence of epileptiform activity.    METHODOLOGY   Electroencephalographic (EEG) recording is with electrodes placed according to the International 10-20 placement system.  Thirty two (32) channels of digital signal (sampling rate of 512/sec) including T1 and T2 was simultaneously recorded from the scalp and may include  EKG, EMG, and/or eye monitors.  Recording band pass was 0.1 to 512 hz.  Digital video recording of the patient is simultaneously recorded with the EEG.  The patient is instructed report clinical symptoms which may occur during the recording session.  EEG and video recording is stored and archived in digital format. Activation procedures which include photic stimulation, hyperventilation and instructing patients to perform simple task are done in selected patients.    The EEG is displayed on a monitor screen and can be reviewed using different montages.  Computer assisted analysis is employed to detect spike and electrographic seizure activity.   The entire record is submitted for computer analysis.  The entire recording is visually reviewed and the times identified by computer analysis as being spikes or seizures are reviewed again.  Compresses spectral analysis (CSA) is also performed on the activity recorded from each individual channel.  This is displayed as a power display of frequencies from 0 to 30 Hz over time.   The CSA is reviewed looking for asymmetries in power between homologous areas of the scalp and then compared with the original EEG recording.     netZentry software is also utilized in the review of this study.  This software suite analyzes the EEG recording in multiple domains.  Coherence and rhythmicity  is computed to identify EEG sections which may contain organized seizures.  Each channel undergoes analysis to detect presence of spike and sharp waves which have special and morphological characteristic of epileptic activity.  The routine EEG recording is converted from spacial into frequency domain.  This is then displayed comparing homologous areas to identify areas of significant asymmetry.  Algorithm to identify non-cortically generated artifact is used to separate eye movement, EMG and other artifact from the EEG.      EEG FINDINGS  Background activity:   The background is continuous, relatively symmetric, somewhat disorganized, mixed frequency activity with predominantly theta/alpha activity with plenty of overriding beta frequencies.  There is intermittent generalized and multifocal polymorphic delta/theta slowing seen bilaterally.  At times, there is a moderately well-formed 8.5 hz maximal posterior dominant rhythm seen bilaterally.    Sleep:  There are periods with poorly formed sleep architecture    Activation procedures:   Hyperventilation is not performed  Photic stimulation is not performed    Cardiac Monitor:   Irregular    Impression:   This is an abnormal routine awake and asleep EEG because of a slow and disorganized background consistent with diffuse cortical dysfunction and a moderate encephalopathy with evidence of subcortical/deep midline dysfunction.  This finding is nonspecific with regards to etiology but can be seen in the setting of toxic/metabolic derangements, infection, and as a medication effect.  There are no prominent focal findings however encephalopathy obscures focal findings.  Overriding faster frequencies are often seen as a medication effect.  There are no pushbutton activations, no epileptiform discharges, and no electrographic seizures.    Teressa Starks MD PhD  Neurology-Epilepsy  [facility].

## 2022-03-21 NOTE — SUBJECTIVE & OBJECTIVE
Past Medical History:   Diagnosis Date    Acute on chronic congestive heart failure 7/6/2019    Cardiomyopathy     Carotid artery occlusion     CHF (congestive heart failure)     COPD (chronic obstructive pulmonary disease)     Coronary artery disease     Hyperlipidemia     Hypertension        Past Surgical History:   Procedure Laterality Date    CARDIAC CATHETERIZATION      cardic cath      CAROTID ENDARTERECTOMY      FLEXIBLE BRONCHOSCOPY N/A 7/31/2019    Procedure: BRONCHOSCOPY, FIBEROPTIC Flexible;  Surgeon: Klever Mckeon MD;  [facility];  Service: Thoracic;  Laterality: N/A;    HIP SURGERY      PLEURODESIS USING TALC N/A 7/31/2019    Procedure: PLEURODESIS;  Surgeon: Klever Mckeon MD;  [facility];  Service: Thoracic;  Laterality: N/A;    PLEURODESIS USING TALC Right 8/5/2019    Procedure: PLEURODESIS, USING TALC;  Surgeon: Klever Mckeon MD;  [facility];  Service: Thoracic;  Laterality: Right;    PLEURODESIS WITH VIDEO-ASSISTED THORACOSCOPIC SURGERY (VATS) Right 8/5/2019    Procedure: VATS, WITH PLEURAL TENT;  Surgeon: Klever Mckeon MD;  Location: [facility] OR [address] [place] [address];  Service: Thoracic;  Laterality: Right;       Review of patient's allergies indicates:   Allergen Reactions    Ancef in dextrose (iso-osm) Rash    Cefazolin Rash    Cefuroxime Rash    Sulfamethoxazole-trimethoprim Rash     Other reaction(s): Rash       No current facility-administered medications on file prior to encounter.     Current Outpatient Medications on File Prior to Encounter   Medication Sig    amoxicillin-clavulanate 500-125mg (AUGMENTIN) 500-125 mg Tab Take 1 tablet (500 mg total) by mouth every 12 (twelve) hours. for 7 days    aspirin 81 mg Tab Take 1 tablet by mouth once daily.     budesonide-formoterol 160-4.5 mcg (SYMBICORT) 160-4.5 mcg/actuation HFAA Inhale 2 puffs into the lungs every 12 (twelve) hours. Controller    bumetanide (BUMEX) 1 MG tablet Take 1 tablet (1 mg  total) by mouth 2 (two) times daily.    ferrous sulfate 325 (65 FE) MG EC tablet Take 1 tablet (325 mg total) by mouth once daily.    guaiFENesin (MUCINEX) 600 mg 12 hr tablet Take 600 mg by mouth 2 (two) times daily as needed for Congestion.    Lactobacillus rhamnosus GG (CULTURELLE) 10 billion cell capsule Take 1 capsule by mouth once daily.    levalbuterol (XOPENEX) 0.63 mg/3 mL nebulizer solution Take 3 mLs (0.63 mg total) by nebulization every 4 (four) hours as needed for Wheezing or Shortness of Breath. Rescue    MELATONIN ORAL Take 3 mg by mouth nightly as needed (sleep).    metoprolol tartrate (LOPRESSOR) 25 MG tablet Take 12.5 mg by mouth 2 (two) times daily.    multivitamin (THERAGRAN) per tablet Take 1 tablet by mouth once daily.    pantoprazole (PROTONIX) 40 MG tablet Take 40 mg by mouth every morning.    potassium chloride (MICRO-K) 10 MEQ CpSR Take 10 mEq by mouth once daily.    tiotropium bromide (SPIRIVA RESPIMAT) 2.5 mcg/actuation inhaler Inhale 2 puffs into the lungs Daily. Controller    [DISCONTINUED] albuterol (PROAIR HFA) 90 mcg/actuation inhaler Inhale 1 puff into the lungs every 6 (six) hours as needed for Wheezing or Shortness of Breath. Rescue (Patient not taking: Reported on 3/16/2022)    [DISCONTINUED] azelastine (ASTELIN) 137 mcg (0.1 %) nasal spray 2 sprays by Nasal route 2 (two) times daily.    [DISCONTINUED] baclofen (LIORESAL) 10 MG tablet TK 1 T PO TID    [DISCONTINUED] rOPINIRole (REQUIP) 0.25 MG tablet Take 1 tablet (0.25 mg total) by mouth every evening. (Patient not taking: Reported on 3/16/2022)     Family History       Problem Relation (Age of Onset)    Hypertension Mother          Tobacco Use    Smoking status: Former Smoker     Packs/day: 2.00     Years: 20.00     Pack years: 40.00     Types: Cigarettes     Quit date: 1980     Years since quittin.2    Smokeless tobacco: Never Used   Substance and Sexual Activity    Alcohol use: Yes     Alcohol/week: 2.0 standard  drinks     Types: 2 Glasses of wine per week     Comment: social    Drug use: No    Sexual activity: Not Currently     Review of Systems   Unable to perform ROS: Mental status change   Objective:     Vital Signs (Most Recent):  Temp: 97.7 °F (36.5 °C) (03/20/22 1929)  Pulse: 63 (03/21/22 0040)  Resp: (!) 26 (03/21/22 0040)  BP: (!) 147/69 (03/21/22 0040)  SpO2: 98 % (03/21/22 0040)   Vital Signs (24h Range):  Temp:  [97.7 °F (36.5 °C)] 97.7 °F (36.5 °C)  Pulse:  [] 63  Resp:  [14-26] 26  SpO2:  [95 %-100 %] 98 %  BP: (106-160)/(56-73) 147/69     Weight: 38.1 kg (84 lb)  Body mass index is 16.41 kg/m².    Physical Exam  Constitutional:       General: She is sleeping.   HENT:      Head: Normocephalic and atraumatic.      Right Ear: No drainage or tenderness.      Left Ear: No drainage or tenderness.      Nose: Nose normal.      Mouth/Throat:      Mouth: Mucous membranes are dry.      Pharynx: Oropharynx is clear.   Cardiovascular:      Rate and Rhythm: Normal rate and regular rhythm.      Pulses: Normal pulses.      Heart sounds: Normal heart sounds.   Pulmonary:      Effort: Pulmonary effort is normal. No respiratory distress.      Breath sounds: Normal breath sounds. No rales.   Abdominal:      General: Bowel sounds are normal. There is no distension.      Palpations: Abdomen is soft.      Tenderness: There is no abdominal tenderness.   Musculoskeletal:      Cervical back: Neck supple.      Right lower leg: No edema.      Left lower leg: No edema.   Skin:     General: Skin is warm and dry.   Neurological:      Mental Status: She is lethargic and disoriented.         Significant Labs: All pertinent labs within the past 24 hours have been reviewed.    Significant Imaging: I have reviewed all pertinent imaging results/findings within the past 24 hours.

## 2022-03-21 NOTE — ASSESSMENT & PLAN NOTE
Pt diuresed on prior admission. Takes Bumex as home diuretic after no longer responsive to po Lasix. Pt on 2L NC at home. BNP 3484 on admission, but pt without increase in O2, no increased work of breathing per daughter at bedside. Pt able to sit up and lie flat without dysnpea. No appreciable JVD, no rales. Pt appears dry on exam.    - Hold diuresis at this time, consider resuming later on 3/21  - If pt with increasing dyspnea or O2 requirements resume diuresis

## 2022-03-21 NOTE — HPI
"Ms. Mayer is a 89 yo W PMHx diastolic heart failure (EF 60 in 03/22), HOCM, pulmonary HTN, COPD on home O2 2.5 L, HLD, HTN presenting with altered mental status. Pt asleep and presented altered, so hx provided by daughter at bedside.     She was previously hospitalized for three days at [facility] where she was diuresed, given IV Zosyn for two days, started on Augmentin for sinusitis. She was discharged on Friday and later that day "started talking out of her head" with non harmful visual and auditory hallucinations. Hallucinations resolved, but for the past two nights pt has been talking all night. Some intermittent cough, but no fevers, chills, night sweats, has lost 9 lbs over past 2 months, but this is attributed to starting Bumex. No SOB, LE swelling, no bowel nor urinary changes.     On admit, pt somnolent. HD stable, afebrile, on 2 L NC. No leukocytosis, Lactate 1, BNP 3484, Troponin 0.162, UA noncontributory, CXR with mid and LL opacities concerning for possible PNA. Diuresed Lasix 40mg x1 in ED with 300mL urine output. Admitted for continued workup of encephalopathy.  "

## 2022-03-21 NOTE — H&P
"[facility] - On[facility] ([facility])  [facility] Medicine  History & Physical    Patient Name: Venus Mayer  MRN: [id number]  Patient Class: IP- Inpatient  Admission Date: 3/20/2022  Attending Physician: Zahida Guo MD   Primary Care Provider: Jona Che MD         Patient information was obtained from relative(s), past medical records and ER records.     Subjective:     Principal Problem:Acute metabolic encephalopathy    Chief Complaint:   Chief Complaint   Patient presents with    Altered Mental Status     History of dementia. More confused per family        HPI: Ms. Mayer is a 89 yo W PMHx diastolic heart failure (EF 60 in 03/22), HOCM, pulmonary HTN, COPD on home O2 2.5 L, HLD, HTN presenting with altered mental status. Pt asleep and presented altered, so hx provided by daughter at bedside.     She was previously hospitalized for three days at [facility] where she was diuresed, given IV Zosyn for two days, started on Augmentin for sinusitis. She was discharged on Friday and later that day "started talking out of her head" with non harmful visual and auditory hallucinations. Hallucinations resolved, but for the past two nights pt has been talking all night. Some intermittent cough, but no fevers, chills, night sweats, has lost 9 lbs over past 2 months, but this is attributed to starting Bumex. No SOB, LE swelling, no bowel nor urinary changes.     On admit, pt somnolent. HD stable, afebrile, on 2 L NC. No leukocytosis, Lactate 1, BNP 3484, Troponin 0.162, UA noncontributory, CXR with mid and LL opacities concerning for possible PNA. Diuresed Lasix 40mg x1 in ED with 300mL urine output. Admitted for continued workup of encephalopathy.      Past Medical History:   Diagnosis Date    Acute on chronic congestive heart failure 7/6/2019    Cardiomyopathy     Carotid artery occlusion     CHF (congestive heart failure)     COPD (chronic obstructive pulmonary disease)     Coronary artery disease     Hyperlipidemia  "    Hypertension        Past Surgical History:   Procedure Laterality Date    CARDIAC CATHETERIZATION      cardi cath      CAROTID ENDARTERECTOMY      FLEXIBLE BRONCHOSCOPY N/A 7/31/2019    Procedure: BRONCHOSCOPY, FIBEROPTIC Flexible;  Surgeon: Klever Mckeon MD;  Location: [facility] OR 2ND FLR;  Service: Thoracic;  Laterality: N/A;    HIP SURGERY      PLEURODESIS USING TALC N/A 7/31/2019    Procedure: PLEURODESIS;  Surgeon: Klever Mckeon MD;  Location: [facility] OR 2ND FLR;  Service: Thoracic;  Laterality: N/A;    PLEURODESIS USING TALC Right 8/5/2019    Procedure: PLEURODESIS, USING TALC;  Surgeon: Klever Mckeon MD;  Location: [facility] OR 2ND FLR;  Service: Thoracic;  Laterality: Right;    PLEURODESIS WITH VIDEO-ASSISTED THORACOSCOPIC SURGERY (VATS) Right 8/5/2019    Procedure: VATS, WITH PLEURAL TENT;  Surgeon: Klever Mckeon MD;  [facility];  Service: Thoracic;  Laterality: Right;       Review of patient's allergies indicates:   Allergen Reactions    Ancef in dextrose (iso-osm) Rash    Cefazolin Rash     Tolerating Zosyn admission 3/2022    Cefuroxime Rash    Sulfamethoxazole-trimethoprim Rash     Other reaction(s): Rash       No current facility-administered medications on file prior to encounter.     Current Outpatient Medications on File Prior to Encounter   Medication Sig    acetaminophen (TYLENOL) 500 MG tablet Take 500 mg by mouth daily as needed (BACK PAIN).    amoxicillin-clavulanate 500-125mg (AUGMENTIN) 500-125 mg Tab Take 1 tablet (500 mg total) by mouth every 12 (twelve) hours. for 7 days    aspirin 81 mg Tab Take 1 tablet by mouth once daily.     budesonide-formoterol 160-4.5 mcg (SYMBICORT) 160-4.5 mcg/actuation HFAA Inhale 2 puffs into the lungs every 12 (twelve) hours. Controller    bumetanide (BUMEX) 1 MG tablet Take 1 tablet (1 mg total) by mouth 2 (two) times daily.    ferrous sulfate 325 (65 FE) MG EC tablet Take 1 tablet (325 mg total) by mouth once  daily.    guaiFENesin (MUCINEX) 600 mg 12 hr tablet Take 600 mg by mouth 2 (two) times daily as needed for Congestion.    Lactobacillus rhamnosus GG (CULTURELLE) 10 billion cell capsule Take 1 capsule by mouth once daily.    levalbuterol (XOPENEX) 0.63 mg/3 mL nebulizer solution Take 3 mLs (0.63 mg total) by nebulization every 4 (four) hours as needed for Wheezing or Shortness of Breath. Rescue    MELATONIN ORAL Take 3 mg by mouth nightly as needed (sleep).    metoprolol tartrate (LOPRESSOR) 25 MG tablet Take 12.5 mg by mouth 2 (two) times daily.    multivitamin (THERAGRAN) per tablet Take 1 tablet by mouth once daily.    pantoprazole (PROTONIX) 40 MG tablet Take 40 mg by mouth every morning.    polyethylene glycol (GLYCOLAX) 17 gram/dose powder Take 17 g by mouth daily as needed (CONSTIPATION).    potassium chloride (MICRO-K) 10 MEQ CpSR Take 10 mEq by mouth once daily.    tetrahydrozoline 0.05% (VISINE) 0.05 % Drop PLACE 1 DROP INTO AFFECTED EYE(S) AS NEEDED FOR REDNESS OR ITCHING    tiotropium bromide (SPIRIVA RESPIMAT) 2.5 mcg/actuation inhaler Inhale 2 puffs into the lungs Daily. Controller    triamcinolone (NASACORT) 55 mcg nasal inhaler 2 sprays by Nasal route once daily.    UNABLE TO FIND MAGNILIFE RELAXING LEG CREAM- APPLY AT BEDTIME TO LEGS AND FEET    [DISCONTINUED] albuterol (PROAIR HFA) 90 mcg/actuation inhaler Inhale 1 puff into the lungs every 6 (six) hours as needed for Wheezing or Shortness of Breath. Rescue (Patient not taking: Reported on 3/16/2022)    [DISCONTINUED] azelastine (ASTELIN) 137 mcg (0.1 %) nasal spray 2 sprays by Nasal route 2 (two) times daily.    [DISCONTINUED] baclofen (LIORESAL) 10 MG tablet TK 1 T PO TID    [DISCONTINUED] rOPINIRole (REQUIP) 0.25 MG tablet Take 1 tablet (0.25 mg total) by mouth every evening. (Patient not taking: Reported on 3/16/2022)     Family History       Problem Relation (Age of Onset)    Hypertension Mother          Tobacco Use     Smoking status: Former Smoker     Packs/day: 2.00     Years: 20.00     Pack years: 40.00     Types: Cigarettes     Quit date: 1980     Years since quittin.2    Smokeless tobacco: Never Used   Substance and Sexual Activity    Alcohol use: Yes     Alcohol/week: 2.0 standard drinks     Types: 2 Glasses of wine per week     Comment: social    Drug use: No    Sexual activity: Not Currently     Review of Systems   Unable to perform ROS: Mental status change   Objective:     Vital Signs (Most Recent):  Temp: 98.3 °F (36.8 °C) (22 1650)  Pulse: 61 (22 1650)  Resp: 18 (22 1650)  BP: 134/68 (22 1650)  SpO2: 95 % (22 1650) Vital Signs (24h Range):  Temp:  [97.5 °F (36.4 °C)-98.3 °F (36.8 °C)] 98.3 °F (36.8 °C)  Pulse:  [] 61  Resp:  [14-26] 18  SpO2:  [95 %-100 %] 95 %  BP: (100-160)/(54-73) 134/68     Weight: 38.1 kg (84 lb)  Body mass index is 16.41 kg/m².    Physical Exam  Vitals and nursing note reviewed.   Constitutional:       General: She is sleeping.   HENT:      Head: Normocephalic and atraumatic.      Mouth/Throat:      Mouth: Mucous membranes are dry.      Pharynx: Oropharynx is clear.   Eyes:      Extraocular Movements: Extraocular movements intact.   Cardiovascular:      Rate and Rhythm: Normal rate and regular rhythm.   Pulmonary:      Effort: Pulmonary effort is normal.      Breath sounds: Normal breath sounds. No wheezing or rales.   Abdominal:      General: Bowel sounds are normal. There is no distension.      Palpations: Abdomen is soft.      Tenderness: There is no abdominal tenderness.   Musculoskeletal:      Cervical back: Neck supple.      Right lower leg: No edema.      Left lower leg: No edema.   Skin:     General: Skin is warm and dry.   Neurological:      Mental Status: She is lethargic and disoriented.         Significant Labs: All pertinent labs within the past 24 hours have been reviewed.    Significant Imaging: I have reviewed all pertinent imaging  "results/findings within the past 24 hours.    Assessment/Plan:     * Acute metabolic encephalopathy  Pt A&O x4, able to ambulate with a walker, lives independently with sitters, family keeping watch on pt. After DC from prior admission, pt was "talking out of her head", with auditory and visual hallucinations, and spent the past two nights talking constantly throughout the night. Encephalopathy has occurred in the past with infection association per daughter at bedside. No hx of dementia, meds review without contributory agents. Etiology believed to be infection, in spite of being afebrile and without leukocytosis. CXR concerning for possible mid or lower lobe PNA.    - Pt on Zosyn with good response on prior hospitalization. Will continue for broad coverage and monitor mental status.      COPD (chronic obstructive pulmonary disease)  Prior notes (2019) identify hypercarbia, CMP with chronic bicarb elevation. Likely compensated.    - Continue home Symbicort      DNR (do not resuscitate)  Status confirmed via [facility]. DNR status ordered for this admission.      Acute on chronic diastolic heart failure  Pt diuresed on prior admission. Takes Bumex as home diuretic after no longer responsive to po Lasix. Pt on 2L NC at home. BNP 3484 on admission, but pt without increase in O2, no increased work of breathing per daughter at bedside. Pt able to sit up and lie flat without dysnpea. No appreciable JVD, no rales. Pt appears dry on exam.    - Hold diuresis at this time, consider resuming later on 3/21  - If pt with increasing dyspnea or O2 requirements resume diuresis      Hypertension  Holding home antihypertensive regimen (Lopressor 12.5mg po bid) in setting of normotension.          VTE Risk Mitigation (From admission, onward)         Ordered     enoxaparin injection 30 mg  Daily         03/21/22 0153     IP VTE HIGH RISK PATIENT  Once         03/21/22 0153     Place sequential compression device  Until discontinued      "    03/21/22 0153                   Danish Lainez MD  Department of Hospital Medicine   [facility] - On[facility] ([facility])

## 2022-03-21 NOTE — ED PROVIDER NOTES
"Encounter Date: 3/20/2022       History     Chief Complaint   Patient presents with    Altered Mental Status     History of dementia. More confused per family     HPI   89 Y/O O2-dependant (2L NC @ home per daughter) F with multiple comorbidities including H/O coronary artery disease, diastolic heart failure, pulmonary hypertension with mild decrease in right ventricular function, COPD, hypertension, dyslipidemia who was discharged from [facility] 1-2 days ago presents with daughter 2/2 somnolent state and cough. Daughter, who's providing Hx 2/2 patient's somnolent state.  She reports recent ear infection that, that was being treated with antibiotics at previous facility.  No reported fever chills in the last 24-40 hours since "coming back home".  No vomiting, no diarrhea, but she does report slight cough.  Triage reported history of dementia, but daughter reports she has never been told that she has dementia.  No reported increase in O2 requirements at home.    Review of patient's allergies indicates:   Allergen Reactions    Ancef in dextrose (iso-osm) Rash    Cefazolin Rash     Tolerating Zosyn admission 3/2022    Cefuroxime Rash    Sulfamethoxazole-trimethoprim Rash     Other reaction(s): Rash     Past Medical History:   Diagnosis Date    Acute on chronic congestive heart failure 7/6/2019    Cardiomyopathy     Carotid artery occlusion     CHF (congestive heart failure)     COPD (chronic obstructive pulmonary disease)     Coronary artery disease     Hyperlipidemia     Hypertension      Past Surgical History:   Procedure Laterality Date    CARDIAC CATHETERIZATION      cardic cath      CAROTID ENDARTERECTOMY      FLEXIBLE BRONCHOSCOPY N/A 7/31/2019    Procedure: BRONCHOSCOPY, FIBEROPTIC Flexible;  Surgeon: Klever Mckeon MD;  Location: [facility] OR [address] [place] [address];  Service: Thoracic;  Laterality: N/A;    HIP SURGERY      PLEURODESIS USING TALC N/A 7/31/2019    Procedure: PLEURODESIS;  Surgeon: " Klever Mckeon MD;  Location: [address];  Service: Thoracic;  Laterality: N/A;    PLEURODESIS USING TALC Right 2019    Procedure: PLEURODESIS, USING TALC;  Surgeon: Klever Mckeon MD;  Location: [facility] OR [address] [place] [address];  Service: Thoracic;  Laterality: Right;    PLEURODESIS WITH VIDEO-ASSISTED THORACOSCOPIC SURGERY (VATS) Right 2019    Procedure: VATS, WITH PLEURAL TENT;  Surgeon: Klever Mckeon MD;  Location: [address];  Service: Thoracic;  Laterality: Right;     Family History   Problem Relation Age of Onset    Hypertension Mother      Social History     Tobacco Use    Smoking status: Former Smoker     Packs/day: 2.00     Years: 20.00     Pack years: 40.00     Types: Cigarettes     Quit date: 1980     Years since quittin.2    Smokeless tobacco: Never Used   Substance Use Topics    Alcohol use: Yes     Alcohol/week: 2.0 standard drinks     Types: 2 Glasses of wine per week     Comment: social    Drug use: No     Review of Systems   Unable to perform ROS: Mental status change    With repeated physical stimulation, patient is able to answer yes no appropriately.  From head to toe she reports:  CONST: No chills or fatigue.  HEENT: No headache or sore throat  NECK: No pain  HEART: No chest pain  LUNG: No SOB, but + cough  ABDOMEN: No pain, nausea, vomiting  : No discharge, dysuria, lesions, rashes, masses, sores.  EXTREMITIES: + pedal swelling, but no redness or injuries  NEURO: No dizziness, weakness, headache or numbness  SKIN: No lesions, rashes, trauma or other complaints.    Physical Exam     Initial Vitals [22]   BP Pulse Resp Temp SpO2   123/72 110 16 97.7 °F (36.5 °C) 95 %      MAP       --         Physical Exam  GENERAL: Calm; Cooperative; Well-appearing and Non-Toxic; thin; somnolent but arousable to tactile stimuli  HEENT: AT/NC; + dysconjugate gaze (known by daughter), PERRL, EOMI, Acuity & Fields Grossly Intact; nasal cannula in place; speaking full  sentences with no slurring of speech or drooling/inability to tolerate oral secretions.  NECK: Supple, FROM with no meningismus, no accessory muscle use.   THORAX/BACK: Atraumatic with NTTP. No midline TTP to C/T/LS spine; No CVA tenderness B/L.  HEART:  Tachycardic rate with regular rhythm, no M/G/T.  LUNGS: No Tachypnea, No Retractions, and CTA B/L with no W/R/R.  ABDOMEN: Soft, ND, NTTP.   EXTREMITIES: FROM.   SKIN: + diffuse total body petechia versus ecchymotic macules and patches.  Warm, Dry, No Skin Tears or Rashes.  VASCULAR: 2+ pulses Prox/Dist & Symmetrical with No delay.  NEUROLOGIC:  Somnolent but arousable to tactile stimuli, and oriented to person, date and location; no truncal ataxia; cranial nerves intact; symmetrical strength in known symmetrical and intact sensation to upper and lower extremities; no focal neurological deficits.    ED Course   Procedures  Labs Reviewed   CBC W/ AUTO DIFFERENTIAL - Abnormal; Notable for the following components:       Result Value     (*)     MCH 31.8 (*)     MCHC 31.9 (*)     Immature Granulocytes 2.3 (*)     Gran # (ANC) 8.7 (*)     Immature Grans (Abs) 0.27 (*)     Mono # 1.1 (*)     Gran % 73.3 (*)     Lymph % 11.2 (*)     All other components within normal limits   COMPREHENSIVE METABOLIC PANEL - Abnormal; Notable for the following components:    Chloride 91 (*)     CO2 39 (*)     Albumin 2.7 (*)     eGFR if  50.0 (*)     eGFR if non  43.4 (*)     All other components within normal limits   URINALYSIS, REFLEX TO URINE CULTURE - Abnormal; Notable for the following components:    Appearance, UA Hazy (*)     Leukocytes, UA Trace (*)     All other components within normal limits    Narrative:     Specimen Source->Urine   TROPONIN I - Abnormal; Notable for the following components:    Troponin I 0.162 (*)     All other components within normal limits   B-TYPE NATRIURETIC PEPTIDE - Abnormal; Notable for the following components:     BNP 3,484 (*)     All other components within normal limits   URINALYSIS MICROSCOPIC - Abnormal; Notable for the following components:    Non-Squam Epith 3 (*)     All other components within normal limits    Narrative:     Specimen Source->Urine   COMPREHENSIVE METABOLIC PANEL - Abnormal; Notable for the following components:    Chloride 93 (*)     CO2 39 (*)     Total Protein 5.7 (*)     Albumin 2.3 (*)     eGFR if  50.0 (*)     eGFR if non  43.4 (*)     All other components within normal limits   CBC W/ AUTO DIFFERENTIAL - Abnormal; Notable for the following components:    RBC 3.87 (*)      (*)     MCH 31.5 (*)     MCHC 31.4 (*)     Immature Granulocytes 2.5 (*)     Gran # (ANC) 8.3 (*)     Immature Grans (Abs) 0.29 (*)     Mono # 1.1 (*)     Gran % 73.1 (*)     Lymph % 11.0 (*)     All other components within normal limits   TROPONIN I - Abnormal; Notable for the following components:    Troponin I 0.151 (*)     All other components within normal limits   LACTIC ACID, PLASMA   AMMONIA   LACTIC ACID, PLASMA   SARS-COV-2 RDRP GENE    Narrative:     This test utilizes isothermal nucleic acid amplification   technology to detect the SARS-CoV-2 RdRp nucleic acid segment.   The analytical sensitivity (limit of detection) is 125 genome   equivalents/mL.   A POSITIVE result implies infection with the SARS-CoV-2 virus;   the patient is presumed to be contagious.     A NEGATIVE result means that SARS-CoV-2 nucleic acids are not   present above the limit of detection. A NEGATIVE result should be   treated as presumptive. It does not rule out the possibility of   COVID-19 and should not be the sole basis for treatment decisions.   If COVID-19 is strongly suspected based on clinical and exposure   history, re-testing using an alternate molecular assay should be   considered.   This test is only for use under the Food and Drug   Administration s Emergency Use Authorization (EUA).    Commercial kits are provided by [employer].   Performance characteristics of the EUA have been independently   verified by [facility] Department of   Pathology and Laboratory Medicine.   _________________________________________________________________   The authorized Fact Sheet for Healthcare Providers and the authorized Fact   Sheet for Patients of the ID NOW COVID-19 are available on the FDA   website:     https://www.fda.gov/media/046163/download  https://www.fda.gov/media/762624/download            EKG Readings: (Independently Interpreted)   Initial Reading: No STEMI.   Baseline artifact; sinus rhythm with intermittent PACs; biatrial enlargement; complete right bundle branch block with no STEMI; no QTC prolongation.     ECG Results          EKG 12-lead (Final result)  Result time 03/21/22 17:10:37    Final result by Interface, Lab [facility] (03/21/22 17:10:37)                 Narrative:    Test Reason : R40.0,    Vent. Rate : 073 BPM     Atrial Rate : 073 BPM     P-R Int : 130 ms          QRS Dur : 122 ms      QT Int : 404 ms       P-R-T Axes : 076 205 130 degrees     QTc Int : 445 ms    Sinus rhythm with marked sinus arrythmia with occasional Premature  ventricular complexes  Biatrial enlargement  Right bundle branch block  Possible Lateral infarct (cited on or before 12-MAR-2022)  T wave abnormality, consider inferior ischemia  Abnormal ECG  When compared with ECG of 16-MAR-2022 09:25,  Premature supraventricular complexes are no longer Present  Confirmed by DAVION LAYNE MD ([id number]) on 3/21/2022 5:10:26 PM    Referred By: [email or web address]   SELF           Confirmed By:DAVION LAYNE MD                            Imaging Results          CT Chest Without Contrast (Final result)  Result time 03/20/22 22:48:50    Final result by Juan Luis Mae MD (03/20/22 22:48:50)                 Impression:      Resolution of large infiltrate and effusion in the right lung since the prior exam in  2019.    Interval removal of chest tube.    No acute findings evident in the chest with severe emphysematous and fibrotic lung disease.    Stable nonobstructing calculus in the upper pole of the right kidney.      Electronically signed by: Juan Luis Mae  Date:    03/20/2022  Time:    22:48             Narrative:    EXAMINATION:  CT CHEST WITHOUT CONTRAST    CLINICAL HISTORY:  Cough, persistent;    TECHNIQUE:  Low dose axial images, sagittal and coronal reformations were obtained from the thoracic inlet to the lung bases. Contrast was not administered.    COMPARISON:  CT chest, 09/07/2019    FINDINGS:  Previous chest tube and pleural and parenchymal opacities in the right lower lobe have resolved.    Extensive emphysematous and scar changes are again noted throughout the lungs with both subpleural honeycombing and apical bullous disease, right greater than left.  Fibro nodular densities appear stable with no new expansile mass or fluid collection.    The heart, pericardium and great vessels appear stable with multi lead pacemaker and scattered atherosclerotic vascular disease.  There is no evidence pneumothorax or significant pleural effusion with minimal pleural fluid and atelectasis mainly in the left lung base..    Limited images of the upper abdomen again demonstrate mild prominence of the adrenal glands likely hyperplasia in nature.  Nonobstructing calculus in the upper pole of the right kidney is noted.  High density of the liver may be related to hemo siderosis or amiodarone treatment.                               CT Head Without Contrast (Final result)  Result time 03/20/22 22:34:26    Final result by Juan Luis Mae MD (03/20/22 22:34:26)                 Impression:      No evidence of acute hemorrhage, mass or infarction.      Electronically signed by: Juan Luis Mae  Date:    03/20/2022  Time:    22:34             Narrative:    EXAMINATION:  CT HEAD WITHOUT CONTRAST    CLINICAL HISTORY:  Mental  status change, unknown cause;    TECHNIQUE:  Low dose axial CT images obtained throughout the head without intravenous contrast. Sagittal and coronal reconstructions were performed.    COMPARISON:  None.    FINDINGS:  Intracranial compartment:    Ventricles and sulci are stable in size for age without continued prominence of the lateral ventricle system.  No extra-axial blood or fluid collections.    The brain parenchyma appears stable.  Generalized low density in the deep and periventricular white matter remains is compatible with chronic small vessel disease.  No parenchymal mass, hemorrhage, edema or major vascular distribution infarct.    Skull/extracranial contents (limited evaluation): No fracture. Mastoid air cells and paranasal sinuses are essentially clear, with small air-fluid interface remaining in the sphenoid sinus and a few scattered ethmoid air cells opacified similar to prior..                               X-Ray Chest AP Portable (Final result)  Result time 03/20/22 20:37:35    Final result by Lavell Ford MD (03/20/22 20:37:35)                 Impression:      New airspace opacities within the mid and lower lung zones.  The findings are suggestive of evolving multifocal pneumonia.      Electronically signed by: Lavell Ford MD  Date:    03/20/2022  Time:    20:37             Narrative:    EXAMINATION:  XR CHEST AP PORTABLE    CLINICAL HISTORY:  Sepsis;    TECHNIQUE:  Single frontal view of the chest was performed.    COMPARISON:  03/16/2022.    FINDINGS:  There is stable appearance of left-sided AICD.  Monitoring EKG leads are present.  There are postoperative changes in left upper hemithorax.    The trachea is unremarkable.  There are calcifications of the knob.  The cardiomediastinal silhouette is enlarged.  There is no evidence of free air beneath the hemidiaphragms.  There are no pleural effusions.  There is no evidence of a pneumothorax.  There is no evidence of pneumomediastinum.  There are  new airspace opacities within the mid and lower lung zones.  There are degenerative changes in the osseous structures.                                 Medications   aspirin chewable tablet 81 mg (81 mg Oral Given 3/22/22 1001)   fluticasone furoate-vilanteroL 100-25 mcg/dose diskus inhaler 1 puff (1 puff Inhalation Given 3/22/22 1007)   melatonin tablet 3 mg (has no administration in time range)   pantoprazole EC tablet 40 mg (40 mg Oral Given 3/22/22 1011)   sodium chloride 0.9% flush 10 mL (has no administration in time range)   naloxone 0.4 mg/mL injection 0.02 mg (has no administration in time range)   glucose chewable tablet 16 g (has no administration in time range)   glucose chewable tablet 24 g (has no administration in time range)   dextrose 10% bolus 125 mL (has no administration in time range)   dextrose 10% bolus 250 mL (has no administration in time range)   glucagon (human recombinant) injection 1 mg (has no administration in time range)   enoxaparin injection 30 mg (30 mg Subcutaneous Given 3/22/22 1702)   acetaminophen tablet 650 mg (has no administration in time range)   ondansetron injection 4 mg (has no administration in time range)   piperacillin-tazobactam 4.5 g in sodium chloride 0.9% 100 mL IVPB (ready to mix system) (0 g Intravenous Stopped 3/23/22 0112)   albuterol-ipratropium 2.5 mg-0.5 mg/3 mL nebulizer solution 3 mL (3 mLs Nebulization Given 3/22/22 2358)   guaiFENesin 12 hr tablet 600 mg (600 mg Oral Given 3/22/22 2108)   bumetanide tablet 1 mg (1 mg Oral Given 3/22/22 2109)   levalbuterol nebulizer solution 0.63 mg (0.63 mg Nebulization Given 3/22/22 1805)   potassium bicarbonate disintegrating tablet 25 mEq (has no administration in time range)   furosemide injection 40 mg (40 mg Intravenous Given 3/20/22 2056)   furosemide injection 40 mg (40 mg Intravenous Given 3/20/22 2307)     Medical Decision Making:   History:   Old Medical Records: I decided to obtain old medical  records.  Initial Assessment:   Afebrile, atraumatic and hemodynamically stable elderly COPD and O2 to dependent female presents with somnolent state.  Will evaluate for hypercapnia as the etiology to her symptoms.  No focal neurological deficits that may warrant stroke activation.  ECG with no STEMI consistent with her lack of chest pain or shortness of breath.  Will evaluate for pneumonia versus other etiology of infection as etiology to her somnolent state as well.  No airway instability.  ____________________  Peyman Boyle MD, [facility]  Emergency Medicine Staff  8:48 PM 3/20/2022    Independently Interpreted Test(s):   I have ordered and independently interpreted EKG Reading(s) - see prior notes  Clinical Tests:   Lab Tests: Ordered  Radiological Study: Ordered  Medical Tests: Ordered                      Clinical Impression:   Final diagnoses:  [R40.0] Somnolence (Primary)          ED Disposition Condition    Admit               Faustino Boyle MD  03/23/22 0816

## 2022-03-21 NOTE — ED NOTES
Patient resting in stretcher and is in NAD at this time. Pt is somnolent but responsive to voice and oriented to everything but situation, VSS, respirations even and unlabored. Pt updated on plan of care. Bed low and locked with side rails up x2, call bell in pt reach. Pt voices no needs at this time, daughter at bedside.  Will continue to monitor.

## 2022-03-22 LAB
ALBUMIN SERPL BCP-MCNC: 2.1 G/DL (ref 3.5–5.2)
ALP SERPL-CCNC: 89 U/L (ref 55–135)
ALT SERPL W/O P-5'-P-CCNC: 9 U/L (ref 10–44)
ANION GAP SERPL CALC-SCNC: 13 MMOL/L (ref 8–16)
AST SERPL-CCNC: 23 U/L (ref 10–40)
BASOPHILS # BLD AUTO: 0.19 K/UL (ref 0–0.2)
BASOPHILS NFR BLD: 1.7 % (ref 0–1.9)
BILIRUB SERPL-MCNC: 0.7 MG/DL (ref 0.1–1)
BUN SERPL-MCNC: 21 MG/DL (ref 10–30)
CALCIUM SERPL-MCNC: 9 MG/DL (ref 8.7–10.5)
CHLORIDE SERPL-SCNC: 93 MMOL/L (ref 95–110)
CO2 SERPL-SCNC: 35 MMOL/L (ref 23–29)
CREAT SERPL-MCNC: 1 MG/DL (ref 0.5–1.4)
DIFFERENTIAL METHOD: ABNORMAL
EOSINOPHIL # BLD AUTO: 0.4 K/UL (ref 0–0.5)
EOSINOPHIL NFR BLD: 3.7 % (ref 0–8)
ERYTHROCYTE [DISTWIDTH] IN BLOOD BY AUTOMATED COUNT: 13.5 % (ref 11.5–14.5)
EST. GFR  (AFRICAN AMERICAN): 56.1 ML/MIN/1.73 M^2
EST. GFR  (NON AFRICAN AMERICAN): 48.7 ML/MIN/1.73 M^2
GLUCOSE SERPL-MCNC: 61 MG/DL (ref 70–110)
HCT VFR BLD AUTO: 40.6 % (ref 37–48.5)
HGB BLD-MCNC: 12.4 G/DL (ref 12–16)
IMM GRANULOCYTES # BLD AUTO: 0.28 K/UL (ref 0–0.04)
IMM GRANULOCYTES NFR BLD AUTO: 2.5 % (ref 0–0.5)
LYMPHOCYTES # BLD AUTO: 1.2 K/UL (ref 1–4.8)
LYMPHOCYTES NFR BLD: 11 % (ref 18–48)
MAGNESIUM SERPL-MCNC: 2.4 MG/DL (ref 1.6–2.6)
MCH RBC QN AUTO: 31.6 PG (ref 27–31)
MCHC RBC AUTO-ENTMCNC: 30.5 G/DL (ref 32–36)
MCV RBC AUTO: 103 FL (ref 82–98)
MONOCYTES # BLD AUTO: 1.1 K/UL (ref 0.3–1)
MONOCYTES NFR BLD: 9.4 % (ref 4–15)
NEUTROPHILS # BLD AUTO: 8.1 K/UL (ref 1.8–7.7)
NEUTROPHILS NFR BLD: 71.7 % (ref 38–73)
NRBC BLD-RTO: 0 /100 WBC
PHOSPHATE SERPL-MCNC: 3.3 MG/DL (ref 2.7–4.5)
PLATELET # BLD AUTO: 189 K/UL (ref 150–450)
PMV BLD AUTO: 10 FL (ref 9.2–12.9)
POCT GLUCOSE: 195 MG/DL (ref 70–110)
POTASSIUM SERPL-SCNC: 4 MMOL/L (ref 3.5–5.1)
PROT SERPL-MCNC: 5.4 G/DL (ref 6–8.4)
RBC # BLD AUTO: 3.93 M/UL (ref 4–5.4)
SODIUM SERPL-SCNC: 141 MMOL/L (ref 136–145)
WBC # BLD AUTO: 11.24 K/UL (ref 3.9–12.7)

## 2022-03-22 PROCEDURE — 63600175 PHARM REV CODE 636 W HCPCS

## 2022-03-22 PROCEDURE — 99233 PR SUBSEQUENT HOSPITAL CARE,LEVL III: ICD-10-PCS | Mod: GC,,, | Performed by: STUDENT IN AN ORGANIZED HEALTH CARE EDUCATION/TRAINING PROGRAM

## 2022-03-22 PROCEDURE — 99900035 HC TECH TIME PER 15 MIN (STAT)

## 2022-03-22 PROCEDURE — 25000003 PHARM REV CODE 250: Performed by: STUDENT IN AN ORGANIZED HEALTH CARE EDUCATION/TRAINING PROGRAM

## 2022-03-22 PROCEDURE — 20600001 HC STEP DOWN PRIVATE ROOM

## 2022-03-22 PROCEDURE — 94761 N-INVAS EAR/PLS OXIMETRY MLT: CPT

## 2022-03-22 PROCEDURE — 84100 ASSAY OF PHOSPHORUS: CPT | Performed by: STUDENT IN AN ORGANIZED HEALTH CARE EDUCATION/TRAINING PROGRAM

## 2022-03-22 PROCEDURE — 27000221 HC OXYGEN, UP TO 24 HOURS

## 2022-03-22 PROCEDURE — 25000003 PHARM REV CODE 250

## 2022-03-22 PROCEDURE — 80053 COMPREHEN METABOLIC PANEL: CPT

## 2022-03-22 PROCEDURE — 83735 ASSAY OF MAGNESIUM: CPT | Performed by: STUDENT IN AN ORGANIZED HEALTH CARE EDUCATION/TRAINING PROGRAM

## 2022-03-22 PROCEDURE — 25000242 PHARM REV CODE 250 ALT 637 W/ HCPCS

## 2022-03-22 PROCEDURE — 99233 SBSQ HOSP IP/OBS HIGH 50: CPT | Mod: GC,,, | Performed by: STUDENT IN AN ORGANIZED HEALTH CARE EDUCATION/TRAINING PROGRAM

## 2022-03-22 PROCEDURE — 94640 AIRWAY INHALATION TREATMENT: CPT

## 2022-03-22 PROCEDURE — 36415 COLL VENOUS BLD VENIPUNCTURE: CPT

## 2022-03-22 PROCEDURE — 85025 COMPLETE CBC W/AUTO DIFF WBC: CPT

## 2022-03-22 RX ORDER — BUMETANIDE 1 MG/1
1 TABLET ORAL 2 TIMES DAILY
Status: DISCONTINUED | OUTPATIENT
Start: 2022-03-22 | End: 2022-03-24 | Stop reason: HOSPADM

## 2022-03-22 RX ORDER — LEVALBUTEROL INHALATION SOLUTION 0.63 MG/3ML
0.63 SOLUTION RESPIRATORY (INHALATION) EVERY 12 HOURS
Status: DISCONTINUED | OUTPATIENT
Start: 2022-03-22 | End: 2022-03-24 | Stop reason: HOSPADM

## 2022-03-22 RX ADMIN — FLUTICASONE FUROATE AND VILANTEROL TRIFENATATE 1 PUFF: 100; 25 POWDER RESPIRATORY (INHALATION) at 10:03

## 2022-03-22 RX ADMIN — ENOXAPARIN SODIUM 30 MG: 100 INJECTION SUBCUTANEOUS at 05:03

## 2022-03-22 RX ADMIN — LEVALBUTEROL HYDROCHLORIDE 0.63 MG: 0.63 SOLUTION RESPIRATORY (INHALATION) at 06:03

## 2022-03-22 RX ADMIN — GUAIFENESIN 600 MG: 600 TABLET, EXTENDED RELEASE ORAL at 10:03

## 2022-03-22 RX ADMIN — GUAIFENESIN 600 MG: 600 TABLET, EXTENDED RELEASE ORAL at 09:03

## 2022-03-22 RX ADMIN — IPRATROPIUM BROMIDE AND ALBUTEROL SULFATE 3 ML: 2.5; .5 SOLUTION RESPIRATORY (INHALATION) at 11:03

## 2022-03-22 RX ADMIN — PIPERACILLIN SODIUM AND TAZOBACTAM SODIUM 4.5 G: 4; .5 INJECTION, POWDER, FOR SOLUTION INTRAVENOUS at 09:03

## 2022-03-22 RX ADMIN — PIPERACILLIN SODIUM AND TAZOBACTAM SODIUM 4.5 G: 4; .5 INJECTION, POWDER, FOR SOLUTION INTRAVENOUS at 10:03

## 2022-03-22 RX ADMIN — PANTOPRAZOLE SODIUM 40 MG: 40 TABLET, DELAYED RELEASE ORAL at 10:03

## 2022-03-22 RX ADMIN — ASPIRIN 81 MG CHEWABLE TABLET 81 MG: 81 TABLET CHEWABLE at 10:03

## 2022-03-22 RX ADMIN — BUMETANIDE 1 MG: 1 TABLET ORAL at 09:03

## 2022-03-22 NOTE — PLAN OF CARE
[facility])  Initial Discharge Assessment       Primary Care Provider: Jona Che MD    Admission Diagnosis: Somnolence [R40.0]  Chest pain [R07.9]    Admission Date: 3/20/2022  Expected Discharge Date: 3/24/2022    Discharge Barriers Identified: None    Payor: HU[employer] MANAGED MEDICARE / Plan: HU[employer]NA MEDICARE HMO / Product Type: Capitation /     Extended Emergency Contact Information  Primary Emergency Contact: Bri Cao  Mobile Phone: [phone]  Relation: Daughter  Secondary Emergency Contact: RAJINDER JESSICA  Home Phone: [phone]  Mobile Phone: [phone]  Relation: Daughter  Preferred language: English   needed? No    Discharge Plan A: Home Health  Discharge Plan B: Home with family      [facility] #[id number] - [place], [place] - [address] [place] [address] AT [facility] [address]  [facility] [place] [address]  Phone: [phone] Fax: [phone]      Transferred from:     Past Medical History:   Diagnosis Date    Acute on chronic congestive heart failure 7/6/2019    Cardiomyopathy     Carotid artery occlusion     CHF (congestive heart failure)     COPD (chronic obstructive pulmonary disease)     Coronary artery disease     Hyperlipidemia     Hypertension          CM met with patient and Bri Damianpamellayrn (daughter) [phone] in room for Discharge Planning Assessment.  Patient was unable to answer questions due very hard of hearing.  Per daughter Bri, patient lives alone in a 1-story home with 4 step(s) to enter. Per daughter Bri, patient has 24/7 care between private sitters and adult children.  Per daughter Bri, patient was semi-independent with ADLS and used rolling walker periodically for ambulation. Per daughter Bri, patient is not on dialysis and does not take Coumadin.  Patient will have help from Bri Cao (daughter) [phone], Rajinder Jessica (daughter) [phone], Arthur Miller (son) [phone], Richmond Mayer (son)  [phone] upon discharge.   Discharge Planning Booklet given to patient/family and discussed.  All questions addressed.  CM will follow for needs.      Initial Assessment (most recent)     Adult Discharge Assessment - 03/22/22 1245        Discharge Assessment    Assessment Type Discharge Planning Assessment     Confirmed/corrected address, phone number and insurance Yes     Confirmed Demographics Correct on Facesheet     Source of Information family     When was your last doctors appointment? 03/04/22     Communicated TRISTON with patient/caregiver Date not available/Unable to determine     Reason For Admission Acute metablolic     Lives With alone;other (see comments)   has 24/7 care between private sitters and adult children    Facility Arrived From: Home     Do you expect to return to your current living situation? Yes     Do you have help at home or someone to help you manage your care at home? Yes     Who are your caregiver(s) and their phone number(s)? Private sitters and adult children     Prior to hospitilization cognitive status: Not Oriented to Time;Not Oriented to Place     Current cognitive status: Not Oriented to Time     Walking or Climbing Stairs Difficulty ambulation difficulty, requires equipment     Mobility Management rolling walker periodically     Dressing/Bathing Difficulty bathing difficulty, assistance 1 person;dressing difficulty, assistance 1 person     Dressing/Bathing Management daughters and sitters assists in these tasks     Equipment Currently Used at Home walker, rolling;bedside commode;bath bench;oxygen;other (see comments)   hand rail for commode    Readmission within 30 days? Yes     Patient currently being followed by outpatient case management? No     Do you currently have service(s) that help you manage your care at home? Yes     Name and Contact number of agency Had [facility] but will not resume with them. Requesting [facility]     Is the pt/caregiver preference to resume  services with current agency No     Do you take prescription medications? Yes     Do you have prescription coverage? Yes     Coverage [employer] - [employer] HMO     Do you have any problems affording any of your prescribed medications? No     Is the patient taking medications as prescribed? yes     Who is going to help you get home at discharge? Bri Cao (daughter) [phone], Rajinder Jessica (daughter) [phone], Arthur Miller (son) [phone], Richmond Moreno (son) [phone]     How do you get to doctors appointments? family or friend will provide     Are you on dialysis? No     Do you take coumadin? No     Discharge Plan A Home Health     Discharge Plan B Home with family     DME Needed Upon Discharge  other (see comments)   TBD    Discharge Plan discussed with: Patient;Adult children     Discharge Barriers Identified None                        PCP:  Jona Che MD  [phone]        Pharmacy:    [facility] #[id number]  [place], [place] - [facility] [address] AT [facility] [address] [place] [address] [place] [address]  Phone: [phone] Fax: [phone]        Emergency Contacts:  Extended Emergency Contact Information  Primary Emergency Contact: Bri Cao  Mobile Phone: [phone]  Relation: Daughter  Secondary Emergency Contact: RAJINDER JESSICA  [place] Phone: [phone]  Mobile Phone: [phone]  Relation: Daughter  Preferred language: English   needed? No      Insurance:    Payor: HUMANA MANAGED MEDICARE / Plan: HU[employer]NA MEDICARE HMO / Product Type: Capitation /     Myrna Keene RN     [phone]      03/22/2022  1:25 PM

## 2022-03-22 NOTE — PLAN OF CARE
POC reviewed with patient and daughters. VS stable. Patient complained of pain and experienced relief with repositioning. Pt has remained free from injury this shift. Zosyn infused during the night. Purwike in place and draining clear yellow urine. PT has low blood sugar this AM and was corrected with oral intervention. Bed in low locked position. Call light and personal belongings within reach. Side rails up x2. Nonskid socks in place. Pt instructed to call with any needs. Will continue to monitor.         Problem: Adult Inpatient Plan of Care  Goal: Plan of Care Review  Outcome: Ongoing, Progressing  Goal: Patient-Specific Goal (Individualized)  Outcome: Ongoing, Progressing  Goal: Absence of Hospital-Acquired Illness or Injury  Outcome: Ongoing, Progressing  Goal: Optimal Comfort and Wellbeing  Outcome: Ongoing, Progressing  Goal: Readiness for Transition of Care  Outcome: Ongoing, Progressing     Problem: Adjustment to Illness (Sepsis/Septic Shock)  Goal: Optimal Coping  Outcome: Ongoing, Progressing     Problem: Fall Injury Risk  Goal: Absence of Fall and Fall-Related Injury  Outcome: Ongoing, Progressing     Problem: Skin Injury Risk Increased  Goal: Skin Health and Integrity  Outcome: Ongoing, Progressing     Problem: Impaired Wound Healing  Goal: Optimal Wound Healing  Outcome: Ongoing, Progressing

## 2022-03-22 NOTE — PLAN OF CARE
ELI spoke with patient and daughter Bri regarding [facility] not taking patient back. Bri advised they would like to go with [facility] upon patient's discharge.      Myrna Keene RN     [phone]

## 2022-03-22 NOTE — PLAN OF CARE
Side rails up x2; call bell in place; bed in lowest, locked position; skid proof socks on; no evidence of skin breakdown; care plan explained to patient; pt remains free of injury.  Pt tolerated po In small amount, voids, turned and repositioned, heels elevated, foam pad to sacrum and heels bilaterally. NC 2 L in progress, 02 sats WNL. Resp treatment given. Zosyn given as scheduled. Pt and family encouraged to call as needed.VSS and afebrile.

## 2022-03-23 LAB
ALBUMIN SERPL BCP-MCNC: 2.3 G/DL (ref 3.5–5.2)
ALP SERPL-CCNC: 85 U/L (ref 55–135)
ALT SERPL W/O P-5'-P-CCNC: 11 U/L (ref 10–44)
ANION GAP SERPL CALC-SCNC: 11 MMOL/L (ref 8–16)
AST SERPL-CCNC: 18 U/L (ref 10–40)
BASOPHILS # BLD AUTO: 0.12 K/UL (ref 0–0.2)
BASOPHILS NFR BLD: 1.2 % (ref 0–1.9)
BILIRUB SERPL-MCNC: 0.6 MG/DL (ref 0.1–1)
BUN SERPL-MCNC: 19 MG/DL (ref 10–30)
CALCIUM SERPL-MCNC: 9 MG/DL (ref 8.7–10.5)
CHLORIDE SERPL-SCNC: 91 MMOL/L (ref 95–110)
CO2 SERPL-SCNC: 38 MMOL/L (ref 23–29)
CREAT SERPL-MCNC: 1.2 MG/DL (ref 0.5–1.4)
DIFFERENTIAL METHOD: ABNORMAL
EOSINOPHIL # BLD AUTO: 0.3 K/UL (ref 0–0.5)
EOSINOPHIL NFR BLD: 2.5 % (ref 0–8)
ERYTHROCYTE [DISTWIDTH] IN BLOOD BY AUTOMATED COUNT: 13.6 % (ref 11.5–14.5)
EST. GFR  (AFRICAN AMERICAN): 45 ML/MIN/1.73 M^2
EST. GFR  (NON AFRICAN AMERICAN): 39.1 ML/MIN/1.73 M^2
GLUCOSE SERPL-MCNC: 85 MG/DL (ref 70–110)
HCT VFR BLD AUTO: 41.6 % (ref 37–48.5)
HGB BLD-MCNC: 13 G/DL (ref 12–16)
IMM GRANULOCYTES # BLD AUTO: 0.44 K/UL (ref 0–0.04)
IMM GRANULOCYTES NFR BLD AUTO: 4.3 % (ref 0–0.5)
LYMPHOCYTES # BLD AUTO: 1.1 K/UL (ref 1–4.8)
LYMPHOCYTES NFR BLD: 11.1 % (ref 18–48)
MCH RBC QN AUTO: 31.9 PG (ref 27–31)
MCHC RBC AUTO-ENTMCNC: 31.3 G/DL (ref 32–36)
MCV RBC AUTO: 102 FL (ref 82–98)
MONOCYTES # BLD AUTO: 1.1 K/UL (ref 0.3–1)
MONOCYTES NFR BLD: 10.4 % (ref 4–15)
NEUTROPHILS # BLD AUTO: 7.2 K/UL (ref 1.8–7.7)
NEUTROPHILS NFR BLD: 70.5 % (ref 38–73)
NRBC BLD-RTO: 0 /100 WBC
PLATELET # BLD AUTO: 199 K/UL (ref 150–450)
PMV BLD AUTO: 10.2 FL (ref 9.2–12.9)
POTASSIUM SERPL-SCNC: 3.1 MMOL/L (ref 3.5–5.1)
PROT SERPL-MCNC: 5.9 G/DL (ref 6–8.4)
RBC # BLD AUTO: 4.07 M/UL (ref 4–5.4)
SODIUM SERPL-SCNC: 140 MMOL/L (ref 136–145)
WBC # BLD AUTO: 10.14 K/UL (ref 3.9–12.7)

## 2022-03-23 PROCEDURE — 25000003 PHARM REV CODE 250

## 2022-03-23 PROCEDURE — 25000003 PHARM REV CODE 250: Performed by: STUDENT IN AN ORGANIZED HEALTH CARE EDUCATION/TRAINING PROGRAM

## 2022-03-23 PROCEDURE — 25000242 PHARM REV CODE 250 ALT 637 W/ HCPCS

## 2022-03-23 PROCEDURE — 94640 AIRWAY INHALATION TREATMENT: CPT

## 2022-03-23 PROCEDURE — 25000242 PHARM REV CODE 250 ALT 637 W/ HCPCS: Performed by: STUDENT IN AN ORGANIZED HEALTH CARE EDUCATION/TRAINING PROGRAM

## 2022-03-23 PROCEDURE — 97530 THERAPEUTIC ACTIVITIES: CPT

## 2022-03-23 PROCEDURE — 27000221 HC OXYGEN, UP TO 24 HOURS

## 2022-03-23 PROCEDURE — 97165 OT EVAL LOW COMPLEX 30 MIN: CPT

## 2022-03-23 PROCEDURE — 97112 NEUROMUSCULAR REEDUCATION: CPT

## 2022-03-23 PROCEDURE — 20600001 HC STEP DOWN PRIVATE ROOM

## 2022-03-23 PROCEDURE — 94761 N-INVAS EAR/PLS OXIMETRY MLT: CPT

## 2022-03-23 PROCEDURE — 99232 PR SUBSEQUENT HOSPITAL CARE,LEVL II: ICD-10-PCS | Mod: GC,,, | Performed by: STUDENT IN AN ORGANIZED HEALTH CARE EDUCATION/TRAINING PROGRAM

## 2022-03-23 PROCEDURE — 97162 PT EVAL MOD COMPLEX 30 MIN: CPT

## 2022-03-23 PROCEDURE — 80053 COMPREHEN METABOLIC PANEL: CPT

## 2022-03-23 PROCEDURE — 99232 SBSQ HOSP IP/OBS MODERATE 35: CPT | Mod: GC,,, | Performed by: STUDENT IN AN ORGANIZED HEALTH CARE EDUCATION/TRAINING PROGRAM

## 2022-03-23 PROCEDURE — 97535 SELF CARE MNGMENT TRAINING: CPT

## 2022-03-23 PROCEDURE — 97116 GAIT TRAINING THERAPY: CPT

## 2022-03-23 PROCEDURE — 85025 COMPLETE CBC W/AUTO DIFF WBC: CPT

## 2022-03-23 PROCEDURE — 63600175 PHARM REV CODE 636 W HCPCS

## 2022-03-23 PROCEDURE — 36415 COLL VENOUS BLD VENIPUNCTURE: CPT

## 2022-03-23 RX ORDER — BALSAM PERU/CASTOR OIL
OINTMENT (GRAM) TOPICAL 2 TIMES DAILY
Status: DISCONTINUED | OUTPATIENT
Start: 2022-03-23 | End: 2022-03-24 | Stop reason: HOSPADM

## 2022-03-23 RX ORDER — AMOXICILLIN AND CLAVULANATE POTASSIUM 500; 125 MG/1; MG/1
1 TABLET, FILM COATED ORAL EVERY 12 HOURS
Status: DISCONTINUED | OUTPATIENT
Start: 2022-03-23 | End: 2022-03-24 | Stop reason: HOSPADM

## 2022-03-23 RX ORDER — POLYETHYLENE GLYCOL 3350 17 G/17G
17 POWDER, FOR SOLUTION ORAL DAILY
Status: DISCONTINUED | OUTPATIENT
Start: 2022-03-23 | End: 2022-03-24 | Stop reason: HOSPADM

## 2022-03-23 RX ADMIN — Medication 3 MG: at 09:03

## 2022-03-23 RX ADMIN — PANTOPRAZOLE SODIUM 40 MG: 40 TABLET, DELAYED RELEASE ORAL at 08:03

## 2022-03-23 RX ADMIN — LEVALBUTEROL HYDROCHLORIDE 0.63 MG: 0.63 SOLUTION RESPIRATORY (INHALATION) at 11:03

## 2022-03-23 RX ADMIN — POTASSIUM BICARBONATE 25 MEQ: 978 TABLET, EFFERVESCENT ORAL at 10:03

## 2022-03-23 RX ADMIN — Medication: at 09:03

## 2022-03-23 RX ADMIN — BUMETANIDE 1 MG: 1 TABLET ORAL at 08:03

## 2022-03-23 RX ADMIN — AMOXICILLIN AND CLAVULANATE POTASSIUM 500 MG: 500; 125 TABLET, FILM COATED ORAL at 01:03

## 2022-03-23 RX ADMIN — POLYETHYLENE GLYCOL 3350 17 G: 17 POWDER, FOR SOLUTION ORAL at 11:03

## 2022-03-23 RX ADMIN — ASPIRIN 81 MG CHEWABLE TABLET 81 MG: 81 TABLET CHEWABLE at 08:03

## 2022-03-23 RX ADMIN — GUAIFENESIN 600 MG: 600 TABLET, EXTENDED RELEASE ORAL at 09:03

## 2022-03-23 RX ADMIN — FLUTICASONE FUROATE AND VILANTEROL TRIFENATATE 1 PUFF: 100; 25 POWDER RESPIRATORY (INHALATION) at 11:03

## 2022-03-23 RX ADMIN — ENOXAPARIN SODIUM 30 MG: 100 INJECTION SUBCUTANEOUS at 04:03

## 2022-03-23 RX ADMIN — POTASSIUM BICARBONATE 25 MEQ: 978 TABLET, EFFERVESCENT ORAL at 08:03

## 2022-03-23 RX ADMIN — GUAIFENESIN 600 MG: 600 TABLET, EXTENDED RELEASE ORAL at 08:03

## 2022-03-23 RX ADMIN — LEVALBUTEROL HYDROCHLORIDE 0.63 MG: 0.63 SOLUTION RESPIRATORY (INHALATION) at 08:03

## 2022-03-23 RX ADMIN — AMOXICILLIN AND CLAVULANATE POTASSIUM 500 MG: 500; 125 TABLET, FILM COATED ORAL at 09:03

## 2022-03-23 RX ADMIN — BUMETANIDE 1 MG: 1 TABLET ORAL at 09:03

## 2022-03-23 RX ADMIN — PIPERACILLIN SODIUM AND TAZOBACTAM SODIUM 4.5 G: 4; .5 INJECTION, POWDER, FOR SOLUTION INTRAVENOUS at 09:03

## 2022-03-23 NOTE — ASSESSMENT & PLAN NOTE
Pt diuresed on prior admission. Takes Bumex as home diuretic after no longer responsive to po Lasix. Pt on 2L NC at home. BNP 3484 on admission, but pt without increase in O2, no increased work of breathing per daughter at bedside. Pt able to sit up and lie flat without dysnpea. No appreciable JVD, no rales. Pt appears dry on exam.    - IV diuresis given on 03/21  - Holding off on diuresis for now due to being dry on exam.

## 2022-03-23 NOTE — PT/OT/SLP EVAL
Physical Therapy Co-Evaluation and Treatmetn    Patient Name:  Venus Mayer   MRN:  [id number]    Recommendations:     Discharge Recommendations:  home health PT (24/7 assistanace)   Discharge Equipment Recommendations: none   Barriers to discharge: None    Assessment:       Venus Mayer is a 89 y.o. female admitted with a medical diagnosis of Acute metabolic encephalopathy.  She presents with the following impairments/functional limitations:  weakness, impaired balance, decreased safety awareness, impaired endurance, impaired functional mobilty, gait instability.  Pt participated in functional mobility training including bed mobility, transfers, gait training, and ADLs. Significant education provided with pt and pt's daughter re: concerns for mobility, both while in house and upon d/c, d/c planning, DME recommendations, and assistance currently required/provided throughout. Pt has 24/7 assist at home via family members and private sitter assistance. Pt's daughter present and reports pt is near functional baseline as provided in social hx. Will continue to follow if hospital stay is prolonged to progress ambulation, activity tolerance, and global strengthening to address risks of frailty. Pt continues to present below their independent prior functional baseline. Pt would benefit from continued, skilled PT while in house to address the above listed impairments, further progress mobility as able, return towards highest level of function.      Rehab Prognosis: Good; patient would benefit from acute skilled PT services 3 x/week to address these deficits and reach maximum level of function.  Patient is most appropriate to go to home health PT (24/7 assistanace) .  Recent Surgery: * No surgery found *      Plan:     During this hospitalization, patient to be seen 3 x/week to address the identified rehab impairments via gait training, therapeutic activities, therapeutic exercises, neuromuscular re-education and progress  "toward the following goals:    · Plan of Care Expires:  04/22/22    Subjective     Subjective:"I want to put my robe on"  Pt goal: none defined  Pain/Comfort:  · Pain Rating 1: 0/10    Patients cultural, spiritual, Protestant conflicts given the current situation: no    Living Environment: Pt lives alone in a [facility] with 4 ALEKSANDR with railings. Pt's children sleep overnight with her and they have private sitters otherwise from 10-5 on 6 days/wk and family assistance with other coverage.  Prior level of function:  Pt's daugther reports pt is able to perform mobility with slight assistance at times, uses RW when waking up/after taking a nap, someone is always behind her when she is walking. Able to perform stair amblulation with ~ min assist. Family/sitters help with ADLs as needed.   Falls within the last 90 days: denies  Equipment owned: walker, rolling, bedside commode, raised toilet (bed rails)  Support available upon discharge: family and sitters    Objective:     Communicated with nursing prior to session.  Patient found supine with PureWick, oxygen, telemetry  upon PT entry to room.    Co-treatment performed for this visit due to patient need for two skilled therapists to ensure patient and staff safety and to accommodate for patient activity tolerance/pain management     General Precautions: Standard, fall   Orthopedic Precautions:N/A   Braces: N/A     Exams:  · Cognition: appropriate and cooperative, at or near baseline per daugther  · RLE ROM: WFL  · RLE Strength: WFL  · LLE ROM: WFL  · LLE Strength: WFL  · Posture: pt appears frail, rounded shoulders, forward head  · Integumentary: grossly intact  · Sensation: grossly intact    Functional Mobility:  · Bed Mobility: supervision for supine>sit with HOB elevated ~30 degrees, cueing to initiate movement however pt able to otherwise perform  · Transfers: CGA for sit<>stand from EOB, recliner, and toilet. Attempted to cue pt for hand placement however pt's daughter " present stating 'she is doing it how she does it at home', limited carryover anticipated   · Gait: Pt ambulated ~30 ft then ~20 ft on second ambulation trial with RW and stand by assist. Pt demo's decreased liane, narrow FABIAN, and reduced foot clearance. Assistance for oxygen tubing management however pt's dtr stating someone holds tubing at home for pt. Cueing for safety and RW management at all times as pt is in an unfamiliar environment.   · Balance:   · Sitting: supervision for safety, pt able to perform dynamic tasks at EOB, on toilet, and in recliner demo'ing appropriate safety awareness and trunk control with supervision, no overt LOB noted  · Standing: CGA/supervision with use of RW, pt able to  small semi squat position while performing hygiene care and adjusting clothing at multiple times throughout session with CGA however no overt LOB noted. Pt stood both at EOB, recliner, and toilet for various amounts of time with appropriate trunk control without overt difficulty. Per dtr, pt is never alone when mobilizing.    Therapeutic activities and education:  Education provided to pt and daugther re: d/c planning, PT POC, DME questions/concerns/recommendations, role of HHPT/home safety eval, concerns for w/c sizing, safety while in house for mobility with nursing staff. Dtr expressing concerns re: pt's current functional status and pending possible d/c home, address to pt and dtr's satisfaction. Pt endorses understanding.     AM-PAC 6 CLICK MOBILITY  Total Score:18     Patient left up in chair with all lines intact, call button in reach and daugther present.    GOALS:   Multidisciplinary Problems     Physical Therapy Goals        Problem: Physical Therapy    Goal Priority Disciplines Outcome Goal Variances Interventions   Physical Therapy Goal     PT, PT/OT Ongoing, Progressing     Description: Goals to be met by: 22    Patient will increase functional independence with mobility by performin.  Pt will perform supine<>sit with supervision to improve independence with mobility  2. Pt will perform functional transfers with supervision   3. Pt will ambulate >150 ft feet with LRAD and supervision to safely perform household distance ambulation  4. Pt will ascend/descend 4 stairs with CGA to safely manage within home.                    History:     Past Medical History:   Diagnosis Date    Acute on chronic congestive heart failure 7/6/2019    Cardiomyopathy     Carotid artery occlusion     CHF (congestive heart failure)     COPD (chronic obstructive pulmonary disease)     Coronary artery disease     Hyperlipidemia     Hypertension        Past Surgical History:   Procedure Laterality Date    CARDIAC CATHETERIZATION      cardic cath      CAROTID ENDARTERECTOMY      FLEXIBLE BRONCHOSCOPY N/A 7/31/2019    Procedure: BRONCHOSCOPY, FIBEROPTIC Flexible;  Surgeon: Klever Mckeon MD;  Location: [facility] OR [address] [place] [address];  Service: Thoracic;  Laterality: N/A;    HIP SURGERY      PLEURODESIS USING TALC N/A 7/31/2019    Procedure: PLEURODESIS;  Surgeon: Klever Mckeon MD;  Location: [facility] OR [address] [place] [address];  Service: Thoracic;  Laterality: N/A;    PLEURODESIS USING TALC Right 8/5/2019    Procedure: PLEURODESIS, USING TALC;  Surgeon: Klever Mckeon MD;  Location: [address];  Service: Thoracic;  Laterality: Right;    PLEURODESIS WITH VIDEO-ASSISTED THORACOSCOPIC SURGERY (VATS) Right 8/5/2019    Procedure: VATS, WITH PLEURAL TENT;  Surgeon: Klever Mckeon MD;  Location: [address];  Service: Thoracic;  Laterality: Right;       Time Tracking:     PT Received On: 03/23/22  PT Start Time: 1338     PT Stop Time: 1424  PT Total Time (min): 46 min     Billable Minutes: Evaluation 8 min, Gait Training 15 min, Therapeutic Activity 10 and Neuromuscular Re-education 13      Chica Khan, PT, DPT, GCS  03/23/2022

## 2022-03-23 NOTE — HOSPITAL COURSE
Pt admitted with acute metabolic encephalopathy. IV Zosyn started due to concerns for aspiration and IV lasix given in light of hypervolemia. Mentation improving with antibiotic treatment. Breathing treatments started. Diuetics changed to home bumex on 3/22 and antibiotics transitioned to Augmentin on 3/23. Pt undergoing breathing treatments- congestion clearing up. Pt to be discharged in stable condition with Home health orders.

## 2022-03-23 NOTE — PLAN OF CARE
POC reviewed with patient and daughters Jennifer and Bri at bedside; no acute events this shift. Afebrile. VSS on 1-2L NC (baseline). Tolerating cardiac low-sodium diet with boost. K+ replaced PO. Respiratory treatments continued q4. Transitioned from IV to PO antibiotics. Up with PT/OT today; in chair for approximately 4 hours; purewick removed and ambulated with assistance x1 to commode/bathroom. Per wound care, starting ointment to sacrum BID. Planning on d/c home with family tomorrow. All needs addressed. Will continue to monitor with frequent rounds and clustered care to promote rest.

## 2022-03-23 NOTE — PROGRESS NOTES
"[facility] ([facility])  [facility] Medicine  Progress Note    Patient Name: Venus Mayer  MRN: [id number]  Patient Class: IP- Inpatient   Admission Date: 3/20/2022  Length of Stay: 2 days  Attending Physician: Zahida Guo MD  Primary Care Provider: Jona Che MD        Subjective:     Principal Problem:Acute metabolic encephalopathy        HPI:  Ms. Mayer is a 89 yo W PMHx diastolic heart failure (EF 60 in 03/22), HOCM, pulmonary HTN, COPD on home O2 2.5 L, HLD, HTN presenting with altered mental status. Pt asleep and presented altered, so hx provided by daughter at bedside.     She was previously hospitalized for three days at [facility] where she was diuresed, given IV Zosyn for two days, started on Augmentin for sinusitis. She was discharged on Friday and later that day "started talking out of her head" with non harmful visual and auditory hallucinations. Hallucinations resolved, but for the past two nights pt has been talking all night. Some intermittent cough, but no fevers, chills, night sweats, has lost 9 lbs over past 2 months, but this is attributed to starting Bumex. No SOB, LE swelling, no bowel nor urinary changes.     On admit, pt somnolent. HD stable, afebrile, on 2 L NC. No leukocytosis, Lactate 1, BNP 3484, Troponin 0.162, UA noncontributory, CXR with mid and LL opacities concerning for possible PNA. Diuresed Lasix 40mg x1 in ED with 300mL urine output. Admitted for continued workup of encephalopathy.      Overview/Hospital Course:  Pt admitted with acute metabolic encephalopathy. IV Zosyn started due to concerns for aspiration and IV lasix given in light of hypervolemia. Mentation improving with antibiotic treatment. Breathing treatments started. Diuetics changed to home bumex on 3/22 and antibiotics transitioned to Augmentin on 3/23.       Interval History: NAEON. Pt denies pain or discomfort. Currently on home 2.5L NC. Adequate urine output while on bumex po. K 3.1. replaced. Abx " transitioned to augmentin. Stable for discharge home however family requesting to keep pt one more day as they are worried given multiple recent hospitalizations. Will keep pt until tomorrow and if she cont to remain stable will discharge home with home health      Review of Systems   Constitutional:  Positive for fatigue. Negative for chills and fever.   HENT:  Positive for congestion, sinus pressure and sinus pain.    Respiratory:  Positive for cough. Negative for shortness of breath.    Cardiovascular:  Negative for chest pain and palpitations.   Gastrointestinal:  Negative for abdominal pain, constipation and diarrhea.   Genitourinary:  Negative for difficulty urinating and enuresis.   Musculoskeletal:  Negative for back pain and myalgias.   Skin:  Negative for rash and wound.   Neurological:  Negative for dizziness, light-headedness and headaches.   Psychiatric/Behavioral:  Negative for agitation and behavioral problems.    All other systems reviewed and are negative.  Objective:     Vital Signs (Most Recent):  Temp: 97.7 °F (36.5 °C) (03/23/22 1139)  Pulse: 63 (03/23/22 1139)  Resp: 18 (03/23/22 1139)  BP: 122/60 (03/23/22 1139)  SpO2: 100 % (03/23/22 1139) Vital Signs (24h Range):  Temp:  [97.5 °F (36.4 °C)-99 °F (37.2 °C)] 97.7 °F (36.5 °C)  Pulse:  [61-99] 63  Resp:  [14-20] 18  SpO2:  [95 %-100 %] 100 %  BP: (122-150)/(59-70) 122/60     Weight: 36.6 kg (80 lb 11 oz)  Body mass index is 15.76 kg/m².    Physical Exam  Vitals and nursing note reviewed.   Constitutional:       General: She is sleeping.   HENT:      Head: Normocephalic and atraumatic.      Mouth/Throat:      Mouth: Mucous membranes are dry.      Pharynx: Oropharynx is clear.   Eyes:      Extraocular Movements: Extraocular movements intact.   Cardiovascular:      Rate and Rhythm: Normal rate and regular rhythm.   Pulmonary:      Effort: Pulmonary effort is normal.      Breath sounds: No wheezing or rales.   Abdominal:      General: Bowel sounds  "are normal. There is no distension.      Palpations: Abdomen is soft.      Tenderness: There is no abdominal tenderness.   Musculoskeletal:      Cervical back: Neck supple.      Right lower leg: No edema.      Left lower leg: No edema.   Skin:     General: Skin is warm and dry.   Neurological:      General: No focal deficit present.      Mental Status: She is oriented to person, place, and time. She is lethargic.   Psychiatric:         Mood and Affect: Mood normal.         Behavior: Behavior normal.         Significant Labs: All pertinent labs within the past 24 hours have been reviewed.    Significant Imaging: I have reviewed all pertinent imaging results/findings within the past 24 hours.      Assessment/Plan:      * Acute metabolic encephalopathy  Pt A&O x4, able to ambulate with a walker, lives independently with sitters, family keeping watch on pt. After DC from prior admission, pt was "talking out of her head", with auditory and visual hallucinations, and spent the past two nights talking constantly throughout the night. Encephalopathy has occurred in the past with infection association per daughter at bedside. No hx of dementia, meds review without contributory agents. Etiology believed to be infection, in spite of being afebrile and without leukocytosis. CXR concerning for possible mid or lower lobe PNA.    - Pt on Zosyn with good response on prior hospitalization. Remained afebrile X 72 hr. Labs stable therfore will transition to Augmentin for aspiration pna      COPD (chronic obstructive pulmonary disease)  Prior notes (2019) identify hypercarbia, CMP with chronic bicarb elevation. Likely compensated.  On home 2.5 L NC    - Continue home Symbicort  - Continue breathing treatments    DNR (do not resuscitate)  Status confirmed via [place]. DNR status ordered for this admission.      Acute on chronic diastolic heart failure  Resolved. Currently euvolemic  EF 60%, GIIDD  Pt diuresed on prior admission. Takes " Bumex as home diuretic after no longer responsive to po Lasix. Pt on 2L NC at home. BNP 3484 on admission, but pt without increase in O2, no increased work of breathing per daughter at bedside. Pt able to sit up and lie flat without dysnpea. No appreciable JVD, no rales. Pt appears dry on exam.    - IV diuresis given on 03/21  - transitioned to home po bumex on 3/22  - monitor bp    Hypertension  Currently normotensive  Will cont to monitor          VTE Risk Mitigation (From admission, onward)         Ordered     enoxaparin injection 30 mg  Daily         03/21/22 0153     IP VTE HIGH RISK PATIENT  Once         03/21/22 0153     Place sequential compression device  Until discontinued         03/21/22 0153                Discharge Planning   TRISTON: 3/24/2022     Code Status: DNR   Is the patient medically ready for discharge?: Yes    Reason for patient still in hospital (select all that apply): PT / OT recommendations and Pending disposition  Discharge Plan A: Home Health                  Noelle Michel MD  Department of Hospital Medicine   [facility] - Oncology ([facility])

## 2022-03-23 NOTE — PROGRESS NOTES
Pharmacist Renal Dose Adjustment Note    Venus Mayer is a 89 y.o. female being treated with the medication amoxicillin/clavulanate.     Patient Data:    Vital Signs (Most Recent):  Temp: 99 °F (37.2 °C) (03/23/22 0743)  Pulse: 61 (03/23/22 1102)  Resp: 14 (03/23/22 1102)  BP: (!) 150/67 (03/23/22 0743)  SpO2: 100 % (03/23/22 1117)   Vital Signs (72h Range):  Temp:  [97.5 °F (36.4 °C)-99 °F (37.2 °C)]   Pulse:  []   Resp:  [14-26]   BP: (100-160)/(54-73)   SpO2:  [95 %-100 %]      Recent Labs   Lab 03/21/22  0348 03/22/22  0421 03/23/22  0446   CREATININE 1.1 1.0 1.2     Serum creatinine: 1.2 mg/dL 03/23/22 0446  Estimated creatinine clearance: 16.9 mL/min    Medication: Amoxicillin/Clavulanate 875 mg/125 mg every 12 hours will be changed to Amoxicillin/Clavulanate 500 mg/125 mg every 12 hours     Pharmacist's Name: Jocelynn Lucas  Pharmacist's Extension: [phone]

## 2022-03-23 NOTE — NURSING
Message sent x 2-3 to IM team 5 pt's family has requested xopenex, Atrovent and spirivia. MD communicated an order would be written.

## 2022-03-23 NOTE — PLAN OF CARE
POC reviewed with patient and son. VS stable. Patient complained of pain and experienced relief with repositioning. Pt has remained free from injury this shift. Zosyn infused during the night. Purwike in place and draining clear yellow urine. Bed in low locked position. Call light and personal belongings within reach. Side rails up x2. Nonskid socks in place. Pt instructed to call with any needs. Will continue to monitor.       Problem: Adult Inpatient Plan of Care  Goal: Plan of Care Review  Outcome: Ongoing, Progressing  Goal: Patient-Specific Goal (Individualized)  Outcome: Ongoing, Progressing  Goal: Absence of Hospital-Acquired Illness or Injury  Outcome: Ongoing, Progressing  Goal: Optimal Comfort and Wellbeing  Outcome: Ongoing, Progressing  Goal: Readiness for Transition of Care  Outcome: Ongoing, Progressing     Problem: Adjustment to Illness (Sepsis/Septic Shock)  Goal: Optimal Coping  Outcome: Ongoing, Progressing     Problem: Glycemic Control Impaired (Sepsis/Septic Shock)  Goal: Blood Glucose Level Within Desired Range  Outcome: Ongoing, Progressing     Problem: Infection Progression (Sepsis/Septic Shock)  Goal: Absence of Infection Signs and Symptoms  Outcome: Ongoing, Progressing     Problem: Nutrition Impaired (Sepsis/Septic Shock)  Goal: Optimal Nutrition Intake  Outcome: Ongoing, Progressing     Problem: Fall Injury Risk  Goal: Absence of Fall and Fall-Related Injury  Outcome: Ongoing, Progressing     Problem: Skin Injury Risk Increased  Goal: Skin Health and Integrity  Outcome: Ongoing, Progressing     Problem: Impaired Wound Healing  Goal: Optimal Wound Healing  Outcome: Ongoing, Progressing

## 2022-03-23 NOTE — ASSESSMENT & PLAN NOTE
"Pt A&O x4, able to ambulate with a walker, lives independently with sitters, family keeping watch on pt. After DC from prior admission, pt was "talking out of her head", with auditory and visual hallucinations, and spent the past two nights talking constantly throughout the night. Encephalopathy has occurred in the past with infection association per daughter at bedside. No hx of dementia, meds review without contributory agents. Etiology believed to be infection, in spite of being afebrile and without leukocytosis. CXR concerning for possible mid or lower lobe PNA.    - Pt on Zosyn with good response on prior hospitalization. Remained afebrile X 72 hr. Labs stable therfore will transition to Augmentin for aspiration pna    "

## 2022-03-23 NOTE — PT/OT/SLP EVAL
Occupational Therapy   Co-Evaluation/Treatment    Name: Venus Mayer  MRN: [id number]  Admitting Diagnosis:  Acute metabolic encephalopathy  Recent Surgery: * No surgery found *      Recommendations:     Discharge Recommendations: home health OT (with 24/7 assistance)  Discharge Equipment Recommendations:  none  Barriers to discharge:  None    Assessment:     Venus Mayer is a 89 y.o. female with a medical diagnosis of Acute metabolic encephalopathy. Performance deficits affecting function: weakness, impaired self care skills, impaired endurance, impaired functional mobilty, gait instability, impaired balance, decreased safety awareness. Patient would benefit from continued skilled acute OT 3x/wk to improve functional mobility, increase independence with ADLs, and address established goals. Recommending HHOT with 24/7 assistance once medically appropriate for discharge to increase maximal independence, reduce burden of care, and ensure safety.     Rehab Prognosis: Good; patient would benefit from acute skilled OT services to address these deficits and reach maximum level of function.       Plan:     Patient to be seen 3 x/week to address the above listed problems via self-care/home management, therapeutic activities, therapeutic exercises  · Plan of Care Expires: 04/23/22  · Plan of Care Reviewed with: patient, daughter    Subjective     Chief Complaint: wanting robe on  Patient/Family Comments/goals: Patient agreed to therapy    Occupational Profile:  Living Environment: Patient lives alone in a H with 4 ALEKSANDR with B HRs (wide apart). Patient has handles on toilet, BSC, and a raised toilet seat to go on top if needed. Patient has a tub shower combo with bath bench and grab bar. Patients children sleep overnight with her and they have private sitters otherwise from 10-5 on 6 days/wk and family assistance with other coverage. Daughter assists with bathing and dressing tasks. Patient has a   Prior level of function:   Patients daugther reports pt is able to perform mobility with slight assistance at times, uses RW when waking up/after taking a nap, someone is always behind her when she is walking. Able to perform stair ambulation with ~ min assist. Family/sitters help with ADLs as needed.   Falls within the last 90 days: denies  Equipment owned: walker, rolling, bedside commode, raised toilet (bed rails)  Support available upon discharge: family and sitters    Pain/Comfort:  · Pain Rating 1: 0/10  · Pain Rating Post-Intervention 1: 0/10    Patients cultural, spiritual, Scientology conflicts given the current situation: no    Objective:     Communicated with: NSDIANE prior to session.  Patient found HOB elevated with   upon OT entry to room.    General Precautions: Standard, fall   Orthopedic Precautions:N/A   Braces: N/A  Respiratory Status: Nasal cannula, flow 2.5 L/min    Occupational Performance:    Bed Mobility:    · Patient completed Supine to Sit with supervision with HOB elevated    Functional Mobility/Transfers:  · Patient completed Sit <> Stand Transfer with contact guard assistance  with  rolling walker   · Patient completed Toilet Transfer bed>toilet; toilet>bedside chair CGA<>SBA with functional ambulation technique with RW    Activities of Daily Living:  · Upper Body Dressing: minimum assistance Donning robe  · Lower Body Dressing: maximal assistance Donning slippers and depends  · Toileting: contact guard assistance      Cognitive/Visual Perceptual:  Cognitive/Psychosocial Skills:  -       Oriented to: Person, Place, Time and Situation   -       Follows Commands/attention:Follows multistep  commands  -       Communication: clear/fluent, hard of hearing, hears better on R side  -       Memory: No Deficits noted  -       Safety awareness/insight to disability: intact   -       Mood/Affect/Coping skills/emotional control: Appropriate to situation  Visual/Perceptual:      -Intact      Physical Exam:  Upper Extremity Range of  Motion:     -       Right Upper Extremity: limited shoulder flexion  -       Left Upper Extremity: limited shoulder flexion  Upper Extremity Strength:    -       Right Upper Extremity: 3-/5  -       Left Upper Extremity: 3-/5   Strength:    -       Right Upper Extremity: WFL  -       Left Upper Extremity: WFL  Fine Motor Coordination:    -       Intact  Gross motor coordination:   WFL    AMPAC 6 Click ADL:  AMPAC Total Score: 16    Treatment & Education with patient and daughter extensively:   Role of OT and POC  ADL retraining  Functional mobility training  Safety  Discharge planning  Post acute therapy benefits   Importance of OOB activity    Co-treatment performed due to patient's multiple deficits requiring two skilled therapists to appropriately and safely assess patient's strength and endurance while facilitating functional tasks in addition to accommodating for patient's activity tolerance.     Education:  Patient left up in chair with all lines intact, call button in reach, nurse notified and all needs met.     GOALS:   Multidisciplinary Problems     Occupational Therapy Goals        Problem: Occupational Therapy    Goal Priority Disciplines Outcome Interventions   Occupational Therapy Goal     OT, PT/OT Ongoing, Progressing    Description: Goals to be met by: 4/13/2022    Patient will increase functional independence with ADLs by performing:    UE Dressing with Stand-by Assistance.  Grooming while standing at sink with Stand-by Assistance.  Toileting from toilet with Supervision for hygiene and clothing management.   Stand pivot transfers with Supervision.  Toilet transfer to toilet with Supervision.                     History:     Past Medical History:   Diagnosis Date    Acute on chronic congestive heart failure 7/6/2019    Cardiomyopathy     Carotid artery occlusion     CHF (congestive heart failure)     COPD (chronic obstructive pulmonary disease)     Coronary artery disease      Hyperlipidemia     Hypertension        Past Surgical History:   Procedure Laterality Date    CARDIAC CATHETERIZATION      cardic cath      CAROTID ENDARTERECTOMY      FLEXIBLE BRONCHOSCOPY N/A 7/31/2019    Procedure: BRONCHOSCOPY, FIBEROPTIC Flexible;  Surgeon: Klever Mckeon MD;  [facility];  Service: Thoracic;  Laterality: N/A;    HIP SURGERY      PLEURODESIS USING TALC N/A 7/31/2019    Procedure: PLEURODESIS;  Surgeon: Klever Mckeon MD;  [facility];  Service: Thoracic;  Laterality: N/A;    PLEURODESIS USING TALC Right 8/5/2019    Procedure: PLEURODESIS, USING TALC;  Surgeon: Klever Mckeon MD;  Location: [facility] OR [address] [place] [address];  Service: Thoracic;  Laterality: Right;    PLEURODESIS WITH VIDEO-ASSISTED THORACOSCOPIC SURGERY (VATS) Right 8/5/2019    Procedure: VATS, WITH PLEURAL TENT;  Surgeon: Klever Mckeon MD;  Location: [facility] OR [address] [place] [address];  Service: Thoracic;  Laterality: Right;       Time Tracking:     OT Date of Treatment: 03/23/22  OT Start Time: 1338  OT Stop Time: 1424  OT Total Time (min): 46 min    Billable Minutes:Evaluation 8  Self Care/Home Management 38    3/23/2022

## 2022-03-23 NOTE — PLAN OF CARE
Problem: Physical Therapy  Goal: Physical Therapy Goal  Description: Goals to be met by: 22    Patient will increase functional independence with mobility by performin. Pt will perform supine<>sit with supervision to improve independence with mobility  2. Pt will perform functional transfers with supervision   3. Pt will ambulate >150 ft feet with LRAD and supervision to safely perform household distance ambulation  4. Pt will ascend/descend 4 stairs with CGA to safely manage within home.   Outcome: Ongoing, Progressing     Evaluation completed and goals currently appropriate at this time.    Chica Khan, PT, DPT, GCS  3/23/2022

## 2022-03-23 NOTE — ASSESSMENT & PLAN NOTE
Prior notes (2019) identify hypercarbia, CMP with chronic bicarb elevation. Likely compensated.    - Continue home Symbicort  - Continue breathing treatments

## 2022-03-23 NOTE — PROGRESS NOTES
[facility] - Oncology ([facility])  Wound Care    Patient Name:  Venus Mayer   MRN:  [id number]  Date: 3/23/2022  Diagnosis: Acute metabolic encephalopathy    History:     Past Medical History:   Diagnosis Date    Acute on chronic congestive heart failure 2019    Cardiomyopathy     Carotid artery occlusion     CHF (congestive heart failure)     COPD (chronic obstructive pulmonary disease)     Coronary artery disease     Hyperlipidemia     Hypertension        Social History     Socioeconomic History    Marital status:    Tobacco Use    Smoking status: Former Smoker     Packs/day: 2.00     Years: 20.00     Pack years: 40.00     Types: Cigarettes     Quit date: 1980     Years since quittin.2    Smokeless tobacco: Never Used   Substance and Sexual Activity    Alcohol use: Yes     Alcohol/week: 2.0 standard drinks     Types: 2 Glasses of wine per week     Comment: social    Drug use: No    Sexual activity: Not Currently       Precautions:     Allergies as of 2022 - Reviewed 2022   Allergen Reaction Noted    Ancef in dextrose (iso-osm) Rash 2019    Cefazolin Rash 2019    Cefuroxime Rash 2019    Sulfamethoxazole-trimethoprim Rash 01/15/2013       [facility] Assessment Details/Treatment     Wound care consult received from RN for assessment of sacrum/ buttocks. Per chart review, patient is a 89 year old female presenting to [facility] with altered mental status. She was recently  previously hospitalized for three days at [facility] and started having non harmful visual and auditory hallucinations. Assessment and picture listed below.        22 1053        Altered Skin Integrity 22 0900 Sacral spine Shearing   Date First Assessed/Time First Assessed: 22 0900   Altered Skin Integrity Present on Admission: yes  Location: Sacral spine  Primary Wound Type: Shearing   Wound Image    Description of Altered Skin Integrity Purple or maroon localized area of  discolored intact skin or non-intact skin or a blood-filled blister.   Dressing Appearance Dry;Intact;Clean   Drainage Amount None   Drainage Characteristics/Odor No odor   Appearance Pink;Maroon   Periwound Area Intact;Redness   Wound Length (cm) 3 cm   Wound Width (cm) 2.5 cm   Wound Surface Area (cm^2) 7.5 cm^2   Dressing Applied;Foam       Recommendations made to primary team for Balsam peru castor oil ointment BID to stimulate capillary blood flow to promote healing and manage any moisture. Nursing and MD team notified. Orders placed. Nursing to continue with pressure injury prevention interventions to include waffle overlay. Wound care to assist with pt prn.     03/23/2022

## 2022-03-23 NOTE — PLAN OF CARE
Problem: Occupational Therapy  Goal: Occupational Therapy Goal  Description: Goals to be met by: 4/13/2022    Patient will increase functional independence with ADLs by performing:    UE Dressing with Stand-by Assistance.  Grooming while standing at sink with Stand-by Assistance.  Toileting from toilet with Supervision for hygiene and clothing management.   Stand pivot transfers with Supervision.  Toilet transfer to toilet with Supervision.    Outcome: Ongoing, Progressing   Patient's goals are set.

## 2022-03-23 NOTE — ASSESSMENT & PLAN NOTE
Prior notes (2019) identify hypercarbia, CMP with chronic bicarb elevation. Likely compensated.  On home 2.5 L NC    - Continue home Symbicort  - Continue breathing treatments

## 2022-03-23 NOTE — PROGRESS NOTES
"[facility] - Oncology ([facility])  [facility] Medicine  Progress Note    Patient Name: Venus Mayer  MRN: [id number]  Patient Class: IP- Inpatient   Admission Date: 3/20/2022  Length of Stay: 1 days  Attending Physician: Zahida Guo MD  Primary Care Provider: Jona Che MD        Subjective:     Principal Problem:Acute metabolic encephalopathy        HPI:  Ms. Mayer is a 89 yo W PMHx diastolic heart failure (EF 60 in 03/22), HOCM, pulmonary HTN, COPD on home O2 2.5 L, HLD, HTN presenting with altered mental status. Pt asleep and presented altered, so hx provided by daughter at bedside.     She was previously hospitalized for three days at [facility] where she was diuresed, given IV Zosyn for two days, started on Augmentin for sinusitis. She was discharged on Friday and later that day "started talking out of her head" with non harmful visual and auditory hallucinations. Hallucinations resolved, but for the past two nights pt has been talking all night. Some intermittent cough, but no fevers, chills, night sweats, has lost 9 lbs over past 2 months, but this is attributed to starting Bumex. No SOB, LE swelling, no bowel nor urinary changes.     On admit, pt somnolent. HD stable, afebrile, on 2 L NC. No leukocytosis, Lactate 1, BNP 3484, Troponin 0.162, UA noncontributory, CXR with mid and LL opacities concerning for possible PNA. Diuresed Lasix 40mg x1 in ED with 300mL urine output. Admitted for continued workup of encephalopathy.      Interval History: Pt comfortable in room and AAOx4. Back to home Oxygen. Coughing up phlegm while bedside; however not in respiratory distress.     Overview/Hospital Course:  Pt admitted with acute metabolic encephalopathy. IV Zosyn started due to concerns for HAP and IV lasix given for diuresis. Mentation improving with antibiotic treatment. Breathing treatments started.       Past Medical History:   Diagnosis Date    Acute on chronic congestive heart failure 7/6/2019    " Cardiomyopathy     Carotid artery occlusion     CHF (congestive heart failure)     COPD (chronic obstructive pulmonary disease)     Coronary artery disease     Hyperlipidemia     Hypertension        Past Surgical History:   Procedure Laterality Date    CARDIAC CATHETERIZATION      cardic cath      CAROTID ENDARTERECTOMY      FLEXIBLE BRONCHOSCOPY N/A 7/31/2019    Procedure: BRONCHOSCOPY, FIBEROPTIC Flexible;  Surgeon: Klever Mckeon MD;  Location: [facility] OR [address] [place] [address];  Service: Thoracic;  Laterality: N/A;    HIP SURGERY      PLEURODESIS USING TALC N/A 7/31/2019    Procedure: PLEURODESIS;  Surgeon: Klever Mckeon MD;  [facility];  Service: Thoracic;  Laterality: N/A;    PLEURODESIS USING TALC Right 8/5/2019    Procedure: PLEURODESIS, USING TALC;  Surgeon: Klever Mckeon MD;  [facility];  Service: Thoracic;  Laterality: Right;    PLEURODESIS WITH VIDEO-ASSISTED THORACOSCOPIC SURGERY (VATS) Right 8/5/2019    Procedure: VATS, WITH PLEURAL TENT;  Surgeon: Klever Mckeon MD;  Location: [facility] OR [address] [place] [address];  Service: Thoracic;  Laterality: Right;       Review of patient's allergies indicates:   Allergen Reactions    Ancef in dextrose (iso-osm) Rash    Cefazolin Rash     Tolerating Zosyn admission 3/2022    Cefuroxime Rash    Sulfamethoxazole-trimethoprim Rash     Other reaction(s): Rash       No current facility-administered medications on file prior to encounter.     Current Outpatient Medications on File Prior to Encounter   Medication Sig    acetaminophen (TYLENOL) 500 MG tablet Take 500 mg by mouth daily as needed (BACK PAIN).    amoxicillin-clavulanate 500-125mg (AUGMENTIN) 500-125 mg Tab Take 1 tablet (500 mg total) by mouth every 12 (twelve) hours. for 7 days    aspirin 81 mg Tab Take 1 tablet by mouth once daily.     budesonide-formoterol 160-4.5 mcg (SYMBICORT) 160-4.5 mcg/actuation HFAA Inhale 2 puffs into the lungs every 12 (twelve) hours.  Controller    bumetanide (BUMEX) 1 MG tablet Take 1 tablet (1 mg total) by mouth 2 (two) times daily.    ferrous sulfate 325 (65 FE) MG EC tablet Take 1 tablet (325 mg total) by mouth once daily.    guaiFENesin (MUCINEX) 600 mg 12 hr tablet Take 600 mg by mouth 2 (two) times daily as needed for Congestion.    Lactobacillus rhamnosus GG (CULTURELLE) 10 billion cell capsule Take 1 capsule by mouth once daily.    levalbuterol (XOPENEX) 0.63 mg/3 mL nebulizer solution Take 3 mLs (0.63 mg total) by nebulization every 4 (four) hours as needed for Wheezing or Shortness of Breath. Rescue    MELATONIN ORAL Take 3 mg by mouth nightly as needed (sleep).    metoprolol tartrate (LOPRESSOR) 25 MG tablet Take 12.5 mg by mouth 2 (two) times daily.    multivitamin (THERAGRAN) per tablet Take 1 tablet by mouth once daily.    pantoprazole (PROTONIX) 40 MG tablet Take 40 mg by mouth every morning.    polyethylene glycol (GLYCOLAX) 17 gram/dose powder Take 17 g by mouth daily as needed (CONSTIPATION).    potassium chloride (MICRO-K) 10 MEQ CpSR Take 10 mEq by mouth once daily.    tetrahydrozoline 0.05% (VISINE) 0.05 % Drop PLACE 1 DROP INTO AFFECTED EYE(S) AS NEEDED FOR REDNESS OR ITCHING    tiotropium bromide (SPIRIVA RESPIMAT) 2.5 mcg/actuation inhaler Inhale 2 puffs into the lungs Daily. Controller    triamcinolone (NASACORT) 55 mcg nasal inhaler 2 sprays by Nasal route once daily.    UNABLE TO FIND MAGNILIFE RELAXING LEG CREAM- APPLY AT BEDTIME TO LEGS AND FEET    [DISCONTINUED] albuterol (PROAIR HFA) 90 mcg/actuation inhaler Inhale 1 puff into the lungs every 6 (six) hours as needed for Wheezing or Shortness of Breath. Rescue (Patient not taking: Reported on 3/16/2022)    [DISCONTINUED] azelastine (ASTELIN) 137 mcg (0.1 %) nasal spray 2 sprays by Nasal route 2 (two) times daily.    [DISCONTINUED] baclofen (LIORESAL) 10 MG tablet TK 1 T PO TID    [DISCONTINUED] rOPINIRole (REQUIP) 0.25 MG tablet Take 1 tablet  (0.25 mg total) by mouth every evening. (Patient not taking: Reported on 3/16/2022)     Family History       Problem Relation (Age of Onset)    Hypertension Mother          Tobacco Use    Smoking status: Former Smoker     Packs/day: 2.00     Years: 20.00     Pack years: 40.00     Types: Cigarettes     Quit date: 1980     Years since quittin.2    Smokeless tobacco: Never Used   Substance and Sexual Activity    Alcohol use: Yes     Alcohol/week: 2.0 standard drinks     Types: 2 Glasses of wine per week     Comment: social    Drug use: No    Sexual activity: Not Currently     Review of Systems   Constitutional:  Positive for fatigue. Negative for chills and fever.   HENT:  Positive for congestion, sinus pressure and sinus pain.    Respiratory:  Positive for cough. Negative for shortness of breath.    Cardiovascular:  Negative for chest pain and palpitations.   Gastrointestinal:  Negative for abdominal pain, constipation and diarrhea.   Genitourinary:  Negative for difficulty urinating and enuresis.   Musculoskeletal:  Negative for back pain and myalgias.   Skin:  Negative for rash and wound.   Neurological:  Negative for dizziness, light-headedness and headaches.   Psychiatric/Behavioral:  Negative for agitation and behavioral problems.    All other systems reviewed and are negative.  Objective:     Vital Signs (Most Recent):  Temp: 98.3 °F (36.8 °C) (22 1650)  Pulse: 61 (22 1650)  Resp: 18 (22 1650)  BP: 134/68 (22 1650)  SpO2: 95 % (22 1650) Vital Signs (24h Range):  Temp:  [97.5 °F (36.4 °C)-98.3 °F (36.8 °C)] 98.3 °F (36.8 °C)  Pulse:  [] 61  Resp:  [14-26] 18  SpO2:  [95 %-100 %] 95 %  BP: (100-160)/(54-73) 134/68     Weight: 38.1 kg (84 lb)  Body mass index is 16.41 kg/m².    Physical Exam  Vitals and nursing note reviewed.   Constitutional:       General: She is sleeping.   HENT:      Head: Normocephalic and atraumatic.      Mouth/Throat:      Mouth: Mucous  "membranes are dry.      Pharynx: Oropharynx is clear.   Eyes:      Extraocular Movements: Extraocular movements intact.   Cardiovascular:      Rate and Rhythm: Normal rate and regular rhythm.   Pulmonary:      Effort: Pulmonary effort is normal.      Breath sounds: Wheezing present. No rales.   Abdominal:      General: Bowel sounds are normal. There is no distension.      Palpations: Abdomen is soft.      Tenderness: There is no abdominal tenderness.   Musculoskeletal:      Cervical back: Neck supple.      Right lower leg: No edema.      Left lower leg: No edema.   Skin:     General: Skin is warm and dry.   Neurological:      General: No focal deficit present.      Mental Status: She is oriented to person, place, and time. She is lethargic.   Psychiatric:         Mood and Affect: Mood normal.         Behavior: Behavior normal.         Significant Labs: All pertinent labs within the past 24 hours have been reviewed.    Significant Imaging: I have reviewed all pertinent imaging results/findings within the past 24 hours.      Assessment/Plan:      * Acute metabolic encephalopathy  Pt A&O x4, able to ambulate with a walker, lives independently with sitters, family keeping watch on pt. After DC from prior admission, pt was "talking out of her head", with auditory and visual hallucinations, and spent the past two nights talking constantly throughout the night. Encephalopathy has occurred in the past with infection association per daughter at bedside. No hx of dementia, meds review without contributory agents. Etiology believed to be infection, in spite of being afebrile and without leukocytosis. CXR concerning for possible mid or lower lobe PNA.    - Pt on Zosyn with good response on prior hospitalization. Will continue for broad coverage and monitor mental status.  - Concern for HAP- continuing IV Zosyn      COPD (chronic obstructive pulmonary disease)  Prior notes (2019) identify hypercarbia, CMP with chronic bicarb " elevation. Likely compensated.    - Continue home Symbicort  - Continue breathing treatments    DNR (do not resuscitate)  Status confirmed via [place]. DNR status ordered for this admission.      Acute on chronic diastolic heart failure  Pt diuresed on prior admission. Takes Bumex as home diuretic after no longer responsive to po Lasix. Pt on 2L NC at home. BNP 3484 on admission, but pt without increase in O2, no increased work of breathing per daughter at bedside. Pt able to sit up and lie flat without dysnpea. No appreciable JVD, no rales. Pt appears dry on exam.    - IV diuresis given on 03/21  - Holding off on diuresis for now due to being dry on exam.     Hypertension  Holding home antihypertensive regimen (Lopressor 12.5mg po bid) in setting of normotension.          VTE Risk Mitigation (From admission, onward)         Ordered     enoxaparin injection 30 mg  Daily         03/21/22 0153     IP VTE HIGH RISK PATIENT  Once         03/21/22 0153     Place sequential compression device  Until discontinued         03/21/22 0153                Discharge Planning   TRISTON: 3/24/2022     Code Status: DNR   Is the patient medically ready for discharge?: No    Reason for patient still in hospital (select all that apply): Patient trending condition  Discharge Plan A: Home Health                  Moraima De MD  Department of Hospital Medicine   [facility] - Oncology ([facility])

## 2022-03-23 NOTE — SUBJECTIVE & OBJECTIVE
Interval History: NAEON. Pt denies pain or discomfort. Currently on home 2.5L NC. Adequate urine output while on bumex po. K 3.1. replaced. Abx transitioned to augmentin. Stable for discharge home however family requesting to keep pt one more day as they are worried given multiple recent hospitalizations. Will keep pt until tomorrow and if she cont to remain stable will discharge home with home health      Review of Systems   Constitutional:  Positive for fatigue. Negative for chills and fever.   HENT:  Positive for congestion, sinus pressure and sinus pain.    Respiratory:  Positive for cough. Negative for shortness of breath.    Cardiovascular:  Negative for chest pain and palpitations.   Gastrointestinal:  Negative for abdominal pain, constipation and diarrhea.   Genitourinary:  Negative for difficulty urinating and enuresis.   Musculoskeletal:  Negative for back pain and myalgias.   Skin:  Negative for rash and wound.   Neurological:  Negative for dizziness, light-headedness and headaches.   Psychiatric/Behavioral:  Negative for agitation and behavioral problems.    All other systems reviewed and are negative.  Objective:     Vital Signs (Most Recent):  Temp: 97.7 °F (36.5 °C) (03/23/22 1139)  Pulse: 63 (03/23/22 1139)  Resp: 18 (03/23/22 1139)  BP: 122/60 (03/23/22 1139)  SpO2: 100 % (03/23/22 1139) Vital Signs (24h Range):  Temp:  [97.5 °F (36.4 °C)-99 °F (37.2 °C)] 97.7 °F (36.5 °C)  Pulse:  [61-99] 63  Resp:  [14-20] 18  SpO2:  [95 %-100 %] 100 %  BP: (122-150)/(59-70) 122/60     Weight: 36.6 kg (80 lb 11 oz)  Body mass index is 15.76 kg/m².    Physical Exam  Vitals and nursing note reviewed.   Constitutional:       General: She is sleeping.   HENT:      Head: Normocephalic and atraumatic.      Mouth/Throat:      Mouth: Mucous membranes are dry.      Pharynx: Oropharynx is clear.   Eyes:      Extraocular Movements: Extraocular movements intact.   Cardiovascular:      Rate and Rhythm: Normal rate and regular  rhythm.   Pulmonary:      Effort: Pulmonary effort is normal.      Breath sounds: No wheezing or rales.   Abdominal:      General: Bowel sounds are normal. There is no distension.      Palpations: Abdomen is soft.      Tenderness: There is no abdominal tenderness.   Musculoskeletal:      Cervical back: Neck supple.      Right lower leg: No edema.      Left lower leg: No edema.   Skin:     General: Skin is warm and dry.   Neurological:      General: No focal deficit present.      Mental Status: She is oriented to person, place, and time. She is lethargic.   Psychiatric:         Mood and Affect: Mood normal.         Behavior: Behavior normal.         Significant Labs: All pertinent labs within the past 24 hours have been reviewed.    Significant Imaging: I have reviewed all pertinent imaging results/findings within the past 24 hours.

## 2022-03-23 NOTE — ASSESSMENT & PLAN NOTE
Resolved. Currently euvolemic  EF 60%, GIIDD  Pt diuresed on prior admission. Takes Bumex as home diuretic after no longer responsive to po Lasix. Pt on 2L NC at home. BNP 3484 on admission, but pt without increase in O2, no increased work of breathing per daughter at bedside. Pt able to sit up and lie flat without dysnpea. No appreciable JVD, no rales. Pt appears dry on exam.    - IV diuresis given on 03/21  - transitioned to home po bumex on 3/22  - monitor bp

## 2022-03-23 NOTE — ASSESSMENT & PLAN NOTE
"Pt A&O x4, able to ambulate with a walker, lives independently with sitters, family keeping watch on pt. After DC from prior admission, pt was "talking out of her head", with auditory and visual hallucinations, and spent the past two nights talking constantly throughout the night. Encephalopathy has occurred in the past with infection association per daughter at bedside. No hx of dementia, meds review without contributory agents. Etiology believed to be infection, in spite of being afebrile and without leukocytosis. CXR concerning for possible mid or lower lobe PNA.    - Pt on Zosyn with good response on prior hospitalization. Will continue for broad coverage and monitor mental status.  - Concern for HAP- continuing IV Zosyn    "

## 2022-03-23 NOTE — PLAN OF CARE
03/23/22 1518   Post-Acute Status   Post-Acute Authorization Placement;Home Health   Post-Acute Placement Status Pending medical clearance/testing   Home Health Status Referrals Sent  ([facility])     SW observed in previous CM note that Pt declined for [facility] and family would like [facility] instead. Sent referral.    Ashleigh Donovan LCSW  Neurocritical Care   [facility]  [phone]

## 2022-03-24 VITALS
BODY MASS INDEX: 16.32 KG/M2 | OXYGEN SATURATION: 98 % | WEIGHT: 83.13 LBS | RESPIRATION RATE: 19 BRPM | HEART RATE: 84 BPM | TEMPERATURE: 98 F | HEIGHT: 60 IN | DIASTOLIC BLOOD PRESSURE: 60 MMHG | SYSTOLIC BLOOD PRESSURE: 123 MMHG

## 2022-03-24 LAB
ANION GAP SERPL CALC-SCNC: 9 MMOL/L (ref 8–16)
BUN SERPL-MCNC: 15 MG/DL (ref 10–30)
CALCIUM SERPL-MCNC: 9.4 MG/DL (ref 8.7–10.5)
CHLORIDE SERPL-SCNC: 91 MMOL/L (ref 95–110)
CO2 SERPL-SCNC: 39 MMOL/L (ref 23–29)
CREAT SERPL-MCNC: 1 MG/DL (ref 0.5–1.4)
EST. GFR  (AFRICAN AMERICAN): 56.1 ML/MIN/1.73 M^2
EST. GFR  (NON AFRICAN AMERICAN): 48.7 ML/MIN/1.73 M^2
GLUCOSE SERPL-MCNC: 100 MG/DL (ref 70–110)
POTASSIUM SERPL-SCNC: 3.9 MMOL/L (ref 3.5–5.1)
SODIUM SERPL-SCNC: 139 MMOL/L (ref 136–145)

## 2022-03-24 PROCEDURE — 1111F PR DISCHARGE MEDS RECONCILED W/ CURRENT OUTPATIENT MED LIST: ICD-10-PCS | Mod: CPTII,GC,, | Performed by: STUDENT IN AN ORGANIZED HEALTH CARE EDUCATION/TRAINING PROGRAM

## 2022-03-24 PROCEDURE — 25000242 PHARM REV CODE 250 ALT 637 W/ HCPCS

## 2022-03-24 PROCEDURE — 1111F DSCHRG MED/CURRENT MED MERGE: CPT | Mod: CPTII,GC,, | Performed by: STUDENT IN AN ORGANIZED HEALTH CARE EDUCATION/TRAINING PROGRAM

## 2022-03-24 PROCEDURE — 25000003 PHARM REV CODE 250

## 2022-03-24 PROCEDURE — 25000242 PHARM REV CODE 250 ALT 637 W/ HCPCS: Performed by: STUDENT IN AN ORGANIZED HEALTH CARE EDUCATION/TRAINING PROGRAM

## 2022-03-24 PROCEDURE — 99239 HOSP IP/OBS DSCHRG MGMT >30: CPT | Mod: GC,,, | Performed by: STUDENT IN AN ORGANIZED HEALTH CARE EDUCATION/TRAINING PROGRAM

## 2022-03-24 PROCEDURE — 25000003 PHARM REV CODE 250: Performed by: STUDENT IN AN ORGANIZED HEALTH CARE EDUCATION/TRAINING PROGRAM

## 2022-03-24 PROCEDURE — 80048 BASIC METABOLIC PNL TOTAL CA: CPT

## 2022-03-24 PROCEDURE — 99239 PR HOSPITAL DISCHARGE DAY,>30 MIN: ICD-10-PCS | Mod: GC,,, | Performed by: STUDENT IN AN ORGANIZED HEALTH CARE EDUCATION/TRAINING PROGRAM

## 2022-03-24 PROCEDURE — 36415 COLL VENOUS BLD VENIPUNCTURE: CPT

## 2022-03-24 RX ORDER — AMOXICILLIN AND CLAVULANATE POTASSIUM 500; 125 MG/1; MG/1
1 TABLET, FILM COATED ORAL EVERY 12 HOURS
Qty: 6 TABLET | Refills: 0 | Status: SHIPPED | OUTPATIENT
Start: 2022-03-24 | End: 2022-03-27

## 2022-03-24 RX ORDER — FLUTICASONE PROPIONATE 50 MCG
2 SPRAY, SUSPENSION (ML) NASAL DAILY
Status: DISCONTINUED | OUTPATIENT
Start: 2022-03-24 | End: 2022-03-24 | Stop reason: HOSPADM

## 2022-03-24 RX ADMIN — Medication: at 08:03

## 2022-03-24 RX ADMIN — BUMETANIDE 1 MG: 1 TABLET ORAL at 08:03

## 2022-03-24 RX ADMIN — FLUTICASONE FUROATE AND VILANTEROL TRIFENATATE 1 PUFF: 100; 25 POWDER RESPIRATORY (INHALATION) at 08:03

## 2022-03-24 RX ADMIN — POLYETHYLENE GLYCOL 3350 17 G: 17 POWDER, FOR SOLUTION ORAL at 08:03

## 2022-03-24 RX ADMIN — GUAIFENESIN 600 MG: 600 TABLET, EXTENDED RELEASE ORAL at 08:03

## 2022-03-24 RX ADMIN — FLUTICASONE PROPIONATE 100 MCG: 50 SPRAY, METERED NASAL at 08:03

## 2022-03-24 RX ADMIN — TIOTROPIUM BROMIDE INHALATION SPRAY 2 PUFF: 3.12 SPRAY, METERED RESPIRATORY (INHALATION) at 08:03

## 2022-03-24 RX ADMIN — LEVALBUTEROL HYDROCHLORIDE 0.63 MG: 0.63 SOLUTION RESPIRATORY (INHALATION) at 08:03

## 2022-03-24 RX ADMIN — ASPIRIN 81 MG CHEWABLE TABLET 81 MG: 81 TABLET CHEWABLE at 08:03

## 2022-03-24 RX ADMIN — AMOXICILLIN AND CLAVULANATE POTASSIUM 500 MG: 500; 125 TABLET, FILM COATED ORAL at 08:03

## 2022-03-24 RX ADMIN — PANTOPRAZOLE SODIUM 40 MG: 40 TABLET, DELAYED RELEASE ORAL at 08:03

## 2022-03-24 NOTE — ASSESSMENT & PLAN NOTE
"Pt A&O x4, able to ambulate with a walker, lives independently with sitters, family keeping watch on pt. After DC from prior admission, pt was "talking out of her head", with auditory and visual hallucinations, and spent the past two nights talking constantly throughout the night. Encephalopathy has occurred in the past with infection association per daughter at bedside. No hx of dementia, meds review without contributory agents. Etiology believed to be infection, in spite of being afebrile and without leukocytosis. CXR concerning for possible mid or lower lobe PNA.    - PO Augmentin for pna. End date 03/27  - AAOx4    "

## 2022-03-24 NOTE — PLAN OF CARE
POC reviewed with patient and son-in-law. VS stable. Pt complained of nasal congestion new orders were put in. Pt has remained free from injury this shift. Pt on 2L NC saturating above 94%. Bed in low locked position. Call light and personal belongings within reach. Side rails up x2. Nonskid socks in place. Pt instructed to call with any needs. Will continue to monitor.     Problem: Adult Inpatient Plan of Care  Goal: Plan of Care Review  Outcome: Ongoing, Progressing  Goal: Patient-Specific Goal (Individualized)  Outcome: Ongoing, Progressing  Goal: Absence of Hospital-Acquired Illness or Injury  Outcome: Ongoing, Progressing  Goal: Optimal Comfort and Wellbeing  Outcome: Ongoing, Progressing  Goal: Readiness for Transition of Care  Outcome: Ongoing, Progressing     Problem: Infection Progression (Sepsis/Septic Shock)  Goal: Absence of Infection Signs and Symptoms  Outcome: Ongoing, Progressing     Problem: Nutrition Impaired (Sepsis/Septic Shock)  Goal: Optimal Nutrition Intake  Outcome: Ongoing, Progressing     Problem: Fall Injury Risk  Goal: Absence of Fall and Fall-Related Injury  Outcome: Ongoing, Progressing     Problem: Skin Injury Risk Increased  Goal: Skin Health and Integrity  Outcome: Ongoing, Progressing

## 2022-03-24 NOTE — PLAN OF CARE
[facility] (Hospital)  Discharge Final Note    Primary Care Provider: Jona Che MD    Expected Discharge Date: 3/24/2022     Pt will dc today with HH ([facility], start of services on Monday 3/28). Primary team is agreeable to services starting on Monday. SW updated pts dtg Bri.    Final Discharge Note (most recent)     Final Note - 03/24/22 1624        Final Note    Assessment Type Final Discharge Note     Anticipated Discharge Disposition [place] [facility]     Hospital Resources/Appts/Education Provided Appointments scheduled and added to AVS        Post-Acute Status    Post-Acute Authorization Home Health     Home Health Status Set-up Complete/Auth obtained     Discharge Delays None known at this time               Important Message from Medicare  Important Message from Medicare regarding Discharge Appeal Rights: Given to patient/caregiver, Explained to patient/caregiver, Signed/date by patient/caregiver     Date IMM was signed: 03/24/22  Time IMM was signed: [id number]    Contact Info     Jona Che MD   Specialty: General Practice   Relationship: PCP - General    [address] [place] [address]  [phone]  [facility] [id number]   Phone: [phone]       Next Steps: Go on 3/30/2022    Instructions: hospital follow up at 10:15AM        Future Appointments   Date Time Provider Department Center   3/29/2022  8:40 AM LAB, APPOINTMENT [place] [facility]   3/29/2022  9:30 AM [facility]-C [facility]   3/29/2022  9:30 AM [facility] HEART FAILURE NURSE [facility] Tavarez LCSW  PRN

## 2022-03-24 NOTE — DISCHARGE SUMMARY
"[facility])  Hospital Medicine  Discharge Summary      Patient Name: Venus Mayer  MRN: [id number]  Patient Class: IP- Inpatient  Admission Date: 3/20/2022  Hospital Length of Stay: 3 days  Discharge Date and Time:  03/24/2022 1:30 PM  Attending Physician: Zahida Guo MD   Discharging Provider: Moraima De MD  Primary Care Provider: Jona Che MD  [facility] Medicine Team: [facility] HOSP MED 5 Moraima De MD    HPI:   Ms. Mayer is a 89 yo W PMHx diastolic heart failure (EF 60 in 03/22), HOCM, pulmonary HTN, COPD on home O2 2.5 L, HLD, HTN presenting with altered mental status. Pt asleep and presented altered, so hx provided by daughter at bedside.     She was previously hospitalized for three days at [facility] where she was diuresed, given IV Zosyn for two days, started on Augmentin for sinusitis. She was discharged on Friday and later that day "started talking out of her head" with non harmful visual and auditory hallucinations. Hallucinations resolved, but for the past two nights pt has been talking all night. Some intermittent cough, but no fevers, chills, night sweats, has lost 9 lbs over past 2 months, but this is attributed to starting Bumex. No SOB, LE swelling, no bowel nor urinary changes.     On admit, pt somnolent. HD stable, afebrile, on 2 L NC. No leukocytosis, Lactate 1, BNP 3484, Troponin 0.162, UA noncontributory, CXR with mid and LL opacities concerning for possible PNA. Diuresed Lasix 40mg x1 in ED with 300mL urine output. Admitted for continued workup of encephalopathy.      * No surgery found *      Hospital Course:   Pt admitted with acute metabolic encephalopathy. IV Zosyn started due to concerns for aspiration and IV lasix given in light of hypervolemia. Mentation improving with antibiotic treatment. Breathing treatments started. Diuetics changed to home bumex on 3/22 and antibiotics transitioned to Augmentin on 3/23. Pt undergoing breathing treatments- " congestion clearing up. Pt to be discharged in stable condition with Home health orders.      Review of Systems   Constitutional: Negative for chills and fever.   HENT: Positive for congestion and sinus pain.    Eyes: Negative for photophobia.   Respiratory: Positive for cough and sputum production. Negative for shortness of breath.    Cardiovascular: Negative for chest pain and palpitations.   Gastrointestinal: Negative for constipation, diarrhea and nausea.   Genitourinary: Negative for dysuria and urgency.   Musculoskeletal: Negative for back pain and myalgias.   Neurological: Negative for dizziness, seizures and headaches.   Psychiatric/Behavioral: Negative for depression and suicidal ideas. The patient is not nervous/anxious.      Physical Exam  Vitals and nursing note reviewed.   Constitutional:       General: She is not in acute distress.     Appearance: Normal appearance. She is not ill-appearing.   HENT:      Head: Normocephalic and atraumatic.   Eyes:      General: No scleral icterus.     Conjunctiva/sclera: Conjunctivae normal.      Pupils: Pupils are equal, round, and reactive to light.   Cardiovascular:      Rate and Rhythm: Normal rate and regular rhythm.      Pulses: Normal pulses.      Heart sounds: Normal heart sounds. No murmur heard.  Pulmonary:      Effort: Pulmonary effort is normal. No respiratory distress.      Breath sounds: Normal breath sounds. No wheezing.   Abdominal:      General: Abdomen is flat. Bowel sounds are normal. There is no distension.      Tenderness: There is no abdominal tenderness.   Musculoskeletal:      Right lower leg: No edema.      Left lower leg: No edema.   Skin:     Capillary Refill: Capillary refill takes less than 2 seconds.   Neurological:      General: No focal deficit present.      Mental Status: She is alert. Mental status is at baseline.   Psychiatric:         Mood and Affect: Mood normal.         Behavior: Behavior normal.           Goals of Care Treatment  "Preferences:  Code Status: DNR       LaPOST: Yes           Consults:     * Acute metabolic encephalopathy  Pt A&O x4, able to ambulate with a walker, lives independently with sitters, family keeping watch on pt. After DC from prior admission, pt was "talking out of her head", with auditory and visual hallucinations, and spent the past two nights talking constantly throughout the night. Encephalopathy has occurred in the past with infection association per daughter at bedside. No hx of dementia, meds review without contributory agents. Etiology believed to be infection, in spite of being afebrile and without leukocytosis. CXR concerning for possible mid or lower lobe PNA.    - PO Augmentin for pna. End date 03/27  - AAOx4      COPD (chronic obstructive pulmonary disease)  Prior notes (2019) identify hypercarbia, CMP with chronic bicarb elevation. Likely compensated.  On home 2.5 L NC    - Continue home Symbicort  - Continue breathing treatments    Hypertension  Currently normotensive  Will cont to monitor  Holding BP meds        Final Active Diagnoses:    Diagnosis Date Noted POA    PRINCIPAL PROBLEM:  Acute metabolic encephalopathy [G93.41] 03/16/2022 Yes    COPD (chronic obstructive pulmonary disease) [J44.9] 03/21/2022 Unknown    DNR (do not resuscitate) [Z66] 05/22/2019 Yes    Hypertension [I10] 01/17/2013 Yes    Acute on chronic diastolic heart failure [I50.33] 01/17/2013 Yes      Problems Resolved During this Admission:       Discharged Condition: stable    Disposition:     Follow Up:   Follow-up Information     Jona Che MD. Schedule an appointment as soon as possible for a visit in 7 day(s).    Specialty: General Practice  Contact information:  [address] [place] [address]  [phone]  [facility] [id number] [phone]                       Patient Instructions:   No discharge procedures on file.    Significant Diagnostic Studies: Labs:   BMP:   Recent Labs   Lab 03/23/22  0446   GLU 85      K 3.1*   CL 91* "   CO2 38*   BUN 19   CREATININE 1.2   CALCIUM 9.0       Pending Diagnostic Studies:     Procedure Component Value Units Date/Time    Basic metabolic panel [[id number]] Collected: 03/24/22 1301    Order Status: Sent Lab Status: In process Updated: 03/24/22 1309    Specimen: Blood          Medications:  Reconciled Home Medications:      Medication List      CONTINUE taking these medications    acetaminophen 500 MG tablet  Commonly known as: TYLENOL  Take 500 mg by mouth daily as needed (BACK PAIN).     amoxicillin-clavulanate 500-125mg 500-125 mg Tab  Commonly known as: AUGMENTIN  Take 1 tablet (500 mg total) by mouth every 12 (twelve) hours. for 3 days     aspirin 81 mg Tab  Take 1 tablet by mouth once daily.     budesonide-formoterol 160-4.5 mcg 160-4.5 mcg/actuation Hfaa  Commonly known as: SYMBICORT  Inhale 2 puffs into the lungs every 12 (twelve) hours. Controller     bumetanide 1 MG tablet  Commonly known as: BUMEX  Take 1 tablet (1 mg total) by mouth 2 (two) times daily.     ferrous sulfate 325 (65 FE) MG EC tablet  Take 1 tablet (325 mg total) by mouth once daily.     guaiFENesin 600 mg 12 hr tablet  Commonly known as: MUCINEX  Take 600 mg by mouth 2 (two) times daily as needed for Congestion.     Lactobacillus rhamnosus GG 10 billion cell capsule  Commonly known as: CULTURELLE  Take 1 capsule by mouth once daily.     levalbuterol 0.63 mg/3 mL nebulizer solution  Commonly known as: XOPENEX  Take 3 mLs (0.63 mg total) by nebulization every 4 (four) hours as needed for Wheezing or Shortness of Breath. Rescue     MELATONIN ORAL  Take 3 mg by mouth nightly as needed (sleep).     metoprolol tartrate 25 MG tablet  Commonly known as: LOPRESSOR  Take 12.5 mg by mouth 2 (two) times daily.     multivitamin per tablet  Commonly known as: THERAGRAN  Take 1 tablet by mouth once daily.     pantoprazole 40 MG tablet  Commonly known as: PROTONIX  Take 40 mg by mouth every morning.     polyethylene glycol 17 gram/dose  powder  Commonly known as: GLYCOLAX  Take 17 g by mouth daily as needed (CONSTIPATION).     potassium chloride 10 MEQ Cpsr  Commonly known as: MICRO-K  Take 10 mEq by mouth once daily.     SPIRIVA RESPIMAT 2.5 mcg/actuation inhaler  Generic drug: tiotropium bromide  Inhale 2 puffs into the lungs Daily. Controller     triamcinolone 55 mcg nasal inhaler  Commonly known as: NASACORT  2 sprays by Nasal route once daily.     VISINE 0.05 % Drop  Generic drug: tetrahydrozoline 0.05%  PLACE 1 DROP INTO AFFECTED EYE(S) AS NEEDED FOR REDNESS OR ITCHING        STOP taking these medications    albuterol 90 mcg/actuation inhaler  Commonly known as: PROAIR HFA     azelastine 137 mcg (0.1 %) nasal spray  Commonly known as: ASTELIN     baclofen 10 MG tablet  Commonly known as: LIORESAL     rOPINIRole 0.25 MG tablet  Commonly known as: REQUIP            Indwelling Lines/Drains at time of discharge:   Lines/Drains/Airways     Drain  Duration           Female External Urinary Catheter 03/20/22 2100 3 days                Time spent on the discharge of patient: 40 minutes         Moraima De MD  Department of Hospital Medicine  [facility] - Oncology ([facility])

## 2022-03-24 NOTE — PLAN OF CARE
PRN  met with pts dtg Jennifer-informed her of denial by [facility]. Jennifer will discuss other HH options with her sister Bri and call SW back. SW name and number left with Jennifer.    1:45pm-sw provided dtg Jennifer with [facility] HH list. SW awaiting choice.    3:30pm-pts dtg Bri informed SW she would like [facility]. Ref sent through [facility].    Patricia Tavarez, YVROSE  PRN

## 2022-03-24 NOTE — PHYSICIAN QUERY
PT Name: Venus Mayer  MR #: [id number]     DOCUMENTATION CLARIFICATION     CDS/: Jack Betts RN, CCDS      Contact information:   [email or web address]      This form is a permanent document in the medical record.     Query Date: 2022    By submitting this query, we are merely seeking further clarification of documentation.  Please utilize your independent clinical judgment when addressing the question(s) below.  The Medical Record contains the following   Indicators   Supporting Clinical Findings Location in Medical Record    SOB, DANGELO, Wheezing, Productive Cough, Use of Accessory Muscles, etc.     x RR         ABGs         O2 sat 3/20 VB.375,  PCO2: 82.9,   PO2: 14,...     3/20@1501   RR: 24,   Sats: 97% on 2LNC  3/20@2301  RR: 24,    Sats:  96% (no value given)  3/21@1501  RR: 16,    Sats: 100%   3LNC  3/22@0701  RR: 18,    Sats: 99%   2.5LNC   Point of care     Flow sheet    x Hypoxia/Hypercapnia Patient's encephalopathy appears multifactorial likely lack of sleep, dehydration, component of hypercapnia, ...   3/21 H&P, Hospital medicine ()    BiPAP/Intubation/Mechanical Ventilation      Supplemental O2     x Home O2, Oxygen Dependence COPD on home O2 2.5 L  3/21 H&P, Hospital medicine ()    Respiratory distress or failure     x Radiology findings CXR: suggestive of evolving multifocal pneumonia.    CT chest:  severe emphysematous and fibrotic lung disease.   3/20 CXR    3/20 CT chest    x Acute/Chronic Illness PMHx diastolic heart failure (EF 60 in ), HOCM, pulmonary HTN, COPD on home O2 2.5 L, HLD, HTN is admitted with metabolic encephalopathy.     COPD (chronic obstructive pulmonary disease)  Prior notes () identify hypercarbia, CMP with chronic bicarb elevation. Likely compensated.  On home 2.5 L NC     - Continue home Symbicort  - Continue breathing treatments      3/21 , H&P        3/24 Discharge summary    x Treatment Home meds: symbicort inhaler; mucinex;  levalbuterol neb. ; spiriva respimat inhaler;  3/21 HM, H&P        Other         The noted clinical guidelines are only system guidelines and do not replace the providers clinical judgment.    Provider, please clarify the respiratory condition COPD on home oxygen associated with above clinical findings.   [ x   ] Chronic Respiratory Failure with Hypoxia - Continuous home oxygen use   [    ] Chronic Respiratory Failure with Hypercapnia - Normal pH with high pCO2 (chronic hypercapnia) (common example: COPD)   [    ] Other Respiratory Diagnosis (please specify): _________________   [   ] Clinically Undetermined       Please document in your progress notes daily for the duration of treatment until resolved and include in your discharge summary.       Form No. [id number]

## 2022-03-24 NOTE — PLAN OF CARE
[facility] ([facility])      HOME HEALTH ORDERS  FACE TO FACE ENCOUNTER    Patient Name: eVnus Mayer  YOB: 1929    PCP: Jona Che MD   PCP Address: [address] [facility] [address] / [place] [address]  PCP Phone Number: [phone]  PCP Fax: [phone]    Encounter Date: 3/20/22    Admit to Home Health    Diagnoses:  Active Hospital Problems    Diagnosis  POA    *Acute metabolic encephalopathy [G93.41]  Yes    COPD (chronic obstructive pulmonary disease) [J44.9]  Unknown    DNR (do not resuscitate) [Z66]  Yes    Hypertension [I10]  Yes    Acute on chronic diastolic heart failure [I50.33]  Yes      Resolved Hospital Problems   No resolved problems to display.       Follow Up Appointments:  Future Appointments   Date Time Provider Department Center   3/29/2022  8:40 AM LAB, APPOINTMENT [facility]   3/29/2022  9:30 AM Adelita Emmanuel PA-C [facility]   3/29/2022  9:30 AM [facility] HEART FAILURE NURSE [facility]       Allergies:  Review of patient's allergies indicates:   Allergen Reactions    Ancef in dextrose (iso-osm) Rash    Cefazolin Rash     Tolerating Zosyn admission 3/2022    Cefuroxime Rash    Sulfamethoxazole-trimethoprim Rash     Other reaction(s): Rash       Medications: Review discharge medications with patient and family and provide education.    Current Facility-Administered Medications   Medication Dose Route Frequency Provider Last Rate Last Admin    acetaminophen tablet 650 mg  650 mg Oral Q4H PRN Danish Lainez MD        albuterol-ipratropium 2.5 mg-0.5 mg/3 mL nebulizer solution 3 mL  3 mL Nebulization Q8H PRN Andrey Rodriguez DO   3 mL at 03/22/22 7273    amoxicillin-clavulanate 500-125mg per tablet 500 mg  1 tablet Oral Q12H Noelle Michel MD   500 mg at 03/24/22 0840    aspirin chewable tablet 81 mg  81 mg Oral Daily Danish Lainez MD   81 mg at 03/24/22 0840    balsam peru-castor oiL Oint   Topical (Top) BID  Noelle Michel MD   Given at 03/24/22 0841    bumetanide tablet 1 mg  1 mg Oral BID Noelle Michel MD   1 mg at 03/24/22 0840    dextrose 10% bolus 125 mL  12.5 g Intravenous PRTUAN Lainez MD        dextrose 10% bolus 250 mL  25 g Intravenous PRTUAN Lainez MD        enoxaparin injection 30 mg  30 mg Subcutaneous Daily Danish Lainez MD   30 mg at 03/23/22 1617    fluticasone furoate-vilanteroL 100-25 mcg/dose diskus inhaler 1 puff  1 puff Inhalation Daily Danish Lainez MD   1 puff at 03/24/22 0834    fluticasone propionate 50 mcg/actuation nasal spray 100 mcg  2 spray Each Nostril Daily Andrey Corcoran MD   100 mcg at 03/24/22 0841    glucagon (human recombinant) injection 1 mg  1 mg Intramuscular PRTUAN Lainez MD        glucose chewable tablet 16 g  16 g Oral PRN Danish Lainez MD        glucose chewable tablet 24 g  24 g Oral PRN Danish Lainez MD        guaiFENesin 12 hr tablet 600 mg  600 mg Oral BID Moraima De MD   600 mg at 03/24/22 0840    levalbuterol nebulizer solution 0.63 mg  0.63 mg Nebulization Q12H Moraima De MD   0.63 mg at 03/24/22 0834    melatonin tablet 3 mg  3 mg Oral Nightly PRTUAN Lainze MD   3 mg at 03/23/22 2147    naloxone 0.4 mg/mL injection 0.02 mg  0.02 mg Intravenous PRTUAN Lainez MD        ondansetron injection 4 mg  4 mg Intravenous Q8H PRTUAN Lainez MD        pantoprazole EC tablet 40 mg  40 mg Oral QAM Danish Lainez MD   40 mg at 03/24/22 0841    polyethylene glycol packet 17 g  17 g Oral Daily Noelle Michel MD   17 g at 03/24/22 0840    sodium chloride 0.9% flush 10 mL  10 mL Intravenous Q12H PRTUAN Lainez MD        tiotropium bromide 2.5 mcg/actuation inhaler 2 puff  2 puff Inhalation Daily Zahida Guo MD   2 puff at 03/24/22 0820     Current Discharge Medication List      CONTINUE these medications which have CHANGED    Details   amoxicillin-clavulanate 500-125mg (AUGMENTIN) 500-125 mg  Tab Take 1 tablet (500 mg total) by mouth every 12 (twelve) hours. for 3 days  Qty: 6 tablet, Refills: 0         CONTINUE these medications which have NOT CHANGED    Details   acetaminophen (TYLENOL) 500 MG tablet Take 500 mg by mouth daily as needed (BACK PAIN).      aspirin 81 mg Tab Take 1 tablet by mouth once daily.       budesonide-formoterol 160-4.5 mcg (SYMBICORT) 160-4.5 mcg/actuation HFAA Inhale 2 puffs into the lungs every 12 (twelve) hours. Controller  Qty: 10.2 g, Refills: 1      bumetanide (BUMEX) 1 MG tablet Take 1 tablet (1 mg total) by mouth 2 (two) times daily.  Qty: 60 tablet, Refills: 3      ferrous sulfate 325 (65 FE) MG EC tablet Take 1 tablet (325 mg total) by mouth once daily.  Refills: 0      guaiFENesin (MUCINEX) 600 mg 12 hr tablet Take 600 mg by mouth 2 (two) times daily as needed for Congestion.      Lactobacillus rhamnosus GG (CULTURELLE) 10 billion cell capsule Take 1 capsule by mouth once daily.      levalbuterol (XOPENEX) 0.63 mg/3 mL nebulizer solution Take 3 mLs (0.63 mg total) by nebulization every 4 (four) hours as needed for Wheezing or Shortness of Breath. Rescue  Qty: 3 mL, Refills: 1      MELATONIN ORAL Take 3 mg by mouth nightly as needed (sleep).      metoprolol tartrate (LOPRESSOR) 25 MG tablet Take 12.5 mg by mouth 2 (two) times daily.      multivitamin (THERAGRAN) per tablet Take 1 tablet by mouth once daily.      pantoprazole (PROTONIX) 40 MG tablet Take 40 mg by mouth every morning.      polyethylene glycol (GLYCOLAX) 17 gram/dose powder Take 17 g by mouth daily as needed (CONSTIPATION).      potassium chloride (MICRO-K) 10 MEQ CpSR Take 10 mEq by mouth once daily.      tetrahydrozoline 0.05% (VISINE) 0.05 % Drop PLACE 1 DROP INTO AFFECTED EYE(S) AS NEEDED FOR REDNESS OR ITCHING      tiotropium bromide (SPIRIVA RESPIMAT) 2.5 mcg/actuation inhaler Inhale 2 puffs into the lungs Daily. Controller  Qty: 4 g, Refills: 11      triamcinolone (NASACORT) 55 mcg nasal inhaler 2  sprays by Nasal route once daily.         STOP taking these medications       albuterol (PROAIR HFA) 90 mcg/actuation inhaler Comments:   Reason for Stopping:         azelastine (ASTELIN) 137 mcg (0.1 %) nasal spray Comments:   Reason for Stopping:         baclofen (LIORESAL) 10 MG tablet Comments:   Reason for Stopping:         rOPINIRole (REQUIP) 0.25 MG tablet Comments:   Reason for Stopping:                 I have seen and examined this patient within the last 30 days. My clinical findings that support the need for the home health skilled services and home bound status are the following:no   Weakness/numbness causing balance and gait disturbance due to COPD Exacerbation and Weakness/Debility making it taxing to leave home.     Diet:   cardiac diet    Labs:  SN to perform labs:  CBC: Monthly, Weekly and Biweekly; 1 month(s) and CMP: Biweekly; 1 month(s)    Referrals/ Consults  Physical Therapy to evaluate and treat. Evaluate for home safety and equipment needs; Establish/upgrade home exercise program. Perform / instruct on therapeutic exercises, gait training, transfer training, and Range of Motion.  Occupational Therapy to evaluate and treat. Evaluate home environment for safety and equipment needs. Perform/Instruct on transfers, ADL training, ROM, and therapeutic exercises.    Activities:   activity as tolerated    Nursing:   Agency to admit patient within 24 hours of hospital discharge unless specified on physician order or at patient request    SN to complete comprehensive assessment including routine vital signs. Instruct on disease process and s/s of complications to report to MD. Review/verify medication list sent home with the patient at time of discharge  and instruct patient/caregiver as needed. Frequency may be adjusted depending on start of care date.     Skilled nurse to perform up to 3 visits PRN for symptoms related to diagnosis    Notify MD if SBP > 160 or < 90; DBP > 90 or < 50; HR > 120 or < 50;  Temp > 101; O2 < 88%; Other: If experiencing new onset chest pain or if oxygen requirements continue to increase.     Ok to schedule additional visits based on staff availability and patient request on consecutive days within the home health episode.    When multiple disciplines ordered:    Start of Care occurs on Sunday - Wednesday schedule remaining discipline evaluations as ordered on separate consecutive days following the start of care.    Thursday SOC -schedule subsequent evaluations Friday and Monday the following week.     Friday - Saturday SOC - schedule subsequent discipline evaluations on consecutive days starting Monday of the following week.    For all post-discharge communication and subsequent orders please contact patient's primary care physician. If unable to reach primary care physician or do not receive response within 30 minutes, please contact [phone] for clinical staff order clarification    Miscellaneous   Home Oxygen:  Oxygen at 2-3 L/min nasal canula to be used:  Continuously.    Home Health Aide:  Physical Therapy Twice weekly, Occupational Therapy Twice weekly     Wound Care Orders  no    I certify that this patient is confined to her home and needs physical therapy and occupational therapy.

## 2022-03-24 NOTE — PLAN OF CARE
Pt discharged to home per MD order. Discharge instructions and prescriptions given and explained to pt. PIV removed; site clean, dry, and intact. Pt ambulating in room with assist x1 with use of walker; tolerating regular diet; voiding without difficulty; pain well-controlled with PO pain meds.  All VSS; O2 sats WNL on 2L NC. Pt left floor via wheelchair to home transportation; accompanied by  daughter from the floor.

## 2022-03-25 ENCOUNTER — PATIENT OUTREACH (OUTPATIENT)
Dept: ADMINISTRATIVE | Facility: CLINIC | Age: 87
End: 2022-03-25
Payer: MEDICARE

## 2022-03-25 LAB
BACTERIA BLD CULT: NORMAL
BACTERIA BLD CULT: NORMAL

## 2022-03-25 NOTE — PROGRESS NOTES
C3 nurse spoke with Venus Mayer's daughter, Bri Sam, for a TCC post hospital discharge follow up call. The patient has a scheduled HOSFU appointment with COLETTE Virgen NP on 3/30/2022 NP @ [facility].

## 2022-03-30 ENCOUNTER — OFFICE VISIT (OUTPATIENT)
Dept: HOME HEALTH SERVICES | Facility: CLINIC | Age: 87
End: 2022-03-30
Payer: MEDICARE

## 2022-03-30 VITALS
SYSTOLIC BLOOD PRESSURE: 110 MMHG | OXYGEN SATURATION: 97 % | DIASTOLIC BLOOD PRESSURE: 62 MMHG | HEART RATE: 88 BPM | RESPIRATION RATE: 18 BRPM

## 2022-03-30 DIAGNOSIS — J96.12 CHRONIC RESPIRATORY FAILURE WITH HYPOXIA AND HYPERCAPNIA: ICD-10-CM

## 2022-03-30 DIAGNOSIS — D69.2 SENILE PURPURA: ICD-10-CM

## 2022-03-30 DIAGNOSIS — L89.152 PRESSURE INJURY OF SACRAL REGION, STAGE 2: ICD-10-CM

## 2022-03-30 DIAGNOSIS — I50.9 CHRONIC CONGESTIVE HEART FAILURE, UNSPECIFIED HEART FAILURE TYPE: ICD-10-CM

## 2022-03-30 DIAGNOSIS — Z66 DNR (DO NOT RESUSCITATE): Primary | ICD-10-CM

## 2022-03-30 DIAGNOSIS — J96.11 CHRONIC RESPIRATORY FAILURE WITH HYPOXIA AND HYPERCAPNIA: ICD-10-CM

## 2022-03-30 PROCEDURE — 1157F PR ADVANCE CARE PLAN OR EQUIV PRESENT IN MEDICAL RECORD: ICD-10-PCS | Mod: CPTII,S$GLB,, | Performed by: NURSE PRACTITIONER

## 2022-03-30 PROCEDURE — 1160F RVW MEDS BY RX/DR IN RCRD: CPT | Mod: CPTII,S$GLB,, | Performed by: NURSE PRACTITIONER

## 2022-03-30 PROCEDURE — 1126F AMNT PAIN NOTED NONE PRSNT: CPT | Mod: CPTII,S$GLB,, | Performed by: NURSE PRACTITIONER

## 2022-03-30 PROCEDURE — 1157F ADVNC CARE PLAN IN RCRD: CPT | Mod: CPTII,S$GLB,, | Performed by: NURSE PRACTITIONER

## 2022-03-30 PROCEDURE — 1159F MED LIST DOCD IN RCRD: CPT | Mod: CPTII,S$GLB,, | Performed by: NURSE PRACTITIONER

## 2022-03-30 PROCEDURE — 99350 PR HOME VISIT,ESTAB PATIENT,LEVEL IV: ICD-10-PCS | Mod: S$GLB,,, | Performed by: NURSE PRACTITIONER

## 2022-03-30 PROCEDURE — 1126F PR PAIN SEVERITY QUANTIFIED, NO PAIN PRESENT: ICD-10-PCS | Mod: CPTII,S$GLB,, | Performed by: NURSE PRACTITIONER

## 2022-03-30 PROCEDURE — 1160F PR REVIEW ALL MEDS BY PRESCRIBER/CLIN PHARMACIST DOCUMENTED: ICD-10-PCS | Mod: CPTII,S$GLB,, | Performed by: NURSE PRACTITIONER

## 2022-03-30 PROCEDURE — 1159F PR MEDICATION LIST DOCUMENTED IN MEDICAL RECORD: ICD-10-PCS | Mod: CPTII,S$GLB,, | Performed by: NURSE PRACTITIONER

## 2022-03-30 PROCEDURE — 99350 HOME/RES VST EST HIGH MDM 60: CPT | Mod: S$GLB,,, | Performed by: NURSE PRACTITIONER

## 2022-03-30 NOTE — PROGRESS NOTES
"  [facility]  Transition of Care Home Visit    Visit Date: 3/30/2022  Encounter Provider: Vickie Noriega NP  PCP:  Jona Che MD    PRESENTING HISTORY      Patient ID: Venus Mayer is a 89 y.o. female.    Consult Requested By:  No ref. provider found  Reason for Consult:  Hospital Follow Up    Venus DEWEY is being seen at home due to physical debility that presents a taxing effort to leave the home, to mitigate high risk of hospital readmission and/or due to the limited availability of reliable or safe options for transportation to the point of access to the provider. Prior to treatment on this visit the chart was reviewed and patient verbal consent was obtained.        Chief Complaint: Transitional Care      History of Present Illness: Ms. Venus Mayer is a 89 y.o. female who was recently admitted to the hospital.  Admit: 3/20/22  Discharge: 3/24/22    Ms. Mayer is a 89 yo W PMHx diastolic heart failure (EF 60 in 03/22), HOCM, pulmonary HTN, COPD on home O2 2.5 L, HLD, HTN presenting with altered mental status. Pt asleep and presented altered, so hx provided by daughter at bedside.      She was previously hospitalized for three days at [facility] where she was diuresed, given IV Zosyn for two days, started on Augmentin for sinusitis. She was discharged on Friday and later that day "started talking out of her head" with non harmful visual and auditory hallucinations. Hallucinations resolved, but for the past two nights pt has been talking all night. Some intermittent cough, but no fevers, chills, night sweats, has lost 9 lbs over past 2 months, but this is attributed to starting Bumex. No SOB, LE swelling, no bowel nor urinary changes.      On admit, pt somnolent. HD stable, afebrile, on 2 L NC. No leukocytosis, Lactate 1, BNP 3484, Troponin 0.162, UA noncontributory, CXR with mid and LL opacities concerning for possible PNA. Diuresed Lasix 40mg x1 in ED with 300mL urine output. Admitted for " continued workup of encephalopathy.        * No surgery found *       Hospital Course:   Pt admitted with acute metabolic encephalopathy. IV Zosyn started due to concerns for aspiration and IV lasix given in light of hypervolemia. Mentation improving with antibiotic treatment. Breathing treatments started. Diuetics changed to home bumex on 3/22 and antibiotics transitioned to Augmentin on 3/23. Pt undergoing breathing treatments- congestion clearing up. Pt to be discharged in stable condition with Home health orders.  ___________________________________________________________________    Today:    HPI:  Pt is being seen today for hospital follow up. See hospital course.    Ms. Mayer is a 89 yo W PMHx diastolic heart failure (EF 60 in 03/22), HOCM, pulmonary HTN, COPD on home O2 2.5 L, HLD, HTN, she is being seen in her home today with her pd caregiver present and her daughter, Bri available on speaker phone.    She is sitting comfortably in her home watching TV. She reports feeling okay and has just eaten breakfast.  Daughter reports she has been complaining of some pain to her sacral area. They are using barrier cream to her gluteal folds but requests exam of area for possible ulcer. Pt reports she is having pain at that area but took a Tylenol and did help her pain.   She denies any SOB, chest pain today. She does have some edema to her lower legs but caregiver reports it is improving. She is using oxygen at all times via NC.   Family is weighing her daily and logging. She is followed by HF clinic and has a FU appt tomorrow.    Daughter is concerned over appetite and recent weight loss. Pt has lost significant weight per daughter in past 2 months. Most of that wt per chart review was fluid overload. Reviewed appropriate intake, ensure adequate protein intake.    Daughter is concerned that Bumex may not be as effective as Lasix and would like to return to Lasix. Plans to discuss with HF clinic tomorrow.    Pt is a  DNR per chart. Daughter reaffirms that notation  Discussed end of life goals and advanced care planning with pt and/or family.   Reviewed living will, [place] Post, and pt's wishes regarding life support and tube feeding.  Reviewed pt's code status and prognosis.   Reviewed hospice program and services to pt and/or family.  30 minutes spent in discussion of these services.      Review of Systems    Assessments:  · Environmental: single story home, 24 care in home, clean, adequate temp and light  · Functional Status: requires some assistance  · Safety: high fall risk, skin breakdown  · Nutritional: adequate available  · Home Health/DME/Supplies: [facility], oxygen    PAST HISTORY:     Past Medical History:   Diagnosis Date    Acute on chronic congestive heart failure 7/6/2019    Cardiomyopathy     Carotid artery occlusion     CHF (congestive heart failure)     COPD (chronic obstructive pulmonary disease)     Coronary artery disease     Hyperlipidemia     Hypertension        Past Surgical History:   Procedure Laterality Date    CARDIAC CATHETERIZATION      cardic cath      CAROTID ENDARTERECTOMY      FLEXIBLE BRONCHOSCOPY N/A 7/31/2019    Procedure: BRONCHOSCOPY, FIBEROPTIC Flexible;  Surgeon: Klever Mckeon MD;  Location: [address];  Service: Thoracic;  Laterality: N/A;    HIP SURGERY      PLEURODESIS USING TALC N/A 7/31/2019    Procedure: PLEURODESIS;  Surgeon: Klever Mckeon MD;  Location: [address];  Service: Thoracic;  Laterality: N/A;    PLEURODESIS USING TALC Right 8/5/2019    Procedure: PLEURODESIS, USING TALC;  Surgeon: Klever Mckeon MD;  Location: [address];  Service: Thoracic;  Laterality: Right;    PLEURODESIS WITH VIDEO-ASSISTED THORACOSCOPIC SURGERY (VATS) Right 8/5/2019    Procedure: VATS, WITH PLEURAL TENT;  Surgeon: Klever Mckeon MD;  Location: [address];  Service: Thoracic;  Laterality: Right;       Family History   Problem Relation Age of  Onset    Hypertension Mother        Social History     Socioeconomic History    Marital status:    Tobacco Use    Smoking status: Former Smoker     Packs/day: 2.00     Years: 20.00     Pack years: 40.00     Types: Cigarettes     Quit date: 1980     Years since quittin.2    Smokeless tobacco: Never Used   Substance and Sexual Activity    Alcohol use: Yes     Alcohol/week: 2.0 standard drinks     Types: 2 Glasses of wine per week     Comment: social    Drug use: No    Sexual activity: Not Currently       MEDICATIONS & ALLERGIES:     Current Outpatient Medications on File Prior to Visit   Medication Sig Dispense Refill    acetaminophen (TYLENOL) 500 MG tablet Take 500 mg by mouth daily as needed (BACK PAIN).      aspirin 81 mg Tab Take 1 tablet by mouth once daily.       bumetanide (BUMEX) 1 MG tablet Take 1 tablet (1 mg total) by mouth 2 (two) times daily. 60 tablet 3    ferrous sulfate 325 (65 FE) MG EC tablet Take 1 tablet (325 mg total) by mouth once daily.  0    guaiFENesin (MUCINEX) 600 mg 12 hr tablet Take 600 mg by mouth 2 (two) times daily as needed for Congestion.      Lactobacillus rhamnosus GG (CULTURELLE) 10 billion cell capsule Take 1 capsule by mouth once daily.      levalbuterol (XOPENEX) 0.63 mg/3 mL nebulizer solution Take 3 mLs (0.63 mg total) by nebulization every 4 (four) hours as needed for Wheezing or Shortness of Breath. Rescue 3 mL 1    MELATONIN ORAL Take 3 mg by mouth nightly as needed (sleep).      metoprolol tartrate (LOPRESSOR) 25 MG tablet Take 12.5 mg by mouth 2 (two) times daily.      multivitamin (THERAGRAN) per tablet Take 1 tablet by mouth once daily.      pantoprazole (PROTONIX) 40 MG tablet Take 40 mg by mouth every morning.      polyethylene glycol (GLYCOLAX) 17 gram/dose powder Take 17 g by mouth daily as needed (CONSTIPATION).      potassium chloride (MICRO-K) 10 MEQ CpSR Take 10 mEq by mouth once daily.      tetrahydrozoline 0.05% (VISION  CLEAR) 0.05 % Drop PLACE 1 DROP INTO AFFECTED EYE(S) AS NEEDED FOR REDNESS OR ITCHING      tiotropium bromide (SPIRIVA RESPIMAT) 2.5 mcg/actuation inhaler Inhale 2 puffs into the lungs Daily. Controller 4 g 11    triamcinolone (NASACORT) 55 mcg nasal inhaler 2 sprays by Nasal route once daily.      [DISCONTINUED] albuterol (PROAIR HFA) 90 mcg/actuation inhaler Inhale 1 puff into the lungs every 6 (six) hours as needed for Wheezing or Shortness of Breath. Rescue (Patient not taking: Reported on 3/16/2022) 1 Inhaler 5    [DISCONTINUED] azelastine (ASTELIN) 137 mcg (0.1 %) nasal spray 2 sprays by Nasal route 2 (two) times daily.      [DISCONTINUED] baclofen (LIORESAL) 10 MG tablet TK 1 T PO TID      [DISCONTINUED] budesonide-formoterol 160-4.5 mcg (SYMBICORT) 160-4.5 mcg/actuation HFAA Inhale 2 puffs into the lungs every 12 (twelve) hours. Controller 10.2 g 1    [DISCONTINUED] rOPINIRole (REQUIP) 0.25 MG tablet Take 1 tablet (0.25 mg total) by mouth every evening. (Patient not taking: Reported on 3/16/2022) 30 tablet 11     No current facility-administered medications on file prior to visit.        Review of patient's allergies indicates:   Allergen Reactions    Ancef in dextrose (iso-osm) Rash    Cefazolin Rash     Tolerating Zosyn admission 3/2022    Cefuroxime Rash    Sulfamethoxazole-trimethoprim Rash     Other reaction(s): Rash       OBJECTIVE:     Vital Signs:  Vitals:    03/30/22 1101   BP: 110/62   Pulse: 88   Resp: 18     There is no height or weight on file to calculate BMI.     Physical Exam:  Physical Exam  Vitals reviewed.   Constitutional:       General: She is not in acute distress.     Appearance: She is well-developed.      Comments: thin   HENT:      Head: Normocephalic and atraumatic.   Eyes:      Pupils: Pupils are equal, round, and reactive to light.   Cardiovascular:      Rate and Rhythm: Normal rate and regular rhythm.      Heart sounds: Normal heart sounds.   Pulmonary:      Effort:  Pulmonary effort is normal.      Comments: Diminished  Abdominal:      General: Bowel sounds are normal.      Palpations: Abdomen is soft.   Musculoskeletal:         General: Normal range of motion.      Cervical back: Normal range of motion and neck supple.      Right lower leg: Edema present.      Left lower leg: Edema present.   Skin:     General: Skin is warm and dry.      Findings: Bruising (to all extremities) present.   Neurological:      Mental Status: She is alert. Mental status is at baseline.      Cranial Nerves: No cranial nerve deficit.   Psychiatric:         Behavior: Behavior normal.         Laboratory  Lab Results   Component Value Date    WBC 10.14 03/23/2022    HGB 13.0 03/23/2022    HCT 41.6 03/23/2022     (H) 03/23/2022     03/23/2022     Lab Results   Component Value Date    INR 0.9 07/21/2019    INR 0.9 06/05/2019     Lab Results   Component Value Date    HGBA1C 5.1 09/22/2021     No results for input(s): POCTGLUCOSE in the last 72 hours.    Diagnostic Results:  Results for orders placed during the hospital encounter of 03/12/22    Echo    Interpretation Summary  · The left ventricle is normal in size with normal systolic function.  · Moderate to severe left atrial enlargement.  · Asymmetric septal hypertrophy as noted in 2019, septum measuring 1.5cm (unchanged from prior measurement, unable to view images) without obstruction  · Grade II left ventricular diastolic dysfunction.  · The estimated ejection fraction is 60%.  · Intermediate central venous pressure (8 mmHg).  · The estimated PA systolic pressure is 45 mmHg.  · There is pulmonary hypertension.  · Mild right ventricular enlargement with mildly reduced right ventricular systolic function.  · Mild tricuspid regurgitation.  · Mild mitral regurgitation.      TRANSITION OF CARE:     [facility] On Call Contact Note: 3/25/22    Family and/or Caretaker present at visit?  Yes.  Diagnostic tests reviewed/disposition: No diagnosic  tests pending after this hospitalization.  Disease/illness education: CHF, skin care  Home health/community services discussion/referrals: Patient has home health established at [facility].   Establishment or re-establishment of referral orders for community resources: No other necessary community resources.   Discussion with other health care providers: No discussion with other health care providers necessary.     Transition of Care Visit:     I have reviewed and updated the history and problem list.  I have reconciled the medication list.  I have discussed the hospitalization and current medical issues, prognosis and plans with the patient/family.  I  spent more than 50% of 60 min time discussing the care with the patient/family.  Total Face-to-Face Encounter: 90 minutes. Remaining 30 min spent on ACP    Medications Reconciliation:   I have reconciled the patient's home medications and discharge medications with the patient/family. I have updated all changes.  Refer to After-Visit Medication List.    ASSESSMENT & PLAN:     HIGH RISK CONDITION(S):      Venus DEWEY was seen today for transitional care.    Diagnoses and all orders for this visit:    DNR (do not resuscitate)    Chronic congestive heart failure, unspecified heart failure type    Chronic respiratory failure with hypoxia and hypercapnia    Senile purpura    Pressure injury of sacral region, stage 2      Problem List Items Addressed This Visit        Pulmonary    Chronic respiratory failure with hypoxia and hypercapnia     Combination of COPD and CHF  Using oxygen at 2L via NC  Spiriva and albuterol in use  Monitor              Cardiac/Vascular    Congestive heart failure     Weight stable  Minimal DANGELO, edema to ankles  Continue BB and Bumex  Followed by HF  Monitor              Hematology    Senile purpura     Noted to all extremities  Related to ASA and PVD  Monitor              Palliative Care    DNR (do not resuscitate) - Primary      Other Visit Diagnoses      Pressure injury of sacral region, stage 2          Keep clean and dry  Apply barrier cream  Remind to reposition every 2 hours  Cushion pillow maybe helpful    Were controlled substances prescribed?  No    Instructions for the patient:  - Continue all medications, treatments and therapies as ordered.   - Follow all instructions, recommendations as discussed.  - Maintain Safety Precautions at all times.  - Attend all medical appointments as scheduled.  - For worsening symptoms: call Primary Care Physician or Nurse Practitioner.  - For emergencies, call 911 or immediately report to the nearest emergency room.  - Limit Risks of environmental exposure to coronavirus/COVID-19 as discussed including: social distancing, hand hygiene, and limiting departures from the home for necessities only.     Scheduled Follow-up :  Future Appointments   Date Time Provider Department Center   3/31/2022 10:45 AM LAB, APPOINTMENT [facility] LAB P [facility]   3/31/2022 11:30 AM Adelita Emmanuel PA-C [facility]   3/31/2022 11:30 AM [facility] HEART FAILURE NURSE [facility]   4/25/2022  8:00 AM Vickie Noriega NP [id number]       After Visit Medication List :     Medication List          Accurate as of March 30, 2022  4:33 PM. If you have any questions, ask your nurse or doctor.            CONTINUE taking these medications    acetaminophen 500 MG tablet  Commonly known as: TYLENOL     aspirin 81 mg Tab     bumetanide 1 MG tablet  Commonly known as: BUMEX  Take 1 tablet (1 mg total) by mouth 2 (two) times daily.     ferrous sulfate 325 (65 FE) MG EC tablet  Take 1 tablet (325 mg total) by mouth once daily.     guaiFENesin 600 mg 12 hr tablet  Commonly known as: MUCINEX     Lactobacillus rhamnosus GG 10 billion cell capsule  Commonly known as: CULTURELLE     levalbuterol 0.63 mg/3 mL nebulizer solution  Commonly known as: XOPENEX  Take 3 mLs (0.63 mg total) by nebulization every 4 (four) hours as needed for  Wheezing or Shortness of Breath. Rescue     MELATONIN ORAL     metoprolol tartrate 25 MG tablet  Commonly known as: LOPRESSOR     multivitamin per tablet  Commonly known as: THERAGRAN     pantoprazole 40 MG tablet  Commonly known as: PROTONIX     polyethylene glycol 17 gram/dose powder  Commonly known as: GLYCOLAX     potassium chloride 10 MEQ Cpsr  Commonly known as: MICRO-K     SPIRIVA RESPIMAT 2.5 mcg/actuation inhaler  Generic drug: tiotropium bromide  Inhale 2 puffs into the lungs Daily. Controller     tetrahydrozoline 0.05% 0.05 % Drop  Commonly known as: Vision Clear     triamcinolone 55 mcg nasal inhaler  Commonly known as: NASACORT            Signature:  Vickie Noriega NP    Patient consent obtained prior to treatment

## 2022-03-31 ENCOUNTER — OFFICE VISIT (OUTPATIENT)
Dept: CARDIOLOGY | Facility: CLINIC | Age: 87
End: 2022-03-31
Payer: MEDICARE

## 2022-03-31 ENCOUNTER — LAB VISIT (OUTPATIENT)
Dept: LAB | Facility: HOSPITAL | Age: 87
End: 2022-03-31
Payer: MEDICARE

## 2022-03-31 ENCOUNTER — CLINICAL SUPPORT (OUTPATIENT)
Dept: CARDIOLOGY | Facility: CLINIC | Age: 87
End: 2022-03-31
Payer: MEDICARE

## 2022-03-31 VITALS
HEIGHT: 60 IN | HEART RATE: 76 BPM | BODY MASS INDEX: 14.63 KG/M2 | SYSTOLIC BLOOD PRESSURE: 113 MMHG | DIASTOLIC BLOOD PRESSURE: 55 MMHG | WEIGHT: 74.5 LBS

## 2022-03-31 DIAGNOSIS — Z95.0 PACEMAKER: ICD-10-CM

## 2022-03-31 DIAGNOSIS — I10 PRIMARY HYPERTENSION: ICD-10-CM

## 2022-03-31 DIAGNOSIS — J43.2 CENTRILOBULAR EMPHYSEMA: ICD-10-CM

## 2022-03-31 DIAGNOSIS — J96.11 CHRONIC RESPIRATORY FAILURE WITH HYPOXIA AND HYPERCAPNIA: ICD-10-CM

## 2022-03-31 DIAGNOSIS — I50.32 CHRONIC DIASTOLIC CONGESTIVE HEART FAILURE: Primary | ICD-10-CM

## 2022-03-31 DIAGNOSIS — N18.30 STAGE 3 CHRONIC KIDNEY DISEASE, UNSPECIFIED WHETHER STAGE 3A OR 3B CKD: ICD-10-CM

## 2022-03-31 DIAGNOSIS — E43 SEVERE MALNUTRITION: ICD-10-CM

## 2022-03-31 DIAGNOSIS — I50.33 ACUTE ON CHRONIC DIASTOLIC HEART FAILURE: ICD-10-CM

## 2022-03-31 DIAGNOSIS — I42.2 HYPERTROPHIC CARDIOMYOPATHY: ICD-10-CM

## 2022-03-31 DIAGNOSIS — J96.12 CHRONIC RESPIRATORY FAILURE WITH HYPOXIA AND HYPERCAPNIA: ICD-10-CM

## 2022-03-31 LAB
ALBUMIN SERPL BCP-MCNC: 2.6 G/DL (ref 3.5–5.2)
ALP SERPL-CCNC: 458 U/L (ref 55–135)
ALT SERPL W/O P-5'-P-CCNC: 178 U/L (ref 10–44)
ANION GAP SERPL CALC-SCNC: 13 MMOL/L (ref 8–16)
AST SERPL-CCNC: 176 U/L (ref 10–40)
BASOPHILS # BLD AUTO: 0.09 K/UL (ref 0–0.2)
BASOPHILS NFR BLD: 0.9 % (ref 0–1.9)
BILIRUB SERPL-MCNC: 1.2 MG/DL (ref 0.1–1)
BNP SERPL-MCNC: 2961 PG/ML (ref 0–99)
BUN SERPL-MCNC: 27 MG/DL (ref 10–30)
CALCIUM SERPL-MCNC: 10 MG/DL (ref 8.7–10.5)
CHLORIDE SERPL-SCNC: 91 MMOL/L (ref 95–110)
CO2 SERPL-SCNC: 39 MMOL/L (ref 23–29)
CREAT SERPL-MCNC: 1 MG/DL (ref 0.5–1.4)
DIFFERENTIAL METHOD: ABNORMAL
EOSINOPHIL # BLD AUTO: 0.1 K/UL (ref 0–0.5)
EOSINOPHIL NFR BLD: 1.2 % (ref 0–8)
ERYTHROCYTE [DISTWIDTH] IN BLOOD BY AUTOMATED COUNT: 14.7 % (ref 11.5–14.5)
EST. GFR  (AFRICAN AMERICAN): 56.1 ML/MIN/1.73 M^2
EST. GFR  (NON AFRICAN AMERICAN): 48.7 ML/MIN/1.73 M^2
FERRITIN SERPL-MCNC: 4613 NG/ML (ref 20–300)
GLUCOSE SERPL-MCNC: 141 MG/DL (ref 70–110)
HCT VFR BLD AUTO: 38.6 % (ref 37–48.5)
HGB BLD-MCNC: 12.2 G/DL (ref 12–16)
IMM GRANULOCYTES # BLD AUTO: 0.09 K/UL (ref 0–0.04)
IMM GRANULOCYTES NFR BLD AUTO: 0.9 % (ref 0–0.5)
IRON SERPL-MCNC: 119 UG/DL (ref 30–160)
LYMPHOCYTES # BLD AUTO: 0.9 K/UL (ref 1–4.8)
LYMPHOCYTES NFR BLD: 9.6 % (ref 18–48)
MCH RBC QN AUTO: 31.9 PG (ref 27–31)
MCHC RBC AUTO-ENTMCNC: 31.6 G/DL (ref 32–36)
MCV RBC AUTO: 101 FL (ref 82–98)
MONOCYTES # BLD AUTO: 0.7 K/UL (ref 0.3–1)
MONOCYTES NFR BLD: 7.4 % (ref 4–15)
NEUTROPHILS # BLD AUTO: 7.8 K/UL (ref 1.8–7.7)
NEUTROPHILS NFR BLD: 80 % (ref 38–73)
NRBC BLD-RTO: 0 /100 WBC
PLATELET # BLD AUTO: 201 K/UL (ref 150–450)
PMV BLD AUTO: 10.4 FL (ref 9.2–12.9)
POTASSIUM SERPL-SCNC: 3.6 MMOL/L (ref 3.5–5.1)
PROT SERPL-MCNC: 6.8 G/DL (ref 6–8.4)
RBC # BLD AUTO: 3.82 M/UL (ref 4–5.4)
SATURATED IRON: 43 % (ref 20–50)
SODIUM SERPL-SCNC: 143 MMOL/L (ref 136–145)
TOTAL IRON BINDING CAPACITY: 278 UG/DL (ref 250–450)
TRANSFERRIN SERPL-MCNC: 188 MG/DL (ref 200–375)
WBC # BLD AUTO: 9.81 K/UL (ref 3.9–12.7)

## 2022-03-31 PROCEDURE — 1101F PR PT FALLS ASSESS DOC 0-1 FALLS W/OUT INJ PAST YR: ICD-10-PCS | Mod: CPTII,S$GLB,,

## 2022-03-31 PROCEDURE — 1157F PR ADVANCE CARE PLAN OR EQUIV PRESENT IN MEDICAL RECORD: ICD-10-PCS | Mod: CPTII,S$GLB,,

## 2022-03-31 PROCEDURE — 1157F ADVNC CARE PLAN IN RCRD: CPT | Mod: CPTII,S$GLB,,

## 2022-03-31 PROCEDURE — 3288F FALL RISK ASSESSMENT DOCD: CPT | Mod: CPTII,S$GLB,,

## 2022-03-31 PROCEDURE — 1101F PT FALLS ASSESS-DOCD LE1/YR: CPT | Mod: CPTII,S$GLB,,

## 2022-03-31 PROCEDURE — 3288F PR FALLS RISK ASSESSMENT DOCUMENTED: ICD-10-PCS | Mod: CPTII,S$GLB,,

## 2022-03-31 PROCEDURE — 82728 ASSAY OF FERRITIN: CPT

## 2022-03-31 PROCEDURE — 1111F DSCHRG MED/CURRENT MED MERGE: CPT | Mod: CPTII,S$GLB,,

## 2022-03-31 PROCEDURE — 99215 PR OFFICE/OUTPT VISIT, EST, LEVL V, 40-54 MIN: ICD-10-PCS | Mod: S$GLB,,,

## 2022-03-31 PROCEDURE — 83880 ASSAY OF NATRIURETIC PEPTIDE: CPT

## 2022-03-31 PROCEDURE — 85025 COMPLETE CBC W/AUTO DIFF WBC: CPT

## 2022-03-31 PROCEDURE — 1160F PR REVIEW ALL MEDS BY PRESCRIBER/CLIN PHARMACIST DOCUMENTED: ICD-10-PCS | Mod: CPTII,S$GLB,,

## 2022-03-31 PROCEDURE — 99999 PR PBB SHADOW E&M-EST. PATIENT-LVL IV: ICD-10-PCS | Mod: PBBFAC,,,

## 2022-03-31 PROCEDURE — 99999 PR PBB SHADOW E&M-EST. PATIENT-LVL IV: CPT | Mod: PBBFAC,,,

## 2022-03-31 PROCEDURE — 1159F MED LIST DOCD IN RCRD: CPT | Mod: CPTII,S$GLB,,

## 2022-03-31 PROCEDURE — 1160F RVW MEDS BY RX/DR IN RCRD: CPT | Mod: CPTII,S$GLB,,

## 2022-03-31 PROCEDURE — 1159F PR MEDICATION LIST DOCUMENTED IN MEDICAL RECORD: ICD-10-PCS | Mod: CPTII,S$GLB,,

## 2022-03-31 PROCEDURE — 1111F PR DISCHARGE MEDS RECONCILED W/ CURRENT OUTPATIENT MED LIST: ICD-10-PCS | Mod: CPTII,S$GLB,,

## 2022-03-31 PROCEDURE — 80053 COMPREHEN METABOLIC PANEL: CPT

## 2022-03-31 PROCEDURE — 1126F PR PAIN SEVERITY QUANTIFIED, NO PAIN PRESENT: ICD-10-PCS | Mod: CPTII,S$GLB,,

## 2022-03-31 PROCEDURE — 84466 ASSAY OF TRANSFERRIN: CPT

## 2022-03-31 PROCEDURE — 36415 COLL VENOUS BLD VENIPUNCTURE: CPT

## 2022-03-31 PROCEDURE — 99215 OFFICE O/P EST HI 40 MIN: CPT | Mod: S$GLB,,,

## 2022-03-31 PROCEDURE — 1126F AMNT PAIN NOTED NONE PRSNT: CPT | Mod: CPTII,S$GLB,,

## 2022-03-31 RX ORDER — BUDESONIDE AND FORMOTEROL FUMARATE DIHYDRATE 160; 4.5 UG/1; UG/1
2 AEROSOL RESPIRATORY (INHALATION) EVERY 12 HOURS
Status: ON HOLD | COMMUNITY
Start: 2022-03-28 | End: 2022-05-29 | Stop reason: HOSPADM

## 2022-03-31 NOTE — PROGRESS NOTES
HF TCC Provider Note (Initial Clinic) Consult Note    Date of original referral: 3/14/22  Age: 89 y.o.  Gender: female  Ethnicity: White  Number of admissions for CHF within the preceding year: 1   Duration of CHF: ~5yrs  Type of Congestive Heart Failure: Diastolic   Etiology: hypertrophic   Enrolled in Infusion suite: no    Diagnostic Labs:   EKG - 03/21/2022  CXR - 03/20/2022  ECHO - 03/13/2022  Stress test -   Stress echo -   Pharmacologic stress -   Cardiac catheterization -    Cardiac MRI -     Lab Results   Component Value Date     03/24/2022     03/23/2022    K 3.9 03/24/2022    K 3.1 (L) 03/23/2022    CL 91 (L) 03/24/2022    CL 91 (L) 03/23/2022    CO2 39 (H) 03/24/2022    CO2 38 (H) 03/23/2022     03/24/2022    GLU 85 03/23/2022    BUN 15 03/24/2022    BUN 19 03/23/2022    CREATININE 1.0 03/24/2022    CREATININE 1.2 03/23/2022    CALCIUM 9.4 03/24/2022    CALCIUM 9.0 03/23/2022    PROT 5.9 (L) 03/23/2022    PROT 5.4 (L) 03/22/2022    ALBUMIN 2.3 (L) 03/23/2022    ALBUMIN 2.1 (L) 03/22/2022    BILITOT 0.6 03/23/2022    BILITOT 0.7 03/22/2022    ALKPHOS 85 03/23/2022    ALKPHOS 89 03/22/2022    AST 18 03/23/2022    AST 23 03/22/2022    ALT 11 03/23/2022    ALT 9 (L) 03/22/2022    ANIONGAP 9 03/24/2022    ANIONGAP 11 03/23/2022    ESTGFRAFRICA 56.1 (A) 03/24/2022    ESTGFRAFRICA 45.0 (A) 03/23/2022    EGFRNONAA 48.7 (A) 03/24/2022    EGFRNONAA 39.1 (A) 03/23/2022       Lab Results   Component Value Date    WBC 10.14 03/23/2022    WBC 11.24 03/22/2022    RBC 4.07 03/23/2022    RBC 3.93 (L) 03/22/2022    HGB 13.0 03/23/2022    HGB 12.4 03/22/2022    HCT 41.6 03/23/2022    HCT 40.6 03/22/2022     (H) 03/23/2022     (H) 03/22/2022    MCH 31.9 (H) 03/23/2022    MCH 31.6 (H) 03/22/2022    MCHC 31.3 (L) 03/23/2022    MCHC 30.5 (L) 03/22/2022    RDW 13.6 03/23/2022    RDW 13.5 03/22/2022     03/23/2022     03/22/2022    MPV 10.2 03/23/2022    MPV 10.0 03/22/2022    IMMGR  4.3 (H) 03/23/2022    IMMGR 2.5 (H) 03/22/2022    IGABS 0.44 (H) 03/23/2022    IGABS 0.28 (H) 03/22/2022    LYMPH 1.1 03/23/2022    LYMPH 11.1 (L) 03/23/2022    MONO 1.1 (H) 03/23/2022    MONO 10.4 03/23/2022    EOS 0.3 03/23/2022    EOS 0.4 03/22/2022    BASO 0.12 03/23/2022    BASO 0.19 03/22/2022    NRBC 0 03/23/2022    NRBC 0 03/22/2022    GRAN 7.2 03/23/2022    GRAN 70.5 03/23/2022    EOSINOPHIL 2.5 03/23/2022    EOSINOPHIL 3.7 03/22/2022    BASOPHIL 1.2 03/23/2022    BASOPHIL 1.7 03/22/2022    PLTEST Appears normal 03/21/2022    PLTEST Adequate 02/18/2010       Lab Results   Component Value Date    BNP 3,484 (H) 03/20/2022    BNP 2,684 (H) 03/12/2022    MG 2.4 03/22/2022    MG 2.6 03/18/2022    PHOS 3.3 03/22/2022    PHOS 3.8 09/23/2021    NTPROBNP 86023 (H) 03/16/2022    TROPONINI 0.151 (H) 03/21/2022    TROPONINI 0.162 (H) 03/20/2022    HGBA1C 5.1 09/22/2021    HGBA1C 5.2 07/06/2019    TSH 0.548 09/22/2021    TSH 2.290 10/11/2020    FREET4 1.30 07/06/2019       Lab Results   Component Value Date    IRON 46 07/26/2019    TIBC 293 07/26/2019    FERRITIN 296 07/26/2019    CHOL 176 09/22/2021    TRIG 54 09/22/2021    HDL 80 (H) 09/22/2021    LDLCALC 85.2 09/22/2021    CHOLHDL 45.5 09/22/2021    TOTALCHOLEST 2.2 09/22/2021    NONHDLCHOL 96 09/22/2021    COLORU Yellow 03/21/2022    APPEARANCEUA Clear 03/21/2022    PHUR 6.0 03/21/2022    SPECGRAV 1.015 03/21/2022    PROTEINUA Negative 03/21/2022    GLUCUA Negative 03/21/2022    KETONESU Negative 03/21/2022    BILIRUBINUA Negative 03/21/2022    OCCULTUA Negative 03/21/2022    NITRITE Negative 03/21/2022    LEUKOCYTESUR Negative 03/21/2022       List all implanted cardiac devices:   Pacemaker: dual chamber    Current Outpatient Medications on File Prior to Visit   Medication Sig Dispense Refill    acetaminophen (TYLENOL) 500 MG tablet Take 500 mg by mouth daily as needed (BACK PAIN).      aspirin 81 mg Tab Take 1 tablet by mouth once daily.       bumetanide  (BUMEX) 1 MG tablet Take 1 tablet (1 mg total) by mouth 2 (two) times daily. 60 tablet 3    ferrous sulfate 325 (65 FE) MG EC tablet Take 1 tablet (325 mg total) by mouth once daily.  0    guaiFENesin (MUCINEX) 600 mg 12 hr tablet Take 600 mg by mouth 2 (two) times daily as needed for Congestion.      Lactobacillus rhamnosus GG (CULTURELLE) 10 billion cell capsule Take 1 capsule by mouth once daily.      levalbuterol (XOPENEX) 0.63 mg/3 mL nebulizer solution Take 3 mLs (0.63 mg total) by nebulization every 4 (four) hours as needed for Wheezing or Shortness of Breath. Rescue 3 mL 1    MELATONIN ORAL Take 3 mg by mouth nightly as needed (sleep).      metoprolol tartrate (LOPRESSOR) 25 MG tablet Take 12.5 mg by mouth 2 (two) times daily.      multivitamin (THERAGRAN) per tablet Take 1 tablet by mouth once daily.      pantoprazole (PROTONIX) 40 MG tablet Take 40 mg by mouth every morning.      polyethylene glycol (GLYCOLAX) 17 gram/dose powder Take 17 g by mouth daily as needed (CONSTIPATION).      potassium chloride (MICRO-K) 10 MEQ CpSR Take 10 mEq by mouth once daily.      tetrahydrozoline 0.05% (VISION CLEAR) 0.05 % Drop PLACE 1 DROP INTO AFFECTED EYE(S) AS NEEDED FOR REDNESS OR ITCHING      tiotropium bromide (SPIRIVA RESPIMAT) 2.5 mcg/actuation inhaler Inhale 2 puffs into the lungs Daily. Controller 4 g 11    triamcinolone (NASACORT) 55 mcg nasal inhaler 2 sprays by Nasal route once daily.      [DISCONTINUED] albuterol (PROAIR HFA) 90 mcg/actuation inhaler Inhale 1 puff into the lungs every 6 (six) hours as needed for Wheezing or Shortness of Breath. Rescue (Patient not taking: Reported on 3/16/2022) 1 Inhaler 5    [DISCONTINUED] baclofen (LIORESAL) 10 MG tablet TK 1 T PO TID       No current facility-administered medications on file prior to visit.         HPI:  Patient can walk 25 feet and pace slow before SOB. SOB resolves with rest. On 2.5L continuous O2 at home. Presents to clinic wearing 3L  pulsatile O2   Patient sleeps on 1 number of pillows, with pillows stacked underneath matress to slightly elevate head of bed   Patient wakes up SOB, has to get out of bed, associated cough- denies, reports cough improved since hospital discharge   Palpitations - denies   Dizzy, light-headed, pre-syncope or syncope- denies   Since discharge frequency of performing weights, home weight and weight change- performing daily weights, 83lbs on hospital discharge, 82.5lbs today   Other information felt pertinent to HPI: Ms. Venus Mayer is a 89 year old woman with HFpEF, HCM, s/p dual chamber ppm, pulmonary HTN, COPD (home O2 2.5 L), HLD, HTN who presents to first [facility] visit accompanied by daughter after recent admission for metabolic encephalopathy 2/2 pna. IV Zosyn started due to concerns for aspiration and IV lasix given in light of hypervolemia. Mentation improving with antibiotic treatment. Breathing treatments started. Diuetics changed to home bumex on 3/22 and antibiotics transitioned to Augmentin on 3/23.     3/31/22: Today she presents to clinic wearing 3L pulsatile O2. During visit, it was noted that she became groggy/sleepy. Pulse Ox checked and SpO2 in 70s. Attached to portable clinic O2 tank at 3L continuous flow with improvement in SpO2 to 88-92% range. Slowly became more alert. A+Ox3, appropriately responsive. Per daughter, pt will sometimes breath through her mouth, forgetting to breath through nose with pulsatile tank. She completed first HH PT session yesterday without issue. Daughter concerned regarding mother's weight loss and if bumex is contributing. Discussed fluid weight vs muscle weight. Daughter admits pt's appetite has not been what it used to be since hospital discharge. Is drinking ensures to attempt to supplement caloric intake.     PHYSICAL:   Vitals:    03/31/22 1121   BP: (!) 113/55   Pulse: 76      Wt Readings from Last 3 Encounters:   03/31/22 33.8 kg (74 lb 8 oz)   03/24/22 37.7 kg  (83 lb 1.8 oz)   03/18/22 38.1 kg (83 lb 15.9 oz)     General: cachectic   JVD: no   Heart rhythm: regular  Cardiac murmur: No    S3: no  S4: no  Lungs: diminished breath sounds throughout, no appreciable crackles  Hepatojugular reflux: no  Edema: yes, 1+ BLE in ankles      Echo 3/13/22:  · The left ventricle is normal in size with normal systolic function.  · Moderate to severe left atrial enlargement.  · Asymmetric septal hypertrophy as noted in 2019, septum measuring 1.5cm (unchanged from prior measurement, unable to view images) without obstruction  · Grade II left ventricular diastolic dysfunction.  · The estimated ejection fraction is 60%.  · Intermediate central venous pressure (8 mmHg).  · The estimated PA systolic pressure is 45 mmHg.  · There is pulmonary hypertension.  · Mild right ventricular enlargement with mildly reduced right ventricular systolic function.  · Mild tricuspid regurgitation.  · Mild mitral regurgitation.    ASSESSMENT: Chronic diastolic HF, HCM    PLAN:      Patient Instructions:    Instruct the patient to notify this clinic if HH, a physician or an advanced care provider wants to change medication one of their HF medications    Activity and Diet restrictions:   o Recommend 2-3 gram sodium restriction and 1500cc- 2000cc fluid restriction.  o Encourage physical activity with graded exercise program.  o Requested patient to weigh themselves daily, and to notify us if their weight increases by more than 3 lbs in 1 day or 5 lbs in 3 days.    Assigned dry weight on home scale: 83lbs  Medication changes (include current dose and changed dose): NYHA Class III symptoms. Today she presents to clinic wearing 3L pulsatile O2. During visit, it was noted that she became groggy/sleepy. Pulse Ox checked and SpO2 in 70s. Attached to clinic portable O2 tank at 3L continuous flow with improvement in SpO2 to 88-92% range. Slowly became more alert. A+Ox3, appropriately responsive. Per daughter, pt will  sometimes breath through her mouth and concerned that mother is not getting adequate O2 on pulsatile tank with forgetting to breath through nose. Notified HHNP, Vickie Virgen regarding pulsatile O2 tank. Will send copy of note to PCP, Dr. Che and NP, Vickie Virgen regarding elevated LFTs. Euvolemic on exam (caution not to over diurese given ? h/o HOCM vs HCM), renal function stable. Likely weight loss related to muscle wasting rather than over diuresis. Muscle mass minimal on exam. Currently completing HHPT to improve strength/functioning. With current functional status and unintentional weight loss, plan to discuss palliative care referral next visit for further goals of care discussions. Will have RD dietician call for further dietary recs.  Upcoming labs and date anticipated: virtual visit in 2 weeks with repeat labs per HH.    Adelita Emmanuel PA-C

## 2022-03-31 NOTE — LETTER
[id number]  [address]  [facility] [address]  Phone: [phone]  Fax: [phone]       Date Sent:  03/31/2022      Venus DEWEY Moreno  2/7/1929      This patient will require the following lab(s). The patient will be instructed to report to your facility for their lab to be drawn.  Please fax all results to [phone]. INR results to be faxed to [phone]    LABS ORDERED    ICD 10     DATE REQUESTED:  Complete Metabolic Panel                 ICD I50.9                                    ASAP 4/11/22  Magnesium                                         ICD I50.9                                    ASAP 4/11/22   BNP Level                                         ICD I50.9                                    ASAP 4/11/22   Phosphorus                                     ICD I50.9                                      ASAP 4/11/22          Snow Lewis RN-BC     [phone]       Fax all results to [phone].  Fax INR results to [phone].  Sincerely,      Angeles Morrissey M.D.  Section Head of Cardiomyopathy & Heart Transplantation

## 2022-03-31 NOTE — PROGRESS NOTES
Pt is enrolled with [facility] [phone], fax [phone].  Lab orders faxed for VV in 2 weeks.

## 2022-03-31 NOTE — Clinical Note
LFTs elevated on CMP today. Wanted to make you aware in case liver work-up warranted. Will also forward labs to PCP, Dr. Che

## 2022-03-31 NOTE — PROGRESS NOTES
"Heart Failure Transitional Care Clinic([facility]) Follow up Visit    Pt presents to clinic ambulatory  accompanied by daughter.    Most Recent Hospital Discharge Date: 3/14/22  Last admission Diagnosis/chief complaint: SOB      Visit Vitals:    Wt Readings from Last 3 Encounters:   03/31/22 33.8 kg (74 lb 8 oz)   03/24/22 37.7 kg (83 lb 1.8 oz)   03/18/22 38.1 kg (83 lb 15.9 oz)     Temp Readings from Last 3 Encounters:   03/24/22 97.6 °F (36.4 °C) (Oral)   03/18/22 98.5 °F (36.9 °C)   03/14/22 97.6 °F (36.4 °C) (Oral)     BP Readings from Last 3 Encounters:   03/31/22 (!) 113/55   03/30/22 110/62   03/24/22 123/60     Pulse Readings from Last 3 Encounters:   03/31/22 76   03/30/22 88   03/24/22 84            Pt reports the following:  [x]  Shortness of Breath with activity  []  Shortness of Breath at rest   []  Fatigue  []  Edema   [] Chest pain or tightness  [] Weight Increase since discharge  [] None of the above     Pt reports being in the GREEN (color) Zone. If in yellow/red, reminded that they should be calling HFT[facility] today or now.     Medications:    Medication reconciliation completed today per RN.  Pt reports having all medications available and understands how to take them appropriately. Reminded pt to call prior to making any changes to medications.     Education:   [x] Confirmed pt still has  "Home Care Guide for Heart Failure Patients".   Reviewed key points as listed below.      Recommend 2 gram sodium restriction and 1500cc fluid restriction.   Encourage physical activity with graded exercise program.   Requested patient to weigh themselves daily, and to notify us if their weight increases by more than 3 lbs in 1 day or 5 lbs in 3 days.     [x] Reviewed completed "Daily Weight and Symptom Tracker".  Reviewed with patient when and how to call HFTCC according to "Yellow Zone" and "Red Zone".     [] Pt given list of low/high sodium food list    Watch for these Signs and Symptoms: If any of these occur, " "contact HFTCC immediately:   Increase in shortness of breath with movement   Increase in swelling in your legs and ankles   Weight gain of more than 3 pounds in a night or 5 pounds in 3 days.   Difficulty breathing when you are lying down   Worsening fatigue or tiredness   Stomach bloating, a full feeling or a loss of appetite   Increased coughing--especially when you are lying down    MyChart and Care Companion:   Patient active on myChart? Yes, but patient does not use regularly    Contacting our Team:   Reviewed with pt how to contact HFT[facility] RN via phone or [employer] messaging.     HF TCC Program Plan:  Pt educated on follow-up plan while in HFT[facility] program.    []  Pt reminded that they will transitioned to long term care provider team at week 4-6.  This team will be either Cardiology, PCP or Advanced Heart Failure depending on needs.       Pt was able to verbalize back to RN in their own words correct diet/fluid restrictions, necessity for exercise, warning signs and symptoms, when and how to contact their TCC team .      Plan:     PT noted to have low pulse ox  At beginning of appt.  Pt is on intermittent oxygen tank (puffs when pt breathes).  PT placed on clinic oxygen tank at 3L via [place].  PT pulse ox up to 95%.  Pt reports breathing through mouth ,  Message sent to pt pcp Dr. Noriega.  She will work with family to have better oxygen system for going to and from appts.  PT family will contact PCP and oxygen company as well.  Once pt recovered, transitioned her back to home tank and pt pulse ox still over 92%.      Refer to provider note.      [x]  Pt given AVS with follow up appointment Virtual Visit within 2 week and medication detail list for ease of use.     [x]  Explained to pt they will have a phone "check in" by RN in/on 1 week     [] Pt met with Carlos Zavaleta Dietician today after visit.  Refer to his note for details.     Will follow up with pt at next clinic visit and RN navigator available for " pt questions, issues or concerns.     Please refer to provider visit for additional details and assessment.

## 2022-04-04 ENCOUNTER — CARE AT HOME (OUTPATIENT)
Dept: HOME HEALTH SERVICES | Facility: CLINIC | Age: 87
End: 2022-04-04
Payer: MEDICARE

## 2022-04-04 ENCOUNTER — LAB VISIT (OUTPATIENT)
Dept: LAB | Facility: HOSPITAL | Age: 87
End: 2022-04-04
Attending: INTERNAL MEDICINE
Payer: MEDICARE

## 2022-04-04 DIAGNOSIS — E87.6 HYPOKALEMIA: ICD-10-CM

## 2022-04-04 DIAGNOSIS — I50.33 ACUTE ON CHRONIC DIASTOLIC HEART FAILURE: ICD-10-CM

## 2022-04-04 DIAGNOSIS — E43 SEVERE PROTEIN-CALORIE MALNUTRITION: ICD-10-CM

## 2022-04-04 DIAGNOSIS — I50.9 HEART FAILURE, UNSPECIFIED: Primary | ICD-10-CM

## 2022-04-04 LAB
ALBUMIN SERPL BCP-MCNC: 2.6 G/DL (ref 3.5–5.2)
ALP SERPL-CCNC: 349 U/L (ref 55–135)
ALT SERPL W/O P-5'-P-CCNC: 51 U/L (ref 10–44)
ANION GAP SERPL CALC-SCNC: 9 MMOL/L (ref 8–16)
AST SERPL-CCNC: 33 U/L (ref 10–40)
BILIRUB SERPL-MCNC: 0.8 MG/DL (ref 0.1–1)
BUN SERPL-MCNC: 26 MG/DL (ref 10–30)
CALCIUM SERPL-MCNC: 9.5 MG/DL (ref 8.7–10.5)
CHLORIDE SERPL-SCNC: 92 MMOL/L (ref 95–110)
CO2 SERPL-SCNC: 38 MMOL/L (ref 23–29)
CREAT SERPL-MCNC: 0.8 MG/DL (ref 0.5–1.4)
EST. GFR  (AFRICAN AMERICAN): >60 ML/MIN/1.73 M^2
EST. GFR  (NON AFRICAN AMERICAN): >60 ML/MIN/1.73 M^2
GLUCOSE SERPL-MCNC: 94 MG/DL (ref 70–110)
MAGNESIUM SERPL-MCNC: 2 MG/DL (ref 1.6–2.6)
PHOSPHATE SERPL-MCNC: 3.1 MG/DL (ref 2.7–4.5)
POTASSIUM SERPL-SCNC: 3.2 MMOL/L (ref 3.5–5.1)
PROT SERPL-MCNC: 6.7 G/DL (ref 6–8.4)
SODIUM SERPL-SCNC: 139 MMOL/L (ref 136–145)

## 2022-04-04 PROCEDURE — 83735 ASSAY OF MAGNESIUM: CPT | Performed by: INTERNAL MEDICINE

## 2022-04-04 PROCEDURE — 99442 PR PHYSICIAN TELEPHONE EVALUATION 11-20 MIN: CPT | Mod: 95,,, | Performed by: NURSE PRACTITIONER

## 2022-04-04 PROCEDURE — 99442 PR PHYSICIAN TELEPHONE EVALUATION 11-20 MIN: ICD-10-PCS | Mod: 95,,, | Performed by: NURSE PRACTITIONER

## 2022-04-04 PROCEDURE — 80053 COMPREHEN METABOLIC PANEL: CPT | Performed by: INTERNAL MEDICINE

## 2022-04-04 PROCEDURE — 36415 COLL VENOUS BLD VENIPUNCTURE: CPT | Performed by: INTERNAL MEDICINE

## 2022-04-04 PROCEDURE — 84100 ASSAY OF PHOSPHORUS: CPT | Performed by: INTERNAL MEDICINE

## 2022-04-04 NOTE — ASSESSMENT & PLAN NOTE
Followed by HF Clinic  Wt today 85 which is dry wt.  Hypotensive today, unable to participate in PT.  Hold Bumex, resume lasix 40 mg daily  Weigh and record.  Low salt diet

## 2022-04-04 NOTE — PROGRESS NOTES
Established Patient - Audio Only Telehealth Visit     The patient location is: [facility]  The chief complaint leading to consultation is: Low B/P  Visit type: Virtual visit with audio only (telephone)  Total time spent with patient: 20 min       The reason for the audio only service rather than synchronous audio and video virtual visit was related to technical difficulties or patient preference/necessity.     Each patient to whom I provide medical services by telemedicine is:  (1) informed of the relationship between the physician and patient and the respective role of any other health care provider with respect to management of the patient; and (2) notified that they may decline to receive medical services by telemedicine and may withdraw from such care at any time. Patient verbally consented to receive this service via voice-only telephone call.       HPI:   Past Medical History:   Diagnosis Date    Acute on chronic congestive heart failure 7/6/2019    Cardiomyopathy     Carotid artery occlusion     CHF (congestive heart failure)     COPD (chronic obstructive pulmonary disease)     Coronary artery disease     Hyperlipidemia     Hypertension      Current Outpatient Medications on File Prior to Visit   Medication Sig Dispense Refill    acetaminophen (TYLENOL) 500 MG tablet Take 500 mg by mouth daily as needed (BACK PAIN).      aspirin 81 mg Tab Take 1 tablet by mouth once daily.       budesonide-formoterol 160-4.5 mcg (SYMBICORT) 160-4.5 mcg/actuation HFAA Take 2 puffs by mouth every 12 (twelve) hours.      bumetanide (BUMEX) 1 MG tablet Take 1 tablet (1 mg total) by mouth 2 (two) times daily. 60 tablet 3    ferrous sulfate 325 (65 FE) MG EC tablet Take 1 tablet (325 mg total) by mouth once daily.  0    guaiFENesin (MUCINEX) 600 mg 12 hr tablet Take 600 mg by mouth 2 (two) times daily as needed for Congestion.      Lactobacillus rhamnosus GG (CULTURELLE) 10 billion cell capsule Take 1 capsule  by mouth once daily.      levalbuterol (XOPENEX) 0.63 mg/3 mL nebulizer solution Take 3 mLs (0.63 mg total) by nebulization every 4 (four) hours as needed for Wheezing or Shortness of Breath. Rescue 3 mL 1    MELATONIN ORAL Take 3 mg by mouth nightly as needed (sleep).      metoprolol tartrate (LOPRESSOR) 25 MG tablet Take 12.5 mg by mouth 2 (two) times daily.      multivitamin (THERAGRAN) per tablet Take 1 tablet by mouth once daily.      pantoprazole (PROTONIX) 40 MG tablet Take 40 mg by mouth every morning.      polyethylene glycol (GLYCOLAX) 17 gram/dose powder Take 17 g by mouth daily as needed (CONSTIPATION).      potassium chloride (MICRO-K) 10 MEQ CpSR Take 10 mEq by mouth once daily.      tetrahydrozoline 0.05% (VISION CLEAR) 0.05 % Drop PLACE 1 DROP INTO AFFECTED EYE(S) AS NEEDED FOR REDNESS OR ITCHING      tiotropium bromide (SPIRIVA RESPIMAT) 2.5 mcg/actuation inhaler Inhale 2 puffs into the lungs Daily. Controller 4 g 11    triamcinolone (NASACORT) 55 mcg nasal inhaler 2 sprays by Nasal route once daily.      [DISCONTINUED] albuterol (PROAIR HFA) 90 mcg/actuation inhaler Inhale 1 puff into the lungs every 6 (six) hours as needed for Wheezing or Shortness of Breath. Rescue (Patient not taking: Reported on 3/16/2022) 1 Inhaler 5    [DISCONTINUED] baclofen (LIORESAL) 10 MG tablet TK 1 T PO TID       No current facility-administered medications on file prior to visit.     Daughter is on the call for this visit as her mother is [facility]. PT is at home for this visit and is concerned that pt has a b/p of 90/60, appears weak and cannot participate fully in therapy regimen. She was seen last week in the HF clinic and labs Thurs showed some reducing kidney function and elevated LFTs. While in clinic her sats were low as well.    Daughter believes that her mother maybe dehydrated. Bumex maybe be to strong for her. She believes her mother tolerated Lasix better without side effects present on Bumex. She  would like to return to Lasix and stop Bumex  Labs were collected today by . LFTs remain elevated but are improving. Pt with low albumin, low K.  Renal numbers WNL       Assessment and plan:         Problem List Items Addressed This Visit        Cardiac/Vascular    Acute on chronic diastolic heart failure    Current Assessment & Plan     Followed by HF Clinic  Wt today 85 which is dry wt.  Hypotensive today, unable to participate in PT.  Hold Bumex, resume lasix 40 mg daily  Weigh and record.  Low salt diet              Renal/    Hypokalemia    Current Assessment & Plan     K of 3.2 today.  Currently on 10mEq daily  Increase to 20mEq daily  Repeat BMP next week              Endocrine    Severe protein-calorie malnutrition    Current Assessment & Plan     Albumin 2.3.  Pt with poor intake, daughter reports some minimal improvement  Using high protein ensure  Wt 85 lb, muscle atrophy                 Reviewed all of above with daughter, she repeats and understands above orders.  Discussed with her that her mother maybe having advancement of disease and multi-system failures.  She stated understanding. Will follow up with this provider in 3 days                 This service was not originating from a related E/M service provided within the previous 7 days nor will  to an E/M service or procedure within the next 24 hours or my soonest available appointment.  Prevailing standard of care was able to be met in this audio-only visit.

## 2022-04-04 NOTE — ASSESSMENT & PLAN NOTE
Albumin 2.3.  Pt with poor intake, daughter reports some minimal improvement  Using high protein ensure  Wt 85 lb, muscle atrophy

## 2022-04-05 ENCOUNTER — EXTERNAL HOME HEALTH (OUTPATIENT)
Dept: HOME HEALTH SERVICES | Facility: HOSPITAL | Age: 87
End: 2022-04-05
Payer: MEDICARE

## 2022-04-08 ENCOUNTER — TELEPHONE (OUTPATIENT)
Dept: CARDIOLOGY | Facility: CLINIC | Age: 87
End: 2022-04-08
Payer: MEDICARE

## 2022-04-08 NOTE — TELEPHONE ENCOUNTER
Pt called today and spoke to Anastasia PAGE to cancel appt.  When discussing reason pt reports she does not want to participate going forward and will pursue care with her established team.  Pt asked to be removed from program.     Will proceed with removal.

## 2022-04-09 ENCOUNTER — HOSPITAL ENCOUNTER (OUTPATIENT)
Facility: HOSPITAL | Age: 87
Discharge: HOME | End: 2022-04-11
Attending: EMERGENCY MEDICINE | Admitting: STUDENT IN AN ORGANIZED HEALTH CARE EDUCATION/TRAINING PROGRAM
Payer: MEDICARE

## 2022-04-09 DIAGNOSIS — J69.0 ASPIRATION PNEUMONIA, UNSPECIFIED ASPIRATION PNEUMONIA TYPE, UNSPECIFIED LATERALITY, UNSPECIFIED PART OF LUNG: ICD-10-CM

## 2022-04-09 DIAGNOSIS — R29.6 FALL IN ELDERLY PATIENT: ICD-10-CM

## 2022-04-09 DIAGNOSIS — R00.0 TACHYCARDIA: ICD-10-CM

## 2022-04-09 DIAGNOSIS — R07.9 CHEST PAIN: ICD-10-CM

## 2022-04-09 DIAGNOSIS — E86.0 DEHYDRATION: Primary | ICD-10-CM

## 2022-04-09 PROBLEM — G93.41 SEPSIS WITH ENCEPHALOPATHY: Status: ACTIVE | Noted: 2022-04-09

## 2022-04-09 PROBLEM — A41.9 SEPSIS WITH ENCEPHALOPATHY: Status: ACTIVE | Noted: 2022-04-09

## 2022-04-09 PROBLEM — R65.20 SEPSIS WITH ENCEPHALOPATHY: Status: ACTIVE | Noted: 2022-04-09

## 2022-04-09 PROBLEM — I50.32 CHRONIC DIASTOLIC CONGESTIVE HEART FAILURE: Status: ACTIVE | Noted: 2021-09-21

## 2022-04-09 LAB
ALBUMIN SERPL BCP-MCNC: 2.5 G/DL (ref 3.5–5.2)
ALP SERPL-CCNC: 195 U/L (ref 55–135)
ALT SERPL W/O P-5'-P-CCNC: 20 U/L (ref 10–44)
ANION GAP SERPL CALC-SCNC: 10 MMOL/L (ref 8–16)
AST SERPL-CCNC: 21 U/L (ref 10–40)
BASOPHILS # BLD AUTO: 0.06 K/UL (ref 0–0.2)
BASOPHILS NFR BLD: 0.3 % (ref 0–1.9)
BILIRUB SERPL-MCNC: 0.7 MG/DL (ref 0.1–1)
BILIRUB UR QL STRIP: NEGATIVE
BNP SERPL-MCNC: 1856 PG/ML (ref 0–99)
BUN SERPL-MCNC: 21 MG/DL (ref 10–30)
CALCIUM SERPL-MCNC: 9.4 MG/DL (ref 8.7–10.5)
CHLORIDE SERPL-SCNC: 97 MMOL/L (ref 95–110)
CLARITY UR REFRACT.AUTO: CLEAR
CO2 SERPL-SCNC: 31 MMOL/L (ref 23–29)
COLOR UR AUTO: YELLOW
CREAT SERPL-MCNC: 0.8 MG/DL (ref 0.5–1.4)
DIFFERENTIAL METHOD: ABNORMAL
EOSINOPHIL # BLD AUTO: 0.1 K/UL (ref 0–0.5)
EOSINOPHIL NFR BLD: 0.3 % (ref 0–8)
ERYTHROCYTE [DISTWIDTH] IN BLOOD BY AUTOMATED COUNT: 15.5 % (ref 11.5–14.5)
EST. GFR  (AFRICAN AMERICAN): >60 ML/MIN/1.73 M^2
EST. GFR  (NON AFRICAN AMERICAN): >60 ML/MIN/1.73 M^2
GLUCOSE SERPL-MCNC: 81 MG/DL (ref 70–110)
GLUCOSE UR QL STRIP: NEGATIVE
HCT VFR BLD AUTO: 35.6 % (ref 37–48.5)
HGB BLD-MCNC: 11.3 G/DL (ref 12–16)
HGB UR QL STRIP: NEGATIVE
IMM GRANULOCYTES # BLD AUTO: 0.16 K/UL (ref 0–0.04)
IMM GRANULOCYTES NFR BLD AUTO: 0.8 % (ref 0–0.5)
KETONES UR QL STRIP: NEGATIVE
LACTATE SERPL-SCNC: 0.9 MMOL/L (ref 0.5–2.2)
LEUKOCYTE ESTERASE UR QL STRIP: NEGATIVE
LYMPHOCYTES # BLD AUTO: 0.9 K/UL (ref 1–4.8)
LYMPHOCYTES NFR BLD: 4.5 % (ref 18–48)
MCH RBC QN AUTO: 32.5 PG (ref 27–31)
MCHC RBC AUTO-ENTMCNC: 31.7 G/DL (ref 32–36)
MCV RBC AUTO: 102 FL (ref 82–98)
MONOCYTES # BLD AUTO: 1.6 K/UL (ref 0.3–1)
MONOCYTES NFR BLD: 8.5 % (ref 4–15)
NEUTROPHILS # BLD AUTO: 16.3 K/UL (ref 1.8–7.7)
NEUTROPHILS NFR BLD: 85.6 % (ref 38–73)
NITRITE UR QL STRIP: NEGATIVE
NRBC BLD-RTO: 0 /100 WBC
PH UR STRIP: 5 [PH] (ref 5–8)
PLATELET # BLD AUTO: 239 K/UL (ref 150–450)
PMV BLD AUTO: 10.5 FL (ref 9.2–12.9)
POTASSIUM SERPL-SCNC: 3.9 MMOL/L (ref 3.5–5.1)
PROT SERPL-MCNC: 6.3 G/DL (ref 6–8.4)
PROT UR QL STRIP: NEGATIVE
RBC # BLD AUTO: 3.48 M/UL (ref 4–5.4)
SODIUM SERPL-SCNC: 138 MMOL/L (ref 136–145)
SP GR UR STRIP: 1.01 (ref 1–1.03)
TROPONIN I SERPL DL<=0.01 NG/ML-MCNC: 0.11 NG/ML (ref 0–0.03)
TROPONIN I SERPL DL<=0.01 NG/ML-MCNC: 0.16 NG/ML (ref 0–0.03)
URN SPEC COLLECT METH UR: NORMAL
WBC # BLD AUTO: 19.03 K/UL (ref 3.9–12.7)

## 2022-04-09 PROCEDURE — 81003 URINALYSIS AUTO W/O SCOPE: CPT | Performed by: STUDENT IN AN ORGANIZED HEALTH CARE EDUCATION/TRAINING PROGRAM

## 2022-04-09 PROCEDURE — 25500020 PHARM REV CODE 255: Performed by: STUDENT IN AN ORGANIZED HEALTH CARE EDUCATION/TRAINING PROGRAM

## 2022-04-09 PROCEDURE — 83880 ASSAY OF NATRIURETIC PEPTIDE: CPT | Performed by: STUDENT IN AN ORGANIZED HEALTH CARE EDUCATION/TRAINING PROGRAM

## 2022-04-09 PROCEDURE — 84145 PROCALCITONIN (PCT): CPT | Performed by: PHYSICIAN ASSISTANT

## 2022-04-09 PROCEDURE — 96372 THER/PROPH/DIAG INJ SC/IM: CPT | Mod: 59 | Performed by: PHYSICIAN ASSISTANT

## 2022-04-09 PROCEDURE — 63600175 PHARM REV CODE 636 W HCPCS: Performed by: PHYSICIAN ASSISTANT

## 2022-04-09 PROCEDURE — 93010 EKG 12-LEAD: ICD-10-PCS | Mod: ,,, | Performed by: INTERNAL MEDICINE

## 2022-04-09 PROCEDURE — G0378 HOSPITAL OBSERVATION PER HR: HCPCS

## 2022-04-09 PROCEDURE — 93010 ELECTROCARDIOGRAM REPORT: CPT | Mod: ,,, | Performed by: INTERNAL MEDICINE

## 2022-04-09 PROCEDURE — 96361 HYDRATE IV INFUSION ADD-ON: CPT

## 2022-04-09 PROCEDURE — 80053 COMPREHEN METABOLIC PANEL: CPT | Performed by: EMERGENCY MEDICINE

## 2022-04-09 PROCEDURE — 83605 ASSAY OF LACTIC ACID: CPT | Performed by: PHYSICIAN ASSISTANT

## 2022-04-09 PROCEDURE — 96375 TX/PRO/DX INJ NEW DRUG ADDON: CPT | Mod: 59

## 2022-04-09 PROCEDURE — 25000003 PHARM REV CODE 250: Performed by: STUDENT IN AN ORGANIZED HEALTH CARE EDUCATION/TRAINING PROGRAM

## 2022-04-09 PROCEDURE — 96365 THER/PROPH/DIAG IV INF INIT: CPT | Mod: 59

## 2022-04-09 PROCEDURE — 93005 ELECTROCARDIOGRAM TRACING: CPT

## 2022-04-09 PROCEDURE — 99220 PR INITIAL OBSERVATION CARE,LEVL III: CPT | Mod: ,,, | Performed by: PHYSICIAN ASSISTANT

## 2022-04-09 PROCEDURE — 25000003 PHARM REV CODE 250: Performed by: PHYSICIAN ASSISTANT

## 2022-04-09 PROCEDURE — 85025 COMPLETE CBC W/AUTO DIFF WBC: CPT | Performed by: STUDENT IN AN ORGANIZED HEALTH CARE EDUCATION/TRAINING PROGRAM

## 2022-04-09 PROCEDURE — 99285 EMERGENCY DEPT VISIT HI MDM: CPT | Mod: 25

## 2022-04-09 PROCEDURE — 87040 BLOOD CULTURE FOR BACTERIA: CPT | Mod: 59 | Performed by: PHYSICIAN ASSISTANT

## 2022-04-09 PROCEDURE — 84484 ASSAY OF TROPONIN QUANT: CPT | Performed by: PHYSICIAN ASSISTANT

## 2022-04-09 PROCEDURE — 99285 EMERGENCY DEPT VISIT HI MDM: CPT | Mod: GC,,, | Performed by: EMERGENCY MEDICINE

## 2022-04-09 PROCEDURE — 84484 ASSAY OF TROPONIN QUANT: CPT | Mod: 91 | Performed by: STUDENT IN AN ORGANIZED HEALTH CARE EDUCATION/TRAINING PROGRAM

## 2022-04-09 PROCEDURE — 99220 PR INITIAL OBSERVATION CARE,LEVL III: ICD-10-PCS | Mod: ,,, | Performed by: PHYSICIAN ASSISTANT

## 2022-04-09 PROCEDURE — 99285 PR EMERGENCY DEPT VISIT,LEVEL V: ICD-10-PCS | Mod: GC,,, | Performed by: EMERGENCY MEDICINE

## 2022-04-09 RX ORDER — LEVALBUTEROL INHALATION SOLUTION 0.63 MG/3ML
1 SOLUTION RESPIRATORY (INHALATION) EVERY 4 HOURS PRN
Status: DISCONTINUED | OUTPATIENT
Start: 2022-04-09 | End: 2022-04-11 | Stop reason: HOSPADM

## 2022-04-09 RX ORDER — DEXAMETHASONE SODIUM PHOSPHATE 4 MG/ML
10 INJECTION, SOLUTION INTRA-ARTICULAR; INTRALESIONAL; INTRAMUSCULAR; INTRAVENOUS; SOFT TISSUE ONCE
Status: COMPLETED | OUTPATIENT
Start: 2022-04-09 | End: 2022-04-09

## 2022-04-09 RX ORDER — GLUCAGON 1 MG
1 KIT INJECTION
Status: DISCONTINUED | OUTPATIENT
Start: 2022-04-09 | End: 2022-04-11 | Stop reason: HOSPADM

## 2022-04-09 RX ORDER — POLYETHYLENE GLYCOL 3350 17 G/17G
17 POWDER, FOR SOLUTION ORAL DAILY PRN
Status: DISCONTINUED | OUTPATIENT
Start: 2022-04-09 | End: 2022-04-11 | Stop reason: HOSPADM

## 2022-04-09 RX ORDER — METOPROLOL TARTRATE 25 MG/1
12.5 TABLET ORAL 2 TIMES DAILY
Status: DISCONTINUED | OUTPATIENT
Start: 2022-04-09 | End: 2022-04-11 | Stop reason: HOSPADM

## 2022-04-09 RX ORDER — ENOXAPARIN SODIUM 100 MG/ML
40 INJECTION SUBCUTANEOUS EVERY 24 HOURS
Status: DISCONTINUED | OUTPATIENT
Start: 2022-04-09 | End: 2022-04-09

## 2022-04-09 RX ORDER — HEPARIN SODIUM 5000 [USP'U]/ML
5000 INJECTION, SOLUTION INTRAVENOUS; SUBCUTANEOUS EVERY 12 HOURS
Status: DISCONTINUED | OUTPATIENT
Start: 2022-04-09 | End: 2022-04-11 | Stop reason: HOSPADM

## 2022-04-09 RX ORDER — PROMETHAZINE HYDROCHLORIDE 25 MG/1
25 TABLET ORAL EVERY 6 HOURS PRN
Status: DISCONTINUED | OUTPATIENT
Start: 2022-04-09 | End: 2022-04-11 | Stop reason: HOSPADM

## 2022-04-09 RX ORDER — IBUPROFEN 200 MG
16 TABLET ORAL
Status: DISCONTINUED | OUTPATIENT
Start: 2022-04-09 | End: 2022-04-11 | Stop reason: HOSPADM

## 2022-04-09 RX ORDER — NAPROXEN SODIUM 220 MG/1
81 TABLET, FILM COATED ORAL DAILY
Status: DISCONTINUED | OUTPATIENT
Start: 2022-04-10 | End: 2022-04-11 | Stop reason: HOSPADM

## 2022-04-09 RX ORDER — IPRATROPIUM BROMIDE AND ALBUTEROL SULFATE 2.5; .5 MG/3ML; MG/3ML
3 SOLUTION RESPIRATORY (INHALATION) EVERY 4 HOURS PRN
Status: DISCONTINUED | OUTPATIENT
Start: 2022-04-09 | End: 2022-04-09

## 2022-04-09 RX ORDER — POTASSIUM CHLORIDE 750 MG/1
10 CAPSULE, EXTENDED RELEASE ORAL DAILY
Status: DISCONTINUED | OUTPATIENT
Start: 2022-04-10 | End: 2022-04-11 | Stop reason: HOSPADM

## 2022-04-09 RX ORDER — IBUPROFEN 200 MG
24 TABLET ORAL
Status: DISCONTINUED | OUTPATIENT
Start: 2022-04-09 | End: 2022-04-11 | Stop reason: HOSPADM

## 2022-04-09 RX ORDER — ONDANSETRON 8 MG/1
8 TABLET, ORALLY DISINTEGRATING ORAL EVERY 8 HOURS PRN
Status: DISCONTINUED | OUTPATIENT
Start: 2022-04-09 | End: 2022-04-11 | Stop reason: HOSPADM

## 2022-04-09 RX ORDER — FLUTICASONE PROPIONATE 50 MCG
2 SPRAY, SUSPENSION (ML) NASAL DAILY
Status: DISCONTINUED | OUTPATIENT
Start: 2022-04-10 | End: 2022-04-10

## 2022-04-09 RX ORDER — ACETAMINOPHEN 325 MG/1
650 TABLET ORAL EVERY 4 HOURS PRN
Status: DISCONTINUED | OUTPATIENT
Start: 2022-04-09 | End: 2022-04-11 | Stop reason: HOSPADM

## 2022-04-09 RX ORDER — PANTOPRAZOLE SODIUM 40 MG/1
40 TABLET, DELAYED RELEASE ORAL EVERY MORNING
Status: DISCONTINUED | OUTPATIENT
Start: 2022-04-10 | End: 2022-04-11 | Stop reason: HOSPADM

## 2022-04-09 RX ORDER — FLUTICASONE FUROATE AND VILANTEROL 100; 25 UG/1; UG/1
1 POWDER RESPIRATORY (INHALATION) DAILY
Status: DISCONTINUED | OUTPATIENT
Start: 2022-04-10 | End: 2022-04-11 | Stop reason: HOSPADM

## 2022-04-09 RX ORDER — SODIUM CHLORIDE 9 MG/ML
INJECTION, SOLUTION INTRAVENOUS CONTINUOUS
Status: ACTIVE | OUTPATIENT
Start: 2022-04-09 | End: 2022-04-10

## 2022-04-09 RX ORDER — BISACODYL 10 MG
10 SUPPOSITORY, RECTAL RECTAL DAILY PRN
Status: DISCONTINUED | OUTPATIENT
Start: 2022-04-09 | End: 2022-04-11 | Stop reason: HOSPADM

## 2022-04-09 RX ORDER — TALC
6 POWDER (GRAM) TOPICAL NIGHTLY PRN
Status: DISCONTINUED | OUTPATIENT
Start: 2022-04-09 | End: 2022-04-11 | Stop reason: HOSPADM

## 2022-04-09 RX ORDER — GUAIFENESIN 600 MG/1
600 TABLET, EXTENDED RELEASE ORAL 2 TIMES DAILY
Status: DISCONTINUED | OUTPATIENT
Start: 2022-04-09 | End: 2022-04-11 | Stop reason: HOSPADM

## 2022-04-09 RX ORDER — CETIRIZINE HYDROCHLORIDE 5 MG/1
5 TABLET ORAL NIGHTLY
Status: DISCONTINUED | OUTPATIENT
Start: 2022-04-09 | End: 2022-04-11 | Stop reason: HOSPADM

## 2022-04-09 RX ADMIN — SODIUM CHLORIDE: 0.9 INJECTION, SOLUTION INTRAVENOUS at 10:04

## 2022-04-09 RX ADMIN — IOHEXOL 75 ML: 350 INJECTION, SOLUTION INTRAVENOUS at 10:04

## 2022-04-09 RX ADMIN — METOPROLOL TARTRATE 12.5 MG: 25 TABLET, FILM COATED ORAL at 11:04

## 2022-04-09 RX ADMIN — SODIUM CHLORIDE 500 ML: 9 INJECTION, SOLUTION INTRAVENOUS at 04:04

## 2022-04-09 RX ADMIN — AMPICILLIN SODIUM AND SULBACTAM SODIUM 3 G: 2; 1 INJECTION, POWDER, FOR SOLUTION INTRAMUSCULAR; INTRAVENOUS at 11:04

## 2022-04-09 RX ADMIN — CETIRIZINE HYDROCHLORIDE 5 MG: 5 TABLET, FILM COATED ORAL at 11:04

## 2022-04-09 RX ADMIN — DEXAMETHASONE SODIUM PHOSPHATE 10 MG: 4 INJECTION INTRA-ARTICULAR; INTRALESIONAL; INTRAMUSCULAR; INTRAVENOUS; SOFT TISSUE at 11:04

## 2022-04-09 RX ADMIN — HEPARIN SODIUM 5000 UNITS: 5000 INJECTION INTRAVENOUS; SUBCUTANEOUS at 10:04

## 2022-04-09 NOTE — ED PROVIDER NOTES
"Encounter Date: 4/9/2022       History     Chief Complaint   Patient presents with    Fall     Was on toilet and leaned forward and fell. Sitter said no loc. Brought to ed by daughter. Daughter says pt seems "loopy". 2.5 L O2 per NC. Daughter concerned for dehydration . Hit head, right abrasion to head.     89-year-old female with cardiomyopathy, CHF, pacemaker in place, HLD, HTN, COPD (on 2 L home oxygen) presents for fall while in the bathroom that occurred earlier today.  Patient was assisted by a sitter to the bathroom, who heard her fall and call out immediately.  Patient reports she fell forward, hitting her head.  There was no reported loss of consciousness, however it is unclear if she was lightheaded/week prior to falling.  Patient appears more fatigued than usual but is otherwise responsive per daughter.  Patient has had copious oral secretions and with seen by ENT recently, who diagnosed her with postnasal drip.  Patient has not had any fever/chills, nausea/vomiting    The history is provided by the patient and a relative.     Review of patient's allergies indicates:   Allergen Reactions    Ancef in dextrose (iso-osm) Rash    Cefazolin Rash     Tolerating Zosyn admission 3/2022    Cefuroxime Rash    Sulfamethoxazole-trimethoprim Rash     Other reaction(s): Rash     Past Medical History:   Diagnosis Date    Acute on chronic congestive heart failure 7/6/2019    Cardiomyopathy     Carotid artery occlusion     CHF (congestive heart failure)     COPD (chronic obstructive pulmonary disease)     Coronary artery disease     Hyperlipidemia     Hypertension      Past Surgical History:   Procedure Laterality Date    CARDIAC CATHETERIZATION      cardic cath      CAROTID ENDARTERECTOMY      FLEXIBLE BRONCHOSCOPY N/A 7/31/2019    Procedure: BRONCHOSCOPY, FIBEROPTIC Flexible;  Surgeon: Klever Mckeon MD;  Location: [facility] OR [address] [place] [address];  Service: Thoracic;  Laterality: N/A;    HIP SURGERY      " PLEURODESIS USING TALC N/A 2019    Procedure: PLEURODESIS;  Surgeon: Klever Mckeon MD;  Location: [facility] OR [address] [place] [address];  Service: Thoracic;  Laterality: N/A;    PLEURODESIS USING TALC Right 2019    Procedure: PLEURODESIS, USING TALC;  Surgeon: Klever Mckeon MD;  [facility];  Service: Thoracic;  Laterality: Right;    PLEURODESIS WITH VIDEO-ASSISTED THORACOSCOPIC SURGERY (VATS) Right 2019    Procedure: VATS, WITH PLEURAL TENT;  Surgeon: Klever Mckeon MD;  Location: [facility] OR [address] [place] [address];  Service: Thoracic;  Laterality: Right;     Family History   Problem Relation Age of Onset    Hypertension Mother      Social History     Tobacco Use    Smoking status: Former Smoker     Packs/day: 2.00     Years: 20.00     Pack years: 40.00     Types: Cigarettes     Quit date: 1980     Years since quittin.2    Smokeless tobacco: Never Used   Substance Use Topics    Alcohol use: Yes     Alcohol/week: 2.0 standard drinks     Types: 2 Glasses of wine per week     Comment: social    Drug use: No     Review of Systems   Constitutional: Positive for fatigue. Negative for chills and fever.   HENT: Positive for postnasal drip. Negative for rhinorrhea and sore throat.    Eyes: Negative for photophobia and visual disturbance.   Respiratory: Positive for cough. Negative for chest tightness and shortness of breath.    Cardiovascular: Negative for chest pain and palpitations.   Gastrointestinal: Negative for nausea and vomiting.   Genitourinary: Negative for difficulty urinating and dysuria.   Musculoskeletal: Negative for back pain and myalgias.   Skin: Negative for pallor and rash.   Neurological: Positive for weakness and headaches. Negative for dizziness and numbness.   Psychiatric/Behavioral: Negative for agitation and confusion.       Physical Exam     Initial Vitals [22 1449]   BP Pulse Resp Temp SpO2   (!) 161/68 (!) 114 (!) 22 99.3 °F (37.4 °C) (!) 93 %      MAP       --          Physical Exam    Nursing note and vitals reviewed.  Constitutional:   Fatigued and frail-appearing, response to voice, normal work of breathing   HENT:   Head: Normocephalic and atraumatic.   Oropharynx dry  Superficial laceration to the right parietal area, no palpable hematomas or depressed skull fracture   Eyes: Conjunctivae and EOM are normal.   Cardiovascular: Regular rhythm, normal heart sounds and intact distal pulses.   Tachycardic   Pulmonary/Chest: No stridor.   Lungs clear to auscultation no respiratory distress  Wet upper airway sounds/copious secretions   Abdominal: Abdomen is soft. She exhibits no distension. There is no abdominal tenderness.   Musculoskeletal:         General: No tenderness or edema.     Neurological:   Poor attention, opens eyes to voice, answers questions and follows commands appropriately   Skin: Skin is warm and dry. No rash noted.         ED Course   Procedures  Labs Reviewed   CBC W/ AUTO DIFFERENTIAL - Abnormal; Notable for the following components:       Result Value    WBC 19.03 (*)     RBC 3.48 (*)     Hemoglobin 11.3 (*)     Hematocrit 35.6 (*)      (*)     MCH 32.5 (*)     MCHC 31.7 (*)     RDW 15.5 (*)     Immature Granulocytes 0.8 (*)     Gran # (ANC) 16.3 (*)     Immature Grans (Abs) 0.16 (*)     Lymph # 0.9 (*)     Mono # 1.6 (*)     Gran % 85.6 (*)     Lymph % 4.5 (*)     All other components within normal limits   TROPONIN I - Abnormal; Notable for the following components:    Troponin I 0.107 (*)     All other components within normal limits   B-TYPE NATRIURETIC PEPTIDE - Abnormal; Notable for the following components:    BNP 1,856 (*)     All other components within normal limits   COMPREHENSIVE METABOLIC PANEL - Abnormal; Notable for the following components:    CO2 31 (*)     Albumin 2.5 (*)     Alkaline Phosphatase 195 (*)     All other components within normal limits   TROPONIN I - Abnormal; Notable for the following components:    Troponin I 0.158  (*)     All other components within normal limits   CULTURE, BLOOD   CULTURE, BLOOD   CULTURE, RESPIRATORY   GROUP A STREP, MOLECULAR   CULTURE, STREP A,  THROAT   URINALYSIS, REFLEX TO URINE CULTURE    Narrative:     Specimen Source->Urine   LACTIC ACID, PLASMA   PROCALCITONIN     EKG Readings: (Independently Interpreted)   Paced rhythm with right bundle branch block, regular, narrow, rate of 108, nonspecific ST changes in the anterior leads, rate is increased, otherwise unchanged compared to previous       Imaging Results          CT Soft Tissue Neck With Contrast (In process)  Result time 04/09/22 22:49:54               X-Ray Chest AP Portable (Final result)  Result time 04/09/22 18:17:38    Final result by Kamran Wsie MD (04/09/22 18:17:38)                 Impression:      See above comments.      Electronically signed by: Kamran Wise  Date:    04/09/2022  Time:    18:17             Narrative:    EXAMINATION:  XR CHEST AP PORTABLE    CLINICAL HISTORY:  Repeated falls    TECHNIQUE:  Single frontal view of the chest was performed.    COMPARISON:  03/20/2022    FINDINGS:  Cardiac silhouette is upper normal size.  Aortic atherosclerosis.    Cardiac pacemaker device on the left.    Lungs appear hyperexpanded with probable emphysematous changes.    Mild blunting of the costophrenic angles bilaterally could represent trace effusions.  Minimal interstitial and ground-glass changes at the lung bases could be associated with mild infiltrate, atelectasis or scarring.    Findings are similar to the prior study.    No mass or consolidation is detected.    No evidence of pneumothorax.  No acute osseous abnormality.                               CT Cervical Spine Without Contrast (Final result)  Result time 04/09/22 18:07:10    Final result by Kamran Wise MD (04/09/22 18:07:10)                 Impression:      1. No acute abnormality.  2. Multilevel chronic degenerative changes.      Electronically signed by: Kamran  Kaushik  Date:    04/09/2022  Time:    18:07             Narrative:    EXAMINATION:  CT CERVICAL SPINE WITHOUT CONTRAST    CLINICAL HISTORY:  Neck trauma (Age >= 65y);    TECHNIQUE:  Low dose axial CT images through the cervical spine, with sagittal and coronal reformations.  Contrast was not administered.    COMPARISON:  None    FINDINGS:  No acute fractures the cervical spine.  Incomplete fusion of the C1 ring posteriorly near the midline as an anatomic variant.    The odontoid process is intact.    Severe disc space narrowing at C3-4, C4-5, C5-6 and C6-7 with moderate narrowing elsewhere.    Grade 1 spondylolisthesis of C7 on T1.    Facet arthropathy bilaterally most prominent at C2-3 on the left, C3-4, C4-5 and C7-T1 on the right.    Mild diffuse posterior disc osteophyte complex at multiple levels including C3-4, C4-5, C5-6, C6-7 and C7-T1.    Minimal central canal narrowing at C4-5 and C5-6.    Severe foraminal narrowing at C3-4 bilaterally right greater than left, C4-5 bilaterally, C5-6 bilaterally, C6-7 bilaterally left greater than right and C7-T1 on the right.    Limited evaluation of the intraspinal contents demonstrates no hematoma or mass.Paraspinal soft tissues exhibit no acute abnormalities.    Emphysematous changes at the lung apices with bleb formation on the right.                               CT Head Without Contrast (Final result)  Result time 04/09/22 17:17:45    Final result by Kamran Wise MD (04/09/22 17:17:45)                 Impression:      1. No acute intracranial process.  2. Involutional changes with chronic microvascular ischemic changes and small remote lacunar infarct of the left caudate body.      Electronically signed by: Kamran Wise  Date:    04/09/2022  Time:    17:17             Narrative:    EXAMINATION:  CT HEAD WITHOUT CONTRAST    CLINICAL HISTORY:  Head trauma, minor (Age >= 65y);    TECHNIQUE:  Low dose axial CT images obtained throughout the head without  intravenous contrast. Sagittal and coronal reconstructions were performed.    COMPARISON:  03/20/2022    FINDINGS:  Intracranial compartment:    Ventricles and sulci are normal in size for age without evidence of hydrocephalus. No extra-axial blood or fluid collections.    Moderate to severe probable chronic microvascular ischemic changes in the periventricular and subcortical white matter.  Small remote lacunar infarct of the left caudate body.  No parenchymal mass, hemorrhage, edema or major vascular distribution infarct.    Skull/extracranial contents (limited evaluation): No fracture. Mastoid air cells and paranasal sinuses are essentially clear.    No significant change.                                 Medications   melatonin tablet 6 mg (has no administration in time range)   ondansetron disintegrating tablet 8 mg (has no administration in time range)   promethazine tablet 25 mg (has no administration in time range)   polyethylene glycol packet 17 g (has no administration in time range)   bisacodyL suppository 10 mg (has no administration in time range)   acetaminophen tablet 650 mg (has no administration in time range)   glucose chewable tablet 16 g (has no administration in time range)   glucose chewable tablet 24 g (has no administration in time range)   glucagon (human recombinant) injection 1 mg (has no administration in time range)   dextrose 10% bolus 125 mL (has no administration in time range)   dextrose 10% bolus 250 mL (has no administration in time range)   heparin (porcine) injection 5,000 Units (5,000 Units Subcutaneous Given 4/9/22 2231)   0.9%  NaCl infusion ( Intravenous New Bag 4/9/22 2232)   aspirin chewable tablet 81 mg (has no administration in time range)   fluticasone furoate-vilanteroL 100-25 mcg/dose diskus inhaler 1 puff (has no administration in time range)   guaiFENesin 12 hr tablet 600 mg (has no administration in time range)   levalbuterol nebulizer solution 0.63 mg (has no  administration in time range)   metoprolol tartrate (LOPRESSOR) split tablet 12.5 mg (has no administration in time range)   pantoprazole EC tablet 40 mg (has no administration in time range)   potassium chloride CR capsule 10 mEq (has no administration in time range)   tiotropium bromide 2.5 mcg/actuation inhaler 2 puff (has no administration in time range)   fluticasone propionate 50 mcg/actuation nasal spray 100 mcg (has no administration in time range)   cetirizine tablet 5 mg (has no administration in time range)   ampicillin-sulbactam 3 g in sodium chloride 0.9 % 100 mL IVPB (ready to mix system) (has no administration in time range)   dexamethasone injection 10 mg (has no administration in time range)   sodium chloride 0.9% bolus 500 mL (0 mLs Intravenous Stopped 4/9/22 1746)   iohexoL (OMNIPAQUE 350) injection 75 mL (75 mLs Intravenous Given 4/9/22 2223)     Medical Decision Making:   History:   Old Medical Records: I decided to obtain old medical records.  Old Records Summarized: records from clinic visits and records from previous admission(s).  Initial Assessment:   89-year-old female with cardiomyopathy, CHF, pacemaker in place, HLD, HTN, COPD (on 2 L home oxygen) presents for fall while in the bathroom that occurred earlier today.    Independently Interpreted Test(s):   I have ordered and independently interpreted EKG Reading(s) - see prior notes  Clinical Tests:   Lab Tests: Ordered and Reviewed  Radiological Study: Ordered and Reviewed  Medical Tests: Ordered and Reviewed  ED Management:  Patient ill-appearing and asymptomatic on arrival  EKG without evidence of arrhythmia/pacemaker dysfunction/ACS.  Presentation most consistent with syncope versus orthostatic hypotension, possible aspiration pneumonia  Other differentials include electrolyte derangement, less likely PE or arrhythmia  No chest pain, no shortness of breath, no leg swelling/pain, no risk factors for PE  Pavel focal neurological deficits,  inconsistent with central/neurologic cause of syncope  There is evidence of trauma, small superficial laceration that will not need repair  CT head and C-spine ordered  Broad infectious/syncope workup              ED Course as of 04/09/22 2300   Sat Apr 09, 2022   1500 Pulse(!): 114 [OK]   1819 CT Head Without Contrast  No acute findings [OK]   1819 CT Cervical Spine Without Contrast  No acute findings [OK]   2012 WBC(!): 19.03 [OK]   2012 Gran %(!): 85.6 [OK]   2018 Pulse: 91 [OK]   2018 Resp: 18 [OK]   2018 BP(!): 144/67 [OK]   2117 Pulse: 88  Heart rate improved after IV fluids [OK]   2259 No evidence of C-spine or intracranial trauma, BNP elevated but improved from patient's baseline.  Leukocytosis left shift concerning for infection, most likely aspiration in context the patient with copious secretions and fatigued appearing.  Patient admitted to hospital medicine for further management [OK]      ED Course User Index  [OK] Usman Vee MD             Clinical Impression:   Final diagnoses:  [R00.0] Tachycardia  [R29.6] Fall in elderly patient  [R29.6] Fall in elderly patient - Cough, weakness  [E86.0] Dehydration (Primary)  [J69.0] Aspiration pneumonia, unspecified aspiration pneumonia type, unspecified laterality, unspecified part of lung          ED Disposition Condition    Observation               Usman Vee MD  Resident  04/09/22 2300

## 2022-04-10 PROBLEM — M79.89 SWELLING OF LOWER EXTREMITY: Status: ACTIVE | Noted: 2022-04-10

## 2022-04-10 LAB
ANION GAP SERPL CALC-SCNC: 15 MMOL/L (ref 8–16)
BASOPHILS # BLD AUTO: 0.06 K/UL (ref 0–0.2)
BASOPHILS NFR BLD: 0.4 % (ref 0–1.9)
BUN SERPL-MCNC: 26 MG/DL (ref 10–30)
CALCIUM SERPL-MCNC: 9.6 MG/DL (ref 8.7–10.5)
CHLORIDE SERPL-SCNC: 99 MMOL/L (ref 95–110)
CO2 SERPL-SCNC: 27 MMOL/L (ref 23–29)
CREAT SERPL-MCNC: 1 MG/DL (ref 0.5–1.4)
DIFFERENTIAL METHOD: ABNORMAL
EOSINOPHIL # BLD AUTO: 0 K/UL (ref 0–0.5)
EOSINOPHIL NFR BLD: 0.1 % (ref 0–8)
ERYTHROCYTE [DISTWIDTH] IN BLOOD BY AUTOMATED COUNT: 15.8 % (ref 11.5–14.5)
EST. GFR  (AFRICAN AMERICAN): 56.1 ML/MIN/1.73 M^2
EST. GFR  (NON AFRICAN AMERICAN): 48.7 ML/MIN/1.73 M^2
GLUCOSE SERPL-MCNC: 67 MG/DL (ref 70–110)
HCT VFR BLD AUTO: 36.8 % (ref 37–48.5)
HGB BLD-MCNC: 11.2 G/DL (ref 12–16)
IMM GRANULOCYTES # BLD AUTO: 0.1 K/UL (ref 0–0.04)
IMM GRANULOCYTES NFR BLD AUTO: 0.6 % (ref 0–0.5)
LYMPHOCYTES # BLD AUTO: 0.7 K/UL (ref 1–4.8)
LYMPHOCYTES NFR BLD: 4.5 % (ref 18–48)
MAGNESIUM SERPL-MCNC: 2.2 MG/DL (ref 1.6–2.6)
MCH RBC QN AUTO: 31.8 PG (ref 27–31)
MCHC RBC AUTO-ENTMCNC: 30.4 G/DL (ref 32–36)
MCV RBC AUTO: 105 FL (ref 82–98)
MONOCYTES # BLD AUTO: 0.3 K/UL (ref 0.3–1)
MONOCYTES NFR BLD: 2 % (ref 4–15)
NEUTROPHILS # BLD AUTO: 15.1 K/UL (ref 1.8–7.7)
NEUTROPHILS NFR BLD: 92.4 % (ref 38–73)
NRBC BLD-RTO: 0 /100 WBC
PLATELET # BLD AUTO: 206 K/UL (ref 150–450)
PMV BLD AUTO: 10.3 FL (ref 9.2–12.9)
POTASSIUM SERPL-SCNC: 4.3 MMOL/L (ref 3.5–5.1)
PROCALCITONIN SERPL IA-MCNC: 0.11 NG/ML
RBC # BLD AUTO: 3.52 M/UL (ref 4–5.4)
SODIUM SERPL-SCNC: 141 MMOL/L (ref 136–145)
TROPONIN I SERPL DL<=0.01 NG/ML-MCNC: 0.2 NG/ML (ref 0–0.03)
WBC # BLD AUTO: 16.32 K/UL (ref 3.9–12.7)

## 2022-04-10 PROCEDURE — 27000646 HC AEROBIKA DEVICE

## 2022-04-10 PROCEDURE — 99900035 HC TECH TIME PER 15 MIN (STAT)

## 2022-04-10 PROCEDURE — 36415 COLL VENOUS BLD VENIPUNCTURE: CPT | Performed by: PHYSICIAN ASSISTANT

## 2022-04-10 PROCEDURE — 80048 BASIC METABOLIC PNL TOTAL CA: CPT | Performed by: PHYSICIAN ASSISTANT

## 2022-04-10 PROCEDURE — 85025 COMPLETE CBC W/AUTO DIFF WBC: CPT | Performed by: PHYSICIAN ASSISTANT

## 2022-04-10 PROCEDURE — 25000003 PHARM REV CODE 250: Performed by: PHYSICIAN ASSISTANT

## 2022-04-10 PROCEDURE — 83735 ASSAY OF MAGNESIUM: CPT | Performed by: PHYSICIAN ASSISTANT

## 2022-04-10 PROCEDURE — 25000242 PHARM REV CODE 250 ALT 637 W/ HCPCS: Performed by: HOSPITALIST

## 2022-04-10 PROCEDURE — 25000242 PHARM REV CODE 250 ALT 637 W/ HCPCS: Performed by: PHYSICIAN ASSISTANT

## 2022-04-10 PROCEDURE — 84484 ASSAY OF TROPONIN QUANT: CPT | Performed by: PHYSICIAN ASSISTANT

## 2022-04-10 PROCEDURE — 94640 AIRWAY INHALATION TREATMENT: CPT

## 2022-04-10 PROCEDURE — 63600175 PHARM REV CODE 636 W HCPCS: Performed by: PHYSICIAN ASSISTANT

## 2022-04-10 PROCEDURE — G0378 HOSPITAL OBSERVATION PER HR: HCPCS

## 2022-04-10 PROCEDURE — 27000221 HC OXYGEN, UP TO 24 HOURS

## 2022-04-10 PROCEDURE — 99225 PR SUBSEQUENT OBSERVATION CARE,LEVEL II: ICD-10-PCS | Mod: ,,, | Performed by: HOSPITALIST

## 2022-04-10 PROCEDURE — 94664 DEMO&/EVAL PT USE INHALER: CPT | Mod: XB

## 2022-04-10 PROCEDURE — 94640 AIRWAY INHALATION TREATMENT: CPT | Mod: XB

## 2022-04-10 PROCEDURE — 96372 THER/PROPH/DIAG INJ SC/IM: CPT | Performed by: PHYSICIAN ASSISTANT

## 2022-04-10 PROCEDURE — 94761 N-INVAS EAR/PLS OXIMETRY MLT: CPT

## 2022-04-10 PROCEDURE — 99225 PR SUBSEQUENT OBSERVATION CARE,LEVEL II: CPT | Mod: ,,, | Performed by: HOSPITALIST

## 2022-04-10 RX ORDER — FLUTICASONE PROPIONATE 50 MCG
2 SPRAY, SUSPENSION (ML) NASAL DAILY
Status: DISCONTINUED | OUTPATIENT
Start: 2022-04-10 | End: 2022-04-11 | Stop reason: HOSPADM

## 2022-04-10 RX ADMIN — HEPARIN SODIUM 5000 UNITS: 5000 INJECTION INTRAVENOUS; SUBCUTANEOUS at 09:04

## 2022-04-10 RX ADMIN — GUAIFENESIN 600 MG: 600 TABLET, EXTENDED RELEASE ORAL at 08:04

## 2022-04-10 RX ADMIN — METOPROLOL TARTRATE 12.5 MG: 25 TABLET, FILM COATED ORAL at 09:04

## 2022-04-10 RX ADMIN — PANTOPRAZOLE SODIUM 40 MG: 40 TABLET, DELAYED RELEASE ORAL at 08:04

## 2022-04-10 RX ADMIN — LEVALBUTEROL HYDROCHLORIDE 0.63 MG: 0.63 SOLUTION RESPIRATORY (INHALATION) at 03:04

## 2022-04-10 RX ADMIN — CETIRIZINE HYDROCHLORIDE 5 MG: 5 TABLET, FILM COATED ORAL at 09:04

## 2022-04-10 RX ADMIN — FLUTICASONE PROPIONATE 100 MCG: 50 SPRAY, METERED NASAL at 11:04

## 2022-04-10 RX ADMIN — LEVALBUTEROL HYDROCHLORIDE 0.63 MG: 0.63 SOLUTION RESPIRATORY (INHALATION) at 09:04

## 2022-04-10 RX ADMIN — LEVALBUTEROL HYDROCHLORIDE 0.63 MG: 0.63 SOLUTION RESPIRATORY (INHALATION) at 08:04

## 2022-04-10 RX ADMIN — ASPIRIN 81 MG CHEWABLE TABLET 81 MG: 81 TABLET CHEWABLE at 08:04

## 2022-04-10 RX ADMIN — METOPROLOL TARTRATE 12.5 MG: 25 TABLET, FILM COATED ORAL at 08:04

## 2022-04-10 RX ADMIN — FLUTICASONE FUROATE AND VILANTEROL TRIFENATATE 1 PUFF: 100; 25 POWDER RESPIRATORY (INHALATION) at 08:04

## 2022-04-10 RX ADMIN — HEPARIN SODIUM 5000 UNITS: 5000 INJECTION INTRAVENOUS; SUBCUTANEOUS at 08:04

## 2022-04-10 RX ADMIN — POTASSIUM CHLORIDE 10 MEQ: 10 CAPSULE, COATED, EXTENDED RELEASE ORAL at 08:04

## 2022-04-10 RX ADMIN — GUAIFENESIN 600 MG: 600 TABLET, EXTENDED RELEASE ORAL at 09:04

## 2022-04-10 NOTE — ASSESSMENT & PLAN NOTE
Acute on chronic respiratory failure with hypoxia  - worsening productive cough, increased O2 needs from home 2L NC, no wheezing  - possible infectious source?  - IV unasyn as above  - ABG pending  - continue daily symbicort, zyrtec, flonase  - levabuterol PRN

## 2022-04-10 NOTE — HOSPITAL COURSE
Ms. Mayer was admitted to Hospital Medicine for management of sepsis 2/2 likely pneumonia vs a viral URI.  She was started on Unasyn on admit based on possible ENT etiology, and changed to Zosyn.  Her WBC quickly normalized.  It is unclear if she had a bacterial or viral infection, vs allergies and post nasal drip causing her cough.  She was discharged on Levaquin and given home health.

## 2022-04-10 NOTE — H&P
"[facility] - Emergency Dept  [facility] Medicine  History & Physical    Patient Name: Venus Mayer  MRN: [id number]  Patient Class: OP- Observation  Admission Date: 4/9/2022  Attending Physician: Michela Mena MD   Primary Care Provider: Jona Che MD         Patient information was obtained from patient, relative(s), past medical records and ER records.     Subjective:     Principal Problem:Sepsis without acute organ dysfunction    Chief Complaint:   Chief Complaint   Patient presents with    Fall     Was on toilet and leaned forward and fell. Sitter said no loc. Brought to ed by daughter. Daughter says pt seems "loopy". 2.5 L O2 per NC. Daughter concerned for dehydration . Hit head, right abrasion to head.        HPI: Venus Mayer is a 89 y.o. female with a PMHx of cardiomyopathy, CHF, pacemaker in place, HLD, HTN, COPD on home 2L NC who presents to [facility] [place] after a fall at home earlier today. Daughter at bedside provides majority of history. Patient was in the bathroom when she called for caretaker's assistance (who was right outside the door) and found patient falling forward upon walking in. Caretaker attempted to catch her but both fell to the ground. Patient hit her forehead on the ground but never loss consciousness. Daughter states that patient's heart rate has been elevated to 100-110s and her blood pressure has been borderline low at home recently so she's unsure if this caused her to get lightheaded upon standing from the toilet earlier today. Patient also noted to have copious amounts of secretions, wet cough, sore throat, ear fullness, poor appetite and decreased PO intake recently. She was seen by outpatient ENT for symptoms and was started on zyrtec, mucinex and Flonase for sinus congestion/ presumed viral URI. Daughter noticed that patient voice sounds "raspy" and it seems as if she is either having difficulty swallowing or avoids foods due to possible pain with swallowing. No difficulty handling " secretions, drooling, tripoding, or fever reported.      ED: tachycardic to  and tachypneic to RR 22. Satting 100% on 3L NC. Leukocytosis of WBC 19.03 with left shift. CT head/ C-spine without acute findings. CXR shows minimal interstitial and ground-glass changes at the lung bases could be associated with mild infiltrate, atelectasis or scarring. Given 500cc NS.       Past Medical History:   Diagnosis Date    Acute on chronic congestive heart failure 7/6/2019    Cardiomyopathy     Carotid artery occlusion     CHF (congestive heart failure)     COPD (chronic obstructive pulmonary disease)     Coronary artery disease     Hyperlipidemia     Hypertension        Past Surgical History:   Procedure Laterality Date    CARDIAC CATHETERIZATION      cardic cath      CAROTID ENDARTERECTOMY      FLEXIBLE BRONCHOSCOPY N/A 7/31/2019    Procedure: BRONCHOSCOPY, FIBEROPTIC Flexible;  Surgeon: Klever Mckeon MD;  Location: [address];  Service: Thoracic;  Laterality: N/A;    HIP SURGERY      PLEURODESIS USING TALC N/A 7/31/2019    Procedure: PLEURODESIS;  Surgeon: Klever Mckeon MD;  Location: [facility] OR [address] [place] [address];  Service: Thoracic;  Laterality: N/A;    PLEURODESIS USING TALC Right 8/5/2019    Procedure: PLEURODESIS, USING TALC;  Surgeon: Klever Mckeon MD;  Location: [facility] OR [address] [place] [address];  Service: Thoracic;  Laterality: Right;    PLEURODESIS WITH VIDEO-ASSISTED THORACOSCOPIC SURGERY (VATS) Right 8/5/2019    Procedure: VATS, WITH PLEURAL TENT;  Surgeon: Klever Mckeon MD;  Location: [address];  Service: Thoracic;  Laterality: Right;       Review of patient's allergies indicates:   Allergen Reactions    Ancef in dextrose (iso-osm) Rash    Cefazolin Rash     Tolerating Zosyn admission 3/2022    Cefuroxime Rash    Sulfamethoxazole-trimethoprim Rash     Other reaction(s): Rash       No current facility-administered medications on file prior to encounter.     Current Outpatient  Medications on File Prior to Encounter   Medication Sig    acetaminophen (TYLENOL) 500 MG tablet Take 500 mg by mouth daily as needed (BACK PAIN).    aspirin 81 mg Tab Take 1 tablet by mouth once daily.     budesonide-formoterol 160-4.5 mcg (SYMBICORT) 160-4.5 mcg/actuation HFAA Take 2 puffs by mouth every 12 (twelve) hours.    bumetanide (BUMEX) 1 MG tablet Take 1 tablet (1 mg total) by mouth 2 (two) times daily.    ferrous sulfate 325 (65 FE) MG EC tablet Take 1 tablet (325 mg total) by mouth once daily.    guaiFENesin (MUCINEX) 600 mg 12 hr tablet Take 600 mg by mouth 2 (two) times daily as needed for Congestion.    Lactobacillus rhamnosus GG (CULTURELLE) 10 billion cell capsule Take 1 capsule by mouth once daily.    levalbuterol (XOPENEX) 0.63 mg/3 mL nebulizer solution Take 3 mLs (0.63 mg total) by nebulization every 4 (four) hours as needed for Wheezing or Shortness of Breath. Rescue    MELATONIN ORAL Take 3 mg by mouth nightly as needed (sleep).    metoprolol tartrate (LOPRESSOR) 25 MG tablet Take 12.5 mg by mouth 2 (two) times daily.    multivitamin (THERAGRAN) per tablet Take 1 tablet by mouth once daily.    pantoprazole (PROTONIX) 40 MG tablet Take 40 mg by mouth every morning.    polyethylene glycol (GLYCOLAX) 17 gram/dose powder Take 17 g by mouth daily as needed (CONSTIPATION).    potassium chloride (MICRO-K) 10 MEQ CpSR Take 10 mEq by mouth once daily.    tetrahydrozoline 0.05% (VISION CLEAR) 0.05 % Drop PLACE 1 DROP INTO AFFECTED EYE(S) AS NEEDED FOR REDNESS OR ITCHING    tiotropium bromide (SPIRIVA RESPIMAT) 2.5 mcg/actuation inhaler Inhale 2 puffs into the lungs Daily. Controller    triamcinolone (NASACORT) 55 mcg nasal inhaler 2 sprays by Nasal route once daily.    [DISCONTINUED] albuterol (PROAIR HFA) 90 mcg/actuation inhaler Inhale 1 puff into the lungs every 6 (six) hours as needed for Wheezing or Shortness of Breath. Rescue (Patient not taking: Reported on 3/16/2022)     [DISCONTINUED] baclofen (LIORESAL) 10 MG tablet TK 1 T PO TID     Family History       Problem Relation (Age of Onset)    Hypertension Mother          Tobacco Use    Smoking status: Former Smoker     Packs/day: 2.00     Years: 20.00     Pack years: 40.00     Types: Cigarettes     Quit date: 1980     Years since quittin.2    Smokeless tobacco: Never Used   Substance and Sexual Activity    Alcohol use: Yes     Alcohol/week: 2.0 standard drinks     Types: 2 Glasses of wine per week     Comment: social    Drug use: No    Sexual activity: Not Currently     Review of Systems   Constitutional:  Positive for activity change, appetite change and fatigue. Negative for chills and fever.   HENT:  Positive for hearing loss, sore throat, trouble swallowing and voice change. Negative for drooling.    Eyes:  Negative for photophobia and visual disturbance.   Respiratory:  Positive for cough. Negative for chest tightness, shortness of breath and wheezing.    Cardiovascular:  Negative for chest pain, palpitations and leg swelling.   Gastrointestinal:  Negative for abdominal distention, abdominal pain, blood in stool, diarrhea and nausea.   Genitourinary:  Negative for difficulty urinating, dysuria, enuresis, frequency, hematuria, pelvic pain and vaginal bleeding.   Musculoskeletal:  Positive for gait problem. Negative for arthralgias and back pain.   Skin:  Positive for color change (chronic BLEs). Negative for wound.   Neurological:  Positive for weakness. Negative for dizziness, facial asymmetry, light-headedness, numbness and headaches.   Psychiatric/Behavioral:  Negative for agitation, behavioral problems and confusion.    Objective:     Vital Signs (Most Recent):  Temp: 98.9 °F (37.2 °C) (22)  Pulse: 88 (22)  Resp: 16 (22)  BP: 118/80 (22)  SpO2: 95 % (22) Vital Signs (24h Range):  Temp:  [98.9 °F (37.2 °C)-99.3 °F (37.4 °C)] 98.9 °F (37.2 °C)  Pulse:   [] 88  Resp:  [14-22] 16  SpO2:  [93 %-100 %] 95 %  BP: (118-161)/(67-80) 118/80     Weight: 38.8 kg (85 lb 8 oz)  Body mass index is 16.7 kg/m².    Physical Exam  Vitals and nursing note reviewed.   Constitutional:       General: She is not in acute distress.     Appearance: She is well-developed.      Comments: Drowsy but awakes to verbal stimuli    HENT:      Head: Normocephalic and atraumatic.      Mouth/Throat:      Pharynx: Pharyngeal swelling present.     Eyes:      Extraocular Movements: Extraocular movements intact.      Conjunctiva/sclera: Conjunctivae normal.   Cardiovascular:      Rate and Rhythm: Normal rate and regular rhythm.      Heart sounds: Normal heart sounds.   Pulmonary:      Effort: Pulmonary effort is normal. No respiratory distress.      Breath sounds: Normal breath sounds. No wheezing.   Abdominal:      General: Bowel sounds are normal. There is no distension.      Palpations: Abdomen is soft.      Tenderness: There is no abdominal tenderness.   Musculoskeletal:         General: No tenderness. Normal range of motion.      Cervical back: Normal range of motion and neck supple.   Lymphadenopathy:      Cervical: No cervical adenopathy.   Skin:     General: Skin is warm and dry.      Capillary Refill: Capillary refill takes less than 2 seconds.      Findings: No rash.   Neurological:      General: No focal deficit present.      Mental Status: She is alert and oriented to person, place, and time. Mental status is at baseline.      Cranial Nerves: No cranial nerve deficit.      Sensory: No sensory deficit.      Coordination: Coordination normal.   Psychiatric:         Behavior: Behavior normal.         Thought Content: Thought content normal.         Judgment: Judgment normal.           Significant Labs: All pertinent labs within the past 24 hours have been reviewed.  CBC:   Recent Labs   Lab 04/09/22  1644   WBC 19.03*   HGB 11.3*   HCT 35.6*        CMP:   Recent Labs   Lab  04/09/22  1845      K 3.9   CL 97   CO2 31*   GLU 81   BUN 21   CREATININE 0.8   CALCIUM 9.4   PROT 6.3   ALBUMIN 2.5*   BILITOT 0.7   ALKPHOS 195*   AST 21   ALT 20   ANIONGAP 10   EGFRNONAA >60.0       Significant Imaging: I have reviewed all pertinent imaging results/findings within the past 24 hours.  X-Ray Chest AP Portable  Narrative: EXAMINATION:  XR CHEST AP PORTABLE    CLINICAL HISTORY:  Repeated falls    TECHNIQUE:  Single frontal view of the chest was performed.    COMPARISON:  03/20/2022    FINDINGS:  Cardiac silhouette is upper normal size.  Aortic atherosclerosis.    Cardiac pacemaker device on the left.    Lungs appear hyperexpanded with probable emphysematous changes.    Mild blunting of the costophrenic angles bilaterally could represent trace effusions.  Minimal interstitial and ground-glass changes at the lung bases could be associated with mild infiltrate, atelectasis or scarring.    Findings are similar to the prior study.    No mass or consolidation is detected.    No evidence of pneumothorax.  No acute osseous abnormality.  Impression: See above comments.    Electronically signed by: Kamran Faulkner  Date:    04/09/2022  Time:    18:17  CT Cervical Spine Without Contrast  Narrative: EXAMINATION:  CT CERVICAL SPINE WITHOUT CONTRAST    CLINICAL HISTORY:  Neck trauma (Age >= 65y);    TECHNIQUE:  Low dose axial CT images through the cervical spine, with sagittal and coronal reformations.  Contrast was not administered.    COMPARISON:  None    FINDINGS:  No acute fractures the cervical spine.  Incomplete fusion of the C1 ring posteriorly near the midline as an anatomic variant.    The odontoid process is intact.    Severe disc space narrowing at C3-4, C4-5, C5-6 and C6-7 with moderate narrowing elsewhere.    Grade 1 spondylolisthesis of C7 on T1.    Facet arthropathy bilaterally most prominent at C2-3 on the left, C3-4, C4-5 and C7-T1 on the right.    Mild diffuse posterior disc osteophyte  complex at multiple levels including C3-4, C4-5, C5-6, C6-7 and C7-T1.    Minimal central canal narrowing at C4-5 and C5-6.    Severe foraminal narrowing at C3-4 bilaterally right greater than left, C4-5 bilaterally, C5-6 bilaterally, C6-7 bilaterally left greater than right and C7-T1 on the right.    Limited evaluation of the intraspinal contents demonstrates no hematoma or mass.Paraspinal soft tissues exhibit no acute abnormalities.    Emphysematous changes at the lung apices with bleb formation on the right.  Impression: 1. No acute abnormality.  2. Multilevel chronic degenerative changes.    Electronically signed by: Kamran Faulkner  Date:    04/09/2022  Time:    18:07  CT Head Without Contrast  Narrative: EXAMINATION:  CT HEAD WITHOUT CONTRAST    CLINICAL HISTORY:  Head trauma, minor (Age >= 65y);    TECHNIQUE:  Low dose axial CT images obtained throughout the head without intravenous contrast. Sagittal and coronal reconstructions were performed.    COMPARISON:  03/20/2022    FINDINGS:  Intracranial compartment:    Ventricles and sulci are normal in size for age without evidence of hydrocephalus. No extra-axial blood or fluid collections.    Moderate to severe probable chronic microvascular ischemic changes in the periventricular and subcortical white matter.  Small remote lacunar infarct of the left caudate body.  No parenchymal mass, hemorrhage, edema or major vascular distribution infarct.    Skull/extracranial contents (limited evaluation): No fracture. Mastoid air cells and paranasal sinuses are essentially clear.    No significant change.  Impression: 1. No acute intracranial process.  2. Involutional changes with chronic microvascular ischemic changes and small remote lacunar infarct of the left caudate body.    Electronically signed by: Kamran Faulkner  Date:    04/09/2022  Time:    17:17      Assessment/Plan:     * Sepsis without acute organ dysfunction  - 3/4 SIRS for HR>90, RR>20, WBC>12  - lactate,  procal pending  - UA unremarkable  - CXR w/ possible mild infiltrate but no focal consolidation  - presentation concerning for peritonsillar abscess vs upper respiratory infection  - CT soft tissue neck ordered  - discussed w/ ENT-- recommend IV antibiotics for now and will follow up on imaging; may officially consult pending CT results   - dexa 10mg IV x1  - start IV unasyn 3g q12hr (renally dosed)  - follow blood and sputum cx  - gentle IVFs overnight    Chronic obstructive pulmonary disease with acute exacerbation  Acute on chronic respiratory failure with hypoxia  - worsening productive cough, increased O2 needs from home 2L NC, no wheezing  - possible infectious source?  - IV unasyn as above  - ABG pending  - continue daily symbicort, zyrtec, flonase  - levabuterol PRN    Elevated troponin  - Trop 0.107 (baseline)  - EKG without acute ST/T wave changes  - no reported CP  - baseline Tn but will trend until peak  - telemetry     Chronic diastolic congestive heart failure  - appears dry vs euvolemic  - BNP improved from last week  - home bumex recently changed to lasix 40mg then 20mg daily d/t concerns for dehydration  - will hold lasix for now but likely can resume in AM given recent hospitalization for vol overload  - I/Os, daily weights    Hypertension  - continue lopressor 12.5 mg BID    Stage 3 chronic kidney disease  - stable at baseline  - daily BMP    VTE Risk Mitigation (From admission, onward)         Ordered     heparin (porcine) injection 5,000 Units  Every 12 hours         04/09/22 2107     IP VTE HIGH RISK PATIENT  Once         04/09/22 2054                   Belia Gagnon PA-C  Department of Hospital Medicine   [facility] - Emergency Dept

## 2022-04-10 NOTE — SUBJECTIVE & OBJECTIVE
Past Medical History:   Diagnosis Date    Acute on chronic congestive heart failure 7/6/2019    Cardiomyopathy     Carotid artery occlusion     CHF (congestive heart failure)     COPD (chronic obstructive pulmonary disease)     Coronary artery disease     Hyperlipidemia     Hypertension        Past Surgical History:   Procedure Laterality Date    CARDIAC CATHETERIZATION      cardic cath      CAROTID ENDARTERECTOMY      FLEXIBLE BRONCHOSCOPY N/A 7/31/2019    Procedure: BRONCHOSCOPY, FIBEROPTIC Flexible;  Surgeon: Klever Mckeon MD;  [facility];  Service: Thoracic;  Laterality: N/A;    HIP SURGERY      PLEURODESIS USING TALC N/A 7/31/2019    Procedure: PLEURODESIS;  Surgeon: Klever Mckeon MD;  [facility];  Service: Thoracic;  Laterality: N/A;    PLEURODESIS USING TALC Right 8/5/2019    Procedure: PLEURODESIS, USING TALC;  Surgeon: Klever Mckeon MD;  [facility];  Service: Thoracic;  Laterality: Right;    PLEURODESIS WITH VIDEO-ASSISTED THORACOSCOPIC SURGERY (VATS) Right 8/5/2019    Procedure: VATS, WITH PLEURAL TENT;  Surgeon: Klever Mckeon MD;  Location: [facility] OR [address] [place] [address];  Service: Thoracic;  Laterality: Right;       Review of patient's allergies indicates:   Allergen Reactions    Ancef in dextrose (iso-osm) Rash    Cefazolin Rash     Tolerating Zosyn admission 3/2022    Cefuroxime Rash    Sulfamethoxazole-trimethoprim Rash     Other reaction(s): Rash       No current facility-administered medications on file prior to encounter.     Current Outpatient Medications on File Prior to Encounter   Medication Sig    acetaminophen (TYLENOL) 500 MG tablet Take 500 mg by mouth daily as needed (BACK PAIN).    aspirin 81 mg Tab Take 1 tablet by mouth once daily.     budesonide-formoterol 160-4.5 mcg (SYMBICORT) 160-4.5 mcg/actuation HFAA Take 2 puffs by mouth every 12 (twelve) hours.    bumetanide (BUMEX) 1 MG tablet Take 1 tablet (1 mg total) by mouth 2 (two)  times daily.    ferrous sulfate 325 (65 FE) MG EC tablet Take 1 tablet (325 mg total) by mouth once daily.    guaiFENesin (MUCINEX) 600 mg 12 hr tablet Take 600 mg by mouth 2 (two) times daily as needed for Congestion.    Lactobacillus rhamnosus GG (CULTURELLE) 10 billion cell capsule Take 1 capsule by mouth once daily.    levalbuterol (XOPENEX) 0.63 mg/3 mL nebulizer solution Take 3 mLs (0.63 mg total) by nebulization every 4 (four) hours as needed for Wheezing or Shortness of Breath. Rescue    MELATONIN ORAL Take 3 mg by mouth nightly as needed (sleep).    metoprolol tartrate (LOPRESSOR) 25 MG tablet Take 12.5 mg by mouth 2 (two) times daily.    multivitamin (THERAGRAN) per tablet Take 1 tablet by mouth once daily.    pantoprazole (PROTONIX) 40 MG tablet Take 40 mg by mouth every morning.    polyethylene glycol (GLYCOLAX) 17 gram/dose powder Take 17 g by mouth daily as needed (CONSTIPATION).    potassium chloride (MICRO-K) 10 MEQ CpSR Take 10 mEq by mouth once daily.    tetrahydrozoline 0.05% (VISION CLEAR) 0.05 % Drop PLACE 1 DROP INTO AFFECTED EYE(S) AS NEEDED FOR REDNESS OR ITCHING    tiotropium bromide (SPIRIVA RESPIMAT) 2.5 mcg/actuation inhaler Inhale 2 puffs into the lungs Daily. Controller    triamcinolone (NASACORT) 55 mcg nasal inhaler 2 sprays by Nasal route once daily.    [DISCONTINUED] albuterol (PROAIR HFA) 90 mcg/actuation inhaler Inhale 1 puff into the lungs every 6 (six) hours as needed for Wheezing or Shortness of Breath. Rescue (Patient not taking: Reported on 3/16/2022)    [DISCONTINUED] baclofen (LIORESAL) 10 MG tablet TK 1 T PO TID     Family History       Problem Relation (Age of Onset)    Hypertension Mother          Tobacco Use    Smoking status: Former Smoker     Packs/day: 2.00     Years: 20.00     Pack years: 40.00     Types: Cigarettes     Quit date: 1980     Years since quittin.2    Smokeless tobacco: Never Used   Substance and Sexual Activity    Alcohol use: Yes      Alcohol/week: 2.0 standard drinks     Types: 2 Glasses of wine per week     Comment: social    Drug use: No    Sexual activity: Not Currently     Review of Systems   Constitutional:  Positive for activity change, appetite change and fatigue. Negative for chills and fever.   HENT:  Positive for hearing loss, sore throat, trouble swallowing and voice change. Negative for drooling.    Eyes:  Negative for photophobia and visual disturbance.   Respiratory:  Positive for cough. Negative for chest tightness, shortness of breath and wheezing.    Cardiovascular:  Negative for chest pain, palpitations and leg swelling.   Gastrointestinal:  Negative for abdominal distention, abdominal pain, blood in stool, diarrhea and nausea.   Genitourinary:  Negative for difficulty urinating, dysuria, enuresis, frequency, hematuria, pelvic pain and vaginal bleeding.   Musculoskeletal:  Positive for gait problem. Negative for arthralgias and back pain.   Skin:  Positive for color change (chronic BLEs). Negative for wound.   Neurological:  Positive for weakness. Negative for dizziness, facial asymmetry, light-headedness, numbness and headaches.   Psychiatric/Behavioral:  Negative for agitation, behavioral problems and confusion.    Objective:     Vital Signs (Most Recent):  Temp: 98.9 °F (37.2 °C) (04/09/22 2117)  Pulse: 88 (04/09/22 2117)  Resp: 16 (04/09/22 2117)  BP: 118/80 (04/09/22 2118)  SpO2: 95 % (04/09/22 2118) Vital Signs (24h Range):  Temp:  [98.9 °F (37.2 °C)-99.3 °F (37.4 °C)] 98.9 °F (37.2 °C)  Pulse:  [] 88  Resp:  [14-22] 16  SpO2:  [93 %-100 %] 95 %  BP: (118-161)/(67-80) 118/80     Weight: 38.8 kg (85 lb 8 oz)  Body mass index is 16.7 kg/m².    Physical Exam  Vitals and nursing note reviewed.   Constitutional:       General: She is not in acute distress.     Appearance: She is well-developed.      Comments: Drowsy but awakes to verbal stimuli    HENT:      Head: Normocephalic and atraumatic.      Mouth/Throat:       Pharynx: Pharyngeal swelling present.     Eyes:      Extraocular Movements: Extraocular movements intact.      Conjunctiva/sclera: Conjunctivae normal.   Cardiovascular:      Rate and Rhythm: Normal rate and regular rhythm.      Heart sounds: Normal heart sounds.   Pulmonary:      Effort: Pulmonary effort is normal. No respiratory distress.      Breath sounds: Normal breath sounds. No wheezing.   Abdominal:      General: Bowel sounds are normal. There is no distension.      Palpations: Abdomen is soft.      Tenderness: There is no abdominal tenderness.   Musculoskeletal:         General: No tenderness. Normal range of motion.      Cervical back: Normal range of motion and neck supple.   Lymphadenopathy:      Cervical: No cervical adenopathy.   Skin:     General: Skin is warm and dry.      Capillary Refill: Capillary refill takes less than 2 seconds.      Findings: No rash.   Neurological:      General: No focal deficit present.      Mental Status: She is alert and oriented to person, place, and time. Mental status is at baseline.      Cranial Nerves: No cranial nerve deficit.      Sensory: No sensory deficit.      Coordination: Coordination normal.   Psychiatric:         Behavior: Behavior normal.         Thought Content: Thought content normal.         Judgment: Judgment normal.           Significant Labs: All pertinent labs within the past 24 hours have been reviewed.  CBC:   Recent Labs   Lab 04/09/22  1644   WBC 19.03*   HGB 11.3*   HCT 35.6*        CMP:   Recent Labs   Lab 04/09/22  1845      K 3.9   CL 97   CO2 31*   GLU 81   BUN 21   CREATININE 0.8   CALCIUM 9.4   PROT 6.3   ALBUMIN 2.5*   BILITOT 0.7   ALKPHOS 195*   AST 21   ALT 20   ANIONGAP 10   EGFRNONAA >60.0       Significant Imaging: I have reviewed all pertinent imaging results/findings within the past 24 hours.  X-Ray Chest AP Portable  Narrative: EXAMINATION:  XR CHEST AP PORTABLE    CLINICAL HISTORY:  Repeated  falls    TECHNIQUE:  Single frontal view of the chest was performed.    COMPARISON:  03/20/2022    FINDINGS:  Cardiac silhouette is upper normal size.  Aortic atherosclerosis.    Cardiac pacemaker device on the left.    Lungs appear hyperexpanded with probable emphysematous changes.    Mild blunting of the costophrenic angles bilaterally could represent trace effusions.  Minimal interstitial and ground-glass changes at the lung bases could be associated with mild infiltrate, atelectasis or scarring.    Findings are similar to the prior study.    No mass or consolidation is detected.    No evidence of pneumothorax.  No acute osseous abnormality.  Impression: See above comments.    Electronically signed by: Kamran Faulkner  Date:    04/09/2022  Time:    18:17  CT Cervical Spine Without Contrast  Narrative: EXAMINATION:  CT CERVICAL SPINE WITHOUT CONTRAST    CLINICAL HISTORY:  Neck trauma (Age >= 65y);    TECHNIQUE:  Low dose axial CT images through the cervical spine, with sagittal and coronal reformations.  Contrast was not administered.    COMPARISON:  None    FINDINGS:  No acute fractures the cervical spine.  Incomplete fusion of the C1 ring posteriorly near the midline as an anatomic variant.    The odontoid process is intact.    Severe disc space narrowing at C3-4, C4-5, C5-6 and C6-7 with moderate narrowing elsewhere.    Grade 1 spondylolisthesis of C7 on T1.    Facet arthropathy bilaterally most prominent at C2-3 on the left, C3-4, C4-5 and C7-T1 on the right.    Mild diffuse posterior disc osteophyte complex at multiple levels including C3-4, C4-5, C5-6, C6-7 and C7-T1.    Minimal central canal narrowing at C4-5 and C5-6.    Severe foraminal narrowing at C3-4 bilaterally right greater than left, C4-5 bilaterally, C5-6 bilaterally, C6-7 bilaterally left greater than right and C7-T1 on the right.    Limited evaluation of the intraspinal contents demonstrates no hematoma or mass.Paraspinal soft tissues exhibit no  acute abnormalities.    Emphysematous changes at the lung apices with bleb formation on the right.  Impression: 1. No acute abnormality.  2. Multilevel chronic degenerative changes.    Electronically signed by: Kamran Faulkner  Date:    04/09/2022  Time:    18:07  CT Head Without Contrast  Narrative: EXAMINATION:  CT HEAD WITHOUT CONTRAST    CLINICAL HISTORY:  Head trauma, minor (Age >= 65y);    TECHNIQUE:  Low dose axial CT images obtained throughout the head without intravenous contrast. Sagittal and coronal reconstructions were performed.    COMPARISON:  03/20/2022    FINDINGS:  Intracranial compartment:    Ventricles and sulci are normal in size for age without evidence of hydrocephalus. No extra-axial blood or fluid collections.    Moderate to severe probable chronic microvascular ischemic changes in the periventricular and subcortical white matter.  Small remote lacunar infarct of the left caudate body.  No parenchymal mass, hemorrhage, edema or major vascular distribution infarct.    Skull/extracranial contents (limited evaluation): No fracture. Mastoid air cells and paranasal sinuses are essentially clear.    No significant change.  Impression: 1. No acute intracranial process.  2. Involutional changes with chronic microvascular ischemic changes and small remote lacunar infarct of the left caudate body.    Electronically signed by: Kamran Faulkner  Date:    04/09/2022  Time:    17:17

## 2022-04-10 NOTE — ASSESSMENT & PLAN NOTE
- 3/4 SIRS for HR>90, RR>20, WBC>12  - lactate, procal pending  - UA unremarkable  - CXR w/ possible mild infiltrate but no focal consolidation  - presentation concerning for peritonsillar abscess vs upper respiratory infection  - CT soft tissue neck ordered  - discussed w/ ENT-- recommend IV antibiotics for now and will follow up on imaging; may officially consult pending CT results   - dexa 10mg IV x1  - start IV unasyn 3g q12hr (renally dosed)  - follow blood and sputum cx  - gentle IVFs overnight

## 2022-04-10 NOTE — ASSESSMENT & PLAN NOTE
· Chronic issue  · Continue Breo - Symbicort at home  · Continue Spiriva  · Continue Zyrtec  · Start Flonase

## 2022-04-10 NOTE — ASSESSMENT & PLAN NOTE
- Trop 0.107 (baseline)  - EKG without acute ST/T wave changes  - no reported CP  - baseline Tn but will trend until peak  - telemetry

## 2022-04-10 NOTE — ASSESSMENT & PLAN NOTE
· Appears dry vs euvolemic  · BNP improved from last week  · Home bumex recently changed to lasix 40mg then 20mg daily d/t concerns for dehydration  · Will hold lasix for now but likely can resume in AM given recent hospitalization for vol overload

## 2022-04-10 NOTE — ASSESSMENT & PLAN NOTE
· Trop 0.100 on admit, trended up to 0.200  · Will check with morning labs  · No reports of chest pain, but has been coughing  · Continue Aspirin

## 2022-04-10 NOTE — AI DETERIORATION ALERT
RAPID RESPONSE NURSE AI ALERT       AI alert received.    Chart Reviewed: 04/10/2022, 4:03 AM    MRN: [id number]  Bed: 7084/7084 A    Dx: Sepsis without acute organ dysfunction    Venus Mayer has a past medical history of Acute on chronic congestive heart failure, Cardiomyopathy, Carotid artery occlusion, CHF (congestive heart failure), COPD (chronic obstructive pulmonary disease), Coronary artery disease, Hyperlipidemia, and Hypertension.    Last VS: BP (!) 113/57 (BP Location: Left arm, Patient Position: Lying)   Pulse 78   Temp 98.9 °F (37.2 °C) (Oral)   Resp 16   Ht 5' (1.524 m)   Wt 41 kg (90 lb 6.2 oz)   LMP  (LMP Unknown)   SpO2 (!) 93%   Breastfeeding No   BMI 17.65 kg/m²     24H Vital Sign Range:  Temp:  [98.9 °F (37.2 °C)-99.3 °F (37.4 °C)]   Pulse:  []   Resp:  [14-22]   BP: (113-161)/(57-80)   SpO2:  [93 %-100 %]     Level of Consciousness (AVPU): alert (AOX3)    Recent Labs     04/09/22  1644   WBC 19.03*   HGB 11.3*   HCT 35.6*          Recent Labs     04/09/22  1845      K 3.9   CL 97   CO2 31*   CREATININE 0.8   GLU 81        No results for input(s): PH, PCO2, PO2, HCO3, POCSATURATED, BE in the last 72 hours.     OXYGEN:  Flow (L/min): 3     O2 Device (Oxygen Therapy): nasal cannula    MEWS score: 1    Charge RNMiri contacted. No concerns verbalized at this time. Instructed to call [phone] for further concerns or assistance.    Susana Jones RN

## 2022-04-10 NOTE — ED NOTES
Telemetry Verification   Patient placed on Telemetry Box  Verified with War Room  Box # [id number]   Monitor Tech    Rate 76   Rhythm NSR

## 2022-04-10 NOTE — ED NOTES
Pt resting comfortably, side rails up x2, call light within reach. Pt daughter updated on poc, no further questions at this time.

## 2022-04-10 NOTE — PROGRESS NOTES
"[facility] - Telemetry Stepdown ([place] [address])  [facility] Hospital Medicine  Progress Note    Patient Name: Venus Mayer  MRN: [id number]  Patient Class: OP- Observation   Admission Date: 4/9/2022  Length of Stay: 0 days  Attending Physician: Danyelle Claros MD  Primary Care Provider: Jona Che MD        Subjective:     Principal Problem:Sepsis without acute organ dysfunction        HPI:  Venus Mayer is a 89 y.o. female with a PMHx of cardiomyopathy, CHF, pacemaker in place, HLD, HTN, COPD on home 2L NC who presents to [facility] after a fall at home earlier today. Daughter at bedside provides majority of history. Patient was in the bathroom when she called for caretaker's assistance (who was right outside the door) and found patient falling forward upon walking in. Caretaker attempted to catch her but both fell to the ground. Patient hit her forehead on the ground but never loss consciousness. Daughter states that patient's heart rate has been elevated to 100-110s and her blood pressure has been borderline low at home recently so she's unsure if this caused her to get lightheaded upon standing from the toilet earlier today. Patient also noted to have copious amounts of secretions, wet cough, sore throat, ear fullness, poor appetite and decreased PO intake recently. She was seen by outpatient ENT for symptoms and was started on zyrtec, mucinex and Flonase for sinus congestion/ presumed viral URI. Daughter noticed that patient voice sounds "raspy" and it seems as if she is either having difficulty swallowing or avoids foods due to possible pain with swallowing. No difficulty handling secretions, drooling, tripoding, or fever reported.      ED: tachycardic to  and tachypneic to RR 22. Satting 100% on 3L NC. Leukocytosis of WBC 19.03 with left shift. CT head/ C-spine without acute findings. CXR shows minimal interstitial and ground-glass changes at the lung bases could be associated with mild infiltrate, atelectasis or " scarring. Given 500cc NS.       Overview/Hospital Course:  Ms. Mayer was admitted to Hospital Medicine for management of sepsis 2/2 likely pneumonia.  She was started on Unasyn on admit based on possible ENT etiology, and changed to Zosyn.      Interval History: No acute events overnight.  Daughter at bedside today.  Discussed changing Unasyn to Zosyn.  She reports persistent fatigue, but seems to be better today than yesterday.  She has concerns about RLE swelling.  Requests Flonase for post nasal drip. Coughing a lot.    Review of Systems   Constitutional:  Negative for chills, fatigue and fever.   HENT:  Positive for congestion and postnasal drip.    Respiratory:  Positive for cough. Negative for shortness of breath.    Cardiovascular:  Negative for chest pain, palpitations and leg swelling (Right).   Gastrointestinal:  Negative for abdominal pain, diarrhea, nausea and vomiting.   Genitourinary:  Negative for dysuria and urgency.   Allergic/Immunologic: Positive for environmental allergies.   Neurological:  Negative for dizziness and headaches.   All other systems reviewed and are negative.  Objective:     Vital Signs (Most Recent):  Temp: 96.4 °F (35.8 °C) (04/10/22 1128)  Pulse: 70 (04/10/22 1128)  Resp: 20 (04/10/22 1128)  BP: (!) 113/59 (04/10/22 1128)  SpO2: (!) 94 % (04/10/22 1128)   Vital Signs (24h Range):  Temp:  [96.4 °F (35.8 °C)-99.3 °F (37.4 °C)] 96.4 °F (35.8 °C)  Pulse:  [] 70  Resp:  [14-22] 20  SpO2:  [93 %-100 %] 94 %  BP: (113-161)/(57-80) 113/59     Weight: 41 kg (90 lb 6.2 oz)  Body mass index is 17.65 kg/m².    Intake/Output Summary (Last 24 hours) at 4/10/2022 1145  Last data filed at 4/10/2022 0215  Gross per 24 hour   Intake 30 ml   Output --   Net 30 ml      Physical Exam  Constitutional:       General: She is not in acute distress.     Appearance: Normal appearance. She is well-developed. She is not ill-appearing or toxic-appearing.   HENT:      Head: Normocephalic and atraumatic.    Cardiovascular:      Rate and Rhythm: Normal rate and regular rhythm.      Heart sounds: No murmur heard.  Pulmonary:      Effort: Pulmonary effort is normal. No respiratory distress.      Breath sounds: Normal breath sounds. No wheezing or rales.      Comments: Comfortable on 2L NC  Abdominal:      General: There is no distension.      Palpations: Abdomen is soft.      Tenderness: There is no abdominal tenderness.   Musculoskeletal:         General: No deformity.      Right lower leg: Edema present.   Skin:     General: Skin is warm.      Findings: Erythema (mild over left shin) present.   Neurological:      General: No focal deficit present.      Mental Status: Mental status is at baseline.       Significant Labs: All pertinent labs within the past 24 hours have been reviewed.  CBC:   Recent Labs   Lab 04/09/22  1644 04/10/22  0525   WBC 19.03* 16.32*   HGB 11.3* 11.2*   HCT 35.6* 36.8*    206     CMP:   Recent Labs   Lab 04/09/22  1845 04/10/22  0525    141   K 3.9 4.3   CL 97 99   CO2 31* 27   GLU 81 67*   BUN 21 26   CREATININE 0.8 1.0   CALCIUM 9.4 9.6   PROT 6.3  --    ALBUMIN 2.5*  --    BILITOT 0.7  --    ALKPHOS 195*  --    AST 21  --    ALT 20  --    ANIONGAP 10 15   EGFRNONAA >60.0 48.7*       Significant Imaging: I have reviewed all pertinent imaging results/findings within the past 24 hours.      Assessment/Plan:      * Sepsis without acute organ dysfunction  · Source unclear, possibly viral URI vs lung - opacities on CT stable  · WBC trended down from 19 to 16  · Change Unasyn to Zosyn for better lung coverage    Acute on chronic respiratory failure with hypoxia  · Chronic and stable  · On 2L NC at home    Chronic obstructive pulmonary disease with acute exacerbation  · Chronic issue  · Continue Breo - Symbicort at home  · Continue Spiriva  · Continue Zyrtec  · Start Flonase    Swelling of lower extremity  · Reports RLE swelling  · Check US to r/o DVT      Body mass index (BMI) less  than 19  · Body mass index is 17.65 kg/m².  · Encourage PO intake  · Boost TID      Chronic diastolic congestive heart failure  · Appears dry vs euvolemic  · BNP improved from last week  · Home bumex recently changed to lasix 40mg then 20mg daily d/t concerns for dehydration  · Will hold lasix for now but likely can resume in AM given recent hospitalization for vol overload    Elevated troponin  · Trop 0.100 on admit, trended up to 0.200  · Will check with morning labs  · No reports of chest pain, but has been coughing  · Continue Aspirin    Stage 3 chronic kidney disease  · Stable at baseline  · Daily BMP    Hypertension  · Chronic issue  · Continue lopressor 12.5 mg BID      VTE Risk Mitigation (From admission, onward)         Ordered     heparin (porcine) injection 5,000 Units  Every 12 hours         04/09/22 2107     IP VTE HIGH RISK PATIENT  Once         04/09/22 2054                Discharge Planning   TRISTON: 4/11/2022     Code Status: DNR   Is the patient medically ready for discharge?: No    Reason for patient still in hospital (select all that apply): Patient trending condition                     Danyelle Claros MD  Department of Hospital Medicine   [facility] - Telemetry Stepdown ([place])

## 2022-04-10 NOTE — ASSESSMENT & PLAN NOTE
· Source unclear, possibly viral URI vs lung - opacities on CT stable  · WBC trended down from 19 to 16  · Change Unasyn to Zosyn for better lung coverage

## 2022-04-10 NOTE — SUBJECTIVE & OBJECTIVE
Interval History: No acute events overnight.  Daughter at bedside today.  Discussed changing Unasyn to Zosyn.  She reports persistent fatigue, but seems to be better today than yesterday.  She has concerns about RLE swelling.  Requests Flonase for post nasal drip. Coughing a lot.    Review of Systems   Constitutional:  Negative for chills, fatigue and fever.   HENT:  Positive for congestion and postnasal drip.    Respiratory:  Positive for cough. Negative for shortness of breath.    Cardiovascular:  Negative for chest pain, palpitations and leg swelling (Right).   Gastrointestinal:  Negative for abdominal pain, diarrhea, nausea and vomiting.   Genitourinary:  Negative for dysuria and urgency.   Allergic/Immunologic: Positive for environmental allergies.   Neurological:  Negative for dizziness and headaches.   All other systems reviewed and are negative.  Objective:     Vital Signs (Most Recent):  Temp: 96.4 °F (35.8 °C) (04/10/22 1128)  Pulse: 70 (04/10/22 1128)  Resp: 20 (04/10/22 1128)  BP: (!) 113/59 (04/10/22 1128)  SpO2: (!) 94 % (04/10/22 1128)   Vital Signs (24h Range):  Temp:  [96.4 °F (35.8 °C)-99.3 °F (37.4 °C)] 96.4 °F (35.8 °C)  Pulse:  [] 70  Resp:  [14-22] 20  SpO2:  [93 %-100 %] 94 %  BP: (113-161)/(57-80) 113/59     Weight: 41 kg (90 lb 6.2 oz)  Body mass index is 17.65 kg/m².    Intake/Output Summary (Last 24 hours) at 4/10/2022 1145  Last data filed at 4/10/2022 0215  Gross per 24 hour   Intake 30 ml   Output --   Net 30 ml      Physical Exam  Constitutional:       General: She is not in acute distress.     Appearance: Normal appearance. She is well-developed. She is not ill-appearing or toxic-appearing.   HENT:      Head: Normocephalic and atraumatic.   Cardiovascular:      Rate and Rhythm: Normal rate and regular rhythm.      Heart sounds: No murmur heard.  Pulmonary:      Effort: Pulmonary effort is normal. No respiratory distress.      Breath sounds: Normal breath sounds. No wheezing or  rales.      Comments: Comfortable on 2L NC  Abdominal:      General: There is no distension.      Palpations: Abdomen is soft.      Tenderness: There is no abdominal tenderness.   Musculoskeletal:         General: No deformity.      Right lower leg: Edema present.   Skin:     General: Skin is warm.      Findings: Erythema (mild over left shin) present.   Neurological:      General: No focal deficit present.      Mental Status: Mental status is at baseline.       Significant Labs: All pertinent labs within the past 24 hours have been reviewed.  CBC:   Recent Labs   Lab 04/09/22  1644 04/10/22  0525   WBC 19.03* 16.32*   HGB 11.3* 11.2*   HCT 35.6* 36.8*    206     CMP:   Recent Labs   Lab 04/09/22  1845 04/10/22  0525    141   K 3.9 4.3   CL 97 99   CO2 31* 27   GLU 81 67*   BUN 21 26   CREATININE 0.8 1.0   CALCIUM 9.4 9.6   PROT 6.3  --    ALBUMIN 2.5*  --    BILITOT 0.7  --    ALKPHOS 195*  --    AST 21  --    ALT 20  --    ANIONGAP 10 15   EGFRNONAA >60.0 48.7*       Significant Imaging: I have reviewed all pertinent imaging results/findings within the past 24 hours.

## 2022-04-11 VITALS
OXYGEN SATURATION: 99 % | WEIGHT: 90.38 LBS | RESPIRATION RATE: 18 BRPM | SYSTOLIC BLOOD PRESSURE: 117 MMHG | HEART RATE: 75 BPM | BODY MASS INDEX: 17.75 KG/M2 | TEMPERATURE: 98 F | HEIGHT: 60 IN | DIASTOLIC BLOOD PRESSURE: 57 MMHG

## 2022-04-11 LAB
ANION GAP SERPL CALC-SCNC: 10 MMOL/L (ref 8–16)
BASOPHILS # BLD AUTO: 0.03 K/UL (ref 0–0.2)
BASOPHILS NFR BLD: 0.2 % (ref 0–1.9)
BUN SERPL-MCNC: 33 MG/DL (ref 10–30)
CALCIUM SERPL-MCNC: 9.7 MG/DL (ref 8.7–10.5)
CHLORIDE SERPL-SCNC: 99 MMOL/L (ref 95–110)
CO2 SERPL-SCNC: 31 MMOL/L (ref 23–29)
CREAT SERPL-MCNC: 1.1 MG/DL (ref 0.5–1.4)
DIFFERENTIAL METHOD: ABNORMAL
EOSINOPHIL # BLD AUTO: 0 K/UL (ref 0–0.5)
EOSINOPHIL NFR BLD: 0.2 % (ref 0–8)
ERYTHROCYTE [DISTWIDTH] IN BLOOD BY AUTOMATED COUNT: 15.7 % (ref 11.5–14.5)
EST. GFR  (AFRICAN AMERICAN): 50 ML/MIN/1.73 M^2
EST. GFR  (NON AFRICAN AMERICAN): 43.4 ML/MIN/1.73 M^2
GLUCOSE SERPL-MCNC: 108 MG/DL (ref 70–110)
HCT VFR BLD AUTO: 32.4 % (ref 37–48.5)
HGB BLD-MCNC: 10.2 G/DL (ref 12–16)
IMM GRANULOCYTES # BLD AUTO: 0.14 K/UL (ref 0–0.04)
IMM GRANULOCYTES NFR BLD AUTO: 1.2 % (ref 0–0.5)
LYMPHOCYTES # BLD AUTO: 0.9 K/UL (ref 1–4.8)
LYMPHOCYTES NFR BLD: 7.6 % (ref 18–48)
MAGNESIUM SERPL-MCNC: 2.2 MG/DL (ref 1.6–2.6)
MCH RBC QN AUTO: 31.6 PG (ref 27–31)
MCHC RBC AUTO-ENTMCNC: 31.5 G/DL (ref 32–36)
MCV RBC AUTO: 100 FL (ref 82–98)
MONOCYTES # BLD AUTO: 1.1 K/UL (ref 0.3–1)
MONOCYTES NFR BLD: 9.2 % (ref 4–15)
NEUTROPHILS # BLD AUTO: 9.8 K/UL (ref 1.8–7.7)
NEUTROPHILS NFR BLD: 81.6 % (ref 38–73)
NRBC BLD-RTO: 0 /100 WBC
PLATELET # BLD AUTO: 210 K/UL (ref 150–450)
PMV BLD AUTO: 10.4 FL (ref 9.2–12.9)
POTASSIUM SERPL-SCNC: 3.9 MMOL/L (ref 3.5–5.1)
RBC # BLD AUTO: 3.23 M/UL (ref 4–5.4)
SODIUM SERPL-SCNC: 140 MMOL/L (ref 136–145)
TROPONIN I SERPL DL<=0.01 NG/ML-MCNC: 0.14 NG/ML (ref 0–0.03)
WBC # BLD AUTO: 12.02 K/UL (ref 3.9–12.7)

## 2022-04-11 PROCEDURE — 99217 PR OBSERVATION CARE DISCHARGE: ICD-10-PCS | Mod: ,,, | Performed by: HOSPITALIST

## 2022-04-11 PROCEDURE — 25000003 PHARM REV CODE 250: Performed by: PHYSICIAN ASSISTANT

## 2022-04-11 PROCEDURE — G0378 HOSPITAL OBSERVATION PER HR: HCPCS

## 2022-04-11 PROCEDURE — 96372 THER/PROPH/DIAG INJ SC/IM: CPT | Performed by: PHYSICIAN ASSISTANT

## 2022-04-11 PROCEDURE — 25000003 PHARM REV CODE 250: Performed by: HOSPITALIST

## 2022-04-11 PROCEDURE — 25000242 PHARM REV CODE 250 ALT 637 W/ HCPCS: Performed by: HOSPITALIST

## 2022-04-11 PROCEDURE — 27000221 HC OXYGEN, UP TO 24 HOURS

## 2022-04-11 PROCEDURE — 83735 ASSAY OF MAGNESIUM: CPT | Performed by: PHYSICIAN ASSISTANT

## 2022-04-11 PROCEDURE — 99900035 HC TECH TIME PER 15 MIN (STAT)

## 2022-04-11 PROCEDURE — 85025 COMPLETE CBC W/AUTO DIFF WBC: CPT | Performed by: PHYSICIAN ASSISTANT

## 2022-04-11 PROCEDURE — 36415 COLL VENOUS BLD VENIPUNCTURE: CPT | Performed by: PHYSICIAN ASSISTANT

## 2022-04-11 PROCEDURE — 94761 N-INVAS EAR/PLS OXIMETRY MLT: CPT

## 2022-04-11 PROCEDURE — 99217 PR OBSERVATION CARE DISCHARGE: CPT | Mod: ,,, | Performed by: HOSPITALIST

## 2022-04-11 PROCEDURE — 80048 BASIC METABOLIC PNL TOTAL CA: CPT | Performed by: PHYSICIAN ASSISTANT

## 2022-04-11 PROCEDURE — 84484 ASSAY OF TROPONIN QUANT: CPT | Performed by: HOSPITALIST

## 2022-04-11 PROCEDURE — 94640 AIRWAY INHALATION TREATMENT: CPT

## 2022-04-11 PROCEDURE — 63600175 PHARM REV CODE 636 W HCPCS: Performed by: PHYSICIAN ASSISTANT

## 2022-04-11 RX ORDER — BUMETANIDE 1 MG/1
1 TABLET ORAL 2 TIMES DAILY
Status: DISCONTINUED | OUTPATIENT
Start: 2022-04-11 | End: 2022-04-11

## 2022-04-11 RX ORDER — HYDROXYZINE HYDROCHLORIDE 25 MG/1
25 TABLET, FILM COATED ORAL ONCE AS NEEDED
Status: DISCONTINUED | OUTPATIENT
Start: 2022-04-11 | End: 2022-04-11 | Stop reason: HOSPADM

## 2022-04-11 RX ORDER — LEVOFLOXACIN 750 MG/1
750 TABLET ORAL
Qty: 3 TABLET | Refills: 0 | Status: SHIPPED | OUTPATIENT
Start: 2022-04-11 | End: 2022-04-17

## 2022-04-11 RX ORDER — LEVOFLOXACIN 750 MG/1
750 TABLET ORAL
Status: DISCONTINUED | OUTPATIENT
Start: 2022-04-11 | End: 2022-04-11 | Stop reason: HOSPADM

## 2022-04-11 RX ADMIN — POTASSIUM CHLORIDE 10 MEQ: 10 CAPSULE, COATED, EXTENDED RELEASE ORAL at 09:04

## 2022-04-11 RX ADMIN — TIOTROPIUM BROMIDE INHALATION SPRAY 2 PUFF: 3.12 SPRAY, METERED RESPIRATORY (INHALATION) at 08:04

## 2022-04-11 RX ADMIN — FLUTICASONE PROPIONATE 100 MCG: 50 SPRAY, METERED NASAL at 09:04

## 2022-04-11 RX ADMIN — FLUTICASONE FUROATE AND VILANTEROL TRIFENATATE 1 PUFF: 100; 25 POWDER RESPIRATORY (INHALATION) at 08:04

## 2022-04-11 RX ADMIN — Medication 6 MG: at 01:04

## 2022-04-11 RX ADMIN — GUAIFENESIN 600 MG: 600 TABLET, EXTENDED RELEASE ORAL at 09:04

## 2022-04-11 RX ADMIN — ASPIRIN 81 MG CHEWABLE TABLET 81 MG: 81 TABLET CHEWABLE at 09:04

## 2022-04-11 RX ADMIN — HEPARIN SODIUM 5000 UNITS: 5000 INJECTION INTRAVENOUS; SUBCUTANEOUS at 09:04

## 2022-04-11 RX ADMIN — METOPROLOL TARTRATE 12.5 MG: 25 TABLET, FILM COATED ORAL at 09:04

## 2022-04-11 RX ADMIN — LEVOFLOXACIN 750 MG: 750 TABLET, FILM COATED ORAL at 09:04

## 2022-04-11 RX ADMIN — PANTOPRAZOLE SODIUM 40 MG: 40 TABLET, DELAYED RELEASE ORAL at 09:04

## 2022-04-11 NOTE — SUBJECTIVE & OBJECTIVE
Interval History: No acute events overnight.  WBC normalized.  More alert today.  Still with occasional cough.  Wants to go home.  Discussed on the phone with Bri (daughter) from yesterday.  Discussed likely viral given negative imaging, or possibly allergies and post nasal drip but will send home on Levaquin to be safe.    Review of Systems   Constitutional:  Negative for chills, fatigue and fever.   HENT:  Positive for congestion and postnasal drip.    Respiratory:  Positive for cough. Negative for shortness of breath.    Cardiovascular:  Negative for chest pain, palpitations and leg swelling (Right).   Gastrointestinal:  Negative for abdominal pain, diarrhea, nausea and vomiting.   Genitourinary:  Negative for dysuria and urgency.   Allergic/Immunologic: Positive for environmental allergies.   Neurological:  Negative for dizziness and headaches.   All other systems reviewed and are negative.  Objective:     Vital Signs (Most Recent):  Temp: 97.5 °F (36.4 °C) (04/11/22 0640)  Pulse: 78 (04/11/22 0909)  Resp: 18 (04/11/22 0823)  BP: (!) 117/57 (04/11/22 0909)  SpO2: 99 % (04/11/22 0823)   Vital Signs (24h Range):  Temp:  [96.4 °F (35.8 °C)-97.5 °F (36.4 °C)] 97.5 °F (36.4 °C)  Pulse:  [69-87] 78  Resp:  [16-20] 18  SpO2:  [93 %-99 %] 99 %  BP: (113-125)/(57-60) 117/57     Weight: 41 kg (90 lb 6.2 oz)  Body mass index is 17.65 kg/m².    Intake/Output Summary (Last 24 hours) at 4/11/2022 1126  Last data filed at 4/10/2022 2100  Gross per 24 hour   Intake 120 ml   Output --   Net 120 ml        Physical Exam  Constitutional:       General: She is not in acute distress.     Appearance: Normal appearance. She is well-developed. She is not ill-appearing or toxic-appearing.   HENT:      Head: Normocephalic and atraumatic.   Cardiovascular:      Rate and Rhythm: Normal rate and regular rhythm.      Heart sounds: No murmur heard.  Pulmonary:      Effort: Pulmonary effort is normal. No respiratory distress.      Breath  sounds: Normal breath sounds. No wheezing or rales.      Comments: Comfortable on 2L NC  Abdominal:      General: There is no distension.      Palpations: Abdomen is soft.      Tenderness: There is no abdominal tenderness.   Musculoskeletal:         General: No deformity.      Right lower leg: Edema present.   Skin:     General: Skin is warm.      Findings: Erythema (mild over left shin) present.   Neurological:      General: No focal deficit present.      Mental Status: Mental status is at baseline.       Significant Labs: All pertinent labs within the past 24 hours have been reviewed.  CBC:   Recent Labs   Lab 04/09/22  1644 04/10/22  0525 04/11/22  0500   WBC 19.03* 16.32* 12.02   HGB 11.3* 11.2* 10.2*   HCT 35.6* 36.8* 32.4*    206 210       CMP:   Recent Labs   Lab 04/09/22  1845 04/10/22  0525 04/11/22  0500    141 140   K 3.9 4.3 3.9   CL 97 99 99   CO2 31* 27 31*   GLU 81 67* 108   BUN 21 26 33*   CREATININE 0.8 1.0 1.1   CALCIUM 9.4 9.6 9.7   PROT 6.3  --   --    ALBUMIN 2.5*  --   --    BILITOT 0.7  --   --    ALKPHOS 195*  --   --    AST 21  --   --    ALT 20  --   --    ANIONGAP 10 15 10   EGFRNONAA >60.0 48.7* 43.4*         Significant Imaging: I have reviewed all pertinent imaging results/findings within the past 24 hours.

## 2022-04-11 NOTE — DISCHARGE SUMMARY
"[facility] - Telemetry Stepdown ([place] [address])  [facility] Medicine  Discharge Summary      Patient Name: Venus Mayer  MRN: [id number]  Patient Class: OP- Observation  Admission Date: 4/9/2022  Hospital Length of Stay: 0 days  Discharge Date and Time:  04/11/2022 11:30 AM  Attending Physician: Danyelle Claros MD   Discharging Provider: Danyelle Claros MD  Primary Care Provider: Jona Che MD  Hospital Medicine Team: [facility] HOSP MED B Danyelle Claros MD    HPI:   Venus Mayer is a 89 y.o. female with a PMHx of cardiomyopathy, CHF, pacemaker in place, HLD, HTN, COPD on home 2L NC who presents to [facility] after a fall at home earlier today. Daughter at bedside provides majority of history. Patient was in the bathroom when she called for caretaker's assistance (who was right outside the door) and found patient falling forward upon walking in. Caretaker attempted to catch her but both fell to the ground. Patient hit her forehead on the ground but never loss consciousness. Daughter states that patient's heart rate has been elevated to 100-110s and her blood pressure has been borderline low at home recently so she's unsure if this caused her to get lightheaded upon standing from the toilet earlier today. Patient also noted to have copious amounts of secretions, wet cough, sore throat, ear fullness, poor appetite and decreased PO intake recently. She was seen by outpatient ENT for symptoms and was started on zyrtec, mucinex and Flonase for sinus congestion/ presumed viral URI. Daughter noticed that patient voice sounds "raspy" and it seems as if she is either having difficulty swallowing or avoids foods due to possible pain with swallowing. No difficulty handling secretions, drooling, tripoding, or fever reported.      ED: tachycardic to  and tachypneic to RR 22. Satting 100% on 3L NC. Leukocytosis of WBC 19.03 with left shift. CT head/ C-spine without acute findings. CXR shows minimal interstitial and ground-glass " changes at the lung bases could be associated with mild infiltrate, atelectasis or scarring. Given 500cc NS.       * No surgery found *      Hospital Course:   Ms. Mayer was admitted to Hospital Medicine for management of sepsis 2/2 likely pneumonia vs a viral URI.  She was started on Unasyn on admit based on possible ENT etiology, and changed to Zosyn.  Her WBC quickly normalized.  It is unclear if she had a bacterial or viral infection, vs allergies and post nasal drip causing her cough.  She was discharged on Levaquin and given home health.       Goals of Care Treatment Preferences:  Code Status: DNR       LaPOST: Yes           Consults:   Consults (From admission, onward)        Status Ordering Provider     IP consult to case management  Once        Provider:  (Not yet assigned)    Acknowledged ELLIOTT ESTEVEZ          No new Assessment & Plan notes have been filed under this hospital service since the last note was generated.  Service: Hospital Medicine    Final Active Diagnoses:    Diagnosis Date Noted POA    PRINCIPAL PROBLEM:  Sepsis without acute organ dysfunction [A41.9] 04/09/2022 Yes    Acute on chronic respiratory failure with hypoxia [J96.21] 07/15/2021 Yes    Chronic obstructive pulmonary disease with acute exacerbation [J44.1] 03/28/2013 Yes    Body mass index (BMI) less than 19 [Z68.1] 04/10/2022 Not Applicable    Swelling of lower extremity [M79.89] 04/10/2022 Yes    Chronic diastolic congestive heart failure [I50.32] 09/21/2021 Yes    Elevated troponin [R77.8] 07/15/2021 Yes    Stage 3 chronic kidney disease [N18.30] 03/28/2013 Yes    Hypertension [I10] 01/17/2013 Yes      Problems Resolved During this Admission:       Discharged Condition: fair    Disposition:     Follow Up:   Follow-up Information     Vickie Noriega NP Follow up.    Specialty: Internal Medicine  Why: The nurse will contact you to schedule a hospital follow up visit.  Main number is [phone]  Contact  information:  [address]  [facility] [address]  [phone]                         Medications:  Reconciled Home Medications:      Medication List      START taking these medications    levoFLOXacin 750 MG tablet  Commonly known as: LEVAQUIN  Take 1 tablet (750 mg total) by mouth every 48 hours. for 6 days        CONTINUE taking these medications    acetaminophen 500 MG tablet  Commonly known as: TYLENOL  Take 500 mg by mouth daily as needed (BACK PAIN).     aspirin 81 mg Tab  Take 1 tablet by mouth once daily.     budesonide-formoterol 160-4.5 mcg 160-4.5 mcg/actuation Hfaa  Commonly known as: SYMBICORT  Take 2 puffs by mouth every 12 (twelve) hours.     ferrous sulfate 325 (65 FE) MG EC tablet  Take 1 tablet (325 mg total) by mouth once daily.     guaiFENesin 600 mg 12 hr tablet  Commonly known as: MUCINEX  Take 600 mg by mouth 2 (two) times daily as needed for Congestion.     Lactobacillus rhamnosus GG 10 billion cell capsule  Commonly known as: CULTURELLE  Take 1 capsule by mouth once daily.     levalbuterol 0.63 mg/3 mL nebulizer solution  Commonly known as: XOPENEX  Take 3 mLs (0.63 mg total) by nebulization every 4 (four) hours as needed for Wheezing or Shortness of Breath. Rescue     MELATONIN ORAL  Take 3 mg by mouth nightly as needed (sleep).     metoprolol tartrate 25 MG tablet  Commonly known as: LOPRESSOR  Take 12.5 mg by mouth 2 (two) times daily.     multivitamin per tablet  Commonly known as: THERAGRAN  Take 1 tablet by mouth once daily.     pantoprazole 40 MG tablet  Commonly known as: PROTONIX  Take 40 mg by mouth every morning.     polyethylene glycol 17 gram/dose powder  Commonly known as: GLYCOLAX  Take 17 g by mouth daily as needed (CONSTIPATION).     potassium chloride 10 MEQ Cpsr  Commonly known as: MICRO-K  Take 10 mEq by mouth once daily.     SPIRIVA RESPIMAT 2.5 mcg/actuation inhaler  Generic drug: tiotropium bromide  Inhale 2 puffs into the lungs Daily. Controller      tetrahydrozoline 0.05% 0.05 % Drop  Commonly known as: Vision Clear  PLACE 1 DROP INTO AFFECTED EYE(S) AS NEEDED FOR REDNESS OR ITCHING     triamcinolone 55 mcg nasal inhaler  Commonly known as: NASACORT  2 sprays by Nasal route once daily.        STOP taking these medications    bumetanide 1 MG tablet  Commonly known as: BUMEX        ASK your doctor about these medications    albuterol 90 mcg/actuation inhaler  Commonly known as: PROAIR HFA  Inhale 1 puff into the lungs every 6 (six) hours as needed for Wheezing or Shortness of Breath. Rescue     baclofen 10 MG tablet  Commonly known as: LIORESAL  TK 1 T PO TID            Indwelling Lines/Drains at time of discharge:   Lines/Drains/Airways     Drain  Duration           Female External Urinary Catheter 03/20/22 2100 21 days                Time spent on the discharge of patient: 35 minutes         Danyelle Claros MD  Department of Hospital Medicine  [facility] - Telemetry Stepdown ([place]-[facility])

## 2022-04-11 NOTE — PLAN OF CARE
[facility] - Telemetry Stepdown ([facility])  Discharge Assessment    Primary Care Provider: Jona Che MD     Discharge Assessment (most recent)     BRIEF DISCHARGE ASSESSMENT - 04/11/22 1250        Discharge Planning    Assessment Type Discharge Planning Brief Assessment     Resource/Environmental Concerns none     Support Systems Family members     Equipment Currently Used at Home oxygen;wheelchair;walker, rolling;bedside commode;raised toilet     Current Living Arrangements home/apartment/condo     Patient/Family Anticipates Transition to home with family     Patient/Family Anticipated Services at Transition home health care     DME Needed Upon Discharge  none     Discharge Plan A Home Health     Discharge Plan B Home Health                 Pt is a (# y.o. female admitted with sepsis and has a PMH of CHF, HTN , COPD (O2@2L). She lives in her house with 24 hour care provided by her daughter and sitters. She is dependent in all ADls and IADls. TC to Nora Shipman to report pt to be discharged today, Diamond acknowledged. [facility] Discharge Packet given to patient and/or family with understanding verbalized.   name and number and estimated discharge date written on white board in patient's room with request to call for any questions or concerns.  Will continue to follow for needs.  Thor Garcia, RN,BSN

## 2022-04-11 NOTE — PROGRESS NOTES
"[facility] - Telemetry Stepdown ([place] [address])  [facility] Medicine  Progress Note    Patient Name: Venus Mayer  MRN: [id number]  Patient Class: OP- Observation   Admission Date: 4/9/2022  Length of Stay: 0 days  Attending Physician: Danyelle Claros MD  Primary Care Provider: Jona Che MD        Subjective:     Principal Problem:Sepsis without acute organ dysfunction        HPI:  Venus Mayer is a 89 y.o. female with a PMHx of cardiomyopathy, CHF, pacemaker in place, HLD, HTN, COPD on home 2L NC who presents to [facility] after a fall at home earlier today. Daughter at bedside provides majority of history. Patient was in the bathroom when she called for caretaker's assistance (who was right outside the door) and found patient falling forward upon walking in. Caretaker attempted to catch her but both fell to the ground. Patient hit her forehead on the ground but never loss consciousness. Daughter states that patient's heart rate has been elevated to 100-110s and her blood pressure has been borderline low at home recently so she's unsure if this caused her to get lightheaded upon standing from the toilet earlier today. Patient also noted to have copious amounts of secretions, wet cough, sore throat, ear fullness, poor appetite and decreased PO intake recently. She was seen by outpatient ENT for symptoms and was started on zyrtec, mucinex and Flonase for sinus congestion/ presumed viral URI. Daughter noticed that patient voice sounds "raspy" and it seems as if she is either having difficulty swallowing or avoids foods due to possible pain with swallowing. No difficulty handling secretions, drooling, tripoding, or fever reported.      ED: tachycardic to  and tachypneic to RR 22. Satting 100% on 3L NC. Leukocytosis of WBC 19.03 with left shift. CT head/ C-spine without acute findings. CXR shows minimal interstitial and ground-glass changes at the lung bases could be associated with mild infiltrate, atelectasis or " scarring. Given 500cc NS.       Overview/Hospital Course:  Ms. Mayer was admitted to Hospital Medicine for management of sepsis 2/2 likely pneumonia vs a viral URI.  She was started on Unasyn on admit based on possible ENT etiology, and changed to Zosyn.  Her WBC quickly normalized.  It is unclear if she had a bacterial or viral infection, vs allergies and post nasal drip causing her cough.  She was discharged on Levaquin and given home health.      Interval History: No acute events overnight.  WBC normalized.  More alert today.  Still with occasional cough.  Wants to go home.  Discussed on the phone with Bri (daughter) from yesterday.  Discussed likely viral given negative imaging, or possibly allergies and post nasal drip but will send home on Levaquin to be safe.    Review of Systems   Constitutional:  Negative for chills, fatigue and fever.   HENT:  Positive for congestion and postnasal drip.    Respiratory:  Positive for cough. Negative for shortness of breath.    Cardiovascular:  Negative for chest pain, palpitations and leg swelling (Right).   Gastrointestinal:  Negative for abdominal pain, diarrhea, nausea and vomiting.   Genitourinary:  Negative for dysuria and urgency.   Allergic/Immunologic: Positive for environmental allergies.   Neurological:  Negative for dizziness and headaches.   All other systems reviewed and are negative.  Objective:     Vital Signs (Most Recent):  Temp: 97.5 °F (36.4 °C) (04/11/22 0640)  Pulse: 78 (04/11/22 0909)  Resp: 18 (04/11/22 0823)  BP: (!) 117/57 (04/11/22 0909)  SpO2: 99 % (04/11/22 0823)   Vital Signs (24h Range):  Temp:  [96.4 °F (35.8 °C)-97.5 °F (36.4 °C)] 97.5 °F (36.4 °C)  Pulse:  [69-87] 78  Resp:  [16-20] 18  SpO2:  [93 %-99 %] 99 %  BP: (113-125)/(57-60) 117/57     Weight: 41 kg (90 lb 6.2 oz)  Body mass index is 17.65 kg/m².    Intake/Output Summary (Last 24 hours) at 4/11/2022 1126  Last data filed at 4/10/2022 2100  Gross per 24 hour   Intake 120 ml   Output  --   Net 120 ml        Physical Exam  Constitutional:       General: She is not in acute distress.     Appearance: Normal appearance. She is well-developed. She is not ill-appearing or toxic-appearing.   HENT:      Head: Normocephalic and atraumatic.   Cardiovascular:      Rate and Rhythm: Normal rate and regular rhythm.      Heart sounds: No murmur heard.  Pulmonary:      Effort: Pulmonary effort is normal. No respiratory distress.      Breath sounds: Normal breath sounds. No wheezing or rales.      Comments: Comfortable on 2L NC  Abdominal:      General: There is no distension.      Palpations: Abdomen is soft.      Tenderness: There is no abdominal tenderness.   Musculoskeletal:         General: No deformity.      Right lower leg: Edema present.   Skin:     General: Skin is warm.      Findings: Erythema (mild over left shin) present.   Neurological:      General: No focal deficit present.      Mental Status: Mental status is at baseline.       Significant Labs: All pertinent labs within the past 24 hours have been reviewed.  CBC:   Recent Labs   Lab 04/09/22  1644 04/10/22  0525 04/11/22  0500   WBC 19.03* 16.32* 12.02   HGB 11.3* 11.2* 10.2*   HCT 35.6* 36.8* 32.4*    206 210       CMP:   Recent Labs   Lab 04/09/22  1845 04/10/22  0525 04/11/22  0500    141 140   K 3.9 4.3 3.9   CL 97 99 99   CO2 31* 27 31*   GLU 81 67* 108   BUN 21 26 33*   CREATININE 0.8 1.0 1.1   CALCIUM 9.4 9.6 9.7   PROT 6.3  --   --    ALBUMIN 2.5*  --   --    BILITOT 0.7  --   --    ALKPHOS 195*  --   --    AST 21  --   --    ALT 20  --   --    ANIONGAP 10 15 10   EGFRNONAA >60.0 48.7* 43.4*         Significant Imaging: I have reviewed all pertinent imaging results/findings within the past 24 hours.      Assessment/Plan:      * Sepsis without acute organ dysfunction  · Source unclear, possibly viral URI vs allergies  · WBC trended down from 19 to 12  · Continue Zosyn and plan to DC on Levaquin to complete a 7 day course in  case    Acute on chronic respiratory failure with hypoxia  · Chronic and stable  · On 2L NC at home    Chronic obstructive pulmonary disease with acute exacerbation  · Chronic issue  · Continue Breo - Symbicort at home  · Continue Spiriva  · Continue Zyrtec  · Continue Flonase    Swelling of lower extremity  · Reports RLE swelling  · LE US negative for DVT      Body mass index (BMI) less than 19  Body mass index is 17.65 kg/m².  · Encourage PO intake  · Boost TID      Chronic diastolic congestive heart failure  · Appears dry vs euvolemic  · BNP improved from last week  · Home bumex recently changed to lasix 40mg then 20mg daily d/t concerns for dehydration  · Will hold lasix for now but likely can resume in AM given recent hospitalization for vol overload    Elevated troponin  · Trop 0.100 on admit, trended up to 0.200 and flat  · No reports of chest pain, but has been coughing  · Continue Aspirin    Stage 3 chronic kidney disease  · Stable at baseline  · Daily BMP    Hypertension  · Chronic issue  · Continue lopressor 12.5 mg BID      VTE Risk Mitigation (From admission, onward)         Ordered     heparin (porcine) injection 5,000 Units  Every 12 hours         04/09/22 2107     IP VTE HIGH RISK PATIENT  Once         04/09/22 2054                Discharge Planning   TRISTON: 4/11/2022     Code Status: DNR   Is the patient medically ready for discharge?: No    Reason for patient still in hospital (select all that apply): Pending disposition                     Danyelle Claros MD  Department of Hospital Medicine   [facility] - Telemetry Stepdown ([place]

## 2022-04-11 NOTE — PROGRESS NOTES
Pharmacist Renal Dose Adjustment Note    Venus Mayer is a 89 y.o. female being treated with levofloxacin    Patient Data:    Vital Signs (Most Recent):  Temp: 97.5 °F (36.4 °C) (04/11/22 0640)  Pulse: 87 (04/11/22 0640)  Resp: 19 (04/11/22 0640)  BP: (!) 123/59 (04/11/22 0640)  SpO2: (!) 93 % (04/11/22 0640)   Vital Signs (72h Range):  Temp:  [96.4 °F (35.8 °C)-99.3 °F (37.4 °C)]   Pulse:  []   Resp:  [14-22]   BP: (113-161)/(57-80)   SpO2:  [93 %-100 %]      Recent Labs   Lab 04/09/22  1845 04/10/22  0525 04/11/22  0500   CREATININE 0.8 1.0 1.1     Serum creatinine: 1.1 mg/dL 04/11/22 0500  Estimated creatinine clearance: 20.7 mL/min    Levofloxacin 500 mg PO every 24 hours will be changed to levofloxacin 500 mg PO every 48 hours as CrCl < 50 mL/min    Pharmacist's Name: Patito Shipman  Pharmacist's Extension: [phone]

## 2022-04-11 NOTE — CARE UPDATE
[facility] unable to schedule PCP hospital follow up visit. A message was sent to the provider requesting an appointment on the patients behalf

## 2022-04-11 NOTE — PLAN OF CARE
[facility] - Telemetry Stepdown ([facility])  Discharge Final Note    Primary Care Provider: Jona Che MD    Pt discharged home today with resumption of  care.   Thor Garcia, RN,BSN        Future Appointments   Date Time Provider Department Center   4/25/2022  8:00 AM Vickie Nroiega NP [facility] [place]     .pt    Expected Discharge Date: 4/11/2022    Final Discharge Note (most recent)     Final Note - 04/11/22 1414        Final Note    Assessment Type Final Discharge Note     Anticipated Discharge Disposition Home or Self Care     Hospital Resources/Appts/Education Provided Provided patient/caregiver with written discharge plan information;Appointments scheduled and added to AVS        Post-Acute Status    Discharge Delays None known at this time                 Important Message from Medicare             Contact Info     Vickie Noriega NP   Specialty: Internal Medicine    [address]  [facility] [address]   Phone: [phone]       Next Steps: Follow up    Instructions: The nurse will contact you to schedule a hospital follow up visit.  Main number is [phone]

## 2022-04-11 NOTE — ASSESSMENT & PLAN NOTE
· Trop 0.100 on admit, trended up to 0.200 and flat  · No reports of chest pain, but has been coughing  · Continue Aspirin

## 2022-04-11 NOTE — PLAN OF CARE
[address] [facility]  [place] [address]  Phone: [phone]    Home Health Orders  Face to Face Encounter    Patient Name: Venus Mayer  YOB: 1929    PCP: Jona Che MD   PCP Address: [address] [place] [address] [id number] / [place] [id number]  PCP Phone Number: [phone]  PCP Fax: [phone]    Encounter Date: 04/11/2022    Admit To Home Health    Diagnoses:  Active Hospital Problems    Diagnosis  POA    *Sepsis without acute organ dysfunction [A41.9]  Yes     Priority: 1 - High    Acute on chronic respiratory failure with hypoxia [J96.21]  Yes     Priority: 2      Patient needs to maintain SpO2 88% and above, do not increase.  Continues to benefit from a portable oxygen concentrator of choice.          Chronic obstructive pulmonary disease with acute exacerbation [J44.1]  Yes     Priority: 3     Body mass index (BMI) less than 19 [Z68.1]  Not Applicable    Swelling of lower extremity [M79.89]  Yes    Chronic diastolic congestive heart failure [I50.32]  Yes    Elevated troponin [R77.8]  Yes    Stage 3 chronic kidney disease [N18.30]  Yes    Hypertension [I10]  Yes      Resolved Hospital Problems   No resolved problems to display.       Future Appointments   Date Time Provider Department Center   4/25/2022  8:00 AM Vickie Noriega NP [address]           I have seen and examined this patient face to face today. My clinical findings that support the need for the home health skilled services and home bound status are the following:  Weakness/numbness causing balance and gait disturbance due to Weakness/Debility making it taxing to leave home.      Allergies:  Review of patient's allergies indicates:   Allergen Reactions    Ancef in dextrose (iso-osm) Rash    Cefazolin Rash     Tolerating Zosyn admission 3/2022    Cefuroxime Rash    Sulfamethoxazole-trimethoprim Rash     Other reaction(s): Rash         Code Status:    Code Status: DNR      Diet: Low  Sodium  Supplement:  1 can Boost (or equivalent) three times a day with meals      Referrals/ Consults     Physical Therapy to evaluate and treat     Occupational Therapy to evaluate and treat      Activities:   activity as tolerated      Miscellaneous:   Home Oxygen:  Oxygen at 2.5 L/min nasal canula to be used:  Continuously.        Nursing:   Agency to admit patient within 24 hours of hospital discharge unless specified on physician order or at patient request    SN to complete comprehensive assessment including routine vital signs. Instruct on disease process and s/s of complications to report to MD. Review/verify medication list sent home with the patient at time of discharge  and instruct patient/caregiver as needed. Frequency may be adjusted depending on start of care date.     Skilled nurse to perform up to 3 visits PRN for symptoms related to diagnosis    Notify MD if SBP > 160 or < 90; DBP > 90 or < 50; HR > 120 or < 50; Temp > 101; O2 < 88%    Ok to schedule additional visits based on staff availability and patient request on consecutive days within the home health episode.      When multiple disciplines ordered:    Start of Care occurs on Sunday - Wednesday schedule remaining discipline evaluations as ordered on separate consecutive days following the start of care.    Thursday SOC -schedule subsequent evaluations Friday and Monday the following week.     Friday - Saturday SOC - schedule subsequent discipline evaluations on consecutive days starting Monday of the following week.      For all post-discharge communication and subsequent orders please contact patient's primary care physician. If unable to reach primary care physician or do not receive response within 30 minutes, please contact [facility] on call for clinical staff order clarification      Medications: Review discharge medications with patient and family and provide education.      Current Discharge Medication List      START taking these  medications    Details   levoFLOXacin (LEVAQUIN) 750 MG tablet Take 1 tablet (750 mg total) by mouth every 48 hours. for 6 days  Qty: 3 tablet, Refills: 0         CONTINUE these medications which have NOT CHANGED    Details   acetaminophen (TYLENOL) 500 MG tablet Take 500 mg by mouth daily as needed (BACK PAIN).      aspirin 81 mg Tab Take 1 tablet by mouth once daily.       budesonide-formoterol 160-4.5 mcg (SYMBICORT) 160-4.5 mcg/actuation HFAA Take 2 puffs by mouth every 12 (twelve) hours.      ferrous sulfate 325 (65 FE) MG EC tablet Take 1 tablet (325 mg total) by mouth once daily.  Refills: 0      guaiFENesin (MUCINEX) 600 mg 12 hr tablet Take 600 mg by mouth 2 (two) times daily as needed for Congestion.      Lactobacillus rhamnosus GG (CULTURELLE) 10 billion cell capsule Take 1 capsule by mouth once daily.      levalbuterol (XOPENEX) 0.63 mg/3 mL nebulizer solution Take 3 mLs (0.63 mg total) by nebulization every 4 (four) hours as needed for Wheezing or Shortness of Breath. Rescue  Qty: 3 mL, Refills: 1      MELATONIN ORAL Take 3 mg by mouth nightly as needed (sleep).      metoprolol tartrate (LOPRESSOR) 25 MG tablet Take 12.5 mg by mouth 2 (two) times daily.      multivitamin (THERAGRAN) per tablet Take 1 tablet by mouth once daily.      pantoprazole (PROTONIX) 40 MG tablet Take 40 mg by mouth every morning.      polyethylene glycol (GLYCOLAX) 17 gram/dose powder Take 17 g by mouth daily as needed (CONSTIPATION).      potassium chloride (MICRO-K) 10 MEQ CpSR Take 10 mEq by mouth once daily.      tetrahydrozoline 0.05% (VISION CLEAR) 0.05 % Drop PLACE 1 DROP INTO AFFECTED EYE(S) AS NEEDED FOR REDNESS OR ITCHING      tiotropium bromide (SPIRIVA RESPIMAT) 2.5 mcg/actuation inhaler Inhale 2 puffs into the lungs Daily. Controller  Qty: 4 g, Refills: 11      triamcinolone (NASACORT) 55 mcg nasal inhaler 2 sprays by Nasal route once daily.         STOP taking these medications       albuterol (PROAIR HFA) 90  mcg/actuation inhaler Comments:   Reason for Stopping:         baclofen (LIORESAL) 10 MG tablet Comments:   Reason for Stopping:         bumetanide (BUMEX) 1 MG tablet Comments:   Reason for Stopping:               I certify that this patient is confined to her home and needs intermittent skilled nursing care, physical therapy and occupational therapy.    Danyelle Claros MD  [facility]

## 2022-04-11 NOTE — PLAN OF CARE
Patients iv removed on previous shift. Removed telemetry box. Patient given discharge instructions and all of her belongings. Family at bedside with patients wheelchair. Family escorted patient to hospital exit.

## 2022-04-11 NOTE — ASSESSMENT & PLAN NOTE
· Chronic issue  · Continue Breo - Symbicort at home  · Continue Spiriva  · Continue Zyrtec  · Continue Flonase

## 2022-04-11 NOTE — ASSESSMENT & PLAN NOTE
· Source unclear, possibly viral URI vs allergies  · WBC trended down from 19 to 12  · Continue Zosyn and plan to DC on Levaquin to complete a 7 day course in case

## 2022-04-13 ENCOUNTER — LAB VISIT (OUTPATIENT)
Dept: LAB | Facility: HOSPITAL | Age: 87
End: 2022-04-13
Attending: NURSE PRACTITIONER
Payer: MEDICARE

## 2022-04-13 DIAGNOSIS — I13.0 HYPERTENSIVE HEART AND RENAL DISEASE WITH CONGESTIVE HEART FAILURE: ICD-10-CM

## 2022-04-13 DIAGNOSIS — I25.10 CORONARY ATHEROSCLEROSIS OF NATIVE CORONARY ARTERY: ICD-10-CM

## 2022-04-13 DIAGNOSIS — G93.41 METABOLIC ENCEPHALOPATHY: Primary | ICD-10-CM

## 2022-04-13 LAB
ANION GAP SERPL CALC-SCNC: 11 MMOL/L (ref 8–16)
BUN SERPL-MCNC: 24 MG/DL (ref 10–30)
CALCIUM SERPL-MCNC: 9.7 MG/DL (ref 8.7–10.5)
CHLORIDE SERPL-SCNC: 99 MMOL/L (ref 95–110)
CO2 SERPL-SCNC: 31 MMOL/L (ref 23–29)
CREAT SERPL-MCNC: 1.2 MG/DL (ref 0.5–1.4)
EST. GFR  (AFRICAN AMERICAN): 45 ML/MIN/1.73 M^2
EST. GFR  (NON AFRICAN AMERICAN): 39.1 ML/MIN/1.73 M^2
GLUCOSE SERPL-MCNC: 87 MG/DL (ref 70–110)
POTASSIUM SERPL-SCNC: 3.9 MMOL/L (ref 3.5–5.1)
SODIUM SERPL-SCNC: 141 MMOL/L (ref 136–145)

## 2022-04-13 PROCEDURE — 80048 BASIC METABOLIC PNL TOTAL CA: CPT | Performed by: NURSE PRACTITIONER

## 2022-04-14 LAB
BACTERIA BLD CULT: NORMAL
BACTERIA BLD CULT: NORMAL

## 2022-04-19 ENCOUNTER — LAB VISIT (OUTPATIENT)
Dept: LAB | Facility: HOSPITAL | Age: 87
End: 2022-04-19
Attending: STUDENT IN AN ORGANIZED HEALTH CARE EDUCATION/TRAINING PROGRAM
Payer: MEDICARE

## 2022-04-19 DIAGNOSIS — I50.9 HEART FAILURE, UNSPECIFIED: Primary | ICD-10-CM

## 2022-04-19 LAB
ALBUMIN SERPL BCP-MCNC: 2.7 G/DL (ref 3.5–5.2)
ALP SERPL-CCNC: 123 U/L (ref 55–135)
ALT SERPL W/O P-5'-P-CCNC: 14 U/L (ref 10–44)
ANION GAP SERPL CALC-SCNC: 9 MMOL/L (ref 8–16)
AST SERPL-CCNC: 22 U/L (ref 10–40)
BASOPHILS # BLD AUTO: 0.07 K/UL (ref 0–0.2)
BASOPHILS NFR BLD: 0.5 % (ref 0–1.9)
BILIRUB SERPL-MCNC: 0.6 MG/DL (ref 0.1–1)
BUN SERPL-MCNC: 20 MG/DL (ref 10–30)
CALCIUM SERPL-MCNC: 9.5 MG/DL (ref 8.7–10.5)
CHLORIDE SERPL-SCNC: 94 MMOL/L (ref 95–110)
CO2 SERPL-SCNC: 36 MMOL/L (ref 23–29)
CREAT SERPL-MCNC: 1.2 MG/DL (ref 0.5–1.4)
DIFFERENTIAL METHOD: ABNORMAL
EOSINOPHIL # BLD AUTO: 0.3 K/UL (ref 0–0.5)
EOSINOPHIL NFR BLD: 2.4 % (ref 0–8)
ERYTHROCYTE [DISTWIDTH] IN BLOOD BY AUTOMATED COUNT: 16.7 % (ref 11.5–14.5)
EST. GFR  (AFRICAN AMERICAN): 45 ML/MIN/1.73 M^2
EST. GFR  (NON AFRICAN AMERICAN): 39.1 ML/MIN/1.73 M^2
GLUCOSE SERPL-MCNC: 79 MG/DL (ref 70–110)
HCT VFR BLD AUTO: 34.8 % (ref 37–48.5)
HGB BLD-MCNC: 10.9 G/DL (ref 12–16)
IMM GRANULOCYTES # BLD AUTO: 0.16 K/UL (ref 0–0.04)
IMM GRANULOCYTES NFR BLD AUTO: 1.1 % (ref 0–0.5)
LYMPHOCYTES # BLD AUTO: 0.7 K/UL (ref 1–4.8)
LYMPHOCYTES NFR BLD: 5 % (ref 18–48)
MCH RBC QN AUTO: 31.6 PG (ref 27–31)
MCHC RBC AUTO-ENTMCNC: 31.3 G/DL (ref 32–36)
MCV RBC AUTO: 101 FL (ref 82–98)
MONOCYTES # BLD AUTO: 1.1 K/UL (ref 0.3–1)
MONOCYTES NFR BLD: 7.5 % (ref 4–15)
NEUTROPHILS # BLD AUTO: 11.7 K/UL (ref 1.8–7.7)
NEUTROPHILS NFR BLD: 83.5 % (ref 38–73)
NRBC BLD-RTO: 0 /100 WBC
PLATELET # BLD AUTO: 174 K/UL (ref 150–450)
PMV BLD AUTO: 10.4 FL (ref 9.2–12.9)
POTASSIUM SERPL-SCNC: 3.6 MMOL/L (ref 3.5–5.1)
PROT SERPL-MCNC: 6 G/DL (ref 6–8.4)
RBC # BLD AUTO: 3.45 M/UL (ref 4–5.4)
SODIUM SERPL-SCNC: 139 MMOL/L (ref 136–145)
WBC # BLD AUTO: 14.07 K/UL (ref 3.9–12.7)

## 2022-04-19 PROCEDURE — 85025 COMPLETE CBC W/AUTO DIFF WBC: CPT | Performed by: STUDENT IN AN ORGANIZED HEALTH CARE EDUCATION/TRAINING PROGRAM

## 2022-04-19 PROCEDURE — 80053 COMPREHEN METABOLIC PANEL: CPT | Performed by: STUDENT IN AN ORGANIZED HEALTH CARE EDUCATION/TRAINING PROGRAM

## 2022-04-20 ENCOUNTER — CARE AT HOME (OUTPATIENT)
Dept: HOME HEALTH SERVICES | Facility: CLINIC | Age: 87
End: 2022-04-20
Payer: MEDICARE

## 2022-04-20 DIAGNOSIS — A41.9 SEPSIS, DUE TO UNSPECIFIED ORGANISM, UNSPECIFIED WHETHER ACUTE ORGAN DYSFUNCTION PRESENT: ICD-10-CM

## 2022-04-20 DIAGNOSIS — J44.1 CHRONIC OBSTRUCTIVE PULMONARY DISEASE WITH ACUTE EXACERBATION: ICD-10-CM

## 2022-04-20 DIAGNOSIS — I50.32 CHRONIC DIASTOLIC CONGESTIVE HEART FAILURE: ICD-10-CM

## 2022-04-20 DIAGNOSIS — J96.11 CHRONIC RESPIRATORY FAILURE WITH HYPOXIA AND HYPERCAPNIA: Primary | ICD-10-CM

## 2022-04-20 DIAGNOSIS — I73.9 PERIPHERAL VASCULAR DISEASE: ICD-10-CM

## 2022-04-20 DIAGNOSIS — J96.12 CHRONIC RESPIRATORY FAILURE WITH HYPOXIA AND HYPERCAPNIA: Primary | ICD-10-CM

## 2022-04-20 PROCEDURE — 99350 PR HOME VISIT,ESTAB PATIENT,LEVEL IV: ICD-10-PCS | Mod: S$GLB,,, | Performed by: NURSE PRACTITIONER

## 2022-04-20 PROCEDURE — 99350 HOME/RES VST EST HIGH MDM 60: CPT | Mod: S$GLB,,, | Performed by: NURSE PRACTITIONER

## 2022-04-20 RX ORDER — FORMOTEROL FUMARATE DIHYDRATE 20 UG/2ML
20 SOLUTION RESPIRATORY (INHALATION) 2 TIMES DAILY
Qty: 360 ML | Refills: 3 | Status: SHIPPED | OUTPATIENT
Start: 2022-04-20 | End: 2022-05-02

## 2022-04-20 NOTE — PROGRESS NOTES
[facility]  Transition of Care Home Visit    Visit Date: 4/21/2022  Encounter Provider: Vickie Noriega   PCP:  Jona Che MD    PRESENTING HISTORY      Patient ID: Venus Mayer is a 89 y.o. female.    Consult Requested By:  No ref. provider found  Reason for Consult:  Hospital Follow Up.    Venus DEWEY is being seen at home due to being seen at home due to physical debility that presents a taxing effort to leave the home, to mitigate high risk of hospital readmission and/or due to the limited availability of reliable or safe options for transportation to the point of access to the provider. Prior to treatment on this visit the chart was reviewed and patient verbal consent was obtained.      Chief Complaint: Transitional Care, Shortness of Breath, and Edema        History of Present Illness: Ms. Venus Mayer is a 89 y.o. female who was recently admitted to the hospital.    Admit: 4/9/22 Discharge: 4/11/22  ___________________________________________________________________    Today:    HPI:  Pt is being seen today for hospital follow up after a recent admit. See hospital course.    Today, she is found sitting in her home with her sitter present. Her daughter is available by phone.  She is AAOx3, but does appear tired and closes her eyes frequently during the visit. Oxygen sat of 82%. With supplemental O2 at 2.5L via NC. Increased to 3L and it improved to 95.  She denies any pain, does report she is mildly SOB at times mostly when moving. She denied any fever since her discharge. Reports fair appetite. She has some edema to her lower legs 2+. She has new zippered compression stockings she plans to start using today. Her wt was up 3lbs yesterday and she took prn Lasix.    Discussed end of life goals and advanced care planning with pt and/or family.   Reviewed living will, [place] Post, and pt's wishes regarding life support and tube feeding.  Reviewed pt's code status and prognosis. Pt is a DNR  She would  like to avoid hospitalization, she is hospice appropriate and would benefit from these services but daughter does not want to pursue at this time  Reviewed hospice program and services to pt and/or family.    30 minutes spent in discussion of these services.           Review of Systems   Constitutional: Positive for fatigue. Negative for activity change and fever.   HENT: Positive for hearing loss.    Eyes: Negative.    Respiratory: Positive for shortness of breath. Negative for chest tightness.    Cardiovascular: Negative.  Negative for leg swelling.   Gastrointestinal: Negative.    Endocrine: Negative.    Genitourinary: Negative.    Musculoskeletal: Positive for gait problem.   Skin: Negative.    Allergic/Immunologic: Negative.    Hematological: Negative.    Psychiatric/Behavioral: Negative.  Negative for agitation.   All other systems reviewed and are negative.      Assessments:  · Environmental: single story home, clean adequate temp.  · Functional Status: requires assistance  · Safety: fall risk  · Nutritional: adequate available  · Home Health/DME/Supplies: [facility], oxygen    PAST HISTORY:     Past Medical History:   Diagnosis Date    Acute on chronic congestive heart failure 7/6/2019    Cardiomyopathy     Carotid artery occlusion     CHF (congestive heart failure)     COPD (chronic obstructive pulmonary disease)     Coronary artery disease     Hyperlipidemia     Hypertension        Past Surgical History:   Procedure Laterality Date    CARDIAC CATHETERIZATION      cardic cath      CAROTID ENDARTERECTOMY      FLEXIBLE BRONCHOSCOPY N/A 7/31/2019    Procedure: BRONCHOSCOPY, FIBEROPTIC Flexible;  Surgeon: Klever Mckeon MD;  Location: [facility] OR [address] [place] [address];  Service: Thoracic;  Laterality: N/A;    HIP SURGERY      PLEURODESIS USING TALC N/A 7/31/2019    Procedure: PLEURODESIS;  Surgeon: Klever Mckeon MD;  Location: [facility] OR [address] [place] [address];  Service: Thoracic;  Laterality: N/A;    PLEURODESIS USING  TALC Right 2019    Procedure: PLEURODESIS, USING TALC;  Surgeon: Klever Mckeon MD;  [facility];  Service: Thoracic;  Laterality: Right;    PLEURODESIS WITH VIDEO-ASSISTED THORACOSCOPIC SURGERY (VATS) Right 2019    Procedure: VATS, WITH PLEURAL TENT;  Surgeon: Klever Mckeon MD;  [facility];  Service: Thoracic;  Laterality: Right;       Family History   Problem Relation Age of Onset    Hypertension Mother        Social History     Socioeconomic History    Marital status:    Tobacco Use    Smoking status: Former Smoker     Packs/day: 2.00     Years: 20.00     Pack years: 40.00     Types: Cigarettes     Quit date: 1980     Years since quittin.2    Smokeless tobacco: Never Used   Substance and Sexual Activity    Alcohol use: Yes     Alcohol/week: 2.0 standard drinks     Types: 2 Glasses of wine per week     Comment: social    Drug use: No    Sexual activity: Not Currently       MEDICATIONS & ALLERGIES:     Current Outpatient Medications on File Prior to Visit   Medication Sig Dispense Refill    acetaminophen (TYLENOL) 500 MG tablet Take 500 mg by mouth daily as needed (BACK PAIN).      aspirin 81 mg Tab Take 1 tablet by mouth once daily.       budesonide-formoterol 160-4.5 mcg (SYMBICORT) 160-4.5 mcg/actuation HFAA Take 2 puffs by mouth every 12 (twelve) hours.      ferrous sulfate 325 (65 FE) MG EC tablet Take 1 tablet (325 mg total) by mouth once daily.  0    guaiFENesin (MUCINEX) 600 mg 12 hr tablet Take 600 mg by mouth 2 (two) times daily as needed for Congestion.      Lactobacillus rhamnosus GG (CULTURELLE) 10 billion cell capsule Take 1 capsule by mouth once daily.      levalbuterol (XOPENEX) 0.63 mg/3 mL nebulizer solution Take 3 mLs (0.63 mg total) by nebulization every 4 (four) hours as needed for Wheezing or Shortness of Breath. Rescue 3 mL 1    MELATONIN ORAL Take 3 mg by mouth nightly as needed (sleep).      metoprolol  tartrate (LOPRESSOR) 25 MG tablet Take 12.5 mg by mouth 2 (two) times daily.      multivitamin (THERAGRAN) per tablet Take 1 tablet by mouth once daily.      pantoprazole (PROTONIX) 40 MG tablet Take 40 mg by mouth every morning.      polyethylene glycol (GLYCOLAX) 17 gram/dose powder Take 17 g by mouth daily as needed (CONSTIPATION).      potassium chloride (MICRO-K) 10 MEQ CpSR Take 10 mEq by mouth once daily.      tetrahydrozoline 0.05% (VISION CLEAR) 0.05 % Drop PLACE 1 DROP INTO AFFECTED EYE(S) AS NEEDED FOR REDNESS OR ITCHING      tiotropium bromide (SPIRIVA RESPIMAT) 2.5 mcg/actuation inhaler Inhale 2 puffs into the lungs Daily. Controller 4 g 11    triamcinolone (NASACORT) 55 mcg nasal inhaler 2 sprays by Nasal route once daily.      [DISCONTINUED] albuterol (PROAIR HFA) 90 mcg/actuation inhaler Inhale 1 puff into the lungs every 6 (six) hours as needed for Wheezing or Shortness of Breath. Rescue (Patient not taking: Reported on 3/16/2022) 1 Inhaler 5    [DISCONTINUED] baclofen (LIORESAL) 10 MG tablet TK 1 T PO TID      [DISCONTINUED] bumetanide (BUMEX) 1 MG tablet Take 1 tablet (1 mg total) by mouth 2 (two) times daily. 60 tablet 3     No current facility-administered medications on file prior to visit.        Review of patient's allergies indicates:   Allergen Reactions    Ancef in dextrose (iso-osm) Rash    Cefazolin Rash     Tolerating Zosyn admission 3/2022    Cefuroxime Rash    Sulfamethoxazole-trimethoprim Rash     Other reaction(s): Rash       OBJECTIVE:     Vital Signs:  There were no vitals filed for this visit.  There is no height or weight on file to calculate BMI.     Physical Exam:  Physical Exam  Vitals reviewed.   Constitutional:       General: She is not in acute distress.     Appearance: She is well-developed. She is ill-appearing.      Comments: Thin, frail   HENT:      Head: Normocephalic and atraumatic.   Eyes:      Pupils: Pupils are equal, round, and reactive to light.    Cardiovascular:      Rate and Rhythm: Normal rate and regular rhythm.      Heart sounds: Normal heart sounds.   Pulmonary:      Effort: Pulmonary effort is normal.      Breath sounds: Wheezing present.      Comments: Diminished thru  Abdominal:      General: Bowel sounds are normal.      Palpations: Abdomen is soft.   Musculoskeletal:         General: Normal range of motion.      Cervical back: Normal range of motion and neck supple.   Skin:     General: Skin is warm and dry.   Neurological:      Mental Status: She is alert and oriented to person, place, and time.      Cranial Nerves: No cranial nerve deficit.   Psychiatric:         Behavior: Behavior normal.         Thought Content: Thought content normal.         Judgment: Judgment normal.         Laboratory  Lab Results   Component Value Date    WBC 14.07 (H) 04/19/2022    HGB 10.9 (L) 04/19/2022    HCT 34.8 (L) 04/19/2022     (H) 04/19/2022     04/19/2022     Lab Results   Component Value Date    INR 0.9 07/21/2019    INR 0.9 06/05/2019     Lab Results   Component Value Date    HGBA1C 5.1 09/22/2021     No results for input(s): POCTGLUCOSE in the last 72 hours.    Diagnostic Results:      TRANSITION OF CARE:     [facility] Contact Note: 4/13/22    Family and/or Caretaker present at visit?  Yes.  Diagnostic tests reviewed/disposition: No diagnosic tests pending after this hospitalization.  Disease/illness education: CHF  Home health/community services discussion/referrals: Patient has home health established at [facility].   Establishment or re-establishment of referral orders for community resources: No other necessary community resources.   Discussion with other health care providers: No discussion with other health care providers necessary.     Transition of Care Visit:  I have reviewed and updated the history and problem list.  I have reconciled the medication list.  I have discussed the hospitalization and current medical issues, prognosis and  plans with the patient/family.  I  spent more than 50% of time discussing the care with the patient/family.  Total Face-to-Face Encounter: 60 minutes.    Medications Reconciliation:   I have reconciled the patient's home medications and discharge medications with the patient/family. I have updated all changes.  Refer to After-Visit Medication List.    ASSESSMENT & PLAN:     HIGH RISK CONDITION(S):  Patient has a condition that poses threat to life and bodily function: CHF    Problem List Items Addressed This Visit        Pulmonary    Chronic obstructive pulmonary disease with acute exacerbation    Current Assessment & Plan     Chronic  Complicated by CHF  Continue Symbicort, Spiriva, Flonase  Use perforomist in nebulizer  Monitor  Oxygen at 2.5L           Chronic respiratory failure with hypoxia and hypercapnia - Primary    Overview     Combination of severe debility, emphysema, diastolic heart failure.  Continues to benefit.           Current Assessment & Plan     Oxygen at 2.5L vi NC  See COPD           Relevant Medications    formoterol (PERFOROMIST) 20 mcg/2 mL Nebu       Cardiac/Vascular    Chronic diastolic congestive heart failure    Current Assessment & Plan     Chronic  Wt increased from yesterday, prn Lasix given  Followed by CHF clinic           Peripheral vascular disease    Current Assessment & Plan     Poor venous return  Edema chronic  Compression stocking in place  Monitor              ID    Sepsis    Relevant Orders    Ambulatory referral/consult to Infectious Disease         PT with sepsis inpt. WBC to 19. Returned to normal by discharge  Out pt labs today with increasing WBCs  Afebrile, does have  Cough-but chronic  Referral placed to ID  Were controlled substances prescribed?  No    Instructions for the patient:  - Continue all medications, treatments and therapies as ordered.   - Follow all instructions, recommendations as discussed.  - Maintain Safety Precautions at all times.  - Attend all  medical appointments as scheduled.  - For worsening symptoms: call Primary Care Physician or Nurse Practitioner.  - For emergencies, call 911 or immediately report to the nearest emergency room.  - Limit Risks of environmental exposure to coronavirus/COVID-19 as discussed including: social distancing, hand hygiene, and limiting departures from the home for necessities only.     Scheduled Follow-up :  No future appointments.    After Visit Medication List :     Medication List          Accurate as of April 20, 2022 11:59 PM. If you have any questions, ask your nurse or doctor.            START taking these medications    formoterol 20 mcg/2 mL Nebu  Commonly known as: PERFOROMIST  Take 2 mLs (20 mcg total) by nebulization 2 (two) times a day. Controller  Started by: Vickie Noriega NP        CONTINUE taking these medications    acetaminophen 500 MG tablet  Commonly known as: TYLENOL     aspirin 81 mg Tab     budesonide-formoterol 160-4.5 mcg 160-4.5 mcg/actuation Hfaa  Commonly known as: SYMBICORT     ferrous sulfate 325 (65 FE) MG EC tablet  Take 1 tablet (325 mg total) by mouth once daily.     guaiFENesin 600 mg 12 hr tablet  Commonly known as: MUCINEX     Lactobacillus rhamnosus GG 10 billion cell capsule  Commonly known as: CULTURELLE     levalbuterol 0.63 mg/3 mL nebulizer solution  Commonly known as: XOPENEX  Take 3 mLs (0.63 mg total) by nebulization every 4 (four) hours as needed for Wheezing or Shortness of Breath. Rescue     MELATONIN ORAL     metoprolol tartrate 25 MG tablet  Commonly known as: LOPRESSOR     multivitamin per tablet  Commonly known as: THERAGRAN     pantoprazole 40 MG tablet  Commonly known as: PROTONIX     polyethylene glycol 17 gram/dose powder  Commonly known as: GLYCOLAX     potassium chloride 10 MEQ Cpsr  Commonly known as: MICRO-K     SPIRIVA RESPIMAT 2.5 mcg/actuation inhaler  Generic drug: tiotropium bromide  Inhale 2 puffs into the lungs Daily. Controller     tetrahydrozoline  0.05% 0.05 % Drop  Commonly known as: Vision Clear     triamcinolone 55 mcg nasal inhaler  Commonly known as: NASACORT           Where to Get Your Medications      These medications were sent to [id number] - [place] - [address] AT [facility] [address]  [address] [address]    Phone: [phone]   · formoterol 20 mcg/2 mL Nebu         Signature: Vickie Noriega NP

## 2022-04-21 ENCOUNTER — TELEPHONE (OUTPATIENT)
Dept: INFECTIOUS DISEASES | Facility: CLINIC | Age: 87
End: 2022-04-21
Payer: MEDICARE

## 2022-04-21 PROBLEM — I73.9 PERIPHERAL VASCULAR DISEASE: Status: ACTIVE | Noted: 2022-04-21

## 2022-04-21 NOTE — ASSESSMENT & PLAN NOTE
Chronic  Complicated by CHF  Continue Symbicort, Spiriva, Flonase  Use perforomist in nebulizer  Monitor  Oxygen at 2.5L

## 2022-04-25 ENCOUNTER — LAB VISIT (OUTPATIENT)
Dept: LAB | Facility: HOSPITAL | Age: 87
End: 2022-04-25
Attending: NURSE PRACTITIONER
Payer: MEDICARE

## 2022-04-25 DIAGNOSIS — I50.33 ACUTE ON CHRONIC DIASTOLIC HEART FAILURE: Primary | ICD-10-CM

## 2022-04-25 DIAGNOSIS — G93.41 METABOLIC ENCEPHALOPATHY: ICD-10-CM

## 2022-04-25 LAB
ALBUMIN SERPL BCP-MCNC: 2.4 G/DL (ref 3.5–5.2)
ALP SERPL-CCNC: 658 U/L (ref 55–135)
ALT SERPL W/O P-5'-P-CCNC: 160 U/L (ref 10–44)
ANION GAP SERPL CALC-SCNC: 11 MMOL/L (ref 8–16)
AST SERPL-CCNC: 215 U/L (ref 10–40)
BASOPHILS # BLD AUTO: 0.05 K/UL (ref 0–0.2)
BASOPHILS NFR BLD: 0.4 % (ref 0–1.9)
BILIRUB SERPL-MCNC: 3.1 MG/DL (ref 0.1–1)
BUN SERPL-MCNC: 23 MG/DL (ref 10–30)
CALCIUM SERPL-MCNC: 9.7 MG/DL (ref 8.7–10.5)
CHLORIDE SERPL-SCNC: 92 MMOL/L (ref 95–110)
CO2 SERPL-SCNC: 36 MMOL/L (ref 23–29)
CREAT SERPL-MCNC: 1 MG/DL (ref 0.5–1.4)
DIFFERENTIAL METHOD: ABNORMAL
EOSINOPHIL # BLD AUTO: 0.4 K/UL (ref 0–0.5)
EOSINOPHIL NFR BLD: 2.8 % (ref 0–8)
ERYTHROCYTE [DISTWIDTH] IN BLOOD BY AUTOMATED COUNT: 16.7 % (ref 11.5–14.5)
EST. GFR  (AFRICAN AMERICAN): 56.1 ML/MIN/1.73 M^2
EST. GFR  (NON AFRICAN AMERICAN): 48.7 ML/MIN/1.73 M^2
GLUCOSE SERPL-MCNC: 71 MG/DL (ref 70–110)
HCT VFR BLD AUTO: 34.9 % (ref 37–48.5)
HGB BLD-MCNC: 10.7 G/DL (ref 12–16)
IMM GRANULOCYTES # BLD AUTO: 0.11 K/UL (ref 0–0.04)
IMM GRANULOCYTES NFR BLD AUTO: 0.9 % (ref 0–0.5)
LYMPHOCYTES # BLD AUTO: 0.7 K/UL (ref 1–4.8)
LYMPHOCYTES NFR BLD: 5.3 % (ref 18–48)
MCH RBC QN AUTO: 31.7 PG (ref 27–31)
MCHC RBC AUTO-ENTMCNC: 30.7 G/DL (ref 32–36)
MCV RBC AUTO: 103 FL (ref 82–98)
MONOCYTES # BLD AUTO: 0.9 K/UL (ref 0.3–1)
MONOCYTES NFR BLD: 7.1 % (ref 4–15)
NEUTROPHILS # BLD AUTO: 10.7 K/UL (ref 1.8–7.7)
NEUTROPHILS NFR BLD: 83.5 % (ref 38–73)
NRBC BLD-RTO: 0 /100 WBC
PLATELET # BLD AUTO: 214 K/UL (ref 150–450)
PMV BLD AUTO: 10.7 FL (ref 9.2–12.9)
POTASSIUM SERPL-SCNC: 3.7 MMOL/L (ref 3.5–5.1)
PROT SERPL-MCNC: 6.1 G/DL (ref 6–8.4)
RBC # BLD AUTO: 3.38 M/UL (ref 4–5.4)
SODIUM SERPL-SCNC: 139 MMOL/L (ref 136–145)
WBC # BLD AUTO: 12.75 K/UL (ref 3.9–12.7)

## 2022-04-25 PROCEDURE — 80053 COMPREHEN METABOLIC PANEL: CPT | Performed by: NURSE PRACTITIONER

## 2022-04-25 PROCEDURE — 85025 COMPLETE CBC W/AUTO DIFF WBC: CPT | Performed by: NURSE PRACTITIONER

## 2022-04-27 ENCOUNTER — CARE AT HOME (OUTPATIENT)
Dept: HOME HEALTH SERVICES | Facility: CLINIC | Age: 87
End: 2022-04-27
Payer: MEDICARE

## 2022-04-27 DIAGNOSIS — R79.89 ELEVATED LFTS: ICD-10-CM

## 2022-04-27 DIAGNOSIS — I50.32 CHRONIC DIASTOLIC CONGESTIVE HEART FAILURE: Primary | ICD-10-CM

## 2022-04-27 DIAGNOSIS — J44.1 CHRONIC OBSTRUCTIVE PULMONARY DISEASE WITH ACUTE EXACERBATION: ICD-10-CM

## 2022-04-27 PROCEDURE — 99442 PR PHYSICIAN TELEPHONE EVALUATION 11-20 MIN: ICD-10-PCS | Mod: 95,,, | Performed by: NURSE PRACTITIONER

## 2022-04-27 PROCEDURE — 99442 PR PHYSICIAN TELEPHONE EVALUATION 11-20 MIN: CPT | Mod: 95,,, | Performed by: NURSE PRACTITIONER

## 2022-05-02 ENCOUNTER — LAB VISIT (OUTPATIENT)
Dept: LAB | Facility: HOSPITAL | Age: 87
End: 2022-05-02
Attending: STUDENT IN AN ORGANIZED HEALTH CARE EDUCATION/TRAINING PROGRAM
Payer: MEDICARE

## 2022-05-02 DIAGNOSIS — G93.41 METABOLIC ENCEPHALOPATHY: Primary | ICD-10-CM

## 2022-05-02 LAB
ALBUMIN SERPL BCP-MCNC: 2.7 G/DL (ref 3.5–5.2)
ALP SERPL-CCNC: 321 U/L (ref 55–135)
ALT SERPL W/O P-5'-P-CCNC: 30 U/L (ref 10–44)
ANION GAP SERPL CALC-SCNC: 9 MMOL/L (ref 8–16)
AST SERPL-CCNC: 31 U/L (ref 10–40)
BASOPHILS # BLD AUTO: 0.05 K/UL (ref 0–0.2)
BASOPHILS NFR BLD: 0.5 % (ref 0–1.9)
BILIRUB SERPL-MCNC: 1.1 MG/DL (ref 0.1–1)
BUN SERPL-MCNC: 23 MG/DL (ref 10–30)
CALCIUM SERPL-MCNC: 9 MG/DL (ref 8.7–10.5)
CHLORIDE SERPL-SCNC: 89 MMOL/L (ref 95–110)
CO2 SERPL-SCNC: 39 MMOL/L (ref 23–29)
CREAT SERPL-MCNC: 0.9 MG/DL (ref 0.5–1.4)
DIFFERENTIAL METHOD: ABNORMAL
EOSINOPHIL # BLD AUTO: 0.2 K/UL (ref 0–0.5)
EOSINOPHIL NFR BLD: 2 % (ref 0–8)
ERYTHROCYTE [DISTWIDTH] IN BLOOD BY AUTOMATED COUNT: 16.6 % (ref 11.5–14.5)
EST. GFR  (AFRICAN AMERICAN): >60 ML/MIN/1.73 M^2
EST. GFR  (NON AFRICAN AMERICAN): 55.3 ML/MIN/1.73 M^2
GLUCOSE SERPL-MCNC: 105 MG/DL (ref 70–110)
HCT VFR BLD AUTO: 35.3 % (ref 37–48.5)
HGB BLD-MCNC: 11.3 G/DL (ref 12–16)
IMM GRANULOCYTES # BLD AUTO: 0.08 K/UL (ref 0–0.04)
IMM GRANULOCYTES NFR BLD AUTO: 0.8 % (ref 0–0.5)
LYMPHOCYTES # BLD AUTO: 1 K/UL (ref 1–4.8)
LYMPHOCYTES NFR BLD: 9.6 % (ref 18–48)
MCH RBC QN AUTO: 31.9 PG (ref 27–31)
MCHC RBC AUTO-ENTMCNC: 32 G/DL (ref 32–36)
MCV RBC AUTO: 100 FL (ref 82–98)
MONOCYTES # BLD AUTO: 0.9 K/UL (ref 0.3–1)
MONOCYTES NFR BLD: 9.5 % (ref 4–15)
NEUTROPHILS # BLD AUTO: 7.7 K/UL (ref 1.8–7.7)
NEUTROPHILS NFR BLD: 77.6 % (ref 38–73)
NRBC BLD-RTO: 0 /100 WBC
PLATELET # BLD AUTO: 201 K/UL (ref 150–450)
PMV BLD AUTO: 10.5 FL (ref 9.2–12.9)
POTASSIUM SERPL-SCNC: 4.4 MMOL/L (ref 3.5–5.1)
PROT SERPL-MCNC: 6 G/DL (ref 6–8.4)
RBC # BLD AUTO: 3.54 M/UL (ref 4–5.4)
SODIUM SERPL-SCNC: 137 MMOL/L (ref 136–145)
WBC # BLD AUTO: 9.93 K/UL (ref 3.9–12.7)

## 2022-05-02 PROCEDURE — 85025 COMPLETE CBC W/AUTO DIFF WBC: CPT | Performed by: STUDENT IN AN ORGANIZED HEALTH CARE EDUCATION/TRAINING PROGRAM

## 2022-05-02 PROCEDURE — 80053 COMPREHEN METABOLIC PANEL: CPT | Performed by: STUDENT IN AN ORGANIZED HEALTH CARE EDUCATION/TRAINING PROGRAM

## 2022-05-02 NOTE — PROGRESS NOTES
Established Patient - Audio Only Telehealth Visit     The patient location is: Home, [place], [place]  The chief complaint leading to consultation is: weight gain, SOB, LFTs  Visit type: Virtual visit with audio only (telephone)  Total time spent with patient: 20 min       The reason for the audio only service rather than synchronous audio and video virtual visit was related to technical difficulties or patient preference/necessity.     Each patient to whom I provide medical services by telemedicine is:  (1) informed of the relationship between the physician and patient and the respective role of any other health care provider with respect to management of the patient; and (2) notified that they may decline to receive medical services by telemedicine and may withdraw from such care at any time. Patient verbally consented to receive this service via voice-only telephone call.       HPI:   Past Medical History:   Diagnosis Date    Acute on chronic congestive heart failure 7/6/2019    Cardiomyopathy     Carotid artery occlusion     CHF (congestive heart failure)     COPD (chronic obstructive pulmonary disease)     Coronary artery disease     Hyperlipidemia     Hypertension      Current Outpatient Medications on File Prior to Visit   Medication Sig Dispense Refill    acetaminophen (TYLENOL) 500 MG tablet Take 500 mg by mouth daily as needed (BACK PAIN).      aspirin 81 mg Tab Take 1 tablet by mouth once daily.       budesonide-formoterol 160-4.5 mcg (SYMBICORT) 160-4.5 mcg/actuation HFAA Take 2 puffs by mouth every 12 (twelve) hours.      ferrous sulfate 325 (65 FE) MG EC tablet Take 1 tablet (325 mg total) by mouth once daily.  0    formoterol (PERFOROMIST) 20 mcg/2 mL Nebu Take 2 mLs (20 mcg total) by nebulization 2 (two) times a day. Controller 360 mL 3    guaiFENesin (MUCINEX) 600 mg 12 hr tablet Take 600 mg by mouth 2 (two) times daily as needed for Congestion.      Lactobacillus rhamnosus GG  (CULTURELLE) 10 billion cell capsule Take 1 capsule by mouth once daily.      levalbuterol (XOPENEX) 0.63 mg/3 mL nebulizer solution Take 3 mLs (0.63 mg total) by nebulization every 4 (four) hours as needed for Wheezing or Shortness of Breath. Rescue 3 mL 1    MELATONIN ORAL Take 3 mg by mouth nightly as needed (sleep).      metoprolol tartrate (LOPRESSOR) 25 MG tablet Take 12.5 mg by mouth 2 (two) times daily.      multivitamin (THERAGRAN) per tablet Take 1 tablet by mouth once daily.      pantoprazole (PROTONIX) 40 MG tablet Take 40 mg by mouth every morning.      polyethylene glycol (GLYCOLAX) 17 gram/dose powder Take 17 g by mouth daily as needed (CONSTIPATION).      potassium chloride (MICRO-K) 10 MEQ CpSR Take 10 mEq by mouth once daily.      tetrahydrozoline 0.05% (VISION CLEAR) 0.05 % Drop PLACE 1 DROP INTO AFFECTED EYE(S) AS NEEDED FOR REDNESS OR ITCHING      tiotropium bromide (SPIRIVA RESPIMAT) 2.5 mcg/actuation inhaler Inhale 2 puffs into the lungs Daily. Controller 4 g 11    triamcinolone (NASACORT) 55 mcg nasal inhaler 2 sprays by Nasal route once daily.      [DISCONTINUED] albuterol (PROAIR HFA) 90 mcg/actuation inhaler Inhale 1 puff into the lungs every 6 (six) hours as needed for Wheezing or Shortness of Breath. Rescue (Patient not taking: Reported on 3/16/2022) 1 Inhaler 5    [DISCONTINUED] baclofen (LIORESAL) 10 MG tablet TK 1 T PO TID      [DISCONTINUED] bumetanide (BUMEX) 1 MG tablet Take 1 tablet (1 mg total) by mouth 2 (two) times daily. 60 tablet 3     No current facility-administered medications on file prior to visit.     CMP  Sodium   Date Value Ref Range Status   04/25/2022 139 136 - 145 mmol/L Final     Potassium   Date Value Ref Range Status   04/25/2022 3.7 3.5 - 5.1 mmol/L Final     Chloride   Date Value Ref Range Status   04/25/2022 92 (L) 95 - 110 mmol/L Final     CO2   Date Value Ref Range Status   04/25/2022 36 (H) 23 - 29 mmol/L Final     Glucose   Date Value Ref  Range Status   04/25/2022 71 70 - 110 mg/dL Final     BUN   Date Value Ref Range Status   04/25/2022 23 10 - 30 mg/dL Final   09/17/2019 13 7 - 21 mg/dL Final     Creatinine   Date Value Ref Range Status   04/25/2022 1.0 0.5 - 1.4 mg/dL Final     Calcium   Date Value Ref Range Status   04/25/2022 9.7 8.7 - 10.5 mg/dL Final     Total Protein   Date Value Ref Range Status   04/25/2022 6.1 6.0 - 8.4 g/dL Final     Albumin   Date Value Ref Range Status   04/25/2022 2.4 (L) 3.5 - 5.2 g/dL Final     Total Bilirubin   Date Value Ref Range Status   04/25/2022 3.1 (H) 0.1 - 1.0 mg/dL Final     Comment:     For infants and newborns, interpretation of results should be based  on gestational age, weight and in agreement with clinical  observations.    Premature Infant recommended reference ranges:  Up to 24 hours.............<8.0 mg/dL  Up to 48 hours............<12.0 mg/dL  3-5 days..................<15.0 mg/dL  6-29 days.................<15.0 mg/dL       Alkaline Phosphatase   Date Value Ref Range Status   04/25/2022 658 (H) 55 - 135 U/L Final     AST   Date Value Ref Range Status   04/25/2022 215 (H) 10 - 40 U/L Final     ALT   Date Value Ref Range Status   04/25/2022 160 (H) 10 - 44 U/L Final     Anion Gap   Date Value Ref Range Status   04/25/2022 11 8 - 16 mmol/L Final   09/17/2019 16 9 - 18 mEq/L Final     eGFR if    Date Value Ref Range Status   04/25/2022 56.1 (A) >60 mL/min/1.73 m^2 Final     eGFR if non    Date Value Ref Range Status   04/25/2022 48.7 (A) >60 mL/min/1.73 m^2 Final     Comment:     Calculation used to obtain the estimated glomerular filtration  rate (eGFR) is the CKD-EPI equation.        Pt and daughter on the phone call today. Pt is very [place] therefore daughter answers most questions. Pt's weight was increasing and edema is worsening to lower legs. Daughter has recently added a afternoon prn dose of Lasix 20mg to regimen and pt has had a 3 lb wt loss, edema continues  but is somewhat improved. Daughter finds her more alert and interacting with family more over past 2 days with increased Lasix. Pt has also had a chronic cough for a few weeks. Daughter reports that is improving with allergy medications. She is being seen by ENT.  Reviewed recent labs with pt. Continued low albumin, possibly contributing to edema.  LFTs elevated, discussed possible multiorgan failure, fluid overload. Discussed end of life goals, and level of aggressiveness that they would like for her mother. She will discuss with her mom and siblings.     Assessment and plan:       1. Chronic diastolic congestive heart failure     2. Chronic obstructive pulmonary disease with acute exacerbation     3. Elevated LFTs     - Continue all medications, treatments and therapies as ordered.   - Follow all instructions, recommendations as discussed.  - Maintain Safety Precautions at all times.  - Attend all medical appointments as scheduled.  - For worsening symptoms: call Primary Care Physician or Nurse Practitioner.  - For emergencies, call 911 or immediately report to the nearest emergency room.  - Limit Risks of environmental exposure to coronavirus/COVID-19 as discussed including: social distancing, hand hygiene, and limiting departures from the home for necessities only.     Questions elicited and answered.  Contact information provided for any changes in condition or concerns                       This service was not originating from a related E/M service provided within the previous 7 days nor will  to an E/M service or procedure within the next 24 hours or my soonest available appointment.  Prevailing standard of care was able to be met in this audio-only visit.

## 2022-05-09 ENCOUNTER — LAB VISIT (OUTPATIENT)
Dept: LAB | Facility: HOSPITAL | Age: 87
End: 2022-05-09
Attending: STUDENT IN AN ORGANIZED HEALTH CARE EDUCATION/TRAINING PROGRAM
Payer: MEDICARE

## 2022-05-09 DIAGNOSIS — G93.41 METABOLIC ENCEPHALOPATHY: Primary | ICD-10-CM

## 2022-05-09 LAB
ALBUMIN SERPL BCP-MCNC: 3 G/DL (ref 3.5–5.2)
ALP SERPL-CCNC: 204 U/L (ref 55–135)
ALT SERPL W/O P-5'-P-CCNC: 22 U/L (ref 10–44)
ANION GAP SERPL CALC-SCNC: 18 MMOL/L (ref 8–16)
AST SERPL-CCNC: 43 U/L (ref 10–40)
BASOPHILS # BLD AUTO: 0.05 K/UL (ref 0–0.2)
BASOPHILS NFR BLD: 0.6 % (ref 0–1.9)
BILIRUB SERPL-MCNC: 0.9 MG/DL (ref 0.1–1)
BUN SERPL-MCNC: 21 MG/DL (ref 10–30)
CALCIUM SERPL-MCNC: 9.8 MG/DL (ref 8.7–10.5)
CHLORIDE SERPL-SCNC: 91 MMOL/L (ref 95–110)
CO2 SERPL-SCNC: 30 MMOL/L (ref 23–29)
CREAT SERPL-MCNC: 1.2 MG/DL (ref 0.5–1.4)
DIFFERENTIAL METHOD: ABNORMAL
EOSINOPHIL # BLD AUTO: 0.2 K/UL (ref 0–0.5)
EOSINOPHIL NFR BLD: 1.7 % (ref 0–8)
ERYTHROCYTE [DISTWIDTH] IN BLOOD BY AUTOMATED COUNT: 16.5 % (ref 11.5–14.5)
EST. GFR  (AFRICAN AMERICAN): 45 ML/MIN/1.73 M^2
EST. GFR  (NON AFRICAN AMERICAN): 39.1 ML/MIN/1.73 M^2
GLUCOSE SERPL-MCNC: 112 MG/DL (ref 70–110)
HCT VFR BLD AUTO: 37.1 % (ref 37–48.5)
HGB BLD-MCNC: 11.9 G/DL (ref 12–16)
IMM GRANULOCYTES # BLD AUTO: 0.08 K/UL (ref 0–0.04)
IMM GRANULOCYTES NFR BLD AUTO: 0.9 % (ref 0–0.5)
LYMPHOCYTES # BLD AUTO: 0.7 K/UL (ref 1–4.8)
LYMPHOCYTES NFR BLD: 7.7 % (ref 18–48)
MCH RBC QN AUTO: 32.2 PG (ref 27–31)
MCHC RBC AUTO-ENTMCNC: 32.1 G/DL (ref 32–36)
MCV RBC AUTO: 100 FL (ref 82–98)
MONOCYTES # BLD AUTO: 0.7 K/UL (ref 0.3–1)
MONOCYTES NFR BLD: 8.2 % (ref 4–15)
NEUTROPHILS # BLD AUTO: 7.4 K/UL (ref 1.8–7.7)
NEUTROPHILS NFR BLD: 80.9 % (ref 38–73)
NRBC BLD-RTO: 0 /100 WBC
PLATELET # BLD AUTO: 186 K/UL (ref 150–450)
PMV BLD AUTO: 10.7 FL (ref 9.2–12.9)
POTASSIUM SERPL-SCNC: 4.4 MMOL/L (ref 3.5–5.1)
PROT SERPL-MCNC: 6.9 G/DL (ref 6–8.4)
RBC # BLD AUTO: 3.7 M/UL (ref 4–5.4)
SODIUM SERPL-SCNC: 139 MMOL/L (ref 136–145)
WBC # BLD AUTO: 9.07 K/UL (ref 3.9–12.7)

## 2022-05-09 PROCEDURE — 85025 COMPLETE CBC W/AUTO DIFF WBC: CPT | Performed by: STUDENT IN AN ORGANIZED HEALTH CARE EDUCATION/TRAINING PROGRAM

## 2022-05-09 PROCEDURE — 80053 COMPREHEN METABOLIC PANEL: CPT | Performed by: STUDENT IN AN ORGANIZED HEALTH CARE EDUCATION/TRAINING PROGRAM

## 2022-05-13 ENCOUNTER — DOCUMENT SCAN (OUTPATIENT)
Dept: HOME HEALTH SERVICES | Facility: HOSPITAL | Age: 87
End: 2022-05-13
Payer: MEDICARE

## 2022-05-18 ENCOUNTER — DOCUMENT SCAN (OUTPATIENT)
Dept: HOME HEALTH SERVICES | Facility: HOSPITAL | Age: 87
End: 2022-05-18
Payer: MEDICARE

## 2022-05-20 ENCOUNTER — DOCUMENT SCAN (OUTPATIENT)
Dept: HOME HEALTH SERVICES | Facility: HOSPITAL | Age: 87
End: 2022-05-20
Payer: MEDICARE

## 2022-05-21 ENCOUNTER — HOSPITAL ENCOUNTER (INPATIENT)
Facility: HOSPITAL | Age: 87
LOS: 2 days | Discharge: HOME HEALTH | DRG: 280 | End: 2022-05-25
Attending: EMERGENCY MEDICINE | Admitting: EMERGENCY MEDICINE
Payer: MEDICARE

## 2022-05-21 DIAGNOSIS — N18.30 STAGE 3 CHRONIC KIDNEY DISEASE, UNSPECIFIED WHETHER STAGE 3A OR 3B CKD: ICD-10-CM

## 2022-05-21 DIAGNOSIS — I73.9 PERIPHERAL VASCULAR DISEASE: ICD-10-CM

## 2022-05-21 DIAGNOSIS — J44.9 CHRONIC OBSTRUCTIVE PULMONARY DISEASE, UNSPECIFIED COPD TYPE: ICD-10-CM

## 2022-05-21 DIAGNOSIS — R07.9 CHEST PAIN: ICD-10-CM

## 2022-05-21 DIAGNOSIS — F41.9 ANXIETY: ICD-10-CM

## 2022-05-21 DIAGNOSIS — I50.32 CHRONIC DIASTOLIC CONGESTIVE HEART FAILURE: ICD-10-CM

## 2022-05-21 DIAGNOSIS — N30.00 ACUTE CYSTITIS WITHOUT HEMATURIA: ICD-10-CM

## 2022-05-21 DIAGNOSIS — J81.0 ACUTE PULMONARY EDEMA: Primary | ICD-10-CM

## 2022-05-21 DIAGNOSIS — E87.3 METABOLIC ALKALOSIS: ICD-10-CM

## 2022-05-21 LAB
ALBUMIN SERPL BCP-MCNC: 2.8 G/DL (ref 3.5–5.2)
ALP SERPL-CCNC: 131 U/L (ref 55–135)
ALT SERPL W/O P-5'-P-CCNC: 20 U/L (ref 10–44)
ANION GAP SERPL CALC-SCNC: 9 MMOL/L (ref 8–16)
AST SERPL-CCNC: 28 U/L (ref 10–40)
BACTERIA #/AREA URNS AUTO: ABNORMAL /HPF
BASOPHILS # BLD AUTO: 0.05 K/UL (ref 0–0.2)
BASOPHILS NFR BLD: 0.5 % (ref 0–1.9)
BILIRUB SERPL-MCNC: 0.8 MG/DL (ref 0.1–1)
BILIRUB UR QL STRIP: NEGATIVE
BNP SERPL-MCNC: 3513 PG/ML (ref 0–99)
BUN SERPL-MCNC: 22 MG/DL (ref 10–30)
CALCIUM SERPL-MCNC: 9 MG/DL (ref 8.7–10.5)
CHLORIDE SERPL-SCNC: 94 MMOL/L (ref 95–110)
CLARITY UR REFRACT.AUTO: ABNORMAL
CO2 SERPL-SCNC: 37 MMOL/L (ref 23–29)
COLOR UR AUTO: YELLOW
CREAT SERPL-MCNC: 0.9 MG/DL (ref 0.5–1.4)
CTP QC/QA: YES
DIFFERENTIAL METHOD: ABNORMAL
EOSINOPHIL # BLD AUTO: 0.3 K/UL (ref 0–0.5)
EOSINOPHIL NFR BLD: 3 % (ref 0–8)
ERYTHROCYTE [DISTWIDTH] IN BLOOD BY AUTOMATED COUNT: 16.2 % (ref 11.5–14.5)
EST. GFR  (AFRICAN AMERICAN): >60 ML/MIN/1.73 M^2
EST. GFR  (NON AFRICAN AMERICAN): 55.3 ML/MIN/1.73 M^2
FERRITIN SERPL-MCNC: 1777 NG/ML (ref 20–300)
GLUCOSE SERPL-MCNC: 104 MG/DL (ref 70–110)
GLUCOSE UR QL STRIP: NEGATIVE
HCT VFR BLD AUTO: 35.7 % (ref 37–48.5)
HGB BLD-MCNC: 11.3 G/DL (ref 12–16)
HGB UR QL STRIP: NEGATIVE
IMM GRANULOCYTES # BLD AUTO: 0.05 K/UL (ref 0–0.04)
IMM GRANULOCYTES NFR BLD AUTO: 0.5 % (ref 0–0.5)
IRON SERPL-MCNC: 33 UG/DL (ref 30–160)
KETONES UR QL STRIP: NEGATIVE
LEUKOCYTE ESTERASE UR QL STRIP: ABNORMAL
LYMPHOCYTES # BLD AUTO: 2 K/UL (ref 1–4.8)
LYMPHOCYTES NFR BLD: 20.4 % (ref 18–48)
MCH RBC QN AUTO: 32.4 PG (ref 27–31)
MCHC RBC AUTO-ENTMCNC: 31.7 G/DL (ref 32–36)
MCV RBC AUTO: 102 FL (ref 82–98)
MICROSCOPIC COMMENT: ABNORMAL
MONOCYTES # BLD AUTO: 1 K/UL (ref 0.3–1)
MONOCYTES NFR BLD: 10 % (ref 4–15)
NEUTROPHILS # BLD AUTO: 6.3 K/UL (ref 1.8–7.7)
NEUTROPHILS NFR BLD: 65.6 % (ref 38–73)
NITRITE UR QL STRIP: NEGATIVE
NRBC BLD-RTO: 0 /100 WBC
PH UR STRIP: 7 [PH] (ref 5–8)
PLATELET # BLD AUTO: 147 K/UL (ref 150–450)
PMV BLD AUTO: 10.3 FL (ref 9.2–12.9)
POTASSIUM SERPL-SCNC: 3.5 MMOL/L (ref 3.5–5.1)
PROT SERPL-MCNC: 6.3 G/DL (ref 6–8.4)
PROT UR QL STRIP: NEGATIVE
RBC # BLD AUTO: 3.49 M/UL (ref 4–5.4)
RBC #/AREA URNS AUTO: 1 /HPF (ref 0–4)
SARS-COV-2 RDRP RESP QL NAA+PROBE: NEGATIVE
SATURATED IRON: 11 % (ref 20–50)
SODIUM SERPL-SCNC: 140 MMOL/L (ref 136–145)
SP GR UR STRIP: 1 (ref 1–1.03)
SQUAMOUS #/AREA URNS AUTO: 1 /HPF
TOTAL IRON BINDING CAPACITY: 299 UG/DL (ref 250–450)
TRANSFERRIN SERPL-MCNC: 202 MG/DL (ref 200–375)
TROPONIN I SERPL DL<=0.01 NG/ML-MCNC: 0.14 NG/ML (ref 0–0.03)
URN SPEC COLLECT METH UR: ABNORMAL
WBC # BLD AUTO: 9.56 K/UL (ref 3.9–12.7)
WBC #/AREA URNS AUTO: 21 /HPF (ref 0–5)

## 2022-05-21 PROCEDURE — 83880 ASSAY OF NATRIURETIC PEPTIDE: CPT | Performed by: EMERGENCY MEDICINE

## 2022-05-21 PROCEDURE — 99284 EMERGENCY DEPT VISIT MOD MDM: CPT | Mod: CS,,, | Performed by: EMERGENCY MEDICINE

## 2022-05-21 PROCEDURE — 84484 ASSAY OF TROPONIN QUANT: CPT | Mod: 91 | Performed by: EMERGENCY MEDICINE

## 2022-05-21 PROCEDURE — 87186 SC STD MICRODIL/AGAR DIL: CPT | Performed by: EMERGENCY MEDICINE

## 2022-05-21 PROCEDURE — 96376 TX/PRO/DX INJ SAME DRUG ADON: CPT

## 2022-05-21 PROCEDURE — G0378 HOSPITAL OBSERVATION PER HR: HCPCS

## 2022-05-21 PROCEDURE — 84466 ASSAY OF TRANSFERRIN: CPT | Performed by: STUDENT IN AN ORGANIZED HEALTH CARE EDUCATION/TRAINING PROGRAM

## 2022-05-21 PROCEDURE — 99284 PR EMERGENCY DEPT VISIT,LEVEL IV: ICD-10-PCS | Mod: CS,,, | Performed by: EMERGENCY MEDICINE

## 2022-05-21 PROCEDURE — 63600175 PHARM REV CODE 636 W HCPCS: Performed by: EMERGENCY MEDICINE

## 2022-05-21 PROCEDURE — 80053 COMPREHEN METABOLIC PANEL: CPT | Performed by: EMERGENCY MEDICINE

## 2022-05-21 PROCEDURE — 93010 ELECTROCARDIOGRAM REPORT: CPT | Mod: ,,, | Performed by: INTERNAL MEDICINE

## 2022-05-21 PROCEDURE — 81001 URINALYSIS AUTO W/SCOPE: CPT | Performed by: EMERGENCY MEDICINE

## 2022-05-21 PROCEDURE — 25000003 PHARM REV CODE 250: Performed by: STUDENT IN AN ORGANIZED HEALTH CARE EDUCATION/TRAINING PROGRAM

## 2022-05-21 PROCEDURE — 25000242 PHARM REV CODE 250 ALT 637 W/ HCPCS: Performed by: STUDENT IN AN ORGANIZED HEALTH CARE EDUCATION/TRAINING PROGRAM

## 2022-05-21 PROCEDURE — 99285 EMERGENCY DEPT VISIT HI MDM: CPT | Mod: 25

## 2022-05-21 PROCEDURE — 87077 CULTURE AEROBIC IDENTIFY: CPT | Performed by: EMERGENCY MEDICINE

## 2022-05-21 PROCEDURE — 87088 URINE BACTERIA CULTURE: CPT | Performed by: EMERGENCY MEDICINE

## 2022-05-21 PROCEDURE — U0002 COVID-19 LAB TEST NON-CDC: HCPCS | Performed by: EMERGENCY MEDICINE

## 2022-05-21 PROCEDURE — 36415 COLL VENOUS BLD VENIPUNCTURE: CPT | Performed by: STUDENT IN AN ORGANIZED HEALTH CARE EDUCATION/TRAINING PROGRAM

## 2022-05-21 PROCEDURE — 84484 ASSAY OF TROPONIN QUANT: CPT | Mod: 91 | Performed by: STUDENT IN AN ORGANIZED HEALTH CARE EDUCATION/TRAINING PROGRAM

## 2022-05-21 PROCEDURE — 93010 EKG 12-LEAD: ICD-10-PCS | Mod: ,,, | Performed by: INTERNAL MEDICINE

## 2022-05-21 PROCEDURE — 85025 COMPLETE CBC W/AUTO DIFF WBC: CPT | Performed by: EMERGENCY MEDICINE

## 2022-05-21 PROCEDURE — 87086 URINE CULTURE/COLONY COUNT: CPT | Performed by: EMERGENCY MEDICINE

## 2022-05-21 PROCEDURE — 82728 ASSAY OF FERRITIN: CPT | Performed by: STUDENT IN AN ORGANIZED HEALTH CARE EDUCATION/TRAINING PROGRAM

## 2022-05-21 PROCEDURE — 94640 AIRWAY INHALATION TREATMENT: CPT

## 2022-05-21 PROCEDURE — 99220 PR INITIAL OBSERVATION CARE,LEVL III: ICD-10-PCS | Mod: ,,, | Performed by: STUDENT IN AN ORGANIZED HEALTH CARE EDUCATION/TRAINING PROGRAM

## 2022-05-21 PROCEDURE — 25000003 PHARM REV CODE 250: Performed by: EMERGENCY MEDICINE

## 2022-05-21 PROCEDURE — 94761 N-INVAS EAR/PLS OXIMETRY MLT: CPT

## 2022-05-21 PROCEDURE — 96375 TX/PRO/DX INJ NEW DRUG ADDON: CPT

## 2022-05-21 PROCEDURE — 99220 PR INITIAL OBSERVATION CARE,LEVL III: CPT | Mod: ,,, | Performed by: STUDENT IN AN ORGANIZED HEALTH CARE EDUCATION/TRAINING PROGRAM

## 2022-05-21 PROCEDURE — 93005 ELECTROCARDIOGRAM TRACING: CPT

## 2022-05-21 RX ORDER — FUROSEMIDE 10 MG/ML
40 INJECTION INTRAMUSCULAR; INTRAVENOUS
Status: DISCONTINUED | OUTPATIENT
Start: 2022-05-22 | End: 2022-05-22

## 2022-05-21 RX ORDER — FUROSEMIDE 10 MG/ML
80 INJECTION INTRAMUSCULAR; INTRAVENOUS
Status: DISCONTINUED | OUTPATIENT
Start: 2022-05-21 | End: 2022-05-21

## 2022-05-21 RX ORDER — FLUTICASONE FUROATE AND VILANTEROL 100; 25 UG/1; UG/1
1 POWDER RESPIRATORY (INHALATION) DAILY
Status: DISCONTINUED | OUTPATIENT
Start: 2022-05-21 | End: 2022-05-25 | Stop reason: HOSPADM

## 2022-05-21 RX ORDER — HYDROXYZINE HYDROCHLORIDE 10 MG/1
10 TABLET, FILM COATED ORAL 3 TIMES DAILY PRN
Status: DISCONTINUED | OUTPATIENT
Start: 2022-05-21 | End: 2022-05-25 | Stop reason: HOSPADM

## 2022-05-21 RX ORDER — NAPROXEN SODIUM 220 MG/1
81 TABLET, FILM COATED ORAL DAILY
Status: DISCONTINUED | OUTPATIENT
Start: 2022-05-22 | End: 2022-05-25 | Stop reason: HOSPADM

## 2022-05-21 RX ORDER — FUROSEMIDE 10 MG/ML
80 INJECTION INTRAMUSCULAR; INTRAVENOUS
Status: COMPLETED | OUTPATIENT
Start: 2022-05-21 | End: 2022-05-21

## 2022-05-21 RX ORDER — ONDANSETRON 4 MG/1
4 TABLET, ORALLY DISINTEGRATING ORAL EVERY 8 HOURS PRN
Status: DISCONTINUED | OUTPATIENT
Start: 2022-05-21 | End: 2022-05-25 | Stop reason: HOSPADM

## 2022-05-21 RX ORDER — TALC
6 POWDER (GRAM) TOPICAL NIGHTLY PRN
Status: DISCONTINUED | OUTPATIENT
Start: 2022-05-21 | End: 2022-05-25 | Stop reason: HOSPADM

## 2022-05-21 RX ORDER — PANTOPRAZOLE SODIUM 40 MG/1
40 TABLET, DELAYED RELEASE ORAL EVERY MORNING
Status: DISCONTINUED | OUTPATIENT
Start: 2022-05-21 | End: 2022-05-25 | Stop reason: HOSPADM

## 2022-05-21 RX ORDER — SODIUM CHLORIDE 0.9 % (FLUSH) 0.9 %
10 SYRINGE (ML) INJECTION
Status: DISCONTINUED | OUTPATIENT
Start: 2022-05-21 | End: 2022-05-25 | Stop reason: HOSPADM

## 2022-05-21 RX ORDER — FUROSEMIDE 10 MG/ML
40 INJECTION INTRAMUSCULAR; INTRAVENOUS
Status: COMPLETED | OUTPATIENT
Start: 2022-05-21 | End: 2022-05-21

## 2022-05-21 RX ORDER — LEVALBUTEROL INHALATION SOLUTION 0.63 MG/3ML
1 SOLUTION RESPIRATORY (INHALATION) EVERY 4 HOURS PRN
Status: DISCONTINUED | OUTPATIENT
Start: 2022-05-21 | End: 2022-05-22

## 2022-05-21 RX ORDER — GUAIFENESIN 600 MG/1
600 TABLET, EXTENDED RELEASE ORAL 2 TIMES DAILY
Status: DISCONTINUED | OUTPATIENT
Start: 2022-05-21 | End: 2022-05-25 | Stop reason: HOSPADM

## 2022-05-21 RX ORDER — NAPROXEN SODIUM 220 MG/1
81 TABLET, FILM COATED ORAL DAILY
Status: DISCONTINUED | OUTPATIENT
Start: 2022-05-21 | End: 2022-05-21

## 2022-05-21 RX ORDER — POLYETHYLENE GLYCOL 3350 17 G/17G
17 POWDER, FOR SOLUTION ORAL 2 TIMES DAILY PRN
Status: DISCONTINUED | OUTPATIENT
Start: 2022-05-21 | End: 2022-05-25 | Stop reason: HOSPADM

## 2022-05-21 RX ORDER — FLUTICASONE PROPIONATE 50 MCG
2 SPRAY, SUSPENSION (ML) NASAL DAILY
Status: DISCONTINUED | OUTPATIENT
Start: 2022-05-22 | End: 2022-05-25 | Stop reason: HOSPADM

## 2022-05-21 RX ORDER — LEVOFLOXACIN 250 MG/1
250 TABLET ORAL DAILY
Status: COMPLETED | OUTPATIENT
Start: 2022-05-21 | End: 2022-05-23

## 2022-05-21 RX ORDER — ASPIRIN 325 MG
325 TABLET ORAL
Status: ACTIVE | OUTPATIENT
Start: 2022-05-21 | End: 2022-05-21

## 2022-05-21 RX ORDER — METOPROLOL TARTRATE 25 MG/1
12.5 TABLET ORAL 2 TIMES DAILY
Status: DISCONTINUED | OUTPATIENT
Start: 2022-05-21 | End: 2022-05-25 | Stop reason: HOSPADM

## 2022-05-21 RX ADMIN — FLUTICASONE FUROATE AND VILANTEROL TRIFENATATE 1 PUFF: 100; 25 POWDER RESPIRATORY (INHALATION) at 01:05

## 2022-05-21 RX ADMIN — FUROSEMIDE 80 MG: 10 INJECTION, SOLUTION INTRAMUSCULAR; INTRAVENOUS at 12:05

## 2022-05-21 RX ADMIN — FUROSEMIDE 80 MG: 10 INJECTION, SOLUTION INTRAMUSCULAR; INTRAVENOUS at 09:05

## 2022-05-21 RX ADMIN — Medication 6 MG: at 10:05

## 2022-05-21 RX ADMIN — THERA TABS 1 TABLET: TAB at 01:05

## 2022-05-21 RX ADMIN — LEVOFLOXACIN 250 MG: 250 TABLET, FILM COATED ORAL at 06:05

## 2022-05-21 RX ADMIN — FUROSEMIDE 40 MG: 10 INJECTION, SOLUTION INTRAMUSCULAR; INTRAVENOUS at 07:05

## 2022-05-21 RX ADMIN — METOPROLOL TARTRATE 12.5 MG: 25 TABLET, FILM COATED ORAL at 10:05

## 2022-05-21 RX ADMIN — PANTOPRAZOLE SODIUM 40 MG: 40 TABLET, DELAYED RELEASE ORAL at 01:05

## 2022-05-21 RX ADMIN — GUAIFENESIN 600 MG: 600 TABLET, EXTENDED RELEASE ORAL at 10:05

## 2022-05-21 NOTE — H&P
"[facility] ([address] Medicine  History & Physical    Patient Name: Venus Mayer  MRN: [id number]  Patient Class: OP- Observation  Admission Date: 5/21/2022  Attending Physician: Lo Guevara MD   Primary Care Provider: Jona Che MD         Patient information was obtained from patient, relative(s) and ER records.     Subjective:     Principal Problem:Acute on chronic congestive heart failure    Chief Complaint:   Chief Complaint   Patient presents with    Shortness of Breath     Woke up SOB about 3am, took rescue inhaler. 90% on 2L home O2. Given 1 duoneb via EMS. 99% on 6L        HPI: Mrs. Donovan is a 89yoF w/PMHx of cardiomyopathy, CHFpEF (60%), pacemaker in place, HLD, HTN, COPD on home 2L NC, vascular dementia who presented to [facility] w/increased wob. Symptoms started 3d ago. Patient normally sleeps at 45 deg incline. Noticed SOB 05/19- increased supplemental oxygen to 3.5L for about 1hr w/improvement of symptoms. Started feeling "shakey" the following day. Woke up on day of admission at 3am w/increased wob and shortness of breath. Patient lives at home, but has 24hrs care with a sitter and also has frequent visits/checks via children.     Upon presentation to ED, RR 34, O2 sat 100% on 2.5L BNC, 152/81mmHg. Labs notable for 11.3, Plt 147, Bicarb 37, Chl 94, BNP 3513, trop 0.145,  UA w/2+ LE and 21 WBC. CXR concerning for "interstitial and alveolar opacities that could relate to edema, infection, noninfectious inflammation, or combination of the above." Received 200mg of IV lasix total while in ED. Admitted to hospital medicine for further workup and evaluation.       Past Medical History:   Diagnosis Date    Acute on chronic congestive heart failure 7/6/2019    Cardiomyopathy     Carotid artery occlusion     CHF (congestive heart failure)     COPD (chronic obstructive pulmonary disease)     Coronary artery disease     Hyperlipidemia     Hypertension        Past " Surgical History:   Procedure Laterality Date    CARDIAC CATHETERIZATION      cardi cath      CAROTID ENDARTERECTOMY      FLEXIBLE BRONCHOSCOPY N/A 7/31/2019    Procedure: BRONCHOSCOPY, FIBEROPTIC Flexible;  Surgeon: Klever Mckeon MD;  Location: [facility] OR 2ND FLR;  Service: Thoracic;  Laterality: N/A;    HIP SURGERY      PLEURODESIS USING TALC N/A 7/31/2019    Procedure: PLEURODESIS;  Surgeon: Klever Mckeon MD;  Location: [facility] OR 2ND FLR;  Service: Thoracic;  Laterality: N/A;    PLEURODESIS USING TALC Right 8/5/2019    Procedure: PLEURODESIS, USING TALC;  Surgeon: Klever Mckeon MD;  Location: [facility] OR 2ND FLR;  Service: Thoracic;  Laterality: Right;    PLEURODESIS WITH VIDEO-ASSISTED THORACOSCOPIC SURGERY (VATS) Right 8/5/2019    Procedure: VATS, WITH PLEURAL TENT;  Surgeon: Klever Mckeon MD;  Location: [facility] OR 2ND FLR;  Service: Thoracic;  Laterality: Right;       Review of patient's allergies indicates:   Allergen Reactions    Ancef in dextrose (iso-osm) Rash    Cefazolin Rash     Tolerating Zosyn admission 3/2022    Cefuroxime Rash    Sulfamethoxazole-trimethoprim Rash     Other reaction(s): Rash       No current facility-administered medications on file prior to encounter.     Current Outpatient Medications on File Prior to Encounter   Medication Sig    acetaminophen (TYLENOL) 500 MG tablet Take 500 mg by mouth daily as needed (BACK PAIN).    aspirin 81 mg Tab Take 1 tablet by mouth once daily.     budesonide-formoterol 160-4.5 mcg (SYMBICORT) 160-4.5 mcg/actuation HFAA Take 2 puffs by mouth every 12 (twelve) hours.    ferrous sulfate 325 (65 FE) MG EC tablet Take 1 tablet (325 mg total) by mouth once daily.    guaiFENesin (MUCINEX) 600 mg 12 hr tablet Take 600 mg by mouth 2 (two) times daily as needed for Congestion.    Lactobacillus rhamnosus GG (CULTURELLE) 10 billion cell capsule Take 1 capsule by mouth once daily.    levalbuterol (XOPENEX) 0.63 mg/3 mL nebulizer  "solution Take 3 mLs (0.63 mg total) by nebulization every 4 (four) hours as needed for Wheezing or Shortness of Breath. Rescue    MELATONIN ORAL Take 3 mg by mouth nightly as needed (sleep).    metoprolol tartrate (LOPRESSOR) 25 MG tablet Take 12.5 mg by mouth 2 (two) times daily.    multivitamin (THERAGRAN) per tablet Take 1 tablet by mouth once daily.    pantoprazole (PROTONIX) 40 MG tablet Take 40 mg by mouth every morning.    polyethylene glycol (GLYCOLAX) 17 gram/dose powder Take 17 g by mouth daily as needed (CONSTIPATION).    potassium chloride (MICRO-K) 10 MEQ CpSR Take 10 mEq by mouth once daily.    tetrahydrozoline 0.05% (VISION CLEAR) 0.05 % Drop PLACE 1 DROP INTO AFFECTED EYE(S) AS NEEDED FOR REDNESS OR ITCHING    tiotropium bromide (SPIRIVA RESPIMAT) 2.5 mcg/actuation inhaler Inhale 2 puffs into the lungs Daily. Controller    triamcinolone (NASACORT) 55 mcg nasal inhaler 2 sprays by Nasal route once daily.    [DISCONTINUED] albuterol (PROAIR HFA) 90 mcg/actuation inhaler Inhale 1 puff into the lungs every 6 (six) hours as needed for Wheezing or Shortness of Breath. Rescue (Patient not taking: Reported on 3/16/2022)    [DISCONTINUED] baclofen (LIORESAL) 10 MG tablet TK 1 T PO TID    [DISCONTINUED] bumetanide (BUMEX) 1 MG tablet Take 1 tablet (1 mg total) by mouth 2 (two) times daily.     Family History       Problem Relation (Age of Onset)    Hypertension Mother          Tobacco Use    Smoking status: Former Smoker     Packs/day: 2.00     Years: 20.00     Pack years: 40.00     Types: Cigarettes     Quit date: 1980     Years since quittin.3    Smokeless tobacco: Never Used   Substance and Sexual Activity    Alcohol use: Yes     Alcohol/week: 2.0 standard drinks     Types: 2 Glasses of wine per week     Comment: social    Drug use: No    Sexual activity: Not Currently     Review of Systems   Constitutional:  Positive for activity change (feeling more "jittery/shaky"). Negative " for appetite change, fatigue and fever.   HENT:  Negative for rhinorrhea and sore throat.    Respiratory:  Positive for shortness of breath. Negative for cough, choking, chest tightness and wheezing.    Cardiovascular:  Negative for chest pain, palpitations and leg swelling.   Gastrointestinal:  Negative for abdominal pain, constipation, diarrhea, nausea and vomiting.   Genitourinary:  Positive for dysuria. Negative for difficulty urinating, flank pain and hematuria.   Musculoskeletal:  Positive for myalgias (BL LE pain). Negative for arthralgias.   Skin:  Positive for rash (hematoma of dorsum of right foot).   Neurological:  Positive for weakness. Negative for speech difficulty, light-headedness and headaches.   Hematological:  Bruises/bleeds easily.   Psychiatric/Behavioral:  Negative for confusion. The patient is nervous/anxious.    All other systems reviewed and are negative.  Objective:     Vital Signs (Most Recent):  Temp: 97.7 °F (36.5 °C) (05/21/22 0532)  Pulse: 104 (05/21/22 1304)  Resp: 20 (05/21/22 1304)  BP: (!) 152/81 (05/21/22 0528)  SpO2: (!) 91 % (05/21/22 1304)   Vital Signs (24h Range):  Temp:  [97.7 °F (36.5 °C)] 97.7 °F (36.5 °C)  Pulse:  [] 104  Resp:  [20-34] 20  SpO2:  [91 %-100 %] 91 %  BP: (152)/(81) 152/81     Weight: 40.8 kg (90 lb)  Body mass index is 17.58 kg/m².    Physical Exam  Gen: in NAD, appears stated age, appears chronically ill, cachetic  Neuro: awake, oriented to self and place, some confusion during interview- hard of hearing, motor, sensory, and strength grossly intact BL  HEENT: NTNC, EOMI, PERRL, MMM  CVS: RRR, no m/r/g; S1/S2 auscultated with no S3 or S4; capillary refill < 2 sec, pacer/paced rhythm  Resp: bibasilar crackles- 2.5L BNC; no belabored breathing or accessory muscle use appreciated   Abd: BS+ in all 4 quadrants; NTND, soft to palpation; no organomegaly appreciated   Extrem: pulses full, equal, and regular over all 4 extremities; no UE or LE edema  BL  Skin: bruising of dorsum of BL feet- Rt>Lt    Significant Labs: All pertinent labs within the past 24 hours have been reviewed.  Blood Culture: No results for input(s): LABBLOO in the last 48 hours.  CBC:   Recent Labs   Lab 05/21/22  0552   WBC 9.56   HGB 11.3*   HCT 35.7*   *     CMP:   Recent Labs   Lab 05/21/22  0552      K 3.5   CL 94*   CO2 37*      BUN 22   CREATININE 0.9   CALCIUM 9.0   PROT 6.3   ALBUMIN 2.8*   BILITOT 0.8   ALKPHOS 131   AST 28   ALT 20   ANIONGAP 9   EGFRNONAA 55.3*     Cardiac Markers:   Recent Labs   Lab 05/21/22  0552   BNP 3,513*     Troponin:   Recent Labs   Lab 05/21/22  0552 05/21/22  0855   TROPONINI 0.143* 0.145*     Urine Culture: No results for input(s): LABURIN in the last 48 hours.  Urine Studies:   Recent Labs   Lab 05/21/22  0845   COLORU Yellow   APPEARANCEUA Hazy*   PHUR 7.0   SPECGRAV 1.005   PROTEINUA Negative   GLUCUA Negative   KETONESU Negative   BILIRUBINUA Negative   OCCULTUA Negative   NITRITE Negative   LEUKOCYTESUR 2+*   RBCUA 1   WBCUA 21*   BACTERIA Rare   SQUAMEPITHEL 1       Significant Imaging: I have reviewed all pertinent imaging results/findings within the past 24 hours.    CXR:  FINDINGS:  Monitoring leads overlie the chest.  Left subclavian approach dual lead pacer.     Grossly unchanged cardiomediastinal contours.  Atherosclerotic calcification of the aorta.  Prominent interstitial and alveolar opacities.  More dense left basilar and retrocardiac opacity.  Small left pleural effusion.  Similar right greater than left apical pleuroparenchymal scarring.     No definite pneumothorax.  No acute findings identified in the visualized abdomen.  Osseous and soft tissue structures appear without definite acute change.     Impression:     Prominent interstitial and alveolar opacities could relate to edema, infection, noninfectious inflammation, or combination of the above.     More dense left basilar retrocardiac opacity could relate to  atelectasis, aspiration or pneumonia.     Small left pleural effusion      Assessment/Plan:     * Acute on chronic congestive heart failure  - Interval history and physical exam findings as described above  - EF 60% per most recent echo 03/2022  - Clinically volume overloaded on admission  - CXR w/edema vs. Infiltrates   - Taking medications as prescribed at home  - S/p 200mg IV lasix in the ED  - Will continue diuresis with 40mg IV lasix BID starting tomorrow, 05/22  - Continue home cardioprudent regimen  - Strict I/Os  - 1L fluid restriction   - Daily weights  - Will continue to monitor on tele        Acute cystitis without hematuria  - Dysuria per patient, UA w/21 WBC, 2+ LE  - Per last UCx- klebsiella- will begin levofloxacin 250mg for 3d in setting of allergies  - Monitor I's and O's  - F/u on UCx      Body mass index (BMI) less than 19  - nutrition consulted       COPD (chronic obstructive pulmonary disease)  - Currently on home oxygen settings  - No concern for COPD exacerbation at this time  - Continue home inhaler regimen  - PRN duonebs provided         Pacemaker  - in place, paced rhythm    Elevated troponin  - likely type 2 NSTEMI from hypoxia  - Bicarb 37- chronic CO2 retainer  - trend trop- repeat this afternoon, no chest pain, no acute ST or T wave changes      Acute on chronic respiratory failure with hypoxia  Patient with Hypercapnic and Hypoxic Respiratory failure which is Acute on chronic.  she is on home oxygen at 2.5 LPM. Supplemental oxygen was provided and noted-  .   Signs/symptoms of respiratory failure include- tachypnea and increased work of breathing. Contributing diagnoses includes - CHF and COPD Labs and images were reviewed. Patient Has not had a recent ABG. Will treat underlying causes and adjust management of respiratory failure as follows-     - Concern for CHF exacerbation  - continuing with IV diuresis  - No wheeze on exam, no cough or change in sputum production or color  - low  suspicion for COPD exacerbation  - starting levofloxacin for suspected UTI- if underlying pna, then this should cover as well  - monitor oxygen saturations    Restless leg syndrome  - check iron studies  - begin IV iron sucrose  - iron def can be associated w/restless leg  - if no improvement, consideration of ropinirole 0.25mg; however, sometimes initiation of this medication can have actual worsening of symptoms in the elderly- must be aware   - concern that restless legs are also a component of anxiety- PRN hydroxyzine added       Anxiety  - hydroxyzine 10mg q8h PRN for anxiety  - I fear that this is contributing to increased wob and restless legs  - consideration of psych consultation       Stage 3 chronic kidney disease  - Cr baseline at admission  - Renally dosing all medications  - Avoid nephrotoxins  - Will continue to monitor on daily labs        VTE Risk Mitigation (From admission, onward)         Ordered     IP VTE HIGH RISK PATIENT  Once         05/21/22 1236     Place sequential compression device  Until discontinued         05/21/22 1236     Place sequential compression device  Until discontinued         05/21/22 1233                   Lo Guevara MD  Department of Hospital Medicine   [facility] - Intensive Care ([place])

## 2022-05-21 NOTE — ASSESSMENT & PLAN NOTE
- Interval history and physical exam findings as described above  - EF 60% per most recent echo 03/2022  - Clinically volume overloaded on admission  - CXR w/edema vs. Infiltrates   - Taking medications as prescribed at home  - S/p 200mg IV lasix in the ED  - Will continue diuresis with 40mg IV lasix BID starting tomorrow, 05/22  - Continue home cardioprudent regimen  - Strict I/Os  - 1L fluid restriction   - Daily weights  - Will continue to monitor on tele

## 2022-05-21 NOTE — ASSESSMENT & PLAN NOTE
- likely type 2 NSTEMI from hypoxia  - Bicarb 37- chronic CO2 retainer  - trend trop- repeat this afternoon, no chest pain, no acute ST or T wave changes

## 2022-05-21 NOTE — ED PROVIDER NOTES
Encounter Date: 5/21/2022       History     Chief Complaint   Patient presents with    Shortness of Breath     Woke up SOB about 3am, took rescue inhaler. 90% on 2L home O2. Given 1 duoneb via EMS. 99% on 6L     HPI     89 yo female, CHF, COPD, CAD, HOCM, presents for SOB. Dtr reports diarrhea x couple episodes that was nonbloody yesterday. SOB started at 3 am this morning, dtr gave her her spiriva without improvement. Dtr reports dry cough x few weeks. No CP. Pt takes an allegra daily. Fried shrimp last night, honey baked ham, hog head cheese yesterday. Dtr reports taking lasix every morning and then afternoon dose every other day depending on her weight if >83 lbs. Last dose yesterday afternoon. Wears 2.5L O2 NC at baseline.       Review of patient's allergies indicates:   Allergen Reactions    Ancef in dextrose (iso-osm) Rash    Cefazolin Rash     Tolerating Zosyn admission 3/2022    Cefuroxime Rash    Sulfamethoxazole-trimethoprim Rash     Other reaction(s): Rash     Past Medical History:   Diagnosis Date    Acute on chronic congestive heart failure 7/6/2019    Cardiomyopathy     Carotid artery occlusion     CHF (congestive heart failure)     COPD (chronic obstructive pulmonary disease)     Coronary artery disease     Hyperlipidemia     Hypertension      Past Surgical History:   Procedure Laterality Date    CARDIAC CATHETERIZATION      cardic cath      CAROTID ENDARTERECTOMY      FLEXIBLE BRONCHOSCOPY N/A 7/31/2019    Procedure: BRONCHOSCOPY, FIBEROPTIC Flexible;  Surgeon: Klever Mckeon MD;  Location: [address];  Service: Thoracic;  Laterality: N/A;    HIP SURGERY      PLEURODESIS USING TALC N/A 7/31/2019    Procedure: PLEURODESIS;  Surgeon: Klever Mckeon MD;  Location: [address];  Service: Thoracic;  Laterality: N/A;    PLEURODESIS USING TALC Right 8/5/2019    Procedure: PLEURODESIS, USING TALC;  Surgeon: Klever Mckeon MD;  Location: [address];  Service:  Thoracic;  Laterality: Right;    PLEURODESIS WITH VIDEO-ASSISTED THORACOSCOPIC SURGERY (VATS) Right 2019    Procedure: VATS, WITH PLEURAL TENT;  Surgeon: Klever Mckeon MD;  Location: [facility] OR [address] [place] [address];  Service: Thoracic;  Laterality: Right;     Family History   Problem Relation Age of Onset    Hypertension Mother      Social History     Tobacco Use    Smoking status: Former Smoker     Packs/day: 2.00     Years: 20.00     Pack years: 40.00     Types: Cigarettes     Quit date: 1980     Years since quittin.3    Smokeless tobacco: Never Used   Substance Use Topics    Alcohol use: Yes     Alcohol/week: 2.0 standard drinks     Types: 2 Glasses of wine per week     Comment: social    Drug use: No     Review of Systems     General: No fever.  No chills.  Eyes: No visual changes.  Head: No headache.    Integument: No rashes or lesions.  Chest: +shortness of breath.  Cardiovascular: No chest pain.  Abdomen: No abdominal pain.  No nausea or vomiting.  Urinary: No abnormal urination.  Neurologic: No focal weakness.  No numbness.  Hematologic: No easy bruising.  Endocrine: No excessive thirst or urination.      Physical Exam     Initial Vitals   BP Pulse Resp Temp SpO2   22 0528 22 0528 22 0528 22 0532 22 0528   (!) 152/81 84 (!) 34 97.7 °F (36.5 °C) 100 %      MAP       --                Physical Exam    Appearance: No acute distress.  HEENT: Normocephalic. Atraumatic. No conjunctival injection. EOMI. PERRL. Oropharynx clear.    Neck: +JVD. No LN. Neck supple.    Chest: Non-tender. Tachypneic. +accessory mm use. Left sided rales at the base. No wheezes.  No rhonchi.  Cardiovascular: Regular rate and rhythm. No murmurs. No gallops. No rubs. +2 radial/DP/DT pulses bilaterally. No LE edema  Abdomen: Soft. Not distended. Nontender. No guarding. No rebound. No Masses  Musculoskeletal: Good range of motion all joints. No deformities.    Neurologic: Alert.  Equal strength in upper  and lower extremities bilaterally. Normal sensation. No facial droop. Normal speech.   Psych:  Appropriate, conversant.    Integumentary: No rashes seen.  Good turgor.  No abrasions.  No ecchymoses.        ED Course   Procedures  Labs Reviewed   CBC W/ AUTO DIFFERENTIAL - Abnormal; Notable for the following components:       Result Value    RBC 3.49 (*)     Hemoglobin 11.3 (*)     Hematocrit 35.7 (*)      (*)     MCH 32.4 (*)     MCHC 31.7 (*)     RDW 16.2 (*)     Platelets 147 (*)     Immature Grans (Abs) 0.05 (*)     All other components within normal limits   COMPREHENSIVE METABOLIC PANEL   TROPONIN I   B-TYPE NATRIURETIC PEPTIDE   URINALYSIS, REFLEX TO URINE CULTURE   SARS-COV-2 RDRP GENE          Imaging Results    None          Medications   aspirin tablet 325 mg (325 mg Oral Not Given 5/21/22 0545)                          Clinical Impression:   Final diagnoses:  [R07.9] Chest pain         89 yo female presents for SOB that started at 3 am this morning waking her from sleep. Increased O2 requirement. Tachypneic. +accessory mm use. Left sided rales at the base. No wheezes.  No rhonchi. +JVD. No LE edema. EKG NSR 98 bpm, old bifasc block, no STEMI. Labs and CXR pending at time of transfer of care to [facility].           Jenny Delong MD  05/21/22 8983

## 2022-05-21 NOTE — HPI
"Mrs. Donovan is a 89yoF w/PMHx of cardiomyopathy, CHFpEF (60%), pacemaker in place, HLD, HTN, COPD on home 2L NC, vascular dementia who presented to [facility] w/increased wob. Symptoms started 3d ago. Patient normally sleeps at 45 deg incline. Noticed SOB 05/19- increased supplemental oxygen to 3.5L for about 1hr w/improvement of symptoms. Started feeling "shakey" the following day. Woke up on day of admission at 3am w/increased wob and shortness of breath. Patient lives at home, but has 24hrs care with a sitter and also has frequent visits/checks via children.     Upon presentation to ED, RR 34, O2 sat 100% on 2.5L BNC, 152/81mmHg. Labs notable for 11.3, Plt 147, Bicarb 37, Chl 94, BNP 3513, trop 0.145,  UA w/2+ LE and 21 WBC. CXR concerning for "interstitial and alveolar opacities that could relate to edema, infection, noninfectious inflammation, or combination of the above." Received 200mg of IV lasix total while in ED. Admitted to hospital medicine for further workup and evaluation.   "

## 2022-05-21 NOTE — ASSESSMENT & PLAN NOTE
- Currently on home oxygen settings  - No concern for COPD exacerbation at this time  - Continue home inhaler regimen  - PRN duonebs provided

## 2022-05-21 NOTE — ASSESSMENT & PLAN NOTE
- hydroxyzine 10mg q8h PRN for anxiety  - I fear that this is contributing to increased wob and restless legs  - consideration of psych consultation

## 2022-05-21 NOTE — SUBJECTIVE & OBJECTIVE
Past Medical History:   Diagnosis Date    Acute on chronic congestive heart failure 7/6/2019    Cardiomyopathy     Carotid artery occlusion     CHF (congestive heart failure)     COPD (chronic obstructive pulmonary disease)     Coronary artery disease     Hyperlipidemia     Hypertension        Past Surgical History:   Procedure Laterality Date    CARDIAC CATHETERIZATION      cardic cath      CAROTID ENDARTERECTOMY      FLEXIBLE BRONCHOSCOPY N/A 7/31/2019    Procedure: BRONCHOSCOPY, FIBEROPTIC Flexible;  Surgeon: Klever Mckeon MD;  [facility];  Service: Thoracic;  Laterality: N/A;    HIP SURGERY      PLEURODESIS USING TALC N/A 7/31/2019    Procedure: PLEURODESIS;  Surgeon: Klever Mckeon MD;  [facility];  Service: Thoracic;  Laterality: N/A;    PLEURODESIS USING TALC Right 8/5/2019    Procedure: PLEURODESIS, USING TALC;  Surgeon: Klever Mckeon MD;  [facility];  Service: Thoracic;  Laterality: Right;    PLEURODESIS WITH VIDEO-ASSISTED THORACOSCOPIC SURGERY (VATS) Right 8/5/2019    Procedure: VATS, WITH PLEURAL TENT;  Surgeon: Klever Mckeon MD;  Location: [facility] OR [address] [place] [address];  Service: Thoracic;  Laterality: Right;       Review of patient's allergies indicates:   Allergen Reactions    Ancef in dextrose (iso-osm) Rash    Cefazolin Rash     Tolerating Zosyn admission 3/2022    Cefuroxime Rash    Sulfamethoxazole-trimethoprim Rash     Other reaction(s): Rash       No current facility-administered medications on file prior to encounter.     Current Outpatient Medications on File Prior to Encounter   Medication Sig    acetaminophen (TYLENOL) 500 MG tablet Take 500 mg by mouth daily as needed (BACK PAIN).    aspirin 81 mg Tab Take 1 tablet by mouth once daily.     budesonide-formoterol 160-4.5 mcg (SYMBICORT) 160-4.5 mcg/actuation HFAA Take 2 puffs by mouth every 12 (twelve) hours.    ferrous sulfate 325 (65 FE) MG EC tablet Take 1 tablet (325 mg total) by mouth  once daily.    guaiFENesin (MUCINEX) 600 mg 12 hr tablet Take 600 mg by mouth 2 (two) times daily as needed for Congestion.    Lactobacillus rhamnosus GG (CULTURELLE) 10 billion cell capsule Take 1 capsule by mouth once daily.    levalbuterol (XOPENEX) 0.63 mg/3 mL nebulizer solution Take 3 mLs (0.63 mg total) by nebulization every 4 (four) hours as needed for Wheezing or Shortness of Breath. Rescue    MELATONIN ORAL Take 3 mg by mouth nightly as needed (sleep).    metoprolol tartrate (LOPRESSOR) 25 MG tablet Take 12.5 mg by mouth 2 (two) times daily.    multivitamin (THERAGRAN) per tablet Take 1 tablet by mouth once daily.    pantoprazole (PROTONIX) 40 MG tablet Take 40 mg by mouth every morning.    polyethylene glycol (GLYCOLAX) 17 gram/dose powder Take 17 g by mouth daily as needed (CONSTIPATION).    potassium chloride (MICRO-K) 10 MEQ CpSR Take 10 mEq by mouth once daily.    tetrahydrozoline 0.05% (VISION CLEAR) 0.05 % Drop PLACE 1 DROP INTO AFFECTED EYE(S) AS NEEDED FOR REDNESS OR ITCHING    tiotropium bromide (SPIRIVA RESPIMAT) 2.5 mcg/actuation inhaler Inhale 2 puffs into the lungs Daily. Controller    triamcinolone (NASACORT) 55 mcg nasal inhaler 2 sprays by Nasal route once daily.    [DISCONTINUED] albuterol (PROAIR HFA) 90 mcg/actuation inhaler Inhale 1 puff into the lungs every 6 (six) hours as needed for Wheezing or Shortness of Breath. Rescue (Patient not taking: Reported on 3/16/2022)    [DISCONTINUED] baclofen (LIORESAL) 10 MG tablet TK 1 T PO TID    [DISCONTINUED] bumetanide (BUMEX) 1 MG tablet Take 1 tablet (1 mg total) by mouth 2 (two) times daily.     Family History       Problem Relation (Age of Onset)    Hypertension Mother          Tobacco Use    Smoking status: Former Smoker     Packs/day: 2.00     Years: 20.00     Pack years: 40.00     Types: Cigarettes     Quit date: 1980     Years since quittin.3    Smokeless tobacco: Never Used   Substance and Sexual Activity    Alcohol use: Yes  "    Alcohol/week: 2.0 standard drinks     Types: 2 Glasses of wine per week     Comment: social    Drug use: No    Sexual activity: Not Currently     Review of Systems   Constitutional:  Positive for activity change (feeling more "jittery/shaky"). Negative for appetite change, fatigue and fever.   HENT:  Negative for rhinorrhea and sore throat.    Respiratory:  Positive for shortness of breath. Negative for cough, choking, chest tightness and wheezing.    Cardiovascular:  Negative for chest pain, palpitations and leg swelling.   Gastrointestinal:  Negative for abdominal pain, constipation, diarrhea, nausea and vomiting.   Genitourinary:  Positive for dysuria. Negative for difficulty urinating, flank pain and hematuria.   Musculoskeletal:  Positive for myalgias (BL LE pain). Negative for arthralgias.   Skin:  Positive for rash (hematoma of dorsum of right foot).   Neurological:  Positive for weakness. Negative for speech difficulty, light-headedness and headaches.   Hematological:  Bruises/bleeds easily.   Psychiatric/Behavioral:  Negative for confusion. The patient is nervous/anxious.    All other systems reviewed and are negative.  Objective:     Vital Signs (Most Recent):  Temp: 97.7 °F (36.5 °C) (05/21/22 0532)  Pulse: 104 (05/21/22 1304)  Resp: 20 (05/21/22 1304)  BP: (!) 152/81 (05/21/22 0528)  SpO2: (!) 91 % (05/21/22 1304)   Vital Signs (24h Range):  Temp:  [97.7 °F (36.5 °C)] 97.7 °F (36.5 °C)  Pulse:  [] 104  Resp:  [20-34] 20  SpO2:  [91 %-100 %] 91 %  BP: (152)/(81) 152/81     Weight: 40.8 kg (90 lb)  Body mass index is 17.58 kg/m².    Physical Exam  Gen: in NAD, appears stated age, appears chronically ill, cachetic  Neuro: awake, oriented to self and place, some confusion during interview- hard of hearing, motor, sensory, and strength grossly intact BL  HEENT: NTNC, EOMI, PERRL, MMM  CVS: RRR, no m/r/g; S1/S2 auscultated with no S3 or S4; capillary refill < 2 sec, pacer/paced rhythm  Resp: " bibasilar crackles- 2.5L BNC; no belabored breathing or accessory muscle use appreciated   Abd: BS+ in all 4 quadrants; NTND, soft to palpation; no organomegaly appreciated   Extrem: pulses full, equal, and regular over all 4 extremities; no UE or LE edema BL  Skin: bruising of dorsum of BL feet- Rt>Lt    Significant Labs: All pertinent labs within the past 24 hours have been reviewed.  Blood Culture: No results for input(s): LABBLOO in the last 48 hours.  CBC:   Recent Labs   Lab 05/21/22  0552   WBC 9.56   HGB 11.3*   HCT 35.7*   *     CMP:   Recent Labs   Lab 05/21/22  0552      K 3.5   CL 94*   CO2 37*      BUN 22   CREATININE 0.9   CALCIUM 9.0   PROT 6.3   ALBUMIN 2.8*   BILITOT 0.8   ALKPHOS 131   AST 28   ALT 20   ANIONGAP 9   EGFRNONAA 55.3*     Cardiac Markers:   Recent Labs   Lab 05/21/22  0552   BNP 3,513*     Troponin:   Recent Labs   Lab 05/21/22  0552 05/21/22  0855   TROPONINI 0.143* 0.145*     Urine Culture: No results for input(s): LABURIN in the last 48 hours.  Urine Studies:   Recent Labs   Lab 05/21/22  0845   COLORU Yellow   APPEARANCEUA Hazy*   PHUR 7.0   SPECGRAV 1.005   PROTEINUA Negative   GLUCUA Negative   KETONESU Negative   BILIRUBINUA Negative   OCCULTUA Negative   NITRITE Negative   LEUKOCYTESUR 2+*   RBCUA 1   WBCUA 21*   BACTERIA Rare   SQUAMEPITHEL 1       Significant Imaging: I have reviewed all pertinent imaging results/findings within the past 24 hours.    CXR:  FINDINGS:  Monitoring leads overlie the chest.  Left subclavian approach dual lead pacer.     Grossly unchanged cardiomediastinal contours.  Atherosclerotic calcification of the aorta.  Prominent interstitial and alveolar opacities.  More dense left basilar and retrocardiac opacity.  Small left pleural effusion.  Similar right greater than left apical pleuroparenchymal scarring.     No definite pneumothorax.  No acute findings identified in the visualized abdomen.  Osseous and soft tissue structures  appear without definite acute change.     Impression:     Prominent interstitial and alveolar opacities could relate to edema, infection, noninfectious inflammation, or combination of the above.     More dense left basilar retrocardiac opacity could relate to atelectasis, aspiration or pneumonia.     Small left pleural effusion

## 2022-05-21 NOTE — ED NOTES
Adult Physical Assessment    pT WAS BROUGHT TO THE ED with c/o SOB, pt's daughter states the pt's SX began just PTA  LOC: Venus Mayer, 89 y.o. female verified via two identifiers.  The patient is awake, alert, oriented and speaking appropriately at this time.  APPEARANCE: Patient resting comfortably and appears to be in no acute distress at this time. Patient is clean and well groomed, patient's clothing is properly fastened.  SKIN:The skin is warm and dry, color consistent with ethnicity, patient has poor skin turgor and moist mucus membranes, skin intact, hematoma tonited to dorsal aspect of left hand and right foot   MUSCULOSKELETAL: Patient moving all extremities well, no obvious swelling or deformities noted.  RESPIRATORY: Airway is open and patent, respirations are spontaneous,, no accessory muscle use noted. RR29, 02 Ssat 85% on RA, 94% ON 4l PER nc  CARDIAC: Patient has a normal rate and rhythm, no periphreal edema noted in any extremity, capillary refill < 3 seconds in all extremities  ABDOMEN: Soft and non tender to palpation, no abdominal distention noted. Bowel sounds present in all four quadrants.  NEUROLOGIC: Eyes open spontaneously, behavior appropriate to situation, follows commands, facial expression symmetrical, bilateral hand grasp equal and even, purposeful motor response noted, normal sensation in all extremities when touched with a finger.

## 2022-05-21 NOTE — PLAN OF CARE
05/21/22 1553   Post-Acute Status   Post-Acute Authorization Home Health   Home Health Status Pending medical clearance/testing   Discharge Delays None known at this time   Discharge Plan   Discharge Plan A Home Health

## 2022-05-21 NOTE — ASSESSMENT & PLAN NOTE
- Dysuria per patient, UA w/21 WBC, 2+ LE  - Per last UCx- klebsiella- will begin levofloxacin 250mg for 3d in setting of allergies  - Monitor I's and O's  - F/u on UCx

## 2022-05-21 NOTE — ASSESSMENT & PLAN NOTE
Patient with Hypercapnic and Hypoxic Respiratory failure which is Acute on chronic.  she is on home oxygen at 2.5 LPM. Supplemental oxygen was provided and noted-  .   Signs/symptoms of respiratory failure include- tachypnea and increased work of breathing. Contributing diagnoses includes - CHF and COPD Labs and images were reviewed. Patient Has not had a recent ABG. Will treat underlying causes and adjust management of respiratory failure as follows-     - Concern for CHF exacerbation  - continuing with IV diuresis  - No wheeze on exam, no cough or change in sputum production or color  - low suspicion for COPD exacerbation  - starting levofloxacin for suspected UTI- if underlying pna, then this should cover as well  - monitor oxygen saturations

## 2022-05-21 NOTE — PLAN OF CARE
"Initial Discharge Planning   Case Management Assessment Note:    Patient admitted on 5-21-11     Chart reviewed  Care plan discussed with treatment team,    attending Dr Baljeet PABON at home: dtr reports, "we have everything"  Current dispo: pending,   assessed in the e/d today. [facility] active in the home, also [facility] (Vickie Noriega) does home visits, next appt on 6-8-22      Patient is currently enrolled in the  Heart Failure Transitional Care(HFTCC) program targeting to prevent 30 day readmissions for all diagnoses, not [facility] program                Patient is high risk for readmission due to recent Heart Failure admission.             dtr reports she is active with the [facility] heart failure, not the [facility] location [address] - Emergency Dept  Initial Discharge Assessment       Primary Care Provider: Jona Che MD    Admission Diagnosis: Acute pulmonary edema [J81.0]    Admission Date: 5/21/2022  Expected Discharge Date:     Discharge Barriers Identified: (P) None    Payor: [employer] MANAGED MEDICARE / Plan: [employer] MEDICARE HMO / Product Type: Capitation /     Extended Emergency Contact Information  Primary Emergency Contact: Bri Sam  Mobile Phone: [phone]  Relation: Daughter  Secondary Emergency Contact: RAJINDER FLYNN  Home Phone: [phone]  Mobile Phone: [phone]  Relation: Daughter  Preferred language: English   needed? No    Discharge Plan A: (P) [facility]         [id number] [place] - [facility] [facility]  [place] [address] [place] [address]  Phone: [phone] Fax: [phone]      Initial Assessment (most recent)       Adult Discharge Assessment - 05/21/22 1544          Discharge Assessment    Assessment Type Discharge Planning Assessment (P)      Confirmed/corrected address, phone number and insurance Yes (P)      Confirmed Demographics Correct on Facesheet (P) "      Source of Information patient;family;health record (P)      Does patient/caregiver understand observation status Yes (P)      Communicated TRISTON with patient/caregiver Yes (P)      Do you expect to return to your current living situation? Yes (P)      Do you have help at home or someone to help you manage your care at home? Yes (P)      Equipment Currently Used at Home wheelchair;walker, rolling (P)      Patient currently being followed by outpatient case management? No (P)      Do you take prescription medications? Yes (P)      Do you have prescription coverage? Yes (P)      Do you have any problems affording any of your prescribed medications? No (P)      Is the patient taking medications as prescribed? yes (P)      How do you get to doctors appointments? family or friend will provide (P)      Are you on dialysis? No (P)      Discharge Plan A Home Health (P)      DME Needed Upon Discharge  none (P)      Discharge Plan discussed with: Patient;Adult children (P)      Discharge Barriers Identified None (P)                                 Case management  to follow

## 2022-05-21 NOTE — ASSESSMENT & PLAN NOTE
- check iron studies  - begin IV iron sucrose  - iron def can be associated w/restless leg  - if no improvement, consideration of ropinirole 0.25mg; however, sometimes initiation of this medication can have actual worsening of symptoms in the elderly- must be aware   - concern that restless legs are also a component of anxiety- PRN hydroxyzine added

## 2022-05-22 PROBLEM — E87.3 METABOLIC ALKALOSIS: Status: ACTIVE | Noted: 2022-05-22

## 2022-05-22 LAB
ALLENS TEST: ABNORMAL
ANION GAP SERPL CALC-SCNC: 9 MMOL/L (ref 8–16)
BUN SERPL-MCNC: 23 MG/DL (ref 10–30)
CALCIUM SERPL-MCNC: 9 MG/DL (ref 8.7–10.5)
CHLORIDE SERPL-SCNC: 91 MMOL/L (ref 95–110)
CO2 SERPL-SCNC: 39 MMOL/L (ref 23–29)
CREAT SERPL-MCNC: 1 MG/DL (ref 0.5–1.4)
EST. GFR  (AFRICAN AMERICAN): 56.1 ML/MIN/1.73 M^2
EST. GFR  (NON AFRICAN AMERICAN): 48.7 ML/MIN/1.73 M^2
GLUCOSE SERPL-MCNC: 72 MG/DL (ref 70–110)
HCO3 UR-SCNC: 41.1 MMOL/L (ref 24–28)
MAGNESIUM SERPL-MCNC: 2.2 MG/DL (ref 1.6–2.6)
PCO2 BLDA: 58.8 MMHG (ref 35–45)
PH SMN: 7.45 [PH] (ref 7.35–7.45)
PO2 BLDA: 68 MMHG (ref 80–100)
POC BE: 17 MMOL/L
POC SATURATED O2: 93 % (ref 95–100)
POC TCO2: 43 MMOL/L (ref 23–27)
POTASSIUM SERPL-SCNC: 3.4 MMOL/L (ref 3.5–5.1)
SAMPLE: ABNORMAL
SITE: ABNORMAL
SODIUM SERPL-SCNC: 139 MMOL/L (ref 136–145)

## 2022-05-22 PROCEDURE — 94640 AIRWAY INHALATION TREATMENT: CPT | Mod: XB

## 2022-05-22 PROCEDURE — 80048 BASIC METABOLIC PNL TOTAL CA: CPT | Performed by: STUDENT IN AN ORGANIZED HEALTH CARE EDUCATION/TRAINING PROGRAM

## 2022-05-22 PROCEDURE — 99900035 HC TECH TIME PER 15 MIN (STAT)

## 2022-05-22 PROCEDURE — 36415 COLL VENOUS BLD VENIPUNCTURE: CPT | Performed by: STUDENT IN AN ORGANIZED HEALTH CARE EDUCATION/TRAINING PROGRAM

## 2022-05-22 PROCEDURE — 99226 PR SUBSEQUENT OBSERVATION CARE,LEVEL III: CPT | Mod: ,,, | Performed by: STUDENT IN AN ORGANIZED HEALTH CARE EDUCATION/TRAINING PROGRAM

## 2022-05-22 PROCEDURE — G0378 HOSPITAL OBSERVATION PER HR: HCPCS

## 2022-05-22 PROCEDURE — 25000003 PHARM REV CODE 250: Performed by: STUDENT IN AN ORGANIZED HEALTH CARE EDUCATION/TRAINING PROGRAM

## 2022-05-22 PROCEDURE — 82803 BLOOD GASES ANY COMBINATION: CPT

## 2022-05-22 PROCEDURE — 96376 TX/PRO/DX INJ SAME DRUG ADON: CPT

## 2022-05-22 PROCEDURE — 83735 ASSAY OF MAGNESIUM: CPT | Performed by: STUDENT IN AN ORGANIZED HEALTH CARE EDUCATION/TRAINING PROGRAM

## 2022-05-22 PROCEDURE — 27000221 HC OXYGEN, UP TO 24 HOURS

## 2022-05-22 PROCEDURE — 96361 HYDRATE IV INFUSION ADD-ON: CPT

## 2022-05-22 PROCEDURE — 63600175 PHARM REV CODE 636 W HCPCS: Performed by: STUDENT IN AN ORGANIZED HEALTH CARE EDUCATION/TRAINING PROGRAM

## 2022-05-22 PROCEDURE — 25000242 PHARM REV CODE 250 ALT 637 W/ HCPCS: Performed by: STUDENT IN AN ORGANIZED HEALTH CARE EDUCATION/TRAINING PROGRAM

## 2022-05-22 PROCEDURE — 36600 WITHDRAWAL OF ARTERIAL BLOOD: CPT

## 2022-05-22 PROCEDURE — 99226 PR SUBSEQUENT OBSERVATION CARE,LEVEL III: ICD-10-PCS | Mod: ,,, | Performed by: STUDENT IN AN ORGANIZED HEALTH CARE EDUCATION/TRAINING PROGRAM

## 2022-05-22 PROCEDURE — 94761 N-INVAS EAR/PLS OXIMETRY MLT: CPT

## 2022-05-22 RX ORDER — POTASSIUM CHLORIDE 20 MEQ/1
60 TABLET, EXTENDED RELEASE ORAL ONCE
Status: COMPLETED | OUTPATIENT
Start: 2022-05-22 | End: 2022-05-22

## 2022-05-22 RX ORDER — LEVALBUTEROL INHALATION SOLUTION 0.63 MG/3ML
1 SOLUTION RESPIRATORY (INHALATION)
Status: DISCONTINUED | OUTPATIENT
Start: 2022-05-22 | End: 2022-05-25 | Stop reason: HOSPADM

## 2022-05-22 RX ADMIN — POTASSIUM CHLORIDE 60 MEQ: 20 TABLET, EXTENDED RELEASE ORAL at 12:05

## 2022-05-22 RX ADMIN — GUAIFENESIN 600 MG: 600 TABLET, EXTENDED RELEASE ORAL at 09:05

## 2022-05-22 RX ADMIN — METOPROLOL TARTRATE 12.5 MG: 25 TABLET, FILM COATED ORAL at 09:05

## 2022-05-22 RX ADMIN — THERA TABS 1 TABLET: TAB at 09:05

## 2022-05-22 RX ADMIN — TIOTROPIUM BROMIDE INHALATION SPRAY 2 PUFF: 3.12 SPRAY, METERED RESPIRATORY (INHALATION) at 07:05

## 2022-05-22 RX ADMIN — Medication 1 CAPSULE: at 09:05

## 2022-05-22 RX ADMIN — ASPIRIN 81 MG CHEWABLE TABLET 81 MG: 81 TABLET CHEWABLE at 09:05

## 2022-05-22 RX ADMIN — SODIUM CHLORIDE, SODIUM LACTATE, POTASSIUM CHLORIDE, AND CALCIUM CHLORIDE 250 ML: .6; .31; .03; .02 INJECTION, SOLUTION INTRAVENOUS at 03:05

## 2022-05-22 RX ADMIN — FLUTICASONE FUROATE AND VILANTEROL TRIFENATATE 1 PUFF: 100; 25 POWDER RESPIRATORY (INHALATION) at 07:05

## 2022-05-22 RX ADMIN — PANTOPRAZOLE SODIUM 40 MG: 40 TABLET, DELAYED RELEASE ORAL at 07:05

## 2022-05-22 RX ADMIN — LEVOFLOXACIN 250 MG: 250 TABLET, FILM COATED ORAL at 09:05

## 2022-05-22 RX ADMIN — LEVALBUTEROL HYDROCHLORIDE 0.63 MG: 0.63 SOLUTION RESPIRATORY (INHALATION) at 09:05

## 2022-05-22 RX ADMIN — FUROSEMIDE 40 MG: 10 INJECTION, SOLUTION INTRAMUSCULAR; INTRAVENOUS at 09:05

## 2022-05-22 NOTE — ASSESSMENT & PLAN NOTE
- component of compensatory from chronic CO2 retention, but likely over diuresis w/chloride of 91  - holding on further diuresis  - LR  250mL bolus today  - ABG w/pH 7.45/CO2 59/O2 68/HCO3 41/O2 sat 90%  - repeat BMP in am

## 2022-05-22 NOTE — PROGRESS NOTES
"[facility] - [facility] ([address] Medicine  Progress Note    Patient Name: Venus Mayer  MRN: [id number]  Patient Class: OP- Observation   Admission Date: 5/21/2022  Length of Stay: 0 days  Attending Physician: Lo Guevara MD  Primary Care Provider: Jona Che MD        Subjective:     Principal Problem:Acute on chronic congestive heart failure        HPI:  Mrs. Donovan is a 89yoF w/PMHx of cardiomyopathy, CHFpEF (60%), pacemaker in place, HLD, HTN, COPD on home 2L NC, vascular dementia who [place] to [facility] [place]/increased wob. Symptoms started 3d ago. Patient normally sleeps at 45 deg incline. Noticed SOB 05/19- increased supplemental oxygen to 3.5L for about 1hr w/improvement of symptoms. Started feeling "shakey" the following day. Woke up on day of admission at 3am w/increased wob and shortness of breath. Patient lives at home, but has 24hrs care with a sitter and also has frequent visits/checks via children.     Upon presentation to ED, RR 34, O2 sat 100% on 2.5L BNC, 152/81mmHg. Labs notable for 11.3, Plt 147, Bicarb 37, Chl 94, BNP 3513, trop 0.145,  UA w/2+ LE and 21 WBC. CXR concerning for "interstitial and alveolar opacities that could relate to edema, infection, noninfectious inflammation, or combination of the above." Received 200mg of IV lasix total while in ED. Admitted to hospital medicine for further workup and evaluation.       Overview/Hospital Course:  Patient had stabilization of respiratory status. Bicarb increased to 39 and Cl decreased to 91- concern for contraction alkalosis and overdiuresis. Stopped lasix am of 05/22. Gave 250mL NSB.       Interval History: Patient was seen and examined this am. Hard of hearing- per daughter- improvement in LE shakiness/tremor. O2 levels will drop w/movement- low 70s, but will increase to low 90s w/rest. PT has yet to see and evaluate patient. No increased oxygen requirements. Patient reported pain on her buttock- burning sensation " as well. No diarrhea, constipation, nausea, vomiting, abd pain, chest pain.     Objective:     Vital Signs (Most Recent):  Temp: 97.8 °F (36.6 °C) (05/22/22 0835)  Pulse: (!) 112 (05/22/22 1121)  Resp: 20 (05/22/22 0835)  BP: (!) 124/57 (05/22/22 0835)  SpO2: (!) 89 % (05/22/22 0835)   Vital Signs (24h Range):  Temp:  [97.3 °F (36.3 °C)-98.3 °F (36.8 °C)] 97.8 °F (36.6 °C)  Pulse:  [] 112  Resp:  [16-34] 20  SpO2:  [82 %-100 %] 89 %  BP: (106-163)/(54-77) 124/57     Weight: 40.8 kg (89 lb 15.2 oz)  Body mass index is 17.57 kg/m².    Intake/Output Summary (Last 24 hours) at 5/22/2022 1139  Last data filed at 5/21/2022 2000  Gross per 24 hour   Intake --   Output 1150 ml   Net -1150 ml     Physical Exam  Gen: in NAD, appears stated age, appears chronically ill, cachetic  Neuro: awake, oriented to self and place, some confusion during interview- hard of hearing, motor, sensory, and strength grossly intact BL  HEENT: NTNC, EOMI, PERRL, MMM  CVS: RRR, no m/r/g; S1/S2 auscultated with no S3 or S4; capillary refill < 2 sec, pacer/paced rhythm  Resp: BL crackles- 2.5L BNC; no belabored breathing or accessory muscle use appreciated   Abd: BS+ in all 4 quadrants; NTND, soft to palpation; no organomegaly appreciated   Extrem: pulses full, equal, and regular over all 4 extremities; no UE or LE edema BL  Skin: bruising of dorsum of BL feet- Rt>Lt    Significant Labs: All pertinent labs within the past 24 hours have been reviewed.  Blood Culture: No results for input(s): LABBLOO in the last 48 hours.  CBC:   Recent Labs   Lab 05/21/22  0552   WBC 9.56   HGB 11.3*   HCT 35.7*   *     CMP:   Recent Labs   Lab 05/21/22  0552 05/22/22  0321    139   K 3.5 3.4*   CL 94* 91*   CO2 37* 39*    72   BUN 22 23   CREATININE 0.9 1.0   CALCIUM 9.0 9.0   PROT 6.3  --    ALBUMIN 2.8*  --    BILITOT 0.8  --    ALKPHOS 131  --    AST 28  --    ALT 20  --    ANIONGAP 9 9   EGFRNONAA 55.3* 48.7*     Urine Culture:   Recent  Labs   Lab 05/21/22  0845   LABURIN GRAM NEGATIVE ROSHAN  50,000 - 99,999 cfu/ml  Identification and susceptibility pending  No other significant isolate  *     Urine Studies:   Recent Labs   Lab 05/21/22  0845   COLORU Yellow   APPEARANCEUA Hazy*   PHUR 7.0   SPECGRAV 1.005   PROTEINUA Negative   GLUCUA Negative   KETONESU Negative   BILIRUBINUA Negative   OCCULTUA Negative   NITRITE Negative   LEUKOCYTESUR 2+*   RBCUA 1   WBCUA 21*   BACTERIA Rare   SQUAMEPITHEL 1       Significant Imaging: I have reviewed all pertinent imaging results/findings within the past 24 hours.      Assessment/Plan:      * Acute on chronic congestive heart failure  - Interval history and physical exam findings as described above  - EF 60% per most recent echo 03/2022  - Clinically volume overloaded on admission  - CXR w/edema vs. Infiltrates   - Taking medications as prescribed at home  - S/p 200mg IV lasix in the ED  - Holding on further diuresis- appears euvolemic, some crackles still on exam- but feel like this may moreso be 2/2 chronic lung changes from COPD vs. Atelectasis   - Continue home cardioprudent regimen  - Strict I/Os   - Daily weights  - Will continue to monitor on tele    Acute cystitis without hematuria  - Dysuria per patient, UA w/21 WBC, 2+ LE  - Per last UCx- klebsiella- continue levofloxacin 250mg in setting of allergies- d2  - UCx w/GNR  - Monitor I's and O's  - F/u on UCx    Metabolic alkalosis  - component of compensatory from chronic CO2 retention, but likely over diuresis w/chloride of 91  - holding on further diuresis  - LR  250mL bolus today  - ABG w/pH 7.45/CO2 59/O2 68/HCO3 41/O2 sat 90%  - repeat BMP in am    Body mass index (BMI) less than 19  - nutrition consulted     COPD (chronic obstructive pulmonary disease)  - Currently on home oxygen settings  - No concern for COPD exacerbation at this time  - Continue home inhaler regimen  - schedule levoalbuterol q6h while awake    Pacemaker  - in place, paced  rhythm    Elevated troponin  - likely type 2 NSTEMI from hypoxia  - trop 0.143-0.145-0.137- plateaued  - no chest pain     Acute on chronic respiratory failure with hypoxia  Patient with Hypercapnic and Hypoxic Respiratory failure which is Acute on chronic.  she is on home oxygen at 2.5 LPM. Supplemental oxygen was provided and noted-  .   Signs/symptoms of respiratory failure include- tachypnea and increased work of breathing. Contributing diagnoses includes - CHF and COPD Labs and images were reviewed. Patient Has not had a recent ABG. Will treat underlying causes and adjust management of respiratory failure as follows-     - Concern for CHF exacerbation  - Holding further diuresis   - No wheeze on exam, no cough or change in sputum production or color  - low suspicion for COPD exacerbation  - monitor oxygen saturations  - incentive spirometer  - Concern for atelectasis as well    Restless leg syndrome  - check iron studies  - continue oral iron supplement   - iron def can be associated w/restless leg  - if no improvement, consideration of ropinirole 0.25mg; however, sometimes initiation of this medication can have actual worsening of symptoms in the elderly- must be aware   - concern that restless legs are also a component of anxiety- PRN hydroxyzine added     Anxiety  - hydroxyzine 10mg q8h PRN for anxiety  - I fear that this is contributing to increased wob and restless legs  - consideration of psych consultation       Stage 3 chronic kidney disease  - Cr baseline at admission  - Renally dosing all medications  - Avoid nephrotoxins  - Will continue to monitor on daily labs    VTE Risk Mitigation (From admission, onward)         Ordered     IP VTE HIGH RISK PATIENT  Once         05/21/22 1236     Place sequential compression device  Until discontinued         05/21/22 1236     Place sequential compression device  Until discontinued         05/21/22 1233                Discharge Planning   TRISTON:      Code Status:  DNR   Is the patient medically ready for discharge?:     Reason for patient still in hospital (select all that apply): Patient trending condition  Discharge Plan A: Home Health   Discharge Delays: None known at this time                Lo Guevara MD  Department of Hospital Medicine   [facility] - Intensive Care ([place]-[facility])

## 2022-05-22 NOTE — ASSESSMENT & PLAN NOTE
- Currently on home oxygen settings  - No concern for COPD exacerbation at this time  - Continue home inhaler regimen  - schedule levoalbuterol q6h while awake

## 2022-05-22 NOTE — PLAN OF CARE
Patient in stable condition, A. Fib with Pvcs noted overnight, routine meds included metoprolol given and tolerated well. Patient daughter at bedside, helpful in care. O2 saturation has remained 92 - 96% on 2.5L O2 overnight. Family member questions iron IV infusion order, Md made aware. Safety maintained, call light within reach.   Problem: Adult Inpatient Plan of Care  Goal: Plan of Care Review  Outcome: Ongoing, Progressing  Goal: Patient-Specific Goal (Individualized)  Outcome: Ongoing, Progressing  Goal: Absence of Hospital-Acquired Illness or Injury  Outcome: Ongoing, Progressing  Goal: Optimal Comfort and Wellbeing  Outcome: Ongoing, Progressing  Goal: Readiness for Transition of Care  Outcome: Ongoing, Progressing     Problem: Adjustment to Illness (Sepsis/Septic Shock)  Goal: Optimal Coping  Outcome: Ongoing, Progressing     Problem: Bleeding (Sepsis/Septic Shock)  Goal: Absence of Bleeding  Outcome: Ongoing, Progressing     Problem: Glycemic Control Impaired (Sepsis/Septic Shock)  Goal: Blood Glucose Level Within Desired Range  Outcome: Ongoing, Progressing     Problem: Infection Progression (Sepsis/Septic Shock)  Goal: Absence of Infection Signs and Symptoms  Outcome: Ongoing, Progressing     Problem: Nutrition Impaired (Sepsis/Septic Shock)  Goal: Optimal Nutrition Intake  Outcome: Ongoing, Progressing     Problem: Impaired Wound Healing  Goal: Optimal Wound Healing  Outcome: Ongoing, Progressing     Problem: Fall Injury Risk  Goal: Absence of Fall and Fall-Related Injury  Outcome: Ongoing, Progressing

## 2022-05-22 NOTE — ASSESSMENT & PLAN NOTE
- Interval history and physical exam findings as described above  - EF 60% per most recent echo 03/2022  - Clinically volume overloaded on admission  - CXR w/edema vs. Infiltrates   - Taking medications as prescribed at home  - S/p 200mg IV lasix in the ED  - Holding on further diuresis- appears euvolemic, some crackles still on exam- but feel like this may moreso be 2/2 chronic lung changes from COPD vs. Atelectasis   - Continue home cardioprudent regimen  - Strict I/Os   - Daily weights  - Will continue to monitor on tele

## 2022-05-22 NOTE — SUBJECTIVE & OBJECTIVE
Interval History: Patient was seen and examined this am. Hard of hearing- per daughter- improvement in LE shakiness/tremor. O2 levels will drop w/movement- low 70s, but will increase to low 90s w/rest. PT has yet to see and evaluate patient. No increased oxygen requirements. Patient reported pain on her buttock- burning sensation as well. No diarrhea, constipation, nausea, vomiting, abd pain, chest pain.     Objective:     Vital Signs (Most Recent):  Temp: 97.8 °F (36.6 °C) (05/22/22 0835)  Pulse: (!) 112 (05/22/22 1121)  Resp: 20 (05/22/22 0835)  BP: (!) 124/57 (05/22/22 0835)  SpO2: (!) 89 % (05/22/22 0835)   Vital Signs (24h Range):  Temp:  [97.3 °F (36.3 °C)-98.3 °F (36.8 °C)] 97.8 °F (36.6 °C)  Pulse:  [] 112  Resp:  [16-34] 20  SpO2:  [82 %-100 %] 89 %  BP: (106-163)/(54-77) 124/57     Weight: 40.8 kg (89 lb 15.2 oz)  Body mass index is 17.57 kg/m².    Intake/Output Summary (Last 24 hours) at 5/22/2022 1139  Last data filed at 5/21/2022 2000  Gross per 24 hour   Intake --   Output 1150 ml   Net -1150 ml     Physical Exam  Gen: in NAD, appears stated age, appears chronically ill, cachetic  Neuro: awake, oriented to self and place, some confusion during interview- hard of hearing, motor, sensory, and strength grossly intact BL  HEENT: NTNC, EOMI, PERRL, MMM  CVS: RRR, no m/r/g; S1/S2 auscultated with no S3 or S4; capillary refill < 2 sec, pacer/paced rhythm  Resp: BL crackles- 2.5L BNC; no belabored breathing or accessory muscle use appreciated   Abd: BS+ in all 4 quadrants; NTND, soft to palpation; no organomegaly appreciated   Extrem: pulses full, equal, and regular over all 4 extremities; no UE or LE edema BL  Skin: bruising of dorsum of BL feet- Rt>Lt    Significant Labs: All pertinent labs within the past 24 hours have been reviewed.  Blood Culture: No results for input(s): LABBLOO in the last 48 hours.  CBC:   Recent Labs   Lab 05/21/22  0552   WBC 9.56   HGB 11.3*   HCT 35.7*   *     CMP:    Recent Labs   Lab 05/21/22  0552 05/22/22  0321    139   K 3.5 3.4*   CL 94* 91*   CO2 37* 39*    72   BUN 22 23   CREATININE 0.9 1.0   CALCIUM 9.0 9.0   PROT 6.3  --    ALBUMIN 2.8*  --    BILITOT 0.8  --    ALKPHOS 131  --    AST 28  --    ALT 20  --    ANIONGAP 9 9   EGFRNONAA 55.3* 48.7*     Urine Culture:   Recent Labs   Lab 05/21/22  0845   LABURIN GRAM NEGATIVE ROSHAN  50,000 - 99,999 cfu/ml  Identification and susceptibility pending  No other significant isolate  *     Urine Studies:   Recent Labs   Lab 05/21/22  0845   COLORU Yellow   APPEARANCEUA Hazy*   PHUR 7.0   SPECGRAV 1.005   PROTEINUA Negative   GLUCUA Negative   KETONESU Negative   BILIRUBINUA Negative   OCCULTUA Negative   NITRITE Negative   LEUKOCYTESUR 2+*   RBCUA 1   WBCUA 21*   BACTERIA Rare   SQUAMEPITHEL 1       Significant Imaging: I have reviewed all pertinent imaging results/findings within the past 24 hours.

## 2022-05-22 NOTE — ASSESSMENT & PLAN NOTE
- check iron studies  - continue oral iron supplement   - iron def can be associated w/restless leg  - if no improvement, consideration of ropinirole 0.25mg; however, sometimes initiation of this medication can have actual worsening of symptoms in the elderly- must be aware   - concern that restless legs are also a component of anxiety- PRN hydroxyzine added

## 2022-05-22 NOTE — HOSPITAL COURSE
Patient had stabilization of respiratory status. Bicarb increased to 39 and Cl decreased to 91- concern for contraction alkalosis and overdiuresis. Stopped lasix am of 05/22. Gave 250mL NSB. Patient had improvement in bicarb to 34. Xopenex treatments were scheduled- continued improvement in work of breathing. CXR showed mildly improved aeration on day 2 of admission. Discussed possibility of home w/home hospice as patient seems to have continued increase in oxygen requirements, and I fear that she is now in end-stage COPD w/air hunger. Prescribed hydroxyzine for anxiety- patient appeared to get worked up over work of breathing. Family does not desire scheduled anxiety meds at this time or hospice. They will continue to have ongoing discussions. Patient received 3d of PO levo based on prior Ucx; however, on 05/23- Ucx w/E coli w/numerous antibiotic resistances- with allergies to cephalosporins, patient was started on erta and ID was consulted. Patient completed 3d of IV erta. No need for f/u in ID clinic per their recommendations. Arrangements made via  for patient. Patient deemed stable for discharge 05/25.    Pt deemed appropriate for discharge. Plan discussed with daughter, Bri, who was agreeable and amenable; medications were discussed and reviewed, outpatient follow-up scheduled, ER precautions were given, all questions were answered to the Bri's satisfaction, and Mrs. Donovan was subsequently discharged.    Physical Exam  Gen: in NAD, appears stated age, appears chronically ill, cachetic  Neuro: awake, oriented to self and place, some confusion during interview- hard of hearing, motor, sensory, and strength grossly intact BL  HEENT: NTNC, EOMI, PERRL, MMM  CVS: RRR, no m/r/g; S1/S2 auscultated with no S3 or S4; capillary refill < 2 sec, pacer/paced rhythm  Resp: Bibasilar crackles- 2.5L BNC; no belabored breathing or accessory muscle use appreciated   Abd: BS+ in all 4 quadrants; NTND, soft to palpation;  no organomegaly appreciated   Extrem: pulses full, equal, and regular over all 4 extremities; no UE or LE edema BL  Skin: bruising of dorsum of BL feet- Rt>Lt

## 2022-05-22 NOTE — ASSESSMENT & PLAN NOTE
- Dysuria per patient, UA w/21 WBC, 2+ LE  - Per last UCx- klebsiella- continue levofloxacin 250mg in setting of allergies- d2  - UCx w/GNR  - Monitor I's and O's  - F/u on UCx

## 2022-05-22 NOTE — ASSESSMENT & PLAN NOTE
Patient with Hypercapnic and Hypoxic Respiratory failure which is Acute on chronic.  she is on home oxygen at 2.5 LPM. Supplemental oxygen was provided and noted-  .   Signs/symptoms of respiratory failure include- tachypnea and increased work of breathing. Contributing diagnoses includes - CHF and COPD Labs and images were reviewed. Patient Has not had a recent ABG. Will treat underlying causes and adjust management of respiratory failure as follows-     - Concern for CHF exacerbation  - Holding further diuresis   - No wheeze on exam, no cough or change in sputum production or color  - low suspicion for COPD exacerbation  - monitor oxygen saturations  - incentive spirometer  - Concern for atelectasis as well

## 2022-05-23 LAB
ANION GAP SERPL CALC-SCNC: 11 MMOL/L (ref 8–16)
BACTERIA UR CULT: ABNORMAL
BUN SERPL-MCNC: 29 MG/DL (ref 10–30)
CALCIUM SERPL-MCNC: 9.4 MG/DL (ref 8.7–10.5)
CHLORIDE SERPL-SCNC: 92 MMOL/L (ref 95–110)
CO2 SERPL-SCNC: 34 MMOL/L (ref 23–29)
CREAT SERPL-MCNC: 1.2 MG/DL (ref 0.5–1.4)
EST. GFR  (AFRICAN AMERICAN): 45 ML/MIN/1.73 M^2
EST. GFR  (NON AFRICAN AMERICAN): 39.1 ML/MIN/1.73 M^2
GLUCOSE SERPL-MCNC: 89 MG/DL (ref 70–110)
MAGNESIUM SERPL-MCNC: 2.2 MG/DL (ref 1.6–2.6)
POTASSIUM SERPL-SCNC: 4.3 MMOL/L (ref 3.5–5.1)
SODIUM SERPL-SCNC: 137 MMOL/L (ref 136–145)

## 2022-05-23 PROCEDURE — 25000242 PHARM REV CODE 250 ALT 637 W/ HCPCS: Performed by: STUDENT IN AN ORGANIZED HEALTH CARE EDUCATION/TRAINING PROGRAM

## 2022-05-23 PROCEDURE — 63600175 PHARM REV CODE 636 W HCPCS: Performed by: STUDENT IN AN ORGANIZED HEALTH CARE EDUCATION/TRAINING PROGRAM

## 2022-05-23 PROCEDURE — 99226 PR SUBSEQUENT OBSERVATION CARE,LEVEL III: CPT | Mod: ,,, | Performed by: STUDENT IN AN ORGANIZED HEALTH CARE EDUCATION/TRAINING PROGRAM

## 2022-05-23 PROCEDURE — 27000221 HC OXYGEN, UP TO 24 HOURS

## 2022-05-23 PROCEDURE — 36415 COLL VENOUS BLD VENIPUNCTURE: CPT | Performed by: STUDENT IN AN ORGANIZED HEALTH CARE EDUCATION/TRAINING PROGRAM

## 2022-05-23 PROCEDURE — 99226 PR SUBSEQUENT OBSERVATION CARE,LEVEL III: ICD-10-PCS | Mod: ,,, | Performed by: STUDENT IN AN ORGANIZED HEALTH CARE EDUCATION/TRAINING PROGRAM

## 2022-05-23 PROCEDURE — 94640 AIRWAY INHALATION TREATMENT: CPT

## 2022-05-23 PROCEDURE — 94640 AIRWAY INHALATION TREATMENT: CPT | Mod: XB

## 2022-05-23 PROCEDURE — 80048 BASIC METABOLIC PNL TOTAL CA: CPT | Performed by: STUDENT IN AN ORGANIZED HEALTH CARE EDUCATION/TRAINING PROGRAM

## 2022-05-23 PROCEDURE — 94761 N-INVAS EAR/PLS OXIMETRY MLT: CPT

## 2022-05-23 PROCEDURE — 25000003 PHARM REV CODE 250: Performed by: STUDENT IN AN ORGANIZED HEALTH CARE EDUCATION/TRAINING PROGRAM

## 2022-05-23 PROCEDURE — 27000207 HC ISOLATION

## 2022-05-23 PROCEDURE — 20600001 HC STEP DOWN PRIVATE ROOM

## 2022-05-23 PROCEDURE — 83735 ASSAY OF MAGNESIUM: CPT | Performed by: STUDENT IN AN ORGANIZED HEALTH CARE EDUCATION/TRAINING PROGRAM

## 2022-05-23 PROCEDURE — 99900035 HC TECH TIME PER 15 MIN (STAT)

## 2022-05-23 PROCEDURE — 96365 THER/PROPH/DIAG IV INF INIT: CPT

## 2022-05-23 PROCEDURE — 25000003 PHARM REV CODE 250: Performed by: EMERGENCY MEDICINE

## 2022-05-23 RX ORDER — HYDROXYZINE HYDROCHLORIDE 10 MG/1
10 TABLET, FILM COATED ORAL 3 TIMES DAILY PRN
Qty: 90 TABLET | Refills: 0 | Status: ON HOLD | OUTPATIENT
Start: 2022-05-23 | End: 2022-05-29 | Stop reason: HOSPADM

## 2022-05-23 RX ORDER — IPRATROPIUM BROMIDE AND ALBUTEROL SULFATE 2.5; .5 MG/3ML; MG/3ML
3 SOLUTION RESPIRATORY (INHALATION) EVERY 4 HOURS PRN
Status: DISCONTINUED | OUTPATIENT
Start: 2022-05-23 | End: 2022-05-25 | Stop reason: HOSPADM

## 2022-05-23 RX ORDER — FUROSEMIDE 40 MG/1
40 TABLET ORAL DAILY
Qty: 30 TABLET | Refills: 2 | Status: SHIPPED | OUTPATIENT
Start: 2022-05-23 | End: 2022-08-21

## 2022-05-23 RX ADMIN — LEVALBUTEROL HYDROCHLORIDE 0.63 MG: 0.63 SOLUTION RESPIRATORY (INHALATION) at 12:05

## 2022-05-23 RX ADMIN — Medication 1 CAPSULE: at 08:05

## 2022-05-23 RX ADMIN — GUAIFENESIN 600 MG: 600 TABLET, EXTENDED RELEASE ORAL at 08:05

## 2022-05-23 RX ADMIN — METOPROLOL TARTRATE 12.5 MG: 25 TABLET, FILM COATED ORAL at 08:05

## 2022-05-23 RX ADMIN — Medication 6 MG: at 10:05

## 2022-05-23 RX ADMIN — TIOTROPIUM BROMIDE INHALATION SPRAY 2 PUFF: 3.12 SPRAY, METERED RESPIRATORY (INHALATION) at 04:05

## 2022-05-23 RX ADMIN — PANTOPRAZOLE SODIUM 40 MG: 40 TABLET, DELAYED RELEASE ORAL at 07:05

## 2022-05-23 RX ADMIN — ERTAPENEM 1 G: 1 INJECTION INTRAMUSCULAR; INTRAVENOUS at 04:05

## 2022-05-23 RX ADMIN — LEVALBUTEROL HYDROCHLORIDE 0.63 MG: 0.63 SOLUTION RESPIRATORY (INHALATION) at 07:05

## 2022-05-23 RX ADMIN — FLUTICASONE FUROATE AND VILANTEROL TRIFENATATE 1 PUFF: 100; 25 POWDER RESPIRATORY (INHALATION) at 07:05

## 2022-05-23 RX ADMIN — ASPIRIN 81 MG CHEWABLE TABLET 81 MG: 81 TABLET CHEWABLE at 08:05

## 2022-05-23 RX ADMIN — METOPROLOL TARTRATE 12.5 MG: 25 TABLET, FILM COATED ORAL at 10:05

## 2022-05-23 RX ADMIN — LEVOFLOXACIN 250 MG: 250 TABLET, FILM COATED ORAL at 08:05

## 2022-05-23 RX ADMIN — LEVALBUTEROL HYDROCHLORIDE 0.63 MG: 0.63 SOLUTION RESPIRATORY (INHALATION) at 08:05

## 2022-05-23 RX ADMIN — GUAIFENESIN 600 MG: 600 TABLET, EXTENDED RELEASE ORAL at 10:05

## 2022-05-23 RX ADMIN — THERA TABS 1 TABLET: TAB at 08:05

## 2022-05-23 RX ADMIN — Medication 6 MG: at 12:05

## 2022-05-23 NOTE — ASSESSMENT & PLAN NOTE
- Interval history and physical exam findings as described above  - EF 60% per most recent echo 03/2022  - Clinically volume overloaded on admission  - CXR w/edema vs. Infiltrates   - Taking medications as prescribed at home  - S/p 200mg IV lasix in the ED  - Holding lasix at this time  - Continue home cardioprudent regimen  - Strict I/Os   - Daily weights  - Will continue to monitor on tele

## 2022-05-23 NOTE — PLAN OF CARE
Patient confused, reoriented PRN, vitals WNL. O2 sats desat to low 70s overnight, patient mouth breathes. Return to 90-96% O2 sat after reminder to breathe through nose. Safety maintained, call light in reach.  Problem: Adult Inpatient Plan of Care  Goal: Plan of Care Review  Outcome: Ongoing, Progressing  Goal: Patient-Specific Goal (Individualized)  Outcome: Ongoing, Progressing  Goal: Absence of Hospital-Acquired Illness or Injury  Outcome: Ongoing, Progressing  Goal: Optimal Comfort and Wellbeing  Outcome: Ongoing, Progressing  Goal: Readiness for Transition of Care  Outcome: Ongoing, Progressing     Problem: Adjustment to Illness (Sepsis/Septic Shock)  Goal: Optimal Coping  Outcome: Ongoing, Progressing     Problem: Bleeding (Sepsis/Septic Shock)  Goal: Absence of Bleeding  Outcome: Ongoing, Progressing     Problem: Glycemic Control Impaired (Sepsis/Septic Shock)  Goal: Blood Glucose Level Within Desired Range  Outcome: Ongoing, Progressing     Problem: Infection Progression (Sepsis/Septic Shock)  Goal: Absence of Infection Signs and Symptoms  Outcome: Ongoing, Progressing     Problem: Nutrition Impaired (Sepsis/Septic Shock)  Goal: Optimal Nutrition Intake  Outcome: Ongoing, Progressing     Problem: Impaired Wound Healing  Goal: Optimal Wound Healing  Outcome: Ongoing, Progressing     Problem: Fall Injury Risk  Goal: Absence of Fall and Fall-Related Injury  Outcome: Ongoing, Progressing

## 2022-05-23 NOTE — PLAN OF CARE
[facility] ([place] [address])      HOME HEALTH ORDERS  FACE TO FACE ENCOUNTER    Patient Name: Venus Mayer  YOB: 1929    PCP: Jona Che MD   PCP Address: [address] [facility] [id number] / [place] [address]  PCP Phone Number: [phone]  PCP Fax: [phone]    Encounter Date: 5/25/22    Admit to Home Health    Diagnoses:  Active Hospital Problems    Diagnosis  POA    *Acute on chronic congestive heart failure [I50.9]  Yes     Priority: 1 - High    Acute cystitis without hematuria [N30.00]  Unknown     Priority: 2     Metabolic alkalosis [E87.3]  Unknown    Body mass index (BMI) less than 19 [Z68.1]  Not Applicable    COPD (chronic obstructive pulmonary disease) [J44.9]  Yes    Pacemaker [Z95.0]  Yes    Acute on chronic respiratory failure with hypoxia [J96.21]  Yes     Patient needs to maintain SpO2 88% and above, do not increase.  Continues to benefit from a portable oxygen concentrator of choice.          Elevated troponin [R77.8]  Yes    Restless leg syndrome [G25.81]  Yes    Anxiety [F41.9]  Yes    Stage 3 chronic kidney disease [N18.30]  Yes      Resolved Hospital Problems   No resolved problems to display.       Follow Up Appointments:  Future Appointments   Date Time Provider Department Center   6/8/2022  8:00 AM Vickie Noriega NP [facility] [place]       Allergies:  Review of patient's allergies indicates:   Allergen Reactions    Ancef in dextrose (iso-osm) Rash    Cefazolin Rash     Tolerating Zosyn admission 3/2022    Cefuroxime Rash    Sulfamethoxazole-trimethoprim Rash     Other reaction(s): Rash       Medications: Review discharge medications with patient and family and provide education.    Current Facility-Administered Medications   Medication Dose Route Frequency Provider Last Rate Last Admin    aspirin chewable tablet 81 mg  81 mg Oral Daily Lo Guevara MD   81 mg at 05/23/22 0850    COVID-19 vac, lilliam(Pfizer)(PF) (Pfizer COVID-19) 30  mcg/0.3 mL injection 0.3 mL  0.3 mL Intramuscular vaccine x 1 dose Lo Guevaar MD        fluticasone furoate-vilanteroL 100-25 mcg/dose diskus inhaler 1 puff  1 puff Inhalation Daily Lo Guevara MD   1 puff at 05/23/22 0721    fluticasone propionate 50 mcg/actuation nasal spray 100 mcg  2 spray Each Nostril Daily Lo Guevara MD        guaiFENesin 12 hr tablet 600 mg  600 mg Oral BID Lo Guevara MD   600 mg at 05/23/22 0850    hydrOXYzine HCL tablet 10 mg  10 mg Oral TID PRN Lo Guevara MD        Lactobacillus rhamnosus GG capsule 1 capsule  1 capsule Oral Daily Lo Guevara MD   1 capsule at 05/23/22 0850    levalbuterol nebulizer solution 0.63 mg  1 ampule Nebulization Q6H WAKE Lo Guevara MD   0.63 mg at 05/23/22 1235    melatonin tablet 6 mg  6 mg Oral Nightly PRN Faustino Boyle MD   6 mg at 05/23/22 0016    metoprolol tartrate (LOPRESSOR) split tablet 12.5 mg  12.5 mg Oral BID Lo Guevara MD   12.5 mg at 05/23/22 0850    multivitamin tablet  1 tablet Oral Daily Lo Guevara MD   1 tablet at 05/23/22 0850    ondansetron disintegrating tablet 4 mg  4 mg Oral Q8H PRN Lo Guevara MD        pantoprazole EC tablet 40 mg  40 mg Oral QAM Lo Guevara MD   40 mg at 05/23/22 0738    polyethylene glycol packet 17 g  17 g Oral BID PRN Lo Guevara MD        sodium chloride 0.9% flush 10 mL  10 mL Intravenous PRN Faustino Boyle MD        sodium chloride 0.9% flush 10 mL  10 mL Intravenous PRN Lo Guevara MD        tiotropium bromide 2.5 mcg/actuation inhaler 2 puff  2 puff Inhalation Daily Lo Guevara MD   2 puff at 05/23/22 0429     Current Discharge Medication List      START taking these medications    Details   furosemide (LASIX) 40 MG tablet Take 1 tablet (40 mg total) by mouth once daily. In the case of 3lbs weight gain in 1d or 5lbs in 3d, administer additional dose of lasix in the afternoon.  Qty: 30  tablet, Refills: 2      hydrOXYzine HCL (ATARAX) 10 MG Tab Take 1 tablet (10 mg total) by mouth 3 (three) times daily as needed.  Qty: 90 tablet, Refills: 0         CONTINUE these medications which have NOT CHANGED    Details   aspirin 81 mg Tab Take 1 tablet by mouth once daily.       acetaminophen (TYLENOL) 500 MG tablet Take 500 mg by mouth daily as needed (BACK PAIN).      budesonide-formoterol 160-4.5 mcg (SYMBICORT) 160-4.5 mcg/actuation HFAA Take 2 puffs by mouth every 12 (twelve) hours.      ferrous sulfate 325 (65 FE) MG EC tablet Take 1 tablet (325 mg total) by mouth once daily.  Refills: 0      guaiFENesin (MUCINEX) 600 mg 12 hr tablet Take 600 mg by mouth 2 (two) times daily as needed for Congestion.      Lactobacillus rhamnosus GG (CULTURELLE) 10 billion cell capsule Take 1 capsule by mouth once daily.      levalbuterol (XOPENEX) 0.63 mg/3 mL nebulizer solution Take 3 mLs (0.63 mg total) by nebulization every 4 (four) hours as needed for Wheezing or Shortness of Breath. Rescue  Qty: 3 mL, Refills: 1      MELATONIN ORAL Take 3 mg by mouth nightly as needed (sleep).      metoprolol tartrate (LOPRESSOR) 25 MG tablet Take 12.5 mg by mouth 2 (two) times daily.      multivitamin (THERAGRAN) per tablet Take 1 tablet by mouth once daily.      pantoprazole (PROTONIX) 40 MG tablet Take 40 mg by mouth every morning.      polyethylene glycol (GLYCOLAX) 17 gram/dose powder Take 17 g by mouth daily as needed (CONSTIPATION).      potassium chloride (MICRO-K) 10 MEQ CpSR Take 10 mEq by mouth once daily.      tetrahydrozoline 0.05% (VISION CLEAR) 0.05 % Drop PLACE 1 DROP INTO AFFECTED EYE(S) AS NEEDED FOR REDNESS OR ITCHING      tiotropium bromide (SPIRIVA RESPIMAT) 2.5 mcg/actuation inhaler Inhale 2 puffs into the lungs Daily. Controller  Qty: 4 g, Refills: 11      triamcinolone (NASACORT) 55 mcg nasal inhaler 2 sprays by Nasal route once daily.         STOP taking these medications       albuterol (PROAIR HFA) 90  mcg/actuation inhaler Comments:   Reason for Stopping:         baclofen (LIORESAL) 10 MG tablet Comments:   Reason for Stopping:         bumetanide (BUMEX) 1 MG tablet Comments:   Reason for Stopping:                 I have seen and examined this patient within the last 30 days. My clinical findings that support the need for the home health skilled services and home bound status are the following:no   Weakness/numbness causing balance and gait disturbance due to Heart Failure and Weakness/Debility making it taxing to leave home.  Requiring assistive device to leave home due to unsteady gait caused by  Heart Failure and Weakness/Debility.  Mental confusion making it unsafe for patient to leave home alone due to  Dementia.     Diet:   cardiac diet and fluid restriction    Labs:  SN to perform labs:  CBC: Weekly; 4 week(s) and CMP: Weekly; 4 week(s) and Report Lab results to PCP.    Referrals/ Consults  Physical Therapy to evaluate and treat. Evaluate for home safety and equipment needs; Establish/upgrade home exercise program. Perform / instruct on therapeutic exercises, gait training, transfer training, and Range of Motion.  Occupational Therapy to evaluate and treat. Evaluate home environment for safety and equipment needs. Perform/Instruct on transfers, ADL training, ROM, and therapeutic exercises.   to evaluate for community resources/long-range planning.  Aide to provide assistance with personal care, ADLs, and vital signs.    Activities:   ambulate in house with assistance    Nursing:   Agency to admit patient within 24 hours of hospital discharge unless specified on physician order or at patient request    SN to complete comprehensive assessment including routine vital signs. Instruct on disease process and s/s of complications to report to MD. Review/verify medication list sent home with the patient at time of discharge  and instruct patient/caregiver as needed. Frequency may be adjusted depending  on start of care date.     Skilled nurse to perform up to 3 visits PRN for symptoms related to diagnosis    Notify MD if SBP > 160 or < 90; DBP > 90 or < 50; HR > 120 or < 50; Temp > 101; O2 < 88%; Other:    Ok to schedule additional visits based on staff availability and patient request on consecutive days within the home health episode.    When multiple disciplines ordered:    Start of Care occurs on Sunday - Wednesday schedule remaining discipline evaluations as ordered on separate consecutive days following the start of care.    Thursday SOC -schedule subsequent evaluations Friday and Monday the following week.     Friday - Saturday SOC - schedule subsequent discipline evaluations on consecutive days starting Monday of the following week.    For all post-discharge communication and subsequent orders please contact patient's primary care physician. If unable to reach primary care physician or do not receive response within 30 minutes, please contact Lo Guevara MD for clinical staff order clarification    Miscellaneous   Routine Skin for Bedridden Patients: Instruct patient/caregiver to apply moisture barrier cream to all skin folds and wet areas in perineal area daily and after baths and all bowel movements.  Heart Failure:      SN to instruct on the following:    Instruct on the definition of CHF.   Instruct on the signs/sympoms of CHF to be reported.   Instruct on and monitor daily weights.   Instruct on factors that cause exacerbation.   Instruct on action, dose, schedule, and side effects of medications.   Instruct on diet as prescribed.   Instruct on activity allowed.   Instruct on life-style modifications for life long management of CHF   SN to assess compliance with daily weights, diet, medications, fluid retention,    safety precautions, activities permitted and life-style modifications.   Additional 1-2 SN visits per week as needed for signs and symptoms     of CHF exacerbation.      For Weight Gain  > 2-3 lbs in 1 day or 4-6 lbs over 1 week notify PCP:  CHF DIURETIC SLIDING SCALE     Skilled nurse visits daily to instruct and monitor medication adherence until target weight. Then resume prior order frequency.     If weight gain exceeds 5 lbs over target weight, call MD  If weight gain of 3-4 lbs over target weight, then:     Increase Furosemide - current dose (mg/day) to 40 double dose and frequency of twice daily until target weight achieved or maximum 3 days.     If already on max oral daily dose, see IV diuretic instructions.    After 3 days of increased oral diuretic dose, get BMP. If patient is on increased oral diuretic dose greater than 5 days, repeat BMP at day 7 of increased dose.     Potassium supplementation   Scr > 1.5 mg/dl  Scr  1.5 mg/dl    K < 3.0 - NOTIFY MD and 40 mEq bid  40 mEq tid   K- 3.1-3.3  20 mEq bid  20 mEq tid    K 3.4-3.7  10 mEq bid  10 mEq tid      If target weight not reached after 5 days of increased oral diuretic, proceed to IV diuretic with daily patient contact to include face-to-face visit and telephone encounters.         Home Oxygen:  Oxygen at 2.5 L/min nasal canula to be used:  Continuously.    Home Health Aide:  Nursing Three times weekly, Physical Therapy Three times weekly, Occupational Therapy Three times weekly, Medical Social Work Three times weekly, Respiratory Therapy Three times weekly and Home Health Aide Three times weekly    Wound Care Orders  yes:  Pressure Ulcer(s) Stage I :   Location: sacram- apply mepiplex to area; turn q2h  Apply Miconzazole:  n/a                       Frequency:  n/a                             If incontinent of stool or urine, apply thin layer Barrier cream                   twice daily and PRN to wound         Pressure relief measure:  for pressure redistribution and A/C Boots              I certify that this patient is confined to her home and needs intermittent skilled nursing care, physical therapy and occupational  therapy.        Lo Guevara MD  Department of Hospital Medicine  Department of Pediatrics  5/25/2022

## 2022-05-23 NOTE — CONSULTS
Food & Nutrition  Education    Diet Education: HF  Time Spent: 10 minutes  Learners: daughter      Nutrition Education provided with handouts: weight monitoring perhaps with assistance of home health, low sodium diet, fluid restriction 1500 ml per day.      Comments:Patient Hard of hearing      All questions and concerns answered. Dietitian's contact information provided.

## 2022-05-23 NOTE — ASSESSMENT & PLAN NOTE
- Currently on home oxygen settings  - No concern for COPD exacerbation at this time  - Continue home inhaler regimen  - continue levoalbuterol q6h while awake  - PRN duonebs q4h for sob     Patient seen and examined at bedside, no acute overnight events. No acute complaints, pain well controlled. Patient is ambulating, voiding spontaneously, passing flatus, and tolerating regular diet. Pt has not had a BM.     Vital Signs Last 24 Hours  T(C): 36.7 (06-06-18 @ 21:00), Max: 36.9 (06-06-18 @ 13:59)  HR: 62 (06-06-18 @ 21:00) (57 - 75)  BP: 116/69 (06-06-18 @ 21:00) (97/68 - 129/82)  RR: 18 (06-06-18 @ 21:00) (17 - 18)  SpO2: 99% (06-06-18 @ 21:00) (98% - 99%)    Physical Exam:  General: NAD  Abdomen: soft, appropriately tender, non-distended, fundus firm  Incision: Pfannenstiel incision CDI, no drainage, no erythema, subcuticular suture closure, steristrips in place  Pelvic: lochia wnl    Labs:  Blood Type: O Positive  Antibody Screen: Negative  RPR: Negative               11.9   13.2  )-----------( 210      ( 06-06 @ 06:48 )             37.4                13.0   15.50 )-----------( 240      ( 06-04 @ 14:52 )             40.2         MEDICATIONS  (STANDING):  acetaminophen   Tablet. 975 milliGRAM(s) Oral every 6 hours  diphtheria/tetanus/pertussis (acellular) Vaccine (ADAcel) 0.5 milliLiter(s) IntraMuscular once  ferrous    sulfate 325 milliGRAM(s) Oral daily  heparin  Injectable 5000 Unit(s) SubCutaneous every 12 hours  ibuprofen  Tablet 600 milliGRAM(s) Oral every 6 hours  ketorolac   Injectable 30 milliGRAM(s) IV Push every 6 hours  lactated ringers. 1000 milliLiter(s) (125 mL/Hr) IV Continuous <Continuous>  oxyCODONE    IR 5 milliGRAM(s) Oral every 3 hours  oxytocin Infusion 333.333 milliUNIT(s)/Min (1000 mL/Hr) IV Continuous <Continuous>  oxytocin Infusion 333.333 milliUNIT(s)/Min (1000 mL/Hr) IV Continuous <Continuous>  oxytocin Infusion 41.667 milliUNIT(s)/Min (125 mL/Hr) IV Continuous <Continuous>  oxytocin Infusion 41.667 milliUNIT(s)/Min (125 mL/Hr) IV Continuous <Continuous>  prenatal multivitamin 1 Tablet(s) Oral daily    MEDICATIONS  (PRN):  dexamethasone  Injectable 4 milliGRAM(s) IV Push every 6 hours PRN Nausea, IF ondansetron is ineffective after 30 - 60 minutes  diphenhydrAMINE   Capsule 25 milliGRAM(s) Oral every 6 hours PRN Itching  docusate sodium 100 milliGRAM(s) Oral two times a day PRN Stool Softening  glycerin Suppository - Adult 1 Suppository(s) Rectal at bedtime PRN Constipation  lanolin Ointment 1 Application(s) Topical every 3 hours PRN Sore Nipples  naloxone Injectable 0.1 milliGRAM(s) IV Push every 3 minutes PRN For ANY of the following changes in patient status:  A. RR LESS THAN 10 breaths per minute, B. Oxygen saturation LESS THAN 90%, C. Sedation score of 6  ondansetron Injectable 4 milliGRAM(s) IV Push every 6 hours PRN Nausea  oxyCODONE    IR 5 milliGRAM(s) Oral every 4 hours PRN Severe Pain (7 - 10)  simethicone 80 milliGRAM(s) Chew every 4 hours PRN Gas

## 2022-05-23 NOTE — PLAN OF CARE
Problem: Adult Inpatient Plan of Care  Goal: Plan of Care Review  Outcome: Ongoing, Progressing  Goal: Patient-Specific Goal (Individualized)  Outcome: Ongoing, Progressing  Goal: Absence of Hospital-Acquired Illness or Injury  Outcome: Ongoing, Progressing  Goal: Optimal Comfort and Wellbeing  Outcome: Ongoing, Progressing  Goal: Readiness for Transition of Care  Outcome: Ongoing, Progressing     Problem: Adjustment to Illness (Sepsis/Septic Shock)  Goal: Optimal Coping  Outcome: Ongoing, Progressing     Problem: Bleeding (Sepsis/Septic Shock)  Goal: Absence of Bleeding  Outcome: Ongoing, Progressing     Problem: Glycemic Control Impaired (Sepsis/Septic Shock)  Goal: Blood Glucose Level Within Desired Range  Outcome: Ongoing, Progressing     Problem: Infection Progression (Sepsis/Septic Shock)  Goal: Absence of Infection Signs and Symptoms  Outcome: Ongoing, Progressing     Problem: Nutrition Impaired (Sepsis/Septic Shock)  Goal: Optimal Nutrition Intake  Outcome: Ongoing, Progressing     Problem: Impaired Wound Healing  Goal: Optimal Wound Healing  Outcome: Ongoing, Progressing     Problem: Fall Injury Risk  Goal: Absence of Fall and Fall-Related Injury  Outcome: Ongoing, Progressing

## 2022-05-23 NOTE — ASSESSMENT & PLAN NOTE
- component of compensatory from chronic CO2 retention, but likely over diuresis w/chloride of 91  - BMP with improving alkalosis   - ABG w/pH 7.45/CO2 59/O2 68/HCO3 41/O2 sat 90%

## 2022-05-23 NOTE — SUBJECTIVE & OBJECTIVE
"Interval History: Patient was seen and examined this am. Increased wob this am. Hard of hearing patient. Daughter in room and daughter bri on the phone. Patient had improvement in work of breathing with breathing treatment this am. Ucx came back w/E coli and numerous antibiotic resistances. ID consulted and started on IV erta. Still with intermittent "shakiness" of legs. Had brief discussion about possible hospice/comfort care with daughters; however, Bri reported that they are not yet at the place to transition her to hospice.     No diarrhea, constipation, nausea, vomiting, abd pain, chest pain.     Objective:     Vital Signs (Most Recent):  Temp: 97.8 °F (36.6 °C) (05/23/22 1200)  Pulse: 72 (05/23/22 1235)  Resp: (!) 22 (05/23/22 1235)  BP: 126/60 (05/23/22 1200)  SpO2: (!) 93 % (05/23/22 1235)   Vital Signs (24h Range):  Temp:  [97.8 °F (36.6 °C)-98.5 °F (36.9 °C)] 97.8 °F (36.6 °C)  Pulse:  [69-84] 72  Resp:  [16-22] 22  SpO2:  [86 %-99 %] 93 %  BP: (112-139)/(56-66) 126/60     Weight: 40.8 kg (89 lb 15.2 oz)  Body mass index is 17.57 kg/m².    Intake/Output Summary (Last 24 hours) at 5/23/2022 1609  Last data filed at 5/22/2022 1800  Gross per 24 hour   Intake 400 ml   Output 500 ml   Net -100 ml     Physical Exam  Gen: in NAD, appears stated age, appears chronically ill, cachetic  Neuro: awake, oriented to self and place, some confusion during interview- hard of hearing, motor, sensory, and strength grossly intact BL  HEENT: NTNC, EOMI, PERRL, MMM  CVS: RRR, no m/r/g; S1/S2 auscultated with no S3 or S4; capillary refill < 2 sec, pacer/paced rhythm  Resp: Bibasilar crackles- 2.5L BNC; no belabored breathing or accessory muscle use appreciated   Abd: BS+ in all 4 quadrants; NTND, soft to palpation; no organomegaly appreciated   Extrem: pulses full, equal, and regular over all 4 extremities; no UE or LE edema BL  Skin: bruising of dorsum of BL feet- Rt>Lt    Significant Labs: All pertinent labs within the " past 24 hours have been reviewed.  Blood Culture: No results for input(s): LABBLOO in the last 48 hours.  CBC:   No results for input(s): WBC, HGB, HCT, PLT in the last 48 hours.    CMP:   Recent Labs   Lab 05/22/22  0321 05/23/22  0450    137   K 3.4* 4.3   CL 91* 92*   CO2 39* 34*   GLU 72 89   BUN 23 29   CREATININE 1.0 1.2   CALCIUM 9.0 9.4   ANIONGAP 9 11   EGFRNONAA 48.7* 39.1*     Urine Culture:   No results for input(s): LABURIN in the last 48 hours.    Urine Studies:   No results for input(s): COLORU, APPEARANCEUA, PHUR, SPECGRAV, PROTEINUA, GLUCUA, KETONESU, BILIRUBINUA, OCCULTUA, NITRITE, UROBILINOGEN, LEUKOCYTESUR, RBCUA, WBCUA, BACTERIA, SQUAMEPITHEL, HYALINECASTS in the last 48 hours.    Invalid input(s): WRIGHTSUR      Significant Imaging: I have reviewed all pertinent imaging results/findings within the past 24 hours.    CXR:  FINDINGS:  The cardiomediastinal silhouette appears mildly enlarged.  There is mild calcification of the aortic knob consistent with atherosclerotic stigmata.  There is mild pulmonary edema, but with improved perihilar ground-glass opacities.  There are persistent small pleural effusions.  No other significant interval changes are noted.     Stable dual chamber pacer.     Impression:     As above

## 2022-05-23 NOTE — ASSESSMENT & PLAN NOTE
Patient with Hypercapnic and Hypoxic Respiratory failure which is Acute on chronic.  she is on home oxygen at 2.5 LPM. Supplemental oxygen was provided and noted- Oxygen Concentration (%):  [32] 32.   Signs/symptoms of respiratory failure include- tachypnea and increased work of breathing. Contributing diagnoses includes - CHF and COPD Labs and images were reviewed. Patient Has not had a recent ABG. Will treat underlying causes and adjust management of respiratory failure as follows-     - Concern for CHF exacerbation  - Holding further diuresis   - No wheeze on exam, no cough or change in sputum production or color  - low suspicion for COPD exacerbation  - monitor oxygen saturations  - incentive spirometer to bedside  - PT/OT consulted   - Concern for atelectasis as well

## 2022-05-23 NOTE — ASSESSMENT & PLAN NOTE
- hydroxyzine 10mg q8h PRN for anxiety  - I fear that this is contributing to increased wob and restless legs  - consideration of PRN morphine for air hunger   - consideration of psych consultation

## 2022-05-23 NOTE — PLAN OF CARE
Patient discharging home today with home health. SW has sent a referral to [facility]. Will continue to follow up.      Nadya Ramires LMSW  [facility]   [id number]

## 2022-05-23 NOTE — PROGRESS NOTES
"[facility] - [facility] ([address] Medicine  Progress Note    Patient Name: Venus Mayer  MRN: [id number]  Patient Class: OP- Observation   Admission Date: 5/21/2022  Length of Stay: 0 days  Attending Physician: Lo Guevara MD  Primary Care Provider: Jona Che MD        Subjective:     Principal Problem:Acute on chronic congestive heart failure        HPI:  Mrs. Donovan is a 89yoF w/PMHx of cardiomyopathy, CHFpEF (60%), pacemaker in place, HLD, HTN, COPD on home 2L NC, vascular dementia who presented to [facility] [place] w/increased wob. Symptoms started 3d ago. Patient normally sleeps at 45 deg incline. Noticed SOB 05/19- increased supplemental oxygen to 3.5L for about 1hr w/improvement of symptoms. Started feeling "shakey" the following day. Woke up on day of admission at 3am w/increased wob and shortness of breath. Patient lives at home, but has 24hrs care with a sitter and also has frequent visits/checks via children.     Upon presentation to ED, RR 34, O2 sat 100% on 2.5L BNC, 152/81mmHg. Labs notable for 11.3, Plt 147, Bicarb 37, Chl 94, BNP 3513, trop 0.145,  UA w/2+ LE and 21 WBC. CXR concerning for "interstitial and alveolar opacities that could relate to edema, infection, noninfectious inflammation, or combination of the above." Received 200mg of IV lasix total while in ED. Admitted to hospital medicine for further workup and evaluation.       Overview/Hospital Course:  Patient had stabilization of respiratory status. Bicarb increased to 39 and Cl decreased to 91- concern for contraction alkalosis and overdiuresis. Stopped lasix am of 05/22. Gave 250mL NSB. Patient had improvement in bicarb to 34. Xopenex treatments were scheduled- continued improvement in work of breathing. CXR showed mildly improved aeration on day 2 of admission. Discussed possibility of home w/home hospice as patient seems to have continued increase in oxygen requirements, and I fear that she is now in end-stage COPD " "w/air hunger. Prescribed hydroxyzine for anxiety- patient appeared to get worked up over work of breathing. Family does not desire scheduled anxiety meds at this time or hospice. They will continue to have ongoing discussions. Patient received 3d of PO levo based on prior Ucx; however, on 05/23- Ucx w/E coli w/numerous antibiotic resistances- with allergies to cephalosporins, patient was started on erta and ID was consulted.       Interval History: Patient was seen and examined this am. Increased wob this am. Hard of hearing patient. Daughter in room and daughter bri on the phone. Patient had improvement in work of breathing with breathing treatment this am. Ucx came back w/E coli and numerous antibiotic resistances. ID consulted and started on IV erta. Still with intermittent "shakiness" of legs. Had brief discussion about possible hospice/comfort care with daughters; however, Bri reported that they are not yet at the place to transition her to hospice.     No diarrhea, constipation, nausea, vomiting, abd pain, chest pain.     Objective:     Vital Signs (Most Recent):  Temp: 97.8 °F (36.6 °C) (05/23/22 1200)  Pulse: 72 (05/23/22 1235)  Resp: (!) 22 (05/23/22 1235)  BP: 126/60 (05/23/22 1200)  SpO2: (!) 93 % (05/23/22 1235)   Vital Signs (24h Range):  Temp:  [97.8 °F (36.6 °C)-98.5 °F (36.9 °C)] 97.8 °F (36.6 °C)  Pulse:  [69-84] 72  Resp:  [16-22] 22  SpO2:  [86 %-99 %] 93 %  BP: (112-139)/(56-66) 126/60     Weight: 40.8 kg (89 lb 15.2 oz)  Body mass index is 17.57 kg/m².    Intake/Output Summary (Last 24 hours) at 5/23/2022 1609  Last data filed at 5/22/2022 1800  Gross per 24 hour   Intake 400 ml   Output 500 ml   Net -100 ml     Physical Exam  Gen: in NAD, appears stated age, appears chronically ill, cachetic  Neuro: awake, oriented to self and place, some confusion during interview- hard of hearing, motor, sensory, and strength grossly intact BL  HEENT: NTNC, EOMI, PERRL, MMM  CVS: RRR, no m/r/g; S1/S2 " auscultated with no S3 or S4; capillary refill < 2 sec, pacer/paced rhythm  Resp: Bibasilar crackles- 2.5L BNC; no belabored breathing or accessory muscle use appreciated   Abd: BS+ in all 4 quadrants; NTND, soft to palpation; no organomegaly appreciated   Extrem: pulses full, equal, and regular over all 4 extremities; no UE or LE edema BL  Skin: bruising of dorsum of BL feet- Rt>Lt    Significant Labs: All pertinent labs within the past 24 hours have been reviewed.  Blood Culture: No results for input(s): LABBLOO in the last 48 hours.  CBC:   No results for input(s): WBC, HGB, HCT, PLT in the last 48 hours.    CMP:   Recent Labs   Lab 05/22/22  0321 05/23/22  0450    137   K 3.4* 4.3   CL 91* 92*   CO2 39* 34*   GLU 72 89   BUN 23 29   CREATININE 1.0 1.2   CALCIUM 9.0 9.4   ANIONGAP 9 11   EGFRNONAA 48.7* 39.1*     Urine Culture:   No results for input(s): LABURIN in the last 48 hours.    Urine Studies:   No results for input(s): COLORU, APPEARANCEUA, PHUR, SPECGRAV, PROTEINUA, GLUCUA, KETONESU, BILIRUBINUA, OCCULTUA, NITRITE, UROBILINOGEN, LEUKOCYTESUR, RBCUA, WBCUA, BACTERIA, SQUAMEPITHEL, HYALINECASTS in the last 48 hours.    Invalid input(s): WRIGHTSUR      Significant Imaging: I have reviewed all pertinent imaging results/findings within the past 24 hours.    CXR:  FINDINGS:  The cardiomediastinal silhouette appears mildly enlarged.  There is mild calcification of the aortic knob consistent with atherosclerotic stigmata.  There is mild pulmonary edema, but with improved perihilar ground-glass opacities.  There are persistent small pleural effusions.  No other significant interval changes are noted.     Stable dual chamber pacer.     Impression:     As above      Assessment/Plan:     Acute cystitis without hematuria  - Dysuria per patient, UA w/21 WBC, 2+ LE  - Per last UCx- klebsiella- completed 3d of levofloxacin   - UCx w/E coli- susceptible to cefepime, ceftriaxone, gent, nitrofurantonin, guera, erta,  bactrim  - with allergies to cephalosporins and bactrim, will begin erta- consult ID for further antibiotic recommendations and guidance   - Monitor I's and O's     * Acute on chronic congestive heart failure  - Interval history and physical exam findings as described above  - EF 60% per most recent echo 03/2022  - Clinically volume overloaded on admission  - CXR w/edema vs. Infiltrates   - Taking medications as prescribed at home  - S/p 200mg IV lasix in the ED  - Holding lasix at this time  - Continue home cardioprudent regimen  - Strict I/Os   - Daily weights  - Will continue to monitor on tele    Metabolic alkalosis  - component of compensatory from chronic CO2 retention, but likely over diuresis w/chloride of 91  - BMP with improving alkalosis   - ABG w/pH 7.45/CO2 59/O2 68/HCO3 41/O2 sat 90%    Body mass index (BMI) less than 19  - nutrition consulted     COPD (chronic obstructive pulmonary disease)  - Currently on home oxygen settings  - No concern for COPD exacerbation at this time  - Continue home inhaler regimen  - continue levoalbuterol q6h while awake  - PRN duonebs q4h for sob    Pacemaker  - in place, paced rhythm    Elevated troponin  - likely type 2 NSTEMI from hypoxia  - trop 0.143-0.145-0.137- plateaued  - no chest pain     Acute on chronic respiratory failure with hypoxia  Patient with Hypercapnic and Hypoxic Respiratory failure which is Acute on chronic.  she is on home oxygen at 2.5 LPM. Supplemental oxygen was provided and noted- Oxygen Concentration (%):  [32] 32.   Signs/symptoms of respiratory failure include- tachypnea and increased work of breathing. Contributing diagnoses includes - CHF and COPD Labs and images were reviewed. Patient Has not had a recent ABG. Will treat underlying causes and adjust management of respiratory failure as follows-     - Concern for CHF exacerbation  - Holding further diuresis   - No wheeze on exam, no cough or change in sputum production or color  - low  suspicion for COPD exacerbation  - monitor oxygen saturations  - incentive spirometer to bedside  - PT/OT consulted   - Concern for atelectasis as well    Restless leg syndrome  - check iron studies  - continue oral iron supplement   - iron def can be associated w/restless leg  - if no improvement, consideration of ropinirole 0.25mg; however, sometimes initiation of this medication can have actual worsening of symptoms in the elderly- must be aware   - concern that restless legs are also a component of anxiety- PRN hydroxyzine added     Anxiety  - hydroxyzine 10mg q8h PRN for anxiety  - I fear that this is contributing to increased wob and restless legs  - consideration of PRN morphine for air hunger   - consideration of psych consultation     Stage 3 chronic kidney disease  - Cr baseline at admission  - Renally dosing all medications  - Avoid nephrotoxins  - Will continue to monitor on daily labs    VTE Risk Mitigation (From admission, onward)         Ordered     IP VTE HIGH RISK PATIENT  Once         05/21/22 1236     Place sequential compression device  Until discontinued         05/21/22 1236     Place sequential compression device  Until discontinued         05/21/22 1233                Discharge Planning   TRISTON: 5/23/2022     Code Status: DNR   Is the patient medically ready for discharge?: Yes    Reason for patient still in hospital (select all that apply): Patient trending condition  Discharge Plan A: Home Health   Discharge Delays: None known at this time                Lo Guevara MD  Department of Hospital Medicine   [facility] - Intensive Care ([facility]-)

## 2022-05-23 NOTE — ASSESSMENT & PLAN NOTE
- Dysuria per patient, UA w/21 WBC, 2+ LE  - Per last UCx- klebsiella- completed 3d of levofloxacin   - UCx w/E coli- susceptible to cefepime, ceftriaxone, gent, nitrofurantonin, guera, erta, bactrim  - with allergies to cephalosporins and bactrim, will begin erta- consult ID for further antibiotic recommendations and guidance   - Monitor I's and O's

## 2022-05-23 NOTE — PROGRESS NOTES
[facility] ([facility]) Team notified of pt referral via [facility] (automated inbasket notification) .    Patient screened today by provider and RN for enrollment to program.      Pt was deemed not a candidate for enrollment at this time related to patient refused.  Pt requested not to be enrolled in the future during last enrollment.     Pt will require additional follow up planning per primary team.     If pt status, diagnosis, or treatment plan changes , please place AMB referral to [facility] ([id number]).

## 2022-05-24 LAB
ANION GAP SERPL CALC-SCNC: 8 MMOL/L (ref 8–16)
BASOPHILS # BLD AUTO: 0.05 K/UL (ref 0–0.2)
BASOPHILS NFR BLD: 0.4 % (ref 0–1.9)
BUN SERPL-MCNC: 29 MG/DL (ref 10–30)
CALCIUM SERPL-MCNC: 9.2 MG/DL (ref 8.7–10.5)
CHLORIDE SERPL-SCNC: 91 MMOL/L (ref 95–110)
CO2 SERPL-SCNC: 38 MMOL/L (ref 23–29)
CREAT SERPL-MCNC: 1.2 MG/DL (ref 0.5–1.4)
DIFFERENTIAL METHOD: ABNORMAL
EOSINOPHIL # BLD AUTO: 0.6 K/UL (ref 0–0.5)
EOSINOPHIL NFR BLD: 4.3 % (ref 0–8)
ERYTHROCYTE [DISTWIDTH] IN BLOOD BY AUTOMATED COUNT: 16.1 % (ref 11.5–14.5)
EST. GFR  (AFRICAN AMERICAN): 45 ML/MIN/1.73 M^2
EST. GFR  (NON AFRICAN AMERICAN): 39.1 ML/MIN/1.73 M^2
GLUCOSE SERPL-MCNC: 91 MG/DL (ref 70–110)
HCT VFR BLD AUTO: 35.5 % (ref 37–48.5)
HGB BLD-MCNC: 11.5 G/DL (ref 12–16)
IMM GRANULOCYTES # BLD AUTO: 0.05 K/UL (ref 0–0.04)
IMM GRANULOCYTES NFR BLD AUTO: 0.4 % (ref 0–0.5)
LYMPHOCYTES # BLD AUTO: 1.3 K/UL (ref 1–4.8)
LYMPHOCYTES NFR BLD: 9.7 % (ref 18–48)
MAGNESIUM SERPL-MCNC: 2.1 MG/DL (ref 1.6–2.6)
MCH RBC QN AUTO: 31.9 PG (ref 27–31)
MCHC RBC AUTO-ENTMCNC: 32.4 G/DL (ref 32–36)
MCV RBC AUTO: 99 FL (ref 82–98)
MONOCYTES # BLD AUTO: 0.9 K/UL (ref 0.3–1)
MONOCYTES NFR BLD: 6.7 % (ref 4–15)
NEUTROPHILS # BLD AUTO: 10.3 K/UL (ref 1.8–7.7)
NEUTROPHILS NFR BLD: 78.5 % (ref 38–73)
NRBC BLD-RTO: 0 /100 WBC
PLATELET # BLD AUTO: 153 K/UL (ref 150–450)
PMV BLD AUTO: 10.5 FL (ref 9.2–12.9)
POTASSIUM SERPL-SCNC: 3.7 MMOL/L (ref 3.5–5.1)
RBC # BLD AUTO: 3.6 M/UL (ref 4–5.4)
SODIUM SERPL-SCNC: 137 MMOL/L (ref 136–145)
WBC # BLD AUTO: 13.14 K/UL (ref 3.9–12.7)

## 2022-05-24 PROCEDURE — 94640 AIRWAY INHALATION TREATMENT: CPT

## 2022-05-24 PROCEDURE — 25000003 PHARM REV CODE 250: Performed by: NURSE PRACTITIONER

## 2022-05-24 PROCEDURE — 97162 PT EVAL MOD COMPLEX 30 MIN: CPT

## 2022-05-24 PROCEDURE — 94761 N-INVAS EAR/PLS OXIMETRY MLT: CPT

## 2022-05-24 PROCEDURE — 83735 ASSAY OF MAGNESIUM: CPT | Performed by: STUDENT IN AN ORGANIZED HEALTH CARE EDUCATION/TRAINING PROGRAM

## 2022-05-24 PROCEDURE — 99223 PR INITIAL HOSPITAL CARE,LEVL III: ICD-10-PCS | Mod: ,,, | Performed by: INTERNAL MEDICINE

## 2022-05-24 PROCEDURE — 97112 NEUROMUSCULAR REEDUCATION: CPT

## 2022-05-24 PROCEDURE — 85025 COMPLETE CBC W/AUTO DIFF WBC: CPT | Performed by: STUDENT IN AN ORGANIZED HEALTH CARE EDUCATION/TRAINING PROGRAM

## 2022-05-24 PROCEDURE — 63600175 PHARM REV CODE 636 W HCPCS: Performed by: STUDENT IN AN ORGANIZED HEALTH CARE EDUCATION/TRAINING PROGRAM

## 2022-05-24 PROCEDURE — 27000207 HC ISOLATION

## 2022-05-24 PROCEDURE — 25000003 PHARM REV CODE 250: Performed by: EMERGENCY MEDICINE

## 2022-05-24 PROCEDURE — 99232 PR SUBSEQUENT HOSPITAL CARE,LEVL II: ICD-10-PCS | Mod: ,,, | Performed by: STUDENT IN AN ORGANIZED HEALTH CARE EDUCATION/TRAINING PROGRAM

## 2022-05-24 PROCEDURE — 25000003 PHARM REV CODE 250: Performed by: STUDENT IN AN ORGANIZED HEALTH CARE EDUCATION/TRAINING PROGRAM

## 2022-05-24 PROCEDURE — 99223 1ST HOSP IP/OBS HIGH 75: CPT | Mod: ,,, | Performed by: INTERNAL MEDICINE

## 2022-05-24 PROCEDURE — 97530 THERAPEUTIC ACTIVITIES: CPT

## 2022-05-24 PROCEDURE — 99499 UNLISTED E&M SERVICE: CPT | Mod: ,,, | Performed by: NURSE PRACTITIONER

## 2022-05-24 PROCEDURE — 80048 BASIC METABOLIC PNL TOTAL CA: CPT | Performed by: STUDENT IN AN ORGANIZED HEALTH CARE EDUCATION/TRAINING PROGRAM

## 2022-05-24 PROCEDURE — 63600175 PHARM REV CODE 636 W HCPCS: Performed by: NURSE PRACTITIONER

## 2022-05-24 PROCEDURE — 99499 NO LOS: ICD-10-PCS | Mod: ,,, | Performed by: NURSE PRACTITIONER

## 2022-05-24 PROCEDURE — 36415 COLL VENOUS BLD VENIPUNCTURE: CPT | Performed by: STUDENT IN AN ORGANIZED HEALTH CARE EDUCATION/TRAINING PROGRAM

## 2022-05-24 PROCEDURE — 97166 OT EVAL MOD COMPLEX 45 MIN: CPT

## 2022-05-24 PROCEDURE — 20600001 HC STEP DOWN PRIVATE ROOM

## 2022-05-24 PROCEDURE — 99232 SBSQ HOSP IP/OBS MODERATE 35: CPT | Mod: ,,, | Performed by: STUDENT IN AN ORGANIZED HEALTH CARE EDUCATION/TRAINING PROGRAM

## 2022-05-24 PROCEDURE — 27000221 HC OXYGEN, UP TO 24 HOURS

## 2022-05-24 PROCEDURE — 97535 SELF CARE MNGMENT TRAINING: CPT

## 2022-05-24 PROCEDURE — 25000242 PHARM REV CODE 250 ALT 637 W/ HCPCS: Performed by: STUDENT IN AN ORGANIZED HEALTH CARE EDUCATION/TRAINING PROGRAM

## 2022-05-24 PROCEDURE — 99900035 HC TECH TIME PER 15 MIN (STAT)

## 2022-05-24 PROCEDURE — 97116 GAIT TRAINING THERAPY: CPT

## 2022-05-24 RX ADMIN — Medication 6 MG: at 09:05

## 2022-05-24 RX ADMIN — LEVALBUTEROL HYDROCHLORIDE 0.63 MG: 0.63 SOLUTION RESPIRATORY (INHALATION) at 07:05

## 2022-05-24 RX ADMIN — FLUTICASONE FUROATE AND VILANTEROL TRIFENATATE 1 PUFF: 100; 25 POWDER RESPIRATORY (INHALATION) at 09:05

## 2022-05-24 RX ADMIN — Medication 1 CAPSULE: at 08:05

## 2022-05-24 RX ADMIN — ASPIRIN 81 MG CHEWABLE TABLET 81 MG: 81 TABLET CHEWABLE at 08:05

## 2022-05-24 RX ADMIN — PANTOPRAZOLE SODIUM 40 MG: 40 TABLET, DELAYED RELEASE ORAL at 06:05

## 2022-05-24 RX ADMIN — GUAIFENESIN 600 MG: 600 TABLET, EXTENDED RELEASE ORAL at 08:05

## 2022-05-24 RX ADMIN — ERTAPENEM 500 MG: 1 INJECTION INTRAMUSCULAR; INTRAVENOUS at 06:05

## 2022-05-24 RX ADMIN — FLUTICASONE PROPIONATE 100 MCG: 50 SPRAY, METERED NASAL at 08:05

## 2022-05-24 RX ADMIN — THERA TABS 1 TABLET: TAB at 08:05

## 2022-05-24 RX ADMIN — METOPROLOL TARTRATE 12.5 MG: 25 TABLET, FILM COATED ORAL at 09:05

## 2022-05-24 RX ADMIN — SODIUM CHLORIDE, SODIUM LACTATE, POTASSIUM CHLORIDE, AND CALCIUM CHLORIDE 250 ML: .6; .31; .03; .02 INJECTION, SOLUTION INTRAVENOUS at 02:05

## 2022-05-24 RX ADMIN — LEVALBUTEROL HYDROCHLORIDE 0.63 MG: 0.63 SOLUTION RESPIRATORY (INHALATION) at 09:05

## 2022-05-24 RX ADMIN — GUAIFENESIN 600 MG: 600 TABLET, EXTENDED RELEASE ORAL at 09:05

## 2022-05-24 RX ADMIN — METOPROLOL TARTRATE 12.5 MG: 25 TABLET, FILM COATED ORAL at 08:05

## 2022-05-24 RX ADMIN — LEVALBUTEROL HYDROCHLORIDE 0.63 MG: 0.63 SOLUTION RESPIRATORY (INHALATION) at 02:05

## 2022-05-24 NOTE — PLAN OF CARE
Problem: Skin Injury Risk Increased  Goal: Skin Health and Integrity  Outcome: Ongoing, Progressing     Problem: Infection  Goal: Absence of Infection Signs and Symptoms  Outcome: Ongoing, Progressing

## 2022-05-24 NOTE — ASSESSMENT & PLAN NOTE
89 year old with cardiomyopathy, CHFpEF (60%), pacemaker in place, HLD, HTN, COPD on home 2L NC, and vascular dementia who presented to [facility] w/increased shortness of breath X 3 days.  Reportedly complained of dysuria and U/A and culture collected.  U/A with 21 WBC.  Based on prior urine cx hx (klebsiella in March) she was started on levofloxacin.   Urine culture from admission now showing E coli (resistant to levofloxacin).  Given family concern about cephalosporin allergies (also allergy to bactrim), ertapenem was started and ID consulted.      At time of visit patient is alert and without complaints. She denies any urinary symptoms - no pain/burning, no suprapubic pain, no flank pain, no nausea or vomiting.  Denies fevers.  Has been afebrile.  Denies frequent UTIs.  Review of records show two positive urine cultures this year (March and May), one in 2020 and 2019.   Denies history of kidney stones.        Plan/recommendations:  1.  Recommend completing 3 day course of IV ertapenem (adjusted dose for creatinine clearance).  Discussed at length with patient's daughters.  No good oral options given age/kidney function if they wish to completely avoid cephalosporins.    2.  Counseled that if she develops problems with recurrent, symptomatic urinary tract recommendations, recommend UroGyn consultation for structural/functional abnormalities.   3.  Follow up with ID as needed.   I have given them my card and encouraged to call with questions/concerns.  If recurrent issues, I will refer to UroGYN  4.  Will sign off.  Please call with questions or re-consult as needed.     Patient seen by, and plan discussed with, ID staff, Dr. Lynch  Secure chat regarding plan with Primary Team, Dr. Guevara

## 2022-05-24 NOTE — SUBJECTIVE & OBJECTIVE
Past Medical History:   Diagnosis Date    Acute on chronic congestive heart failure 7/6/2019    Cardiomyopathy     Carotid artery occlusion     CHF (congestive heart failure)     COPD (chronic obstructive pulmonary disease)     Coronary artery disease     Hyperlipidemia     Hypertension        Past Surgical History:   Procedure Laterality Date    CARDIAC CATHETERIZATION      cardic cath      CAROTID ENDARTERECTOMY      FLEXIBLE BRONCHOSCOPY N/A 7/31/2019    Procedure: BRONCHOSCOPY, FIBEROPTIC Flexible;  Surgeon: Klever Mckeon MD;  [facility];  Service: Thoracic;  Laterality: N/A;    HIP SURGERY      PLEURODESIS USING TALC N/A 7/31/2019    Procedure: PLEURODESIS;  Surgeon: Klever Mckeon MD;  Location: [facility];  Service: Thoracic;  Laterality: N/A;    PLEURODESIS USING TALC Right 8/5/2019    Procedure: PLEURODESIS, USING TALC;  Surgeon: Klever Mckeon MD;  [facility];  Service: Thoracic;  Laterality: Right;    PLEURODESIS WITH VIDEO-ASSISTED THORACOSCOPIC SURGERY (VATS) Right 8/5/2019    Procedure: VATS, WITH PLEURAL TENT;  Surgeon: Klever Mckeon MD;  [facility];  Service: Thoracic;  Laterality: Right;       Review of patient's allergies indicates:   Allergen Reactions    Ancef in dextrose (iso-osm) Rash    Cefazolin Rash     Tolerating Zosyn admission 3/2022    Cefuroxime Rash    Sulfamethoxazole-trimethoprim Rash     Other reaction(s): Rash       Medications:  Medications Prior to Admission   Medication Sig    aspirin 81 mg Tab Take 1 tablet by mouth once daily.     acetaminophen (TYLENOL) 500 MG tablet Take 500 mg by mouth daily as needed (BACK PAIN).    budesonide-formoterol 160-4.5 mcg (SYMBICORT) 160-4.5 mcg/actuation HFAA Take 2 puffs by mouth every 12 (twelve) hours.    ferrous sulfate 325 (65 FE) MG EC tablet Take 1 tablet (325 mg total) by mouth once daily.    guaiFENesin (MUCINEX) 600 mg 12 hr tablet Take 600 mg by mouth 2 (two)  times daily as needed for Congestion.    Lactobacillus rhamnosus GG (CULTURELLE) 10 billion cell capsule Take 1 capsule by mouth once daily.    levalbuterol (XOPENEX) 0.63 mg/3 mL nebulizer solution Take 3 mLs (0.63 mg total) by nebulization every 4 (four) hours as needed for Wheezing or Shortness of Breath. Rescue    MELATONIN ORAL Take 3 mg by mouth nightly as needed (sleep).    metoprolol tartrate (LOPRESSOR) 25 MG tablet Take 12.5 mg by mouth 2 (two) times daily.    multivitamin (THERAGRAN) per tablet Take 1 tablet by mouth once daily.    pantoprazole (PROTONIX) 40 MG tablet Take 40 mg by mouth every morning.    polyethylene glycol (GLYCOLAX) 17 gram/dose powder Take 17 g by mouth daily as needed (CONSTIPATION).    potassium chloride (MICRO-K) 10 MEQ CpSR Take 10 mEq by mouth once daily.    tetrahydrozoline 0.05% (VISION CLEAR) 0.05 % Drop PLACE 1 DROP INTO AFFECTED EYE(S) AS NEEDED FOR REDNESS OR ITCHING    tiotropium bromide (SPIRIVA RESPIMAT) 2.5 mcg/actuation inhaler Inhale 2 puffs into the lungs Daily. Controller    triamcinolone (NASACORT) 55 mcg nasal inhaler 2 sprays by Nasal route once daily.     Antibiotics (From admission, onward)                Start     Stop Route Frequency Ordered    05/24/22 1715  ertapenem (INVANZ) 500 mg in sodium chloride 0.9% 100 mL IVPB         -- IV Every 24 hours (non-standard times) 05/24/22 0926          Antifungals (From admission, onward)                None          Antivirals (From admission, onward)      None             Immunization History   Administered Date(s) Administered    COVID-19, MRNA, LN-S, PF (Pfizer) (Purple Cap) 02/10/2021, 03/03/2021    Tdap 02/27/2015       Family History       Problem Relation (Age of Onset)    Hypertension Mother          Social History     Socioeconomic History    Marital status:    Tobacco Use    Smoking status: Former Smoker     Packs/day: 2.00     Years: 20.00     Pack years: 40.00     Types: Cigarettes     Quit date:  1980     Years since quittin.3    Smokeless tobacco: Never Used   Substance and Sexual Activity    Alcohol use: Yes     Alcohol/week: 2.0 standard drinks     Types: 2 Glasses of wine per week     Comment: social    Drug use: No    Sexual activity: Not Currently     Review of Systems   Constitutional:  Negative for activity change, appetite change, chills, fatigue and fever.   HENT: Negative.     Respiratory:  Positive for shortness of breath (baseline). Negative for cough, choking, chest tightness and wheezing.    Cardiovascular:  Negative for chest pain, palpitations and leg swelling.   Gastrointestinal:  Negative for abdominal pain, constipation, diarrhea, nausea and vomiting.   Genitourinary:  Negative for difficulty urinating, dysuria, flank pain and hematuria.   Musculoskeletal:  Positive for myalgias (BL LE pain/tremors - chronic). Negative for arthralgias.   Skin:  Negative for rash.   Neurological:  Positive for weakness. Negative for dizziness, speech difficulty, light-headedness and headaches.   Hematological:  Bruises/bleeds easily.   Psychiatric/Behavioral:  Negative for agitation and confusion. The patient is not nervous/anxious.    All other systems reviewed and are negative.  Objective:     Vital Signs (Most Recent):  Temp: 97.6 °F (36.4 °C) (22 1200)  Pulse: 86 (22 1440)  Resp: 18 (22 1440)  BP: (!) 120/53 (22 1200)  SpO2: (!) 92 % (22 1440)   Vital Signs (24h Range):  Temp:  [97.2 °F (36.2 °C)-98.2 °F (36.8 °C)] 97.6 °F (36.4 °C)  Pulse:  [71-86] 86  Resp:  [18-56] 18  SpO2:  [92 %-100 %] 92 %  BP: (118-156)/(53-62) 120/53     Weight: 40.8 kg (89 lb 15.2 oz)  Body mass index is 17.57 kg/m².    Estimated Creatinine Clearance: 18.9 mL/min (based on SCr of 1.2 mg/dL).    Physical Exam  Constitutional:       General: She is not in acute distress.     Appearance: She is not ill-appearing or toxic-appearing.      Comments: Sitting up in bed.  No distress   HENT:       Head: Normocephalic and atraumatic.   Eyes:      General: No scleral icterus.     Conjunctiva/sclera: Conjunctivae normal.   Cardiovascular:      Rate and Rhythm: Normal rate and regular rhythm.      Heart sounds: No murmur heard.  Pulmonary:      Effort: Pulmonary effort is normal. No tachypnea, accessory muscle usage or respiratory distress.      Comments: O2 nasal cannula  Abdominal:      General: Abdomen is flat. There is no distension.      Palpations: Abdomen is soft.      Tenderness: There is no abdominal tenderness. There is no right CVA tenderness or guarding.      Comments: No suprapubic tenderness   Musculoskeletal:      Cervical back: Normal range of motion.      Right lower leg: No edema.      Left lower leg: No edema.   Skin:     General: Skin is warm and dry.      Findings: Bruising present.   Neurological:      Mental Status: She is alert.      Comments: Oriented to person and place      Psychiatric:         Mood and Affect: Mood normal.         Behavior: Behavior normal.       Significant Labs: Blood Culture:   Recent Labs   Lab 03/16/22  0948 03/20/22  2019 04/09/22 2126 04/09/22  2133   LABBLOO No growth after 5 days.  No growth after 5 days. No growth after 5 days.  No growth after 5 days. No growth after 5 days. No growth after 5 days.     CBC:   Recent Labs   Lab 05/24/22  0909   WBC 13.14*   HGB 11.5*   HCT 35.5*        CMP:   Recent Labs   Lab 05/23/22  0450 05/24/22  0445    137   K 4.3 3.7   CL 92* 91*   CO2 34* 38*   GLU 89 91   BUN 29 29   CREATININE 1.2 1.2   CALCIUM 9.4 9.2   ANIONGAP 11 8   EGFRNONAA 39.1* 39.1*     Urine Culture:   Recent Labs   Lab 03/16/22  0948 05/21/22  0845   LABURIN KLEBSIELLA PNEUMONIAE  30,000 cfu/ml  * ESCHERICHIA COLI  50,000 - 99,999 cfu/ml  No other significant isolate  *     Urine Studies:   Recent Labs   Lab 03/16/22  0948 03/20/22  2303 05/21/22  0845   COLORU Yellow   < > Yellow   APPEARANCEUA Clear   < > Hazy*   PHUR 5.5   < > 7.0    SPECGRAV 1.010   < > 1.005   PROTEINUA Trace*   < > Negative   GLUCUA Negative   < > Negative   KETONESU Negative   < > Negative   BILIRUBINUA Negative   < > Negative   OCCULTUA Negative   < > Negative   NITRITE Negative   < > Negative   UROBILINOGEN 1.0  --   --    LEUKOCYTESUR 1+*   < > 2+*   RBCUA 12*  12*   < > 1   WBCUA 34*  34*   < > 21*   BACTERIA Negative  Negative  --  Rare   SQUAMEPITHEL 3  3   < > 1   HYALINECASTS 1  1  --   --     < > = values in this interval not displayed.       Significant Imaging: I have reviewed all pertinent imaging results/findings within the past 24 hours.

## 2022-05-24 NOTE — NURSING
Patient is alert. In bed resting with eyes open, no signs of physical distress noted. Family at bedside. IV antibiotic started for E-coli and ESBL in the urine.

## 2022-05-24 NOTE — RESPIRATORY THERAPY
RAPID RESPONSE RESPIRATORY CHART CHECK       Chart check completed. Instructed to call [phone] for further concerns or assistance.

## 2022-05-24 NOTE — PT/OT/SLP EVAL
Physical Therapy Evaluation and Treatment    Patient Name:  Venus Mayer   MRN:  [id number]  Admit Date: 5/21/2022  Admitting Diagnosis:  Acute on chronic congestive heart failure   Length of Stay: 1 days  Recent Surgery: * No surgery found *      Recommendations:     Discharge Recommendations:  home health PT   Discharge Equipment Recommendations: none   Barriers to discharge: Inaccessible home environment and Evolving Clinical Presentation    Assessment:     Venus Mayer is a 89 y.o. female admitted with a medical diagnosis of Acute on chronic congestive heart failure.  She presents with the following impairments/functional limitations: weakness, impaired functional mobilty, impaired endurance, gait instability, impaired cardiopulmonary response to activity, decreased lower extremity function, impaired cognition, decreased safety awareness, impaired balance.     Pt presents today moderately fatigued and mildly confused, likely due to infection as this is not her reported baseline. Pt has 3-4 steps to enter her home which is currently her largest barrier to discharge back to her home. Fortunately she has a strong support system and will have adequate assistance 24/7 upon returning home. Pt sits EOB from supine with min A, static sitting EOB ~15 minutes for balance training, oxygen recovery, and muscle and sensation testing from OT and PT. Pt then stands from EOB with CGA, Min A required in standing due to posterior lean, pt ambulates 10ft with RW and CGA before needing to sit due to fatigue in legs and fatigued breathing. Extra time given between all movements to allow for oxygen recovery as saturations were dropping into the 80's with most movement. Pt able to stand one additional time and ambulate with RW 5ft forward and then step backwards to bedside chair. Again limited by fatigue. Recommend continuing to train pt's activity endurance to facilitate a safe return home.    Rehab Prognosis: Fair; patient would  benefit from acute skilled PT services to address these deficits and reach maximum level of function.      Highest level of mobility achieved this visit: 10ft ambulation with RW and CGA    Plan:     During this hospitalization, patient to be seen 4 x/week to address the identified rehab impairments via gait training, therapeutic activities, therapeutic exercises, neuromuscular re-education and progress towards the established goals.    · Plan of Care Expires:  06/23/22    Subjective     RN Yamileth notified prior to session. Pt's daughter present upon PT entrance into room.    Chief Complaint: fatigue  Patient/Family Comments/goals: Daughter wants patient to go back to her house, does not feel she would do well with an additional stay in a SNF.  Pain/Comfort:  · Pain Rating 1: 0/10    Social History:  Residence: lives alone but has 24/7 assistance from either sitters or her family.  1-story house with 3-4 ALEKSANDR and JOSE HR.   Support available: family and sitters  Equipment Used: walker, rolling, wheelchair, grab bar, bath bench, bedside commode, raised toilet, oxygen  Equipment Owned (not using): none  Prior level of function: assist required for mobility, some ADLs  Work: Retired.   Drive: no.       Objective:     Additional staff present: CHIOMA Stern and DEMETRIA Ashby into room near conclusion of treatment for OT evaluation    Patient found supine with: pulse ox (continuous), telemetry, PureWick, oxygen     General Precautions: Standard, Cardiac contact, fall   Orthopedic Precautions:N/A   Braces: N/A   Body mass index is 17.57 kg/m².  Oxygen Device: Nasal Cannula 3L    Vitals: BP (!) 120/53 (BP Location: Left arm, Patient Position: Lying)   Pulse 71   Temp 97.6 °F (36.4 °C) (Oral)   Resp (!) 22   Ht 5' (1.524 m)   Wt 40.8 kg (89 lb 15.2 oz)   LMP  (LMP Unknown)   SpO2 98%   Breastfeeding No   BMI 17.57 kg/m²     Exams:  · Cognition:   · Lethargic and Cooperative  · Command following: Follows two-step verbal  commands  · Fluency: speaks very softly  · Hearing: JOSE Solomon  · Vision:  Intact  · Skin Integrity: Visible skin intact    Physical Exam:   · Edema - None noted  · ROM - JOSE LEs WFL  · Strength - JOSE LEs symmetrical, ankles 5/5, hips and knees 4/5   · Sensation - Intact to light touch  · Coordination - No deficits noted    Outcome Measures:    AM-PAC 6 CLICK MOBILITY  Turning over in bed (including adjusting bedclothes, sheets and blankets)?: 3  Sitting down on and standing up from a chair with arms (e.g., wheelchair, bedside commode, etc.): 3  Moving from lying on back to sitting on the side of the bed?: 3  Moving to and from a bed to a chair (including a wheelchair)?: 3  Need to walk in hospital room?: 3  Climbing 3-5 steps with a railing?: 2  Basic Mobility Total Score: 17     Functional Mobility:    Bed Mobility:   · Supine to Sit: minimum assistance; from L side of bed  · Scooting anteriorly to EOB to have both feet planted on floor: minimum assistance  · Pt left in bedside chair    Sitting Balance at Edge of Bed:   Static Sitting Balance: Poor+ : able to maintain balance with minimal assistance from individual or chair   Dynamic Sitting Balance: Poor: able to sit unsupported with moderate assistance and reach ipsilaterally or anteriorly. Unable to cross midline.   Assistance Level Required: Minimal Assistance (occasionally maintains with SBA/CGA)   Time: 15 min   Postural deviations noted: slouched posture, rounded shoulders and posterior lean   Encouraged: upright sitting, independent balance, midline alignment    Transfers:   · Sit <> Stand Transfer: contact guard assistance with rolling walker   · Stand <> Sit Transfer: contact guard assistance with rolling walker   · [id number] from EOB and [id number] from bedside chair  · Bed <> Chair Transfer: Step Transfer technique with minimum assistance with rolling walker  · Chair on patient's R    Standing Balance:   Static Standing Balance: Fair- : requires  minimal assistance or UE support in order to stand without LOB   Dynamic Standing Balance: Poor+ : able to stand with minimal assistance and reach ipsilaterally. Unable to weight shift.   Assistance Level Required: Minimal Assistance   Patient used: rolling walker    Time: 12 min + 8 min   Postural deviations noted: slouched posture and rounded shoulders   Encouraged: upright stance, hips tucked in      Gait:   · Patient ambulated: 10ft + 5ft and 5ft backward stepping   · Patient required: minimal assist  · Patient used:  rolling walker   · Gait Pattern observed: swing-to gait  · Gait Deviation(s): occasional unsteady gait, decreased step length, decreased liane  · Impairments due to: impaired balance and decreased endurance  · Gait belt utilized, O2 @ 3LPM  · Comments: pt fatigues very quickly    Education:   Time provided for education, counseling and discussion of health disposition in regards to patient's current status   All questions answered within PT scope of practice and to patient's satisfaction   PT role in POC to address current functional deficits   Pt educated on proper body mechanics, safety techniques, and energy conservation with PT facilitation and cueing throughout session    Patient left up in chair with all lines intact, call button in reach and daughter present. RW left in room.    GOALS:   Multidisciplinary Problems     Physical Therapy Goals        Problem: Physical Therapy    Goal Priority Disciplines Outcome Goal Variances Interventions   Physical Therapy Goal     PT, PT/OT Ongoing, Progressing     Description: Goals to met by 6/7/2022    1. Supine to sit with Modified [place]  2. Sit to supine with Modified [place]  3. Sit to stand transfer with Supervision  4. Bed to chair transfer with Stand-by Assistance using Rolling Walker  5. Gait  x 50 feet with Stand-by Assistance using Rolling Walker   6. Ascend/descend 4 stairs with bilateral Handrails Moderate Assistance  using LRAD.   7. Lower extremity exercise program x15 reps per Instruction, with assistance as needed in order to facilitate improved strength and improvement in functional independence                     History:     Past Medical History:   Diagnosis Date    Acute on chronic congestive heart failure 7/6/2019    Cardiomyopathy     Carotid artery occlusion     CHF (congestive heart failure)     COPD (chronic obstructive pulmonary disease)     Coronary artery disease     Hyperlipidemia     Hypertension        Past Surgical History:   Procedure Laterality Date    CARDIAC CATHETERIZATION      cardic cath      CAROTID ENDARTERECTOMY      FLEXIBLE BRONCHOSCOPY N/A 7/31/2019    Procedure: BRONCHOSCOPY, FIBEROPTIC Flexible;  Surgeon: Klever Mckeon MD;  Location: [address];  Service: Thoracic;  Laterality: N/A;    HIP SURGERY      PLEURODESIS USING TALC N/A 7/31/2019    Procedure: PLEURODESIS;  Surgeon: Klever Mckeon MD;  Location: [address];  Service: Thoracic;  Laterality: N/A;    PLEURODESIS USING TALC Right 8/5/2019    Procedure: PLEURODESIS, USING TALC;  Surgeon: Klever Mckeon MD;  Location: [address];  Service: Thoracic;  Laterality: Right;    PLEURODESIS WITH VIDEO-ASSISTED THORACOSCOPIC SURGERY (VATS) Right 8/5/2019    Procedure: VATS, WITH PLEURAL TENT;  Surgeon: Klever Mckeon MD;  Location: [address];  Service: Thoracic;  Laterality: Right;       Time Tracking:     PT Received On: 05/24/22  PT Start Time: 0810     PT Stop Time: 0910  PT Total Time (min): 60 min     Billable Minutes: Evaluation 1 procedure, Gait Training 20 min, Therapeutic Activity 25 min and Neuromuscular Re-education 8 min    Harris Otero, PT, DPT  5/24/2022

## 2022-05-24 NOTE — CONSULTS
[facility] - [facility] ([place] [id number])  Infectious Disease  Consult Note    Patient Name: Venus Mayer  MRN: [id number]  Admission Date: 5/21/2022  Hospital Length of Stay: 1 days  Attending Physician: Lo Guevara MD  Primary Care Provider: Jona Che MD     Isolation Status: Contact        Inpatient consult to Infectious Diseases  Consult performed by: KRISH Elkins, ANP  Consult ordered by: Lo Guevara MD  Reason for consult: started on erta for e coli UTI w/numerous resistances- allergies to cephalosporins (full body rash)- family not willing to dry cephalosporin- ok to be seen 05/24          ID Consult acknowledged.   Full consult and recommendations to follow today  In the interim, please call for any immediate concerns.     Thank you.   KRISH Taveras, ANP-C  [facility] [id number]

## 2022-05-24 NOTE — PT/OT/SLP EVAL
Occupational Therapy  Co-Evaluation and Treatment     Name: Venus Mayer  MRN: [id number]  Admitting Diagnosis:  Acute on chronic congestive heart failure  Recent Surgery: * No surgery found *      Recommendations:     Discharge Recommendations: home with home health  Discharge Equipment Recommendations:  none  Barriers to discharge:  None    Assessment:     Venus Mayer is a 89 y.o. female with a medical diagnosis of Acute on chronic congestive heart failure.  She presents with the following performance deficits affecting function: weakness, impaired endurance, impaired self care skills, impaired functional mobilty, gait instability, impaired balance, impaired cardiopulmonary response to activity. Pt was agreeable to session. Performance in ADLs and functional mobility was limited 2* fatigue, generalized weakness and decreased activity tolerance. Pt required frequent rest breaks and additional time to complete tasks. Patient would benefit from skilled acute care OT services to improve functional mobility, increase independence with ADLs, and address established goals. Recommending HHOT with 24/7 assistance once medically appropriate to discharge to increase maximal independence, reduce burden of care, and ensure safety.     Rehab Prognosis: Good; patient would benefit from acute skilled OT services to address these deficits and reach maximum level of function.       Plan:     Patient to be seen 3 x/week to address the above listed problems via self-care/home management, therapeutic activities, therapeutic exercises  · Plan of Care Expires: 06/23/22  · Plan of Care Reviewed with: patient, daughter    Subjective     Chief Complaint: Weakness and increased assistance with ADLs  Patient/Family Comments/goals: To get stronger before returning home     Occupational Profile:  Living Environment: Pt lives in a [facility] with 4 ALEKSANDR and B HR (far apart). Pt lives alones, but hired sitters assist 10-5, 6 days/week and children  spend the night to ensure she has 24/7 supervision. Pt has an elevated toilet seat and tub/shower combo with a bath bench and grab bars.   Previous level of function: Pt ambulates around the home using a RW and daughter reports that they stand behind her in case of loss of balance. Pt's daughter assists with bathing and dressing and hired sitters assist with ADLs during the day. Pt on 2.5L of oxygen at baseline. Pt's daughter reports that she is able to ambulate stair with Min A  Equipment Used at Home:  walker, rolling, wheelchair, grab bar, bath bench, bedside commode, raised toilet, oxygen  Assistance upon Discharge: family and hired sitters     Pain/Comfort:  Pain Rating 1: 0/10    Patients cultural, spiritual, Mormonism conflicts given the current situation: no    Objective:     Communicated with: RN prior to session.  Patient found sitting edge of bed with PT and daughter present with pulse ox (continuous), telemetry, PureWick, oxygen upon OT entry to room.    General Precautions: Standard, fall, contact  Orthopedic Precautions:N/A   Braces: N/A  Respiratory Status: Nasal cannula, flow 3 L/min    Occupational Performance:    Bed Mobility:    · Bed mobility was not assessed but PT reports that patient completed Supine to Sit with minimum assistance    Functional Mobility/Transfers:  · Patient completed Sit <> Stand Transfer with contact guard assistance with rolling walker   · Pt performed 2 sit<>stand trials from EOB and bedside chair with CGA using RW  · Functional Mobility: Pt ambulated ~5ft x2 with rolling walker with CGA    Balance:   Sitting: Pt tolerated sitting EOB ~5 minutes with SBA   Standing: Pt demonstrated posterior lean with static standing requiring Min A and verbal cues to shift weight forward     Activities of Daily Living:  · Grooming: minimum assistance for balance to wash face while standing; Pt could complete task seated with SBA   · Feeding: [id number] to self- feed and open  packages sitting in bedside chair  · Verbal cues for placement of items on tray and appropriate use of utensils     Cognitive/Visual Perceptual:  Cognitive/Psychosocial Skills:     -       Follows Commands/attention:follows multi-step directions  -       Communication: clear/fluent; Pt hard of hearing, hears better on R side  -       Memory: No Deficits noted  -       Safety awareness/insight to disability: intact   -       Mood/Affect/Coping skills/emotional control: Appropriate to situation, Cooperative and Pleasant  Visual/Perceptual:      -Intact; Pt wears reading glasses     Physical Exam:  Upper Extremity Range of Motion:     -       Right Upper Extremity: WFL  -       Left Upper Extremity: WFL   Strength:    -       Right Upper Extremity: WFL  -       Left Upper Extremity: WFL  Fine Motor Coordination:    -       Intact    AMPAC 6 Click ADL:  AMPAC Total Score: 18    Treatment & Education:  - Discussed OT POC and answered all questions within OT scope of practice   - Instructed patient to use call light to have nursing staff assist with needs/transfers  - Therapist provided facilitation and instruction of proper body mechanics and fall prevention strategies during tasks listed above  - Instructed patient to sit in bedside chair daily to increase OOB activity tolerance  - Encouraged patient to alert OT of personal-care goals and/or comfort related concerns during future OT sessions  Education:    Patient left up in chair with all lines intact, call button in reach and daughter present and RN notified     GOALS:   Multidisciplinary Problems     Occupational Therapy Goals        Problem: Occupational Therapy    Goal Priority Disciplines Outcome Interventions   Occupational Therapy Goal     OT, PT/OT     Description: Goals to be met by: 6/7/2022    Patient will increase functional independence with ADLs by performing:    UE Dressing with Stand-by Assistance.  LE Dressing with Minimal Assistance.  Grooming  while standing with Stand-by Assistance.  Toileting from toilet with Supervision for hygiene and clothing management.   Toilet transfer to toilet with Supervision.                     History:     Past Medical History:   Diagnosis Date    Acute on chronic congestive heart failure 7/6/2019    Cardiomyopathy     Carotid artery occlusion     CHF (congestive heart failure)     COPD (chronic obstructive pulmonary disease)     Coronary artery disease     Hyperlipidemia     Hypertension          Past Surgical History:   Procedure Laterality Date    CARDIAC CATHETERIZATION      cardic cath      CAROTID ENDARTERECTOMY      FLEXIBLE BRONCHOSCOPY N/A 7/31/2019    Procedure: BRONCHOSCOPY, FIBEROPTIC Flexible;  Surgeon: Klever Mckeon MD;  Location: [address];  Service: Thoracic;  Laterality: N/A;    HIP SURGERY      PLEURODESIS USING TALC N/A 7/31/2019    Procedure: PLEURODESIS;  Surgeon: Klever Mckeon MD;  Location: [address];  Service: Thoracic;  Laterality: N/A;    PLEURODESIS USING TALC Right 8/5/2019    Procedure: PLEURODESIS, USING TALC;  Surgeon: Klever Mckeon MD;  Location: [address];  Service: Thoracic;  Laterality: Right;    PLEURODESIS WITH VIDEO-ASSISTED THORACOSCOPIC SURGERY (VATS) Right 8/5/2019    Procedure: VATS, WITH PLEURAL TENT;  Surgeon: Klever Mckeon MD;  Location: [address];  Service: Thoracic;  Laterality: Right;       Time Tracking:     OT Date of Treatment: 05/24/22  OT Start Time: 0841  OT Stop Time: 0908  OT Total Time (min): 27 min    Billable Minutes:Evaluation 12  Self Care/Home Management 15    5/24/2022

## 2022-05-24 NOTE — SUBJECTIVE & OBJECTIVE
"Interval History: Patient was seen and examined this am. Improvement in work of breathing. Still with "jumping" of legs. Daughter, Bri, at bedside- questions answered.     No diarrhea, constipation, nausea, vomiting, abd pain, chest pain.     Objective:     Vital Signs (Most Recent):  Temp: 97.6 °F (36.4 °C) (05/24/22 1200)  Pulse: 71 (05/24/22 1200)  Resp: (!) 22 (05/24/22 1200)  BP: (!) 120/53 (05/24/22 1200)  SpO2: 98 % (05/24/22 1200)   Vital Signs (24h Range):  Temp:  [97.2 °F (36.2 °C)-98.2 °F (36.8 °C)] 97.6 °F (36.4 °C)  Pulse:  [71-85] 71  Resp:  [19-56] 22  SpO2:  [95 %-100 %] 98 %  BP: (118-156)/(53-62) 120/53     Weight: 40.8 kg (89 lb 15.2 oz)  Body mass index is 17.57 kg/m².  No intake or output data in the 24 hours ending 05/24/22 1358    Physical Exam  Gen: in NAD, appears stated age, appears chronically ill, cachetic  Neuro: awake, oriented to self and place, some confusion during interview- hard of hearing, motor, sensory, and strength grossly intact BL  HEENT: NTNC, EOMI, PERRL, MMM  CVS: RRR, no m/r/g; S1/S2 auscultated with no S3 or S4; capillary refill < 2 sec, pacer/paced rhythm  Resp: Bibasilar crackles- 2.5L BNC; no belabored breathing or accessory muscle use appreciated   Abd: BS+ in all 4 quadrants; NTND, soft to palpation; no organomegaly appreciated   Extrem: pulses full, equal, and regular over all 4 extremities; no UE or LE edema BL  Skin: bruising of dorsum of BL feet- Rt>Lt    Significant Labs: All pertinent labs within the past 24 hours have been reviewed.  Blood Culture: No results for input(s): LABBLOO in the last 48 hours.  CBC:   Recent Labs   Lab 05/24/22  0909   WBC 13.14*   HGB 11.5*   HCT 35.5*          CMP:   Recent Labs   Lab 05/23/22  0450 05/24/22  0445    137   K 4.3 3.7   CL 92* 91*   CO2 34* 38*   GLU 89 91   BUN 29 29   CREATININE 1.2 1.2   CALCIUM 9.4 9.2   ANIONGAP 11 8   EGFRNONAA 39.1* 39.1*     Urine Culture:   No results for input(s): LABURIN in " the last 48 hours.    Urine Studies:   No results for input(s): COLORU, APPEARANCEUA, PHUR, SPECGRAV, PROTEINUA, GLUCUA, KETONESU, BILIRUBINUA, OCCULTUA, NITRITE, UROBILINOGEN, LEUKOCYTESUR, RBCUA, WBCUA, BACTERIA, SQUAMEPITHEL, HYALINECASTS in the last 48 hours.    Invalid input(s): WRIGHTSUR      Significant Imaging: I have reviewed all pertinent imaging results/findings within the past 24 hours.    CXR:  FINDINGS:  The cardiomediastinal silhouette appears mildly enlarged.  There is mild calcification of the aortic knob consistent with atherosclerotic stigmata.  There is mild pulmonary edema, but with improved perihilar ground-glass opacities.  There are persistent small pleural effusions.  No other significant interval changes are noted.     Stable dual chamber pacer.     Impression:     As above

## 2022-05-24 NOTE — PLAN OF CARE
Patient confused, reoriented as needed; denies any pain or discomfort. Routine medications tolerated well. Patient's daughter rooming in overnight to assist with care. Both patient and daughter aware of plan of care. Vitals WNL on 2.5L of O2. Safety maintained, call light within reach.  Problem: Skin Injury Risk Increased  Goal: Skin Health and Integrity  Outcome: Ongoing, Progressing     Problem: Infection  Goal: Absence of Infection Signs and Symptoms  Outcome: Ongoing, Progressing

## 2022-05-24 NOTE — PLAN OF CARE
Evaluation completed and OT POC established   Problem: Occupational Therapy  Goal: Occupational Therapy Goal  Description: Goals to be met by: 6/7/2022    Patient will increase functional independence with ADLs by performing:    UE Dressing with Stand-by Assistance.  LE Dressing with Minimal Assistance.  Grooming while standing with Stand-by Assistance.  Toileting from toilet with Supervision for hygiene and clothing management.   Toilet transfer to toilet with Supervision.    Outcome: Ongoing, Progressing

## 2022-05-24 NOTE — PLAN OF CARE
Problem: Physical Therapy  Goal: Physical Therapy Goal  Description: Goals to met by 6/7/2022    1. Supine to sit with Modified [place]  2. Sit to supine with Modified [place]  3. Sit to stand transfer with Supervision  4. Bed to chair transfer with Stand-by Assistance using Rolling Walker  5. Gait  x 50 feet with Stand-by Assistance using Rolling Walker   6. Ascend/descend 4 stairs with bilateral Handrails Moderate Assistance using LRAD.   7. Lower extremity exercise program x15 reps per Instruction, with assistance as needed in order to facilitate improved strength and improvement in functional independence    PT Ran  5/24/2022 1205 by Harris Otero PT        Lumbar spine series:  Three views.

 

History:  Degenerative disc disease.

 

Findings:  Lumbar vertebral body heights are preserved.  Alignment is normal.  A

laminectomy defect is noted on the left at L4.  There is fairly advanced

degenerative disc disease diffusely.  Degenerative disc narrowing is most

pronounced at L4-5 and L5-S1 but there is narrowing and discogenic spurring also

noted at L3-4, L2-3 and L1-2 in the lumbar spine.  Osteoarthritic facet

hypertrophy is noted bilaterally at L3-4, L4-5 and L5-S1 and on the right at

L2-3.  Psoas margins are symmetric.  Sacrum and SI joints are intact.  There are

clips in the right upper quadrant.

 

Impression:

 

Diffuse degenerative spondylosis changes.  Left-sided laminectomy at L4.

 

 

Electronically Signed by

Lisandro Blanco MD 10/02/2019 07:44 A

## 2022-05-24 NOTE — HPI
"Ms. Venus Mayer is a 89 year old with cardiomyopathy, CHFpEF (60%), pacemaker in place, HLD, HTN, COPD on home 2L NC, and vascular dementia who presented to [facility] w/increased shortness of breath X 3 days.         Upon presentation to ED, RR 34, O2 sat 100% on 2.5L BNC, 152/81mmHg. Labs notable for WBC 11.3.   UA with 21 WBC.  CXR concerning for "interstitial and alveolar opacities that could relate to edema, infection, noninfectious inflammation, or combination of the above."   Received 200mg of IV lasix total while in ED. Admitted to hospital medicine for further workup and evaluation.     She complained of dysuria. Based on prior urine cx hx (klebsiella in March) she was started on levofloxacin (multiple reported beta lactam and bactrim allergies).   Urine culture from admission now showing E coli (resistant to FQ, cefazolin). Sensitive to CTX but family refuses to try this.  She was started on IV ertapenem and ID consulted for antibiotic recommendations.     At time of visit patient is alert and without complaints. She denies urinary symptoms - no pain/burning, no suprapubic pain, no flank pain, no nausea or vomiting.  Daughter Jennifer at bedside assisting with history.  Jennifer states usual UTI symptoms are fevers/confusion.  Does not complain of dysuria. Denies frequent UTIs.  Review of records show two positive urine cultures this year (March and May), one in 2020 and 2019.  States they are usually found when she comes in for another problem.  Denies history of kidney stones.    "

## 2022-05-24 NOTE — CONSULTS
[facility] ([place] [id number])  Infectious Disease  Consult Note    Patient Name: Venus Mayer  MRN: [id number]  Admission Date: 5/21/2022  Hospital Length of Stay: 1 days  Attending Physician: Lo Guevara MD  Primary Care Provider: Jona Che MD     Isolation Status: Contact    Patient information was obtained from patient, relative(s), ER records and primary team.      Consults  Assessment/Plan:     Acute cystitis without hematuria     89 year old with cardiomyopathy, CHFpEF (60%), pacemaker in place, HLD, HTN, COPD on home 2L NC, and vascular dementia who presented to [facility] w/increased shortness of breath X 3 days.  Reportedly complained of dysuria and U/A and culture collected.  U/A with 21 WBC.  Based on prior urine cx hx (klebsiella in March) she was started on levofloxacin.   Urine culture from admission now showing E coli (resistant to levofloxacin).  Given family concern about cephalosporin allergies (also allergy to bactrim), ertapenem was started and ID consulted.      At time of visit patient is alert and without complaints. She denies any urinary symptoms - no pain/burning, no suprapubic pain, no flank pain, no nausea or vomiting.  Denies fevers.  Has been afebrile.  Denies frequent UTIs.  Review of records show two positive urine cultures this year (March and May), one in 2020 and 2019.   Denies history of kidney stones.        Plan/recommendations:  1.  Recommend completing 3 day course of IV ertapenem (adjusted dose for creatinine clearance).  Discussed at length with patient's daughters.  No good oral options given age/kidney function if they wish to completely avoid cephalosporins.    2.  Counseled that if she develops problems with recurrent, symptomatic urinary tract recommendations, recommend UroGyn consultation for structural/functional abnormalities.   3.  Follow up with ID as needed.   I have given them my card and encouraged to call with questions/concerns.  If  "recurrent issues, I will refer to UroGYN  4.  Will sign off.  Please call with questions or re-consult as needed.     Patient seen by, and plan discussed with, ID staff, Dr. Lynch  Secure chat regarding plan with Primary Team, Dr. Guevara          Subjective:     Principal Problem: Acute on chronic congestive heart failure    HPI: Ms. Venus Mayer is a 89 year old with cardiomyopathy, CHFpEF (60%), pacemaker in place, HLD, HTN, COPD on home 2L NC, and vascular dementia who presented to [facility] w/increased shortness of breath X 3 days.         Upon presentation to ED, RR 34, O2 sat 100% on 2.5L BNC, 152/81mmHg. Labs notable for WBC 11.3.   UA with 21 WBC.  CXR concerning for "interstitial and alveolar opacities that could relate to edema, infection, noninfectious inflammation, or combination of the above."   Received 200mg of IV lasix total while in ED. Admitted to hospital medicine for further workup and evaluation.     She complained of dysuria. Based on prior urine cx hx (klebsiella in March) she was started on levofloxacin (multiple reported beta lactam and bactrim allergies).   Urine culture from admission now showing E coli (resistant to FQ, cefazolin). Sensitive to CTX but family refuses to try this.  She was started on IV ertapenem and ID consulted for antibiotic recommendations.     At time of visit patient is alert and without complaints. She denies urinary symptoms - no pain/burning, no suprapubic pain, no flank pain, no nausea or vomiting.  Daughter Jennifer at bedside assisting with history.  Jennifer states usual UTI symptoms are fevers/confusion.  Does not complain of dysuria. Denies frequent UTIs.  Review of records show two positive urine cultures this year (March and May), one in 2020 and 2019.  States they are usually found when she comes in for another problem.  Denies history of kidney stones.        Past Medical History:   Diagnosis Date    Acute on chronic congestive heart failure 7/6/2019    " Cardiomyopathy     Carotid artery occlusion     CHF (congestive heart failure)     COPD (chronic obstructive pulmonary disease)     Coronary artery disease     Hyperlipidemia     Hypertension        Past Surgical History:   Procedure Laterality Date    CARDIAC CATHETERIZATION      cardic cath      CAROTID ENDARTERECTOMY      FLEXIBLE BRONCHOSCOPY N/A 7/31/2019    Procedure: BRONCHOSCOPY, FIBEROPTIC Flexible;  Surgeon: Klever Mckeon MD;  [facility];  Service: Thoracic;  Laterality: N/A;    HIP SURGERY      PLEURODESIS USING TALC N/A 7/31/2019    Procedure: PLEURODESIS;  Surgeon: Klever Mckeon MD;  [facility];  Service: Thoracic;  Laterality: N/A;    PLEURODESIS USING TALC Right 8/5/2019    Procedure: PLEURODESIS, USING TALC;  Surgeon: Klever Mckeon MD;  [facility];  Service: Thoracic;  Laterality: Right;    PLEURODESIS WITH VIDEO-ASSISTED THORACOSCOPIC SURGERY (VATS) Right 8/5/2019    Procedure: VATS, WITH PLEURAL TENT;  Surgeon: Klever Mckeon MD;  Location: [facility] OR [address] [place] [address];  Service: Thoracic;  Laterality: Right;       Review of patient's allergies indicates:   Allergen Reactions    Ancef in dextrose (iso-osm) Rash    Cefazolin Rash     Tolerating Zosyn admission 3/2022    Cefuroxime Rash    Sulfamethoxazole-trimethoprim Rash     Other reaction(s): Rash       Medications:  Medications Prior to Admission   Medication Sig    aspirin 81 mg Tab Take 1 tablet by mouth once daily.     acetaminophen (TYLENOL) 500 MG tablet Take 500 mg by mouth daily as needed (BACK PAIN).    budesonide-formoterol 160-4.5 mcg (SYMBICORT) 160-4.5 mcg/actuation HFAA Take 2 puffs by mouth every 12 (twelve) hours.    ferrous sulfate 325 (65 FE) MG EC tablet Take 1 tablet (325 mg total) by mouth once daily.    guaiFENesin (MUCINEX) 600 mg 12 hr tablet Take 600 mg by mouth 2 (two) times daily as needed for Congestion.    Lactobacillus rhamnosus GG  (CULTURELLE) 10 billion cell capsule Take 1 capsule by mouth once daily.    levalbuterol (XOPENEX) 0.63 mg/3 mL nebulizer solution Take 3 mLs (0.63 mg total) by nebulization every 4 (four) hours as needed for Wheezing or Shortness of Breath. Rescue    MELATONIN ORAL Take 3 mg by mouth nightly as needed (sleep).    metoprolol tartrate (LOPRESSOR) 25 MG tablet Take 12.5 mg by mouth 2 (two) times daily.    multivitamin (THERAGRAN) per tablet Take 1 tablet by mouth once daily.    pantoprazole (PROTONIX) 40 MG tablet Take 40 mg by mouth every morning.    polyethylene glycol (GLYCOLAX) 17 gram/dose powder Take 17 g by mouth daily as needed (CONSTIPATION).    potassium chloride (MICRO-K) 10 MEQ CpSR Take 10 mEq by mouth once daily.    tetrahydrozoline 0.05% (VISION CLEAR) 0.05 % Drop PLACE 1 DROP INTO AFFECTED EYE(S) AS NEEDED FOR REDNESS OR ITCHING    tiotropium bromide (SPIRIVA RESPIMAT) 2.5 mcg/actuation inhaler Inhale 2 puffs into the lungs Daily. Controller    triamcinolone (NASACORT) 55 mcg nasal inhaler 2 sprays by Nasal route once daily.     Antibiotics (From admission, onward)                Start     Stop Route Frequency Ordered    22 1715  ertapenem (INVANZ) 500 mg in sodium chloride 0.9% 100 mL IVPB         -- IV Every 24 hours (non-standard times) 22 0926          Antifungals (From admission, onward)                None          Antivirals (From admission, onward)      None             Immunization History   Administered Date(s) Administered    COVID-19, MRNA, LN-S, PF (Pfizer) (Purple Cap) 02/10/2021, 2021    Tdap 2015       Family History       Problem Relation (Age of Onset)    Hypertension Mother          Social History     Socioeconomic History    Marital status:    Tobacco Use    Smoking status: Former Smoker     Packs/day: 2.00     Years: 20.00     Pack years: 40.00     Types: Cigarettes     Quit date: 1980     Years since quittin.3    Smokeless  tobacco: Never Used   Substance and Sexual Activity    Alcohol use: Yes     Alcohol/week: 2.0 standard drinks     Types: 2 Glasses of wine per week     Comment: social    Drug use: No    Sexual activity: Not Currently     Review of Systems   Constitutional:  Negative for activity change, appetite change, chills, fatigue and fever.   HENT: Negative.     Respiratory:  Positive for shortness of breath (baseline). Negative for cough, choking, chest tightness and wheezing.    Cardiovascular:  Negative for chest pain, palpitations and leg swelling.   Gastrointestinal:  Negative for abdominal pain, constipation, diarrhea, nausea and vomiting.   Genitourinary:  Negative for difficulty urinating, dysuria, flank pain and hematuria.   Musculoskeletal:  Positive for myalgias (BL LE pain/tremors - chronic). Negative for arthralgias.   Skin:  Negative for rash.   Neurological:  Positive for weakness. Negative for dizziness, speech difficulty, light-headedness and headaches.   Hematological:  Bruises/bleeds easily.   Psychiatric/Behavioral:  Negative for agitation and confusion. The patient is not nervous/anxious.    All other systems reviewed and are negative.  Objective:     Vital Signs (Most Recent):  Temp: 97.6 °F (36.4 °C) (05/24/22 1200)  Pulse: 86 (05/24/22 1440)  Resp: 18 (05/24/22 1440)  BP: (!) 120/53 (05/24/22 1200)  SpO2: (!) 92 % (05/24/22 1440)   Vital Signs (24h Range):  Temp:  [97.2 °F (36.2 °C)-98.2 °F (36.8 °C)] 97.6 °F (36.4 °C)  Pulse:  [71-86] 86  Resp:  [18-56] 18  SpO2:  [92 %-100 %] 92 %  BP: (118-156)/(53-62) 120/53     Weight: 40.8 kg (89 lb 15.2 oz)  Body mass index is 17.57 kg/m².    Estimated Creatinine Clearance: 18.9 mL/min (based on SCr of 1.2 mg/dL).    Physical Exam  Constitutional:       General: She is not in acute distress.     Appearance: She is not ill-appearing or toxic-appearing.      Comments: Sitting up in bed.  No distress   HENT:      Head: Normocephalic and atraumatic.   Eyes:       General: No scleral icterus.     Conjunctiva/sclera: Conjunctivae normal.   Cardiovascular:      Rate and Rhythm: Normal rate and regular rhythm.      Heart sounds: No murmur heard.  Pulmonary:      Effort: Pulmonary effort is normal. No tachypnea, accessory muscle usage or respiratory distress.      Comments: O2 nasal cannula  Abdominal:      General: Abdomen is flat. There is no distension.      Palpations: Abdomen is soft.      Tenderness: There is no abdominal tenderness. There is no right CVA tenderness or guarding.      Comments: No suprapubic tenderness   Musculoskeletal:      Cervical back: Normal range of motion.      Right lower leg: No edema.      Left lower leg: No edema.   Skin:     General: Skin is warm and dry.      Findings: Bruising present.   Neurological:      Mental Status: She is alert.      Comments: Oriented to person and place      Psychiatric:         Mood and Affect: Mood normal.         Behavior: Behavior normal.       Significant Labs: Blood Culture:   Recent Labs   Lab 03/16/22  0948 03/20/22  2019 04/09/22 2126 04/09/22  2133   LABBLOO No growth after 5 days.  No growth after 5 days. No growth after 5 days.  No growth after 5 days. No growth after 5 days. No growth after 5 days.     CBC:   Recent Labs   Lab 05/24/22  0909   WBC 13.14*   HGB 11.5*   HCT 35.5*        CMP:   Recent Labs   Lab 05/23/22  0450 05/24/22  0445    137   K 4.3 3.7   CL 92* 91*   CO2 34* 38*   GLU 89 91   BUN 29 29   CREATININE 1.2 1.2   CALCIUM 9.4 9.2   ANIONGAP 11 8   EGFRNONAA 39.1* 39.1*     Urine Culture:   Recent Labs   Lab 03/16/22  0948 05/21/22  0845   LABURIN KLEBSIELLA PNEUMONIAE  30,000 cfu/ml  * ESCHERICHIA COLI  50,000 - 99,999 cfu/ml  No other significant isolate  *     Urine Studies:   Recent Labs   Lab 03/16/22  0948 03/20/22  2303 05/21/22  0845   COLORU Yellow   < > Yellow   APPEARANCEUA Clear   < > Hazy*   PHUR 5.5   < > 7.0   SPECGRAV 1.010   < > 1.005   PROTEINUA Trace*   < >  Negative   GLUCUA Negative   < > Negative   KETONESU Negative   < > Negative   BILIRUBINUA Negative   < > Negative   OCCULTUA Negative   < > Negative   NITRITE Negative   < > Negative   UROBILINOGEN 1.0  --   --    LEUKOCYTESUR 1+*   < > 2+*   RBCUA 12*  12*   < > 1   WBCUA 34*  34*   < > 21*   BACTERIA Negative  Negative  --  Rare   SQUAMEPITHEL 3  3   < > 1   HYALINECASTS 1  1  --   --     < > = values in this interval not displayed.       Significant Imaging: I have reviewed all pertinent imaging results/findings within the past 24 hours.

## 2022-05-25 VITALS
TEMPERATURE: 98 F | SYSTOLIC BLOOD PRESSURE: 128 MMHG | HEIGHT: 60 IN | BODY MASS INDEX: 17.66 KG/M2 | DIASTOLIC BLOOD PRESSURE: 62 MMHG | HEART RATE: 80 BPM | RESPIRATION RATE: 18 BRPM | WEIGHT: 89.94 LBS | OXYGEN SATURATION: 100 %

## 2022-05-25 LAB
ANION GAP SERPL CALC-SCNC: 9 MMOL/L (ref 8–16)
BASOPHILS # BLD AUTO: 0.05 K/UL (ref 0–0.2)
BASOPHILS NFR BLD: 0.5 % (ref 0–1.9)
BUN SERPL-MCNC: 23 MG/DL (ref 10–30)
CALCIUM SERPL-MCNC: 9.1 MG/DL (ref 8.7–10.5)
CHLORIDE SERPL-SCNC: 96 MMOL/L (ref 95–110)
CO2 SERPL-SCNC: 28 MMOL/L (ref 23–29)
CREAT SERPL-MCNC: 0.9 MG/DL (ref 0.5–1.4)
DIFFERENTIAL METHOD: ABNORMAL
EOSINOPHIL # BLD AUTO: 0.6 K/UL (ref 0–0.5)
EOSINOPHIL NFR BLD: 5.5 % (ref 0–8)
ERYTHROCYTE [DISTWIDTH] IN BLOOD BY AUTOMATED COUNT: 15.9 % (ref 11.5–14.5)
EST. GFR  (AFRICAN AMERICAN): >60 ML/MIN/1.73 M^2
EST. GFR  (NON AFRICAN AMERICAN): 55.3 ML/MIN/1.73 M^2
GLUCOSE SERPL-MCNC: 84 MG/DL (ref 70–110)
HCT VFR BLD AUTO: 36.9 % (ref 37–48.5)
HGB BLD-MCNC: 11.7 G/DL (ref 12–16)
IMM GRANULOCYTES # BLD AUTO: 0.05 K/UL (ref 0–0.04)
IMM GRANULOCYTES NFR BLD AUTO: 0.5 % (ref 0–0.5)
LYMPHOCYTES # BLD AUTO: 1.4 K/UL (ref 1–4.8)
LYMPHOCYTES NFR BLD: 12.5 % (ref 18–48)
MAGNESIUM SERPL-MCNC: 2.1 MG/DL (ref 1.6–2.6)
MCH RBC QN AUTO: 32.3 PG (ref 27–31)
MCHC RBC AUTO-ENTMCNC: 31.7 G/DL (ref 32–36)
MCV RBC AUTO: 102 FL (ref 82–98)
MONOCYTES # BLD AUTO: 0.7 K/UL (ref 0.3–1)
MONOCYTES NFR BLD: 6.7 % (ref 4–15)
NEUTROPHILS # BLD AUTO: 8.2 K/UL (ref 1.8–7.7)
NEUTROPHILS NFR BLD: 74.3 % (ref 38–73)
NRBC BLD-RTO: 0 /100 WBC
PLATELET # BLD AUTO: 149 K/UL (ref 150–450)
PMV BLD AUTO: 10.2 FL (ref 9.2–12.9)
POTASSIUM SERPL-SCNC: 4.6 MMOL/L (ref 3.5–5.1)
RBC # BLD AUTO: 3.62 M/UL (ref 4–5.4)
SODIUM SERPL-SCNC: 133 MMOL/L (ref 136–145)
WBC # BLD AUTO: 10.97 K/UL (ref 3.9–12.7)

## 2022-05-25 PROCEDURE — 97530 THERAPEUTIC ACTIVITIES: CPT

## 2022-05-25 PROCEDURE — 99239 PR HOSPITAL DISCHARGE DAY,>30 MIN: ICD-10-PCS | Mod: ,,, | Performed by: STUDENT IN AN ORGANIZED HEALTH CARE EDUCATION/TRAINING PROGRAM

## 2022-05-25 PROCEDURE — 36415 COLL VENOUS BLD VENIPUNCTURE: CPT | Performed by: STUDENT IN AN ORGANIZED HEALTH CARE EDUCATION/TRAINING PROGRAM

## 2022-05-25 PROCEDURE — 83735 ASSAY OF MAGNESIUM: CPT | Performed by: STUDENT IN AN ORGANIZED HEALTH CARE EDUCATION/TRAINING PROGRAM

## 2022-05-25 PROCEDURE — 27000221 HC OXYGEN, UP TO 24 HOURS

## 2022-05-25 PROCEDURE — 94761 N-INVAS EAR/PLS OXIMETRY MLT: CPT

## 2022-05-25 PROCEDURE — 94640 AIRWAY INHALATION TREATMENT: CPT

## 2022-05-25 PROCEDURE — 85025 COMPLETE CBC W/AUTO DIFF WBC: CPT | Performed by: STUDENT IN AN ORGANIZED HEALTH CARE EDUCATION/TRAINING PROGRAM

## 2022-05-25 PROCEDURE — 63600175 PHARM REV CODE 636 W HCPCS: Performed by: STUDENT IN AN ORGANIZED HEALTH CARE EDUCATION/TRAINING PROGRAM

## 2022-05-25 PROCEDURE — 97116 GAIT TRAINING THERAPY: CPT

## 2022-05-25 PROCEDURE — 99239 HOSP IP/OBS DSCHRG MGMT >30: CPT | Mod: ,,, | Performed by: STUDENT IN AN ORGANIZED HEALTH CARE EDUCATION/TRAINING PROGRAM

## 2022-05-25 PROCEDURE — 25000242 PHARM REV CODE 250 ALT 637 W/ HCPCS: Performed by: STUDENT IN AN ORGANIZED HEALTH CARE EDUCATION/TRAINING PROGRAM

## 2022-05-25 PROCEDURE — 25000003 PHARM REV CODE 250: Performed by: STUDENT IN AN ORGANIZED HEALTH CARE EDUCATION/TRAINING PROGRAM

## 2022-05-25 PROCEDURE — 99900035 HC TECH TIME PER 15 MIN (STAT)

## 2022-05-25 PROCEDURE — 97110 THERAPEUTIC EXERCISES: CPT

## 2022-05-25 PROCEDURE — 80048 BASIC METABOLIC PNL TOTAL CA: CPT | Performed by: STUDENT IN AN ORGANIZED HEALTH CARE EDUCATION/TRAINING PROGRAM

## 2022-05-25 RX ADMIN — PANTOPRAZOLE SODIUM 40 MG: 40 TABLET, DELAYED RELEASE ORAL at 06:05

## 2022-05-25 RX ADMIN — ERTAPENEM 500 MG: 1 INJECTION INTRAMUSCULAR; INTRAVENOUS at 03:05

## 2022-05-25 RX ADMIN — ASPIRIN 81 MG CHEWABLE TABLET 81 MG: 81 TABLET CHEWABLE at 09:05

## 2022-05-25 RX ADMIN — LEVALBUTEROL HYDROCHLORIDE 0.63 MG: 0.63 SOLUTION RESPIRATORY (INHALATION) at 02:05

## 2022-05-25 RX ADMIN — TIOTROPIUM BROMIDE INHALATION SPRAY 2 PUFF: 3.12 SPRAY, METERED RESPIRATORY (INHALATION) at 08:05

## 2022-05-25 RX ADMIN — IPRATROPIUM BROMIDE AND ALBUTEROL SULFATE 3 ML: 2.5; .5 SOLUTION RESPIRATORY (INHALATION) at 12:05

## 2022-05-25 RX ADMIN — LEVALBUTEROL HYDROCHLORIDE 0.63 MG: 0.63 SOLUTION RESPIRATORY (INHALATION) at 08:05

## 2022-05-25 RX ADMIN — THERA TABS 1 TABLET: TAB at 09:05

## 2022-05-25 RX ADMIN — GUAIFENESIN 600 MG: 600 TABLET, EXTENDED RELEASE ORAL at 09:05

## 2022-05-25 RX ADMIN — FLUTICASONE FUROATE AND VILANTEROL TRIFENATATE 1 PUFF: 100; 25 POWDER RESPIRATORY (INHALATION) at 08:05

## 2022-05-25 RX ADMIN — FLUTICASONE PROPIONATE 100 MCG: 50 SPRAY, METERED NASAL at 09:05

## 2022-05-25 RX ADMIN — METOPROLOL TARTRATE 12.5 MG: 25 TABLET, FILM COATED ORAL at 09:05

## 2022-05-25 RX ADMIN — Medication 1 CAPSULE: at 09:05

## 2022-05-25 NOTE — PT/OT/SLP PROGRESS
Physical Therapy Treatment    Patient Name:  Venus Mayer   MRN:  [id number]  Admitting Diagnosis:  Acute on chronic congestive heart failure   Recent Surgery: * No surgery found *    Admit Date: 5/21/2022  Length of Stay: 2 days    Recommendations:     Discharge Recommendations:  home health PT   Discharge Equipment Recommendations: none   Barriers to discharge: Inaccessible home environment and Evolving Clinical Presentation    Assessment:     Venus Mayer is a 89 y.o. female admitted with a medical diagnosis of Acute on chronic congestive heart failure.  She presents with the following impairments/functional limitations:  weakness, impaired endurance, impaired cardiopulmonary response to activity, gait instability, decreased lower extremity function, decreased safety awareness, impaired functional mobilty, impaired balance, decreased coordination.     Pt presents today very sleepy, upon waking agreeable to attempt stairs to ensure she will be safe navigating her stairs at home. Pt wheeled to stairwell with portable oxygen due to poor endurance. Once in stairwell patient stands from bedside chair to RW with min A, ambulates to stairs with min A, and attempts to climb 3 stairs. Pt ascends 2 stairs with max A before reporting she cannot do anymore, max A to return patient to bedside chair. Pt wheeled back to room, after a rest further gait training initiated with RW and min A ~ 15ft. Treatment tolerated fair, pt will need considerable assistance getting up stairs into home.    Rehab Prognosis: Fair and Poor; patient would benefit from acute skilled PT services to address these deficits and reach maximum level of function.    Recent Surgery: * No surgery found *      Highest level of mobility achieved this visit: 2 stairs ascend/descend with max A    Plan:     During this hospitalization, patient to be seen 4 x/week to address the identified rehab impairments via therapeutic activities, gait training, therapeutic  "exercises, neuromuscular re-education and progress toward the following goals:    · Plan of Care Expires:  06/23/22    Subjective     RN Yamileth notified prior to session. Pt's caregiver present upon PT entrance into room.    Chief Complaint: fatigue  Patient/Family Comments/goals: "This is all I've got"  Pain/Comfort:  · Pain Rating 1: 0/10      Objective:     Additional staff present: OT Leena lends a helping hand in the stairwell    Patient found up in chair with: pulse ox (continuous), oxygen, telemetry, PureWick   Cognition:   · Lethargic and Cooperative  · Command following: Follows one-step verbal commands  · Fluency: clear/fluent  General Precautions: Standard, Cardiac fall, contact   Orthopedic Precautions:N/A   Braces: N/A   Body mass index is 17.57 kg/m².  Oxygen Device: Nasal Cannula 2L    Vitals: BP (!) 108/54   Pulse 60   Temp 98.3 °F (36.8 °C)   Resp 20   Ht 5' (1.524 m)   Wt 40.8 kg (89 lb 15.2 oz)   LMP  (LMP Unknown)   SpO2 100%   Breastfeeding No   BMI 17.57 kg/m²     Outcome Measures:  AM-PAC 6 CLICK MOBILITY  Turning over in bed (including adjusting bedclothes, sheets and blankets)?: 3  Sitting down on and standing up from a chair with arms (e.g., wheelchair, bedside commode, etc.): 3  Moving from lying on back to sitting on the side of the bed?: 3  Moving to and from a bed to a chair (including a wheelchair)?: 3  Need to walk in hospital room?: 3  Climbing 3-5 steps with a railing?: 2  Basic Mobility Total Score: 17     Functional Mobility:    Bed Mobility:   · Pt found/returned to bedside chair      Transfers:   · Sit <> Stand Transfer: minimum assistance with rolling walker   · Stand <> Sit Transfer: minimum assistance with rolling walker   · [id number] from bedside chair    Standing Balance:   Static Standing Balance: Fair- : requires minimal assistance or UE support in order to stand without LOB   Dynamic Standing Balance: Poor+ : able to stand with minimal assistance and reach " ipsilaterally. Unable to weight shift.   Assistance Level Required: Contact Guard Assistance and Minimal Assistance   Patient used: rolling walker    Time: 10 min   Postural deviations noted: slouched posture and rounded shoulders   Encouraged: upright stance      Gait:  · Patient ambulated: 10ft + 10ft   · Patient required: minimal assist  · Patient used:  rolling walker   · Gait Pattern observed: swing-to gait  · Gait Deviation(s): decreased step length, flexed posture and decreased liane  · Impairments due to: impaired balance, decreased strength and decreased endurance  · Portable Supplemental O2 2L utilized  · Chair follow for patient safety  · Gait belt utilized  · Mask donned for out of room ambulation    Stairs:   Pt ascended/descended 2 stair(s) with No Assistive Device with right handrail with Maximum Assistance.     Education:   Time provided for education, counseling and discussion of health disposition in regards to patient's current status   All questions answered within PT scope of practice and to patient's satisfaction   PT role in POC to address current functional deficits   Pt educated on proper body mechanics, safety techniques, and energy conservation with PT facilitation and cueing throughout session    Patient left up in chair with all lines intact, call button in reach and sitter present.    GOALS:   Multidisciplinary Problems     Physical Therapy Goals        Problem: Physical Therapy    Goal Priority Disciplines Outcome Goal Variances Interventions   Physical Therapy Goal     PT, PT/OT Ongoing, Progressing     Description: Goals to met by 6/7/2022    1. Supine to sit with Modified [place]  2. Sit to supine with Modified [place]  3. Sit to stand transfer with Supervision  4. Bed to chair transfer with Stand-by Assistance using Rolling Walker  5. Gait  x 50 feet with Stand-by Assistance using Rolling Walker   6. Ascend/descend 4 stairs with bilateral Handrails Moderate  Assistance using LRAD.   7. Lower extremity exercise program x15 reps per Instruction, with assistance as needed in order to facilitate improved strength and improvement in functional independence                   Time Tracking:     PT Received On: 05/25/22  PT Start Time: 1425     PT Stop Time: 1505  PT Total Time (min): 40 min       Billable Minutes:   · Gait Training 25 min and Therapeutic Activity 15 min    Treatment Type: Treatment  PT/PTA: PT       Harris Otero, PT, DPT  5/25/2022

## 2022-05-25 NOTE — ASSESSMENT & PLAN NOTE
- component of compensatory from chronic CO2 retention, but likely over diuresis w/chloride of 91  - BMP w/worsening alkalosis- additional 250mL LR bolus today   - ABG w/pH 7.45/CO2 59/O2 68/HCO3 41/O2 sat 90%

## 2022-05-25 NOTE — PLAN OF CARE
Patient HH is set up with [facility]. SW has arranged transport for 5:00PM. Patient will transport by stretcher. Admin Approval by Hollie Miller.        Nadya Ramires LMSW  [facility]   [id number]

## 2022-05-25 NOTE — ASSESSMENT & PLAN NOTE
Patient with Hypercapnic and Hypoxic Respiratory failure which is Acute on chronic.  she is on home oxygen at 2.5 LPM. Supplemental oxygen was provided and noted-  .   Signs/symptoms of respiratory failure include- tachypnea and increased work of breathing. Contributing diagnoses includes - CHF and COPD Labs and images were reviewed. Patient Has not had a recent ABG. Will treat underlying causes and adjust management of respiratory failure as follows-     - Concern for CHF exacerbation  - Holding further diuresis   - No wheeze on exam, no cough or change in sputum production or color  - low suspicion for COPD exacerbation  - monitor oxygen saturations  - incentive spirometer to bedside  - PT/OT consulted-- recommending HH   - Concern for atelectasis as well

## 2022-05-25 NOTE — PROGRESS NOTES
"[facility] - [facility] ([address] Medicine  Progress Note    Patient Name: Venus Mayer  MRN: [id number]  Patient Class: IP- Inpatient   Admission Date: 5/21/2022  Length of Stay: 1 days  Attending Physician: Lo Guevara MD  Primary Care Provider: Jona Che MD        Subjective:     Principal Problem:Acute on chronic congestive heart failure        HPI:  Mrs. Donovan is a 89yoF w/PMHx of cardiomyopathy, CHFpEF (60%), pacemaker in place, HLD, HTN, COPD on home 2L NC, vascular dementia who [place] [facility] [place]ased [place]ob. Symptoms started 3d ago. Patient normally sleeps at 45 deg incline. Noticed SOB 05/19- increased supplemental oxygen to 3.5L for about 1hr w/improvement of symptoms. Started feeling "shakey" the following day. Woke up on day of admission at 3am w/increased wob and shortness of breath. Patient lives at home, but has 24hrs care with a sitter and also has frequent visits/checks via children.     Upon presentation to ED, RR 34, O2 sat 100% on 2.5L BNC, 152/81mmHg. Labs notable for 11.3, Plt 147, Bicarb 37, Chl 94, BNP 3513, trop 0.145,  UA w/2+ LE and 21 WBC. CXR concerning for "interstitial and alveolar opacities that could relate to edema, infection, noninfectious inflammation, or combination of the above." Received 200mg of IV lasix total while in ED. Admitted to hospital medicine for further workup and evaluation.       Overview/Hospital Course:  Patient had stabilization of respiratory status. Bicarb increased to 39 and Cl decreased to 91- concern for contraction alkalosis and overdiuresis. Stopped lasix am of 05/22. Gave 250mL NSB. Patient had improvement in bicarb to 34. Xopenex treatments were scheduled- continued improvement in work of breathing. CXR showed mildly improved aeration on day 2 of admission. Discussed possibility of home w/home hospice as patient seems to have continued increase in oxygen requirements, and I fear that she is now in end-stage COPD " "w/air hunger. Prescribed hydroxyzine for anxiety- patient appeared to get worked up over work of breathing. Family does not desire scheduled anxiety meds at this time or hospice. They will continue to have ongoing discussions. Patient received 3d of PO levo based on prior Ucx; however, on 05/23- Ucx w/E coli w/numerous antibiotic resistances- with allergies to cephalosporins, patient was started on erta and ID was consulted.       Interval History: Patient was seen and examined this am. Improvement in work of breathing. Still with "jumping" of legs. Daughter, Bri, at bedside- questions answered.     No diarrhea, constipation, nausea, vomiting, abd pain, chest pain.     Objective:     Vital Signs (Most Recent):  Temp: 97.6 °F (36.4 °C) (05/24/22 1200)  Pulse: 71 (05/24/22 1200)  Resp: (!) 22 (05/24/22 1200)  BP: (!) 120/53 (05/24/22 1200)  SpO2: 98 % (05/24/22 1200)   Vital Signs (24h Range):  Temp:  [97.2 °F (36.2 °C)-98.2 °F (36.8 °C)] 97.6 °F (36.4 °C)  Pulse:  [71-85] 71  Resp:  [19-56] 22  SpO2:  [95 %-100 %] 98 %  BP: (118-156)/(53-62) 120/53     Weight: 40.8 kg (89 lb 15.2 oz)  Body mass index is 17.57 kg/m².  No intake or output data in the 24 hours ending 05/24/22 1358    Physical Exam  Gen: in NAD, appears stated age, appears chronically ill, cachetic  Neuro: awake, oriented to self and place, some confusion during interview- hard of hearing, motor, sensory, and strength grossly intact BL  HEENT: NTNC, EOMI, PERRL, MMM  CVS: RRR, no m/r/g; S1/S2 auscultated with no S3 or S4; capillary refill < 2 sec, pacer/paced rhythm  Resp: Bibasilar crackles- 2.5L BNC; no belabored breathing or accessory muscle use appreciated   Abd: BS+ in all 4 quadrants; NTND, soft to palpation; no organomegaly appreciated   Extrem: pulses full, equal, and regular over all 4 extremities; no UE or LE edema BL  Skin: bruising of dorsum of BL feet- Rt>Lt    Significant Labs: All pertinent labs within the past 24 hours have been " reviewed.  Blood Culture: No results for input(s): LABBLOO in the last 48 hours.  CBC:   Recent Labs   Lab 05/24/22  0909   WBC 13.14*   HGB 11.5*   HCT 35.5*          CMP:   Recent Labs   Lab 05/23/22  0450 05/24/22  0445    137   K 4.3 3.7   CL 92* 91*   CO2 34* 38*   GLU 89 91   BUN 29 29   CREATININE 1.2 1.2   CALCIUM 9.4 9.2   ANIONGAP 11 8   EGFRNONAA 39.1* 39.1*     Urine Culture:   No results for input(s): LABURIN in the last 48 hours.    Urine Studies:   No results for input(s): COLORU, APPEARANCEUA, PHUR, SPECGRAV, PROTEINUA, GLUCUA, KETONESU, BILIRUBINUA, OCCULTUA, NITRITE, UROBILINOGEN, LEUKOCYTESUR, RBCUA, WBCUA, BACTERIA, SQUAMEPITHEL, HYALINECASTS in the last 48 hours.    Invalid input(s): WRIGHTSUR      Significant Imaging: I have reviewed all pertinent imaging results/findings within the past 24 hours.    CXR:  FINDINGS:  The cardiomediastinal silhouette appears mildly enlarged.  There is mild calcification of the aortic knob consistent with atherosclerotic stigmata.  There is mild pulmonary edema, but with improved perihilar ground-glass opacities.  There are persistent small pleural effusions.  No other significant interval changes are noted.     Stable dual chamber pacer.     Impression:     As above      Assessment/Plan:      * Acute on chronic congestive heart failure  - Interval history and physical exam findings as described above  - EF 60% per most recent echo 03/2022  - Clinically volume overloaded on admission  - CXR w/edema vs. Infiltrates   - Taking medications as prescribed at home  - S/p 200mg IV lasix in the ED  - Holding lasix at this time  - Continue home cardioprudent regimen  - Strict I/Os   - Daily weights  - Will continue to monitor on tele    Acute cystitis without hematuria  - Dysuria per patient, UA w/21 WBC, 2+ LE  - Per last UCx- klebsiella- completed 3d of levofloxacin   - UCx w/E coli- susceptible to cefepime, ceftriaxone, gent, nitrofurantonin, guera, erta,  bactrim  - with allergies to cephalosporins and bactrim, started erta- ID consulted- recommending 3d of erta- EOT 05/25  - Monitor I's and O's    Metabolic alkalosis  - component of compensatory from chronic CO2 retention, but likely over diuresis w/chloride of 91  - BMP w/worsening alkalosis- additional 250mL LR bolus today   - ABG w/pH 7.45/CO2 59/O2 68/HCO3 41/O2 sat 90%    Body mass index (BMI) less than 19  - nutrition consulted       COPD (chronic obstructive pulmonary disease)  - Currently on home oxygen settings  - No concern for COPD exacerbation at this time  - Continue home inhaler regimen  - continue levoalbuterol q6h while awake  - PRN duonebs q4h for sob    Pacemaker  - in place, paced rhythm    Elevated troponin  - likely type 2 NSTEMI from hypoxia  - trop 0.143-0.145-0.137- plateaued  - no chest pain     Acute on chronic respiratory failure with hypoxia  Patient with Hypercapnic and Hypoxic Respiratory failure which is Acute on chronic.  she is on home oxygen at 2.5 LPM. Supplemental oxygen was provided and noted-  .   Signs/symptoms of respiratory failure include- tachypnea and increased work of breathing. Contributing diagnoses includes - CHF and COPD Labs and images were reviewed. Patient Has not had a recent ABG. Will treat underlying causes and adjust management of respiratory failure as follows-     - Concern for CHF exacerbation  - Holding further diuresis   - No wheeze on exam, no cough or change in sputum production or color  - low suspicion for COPD exacerbation  - monitor oxygen saturations  - incentive spirometer to bedside  - PT/OT consulted-- recommending HH   - Concern for atelectasis as well    Restless leg syndrome  - check iron studies  - continue oral iron supplement   - iron def can be associated w/restless leg  - if no improvement, consideration of ropinirole 0.25mg; however, sometimes initiation of this medication can have actual worsening of symptoms in the elderly- must be  aware   - concern that restless legs are also a component of anxiety- PRN hydroxyzine added     Anxiety  - hydroxyzine 10mg q8h PRN for anxiety  - I fear that this is contributing to increased wob and restless legs  - consideration of PRN morphine for air hunger   - consideration of psych consultation     Stage 3 chronic kidney disease  - Cr baseline at admission  - Renally dosing all medications  - Avoid nephrotoxins  - Will continue to monitor on daily labs    VTE Risk Mitigation (From admission, onward)         Ordered     IP VTE HIGH RISK PATIENT  Once         05/21/22 1236     Place sequential compression device  Until discontinued         05/21/22 1236     Place sequential compression device  Until discontinued         05/21/22 1233                Discharge Planning   TRISTON: 5/25/2022     Code Status: DNR   Is the patient medically ready for discharge?: Yes    Reason for patient still in hospital (select all that apply): Patient trending condition  Discharge Plan A: Home Health   Discharge Delays: None known at this time                Lo Guevara MD  Department of Hospital Medicine   [facility] - Intensive Care ([place]

## 2022-05-25 NOTE — DISCHARGE SUMMARY
"[facility] - [facility] ([place] [address])  [facility] Medicine  Discharge Summary      Patient Name: Venus Mayer  MRN: [id number]  Patient Class: IP- Inpatient  Admission Date: 5/21/2022  Hospital Length of Stay: 2 days  Discharge Date and Time:  05/25/2022 5:18 PM  Attending Physician: Lo Guevara MD   Discharging Provider: Lo Guevara MD  Primary Care Provider: Jona Che MD  Hospital Medicine Team: [facility] MED D Lo Guevara MD    HPI:   Mrs. Donovan is a 89yoF w/PMHx of cardiomyopathy, CHFpEF (60%), pacemaker in place, HLD, HTN, COPD on home 2L NC, vascular dementia who presented to [facility] w/increased wob. Symptoms started 3d ago. Patient normally sleeps at 45 deg incline. Noticed SOB 05/19- increased supplemental oxygen to 3.5L for about 1hr w/improvement of symptoms. Started feeling "shakey" the following day. Woke up on day of admission at 3am w/increased wob and shortness of breath. Patient lives at home, but has 24hrs care with a sitter and also has frequent visits/checks via children.     Upon presentation to ED, RR 34, O2 sat 100% on 2.5L BNC, 152/81mmHg. Labs notable for 11.3, Plt 147, Bicarb 37, Chl 94, BNP 3513, trop 0.145,  UA w/2+ LE and 21 WBC. CXR concerning for "interstitial and alveolar opacities that could relate to edema, infection, noninfectious inflammation, or combination of the above." Received 200mg of IV lasix total while in ED. Admitted to hospital medicine for further workup and evaluation.       * No surgery found *      Hospital Course:   Patient had stabilization of respiratory status. Bicarb increased to 39 and Cl decreased to 91- concern for contraction alkalosis and overdiuresis. Stopped lasix am of 05/22. Gave 250mL NSB. Patient had improvement in bicarb to 34. Xopenex treatments were scheduled- continued improvement in work of breathing. CXR showed mildly improved aeration on day 2 of admission. Discussed possibility of home w/home hospice as patient seems " to have continued increase in oxygen requirements, and I fear that she is now in end-stage COPD w/air hunger. Prescribed hydroxyzine for anxiety- patient appeared to get worked up over work of breathing. Family does not desire scheduled anxiety meds at this time or hospice. They will continue to have ongoing discussions. Patient received 3d of PO levo based on prior Ucx; however, on 05/23- Ucx w/E coli w/numerous antibiotic resistances- with allergies to cephalosporins, patient was started on erta and ID was consulted. Patient completed 3d of IV erta. No need for f/u in ID clinic per their recommendations. Arrangements made via  for patient. Patient deemed stable for discharge 05/25.    Pt deemed appropriate for discharge. Plan discussed with daughter, Bri, who was agreeable and amenable; medications were discussed and reviewed, outpatient follow-up scheduled, ER precautions were given, all questions were answered to the Bri's satisfaction, and Mrs. Donovan was subsequently discharged.    Physical Exam  Gen: in NAD, appears stated age, appears chronically ill, cachetic  Neuro: awake, oriented to self and place, some confusion during interview- hard of hearing, motor, sensory, and strength grossly intact BL  HEENT: NTNC, EOMI, PERRL, MMM  CVS: RRR, no m/r/g; S1/S2 auscultated with no S3 or S4; capillary refill < 2 sec, pacer/paced rhythm  Resp: Bibasilar crackles- 2.5L BNC; no belabored breathing or accessory muscle use appreciated   Abd: BS+ in all 4 quadrants; NTND, soft to palpation; no organomegaly appreciated   Extrem: pulses full, equal, and regular over all 4 extremities; no UE or LE edema BL  Skin: bruising of dorsum of BL feet- Rt>Lt      Goals of Care Treatment Preferences:  Code Status: DNR       LaPOST: Yes           Consults:   Consults (From admission, onward)        Status Ordering Provider     Inpatient consult to Infectious Diseases  Once        Provider:  (Not yet assigned)    Completed  NIRMALA JONES     Inpatient consult to Social Work/Case Management  Once        Provider:  (Not yet assigned)    Completed NIRMALA JONES     Inpatient consult to Registered Dietitian/Nutritionist  Once        Provider:  (Not yet assigned)    Completed NIRMALA JONES        Final Active Diagnoses:    Diagnosis Date Noted POA    PRINCIPAL PROBLEM:  Acute on chronic congestive heart failure [I50.9] 07/06/2019 Yes    Acute cystitis without hematuria [N30.00] 05/21/2022 Unknown    Metabolic alkalosis [E87.3] 05/22/2022 Unknown    Body mass index (BMI) less than 19 [Z68.1] 04/10/2022 Not Applicable    COPD (chronic obstructive pulmonary disease) [J44.9] 03/21/2022 Yes    Pacemaker [Z95.0] 03/12/2022 Yes    Acute on chronic respiratory failure with hypoxia [J96.21] 07/15/2021 Yes    Elevated troponin [R77.8] 07/15/2021 Yes    Restless leg syndrome [G25.81] 07/20/2019 Yes    Anxiety [F41.9] 05/26/2019 Yes    Stage 3 chronic kidney disease [N18.30] 03/28/2013 Yes      Problems Resolved During this Admission:       Discharged Condition: fair    [place] [facility] [place]:   Follow-up Information     Jona Che MD. Go on 5/26/2022.    Specialty: General Practice  Why: appointment with Dr. Greene at 11:15 am on 5/26/2022.  Contact information:  [facility] [facility]  [id number]  [place] [address] [phone]             Veronica Ibarra MD. Go on 5/30/2022.    Specialty: Internal Medicine  Why: follow up appointment at 8:00am on 5/30/2022  Contact information:  [address] [facility]  [id number] [place] [address] [phone]                       Patient Instructions:      Ambulatory referral/consult to Pulmonology   Standing Status: Future   Referral Priority: Routine Referral Type: Consultation   Referral Reason: Specialty Services Required   Requested Specialty: Pulmonary Disease   Number of Visits Requested: 1     Diet Cardiac     Call MD for:  persistent nausea and vomiting  or diarrhea   Standing Status:      Call MD for:  difficulty breathing or increased cough   Standing Status:      Call MD for:  persistent dizziness, light-headedness, or visual disturbances   Standing Status:      Call MD for:  increased confusion or weakness   Standing Status:      Activity as tolerated   Order Comments: With assistance only       Significant Diagnostic Studies: Labs:   CMP   Recent Labs   Lab 05/24/22  0445 05/25/22  0439    133*   K 3.7 4.6   CL 91* 96   CO2 38* 28   GLU 91 84   BUN 29 23   CREATININE 1.2 0.9   CALCIUM 9.2 9.1   ANIONGAP 8 9   ESTGFRAFRICA 45.0* >60.0   EGFRNONAA 39.1* 55.3*   , CBC   Recent Labs   Lab 05/24/22  0909 05/25/22  0934   WBC 13.14* 10.97   HGB 11.5* 11.7*   HCT 35.5* 36.9*    149*    and All labs within the past 24 hours have been reviewed  Radiology: X-Ray: CXR: X-Ray Chest 1 View (CXR):   Results for orders placed or performed during the hospital encounter of 05/21/22   X-Ray Chest 1 View    Narrative    EXAMINATION:  XR CHEST 1 VIEW    CLINICAL HISTORY:  increased crackles and oxygen desaturation to 70s w/movement;    TECHNIQUE:  Single frontal view of the chest was performed.    COMPARISON:  05/21/2022    FINDINGS:  The cardiomediastinal silhouette appears mildly enlarged.  There is mild calcification of the aortic knob consistent with atherosclerotic stigmata.  There is mild pulmonary edema, but with improved perihilar ground-glass opacities.  There are persistent small pleural effusions.  No other significant interval changes are noted.    Stable dual chamber pacer.      Impression    As above      Electronically signed by: Mercedes Peña  Date:    05/22/2022  Time:    11:53    and X-Ray Chest PA and Lateral (CXR): No results found for this visit on 05/21/22.    Pending Diagnostic Studies:     None         Medications:  Reconciled Home Medications:      Medication List      START taking these medications    furosemide 40 MG tablet  Commonly known  as: LASIX  Take 1 tablet (40 mg total) by mouth once daily. In the case of 3lbs weight gain in 1d or 5lbs in 3d, administer additional dose of lasix in the afternoon.     hydrOXYzine HCL 10 MG Tab  Commonly known as: ATARAX  Take 1 tablet (10 mg total) by mouth 3 (three) times daily as needed.        CONTINUE taking these medications    acetaminophen 500 MG tablet  Commonly known as: TYLENOL  Take 500 mg by mouth daily as needed (BACK PAIN).     aspirin 81 mg Tab  Take 1 tablet by mouth once daily.     budesonide-formoterol 160-4.5 mcg 160-4.5 mcg/actuation Hfaa  Commonly known as: SYMBICORT  Take 2 puffs by mouth every 12 (twelve) hours.     ferrous sulfate 325 (65 FE) MG EC tablet  Take 1 tablet (325 mg total) by mouth once daily.     guaiFENesin 600 mg 12 hr tablet  Commonly known as: MUCINEX  Take 600 mg by mouth 2 (two) times daily as needed for Congestion.     Lactobacillus rhamnosus GG 10 billion cell capsule  Commonly known as: CULTURELLE  Take 1 capsule by mouth once daily.     levalbuterol 0.63 mg/3 mL nebulizer solution  Commonly known as: XOPENEX  Take 3 mLs (0.63 mg total) by nebulization every 4 (four) hours as needed for Wheezing or Shortness of Breath. Rescue     MELATONIN ORAL  Take 3 mg by mouth nightly as needed (sleep).     metoprolol tartrate 25 MG tablet  Commonly known as: LOPRESSOR  Take 12.5 mg by mouth 2 (two) times daily.     multivitamin per tablet  Commonly known as: THERAGRAN  Take 1 tablet by mouth once daily.     pantoprazole 40 MG tablet  Commonly known as: PROTONIX  Take 40 mg by mouth every morning.     polyethylene glycol 17 gram/dose powder  Commonly known as: GLYCOLAX  Take 17 g by mouth daily as needed (CONSTIPATION).     potassium chloride 10 MEQ Cpsr  Commonly known as: MICRO-K  Take 10 mEq by mouth once daily.     SPIRIVA RESPIMAT 2.5 mcg/actuation inhaler  Generic drug: tiotropium bromide  Inhale 2 puffs into the lungs Daily. Controller     tetrahydrozoline 0.05% 0.05 %  Drop  Commonly known as: Vision Clear  PLACE 1 DROP INTO AFFECTED EYE(S) AS NEEDED FOR REDNESS OR ITCHING     triamcinolone 55 mcg nasal inhaler  Commonly known as: NASACORT  2 sprays by Nasal route once daily.        STOP taking these medications    albuterol 90 mcg/actuation inhaler  Commonly known as: PROAIR HFA     baclofen 10 MG tablet  Commonly known as: LIORESAL     bumetanide 1 MG tablet  Commonly known as: BUMEX            Indwelling Lines/Drains at time of discharge:   Lines/Drains/Airways     None                 Time spent on the discharge of patient: 40 minutes           Lo Guevara MD  Department of Hospital Medicine  [facility] - Intensive Care ([place]

## 2022-05-25 NOTE — PLAN OF CARE
Problem: Skin Injury Risk Increased  Goal: Skin Health and Integrity  Outcome: Ongoing, Progressing     Problem: Infection  Goal: Absence of Infection Signs and Symptoms  Outcome: Ongoing, Progressing     Problem: Anxiety  Goal: Anxiety Reduction or Resolution  Outcome: Ongoing, Progressing

## 2022-05-25 NOTE — ASSESSMENT & PLAN NOTE
- Currently on home oxygen settings  - No concern for COPD exacerbation at this time  - Continue home inhaler regimen  - continue levoalbuterol q6h while awake  - PRN duonebs q4h for sob

## 2022-05-25 NOTE — PT/OT/SLP PROGRESS
"Occupational Therapy   Treatment    Name: Venus Mayer  MRN: [id number]  Admitting Diagnosis:  Acute on chronic congestive heart failure       Recommendations:     Discharge Recommendations: home with home health  Discharge Equipment Recommendations:  none  Barriers to discharge:  None    Assessment:     Venus Mayer is a 89 y.o. female with a medical diagnosis of Acute on chronic congestive heart failure.  She presents with the following performance deficits affecting function: weakness, impaired endurance, impaired self care skills, impaired functional mobilty, gait instability, impaired balance, decreased lower extremity function, decreased safety awareness. Pt was agreeable to session; however, following instruction is limited by hearing deficit. Pt's daughter don hearing aids at the beginning of session, however pt still had difficulty hearing and processing information given. Pt responded better to instructions when given by a family member. SpO2 monitor was not reading accurately during session. Therapist tried to relocate monitor to jj better reading with no success, RN notified. Pt has a supportive family who encourages independence and participation in ADLs. At this time, patient will continue to benefit from acute skilled therapy intervention to address deficits/underlying impairments and progress towards prior level of function. After discharge, patient will benefit from receiving home health therapy services to ensure safety and independence in the home environment.    Rehab Prognosis:  Good; patient would benefit from acute skilled OT services to address these deficits and reach maximum level of function.       Plan:     Patient to be seen 3 x/week to address the above listed problems via self-care/home management, therapeutic activities, therapeutic exercises  · Plan of Care Expires: 06/23/22  · Plan of Care Reviewed with: patient, daughter    Subjective   "I got a good nights sleep last " "night"    Pain/Comfort:  · Pain Rating 1: 0/10    Objective:     Communicated with: RN prior to session.  Patient found HOB elevated with pulse ox (continuous), oxygen, telemetry, PureWick and daughter present upon OT entry to room.    General Precautions: Standard, fall, contact   Orthopedic Precautions:N/A   Braces: N/A  Respiratory Status: Nasal cannula, flow 3 L/min     Occupational Performance:     Bed Mobility:    · Patient completed anterior scooting with maximal assistance  · Patient completed Supine to Sit with moderate assistance for trunk and LE management     Functional Mobility/Transfers:  · Patient completed Sit <> Stand Transfer from EOB with minimum assistance with rolling walker   · Verbal cues provided for proper hand placement; however, pt was not receptive   · Functional Mobility: Pt ambulated ~3ft to bedside chair with CGA using rolling walker    Balance:   Sitting: Pt sat EOB with SBA-Shanta for balance    Standing:   o Static: Pt stood with Min A using rolling walker  - Pt demonstrated posterior lean and was provided verbal cues for upright posturing   o Dynamic: Pt ambulated to bedside chair and performed step transfer with CGA    Activities of Daily Living:  · Upper Body Dressing: maximal assistance to don and doff gown sitting in bedside chair   · Patient was able to unbutton gown independently   · Patient required verbal cues for pt participation in task   · Lower Body Dressing: total assistance to don socks sitting EOB      [facility] 6 Click ADL: 18    Treatment & Education:  - Discussed OT POC and answered all questions within OT scope of practice   - Instructed patient to use call light to have nursing staff assist with needs/transfers  - Therapist provided facilitation and instruction of proper body mechanics and fall prevention strategies during tasks listed above  - Instructed patient to sit in bedside chair daily to increase OOB activity tolerance  -Patient performed UE AROM exercises " sitting in bedside chair and was instructed to perform them while in bed throughout the day to improve UE function    -1x10 shoulder flexion    -1x10 shoulder abduction    -1x10 forward punches     Patient left up in chair with all lines intact, call button in reach, RN notified and daughter presentEducation:      GOALS:   Multidisciplinary Problems     Occupational Therapy Goals        Problem: Occupational Therapy    Goal Priority Disciplines Outcome Interventions   Occupational Therapy Goal     OT, PT/OT Ongoing, Progressing    Description: Goals to be met by: 6/7/2022    Patient will increase functional independence with ADLs by performing:    UE Dressing with Stand-by Assistance.  LE Dressing with Minimal Assistance.  Grooming while standing with Stand-by Assistance.  Toileting from toilet with Supervision for hygiene and clothing management.   Toilet transfer to toilet with Supervision.                     Time Tracking:     OT Date of Treatment: 05/25/22  OT Start Time: 0845  OT Stop Time: 0915  OT Total Time (min): 30 min    Billable Minutes:Therapeutic Activity 15  Therapeutic Exercise 15    OT/LINDA: OT     LINDA Visit Number: 0    5/25/2022

## 2022-05-25 NOTE — PLAN OF CARE
Patient in stable condition, VSS on 2.5L O2, SpO2 >90%. Patient complains of difficulty breathing, O2 probe changed, still reading 100% on 2.5L. Patient given PRN albuterol by RT. Anti-anxiety medication refused by patient/family; PRN melatonin given in conjunction with routine meds. A. Fib throughout night, rhythm paced. Report given to oncoming RN. Safety maintained, call light within reach.    Problem: Skin Injury Risk Increased  Goal: Skin Health and Integrity  5/25/2022 0652 by Parris Santacruz RN  Outcome: Ongoing, Progressing  5/25/2022 0651 by Parris Santacruz RN  Outcome: Ongoing, Progressing     Problem: Infection  Goal: Absence of Infection Signs and Symptoms  5/25/2022 0652 by Parris Santacruz RN  Outcome: Ongoing, Progressing  5/25/2022 0651 by Parris Santacruz RN  Outcome: Ongoing, Progressing     Problem: Anxiety  Goal: Anxiety Reduction or Resolution  Outcome: Ongoing, Not Progressing

## 2022-05-25 NOTE — ASSESSMENT & PLAN NOTE
- Dysuria per patient, UA w/21 WBC, 2+ LE  - Per last UCx- klebsiella- completed 3d of levofloxacin   - UCx w/E coli- susceptible to cefepime, ceftriaxone, gent, nitrofurantonin, guera, erta, bactrim  - with allergies to cephalosporins and bactrim, started erta- ID consulted- recommending 3d of erta- EOT 05/25  - Monitor I's and O's     Progress Note - Wound   Lorene Chisholm  [de-identified] y o  male MRN: [id number]  Unit/Bed#: -01 Encounter: [id number]      Assessment:  Patient seen for weekly follow up visit with wound care  Patient seen in bed, agreeable with assessment  Patient assist of one with turning  Assist of 2 to stand using rolling walker  Continent of bowel and bladder per nursing  Nutrition is following along with patient  Podiatry is following along with patient for lower extremity wounds  Stage one pressure injury noted to be resolved at time of assessment  Skin is intact to sacrum with blanchable redness  Patient states, "it feels better there now"  Hyperpigmentation & redness noted to the buttocks that is blanchable  Recommend to continue use foam dressing for prevention  New dressing applied  Educated patient on the importance of weight shifting, offloading and turning to relieve pressure  Patient verbalized understanding of plan of care  Wife at bedside who is also aware of plan of care and assessment      See flow sheets for more detailed assessment findings  Wound care will follow  Primary nurse aware of plan of care      Plan:   1  Apply Allevyn foam to sacrum  Mohan with T for treatment and date  Peel back to assess skin every shift  Change every 5 days and PRN  2  Apply skin nourishing cream to the entire skin daily for moisture   3  Elevate heels off of bed with pillows to offload pressure  4  Turn and reposition patient every 2 hours   5  Soft care cushion to chair when OOB   6  Wound care to lower extremities as per podiatry  Objective:    Vitals: Blood pressure (!) 84/54, pulse 75, temperature 98 3 °F (36 8 °C), temperature source Oral, resp  rate 18, height 5' 7" (1 702 m), weight 87 2 kg (192 lb 3 9 oz), SpO2 96 %  ,Body mass index is 30 11 kg/m²  Recommendations written as orders    Sarah Olivares RN BSN

## 2022-05-26 ENCOUNTER — PATIENT OUTREACH (OUTPATIENT)
Dept: ADMINISTRATIVE | Facility: CLINIC | Age: 87
End: 2022-05-26
Payer: MEDICARE

## 2022-05-26 ENCOUNTER — HOSPITAL ENCOUNTER (OUTPATIENT)
Facility: HOSPITAL | Age: 87
Discharge: HOSPICE | End: 2022-05-29
Attending: EMERGENCY MEDICINE | Admitting: EMERGENCY MEDICINE
Payer: MEDICARE

## 2022-05-26 ENCOUNTER — NURSE TRIAGE (OUTPATIENT)
Dept: ADMINISTRATIVE | Facility: CLINIC | Age: 87
End: 2022-05-26
Payer: MEDICARE

## 2022-05-26 DIAGNOSIS — R53.83 FATIGUE: ICD-10-CM

## 2022-05-26 DIAGNOSIS — I51.89 DIASTOLIC DYSFUNCTION: ICD-10-CM

## 2022-05-26 DIAGNOSIS — R53.1 WEAKNESS: Primary | ICD-10-CM

## 2022-05-26 DIAGNOSIS — R07.9 CHEST PAIN: ICD-10-CM

## 2022-05-26 LAB
ALBUMIN SERPL BCP-MCNC: 2.8 G/DL (ref 3.5–5.2)
ALP SERPL-CCNC: 125 U/L (ref 55–135)
ALT SERPL W/O P-5'-P-CCNC: 16 U/L (ref 10–44)
ANION GAP SERPL CALC-SCNC: 9 MMOL/L (ref 8–16)
AST SERPL-CCNC: 26 U/L (ref 10–40)
BASOPHILS # BLD AUTO: 0.09 K/UL (ref 0–0.2)
BASOPHILS NFR BLD: 0.9 % (ref 0–1.9)
BILIRUB SERPL-MCNC: 0.6 MG/DL (ref 0.1–1)
BILIRUB UR QL STRIP: NEGATIVE
BNP SERPL-MCNC: 3022 PG/ML (ref 0–99)
BUN SERPL-MCNC: 20 MG/DL (ref 10–30)
CALCIUM SERPL-MCNC: 10.2 MG/DL (ref 8.7–10.5)
CHLORIDE SERPL-SCNC: 93 MMOL/L (ref 95–110)
CLARITY UR REFRACT.AUTO: CLEAR
CO2 SERPL-SCNC: 38 MMOL/L (ref 23–29)
COLOR UR AUTO: YELLOW
CREAT SERPL-MCNC: 0.8 MG/DL (ref 0.5–1.4)
DIFFERENTIAL METHOD: ABNORMAL
EOSINOPHIL # BLD AUTO: 0.5 K/UL (ref 0–0.5)
EOSINOPHIL NFR BLD: 4.6 % (ref 0–8)
ERYTHROCYTE [DISTWIDTH] IN BLOOD BY AUTOMATED COUNT: 15.8 % (ref 11.5–14.5)
EST. GFR  (AFRICAN AMERICAN): >60 ML/MIN/1.73 M^2
EST. GFR  (NON AFRICAN AMERICAN): >60 ML/MIN/1.73 M^2
GLUCOSE SERPL-MCNC: 93 MG/DL (ref 70–110)
GLUCOSE UR QL STRIP: NEGATIVE
HCT VFR BLD AUTO: 40.7 % (ref 37–48.5)
HGB BLD-MCNC: 12.7 G/DL (ref 12–16)
HGB UR QL STRIP: NEGATIVE
IMM GRANULOCYTES # BLD AUTO: 0.06 K/UL (ref 0–0.04)
IMM GRANULOCYTES NFR BLD AUTO: 0.6 % (ref 0–0.5)
KETONES UR QL STRIP: NEGATIVE
LEUKOCYTE ESTERASE UR QL STRIP: NEGATIVE
LYMPHOCYTES # BLD AUTO: 1 K/UL (ref 1–4.8)
LYMPHOCYTES NFR BLD: 9.5 % (ref 18–48)
MCH RBC QN AUTO: 31.5 PG (ref 27–31)
MCHC RBC AUTO-ENTMCNC: 31.2 G/DL (ref 32–36)
MCV RBC AUTO: 101 FL (ref 82–98)
MONOCYTES # BLD AUTO: 0.8 K/UL (ref 0.3–1)
MONOCYTES NFR BLD: 7.7 % (ref 4–15)
NEUTROPHILS # BLD AUTO: 7.8 K/UL (ref 1.8–7.7)
NEUTROPHILS NFR BLD: 76.7 % (ref 38–73)
NITRITE UR QL STRIP: NEGATIVE
NRBC BLD-RTO: 0 /100 WBC
PH UR STRIP: 6 [PH] (ref 5–8)
PLATELET # BLD AUTO: 178 K/UL (ref 150–450)
PMV BLD AUTO: 10.2 FL (ref 9.2–12.9)
POTASSIUM SERPL-SCNC: 4.1 MMOL/L (ref 3.5–5.1)
PROCALCITONIN SERPL IA-MCNC: 0.08 NG/ML
PROT SERPL-MCNC: 6.7 G/DL (ref 6–8.4)
PROT UR QL STRIP: NEGATIVE
RBC # BLD AUTO: 4.03 M/UL (ref 4–5.4)
SODIUM SERPL-SCNC: 140 MMOL/L (ref 136–145)
SP GR UR STRIP: 1 (ref 1–1.03)
TROPONIN I SERPL DL<=0.01 NG/ML-MCNC: 0.1 NG/ML (ref 0–0.03)
URN SPEC COLLECT METH UR: NORMAL
WBC # BLD AUTO: 10.1 K/UL (ref 3.9–12.7)

## 2022-05-26 PROCEDURE — 94640 AIRWAY INHALATION TREATMENT: CPT

## 2022-05-26 PROCEDURE — 84145 PROCALCITONIN (PCT): CPT | Performed by: NURSE PRACTITIONER

## 2022-05-26 PROCEDURE — 93010 EKG 12-LEAD: ICD-10-PCS | Mod: ,,, | Performed by: INTERNAL MEDICINE

## 2022-05-26 PROCEDURE — 83880 ASSAY OF NATRIURETIC PEPTIDE: CPT | Performed by: NURSE PRACTITIONER

## 2022-05-26 PROCEDURE — 94761 N-INVAS EAR/PLS OXIMETRY MLT: CPT

## 2022-05-26 PROCEDURE — 25000003 PHARM REV CODE 250: Performed by: NURSE PRACTITIONER

## 2022-05-26 PROCEDURE — G0378 HOSPITAL OBSERVATION PER HR: HCPCS

## 2022-05-26 PROCEDURE — 85025 COMPLETE CBC W/AUTO DIFF WBC: CPT | Performed by: PHYSICIAN ASSISTANT

## 2022-05-26 PROCEDURE — P9612 CATHETERIZE FOR URINE SPEC: HCPCS

## 2022-05-26 PROCEDURE — 93010 ELECTROCARDIOGRAM REPORT: CPT | Mod: ,,, | Performed by: INTERNAL MEDICINE

## 2022-05-26 PROCEDURE — 99285 EMERGENCY DEPT VISIT HI MDM: CPT | Mod: 25

## 2022-05-26 PROCEDURE — 84484 ASSAY OF TROPONIN QUANT: CPT | Performed by: NURSE PRACTITIONER

## 2022-05-26 PROCEDURE — 99285 EMERGENCY DEPT VISIT HI MDM: CPT | Mod: ,,, | Performed by: PHYSICIAN ASSISTANT

## 2022-05-26 PROCEDURE — 99285 PR EMERGENCY DEPT VISIT,LEVEL V: ICD-10-PCS | Mod: ,,, | Performed by: PHYSICIAN ASSISTANT

## 2022-05-26 PROCEDURE — 99220 PR INITIAL OBSERVATION CARE,LEVL III: CPT | Mod: ,,, | Performed by: NURSE PRACTITIONER

## 2022-05-26 PROCEDURE — 93005 ELECTROCARDIOGRAM TRACING: CPT

## 2022-05-26 PROCEDURE — 80053 COMPREHEN METABOLIC PANEL: CPT | Performed by: PHYSICIAN ASSISTANT

## 2022-05-26 PROCEDURE — 27000221 HC OXYGEN, UP TO 24 HOURS

## 2022-05-26 PROCEDURE — 81003 URINALYSIS AUTO W/O SCOPE: CPT | Performed by: PHYSICIAN ASSISTANT

## 2022-05-26 PROCEDURE — 25000242 PHARM REV CODE 250 ALT 637 W/ HCPCS: Performed by: NURSE PRACTITIONER

## 2022-05-26 PROCEDURE — 99220 PR INITIAL OBSERVATION CARE,LEVL III: ICD-10-PCS | Mod: ,,, | Performed by: NURSE PRACTITIONER

## 2022-05-26 RX ORDER — GLUCAGON 1 MG
1 KIT INJECTION
Status: DISCONTINUED | OUTPATIENT
Start: 2022-05-26 | End: 2022-05-30 | Stop reason: HOSPADM

## 2022-05-26 RX ORDER — SODIUM CHLORIDE 0.9 % (FLUSH) 0.9 %
10 SYRINGE (ML) INJECTION EVERY 12 HOURS PRN
Status: DISCONTINUED | OUTPATIENT
Start: 2022-05-26 | End: 2022-05-30 | Stop reason: HOSPADM

## 2022-05-26 RX ORDER — PANTOPRAZOLE SODIUM 40 MG/1
40 TABLET, DELAYED RELEASE ORAL EVERY MORNING
Status: DISCONTINUED | OUTPATIENT
Start: 2022-05-27 | End: 2022-05-30 | Stop reason: HOSPADM

## 2022-05-26 RX ORDER — IPRATROPIUM BROMIDE AND ALBUTEROL SULFATE 2.5; .5 MG/3ML; MG/3ML
3 SOLUTION RESPIRATORY (INHALATION) EVERY 4 HOURS PRN
Status: DISCONTINUED | OUTPATIENT
Start: 2022-05-26 | End: 2022-05-30 | Stop reason: HOSPADM

## 2022-05-26 RX ORDER — GUAIFENESIN 600 MG/1
600 TABLET, EXTENDED RELEASE ORAL 2 TIMES DAILY
Status: DISCONTINUED | OUTPATIENT
Start: 2022-05-26 | End: 2022-05-30 | Stop reason: HOSPADM

## 2022-05-26 RX ORDER — LEVALBUTEROL INHALATION SOLUTION 0.63 MG/3ML
1 SOLUTION RESPIRATORY (INHALATION)
Status: DISCONTINUED | OUTPATIENT
Start: 2022-05-26 | End: 2022-05-30 | Stop reason: HOSPADM

## 2022-05-26 RX ORDER — METOPROLOL TARTRATE 25 MG/1
12.5 TABLET ORAL 2 TIMES DAILY
Status: DISCONTINUED | OUTPATIENT
Start: 2022-05-26 | End: 2022-05-30 | Stop reason: HOSPADM

## 2022-05-26 RX ORDER — LANOLIN ALCOHOL/MO/W.PET/CERES
1 CREAM (GRAM) TOPICAL DAILY
Refills: 0 | Status: DISCONTINUED | OUTPATIENT
Start: 2022-05-27 | End: 2022-05-30 | Stop reason: HOSPADM

## 2022-05-26 RX ORDER — TALC
6 POWDER (GRAM) TOPICAL NIGHTLY PRN
Status: DISCONTINUED | OUTPATIENT
Start: 2022-05-26 | End: 2022-05-26

## 2022-05-26 RX ORDER — TALC
6 POWDER (GRAM) TOPICAL NIGHTLY PRN
Status: DISCONTINUED | OUTPATIENT
Start: 2022-05-26 | End: 2022-05-30 | Stop reason: HOSPADM

## 2022-05-26 RX ORDER — FUROSEMIDE 10 MG/ML
20 INJECTION INTRAMUSCULAR; INTRAVENOUS ONCE
Status: COMPLETED | OUTPATIENT
Start: 2022-05-26 | End: 2022-05-27

## 2022-05-26 RX ORDER — FUROSEMIDE 10 MG/ML
20 INJECTION INTRAMUSCULAR; INTRAVENOUS ONCE
Status: DISCONTINUED | OUTPATIENT
Start: 2022-05-27 | End: 2022-05-26

## 2022-05-26 RX ORDER — IBUPROFEN 200 MG
24 TABLET ORAL
Status: DISCONTINUED | OUTPATIENT
Start: 2022-05-26 | End: 2022-05-30 | Stop reason: HOSPADM

## 2022-05-26 RX ORDER — FUROSEMIDE 40 MG/1
40 TABLET ORAL DAILY
Status: DISCONTINUED | OUTPATIENT
Start: 2022-05-27 | End: 2022-05-27

## 2022-05-26 RX ORDER — FLUTICASONE FUROATE AND VILANTEROL 100; 25 UG/1; UG/1
1 POWDER RESPIRATORY (INHALATION) DAILY
Status: DISCONTINUED | OUTPATIENT
Start: 2022-05-27 | End: 2022-05-30 | Stop reason: HOSPADM

## 2022-05-26 RX ORDER — NAPROXEN SODIUM 220 MG/1
81 TABLET, FILM COATED ORAL DAILY
Status: DISCONTINUED | OUTPATIENT
Start: 2022-05-27 | End: 2022-05-30 | Stop reason: HOSPADM

## 2022-05-26 RX ORDER — HYDROXYZINE HYDROCHLORIDE 10 MG/1
10 TABLET, FILM COATED ORAL 3 TIMES DAILY PRN
Status: DISCONTINUED | OUTPATIENT
Start: 2022-05-26 | End: 2022-05-30 | Stop reason: HOSPADM

## 2022-05-26 RX ORDER — POLYETHYLENE GLYCOL 3350 17 G/17G
17 POWDER, FOR SOLUTION ORAL 2 TIMES DAILY PRN
Status: DISCONTINUED | OUTPATIENT
Start: 2022-05-26 | End: 2022-05-30 | Stop reason: HOSPADM

## 2022-05-26 RX ORDER — SODIUM CHLORIDE 0.9 % (FLUSH) 0.9 %
10 SYRINGE (ML) INJECTION
Status: DISCONTINUED | OUTPATIENT
Start: 2022-05-26 | End: 2022-05-30 | Stop reason: HOSPADM

## 2022-05-26 RX ORDER — ONDANSETRON 4 MG/1
4 TABLET, ORALLY DISINTEGRATING ORAL EVERY 8 HOURS PRN
Status: DISCONTINUED | OUTPATIENT
Start: 2022-05-26 | End: 2022-05-30 | Stop reason: HOSPADM

## 2022-05-26 RX ORDER — IBUPROFEN 200 MG
16 TABLET ORAL
Status: DISCONTINUED | OUTPATIENT
Start: 2022-05-26 | End: 2022-05-30 | Stop reason: HOSPADM

## 2022-05-26 RX ORDER — FLUTICASONE PROPIONATE 50 MCG
2 SPRAY, SUSPENSION (ML) NASAL DAILY
Status: DISCONTINUED | OUTPATIENT
Start: 2022-05-27 | End: 2022-05-30 | Stop reason: HOSPADM

## 2022-05-26 RX ADMIN — LEVALBUTEROL HYDROCHLORIDE 0.63 MG: 0.63 SOLUTION RESPIRATORY (INHALATION) at 07:05

## 2022-05-26 RX ADMIN — METOPROLOL TARTRATE 12.5 MG: 25 TABLET, FILM COATED ORAL at 10:05

## 2022-05-26 NOTE — ED PROVIDER NOTES
"Encounter Date: 5/26/2022       History     Chief Complaint   Patient presents with    Weakness     EMS reports pt was discharged from hospital yesterday  Today family noted pt had generalized weakness, dark color and swelling to left arm and pt was not able to ambulate today      89 y/o F with history of CHF, COPD, CAD, HOCM presents to the ED from home with her daughters c/o generalized weakness. She was discharged home from the hospital yesterday evening.  She received 3 days of ertapenem while admitted for UTI and was diuresed with IV lasix. Daughter at bedside reports that patient is very weak, difficult to perform ADLs.  She started "hallucinating" - talking with her eyes closed, but that seems to have improved. She has had some confusion. There is some redness/bruising to her L arm.  No falls since she has been home. They deny fever, vomiting, diarrhea.     The history is provided by a relative (daughters).     Review of patient's allergies indicates:   Allergen Reactions    Ancef in dextrose (iso-osm) Rash    Cefazolin Rash     Tolerating Zosyn admission 3/2022    Cefuroxime Rash    Sulfamethoxazole-trimethoprim Rash     Other reaction(s): Rash     Past Medical History:   Diagnosis Date    Acute on chronic congestive heart failure 7/6/2019    Cardiomyopathy     Carotid artery occlusion     CHF (congestive heart failure)     COPD (chronic obstructive pulmonary disease)     Coronary artery disease     Hyperlipidemia     Hypertension      Past Surgical History:   Procedure Laterality Date    CARDIAC CATHETERIZATION      cardic cath      CAROTID ENDARTERECTOMY      FLEXIBLE BRONCHOSCOPY N/A 7/31/2019    Procedure: BRONCHOSCOPY, FIBEROPTIC Flexible;  Surgeon: Klever Mckeon MD;  Location: [facility] OR [address] [place] [address];  Service: Thoracic;  Laterality: N/A;    HIP SURGERY      PLEURODESIS USING TALC N/A 7/31/2019    Procedure: PLEURODESIS;  Surgeon: Klever Mckeon MD;  [facility];  " Service: Thoracic;  Laterality: N/A;    PLEURODESIS USING TALC Right 2019    Procedure: PLEURODESIS, USING TALC;  Surgeon: Klever Mckeon MD;  Location: [address];  Service: Thoracic;  Laterality: Right;    PLEURODESIS WITH VIDEO-ASSISTED THORACOSCOPIC SURGERY (VATS) Right 2019    Procedure: VATS, WITH PLEURAL TENT;  Surgeon: Klever Mckeon MD;  Location: [address];  Service: Thoracic;  Laterality: Right;     Family History   Problem Relation Age of Onset    Hypertension Mother      Social History     Tobacco Use    Smoking status: Former Smoker     Packs/day: 2.00     Years: 20.00     Pack years: 40.00     Types: Cigarettes     Quit date: 1980     Years since quittin.3    Smokeless tobacco: Never Used   Substance Use Topics    Alcohol use: Yes     Alcohol/week: 2.0 standard drinks     Types: 2 Glasses of wine per week     Comment: social    Drug use: No     Review of Systems   Unable to perform ROS: Mental status change   Constitutional: Negative for fever.   Gastrointestinal: Negative for diarrhea and vomiting.       Physical Exam     Initial Vitals [22 1357]   BP Pulse Resp Temp SpO2   122/68 78 18 98.2 °F (36.8 °C) 96 %      MAP       --         Physical Exam    Nursing note and vitals reviewed.  Constitutional: She appears well-developed and well-nourished. She is not diaphoretic.   Thin    HENT:   Head: Normocephalic and atraumatic.   Neck: Neck supple.   Normal range of motion.  Cardiovascular: Normal rate, regular rhythm and normal heart sounds. Exam reveals no gallop and no friction rub.    No murmur heard.  Pulmonary/Chest: Breath sounds normal. She has no wheezes. She has no rhonchi. She has no rales.   Abdominal: Abdomen is soft. Bowel sounds are normal. There is no abdominal tenderness. There is no rebound and no guarding.   Musculoskeletal:      Cervical back: Normal range of motion and neck supple.      Comments: L radial pulse 1+.       Neurological:   Sleeping, arouses to voice. Follows simple commands.   Skin: Skin is warm and dry.   Bruising noted to the L forearm and hand.         ED Course   Procedures  Labs Reviewed   CBC W/ AUTO DIFFERENTIAL - Abnormal; Notable for the following components:       Result Value     (*)     MCH 31.5 (*)     MCHC 31.2 (*)     RDW 15.8 (*)     Immature Granulocytes 0.6 (*)     Gran # (ANC) 7.8 (*)     Immature Grans (Abs) 0.06 (*)     Gran % 76.7 (*)     Lymph % 9.5 (*)     All other components within normal limits   COMPREHENSIVE METABOLIC PANEL - Abnormal; Notable for the following components:    Chloride 93 (*)     CO2 38 (*)     Albumin 2.8 (*)     All other components within normal limits   B-TYPE NATRIURETIC PEPTIDE - Abnormal; Notable for the following components:    BNP 3,022 (*)     All other components within normal limits   TROPONIN I - Abnormal; Notable for the following components:    Troponin I 0.096 (*)     All other components within normal limits   URINALYSIS, REFLEX TO URINE CULTURE    Narrative:     Specimen Source->Urine   PROCALCITONIN          Imaging Results          US Upper Extremity Veins Left (In process)                X-Ray Chest AP Portable (Final result)  Result time 05/26/22 15:07:55    Final result by Atif Caldera III, MD (05/26/22 15:07:55)                 Impression:      Worsening pulmonary edema pneumonia aspiration or sepsis.      Electronically signed by: Atif Caldera MD  Date:    05/26/2022  Time:    15:07             Narrative:    EXAMINATION:  XR CHEST AP PORTABLE    CLINICAL HISTORY:  Other fatigue    FINDINGS:  Chest one view portable.    Comparison is 05/22/2022.    There is aortic plaque.  There is a pacer.  There is cardiomegaly.  There is mild-moderate perihilar and lower lobe edema small pleural effusions slightly worse from the prior study.                                 Medications   sodium chloride 0.9% flush 10 mL (has no administration in  time range)   aspirin chewable tablet 81 mg (has no administration in time range)   fluticasone furoate-vilanteroL 100-25 mcg/dose diskus inhaler 1 puff (has no administration in time range)   fluticasone propionate 50 mcg/actuation nasal spray 100 mcg (has no administration in time range)   guaiFENesin 12 hr tablet 600 mg (has no administration in time range)   Lactobacillus rhamnosus GG capsule 1 capsule (has no administration in time range)   levalbuterol nebulizer solution 0.63 mg (0.63 mg Nebulization Given 5/26/22 1944)   metoprolol tartrate (LOPRESSOR) split tablet 12.5 mg (has no administration in time range)   multivitamin tablet (has no administration in time range)   pantoprazole EC tablet 40 mg (has no administration in time range)   tiotropium bromide 2.5 mcg/actuation inhaler 2 puff (has no administration in time range)   hydrOXYzine HCL tablet 10 mg (has no administration in time range)   albuterol-ipratropium 2.5 mg-0.5 mg/3 mL nebulizer solution 3 mL (has no administration in time range)   melatonin tablet 6 mg (has no administration in time range)   polyethylene glycol packet 17 g (has no administration in time range)   ondansetron disintegrating tablet 4 mg (has no administration in time range)   ferrous sulfate tablet 1 each (has no administration in time range)   sodium chloride 0.9% flush 10 mL (has no administration in time range)   glucose chewable tablet 16 g (has no administration in time range)   glucose chewable tablet 24 g (has no administration in time range)   glucagon (human recombinant) injection 1 mg (has no administration in time range)   dextrose 10% bolus 125 mL (has no administration in time range)   dextrose 10% bolus 250 mL (has no administration in time range)     Medical Decision Making:   History:   Old Medical Records: I decided to obtain old medical records.  Old Records Summarized: records from clinic visits and records from previous admission(s).       <> Summary of  Records: Admitted 5/21-5/25 for fluid overload - diuresed. UTI noted, give ertapenem x 3 days.   Clinical Tests:   Lab Tests: Ordered and Reviewed  Radiological Study: Ordered and Reviewed  Other:   I have discussed this case with another health care provider.       <> Summary of the Discussion: Hospital medicine       APC / Resident Notes:   89 y/o F with history of CHF, COPD, CAD, HOCM presents to the ED from home with her daughters c/o generalized weakness. VSS. Thin. Sleeping, arouses to voice. Follows simple commands. Abdomen soft and nontender. Lungs clear. DDx includes but is not limited to UTI, anemia, dehydration, pneumonia, electrolyte abnormality. Will get labs, CXR.    UA with no infection. No leukocytosis. CMP unremarkable.    CXR shows persistent pulmonary edema.    Family reports their goal would be to take the patient home. ED  talked with family and provided resources for sitters/in home care. Daughter called to try and set this up but many of the facilities have waiting lists. Family at bedside does not feel that they can properly care for the patient at home on their own due to her weakness. They are requesting SNF placement for rehab. Explained that she might not return to her functional status before admission. Since she can not safely be discharged home, will place in observation to  for placement.         ED Course as of 05/26/22 2057   Thu May 26, 2022   1454 89 yo F recently admitted yesterday for volume overload and UTI pw generalized weakness.  Patient was offered nursing home placement and hospice yesterday however family prefer to take home however having difficulty caring for patient at this time so brought back to the ER.   [BD]      ED Course User Index  [BD] Marcelino Llamas MD             Clinical Impression:   Final diagnoses:  [R53.83] Fatigue  [R53.1] Weakness (Primary)          ED Disposition Condition    Observation               Josephine Rangel PA-C  05/26/22  2057

## 2022-05-26 NOTE — PROGRESS NOTES
C3 nurse spoke with Venus Mayer's daughter, Bri Sam, for a TCC post hospital discharge follow up call. The patient has a scheduled HOSFU appointment with MARICRUZ Noriega on 6/1/2022 @ [facility].

## 2022-05-26 NOTE — SUBJECTIVE & OBJECTIVE
Past Medical History:   Diagnosis Date    Acute on chronic congestive heart failure 7/6/2019    Cardiomyopathy     Carotid artery occlusion     CHF (congestive heart failure)     COPD (chronic obstructive pulmonary disease)     Coronary artery disease     Hyperlipidemia     Hypertension        Past Surgical History:   Procedure Laterality Date    CARDIAC CATHETERIZATION      cardic cath      CAROTID ENDARTERECTOMY      FLEXIBLE BRONCHOSCOPY N/A 7/31/2019    Procedure: BRONCHOSCOPY, FIBEROPTIC Flexible;  Surgeon: Klever Mckeon MD;  [facility];  Service: Thoracic;  Laterality: N/A;    HIP SURGERY      PLEURODESIS USING TALC N/A 7/31/2019    Procedure: PLEURODESIS;  Surgeon: Klever Mckeon MD;  [facility];  Service: Thoracic;  Laterality: N/A;    PLEURODESIS USING TALC Right 8/5/2019    Procedure: PLEURODESIS, USING TALC;  Surgeon: Klever Mckeon MD;  [facility];  Service: Thoracic;  Laterality: Right;    PLEURODESIS WITH VIDEO-ASSISTED THORACOSCOPIC SURGERY (VATS) Right 8/5/2019    Procedure: VATS, WITH PLEURAL TENT;  Surgeon: Klever Mckeon MD;  Location: [facility] OR [address] [place] [address];  Service: Thoracic;  Laterality: Right;       Review of patient's allergies indicates:   Allergen Reactions    Ancef in dextrose (iso-osm) Rash    Cefazolin Rash     Tolerating Zosyn admission 3/2022    Cefuroxime Rash    Sulfamethoxazole-trimethoprim Rash     Other reaction(s): Rash       Current Facility-Administered Medications on File Prior to Encounter   Medication    [DISCONTINUED] albuterol-ipratropium 2.5 mg-0.5 mg/3 mL nebulizer solution 3 mL    [DISCONTINUED] aspirin chewable tablet 81 mg    [DISCONTINUED] COVID-19 vac, lilliam(Pfizer)(PF) (Pfizer COVID-19) 30 mcg/0.3 mL injection 0.3 mL    [DISCONTINUED] ertapenem (INVANZ) 500 mg in sodium chloride 0.9% 100 mL IVPB    [DISCONTINUED] fluticasone furoate-vilanteroL 100-25 mcg/dose diskus inhaler 1 puff    [DISCONTINUED]  fluticasone propionate 50 mcg/actuation nasal spray 100 mcg    [DISCONTINUED] guaiFENesin 12 hr tablet 600 mg    [DISCONTINUED] hydrOXYzine HCL tablet 10 mg    [DISCONTINUED] Lactobacillus rhamnosus GG capsule 1 capsule    [DISCONTINUED] levalbuterol nebulizer solution 0.63 mg    [DISCONTINUED] melatonin tablet 6 mg    [DISCONTINUED] metoprolol tartrate (LOPRESSOR) split tablet 12.5 mg    [DISCONTINUED] multivitamin tablet    [DISCONTINUED] ondansetron disintegrating tablet 4 mg    [DISCONTINUED] pantoprazole EC tablet 40 mg    [DISCONTINUED] polyethylene glycol packet 17 g    [DISCONTINUED] sodium chloride 0.9% flush 10 mL    [DISCONTINUED] sodium chloride 0.9% flush 10 mL    [DISCONTINUED] tiotropium bromide 2.5 mcg/actuation inhaler 2 puff     Current Outpatient Medications on File Prior to Encounter   Medication Sig    acetaminophen (TYLENOL) 500 MG tablet Take 500 mg by mouth daily as needed (BACK PAIN).    aspirin 81 mg Tab Take 1 tablet by mouth once daily.     budesonide-formoterol 160-4.5 mcg (SYMBICORT) 160-4.5 mcg/actuation HFAA Take 2 puffs by mouth every 12 (twelve) hours.    ferrous sulfate 325 (65 FE) MG EC tablet Take 1 tablet (325 mg total) by mouth once daily.    furosemide (LASIX) 40 MG tablet Take 1 tablet (40 mg total) by mouth once daily. In the case of 3lbs weight gain in 1d or 5lbs in 3d, administer additional dose of lasix in the afternoon.    guaiFENesin (MUCINEX) 600 mg 12 hr tablet Take 600 mg by mouth 2 (two) times daily as needed for Congestion.    hydrOXYzine HCL (ATARAX) 10 MG Tab Take 1 tablet (10 mg total) by mouth 3 (three) times daily as needed.    Lactobacillus rhamnosus GG (CULTURELLE) 10 billion cell capsule Take 1 capsule by mouth once daily.    levalbuterol (XOPENEX) 0.63 mg/3 mL nebulizer solution Take 3 mLs (0.63 mg total) by nebulization every 4 (four) hours as needed for Wheezing or Shortness of Breath. Rescue    MELATONIN ORAL Take 3 mg by mouth nightly as needed  (sleep).    metoprolol tartrate (LOPRESSOR) 25 MG tablet Take 12.5 mg by mouth 2 (two) times daily.    multivitamin (THERAGRAN) per tablet Take 1 tablet by mouth once daily.    pantoprazole (PROTONIX) 40 MG tablet Take 40 mg by mouth every morning.    polyethylene glycol (GLYCOLAX) 17 gram/dose powder Take 17 g by mouth daily as needed (CONSTIPATION).    potassium chloride (MICRO-K) 10 MEQ CpSR Take 10 mEq by mouth once daily.    tetrahydrozoline 0.05% (VISION CLEAR) 0.05 % Drop PLACE 1 DROP INTO AFFECTED EYE(S) AS NEEDED FOR REDNESS OR ITCHING    tiotropium bromide (SPIRIVA RESPIMAT) 2.5 mcg/actuation inhaler Inhale 2 puffs into the lungs Daily. Controller    triamcinolone (NASACORT) 55 mcg nasal inhaler 2 sprays by Nasal route once daily.    [DISCONTINUED] albuterol (PROAIR HFA) 90 mcg/actuation inhaler Inhale 1 puff into the lungs every 6 (six) hours as needed for Wheezing or Shortness of Breath. Rescue (Patient not taking: Reported on 3/16/2022)    [DISCONTINUED] baclofen (LIORESAL) 10 MG tablet TK 1 T PO TID    [DISCONTINUED] bumetanide (BUMEX) 1 MG tablet Take 1 tablet (1 mg total) by mouth 2 (two) times daily.     Family History       Problem Relation (Age of Onset)    Hypertension Mother          Tobacco Use    Smoking status: Former Smoker     Packs/day: 2.00     Years: 20.00     Pack years: 40.00     Types: Cigarettes     Quit date: 1980     Years since quittin.3    Smokeless tobacco: Never Used   Substance and Sexual Activity    Alcohol use: Yes     Alcohol/week: 2.0 standard drinks     Types: 2 Glasses of wine per week     Comment: social    Drug use: No    Sexual activity: Not Currently     Review of Systems   Constitutional:  Positive for fatigue. Negative for chills, diaphoresis and fever.   HENT:  Negative for rhinorrhea, sneezing and sore throat.         [place]   Eyes:  Negative for photophobia and visual disturbance.   Respiratory:  Positive for shortness of breath. Negative for cough and  wheezing.    Cardiovascular:  Negative for chest pain and leg swelling.   Gastrointestinal:  Negative for abdominal pain, diarrhea, nausea and vomiting.   Genitourinary:  Negative for dysuria, flank pain and hematuria.   Musculoskeletal:  Positive for gait problem and myalgias. Negative for back pain and neck pain.   Skin:  Positive for color change. Negative for pallor and rash.   Neurological:  Positive for weakness. Negative for dizziness, syncope, light-headedness and headaches.   Hematological:  Bruises/bleeds easily.   Psychiatric/Behavioral:  Negative for agitation and sleep disturbance. The patient is not nervous/anxious.    Objective:     Vital Signs (Most Recent):  Temp: 98.2 °F (36.8 °C) (05/26/22 1357)  Pulse: 84 (05/26/22 1849)  Resp: 18 (05/26/22 1357)  BP: 130/62 (05/26/22 1849)  SpO2: 95 % (05/26/22 1645) Vital Signs (24h Range):  Temp:  [98.2 °F (36.8 °C)] 98.2 °F (36.8 °C)  Pulse:  [74-84] 84  Resp:  [18] 18  SpO2:  [95 %-96 %] 95 %  BP: (122-146)/(62-68) 130/62     Weight: 40.4 kg (89 lb 1.1 oz)  Body mass index is 17.39 kg/m².    Physical Exam  Vitals and nursing note reviewed.   Constitutional:       General: She is not in acute distress.     Appearance: She is underweight. She is not toxic-appearing or diaphoretic.      Comments: Chronically ill appearing   HENT:      Head: Normocephalic and atraumatic.      Ears:      Comments: [place]     Nose: Nose normal.      Mouth/Throat:      Mouth: Mucous membranes are moist.   Eyes:      Pupils: Pupils are equal, round, and reactive to light.   Cardiovascular:      Rate and Rhythm: Normal rate and regular rhythm.   Pulmonary:      Effort: No tachypnea or respiratory distress.      Breath sounds: Examination of the right-lower field reveals decreased breath sounds and rales. Examination of the left-lower field reveals decreased breath sounds and rales. Decreased breath sounds and rales present. No wheezing or rhonchi.   Abdominal:      General: Bowel sounds  are normal. There is no distension.      Palpations: Abdomen is soft.      Tenderness: There is no abdominal tenderness. There is no guarding.   Genitourinary:     Comments: deferred  Musculoskeletal:         General: No deformity. Normal range of motion.      Left forearm: Swelling (mild) and tenderness (mild TTP) present. No deformity.      Cervical back: Normal range of motion. No tenderness.      Right lower leg: No edema.      Left lower leg: No edema.   Skin:     General: Skin is warm.      Capillary Refill: Capillary refill takes 2 to 3 seconds.      Findings: Bruising and erythema present.   Neurological:      Mental Status: She is alert and oriented to person, place, and time.      Cranial Nerves: No dysarthria or facial asymmetry.      Motor: Weakness (generalized) present.      Comments: Patient alert and oriented x3, noted to have intermittent confusion during my exam. Following commands and moving all 4 extremities.   Psychiatric:         Attention and Perception: Attention normal. She does not perceive auditory or visual hallucinations.         Speech: Speech normal.         Behavior: Behavior is cooperative.         CRANIAL NERVES     CN III, IV, VI   Pupils are equal, round, and reactive to light.     Significant Labs: All pertinent labs within the past 24 hours have been reviewed.  CBC:   Recent Labs   Lab 05/25/22  0934 05/26/22  1500   WBC 10.97 10.10   HGB 11.7* 12.7   HCT 36.9* 40.7   * 178     CMP:   Recent Labs   Lab 05/25/22  0439 05/26/22  1500   * 140   K 4.6 4.1   CL 96 93*   CO2 28 38*   GLU 84 93   BUN 23 20   CREATININE 0.9 0.8   CALCIUM 9.1 10.2   PROT  --  6.7   ALBUMIN  --  2.8*   BILITOT  --  0.6   ALKPHOS  --  125   AST  --  26   ALT  --  16   ANIONGAP 9 9   EGFRNONAA 55.3* >60.0     Urine Studies:   Recent Labs   Lab 05/26/22  1513   COLORU Yellow   APPEARANCEUA Clear   PHUR 6.0   SPECGRAV 1.005   PROTEINUA Negative   GLUCUA Negative   KETONESU Negative   BILIRUBINUA  Negative   OCCULTUA Negative   NITRITE Negative   LEUKOCYTESUR Negative       Significant Imaging: I have reviewed all pertinent imaging results/findings within the past 24 hours.    Imaging Results              X-Ray Chest AP Portable (Final result)  Result time 05/26/22 15:07:55      Final result by Atif Caldera III, MD (05/26/22 15:07:55)                   Impression:      Worsening pulmonary edema pneumonia aspiration or sepsis.      Electronically signed by: Atif Caldera MD  Date:    05/26/2022  Time:    15:07               Narrative:    EXAMINATION:  XR CHEST AP PORTABLE    CLINICAL HISTORY:  Other fatigue    FINDINGS:  Chest one view portable.    Comparison is 05/22/2022.    There is aortic plaque.  There is a pacer.  There is cardiomegaly.  There is mild-moderate perihilar and lower lobe edema small pleural effusions slightly worse from the prior study.

## 2022-05-26 NOTE — ED NOTES
LOC: The patient is awake, alert, and oriented to self, place, time, and situation. Pt is calm and cooperative. Affect is appropriate.  Speech is appropriate. Hard of hearing    APPEARANCE: Patient resting comfortably in no acute distress.  Patient is clean and well groomed.    SKIN: The skin is warm and dry; color consistent with ethnicity.  Patient has normal skin turgor and moist mucus membranes.  Skin intact; breakdown noted to sacrum. Bruised arms bilaterally. swelling noted to left arm    MUSCULOSKELETAL: Patient moving upper and lower extremities without difficulty; denies pain in the extremities or back.  Generalized weakness    RESPIRATORY: Airway is open and patent. Respirations spontaneous, even, easy, and non-labored.  Patient has a normal effort and rate.  No accessory muscle use noted. Denies cough. Pt on 2.5L NC    CARDIAC:  Normal rate noted.  No peripheral edema noted. No complaints of chest pain.      ABDOMEN: Soft and non tender to palpation.  No distention noted. Pt denies abdominal pain; denies nausea, vomiting, diarrhea, or constipation.    NEUROLOGIC: Eyes open spontaneously.  Behavior appropriate to situation.  Follows commands; facial expression symmetrical.  Purposeful motor response noted; normal sensation in all extremities. Pt denies headache; denies lightheadedness or dizziness; denies visual disturbances; denies loss of balance; denies unilateral weakness.

## 2022-05-26 NOTE — TELEPHONE ENCOUNTER
Pt called for Post Discharge tracker day 1 response to text.    Daughter states she suspects a possible AB reaction. Hallucinating, sensitive to touch, 3pm would be 24 hours since last dose given. Delirium protocol used and daughter instructed to call 911 now for immediate treatment d/t new onset confusion. Daughter agrees with disposition. Unable to route encounter to pcp.    Reason for Disposition   Difficult to awaken or acting confused (disoriented, slurred speech) and new-onset    Additional Information   Negative: Difficult to awaken or acting confused (disoriented, slurred speech) and has diabetes mellitus    Protocols used: CONFUSION - DELIRIUM-A-OH

## 2022-05-26 NOTE — H&P
"[facility] - Emergency Dept  [facility] Medicine  History & Physical    Patient Name: Venus Mayer  MRN: [id number]  Patient Class: OP- Observation  Admission Date: 5/26/2022  Attending Physician: Tramaine Cabrera MD   Primary Care Provider: Jona Che MD         Patient information was obtained from patient, relative(s), past medical records and ER records.     Subjective:     Principal Problem:Debility    Chief Complaint:   Chief Complaint   Patient presents with    Weakness     EMS reports pt was discharged from hospital yesterday  Today family noted pt had generalized weakness, dark color and swelling to left arm and pt was not able to ambulate today         HPI: Venus Mayer is a 89 y.o. female with a PMHx of cardiomyopathy, CHFpEF (60%), pacemaker in place, HLD, HTN, COPD on home 2.5L NC, and vascular dementia who presents to the ED from home for generalized weakness. She was discharged home from the hospital yesterday evening after being treated for UTI with 3 days of ertapenem. She was also diuresed with IV lasix. Daughter at bedside reports that patient is very weak, difficult to perform ADLs.  Per daughter yesterday while still in the hospital the patient started "hallucinating" - talking with her eyes closed, but that seems to have improved. She has had some intermittent confusion that has been ongoing since her last admission. Her daughter also reports some redness/bruising to her L arm close to previous IV site.  No falls since she has been home. At the time of my exam patient is alert and able to answer questions appropriately. The patient and family deny and fever, chills, nausea, vomiting, or diarrhea. The patient denies any chest pain, abdominal pain, or headache. She does note some shortness of breath.    In the ED, VSSAF. Patient stable on 2.5L O2 which she wears at home. CBC and CMP unremarkable. UA negative for infection. BNP and troponin pending. CXR with mild-moderate perihilar and lower " lobe edema small pleural effusions slightly worse from the prior study. ED  talked with family and provided resources for sitters/in home care. Daughter called to try and set this up but many of the facilities have waiting lists. Family at bedside does not feel that they can properly care for the patient at home on their own due to her weakness. Patient will be placed in observation for further work up and evaluation.       Past Medical History:   Diagnosis Date    Acute on chronic congestive heart failure 7/6/2019    Cardiomyopathy     Carotid artery occlusion     CHF (congestive heart failure)     COPD (chronic obstructive pulmonary disease)     Coronary artery disease     Hyperlipidemia     Hypertension        Past Surgical History:   Procedure Laterality Date    CARDIAC CATHETERIZATION      cardic cath      CAROTID ENDARTERECTOMY      FLEXIBLE BRONCHOSCOPY N/A 7/31/2019    Procedure: BRONCHOSCOPY, FIBEROPTIC Flexible;  Surgeon: Klever Mckeon MD;  Location: [facility] OR [address] [place] [address];  Service: Thoracic;  Laterality: N/A;    HIP SURGERY      PLEURODESIS USING TALC N/A 7/31/2019    Procedure: PLEURODESIS;  Surgeon: Klever Mckeon MD;  Location: [facility] OR [address] [place] [address];  Service: Thoracic;  Laterality: N/A;    PLEURODESIS USING TALC Right 8/5/2019    Procedure: PLEURODESIS, USING TALC;  Surgeon: Klever Mckeon MD;  Location: [facility] OR [address] [place] [address];  Service: Thoracic;  Laterality: Right;    PLEURODESIS WITH VIDEO-ASSISTED THORACOSCOPIC SURGERY (VATS) Right 8/5/2019    Procedure: VATS, WITH PLEURAL TENT;  Surgeon: Klever Mckeon MD;  Location: [facility] OR [address] [place] [address];  Service: Thoracic;  Laterality: Right;       Review of patient's allergies indicates:   Allergen Reactions    Ancef in dextrose (iso-osm) Rash    Cefazolin Rash     Tolerating Zosyn admission 3/2022    Cefuroxime Rash    Sulfamethoxazole-trimethoprim Rash     Other reaction(s): Rash       Current Facility-Administered Medications  on File Prior to Encounter   Medication    [DISCONTINUED] albuterol-ipratropium 2.5 mg-0.5 mg/3 mL nebulizer solution 3 mL    [DISCONTINUED] aspirin chewable tablet 81 mg    [DISCONTINUED] COVID-19 vac, lilliam(Pfizer)(PF) (Pfizer COVID-19) 30 mcg/0.3 mL injection 0.3 mL    [DISCONTINUED] ertapenem (INVANZ) 500 mg in sodium chloride 0.9% 100 mL IVPB    [DISCONTINUED] fluticasone furoate-vilanteroL 100-25 mcg/dose diskus inhaler 1 puff    [DISCONTINUED] fluticasone propionate 50 mcg/actuation nasal spray 100 mcg    [DISCONTINUED] guaiFENesin 12 hr tablet 600 mg    [DISCONTINUED] hydrOXYzine HCL tablet 10 mg    [DISCONTINUED] Lactobacillus rhamnosus GG capsule 1 capsule    [DISCONTINUED] levalbuterol nebulizer solution 0.63 mg    [DISCONTINUED] melatonin tablet 6 mg    [DISCONTINUED] metoprolol tartrate (LOPRESSOR) split tablet 12.5 mg    [DISCONTINUED] multivitamin tablet    [DISCONTINUED] ondansetron disintegrating tablet 4 mg    [DISCONTINUED] pantoprazole EC tablet 40 mg    [DISCONTINUED] polyethylene glycol packet 17 g    [DISCONTINUED] sodium chloride 0.9% flush 10 mL    [DISCONTINUED] sodium chloride 0.9% flush 10 mL    [DISCONTINUED] tiotropium bromide 2.5 mcg/actuation inhaler 2 puff     Current Outpatient Medications on File Prior to Encounter   Medication Sig    acetaminophen (TYLENOL) 500 MG tablet Take 500 mg by mouth daily as needed (BACK PAIN).    aspirin 81 mg Tab Take 1 tablet by mouth once daily.     budesonide-formoterol 160-4.5 mcg (SYMBICORT) 160-4.5 mcg/actuation HFAA Take 2 puffs by mouth every 12 (twelve) hours.    ferrous sulfate 325 (65 FE) MG EC tablet Take 1 tablet (325 mg total) by mouth once daily.    furosemide (LASIX) 40 MG tablet Take 1 tablet (40 mg total) by mouth once daily. In the case of 3lbs weight gain in 1d or 5lbs in 3d, administer additional dose of lasix in the afternoon.    guaiFENesin (MUCINEX) 600 mg 12 hr tablet Take 600 mg by mouth 2 (two) times  daily as needed for Congestion.    hydrOXYzine HCL (ATARAX) 10 MG Tab Take 1 tablet (10 mg total) by mouth 3 (three) times daily as needed.    Lactobacillus rhamnosus GG (CULTURELLE) 10 billion cell capsule Take 1 capsule by mouth once daily.    levalbuterol (XOPENEX) 0.63 mg/3 mL nebulizer solution Take 3 mLs (0.63 mg total) by nebulization every 4 (four) hours as needed for Wheezing or Shortness of Breath. Rescue    MELATONIN ORAL Take 3 mg by mouth nightly as needed (sleep).    metoprolol tartrate (LOPRESSOR) 25 MG tablet Take 12.5 mg by mouth 2 (two) times daily.    multivitamin (THERAGRAN) per tablet Take 1 tablet by mouth once daily.    pantoprazole (PROTONIX) 40 MG tablet Take 40 mg by mouth every morning.    polyethylene glycol (GLYCOLAX) 17 gram/dose powder Take 17 g by mouth daily as needed (CONSTIPATION).    potassium chloride (MICRO-K) 10 MEQ CpSR Take 10 mEq by mouth once daily.    tetrahydrozoline 0.05% (VISION CLEAR) 0.05 % Drop PLACE 1 DROP INTO AFFECTED EYE(S) AS NEEDED FOR REDNESS OR ITCHING    tiotropium bromide (SPIRIVA RESPIMAT) 2.5 mcg/actuation inhaler Inhale 2 puffs into the lungs Daily. Controller    triamcinolone (NASACORT) 55 mcg nasal inhaler 2 sprays by Nasal route once daily.    [DISCONTINUED] albuterol (PROAIR HFA) 90 mcg/actuation inhaler Inhale 1 puff into the lungs every 6 (six) hours as needed for Wheezing or Shortness of Breath. Rescue (Patient not taking: Reported on 3/16/2022)    [DISCONTINUED] baclofen (LIORESAL) 10 MG tablet TK 1 T PO TID    [DISCONTINUED] bumetanide (BUMEX) 1 MG tablet Take 1 tablet (1 mg total) by mouth 2 (two) times daily.     Family History       Problem Relation (Age of Onset)    Hypertension Mother          Tobacco Use    Smoking status: Former Smoker     Packs/day: 2.00     Years: 20.00     Pack years: 40.00     Types: Cigarettes     Quit date: 1980     Years since quittin.3    Smokeless tobacco: Never Used   Substance and  Sexual Activity    Alcohol use: Yes     Alcohol/week: 2.0 standard drinks     Types: 2 Glasses of wine per week     Comment: social    Drug use: No    Sexual activity: Not Currently     Review of Systems   Constitutional:  Positive for fatigue. Negative for chills, diaphoresis and fever.   HENT:  Negative for rhinorrhea, sneezing and sore throat.         [place]   Eyes:  Negative for photophobia and visual disturbance.   Respiratory:  Positive for shortness of breath. Negative for cough and wheezing.    Cardiovascular:  Negative for chest pain and leg swelling.   Gastrointestinal:  Negative for abdominal pain, diarrhea, nausea and vomiting.   Genitourinary:  Negative for dysuria, flank pain and hematuria.   Musculoskeletal:  Positive for gait problem and myalgias. Negative for back pain and neck pain.   Skin:  Positive for color change. Negative for pallor and rash.   Neurological:  Positive for weakness. Negative for dizziness, syncope, light-headedness and headaches.   Hematological:  Bruises/bleeds easily.   Psychiatric/Behavioral:  Negative for agitation and sleep disturbance. The patient is not nervous/anxious.    Objective:     Vital Signs (Most Recent):  Temp: 98.2 °F (36.8 °C) (05/26/22 1357)  Pulse: 84 (05/26/22 1849)  Resp: 18 (05/26/22 1357)  BP: 130/62 (05/26/22 1849)  SpO2: 95 % (05/26/22 1645) Vital Signs (24h Range):  Temp:  [98.2 °F (36.8 °C)] 98.2 °F (36.8 °C)  Pulse:  [74-84] 84  Resp:  [18] 18  SpO2:  [95 %-96 %] 95 %  BP: (122-146)/(62-68) 130/62     Weight: 40.4 kg (89 lb 1.1 oz)  Body mass index is 17.39 kg/m².    Physical Exam  Vitals and nursing note reviewed.   Constitutional:       General: She is not in acute distress.     Appearance: She is underweight. She is not toxic-appearing or diaphoretic.      Comments: Chronically ill appearing   HENT:      Head: Normocephalic and atraumatic.      Ears:      Comments: [place]     Nose: Nose normal.      Mouth/Throat:      Mouth: Mucous membranes are  moist.   Eyes:      Pupils: Pupils are equal, round, and reactive to light.   Cardiovascular:      Rate and Rhythm: Normal rate and regular rhythm.   Pulmonary:      Effort: No tachypnea or respiratory distress.      Breath sounds: Examination of the right-lower field reveals decreased breath sounds and rales. Examination of the left-lower field reveals decreased breath sounds and rales. Decreased breath sounds and rales present. No wheezing or rhonchi.   Abdominal:      General: Bowel sounds are normal. There is no distension.      Palpations: Abdomen is soft.      Tenderness: There is no abdominal tenderness. There is no guarding.   Genitourinary:     Comments: deferred  Musculoskeletal:         General: No deformity. Normal range of motion.      Left forearm: Swelling (mild) and tenderness (mild TTP) present. No deformity.      Cervical back: Normal range of motion. No tenderness.      Right lower leg: No edema.      Left lower leg: No edema.   Skin:     General: Skin is warm.      Capillary Refill: Capillary refill takes 2 to 3 seconds.      Findings: Bruising and erythema present.   Neurological:      Mental Status: She is alert and oriented to person, place, and time.      Cranial Nerves: No dysarthria or facial asymmetry.      Motor: Weakness (generalized) present.      Comments: Patient alert and oriented x3, noted to have intermittent confusion during my exam. Following commands and moving all 4 extremities.   Psychiatric:         Attention and Perception: Attention normal. She does not perceive auditory or visual hallucinations.         Speech: Speech normal.         Behavior: Behavior is cooperative.         CRANIAL NERVES     CN III, IV, VI   Pupils are equal, round, and reactive to light.     Significant Labs: All pertinent labs within the past 24 hours have been reviewed.  CBC:   Recent Labs   Lab 05/25/22  0934 05/26/22  1500   WBC 10.97 10.10   HGB 11.7* 12.7   HCT 36.9* 40.7   * 178     CMP:    Recent Labs   Lab 05/25/22  0439 05/26/22  1500   * 140   K 4.6 4.1   CL 96 93*   CO2 28 38*   GLU 84 93   BUN 23 20   CREATININE 0.9 0.8   CALCIUM 9.1 10.2   PROT  --  6.7   ALBUMIN  --  2.8*   BILITOT  --  0.6   ALKPHOS  --  125   AST  --  26   ALT  --  16   ANIONGAP 9 9   EGFRNONAA 55.3* >60.0     Urine Studies:   Recent Labs   Lab 05/26/22  1513   COLORU Yellow   APPEARANCEUA Clear   PHUR 6.0   SPECGRAV 1.005   PROTEINUA Negative   GLUCUA Negative   KETONESU Negative   BILIRUBINUA Negative   OCCULTUA Negative   NITRITE Negative   LEUKOCYTESUR Negative       Significant Imaging: I have reviewed all pertinent imaging results/findings within the past 24 hours.    Imaging Results              X-Ray Chest AP Portable (Final result)  Result time 05/26/22 15:07:55      Final result by Atif Caldera III, MD (05/26/22 15:07:55)                   Impression:      Worsening pulmonary edema pneumonia aspiration or sepsis.      Electronically signed by: Atif Caldera MD  Date:    05/26/2022  Time:    15:07               Narrative:    EXAMINATION:  XR CHEST AP PORTABLE    CLINICAL HISTORY:  Other fatigue    FINDINGS:  Chest one view portable.    Comparison is 05/22/2022.    There is aortic plaque.  There is a pacer.  There is cardiomegaly.  There is mild-moderate perihilar and lower lobe edema small pleural effusions slightly worse from the prior study.                                        Assessment/Plan:     * Debility  Acute/chronic due to recent surgery/illnesses/prolonged hospitalization and nutritional status   - high risk of fall / injury utilize all safety measures and fall precautions   -PT/OT ordered  -Social work consult for discharge planning.    Left arm swelling  Family reporting significant bruising as well as erythema to left forearm with mild swelling. Family reports she had IV in this area.  -US LUE  -Elevate LUE    Body mass index (BMI) less than 19  Nutrition consulted. Body mass index is  17.39 kg/m².. Encourage maximal PO intake. Diet supplementation ordered per nutrition approval. Will encourage PO and monitor closely for weight changes.    COPD (chronic obstructive pulmonary disease)  - Currently on home oxygen settings  - No concern for COPD exacerbation at this time  - Continue home inhaler regimen  - continue levoalbuterol q6h while awake  - PRN duonebs q4h for sob    Acute metabolic encephalopathy  -Patients family reporting possible hallucinations yesterday in the hospital. At the time of my exam patient is alert and oriented x3. She does have intermittent confusion but was easily reoriented.  -Will monitor neuro status carefully, avoid narcotics and benzos that will exacerbate agitation, and use PRN anti-psychotics for controls of behavior for self harm.  -Delirium precautions.    Pacemaker  -No acute issues.     Elevated troponin  - trop 0.0.96, appears to be at baseline.  - no chest pain     Coronary artery disease  Patient with known CAD, which is controlled Will continue ASA and monitor for S/Sx of angina/ACS. Continue to monitor on telemetry.     Chronic respiratory failure with hypoxia and hypercapnia  Patient with Hypercapnic and Hypoxic Respiratory failure which is Chronic.  she is on home oxygen at 2.5 LPM. Supplemental oxygen was provided and noted-  .  Contributing diagnoses includes - CHF and COPD Labs and images were reviewed. Patient Has not had a recent ABG.     Anxiety  -Chronic, controlled.  -Continue PRN hydroxyzine     Stage 3 chronic kidney disease  Creatine stable for now. BMP reviewed- noted Estimated Creatinine Clearance: 28 mL/min (based on SCr of 0.8 mg/dL). according to latest data. Monitor UOP and serial BMP and adjust therapy as needed. Renally dose meds.    Acute on chronic diastolic heart failure  Patient is identified as having Diastolic (HFpEF) heart failure that is Acute on Chronic. CHF is currently uncontrolled due to Rales/crackles on pulmonary exam and  Pulmonary edema/pleural effusion on CXR. Latest ECHO performed and demonstrates- Results for orders placed during the hospital encounter of 03/12/22    Echo    Interpretation Summary  · The left ventricle is normal in size with normal systolic function.  · Moderate to severe left atrial enlargement.  · Asymmetric septal hypertrophy as noted in 2019, septum measuring 1.5cm (unchanged from prior measurement, unable to view images) without obstruction  · Grade II left ventricular diastolic dysfunction.  · The estimated ejection fraction is 60%.  · Intermediate central venous pressure (8 mmHg).  · The estimated PA systolic pressure is 45 mmHg.  · There is pulmonary hypertension.  · Mild right ventricular enlargement with mildly reduced right ventricular systolic function.  · Mild tricuspid regurgitation.  · Mild mitral regurgitation.  . Continue Beta Blocker Furosemide and monitor clinical status closely. Monitor on telemetry. Patient is off CHF pathway.  Monitor strict Is&Os and daily weights.  Place on fluid restriction of 1.5 L. Continue to stress to patient importance of self efficacy and  on diet for CHF. Last BNP reviewed- and noted below   Recent Labs   Lab 05/26/22 1858   BNP 3,022*   -Previously dry weight 83lbs. Currently 89lbs.  -Given 20mg IV lasix in the ED.  -Continue 40mg lasix PO tomorrow and reassess fluid status.    Hypertension  -Chronic issue, appears controlled.  -Continue lopressor 12.5 mg BID.    VTE Risk Mitigation (From admission, onward)         Ordered     IP VTE HIGH RISK PATIENT  Once         05/26/22 1846     Place sequential compression device  Until discontinued         05/26/22 1846                   Fanta Guillaume NP  Department of Hospital Medicine   [facility] - Emergency Dept

## 2022-05-27 PROBLEM — R41.0 DELIRIUM: Status: ACTIVE | Noted: 2022-05-27

## 2022-05-27 PROBLEM — I82.612: Status: ACTIVE | Noted: 2022-05-27

## 2022-05-27 LAB
AMMONIA PLAS-SCNC: 17 UMOL/L (ref 10–50)
ANION GAP SERPL CALC-SCNC: 11 MMOL/L (ref 8–16)
BASOPHILS # BLD AUTO: 0.06 K/UL (ref 0–0.2)
BASOPHILS NFR BLD: 0.5 % (ref 0–1.9)
BUN SERPL-MCNC: 18 MG/DL (ref 10–30)
CALCIUM SERPL-MCNC: 9.9 MG/DL (ref 8.7–10.5)
CHLORIDE SERPL-SCNC: 94 MMOL/L (ref 95–110)
CO2 SERPL-SCNC: 35 MMOL/L (ref 23–29)
CREAT SERPL-MCNC: 0.8 MG/DL (ref 0.5–1.4)
DIFFERENTIAL METHOD: ABNORMAL
EOSINOPHIL # BLD AUTO: 0.4 K/UL (ref 0–0.5)
EOSINOPHIL NFR BLD: 3.7 % (ref 0–8)
ERYTHROCYTE [DISTWIDTH] IN BLOOD BY AUTOMATED COUNT: 15.6 % (ref 11.5–14.5)
EST. GFR  (AFRICAN AMERICAN): >60 ML/MIN/1.73 M^2
EST. GFR  (NON AFRICAN AMERICAN): >60 ML/MIN/1.73 M^2
GLUCOSE SERPL-MCNC: 90 MG/DL (ref 70–110)
HCT VFR BLD AUTO: 37.3 % (ref 37–48.5)
HGB BLD-MCNC: 11.7 G/DL (ref 12–16)
IMM GRANULOCYTES # BLD AUTO: 0.07 K/UL (ref 0–0.04)
IMM GRANULOCYTES NFR BLD AUTO: 0.6 % (ref 0–0.5)
LYMPHOCYTES # BLD AUTO: 1.1 K/UL (ref 1–4.8)
LYMPHOCYTES NFR BLD: 10 % (ref 18–48)
MAGNESIUM SERPL-MCNC: 2.3 MG/DL (ref 1.6–2.6)
MCH RBC QN AUTO: 31.5 PG (ref 27–31)
MCHC RBC AUTO-ENTMCNC: 31.4 G/DL (ref 32–36)
MCV RBC AUTO: 101 FL (ref 82–98)
MONOCYTES # BLD AUTO: 1 K/UL (ref 0.3–1)
MONOCYTES NFR BLD: 8.8 % (ref 4–15)
NEUTROPHILS # BLD AUTO: 8.4 K/UL (ref 1.8–7.7)
NEUTROPHILS NFR BLD: 76.4 % (ref 38–73)
NRBC BLD-RTO: 0 /100 WBC
PLATELET # BLD AUTO: 181 K/UL (ref 150–450)
PMV BLD AUTO: 10.1 FL (ref 9.2–12.9)
POTASSIUM SERPL-SCNC: 3.8 MMOL/L (ref 3.5–5.1)
RBC # BLD AUTO: 3.71 M/UL (ref 4–5.4)
SODIUM SERPL-SCNC: 140 MMOL/L (ref 136–145)
WBC # BLD AUTO: 10.98 K/UL (ref 3.9–12.7)

## 2022-05-27 PROCEDURE — 97166 OT EVAL MOD COMPLEX 45 MIN: CPT

## 2022-05-27 PROCEDURE — 97162 PT EVAL MOD COMPLEX 30 MIN: CPT

## 2022-05-27 PROCEDURE — 63600175 PHARM REV CODE 636 W HCPCS: Performed by: NURSE PRACTITIONER

## 2022-05-27 PROCEDURE — 99900035 HC TECH TIME PER 15 MIN (STAT)

## 2022-05-27 PROCEDURE — 97530 THERAPEUTIC ACTIVITIES: CPT

## 2022-05-27 PROCEDURE — 85025 COMPLETE CBC W/AUTO DIFF WBC: CPT | Performed by: NURSE PRACTITIONER

## 2022-05-27 PROCEDURE — 96374 THER/PROPH/DIAG INJ IV PUSH: CPT

## 2022-05-27 PROCEDURE — 82140 ASSAY OF AMMONIA: CPT | Performed by: INTERNAL MEDICINE

## 2022-05-27 PROCEDURE — 94761 N-INVAS EAR/PLS OXIMETRY MLT: CPT

## 2022-05-27 PROCEDURE — 80048 BASIC METABOLIC PNL TOTAL CA: CPT | Performed by: NURSE PRACTITIONER

## 2022-05-27 PROCEDURE — 25000242 PHARM REV CODE 250 ALT 637 W/ HCPCS: Performed by: NURSE PRACTITIONER

## 2022-05-27 PROCEDURE — 83735 ASSAY OF MAGNESIUM: CPT | Performed by: NURSE PRACTITIONER

## 2022-05-27 PROCEDURE — G0378 HOSPITAL OBSERVATION PER HR: HCPCS

## 2022-05-27 PROCEDURE — 25000003 PHARM REV CODE 250: Performed by: NURSE PRACTITIONER

## 2022-05-27 PROCEDURE — 99226 PR SUBSEQUENT OBSERVATION CARE,LEVEL III: ICD-10-PCS | Mod: ,,, | Performed by: PHYSICIAN ASSISTANT

## 2022-05-27 PROCEDURE — 27000221 HC OXYGEN, UP TO 24 HOURS

## 2022-05-27 PROCEDURE — 36415 COLL VENOUS BLD VENIPUNCTURE: CPT | Performed by: NURSE PRACTITIONER

## 2022-05-27 PROCEDURE — 87040 BLOOD CULTURE FOR BACTERIA: CPT | Performed by: PHYSICIAN ASSISTANT

## 2022-05-27 PROCEDURE — 99226 PR SUBSEQUENT OBSERVATION CARE,LEVEL III: CPT | Mod: ,,, | Performed by: PHYSICIAN ASSISTANT

## 2022-05-27 PROCEDURE — 97535 SELF CARE MNGMENT TRAINING: CPT

## 2022-05-27 PROCEDURE — 94640 AIRWAY INHALATION TREATMENT: CPT | Mod: XB

## 2022-05-27 PROCEDURE — 92610 EVALUATE SWALLOWING FUNCTION: CPT

## 2022-05-27 RX ORDER — FUROSEMIDE 10 MG/ML
40 INJECTION INTRAMUSCULAR; INTRAVENOUS
Status: DISCONTINUED | OUTPATIENT
Start: 2022-05-27 | End: 2022-05-27

## 2022-05-27 RX ORDER — BALSAM PERU/CASTOR OIL
OINTMENT (GRAM) TOPICAL 2 TIMES DAILY
Status: DISCONTINUED | OUTPATIENT
Start: 2022-05-27 | End: 2022-05-30 | Stop reason: HOSPADM

## 2022-05-27 RX ORDER — FUROSEMIDE 10 MG/ML
40 INJECTION INTRAMUSCULAR; INTRAVENOUS DAILY
Status: DISCONTINUED | OUTPATIENT
Start: 2022-05-28 | End: 2022-05-29

## 2022-05-27 RX ADMIN — GUAIFENESIN 600 MG: 600 TABLET, EXTENDED RELEASE ORAL at 09:05

## 2022-05-27 RX ADMIN — FUROSEMIDE 20 MG: 10 INJECTION, SOLUTION INTRAMUSCULAR; INTRAVENOUS at 01:05

## 2022-05-27 RX ADMIN — ASPIRIN 81 MG CHEWABLE TABLET 81 MG: 81 TABLET CHEWABLE at 09:05

## 2022-05-27 RX ADMIN — METOPROLOL TARTRATE 12.5 MG: 25 TABLET, FILM COATED ORAL at 09:05

## 2022-05-27 RX ADMIN — PANTOPRAZOLE SODIUM 40 MG: 40 TABLET, DELAYED RELEASE ORAL at 06:05

## 2022-05-27 RX ADMIN — Medication 6 MG: at 01:05

## 2022-05-27 RX ADMIN — Medication 1 CAPSULE: at 09:05

## 2022-05-27 RX ADMIN — LEVALBUTEROL HYDROCHLORIDE 0.63 MG: 0.63 SOLUTION RESPIRATORY (INHALATION) at 12:05

## 2022-05-27 RX ADMIN — FERROUS SULFATE TAB 325 MG (65 MG ELEMENTAL FE) 1 EACH: 325 (65 FE) TAB at 09:05

## 2022-05-27 RX ADMIN — FLUTICASONE FUROATE AND VILANTEROL TRIFENATATE 1 PUFF: 100; 25 POWDER RESPIRATORY (INHALATION) at 07:05

## 2022-05-27 RX ADMIN — TIOTROPIUM BROMIDE INHALATION SPRAY 2 PUFF: 3.12 SPRAY, METERED RESPIRATORY (INHALATION) at 12:05

## 2022-05-27 RX ADMIN — THERA TABS 1 TABLET: TAB at 09:05

## 2022-05-27 RX ADMIN — LEVALBUTEROL HYDROCHLORIDE 0.63 MG: 0.63 SOLUTION RESPIRATORY (INHALATION) at 07:05

## 2022-05-27 RX ADMIN — Medication 6 MG: at 09:05

## 2022-05-27 NOTE — ASSESSMENT & PLAN NOTE
Nutrition consulted. Body mass index is 17.39 kg/m².. Encourage maximal PO intake. Diet supplementation ordered per nutrition approval. Will encourage PO and monitor closely for weight changes.

## 2022-05-27 NOTE — ASSESSMENT & PLAN NOTE
Thrombus of the Superficial Vein of left arm  Family reporting significant bruising as well as erythema to left forearm with mild swelling. Family reports she had IV in this area.  -US LUE with occlusive or nearly occlusive left basilic vein thrombus (superficial vein)  -Elevate LUE, will hold off on NSAIDs for now given low CrCl  -continue to monitor

## 2022-05-27 NOTE — ASSESSMENT & PLAN NOTE
Family reporting significant bruising as well as erythema to left forearm with mild swelling. Family reports she had IV in this area.  -US LUE  -Elevate LUE

## 2022-05-27 NOTE — PT/OT/SLP EVAL
"Occupational Therapy   Co-Evaluation  Co-treatment performed due to patient's multiple deficits requiring two skilled therapists to appropriately and safely assess patient's strength and endurance while facilitating functional tasks in addition to accommodating for patient's activity tolerance.    Name: Venus Mayer  MRN: [id number]  Admitting Diagnosis:  Debility  Recent Surgery: * No surgery found *      Recommendations:     Discharge Recommendations: nursing facility, skilled  Discharge Equipment Recommendations:   (TBD)  Barriers to discharge:  None    Assessment:     Venus Mayer is a 89 y.o. female with a medical diagnosis of Debility. Performance deficits affecting function: weakness, impaired functional mobilty, impaired cognition, decreased safety awareness, impaired endurance, gait instability, impaired balance, impaired self care skills, impaired cardiopulmonary response to activity, decreased upper extremity function.      Pt agreeable to eval and tolerated fairly well. Pt was limited on this date with functional mobility & tf's due to fear of falling and impaired cognition requiring max encouragement from PT/OT. She required Max (A) for bed mobility and Max (A) x2 people for functional tf's EOB>bedside chair. Pt is currently functioning below her baseline in regards to both physically and cognitively per pt's yang's reports. Once medically stable, OT recommending SNF  in order to maximize independence with functional activities, reduce caregiver burden, and facilitate safe discharge.    Rehab Prognosis: Good; patient would benefit from acute skilled OT services to address these deficits and reach maximum level of function.       Plan:     Patient to be seen 3 x/week to address the above listed problems via self-care/home management, therapeutic activities, therapeutic exercises  · Plan of Care Expires: 06/26/22  · Plan of Care Reviewed with: patient, daughter    Subjective   "They won't let me wear " "pants"  Chief Complaint: to go home  Patient/Family Comments/goals: Increase (I) with ADLs and mobility    Occupational Profile:  Living Environment: Pt lives alone in a H with 4 ALEKSANDR and BHR (unable to reach simultaneously). She has a tub/shower combo with grab bars.   Previous level of function: PTA pt required (A) for ADLs and mobility. She utilized a RW within the home environment and w/c in the community. Pt has a sitter that comes 6 days/wk 10-5PM and her children alternate nights to provide 24/7 care.  Equipment Used at Home:  bath bench, bedside commode, raised toilet, walker, rolling, wheelchair, grab bar, oxygen  Assistance upon Discharge: family and sitter    Pain/Comfort:  · Pain Rating 1: 0/10  · Pain Rating Post-Intervention 1: 0/10    Patients cultural, spiritual, Jain conflicts given the current situation: no    Objective:     Communicated with: RN prior to session.  Patient found HOB elevated with pulse ox (continuous), PureWick, oxygen upon OT entry to room.    General Precautions: Standard, fall   Orthopedic Precautions:N/A   Braces: N/A  Respiratory Status: Nasal cannula, flow 3 L/min    Occupational Performance:    Bed Mobility:    · Patient completed Scooting/Bridging with maximal assistance  · Patient completed Supine to Sit with maximal assistance  · HOB elevated  · Increased time and effort    Functional Mobility/Transfers:  · Patient completed Sit <> Stand Transfer x2 trials from EOB with maximal assistance x2 people  with  no assistive device and hand-held assist   · Pt performed stand>sit on bedside chair with Max (A) x2 people and HHA  · Patient completed Bed <> Chair Transfer using Step Transfer technique with ~4 small steps to the right with maximal assistance x2 people with no assistive device and hand-held assist  · vc's t/o for initiation and sequencing     Activities of Daily Living:  · Grooming: stand by assistance to wash face with wash cloth  · Lower Body Dressing: total " assistance to don slippers    Cognitive/Visual Perceptual:  Cognitive/Psychosocial Skills:     -       Oriented to: Person   -       Follows Commands/attention:Easily distracted and Follows one-step commands  -       Safety awareness/insight to disability: impaired   -       Mood/Affect/Coping skills/emotional control: Anxious    Physical Exam:  Balance:    -       SBA static sitting balance EOB with external support required BUE. However, she required Min (A) intermittently due to posterior leaning   Tolerated sitting EOB for ~12 minutes  Upper Extremity Range of Motion:     -       Right Upper Extremity: WFL except shoulder  -       Left Upper Extremity: WFL except shoulder  Upper Extremity Strength:    -       Right Upper Extremity: 3+/5  -       Left Upper Extremity: 3+/5    AMPAC 6 Click ADL:  AMPAC Total Score: 14    Treatment & Education:  · Education on OT HH vs SNF  · Provided education regarding role of OT, POC, & discharge recommendations with pt & yang verbalizing understanding.  Pt had no further questions & when asked whether there were any concerns pt reported none.  Education:    Patient left up in chair with all lines intact, call button in reach and yang present    GOALS:   Multidisciplinary Problems     Occupational Therapy Goals        Problem: Occupational Therapy    Goal Priority Disciplines Outcome Interventions   Occupational Therapy Goal     OT, PT/OT Ongoing, Progressing    Description: Goals to be met by: 6/16/2022     Patient will increase functional independence with ADLs by performing:    Feeding with Set-up Assistance.  UE Dressing with Minimal Assistance.  LE Dressing with Moderate Assistance.  Grooming while seated with Set-up Assistance.  Toileting from toilet/bedside commode with Moderate Assistance for hygiene and clothing management.   Toilet transfer to toilet/bedside commode with Moderate Assistance and LRAD.                     History:     Past Medical History:   Diagnosis Date     Acute on chronic congestive heart failure 7/6/2019    Cardiomyopathy     Carotid artery occlusion     CHF (congestive heart failure)     COPD (chronic obstructive pulmonary disease)     Coronary artery disease     Hyperlipidemia     Hypertension        Past Surgical History:   Procedure Laterality Date    CARDIAC CATHETERIZATION      cardic cath      CAROTID ENDARTERECTOMY      FLEXIBLE BRONCHOSCOPY N/A 7/31/2019    Procedure: BRONCHOSCOPY, FIBEROPTIC Flexible;  Surgeon: Klever Mckeon MD;  Location: [facility] OR [address] [place] [address];  Service: Thoracic;  Laterality: N/A;    HIP SURGERY      PLEURODESIS USING TALC N/A 7/31/2019    Procedure: PLEURODESIS;  Surgeon: Klever Mckeon MD;  Location: [facility] OR [address] [place] [address];  Service: Thoracic;  Laterality: N/A;    PLEURODESIS USING TALC Right 8/5/2019    Procedure: PLEURODESIS, USING TALC;  Surgeon: Klever Mckeon MD;  Location: [facility] OR [address] [place] [address];  Service: Thoracic;  Laterality: Right;    PLEURODESIS WITH VIDEO-ASSISTED THORACOSCOPIC SURGERY (VATS) Right 8/5/2019    Procedure: VATS, WITH PLEURAL TENT;  Surgeon: Klever Mckeon MD;  Location: [address];  Service: Thoracic;  Laterality: Right;       Time Tracking:     OT Date of Treatment: 05/27/22  OT Start Time: 0858  OT Stop Time: 0927  OT Total Time (min): 29 min    Billable Minutes:Evaluation 15  Self Care/Home Management 14    5/27/2022

## 2022-05-27 NOTE — ASSESSMENT & PLAN NOTE
Creatine stable for now. BMP reviewed- noted Estimated Creatinine Clearance: 28 mL/min (based on SCr of 0.8 mg/dL). according to latest data. Monitor UOP and serial BMP and adjust therapy as needed. Renally dose meds.

## 2022-05-27 NOTE — PLAN OF CARE
[facility] - Telemetry Stepdown ([place]-[facility])  Initial Discharge Assessment       Primary Care Provider: Jona Che MD    Admission Diagnosis: Fatigue [R53.83]  Weakness [R53.1]  Diastolic dysfunction [I51.89]  Chest pain [R07.9]    Admission Date: 5/26/2022  Expected Discharge Date: 5/28/2022    Discharge Barriers Identified: None    Payor: HU[employer] MANAGED MEDICARE / Plan: HU[employer] MEDICARE HMO / Product Type: Capitation /     Extended Emergency Contact Information  Primary Emergency Contact: Bri Sam  Mobile Phone: [phone]  Relation: Daughter  Secondary Emergency Contact: RAJINDER FLYNN  Home Phone: [phone]  Mobile Phone: [phone]  Relation: Daughter  Preferred language: English   needed? No    Discharge Plan A: Home Health  Discharge Plan B: Skilled Nursing Facility      [id number] - [place] - [facility] [address] AT [facility] [address]  [facility] [place] [address]  Phone: [phone] Fax: [phone]      Initial Assessment (most recent)     Adult Discharge Assessment - 05/27/22 1525        Discharge Assessment    Assessment Type Discharge Planning Assessment     Confirmed/corrected address, phone number and insurance Yes     Confirmed Demographics Correct on Facesheet     Source of Information family     Reason For Admission Debility     Lives With alone;other (see comments)   sitters/siblings stay 24/7    Facility Arrived From: home     Do you expect to return to your current living situation? Yes     Do you have help at home or someone to help you manage your care at home? Yes     Who are your caregiver(s) and their phone number(s)? shahab Gregory [phone]     Prior to hospitilization cognitive status: Inappropriate Behavior     Current cognitive status: Inappropriate Behavior   hallucinations per family    Walking or Climbing Stairs Difficulty ambulation difficulty, requires equipment     Dressing/Bathing Difficulty  bathing difficulty, requires equipment     Equipment Currently Used at Home oxygen;walker, rolling;cane, straight;commode;raised toilet;wheelchair     Patient currently being followed by outpatient case management? No     Do you currently have service(s) that help you manage your care at home? Yes     Name and Contact number of agency [facility] HH     Is the pt/caregiver preference to resume services with current agency Yes     Who is going to help you get home at discharge? hospital transport     How do you get to doctors appointments? family or friend will provide     Are you on dialysis? No     Do you take coumadin? No     Discharge Plan A Home Health     Discharge Plan B Skilled Nursing Facility     DME Needed Upon Discharge  other (see comments)   TBD    Discharge Barriers Identified None               CM spoke with patient's daughter in [phone] for DISCHARGE PLANNING ASSESSMENT. Per daughter, pt lives alone in a single family home with 4 steps to porch and point of entry and 24/7 care provided by sitters and adult children.  Patient was not independent with ADLS and DID use DME or in-home assistive equipment (see above).  Pt is not on dialysis or Coumadin, takes medications as prescribed / keeps refilled / has resources for all daily and prescriptive needs.  [email or web address]-Agreeable to bedside delivery.  Will have help from children and other immediate family upon discharge.  All questions addressed.  Will continue to follow for course of hospitalization.    *Pt's family reports they will need hospital stretch transport for pt to home at time of d/c.  Pt's family does not wish to persue SNF recs at this time.  States the have sitters, HH, and are looking into 5 day per week therapy at home.      Eduarda Espinoza RN CM  [id number]  Case Management

## 2022-05-27 NOTE — PLAN OF CARE
Problem: Physical Therapy  Goal: Physical Therapy Goal  Description: Goals to be met by: 6/10/2022     Patient will increase functional independence with mobility by performin. Supine to sit with Stand-by Assistance  2. Sit to supine with Stand-by Assistance  3. Sit to stand transfer with Minimal Assistance  4. Bed to chair transfer with Minimal Assistance using LRAD  5. Gait  x 50 feet with Contact Guard Assistance using LRAD.   6. Stand for 5 minutes with Contact Guard Assistance using LRAD    Outcome: Ongoing, Progressing     PT evaluated pt and established goals for pt today.

## 2022-05-27 NOTE — ASSESSMENT & PLAN NOTE
Delirium  Acute/chronic due to recent surgery/illnesses/prolonged hospitalization and nutritional status  - high risk of fall / injury utilize all safety measures and fall precautions   -PT/OT ordered  -Social work consult for discharge planning.  - delirium precautions

## 2022-05-27 NOTE — ASSESSMENT & PLAN NOTE
Acute/chronic due to recent surgery/illnesses/prolonged hospitalization and nutritional status   - high risk of fall / injury utilize all safety measures and fall precautions   -PT/OT ordered  -Social work consult for discharge planning.

## 2022-05-27 NOTE — NURSING
Pt turned q 2 hours.2333 lying on right side, then at 0130 turned on back then at 0330 turned on left side, at 0523 pt turned on right side

## 2022-05-27 NOTE — PLAN OF CARE
Problem: Adult Inpatient Plan of Care  Goal: Plan of Care Review  Outcome: Ongoing, Progressing  Goal: Patient-Specific Goal (Individualized)  Outcome: Ongoing, Progressing  Goal: Absence of Hospital-Acquired Illness or Injury  Outcome: Ongoing, Progressing  Goal: Optimal Comfort and Wellbeing  Outcome: Ongoing, Progressing  Goal: Readiness for Transition of Care  Outcome: Ongoing, Progressing     Problem: Adjustment to Illness (Sepsis/Septic Shock)  Goal: Optimal Coping  Outcome: Ongoing, Progressing     Problem: Bleeding (Sepsis/Septic Shock)  Goal: Absence of Bleeding  Outcome: Ongoing, Progressing     Problem: Glycemic Control Impaired (Sepsis/Septic Shock)  Goal: Blood Glucose Level Within Desired Range  Outcome: Ongoing, Progressing     Problem: Infection Progression (Sepsis/Septic Shock)  Goal: Absence of Infection Signs and Symptoms  Outcome: Ongoing, Progressing     Problem: Nutrition Impaired (Sepsis/Septic Shock)  Goal: Optimal Nutrition Intake  Outcome: Ongoing, Progressing     Problem: Impaired Wound Healing  Goal: Optimal Wound Healing  Outcome: Ongoing, Progressing     Problem: Skin Injury Risk Increased  Goal: Skin Health and Integrity  Outcome: Ongoing, Progressing     Problem: Fall Injury Risk  Goal: Absence of Fall and Fall-Related Injury  Outcome: Ongoing, Progressing

## 2022-05-27 NOTE — HPI
"Vneus Mayer is a 89 y.o. female with a PMHx of cardiomyopathy, CHFpEF (60%), pacemaker in place, HLD, HTN, COPD on home 2.5L NC, and vascular dementia who presents to the ED from home for generalized weakness. She was discharged home from the hospital yesterday evening after being treated for UTI with 3 days of ertapenem. She was also diuresed with IV lasix. Daughter at bedside reports that patient is very weak, difficult to perform ADLs.  Per daughter yesterday while still in the hospital the patient started "hallucinating" - talking with her eyes closed, but that seems to have improved. She has had some intermittent confusion that has been ongoing since her last admission. Her daughter also reports some redness/bruising to her L arm close to previous IV site.  No falls since she has been home. At the time of my exam patient is alert and able to answer questions appropriately. The patient and family deny and fever, chills, nausea, vomiting, or diarrhea. The patient denies any chest pain, abdominal pain, or headache. She does note some shortness of breath.    In the ED, VSSAF. Patient stable on 2.5L O2 which she wears at home. CBC and CMP unremarkable. UA negative for infection. BNP and troponin pending. CXR with mild-moderate perihilar and lower lobe edema small pleural effusions slightly worse from the prior study. ED  talked with family and provided resources for sitters/in home care. Daughter called to try and set this up but many of the facilities have waiting lists. Family at bedside does not feel that they can properly care for the patient at home on their own due to her weakness. Patient will be placed in observation for further work up and evaluation.   "

## 2022-05-27 NOTE — ASSESSMENT & PLAN NOTE
Patient is identified as having Diastolic (HFpEF) heart failure that is Acute on Chronic. CHF is currently uncontrolled due to Rales/crackles on pulmonary exam and Pulmonary edema/pleural effusion on CXR. Latest ECHO performed and demonstrates- Results for orders placed during the hospital encounter of 03/12/22    Echo    Interpretation Summary  · The left ventricle is normal in size with normal systolic function.  · Moderate to severe left atrial enlargement.  · Asymmetric septal hypertrophy as noted in 2019, septum measuring 1.5cm (unchanged from prior measurement, unable to view images) without obstruction  · Grade II left ventricular diastolic dysfunction.  · The estimated ejection fraction is 60%.  · Intermediate central venous pressure (8 mmHg).  · The estimated PA systolic pressure is 45 mmHg.  · There is pulmonary hypertension.  · Mild right ventricular enlargement with mildly reduced right ventricular systolic function.  · Mild tricuspid regurgitation.  · Mild mitral regurgitation.  . Continue Beta Blocker Furosemide and monitor clinical status closely. Monitor on telemetry. Patient is off CHF pathway.  Monitor strict Is&Os and daily weights.  Place on fluid restriction of 1.5 L. Continue to stress to patient importance of self efficacy and  on diet for CHF. Last BNP reviewed- and noted below   Recent Labs   Lab 05/26/22  1858   BNP 3,022*   -Previously dry weight 83lbs. Currently 89lbs.  -Given 20mg IV lasix in the ED.  -Continue 40mg lasix PO tomorrow and reassess fluid status.

## 2022-05-27 NOTE — ASSESSMENT & PLAN NOTE
Patient with Hypercapnic and Hypoxic Respiratory failure which is Chronic.  she is on home oxygen at 2.5 LPM. Supplemental oxygen was provided and noted-  .  Contributing diagnoses includes - CHF and COPD Labs and images were reviewed. Patient Has not had a recent ABG.

## 2022-05-27 NOTE — ASSESSMENT & PLAN NOTE
Patient with known CAD, which is controlled Will continue ASA and monitor for S/Sx of angina/ACS. Continue to monitor on telemetry.

## 2022-05-27 NOTE — PLAN OF CARE
Recommendations  1. Liberalize diet to regular diet- encourage adequate po intake- fluid per MD   2. Add Boost Plus TID- any flavor  3. RD following     Goals: Meet % EEN/EPN by RD f/u date  Nutrition Goal Status: goal not met  Communication of RD Recs: other (POC)       By Mary ZHOU

## 2022-05-27 NOTE — CONSULTS
[facility] - Tele[facility] Stepdown ([place] [address])  Adult Nutrition  Consult Note    SUMMARY     Recommendations  1. Liberalize diet to regular diet- encourage adequate po intake- fluid per MD   2. Add Boost Plus TID- any flavor  3. RD following    Goals: Meet % EEN/EPN by RD f/u date  Nutrition Goal Status: goal not met  Communication of RD Recs: other (POC)    Assessment and Plan  Nutrition Problem:  Severe Protein-Calorie Malnutrition  Malnutrition in the context of Chronic Illness/Injury     Related to (etiology):  Decreased appetite     Signs and Symptoms (as evidenced by):  Energy Intake: <75% of estimated energy requirement for >6 month.  Body Fat Depletion: severe depletion of orbitals and triceps   Muscle Mass Depletion: severe depletion of temples, clavicle region, scapular region, interosseous muscle and lower extremities      Interventions(treatment strategy):  Collaboration of nutrition care with other providers.  Commercial beverage     Nutrition Diagnosis Status:  New       Malnutrition Assessment  Malnutrition Type: chronic illness  Energy Intake: severe energy intake                  Sidney Region (Muscle Loss): severe depletion  Clavicle Bone Region (Muscle Loss): severe depletion  Clavicle and Acromion Bone Region (Muscle Loss): severe depletion  Scapular Bone Region (Muscle Loss): severe depletion  Dorsal Hand (Muscle Loss): severe depletion  Patellar Region (Muscle Loss): severe depletion  Anterior Thigh Region (Muscle Loss): severe depletion  Posterior Calf Region (Muscle Loss): severe depletion                 Reason for Assessment    Reason For Assessment: consult  Diagnosis: cardiac disease  Relevant Medical History: HLD, HTN, COPD, CHF  Interdisciplinary Rounds: did not attend  General Information Comments: RD consulted for BMI of 19. Pt [place] and very disoriented during time of visit. Spoke w/ pt's caregiver during time of visit. Pt's caregiver reports that pt consumed bites of her food  this morning. Reports pt w/ poor appetite for about 6 months. Spoke w/ pt's other caregiver over the phone about pt incorporating a high calorie, high protein diet at home and monitoring sodium and fluid restriction. Pt's caregiver notes that she has been making pt drink Ensure or Boost at home for bksft when pt is not hungry. Reports that pt is more typically hungry during the nighttime. Heart failure education and high calorie, high protein education provided 5/27/22 during time of visit. Handouts provided, v/u understanding. NFPE completed 5/27, pt meets criteria for severe protein malnutrition at this time in the context of chronic illness at this time.  Nutrition Discharge Planning: adequate nutrition intake    Nutrition Risk Screen    Nutrition Risk Screen: dysphagia or difficulty swallowing    Nutrition/Diet History    Spiritual, Cultural Beliefs, Sikhism Practices, Values that Affect Care: no    Anthropometrics    Temp: 97.5 °F (36.4 °C)  Height Method: Stated  Height: 5' (152.4 cm)  Height (inches): 60 in  Weight Method: Bed Scale  Weight: 41 kg (90 lb 6.2 oz)  Weight (lb): 90.39 lb  Ideal Body Weight (IBW), Female: 100 lb  % Ideal Body Weight, Female (lb): 90.39 %  BMI (Calculated): 17.7       Lab/Procedures/Meds    Pertinent Labs Reviewed: reviewed  Pertinent Labs Comments: -  Pertinent Medications Reviewed: reviewed  Pertinent Medications Comments: lactobacillus rhamnosus, MCV    Estimated/Assessed Needs    Weight Used For Calorie Calculations: 41 kg (90 lb 6.2 oz)  Energy Calorie Requirements (kcal): 1230  Energy Need Method: Kcal/kg (30 kcal/kg)  Protein Requirements: 61 g (1.5 g/kg)  Weight Used For Protein Calculations: 41 kg (90 lb 6.2 oz)   RDA Method (mL): 1230       Nutrition Prescription Ordered    Current Diet Order: Cardiac diet    Evaluation of Received Nutrient/Fluid Intake    I/O: +100 ml  Energy Calories Required: not meeting needs  Protein Required: not meeting needs  Fluid Required:  not meeting needs  Total Fluid Intake (mL/kg): 1 ml or fluid per MD  Tolerance: tolerating  % Intake of Estimated Energy Needs: 25-75%   Meal Intake: 25-75%    Nutrition Risk    Level of Risk/Frequency of Follow-up: low       Monitor and Evaluation    Food and Nutrient Intake: energy intake, food and beverage intake  Food and Nutrient Adminstration: diet order  Knowledge/Beliefs/Attitudes: food and nutrition knowledge/skill, beliefs and attitudes  Physical Activity and Function: nutrition-related ADLs and IADLs, factors affecting access to physical activity  Anthropometric Measurements: height/length, weight, weight change, body mass index, growth pattern indices/percentile ranks  Biochemical Data, Medical Tests and Procedures: electrolyte and renal panel, gastrointestinal profile, glucose/endocrine profile, inflammatory profile, lipid profile  Nutrition-Focused Physical Findings: overall appearance, extremities, muscles and bones, head and eyes, skin       Nutrition Follow-Up    RD Follow-up?: Yes   By Mary ZHOU

## 2022-05-27 NOTE — PT/OT/SLP EVAL
Speech Language Pathology Evaluation  Bedside Swallow    Patient Name:  Venus Mayer   MRN:  [id number]  Admitting Diagnosis: Debility    Recommendations:                 General Recommendations:  Dysphagia therapy  Diet recommendations:  Mechanical soft, Thin   Aspiration Precautions: 1 bite/sip at a time, Feed only when awake/alert, HOB to 90 degrees, Small bites/sips and Standard aspiration precautions   General Precautions: Standard, fall  Communication strategies:pt [place]    History:     Past Medical History:   Diagnosis Date    Acute on chronic congestive heart failure 7/6/2019    Cardiomyopathy     Carotid artery occlusion     CHF (congestive heart failure)     COPD (chronic obstructive pulmonary disease)     Coronary artery disease     Hyperlipidemia     Hypertension        Past Surgical History:   Procedure Laterality Date    CARDIAC CATHETERIZATION      cardic cath      CAROTID ENDARTERECTOMY      FLEXIBLE BRONCHOSCOPY N/A 7/31/2019    Procedure: BRONCHOSCOPY, FIBEROPTIC Flexible;  Surgeon: Klever Mckeon MD;  Location: [facility] OR [address] [place] [address];  Service: Thoracic;  Laterality: N/A;    HIP SURGERY      PLEURODESIS USING TALC N/A 7/31/2019    Procedure: PLEURODESIS;  Surgeon: Klever Mckeon MD;  [facility];  Service: Thoracic;  Laterality: N/A;    PLEURODESIS USING TALC Right 8/5/2019    Procedure: PLEURODESIS, USING TALC;  Surgeon: Klever Mckeon MD;  [facility];  Service: Thoracic;  Laterality: Right;    PLEURODESIS WITH VIDEO-ASSISTED THORACOSCOPIC SURGERY (VATS) Right 8/5/2019    Procedure: VATS, WITH PLEURAL TENT;  Surgeon: Klever Mckeon MD;  Location: [facility] OR [address] [place] [address];  Service: Thoracic;  Laterality: Right;       Social History: Patient lives with family.    Prior diet: mechanical soft /thin.      Subjective     Awake/alert    Pain/Comfort:  · Pain Rating 1: 0/10  · Pain Rating Post-Intervention 1: 0/10    Respiratory Status: nasal  cannula    Objective:     Oral Musculature Evaluation  · Oral Musculature: WFL  · Dentition: scattered dentition  · Oral Labial Strength and Mobility: WFL  · Lingual Strength and Mobility: WFL  · Voice Prior to PO Intake: clear    Bedside Swallow Eval:   Consistencies Assessed:  · Thin liquids x6 oz   · Solids x2     Oral Phase:   · Pt with difficulty biting cracker.    · Prolonged mastication of cracker  · Adequate oral clearance    Pharyngeal Phase:   · Coughing/choking x1 post multiple cracker bites. Per daughter, pt eats mechanical soft at baseline  · no overt clinical signs/symptoms of aspiration with thin via cup/straw and isolated bites of cracker      Assessment:     Venus Mayer is a 89 y.o. female with an SLP diagnosis of Dysphagia.     Goals:   Multidisciplinary Problems     SLP Goals        Problem: SLP    Goal Priority Disciplines Outcome   SLP Goal     SLP    Description: Speech Language Pathology Goals  Goals expected to be met by 6/3    1. Pt will participate in ongoing swallow assessment                           Plan:     · Patient to be seen:  3 x/week   · Plan of Care reviewed with:  patient, daughter   · SLP Follow-Up:  Yes       Discharge recommendations:  nursing facility, skilled       Time Tracking:     SLP Treatment Date:   05/27/22  Speech Start Time:  0918  Speech Stop Time:  0927     Speech Total Time (min):  9 min    Billable Minutes: Eval Swallow and Oral Function 9    05/27/2022

## 2022-05-27 NOTE — ASSESSMENT & PLAN NOTE
Patient is identified as having Diastolic (HFpEF) heart failure that is Acute on Chronic. CHF is currently uncontrolled due to Rales/crackles on pulmonary exam and Pulmonary edema/pleural effusion on CXR. Latest ECHO performed and demonstrates- Results for orders placed during the hospital encounter of 03/12/22    Echo    Interpretation Summary  · The left ventricle is normal in size with normal systolic function.  · Moderate to severe left atrial enlargement.  · Asymmetric septal hypertrophy as noted in 2019, septum measuring 1.5cm (unchanged from prior measurement, unable to view images) without obstruction  · Grade II left ventricular diastolic dysfunction.  · The estimated ejection fraction is 60%.  · Intermediate central venous pressure (8 mmHg).  · The estimated PA systolic pressure is 45 mmHg.  · There is pulmonary hypertension.  · Mild right ventricular enlargement with mildly reduced right ventricular systolic function.  · Mild tricuspid regurgitation.  · Mild mitral regurgitation.  . Continue Beta Blocker Furosemide and monitor clinical status closely. Monitor on telemetry. Patient is off CHF pathway.  Monitor strict Is&Os and daily weights.  Place on fluid restriction of 1.5 L. Continue to stress to patient importance of self efficacy and  on diet for CHF. Last BNP reviewed- and noted below   Recent Labs   Lab 05/26/22  1858   BNP 3,022*   -Previously dry weight 83lbs. Currently 89lbs.  -Given 20mg IV lasix in the ED.  - will give Lasix 40mg IV this AM them reassess fluid status

## 2022-05-27 NOTE — ASSESSMENT & PLAN NOTE
-Patients family reporting possible hallucinations yesterday in the hospital. At the time of my exam patient is alert and oriented x3. She does have intermittent confusion but was easily reoriented.  -Will monitor neuro status carefully, avoid narcotics and benzos that will exacerbate agitation, and use PRN anti-psychotics for controls of behavior for self harm.  -Delirium precautions.

## 2022-05-27 NOTE — PLAN OF CARE
Problem: Occupational Therapy  Goal: Occupational Therapy Goal  Description: Goals to be met by: 6/16/2022     Patient will increase functional independence with ADLs by performing:    Feeding with Set-up Assistance.  UE Dressing with Minimal Assistance.  LE Dressing with Moderate Assistance.  Grooming while seated with Set-up Assistance.  Toileting from toilet/bedside commode with Moderate Assistance for hygiene and clothing management.   Toilet transfer to toilet/bedside commode with Moderate Assistance and LRAD.    Outcome: Ongoing, Progressing   Goals set.

## 2022-05-27 NOTE — PLAN OF CARE
Bedside swallow study completed. Recommend mechanical soft diet/thin liquids at this time.   5/27/2022

## 2022-05-27 NOTE — PT/OT/SLP EVAL
Physical Therapy Co-Evaluation and Treatment with Occupational Therapy     Patient Name:  Venus Mayer   MRN:  [id number]  Admit Date: 5/26/2022  Admitting Diagnosis:  Debility  Recent Surgery: * No surgery found *    Length of Stay: 0 days    Recommendations:     Discharge Recommendations:  nursing facility, skilled   Discharge Equipment Recommendations:  (TBD)   Barriers to discharge: decline in functional mobility, decreased safety awareness, and high fall risk    Assessment:     Venus Mayer is a 89 y.o. female admitted to [facility] on 5/26/2022 with a medical diagnosis of Debility. She discharged from the hospital on Wednesday 5/25/2022 and was unable to walk from assistance from family.  Today, she required maximal assistance for bed mobility and and maximal assistance of 2 people for transfers. Patient was confused and expressed a fear of falling.  She presents with the following impairments/functional limitations: weakness, impaired endurance, impaired cognition, impaired self care skills, decreased upper extremity function, impaired functional mobilty, gait instability, decreased safety awareness, impaired balance, impaired cardiopulmonary response to activity. Patient will benefit from skilled acute physical therapy services to address the mentioned deficits in order to increase safety and independence with functional mobility. Prior to recent hospitalization, patient was ambulating with supervision. Patient is not at her baseline with cognition and functional mobility. She will require increased assistance at discharge. PT recommending patient discharge to SNF.     Rehab Prognosis: Fair     Plan:     During this hospitalization, patient to be seen 3 x/week to address the identified rehab impairments via gait training, therapeutic activities, therapeutic exercises, neuromuscular re-education and progress towards the established goals.    · Plan of Care Expires:  06/26/22    Social History   Living  "Environment:   Pt lives alone. Patient has 24/7 sitters.     Pt lives in a single story home with 4 steps to enter with bilateral handrails    Pt has a tub/shower combo with bath bench and grab bars    Prior Level of Function:    Prior to recent hospitaliztion, patient was ambulating with supervision    DME used: walker, rolling, wheelchair, grab bar, bath bench, bedside commode, raised toilet, oxygen      Roles/Repsonsibilities:    Works: no.    Drives: no.    Managing Medicines/Managing Home: no.     Upon discharge, patient will have 24/7 assistance from: sitters and family    Subjective   Communicated with RN prior to session.  Patient found HOB elevated with  oxygen, PureWick, pulse ox (continuous) upon PT entry to room.  Patient's daughter present during session.  Pt is agreeable to evaluation.     Additional staff present:RN and OT  OT for co-evaluation due to patient's medical complexities requiring two skilled therapists in order to appropriately assess patient's functional deficits as well as ensure patient safety, accommodate for limited activity tolerance, and provide appropriate, skilled assistance to maximize functional potential during evaluation.    Pt's subjective: "How do you get rid of crumbs?"    Pain/Comfort:  · Pain Rating 1: 0/10  · Pain Rating Post-Intervention 1: 0/10      Objective:   General Precautions: Standard, fall   Orthopedic Precautions:N/A   Braces: N/A   Oxygen Device: Nasal Cannula 3L  Vitals: BP (!) 121/56 (BP Location: Right arm, Patient Position: Lying)   Pulse 90   Temp 96.3 °F (35.7 °C) (Oral)   Resp 17   Ht 5' (1.524 m)   Wt 41 kg (90 lb 6.2 oz)   LMP  (LMP Unknown)   SpO2 95%   BMI 17.65 kg/m²     Patient's pulse oximeter did not provide accurate reading throughout PT session. RN and PA present throughout therapy session.     Exams:  · Cognition:   · Alert and Confused  · AxOx1  · Command following: Easily distracted  · Fluency: clear/fluent  · Skin " Integrity: blue bilateral hands and forearms  · Edema: present in LUE  · Hearing: Impaired  · Range of Motion:  · RLE: WFL  · LLE: WFL  · Strength Exam:  · RLE Strength: grossly 3/5  · LLE Strength:  grossly 3/5    Outcome Measures:   AM-PAC 6 CLICK MOBILITY:    Turning over in bed (including adjusting bedclothes, sheets and blankets)?: 3   Sitting down on and standing up from a chair with arms (e.g., wheelchair, bedside commode, etc.): 2   Moving from lying on back to sitting on the side of the bed?: 2   Moving to and from a bed to a chair (including a wheelchair)?: 2   Need to walk in hospital room?: 1   Climbing 3-5 steps with a railing?: 1   Basic Mobility Total Score: 11     Functional Mobility:    Bed Mobility:     Supine to Sit: maximal assistance and of 2 persons for LE management and trunk management   Seated scooting towards EOB: maximal assistance    Transfers:     Sit to Stand:  maximal assistance and of 2 persons. 2 reps. PT provides verbal cues for hand placement   Stand to Sit: maximal assistance and of 2 persons. 2 reps. PT provides verbal cues for hand placement   Bed to Chair: maximal assistance and of 2 persons  using Step Transfer. Chair on pt's right side.     Gait:    Patient takes 4 steps from bed to chair    Patient uses:  hand-held assist x 2 people   Patient requires: maximal assist and of 2 persons   Gait Deviation(s): unsteady gait, decreased step length, flexed posture and decreased liane   Impairments due to: impaired balance, decreased strength and impaired cognition   PT verbally cues patient on step sequence    Therapeutic Activities:   Patient sits EOB for 12 minutes with minimal to stand-by assistance. Patient occasionally performs posterior lean, requiring minimal assistance to return to midline. Patient requires minimal assistance for dynamic seated activities.     Exercises:    Patient performed 1 set(s) of 5 repetitions of the following seated exercises:  long arc quads and marches for bilateral lower extremities. Patient required skilled PT for instruction of exercises and appropriate cues to perform exercises safely and appropriately.      Patient Education:   Patient educated on PT POC and role of PT in acute care.   Patient was instructed to utilize staff assistance for mobility/transfers.   Patient is appropriate to transfer via stand pivot with maximal assistance of 2 people with RN/PCT   White board updated to include patient's safest level of mobility with staff assistance   Patient and patient's daughter educated on bed mobility training, Fall risk, gait training, home safety, plan of care, posture and transfer training by explanation.  Patient was confusion limiting to education and needs reinforcement.       Patient left sitting in chair with all lines intact, call button in reach and patient's RN and daughter present.      GOALS:   Multidisciplinary Problems     Physical Therapy Goals        Problem: Physical Therapy    Goal Priority Disciplines Outcome Goal Variances Interventions   Physical Therapy Goal     PT, PT/OT Ongoing, Progressing     Description: Goals to be met by: 6/10/2022     Patient will increase functional independence with mobility by performin. Supine to sit with Stand-by Assistance  2. Sit to supine with Stand-by Assistance  3. Sit to stand transfer with Minimal Assistance  4. Bed to chair transfer with Minimal Assistance using LRAD  5. Gait  x 50 feet with Contact Guard Assistance using LRAD.   6. Stand for 5 minutes with Contact Guard Assistance using LRAD                     History:     Past Medical History:   Diagnosis Date    Acute on chronic congestive heart failure 2019    Cardiomyopathy     Carotid artery occlusion     CHF (congestive heart failure)     COPD (chronic obstructive pulmonary disease)     Coronary artery disease     Hyperlipidemia     Hypertension        Past Surgical History:   Procedure  Laterality Date    CARDIAC CATHETERIZATION      cardic cath      CAROTID ENDARTERECTOMY      FLEXIBLE BRONCHOSCOPY N/A 7/31/2019    Procedure: BRONCHOSCOPY, FIBEROPTIC Flexible;  Surgeon: Klever Mckeon MD;  [facility];  Service: Thoracic;  Laterality: N/A;    HIP SURGERY      PLEURODESIS USING TALC N/A 7/31/2019    Procedure: PLEURODESIS;  Surgeon: Klever Mckeon MD;  [facility];  Service: Thoracic;  Laterality: N/A;    PLEURODESIS USING TALC Right 8/5/2019    Procedure: PLEURODESIS, USING TALC;  Surgeon: Klever Mckeon MD;  Location: [facility] OR [address] [place] [address];  Service: Thoracic;  Laterality: Right;    PLEURODESIS WITH VIDEO-ASSISTED THORACOSCOPIC SURGERY (VATS) Right 8/5/2019    Procedure: VATS, WITH PLEURAL TENT;  Surgeon: Klever Mckeon MD;  Location: [facility] OR [address] [place] [address];  Service: Thoracic;  Laterality: Right;       Time Tracking:     PT Received On: 05/27/22  PT Start Time: 0858     PT Stop Time: 0927  PT Total Time (min): 29 min     Billable Minutes: Evaluation 1 procedure and Therapeutic Activity 14 mins

## 2022-05-27 NOTE — PLAN OF CARE
SW completed LOCET and faxed PASRR to state. Waiting on 142.    PASRR uploaded to [facility].    Carmen Rodriguez LMSW  [facility]  Ext. [phone]

## 2022-05-27 NOTE — SUBJECTIVE & OBJECTIVE
Interval History: Patient seen and examined this morning. GUILLERMINA. AIMEES. Patient pleasantly confused, but cooperative with exam. Has no complaints at this time. PT/OT in room to assess patient as well. Left arm elevated for superficial thrombus. Continue IV lasix. Delirium precautions in place.     Review of Systems   Constitutional:  Positive for fatigue. Negative for chills, diaphoresis and fever.   HENT:  Negative for rhinorrhea, sneezing and sore throat.         [place]   Eyes:  Negative for photophobia and visual disturbance.   Respiratory:  Positive for shortness of breath. Negative for cough and wheezing.    Cardiovascular:  Negative for chest pain and leg swelling.   Gastrointestinal:  Negative for abdominal pain, diarrhea, nausea and vomiting.   Genitourinary:  Negative for dysuria, flank pain and hematuria.   Musculoskeletal:  Positive for gait problem and myalgias. Negative for back pain and neck pain.   Skin:  Positive for color change. Negative for pallor and rash.   Neurological:  Positive for weakness. Negative for dizziness, syncope, light-headedness and headaches.   Hematological:  Bruises/bleeds easily.   Psychiatric/Behavioral:  Negative for agitation and sleep disturbance. The patient is not nervous/anxious.    Objective:     Vital Signs (Most Recent):  Temp: 96.3 °F (35.7 °C) (05/27/22 0804)  Pulse: 90 (05/27/22 0804)  Resp: 17 (05/27/22 0804)  BP: (!) 121/56 (05/27/22 0804)  SpO2: 95 % (05/27/22 0804)   Vital Signs (24h Range):  Temp:  [96.3 °F (35.7 °C)-98.8 °F (37.1 °C)] 96.3 °F (35.7 °C)  Pulse:  [72-95] 90  Resp:  [17-18] 17  SpO2:  [93 %-98 %] 95 %  BP: (115-147)/(56-68) 121/56     Weight: 41 kg (90 lb 6.2 oz)  Body mass index is 17.65 kg/m².  No intake or output data in the 24 hours ending 05/27/22 1044   Physical Exam  Vitals and nursing note reviewed.   Constitutional:       General: She is not in acute distress.     Appearance: She is cachectic. She is not toxic-appearing or diaphoretic.       Comments: Chronically ill appearing   HENT:      Head: Normocephalic and atraumatic.      Ears:      Comments: [place]     Nose: Nose normal.      Mouth/Throat:      Mouth: Mucous membranes are moist.   Eyes:      Pupils: Pupils are equal, round, and reactive to light.   Cardiovascular:      Rate and Rhythm: Normal rate and regular rhythm.   Pulmonary:      Effort: No tachypnea or respiratory distress.      Breath sounds: Examination of the right-lower field reveals decreased breath sounds and rales. Examination of the left-lower field reveals decreased breath sounds and rales. Decreased breath sounds and rales present. No wheezing or rhonchi.   Abdominal:      General: Bowel sounds are normal. There is no distension.      Palpations: Abdomen is soft.      Tenderness: There is no abdominal tenderness. There is no guarding.   Genitourinary:     Comments: deferred  Musculoskeletal:         General: No deformity. Normal range of motion.      Left forearm: Swelling (mild) and tenderness (mild TTP) present. No deformity.      Cervical back: Normal range of motion. No tenderness.      Right lower leg: No edema.      Left lower leg: No edema.   Skin:     General: Skin is warm.      Capillary Refill: Capillary refill takes 2 to 3 seconds.      Findings: Bruising and erythema present.   Neurological:      Mental Status: She is alert and oriented to person, place, and time.      Cranial Nerves: No dysarthria or facial asymmetry.      Motor: Weakness (generalized) present.      Comments: Patient alert and oriented x3, noted to have intermittent confusion during my exam. Following commands and moving all 4 extremities.   Psychiatric:         Attention and Perception: Attention normal. She does not perceive auditory or visual hallucinations.         Speech: Speech normal.         Behavior: Behavior is cooperative.       Significant Labs: All pertinent labs within the past 24 hours have been reviewed.  BMP:   Recent Labs   Lab  05/27/22  0416   GLU 90      K 3.8   CL 94*   CO2 35*   BUN 18   CREATININE 0.8   CALCIUM 9.9   MG 2.3     CBC:   Recent Labs   Lab 05/26/22  1500 05/27/22  0416   WBC 10.10 10.98   HGB 12.7 11.7*   HCT 40.7 37.3    181     CMP:   Recent Labs   Lab 05/26/22  1500 05/27/22  0416    140   K 4.1 3.8   CL 93* 94*   CO2 38* 35*   GLU 93 90   BUN 20 18   CREATININE 0.8 0.8   CALCIUM 10.2 9.9   PROT 6.7  --    ALBUMIN 2.8*  --    BILITOT 0.6  --    ALKPHOS 125  --    AST 26  --    ALT 16  --    ANIONGAP 9 11   EGFRNONAA >60.0 >60.0     Urine Studies:   Recent Labs   Lab 05/26/22  1513   COLORU Yellow   APPEARANCEUA Clear   PHUR 6.0   SPECGRAV 1.005   PROTEINUA Negative   GLUCUA Negative   KETONESU Negative   BILIRUBINUA Negative   OCCULTUA Negative   NITRITE Negative   LEUKOCYTESUR Negative       Significant Imaging: I have reviewed all pertinent imaging results/findings within the past 24 hours.

## 2022-05-27 NOTE — HOSPITAL COURSE
Venus Mayer is a 89 y.o. female admitted to hospital medicine for debility and delirium. US LUE with superficial thrombus 2/2 IV from previous stay. Remaining work up largely unremarkable. Patient with continued overall decline in health with CO2 retention. Discussed extent of patient's progressing medical condition with family. Family agreeable to home hospice. [facility] able to accept patient. Patient's last rights were given at bedside prior to discharge home.

## 2022-05-27 NOTE — PROGRESS NOTES
"[facility] - Telemetry Stepdown ([place] [id number])  [facility] Medicine  Progress Note    Patient Name: Venus Mayer  MRN: [id number]  Patient Class: OP- Observation   Admission Date: 5/26/2022  Length of Stay: 0 days  Attending Physician: Tramaine Cabrera MD  Primary Care Provider: Jona Che MD        Subjective:     Principal Problem:Debility        HPI:  Venus Mayer is a 89 y.o. female with a PMHx of cardiomyopathy, CHFpEF (60%), pacemaker in place, HLD, HTN, COPD on home 2.5L NC, and vascular dementia who presents to the ED from home for generalized weakness. She was discharged home from the hospital yesterday evening after being treated for UTI with 3 days of ertapenem. She was also diuresed with IV lasix. Daughter at bedside reports that patient is very weak, difficult to perform ADLs.  Per daughter yesterday while still in the hospital the patient started "hallucinating" - talking with her eyes closed, but that seems to have improved. She has had some intermittent confusion that has been ongoing since her last admission. Her daughter also reports some redness/bruising to her L arm close to previous IV site.  No falls since she has been home. At the time of my exam patient is alert and able to answer questions appropriately. The patient and family deny and fever, chills, nausea, vomiting, or diarrhea. The patient denies any chest pain, abdominal pain, or headache. She does note some shortness of breath.    In the ED, VSSAF. Patient stable on 2.5L O2 which she wears at home. CBC and CMP unremarkable. UA negative for infection. BNP and troponin pending. CXR with mild-moderate perihilar and lower lobe edema small pleural effusions slightly worse from the prior study. ED  talked with family and provided resources for sitters/in home care. Daughter called to try and set this up but many of the facilities have waiting lists. Family at bedside does not feel that they can properly care for the patient " at home on their own due to her weakness. Patient will be placed in observation for further work up and evaluation.       Overview/Hospital Course:  Venus Mayer is a 89 y.o. female admitted to hospital medicine for debility and delirium. CXR with mild edema. BNP 3022. Started on IV lasix. US LUE with superficial thrombus 2/2 IV from previous stay. Remaining work up largely unremarkable. PT/OT consulted. Delirium precautions in place.       Interval History: Patient seen and examined this morning. NAEON. VSS. Patient pleasantly confused, but cooperative with exam. Has no complaints at this time. PT/OT in room to assess patient as well. Left arm elevated for superficial thrombus. Continue IV lasix. Delirium precautions in place.     Review of Systems   Constitutional:  Positive for fatigue. Negative for chills, diaphoresis and fever.   HENT:  Negative for rhinorrhea, sneezing and sore throat.         [place]   Eyes:  Negative for photophobia and visual disturbance.   Respiratory:  Positive for shortness of breath. Negative for cough and wheezing.    Cardiovascular:  Negative for chest pain and leg swelling.   Gastrointestinal:  Negative for abdominal pain, diarrhea, nausea and vomiting.   Genitourinary:  Negative for dysuria, flank pain and hematuria.   Musculoskeletal:  Positive for gait problem and myalgias. Negative for back pain and neck pain.   Skin:  Positive for color change. Negative for pallor and rash.   Neurological:  Positive for weakness. Negative for dizziness, syncope, light-headedness and headaches.   Hematological:  Bruises/bleeds easily.   Psychiatric/Behavioral:  Negative for agitation and sleep disturbance. The patient is not nervous/anxious.    Objective:     Vital Signs (Most Recent):  Temp: 96.3 °F (35.7 °C) (05/27/22 0804)  Pulse: 90 (05/27/22 0804)  Resp: 17 (05/27/22 0804)  BP: (!) 121/56 (05/27/22 0804)  SpO2: 95 % (05/27/22 0804)   Vital Signs (24h Range):  Temp:  [96.3 °F (35.7 °C)-98.8 °F  (37.1 °C)] 96.3 °F (35.7 °C)  Pulse:  [72-95] 90  Resp:  [17-18] 17  SpO2:  [93 %-98 %] 95 %  BP: (115-147)/(56-68) 121/56     Weight: 41 kg (90 lb 6.2 oz)  Body mass index is 17.65 kg/m².  No intake or output data in the 24 hours ending 05/27/22 1044   Physical Exam  Vitals and nursing note reviewed.   Constitutional:       General: She is not in acute distress.     Appearance: She is cachectic. She is not toxic-appearing or diaphoretic.      Comments: Chronically ill appearing   HENT:      Head: Normocephalic and atraumatic.      Ears:      Comments: [place]     Nose: Nose normal.      Mouth/Throat:      Mouth: Mucous membranes are moist.   Eyes:      Pupils: Pupils are equal, round, and reactive to light.   Cardiovascular:      Rate and Rhythm: Normal rate and regular rhythm.   Pulmonary:      Effort: No tachypnea or respiratory distress.      Breath sounds: Examination of the right-lower field reveals decreased breath sounds and rales. Examination of the left-lower field reveals decreased breath sounds and rales. Decreased breath sounds and rales present. No wheezing or rhonchi.   Abdominal:      General: Bowel sounds are normal. There is no distension.      Palpations: Abdomen is soft.      Tenderness: There is no abdominal tenderness. There is no guarding.   Genitourinary:     Comments: deferred  Musculoskeletal:         General: No deformity. Normal range of motion.      Left forearm: Swelling (mild) and tenderness (mild TTP) present. No deformity.      Cervical back: Normal range of motion. No tenderness.      Right lower leg: No edema.      Left lower leg: No edema.   Skin:     General: Skin is warm.      Capillary Refill: Capillary refill takes 2 to 3 seconds.      Findings: Bruising and erythema present.   Neurological:      Mental Status: She is alert and oriented to person, place, and time.      Cranial Nerves: No dysarthria or facial asymmetry.      Motor: Weakness (generalized) present.      Comments:  Patient alert and oriented x3, noted to have intermittent confusion during my exam. Following commands and moving all 4 extremities.   Psychiatric:         Attention and Perception: Attention normal. She does not perceive auditory or visual hallucinations.         Speech: Speech normal.         Behavior: Behavior is cooperative.       Significant Labs: All pertinent labs within the past 24 hours have been reviewed.  BMP:   Recent Labs   Lab 05/27/22  0416   GLU 90      K 3.8   CL 94*   CO2 35*   BUN 18   CREATININE 0.8   CALCIUM 9.9   MG 2.3     CBC:   Recent Labs   Lab 05/26/22  1500 05/27/22  0416   WBC 10.10 10.98   HGB 12.7 11.7*   HCT 40.7 37.3    181     CMP:   Recent Labs   Lab 05/26/22  1500 05/27/22  0416    140   K 4.1 3.8   CL 93* 94*   CO2 38* 35*   GLU 93 90   BUN 20 18   CREATININE 0.8 0.8   CALCIUM 10.2 9.9   PROT 6.7  --    ALBUMIN 2.8*  --    BILITOT 0.6  --    ALKPHOS 125  --    AST 26  --    ALT 16  --    ANIONGAP 9 11   EGFRNONAA >60.0 >60.0     Urine Studies:   Recent Labs   Lab 05/26/22  1513   COLORU Yellow   APPEARANCEUA Clear   PHUR 6.0   SPECGRAV 1.005   PROTEINUA Negative   GLUCUA Negative   KETONESU Negative   BILIRUBINUA Negative   OCCULTUA Negative   NITRITE Negative   LEUKOCYTESUR Negative       Significant Imaging: I have reviewed all pertinent imaging results/findings within the past 24 hours.      Assessment/Plan:      * Debility  Delirium  Acute/chronic due to recent surgery/illnesses/prolonged hospitalization and nutritional status  - high risk of fall / injury utilize all safety measures and fall precautions   -PT/OT ordered  -Social work consult for discharge planning.  - delirium precautions    Left arm swelling  Thrombus of the Superficial Vein of left arm  Family reporting significant bruising as well as erythema to left forearm with mild swelling. Family reports she had IV in this area.  -US LUE with occlusive or nearly occlusive left basilic vein thrombus  (superficial vein)  -Elevate LUE, will hold off on NSAIDs for now given low CrCl  -continue to monitor    Body mass index (BMI) less than 19  Nutrition consulted. Body mass index is 17.39 kg/m².. Encourage maximal PO intake. Diet supplementation ordered per nutrition approval. Will encourage PO and monitor closely for weight changes.    COPD (chronic obstructive pulmonary disease)  - Currently on home oxygen settings  - No concern for COPD exacerbation at this time  - Continue home inhaler regimen  - continue levoalbuterol q6h while awake  - PRN duonebs q4h for sob    Acute metabolic encephalopathy  -Patients family reporting possible hallucinations yesterday in the hospital. At the time of my exam patient is alert and oriented x3. She does have intermittent confusion but was easily reoriented.  -Will monitor neuro status carefully, avoid narcotics and benzos that will exacerbate agitation, and use PRN anti-psychotics for controls of behavior for self harm.  -Delirium precautions.    Pacemaker  -No acute issues.     Elevated troponin  - trop 0.0.96, appears to be at baseline.  - no chest pain     Coronary artery disease  Patient with known CAD, which is controlled Will continue ASA and monitor for S/Sx of angina/ACS. Continue to monitor on telemetry.     Chronic respiratory failure with hypoxia and hypercapnia  Patient with Hypercapnic and Hypoxic Respiratory failure which is Chronic.  she is on home oxygen at 2.5 LPM. Supplemental oxygen was provided and noted-  .  Contributing diagnoses includes - CHF and COPD Labs and images were reviewed. Patient Has not had a recent ABG.     Anxiety  -Chronic, controlled.  -Continue PRN hydroxyzine     Stage 3 chronic kidney disease  Creatine stable for now. BMP reviewed- noted Estimated Creatinine Clearance: 28 mL/min (based on SCr of 0.8 mg/dL). according to latest data. Monitor UOP and serial BMP and adjust therapy as needed. Renally dose meds.    Acute on chronic diastolic  heart failure  Patient is identified as having Diastolic (HFpEF) heart failure that is Acute on Chronic. CHF is currently uncontrolled due to Rales/crackles on pulmonary exam and Pulmonary edema/pleural effusion on CXR. Latest ECHO performed and demonstrates- Results for orders placed during the hospital encounter of 03/12/22    Echo    Interpretation Summary  · The left ventricle is normal in size with normal systolic function.  · Moderate to severe left atrial enlargement.  · Asymmetric septal hypertrophy as noted in 2019, septum measuring 1.5cm (unchanged from prior measurement, unable to view images) without obstruction  · Grade II left ventricular diastolic dysfunction.  · The estimated ejection fraction is 60%.  · Intermediate central venous pressure (8 mmHg).  · The estimated PA systolic pressure is 45 mmHg.  · There is pulmonary hypertension.  · Mild right ventricular enlargement with mildly reduced right ventricular systolic function.  · Mild tricuspid regurgitation.  · Mild mitral regurgitation.  . Continue Beta Blocker Furosemide and monitor clinical status closely. Monitor on telemetry. Patient is off CHF pathway.  Monitor strict Is&Os and daily weights.  Place on fluid restriction of 1.5 L. Continue to stress to patient importance of self efficacy and  on diet for CHF. Last BNP reviewed- and noted below   Recent Labs   Lab 05/26/22 1858   BNP 3,022*   -Previously dry weight 83lbs. Currently 89lbs.  -Given 20mg IV lasix in the ED.  - will give Lasix 40mg IV this AM them reassess fluid status    Hypertension  -Chronic issue, appears controlled.  -Continue lopressor 12.5 mg BID.      VTE Risk Mitigation (From admission, onward)         Ordered     IP VTE HIGH RISK PATIENT  Once         05/26/22 1846     Place sequential compression device  Until discontinued         05/26/22 1846                Discharge Planning   [place]:      Code Status: DNR   Is the patient medically ready for discharge?:      Reason for patient still in hospital (select all that apply): Patient trending condition, Treatment and PT / OT recommendations                     Yudi Arroyo PA-C  Department of Hospital Medicine   [facility] - Telemetry Stepdown ([place])

## 2022-05-28 LAB
ALLENS TEST: ABNORMAL
DELSYS: ABNORMAL
FIO2: 30
FLOW: 2.5
HCO3 UR-SCNC: 42.9 MMOL/L (ref 24–28)
MODE: ABNORMAL
PCO2 BLDA: 55.3 MMHG (ref 35–45)
PH SMN: 7.5 [PH] (ref 7.35–7.45)
PO2 BLDA: 54 MMHG (ref 80–100)
POC BE: 20 MMOL/L
POC SATURATED O2: 89 % (ref 95–100)
POC TCO2: 45 MMOL/L (ref 23–27)
SAMPLE: ABNORMAL
SITE: ABNORMAL
SP02: 91

## 2022-05-28 PROCEDURE — 25000242 PHARM REV CODE 250 ALT 637 W/ HCPCS: Performed by: NURSE PRACTITIONER

## 2022-05-28 PROCEDURE — 25000003 PHARM REV CODE 250: Performed by: PHYSICIAN ASSISTANT

## 2022-05-28 PROCEDURE — 99226 PR SUBSEQUENT OBSERVATION CARE,LEVEL III: ICD-10-PCS | Mod: ,,, | Performed by: PHYSICIAN ASSISTANT

## 2022-05-28 PROCEDURE — 94640 AIRWAY INHALATION TREATMENT: CPT | Mod: XB

## 2022-05-28 PROCEDURE — 36600 WITHDRAWAL OF ARTERIAL BLOOD: CPT

## 2022-05-28 PROCEDURE — 27000221 HC OXYGEN, UP TO 24 HOURS

## 2022-05-28 PROCEDURE — 63600175 PHARM REV CODE 636 W HCPCS: Performed by: PHYSICIAN ASSISTANT

## 2022-05-28 PROCEDURE — 96376 TX/PRO/DX INJ SAME DRUG ADON: CPT

## 2022-05-28 PROCEDURE — 25000003 PHARM REV CODE 250: Performed by: NURSE PRACTITIONER

## 2022-05-28 PROCEDURE — G0378 HOSPITAL OBSERVATION PER HR: HCPCS

## 2022-05-28 PROCEDURE — 94761 N-INVAS EAR/PLS OXIMETRY MLT: CPT

## 2022-05-28 PROCEDURE — 99226 PR SUBSEQUENT OBSERVATION CARE,LEVEL III: CPT | Mod: ,,, | Performed by: PHYSICIAN ASSISTANT

## 2022-05-28 PROCEDURE — 99900035 HC TECH TIME PER 15 MIN (STAT)

## 2022-05-28 RX ORDER — OLANZAPINE 2.5 MG/1
2.5 TABLET ORAL NIGHTLY
Status: DISCONTINUED | OUTPATIENT
Start: 2022-05-28 | End: 2022-05-30 | Stop reason: HOSPADM

## 2022-05-28 RX ORDER — OLANZAPINE 2.5 MG/1
2.5 TABLET ORAL EVERY 8 HOURS PRN
Status: DISCONTINUED | OUTPATIENT
Start: 2022-05-28 | End: 2022-05-30 | Stop reason: HOSPADM

## 2022-05-28 RX ADMIN — Medication 6 MG: at 08:05

## 2022-05-28 RX ADMIN — LEVALBUTEROL HYDROCHLORIDE 0.63 MG: 0.63 SOLUTION RESPIRATORY (INHALATION) at 02:05

## 2022-05-28 RX ADMIN — ASPIRIN 81 MG CHEWABLE TABLET 81 MG: 81 TABLET CHEWABLE at 08:05

## 2022-05-28 RX ADMIN — METOPROLOL TARTRATE 12.5 MG: 25 TABLET, FILM COATED ORAL at 08:05

## 2022-05-28 RX ADMIN — FERROUS SULFATE TAB 325 MG (65 MG ELEMENTAL FE) 1 EACH: 325 (65 FE) TAB at 08:05

## 2022-05-28 RX ADMIN — OLANZAPINE 2.5 MG: 2.5 TABLET, FILM COATED ORAL at 05:05

## 2022-05-28 RX ADMIN — GUAIFENESIN 600 MG: 600 TABLET, EXTENDED RELEASE ORAL at 08:05

## 2022-05-28 RX ADMIN — Medication 1 CAPSULE: at 08:05

## 2022-05-28 RX ADMIN — LEVALBUTEROL HYDROCHLORIDE 0.63 MG: 0.63 SOLUTION RESPIRATORY (INHALATION) at 08:05

## 2022-05-28 RX ADMIN — PANTOPRAZOLE SODIUM 40 MG: 40 TABLET, DELAYED RELEASE ORAL at 07:05

## 2022-05-28 RX ADMIN — TIOTROPIUM BROMIDE INHALATION SPRAY 2 PUFF: 3.12 SPRAY, METERED RESPIRATORY (INHALATION) at 08:05

## 2022-05-28 RX ADMIN — FLUTICASONE PROPIONATE 100 MCG: 50 SPRAY, METERED NASAL at 08:05

## 2022-05-28 RX ADMIN — IPRATROPIUM BROMIDE AND ALBUTEROL SULFATE 3 ML: 2.5; .5 SOLUTION RESPIRATORY (INHALATION) at 08:05

## 2022-05-28 RX ADMIN — FLUTICASONE FUROATE AND VILANTEROL TRIFENATATE 1 PUFF: 100; 25 POWDER RESPIRATORY (INHALATION) at 08:05

## 2022-05-28 RX ADMIN — OLANZAPINE 2.5 MG: 2.5 TABLET, FILM COATED ORAL at 08:05

## 2022-05-28 RX ADMIN — FUROSEMIDE 40 MG: 10 INJECTION, SOLUTION INTRAMUSCULAR; INTRAVENOUS at 08:05

## 2022-05-28 RX ADMIN — THERA TABS 1 TABLET: TAB at 08:05

## 2022-05-28 NOTE — PLAN OF CARE
Problem: Adult Inpatient Plan of Care  Goal: Plan of Care Review  Outcome: Ongoing, Progressing  Goal: Patient-Specific Goal (Individualized)  Outcome: Ongoing, Progressing  Goal: Absence of Hospital-Acquired Illness or Injury  Outcome: Ongoing, Progressing  Goal: Optimal Comfort and Wellbeing  Outcome: Ongoing, Progressing  Goal: Readiness for Transition of Care  Outcome: Ongoing, Progressing     Problem: Adjustment to Illness (Sepsis/Septic Shock)  Goal: Optimal Coping  Outcome: Ongoing, Progressing     Problem: Bleeding (Sepsis/Septic Shock)  Goal: Absence of Bleeding  Outcome: Ongoing, Progressing     Problem: Glycemic Control Impaired (Sepsis/Septic Shock)  Goal: Blood Glucose Level Within Desired Range  Outcome: Ongoing, Progressing     Problem: Glycemic Control Impaired (Sepsis/Septic Shock)  Goal: Blood Glucose Level Within Desired Range  Outcome: Ongoing, Progressing     Problem: Infection Progression (Sepsis/Septic Shock)  Goal: Absence of Infection Signs and Symptoms  Outcome: Ongoing, Progressing     Problem: Nutrition Impaired (Sepsis/Septic Shock)  Goal: Optimal Nutrition Intake  Outcome: Ongoing, Progressing     Problem: Impaired Wound Healing  Goal: Optimal Wound Healing  Outcome: Ongoing, Progressing     Problem: Skin Injury Risk Increased  Goal: Skin Health and Integrity  Outcome: Ongoing, Progressing     Problem: Fall Injury Risk  Goal: Absence of Fall and Fall-Related Injury  Outcome: Ongoing, Progressing     Pt sitting in recliner chair after PT/OT eval. Cont purple/bluish/red feet and hands. Pt cont to be confused and restless at times. O2sats decrease and increases at times as well as HR. Dtrs and son-in-law at bedside. Spoke with dtrs extensively about pt care as well as providers. Pt is hospice appropriate.

## 2022-05-28 NOTE — ASSESSMENT & PLAN NOTE
Patient with Hypercapnic and Hypoxic Respiratory failure which is Chronic.  she is on home oxygen at 2.5 LPM. Supplemental oxygen was provided and noted-  .  Contributing diagnoses includes - CHF and COPD Labs and images were reviewed. Patient Has recent ABG, which has been reviewed.   - increased home oxygen to 2.5 L   - continue diuresis with lasix   - duonebs PRN

## 2022-05-28 NOTE — SUBJECTIVE & OBJECTIVE
Interval History: Patient seen at bedside at daughter. Actively hallucinating, but able to follow directions. ABG reviewed. Supplemental oxygen increased. Continue lasix 40 mg IV daily. Trial zyprexa 2.5 mg qhs.     Review of Systems   Unable to perform ROS: Mental status change   Objective:     Vital Signs (Most Recent):  Temp: 97 °F (36.1 °C) (05/28/22 0514)  Pulse: 70 (05/28/22 1100)  Resp: 16 (05/28/22 1100)  BP: 100/61 (05/28/22 1100)  SpO2: (!) 90 % (05/28/22 1100)   Vital Signs (24h Range):  Temp:  [97 °F (36.1 °C)-97.1 °F (36.2 °C)] 97 °F (36.1 °C)  Pulse:  [62-86] 70  Resp:  [16-18] 16  SpO2:  [84 %-95 %] 90 %  BP: (100-152)/(52-67) 100/61     Weight: 41 kg (90 lb 6.2 oz)  Body mass index is 17.65 kg/m².  No intake or output data in the 24 hours ending 05/28/22 1242   Physical Exam  Vitals and nursing note reviewed.   Constitutional:       General: She is not in acute distress.     Appearance: She is cachectic. She is not toxic-appearing or diaphoretic.      Comments: Chronically ill appearing   HENT:      Head: Normocephalic and atraumatic.      Ears:      Comments: [place]     Nose: Nose normal.      Mouth/Throat:      Mouth: Mucous membranes are moist.   Eyes:      Pupils: Pupils are equal, round, and reactive to light.   Cardiovascular:      Rate and Rhythm: Normal rate and regular rhythm.   Pulmonary:      Effort: No tachypnea or respiratory distress.      Breath sounds: Examination of the right-lower field reveals decreased breath sounds and rales. Examination of the left-lower field reveals decreased breath sounds and rales. Decreased breath sounds and rales present. No wheezing or rhonchi.   Abdominal:      General: Bowel sounds are normal. There is no distension.      Palpations: Abdomen is soft.      Tenderness: There is no abdominal tenderness. There is no guarding.   Genitourinary:     Comments: deferred  Musculoskeletal:         General: No deformity. Normal range of motion.      Left forearm:  Swelling (mild) and tenderness (mild TTP) present. No deformity.      Cervical back: Normal range of motion. No tenderness.      Right lower leg: No edema.      Left lower leg: No edema.   Skin:     General: Skin is warm.      Capillary Refill: Capillary refill takes 2 to 3 seconds.      Findings: Bruising and erythema present.   Neurological:      Mental Status: She is alert and oriented to person, place, and time.      Cranial Nerves: No dysarthria or facial asymmetry.      Motor: Weakness (generalized) present.      Comments: Patient alert and oriented x3, noted to have intermittent confusion during my exam. Following commands and moving all 4 extremities.   Psychiatric:         Attention and Perception: She is inattentive. She perceives auditory and visual hallucinations.         Speech: Speech normal.         Behavior: Behavior is cooperative.       Significant Labs: All pertinent labs within the past 24 hours have been reviewed.  CBC:   Recent Labs   Lab 05/26/22  1500 05/27/22  0416   WBC 10.10 10.98   HGB 12.7 11.7*   HCT 40.7 37.3    181     CMP:   Recent Labs   Lab 05/26/22  1500 05/27/22  0416    140   K 4.1 3.8   CL 93* 94*   CO2 38* 35*   GLU 93 90   BUN 20 18   CREATININE 0.8 0.8   CALCIUM 10.2 9.9   PROT 6.7  --    ALBUMIN 2.8*  --    BILITOT 0.6  --    ALKPHOS 125  --    AST 26  --    ALT 16  --    ANIONGAP 9 11   EGFRNONAA >60.0 >60.0       Significant Imaging: I have reviewed all pertinent imaging results/findings within the past 24 hours.

## 2022-05-28 NOTE — PROGRESS NOTES
"[facility] - Telemetry Stepdown ([place] [address])  [facility] Medicine  Progress Note    Patient Name: Venus Mayer  MRN: [id number]  Patient Class: OP- Observation   Admission Date: 5/26/2022  Length of Stay: 0 days  Attending Physician: Tramaine Cabrera MD  Primary Care Provider: Jona Che MD        Subjective:     Principal Problem:Delirium        HPI:  Venus Mayer is a 89 y.o. female with a PMHx of cardiomyopathy, CHFpEF (60%), pacemaker in place, HLD, HTN, COPD on home 2.5L NC, and vascular dementia who presents to the ED from home for generalized weakness. She was discharged home from the hospital yesterday evening after being treated for UTI with 3 days of ertapenem. She was also diuresed with IV lasix. Daughter at bedside reports that patient is very weak, difficult to perform ADLs.  Per daughter yesterday while still in the hospital the patient started "hallucinating" - talking with her eyes closed, but that seems to have improved. She has had some intermittent confusion that has been ongoing since her last admission. Her daughter also reports some redness/bruising to her L arm close to previous IV site.  No falls since she has been home. At the time of my exam patient is alert and able to answer questions appropriately. The patient and family deny and fever, chills, nausea, vomiting, or diarrhea. The patient denies any chest pain, abdominal pain, or headache. She does note some shortness of breath.    In the ED, VSSAF. Patient stable on 2.5L O2 which she wears at home. CBC and CMP unremarkable. UA negative for infection. BNP and troponin pending. CXR with mild-moderate perihilar and lower lobe edema small pleural effusions slightly worse from the prior study. ED  talked with family and provided resources for sitters/in home care. Daughter called to try and set this up but many of the facilities have waiting lists. Family at bedside does not feel that they can properly care for the patient " at home on their own due to her weakness. Patient will be placed in observation for further work up and evaluation.       Overview/Hospital Course:  Venus Mayer is a 89 y.o. female admitted to hospital medicine for debility and delirium. CXR with mild edema. BNP 3022. Started on IV lasix. US LUE with superficial thrombus 2/2 IV from previous stay. Remaining work up largely unremarkable. PT/OT consulted. Delirium precautions in place.       Interval History: Patient seen at bedside at daughter. Actively hallucinating, but able to follow directions. ABG reviewed. Supplemental oxygen increased. Continue lasix 40 mg IV daily. Trial zyprexa 2.5 mg qhs.     Review of Systems   Unable to perform ROS: Mental status change   Objective:     Vital Signs (Most Recent):  Temp: 97 °F (36.1 °C) (05/28/22 0514)  Pulse: 70 (05/28/22 1100)  Resp: 16 (05/28/22 1100)  BP: 100/61 (05/28/22 1100)  SpO2: (!) 90 % (05/28/22 1100)   Vital Signs (24h Range):  Temp:  [97 °F (36.1 °C)-97.1 °F (36.2 °C)] 97 °F (36.1 °C)  Pulse:  [62-86] 70  Resp:  [16-18] 16  SpO2:  [84 %-95 %] 90 %  BP: (100-152)/(52-67) 100/61     Weight: 41 kg (90 lb 6.2 oz)  Body mass index is 17.65 kg/m².  No intake or output data in the 24 hours ending 05/28/22 1242   Physical Exam  Vitals and nursing note reviewed.   Constitutional:       General: She is not in acute distress.     Appearance: She is cachectic. She is not toxic-appearing or diaphoretic.      Comments: Chronically ill appearing   HENT:      Head: Normocephalic and atraumatic.      Ears:      Comments: [place]     Nose: Nose normal.      Mouth/Throat:      Mouth: Mucous membranes are moist.   Eyes:      Pupils: Pupils are equal, round, and reactive to light.   Cardiovascular:      Rate and Rhythm: Normal rate and regular rhythm.   Pulmonary:      Effort: No tachypnea or respiratory distress.      Breath sounds: Examination of the right-lower field reveals decreased breath sounds and rales. Examination of the  left-lower field reveals decreased breath sounds and rales. Decreased breath sounds and rales present. No wheezing or rhonchi.   Abdominal:      General: Bowel sounds are normal. There is no distension.      Palpations: Abdomen is soft.      Tenderness: There is no abdominal tenderness. There is no guarding.   Genitourinary:     Comments: deferred  Musculoskeletal:         General: No deformity. Normal range of motion.      Left forearm: Swelling (mild) and tenderness (mild TTP) present. No deformity.      Cervical back: Normal range of motion. No tenderness.      Right lower leg: No edema.      Left lower leg: No edema.   Skin:     General: Skin is warm.      Capillary Refill: Capillary refill takes 2 to 3 seconds.      Findings: Bruising and erythema present.   Neurological:      Mental Status: She is alert and oriented to person, place, and time.      Cranial Nerves: No dysarthria or facial asymmetry.      Motor: Weakness (generalized) present.      Comments: Patient alert and oriented x3, noted to have intermittent confusion during my exam. Following commands and moving all 4 extremities.   Psychiatric:         Attention and Perception: She is inattentive. She perceives auditory and visual hallucinations.         Speech: Speech normal.         Behavior: Behavior is cooperative.       Significant Labs: All pertinent labs within the past 24 hours have been reviewed.  CBC:   Recent Labs   Lab 05/26/22  1500 05/27/22  0416   WBC 10.10 10.98   HGB 12.7 11.7*   HCT 40.7 37.3    181     CMP:   Recent Labs   Lab 05/26/22  1500 05/27/22  0416    140   K 4.1 3.8   CL 93* 94*   CO2 38* 35*   GLU 93 90   BUN 20 18   CREATININE 0.8 0.8   CALCIUM 10.2 9.9   PROT 6.7  --    ALBUMIN 2.8*  --    BILITOT 0.6  --    ALKPHOS 125  --    AST 26  --    ALT 16  --    ANIONGAP 9 11   EGFRNONAA >60.0 >60.0       Significant Imaging: I have reviewed all pertinent imaging results/findings within the past 24  hours.      Assessment/Plan:      * Delirium  Debility  Acute/chronic due to recent surgery/illnesses/prolonged hospitalization and nutritional status in setting of vascular dementia  - high risk of fall / injury utilize all safety measures and fall precautions   - infectious work up unremarkable  - ABG reviewed, do not suspect contributing  -PT/OT ordered  -Social work consult for discharge planning.  - trial zyprex 2.5 mg qhs   - will need to continue palliative care at discharge    Chronic respiratory failure with hypoxia and hypercapnia  Patient with Hypercapnic and Hypoxic Respiratory failure which is Chronic.  she is on home oxygen at 2.5 LPM. Supplemental oxygen was provided and noted-  .  Contributing diagnoses includes - CHF and COPD Labs and images were reviewed. Patient Has recent ABG, which has been reviewed.   - increased home oxygen to 2.5 L   - continue diuresis with lasix   - duonebs PRN    Acute on chronic diastolic heart failure  Patient is identified as having Diastolic (HFpEF) heart failure that is Acute on Chronic. CHF is currently uncontrolled due to Rales/crackles on pulmonary exam and Pulmonary edema/pleural effusion on CXR. Latest ECHO performed and demonstrates- Results for orders placed during the hospital encounter of 03/12/22    Echo    Interpretation Summary  · The left ventricle is normal in size with normal systolic function.  · Moderate to severe left atrial enlargement.  · Asymmetric septal hypertrophy as noted in 2019, septum measuring 1.5cm (unchanged from prior measurement, unable to view images) without obstruction  · Grade II left ventricular diastolic dysfunction.  · The estimated ejection fraction is 60%.  · Intermediate central venous pressure (8 mmHg).  · The estimated PA systolic pressure is 45 mmHg.  · There is pulmonary hypertension.  · Mild right ventricular enlargement with mildly reduced right ventricular systolic function.  · Mild tricuspid regurgitation.  · Mild  mitral regurgitation.  . Continue Beta Blocker Furosemide and monitor clinical status closely. Monitor on telemetry. Patient is off CHF pathway.  Monitor strict Is&Os and daily weights.  Place on fluid restriction of 1.5 L. Continue to stress to patient importance of self efficacy and  on diet for CHF. Last BNP reviewed- and noted below   Recent Labs   Lab 05/26/22  1858   BNP 3,022*   -Previously dry weight 83lbs. Currently 89lbs.  -Given 20mg IV lasix in the ED.  - will give Lasix 40mg IV this AM them reassess fluid status, will give additional lasix 40 mg IV     Elevated troponin  - trop 0.0.96, appears to be at baseline.  - no chest pain     Left arm swelling  Thrombus of the Superficial Vein of left arm  Family reporting significant bruising as well as erythema to left forearm with mild swelling. Family reports she had IV in this area.  -US LUE with occlusive or nearly occlusive left basilic vein thrombus (superficial vein)  -Elevate LUE, will hold off on NSAIDs for now given low CrCl  -continue to monitor    Body mass index (BMI) less than 19  Nutrition consulted. Body mass index is 17.39 kg/m².. Encourage maximal PO intake. Diet supplementation ordered per nutrition approval. Will encourage PO and monitor closely for weight changes.    COPD (chronic obstructive pulmonary disease)  - Currently on home oxygen settings  - No concern for COPD exacerbation at this time  - Continue home inhaler regimen  - continue levoalbuterol q6h while awake  - PRN duonebs q4h for sob    Acute metabolic encephalopathy  -Patients family reporting possible hallucinations yesterday in the hospital. At the time of my exam patient is alert and oriented x3. She does have intermittent confusion but was easily reoriented.  -Will monitor neuro status carefully, avoid narcotics and benzos that will exacerbate agitation, and use PRN anti-psychotics for controls of behavior for self harm.  -Delirium precautions.    Pacemaker  -No  acute issues.     Coronary artery disease  Patient with known CAD, which is controlled Will continue ASA and monitor for S/Sx of angina/ACS. Continue to monitor on telemetry.     Anxiety  -Chronic, controlled.  -Continue PRN hydroxyzine     Debility         Stage 3 chronic kidney disease  Creatine stable for now. BMP reviewed- noted Estimated Creatinine Clearance: 28 mL/min (based on SCr of 0.8 mg/dL). according to latest data. Monitor UOP and serial BMP and adjust therapy as needed. Renally dose meds.    Hypertension  -Chronic issue, appears controlled.  -Continue lopressor 12.5 mg BID.      VTE Risk Mitigation (From admission, onward)         Ordered     IP VTE HIGH RISK PATIENT  Once         05/26/22 1846     Place sequential compression device  Until discontinued         05/26/22 1846                Discharge Planning   TRISTON: 5/28/2022     Code Status: DNR   Is the patient medically ready for discharge?: No    Reason for patient still in hospital (select all that apply): Patient unstable, Patient trending condition, Laboratory test and Treatment  Discharge Plan A: Home Health                  Marce Stephenson PA-C  Department of Hospital Medicine   [facility] - Telemetry Stepdown ([place]-)

## 2022-05-28 NOTE — ASSESSMENT & PLAN NOTE
Patient is identified as having Diastolic (HFpEF) heart failure that is Acute on Chronic. CHF is currently uncontrolled due to Rales/crackles on pulmonary exam and Pulmonary edema/pleural effusion on CXR. Latest ECHO performed and demonstrates- Results for orders placed during the hospital encounter of 03/12/22    Echo    Interpretation Summary  · The left ventricle is normal in size with normal systolic function.  · Moderate to severe left atrial enlargement.  · Asymmetric septal hypertrophy as noted in 2019, septum measuring 1.5cm (unchanged from prior measurement, unable to view images) without obstruction  · Grade II left ventricular diastolic dysfunction.  · The estimated ejection fraction is 60%.  · Intermediate central venous pressure (8 mmHg).  · The estimated PA systolic pressure is 45 mmHg.  · There is pulmonary hypertension.  · Mild right ventricular enlargement with mildly reduced right ventricular systolic function.  · Mild tricuspid regurgitation.  · Mild mitral regurgitation.  . Continue Beta Blocker Furosemide and monitor clinical status closely. Monitor on telemetry. Patient is off CHF pathway.  Monitor strict Is&Os and daily weights.  Place on fluid restriction of 1.5 L. Continue to stress to patient importance of self efficacy and  on diet for CHF. Last BNP reviewed- and noted below   Recent Labs   Lab 05/26/22  1858   BNP 3,022*   -Previously dry weight 83lbs. Currently 89lbs.  -Given 20mg IV lasix in the ED.  - will give Lasix 40mg IV this AM them reassess fluid status, will give additional lasix 40 mg IV

## 2022-05-28 NOTE — ASSESSMENT & PLAN NOTE
Debility  Acute/chronic due to recent surgery/illnesses/prolonged hospitalization and nutritional status in setting of vascular dementia  - high risk of fall / injury utilize all safety measures and fall precautions   - infectious work up unremarkable  - ABG reviewed, do not suspect contributing  -PT/OT ordered  -Social work consult for discharge planning.  - trial zyprex 2.5 mg qhs   - will need to continue palliative care at discharge

## 2022-05-28 NOTE — NURSING
Patient has been visually hallucinating most of the day and verbalizing inappropriately.  She is surrounded by family at the bedside who are attenative to her.  Zyprexa 2.5mg po given.  Will continue monitoring.

## 2022-05-29 VITALS
SYSTOLIC BLOOD PRESSURE: 144 MMHG | HEIGHT: 60 IN | BODY MASS INDEX: 17.75 KG/M2 | TEMPERATURE: 97 F | RESPIRATION RATE: 18 BRPM | WEIGHT: 90.38 LBS | OXYGEN SATURATION: 100 % | DIASTOLIC BLOOD PRESSURE: 87 MMHG | HEART RATE: 90 BPM

## 2022-05-29 PROBLEM — F03.C0 SEVERE DEMENTIA: Status: ACTIVE | Noted: 2022-05-29

## 2022-05-29 PROBLEM — Z51.5 COMFORT MEASURES ONLY STATUS: Status: ACTIVE | Noted: 2022-05-29

## 2022-05-29 LAB
ANION GAP SERPL CALC-SCNC: 15 MMOL/L (ref 8–16)
BASOPHILS # BLD AUTO: 0.07 K/UL (ref 0–0.2)
BASOPHILS NFR BLD: 0.6 % (ref 0–1.9)
BUN SERPL-MCNC: 29 MG/DL (ref 10–30)
CALCIUM SERPL-MCNC: 10.1 MG/DL (ref 8.7–10.5)
CHLORIDE SERPL-SCNC: 92 MMOL/L (ref 95–110)
CO2 SERPL-SCNC: 35 MMOL/L (ref 23–29)
CREAT SERPL-MCNC: 1.1 MG/DL (ref 0.5–1.4)
DIFFERENTIAL METHOD: ABNORMAL
EOSINOPHIL # BLD AUTO: 0.2 K/UL (ref 0–0.5)
EOSINOPHIL NFR BLD: 1.5 % (ref 0–8)
ERYTHROCYTE [DISTWIDTH] IN BLOOD BY AUTOMATED COUNT: 15.6 % (ref 11.5–14.5)
EST. GFR  (AFRICAN AMERICAN): 50 ML/MIN/1.73 M^2
EST. GFR  (NON AFRICAN AMERICAN): 43.4 ML/MIN/1.73 M^2
GLUCOSE SERPL-MCNC: 72 MG/DL (ref 70–110)
HCT VFR BLD AUTO: 36.9 % (ref 37–48.5)
HGB BLD-MCNC: 11.8 G/DL (ref 12–16)
IMM GRANULOCYTES # BLD AUTO: 0.05 K/UL (ref 0–0.04)
IMM GRANULOCYTES NFR BLD AUTO: 0.4 % (ref 0–0.5)
LYMPHOCYTES # BLD AUTO: 1.3 K/UL (ref 1–4.8)
LYMPHOCYTES NFR BLD: 11 % (ref 18–48)
MAGNESIUM SERPL-MCNC: 2.3 MG/DL (ref 1.6–2.6)
MCH RBC QN AUTO: 31.7 PG (ref 27–31)
MCHC RBC AUTO-ENTMCNC: 32 G/DL (ref 32–36)
MCV RBC AUTO: 99 FL (ref 82–98)
MONOCYTES # BLD AUTO: 1.2 K/UL (ref 0.3–1)
MONOCYTES NFR BLD: 10.2 % (ref 4–15)
NEUTROPHILS # BLD AUTO: 8.7 K/UL (ref 1.8–7.7)
NEUTROPHILS NFR BLD: 76.3 % (ref 38–73)
NRBC BLD-RTO: 0 /100 WBC
PLATELET # BLD AUTO: 196 K/UL (ref 150–450)
PMV BLD AUTO: 10.3 FL (ref 9.2–12.9)
POTASSIUM SERPL-SCNC: 4.1 MMOL/L (ref 3.5–5.1)
RBC # BLD AUTO: 3.72 M/UL (ref 4–5.4)
SODIUM SERPL-SCNC: 142 MMOL/L (ref 136–145)
WBC # BLD AUTO: 11.35 K/UL (ref 3.9–12.7)

## 2022-05-29 PROCEDURE — 25000242 PHARM REV CODE 250 ALT 637 W/ HCPCS: Performed by: NURSE PRACTITIONER

## 2022-05-29 PROCEDURE — 99217 PR OBSERVATION CARE DISCHARGE: CPT | Mod: ,,, | Performed by: PHYSICIAN ASSISTANT

## 2022-05-29 PROCEDURE — 99497 PR ADVNCD CARE PLAN 30 MIN: ICD-10-PCS | Mod: ,,, | Performed by: PHYSICIAN ASSISTANT

## 2022-05-29 PROCEDURE — 36415 COLL VENOUS BLD VENIPUNCTURE: CPT | Performed by: NURSE PRACTITIONER

## 2022-05-29 PROCEDURE — 25000003 PHARM REV CODE 250: Performed by: NURSE PRACTITIONER

## 2022-05-29 PROCEDURE — G0378 HOSPITAL OBSERVATION PER HR: HCPCS

## 2022-05-29 PROCEDURE — 80048 BASIC METABOLIC PNL TOTAL CA: CPT | Performed by: NURSE PRACTITIONER

## 2022-05-29 PROCEDURE — 99497 ADVNCD CARE PLAN 30 MIN: CPT | Mod: ,,, | Performed by: PHYSICIAN ASSISTANT

## 2022-05-29 PROCEDURE — 63600175 PHARM REV CODE 636 W HCPCS: Performed by: PHYSICIAN ASSISTANT

## 2022-05-29 PROCEDURE — 99217 PR OBSERVATION CARE DISCHARGE: ICD-10-PCS | Mod: ,,, | Performed by: PHYSICIAN ASSISTANT

## 2022-05-29 PROCEDURE — 85025 COMPLETE CBC W/AUTO DIFF WBC: CPT | Performed by: NURSE PRACTITIONER

## 2022-05-29 PROCEDURE — 83735 ASSAY OF MAGNESIUM: CPT | Performed by: NURSE PRACTITIONER

## 2022-05-29 PROCEDURE — 27000221 HC OXYGEN, UP TO 24 HOURS

## 2022-05-29 PROCEDURE — 27200966 HC CLOSED SUCTION SYSTEM

## 2022-05-29 PROCEDURE — 25000003 PHARM REV CODE 250: Performed by: PHYSICIAN ASSISTANT

## 2022-05-29 PROCEDURE — 99900035 HC TECH TIME PER 15 MIN (STAT)

## 2022-05-29 PROCEDURE — 94761 N-INVAS EAR/PLS OXIMETRY MLT: CPT

## 2022-05-29 PROCEDURE — 94640 AIRWAY INHALATION TREATMENT: CPT

## 2022-05-29 PROCEDURE — 96372 THER/PROPH/DIAG INJ SC/IM: CPT | Performed by: PHYSICIAN ASSISTANT

## 2022-05-29 PROCEDURE — 25000003 PHARM REV CODE 250: Performed by: INTERNAL MEDICINE

## 2022-05-29 RX ORDER — LORAZEPAM 0.5 MG/1
0.5 TABLET ORAL EVERY 4 HOURS PRN
Status: DISCONTINUED | OUTPATIENT
Start: 2022-05-29 | End: 2022-05-29

## 2022-05-29 RX ORDER — FUROSEMIDE 40 MG/1
40 TABLET ORAL DAILY
Status: DISCONTINUED | OUTPATIENT
Start: 2022-05-29 | End: 2022-05-30 | Stop reason: HOSPADM

## 2022-05-29 RX ORDER — LORAZEPAM 2 MG/ML
0.5 INJECTION INTRAMUSCULAR EVERY 4 HOURS PRN
Status: DISCONTINUED | OUTPATIENT
Start: 2022-05-29 | End: 2022-05-30 | Stop reason: HOSPADM

## 2022-05-29 RX ORDER — FUROSEMIDE 40 MG/1
40 TABLET ORAL 2 TIMES DAILY
Status: DISCONTINUED | OUTPATIENT
Start: 2022-05-29 | End: 2022-05-29

## 2022-05-29 RX ADMIN — LEVALBUTEROL HYDROCHLORIDE 0.63 MG: 0.63 SOLUTION RESPIRATORY (INHALATION) at 02:05

## 2022-05-29 RX ADMIN — HYDROXYZINE HYDROCHLORIDE 10 MG: 10 TABLET ORAL at 02:05

## 2022-05-29 RX ADMIN — LEVALBUTEROL HYDROCHLORIDE 0.63 MG: 0.63 SOLUTION RESPIRATORY (INHALATION) at 08:05

## 2022-05-29 RX ADMIN — Medication: at 09:05

## 2022-05-29 RX ADMIN — OLANZAPINE 2.5 MG: 2.5 TABLET, FILM COATED ORAL at 02:05

## 2022-05-29 RX ADMIN — FLUTICASONE FUROATE AND VILANTEROL TRIFENATATE 1 PUFF: 100; 25 POWDER RESPIRATORY (INHALATION) at 08:05

## 2022-05-29 RX ADMIN — LORAZEPAM 0.5 MG: 2 INJECTION INTRAMUSCULAR; INTRAVENOUS at 03:05

## 2022-05-29 NOTE — CONSULTS
Discussed with primary team.  Consult placed for goals of care.  After BHUPINDER Franklin from team met with family, goals were clear family desired to have the pt home with hospice.      Consult cancelled though happy to help in any way needed.    Please call anytime or page  for luis eduardo ochoa MD on call.     Baljeet Pacheco MD  Palliative Medicine   [facility]  [phone] (cell)

## 2022-05-29 NOTE — NURSING
Patient remains confused, pulling O2 NC off. Grabbing at the ceiling and fidgeting around in the bed. VSS.  Repositioned for comfort.  Bed locked and in lowest position with SR upx2 and bed alarm set.  Family at bedside.  Will continue monitoring.

## 2022-05-29 NOTE — PLAN OF CARE
[facility]  Department of Hospital Medicine  [address] [place] [address]  [phone] after hours  [phone] fax    HOSPICE  ORDERS    05/29/2022    Admit to Hospice:  Home Service     Diagnoses:   Active Hospital Problems    Diagnosis  POA    *Delirium [R41.0]  Yes    Severe dementia [F03.90]  Yes    Acute embolism and thrombosis of superficial vein of left arm [I82.612]  Yes    Body mass index (BMI) less than 19 [Z68.1]  Not Applicable    Left arm swelling [M79.89]  Yes    COPD (chronic obstructive pulmonary disease) [J44.9]  Yes    Acute metabolic encephalopathy [G93.41]  Yes    Pacemaker [Z95.0]  Yes    Elevated troponin [R77.8]  Yes    Coronary artery disease [I25.10]  Yes    Chronic respiratory failure with hypoxia and hypercapnia [J96.11, J96.12]  Yes     Combination of severe debility, emphysema, diastolic heart failure.  Continues to benefit.      Anxiety [F41.9]  Yes    Debility [R53.81]  Yes    Stage 3 chronic kidney disease [N18.30]  Yes    Acute on chronic diastolic heart failure [I50.33]  Yes    Hypertension [I10]  Yes      Resolved Hospital Problems   No resolved problems to display.       Hospice Qualifying Diagnoses: Severe Dementia       Patient has a life expectancy < 6 months due to:  1) Primary Hospice Diagnosis:  Severe Dementia  2) Comorbid Conditions Contributing to Decline: Respiratory Failure, Congestive Heart Failure, Chronic Obstructive Pulmonary Disease    Vital Signs: Routine per Hospice Protocol.    Code Status: DNR    Allergies:   Review of patient's allergies indicates:   Allergen Reactions    Ancef in dextrose (iso-osm) Rash    Cefazolin Rash     Tolerating Zosyn admission 3/2022    Cefuroxime Rash    Sulfamethoxazole-trimethoprim Rash     Other reaction(s): Rash       Diet: Pleasure feedings    Activities: As tolerated    Nursing: Per Hospice Routine.      Routine Skin for Bedridden Patients: Apply moisture  barrier cream to all skin folds and   wet areas in perineal area daily and after baths and all bowel movements.    Oxygen: 3L NC as needed for SpO2 <94%    Medications:        Medication List      CONTINUE taking these medications    furosemide 40 MG tablet  Commonly known as: LASIX  Take 1 tablet (40 mg total) by mouth once daily. In the case of 3lbs weight gain in 1d or 5lbs in 3d, administer additional dose of lasix in the afternoon.     levalbuterol 0.63 mg/3 mL nebulizer solution  Commonly known as: XOPENEX  Take 3 mLs (0.63 mg total) by nebulization every 4 (four) hours as needed for Wheezing or Shortness of Breath. Rescue        STOP taking these medications    acetaminophen 500 MG tablet  Commonly known as: TYLENOL     albuterol 90 mcg/actuation inhaler  Commonly known as: PROAIR HFA     aspirin 81 mg Tab     baclofen 10 MG tablet  Commonly known as: LIORESAL     budesonide-formoterol 160-4.5 mcg 160-4.5 mcg/actuation Hfaa  Commonly known as: SYMBICORT     bumetanide 1 MG tablet  Commonly known as: BUMEX     ferrous sulfate 325 (65 FE) MG EC tablet     guaiFENesin 600 mg 12 hr tablet  Commonly known as: MUCINEX     hydrOXYzine HCL 10 MG Tab  Commonly known as: ATARAX     Lactobacillus rhamnosus GG 10 billion cell capsule  Commonly known as: CULTURELLE     MELATONIN ORAL     metoprolol tartrate 25 MG tablet  Commonly known as: LOPRESSOR     multivitamin per tablet  Commonly known as: THERAGRAN     pantoprazole 40 MG tablet  Commonly known as: PROTONIX     polyethylene glycol 17 gram/dose powder  Commonly known as: GLYCOLAX     potassium chloride 10 MEQ Cpsr  Commonly known as: MICRO-K     SPIRIVA RESPIMAT 2.5 mcg/actuation inhaler  Generic drug: tiotropium bromide     tetrahydrozoline 0.05% 0.05 % Drop  Commonly known as: Vision Clear     triamcinolone 55 mcg nasal inhaler  Commonly known as: NASACORT            Superficial Vein Thrombus in the Left upper extremity: Elevate left arm to help with  swelling    Future Orders:  Hospice Medical Director may dictate new orders for comfortable care measures & sign death certificate.        _________________________________  Yudi Arroyo PA-C  05/29/2022

## 2022-05-29 NOTE — PLAN OF CARE
Problem: Oral Intake Inadequate  Goal: Improved Oral Intake  Outcome: Ongoing, Progressing     Problem: Altered Behavior (Delirium)  Goal: Improved Behavioral Control  Outcome: Ongoing, Progressing     Problem: Adjustment to Illness (Delirium)  Goal: Optimal Coping  Outcome: Ongoing, Progressing     Problem: Sleep Disturbance (Delirium)  Goal: Improved Sleep  Outcome: Ongoing, Progressing     Problem: Fall Injury Risk  Goal: Absence of Fall and Fall-Related Injury  Outcome: Ongoing, Progressing     Problem: Skin Injury Risk Increased  Goal: Skin Health and Integrity  Outcome: Ongoing, Progressing

## 2022-05-29 NOTE — NURSING
Ativan 0.5mg IM given per orders for agitation.  Patient is noted to be restless and reaching for the air.  Respirations are uneven and labored at 22. Multiple family at bedside.  Patient with garbled speech.  O2 ventimask on with O2 sats at 85-89%.  Will continue monitoring.

## 2022-05-29 NOTE — NURSING
DC instructions given to daughterBri.  Multiple questions answered. Awaiting Ambulance for home transport,  Patient remains awake lying in bed with intermittent confusion. O2 sats at 89-90%, Respirations labored 20-24 Family at bedside.  Will continue to monitor.

## 2022-05-29 NOTE — NURSING
Patient is spitting her meds out her meds, even with crushing then and adding pudding.  She remains confused and agitated.  Her son-in-law is at the bedside and is very tentative. Support provided.  Family is requesting to have MD visit today, communicated with team that  MD will visit as soon as time permits. Reassured family that PA is in constant communication with MD.  Verbalizes understanding.  Will continue monitoring.

## 2022-05-29 NOTE — ACP (ADVANCE CARE PLANNING)
Advance Care Planning     Date: 05/29/2022    [facility]  I engaged the family in a conversation about advance care planning and we specifically addressed what the goals of care would be moving forward, in light of the patient's change in clinical status, specifically comfort care and home hospice.  We did specifically address the patient's likely prognosis, which is poor.  We explored the patient's values and preferences for future care.  The family endorses that what is most important right now is to focus on spending time at home, avoiding the hospital and comfort and QOL     Accordingly, we have decided that the best plan to meet the patient's goals includes enrolling in hospice care    I did explain the role for hospice care at this stage of the patient's illness, including its ability to help the patient live with the best quality of life possible.  We will be making a hospice referral.    I spent a total of 45 minutes engaging the patient in this advance care planning discussion.

## 2022-05-29 NOTE — PROGRESS NOTES
05/29/22 0032        Wound 05/29/22 0032 Skin Tear Right lower;dorsal Arm   Date First Assessed/Time First Assessed: 05/29/22 0032   Pre-existing: Yes  Primary Wound Type: Skin Tear  Side: Right  Orientation: lower;dorsal  Location: Arm   Dressing Appearance No dressing   Drainage Amount Scant   Drainage Characteristics/Odor Sanguineous;Bleeding controlled   Appearance [place]   Periwound Area Intact;Ecchymotic   Care Cleansed with:;Sterile normal saline   Dressing Foam     Called to pt's rm by son-in-law. Reports pt was flailing arms around, hit R FA on SR and re-opened skin tear. Previously healing skin tear now open w/scant bloody drainage. Cleansed w/SNS and applied foam dsg.Pt continues to flail arms, having visual and auditory hallucinations. Talking to and grabbing for people/things that are not there. Putting arms/legs through SR. SR padded. Son-in-law remains @ BS, very attentive to pt. VSS. NAD noted. WCTM.

## 2022-05-29 NOTE — NURSING
Pt flailing around in bed, removing nasal cannula, putting legs through SR. Continues to have visual and auditory halluciantions. Medicated w/PRN zyprexa and hydroxyzine. Pt having difficulty swallowing- meds crushed and given in chocolate pudding. Repositioned for comfort. Son-in-law remains @ BS. NAD noted. WCTM.

## 2022-05-29 NOTE — NURSING
Patient lying quietly in bed with eyes closed.  O2 sats at 100% on O2 facemask.  Respirations even and unlabored noted rise and fall of chest.  Multiple family at bedside.  Will continue monitoring.

## 2022-05-29 NOTE — PLAN OF CARE
11:00 AM  CM notified by treatment team patient will discharge home with hospice. Patient is current with [facility] but unsure if they have hospice service.     12:00 PM  CM contacted on-call nurse at [facility], they only have hospice services on the [facility].    12:30 PM  CM initiated home hospice referral to Vita Martinez RN at [facility]. CM provided demographics and daughters names/numbers to reach them.     1:00 PM  [facility] accepted patient. CM to patient's bedside to speak with Bri, patient's daughter. Vita with [facility] will come to patient's room for assessment. Patient's daughter verbalized understanding.    3:30 PM  CM arranged ambulance transportation via [facility], requested pickup time 6PM to allow time for home equipment delivery. CM emailed all requested documents to [facility].    4:00 PM  Vita with [facility] has equipment headed to patient's home, ok to move up transportation. CM contacted Francisco Javier at [facility] dispatch. They have 2 emergencies in front of patient but should pickup around 5:30 PM.    4:30 PM  CM to patient's bedside, updated daughter, Bri and she verbalized understanding. CM called Vita at [facility] to update on transportation time. CM updated treatment team on plan status, patient ready to discharge from CM standpoint.      Alda Garrido RN  Weekend  - [facility]  [phone]

## 2022-05-30 ENCOUNTER — OFFICE VISIT (OUTPATIENT)
Dept: NEUROLOGY | Facility: CLINIC | Age: 87
End: 2022-05-30
Payer: MEDICARE

## 2022-05-30 DIAGNOSIS — I67.89 CEREBRAL MICROVASCULAR DISEASE: ICD-10-CM

## 2022-05-30 DIAGNOSIS — R41.0 DELIRIUM: Primary | ICD-10-CM

## 2022-05-30 PROCEDURE — 1157F PR ADVANCE CARE PLAN OR EQUIV PRESENT IN MEDICAL RECORD: ICD-10-PCS | Mod: CPTII,95,, | Performed by: NEUROLOGICAL SURGERY

## 2022-05-30 PROCEDURE — 1111F PR DISCHARGE MEDS RECONCILED W/ CURRENT OUTPATIENT MED LIST: ICD-10-PCS | Mod: CPTII,95,, | Performed by: NEUROLOGICAL SURGERY

## 2022-05-30 PROCEDURE — 99204 OFFICE O/P NEW MOD 45 MIN: CPT | Mod: 95,,, | Performed by: NEUROLOGICAL SURGERY

## 2022-05-30 PROCEDURE — 1111F DSCHRG MED/CURRENT MED MERGE: CPT | Mod: CPTII,95,, | Performed by: NEUROLOGICAL SURGERY

## 2022-05-30 PROCEDURE — 1157F ADVNC CARE PLAN IN RCRD: CPT | Mod: CPTII,95,, | Performed by: NEUROLOGICAL SURGERY

## 2022-05-30 PROCEDURE — 99204 PR OFFICE/OUTPT VISIT, NEW, LEVL IV, 45-59 MIN: ICD-10-PCS | Mod: 95,,, | Performed by: NEUROLOGICAL SURGERY

## 2022-05-30 NOTE — PLAN OF CARE
Pt discharged approx 10 pm family at bedside. IV removed from day shift nurse. Incontinence care performed prior to discharge. Discharge instructions reviewed with pt family and they verbalized understanding   Problem: Adult Inpatient Plan of Care  Goal: Plan of Care Review  Outcome: Ongoing, Progressing  Goal: Patient-Specific Goal (Individualized)  Outcome: Ongoing, Progressing  Goal: Absence of Hospital-Acquired Illness or Injury  Outcome: Ongoing, Progressing  Goal: Optimal Comfort and Wellbeing  Outcome: Ongoing, Progressing  Goal: Readiness for Transition of Care  Outcome: Ongoing, Progressing     Problem: Adjustment to Illness (Sepsis/Septic Shock)  Goal: Optimal Coping  Outcome: Ongoing, Progressing     Problem: Bleeding (Sepsis/Septic Shock)  Goal: Absence of Bleeding  Outcome: Ongoing, Progressing     Problem: Glycemic Control Impaired (Sepsis/Septic Shock)  Goal: Blood Glucose Level Within Desired Range  Outcome: Ongoing, Progressing     Problem: Infection Progression (Sepsis/Septic Shock)  Goal: Absence of Infection Signs and Symptoms  Outcome: Ongoing, Progressing     Problem: Nutrition Impaired (Sepsis/Septic Shock)  Goal: Optimal Nutrition Intake  Outcome: Ongoing, Progressing     Problem: Impaired Wound Healing  Goal: Optimal Wound Healing  Outcome: Ongoing, Progressing     Problem: Skin Injury Risk Increased  Goal: Skin Health and Integrity  Outcome: Ongoing, Progressing     Problem: Fall Injury Risk  Goal: Absence of Fall and Fall-Related Injury  Outcome: Ongoing, Progressing     Problem: Adjustment to Illness (Delirium)  Goal: Optimal Coping  Outcome: Ongoing, Progressing     Problem: Altered Behavior (Delirium)  Goal: Improved Behavioral Control  Outcome: Ongoing, Progressing     Problem: Attention and Thought Clarity Impairment (Delirium)  Goal: Improved Attention and Thought Clarity  Outcome: Ongoing, Progressing

## 2022-05-31 NOTE — ASSESSMENT & PLAN NOTE
Patient with Hypercapnic and Hypoxic Respiratory failure which is Chronic.  she is on home oxygen at 2.5 LPM. Supplemental oxygen was provided and noted-  .  Contributing diagnoses includes - CHF and COPD Labs and images were reviewed. Patient Has recent ABG, which has been reviewed.   - increased home oxygen to 3L  - patient with continued decline in health with CO2 retention  - discharging home on home hospice with supplemental NC for comfort

## 2022-05-31 NOTE — ASSESSMENT & PLAN NOTE
Patient is identified as having Diastolic (HFpEF) heart failure that is Acute on Chronic. CHF is currently uncontrolled due to Rales/crackles on pulmonary exam and Pulmonary edema/pleural effusion on CXR. Latest ECHO performed and demonstrates- Results for orders placed during the hospital encounter of 03/12/22    Echo    Interpretation Summary  · The left ventricle is normal in size with normal systolic function.  · Moderate to severe left atrial enlargement.  · Asymmetric septal hypertrophy as noted in 2019, septum measuring 1.5cm (unchanged from prior measurement, unable to view images) without obstruction  · Grade II left ventricular diastolic dysfunction.  · The estimated ejection fraction is 60%.  · Intermediate central venous pressure (8 mmHg).  · The estimated PA systolic pressure is 45 mmHg.  · There is pulmonary hypertension.  · Mild right ventricular enlargement with mildly reduced right ventricular systolic function.  · Mild tricuspid regurgitation.  · Mild mitral regurgitation.    Recent Labs   Lab 05/26/22  1858   BNP 3,022*   -Previously dry weight 83lbs. Currently 89lbs.  -Lasix continued with hospice for symptoms managment

## 2022-05-31 NOTE — DISCHARGE SUMMARY
"[facility] - Telemetry Stepdown ([place] [id number])  [facility] Medicine  Discharge Summary      Patient Name: Venus Mayer  MRN: [id number]  Patient Class: OP- Observation  Admission Date: 5/26/2022  Hospital Length of Stay: 3 days  Discharge Date and Time: 5/29/2022 10:58 PM  Attending Physician: Tramaine Cabrera MD  Discharging Provider: Yudi Arroyo PA-C  Primary Care Provider: Jona Che MD  [facility] Medicine Team: [facility] Yudi Arroyo PA-C    HPI:   Venus Mayer is a 89 y.o. female with a PMHx of cardiomyopathy, CHFpEF (60%), pacemaker in place, HLD, HTN, COPD on home 2.5L NC, and vascular dementia who presents to the ED from home for generalized weakness. She was discharged home from the hospital yesterday evening after being treated for UTI with 3 days of ertapenem. She was also diuresed with IV lasix. Daughter at bedside reports that patient is very weak, difficult to perform ADLs.  Per daughter yesterday while still in the hospital the patient started "hallucinating" - talking with her eyes closed, but that seems to have improved. She has had some intermittent confusion that has been ongoing since her last admission. Her daughter also reports some redness/bruising to her L arm close to previous IV site.  No falls since she has been home. At the time of my exam patient is alert and able to answer questions appropriately. The patient and family deny and fever, chills, nausea, vomiting, or diarrhea. The patient denies any chest pain, abdominal pain, or headache. She does note some shortness of breath.    In the ED, VSSAF. Patient stable on 2.5L O2 which she wears at home. CBC and CMP unremarkable. UA negative for infection. BNP and troponin pending. CXR with mild-moderate perihilar and lower lobe edema small pleural effusions slightly worse from the prior study. ED  talked with family and provided resources for sitters/in home care. Daughter called to try and set this up but many of the " facilities have waiting lists. Family at bedside does not feel that they can properly care for the patient at home on their own due to her weakness. Patient will be placed in observation for further work up and evaluation.       * No surgery found *      Hospital Course:   Venus Mayer is a 89 y.o. female admitted to hospital medicine for debility and delirium. US LUE with superficial thrombus 2/2 IV from previous stay. Remaining work up largely unremarkable. Patient with continued overall decline in health with CO2 retention. Discussed extent of patient's progressing medical condition with family. Family agreeable to home hospice. [facility] able to accept patient. Patient's last rights were given at bedside prior to discharge home.        Goals of Care Treatment Preferences:  Code Status: DNR       LaPOST: Yes  What is most important right now is to focus on spending time at home, avoiding the hospital, comfort and QOL .  Accordingly, we have decided that the best plan to meet the patient's goals includes enrolling in hospice care.      Consults:   Consults (From admission, onward)        Status Ordering Provider     Inpatient consult to Palliative Care  Once        Provider:  (Not yet assigned)    Completed ROB MENDOZA     Inpatient consult to Registered Dietitian/Nutritionist  Once        Provider:  (Not yet assigned)    Completed JANI TENA     Inpatient consult to Social Work  Once        Provider:  (Not yet assigned)    Completed JANI TENA          * Delirium  Debility  Acute/chronic due to recent surgery/illnesses/prolonged hospitalization and nutritional status in setting of vascular dementia  - high risk of fall / injury utilize all safety measures and fall precautions   - infectious work up unremarkable  - discharge home on home hospice    Left arm swelling  Thrombus of the Superficial Vein of left arm  Family reporting significant bruising as well as erythema to left  forearm with mild swelling. Family reports she had IV in this area.  -US LUE with occlusive or nearly occlusive left basilic vein thrombus (superficial vein)  -Elevate LUE, will hold off on NSAIDs for now given low CrCl    COPD (chronic obstructive pulmonary disease)  - Currently on home oxygen settings  - No concern for COPD exacerbation at this time    Acute metabolic encephalopathy  -Patients family reporting possible hallucinations yesterday in the hospital. At the time of my exam patient is alert and oriented x3. She does have intermittent confusion but was easily reoriented.  -severe dementia declne    Coronary artery disease  Patient with known CAD    Chronic respiratory failure with hypoxia and hypercapnia  Patient with Hypercapnic and Hypoxic Respiratory failure which is Chronic.  she is on home oxygen at 2.5 LPM. Supplemental oxygen was provided and noted-  .  Contributing diagnoses includes - CHF and COPD Labs and images were reviewed. Patient Has recent ABG, which has been reviewed.   - increased home oxygen to 3L  - patient with continued decline in health with CO2 retention  - discharging home on home hospice with supplemental NC for comfort    Anxiety  -Chronic, controlled.    Acute on chronic diastolic heart failure  Patient is identified as having Diastolic (HFpEF) heart failure that is Acute on Chronic. CHF is currently uncontrolled due to Rales/crackles on pulmonary exam and Pulmonary edema/pleural effusion on CXR. Latest ECHO performed and demonstrates- Results for orders placed during the hospital encounter of 03/12/22    Echo    Interpretation Summary  · The left ventricle is normal in size with normal systolic function.  · Moderate to severe left atrial enlargement.  · Asymmetric septal hypertrophy as noted in 2019, septum measuring 1.5cm (unchanged from prior measurement, unable to view images) without obstruction  · Grade II left ventricular diastolic dysfunction.  · The estimated ejection  fraction is 60%.  · Intermediate central venous pressure (8 mmHg).  · The estimated PA systolic pressure is 45 mmHg.  · There is pulmonary hypertension.  · Mild right ventricular enlargement with mildly reduced right ventricular systolic function.  · Mild tricuspid regurgitation.  · Mild mitral regurgitation.    Recent Labs   Lab 05/26/22  1858   BNP 3,022*   -Previously dry weight 83lbs. Currently 89lbs.  -Lasix continued with hospice for symptoms managment    Hypertension  -Chronic issue, appears controlled.  -medications discontinued with hospine      Final Active Diagnoses:    Diagnosis Date Noted POA    PRINCIPAL PROBLEM:  Delirium [R41.0] 05/27/2022 Yes    Severe dementia [F03.90] 05/29/2022 Yes    Comfort measures only status [Z51.5] 05/29/2022 Not Applicable    Acute embolism and thrombosis of superficial vein of left arm [I82.612] 05/27/2022 Yes    Body mass index (BMI) less than 19 [Z68.1] 04/10/2022 Not Applicable    Left arm swelling [M79.89] 04/10/2022 Yes    COPD (chronic obstructive pulmonary disease) [J44.9] 03/21/2022 Yes    Acute metabolic encephalopathy [G93.41] 03/16/2022 Yes    Pacemaker [Z95.0] 03/12/2022 Yes    Elevated troponin [R77.8] 07/15/2021 Yes    Coronary artery disease [I25.10] 09/07/2019 Yes    Chronic respiratory failure with hypoxia and hypercapnia [J96.11, J96.12] 07/06/2019 Yes    Anxiety [F41.9] 05/26/2019 Yes    Debility [R53.81] 05/22/2019 Yes    Stage 3 chronic kidney disease [N18.30] 03/28/2013 Yes    Acute on chronic diastolic heart failure [I50.33] 01/17/2013 Yes    Hypertension [I10] 01/17/2013 Yes      Problems Resolved During this Admission:       Discharged Condition: Poor, home on hospice    Disposition: Hospice/Home    Follow Up:    Patient Instructions:   No discharge procedures on file.    Significant Diagnostic Studies: Labs: All labs within the past 24 hours have been reviewed    Pending Diagnostic Studies:     None          Medications:  Reconciled Home Medications:      Medication List      CONTINUE taking these medications    furosemide 40 MG tablet  Commonly known as: LASIX  Take 1 tablet (40 mg total) by mouth once daily. In the case of 3lbs weight gain in 1d or 5lbs in 3d, administer additional dose of lasix in the afternoon.     levalbuterol 0.63 mg/3 mL nebulizer solution  Commonly known as: XOPENEX  Take 3 mLs (0.63 mg total) by nebulization every 4 (four) hours as needed for Wheezing or Shortness of Breath. Rescue        STOP taking these medications    acetaminophen 500 MG tablet  Commonly known as: TYLENOL     albuterol 90 mcg/actuation inhaler  Commonly known as: PROAIR HFA     aspirin 81 mg Tab     baclofen 10 MG tablet  Commonly known as: LIORESAL     budesonide-formoterol 160-4.5 mcg 160-4.5 mcg/actuation Hfaa  Commonly known as: SYMBICORT     bumetanide 1 MG tablet  Commonly known as: BUMEX     ferrous sulfate 325 (65 FE) MG EC tablet     guaiFENesin 600 mg 12 hr tablet  Commonly known as: MUCINEX     hydrOXYzine HCL 10 MG Tab  Commonly known as: ATARAX     Lactobacillus rhamnosus GG 10 billion cell capsule  Commonly known as: CULTURELLE     MELATONIN ORAL     metoprolol tartrate 25 MG tablet  Commonly known as: LOPRESSOR     multivitamin per tablet  Commonly known as: THERAGRAN     pantoprazole 40 MG tablet  Commonly known as: PROTONIX     polyethylene glycol 17 gram/dose powder  Commonly known as: GLYCOLAX     potassium chloride 10 MEQ Cpsr  Commonly known as: MICRO-K     SPIRIVA RESPIMAT 2.5 mcg/actuation inhaler  Generic drug: tiotropium bromide     tetrahydrozoline 0.05% 0.05 % Drop  Commonly known as: Vision Clear     triamcinolone 55 mcg nasal inhaler  Commonly known as: NASACORT            Indwelling Lines/Drains at time of discharge:   Lines/Drains/Airways     None                 Time spent on the discharge of patient: 50 minutes         Yudi Arroyo PA-C  Department of Hospital Medicine  [facility]  [facility]

## 2022-05-31 NOTE — ASSESSMENT & PLAN NOTE
Thrombus of the Superficial Vein of left arm  Family reporting significant bruising as well as erythema to left forearm with mild swelling. Family reports she had IV in this area.  -US LUE with occlusive or nearly occlusive left basilic vein thrombus (superficial vein)  -Elevate LUE, will hold off on NSAIDs for now given low CrCl

## 2022-05-31 NOTE — PROGRESS NOTES
Chief Complaint   Patient presents with    Altered Mental Status        Venus Mayer is a 89 y.o. female with a history of multiple medical diagnoses as listed below that presents for evaluation of mentation changes. She has been to the hospital on multiple occasions for evaluation of UTI and changes in her mentation. She has also been found to have exacerbation of her heart disease and her respiratory disease as well. She has been much more sleepy and lethargic and has been uninterested in eating or drinking. She was released to home on hospice..     PAST MEDICAL HISTORY:  Past Medical History:   Diagnosis Date    Acute on chronic congestive heart failure 7/6/2019    Cardiomyopathy     Carotid artery occlusion     CHF (congestive heart failure)     COPD (chronic obstructive pulmonary disease)     Coronary artery disease     Hyperlipidemia     Hypertension        PAST SURGICAL HISTORY:  Past Surgical History:   Procedure Laterality Date    CARDIAC CATHETERIZATION      cardic cath      CAROTID ENDARTERECTOMY      FLEXIBLE BRONCHOSCOPY N/A 7/31/2019    Procedure: BRONCHOSCOPY, FIBEROPTIC Flexible;  Surgeon: Klever Mckeon MD;  Location: [address];  Service: Thoracic;  Laterality: N/A;    HIP SURGERY      PLEURODESIS USING TALC N/A 7/31/2019    Procedure: PLEURODESIS;  Surgeon: Klever Mckeon MD;  Location: [address];  Service: Thoracic;  Laterality: N/A;    PLEURODESIS USING TALC Right 8/5/2019    Procedure: PLEURODESIS, USING TALC;  Surgeon: Klever Mckeon MD;  Location: [address];  Service: Thoracic;  Laterality: Right;    PLEURODESIS WITH VIDEO-ASSISTED THORACOSCOPIC SURGERY (VATS) Right 8/5/2019    Procedure: VATS, WITH PLEURAL TENT;  Surgeon: Klever Mckeon MD;  Location: [address];  Service: Thoracic;  Laterality: Right;       SOCIAL HISTORY:  Social History     Socioeconomic History    Marital status:    Tobacco Use    Smoking status:  Former Smoker     Packs/day: 2.00     Years: 20.00     Pack years: 40.00     Types: Cigarettes     Quit date: 1980     Years since quittin.3    Smokeless tobacco: Never Used   Substance and Sexual Activity    Alcohol use: Yes     Alcohol/week: 2.0 standard drinks     Types: 2 Glasses of wine per week     Comment: social    Drug use: No    Sexual activity: Not Currently       FAMILY HISTORY:  Family History   Problem Relation Age of Onset    Hypertension Mother        ALLERGIES AND MEDICATIONS: updated and reviewed.  Review of patient's allergies indicates:   Allergen Reactions    Ancef in dextrose (iso-osm) Rash    Cefazolin Rash     Tolerating Zosyn admission 3/2022    Cefuroxime Rash    Sulfamethoxazole-trimethoprim Rash     Other reaction(s): Rash     Current Outpatient Medications   Medication Sig Dispense Refill    furosemide (LASIX) 40 MG tablet Take 1 tablet (40 mg total) by mouth once daily. In the case of 3lbs weight gain in 1d or 5lbs in 3d, administer additional dose of lasix in the afternoon. 30 tablet 2    levalbuterol (XOPENEX) 0.63 mg/3 mL nebulizer solution Take 3 mLs (0.63 mg total) by nebulization every 4 (four) hours as needed for Wheezing or Shortness of Breath. Rescue 3 mL 1     No current facility-administered medications for this visit.       Review of Systems   Constitutional: Positive for activity change. Negative for appetite change, fever and unexpected weight change.   HENT: Negative for trouble swallowing and voice change.    Eyes: Negative for photophobia and visual disturbance.   Respiratory: Negative for apnea and shortness of breath.    Cardiovascular: Negative for chest pain and leg swelling.   Gastrointestinal: Negative for constipation and nausea.   Genitourinary: Negative for difficulty urinating.   Musculoskeletal: Negative for back pain, gait problem and neck pain.   Skin: Negative for color change and pallor.   Neurological: Positive for weakness. Negative  for dizziness, seizures, syncope and numbness.   Hematological: Negative for adenopathy.   Psychiatric/Behavioral: Positive for confusion, decreased concentration and hallucinations. Negative for agitation.       Neurologic Exam     Mental Status   Speech: speech is normal   Level of consciousness: drowsy    Motor Exam   Muscle bulk: decreased    Gait, Coordination, and Reflexes     Gait  Gait: normal    Coordination   Finger to nose coordination: normal    Tremor   Resting tremor: absent      Physical Exam  Neurological:      Coordination: Finger-Nose-Finger Test normal.      Gait: Gait is intact.   Psychiatric:         Speech: Speech normal.         There were no vitals filed for this visit.    Assessment & Plan:    Problem List Items Addressed This Visit     Delirium - Primary    Cerebral microvascular disease    Overview     Extensive disease; likely contributing to her current mental state.                 The patient location is: home  The chief complaint leading to consultation is: AMS  Visit type: Virtual visit with synchronous audio and video  Total time spent with patient: 30  Each patient to whom he or she provides medical services by telemedicine is:  (1) informed of the relationship between the physician and patient and the respective role of any other health care provider with respect to management of the patient; and (2) notified that he or she may decline to receive medical services by telemedicine and may withdraw from such care at any time.    Notes:     Follow-up: No follow-ups on file.    This note was done with the assistance of voice recognition software. Some errors may be present after proofreading.

## 2022-05-31 NOTE — ASSESSMENT & PLAN NOTE
-Patients family reporting possible hallucinations yesterday in the hospital. At the time of my exam patient is alert and oriented x3. She does have intermittent confusion but was easily reoriented.  -severe dementia thi

## 2022-05-31 NOTE — ASSESSMENT & PLAN NOTE
Debility  Acute/chronic due to recent surgery/illnesses/prolonged hospitalization and nutritional status in setting of vascular dementia  - high risk of fall / injury utilize all safety measures and fall precautions   - infectious work up unremarkable  - discharge home on home hospice

## 2022-06-01 LAB
BACTERIA BLD CULT: NORMAL
BACTERIA BLD CULT: NORMAL

## 2022-06-10 ENCOUNTER — DOCUMENT SCAN (OUTPATIENT)
Dept: HOME HEALTH SERVICES | Facility: HOSPITAL | Age: 87
End: 2022-06-10
Payer: MEDICARE

## 2022-07-05 ENCOUNTER — HOSPITAL ENCOUNTER (INPATIENT)
Facility: HOSPITAL | Age: 87
LOS: 3 days | Discharge: HOSPICE | DRG: 871 | End: 2022-07-09
Attending: EMERGENCY MEDICINE | Admitting: STUDENT IN AN ORGANIZED HEALTH CARE EDUCATION/TRAINING PROGRAM
Payer: MEDICARE

## 2022-07-05 DIAGNOSIS — Z51.5 PALLIATIVE CARE ENCOUNTER: ICD-10-CM

## 2022-07-05 DIAGNOSIS — R65.21 SEPTIC SHOCK: ICD-10-CM

## 2022-07-05 DIAGNOSIS — I95.9 HYPOTENSION: ICD-10-CM

## 2022-07-05 DIAGNOSIS — Z71.89 GOALS OF CARE, COUNSELING/DISCUSSION: ICD-10-CM

## 2022-07-05 DIAGNOSIS — R09.02 HYPOXIA: ICD-10-CM

## 2022-07-05 DIAGNOSIS — I50.9 HEART FAILURE: ICD-10-CM

## 2022-07-05 DIAGNOSIS — Z71.89 ADVANCED CARE PLANNING/COUNSELING DISCUSSION: ICD-10-CM

## 2022-07-05 DIAGNOSIS — R07.9 CHEST PAIN: ICD-10-CM

## 2022-07-05 DIAGNOSIS — A41.9 SEPTIC SHOCK: ICD-10-CM

## 2022-07-05 LAB
ALBUMIN SERPL BCP-MCNC: 2.7 G/DL (ref 3.5–5.2)
ALP SERPL-CCNC: 136 U/L (ref 55–135)
ALT SERPL W/O P-5'-P-CCNC: 43 U/L (ref 10–44)
ANION GAP SERPL CALC-SCNC: 12 MMOL/L (ref 8–16)
ANISOCYTOSIS BLD QL SMEAR: SLIGHT
AST SERPL-CCNC: 88 U/L (ref 10–40)
BASOPHILS # BLD AUTO: 0.07 K/UL (ref 0–0.2)
BASOPHILS NFR BLD: 0.3 % (ref 0–1.9)
BILIRUB SERPL-MCNC: 0.9 MG/DL (ref 0.1–1)
BNP SERPL-MCNC: 3645 PG/ML (ref 0–99)
BUN SERPL-MCNC: 29 MG/DL (ref 10–30)
CALCIUM SERPL-MCNC: 9.4 MG/DL (ref 8.7–10.5)
CHLORIDE SERPL-SCNC: 97 MMOL/L (ref 95–110)
CO2 SERPL-SCNC: 27 MMOL/L (ref 23–29)
CREAT SERPL-MCNC: 1.2 MG/DL (ref 0.5–1.4)
CTP QC/QA: YES
DIFFERENTIAL METHOD: ABNORMAL
DOHLE BOD BLD QL SMEAR: PRESENT
EOSINOPHIL # BLD AUTO: 0.1 K/UL (ref 0–0.5)
EOSINOPHIL NFR BLD: 0.2 % (ref 0–8)
ERYTHROCYTE [DISTWIDTH] IN BLOOD BY AUTOMATED COUNT: 15.9 % (ref 11.5–14.5)
EST. GFR  (AFRICAN AMERICAN): 45 ML/MIN/1.73 M^2
EST. GFR  (NON AFRICAN AMERICAN): 39.1 ML/MIN/1.73 M^2
GLUCOSE SERPL-MCNC: 94 MG/DL (ref 70–110)
HCT VFR BLD AUTO: 36.8 % (ref 37–48.5)
HGB BLD-MCNC: 11.4 G/DL (ref 12–16)
IMM GRANULOCYTES # BLD AUTO: 0.24 K/UL (ref 0–0.04)
IMM GRANULOCYTES NFR BLD AUTO: 1 % (ref 0–0.5)
LACTATE SERPL-SCNC: 3.5 MMOL/L (ref 0.5–2.2)
LYMPHOCYTES # BLD AUTO: 0.6 K/UL (ref 1–4.8)
LYMPHOCYTES NFR BLD: 2.4 % (ref 18–48)
MCH RBC QN AUTO: 31.8 PG (ref 27–31)
MCHC RBC AUTO-ENTMCNC: 31 G/DL (ref 32–36)
MCV RBC AUTO: 103 FL (ref 82–98)
MONOCYTES # BLD AUTO: 1.7 K/UL (ref 0.3–1)
MONOCYTES NFR BLD: 6.7 % (ref 4–15)
NEUTROPHILS # BLD AUTO: 22.2 K/UL (ref 1.8–7.7)
NEUTROPHILS NFR BLD: 89.4 % (ref 38–73)
NRBC BLD-RTO: 0 /100 WBC
PLATELET # BLD AUTO: 150 K/UL (ref 150–450)
PLATELET BLD QL SMEAR: ABNORMAL
PMV BLD AUTO: 10.4 FL (ref 9.2–12.9)
POC MOLECULAR INFLUENZA A AGN: NEGATIVE
POC MOLECULAR INFLUENZA B AGN: NEGATIVE
POLYCHROMASIA BLD QL SMEAR: ABNORMAL
POTASSIUM SERPL-SCNC: 5.2 MMOL/L (ref 3.5–5.1)
PROT SERPL-MCNC: 6.2 G/DL (ref 6–8.4)
RBC # BLD AUTO: 3.59 M/UL (ref 4–5.4)
SMUDGE CELLS BLD QL SMEAR: PRESENT
SODIUM SERPL-SCNC: 136 MMOL/L (ref 136–145)
TOXIC GRANULES BLD QL SMEAR: PRESENT
TROPONIN I SERPL DL<=0.01 NG/ML-MCNC: 0.29 NG/ML (ref 0–0.03)
WBC # BLD AUTO: 24.84 K/UL (ref 3.9–12.7)

## 2022-07-05 PROCEDURE — 99291 PR CRITICAL CARE, E/M 30-74 MINUTES: ICD-10-PCS | Mod: GW,CS,, | Performed by: EMERGENCY MEDICINE

## 2022-07-05 PROCEDURE — 87186 SC STD MICRODIL/AGAR DIL: CPT

## 2022-07-05 PROCEDURE — 63600175 PHARM REV CODE 636 W HCPCS

## 2022-07-05 PROCEDURE — 83880 ASSAY OF NATRIURETIC PEPTIDE: CPT

## 2022-07-05 PROCEDURE — 99285 EMERGENCY DEPT VISIT HI MDM: CPT | Mod: 25

## 2022-07-05 PROCEDURE — 96368 THER/DIAG CONCURRENT INF: CPT | Mod: 59

## 2022-07-05 PROCEDURE — 84484 ASSAY OF TROPONIN QUANT: CPT

## 2022-07-05 PROCEDURE — 87077 CULTURE AEROBIC IDENTIFY: CPT

## 2022-07-05 PROCEDURE — 87040 BLOOD CULTURE FOR BACTERIA: CPT

## 2022-07-05 PROCEDURE — U0002 COVID-19 LAB TEST NON-CDC: HCPCS

## 2022-07-05 PROCEDURE — 83605 ASSAY OF LACTIC ACID: CPT

## 2022-07-05 PROCEDURE — 85025 COMPLETE CBC W/AUTO DIFF WBC: CPT

## 2022-07-05 PROCEDURE — 93005 ELECTROCARDIOGRAM TRACING: CPT

## 2022-07-05 PROCEDURE — 80053 COMPREHEN METABOLIC PANEL: CPT

## 2022-07-05 PROCEDURE — 93010 ELECTROCARDIOGRAM REPORT: CPT | Mod: ,,, | Performed by: INTERNAL MEDICINE

## 2022-07-05 PROCEDURE — 25000003 PHARM REV CODE 250

## 2022-07-05 PROCEDURE — 96365 THER/PROPH/DIAG IV INF INIT: CPT

## 2022-07-05 PROCEDURE — 99291 CRITICAL CARE FIRST HOUR: CPT | Mod: GW,CS,, | Performed by: EMERGENCY MEDICINE

## 2022-07-05 PROCEDURE — 82962 GLUCOSE BLOOD TEST: CPT

## 2022-07-05 PROCEDURE — 93010 EKG 12-LEAD: ICD-10-PCS | Mod: ,,, | Performed by: INTERNAL MEDICINE

## 2022-07-05 RX ORDER — VANCOMYCIN HCL IN 5 % DEXTROSE 1G/250ML
1000 PLASTIC BAG, INJECTION (ML) INTRAVENOUS
Status: COMPLETED | OUTPATIENT
Start: 2022-07-05 | End: 2022-07-06

## 2022-07-05 RX ADMIN — PIPERACILLIN SODIUM AND TAZOBACTAM SODIUM 4.5 G: 4; .5 INJECTION, POWDER, LYOPHILIZED, FOR SOLUTION INTRAVENOUS at 11:07

## 2022-07-05 RX ADMIN — SODIUM CHLORIDE 250 ML: 0.9 INJECTION, SOLUTION INTRAVENOUS at 10:07

## 2022-07-05 RX ADMIN — VANCOMYCIN HYDROCHLORIDE 1000 MG: 1 INJECTION, POWDER, LYOPHILIZED, FOR SOLUTION INTRAVENOUS at 10:07

## 2022-07-06 PROBLEM — R65.21 SEPTIC SHOCK: Status: ACTIVE | Noted: 2022-04-09

## 2022-07-06 LAB
ALBUMIN SERPL BCP-MCNC: 2.6 G/DL (ref 3.5–5.2)
ALP SERPL-CCNC: 123 U/L (ref 55–135)
ALT SERPL W/O P-5'-P-CCNC: 46 U/L (ref 10–44)
ANION GAP SERPL CALC-SCNC: 12 MMOL/L (ref 8–16)
AST SERPL-CCNC: 65 U/L (ref 10–40)
AV INDEX (PROSTH): 1.24
AV MEAN GRADIENT: 2 MMHG
AV PEAK GRADIENT: 2 MMHG
AV VALVE AREA: 3.53 CM2
AV VELOCITY RATIO: 1.04
BACTERIA #/AREA URNS AUTO: ABNORMAL /HPF
BASOPHILS # BLD AUTO: 0.04 K/UL (ref 0–0.2)
BASOPHILS NFR BLD: 0.2 % (ref 0–1.9)
BILIRUB SERPL-MCNC: 0.8 MG/DL (ref 0.1–1)
BILIRUB UR QL STRIP: NEGATIVE
BSA FOR ECHO PROCEDURE: 1.4 M2
BUN SERPL-MCNC: 33 MG/DL (ref 10–30)
CALCIUM SERPL-MCNC: 9.3 MG/DL (ref 8.7–10.5)
CHLORIDE SERPL-SCNC: 92 MMOL/L (ref 95–110)
CLARITY UR REFRACT.AUTO: ABNORMAL
CO2 SERPL-SCNC: 30 MMOL/L (ref 23–29)
COLOR UR AUTO: ABNORMAL
CREAT SERPL-MCNC: 1.3 MG/DL (ref 0.5–1.4)
CTP QC/QA: YES
CV ECHO LV RWT: 0.46 CM
DIFFERENTIAL METHOD: ABNORMAL
DOP CALC AO PEAK VEL: 0.74 M/S
DOP CALC AO VTI: 13.37 CM
DOP CALC LVOT AREA: 2.8 CM2
DOP CALC LVOT DIAMETER: 1.9 CM
DOP CALC LVOT PEAK VEL: 0.77 M/S
DOP CALC LVOT STROKE VOLUME: 47.16 CM3
DOP CALCLVOT PEAK VEL VTI: 16.64 CM
E WAVE DECELERATION TIME: 183.38 MSEC
E/A RATIO: 0.9
E/E' RATIO: 19.11 M/S
ECHO LV POSTERIOR WALL: 0.79 CM (ref 0.6–1.1)
EJECTION FRACTION: 50 %
EOSINOPHIL # BLD AUTO: 0 K/UL (ref 0–0.5)
EOSINOPHIL NFR BLD: 0.2 % (ref 0–8)
ERYTHROCYTE [DISTWIDTH] IN BLOOD BY AUTOMATED COUNT: 16.2 % (ref 11.5–14.5)
EST. GFR  (AFRICAN AMERICAN): 40.9 ML/MIN/1.73 M^2
EST. GFR  (NON AFRICAN AMERICAN): 35.4 ML/MIN/1.73 M^2
FRACTIONAL SHORTENING: 17 % (ref 28–44)
GLUCOSE SERPL-MCNC: 113 MG/DL (ref 70–110)
GLUCOSE UR QL STRIP: NEGATIVE
HCT VFR BLD AUTO: 36.2 % (ref 37–48.5)
HGB BLD-MCNC: 11.4 G/DL (ref 12–16)
HGB UR QL STRIP: NEGATIVE
HYALINE CASTS UR QL AUTO: 0 /LPF
IMM GRANULOCYTES # BLD AUTO: 0.07 K/UL (ref 0–0.04)
IMM GRANULOCYTES NFR BLD AUTO: 0.4 % (ref 0–0.5)
INTERVENTRICULAR SEPTUM: 1.7 CM (ref 0.6–1.1)
KETONES UR QL STRIP: NEGATIVE
LA MAJOR: 4.68 CM
LA MINOR: 4.57 CM
LA WIDTH: 4 CM
LACTATE SERPL-SCNC: 2.2 MMOL/L (ref 0.5–2.2)
LEFT ATRIUM SIZE: 4.37 CM
LEFT ATRIUM VOLUME INDEX MOD: 36 ML/M2
LEFT ATRIUM VOLUME INDEX: 48.4 ML/M2
LEFT ATRIUM VOLUME MOD: 51.13 CM3
LEFT ATRIUM VOLUME: 68.71 CM3
LEFT INTERNAL DIMENSION IN SYSTOLE: 2.85 CM (ref 2.1–4)
LEFT VENTRICLE DIASTOLIC VOLUME INDEX: 34.22 ML/M2
LEFT VENTRICLE DIASTOLIC VOLUME: 48.59 ML
LEFT VENTRICLE MASS INDEX: 98 G/M2
LEFT VENTRICLE SYSTOLIC VOLUME INDEX: 21.7 ML/M2
LEFT VENTRICLE SYSTOLIC VOLUME: 30.86 ML
LEFT VENTRICULAR INTERNAL DIMENSION IN DIASTOLE: 3.43 CM (ref 3.5–6)
LEFT VENTRICULAR MASS: 139.64 G
LEUKOCYTE ESTERASE UR QL STRIP: ABNORMAL
LV LATERAL E/E' RATIO: 17.2 M/S
LV SEPTAL E/E' RATIO: 21.5 M/S
LYMPHOCYTES # BLD AUTO: 0.9 K/UL (ref 1–4.8)
LYMPHOCYTES NFR BLD: 5.1 % (ref 18–48)
MAGNESIUM SERPL-MCNC: 2.3 MG/DL (ref 1.6–2.6)
MCH RBC QN AUTO: 31.7 PG (ref 27–31)
MCHC RBC AUTO-ENTMCNC: 31.5 G/DL (ref 32–36)
MCV RBC AUTO: 101 FL (ref 82–98)
MICROSCOPIC COMMENT: ABNORMAL
MONOCYTES # BLD AUTO: 1.3 K/UL (ref 0.3–1)
MONOCYTES NFR BLD: 7.2 % (ref 4–15)
MV PEAK A VEL: 0.96 M/S
MV PEAK E VEL: 0.86 M/S
MV STENOSIS PRESSURE HALF TIME: 53.18 MS
MV VALVE AREA P 1/2 METHOD: 4.14 CM2
NEUTROPHILS # BLD AUTO: 15.6 K/UL (ref 1.8–7.7)
NEUTROPHILS NFR BLD: 86.9 % (ref 38–73)
NITRITE UR QL STRIP: NEGATIVE
NON-SQ EPI CELLS #/AREA URNS AUTO: 0 /HPF
NRBC BLD-RTO: 0 /100 WBC
PH UR STRIP: 5 [PH] (ref 5–8)
PHOSPHATE SERPL-MCNC: 4.3 MG/DL (ref 2.7–4.5)
PISA TR MAX VEL: 3.07 M/S
PLATELET # BLD AUTO: 137 K/UL (ref 150–450)
PMV BLD AUTO: 9.9 FL (ref 9.2–12.9)
POCT GLUCOSE: 120 MG/DL (ref 70–110)
POCT GLUCOSE: 121 MG/DL (ref 70–110)
POCT GLUCOSE: 123 MG/DL (ref 70–110)
POCT GLUCOSE: 130 MG/DL (ref 70–110)
POTASSIUM SERPL-SCNC: 4.8 MMOL/L (ref 3.5–5.1)
PROT SERPL-MCNC: 6.1 G/DL (ref 6–8.4)
PROT UR QL STRIP: ABNORMAL
RA MAJOR: 5.01 CM
RA PRESSURE: 15 MMHG
RA WIDTH: 5.28 CM
RBC # BLD AUTO: 3.6 M/UL (ref 4–5.4)
RBC #/AREA URNS AUTO: 13 /HPF (ref 0–4)
RV TISSUE DOPPLER FREE WALL SYSTOLIC VELOCITY 1 (APICAL 4 CHAMBER VIEW): 7.82 CM/S
SARS-COV-2 RDRP RESP QL NAA+PROBE: NEGATIVE
SINUS: 2.79 CM
SODIUM SERPL-SCNC: 134 MMOL/L (ref 136–145)
SP GR UR STRIP: 1.01 (ref 1–1.03)
SQUAMOUS #/AREA URNS AUTO: 5 /HPF
STJ: 1.99 CM
TDI LATERAL: 0.05 M/S
TDI SEPTAL: 0.04 M/S
TDI: 0.05 M/S
TR MAX PG: 38 MMHG
TRICUSPID ANNULAR PLANE SYSTOLIC EXCURSION: 0.95 CM
TROPONIN I SERPL DL<=0.01 NG/ML-MCNC: 0.28 NG/ML (ref 0–0.03)
TV REST PULMONARY ARTERY PRESSURE: 53 MMHG
URN SPEC COLLECT METH UR: ABNORMAL
WBC # BLD AUTO: 18 K/UL (ref 3.9–12.7)
WBC #/AREA URNS AUTO: >100 /HPF (ref 0–5)
WBC CLUMPS UR QL AUTO: ABNORMAL

## 2022-07-06 PROCEDURE — 99223 PR INITIAL HOSPITAL CARE,LEVL III: ICD-10-PCS | Mod: AI,GC,, | Performed by: STUDENT IN AN ORGANIZED HEALTH CARE EDUCATION/TRAINING PROGRAM

## 2022-07-06 PROCEDURE — 87086 URINE CULTURE/COLONY COUNT: CPT

## 2022-07-06 PROCEDURE — 85025 COMPLETE CBC W/AUTO DIFF WBC: CPT

## 2022-07-06 PROCEDURE — 84484 ASSAY OF TROPONIN QUANT: CPT | Performed by: STUDENT IN AN ORGANIZED HEALTH CARE EDUCATION/TRAINING PROGRAM

## 2022-07-06 PROCEDURE — 63600175 PHARM REV CODE 636 W HCPCS

## 2022-07-06 PROCEDURE — 99497 ADVNCD CARE PLAN 30 MIN: CPT | Mod: 25,GW,HPC, | Performed by: CLINICAL NURSE SPECIALIST

## 2022-07-06 PROCEDURE — 94640 AIRWAY INHALATION TREATMENT: CPT

## 2022-07-06 PROCEDURE — 25000003 PHARM REV CODE 250

## 2022-07-06 PROCEDURE — 99900035 HC TECH TIME PER 15 MIN (STAT)

## 2022-07-06 PROCEDURE — 20600001 HC STEP DOWN PRIVATE ROOM

## 2022-07-06 PROCEDURE — 80202 ASSAY OF VANCOMYCIN: CPT | Performed by: STUDENT IN AN ORGANIZED HEALTH CARE EDUCATION/TRAINING PROGRAM

## 2022-07-06 PROCEDURE — 99497 PR ADVNCD CARE PLAN 30 MIN: ICD-10-PCS | Mod: 25,GW,HPC, | Performed by: CLINICAL NURSE SPECIALIST

## 2022-07-06 PROCEDURE — 99223 1ST HOSP IP/OBS HIGH 75: CPT | Mod: AI,GC,, | Performed by: STUDENT IN AN ORGANIZED HEALTH CARE EDUCATION/TRAINING PROGRAM

## 2022-07-06 PROCEDURE — 87088 URINE BACTERIA CULTURE: CPT

## 2022-07-06 PROCEDURE — 99497 PR ADVNCD CARE PLAN 30 MIN: ICD-10-PCS | Mod: 25,,, | Performed by: STUDENT IN AN ORGANIZED HEALTH CARE EDUCATION/TRAINING PROGRAM

## 2022-07-06 PROCEDURE — 94761 N-INVAS EAR/PLS OXIMETRY MLT: CPT

## 2022-07-06 PROCEDURE — 27000221 HC OXYGEN, UP TO 24 HOURS

## 2022-07-06 PROCEDURE — 25000003 PHARM REV CODE 250: Performed by: INTERNAL MEDICINE

## 2022-07-06 PROCEDURE — 25000242 PHARM REV CODE 250 ALT 637 W/ HCPCS

## 2022-07-06 PROCEDURE — 87077 CULTURE AEROBIC IDENTIFY: CPT

## 2022-07-06 PROCEDURE — 83605 ASSAY OF LACTIC ACID: CPT

## 2022-07-06 PROCEDURE — 36415 COLL VENOUS BLD VENIPUNCTURE: CPT | Performed by: STUDENT IN AN ORGANIZED HEALTH CARE EDUCATION/TRAINING PROGRAM

## 2022-07-06 PROCEDURE — 99223 PR INITIAL HOSPITAL CARE,LEVL III: ICD-10-PCS | Mod: GW,HPC,, | Performed by: CLINICAL NURSE SPECIALIST

## 2022-07-06 PROCEDURE — 81001 URINALYSIS AUTO W/SCOPE: CPT

## 2022-07-06 PROCEDURE — 84100 ASSAY OF PHOSPHORUS: CPT

## 2022-07-06 PROCEDURE — 99497 ADVNCD CARE PLAN 30 MIN: CPT | Mod: 25,,, | Performed by: STUDENT IN AN ORGANIZED HEALTH CARE EDUCATION/TRAINING PROGRAM

## 2022-07-06 PROCEDURE — 83735 ASSAY OF MAGNESIUM: CPT

## 2022-07-06 PROCEDURE — 99223 1ST HOSP IP/OBS HIGH 75: CPT | Mod: GW,HPC,, | Performed by: CLINICAL NURSE SPECIALIST

## 2022-07-06 PROCEDURE — 25000003 PHARM REV CODE 250: Performed by: STUDENT IN AN ORGANIZED HEALTH CARE EDUCATION/TRAINING PROGRAM

## 2022-07-06 PROCEDURE — 87186 SC STD MICRODIL/AGAR DIL: CPT

## 2022-07-06 PROCEDURE — 63600175 PHARM REV CODE 636 W HCPCS: Performed by: STUDENT IN AN ORGANIZED HEALTH CARE EDUCATION/TRAINING PROGRAM

## 2022-07-06 PROCEDURE — 80053 COMPREHEN METABOLIC PANEL: CPT

## 2022-07-06 RX ORDER — IBUPROFEN 200 MG
16 TABLET ORAL
Status: DISCONTINUED | OUTPATIENT
Start: 2022-07-06 | End: 2022-07-10 | Stop reason: HOSPADM

## 2022-07-06 RX ORDER — METOPROLOL SUCCINATE 25 MG/1
12.5 TABLET, EXTENDED RELEASE ORAL 2 TIMES DAILY
Status: ON HOLD | COMMUNITY
End: 2022-07-08

## 2022-07-06 RX ORDER — TALC
6 POWDER (GRAM) TOPICAL NIGHTLY PRN
Status: DISCONTINUED | OUTPATIENT
Start: 2022-07-06 | End: 2022-07-10 | Stop reason: HOSPADM

## 2022-07-06 RX ORDER — MICONAZOLE NITRATE 2 %
POWDER (GRAM) TOPICAL 2 TIMES DAILY
Status: DISCONTINUED | OUTPATIENT
Start: 2022-07-06 | End: 2022-07-10 | Stop reason: HOSPADM

## 2022-07-06 RX ORDER — PROCHLORPERAZINE EDISYLATE 5 MG/ML
5 INJECTION INTRAMUSCULAR; INTRAVENOUS EVERY 6 HOURS PRN
Status: DISCONTINUED | OUTPATIENT
Start: 2022-07-06 | End: 2022-07-10 | Stop reason: HOSPADM

## 2022-07-06 RX ORDER — SODIUM CHLORIDE 0.9 % (FLUSH) 0.9 %
10 SYRINGE (ML) INJECTION EVERY 12 HOURS PRN
Status: DISCONTINUED | OUTPATIENT
Start: 2022-07-06 | End: 2022-07-10 | Stop reason: HOSPADM

## 2022-07-06 RX ORDER — POLYETHYLENE GLYCOL 3350 17 G/17G
17 POWDER, FOR SOLUTION ORAL DAILY
Status: DISCONTINUED | OUTPATIENT
Start: 2022-07-06 | End: 2022-07-10 | Stop reason: HOSPADM

## 2022-07-06 RX ORDER — LEVALBUTEROL INHALATION SOLUTION 0.63 MG/3ML
1 SOLUTION RESPIRATORY (INHALATION) EVERY 4 HOURS PRN
Status: DISCONTINUED | OUTPATIENT
Start: 2022-07-06 | End: 2022-07-07

## 2022-07-06 RX ORDER — NALOXONE HCL 0.4 MG/ML
0.02 VIAL (ML) INJECTION
Status: DISCONTINUED | OUTPATIENT
Start: 2022-07-06 | End: 2022-07-10 | Stop reason: HOSPADM

## 2022-07-06 RX ORDER — BUDESONIDE AND FORMOTEROL FUMARATE DIHYDRATE 160; 4.5 UG/1; UG/1
2 AEROSOL RESPIRATORY (INHALATION) EVERY 12 HOURS
COMMUNITY

## 2022-07-06 RX ORDER — HYDROXYZINE PAMOATE 25 MG/1
25 CAPSULE ORAL EVERY 8 HOURS PRN
Status: DISCONTINUED | OUTPATIENT
Start: 2022-07-06 | End: 2022-07-06

## 2022-07-06 RX ORDER — ONDANSETRON 8 MG/1
8 TABLET, ORALLY DISINTEGRATING ORAL EVERY 8 HOURS PRN
Status: DISCONTINUED | OUTPATIENT
Start: 2022-07-06 | End: 2022-07-10 | Stop reason: HOSPADM

## 2022-07-06 RX ORDER — IBUPROFEN 200 MG
24 TABLET ORAL
Status: DISCONTINUED | OUTPATIENT
Start: 2022-07-06 | End: 2022-07-10 | Stop reason: HOSPADM

## 2022-07-06 RX ORDER — INSULIN ASPART 100 [IU]/ML
0-5 INJECTION, SOLUTION INTRAVENOUS; SUBCUTANEOUS
Status: DISCONTINUED | OUTPATIENT
Start: 2022-07-06 | End: 2022-07-10 | Stop reason: HOSPADM

## 2022-07-06 RX ORDER — ENOXAPARIN SODIUM 100 MG/ML
30 INJECTION SUBCUTANEOUS EVERY 24 HOURS
Status: DISCONTINUED | OUTPATIENT
Start: 2022-07-06 | End: 2022-07-10 | Stop reason: HOSPADM

## 2022-07-06 RX ORDER — FLUTICASONE FUROATE AND VILANTEROL 100; 25 UG/1; UG/1
1 POWDER RESPIRATORY (INHALATION) DAILY
Status: DISCONTINUED | OUTPATIENT
Start: 2022-07-06 | End: 2022-07-10 | Stop reason: HOSPADM

## 2022-07-06 RX ORDER — HYDROXYZINE HYDROCHLORIDE 25 MG/1
25 TABLET, FILM COATED ORAL 3 TIMES DAILY PRN
Status: DISCONTINUED | OUTPATIENT
Start: 2022-07-06 | End: 2022-07-10 | Stop reason: HOSPADM

## 2022-07-06 RX ORDER — GLUCAGON 1 MG
1 KIT INJECTION
Status: DISCONTINUED | OUTPATIENT
Start: 2022-07-06 | End: 2022-07-10 | Stop reason: HOSPADM

## 2022-07-06 RX ADMIN — FLUTICASONE FUROATE AND VILANTEROL TRIFENATATE 1 PUFF: 100; 25 POWDER RESPIRATORY (INHALATION) at 08:07

## 2022-07-06 RX ADMIN — ENOXAPARIN SODIUM 30 MG: 30 INJECTION, SOLUTION INTRAVENOUS; SUBCUTANEOUS at 04:07

## 2022-07-06 RX ADMIN — ERTAPENEM 0.5 G: 1 INJECTION INTRAMUSCULAR; INTRAVENOUS at 12:07

## 2022-07-06 RX ADMIN — FLUTICASONE FUROATE AND VILANTEROL TRIFENATATE 1 PUFF: 100; 25 POWDER RESPIRATORY (INHALATION) at 07:07

## 2022-07-06 RX ADMIN — Medication 6 MG: at 08:07

## 2022-07-06 RX ADMIN — MICONAZOLE NITRATE 2 % TOPICAL POWDER: at 08:07

## 2022-07-06 RX ADMIN — POLYETHYLENE GLYCOL 3350 17 G: 17 POWDER, FOR SOLUTION ORAL at 09:07

## 2022-07-06 RX ADMIN — PIPERACILLIN SODIUM AND TAZOBACTAM SODIUM 4.5 G: 4; .5 INJECTION, POWDER, LYOPHILIZED, FOR SOLUTION INTRAVENOUS at 08:07

## 2022-07-06 RX ADMIN — HYDROXYZINE HYDROCHLORIDE 25 MG: 25 TABLET, FILM COATED ORAL at 04:07

## 2022-07-06 RX ADMIN — MICONAZOLE NITRATE 2 % TOPICAL POWDER: at 12:07

## 2022-07-06 RX ADMIN — LEVALBUTEROL HYDROCHLORIDE 0.63 MG: 0.63 SOLUTION RESPIRATORY (INHALATION) at 08:07

## 2022-07-06 NOTE — H&P
[facility] Medicine  History & Physical    Patient Name: Venus Mayer  MRN: [id number]  Patient Class: IP- Inpatient  Admission Date: 7/5/2022  Attending Physician: Javier Puente MD   Primary Care Provider: Primary Doctor No         Patient information was obtained from patient, relative(s), past medical records and ER records.     Subjective:     Principal Problem:Septic shock    Chief Complaint:   Chief Complaint   Patient presents with    Fatigue     Pt to ER via EMS c/o fatigue starting at 1615 today. BP 89/55. Pt has a non-productive cough and on continuous 3 liters NC at home. Pt is on hospice.    Hypotension        HPI: This is a 89 year old lady with HFpEF (EF 60% with GIIDD 3/2022), COPD on 2L NC, HLD, and HTN who presented to the ED for increased weakness. History obtained primarily from daughters at bedside. They noticed the patient was unable to perform her daily ADLs and was a little more weak per the home physical therapist. They were also concerned that she was more altered and had trouble breathing on her home O2. They noted that she had a cough and gurgling sound from her throat. They mentioned that she had similar symptoms prior and saw an ENT for sinusitis and was given a steroid shot with some anti-histamines which helped with the symptoms. She also had a fall a couple of days ago where she hit her left leg on her bed. She denies any fevers, chills, nausea, vomiting, diarrhea, constipation. The family notes that she has had decreased PO intake over the past couple of months.     Upon chart review, patient was discharged home with hospice on 5/29. She was admitted for debility and delirium and found to have severe CO2 retention. She continued to decline which prompted hospice discussions. Patient was discharged with hospice. For this admission, they rescinded hospice as the patient was improving.     In the ED, patient was hypotensive to 90/55 and tachpneic  but satting 99% on her home O2. Labs showed a WBC of 24.84, a potassium of 5.2, Cr of 1.2 and an AST of 88. BNP was elevated to 3645 and troponin was elevated to 0.294 and downtrending. Lactic was 3.5. UA showed >100 WBCs, 3+ leuks, and rare bacteria. CT head showed new acute sphenoid sinusitis with scattered ethmoid sinus disease. CXR showed some pulmonary interstitial edema and small left-sided pleural effusion.      Past Medical History:   Diagnosis Date    Acute on chronic congestive heart failure 7/6/2019    Cardiomyopathy     Carotid artery occlusion     CHF (congestive heart failure)     COPD (chronic obstructive pulmonary disease)     Coronary artery disease     Hyperlipidemia     Hypertension        Past Surgical History:   Procedure Laterality Date    CARDIAC CATHETERIZATION      cardic cath      CAROTID ENDARTERECTOMY      FLEXIBLE BRONCHOSCOPY N/A 7/31/2019    Procedure: BRONCHOSCOPY, FIBEROPTIC Flexible;  Surgeon: Klever Mckeon MD;  Location: [address];  Service: Thoracic;  Laterality: N/A;    HIP SURGERY      PLEURODESIS USING TALC N/A 7/31/2019    Procedure: PLEURODESIS;  Surgeon: Klever Mckeon MD;  Location: [address];  Service: Thoracic;  Laterality: N/A;    PLEURODESIS USING TALC Right 8/5/2019    Procedure: PLEURODESIS, USING TALC;  Surgeon: Klever Mckeon MD;  Location: [address];  Service: Thoracic;  Laterality: Right;    PLEURODESIS WITH VIDEO-ASSISTED THORACOSCOPIC SURGERY (VATS) Right 8/5/2019    Procedure: VATS, WITH PLEURAL TENT;  Surgeon: Klever Mckeon MD;  Location: [address];  Service: Thoracic;  Laterality: Right;       Review of patient's allergies indicates:   Allergen Reactions    Ancef in dextrose (iso-osm) Rash    Cefazolin Rash     Tolerating Zosyn admission 3/2022    Cefuroxime Rash    Sulfamethoxazole-trimethoprim Rash     Other reaction(s): Rash       No current facility-administered medications on file prior to  encounter.     Current Outpatient Medications on File Prior to Encounter   Medication Sig    budesonide-formoterol 160-4.5 mcg (SYMBICORT) 160-4.5 mcg/actuation HFAA Inhale 2 puffs into the lungs every 12 (twelve) hours. Controller    furosemide (LASIX) 40 MG tablet Take 1 tablet (40 mg total) by mouth once daily. In the case of 3lbs weight gain in 1d or 5lbs in 3d, administer additional dose of lasix in the afternoon.    levalbuterol (XOPENEX) 0.63 mg/3 mL nebulizer solution Take 3 mLs (0.63 mg total) by nebulization every 4 (four) hours as needed for Wheezing or Shortness of Breath. Rescue    metoprolol succinate (TOPROL-XL) 25 MG 24 hr tablet Take 12.5 mg by mouth 2 (two) times daily.    [DISCONTINUED] albuterol (PROAIR HFA) 90 mcg/actuation inhaler Inhale 1 puff into the lungs every 6 (six) hours as needed for Wheezing or Shortness of Breath. Rescue (Patient not taking: Reported on 3/16/2022)    [DISCONTINUED] baclofen (LIORESAL) 10 MG tablet TK 1 T PO TID    [DISCONTINUED] bumetanide (BUMEX) 1 MG tablet Take 1 tablet (1 mg total) by mouth 2 (two) times daily.    [DISCONTINUED] pantoprazole (PROTONIX) 40 MG tablet Take 40 mg by mouth every morning.    [DISCONTINUED] tiotropium bromide (SPIRIVA RESPIMAT) 2.5 mcg/actuation inhaler Inhale 2 puffs into the lungs Daily. Controller     Family History       Problem Relation (Age of Onset)    Hypertension Mother          Tobacco Use    Smoking status: Former Smoker     Packs/day: 2.00     Years: 20.00     Pack years: 40.00     Types: Cigarettes     Quit date: 1980     Years since quittin.4    Smokeless tobacco: Never Used   Substance and Sexual Activity    Alcohol use: Yes     Alcohol/week: 2.0 standard drinks     Types: 2 Glasses of wine per week     Comment: social    Drug use: No    Sexual activity: Not Currently     Review of Systems   Constitutional:  Negative for chills and fever.   HENT:  Negative for ear pain and sinus pain.    Eyes:   Negative for photophobia, pain and visual disturbance.   Respiratory:  Negative for cough and shortness of breath.    Cardiovascular:  Negative for chest pain and leg swelling.   Gastrointestinal:  Negative for abdominal pain, blood in stool, constipation, diarrhea, nausea and vomiting.   Endocrine: Negative for polyuria.   Genitourinary:  Negative for difficulty urinating and dysuria.   Musculoskeletal:  Positive for arthralgias, back pain and joint swelling. Negative for neck pain.   Skin:  Positive for wound (left leg wound). Negative for rash.   Neurological:  Positive for weakness. Negative for dizziness and light-headedness.   Psychiatric/Behavioral:  Negative for confusion and sleep disturbance.    Objective:     Vital Signs (Most Recent):  Temp: 98 °F (36.7 °C) (07/06/22 0315)  Pulse: 75 (07/06/22 0315)  Resp: (!) 22 (07/06/22 0130)  BP: (!) 93/51 (07/06/22 0315)  SpO2: 99 % (07/06/22 0315)   Vital Signs (24h Range):  Temp:  [98 °F (36.7 °C)] 98 °F (36.7 °C)  Pulse:  [62-83] 75  Resp:  [20-22] 22  SpO2:  [90 %-99 %] 99 %  BP: ()/(43-61) 93/51     Weight: 45 kg (99 lb 3.3 oz)  Body mass index is 18.15 kg/m².    Physical Exam  Constitutional:       General: She is sleeping. She is not in acute distress.     Appearance: Normal appearance. She is cachectic. She is not ill-appearing or diaphoretic.      Interventions: Nasal cannula in place.   HENT:      Head: Normocephalic and atraumatic.      Right Ear: External ear normal.      Left Ear: External ear normal.      Nose: Nose normal. No congestion or rhinorrhea.      Mouth/Throat:      Mouth: Mucous membranes are moist.      Pharynx: Oropharynx is clear. No oropharyngeal exudate or posterior oropharyngeal erythema.      Comments: Patient had mucus pooling in back of the mouth  Eyes:      General: No scleral icterus.     Extraocular Movements: Extraocular movements intact.      Conjunctiva/sclera: Conjunctivae normal.   Neck:      Vascular: No carotid  bruit.   Cardiovascular:      Rate and Rhythm: Normal rate and regular rhythm.      Pulses: Normal pulses.      Heart sounds: Murmur heard.     No friction rub.   Pulmonary:      Effort: Pulmonary effort is normal. No respiratory distress.      Breath sounds: No stridor. Wheezing, rhonchi and rales present.      Comments: Transmitted upper airway sounds  Chest:      Chest wall: No tenderness.   Abdominal:      General: Abdomen is flat. Bowel sounds are normal. There is no distension.      Palpations: There is no mass.      Tenderness: There is no right CVA tenderness, left CVA tenderness or guarding.   Musculoskeletal:         General: No swelling, tenderness or deformity. Normal range of motion.      Cervical back: Normal range of motion. No rigidity or tenderness.      Right lower leg: Edema present.      Left lower leg: Edema present.   Skin:     General: Skin is warm and dry.      Coloration: Skin is not jaundiced or pale.      Findings: Lesion present. No bruising or erythema.      Comments: Superficial long wound on left leg   Neurological:      General: No focal deficit present.      Mental Status: Mental status is at baseline. She is lethargic.      Comments: Hard of hearing but alert   Psychiatric:         Mood and Affect: Mood normal.         Behavior: Behavior normal.         Thought Content: Thought content normal.        Significant Labs: All pertinent labs within the past 24 hours have been reviewed.  CBC:   Recent Labs   Lab 07/05/22 2210   WBC 24.84*   HGB 11.4*   HCT 36.8*        CMP:   Recent Labs   Lab 07/05/22 2210      K 5.2*   CL 97   CO2 27   GLU 94   BUN 29   CREATININE 1.2   CALCIUM 9.4   PROT 6.2   ALBUMIN 2.7*   BILITOT 0.9   ALKPHOS 136*   AST 88*   ALT 43   ANIONGAP 12   EGFRNONAA 39.1*     Cardiac Markers:   Recent Labs   Lab 07/05/22 2210   BNP 3,645*     Lactic Acid:   Recent Labs   Lab 07/05/22 2210 07/06/22  0206   LACTATE 3.5* 2.2     Troponin:   Recent Labs   Lab  07/05/22  2210 07/06/22  0206   TROPONINI 0.294* 0.275*     Urine Studies:   Recent Labs   Lab 07/06/22  0053   COLORU [place]   APPEARANCEUA Cloudy*   PHUR 5.0   SPECGRAV 1.015   PROTEINUA 1+*   GLUCUA Negative   KETONESU Negative   BILIRUBINUA Negative   OCCULTUA Negative   NITRITE Negative   LEUKOCYTESUR 3+*   RBCUA 13*   WBCUA >100*   BACTERIA Rare   SQUAMEPITHEL 5   HYALINECASTS 0       Significant Imaging: I have reviewed all pertinent imaging results/findings within the past 24 hours.  CT head New acute sphenoid sinusitis with scattered ethmoid sinus disease. Involutional chronic small vessel changes with no evidence of acute hemorrhage, mass infarction.     CXR: Stable cardiomegaly with pulmonary interstitial edema and small left-sided pleural effusion, suggestive of underlying CHF. Emphysematous changes in the lung apices.      Assessment/Plan:     * Septic shock  89 yoF presented with worsening weakness and AMS. Found to be hypotensive to 90/55 and tachpneic. Exam showed some rales and wheezing on exam. Labs showed a WBC of 24.84, BNP was elevated to 3645 and troponin was elevated to 0.294 and downtrending. Lactic was 3.5 and downtrended to 2.2 after gentle hydration. UA showed >100 WBCs, 3+ leuks, and rare bacteria. CT head showed new acute sphenoid sinusitis with scattered ethmoid sinus disease.     Given history, exam, labs, and imaging, patient meets criteria for septic shock with likely source of infection in the urine.    - continue vanc and zosyn  - f/u blood, urine, and respiratory cultures and tailor antibiotics  - gentle IVF due to CHF    Acute on chronic diastolic heart failure  Coronary Artery Disease  Elevated Troponin  Hypertension    Patient had increased O2 requirement initially but down to her home O2 requirements and had some trace pitting edema bilaterally. Labs showed BNP of >3000 with elevated but stable troponin (likely type 2 due to septic shock). CXR showed evidence of pulmonary  edema.    - given hypotension improved with fluids, will hold off on diuresis at the moment  - Fluid restriction at 1500 cc with strict I/Os and daily weights   - Maintain on telemetry and daily EKGs   - Up to date risk stratification : TSH, Lipids, HbA1c with optimization of risk factors is necessary  - Check Electrolytes, keep Mag >2 & K+ >4  - f/u repeat TTE  - patient was off GDMT given hospice situation but family restarted some home medication. Given septic shock, held all anti-hypertensive medication and can restart when clinically indicated        Cerebral microvascular disease  CT head noted chronic microvascular changes which can be contributing to her encephalopathy but her acute decline is better explained by infection. No acute stroke noted on CT head    - continue to monitor  - aspiration and fall precautions      Senile purpura  Patient has obvious skin breakdown and purpura noted on extremities.     - routine wound care on affected areas      Advanced care planning/counseling discussion  DNR  Patient's family still wish patient to be DNR/DNI. Order reflected on chart. Patient's family had rescinded hospice.    - consulted palliative care to re-engage conversations      Chronic respiratory failure with hypoxia and hypercapnia  COPD  Centrilobular emphysema    Patient is on home 2L NC and is at baseline currently.    - continue home inhalers    Symptomatic bradycardia  S/p pacemaker      OCTAVIO (acute kidney injury)  Stage 3 CKD    Patient had a previous diagnosis of CKD stage 3 but baseline Cr around 0.8. admit Cr 1.2 likely due to septic shock.    - continue trending Cr    VTE Risk Mitigation (From admission, onward)         Ordered     enoxaparin injection 30 mg  Daily         07/06/22 0219     IP VTE HIGH RISK PATIENT  Once         07/06/22 0219     Place sequential compression device  Until discontinued         07/06/22 0219                   Bina Caraballo MD  Department of Hospital Medicine   [facility]

## 2022-07-06 NOTE — NURSING
Pt admitted to room 47317 vis bed from ED with daughter at bedside. No acute distress noted or voiced. Foam Dressing Placed to both heels and Coccyx. Pt home to have multiple fluid blisters all over body and skin tear to LLL that is open to air. Wound Consult placed. Bed low Call light with in Reach ,Bed rails up x2

## 2022-07-06 NOTE — ASSESSMENT & PLAN NOTE
Coronary Artery Disease  Elevated Troponin  Hypertension    Patient had increased O2 requirement initially but down to her home O2 requirements and had some trace pitting edema bilaterally. Labs showed BNP of >3000 with elevated but stable troponin (likely type 2 due to septic shock). CXR showed evidence of pulmonary edema.    - given hypotension improved with fluids, will hold off on diuresis at the moment  - Fluid restriction at 1500 cc with strict I/Os and daily weights   - Maintain on telemetry and daily EKGs   - Up to date risk stratification : TSH, Lipids, HbA1c with optimization of risk factors is necessary  - Check Electrolytes, keep Mag >2 & K+ >4  - f/u repeat TTE  - patient was off GDMT given hospice situation but family restarted some home medication. Given septic shock, held all anti-hypertensive medication and can restart when clinically indicated

## 2022-07-06 NOTE — ASSESSMENT & PLAN NOTE
89 yoF presented with worsening weakness and AMS. Found to be hypotensive to 90/55 and tachpneic. Exam showed some rales and wheezing on exam. Labs showed a WBC of 24.84, BNP was elevated to 3645 and troponin was elevated to 0.294 and downtrending. Lactic was 3.5 and downtrended to 2.2 after gentle hydration. UA showed >100 WBCs, 3+ leuks, and rare bacteria. CT head showed new acute sphenoid sinusitis with scattered ethmoid sinus disease.     Given history, exam, labs, and imaging, patient meets criteria for septic shock with likely source of infection in the urine.    - continue vanc and zosyn  - f/u blood, urine, and respiratory cultures and tailor antibiotics  - gentle IVF due to CHF

## 2022-07-06 NOTE — SUBJECTIVE & OBJECTIVE
Past Medical History:   Diagnosis Date    Acute on chronic congestive heart failure 7/6/2019    Cardiomyopathy     Carotid artery occlusion     CHF (congestive heart failure)     COPD (chronic obstructive pulmonary disease)     Coronary artery disease     Hyperlipidemia     Hypertension        Past Surgical History:   Procedure Laterality Date    CARDIAC CATHETERIZATION      cardic cath      CAROTID ENDARTERECTOMY      FLEXIBLE BRONCHOSCOPY N/A 7/31/2019    Procedure: BRONCHOSCOPY, FIBEROPTIC Flexible;  Surgeon: Klever Mckeon MD;  [facility];  Service: Thoracic;  Laterality: N/A;    HIP SURGERY      PLEURODESIS USING TALC N/A 7/31/2019    Procedure: PLEURODESIS;  Surgeon: Klever Mckeon MD;  [facility];  Service: Thoracic;  Laterality: N/A;    PLEURODESIS USING TALC Right 8/5/2019    Procedure: PLEURODESIS, USING TALC;  Surgeon: Klever Mckeon MD;  Location: [facility];  Service: Thoracic;  Laterality: Right;    PLEURODESIS WITH VIDEO-ASSISTED THORACOSCOPIC SURGERY (VATS) Right 8/5/2019    Procedure: VATS, WITH PLEURAL TENT;  Surgeon: Klever Mckeon MD;  Location: [facility] OR [address] [place] [address];  Service: Thoracic;  Laterality: Right;       Review of patient's allergies indicates:   Allergen Reactions    Ancef in dextrose (iso-osm) Rash    Cefazolin Rash     Tolerating Zosyn admission 3/2022    Cefuroxime Rash    Sulfamethoxazole-trimethoprim Rash     Other reaction(s): Rash       No current facility-administered medications on file prior to encounter.     Current Outpatient Medications on File Prior to Encounter   Medication Sig    budesonide-formoterol 160-4.5 mcg (SYMBICORT) 160-4.5 mcg/actuation HFAA Inhale 2 puffs into the lungs every 12 (twelve) hours. Controller    furosemide (LASIX) 40 MG tablet Take 1 tablet (40 mg total) by mouth once daily. In the case of 3lbs weight gain in 1d or 5lbs in 3d, administer additional dose of lasix in the afternoon.    levalbuterol  (XOPENEX) 0.63 mg/3 mL nebulizer solution Take 3 mLs (0.63 mg total) by nebulization every 4 (four) hours as needed for Wheezing or Shortness of Breath. Rescue    metoprolol succinate (TOPROL-XL) 25 MG 24 hr tablet Take 12.5 mg by mouth 2 (two) times daily.    [DISCONTINUED] albuterol (PROAIR HFA) 90 mcg/actuation inhaler Inhale 1 puff into the lungs every 6 (six) hours as needed for Wheezing or Shortness of Breath. Rescue (Patient not taking: Reported on 3/16/2022)    [DISCONTINUED] baclofen (LIORESAL) 10 MG tablet TK 1 T PO TID    [DISCONTINUED] bumetanide (BUMEX) 1 MG tablet Take 1 tablet (1 mg total) by mouth 2 (two) times daily.    [DISCONTINUED] pantoprazole (PROTONIX) 40 MG tablet Take 40 mg by mouth every morning.    [DISCONTINUED] tiotropium bromide (SPIRIVA RESPIMAT) 2.5 mcg/actuation inhaler Inhale 2 puffs into the lungs Daily. Controller     Family History       Problem Relation (Age of Onset)    Hypertension Mother          Tobacco Use    Smoking status: Former Smoker     Packs/day: 2.00     Years: 20.00     Pack years: 40.00     Types: Cigarettes     Quit date: 1980     Years since quittin.4    Smokeless tobacco: Never Used   Substance and Sexual Activity    Alcohol use: Yes     Alcohol/week: 2.0 standard drinks     Types: 2 Glasses of wine per week     Comment: social    Drug use: No    Sexual activity: Not Currently     Review of Systems   Constitutional:  Negative for chills and fever.   HENT:  Negative for ear pain and sinus pain.    Eyes:  Negative for photophobia, pain and visual disturbance.   Respiratory:  Negative for cough and shortness of breath.    Cardiovascular:  Negative for chest pain and leg swelling.   Gastrointestinal:  Negative for abdominal pain, blood in stool, constipation, diarrhea, nausea and vomiting.   Endocrine: Negative for polyuria.   Genitourinary:  Negative for difficulty urinating and dysuria.   Musculoskeletal:  Positive for arthralgias, back pain and joint  swelling. Negative for neck pain.   Skin:  Positive for wound (left leg wound). Negative for rash.   Neurological:  Positive for weakness. Negative for dizziness and light-headedness.   Psychiatric/Behavioral:  Negative for confusion and sleep disturbance.    Objective:     Vital Signs (Most Recent):  Temp: 98 °F (36.7 °C) (07/06/22 0315)  Pulse: 75 (07/06/22 0315)  Resp: (!) 22 (07/06/22 0130)  BP: (!) 93/51 (07/06/22 0315)  SpO2: 99 % (07/06/22 0315)   Vital Signs (24h Range):  Temp:  [98 °F (36.7 °C)] 98 °F (36.7 °C)  Pulse:  [62-83] 75  Resp:  [20-22] 22  SpO2:  [90 %-99 %] 99 %  BP: ()/(43-61) 93/51     Weight: 45 kg (99 lb 3.3 oz)  Body mass index is 18.15 kg/m².    Physical Exam  Constitutional:       General: She is sleeping. She is not in acute distress.     Appearance: Normal appearance. She is cachectic. She is not ill-appearing or diaphoretic.      Interventions: Nasal cannula in place.   HENT:      Head: Normocephalic and atraumatic.      Right Ear: External ear normal.      Left Ear: External ear normal.      Nose: Nose normal. No congestion or rhinorrhea.      Mouth/Throat:      Mouth: Mucous membranes are moist.      Pharynx: Oropharynx is clear. No oropharyngeal exudate or posterior oropharyngeal erythema.      Comments: Patient had mucus pooling in back of the mouth  Eyes:      General: No scleral icterus.     Extraocular Movements: Extraocular movements intact.      Conjunctiva/sclera: Conjunctivae normal.   Neck:      Vascular: No carotid bruit.   Cardiovascular:      Rate and Rhythm: Normal rate and regular rhythm.      Pulses: Normal pulses.      Heart sounds: Murmur heard.     No friction rub.   Pulmonary:      Effort: Pulmonary effort is normal. No respiratory distress.      Breath sounds: No stridor. Wheezing, rhonchi and rales present.      Comments: Transmitted upper airway sounds  Chest:      Chest wall: No tenderness.   Abdominal:      General: Abdomen is flat. Bowel sounds are  normal. There is no distension.      Palpations: There is no mass.      Tenderness: There is no right CVA tenderness, left CVA tenderness or guarding.   Musculoskeletal:         General: No swelling, tenderness or deformity. Normal range of motion.      Cervical back: Normal range of motion. No rigidity or tenderness.      Right lower leg: Edema present.      Left lower leg: Edema present.   Skin:     General: Skin is warm and dry.      Coloration: Skin is not jaundiced or pale.      Findings: Lesion present. No bruising or erythema.      Comments: Superficial long wound on left leg   Neurological:      General: No focal deficit present.      Mental Status: Mental status is at baseline. She is lethargic.      Comments: Hard of hearing but alert   Psychiatric:         Mood and Affect: Mood normal.         Behavior: Behavior normal.         Thought Content: Thought content normal.        Significant Labs: All pertinent labs within the past 24 hours have been reviewed.  CBC:   Recent Labs   Lab 07/05/22 2210   WBC 24.84*   HGB 11.4*   HCT 36.8*        CMP:   Recent Labs   Lab 07/05/22 2210      K 5.2*   CL 97   CO2 27   GLU 94   BUN 29   CREATININE 1.2   CALCIUM 9.4   PROT 6.2   ALBUMIN 2.7*   BILITOT 0.9   ALKPHOS 136*   AST 88*   ALT 43   ANIONGAP 12   EGFRNONAA 39.1*     Cardiac Markers:   Recent Labs   Lab 07/05/22 2210   BNP 3,645*     Lactic Acid:   Recent Labs   Lab 07/05/22 2210 07/06/22  0206   LACTATE 3.5* 2.2     Troponin:   Recent Labs   Lab 07/05/22 2210 07/06/22  0206   TROPONINI 0.294* 0.275*     Urine Studies:   Recent Labs   Lab 07/06/22  0053   COLORU [place]   APPEARANCEUA Cloudy*   PHUR 5.0   SPECGRAV 1.015   PROTEINUA 1+*   GLUCUA Negative   KETONESU Negative   BILIRUBINUA Negative   OCCULTUA Negative   NITRITE Negative   LEUKOCYTESUR 3+*   RBCUA 13*   WBCUA >100*   BACTERIA Rare   SQUAMEPITHEL 5   HYALINECASTS 0       Significant Imaging: I have reviewed all pertinent imaging  results/findings within the past 24 hours.  CT head New acute sphenoid sinusitis with scattered ethmoid sinus disease. Involutional chronic small vessel changes with no evidence of acute hemorrhage, mass infarction.     CXR: Stable cardiomegaly with pulmonary interstitial edema and small left-sided pleural effusion, suggestive of underlying CHF. Emphysematous changes in the lung apices.

## 2022-07-06 NOTE — ASSESSMENT & PLAN NOTE
CT head noted chronic microvascular changes which can be contributing to her encephalopathy but her acute decline is better explained by infection. No acute stroke noted on CT head    - continue to monitor  - aspiration and fall precautions

## 2022-07-06 NOTE — PLAN OF CARE
[facility] - Transplant Stepdown  Initial Discharge Assessment       Primary Care Provider: Primary Doctor No    Admission Diagnosis: Hypoxia [R09.02]  Heart failure [I50.9]  Chest pain [R07.9]  Hypotension [I95.9]    Admission Date: 7/5/2022  Expected Discharge Date: 7/7/2022    Discharge Barriers Identified: None    Payor: [employer] MANAGED MEDICARE / Plan: [employer] MEDICARE HMO / Product Type: Capitation /     Extended Emergency Contact Information  Primary Emergency Contact: Bri Sam  Mobile Phone: [phone]  Relation: Daughter  Secondary Emergency Contact: RAJINDER FLYNN  Home Phone: [phone]  Mobile Phone: [phone]  Relation: Daughter  Preferred language: English   needed? No    Discharge Plan A: Home with family  Discharge Plan B: Hospice/home      [id number] [place] - [address] AT [facility] [address]  [address] [place] [address]  Phone: [phone] Fax: [phone]      Initial Assessment (most recent)     Adult Discharge Assessment - 07/06/22 1113        Discharge Assessment    Assessment Type Discharge Planning Assessment     Confirmed/corrected address, phone number and insurance Yes     Confirmed Demographics Correct on Facesheet     Source of Information patient;family     Communicated [place] with patient/caregiver Date not available/Unable to determine     Reason For Admission Septic shock     Lives With alone   The patient has 24 hours sitters at home.    Do you expect to return to your current living situation? Yes     Do you have help at home or someone to help you manage your care at home? Yes     Home Layout Able to live on 1st floor     Equipment Currently Used at Home bedside commode;nebulizer;walker, rolling;shower chair;hospital bed     Patient currently being followed by outpatient case management? No     Do you currently have service(s) that help you manage your care at home? Yes     Name and Contact number of  agency [facility]     Is the patient taking medications as prescribed? yes     How do you get to doctors appointments? family or friend will provide;health plan transportation     Are you on dialysis? No     Do you take coumadin? No     Discharge Plan A Home with family     Discharge Plan B Hospice/home     DME Needed Upon Discharge  other (see comments)   TBD    Discharge Plan discussed with: Patient;Adult children     Discharge Barriers Identified None                    This SW met with patient and bedside  in room to complete DPA. Questions answered / contact numbers provided.  Use PREFERRED PHARMACY / BEDSIDE DELIVERY for any necessary medications at time of discharge. The patient has 24 hours sitters at her home. The patient has a bedside commode, nebulizer, walker, shower chair, and hospital bed. The patient is not on dialysis or Coumadin.  The patient's family will be assisting with help upon discharge. The patient would need  transportation home upon d/c. Hospital follow up will be scheduled with PCP. Will continue to follow for course of hospitalization.     Krystle Tello LMSW  Case Management [facility]  Ext: [phone]

## 2022-07-06 NOTE — ASSESSMENT & PLAN NOTE
COPD  Centrilobular emphysema    Patient is on home 2L NC and is at baseline currently.    - continue home inhalers

## 2022-07-06 NOTE — HPI
This is a 89 year old lady with HFpEF (EF 60% with GIIDD 3/2022), COPD on 2L NC, HLD, and HTN who presented to the ED for increased weakness. History obtained primarily from daughters at bedside. They noticed the patient was unable to perform her daily ADLs and was a little more weak per the home physical therapist. They were also concerned that she was more altered and had trouble breathing on her home O2. They noted that she had a cough and gurgling sound from her throat. They mentioned that she had similar symptoms prior and saw an ENT for sinusitis and was given a steroid shot with some anti-histamines which helped with the symptoms. She also had a fall a couple of days ago where she hit her left leg on her bed. She denies any fevers, chills, nausea, vomiting, diarrhea, constipation. The family notes that she has had decreased PO intake over the past couple of months.     Upon chart review, patient was discharged home with hospice on 5/29. She was admitted for debility and delirium and found to have severe CO2 retention. She continued to decline which prompted hospice discussions. Patient was discharged with hospice. For this admission, they rescinded hospice as the patient was improving.     In the ED, patient was hypotensive to 90/55 and tachpneic but satting 99% on her home O2. Labs showed a WBC of 24.84, a potassium of 5.2, Cr of 1.2 and an AST of 88. BNP was elevated to 3645 and troponin was elevated to 0.294 and downtrending. Lactic was 3.5. UA showed >100 WBCs, 3+ leuks, and rare bacteria. CT head showed new acute sphenoid sinusitis with scattered ethmoid sinus disease. CXR showed some pulmonary interstitial edema and small left-sided pleural effusion.

## 2022-07-06 NOTE — PROGRESS NOTES
Pharmacokinetic Initial Assessment: IV Vancomycin    Assessment/Plan:    Initiate intravenous vancomycin with loading dose of 1000 mg once with subsequent doses when random concentrations are less than 20 mcg/mL  Desired empiric serum trough concentration is 10 to 20 mcg/mL  Draw vancomycin random level on 7/6/22 at 2300..  Pharmacy will continue to follow and monitor vancomycin.      Please contact pharmacy at extension [phone] with any questions regarding this assessment.     Thank you for the consult,   Tramaine Miller       Patient brief summary:  Venus Mayer is a 89 y.o. female initiated on antimicrobial therapy with IV Vancomycin for treatment of suspected sepsis    Drug Allergies:   Review of patient's allergies indicates:   Allergen Reactions    Ancef in dextrose (iso-osm) Rash    Cefazolin Rash     Tolerating Zosyn admission 3/2022    Cefuroxime Rash    Sulfamethoxazole-trimethoprim Rash     Other reaction(s): Rash       Actual Body Weight:   45 kg    Renal Function:   Estimated Creatinine Clearance: 20.8 mL/min (based on SCr of 1.2 mg/dL).,     Dialysis Method (if applicable):  N/A    CBC (last 72 hours):  Recent Labs   Lab Result Units 07/05/22  2210   WBC K/uL 24.84*   Hemoglobin g/dL 11.4*   Hematocrit % 36.8*   Platelets K/uL 150   Gran % % 89.4*   Lymph % % 2.4*   Mono % % 6.7   Eosinophil % % 0.2   Basophil % % 0.3   Differential Method  Automated       Metabolic Panel (last 72 hours):  Recent Labs   Lab Result Units 07/05/22  2210 07/06/22  0053   Sodium mmol/L 136  --    Potassium mmol/L 5.2*  --    Chloride mmol/L 97  --    CO2 mmol/L 27  --    Glucose mg/dL 94  --    Glucose, UA   --  Negative   BUN mg/dL 29  --    Creatinine mg/dL 1.2  --    Albumin g/dL 2.7*  --    Total Bilirubin mg/dL 0.9  --    Alkaline Phosphatase U/L 136*  --    AST U/L 88*  --    ALT U/L 43  --        Drug levels (last 3 results):  No results for input(s): VANCOMYCINRA, VANCORANDOM, VANCOMYCINPE, VANCOPEAK,  VANCOMYCINTR, VANCOTROUGH in the last 72 hours.    Microbiologic Results:  Microbiology Results (last 7 days)     Procedure Component Value Units Date/Time    Culture, Respiratory with Gram Stain [[id number]]     Order Status: No result Specimen: Respiratory     Urine culture [[id number]] Collected: 07/06/22 0053    Order Status: No result Specimen: Urine Updated: 07/06/22 0109    Blood culture x two cultures. Draw prior to antibiotics. [[id number]] Collected: 07/05/22 2210    Order Status: Sent Specimen: Blood from Peripheral, Forearm, Left Updated: 07/05/22 2225    Blood culture x two cultures. Draw prior to antibiotics. [[id number]] Collected: 07/05/22 2210    Order Status: Sent Specimen: Blood from Peripheral, Forearm, Right Updated: 07/05/22 2225

## 2022-07-06 NOTE — ASSESSMENT & PLAN NOTE
DNR  Patient's family still wish patient to be DNR/DNI. Order reflected on chart. Patient's family had rescinded hospice.    - consulted palliative care to re-engage conversations

## 2022-07-06 NOTE — ED PROVIDER NOTES
ED Resident HAND-OFF NOTE:  11:42 PM 7/6/2022  Venus Mayer is a 89 y.o. female who presented to the ED on 7/5/2022 and has been managed by , who reports patient C/O  Fatigue, hypotension, AMS. I assumed care of patient from off-going ED physician team at 11:42 PM pending CT head and admission.    Briefly, this is a 89-year-old female with past medical history of CHF, COPD, hypertension, hyperlipidemia who presents to emergency department over concerns of hypoxia, mental status changes, abnormal breathing.  Granddaughter is at bedside and reports that patient is currently on hospice after a long ICU stay.  She states that patient was doing well however today she started noticing that patient was fatigued unable to perform ADLs that she usually can.  There was concern of irregular breathing and noted hypoxia.     CBC showed leukocytosis, CMP showed no extremities concerning hospitalization.  Lactic elevated at 3.5.  Only received 250 cc of fluid due to patient's history of CHF, elevated BNP.  No obvious source of infection, though pending UA.  CT head showed no concern for hemorrhage or mass effect.  Discussed case with Hospital Medicine for admission.  ______________________  Jose Lopez MD  Emergency Medicine Resident  11:42 PM 7/5/2022             Jose Lopez MD  Resident  07/06/22 0728

## 2022-07-06 NOTE — HOSPITAL COURSE
Patient admitted for Mx of sepsis, likely source UTI vs PNA vs sinusitis. Recent h/o E. Coli UTI Tx w/ ertapenem. Acute respiratory decompensation requiring non rebreather and 8L oxygen support that resolved with breathing treatment and 1x steroid dose; CXR negative - suspect mucus plug. Blood cultures with Staph aureus. Continue vancomycin; ID consulted with recommendation for 14 days therapy per standard of care. PO option would be linezolid, as IV abx not compatible with hospice. Extensive GOC discussion with family and patient and decided to go home with hospice care.

## 2022-07-06 NOTE — ED PROVIDER NOTES
Encounter Date: 7/5/2022       History     Chief Complaint   Patient presents with    Fatigue     Pt to ER via EMS c/o fatigue starting at 1615 today. BP 89/55. Pt has a non-productive cough and on continuous 3 liters NC at home. Pt is on hospice.    Hypotension     Patient is a 89-year-old female with past medical history of CHF, COPD, hypertension, hyperlipidemia who presents to emergency department over concerns of hypoxia, mental status changes, abnormal breathing.  Granddaughter is at bedside and reports that patient is currently on hospice after a long ICU stay.  She states that patient was doing well however today she started noticing that patient was fatigued unable to perform ADLs that she usually can.  There was concern of irregular breathing and noted hypoxia.  Patient is on 2 L baseline and has been requiring mild increases intermittently.  They note that patient has also had coughing with abnormal breathing.  They believe that patient is breathing irregularly.  They deny any vomiting or diarrhea noted.        Review of patient's allergies indicates:   Allergen Reactions    Ancef in dextrose (iso-osm) Rash    Cefazolin Rash     Tolerating Zosyn admission 3/2022    Cefuroxime Rash    Sulfamethoxazole-trimethoprim Rash     Other reaction(s): Rash     Past Medical History:   Diagnosis Date    Acute on chronic congestive heart failure 7/6/2019    Cardiomyopathy     Carotid artery occlusion     CHF (congestive heart failure)     COPD (chronic obstructive pulmonary disease)     Coronary artery disease     Hyperlipidemia     Hypertension      Past Surgical History:   Procedure Laterality Date    CARDIAC CATHETERIZATION      cardic cath      CAROTID ENDARTERECTOMY      FLEXIBLE BRONCHOSCOPY N/A 7/31/2019    Procedure: BRONCHOSCOPY, FIBEROPTIC Flexible;  Surgeon: Klever Mckeon MD;  Location: [facility] OR [address] [place] [address];  Service: Thoracic;  Laterality: N/A;    HIP SURGERY      PLEURODESIS USING TALC  N/A 2019    Procedure: PLEURODESIS;  Surgeon: Klever Mckeon MD;  [facility];  Service: Thoracic;  Laterality: N/A;    PLEURODESIS USING TALC Right 2019    Procedure: PLEURODESIS, USING TALC;  Surgeon: Klever Mckeon MD;  [facility];  Service: Thoracic;  Laterality: Right;    PLEURODESIS WITH VIDEO-ASSISTED THORACOSCOPIC SURGERY (VATS) Right 2019    Procedure: VATS, WITH PLEURAL TENT;  Surgeon: Klever Mckeon MD;  [facility];  Service: Thoracic;  Laterality: Right;     Family History   Problem Relation Age of Onset    Hypertension Mother      Social History     Tobacco Use    Smoking status: Former Smoker     Packs/day: 2.00     Years: 20.00     Pack years: 40.00     Types: Cigarettes     Quit date: 1980     Years since quittin.4    Smokeless tobacco: Never Used   Substance Use Topics    Alcohol use: Yes     Alcohol/week: 2.0 standard drinks     Types: 2 Glasses of wine per week     Comment: social    Drug use: No     Review of Systems   Unable to perform ROS: Mental status change       Physical Exam     Initial Vitals [22 2132]   BP Pulse Resp Temp SpO2   (!) 89/55 62 20 98 °F (36.7 °C) (!) 90 %      MAP       --         Physical Exam    Nursing note and vitals reviewed.  Constitutional: She appears well-developed and well-nourished.   HENT:   Head: Normocephalic and atraumatic.   Eyes: EOM are normal. Pupils are equal, round, and reactive to light.   Neck: Neck supple. No JVD present.   Normal range of motion.  Cardiovascular: Normal rate, regular rhythm, normal heart sounds and intact distal pulses.   Pulmonary/Chest: Breath sounds normal. No respiratory distress. She has no wheezes.   Abdominal: Abdomen is soft. Bowel sounds are normal. She exhibits no distension. There is no abdominal tenderness. There is no rebound.   Musculoskeletal:         General: Edema present. No tenderness. Normal range of motion.       "Cervical back: Normal range of motion and neck supple.      Comments: Bilateral lower extremity edema     Lymphadenopathy:     She has no cervical adenopathy.   Neurological: She is alert and oriented to person, place, and time. She has normal strength and normal reflexes. GCS score is 15. GCS eye subscore is 4. GCS verbal subscore is 5. GCS motor subscore is 6.   Skin: Skin is warm and dry. Capillary refill takes less than 2 seconds. No pallor.   Skin tear on the left lower extremity anterior          ED Course   Procedures  Labs Reviewed - No data to display  EKG Readings: (Independently Interpreted)   Initial Reading: No STEMI. Previous EKG: Compared with most recent EKG Rhythm: Normal Sinus Rhythm.       Imaging Results    None          Medications - No data to display  Medical Decision Making:   Initial Assessment:   89-year-old female who presented to the emergency department over concerns of family of abnormal breathing, mental status changes, hypoxia.  Patient is alert but not able to provide any history.  Patient is tachypneic, afebrile with all other vitals within normal limits.  Patient currently 96% on 2 L nasal cannula home oxygen requirement  Differential Diagnosis:   Sepsis  UTI  Pneumonia  Intracranial hemorrhage  Ischemic stroke  Clinical Tests:   Lab Tests: Ordered and Reviewed  Radiological Study: Ordered and Reviewed  Medical Tests: Ordered and Reviewed  Sepsis Perfusion Assessment: "I attest a sepsis perfusion exam was performed within 6 hours of sepsis, severe sepsis, or septic shock presentation, following fluid resuscitation."  ED Management:  Patient presented hypotensive and altered.  Patient given 250 cc boluses patient has known severe CHF did not want to fluid overload.  Patient's blood pressure responded well to 250 cc bolus.  Concern for sepsis patient is hypotensive, tachycardic, tachypneic.  CBC, CMP obtained to evaluate for leukocytosis, anemia, metabolic derangements.  Last " double for leukocytosis of 24. Patient was started on broad-spectrum antibiotics IV vancomycin and Zosyn.  Lactate obtained and was elevated to 3.5.  EKG obtained which displayed normal sinus rhythm.  Troponin mildly elevated likely secondary to sepsis.  BNP elevated from patient's baseline to over 3000. Given altered mental status and decreased responsiveness CT head obtained to evaluate for any acute intracranial processes.  CT head pending at this time.  Patient care handed over to oncoming care team pending imaging and labs.  See note for final disposition.                      Clinical Impression:    Hypotension    Physician Attestation Statement for Resident:  As the supervising MD   Physician Attestation Statement: I have personally seen and examined this patient.       I agree with the history unless otherwise noted.   Briefly, patient is a 89-year-old female with past medical history of CHF, COPD, hypertension, hyperlipidemia who presents to emergency department over concerns of hypoxia, mental status changes, abnormal breathing.  Family notes the patient had a recent ICU stay and have noted increased WOB, hypoxia, patient unable to perform her usual ADLs today. Family notes no n/v/d, no known fever.     As the supervising MD I agree with the PE unless otherwise noted.  Patient arrives hypotensive, not tachycardic, afebrile  Lungs without focal crackles/rales  ABD soft, not focal TTP noted      I have reviewed and agree with the residents interpretation of the following unless otherwise noted:   lab data: +leukocytosis, elevated troponin, elevated BNP, elevated lactate  imaging studies: CTH pending at the time of s/o     EKG and rhythm strips. EKG without evidence of acute ischemia    I have also reviewed the following:   old records at this facility,   old EKGs,   old imaging and results    As the supervising MD I agree with the treatment, course, plan, and disposition unless otherwise noted.    Patient  with reduced responsiveness the day of arrival with evidence of hypotension on arrival to the ED.     Patient was noted to have evidence of sig leukocytosis, elevated lactate concerning for sepsis and thus the patient was stared on empiric broad spectrum abx - patient with a h/o CHF and thus patient was NOT treated with 30 cc/kg bolus in the ED. Given AMS, CTH ordered, pending at the time of s/o to eval for intracranial abnormality. Will plan for admission     Critical Care   Date: 08/02/2022  Performed by: Danielle Lawrence MD   Authorized by: Danielle Lawrence MD      Total critical care time (exclusive of procedural time) : 35 minutes, exclusive of separately billable procedures and treating other patients and teaching time.    Critical care was necessary to treat or prevent imminent or life-threatening deterioration of the following conditions:  sepsis    Due to a high probability of clinically significant, life threatening deterioration, the patient required my highest level of preparedness to intervene emergently and I personally spent this critical care time directly and personally managing the patient. This critical care time included obtaining a history; examining the patient; pulse oximetry; ordering and review of studies; arranging urgent treatment with development of a management plan; evaluation of patient's response to treatment; frequent reassessment, documentation, and, discussions with other providers, patient and family members.                      Danielle Lawrence MD  08/02/22 [id number]

## 2022-07-06 NOTE — ED TRIAGE NOTES
Venus Mayer, a 89 y.o. female presents to the ED w/ complaint of weakness per family. Pt on hospice care. Daughter at bedside states new onset of sob/irregular breathing that started this afternoon. Pt on home oxygen 2L. Pt hypotensive     Triage note:  Chief Complaint   Patient presents with    Fatigue     Pt to ER via EMS c/o fatigue starting at 1615 today. BP 89/55. Pt has a non-productive cough and on continuous 3 liters NC at home. Pt is on hospice.    Hypotension     Review of patient's allergies indicates:   Allergen Reactions    Ancef in dextrose (iso-osm) Rash    Cefazolin Rash     Tolerating Zosyn admission 3/2022    Cefuroxime Rash    Sulfamethoxazole-trimethoprim Rash     Other reaction(s): Rash     Past Medical History:   Diagnosis Date    Acute on chronic congestive heart failure 7/6/2019    Cardiomyopathy     Carotid artery occlusion     CHF (congestive heart failure)     COPD (chronic obstructive pulmonary disease)     Coronary artery disease     Hyperlipidemia     Hypertension

## 2022-07-06 NOTE — ASSESSMENT & PLAN NOTE
Stage 3 CKD    Patient had a previous diagnosis of CKD stage 3 but baseline Cr around 0.8. admit Cr 1.2 likely due to septic shock.    - continue trending Cr

## 2022-07-06 NOTE — PLAN OF CARE
Recommendations    1. Continue Cardiac diet with texture per SLP     2. Add Boost Plus (chocolate) daily with breakfast     3. RD to monitor and follow    Goals: Meet % EEN, EPN by RD f/u date  Nutrition Goal Status: new  Communication of RD Recs:  (POC)

## 2022-07-06 NOTE — ACP (ADVANCE CARE PLANNING)
Advance Care Planning     Date: 07/06/2022    Today a meeting took place: bedside    Patient Participation: Patient is able to participate, lead by daughter at bedside,    Family is ok with going back to hospice but wish to treat her infection as they feel at this time she has a good quality of life.  I discussed that these infections if recurrent may be difficult to treat in the future.    Discussion >15 mins      Javier Puente M.D.  [facility]  Pager [phone]

## 2022-07-06 NOTE — PLAN OF CARE
Patient is only oriented to self and waxes and wanes with location.   Patient up in recliner for most of the day.   Patient moved from bed to chair with 2 person assist.   AC&HS glucose monitoring. No SSI coverage required thus far this shift.   PRN atarax ordered.  PRN melatonin ordered per patient's daughter's request.   Wound care consult placed.   Palliative consult placed.   Instructed to call for assistance.   Bed in lowest position with wheels locked.   TM

## 2022-07-06 NOTE — ASSESSMENT & PLAN NOTE
Patient has obvious skin breakdown and purpura noted on extremities.     - routine wound care on affected areas

## 2022-07-06 NOTE — MEDICAL/APP STUDENT
[facility] - Transplant Stepdown    History & Physical      Patient Name: Venus Mayer  MRN: [id number]  Admission Date: 7/5/2022  Attending Physician: Javier Puente MD   Primary Care Provider: Primary Doctor No         Patient information was obtained from patient's daughter and ER records.     Subjective:     Principal Problem:Septic shock    Chief Complaint:   Chief Complaint   Patient presents with    Fatigue     Pt to ER via EMS c/o fatigue starting at 1615 today. BP 89/55. Pt has a non-productive cough and on continuous 3 liters NC at home. Pt is on hospice.    Hypotension        HPI: 89 yo female with a PMH of CHF (EF 60%), COPD, CKD III presents for increasing weakness and SOB. Yesterday her home sitter noticed that she seemed weaker, more short of breath, and less able to perform ADLs. Patient is usually able to walk around, eat meals, go to the bathroom, play scrabble without assistance. Patient's daughter at bedside reports that similar symptoms have occurred in the past when patient had sinusitis which was treated by ENT with steroids and antihistamines. Patient was recently discharged on 5/29 after being admitted for delirium found to have a UTI and hypercapnia. At this hospitilization in May, patient's family decided to transition her to hospice. This admission, the family decided to rescind hospice and bring patient to ED as they felt her current presentation was treatable.    In the ED, patient was hypotensive (90/55) and tachypnic. WBC 24.8. K 5.2. BNP 3645. Lactate 3.5 UA > 100 WBCs. Patient was given fluids, started on vanc + zosyn and admitted to hospital medicine for further management.    Past Medical History:   Diagnosis Date    Acute on chronic congestive heart failure 7/6/2019    Cardiomyopathy     Carotid artery occlusion     CHF (congestive heart failure)     COPD (chronic obstructive pulmonary disease)     Coronary artery disease     Hyperlipidemia     Hypertension         Past Surgical History:   Procedure Laterality Date    CARDIAC CATHETERIZATION      cardi cath      CAROTID ENDARTERECTOMY      FLEXIBLE BRONCHOSCOPY N/A 7/31/2019    Procedure: BRONCHOSCOPY, FIBEROPTIC Flexible;  Surgeon: Klever Mckeon MD;  Location: [facility] OR [facility]ND [facility];  Service: Thoracic;  Laterality: N/A;    HIP SURGERY      PLEURODESIS USING TALC N/A 7/31/2019    Procedure: PLEURODESIS;  Surgeon: Klever Mckeon MD;  Location: [facility] OR 2ND [facility];  Service: Thoracic;  Laterality: N/A;    PLEURODESIS USING TALC Right 8/5/2019    Procedure: PLEURODESIS, USING TALC;  Surgeon: Klever Mckeon MD;  Location: [facility] OR 2ND [facility];  Service: Thoracic;  Laterality: Right;    PLEURODESIS WITH VIDEO-ASSISTED THORACOSCOPIC SURGERY (VATS) Right 8/5/2019    Procedure: VATS, WITH PLEURAL TENT;  Surgeon: Klever Mckeon MD;  Location: [facility] OR 2ND FLR;  Service: Thoracic;  Laterality: Right;       Review of patient's allergies indicates:   Allergen Reactions    Ancef in dextrose (iso-osm) Rash    Cefazolin Rash     Tolerating Zosyn admission 3/2022    Cefuroxime Rash    Sulfamethoxazole-trimethoprim Rash     Other reaction(s): Rash       No current facility-administered medications on file prior to encounter.     Current Outpatient Medications on File Prior to Encounter   Medication Sig    budesonide-formoterol 160-4.5 mcg (SYMBICORT) 160-4.5 mcg/actuation HFAA Inhale 2 puffs into the lungs every 12 (twelve) hours. Controller    furosemide (LASIX) 40 MG tablet Take 1 tablet (40 mg total) by mouth once daily. In the case of 3lbs weight gain in 1d or 5lbs in 3d, administer additional dose of lasix in the afternoon.    levalbuterol (XOPENEX) 0.63 mg/3 mL nebulizer solution Take 3 mLs (0.63 mg total) by nebulization every 4 (four) hours as needed for Wheezing or Shortness of Breath. Rescue    metoprolol succinate (TOPROL-XL) 25 MG 24 hr tablet Take 12.5 mg by mouth 2 (two) times daily.    [DISCONTINUED]  albuterol (PROAIR HFA) 90 mcg/actuation inhaler Inhale 1 puff into the lungs every 6 (six) hours as needed for Wheezing or Shortness of Breath. Rescue (Patient not taking: Reported on 3/16/2022)    [DISCONTINUED] baclofen (LIORESAL) 10 MG tablet TK 1 T PO TID    [DISCONTINUED] bumetanide (BUMEX) 1 MG tablet Take 1 tablet (1 mg total) by mouth 2 (two) times daily.    [DISCONTINUED] pantoprazole (PROTONIX) 40 MG tablet Take 40 mg by mouth every morning.    [DISCONTINUED] tiotropium bromide (SPIRIVA RESPIMAT) 2.5 mcg/actuation inhaler Inhale 2 puffs into the lungs Daily. Controller     Family History     Problem Relation (Age of Onset)    Hypertension Mother        Tobacco Use    Smoking status: Former Smoker     Packs/day: 2.00     Years: 20.00     Pack years: 40.00     Types: Cigarettes     Quit date: 1980     Years since quittin.4    Smokeless tobacco: Never Used   Substance and Sexual Activity    Alcohol use: Yes     Alcohol/week: 2.0 standard drinks     Types: 2 Glasses of wine per week     Comment: social    Drug use: No    Sexual activity: Not Currently     Review of Systems   Constitutional: Positive for activity change and fatigue. Negative for appetite change, chills, diaphoresis, fever and unexpected weight change.   HENT: Negative for congestion, facial swelling, rhinorrhea, sinus pain and sore throat.    Respiratory: Positive for shortness of breath. Negative for apnea, cough, chest tightness and stridor.    Cardiovascular: Negative for chest pain, palpitations and leg swelling.   Gastrointestinal: Negative for abdominal distention, abdominal pain, constipation, diarrhea, nausea and vomiting.   Genitourinary: Negative for decreased urine volume, difficulty urinating, dysuria, flank pain and urgency.   Musculoskeletal: Negative for arthralgias, joint swelling, myalgias, neck pain and neck stiffness.   Skin: Positive for wound (On left leg. Per daughter, been there 2 weeks). Negative for  color change and pallor.   Neurological: Negative for tremors, speech difficulty, weakness, light-headedness and headaches.   Psychiatric/Behavioral: Negative for agitation, confusion, decreased concentration and hallucinations.     Objective:     Vital Signs (Most Recent):  Temp: 97.4 °F (36.3 °C) (07/06/22 1157)  Pulse: 76 (07/06/22 0903)  Resp: 18 (07/06/22 0843)  BP: (!) 93/55 (07/06/22 0903)  SpO2: 100 % (07/06/22 0903) Vital Signs (24h Range):  Temp:  [97.4 °F (36.3 °C)-98 °F (36.7 °C)] 97.4 °F (36.3 °C)  Pulse:  [62-85] 76  Resp:  [18-22] 18  SpO2:  [90 %-100 %] 100 %  BP: ()/(43-61) 93/55     Weight: 45 kg (99 lb 3.3 oz)  Body mass index is 18.15 kg/m².    Physical Exam  Constitutional:       Appearance: Normal appearance. She is normal weight.   Cardiovascular:      Rate and Rhythm: Normal rate and regular rhythm.      Pulses: Normal pulses.      Heart sounds: Normal heart sounds.   Pulmonary:      Effort: Respiratory distress present.      Breath sounds: Normal breath sounds.   Abdominal:      General: There is no distension.      Palpations: There is no mass.      Tenderness: There is no abdominal tenderness. There is no guarding or rebound.   Skin:     General: Skin is warm and dry.      Capillary Refill: Capillary refill takes less than 2 seconds.   Neurological:      General: No focal deficit present.      Mental Status: She is alert and oriented to person, place, and time. Mental status is at baseline.            Significant Labs:   All pertinent labs within the past 24 hours have been reviewed.  Blood Culture:   Recent Labs   Lab 07/05/22 2210   LABBLOO No Growth to date  No Growth to date     CBC:   Recent Labs   Lab 07/05/22 2210 07/06/22 0652   WBC 24.84* 18.00*   HGB 11.4* 11.4*   HCT 36.8* 36.2*    137*     CMP:   Recent Labs   Lab 07/05/22 2210 07/06/22 0652    134*   K 5.2* 4.8   CL 97 92*   CO2 27 30*   GLU 94 113*   BUN 29 33*   CREATININE 1.2 1.3   CALCIUM 9.4 9.3    PROT 6.2 6.1   ALBUMIN 2.7* 2.6*   BILITOT 0.9 0.8   ALKPHOS 136* 123   AST 88* 65*   ALT 43 46*   ANIONGAP 12 12   EGFRNONAA 39.1* 35.4*     Lactic Acid:   Recent Labs   Lab 07/05/22  2210 07/06/22  0206   LACTATE 3.5* 2.2     Magnesium:   Recent Labs   Lab 07/06/22  0652   MG 2.3     POCT Glucose:   Recent Labs   Lab 07/06/22  0751 07/06/22  1205   POCTGLUCOSE 130* 123*     Urine Culture: No results for input(s): LABURIN in the last 48 hours.  Urine Studies:   Recent Labs   Lab 07/06/22  0053   COLORU [place]   APPEARANCEUA Cloudy*   PHUR 5.0   SPECGRAV 1.015   PROTEINUA 1+*   GLUCUA Negative   KETONESU Negative   BILIRUBINUA Negative   OCCULTUA Negative   NITRITE Negative   LEUKOCYTESUR 3+*   RBCUA 13*   WBCUA >100*   BACTERIA Rare   SQUAMEPITHEL 5   HYALINECASTS 0       Significant Imaging: I have reviewed all pertinent imaging results/findings within the past 24 hours.    Assessment/Plan:     Urosepsis  89 yo female presents with SOB and weakness. Patient is tachypnic and hypotensive. WBC and lactate elevated. UA > 100 WBCs. Concerns for sepsis 2/2 to UTI. Patient has had UTI in May that grew E coli that was sensitive to ertapenem, cefepime, ceftriaxone, gentamicin, meropenem, nitrofurantoin, bactrim, tobramycin. Patient allergic to bactrim.  Plan  - Ertapenem and Vanc, can stop vanc tomorrow pending no staph growth  - IV consult  - IV fluids as needed  - F/u on blood and urine cultures    Congestive Heart Failure  Patient came in SOB and edema on exam. BNP 3645. CXR suggestive of pulmonary edema. Last echo showed EF 60 %. Patient O2 sats >98 but reporting air hunger.  Plan  - Avoid lasix in setting of hypotension  - IV morphine for breathing  - 1500 cc fluid restriction   - Follow CMP - replete mag > 2, K+ > 4  - f/u ECHO    Advance Care Planning   Patient taken off hospice for current admission. Family vocalizes patient is able to perform ADLs and has quality of life. Family plans on putting patient back on  hospice after current admission.  - Palliative care consult    Chronic Kidney Disease III  Patient's current creatinine 1.2 likely 2/2 hypoperfusion 2/2 to hypotension. Baseline 0.8.   Plan  - Continue to trend creatinine  - Light IVF as needed    Purpura  Patient has chronic purpura on extremities. Stable  - Wound care consult    COPD  Patient on 2.5 L at home. 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] 05/30/2022 Yes    Senile purpura [D69.2] 03/30/2022 Yes    Advanced care planning/counseling discussion [Z71.89]  Not Applicable    Chronic respiratory failure with hypoxia and hypercapnia [J96.11, J96.12] 07/06/2019 Yes    Symptomatic bradycardia [R00.1] 03/15/2017 Yes    OCTAVIO (acute kidney injury) [N17.9] 03/28/2013 Yes    Acute on chronic diastolic heart failure [I50.33] 01/17/2013 Yes      Problems Resolved During this Admission:     VTE Risk Mitigation (From admission, onward)         Ordered     enoxaparin injection 30 mg  Daily         07/06/22 0219     IP VTE HIGH RISK PATIENT  Once         07/06/22 0219     Place sequential compression device  Until discontinued         07/06/22 0219                  Tequila Perea  Department of Hospital Medicine   [facility] - Transplant Stepdown

## 2022-07-06 NOTE — CONSULTS
[facility] - Transplant Stepdown  Adult Nutrition  Consult Note    SUMMARY     Recommendations    1. Continue Cardiac diet with texture per SLP     2. Add Boost Plus (chocolate) daily with breakfast     3. RD to monitor and follow    Goals: Meet % EEN, EPN by RD f/u date  Nutrition Goal Status: new  Communication of RD Recs:  (POC)    Assessment and Plan    Nutrition Problem  Underweight    Related to (etiology):   Inadequate energy intake    Signs and Symptoms (as evidenced by):   BMI 18.25 at advance age      Interventions/Recommendations (treatment strategy):  Collaboration with other providers    Nutrition Diagnosis Status:   New    Malnutrition Assessment  Orbital Region (Subcutaneous Fat Loss): severe depletion  Upper Arm Region (Subcutaneous Fat Loss): moderate depletion   Oriental orthodox Region (Muscle Loss): moderate depletion  Clavicle Bone Region (Muscle Loss): severe depletion  Clavicle and Acromion Bone Region (Muscle Loss): severe depletion  Dorsal Hand (Muscle Loss): moderate depletion  Anterior Thigh Region (Muscle Loss): moderate depletion  Posterior Calf Region (Muscle Loss): moderate depletion     Reason for Assessment    Reason For Assessment: consult  Diagnosis: infection/sepsis  Relevant Medical History: Septic shock, HF  Interdisciplinary Rounds: did not attend    General Information Comments:   Pt is [place], spoken to family at bedside. Family report pt PO intake is improving. Pt has always been a small eater. Stated UBW was 88 lb. Per chart, wt gained of 9 lb (10%) since May - unsure of accuracy. Pt is eating 3 meals a day with snacks between meals at home, usually snacks on jello, crackers and chocolate. Pt was taking 1/2 cup of Ensure Plus Chocolate every morning at home. Discussed food preferences and put a note for kitchen. Discussed good protein source with family. Family verbalize understanding. NFPE completed today, moderate-severe fat and muscle wasting noted. Family stated pt has always  "been a thin person. Pt do not meet the criteria for malnutrition at this time, but is at risk if PO intake decrease.     Wt Readings from Last 3 Encounters:   07/06/22 45 kg (99 lb 3.3 oz)   05/26/22 41 kg (90 lb 6.2 oz)   05/21/22 40.8 kg (89 lb 15.2 oz)       Nutrition Discharge Planning: Low Na, high kcal, high protein diet    Nutrition Risk Screen    Nutrition Risk Screen: no indicators present    Anthropometrics    Temp: 97.9 °F (36.6 °C)  Height: 5' 2" (157.5 cm)  Height (inches): 62 in  Weight Method: Bed Scale  Weight: 45 kg (99 lb 3.3 oz)  Weight (lb): 99.21 lb  Ideal Body Weight (IBW), Female: 110 lb  % Ideal Body Weight, Female (lb): 90.19 %  BMI (Calculated): 18.1       Lab/Procedures/Meds    Pertinent Labs Reviewed: reviewed  Pertinent Labs Comments: Na 134, Glucose 113, BUN 33, AST 65, ALT 46, eGFR 35.4  Pertinent Medications Reviewed: reviewed  Pertinent Medications Comments: polyethylene glycol    Estimated/Assessed Needs    Weight Used For Calorie Calculations: 45 kg (99 lb 3.3 oz)  Energy Calorie Requirements (kcal): 1138-4398  Energy Need Method: Kcal/kg (30-35)  Protein Requirements: 54-63g (1.2-1.4g/kg)  Weight Used For Protein Calculations: 45 kg (99 lb 3.3 oz)  Fluid Requirements (mL): per MD    Nutrition Prescription Ordered    Current Diet Order: Cardiac, mechanical soft    Evaluation of Received Nutrient/Fluid Intake    I/O: +0.6L since admit  Energy Calories Required: not meeting needs  Protein Required: not meeting needs  Comments: LBM 7/5  Tolerance: tolerating    Nutrition Risk    Level of Risk/Frequency of Follow-up: low     Monitor and Evaluation    Food and Nutrient Intake: energy intake, food and beverage intake  Food and Nutrient Adminstration: diet order  Knowledge/Beliefs/Attitudes: food and nutrition knowledge/skill, beliefs and attitudes  Physical Activity and Function: nutrition-related ADLs and IADLs  Anthropometric Measurements: height/length, weight, weight change, body " mass index  Biochemical Data, Medical Tests and Procedures: electrolyte and renal panel, glucose/endocrine profile, gastrointestinal profile, inflammatory profile, lipid profile  Nutrition-Focused Physical Findings: overall appearance     Nutrition Follow-Up    RD Follow-up?: Yes     Siria ZHOU

## 2022-07-07 LAB
ALBUMIN SERPL BCP-MCNC: 2.5 G/DL (ref 3.5–5.2)
ALLENS TEST: ABNORMAL
ALP SERPL-CCNC: 107 U/L (ref 55–135)
ALT SERPL W/O P-5'-P-CCNC: 33 U/L (ref 10–44)
ANION GAP SERPL CALC-SCNC: 10 MMOL/L (ref 8–16)
AST SERPL-CCNC: 33 U/L (ref 10–40)
BASOPHILS # BLD AUTO: 0.03 K/UL (ref 0–0.2)
BASOPHILS NFR BLD: 0.3 % (ref 0–1.9)
BILIRUB SERPL-MCNC: 0.6 MG/DL (ref 0.1–1)
BUN SERPL-MCNC: 37 MG/DL (ref 10–30)
CALCIUM SERPL-MCNC: 9.5 MG/DL (ref 8.7–10.5)
CHLORIDE SERPL-SCNC: 96 MMOL/L (ref 95–110)
CO2 SERPL-SCNC: 29 MMOL/L (ref 23–29)
CREAT SERPL-MCNC: 1.4 MG/DL (ref 0.5–1.4)
DELSYS: ABNORMAL
DIFFERENTIAL METHOD: ABNORMAL
EOSINOPHIL # BLD AUTO: 0.2 K/UL (ref 0–0.5)
EOSINOPHIL NFR BLD: 2 % (ref 0–8)
ERYTHROCYTE [DISTWIDTH] IN BLOOD BY AUTOMATED COUNT: 15.9 % (ref 11.5–14.5)
EST. GFR  (AFRICAN AMERICAN): 37.4 ML/MIN/1.73 M^2
EST. GFR  (NON AFRICAN AMERICAN): 32.4 ML/MIN/1.73 M^2
FIO2: 31
FLOW: 8
GLUCOSE SERPL-MCNC: 62 MG/DL (ref 70–110)
HCO3 UR-SCNC: 34.4 MMOL/L (ref 24–28)
HCT VFR BLD AUTO: 35.6 % (ref 37–48.5)
HGB BLD-MCNC: 11.1 G/DL (ref 12–16)
IMM GRANULOCYTES # BLD AUTO: 0.04 K/UL (ref 0–0.04)
IMM GRANULOCYTES NFR BLD AUTO: 0.4 % (ref 0–0.5)
LYMPHOCYTES # BLD AUTO: 0.8 K/UL (ref 1–4.8)
LYMPHOCYTES NFR BLD: 7.6 % (ref 18–48)
MAGNESIUM SERPL-MCNC: 2.2 MG/DL (ref 1.6–2.6)
MCH RBC QN AUTO: 31.9 PG (ref 27–31)
MCHC RBC AUTO-ENTMCNC: 31.2 G/DL (ref 32–36)
MCV RBC AUTO: 102 FL (ref 82–98)
MODE: ABNORMAL
MONOCYTES # BLD AUTO: 0.8 K/UL (ref 0.3–1)
MONOCYTES NFR BLD: 6.9 % (ref 4–15)
NEUTROPHILS # BLD AUTO: 9.1 K/UL (ref 1.8–7.7)
NEUTROPHILS NFR BLD: 82.8 % (ref 38–73)
NRBC BLD-RTO: 0 /100 WBC
PCO2 BLDA: 57.4 MMHG (ref 35–45)
PH SMN: 7.38 [PH] (ref 7.35–7.45)
PHOSPHATE SERPL-MCNC: 4 MG/DL (ref 2.7–4.5)
PLATELET # BLD AUTO: 143 K/UL (ref 150–450)
PMV BLD AUTO: 10.5 FL (ref 9.2–12.9)
PO2 BLDA: 57 MMHG (ref 80–100)
POC BE: 9 MMOL/L
POC SATURATED O2: 88 % (ref 95–100)
POC TCO2: 36 MMOL/L (ref 23–27)
POCT GLUCOSE: 119 MG/DL (ref 70–110)
POCT GLUCOSE: 71 MG/DL (ref 70–110)
POTASSIUM SERPL-SCNC: 4.2 MMOL/L (ref 3.5–5.1)
PROT SERPL-MCNC: 5.8 G/DL (ref 6–8.4)
RBC # BLD AUTO: 3.48 M/UL (ref 4–5.4)
SAMPLE: ABNORMAL
SITE: ABNORMAL
SODIUM SERPL-SCNC: 135 MMOL/L (ref 136–145)
SP02: 94
VANCOMYCIN SERPL-MCNC: 12.9 UG/ML
WBC # BLD AUTO: 10.97 K/UL (ref 3.9–12.7)

## 2022-07-07 PROCEDURE — 94640 AIRWAY INHALATION TREATMENT: CPT

## 2022-07-07 PROCEDURE — 25000003 PHARM REV CODE 250: Performed by: INTERNAL MEDICINE

## 2022-07-07 PROCEDURE — 82803 BLOOD GASES ANY COMBINATION: CPT

## 2022-07-07 PROCEDURE — 25000003 PHARM REV CODE 250

## 2022-07-07 PROCEDURE — 36600 WITHDRAWAL OF ARTERIAL BLOOD: CPT

## 2022-07-07 PROCEDURE — 36415 COLL VENOUS BLD VENIPUNCTURE: CPT | Performed by: STUDENT IN AN ORGANIZED HEALTH CARE EDUCATION/TRAINING PROGRAM

## 2022-07-07 PROCEDURE — 25000003 PHARM REV CODE 250: Performed by: STUDENT IN AN ORGANIZED HEALTH CARE EDUCATION/TRAINING PROGRAM

## 2022-07-07 PROCEDURE — 99223 PR INITIAL HOSPITAL CARE,LEVL III: ICD-10-PCS | Mod: ,,, | Performed by: INTERNAL MEDICINE

## 2022-07-07 PROCEDURE — 63600175 PHARM REV CODE 636 W HCPCS: Performed by: STUDENT IN AN ORGANIZED HEALTH CARE EDUCATION/TRAINING PROGRAM

## 2022-07-07 PROCEDURE — 63600175 PHARM REV CODE 636 W HCPCS

## 2022-07-07 PROCEDURE — 83735 ASSAY OF MAGNESIUM: CPT

## 2022-07-07 PROCEDURE — 84100 ASSAY OF PHOSPHORUS: CPT

## 2022-07-07 PROCEDURE — 99223 1ST HOSP IP/OBS HIGH 75: CPT | Mod: ,,, | Performed by: INTERNAL MEDICINE

## 2022-07-07 PROCEDURE — 27000221 HC OXYGEN, UP TO 24 HOURS

## 2022-07-07 PROCEDURE — 94761 N-INVAS EAR/PLS OXIMETRY MLT: CPT

## 2022-07-07 PROCEDURE — 99900035 HC TECH TIME PER 15 MIN (STAT)

## 2022-07-07 PROCEDURE — 20600001 HC STEP DOWN PRIVATE ROOM

## 2022-07-07 PROCEDURE — 25000242 PHARM REV CODE 250 ALT 637 W/ HCPCS

## 2022-07-07 PROCEDURE — 99499 NO LOS: ICD-10-PCS | Mod: ,,, | Performed by: PHYSICIAN ASSISTANT

## 2022-07-07 PROCEDURE — 85025 COMPLETE CBC W/AUTO DIFF WBC: CPT

## 2022-07-07 PROCEDURE — 87040 BLOOD CULTURE FOR BACTERIA: CPT | Mod: 59 | Performed by: STUDENT IN AN ORGANIZED HEALTH CARE EDUCATION/TRAINING PROGRAM

## 2022-07-07 PROCEDURE — 99291 PR CRITICAL CARE, E/M 30-74 MINUTES: ICD-10-PCS | Mod: ,,, | Performed by: STUDENT IN AN ORGANIZED HEALTH CARE EDUCATION/TRAINING PROGRAM

## 2022-07-07 PROCEDURE — 99291 CRITICAL CARE FIRST HOUR: CPT | Mod: ,,, | Performed by: STUDENT IN AN ORGANIZED HEALTH CARE EDUCATION/TRAINING PROGRAM

## 2022-07-07 PROCEDURE — 80053 COMPREHEN METABOLIC PANEL: CPT

## 2022-07-07 PROCEDURE — 99499 UNLISTED E&M SERVICE: CPT | Mod: ,,, | Performed by: PHYSICIAN ASSISTANT

## 2022-07-07 RX ORDER — FLUCONAZOLE 150 MG/1
150 TABLET ORAL ONCE
Status: COMPLETED | OUTPATIENT
Start: 2022-07-07 | End: 2022-07-07

## 2022-07-07 RX ORDER — FUROSEMIDE 10 MG/ML
40 INJECTION INTRAMUSCULAR; INTRAVENOUS ONCE
Status: COMPLETED | OUTPATIENT
Start: 2022-07-07 | End: 2022-07-07

## 2022-07-07 RX ORDER — TIOTROPIUM BROMIDE INHALATION SPRAY 3.12 UG/1
2 SPRAY, METERED RESPIRATORY (INHALATION) DAILY
Qty: 4 G | Refills: 11 | Status: SHIPPED | OUTPATIENT
Start: 2022-07-07

## 2022-07-07 RX ORDER — LEVALBUTEROL INHALATION SOLUTION 0.63 MG/3ML
0.63 SOLUTION RESPIRATORY (INHALATION) ONCE
Status: DISCONTINUED | OUTPATIENT
Start: 2022-07-07 | End: 2022-07-07

## 2022-07-07 RX ORDER — ACETAMINOPHEN 325 MG/1
650 TABLET ORAL EVERY 6 HOURS PRN
Status: DISCONTINUED | OUTPATIENT
Start: 2022-07-07 | End: 2022-07-10 | Stop reason: HOSPADM

## 2022-07-07 RX ORDER — LEVALBUTEROL INHALATION SOLUTION 0.63 MG/3ML
1 SOLUTION RESPIRATORY (INHALATION) EVERY 6 HOURS
Status: DISCONTINUED | OUTPATIENT
Start: 2022-07-08 | End: 2022-07-07

## 2022-07-07 RX ORDER — GUAIFENESIN 600 MG/1
600 TABLET, EXTENDED RELEASE ORAL 2 TIMES DAILY
Status: DISCONTINUED | OUTPATIENT
Start: 2022-07-07 | End: 2022-07-10 | Stop reason: HOSPADM

## 2022-07-07 RX ORDER — HYDROXYZINE HYDROCHLORIDE 10 MG/1
10 TABLET, FILM COATED ORAL ONCE
Status: COMPLETED | OUTPATIENT
Start: 2022-07-07 | End: 2022-07-07

## 2022-07-07 RX ORDER — LEVALBUTEROL INHALATION SOLUTION 0.63 MG/3ML
1 SOLUTION RESPIRATORY (INHALATION) EVERY 6 HOURS
Status: DISCONTINUED | OUTPATIENT
Start: 2022-07-08 | End: 2022-07-08

## 2022-07-07 RX ADMIN — MICONAZOLE NITRATE 2 % TOPICAL POWDER: at 08:07

## 2022-07-07 RX ADMIN — METHYLPREDNISOLONE SODIUM SUCCINATE 40 MG: 40 INJECTION, POWDER, FOR SOLUTION INTRAMUSCULAR; INTRAVENOUS at 02:07

## 2022-07-07 RX ADMIN — GUAIFENESIN 600 MG: 600 TABLET, EXTENDED RELEASE ORAL at 08:07

## 2022-07-07 RX ADMIN — FLUTICASONE FUROATE AND VILANTEROL TRIFENATATE 1 PUFF: 100; 25 POWDER RESPIRATORY (INHALATION) at 11:07

## 2022-07-07 RX ADMIN — ACETAMINOPHEN 650 MG: 325 TABLET ORAL at 11:07

## 2022-07-07 RX ADMIN — LEVALBUTEROL HYDROCHLORIDE 0.63 MG: 0.63 SOLUTION RESPIRATORY (INHALATION) at 10:07

## 2022-07-07 RX ADMIN — TIOTROPIUM BROMIDE INHALATION SPRAY 2 PUFF: 3.12 SPRAY, METERED RESPIRATORY (INHALATION) at 10:07

## 2022-07-07 RX ADMIN — POLYETHYLENE GLYCOL 3350 17 G: 17 POWDER, FOR SOLUTION ORAL at 08:07

## 2022-07-07 RX ADMIN — Medication 6 MG: at 08:07

## 2022-07-07 RX ADMIN — HYDROXYZINE HYDROCHLORIDE 10 MG: 10 TABLET ORAL at 08:07

## 2022-07-07 RX ADMIN — INSULIN ASPART 1 UNITS: 100 INJECTION, SOLUTION INTRAVENOUS; SUBCUTANEOUS at 08:07

## 2022-07-07 RX ADMIN — LEVALBUTEROL HYDROCHLORIDE 0.63 MG: 0.63 SOLUTION RESPIRATORY (INHALATION) at 01:07

## 2022-07-07 RX ADMIN — ENOXAPARIN SODIUM 30 MG: 30 INJECTION, SOLUTION INTRAVENOUS; SUBCUTANEOUS at 05:07

## 2022-07-07 RX ADMIN — VANCOMYCIN HYDROCHLORIDE 750 MG: 750 INJECTION, POWDER, LYOPHILIZED, FOR SOLUTION INTRAVENOUS at 09:07

## 2022-07-07 RX ADMIN — FLUCONAZOLE 150 MG: 150 TABLET ORAL at 05:07

## 2022-07-07 RX ADMIN — FUROSEMIDE 40 MG: 10 INJECTION, SOLUTION INTRAMUSCULAR; INTRAVENOUS at 08:07

## 2022-07-07 NOTE — SUBJECTIVE & OBJECTIVE
Past Medical History:   Diagnosis Date    Acute on chronic congestive heart failure 7/6/2019    Cardiomyopathy     Carotid artery occlusion     CHF (congestive heart failure)     COPD (chronic obstructive pulmonary disease)     Coronary artery disease     Hyperlipidemia     Hypertension        Past Surgical History:   Procedure Laterality Date    CARDIAC CATHETERIZATION      cardic cath      CAROTID ENDARTERECTOMY      FLEXIBLE BRONCHOSCOPY N/A 7/31/2019    Procedure: BRONCHOSCOPY, FIBEROPTIC Flexible;  Surgeon: Klever Mckeon MD;  [facility];  Service: Thoracic;  Laterality: N/A;    HIP SURGERY      PLEURODESIS USING TALC N/A 7/31/2019    Procedure: PLEURODESIS;  Surgeon: Klever Mckeon MD;  Location: [facility];  Service: Thoracic;  Laterality: N/A;    PLEURODESIS USING TALC Right 8/5/2019    Procedure: PLEURODESIS, USING TALC;  Surgeon: Klever Mckeon MD;  Location: [facility];  Service: Thoracic;  Laterality: Right;    PLEURODESIS WITH VIDEO-ASSISTED THORACOSCOPIC SURGERY (VATS) Right 8/5/2019    Procedure: VATS, WITH PLEURAL TENT;  Surgeon: Klever Mckeon MD;  [facility];  Service: Thoracic;  Laterality: Right;       Review of patient's allergies indicates:   Allergen Reactions    Ancef in dextrose (iso-osm) Rash    Cefazolin Rash     Tolerating Zosyn admission 3/2022    Cefuroxime Rash    Sulfamethoxazole-trimethoprim Rash     Other reaction(s): Rash       Medications:  Medications Prior to Admission   Medication Sig    budesonide-formoterol 160-4.5 mcg (SYMBICORT) 160-4.5 mcg/actuation HFAA Inhale 2 puffs into the lungs every 12 (twelve) hours. Controller    furosemide (LASIX) 40 MG tablet Take 1 tablet (40 mg total) by mouth once daily. In the case of 3lbs weight gain in 1d or 5lbs in 3d, administer additional dose of lasix in the afternoon.    levalbuterol (XOPENEX) 0.63 mg/3 mL nebulizer solution Take 3 mLs (0.63 mg total) by nebulization every 4  "(four) hours as needed for Wheezing or Shortness of Breath. Rescue    metoprolol succinate (TOPROL-XL) 25 MG 24 hr tablet Take 12.5 mg by mouth 2 (two) times daily.     Antibiotics (From admission, onward)                Start     Stop Route Frequency Ordered    22 0345  vancomycin - pharmacy to dose  (vancomycin IVPB)        "And" Linked Group Details    -- IV pharmacy to manage frequency 22 0246          Antifungals (From admission, onward)                Start     Stop Route Frequency Ordered    22 1230  fluconazole tablet 150 mg         -- Oral Once 22 1128    22 0814  miconazole NITRATE 2 % top powder         -- Top 2 times daily 22 0814          Antivirals (From admission, onward)      None             Immunization History   Administered Date(s) Administered    COVID-19, MRNA, LN-S, PF (Pfizer) (Purple Cap) 02/10/2021, 2021    Tdap 2015       Family History       Problem Relation (Age of Onset)    Hypertension Mother          Social History     Socioeconomic History    Marital status:    Tobacco Use    Smoking status: Former Smoker     Packs/day: 2.00     Years: 20.00     Pack years: 40.00     Types: Cigarettes     Quit date: 1980     Years since quittin.4    Smokeless tobacco: Never Used   Substance and Sexual Activity    Alcohol use: Yes     Alcohol/week: 2.0 standard drinks     Types: 2 Glasses of wine per week     Comment: social    Drug use: No    Sexual activity: Not Currently     Review of Systems   Constitutional:  Positive for fatigue. Negative for activity change, appetite change, chills, diaphoresis and fever.   Respiratory:  Positive for cough and shortness of breath.    Cardiovascular:  Negative for chest pain, palpitations and leg swelling.   Gastrointestinal:  Negative for abdominal pain, constipation, diarrhea, nausea and vomiting.   Genitourinary:  Negative for difficulty urinating and dysuria.   Musculoskeletal:  Negative for " back pain.        +leg spasms   Skin:  Negative for color change, pallor, rash and wound.   Neurological:  Negative for dizziness and headaches.   Objective:     Vital Signs (Most Recent):  Temp: 97.4 °F (36.3 °C) (07/07/22 1211)  Pulse: 85 (07/07/22 1127)  Resp: 18 (07/07/22 1127)  BP: (!) 109/58 (07/07/22 0800)  SpO2: 95 % (07/07/22 1127)   Vital Signs (24h Range):  Temp:  [97.2 °F (36.2 °C)-97.7 °F (36.5 °C)] 97.4 °F (36.3 °C)  Pulse:  [74-94] 85  Resp:  [16-18] 18  SpO2:  [91 %-99 %] 95 %  BP: ()/(54-58) 109/58     Weight: 45 kg (99 lb 3.3 oz)  Body mass index is 18.15 kg/m².    Estimated Creatinine Clearance: 17.8 mL/min (based on SCr of 1.4 mg/dL).    Physical Exam  Vitals and nursing note reviewed.   Constitutional:       General: She is not in acute distress.     Appearance: She is well-developed. She is ill-appearing.   HENT:      Head: Normocephalic.   Eyes:      Pupils: Pupils are equal, round, and reactive to light.   Cardiovascular:      Rate and Rhythm: Normal rate and regular rhythm.      Heart sounds: Normal heart sounds.   Pulmonary:      Effort: Respiratory distress present.      Comments: HFNC  Abdominal:      General: Abdomen is flat.      Palpations: Abdomen is soft.   Musculoskeletal:         General: No tenderness or deformity. Normal range of motion.      Cervical back: Normal range of motion.   Skin:     General: Skin is warm and dry.      Coloration: Skin is not pale.      Findings: No erythema.   Neurological:      Mental Status: She is alert and oriented to person, place, and time.      Cranial Nerves: No cranial nerve deficit.      Coordination: Coordination normal.   Psychiatric:         Behavior: Behavior normal.         Thought Content: Thought content normal.       Significant Labs: All pertinent labs within the past 24 hours have been reviewed.    Significant Imaging: I have reviewed all pertinent imaging results/findings within the past 24 hours.

## 2022-07-07 NOTE — ASSESSMENT & PLAN NOTE
Coronary Artery Disease  Elevated Troponin  Hypertension    Patient had increased O2 requirement initially and had some trace pitting edema bilaterally. Labs showed BNP of >3000 with elevated but stable troponin (likely type 2 due to septic shock). CXR showed evidence of pulmonary edema.    - given hypotension improved with fluids, will hold off on diuresis at the moment  - Fluid restriction at 1500 cc with strict I/Os and daily weights   - Maintain on telemetry and daily EKGs   - Up to date risk stratification : TSH, Lipids, HbA1c with optimization of risk factors is necessary  - Check Electrolytes, keep Mag >2 & K+ >4  - patient was off GDMT given hospice situation but family restarted some home medication. Given septic shock, held all anti-hypertensive medication and can restart when clinically indicated    Repeat TTE:  · Asymmetric LV septal hypertrophy without obstruction. Small LV cavity, small stroke volume. Overall reduced cardiac output (2.2L/m, CI 1.5)  · The left ventricle is small with hypertrophy and low normal systolic function.  · The estimated ejection fraction is 50%.  · Grade II left ventricular diastolic dysfunction.  · Severe left atrial enlargement.  · Moderate mitral regurgitation.  · Moderate to severe tricuspid regurgitation.  · Severe right atrial enlargement.  · Moderate right ventricular enlargement with moderately reduced right ventricular systolic function.  · The estimated PA systolic pressure is 53 mmHg.  · Elevated central venous pressure (15 mmHg).

## 2022-07-07 NOTE — CONSULTS
Pharmacokinetic Initial Assessment: IV Vancomycin    Assessment/Plan:    Initiate intravenous vancomycin with loading dose of 750 mg once with subsequent doses when random concentrations are less than 20 mcg/mL.  Desired empiric serum trough concentration is 15 to 20 mcg/mL.  Draw vancomycin random level on 7/8 at approximately 0830.  Pharmacy will continue to follow and monitor vancomycin.      Please contact inpatient pharmacy with any questions regarding this assessment.     Thank you for the consult,   Ashleigh Clark       Patient brief summary:  Venus Myaer is a 89 y.o. female initiated on antimicrobial therapy with IV Vancomycin for treatment of suspected bacteremia.    Drug Allergies:   Review of patient's allergies indicates:   Allergen Reactions    Ancef in dextrose (iso-osm) Rash    Cefazolin Rash     Tolerating Zosyn admission 3/2022    Cefuroxime Rash    Sulfamethoxazole-trimethoprim Rash     Other reaction(s): Rash       Actual Body Weight:   45 kg    Renal Function:   Estimated Creatinine Clearance: 17.8 mL/min (based on SCr of 1.4 mg/dL).,     Dialysis Method (if applicable):  N/A    CBC (last 72 hours):  Recent Labs   Lab Result Units 07/05/22 2210 07/06/22 0652 07/07/22  0811   WBC K/uL 24.84* 18.00* 10.97   Hemoglobin g/dL 11.4* 11.4* 11.1*   Hematocrit % 36.8* 36.2* 35.6*   Platelets K/uL 150 137* 143*   Gran % % 89.4* 86.9* 82.8*   Lymph % % 2.4* 5.1* 7.6*   Mono % % 6.7 7.2 6.9   Eosinophil % % 0.2 0.2 2.0   Basophil % % 0.3 0.2 0.3   Differential Method  Automated Automated Automated       Metabolic Panel (last 72 hours):  Recent Labs   Lab Result Units 07/05/22 2210 07/06/22  0053 07/06/22  0652 07/07/22  0811   Sodium mmol/L 136  --  134* 135*   Potassium mmol/L 5.2*  --  4.8 4.2   Chloride mmol/L 97  --  92* 96   CO2 mmol/L 27  --  30* 29   Glucose mg/dL 94  --  113* 62*   Glucose, UA   --  Negative  --   --    BUN mg/dL 29  --  33* 37*   Creatinine mg/dL 1.2  --  1.3 1.4   Albumin  g/dL 2.7*  --  2.6* 2.5*   Total Bilirubin mg/dL 0.9  --  0.8 0.6   Alkaline Phosphatase U/L 136*  --  123 107   AST U/L 88*  --  65* 33   ALT U/L 43  --  46* 33   Magnesium mg/dL  --   --  2.3 2.2   Phosphorus mg/dL  --   --  4.3 4.0       Drug levels (last 3 results):  Recent Labs   Lab Result Units 07/06/22  2343   Vancomycin, Random ug/mL 12.9       Microbiologic Results:  Microbiology Results (last 7 days)       Procedure Component Value Units Date/Time    Blood culture [[id number]] Collected: 07/07/22 0811    Order Status: Completed Specimen: Blood from Peripheral, Left Arm Updated: 07/07/22 1515     Blood Culture, Routine No Growth to date    Blood culture [[id number]] Collected: 07/07/22 0825    Order Status: Completed Specimen: Blood from Antecubital, Right Arm Updated: 07/07/22 1515     Blood Culture, Routine No Growth to date    Blood culture x two cultures. Draw prior to antibiotics. [[id number]]  (Abnormal) Collected: 07/05/22 2210    Order Status: Completed Specimen: Blood from Peripheral, Forearm, Left Updated: 07/07/22 1448     Blood Culture, Routine Gram stain cortney bottle: Gram positive cocci      Results called to and read back by:Anabelle Jewell RN 07/06/2022  17:09      Gram stain aer bottle: Gram positive cocci       Positive results previously called 07/07/2022  12:06      STAPHYLOCOCCUS AUREUS  Susceptibility pending  [facility] consult required at [facility].      Narrative:      Aerobic and anaerobic    Urine culture [[id number]]  (Abnormal) Collected: 07/06/22 0053    Order Status: Completed Specimen: Urine Updated: 07/07/22 0204     Urine Culture, Routine ENTEROCOCCUS SPECIES  >100,000 cfu/ml  Identification and susceptibility pending  No other significant isolate      Narrative:      Specimen Source->Urine    Blood culture x two cultures. Draw prior to antibiotics. [[id number]] Collected: 07/05/22 2210    Order Status: Completed Specimen: Blood from Peripheral, Forearm,  Right Updated: 07/06/22 2312     Blood Culture, Routine No Growth to date      No Growth to date    Narrative:      Aerobic and anaerobic    Culture, Respiratory with Gram Stain [[id number]]     Order Status: No result Specimen: Respiratory

## 2022-07-07 NOTE — PROGRESS NOTES
[facility] - Transplant [facility] Medicine  Progress Note    Patient Name: Venus Mayer  MRN: [id number]  Patient Class: IP- Inpatient   Admission Date: 7/5/2022  Length of Stay: 1 days  Attending Physician: Javier Puente MD  Primary Care Provider: Primary Doctor No        Subjective:     Principal Problem:Septic shock        HPI:  This is a 89 year old lady with HFpEF (EF 60% with GIIDD 3/2022), COPD on 2L NC, HLD, and HTN who presented to the ED for increased weakness. History obtained primarily from daughters at bedside. They noticed the patient was unable to perform her daily ADLs and was a little more weak per the home physical therapist. They were also concerned that she was more altered and had trouble breathing on her home O2. They noted that she had a cough and gurgling sound from her throat. They mentioned that she had similar symptoms prior and saw an ENT for sinusitis and was given a steroid shot with some anti-histamines which helped with the symptoms. She also had a fall a couple of days ago where she hit her left leg on her bed. She denies any fevers, chills, nausea, vomiting, diarrhea, constipation. The family notes that she has had decreased PO intake over the past couple of months.     Upon chart review, patient was discharged home with hospice on 5/29. She was admitted for debility and delirium and found to have severe CO2 retention. She continued to decline which prompted hospice discussions. Patient was discharged with hospice. For this admission, they rescinded hospice as the patient was improving.     In the ED, patient was hypotensive to 90/55 and tachpneic but satting 99% on her home O2. Labs showed a WBC of 24.84, a potassium of 5.2, Cr of 1.2 and an AST of 88. BNP was elevated to 3645 and troponin was elevated to 0.294 and downtrending. Lactic was 3.5. UA showed >100 WBCs, 3+ leuks, and rare bacteria. CT head showed new acute sphenoid sinusitis with scattered ethmoid sinus  disease. CXR showed some pulmonary interstitial edema and small left-sided pleural effusion.      Overview/Hospital Course:  Patient admitted for Mx of sepsis, likely source UTI vs PNA vs sinusitis. Recent h/o E. Coli UTI Tx w/ ertapenem. Blood cultures with Staph aureus. Continue vancomycin. Acute respiratory decompensation requiring non rebreather and 8L oxygen support. CXR negative. ABG showed low PaO2.       No new subjective & objective note has been filed under this hospital service since the last note was generated.      Assessment/Plan:      * Septic shock  89 yoF presented with worsening weakness and AMS. Found to be hypotensive to 90/55 and tachpneic. Exam showed some rales and wheezing on exam. Labs showed a WBC of 24.84, BNP was elevated to 3645 and troponin was elevated to 0.294 and downtrending. Lactic was 3.5 and downtrended to 2.2 after gentle hydration. UA showed >100 WBCs, 3+ leuks, and rare bacteria. CT head showed new acute sphenoid sinusitis with scattered ethmoid sinus disease.     Given history, exam, labs, and imaging, patient meets criteria for septic shock with likely source of infection in the urine.    - continue vanc with blood cultures positive for S aureus  - urine cx with enterococcus  - gentle IVF due to CHF  - ID following, appreciate recs  - maintaining BP currently    Cerebral microvascular disease  CT head noted chronic microvascular changes which can be contributing to her encephalopathy but her acute decline is better explained by infection. No acute stroke noted on CT head    - continue to monitor  - aspiration and fall precautions      Acute cystitis without hematuria  See septic shock    Senile purpura  Patient has obvious skin breakdown and purpura noted on extremities.     - routine wound care on affected areas      Advanced care planning/counseling discussion  DNR  Patient's family still wish patient to be DNR/DNI. Order reflected on chart. Patient's family had rescinded  hospice.    - consulted palliative care to re-engage conversations      Chronic respiratory failure with hypoxia and hypercapnia  COPD  Centrilobular emphysema    Patient is on home 2L NC  - continue home inhalers    Acute respiratory decompensation requiring 8L via rebreather  - CXR with some fluid but grossly unchanged from prior  - AGB c/w acute hypoxemia  - Discussing with family whether to pursue CT PE as patient was in hospice and does not want invasive interventions    Symptomatic bradycardia  S/p pacemaker      OCTAVIO (acute kidney injury)  Stage 3 CKD    Patient had a previous diagnosis of CKD stage 3 but baseline Cr around 0.8. admit Cr 1.2 likely due to septic shock.    - continue trending Cr    Acute on chronic diastolic heart failure  Coronary Artery Disease  Elevated Troponin  Hypertension    Patient had increased O2 requirement initially and had some trace pitting edema bilaterally. Labs showed BNP of >3000 with elevated but stable troponin (likely type 2 due to septic shock). CXR showed evidence of pulmonary edema.    - given hypotension improved with fluids, will hold off on diuresis at the moment  - Fluid restriction at 1500 cc with strict I/Os and daily weights   - Maintain on telemetry and daily EKGs   - Up to date risk stratification : TSH, Lipids, HbA1c with optimization of risk factors is necessary  - Check Electrolytes, keep Mag >2 & K+ >4  - patient was off GDMT given hospice situation but family restarted some home medication. Given septic shock, held all anti-hypertensive medication and can restart when clinically indicated    Repeat TTE:  · Asymmetric LV septal hypertrophy without obstruction. Small LV cavity, small stroke volume. Overall reduced cardiac output (2.2L/m, CI 1.5)  · The left ventricle is small with hypertrophy and low normal systolic function.  · The estimated ejection fraction is 50%.  · Grade II left ventricular diastolic dysfunction.  · Severe left atrial  enlargement.  · Moderate mitral regurgitation.  · Moderate to severe tricuspid regurgitation.  · Severe right atrial enlargement.  · Moderate right ventricular enlargement with moderately reduced right ventricular systolic function.  · The estimated PA systolic pressure is 53 mmHg.  · Elevated central venous pressure (15 mmHg).        VTE Risk Mitigation (From admission, onward)         Ordered     enoxaparin injection 30 mg  Daily         07/06/22 0219     IP VTE HIGH RISK PATIENT  Once         07/06/22 0219     Place sequential compression device  Until discontinued         07/06/22 0219                Discharge Planning   TRISTON: 7/9/2022     Code Status: DNR   Is the patient medically ready for discharge?: Yes    Reason for patient still in hospital (select all that apply): Patient unstable and Treatment  Discharge Plan A: Home with family                  Akhil Silva MD  Department of Hospital Medicine   [facility] - Transplant Stepdown

## 2022-07-07 NOTE — ASSESSMENT & PLAN NOTE
89 y/o female COPD, HTN, CAD, CHF, vascular dementia presents to ED for weakness. ID consulted for sinusitis, pneumonia and UTI. CXR with cardiomegaly and edema without focal consolidation. Small left sided effusions noted. Urine cultures +Enterococcus. Blood cultures with coag negative staph. No urinary symptoms at this time. Pt with increasing O2 requirements..  Palliative care following, likely home with hospice once discharged.       Recommendations  1. Completed course of abx at this time for uncomplicated uti. No further abx indicated at this time  2. CHF COPD mgmt per primary team. Recommend repeat cultures and imaging if worsens  3. Palliative care following, home hospice at discharge    Seen and discussed with ID staff. Discussed with primary team.

## 2022-07-07 NOTE — PLAN OF CARE
07/07/22 1336   Post-Acute Status   Post-Acute Authorization Hospice   Hospice Status Referrals Sent     The SW met with the patient and her daughter bedside to discuss the patient's Hospice Orders.  The patient's daughter reported that she preferred [facility].    The SW faxed a Hospice referral via [facility] to [facility] for review.       The SW will continue to follow.       Krystle Tello LMSW  Case Management [facility]  Ext: [phone]

## 2022-07-07 NOTE — CONSULTS
Alexandro ochoa consulted for goals of care and advanced care planning.     Chart reviewed and patient discussed with Dr. Puente.  Pal med met with patient and daughter at bedside. Pal med re-introduced. Patient known to pal med from previous encounter.    Advance Care Planning     - ACP documents received.  HPOA and LaPOST  HPOA is son Arthur Miller and LaPOST indicates DNR    Goals of Care   - denver Rodriguez at bedside reports the patient had been acting strange and was concerned the patient had stroke.  Rescinded home hospice - [facility] to return to hospital   - hospice education provided and appropriateness of hospice discussed.    - Stroke ruled out and patient found to have infection - possible urinary tract infection.  -following lengthy conversation with daughter - patient when ready to discharge will return home and resume home hospice.      30 mins spent in advanced care planning.  Pal gabriela will continue to follow.  Full consult pending

## 2022-07-07 NOTE — HPI
HPI obtained from chart review:     Mrs. Mayer is a 89 yo lady with PMH of:  CHF, COPD, hypertension, hyperlipidemia who presents to emergency department over concerns of hypoxia, mental status changes, abnormal breathing.  Granddaughter is at bedside and reports that patient is currently on hospice after a long ICU stay.  She states that patient was doing well however today she started noticing that patient was fatigued unable to perform ADLs that she usually can.  There was concern of irregular breathing and noted hypoxia.  Patient is on 2 L baseline and has been requiring mild increases intermittently.  They note that patient has also had coughing with abnormal breathing.  They believe that patient is breathing irregularly.  They deny any vomiting or diarrhea noted.    Daughter reports have concern the patent had stroke.  Daughter Jennifer states contacting the hospice nurse who advised family to return to hospital for treatment.    Ct of Head without Contrast - showed no evidence of acute hemorrhage, mass or infarction.  Indicates sinusiits    Pal med consulted for goals of care and advanced care planning

## 2022-07-07 NOTE — ASSESSMENT & PLAN NOTE
Palliative medicine consulted for goals of care and advanced care planning for Mr. Mayer a 89 yo lady with PMH of  CHF, COPD, hypertension, hyperlipidemia who presents to emergency department and admitted to hospital medicine with concerns related to  hypoxia, mental status changes, abnormal breathing.  Daughter also states concern the patient had had a stroke as he had new weaknessand inability to feed herself or complete ADLs as usual. Currently uses supplemental oxygen at 2 L NC. Patient is able to speak but not particpate in meaningful conversation.  During this encounter patient states she is having trouble breathing that her butt hurts and her legs are jumping - she is unable to state what is actually occurring.  At this time the patient appears to be in no discomfort - no facial grimacing or moaning,  No labored breathing with oxygen sats  99 - 100%,      Daughter states rescinding home hospice services with [facility] to seek medical treatment - family concerned Mrs. Mayer had a stroke.     Advanced Care Planning:  - HPOA received indicating Arthur Miller [phone] is decision maker.  -daughter reports decisions are made as a family  - LaPOST also on file indicating DNR, selective treatment, and limited trial of artifical nutrition  - DNR per primary team .     Goals of Care  - Lengthy conversation regarding appropriateness of hospice and philosophy of hospice    - Also discussed the patient's quality of life at home.  Appears the patient remains to be very functional.  Toilets only with ambulation assistance, is able to dress her self appropriately, converses and plays scrabble - especially when she is feeling anxious as this is calming to her  She is still able to eat and adheres to her dietary restrictions   - Family reports they are not amenable to invasive interventions, the patient does not want to come to hospital and prefers to be in her home.   - Daughter states the family is torn about continuing  treatment with oral antibiotics versus revoking hospice completely.  - explained oral antibiotics can be given in hospice.    - explained the hospice agency assesses patiient's frequently to determine eligibility for hospice care.    - Daughter came to conclusion that hospice is very beneficial for the patient and family.  Daughter states family will likely continue with hospice.  If at any time the agency determines she does not meet criteria for hospice family will then revert back to home palliative care services.     Plan/Recommendations  - continue current plan of care  - family would benefit from continued education and information and what to expect in the furture.   - family amenable to returning home with hospice services at discharge.  - emotional support provided.           - .

## 2022-07-07 NOTE — PLAN OF CARE
[facility]     Department of Hospital Medicine     [address] [place] [address]     [phone] after hours  [phone] fax                                   HOSPICE  ORDERS     Patient Name: Venus Mayer  YOB: 1929 07/07/2022    Admit to Hospice:  Home Service      Diagnoses:  Active Hospital Problems    Diagnosis  POA    *Septic shock [A41.9, R65.21]  Yes    Cerebral microvascular disease [I67.89]  Yes     Extensive disease; likely contributing to her current mental state.      Senile purpura [D69.2]  Yes    Palliative care encounter [Z51.5]  Not Applicable    Advanced care planning/counseling discussion [Z71.89]  Not Applicable    Chronic respiratory failure with hypoxia and hypercapnia [J96.11, J96.12]  Yes     Combination of severe debility, emphysema, diastolic heart failure.  Continues to benefit.      Symptomatic bradycardia [R00.1]  Yes    OCTAVIO (acute kidney injury) [N17.9]  Yes    Acute on chronic diastolic heart failure [I50.33]  Yes      Resolved Hospital Problems   No resolved problems to display.       Hospice Qualifying Diagnoses:Dementia/ End stage COPD        Patient has a life expectancy < 6 months due to these conditions.    Vital Signs: Routine per Hospice Protocol.    Allergies:  Review of patient's allergies indicates:   Allergen Reactions    Ancef in dextrose (iso-osm) Rash    Cefazolin Rash     Tolerating Zosyn admission 3/2022    Cefuroxime Rash    Sulfamethoxazole-trimethoprim Rash     Other reaction(s): Rash       Diet: Pleasure     Activities: activity as tolerated    Nursing: Per Hospice Routine    Future Orders:  Hospice Medical Director may dictate new orders for comfortable care measures & sign death certificate.    Medications:         Comfort Care Medications Only        Medication List      CONTINUE taking these medications    budesonide-formoterol 160-4.5 mcg 160-4.5 mcg/actuation  Hfaa  Commonly known as: SYMBICORT     furosemide 40 MG tablet  Commonly known as: LASIX  Take 1 tablet (40 mg total) by mouth once daily. In the case of 3lbs weight gain in 1d or 5lbs in 3d, administer additional dose of lasix in the afternoon.     levalbuterol 0.63 mg/3 mL nebulizer solution  Commonly known as: XOPENEX  Take 3 mLs (0.63 mg total) by nebulization every 4 (four) hours as needed for Wheezing or Shortness of Breath. Rescue     metoprolol succinate 25 MG 24 hr tablet  Commonly known as: TOPROL-XL     SPIRIVA RESPIMAT 2.5 mcg/actuation inhaler  Generic drug: tiotropium bromide  Inhale 2 puffs into the lungs Daily. Controller        STOP taking these medications    albuterol 90 mcg/actuation inhaler  Commonly known as: PROAIR HFA     baclofen 10 MG tablet  Commonly known as: LIORESAL     bumetanide 1 MG tablet  Commonly known as: BUMEX     pantoprazole 40 MG tablet  Commonly known as: PROTONIX           Where to Get Your Medications      These medications were sent to [facility] [address], [place] [address]    Hours: Mon-Fri 7a-7p, Sat-Sun 10a-4p Phone: [phone]   · SPIRIVA RESPIMAT 2.5 mcg/actuation inhaler             _________________________________  Javier Puente MD  07/07/2022

## 2022-07-07 NOTE — PROGRESS NOTES
Pharmacokinetic Assessment Follow Up: IV Vancomycin    Vancomycin serum concentration assessment(s):    The random level was drawn correctly and can be used to guide therapy at this time. The measurement is below the desired definitive target range of 15 to 20 mcg/mL.    Vancomycin Regimen Plan:    Continue pulse dosing  Vancomycin  mg (15 mg/kg) x 1 dose  Random scheduled for 0645 on 7/8    Drug levels (last 3 results):  Recent Labs   Lab Result Units 07/06/22  2343   Vancomycin, Random ug/mL 12.9       Pharmacy will continue to follow and monitor vancomycin.    Please contact pharmacy at extension [phone] for questions regarding this assessment.    Thank you for the consult,   Chica Jimenez       Patient brief summary:  Venus Mayer is a 89 y.o. female initiated on antimicrobial therapy with IV Vancomycin for treatment of sepsis        Drug Allergies:   Review of patient's allergies indicates:   Allergen Reactions    Ancef in dextrose (iso-osm) Rash    Cefazolin Rash     Tolerating Zosyn admission 3/2022    Cefuroxime Rash    Sulfamethoxazole-trimethoprim Rash     Other reaction(s): Rash       Actual Body Weight:   45 kg    Renal Function:   Estimated Creatinine Clearance: 19.2 mL/min (based on SCr of 1.3 mg/dL).    Dialysis Method (if applicable):  N/A    CBC (last 72 hours):  Recent Labs   Lab Result Units 07/05/22 2210 07/06/22  0652   WBC K/uL 24.84* 18.00*   Hemoglobin g/dL 11.4* 11.4*   Hematocrit % 36.8* 36.2*   Platelets K/uL 150 137*   Gran % % 89.4* 86.9*   Lymph % % 2.4* 5.1*   Mono % % 6.7 7.2   Eosinophil % % 0.2 0.2   Basophil % % 0.3 0.2   Differential Method  Automated Automated       Metabolic Panel (last 72 hours):  Recent Labs   Lab Result Units 07/05/22 2210 07/06/22  0053 07/06/22  0652   Sodium mmol/L 136  --  134*   Potassium mmol/L 5.2*  --  4.8   Chloride mmol/L 97  --  92*   CO2 mmol/L 27  --  30*   Glucose mg/dL 94  --  113*   Glucose, UA   --  Negative  --    BUN mg/dL 29   --  33*   Creatinine mg/dL 1.2  --  1.3   Albumin g/dL 2.7*  --  2.6*   Total Bilirubin mg/dL 0.9  --  0.8   Alkaline Phosphatase U/L 136*  --  123   AST U/L 88*  --  65*   ALT U/L 43  --  46*   Magnesium mg/dL  --   --  2.3   Phosphorus mg/dL  --   --  4.3       Vancomycin Administrations:  vancomycin given in the last 96 hours                   vancomycin in dextrose 5 % 1 gram/250 mL IVPB 1,000 mg (mg) 1,000 mg New Bag 07/05/22 2245                Microbiologic Results:  Microbiology Results (last 7 days)     Procedure Component Value Units Date/Time    Urine culture [[id number]]  (Abnormal) Collected: 07/06/22 0053    Order Status: Completed Specimen: Urine Updated: 07/07/22 0204     Urine Culture, Routine ENTEROCOCCUS SPECIES  >100,000 cfu/ml  Identification and susceptibility pending  No other significant isolate      Narrative:      Specimen Source->Urine    Blood culture x two cultures. Draw prior to antibiotics. [[id number]] Collected: 07/05/22 2210    Order Status: Completed Specimen: Blood from Peripheral, Forearm, Right Updated: 07/06/22 2312     Blood Culture, Routine No Growth to date      No Growth to date    Narrative:      Aerobic and anaerobic    Blood culture x two cultures. Draw prior to antibiotics. [[id number]] Collected: 07/05/22 2210    Order Status: Completed Specimen: Blood from Peripheral, Forearm, Left Updated: 07/06/22 1710     Blood Culture, Routine Gram stain cortney bottle: Gram positive cocci      Results called to and read back by:Anabelle Jewell RN 07/06/2022  17:09    Narrative:      Aerobic and anaerobic    Culture, Respiratory with Gram Stain [[id number]]     Order Status: No result Specimen: Respiratory

## 2022-07-07 NOTE — ASSESSMENT & PLAN NOTE
89 yoF presented with worsening weakness and AMS. Found to be hypotensive to 90/55 and tachpneic. Exam showed some rales and wheezing on exam. Labs showed a WBC of 24.84, BNP was elevated to 3645 and troponin was elevated to 0.294 and downtrending. Lactic was 3.5 and downtrended to 2.2 after gentle hydration. UA showed >100 WBCs, 3+ leuks, and rare bacteria. CT head showed new acute sphenoid sinusitis with scattered ethmoid sinus disease.     Given history, exam, labs, and imaging, patient meets criteria for septic shock with likely source of infection in the urine.    - continue vanc with blood cultures positive for S aureus  - urine cx with enterococcus  - gentle IVF due to CHF  - ID following, appreciate recs  - maintaining BP currently

## 2022-07-07 NOTE — SUBJECTIVE & OBJECTIVE
Interval history: Patient with no acute complaints this AM. Alert and oriented. Mentions 'leg jumping' that she has had for many years and is relieved with sugary drinks.      Review of Systems   Constitutional:  Negative for chills and fever.   HENT:  Negative for ear pain and sinus pain.    Eyes:  Negative for photophobia, pain and visual disturbance.   Respiratory:  Negative for cough and shortness of breath.    Cardiovascular:  Negative for chest pain and leg swelling.   Gastrointestinal:  Negative for abdominal pain, blood in stool, constipation, diarrhea, nausea and vomiting.   Endocrine: Negative for polyuria.   Genitourinary:  Negative for difficulty urinating and dysuria.   Musculoskeletal:  Positive for arthralgias, back pain and joint swelling. Negative for neck pain.   Skin:  Positive for wound (left leg wound). Negative for rash.   Neurological:  Positive for weakness. Negative for dizziness and light-headedness.   Psychiatric/Behavioral:  Negative for confusion and sleep disturbance.    Objective:     Vital Signs (Most Recent):  Temp: 97.4 °F (36.3 °C) (07/07/22 1211)  Pulse: 92 (07/07/22 1352)  Resp: (!) 24 (07/07/22 1352)  BP: 118/62 (07/07/22 1245)  SpO2: (!) 92 % (07/07/22 1352)   Vital Signs (24h Range):  Temp:  [97.2 °F (36.2 °C)-97.7 °F (36.5 °C)] 97.4 °F (36.3 °C)  Pulse:  [74-94] 92  Resp:  [16-24] 24  SpO2:  [92 %-99 %] 92 %  BP: (109-118)/(55-62) 118/62     Weight: 45 kg (99 lb 3.3 oz)  Body mass index is 18.15 kg/m².    Physical Exam  Constitutional:       General: She is sleeping. She is not in acute distress.     Appearance: Normal appearance. She is cachectic. She is not ill-appearing or diaphoretic.      Interventions: Nasal cannula in place.   HENT:      Head: Normocephalic and atraumatic.      Right Ear: External ear normal.      Left Ear: External ear normal.      Nose: Nose normal. No congestion or rhinorrhea.      Mouth/Throat:      Mouth: Mucous membranes are moist.       Pharynx: Oropharynx is clear. No oropharyngeal exudate or posterior oropharyngeal erythema.      Comments: Patient had mucus pooling in back of the mouth  Eyes:      General: No scleral icterus.     Extraocular Movements: Extraocular movements intact.      Conjunctiva/sclera: Conjunctivae normal.   Neck:      Vascular: No carotid bruit.   Cardiovascular:      Rate and Rhythm: Normal rate and regular rhythm.      Pulses: Normal pulses.      Heart sounds: Murmur heard.     No friction rub.   Pulmonary:      Effort: Pulmonary effort is normal. No respiratory distress.      Breath sounds: No stridor. Wheezing, rhonchi and rales present.      Comments: Transmitted upper airway sounds  Chest:      Chest wall: No tenderness.   Abdominal:      General: Abdomen is flat. Bowel sounds are normal. There is no distension.      Palpations: There is no mass.      Tenderness: There is no right CVA tenderness, left CVA tenderness or guarding.   Musculoskeletal:         General: No swelling, tenderness or deformity. Normal range of motion.      Cervical back: Normal range of motion. No rigidity or tenderness.      Right lower leg: Edema present.      Left lower leg: Edema present.   Skin:     General: Skin is warm and dry.      Coloration: Skin is not jaundiced or pale.      Findings: Lesion present. No bruising or erythema.      Comments: Superficial long wound on left leg   Neurological:      General: No focal deficit present.      Mental Status: Mental status is at baseline. She is lethargic.      Comments: Hard of hearing but alert   Psychiatric:         Mood and Affect: Mood normal.         Behavior: Behavior normal.         Thought Content: Thought content normal.        Significant Labs: All pertinent labs within the past 24 hours have been reviewed.  CBC:   Recent Labs   Lab 07/05/22  2210 07/06/22  0652 07/07/22  0811   WBC 24.84* 18.00* 10.97   HGB 11.4* 11.4* 11.1*   HCT 36.8* 36.2* 35.6*    137* 143*       CMP:    Recent Labs   Lab 07/05/22 2210 07/06/22  0652 07/07/22  0811    134* 135*   K 5.2* 4.8 4.2   CL 97 92* 96   CO2 27 30* 29   GLU 94 113* 62*   BUN 29 33* 37*   CREATININE 1.2 1.3 1.4   CALCIUM 9.4 9.3 9.5   PROT 6.2 6.1 5.8*   ALBUMIN 2.7* 2.6* 2.5*   BILITOT 0.9 0.8 0.6   ALKPHOS 136* 123 107   AST 88* 65* 33   ALT 43 46* 33   ANIONGAP 12 12 10   EGFRNONAA 39.1* 35.4* 32.4*       Cardiac Markers:   Recent Labs   Lab 07/05/22 2210   BNP 3,645*       Lactic Acid:   Recent Labs   Lab 07/05/22 2210 07/06/22  0206   LACTATE 3.5* 2.2       Troponin:   Recent Labs   Lab 07/05/22 2210 07/06/22  0206   TROPONINI 0.294* 0.275*       Urine Studies:   Recent Labs   Lab 07/06/22  0053   COLORU [place]   APPEARANCEUA Cloudy*   PHUR 5.0   SPECGRAV 1.015   PROTEINUA 1+*   GLUCUA Negative   KETONESU Negative   BILIRUBINUA Negative   OCCULTUA Negative   NITRITE Negative   LEUKOCYTESUR 3+*   RBCUA 13*   WBCUA >100*   BACTERIA Rare   SQUAMEPITHEL 5   HYALINECASTS 0         Significant Imaging: I have reviewed all pertinent imaging results/findings within the past 24 hours.

## 2022-07-07 NOTE — PLAN OF CARE
Admitted 7/5 from hospice with septic shock (weakness/hypotension/SOB)  -AAO1(self), VSS/afebrile, on 2L NC, denies pain  -monitoring O2 and HR on bedside monitor  -ertapenem q24 for UTI  -palliative re-consulted  -wound care consulted for L shin skin tear; ABD placed by this RN for leaking  -foam dressing to bilateral heels CDI  -miconazole powder applied to groin/folds  -accuchecks ACHS, no ss needed  -2-person assist to move from chair to bed/bed to chair, turns self  -labs> LA WDL, WBC 18 down from last, Cr WDL, trop 0.275 down from last  -son at bedside, fall precautions maintained, call bell in reach

## 2022-07-07 NOTE — SUBJECTIVE & OBJECTIVE
Interval History:   Past Medical History:   Diagnosis Date    Acute on chronic congestive heart failure 7/6/2019    Cardiomyopathy     Carotid artery occlusion     CHF (congestive heart failure)     COPD (chronic obstructive pulmonary disease)     Coronary artery disease     Hyperlipidemia     Hypertension        Past Surgical History:   Procedure Laterality Date    CARDIAC CATHETERIZATION      cardic cath      CAROTID ENDARTERECTOMY      FLEXIBLE BRONCHOSCOPY N/A 7/31/2019    Procedure: BRONCHOSCOPY, FIBEROPTIC Flexible;  Surgeon: Klever Mckeon MD;  Location: [facility] OR [facility] FLR;  Service: Thoracic;  Laterality: N/A;    HIP SURGERY      PLEURODESIS USING TALC N/A 7/31/2019    Procedure: PLEURODESIS;  Surgeon: Klever Mckeon MD;  Location: [facility] OR 2ND FLR;  Service: Thoracic;  Laterality: N/A;    PLEURODESIS USING TALC Right 8/5/2019    Procedure: PLEURODESIS, USING TALC;  Surgeon: Klever Mckeon MD;  Location: [facility] OR [facility] FLR;  Service: Thoracic;  Laterality: Right;    PLEURODESIS WITH VIDEO-ASSISTED THORACOSCOPIC SURGERY (VATS) Right 8/5/2019    Procedure: VATS, WITH PLEURAL TENT;  Surgeon: Klever Mckeon MD;  [facility];  Service: Thoracic;  Laterality: Right;       Review of patient's allergies indicates:   Allergen Reactions    Ancef in dextrose (iso-osm) Rash    Cefazolin Rash     Tolerating Zosyn admission 3/2022    Cefuroxime Rash    Sulfamethoxazole-trimethoprim Rash     Other reaction(s): Rash       Medications:  Continuous Infusions:  Scheduled Meds:   enoxaparin  30 mg Subcutaneous Daily    ertapenem (INVANZ) IVPB  0.5 g Intravenous Q24H    fluticasone furoate-vilanteroL  1 puff Inhalation Daily    miconazole NITRATE 2 %   Topical (Top) BID    polyethylene glycol  17 g Oral Daily    tiotropium bromide  2 puff Inhalation Daily     PRN Meds:dextrose 10%, dextrose 10%, glucagon (human recombinant), glucose, glucose, hydrOXYzine HCL, insulin aspart U-100, levalbuterol,  melatonin, naloxone, ondansetron, prochlorperazine, sodium chloride 0.9%, Pharmacy to dose Vancomycin consult **AND** vancomycin - pharmacy to dose    Family History       Problem Relation (Age of Onset)    Hypertension Mother          Tobacco Use    Smoking status: Former Smoker     Packs/day: 2.00     Years: 20.00     Pack years: 40.00     Types: Cigarettes     Quit date: 1980     Years since quittin.4    Smokeless tobacco: Never Used   Substance and Sexual Activity    Alcohol use: Yes     Alcohol/week: 2.0 standard drinks     Types: 2 Glasses of wine per week     Comment: social    Drug use: No    Sexual activity: Not Currently       Review of Systems   Unable to perform ROS: Dementia (patient very hard of hearing and cannot paritcipate in conversation)   Objective:     Vital Signs (Most Recent):  Temp: 97.4 °F (36.3 °C) (22)  Pulse: 94 (22)  Resp: 18 (22)  BP: (!) 111/56 (22)  SpO2: 97 % (22)   Vital Signs (24h Range):  Temp:  [97.4 °F (36.3 °C)-98 °F (36.7 °C)] 97.4 °F (36.3 °C)  Pulse:  [72-94] 94  Resp:  [17-22] 18  SpO2:  [91 %-100 %] 97 %  BP: ()/(43-61) 111/56     Weight: 45 kg (99 lb 3.3 oz)  Body mass index is 18.15 kg/m².    Physical Exam  Constitutional:       Appearance: She is ill-appearing. She is not toxic-appearing.   HENT:      Head: Normocephalic and atraumatic.      Ears:      Comments: Hard of hearing      Nose: Congestion present.      Comments: Congested,       Mouth/Throat:      Mouth: Mucous membranes are moist.   Eyes:      Conjunctiva/sclera: Conjunctivae normal.      Pupils: Pupils are equal, round, and reactive to light.   Cardiovascular:      Rate and Rhythm: Normal rate and regular rhythm.   Pulmonary:      Breath sounds: No wheezing.   Abdominal:      General: Abdomen is flat. Bowel sounds are normal.   Musculoskeletal:         General: Normal range of motion.      Cervical back: Normal range of motion and neck  supple.      Right lower leg: Edema present.      Left lower leg: Edema present.   Skin:     General: Skin is warm and dry.      Coloration: Skin is pale.      Comments: Multiple skin integrity issues to heels bilateral and left anterior lower extremity - red and blue open wound    Neurological:      Mental Status: She is alert. She is disoriented.      Motor: Weakness present.   Psychiatric:      Comments: Appears to have agitation and difficulty expressing thoughts        Review of Symptoms      Symptom Assessment (ESAS 0-10 Scale)  Pain:  0  Dyspnea:  0  Anxiety:  0  Nausea:  0  Depression:  0  Anorexia:  0  Fatigue:  0  Insomnia:  0  Restlessness:  0  Agitation:  0  Unable to complete assessment       Pain Assessment:  OME in 24 hours:  0  Location(s):      Pain Assessment in Advanced Demential Scale (PAINAD)   Breathing - Independent of vocalization:  0  Negative vocalization:  1  Facial expression:  0  Body language:  1  Consolability:  0  Total:  2    Performance Status:  60    Living Arrangements:  Lives with family    Psychosocial/Cultural: Widwowed, adult children, numerous grand children,  great grand children     Spiritual:  F - Kylah and Belief:  Bahai   C - Community:  Amenable to  visitis       Advance Care Planning   Advance Directives:   Living Will: No        Oral Declaration: No    LaPOST: Yes    Do Not Resuscitate Status: Yes    Medical Power of : Yes    Agent's Name:  Arthur Miller   Agent's Contact Number:  [phone]    Decision Making:  Patient unable to communicate due to disease severity/cognitive impairment and Family answered questions       Significant Labs: All pertinent labs within the past 24 hours have been reviewed.  CBC:   Recent Labs   Lab 07/06/22  0652   WBC 18.00*   HGB 11.4*   HCT 36.2*   *   *     BMP:  Recent Labs   Lab 07/06/22  0652   *   *   K 4.8   CL 92*   CO2 30*   BUN 33*   CREATININE 1.3   CALCIUM 9.3   MG 2.3      LFT:  Lab Results   Component Value Date    AST 65 (H) 07/06/2022    ALKPHOS 123 07/06/2022    BILITOT 0.8 07/06/2022     Albumin:   Albumin   Date Value Ref Range Status   07/06/2022 2.6 (L) 3.5 - 5.2 g/dL Final     Protein:   Total Protein   Date Value Ref Range Status   07/06/2022 6.1 6.0 - 8.4 g/dL Final     Lactic acid:   Lab Results   Component Value Date    LACTATE 2.2 07/06/2022    LACTATE 3.5 (HH) 07/05/2022       Significant Imaging: I have reviewed all pertinent imaging results/findings within the past 24 hours.

## 2022-07-07 NOTE — HPI
History obtained via daughter    89 y/o female COPD, HTN, CAD, CHF, vascular dementia presents to ED for weakness. ID consulted for sinusitis, pneumonia and UTI. Pt was seen in May 2022 for E.coli UTI. She is currently on vancomycin and ertapenem. CXR with cardiomegaly and edema without focal consolidation. Small left sided effusions noted. Urine cultures +Enterococcus. Blood cultures with coag negative staph. Pt has much improved since admission. O2 requirements at 2L. Palliative care following, likely home with hospice once discharged.

## 2022-07-07 NOTE — ASSESSMENT & PLAN NOTE
COPD  Centrilobular emphysema    Patient is on home 2L NC  - continue home inhalers    Acute respiratory decompensation requiring 8L via rebreather - resolved with breathing treatment; suspect mucus plugging  - CXR with some fluid but grossly unchanged from prior  - AGB c/w acute hypoxemia

## 2022-07-07 NOTE — CONSULTS
[facility] - Transplant Stepdown  Infectious Disease  Consult Note    Patient Name: Venus Mayer  MRN: [id number]  Admission Date: 7/5/2022  Hospital Length of Stay: 1 days  Attending Physician: Javier Puente MD  Primary Care Provider: Primary Doctor No     Isolation Status: No active isolations      Inpatient consult to Infectious Diseases  Consult performed by: Alyssia Knight PA-C  Consult ordered by: Swapnil Weiss MD        Assessment/Plan:     Acute cystitis without hematuria  89 y/o female COPD, HTN, CAD, CHF, vascular dementia presents to ED for weakness. ID consulted for sinusitis, pneumonia and UTI. CXR with cardiomegaly and edema without focal consolidation. Small left sided effusions noted. Urine cultures +Enterococcus. Blood cultures with coag negative staph. No urinary symptoms at this time. Pt with increasing O2 requirements..  Palliative care following, likely home with hospice once discharged.       Recommendations  1. Blood cultures on admit prior coag negative staph now identified as Staph aureus. susceptibilities pending. Continue IV vancomycin at this time.  2. Repeat blood cultures so far NGTD.   2. CHF COPD mgmt per primary team. Recommend repeat cultures and imaging if worsens  3. Palliative care following,, anticipating home hospice at discharge.     Seen and discussed with ID staff. Discussed with primary team.         Thank you for your consult. I will follow-up with patient. Please contact us if you have any additional questions.    Alyssia Knight PA-C  Infectious Disease  [facility] - Transplant Stepdown    Subjective:     Principal Problem: Septic shock    HPI: History obtained via daughter    89 y/o female COPD, HTN, CAD, CHF, vascular dementia presents to ED for weakness. ID consulted for sinusitis, pneumonia and UTI. Pt was seen in May 2022 for E.coli UTI. She is currently on vancomycin and ertapenem. CXR with cardiomegaly and edema without focal consolidation. Small left sided  effusions noted. Urine cultures +Enterococcus. Blood cultures with coag negative staph. Pt has much improved since admission. O2 requirements at 2L. Palliative care following, likely home with hospice once discharged.       Past Medical History:   Diagnosis Date    Acute on chronic congestive heart failure 7/6/2019    Cardiomyopathy     Carotid artery occlusion     CHF (congestive heart failure)     COPD (chronic obstructive pulmonary disease)     Coronary artery disease     Hyperlipidemia     Hypertension        Past Surgical History:   Procedure Laterality Date    CARDIAC CATHETERIZATION      cardic cath      CAROTID ENDARTERECTOMY      FLEXIBLE BRONCHOSCOPY N/A 7/31/2019    Procedure: BRONCHOSCOPY, FIBEROPTIC Flexible;  Surgeon: Klever Mckeon MD;  Location: [facility] OR [address] [place] [address];  Service: Thoracic;  Laterality: N/A;    HIP SURGERY      PLEURODESIS USING TALC N/A 7/31/2019    Procedure: PLEURODESIS;  Surgeon: Klever Mckeon MD;  Location: [facility] OR [address] [place] [address];  Service: Thoracic;  Laterality: N/A;    PLEURODESIS USING TALC Right 8/5/2019    Procedure: PLEURODESIS, USING TALC;  Surgeon: Klever Mckeon MD;  Location: [address];  Service: Thoracic;  Laterality: Right;    PLEURODESIS WITH VIDEO-ASSISTED THORACOSCOPIC SURGERY (VATS) Right 8/5/2019    Procedure: VATS, WITH PLEURAL TENT;  Surgeon: Klever Mckeon MD;  Location: [address];  Service: Thoracic;  Laterality: Right;       Review of patient's allergies indicates:   Allergen Reactions    Ancef in dextrose (iso-osm) Rash    Cefazolin Rash     Tolerating Zosyn admission 3/2022    Cefuroxime Rash    Sulfamethoxazole-trimethoprim Rash     Other reaction(s): Rash       Medications:  Medications Prior to Admission   Medication Sig    budesonide-formoterol 160-4.5 mcg (SYMBICORT) 160-4.5 mcg/actuation HFAA Inhale 2 puffs into the lungs every 12 (twelve) hours. Controller    furosemide (LASIX) 40 MG tablet Take 1 tablet (40  "mg total) by mouth once daily. In the case of 3lbs weight gain in 1d or 5lbs in 3d, administer additional dose of lasix in the afternoon.    levalbuterol (XOPENEX) 0.63 mg/3 mL nebulizer solution Take 3 mLs (0.63 mg total) by nebulization every 4 (four) hours as needed for Wheezing or Shortness of Breath. Rescue    metoprolol succinate (TOPROL-XL) 25 MG 24 hr tablet Take 12.5 mg by mouth 2 (two) times daily.     Antibiotics (From admission, onward)                Start     Stop Route Frequency Ordered    22 0345  vancomycin - pharmacy to dose  (vancomycin IVPB)        "And" Linked Group Details    -- IV pharmacy to manage frequency 22 0246          Antifungals (From admission, onward)                Start     Stop Route Frequency Ordered    22 1230  fluconazole tablet 150 mg         -- Oral Once 22 1128    22 0814  miconazole NITRATE 2 % top powder         -- Top 2 times daily 22 0814          Antivirals (From admission, onward)      None             Immunization History   Administered Date(s) Administered    COVID-19, MRNA, LN-S, PF (Pfizer) (Purple Cap) 02/10/2021, 2021    Tdap 2015       Family History       Problem Relation (Age of Onset)    Hypertension Mother          Social History     Socioeconomic History    Marital status:    Tobacco Use    Smoking status: Former Smoker     Packs/day: 2.00     Years: 20.00     Pack years: 40.00     Types: Cigarettes     Quit date: 1980     Years since quittin.4    Smokeless tobacco: Never Used   Substance and Sexual Activity    Alcohol use: Yes     Alcohol/week: 2.0 standard drinks     Types: 2 Glasses of wine per week     Comment: social    Drug use: No    Sexual activity: Not Currently     Review of Systems   Constitutional:  Positive for fatigue. Negative for activity change, appetite change, chills, diaphoresis and fever.   Respiratory:  Positive for cough and shortness of breath.  "   Cardiovascular:  Negative for chest pain, palpitations and leg swelling.   Gastrointestinal:  Negative for abdominal pain, constipation, diarrhea, nausea and vomiting.   Genitourinary:  Negative for difficulty urinating and dysuria.   Musculoskeletal:  Negative for back pain.        +leg spasms   Skin:  Negative for color change, pallor, rash and wound.   Neurological:  Negative for dizziness and headaches.   Objective:     Vital Signs (Most Recent):  Temp: 97.4 °F (36.3 °C) (07/07/22 1211)  Pulse: 85 (07/07/22 1127)  Resp: 18 (07/07/22 1127)  BP: (!) 109/58 (07/07/22 0800)  SpO2: 95 % (07/07/22 1127)   Vital Signs (24h Range):  Temp:  [97.2 °F (36.2 °C)-97.7 °F (36.5 °C)] 97.4 °F (36.3 °C)  Pulse:  [74-94] 85  Resp:  [16-18] 18  SpO2:  [91 %-99 %] 95 %  BP: ()/(54-58) 109/58     Weight: 45 kg (99 lb 3.3 oz)  Body mass index is 18.15 kg/m².    Estimated Creatinine Clearance: 17.8 mL/min (based on SCr of 1.4 mg/dL).    Physical Exam  Vitals and nursing note reviewed.   Constitutional:       General: She is not in acute distress.     Appearance: She is well-developed. She is ill-appearing.   HENT:      Head: Normocephalic.   Eyes:      Pupils: Pupils are equal, round, and reactive to light.   Cardiovascular:      Rate and Rhythm: Normal rate and regular rhythm.      Heart sounds: Normal heart sounds.   Pulmonary:      Effort: Respiratory distress present.      Comments: HFNC  Abdominal:      General: Abdomen is flat.      Palpations: Abdomen is soft.   Musculoskeletal:         General: No tenderness or deformity. Normal range of motion.      Cervical back: Normal range of motion.   Skin:     General: Skin is warm and dry.      Coloration: Skin is not pale.      Findings: No erythema.   Neurological:      Mental Status: She is alert and oriented to person, place, and time.      Cranial Nerves: No cranial nerve deficit.      Coordination: Coordination normal.   Psychiatric:         Behavior: Behavior normal.          Thought Content: Thought content normal.       Significant Labs: All pertinent labs within the past 24 hours have been reviewed.    Significant Imaging: I have reviewed all pertinent imaging results/findings within the past 24 hours.

## 2022-07-07 NOTE — CARE UPDATE
Palliative medicine consulted for goals of care and advanced care planning.     HPOA and LaPOST have been received and reviewed.   Lengthy goals of care conversation completed.  Patient will return to home and resume hospice services with [facility]   Hospice orders have been written per primary team.   Pal med will sign off.    Thank you for consult and opportunity to participate in Mrs. Mayer's care.

## 2022-07-07 NOTE — CONSULTS
[facility] - [facility] Stepdown  Palliative Medicine  Consult Note    Patient Name: Venus Mayer  MRN: [id number]  Admission Date: 7/5/2022  Hospital Length of Stay: 0 days  Code Status: DNR   Attending Provider: Javier Puente MD  Consulting Provider: SERA Abdalla  Primary Care Physician: Primary Doctor No  Principal Problem:Septic shock    Patient information was obtained from patient, relative(s), past medical records and primary team.      Consults  Assessment/Plan:     Palliative care encounter    Palliative medicine consulted for goals of care and advanced care planning for Mr. Mayer a 89 yo lady with PMH of  CHF, COPD, hypertension, hyperlipidemia who presents to emergency department and admitted to hospital medicine with concerns related to  hypoxia, mental status changes, abnormal breathing.  Daughter also states concern the patient had had a stroke as he had new weaknessand inability to feed herself or complete ADLs as usual. Currently uses supplemental oxygen at 2 L NC. Patient is able to speak but not particpate in meaningful conversation.  During this encounter patient states she is having trouble breathing that her butt hurts and her legs are jumping - she is unable to state what is actually occurring.  At this time the patient appears to be in no discomfort - no facial grimacing or moaning,  No labored breathing with oxygen sats  99 - 100%,      Daughter states rescinding home hospice services with [facility] to seek medical treatment - family concerned Mrs. Mayer had a stroke.     Advanced Care Planning:  - HPOA received indicating Arthur Miller [phone] is decision maker.  -daughter reports decisions are made as a family  - [place] also on file indicating DNR, selective treatment, and limited trial of artifical nutrition  - DNR per primary team .     Goals of Care  - Lengthy conversation regarding appropriateness of hospice and philosophy of hospice    - Also discussed the patient's  quality of life at home.  Appears the patient remains to be very functional.  Toilets only with ambulation assistance, is able to dress her self appropriately, converses and plays scrabble - especially when she is feeling anxious as this is calming to her  She is still able to eat and adheres to her dietary restrictions   - Family reports they are not amenable to invasive interventions, the patient does not want to come to hospital and prefers to be in her home.   - Daughter states the family is torn about continuing treatment with oral antibiotics versus revoking hospice completely.  - explained oral antibiotics can be given in hospice.    - explained the hospice agency assesses patiient's frequently to determine eligibility for hospice care.    - Daughter came to conclusion that hospice is very beneficial for the patient and family.  Daughter states family will likely continue with hospice.  If at any time the agency determines she does not meet criteria for hospice family will then revert back to home palliative care services.     Plan/Recommendations  - continue current plan of care  - family would benefit from continued education and information and what to expect in the furture.   - family amenable to returning home with hospice services at discharge.  - emotional support provided.           - .           Thank you for your consult. I will follow-up with patient. Please contact us if you have any additional questions.    Subjective:     HPI:   HPI obtained from chart review:     Mrs. Mayer is a 89 yo lady with PMH of:  CHF, COPD, hypertension, hyperlipidemia who presents to emergency department over concerns of hypoxia, mental status changes, abnormal breathing.  Granddaughter is at bedside and reports that patient is currently on hospice after a long ICU stay.  She states that patient was doing well however today she started noticing that patient was fatigued unable to perform ADLs that she usually can.  There  was concern of irregular breathing and noted hypoxia.  Patient is on 2 L baseline and has been requiring mild increases intermittently.  They note that patient has also had coughing with abnormal breathing.  They believe that patient is breathing irregularly.  They deny any vomiting or diarrhea noted.    Daughter reports have concern the patent had stroke.  Daughter Jennifer states contacting the hospice nurse who advised family to return to hospital for treatment.    Ct of Head without Contrast - showed no evidence of acute hemorrhage, mass or infarction.  Indicates sinusiits    Pal med consulted for goals of care and advanced care planning                   Hospital Course:  No notes on file    Interval History:   Past Medical History:   Diagnosis Date    Acute on chronic congestive heart failure 7/6/2019    Cardiomyopathy     Carotid artery occlusion     CHF (congestive heart failure)     COPD (chronic obstructive pulmonary disease)     Coronary artery disease     Hyperlipidemia     Hypertension        Past Surgical History:   Procedure Laterality Date    CARDIAC CATHETERIZATION      cardic cath      CAROTID ENDARTERECTOMY      FLEXIBLE BRONCHOSCOPY N/A 7/31/2019    Procedure: BRONCHOSCOPY, FIBEROPTIC Flexible;  Surgeon: Klever Mckeon MD;  Location: [address];  Service: Thoracic;  Laterality: N/A;    HIP SURGERY      PLEURODESIS USING TALC N/A 7/31/2019    Procedure: PLEURODESIS;  Surgeon: Klever Mckeon MD;  Location: [facility] OR [address] [place] [address];  Service: Thoracic;  Laterality: N/A;    PLEURODESIS USING TALC Right 8/5/2019    Procedure: PLEURODESIS, USING TALC;  Surgeon: Klever Mckeon MD;  Location: [address];  Service: Thoracic;  Laterality: Right;    PLEURODESIS WITH VIDEO-ASSISTED THORACOSCOPIC SURGERY (VATS) Right 8/5/2019    Procedure: VATS, WITH PLEURAL TENT;  Surgeon: Klever Mckeon MD;  Location: [address];  Service: Thoracic;  Laterality: Right;       Review of  patient's allergies indicates:   Allergen Reactions    Ancef in dextrose (iso-osm) Rash    Cefazolin Rash     Tolerating Zosyn admission 3/2022    Cefuroxime Rash    Sulfamethoxazole-trimethoprim Rash     Other reaction(s): Rash       Medications:  Continuous Infusions:  Scheduled Meds:   enoxaparin  30 mg Subcutaneous Daily    ertapenem (INVANZ) IVPB  0.5 g Intravenous Q24H    fluticasone furoate-vilanteroL  1 puff Inhalation Daily    miconazole NITRATE 2 %   Topical (Top) BID    polyethylene glycol  17 g Oral Daily    tiotropium bromide  2 puff Inhalation Daily     PRN Meds:dextrose 10%, dextrose 10%, glucagon (human recombinant), glucose, glucose, hydrOXYzine HCL, insulin aspart U-100, levalbuterol, melatonin, naloxone, ondansetron, prochlorperazine, sodium chloride 0.9%, Pharmacy to dose Vancomycin consult **AND** vancomycin - pharmacy to dose    Family History       Problem Relation (Age of Onset)    Hypertension Mother          Tobacco Use    Smoking status: Former Smoker     Packs/day: 2.00     Years: 20.00     Pack years: 40.00     Types: Cigarettes     Quit date: 1980     Years since quittin.4    Smokeless tobacco: Never Used   Substance and Sexual Activity    Alcohol use: Yes     Alcohol/week: 2.0 standard drinks     Types: 2 Glasses of wine per week     Comment: social    Drug use: No    Sexual activity: Not Currently       Review of Systems   Unable to perform ROS: Dementia (patient very hard of hearing and cannot paritcipate in conversation)   Objective:     Vital Signs (Most Recent):  Temp: 97.4 °F (36.3 °C) (22)  Pulse: 94 (22)  Resp: 18 (22)  BP: (!) 111/56 (22)  SpO2: 97 % (22)   Vital Signs (24h Range):  Temp:  [97.4 °F (36.3 °C)-98 °F (36.7 °C)] 97.4 °F (36.3 °C)  Pulse:  [72-94] 94  Resp:  [17-22] 18  SpO2:  [91 %-100 %] 97 %  BP: ()/(43-61) 111/56     Weight: 45 kg (99 lb 3.3 oz)  Body mass index is 18.15  kg/m².    Physical Exam  Constitutional:       Appearance: She is ill-appearing. She is not toxic-appearing.   HENT:      Head: Normocephalic and atraumatic.      Ears:      Comments: Hard of hearing      Nose: Congestion present.      Comments: Congested,       Mouth/Throat:      Mouth: Mucous membranes are moist.   Eyes:      Conjunctiva/sclera: Conjunctivae normal.      Pupils: Pupils are equal, round, and reactive to light.   Cardiovascular:      Rate and Rhythm: Normal rate and regular rhythm.   Pulmonary:      Breath sounds: No wheezing.   Abdominal:      General: Abdomen is flat. Bowel sounds are normal.   Musculoskeletal:         General: Normal range of motion.      Cervical back: Normal range of motion and neck supple.      Right lower leg: Edema present.      Left lower leg: Edema present.   Skin:     General: Skin is warm and dry.      Coloration: Skin is pale.      Comments: Multiple skin integrity issues to heels bilateral and left anterior lower extremity - red and blue open wound    Neurological:      Mental Status: She is alert. She is disoriented.      Motor: Weakness present.   Psychiatric:      Comments: Appears to have agitation and difficulty expressing thoughts        Review of Symptoms      Symptom Assessment (ESAS 0-10 Scale)  Pain:  0  Dyspnea:  0  Anxiety:  0  Nausea:  0  Depression:  0  Anorexia:  0  Fatigue:  0  Insomnia:  0  Restlessness:  0  Agitation:  0  Unable to complete assessment       Pain Assessment:  OME in 24 hours:  0  Location(s):      Pain Assessment in Advanced Demential Scale (PAINAD)   Breathing - Independent of vocalization:  0  Negative vocalization:  1  Facial expression:  0  Body language:  1  Consolability:  0  Total:  2    Performance Status:  60    Living Arrangements:  Lives with family    Psychosocial/Cultural: Widwowed, adult children, numerous grand children,  great grand children     Spiritual:  F - [place] and Belief:  Baptism   C - Community:  Amenable to   lam       Advance Care Planning   Advance Directives:   Living Will: No        Oral Declaration: No    [place]POST: Yes    Do Not Resuscitate Status: Yes    Medical Power of : Yes    Agent's Name:  Arthur Miller   Agent's Contact Number:  [phone]    Decision Making:  Patient unable to communicate due to disease severity/cognitive impairment and Family answered questions       Significant Labs: All pertinent labs within the past 24 hours have been reviewed.  CBC:   Recent Labs   Lab 07/06/22 0652   WBC 18.00*   HGB 11.4*   HCT 36.2*   *   *     BMP:  Recent Labs   Lab 07/06/22 0652   *   *   K 4.8   CL 92*   CO2 30*   BUN 33*   CREATININE 1.3   CALCIUM 9.3   MG 2.3     LFT:  Lab Results   Component Value Date    AST 65 (H) 07/06/2022    ALKPHOS 123 07/06/2022    BILITOT 0.8 07/06/2022     Albumin:   Albumin   Date Value Ref Range Status   07/06/2022 2.6 (L) 3.5 - 5.2 g/dL Final     Protein:   Total Protein   Date Value Ref Range Status   07/06/2022 6.1 6.0 - 8.4 g/dL Final     Lactic acid:   Lab Results   Component Value Date    LACTATE 2.2 07/06/2022    LACTATE 3.5 (HH) 07/05/2022       Significant Imaging: I have reviewed all pertinent imaging results/findings within the past 24 hours.        > 50% of 70 min visit spent in chart review, face to face discussion of goals of care,  symptom assessment, coordination of care and emotional support.    - additional 20 mins spent in advanced care planning     Eveline Moses, CNS  Palliative Medicine  [facility] -

## 2022-07-07 NOTE — PLAN OF CARE
Patient is only oriented to self.   Episode of desat 70-80s. Venti mask applied. Patient came up to 90s. Dr. Puente at bedside. Patient now on 3L NC.   Chest xray ordered.   Patient up in recliner most of the day.   Bed is in lowest position with wheels locked.   Instructed to [facility] for assistance.   [facility]

## 2022-07-08 PROBLEM — R65.21 SEPTIC SHOCK: Status: RESOLVED | Noted: 2022-04-09 | Resolved: 2022-07-08

## 2022-07-08 PROBLEM — A41.9 SEPTIC SHOCK: Status: RESOLVED | Noted: 2022-04-09 | Resolved: 2022-07-08

## 2022-07-08 PROBLEM — R78.81 BACTEREMIA: Status: ACTIVE | Noted: 2022-07-08

## 2022-07-08 LAB
ALBUMIN SERPL BCP-MCNC: 2.5 G/DL (ref 3.5–5.2)
ALP SERPL-CCNC: 102 U/L (ref 55–135)
ALT SERPL W/O P-5'-P-CCNC: 30 U/L (ref 10–44)
ANION GAP SERPL CALC-SCNC: 8 MMOL/L (ref 8–16)
AST SERPL-CCNC: 19 U/L (ref 10–40)
BACTERIA BLD CULT: ABNORMAL
BACTERIA UR CULT: ABNORMAL
BASOPHILS # BLD AUTO: 0 K/UL (ref 0–0.2)
BASOPHILS NFR BLD: 0 % (ref 0–1.9)
BILIRUB SERPL-MCNC: 0.5 MG/DL (ref 0.1–1)
BUN SERPL-MCNC: 34 MG/DL (ref 10–30)
CALCIUM SERPL-MCNC: 9.5 MG/DL (ref 8.7–10.5)
CHLORIDE SERPL-SCNC: 96 MMOL/L (ref 95–110)
CO2 SERPL-SCNC: 31 MMOL/L (ref 23–29)
CREAT SERPL-MCNC: 1.1 MG/DL (ref 0.5–1.4)
DIFFERENTIAL METHOD: ABNORMAL
EOSINOPHIL # BLD AUTO: 0 K/UL (ref 0–0.5)
EOSINOPHIL NFR BLD: 0 % (ref 0–8)
ERYTHROCYTE [DISTWIDTH] IN BLOOD BY AUTOMATED COUNT: 15.9 % (ref 11.5–14.5)
EST. GFR  (AFRICAN AMERICAN): 50 ML/MIN/1.73 M^2
EST. GFR  (NON AFRICAN AMERICAN): 43.4 ML/MIN/1.73 M^2
GLUCOSE SERPL-MCNC: 129 MG/DL (ref 70–110)
HCT VFR BLD AUTO: 35.2 % (ref 37–48.5)
HGB BLD-MCNC: 10.9 G/DL (ref 12–16)
IMM GRANULOCYTES # BLD AUTO: 0.02 K/UL (ref 0–0.04)
IMM GRANULOCYTES NFR BLD AUTO: 0.4 % (ref 0–0.5)
LYMPHOCYTES # BLD AUTO: 0.3 K/UL (ref 1–4.8)
LYMPHOCYTES NFR BLD: 6.3 % (ref 18–48)
MAGNESIUM SERPL-MCNC: 2.2 MG/DL (ref 1.6–2.6)
MCH RBC QN AUTO: 31.7 PG (ref 27–31)
MCHC RBC AUTO-ENTMCNC: 31 G/DL (ref 32–36)
MCV RBC AUTO: 102 FL (ref 82–98)
MONOCYTES # BLD AUTO: 0.2 K/UL (ref 0.3–1)
MONOCYTES NFR BLD: 4.9 % (ref 4–15)
NEUTROPHILS # BLD AUTO: 4.3 K/UL (ref 1.8–7.7)
NEUTROPHILS NFR BLD: 88.4 % (ref 38–73)
NRBC BLD-RTO: 0 /100 WBC
PHOSPHATE SERPL-MCNC: 4.7 MG/DL (ref 2.7–4.5)
PLATELET # BLD AUTO: 146 K/UL (ref 150–450)
PMV BLD AUTO: 10.6 FL (ref 9.2–12.9)
POCT GLUCOSE: 114 MG/DL (ref 70–110)
POCT GLUCOSE: 129 MG/DL (ref 70–110)
POCT GLUCOSE: 211 MG/DL (ref 70–110)
POTASSIUM SERPL-SCNC: 4.2 MMOL/L (ref 3.5–5.1)
PROT SERPL-MCNC: 5.9 G/DL (ref 6–8.4)
RBC # BLD AUTO: 3.44 M/UL (ref 4–5.4)
SODIUM SERPL-SCNC: 135 MMOL/L (ref 136–145)
VANCOMYCIN SERPL-MCNC: 18.3 UG/ML
WBC # BLD AUTO: 4.91 K/UL (ref 3.9–12.7)

## 2022-07-08 PROCEDURE — 94668 MNPJ CHEST WALL SBSQ: CPT

## 2022-07-08 PROCEDURE — 85025 COMPLETE CBC W/AUTO DIFF WBC: CPT

## 2022-07-08 PROCEDURE — 27000646 HC AEROBIKA DEVICE

## 2022-07-08 PROCEDURE — 25000242 PHARM REV CODE 250 ALT 637 W/ HCPCS

## 2022-07-08 PROCEDURE — 99233 SBSQ HOSP IP/OBS HIGH 50: CPT | Mod: ,,, | Performed by: PHYSICIAN ASSISTANT

## 2022-07-08 PROCEDURE — 97530 THERAPEUTIC ACTIVITIES: CPT

## 2022-07-08 PROCEDURE — 25000003 PHARM REV CODE 250

## 2022-07-08 PROCEDURE — 27000221 HC OXYGEN, UP TO 24 HOURS

## 2022-07-08 PROCEDURE — 80053 COMPREHEN METABOLIC PANEL: CPT

## 2022-07-08 PROCEDURE — 94640 AIRWAY INHALATION TREATMENT: CPT

## 2022-07-08 PROCEDURE — 97161 PT EVAL LOW COMPLEX 20 MIN: CPT

## 2022-07-08 PROCEDURE — 97116 GAIT TRAINING THERAPY: CPT

## 2022-07-08 PROCEDURE — 97165 OT EVAL LOW COMPLEX 30 MIN: CPT

## 2022-07-08 PROCEDURE — 99233 PR SUBSEQUENT HOSPITAL CARE,LEVL III: ICD-10-PCS | Mod: GC,,, | Performed by: STUDENT IN AN ORGANIZED HEALTH CARE EDUCATION/TRAINING PROGRAM

## 2022-07-08 PROCEDURE — 25000242 PHARM REV CODE 250 ALT 637 W/ HCPCS: Performed by: STUDENT IN AN ORGANIZED HEALTH CARE EDUCATION/TRAINING PROGRAM

## 2022-07-08 PROCEDURE — 84100 ASSAY OF PHOSPHORUS: CPT

## 2022-07-08 PROCEDURE — 99233 PR SUBSEQUENT HOSPITAL CARE,LEVL III: ICD-10-PCS | Mod: ,,, | Performed by: PHYSICIAN ASSISTANT

## 2022-07-08 PROCEDURE — 83735 ASSAY OF MAGNESIUM: CPT

## 2022-07-08 PROCEDURE — 80202 ASSAY OF VANCOMYCIN: CPT

## 2022-07-08 PROCEDURE — 36415 COLL VENOUS BLD VENIPUNCTURE: CPT

## 2022-07-08 PROCEDURE — 94761 N-INVAS EAR/PLS OXIMETRY MLT: CPT

## 2022-07-08 PROCEDURE — 25000003 PHARM REV CODE 250: Performed by: STUDENT IN AN ORGANIZED HEALTH CARE EDUCATION/TRAINING PROGRAM

## 2022-07-08 PROCEDURE — 63600175 PHARM REV CODE 636 W HCPCS: Performed by: STUDENT IN AN ORGANIZED HEALTH CARE EDUCATION/TRAINING PROGRAM

## 2022-07-08 PROCEDURE — 94799 UNLISTED PULMONARY SVC/PX: CPT

## 2022-07-08 PROCEDURE — 99233 SBSQ HOSP IP/OBS HIGH 50: CPT | Mod: GC,,, | Performed by: STUDENT IN AN ORGANIZED HEALTH CARE EDUCATION/TRAINING PROGRAM

## 2022-07-08 PROCEDURE — 97535 SELF CARE MNGMENT TRAINING: CPT

## 2022-07-08 PROCEDURE — 99900035 HC TECH TIME PER 15 MIN (STAT)

## 2022-07-08 PROCEDURE — 20600001 HC STEP DOWN PRIVATE ROOM

## 2022-07-08 PROCEDURE — 63600175 PHARM REV CODE 636 W HCPCS

## 2022-07-08 PROCEDURE — 94664 DEMO&/EVAL PT USE INHALER: CPT

## 2022-07-08 RX ORDER — HYDROXYZINE HYDROCHLORIDE 25 MG/1
25 TABLET, FILM COATED ORAL 3 TIMES DAILY PRN
Qty: 30 TABLET
Start: 2022-07-08 | End: 2022-07-18

## 2022-07-08 RX ORDER — LEVALBUTEROL INHALATION SOLUTION 0.63 MG/3ML
1 SOLUTION RESPIRATORY (INHALATION)
Status: DISCONTINUED | OUTPATIENT
Start: 2022-07-08 | End: 2022-07-10 | Stop reason: HOSPADM

## 2022-07-08 RX ORDER — POTASSIUM CHLORIDE 750 MG/1
10 TABLET, EXTENDED RELEASE ORAL DAILY
COMMUNITY

## 2022-07-08 RX ORDER — ACETAMINOPHEN 500 MG
500 TABLET ORAL DAILY PRN
COMMUNITY

## 2022-07-08 RX ORDER — FERROUS SULFATE 325(65) MG
325 TABLET ORAL DAILY
COMMUNITY

## 2022-07-08 RX ORDER — METOPROLOL TARTRATE 25 MG/1
12.5 TABLET, FILM COATED ORAL 2 TIMES DAILY
COMMUNITY

## 2022-07-08 RX ORDER — POLYETHYLENE GLYCOL 400 AND PROPYLENE GLYCOL 4; 3 MG/ML; MG/ML
1 SOLUTION/ DROPS OPHTHALMIC DAILY PRN
COMMUNITY

## 2022-07-08 RX ORDER — GUAIFENESIN 600 MG/1
600 TABLET, EXTENDED RELEASE ORAL 2 TIMES DAILY
Qty: 20 TABLET | Refills: 0
Start: 2022-07-08 | End: 2022-07-18

## 2022-07-08 RX ORDER — TIOTROPIUM BROMIDE INHALATION SPRAY 3.12 UG/1
2 SPRAY, METERED RESPIRATORY (INHALATION) DAILY
COMMUNITY

## 2022-07-08 RX ORDER — MINERAL OIL
180 ENEMA (ML) RECTAL DAILY
COMMUNITY

## 2022-07-08 RX ADMIN — ENOXAPARIN SODIUM 30 MG: 30 INJECTION, SOLUTION INTRAVENOUS; SUBCUTANEOUS at 05:07

## 2022-07-08 RX ADMIN — TIOTROPIUM BROMIDE INHALATION SPRAY 2 PUFF: 3.12 SPRAY, METERED RESPIRATORY (INHALATION) at 08:07

## 2022-07-08 RX ADMIN — MICONAZOLE NITRATE 2 % TOPICAL POWDER: at 08:07

## 2022-07-08 RX ADMIN — LEVALBUTEROL HYDROCHLORIDE 0.63 MG: 0.63 SOLUTION RESPIRATORY (INHALATION) at 08:07

## 2022-07-08 RX ADMIN — VANCOMYCIN HYDROCHLORIDE 750 MG: 500 INJECTION, POWDER, LYOPHILIZED, FOR SOLUTION INTRAVENOUS at 11:07

## 2022-07-08 RX ADMIN — GUAIFENESIN 600 MG: 600 TABLET, EXTENDED RELEASE ORAL at 08:07

## 2022-07-08 RX ADMIN — DEXTROSE 40 MG: 50 INJECTION, SOLUTION INTRAVENOUS at 04:07

## 2022-07-08 RX ADMIN — FLUTICASONE FUROATE AND VILANTEROL TRIFENATATE 1 PUFF: 100; 25 POWDER RESPIRATORY (INHALATION) at 08:07

## 2022-07-08 RX ADMIN — LEVALBUTEROL HYDROCHLORIDE 0.63 MG: 0.63 SOLUTION RESPIRATORY (INHALATION) at 07:07

## 2022-07-08 RX ADMIN — LEVALBUTEROL HYDROCHLORIDE 0.63 MG: 0.63 SOLUTION RESPIRATORY (INHALATION) at 01:07

## 2022-07-08 RX ADMIN — POLYETHYLENE GLYCOL 3350 17 G: 17 POWDER, FOR SOLUTION ORAL at 08:07

## 2022-07-08 RX ADMIN — LEVALBUTEROL HYDROCHLORIDE 0.63 MG: 0.63 SOLUTION RESPIRATORY (INHALATION) at 04:07

## 2022-07-08 RX ADMIN — LEVALBUTEROL HYDROCHLORIDE 0.63 MG: 0.63 SOLUTION RESPIRATORY (INHALATION) at 12:07

## 2022-07-08 NOTE — PROGRESS NOTES
[facility] - Transplant Stepdown  Infectious Disease  Progress Note    Patient Name: Venus Mayer  MRN: [id number]  Admission Date: 7/5/2022  Length of Stay: 2 days  Attending Physician: Javier Puente MD  Primary Care Provider: Primary Doctor No    Isolation Status: No active isolations  Assessment/Plan:      * Bacteremia  89 y/o female COPD, HTN, CAD, CHF, vascular dementia presents to ED for weakness. ID consulted for sinusitis, pneumonia and UTI. CXR with cardiomegaly and edema without focal consolidation. Small left sided effusions noted. Urine cultures +VRE. Blood cultures on admit +MSSA. Repeat blood cultures 7/7 NGTD. No urinary symptoms at this time.  Palliative care following, likely home with hospice once discharged.     Recommendations  1. Continue vancomycin at this time. Blood cultures +MSSA. Pt with beta lactam allergy. Would continue vancomycin x 14 days for bacteremia  2. Urine cultures +VRE. Pt without urinary symptoms. Would avoid treatment of asymptomatic bacteruria at this time.   3. Repeat blood cultures so far NGTD. Would continue IV vancomycin x 14 days from date of first negative blood cultures. Pt with RLE skin breakdown, ?phlebitis RUE,  may be source of bacteremia. Consider imaging of bilateral upper extremities to r/o DVT or underlying sources of infx    3. Continue palliative care follow up for goals of care (home hospice?). If consistent with goals of care would continue IV abx x 2 weeks EOC  7/21/22. Would need weekly cbc cmp vanc trough before 4th dose sent to ID clinic at .   4. If IV abx is not within the goals of care, can consider po linezolid for 14 days. Would hold SSRI while on linezolid as with risk of serotonin syndrome. This alternative therapy would be inferior to IV abx therapy for MSSA bacteremia.     Discussed with ID staff. Discussed with primary team.       Acute cystitis without hematuria  See bacteremia        Thank you for your consult. I will sign  off. Please contact us if you have any additional questions.    Alyssia Knight PA-C  Infectious Disease  [facility] - Transplant Stepdown    Subjective:     Principal Problem:Bacteremia    HPI: History obtained via daughter    89 y/o female COPD, HTN, CAD, CHF, vascular dementia presents to ED for weakness. ID consulted for sinusitis, pneumonia and UTI. Pt was seen in May 2022 for E.coli UTI. She is currently on vancomycin and ertapenem. CXR with cardiomegaly and edema without focal consolidation. Small left sided effusions noted. Urine cultures +Enterococcus. Blood cultures with coag negative staph. Pt has much improved since admission. O2 requirements at 2L. Palliative care following, likely home with hospice once discharged.     Interval History:    Blood cultures on admit +MSSA  Repeat blood cultures NGTD  Complaining of leg spasms     Review of Systems   Unable to perform ROS: Dementia   Musculoskeletal:         RUE swelling   Leg spasms   Objective:     Vital Signs (Most Recent):  Temp: 98.7 °F (37.1 °C) (07/08/22 1252)  Pulse: 78 (07/08/22 1252)  Resp: 18 (07/08/22 1252)  BP: (!) 117/56 (07/08/22 1252)  SpO2: 99 % (07/08/22 1252)   Vital Signs (24h Range):  Temp:  [97 °F (36.1 °C)-98.7 °F (37.1 °C)] 98.7 °F (37.1 °C)  Pulse:  [62-86] 78  Resp:  [16-20] 18  SpO2:  [94 %-100 %] 99 %  BP: (109-136)/(55-63) 117/56     Weight: 39.5 kg (87 lb 1.3 oz)  Body mass index is 15.93 kg/m².    Estimated Creatinine Clearance: 19.9 mL/min (based on SCr of 1.1 mg/dL).    Physical Exam  Vitals and nursing note reviewed.   Constitutional:       General: She is not in acute distress.     Appearance: She is well-developed. She is not diaphoretic.   HENT:      Head: Normocephalic and atraumatic.   Eyes:      Pupils: Pupils are equal, round, and reactive to light.   Cardiovascular:      Rate and Rhythm: Normal rate and regular rhythm.      Heart sounds: Normal heart sounds. No murmur heard.    No friction rub. No gallop.   Pulmonary:       Effort: Pulmonary effort is normal. No respiratory distress.      Breath sounds: Normal breath sounds. No wheezing or rales.   Chest:      Chest wall: No tenderness.   Abdominal:      General: Bowel sounds are normal.      Palpations: Abdomen is soft.   Musculoskeletal:         General: No tenderness or deformity. Normal range of motion.      Cervical back: Normal range of motion and neck supple.      Left lower leg: Edema present.   Skin:     General: Skin is warm and dry.      Coloration: Skin is not pale.      Findings: Erythema present.      Comments: Skin breakdown of RLE   Right thigh blister   RUE phlebitis?   LUE swelling. Some warmth    Neurological:      Mental Status: She is alert.      Cranial Nerves: No cranial nerve deficit.      Comments: Alert        Significant Labs: All pertinent labs within the past 24 hours have been reviewed.    Significant Imaging: I have reviewed all pertinent imaging results/findings within the past 24 hours.

## 2022-07-08 NOTE — PT/OT/SLP EVAL
Occupational Therapy   Co-Evaluation and Discharge Note    Name: Venus Mayer  MRN: [id number]  Admitting Diagnosis:  Bacteremia   Recent Surgery: * No surgery found *      Recommendations:     Discharge Recommendations: home  Discharge Equipment Recommendations:  none  Barriers to discharge:  None    Assessment:     Venus Mayer is a 89 y.o. female with a medical diagnosis of Bacteremia. Pt agreeable to session with trouble following commands 2* [place]. Pt was receiving hospice services and assist from sitters and family at baseline. At this time, patient is functioning at their prior level of function and does not require further acute OT services.     Plan:     During this hospitalization, patient does not require further acute OT services.  Please re-consult if situation changes.    · Plan of Care Reviewed with: patient, daughter    Subjective     Chief Complaint: None stated     Occupational Profile:   Living Environment: Pt lives alone in a H. Pt receives hospice care and 24/7 sitters at home to assist with care. Pt has a raised toiler and  tub/shower combo with a bath bench and grab bars.  Previous level of function: Pt was requiring assist with ADLs/mobility PTA  Equipment Used at home:  bedside commode, hospital bed, nebulizer, shower chair, walker, rolling  Assistance upon Discharge: Family and sitters     Pain/Comfort:  Pain Rating 1: 0/10    Patients cultural, spiritual, Mormon conflicts given the current situation: no    Objective:     Communicated with: RN prior to session.  Patient found HOB elevated with pulse ox (continuous), telemetry, oxygen and daughter present upon OT entry to room.    General Precautions: Standard, fall   Orthopedic Precautions:N/A   Braces: N/A  Respiratory Status: Nasal cannula, flow 3 L/min     Occupational Performance:    Bed Mobility:    · Patient completed Scooting/Bridging with minimum assistance and extra time   · Patient completed Supine to Sit with minimum  assistance for trunk management     Functional Mobility/Transfers:  · Patient completed Bed <> Chair Transfer using Step Transfer technique with minimum assistance with rolling walker  · Functional Mobility: Pt ambulated ~5 steps to bedside chair with Min A using RW    Balance:   Sitting: Min A with posterior lean    Standing: Min A with rolling walker    Activities of Daily Living:  · Feeding:  minimum assistance to self-feed sitting supported in bedside chair   · Patient demonstrated decreased coordination initially requiring min A to bring spoon to mouth; however, pt was able to perform with SBA and extra time following initial assistance  · Grooming: stand by assistance to comb hair sitting supported in bedside chair  · Upper Body Dressing: moderate assistance to don/[facility] hospital down   · Lower Body Dressing: maximal assistance to don socks sitting EOB    Cognitive/Visual Perceptual:  Cognitive/Psychosocial Skills:     -       Oriented to: Person   -       Follows Commands/attention:Follows two-step commands  -       Communication: clear/fluent; Pt is [place] and hears better on R  -       Memory: Impaired  -       Safety awareness/insight to disability: impaired   -       Mood/Affect/Coping skills/emotional control: Appropriate to situation, Cooperative and Quiet      Physical Exam:  Postural examination/scapula alignment:    -       Rounded shoulders  -       Forward head  Sensation:    -       Intact  Dominant hand:    -       Right  Upper Extremity Range of Motion:     -       Right Upper Extremity: WFL  -       Left Upper Extremity: WFL  Upper Extremity Strength:    -       Right Upper Extremity: WFL  -       Left Upper Extremity: WFL   Strength:    -       Right Upper Extremity: WFL  -       Left Upper Extremity: WFL  Fine Motor Coordination:    -       Impaired; Left hand thumb/finger opposition skills and Right hand thumb/finger opposition skills     AMPAC 6 Click ADL:  AMPAC Total Score:  12    Treatment & Education:  - Discussed OT POC and answered all questions within OT scope of practice   - Instructed patient to use call light to have nursing staff assist with needs/transfers  - Therapist provided facilitation and instruction of proper body mechanics and fall prevention strategies during tasks listed above  - Instructed patient to sit in bedside chair daily to increase OOB activity tolerance  - Positioned patient comfortably in bed side chair with RN notified       Education:    Patient left up in chair with all lines intact, call button in reach, RN notified and daughter present    GOALS:   Multidisciplinary Problems     Occupational Therapy Goals     Not on file          Multidisciplinary Problems (Resolved)        Problem: Occupational Therapy    Goal Priority Disciplines Outcome Interventions   Occupational Therapy Goal   (Resolved)     OT, PT/OT Met                    History:     Past Medical History:   Diagnosis Date    Acute on chronic congestive heart failure 7/6/2019    Cardiomyopathy     Carotid artery occlusion     CHF (congestive heart failure)     COPD (chronic obstructive pulmonary disease)     Coronary artery disease     Hyperlipidemia     Hypertension          Past Surgical History:   Procedure Laterality Date    CARDIAC CATHETERIZATION      cardic cath      CAROTID ENDARTERECTOMY      FLEXIBLE BRONCHOSCOPY N/A 7/31/2019    Procedure: BRONCHOSCOPY, FIBEROPTIC Flexible;  Surgeon: Klever Mckeon MD;  Location: [address];  Service: Thoracic;  Laterality: N/A;    HIP SURGERY      PLEURODESIS USING TALC N/A 7/31/2019    Procedure: PLEURODESIS;  Surgeon: Klever Mckeon MD;  Location: [address];  Service: Thoracic;  Laterality: N/A;    PLEURODESIS USING TALC Right 8/5/2019    Procedure: PLEURODESIS, USING TALC;  Surgeon: Klever Mckeon MD;  Location: [address];  Service: Thoracic;  Laterality: Right;    PLEURODESIS WITH VIDEO-ASSISTED  THORACOSCOPIC SURGERY (VATS) Right 8/5/2019    Procedure: VATS, WITH PLEURAL TENT;  Surgeon: Klever Mckeon MD;  Location: [facility] OR [address] [place] [address];  Service: Thoracic;  Laterality: Right;       Time Tracking:     OT Date of Treatment: 07/08/22  OT Start Time: 0901  OT Stop Time: 0917  OT Total Time (min): 16 min    Billable Minutes:Evaluation 8  Self Care/Home Management 8    7/8/2022

## 2022-07-08 NOTE — PLAN OF CARE
Evaluation completed; pt requiring assist with ADLs/mobility at baseline and does not required acute skilled OT services at this time.   Problem: Occupational Therapy  Goal: Occupational Therapy Goal  Outcome: Met

## 2022-07-08 NOTE — PHARMACY MED REC
"  Admission Medication History     The home medication history was taken by Марина Hernandez.    You may go to "Admission" then "Reconcile Home Medications" tabs to review and/or act upon these items.      The home medication list has been updated by the Pharmacy department.    Please read ALL comments highlighted in yellow.    Please address this information as you see fit.     Feel free to contact us if you have any questions or require assistance.      The medications listed below were removed from the home medication list. Please reorder if appropriate:  Patient reports no longer taking the following medication(s):   ALBUTEROL 90 MCG/ACTUATION INHALER   BACLOFEN 10MG   BUMETANIDE 1MG   PANTOPRAZOLE 40MG    Medications listed below were obtained from: Patient/family  PTA Medications   Medication Sig    acetaminophen (TYLENOL) 500 MG tablet   Take 500 mg by mouth daily as needed for Pain.    budesonide-formoterol 160-4.5 mcg (SYMBICORT) 160-4.5 mcg/actuation HFAA   Inhale 2 puffs into the lungs every 12 (twelve) hours. Controller    ferrous sulfate (FEOSOL) 325 mg (65 mg iron) Tab tablet   Take 325 mg by mouth once daily.    fexofenadine (ALLEGRA) 180 MG tablet   Take 180 mg by mouth once daily.    furosemide (LASIX) 40 MG tablet     Take 1 tablet (40 mg total) by mouth once daily. In the case of 3lbs weight gain in 1d or 5lbs in 3d, administer additional dose of lasix in the afternoon.    guaifenesin (MUCINEX ORAL)   Take 1 tablet by mouth daily as needed.    Lactobacillus rhamnosus GG (CULTURELLE) 10 billion cell capsule   Take 1 capsule by mouth once daily.    levalbuterol (XOPENEX) 0.63 mg/3 mL nebulizer solution   Take 1 ampule by nebulization 3 (three) times daily Rescue)    metoprolol tartrate (LOPRESSOR) 25 MG tablet   Take 12.5 mg by mouth 2 (two) times daily.    mv-mn/iron/folic acid/herb 190 (VITAMIN D3 COMPLETE ORAL) Take 1 tablet by mouth once daily.    peg 400-propylene glycol, PF, " (SYSTANE HYDRATION PF) 0.4-0.3 % Drop   Apply 1 drop to eye daily as needed.    potassium chloride (KLOR-CON) 10 MEQ TbSR   Take 10 mEq by mouth once daily.    sodium chloride (SALINE NASAL) 0.65 % nasal spray   1-2 sprays by Nasal route daily as needed for Congestion.    tiotropium bromide (SPIRIVA RESPIMAT) 2.5 mcg/actuation inhaler Inhale 2 puffs into the lungs Daily. Controller           Марина Hernandez  EXT [id number]              .

## 2022-07-08 NOTE — SUBJECTIVE & OBJECTIVE
Interval history: Patient with no acute complaints this AM. Alert and oriented. Denies SOB or cough. Resting comfortably on 2.5L NC.      Review of Systems   Constitutional:  Negative for chills and fever.   HENT:  Negative for ear pain and sinus pain.    Eyes:  Negative for photophobia, pain and visual disturbance.   Respiratory:  Negative for cough and shortness of breath.    Cardiovascular:  Negative for chest pain and leg swelling.   Gastrointestinal:  Negative for abdominal pain, blood in stool, constipation, diarrhea, nausea and vomiting.   Endocrine: Negative for polyuria.   Genitourinary:  Negative for difficulty urinating and dysuria.   Musculoskeletal:  Positive for arthralgias, back pain and joint swelling. Negative for neck pain.   Skin:  Positive for wound (left leg wound). Negative for rash.   Neurological:  Positive for weakness. Negative for dizziness and light-headedness.   Psychiatric/Behavioral:  Negative for confusion and sleep disturbance.    Objective:     Vital Signs (Most Recent):  Temp: 97.6 °F (36.4 °C) (07/08/22 1641)  Pulse: 92 (07/08/22 1641)  Resp: 18 (07/08/22 1641)  BP: (!) 108/59 (07/08/22 1641)  SpO2: 95 % (07/08/22 1615)   Vital Signs (24h Range):  Temp:  [97 °F (36.1 °C)-98.7 °F (37.1 °C)] 97.6 °F (36.4 °C)  Pulse:  [62-92] 92  Resp:  [16-20] 18  SpO2:  [94 %-100 %] 95 %  BP: (108-136)/(55-63) 108/59     Weight: 39.5 kg (87 lb 1.3 oz)  Body mass index is 15.93 kg/m².    Physical Exam  Constitutional:       General: She is sleeping. She is not in acute distress.     Appearance: Normal appearance. She is cachectic. She is not ill-appearing or diaphoretic.      Interventions: Nasal cannula in place.   HENT:      Head: Normocephalic and atraumatic.      Right Ear: External ear normal.      Left Ear: External ear normal.      Nose: Nose normal. No congestion or rhinorrhea.      Mouth/Throat:      Mouth: Mucous membranes are moist.      Pharynx: Oropharynx is clear. No oropharyngeal  exudate or posterior oropharyngeal erythema.   Eyes:      General: No scleral icterus.     Extraocular Movements: Extraocular movements intact.      Conjunctiva/sclera: Conjunctivae normal.   Neck:      Vascular: No carotid bruit.   Cardiovascular:      Rate and Rhythm: Normal rate and regular rhythm.      Pulses: Normal pulses.      Heart sounds: Murmur heard.     No friction rub.   Pulmonary:      Effort: Pulmonary effort is normal. No respiratory distress.      Breath sounds: No stridor. Wheezing, rhonchi and rales present.      Comments: Transmitted upper airway sounds  Chest:      Chest wall: No tenderness.   Abdominal:      General: Abdomen is flat. Bowel sounds are normal. There is no distension.      Palpations: There is no mass.      Tenderness: There is no right CVA tenderness, left CVA tenderness or guarding.   Musculoskeletal:         General: No swelling, tenderness or deformity. Normal range of motion.      Cervical back: Normal range of motion. No rigidity or tenderness.      Right lower leg: Edema present.      Left lower leg: Edema present.   Skin:     General: Skin is warm and dry.      Coloration: Skin is not jaundiced or pale.      Findings: Lesion present. No bruising or erythema.      Comments: Superficial long wound on left leg   Neurological:      General: No focal deficit present.      Mental Status: Mental status is at baseline.      Comments: Hard of hearing but alert   Psychiatric:         Mood and Affect: Mood normal.         Behavior: Behavior normal.         Thought Content: Thought content normal.        Significant Labs: All pertinent labs within the past 24 hours have been reviewed.  CBC:   Recent Labs   Lab 07/07/22  0811 07/08/22  0711   WBC 10.97 4.91   HGB 11.1* 10.9*   HCT 35.6* 35.2*   * 146*       CMP:   Recent Labs   Lab 07/07/22  0811 07/08/22  0711   * 135*   K 4.2 4.2   CL 96 96   CO2 29 31*   GLU 62* 129*   BUN 37* 34*   CREATININE 1.4 1.1   CALCIUM 9.5 9.5    PROT 5.8* 5.9*   ALBUMIN 2.5* 2.5*   BILITOT 0.6 0.5   ALKPHOS 107 102   AST 33 19   ALT 33 30   ANIONGAP 10 8   EGFRNONAA 32.4* 43.4*         Significant Imaging: I have reviewed all pertinent imaging results/findings within the past 24 hours.

## 2022-07-08 NOTE — CONSULTS
Pharmacokinetic Assessment Follow Up: IV Vancomycin    Vancomycin serum concentration assessment(s):    The random level was drawn correctly and can be used to guide therapy at this time. The measurement is within the desired definitive target range of 15 to 20 mcg/mL.    Vancomycin Regimen Plan:    Give 750 mg x 1. Re-dose when the random level is less than 20 mcg/mL, next level to be drawn at 0500 on 7/9    Drug levels (last 3 results):  Recent Labs   Lab Result Units 07/06/22  2343 07/08/22  0711   Vancomycin, Random ug/mL 12.9 18.3       Pharmacy will continue to follow and monitor vancomycin.    Please contact pharmacy at extension [phone] for questions regarding this assessment.    Thank you for the consult,   Anabelle Mccauleyy       Patient brief summary:  Venus Mayer is a 89 y.o. female initiated on antimicrobial therapy with IV Vancomycin for treatment of bacteremia      Drug Allergies:   Review of patient's allergies indicates:   Allergen Reactions    Ancef in dextrose (iso-osm) Rash    Cefazolin Rash     Tolerating Zosyn admission 3/2022    Cefuroxime Rash    Sulfamethoxazole-trimethoprim Rash     Other reaction(s): Rash       Actual Body Weight:   39.5 kg    Renal Function:   Estimated Creatinine Clearance: 19.9 mL/min (based on SCr of 1.1 mg/dL).,     Dialysis Method (if applicable):  N/A    CBC (last 72 hours):  Recent Labs   Lab Result Units 07/05/22 2210 07/06/22  0652 07/07/22  0811 07/08/22  0711   WBC K/uL 24.84* 18.00* 10.97 4.91   Hemoglobin g/dL 11.4* 11.4* 11.1* 10.9*   Hematocrit % 36.8* 36.2* 35.6* 35.2*   Platelets K/uL 150 137* 143* 146*   Gran % % 89.4* 86.9* 82.8* 88.4*   Lymph % % 2.4* 5.1* 7.6* 6.3*   Mono % % 6.7 7.2 6.9 4.9   Eosinophil % % 0.2 0.2 2.0 0.0   Basophil % % 0.3 0.2 0.3 0.0   Differential Method  Automated Automated Automated Automated       Metabolic Panel (last 72 hours):  Recent Labs   Lab Result Units 07/05/22 2210 07/06/22  0053 07/06/22  0652 07/07/22  0811  07/08/22  0711   Sodium mmol/L 136  --  134* 135* 135*   Potassium mmol/L 5.2*  --  4.8 4.2 4.2   Chloride mmol/L 97  --  92* 96 96   CO2 mmol/L 27  --  30* 29 31*   Glucose mg/dL 94  --  113* 62* 129*   Glucose, UA   --  Negative  --   --   --    BUN mg/dL 29  --  33* 37* 34*   Creatinine mg/dL 1.2  --  1.3 1.4 1.1   Albumin g/dL 2.7*  --  2.6* 2.5* 2.5*   Total Bilirubin mg/dL 0.9  --  0.8 0.6 0.5   Alkaline Phosphatase U/L 136*  --  123 107 102   AST U/L 88*  --  65* 33 19   ALT U/L 43  --  46* 33 30   Magnesium mg/dL  --   --  2.3 2.2 2.2   Phosphorus mg/dL  --   --  4.3 4.0 4.7*       Vancomycin Administrations:  vancomycin given in the last 96 hours                   vancomycin 750 mg in dextrose 5 % 250 mL IVPB (ready to mix system) (mg) 750 mg New Bag 07/07/22 0923    vancomycin in dextrose 5 % 1 gram/250 mL IVPB 1,000 mg (mg) 1,000 mg New Bag 07/05/22 2245                Microbiologic Results:  Microbiology Results (last 7 days)     Procedure Component Value Units Date/Time    Blood culture x two cultures. Draw prior to antibiotics. [[id number]]  (Abnormal) Collected: 07/05/22 2210    Order Status: Completed Specimen: Blood from Peripheral, Forearm, Left Updated: 07/08/22 0744     Blood Culture, Routine Gram stain cortney bottle: Gram positive cocci      Results called to and read back by:Anabelle Jewell RN 07/06/2022  17:09      Gram stain aer bottle: Gram positive cocci       Positive results previously called 07/07/2022  12:06      STAPHYLOCOCCUS AUREUS  Susceptibility pending  ID consult required at [facility] and [facility] locations.      Narrative:      Aerobic and anaerobic    Blood culture x two cultures. Draw prior to antibiotics. [[id number]] Collected: 07/05/22 2210    Order Status: Completed Specimen: Blood from Peripheral, Forearm, Right Updated: 07/07/22 2312     Blood Culture, Routine No Growth to date      No Growth to date      No Growth to date    Narrative:      Aerobic and anaerobic     Urine culture [[id number]]  (Abnormal) Collected: 07/06/22 0053    Order Status: Completed Specimen: Urine Updated: 07/07/22 2232     Urine Culture, Routine ENTEROCOCCUS SPECIES  >100,000 cfu/ml  Identification and susceptibility pending  No other significant isolate      Narrative:      Specimen Source->Urine    Blood culture [[id number]] Collected: 07/07/22 0811    Order Status: Completed Specimen: Blood from Peripheral, Left Arm Updated: 07/07/22 1515     Blood Culture, Routine No Growth to date    Blood culture [[id number]] Collected: 07/07/22 0825    Order Status: Completed Specimen: Blood from Antecubital, Right Arm Updated: 07/07/22 1515     Blood Culture, Routine No Growth to date    Culture, Respiratory with Gram Stain [[id number]]     Order Status: No result Specimen: Respiratory

## 2022-07-08 NOTE — PLAN OF CARE
Patient discharge plan remains home with [facility].  No change in plan of care need.     SW will remain available for patient and family concerns.      Graham Garza LMSW  [facility]  [phone]

## 2022-07-08 NOTE — PLAN OF CARE
Problem: Physical Therapy  Goal: Physical Therapy Goal  Description: PT goals until 7/15/22    1. Pt supine to sit with SBA-not met  2. Pt sit to supine with SBA-not met  3. Pt sit to stand with RW with CGA-not met  4. Pt to perform gait 15ft with RW with CGA.-not met  5. Pt to transfer bed to/from bedside chair with RW with CGA.-not met  6. Pt to perform B LE exs in sitting or supine x 10 reps to strengthen B LE to improve functional mobility.-not met    Outcome: Ongoing, Progressing   Pt's goals set and pt will benefit from skilled PT services to work towards improved functional mobility including: bed mobility, transfers, and gait.   7/8/2022

## 2022-07-08 NOTE — PLAN OF CARE
89 year-old female admitted 7/5/22 for weakness/SOB/bacteremia. Pt has hx of CHF, cardiomyopathy, COPD, HLD, CAD, PVD, dysphagia  -AAOx2, pt disoriented to time and situation  -22 G Lt FA  -22 G Rt FA  -Vancomycin IVPB  -Soul-Medrol IVPB  -scheduled neb tx  -NC @ 3L  -Accuchecks AC/HS, slides @ 201  -new wound care orders: Lt shin cleanse with NS, apply Xeroform, Abd pad, Kerlix M,W, F. Rt Hip cleanse with NS, apply Xeroform, Foam dressings M, W, F  -Miconazole in groin area  -Robin when in bed  -1 person assist to BSC  -daughter at bedside, pt sitting up in chair, wheels locked, non-skid socks in place, call light within reach

## 2022-07-08 NOTE — RESPIRATORY THERAPY
RAPID RESPONSE RESPIRATORY CHART CHECK       Chart check completed, instructed to call [phone] for further concerns or assistance.

## 2022-07-08 NOTE — CONSULTS
[facility] - Transplant Stepdown  Wound Care    Patient Name:  Venus Mayer   MRN:  [id number]  Date: 2022  Diagnosis: Bacteremia    History:     Past Medical History:   Diagnosis Date    Acute on chronic congestive heart failure 2019    Cardiomyopathy     Carotid artery occlusion     CHF (congestive heart failure)     COPD (chronic obstructive pulmonary disease)     Coronary artery disease     Hyperlipidemia     Hypertension        Social History     Socioeconomic History    Marital status:    Tobacco Use    Smoking status: Former Smoker     Packs/day: 2.00     Years: 20.00     Pack years: 40.00     Types: Cigarettes     Quit date: 1980     Years since quittin.5    Smokeless tobacco: Never Used   Substance and Sexual Activity    Alcohol use: Yes     Alcohol/week: 2.0 standard drinks     Types: 2 Glasses of wine per week     Comment: social    Drug use: No    Sexual activity: Not Currently       Precautions:     Allergies as of 2022 - Reviewed 2022   Allergen Reaction Noted    Ancef in dextrose (iso-osm) Rash 2019    Cefazolin Rash 2019    Cefuroxime Rash 2019    Sulfamethoxazole-trimethoprim Rash 01/15/2013       WO Assessment Details/Treatment   Wound care consult for left shin.  The patient's daughter states the patient's leg hit the side of her bed at home and caused a skin tear. The wound was cleansed with NS, xeroform dressing applied, covered with an abdominal pad and wrapped in kerlix. There is an intact blister on the right hip. Xeroform was placed on the blister and covered with a foam dressing.    Plan:  Left shin - Nursing to cleanse with NS, apply xeroform dressing, cover with an abdominal pad and wrap in kerlix every MWF  Right hip blister - Nursing to cleanse with NS, apply xeroform dressing and cover with a foam dressing every MWF  Waffle mattress overlay  Chair cushion            22 1148        Wound 22 Skin Tear  Left anterior;lower Leg   Date First Assessed/Time First Assessed: 07/06/22 2000   Pre-existing: Yes  Primary Wound Type: Skin Tear  Side: Left  Orientation: anterior;lower  Location: Leg   Wound Image    Wound WDL ex   Dressing Appearance Dry;Dried drainage   Drainage Amount Scant   Drainage Characteristics/Odor Serosanguineous   Appearance Pink;Red;Dry   Tissue loss description Partial thickness   Periwound Area Intact;Dry;Pink   Care Cleansed with:;Sterile normal saline   Dressing Removed;Applied;Other (comment)  (xeroform applied to wound bed, covered with an abdominal pad, and wrapped with kerlix)        Altered Skin Integrity 07/08/22 1148 Right anterior Greater trochanter Blister(s)   Date First Assessed/Time First Assessed: 07/08/22 1148   Altered Skin Integrity Present on Admission: suspected hospital acquired  Side: Right  Orientation: anterior  Location: Greater trochanter  Primary Wound Type: Blister(s)   Dressing Appearance Dry;Intact   Drainage Amount None   Appearance Pink;Other (see comments)  (blister intact)   Periwound Area Intact;Dry;Pink   Dressing Foam;Applied;Other (comment)  (xeroform applied)       Recommendations made to primary team for above plan via secure chat. Orders placed. Wound care to follow-up as needed.   07/08/2022

## 2022-07-08 NOTE — PLAN OF CARE
Admitted 7/5 from hospice with septic shock (weakness/hypotension/SOB)  -AAO1(self), VSS/afebrile, on 3L NC, denies pain  -monitoring O2 and HR on bedside monitor  -palliative re-consulted  -wound care consulted for L shin skin tear; ABD placed by this RN for leaking  -foam dressing to bilateral heels CDI  -miconazole powder applied to groin/folds  -blood cx w staph, vanc administered  -accuchecks ACHS, ss per MAR  -2-person assist to move from chair to bed/bed to chair, turns self  -labs> LA WDL, WBC WDL, Cr WDL, trop 0.275 down from last  -daughter at bedside, fall precautions maintained, call bell in reach  -poss D/C 7/8

## 2022-07-08 NOTE — SUBJECTIVE & OBJECTIVE
Interval History:    Blood cultures on admit +MSSA  Repeat blood cultures NGTD  Complaining of leg spasms     Review of Systems   Unable to perform ROS: Dementia   Musculoskeletal:         RUE swelling   Leg spasms   Objective:     Vital Signs (Most Recent):  Temp: 98.7 °F (37.1 °C) (07/08/22 1252)  Pulse: 78 (07/08/22 1252)  Resp: 18 (07/08/22 1252)  BP: (!) 117/56 (07/08/22 1252)  SpO2: 99 % (07/08/22 1252)   Vital Signs (24h Range):  Temp:  [97 °F (36.1 °C)-98.7 °F (37.1 °C)] 98.7 °F (37.1 °C)  Pulse:  [62-86] 78  Resp:  [16-20] 18  SpO2:  [94 %-100 %] 99 %  BP: (109-136)/(55-63) 117/56     Weight: 39.5 kg (87 lb 1.3 oz)  Body mass index is 15.93 kg/m².    Estimated Creatinine Clearance: 19.9 mL/min (based on SCr of 1.1 mg/dL).    Physical Exam  Vitals and nursing note reviewed.   Constitutional:       General: She is not in acute distress.     Appearance: She is well-developed. She is not diaphoretic.   HENT:      Head: Normocephalic and atraumatic.   Eyes:      Pupils: Pupils are equal, round, and reactive to light.   Cardiovascular:      Rate and Rhythm: Normal rate and regular rhythm.      Heart sounds: Normal heart sounds. No murmur heard.    No friction rub. No gallop.   Pulmonary:      Effort: Pulmonary effort is normal. No respiratory distress.      Breath sounds: Normal breath sounds. No wheezing or rales.   Chest:      Chest wall: No tenderness.   Abdominal:      General: Bowel sounds are normal.      Palpations: Abdomen is soft.   Musculoskeletal:         General: No tenderness or deformity. Normal range of motion.      Cervical back: Normal range of motion and neck supple.      Left lower leg: Edema present.   Skin:     General: Skin is warm and dry.      Coloration: Skin is not pale.      Findings: Erythema present.      Comments: Skin breakdown of RLE   Right thigh blister   RUE phlebitis?   LUE swelling. Some warmth    Neurological:      Mental Status: She is alert.      Cranial Nerves: No cranial  nerve deficit.      Comments: Alert        Significant Labs: All pertinent labs within the past 24 hours have been reviewed.    Significant Imaging: I have reviewed all pertinent imaging results/findings within the past 24 hours.

## 2022-07-08 NOTE — ASSESSMENT & PLAN NOTE
89 y/o female COPD, HTN, CAD, CHF, vascular dementia presents to ED for weakness. ID consulted for sinusitis, pneumonia and UTI. CXR with cardiomegaly and edema without focal consolidation. Small left sided effusions noted. Urine cultures +VRE. Blood cultures on admit +MSSA. Repeat blood cultures 7/7 NGTD. No urinary symptoms at this time.  Palliative care following, likely home with hospice once discharged.     Recommendations  1. Continue vancomycin at this time. Blood cultures +MSSA. Pt with beta lactam allergy. Would continue vancomycin x 14 days for bacteremia  2. Urine cultures +VRE. Pt without urinary symptoms. Would avoid treatment of asymptomatic bacteruria at this time.   3. Repeat blood cultures so far NGTD. Would continue IV vancomycin x 14 days from date of first negative blood cultures. Pt with RLE skin breakdown, ?phlebitis RUE,  may be source of bacteremia. Consider imaging of bilateral upper extremities to r/o DVT or underlying sources of infx if with goals of care.   3. Continue palliative care follow up for goals of care (home hospice?). If consistent with goals of care would continue IV abx x 2 weeks EOC  7/21/22. Would need weekly cbc cmp vanc trough before 4th dose sent to ID clinic at .   4. If IV abx is not within the goals of care, can consider po linezolid for 14 days. Would hold SSRI while on linezolid as with risk of serotonin syndrome. This alternative therapy would be suboptimal to IV abx therapy for MSSA bacteremia.     Discussed with ID staff. Discussed with primary team.

## 2022-07-08 NOTE — PT/OT/SLP EVAL
"Physical Therapy Evaluation/co treat with OT    Patient Name:  Venus Mayer   MRN:  [id number]    Recommendations:     Discharge Recommendations:  home with home health (with family assist)   Discharge Equipment Recommendations: none   Barriers to discharge: None    Assessment:     Venus Mayer is a 89 y.o. female admitted with a medical diagnosis of Bacteremia.  She presents with the following impairments/functional limitations:  weakness, impaired endurance, decreased coordination, impaired self care skills, impaired functional mobilty, impaired balance . Pt is unsafe with functional mobility at this time due to pt requires minimal assist for bed mobility, minimal assist for transfers, minimal assist for sitting balance, and minimal assist for gait due to weakness and instability. Pt is motivated to progress with functional mobility.    Rehab Prognosis: Good to return to prior level of function; patient would benefit from acute skilled PT services to address these deficits and reach maximum level of function.    Recent Surgery: * No surgery found *      Plan:     During this hospitalization, patient to be seen 3 x/week to address the identified rehab impairments via gait training, therapeutic activities, therapeutic exercises, neuromuscular re-education and progress toward the following goals:    · Plan of Care Expires:  08/07/22    Subjective   "I want to sit up"    Pain/Comfort:  · Pain Rating 1: 0/10  · Pain Rating Post-Intervention 1: 0/10    Patients cultural, spiritual, Roman [employer] conflicts given the current situation: no    Living Environment:  Pt lives alone with 24 hour sitters, able to live on the first floor  Prior to admission, patients level of function was required assist for mobility and ADLs with RW.  Equipment used at home: bedside commode, nebulizer, hospital bed, shower chair, walker, rolling.   Upon discharge, patient will have assistance from sitters.    Objective:     Communicated with " nurse prior to session.  Patient found HOB elevated with pulse ox (continuous), telemetry, oxygen  upon PT entry to room.    General Precautions: Standard, fall   Orthopedic Precautions:N/A   Braces: N/A  Respiratory Status: Nasal cannula, flow 2.5 L/min    Exams:  · Cognitive Exam:  Patient is oriented to Person  · Sensation:    · -       Intact  light/touch B LE  · RLE ROM: WFL  · RLE Strength: WFL except hip flex 4-/5  · LLE ROM: WFL  · LLE Strength: WFL except hip flex 4-/5    Functional Mobility:  · Bed Mobility:     · Rolling Left:  Minimal assistance  · Supine to Sit: minimum assistance  · Sit to supine: minimal assist  · Pt sat on the EOB ~ 6 min with minimal assist due to posterior lean prior to transfer  · Transfers:     · Sit to Stand:  minimum assistance with rolling walker  · Gait: 6 steps with RW with minimal assist. pt performed gait with flexed trunk, decreased step length, posterior lean, and at slow pace.   ·    Co-treat with OT due to medical complexity of pt and need for skilled hands for safe intervention.    AM-PAC 6 CLICK MOBILITY  Total Score:16     Patient left up in chair with all lines intact, call button in reach, nurse notified and daughter present.    GOALS:   Multidisciplinary Problems     Physical Therapy Goals        Problem: Physical Therapy    Goal Priority Disciplines Outcome Goal Variances Interventions   Physical Therapy Goal     PT, PT/OT Ongoing, Progressing     Description: PT goals until 7/15/22    1. Pt supine to sit with SBA-not met  2. Pt sit to supine with SBA-not met  3. Pt sit to stand with RW with CGA-not met  4. Pt to perform gait 15ft with RW with CGA.-not met  5. Pt to transfer bed to/from bedside chair with RW with CGA.-not met  6. Pt to perform B LE exs in sitting or supine x 10 reps to strengthen B LE to improve functional mobility.-not met                     History:     Past Medical History:   Diagnosis Date    Acute on chronic congestive heart failure  7/6/2019    Cardiomyopathy     Carotid artery occlusion     CHF (congestive heart failure)     COPD (chronic obstructive pulmonary disease)     Coronary artery disease     Hyperlipidemia     Hypertension        Past Surgical History:   Procedure Laterality Date    CARDIAC CATHETERIZATION      cardic cath      CAROTID ENDARTERECTOMY      FLEXIBLE BRONCHOSCOPY N/A 7/31/2019    Procedure: BRONCHOSCOPY, FIBEROPTIC Flexible;  Surgeon: Klever Mckeon MD;  Location: [address];  Service: Thoracic;  Laterality: N/A;    HIP SURGERY      PLEURODESIS USING TALC N/A 7/31/2019    Procedure: PLEURODESIS;  Surgeon: Klever Mckeon MD;  Location: [address];  Service: Thoracic;  Laterality: N/A;    PLEURODESIS USING TALC Right 8/5/2019    Procedure: PLEURODESIS, USING TALC;  Surgeon: Klever Mckeon MD;  Location: [address];  Service: Thoracic;  Laterality: Right;    PLEURODESIS WITH VIDEO-ASSISTED THORACOSCOPIC SURGERY (VATS) Right 8/5/2019    Procedure: VATS, WITH PLEURAL TENT;  Surgeon: Klever Mckeon MD;  Location: [address];  Service: Thoracic;  Laterality: Right;       Time Tracking:     PT Received On: 07/08/22  PT Start Time: 0900     PT Stop Time: 0917  PT Total Time (min): 17 min     Billable Minutes: Evaluation 8 and Therapeutic Activity 9      07/08/2022

## 2022-07-08 NOTE — PROGRESS NOTES
[facility] Medicine  Progress Note    Patient Name: Venus Mayer  MRN: [id number]  Patient Class: IP- Inpatient   Admission Date: 7/5/2022  Length of Stay: 2 days  Attending Physician: Javier Puente MD  Primary Care Provider: Primary Doctor No        Subjective:     Principal Problem:Bacteremia        HPI:  This is a 89 year old lady with HFpEF (EF 60% with GIIDD 3/2022), COPD on 2L NC, HLD, and HTN who presented to the ED for increased weakness. History obtained primarily from daughters at bedside. They noticed the patient was unable to perform her daily ADLs and was a little more weak per the home physical therapist. They were also concerned that she was more altered and had trouble breathing on her home O2. They noted that she had a cough and gurgling sound from her throat. They mentioned that she had similar symptoms prior and saw an ENT for sinusitis and was given a steroid shot with some anti-histamines which helped with the symptoms. She also had a fall a couple of days ago where she hit her left leg on her bed. She denies any fevers, chills, nausea, vomiting, diarrhea, constipation. The family notes that she has had decreased PO intake over the past couple of months.     Upon chart review, patient was discharged home with hospice on 5/29. She was admitted for debility and delirium and found to have severe CO2 retention. She continued to decline which prompted hospice discussions. Patient was discharged with hospice. For this admission, they rescinded hospice as the patient was improving.     In the ED, patient was hypotensive to 90/55 and tachpneic but satting 99% on her home O2. Labs showed a WBC of 24.84, a potassium of 5.2, Cr of 1.2 and an AST of 88. BNP was elevated to 3645 and troponin was elevated to 0.294 and downtrending. Lactic was 3.5. UA showed >100 WBCs, 3+ leuks, and rare bacteria. CT head showed new acute sphenoid sinusitis with scattered ethmoid sinus  disease. CXR showed some pulmonary interstitial edema and small left-sided pleural effusion.      Overview/Hospital Course:  Patient admitted for Mx of sepsis, likely source UTI vs PNA vs sinusitis. Recent h/o E. Coli UTI Tx w/ ertapenem. Acute respiratory decompensation requiring non rebreather and 8L oxygen support that resolved with breathing treatment and 1x steroid dose; CXR negative - suspect mucus plug. Blood cultures with Staph aureus. Continue vancomycin; ID consulted with recommendation for 14 days therapy. GOC conversation pending with family for PO antibiotic option and home with hospice.        Interval history: Patient with no acute complaints this AM. Alert and oriented. Denies SOB or cough. Resting comfortably on 2.5L NC.      Review of Systems   Constitutional:  Negative for chills and fever.   HENT:  Negative for ear pain and sinus pain.    Eyes:  Negative for photophobia, pain and visual disturbance.   Respiratory:  Negative for cough and shortness of breath.    Cardiovascular:  Negative for chest pain and leg swelling.   Gastrointestinal:  Negative for abdominal pain, blood in stool, constipation, diarrhea, nausea and vomiting.   Endocrine: Negative for polyuria.   Genitourinary:  Negative for difficulty urinating and dysuria.   Musculoskeletal:  Positive for arthralgias, back pain and joint swelling. Negative for neck pain.   Skin:  Positive for wound (left leg wound). Negative for rash.   Neurological:  Positive for weakness. Negative for dizziness and light-headedness.   Psychiatric/Behavioral:  Negative for confusion and sleep disturbance.    Objective:     Vital Signs (Most Recent):  Temp: 97.6 °F (36.4 °C) (07/08/22 1641)  Pulse: 92 (07/08/22 1641)  Resp: 18 (07/08/22 1641)  BP: (!) 108/59 (07/08/22 1641)  SpO2: 95 % (07/08/22 1615)   Vital Signs (24h Range):  Temp:  [97 °F (36.1 °C)-98.7 °F (37.1 °C)] 97.6 °F (36.4 °C)  Pulse:  [62-92] 92  Resp:  [16-20] 18  SpO2:  [94 %-100 %] 95 %  BP:  (108-136)/(55-63) 108/59     Weight: 39.5 kg (87 lb 1.3 oz)  Body mass index is 15.93 kg/m².    Physical Exam  Constitutional:       General: She is sleeping. She is not in acute distress.     Appearance: Normal appearance. She is cachectic. She is not ill-appearing or diaphoretic.      Interventions: Nasal cannula in place.   HENT:      Head: Normocephalic and atraumatic.      Right Ear: External ear normal.      Left Ear: External ear normal.      Nose: Nose normal. No congestion or rhinorrhea.      Mouth/Throat:      Mouth: Mucous membranes are moist.      Pharynx: Oropharynx is clear. No oropharyngeal exudate or posterior oropharyngeal erythema.   Eyes:      General: No scleral icterus.     Extraocular Movements: Extraocular movements intact.      Conjunctiva/sclera: Conjunctivae normal.   Neck:      Vascular: No carotid bruit.   Cardiovascular:      Rate and Rhythm: Normal rate and regular rhythm.      Pulses: Normal pulses.      Heart sounds: Murmur heard.     No friction rub.   Pulmonary:      Effort: Pulmonary effort is normal. No respiratory distress.      Breath sounds: No stridor. Wheezing, rhonchi and rales present.      Comments: Transmitted upper airway sounds  Chest:      Chest wall: No tenderness.   Abdominal:      General: Abdomen is flat. Bowel sounds are normal. There is no distension.      Palpations: There is no mass.      Tenderness: There is no right CVA tenderness, left CVA tenderness or guarding.   Musculoskeletal:         General: No swelling, tenderness or deformity. Normal range of motion.      Cervical back: Normal range of motion. No rigidity or tenderness.      Right lower leg: Edema present.      Left lower leg: Edema present.   Skin:     General: Skin is warm and dry.      Coloration: Skin is not jaundiced or pale.      Findings: Lesion present. No bruising or erythema.      Comments: Superficial long wound on left leg   Neurological:      General: No focal deficit present.       Mental Status: Mental status is at baseline.      Comments: Hard of hearing but alert   Psychiatric:         Mood and Affect: Mood normal.         Behavior: Behavior normal.         Thought Content: Thought content normal.        Significant Labs: All pertinent labs within the past 24 hours have been reviewed.  CBC:   Recent Labs   Lab 07/07/22  0811 07/08/22 0711   WBC 10.97 4.91   HGB 11.1* 10.9*   HCT 35.6* 35.2*   * 146*       CMP:   Recent Labs   Lab 07/07/22  0811 07/08/22 0711   * 135*   K 4.2 4.2   CL 96 96   CO2 29 31*   GLU 62* 129*   BUN 37* 34*   CREATININE 1.4 1.1   CALCIUM 9.5 9.5   PROT 5.8* 5.9*   ALBUMIN 2.5* 2.5*   BILITOT 0.6 0.5   ALKPHOS 107 102   AST 33 19   ALT 33 30   ANIONGAP 10 8   EGFRNONAA 32.4* 43.4*         Significant Imaging: I have reviewed all pertinent imaging results/findings within the past 24 hours.        Assessment/Plan:      Cerebral microvascular disease  CT head noted chronic microvascular changes which can be contributing to her encephalopathy but her acute decline is better explained by infection. No acute stroke noted on CT head    - continue to monitor  - aspiration and fall precautions      Acute cystitis without hematuria  See septic shock    Senile purpura  Patient has obvious skin breakdown and purpura noted on extremities.     - routine wound care on affected areas      Advanced care planning/counseling discussion  DNR  Patient's family still wish patient to be DNR/DNI. Order reflected on chart. Patient's family had rescinded hospice.    - consulted palliative care to re-engage conversations      Chronic respiratory failure with hypoxia and hypercapnia  COPD  Centrilobular emphysema    Patient is on home 2L NC  - continue home inhalers    Acute respiratory decompensation requiring 8L via rebreather - resolved with breathing treatment; suspect mucus plugging  - CXR with some fluid but grossly unchanged from prior  - AGB c/w acute  hypoxemia      Symptomatic bradycardia  S/p pacemaker      Acute on chronic diastolic heart failure  Coronary Artery Disease  Elevated Troponin  Hypertension    Patient had increased O2 requirement initially and had some trace pitting edema bilaterally. Labs showed BNP of >3000 with elevated but stable troponin (likely type 2 due to septic shock). CXR showed evidence of pulmonary edema.    - given hypotension improved with fluids, will hold off on diuresis at the moment  - Fluid restriction at 1500 cc with strict I/Os and daily weights   - Maintain on telemetry and daily EKGs   - Up to date risk stratification : TSH, Lipids, HbA1c with optimization of risk factors is necessary  - Check Electrolytes, keep Mag >2 & K+ >4  - patient was off GDMT given hospice situation but family restarted some home medication. Given septic shock, held all anti-hypertensive medication and can restart when clinically indicated    Repeat TTE:  · Asymmetric LV septal hypertrophy without obstruction. Small LV cavity, small stroke volume. Overall reduced cardiac output (2.2L/m, CI 1.5)  · The left ventricle is small with hypertrophy and low normal systolic function.  · The estimated ejection fraction is 50%.  · Grade II left ventricular diastolic dysfunction.  · Severe left atrial enlargement.  · Moderate mitral regurgitation.  · Moderate to severe tricuspid regurgitation.  · Severe right atrial enlargement.  · Moderate right ventricular enlargement with moderately reduced right ventricular systolic function.  · The estimated PA systolic pressure is 53 mmHg.  · Elevated central venous pressure (15 mmHg).          VTE Risk Mitigation (From admission, onward)         Ordered     enoxaparin injection 30 mg  Daily         07/06/22 0219     IP VTE HIGH RISK PATIENT  Once         07/06/22 0219     Place sequential compression device  Until discontinued         07/06/22 0219                Discharge Planning   TRISTON: 7/9/2022     Code Status: DNR    Is the patient medically ready for discharge?: Yes    Reason for patient still in hospital (select all that apply): Treatment  Discharge Plan A: Home with family                  Akhil Silva MD  Department of Hospital Medicine   [facility] - Transplant Stepdown

## 2022-07-09 VITALS
RESPIRATION RATE: 16 BRPM | TEMPERATURE: 98 F | BODY MASS INDEX: 16.39 KG/M2 | HEART RATE: 97 BPM | OXYGEN SATURATION: 96 % | DIASTOLIC BLOOD PRESSURE: 63 MMHG | HEIGHT: 62 IN | WEIGHT: 89.06 LBS | SYSTOLIC BLOOD PRESSURE: 140 MMHG

## 2022-07-09 LAB
ALBUMIN SERPL BCP-MCNC: 2.7 G/DL (ref 3.5–5.2)
ALP SERPL-CCNC: 106 U/L (ref 55–135)
ALT SERPL W/O P-5'-P-CCNC: 26 U/L (ref 10–44)
ANION GAP SERPL CALC-SCNC: 10 MMOL/L (ref 8–16)
AST SERPL-CCNC: 17 U/L (ref 10–40)
BASOPHILS # BLD AUTO: 0.01 K/UL (ref 0–0.2)
BASOPHILS NFR BLD: 0.2 % (ref 0–1.9)
BILIRUB SERPL-MCNC: 0.5 MG/DL (ref 0.1–1)
BUN SERPL-MCNC: 33 MG/DL (ref 10–30)
CALCIUM SERPL-MCNC: 9.9 MG/DL (ref 8.7–10.5)
CHLORIDE SERPL-SCNC: 97 MMOL/L (ref 95–110)
CO2 SERPL-SCNC: 32 MMOL/L (ref 23–29)
CREAT SERPL-MCNC: 1 MG/DL (ref 0.5–1.4)
DIFFERENTIAL METHOD: ABNORMAL
EOSINOPHIL # BLD AUTO: 0 K/UL (ref 0–0.5)
EOSINOPHIL NFR BLD: 0 % (ref 0–8)
ERYTHROCYTE [DISTWIDTH] IN BLOOD BY AUTOMATED COUNT: 15.9 % (ref 11.5–14.5)
EST. GFR  (AFRICAN AMERICAN): 56.1 ML/MIN/1.73 M^2
EST. GFR  (NON AFRICAN AMERICAN): 48.7 ML/MIN/1.73 M^2
GLUCOSE SERPL-MCNC: 108 MG/DL (ref 70–110)
HCT VFR BLD AUTO: 38 % (ref 37–48.5)
HGB BLD-MCNC: 11.8 G/DL (ref 12–16)
IMM GRANULOCYTES # BLD AUTO: 0.03 K/UL (ref 0–0.04)
IMM GRANULOCYTES NFR BLD AUTO: 0.6 % (ref 0–0.5)
LYMPHOCYTES # BLD AUTO: 0.3 K/UL (ref 1–4.8)
LYMPHOCYTES NFR BLD: 5.3 % (ref 18–48)
MAGNESIUM SERPL-MCNC: 2.4 MG/DL (ref 1.6–2.6)
MCH RBC QN AUTO: 31.5 PG (ref 27–31)
MCHC RBC AUTO-ENTMCNC: 31.1 G/DL (ref 32–36)
MCV RBC AUTO: 101 FL (ref 82–98)
MONOCYTES # BLD AUTO: 0.3 K/UL (ref 0.3–1)
MONOCYTES NFR BLD: 6.7 % (ref 4–15)
NEUTROPHILS # BLD AUTO: 4.3 K/UL (ref 1.8–7.7)
NEUTROPHILS NFR BLD: 87.2 % (ref 38–73)
NRBC BLD-RTO: 0 /100 WBC
PHOSPHATE SERPL-MCNC: 4.4 MG/DL (ref 2.7–4.5)
PLATELET # BLD AUTO: 153 K/UL (ref 150–450)
PMV BLD AUTO: 10.1 FL (ref 9.2–12.9)
POCT GLUCOSE: 107 MG/DL (ref 70–110)
POCT GLUCOSE: 156 MG/DL (ref 70–110)
POCT GLUCOSE: 158 MG/DL (ref 70–110)
POTASSIUM SERPL-SCNC: 4.3 MMOL/L (ref 3.5–5.1)
PROT SERPL-MCNC: 6.2 G/DL (ref 6–8.4)
RBC # BLD AUTO: 3.75 M/UL (ref 4–5.4)
SODIUM SERPL-SCNC: 139 MMOL/L (ref 136–145)
VANCOMYCIN SERPL-MCNC: 20.6 UG/ML
WBC # BLD AUTO: 4.89 K/UL (ref 3.9–12.7)

## 2022-07-09 PROCEDURE — 99900035 HC TECH TIME PER 15 MIN (STAT)

## 2022-07-09 PROCEDURE — 99497 PR ADVNCD CARE PLAN 30 MIN: ICD-10-PCS | Mod: ,,, | Performed by: STUDENT IN AN ORGANIZED HEALTH CARE EDUCATION/TRAINING PROGRAM

## 2022-07-09 PROCEDURE — 99239 PR HOSPITAL DISCHARGE DAY,>30 MIN: ICD-10-PCS | Mod: GC,,, | Performed by: STUDENT IN AN ORGANIZED HEALTH CARE EDUCATION/TRAINING PROGRAM

## 2022-07-09 PROCEDURE — 84100 ASSAY OF PHOSPHORUS: CPT

## 2022-07-09 PROCEDURE — 99497 ADVNCD CARE PLAN 30 MIN: CPT | Mod: ,,, | Performed by: STUDENT IN AN ORGANIZED HEALTH CARE EDUCATION/TRAINING PROGRAM

## 2022-07-09 PROCEDURE — 94640 AIRWAY INHALATION TREATMENT: CPT

## 2022-07-09 PROCEDURE — 80202 ASSAY OF VANCOMYCIN: CPT | Performed by: STUDENT IN AN ORGANIZED HEALTH CARE EDUCATION/TRAINING PROGRAM

## 2022-07-09 PROCEDURE — 63600175 PHARM REV CODE 636 W HCPCS: Performed by: STUDENT IN AN ORGANIZED HEALTH CARE EDUCATION/TRAINING PROGRAM

## 2022-07-09 PROCEDURE — 25000003 PHARM REV CODE 250

## 2022-07-09 PROCEDURE — 36415 COLL VENOUS BLD VENIPUNCTURE: CPT | Performed by: STUDENT IN AN ORGANIZED HEALTH CARE EDUCATION/TRAINING PROGRAM

## 2022-07-09 PROCEDURE — 25000003 PHARM REV CODE 250: Performed by: STUDENT IN AN ORGANIZED HEALTH CARE EDUCATION/TRAINING PROGRAM

## 2022-07-09 PROCEDURE — 25000242 PHARM REV CODE 250 ALT 637 W/ HCPCS

## 2022-07-09 PROCEDURE — 27000221 HC OXYGEN, UP TO 24 HOURS

## 2022-07-09 PROCEDURE — 80053 COMPREHEN METABOLIC PANEL: CPT

## 2022-07-09 PROCEDURE — 94761 N-INVAS EAR/PLS OXIMETRY MLT: CPT

## 2022-07-09 PROCEDURE — 85025 COMPLETE CBC W/AUTO DIFF WBC: CPT

## 2022-07-09 PROCEDURE — 99239 HOSP IP/OBS DSCHRG MGMT >30: CPT | Mod: GC,,, | Performed by: STUDENT IN AN ORGANIZED HEALTH CARE EDUCATION/TRAINING PROGRAM

## 2022-07-09 PROCEDURE — 83735 ASSAY OF MAGNESIUM: CPT

## 2022-07-09 RX ORDER — LINEZOLID 600 MG/1
600 TABLET, FILM COATED ORAL EVERY 12 HOURS
Qty: 22 TABLET | Refills: 0
Start: 2022-07-09 | End: 2022-07-10 | Stop reason: SDUPTHER

## 2022-07-09 RX ORDER — LINEZOLID 600 MG/1
600 TABLET, FILM COATED ORAL EVERY 12 HOURS
Qty: 22 TABLET | Refills: 0 | Status: SHIPPED | OUTPATIENT
Start: 2022-07-09 | End: 2022-07-09 | Stop reason: SDUPTHER

## 2022-07-09 RX ADMIN — VANCOMYCIN HYDROCHLORIDE 750 MG: 500 INJECTION, POWDER, LYOPHILIZED, FOR SOLUTION INTRAVENOUS at 02:07

## 2022-07-09 RX ADMIN — HYDROXYZINE HYDROCHLORIDE 25 MG: 25 TABLET, FILM COATED ORAL at 12:07

## 2022-07-09 RX ADMIN — FLUTICASONE FUROATE AND VILANTEROL TRIFENATATE 1 PUFF: 100; 25 POWDER RESPIRATORY (INHALATION) at 09:07

## 2022-07-09 RX ADMIN — TIOTROPIUM BROMIDE INHALATION SPRAY 2 PUFF: 3.12 SPRAY, METERED RESPIRATORY (INHALATION) at 08:07

## 2022-07-09 RX ADMIN — POLYETHYLENE GLYCOL 3350 17 G: 17 POWDER, FOR SOLUTION ORAL at 08:07

## 2022-07-09 RX ADMIN — MICONAZOLE NITRATE 2 % TOPICAL POWDER: at 08:07

## 2022-07-09 RX ADMIN — LEVALBUTEROL HYDROCHLORIDE 0.63 MG: 0.63 SOLUTION RESPIRATORY (INHALATION) at 12:07

## 2022-07-09 RX ADMIN — LEVALBUTEROL HYDROCHLORIDE 0.63 MG: 0.63 SOLUTION RESPIRATORY (INHALATION) at 08:07

## 2022-07-09 RX ADMIN — GUAIFENESIN 600 MG: 600 TABLET, EXTENDED RELEASE ORAL at 08:07

## 2022-07-09 NOTE — SUBJECTIVE & OBJECTIVE
Interval history: Patient with no acute complaints this AM. Resting comfortably with family at bedside. Denies SOB or cough. On 2.5L NC and satting %. Reports she wants to go home.      Review of Systems   Constitutional:  Negative for chills and fever.   HENT:  Negative for ear pain and sinus pain.    Eyes:  Negative for photophobia, pain and visual disturbance.   Respiratory:  Negative for cough and shortness of breath.    Cardiovascular:  Negative for chest pain and leg swelling.   Gastrointestinal:  Negative for abdominal pain, blood in stool, constipation, diarrhea, nausea and vomiting.   Endocrine: Negative for polyuria.   Genitourinary:  Negative for difficulty urinating and dysuria.   Musculoskeletal:  Positive for arthralgias, back pain and joint swelling. Negative for neck pain.   Skin:  Positive for wound (left leg wound). Negative for rash.   Neurological:  Positive for weakness. Negative for dizziness and light-headedness.   Psychiatric/Behavioral:  Negative for confusion and sleep disturbance.    Objective:     Vital Signs (Most Recent):  Temp: 97.6 °F (36.4 °C) (07/09/22 1557)  Pulse: 90 (07/09/22 1557)  Resp: 16 (07/09/22 1557)  BP: (!) 140/83 (07/09/22 1557)  SpO2: 96 % (07/09/22 1557)   Vital Signs (24h Range):  Temp:  [97.3 °F (36.3 °C)-98 °F (36.7 °C)] 97.6 °F (36.4 °C)  Pulse:  [73-92] 90  Resp:  [14-20] 16  SpO2:  [90 %-100 %] 96 %  BP: (108-140)/(59-83) 140/83     Weight: 40.4 kg (89 lb 1.1 oz)  Body mass index is 16.29 kg/m².    Physical Exam  Constitutional:       General: She is sleeping. She is not in acute distress.     Appearance: Normal appearance. She is cachectic. She is not ill-appearing or diaphoretic.      Interventions: Nasal cannula in place.   HENT:      Head: Normocephalic and atraumatic.      Right Ear: External ear normal.      Left Ear: External ear normal.      Nose: Nose normal. No congestion or rhinorrhea.      Mouth/Throat:      Mouth: Mucous membranes are moist.       Pharynx: Oropharynx is clear. No oropharyngeal exudate or posterior oropharyngeal erythema.   Eyes:      General: No scleral icterus.     Extraocular Movements: Extraocular movements intact.      Conjunctiva/sclera: Conjunctivae normal.   Neck:      Vascular: No carotid bruit.   Cardiovascular:      Rate and Rhythm: Normal rate and regular rhythm.      Pulses: Normal pulses.      Heart sounds: Murmur heard.     No friction rub.   Pulmonary:      Effort: Pulmonary effort is normal. No respiratory distress.      Breath sounds: No stridor. Wheezing, rhonchi and rales present.      Comments: Transmitted upper airway sounds  Chest:      Chest wall: No tenderness.   Abdominal:      General: Abdomen is flat. Bowel sounds are normal. There is no distension.      Palpations: There is no mass.      Tenderness: There is no right CVA tenderness, left CVA tenderness or guarding.   Musculoskeletal:         General: No swelling, tenderness or deformity. Normal range of motion.      Cervical back: Normal range of motion. No rigidity or tenderness.      Right lower leg: Edema present.      Left lower leg: Edema present.      Comments: Trace edema, improved   Skin:     General: Skin is warm and dry.      Coloration: Skin is not jaundiced or pale.      Findings: Lesion present. No bruising or erythema.      Comments: Superficial long wound on left leg   Neurological:      General: No focal deficit present.      Mental Status: Mental status is at baseline.      Comments: Hard of hearing but alert   Psychiatric:         Mood and Affect: Mood normal.         Behavior: Behavior normal.         Thought Content: Thought content normal.        Significant Labs: All pertinent labs within the past 24 hours have been reviewed.  CBC:   Recent Labs   Lab 07/08/22  0711 07/09/22  0753   WBC 4.91 4.89   HGB 10.9* 11.8*   HCT 35.2* 38.0   * 153       CMP:   Recent Labs   Lab 07/08/22  0711 07/09/22  0753   * 139   K 4.2 4.3   CL 96  97   CO2 31* 32*   * 108   BUN 34* 33*   CREATININE 1.1 1.0   CALCIUM 9.5 9.9   PROT 5.9* 6.2   ALBUMIN 2.5* 2.7*   BILITOT 0.5 0.5   ALKPHOS 102 106   AST 19 17   ALT 30 26   ANIONGAP 8 10   EGFRNONAA 43.4* 48.7*         Significant Imaging: I have reviewed all pertinent imaging results/findings within the past 24 hours.

## 2022-07-09 NOTE — PROGRESS NOTES
Tirso Mckay - Transplant [facility] Medicine  Progress Note    Patient Name: Venus Mayer  MRN: [id number]  Patient Class: IP- Inpatient   Admission Date: 7/5/2022  Length of Stay: 3 days  Attending Physician: Javier Puente MD  Primary Care Provider: Primary Doctor No        Subjective:     Principal Problem:Bacteremia        HPI:  This is a 89 year old lady with HFpEF (EF 60% with GIIDD 3/2022), COPD on 2L NC, HLD, and HTN who presented to the ED for increased weakness. History obtained primarily from daughters at bedside. They noticed the patient was unable to perform her daily ADLs and was a little more weak per the home physical therapist. They were also concerned that she was more altered and had trouble breathing on her home O2. They noted that she had a cough and gurgling sound from her throat. They mentioned that she had similar symptoms prior and saw an ENT for sinusitis and was given a steroid shot with some anti-histamines which helped with the symptoms. She also had a fall a couple of days ago where she hit her left leg on her bed. She denies any fevers, chills, nausea, vomiting, diarrhea, constipation. The family notes that she has had decreased PO intake over the past couple of months.     Upon chart review, patient was discharged home with hospice on 5/29. She was admitted for debility and delirium and found to have severe CO2 retention. She continued to decline which prompted hospice discussions. Patient was discharged with hospice. For this admission, they rescinded hospice as the patient was improving.     In the ED, patient was hypotensive to 90/55 and tachpneic but satting 99% on her home O2. Labs showed a WBC of 24.84, a potassium of 5.2, Cr of 1.2 and an AST of 88. BNP was elevated to 3645 and troponin was elevated to 0.294 and downtrending. Lactic was 3.5. UA showed >100 WBCs, 3+ leuks, and rare bacteria. CT head showed new acute sphenoid sinusitis with scattered ethmoid sinus  disease. CXR showed some pulmonary interstitial edema and small left-sided pleural effusion.      Overview/Hospital Course:  Patient admitted for Mx of sepsis, likely source UTI vs PNA vs sinusitis. Recent h/o E. Coli UTI Tx w/ ertapenem. Acute respiratory decompensation requiring non rebreather and 8L oxygen support that resolved with breathing treatment and 1x steroid dose; CXR negative - suspect mucus plug. Blood cultures with Staph aureus. Continue vancomycin; ID consulted with recommendation for 14 days therapy per standard of care. PO option would be linezolid, as IV abx not compatible with hospice. Extensive GOC discussion with family and patient ongoing.        Interval history: Patient with no acute complaints this AM. Resting comfortably with family at bedside. Denies SOB or cough. On 2.5L NC and satting %. Reports she wants to go home.      Review of Systems   Constitutional:  Negative for chills and fever.   HENT:  Negative for ear pain and sinus pain.    Eyes:  Negative for photophobia, pain and visual disturbance.   Respiratory:  Negative for cough and shortness of breath.    Cardiovascular:  Negative for chest pain and leg swelling.   Gastrointestinal:  Negative for abdominal pain, blood in stool, constipation, diarrhea, nausea and vomiting.   Endocrine: Negative for polyuria.   Genitourinary:  Negative for difficulty urinating and dysuria.   Musculoskeletal:  Positive for arthralgias, back pain and joint swelling. Negative for neck pain.   Skin:  Positive for wound (left leg wound). Negative for rash.   Neurological:  Positive for weakness. Negative for dizziness and light-headedness.   Psychiatric/Behavioral:  Negative for confusion and sleep disturbance.    Objective:     Vital Signs (Most Recent):  Temp: 97.6 °F (36.4 °C) (07/09/22 1557)  Pulse: 90 (07/09/22 1557)  Resp: 16 (07/09/22 1557)  BP: (!) 140/83 (07/09/22 1557)  SpO2: 96 % (07/09/22 1557)   Vital Signs (24h Range):  Temp:  [97.3 °F  (36.3 °C)-98 °F (36.7 °C)] 97.6 °F (36.4 °C)  Pulse:  [73-92] 90  Resp:  [14-20] 16  SpO2:  [90 %-100 %] 96 %  BP: (108-140)/(59-83) 140/83     Weight: 40.4 kg (89 lb 1.1 oz)  Body mass index is 16.29 kg/m².    Physical Exam  Constitutional:       General: She is sleeping. She is not in acute distress.     Appearance: Normal appearance. She is cachectic. She is not ill-appearing or diaphoretic.      Interventions: Nasal cannula in place.   HENT:      Head: Normocephalic and atraumatic.      Right Ear: External ear normal.      Left Ear: External ear normal.      Nose: Nose normal. No congestion or rhinorrhea.      Mouth/Throat:      Mouth: Mucous membranes are moist.      Pharynx: Oropharynx is clear. No oropharyngeal exudate or posterior oropharyngeal erythema.   Eyes:      General: No scleral icterus.     Extraocular Movements: Extraocular movements intact.      Conjunctiva/sclera: Conjunctivae normal.   Neck:      Vascular: No carotid bruit.   Cardiovascular:      Rate and Rhythm: Normal rate and regular rhythm.      Pulses: Normal pulses.      Heart sounds: Murmur heard.     No friction rub.   Pulmonary:      Effort: Pulmonary effort is normal. No respiratory distress.      Breath sounds: No stridor. Wheezing, rhonchi and rales present.      Comments: Transmitted upper airway sounds  Chest:      Chest wall: No tenderness.   Abdominal:      General: Abdomen is flat. Bowel sounds are normal. There is no distension.      Palpations: There is no mass.      Tenderness: There is no right CVA tenderness, left CVA tenderness or guarding.   Musculoskeletal:         General: No swelling, tenderness or deformity. Normal range of motion.      Cervical back: Normal range of motion. No rigidity or tenderness.      Right lower leg: Edema present.      Left lower leg: Edema present.      Comments: Trace edema, improved   Skin:     General: Skin is warm and dry.      Coloration: Skin is not jaundiced or pale.      Findings:  Lesion present. No bruising or erythema.      Comments: Superficial long wound on left leg   Neurological:      General: No focal deficit present.      Mental Status: Mental status is at baseline.      Comments: Hard of hearing but alert   Psychiatric:         Mood and Affect: Mood normal.         Behavior: Behavior normal.         Thought Content: Thought content normal.        Significant Labs: All pertinent labs within the past 24 hours have been reviewed.  CBC:   Recent Labs   Lab 07/08/22  0711 07/09/22  0753   WBC 4.91 4.89   HGB 10.9* 11.8*   HCT 35.2* 38.0   * 153       CMP:   Recent Labs   Lab 07/08/22  0711 07/09/22  0753   * 139   K 4.2 4.3   CL 96 97   CO2 31* 32*   * 108   BUN 34* 33*   CREATININE 1.1 1.0   CALCIUM 9.5 9.9   PROT 5.9* 6.2   ALBUMIN 2.5* 2.7*   BILITOT 0.5 0.5   ALKPHOS 102 106   AST 19 17   ALT 30 26   ANIONGAP 8 10   EGFRNONAA 43.4* 48.7*         Significant Imaging: I have reviewed all pertinent imaging results/findings within the past 24 hours.        Assessment/Plan:      * Bacteremia  Currently on IV vancomycin per ID recommendations    Patient admitted from hospice, which will not allow IV abx and lab draws upon discharge  - PO option of linezolid to cover MSSA bacteremia  - Extensive discussions with family about risks and benefits of IV vs PO      Cerebral microvascular disease  CT head noted chronic microvascular changes which can be contributing to her encephalopathy but her acute decline is better explained by infection. No acute stroke noted on CT head    - continue to monitor  - aspiration and fall precautions      Acute cystitis without hematuria  See septic shock    Senile purpura  Patient has obvious skin breakdown and purpura noted on extremities.     - routine wound care on affected areas      Advanced care planning/counseling discussion  DNR  Patient's family still wish patient to be DNR/DNI. Order reflected on chart. Patient's family had rescinded  hospice.    - consulted palliative care to re-engage conversations   - extensive conversations with multiple family members between primary team and palliative care      Palliative care encounter        Chronic respiratory failure with hypoxia and hypercapnia  COPD  Centrilobular emphysema    Patient is on home 2L NC  - continue home inhalers    Acute respiratory decompensation requiring 8L via rebreather - resolved with breathing treatment; suspect mucus plugging  - CXR with some fluid but grossly unchanged from prior  - AGB c/w acute hypoxemia      Symptomatic bradycardia  S/p pacemaker - no acute issues      Acute on chronic diastolic heart failure  Coronary Artery Disease  Elevated Troponin  Hypertension    Patient had increased O2 requirement initially and had some trace pitting edema bilaterally. Labs showed BNP of >3000 with elevated but stable troponin (likely type 2 due to septic shock). CXR showed evidence of pulmonary edema.    - given hypotension improved with fluids, will hold off on diuresis at the moment  - Fluid restriction at 1500 cc with strict I/Os and daily weights   - Maintain on telemetry  - Up to date risk stratification : TSH, Lipids, HbA1c with optimization of risk factors is necessary  - Check Electrolytes, keep Mag >2 & K+ >4  - patient was off GDMT given hospice situation but family restarted some home medication. Given septic shock, held all anti-hypertensive medication and can restart when clinically indicated    Repeat TTE:  · Asymmetric LV septal hypertrophy without obstruction. Small LV cavity, small stroke volume. Overall reduced cardiac output (2.2L/m, CI 1.5)  · The left ventricle is small with hypertrophy and low normal systolic function.  · The estimated ejection fraction is 50%.  · Grade II left ventricular diastolic dysfunction.  · Severe left atrial enlargement.  · Moderate mitral regurgitation.  · Moderate to severe tricuspid regurgitation.  · Severe right atrial  enlargement.  · Moderate right ventricular enlargement with moderately reduced right ventricular systolic function.  · The estimated PA systolic pressure is 53 mmHg.  · Elevated central venous pressure (15 mmHg).          VTE Risk Mitigation (From admission, onward)         Ordered     enoxaparin injection 30 mg  Daily         07/06/22 0219     IP VTE HIGH RISK PATIENT  Once         07/06/22 0219     Place sequential compression device  Until discontinued         07/06/22 0219                Discharge Planning   TRISTON: 7/10/2022     Code Status: DNR   Is the patient medically ready for discharge?: Yes    Reason for patient still in hospital (select all that apply): Treatment  Discharge Plan A: Home with family                  Akhil Silva MD  Department of Hospital Medicine   [facility] - Transplant Stepdown

## 2022-07-09 NOTE — PLAN OF CARE
CM contacted on-call liaison with [facility].   Hospice orders placed in [facility].    [facility] will resume services this evening after patient arrives home.   Patient's daughter will contact [facility] after discharge from hospital.   Ambulance stretcher requested with [facility] (ext [phone]) for 6 PM pickup time.

## 2022-07-09 NOTE — ASSESSMENT & PLAN NOTE
Coronary Artery Disease  Elevated Troponin  Hypertension    Patient had increased O2 requirement initially and had some trace pitting edema bilaterally. Labs showed BNP of >3000 with elevated but stable troponin (likely type 2 due to septic shock). CXR showed evidence of pulmonary edema.    - given hypotension improved with fluids, will hold off on diuresis at the moment  - Fluid restriction at 1500 cc with strict I/Os and daily weights   - Maintain on telemetry  - Up to date risk stratification : TSH, Lipids, HbA1c with optimization of risk factors is necessary  - Check Electrolytes, keep Mag >2 & K+ >4  - patient was off GDMT given hospice situation but family restarted some home medication. Given septic shock, held all anti-hypertensive medication and can restart when clinically indicated    Repeat TTE:  · Asymmetric LV septal hypertrophy without obstruction. Small LV cavity, small stroke volume. Overall reduced cardiac output (2.2L/m, CI 1.5)  · The left ventricle is small with hypertrophy and low normal systolic function.  · The estimated ejection fraction is 50%.  · Grade II left ventricular diastolic dysfunction.  · Severe left atrial enlargement.  · Moderate mitral regurgitation.  · Moderate to severe tricuspid regurgitation.  · Severe right atrial enlargement.  · Moderate right ventricular enlargement with moderately reduced right ventricular systolic function.  · The estimated PA systolic pressure is 53 mmHg.  · Elevated central venous pressure (15 mmHg).

## 2022-07-09 NOTE — PROGRESS NOTES
Pharmacokinetic Assessment Follow Up: IV Vancomycin    Vancomycin serum concentration assessment/plan:  · UOP 0.3 ml/kg/hr with 3x unmeasured outputs.  · Vancomycin random level of 20.6 mg/dL taken approximately 20 hours after previous dose of 750 mg.  · Schedule vancomycin 750 mg every 24 hrs starting at 1330 on 7/9.  · Trough level for 7/11 at 1300 prior to 3rd dose.  · Goal level:  15-20 mg/dL      Drug levels (last 3 results):  Recent Labs   Lab Result Units 07/06/22  2343 07/08/22 0711 07/09/22  0753   Vancomycin, Random ug/mL 12.9 18.3 20.6       Pharmacy will continue to follow and monitor vancomycin.    Please contact pharmacy at extension [phone] for questions regarding this assessment.    Thank you for the consult,   Jarod Woodard       Patient brief summary:  Venus Mayer is a 89 y.o. female initiated on antimicrobial therapy with IV Vancomycin for treatment of bacteremia      Drug Allergies:   Review of patient's allergies indicates:   Allergen Reactions    Ancef in dextrose (iso-osm) Rash    Cefazolin Rash     Tolerating Zosyn admission 3/2022    Cefuroxime Rash    Sulfamethoxazole-trimethoprim Rash     Other reaction(s): Rash       Actual Body Weight:   40 kg    Renal Function:   Estimated Creatinine Clearance: 22.4 mL/min (based on SCr of 1 mg/dL).,     Dialysis Method (if applicable):  N/A    CBC (last 72 hours):  Recent Labs   Lab Result Units 07/07/22  0811 07/08/22  0711 07/09/22  0753   WBC K/uL 10.97 4.91 4.89   Hemoglobin g/dL 11.1* 10.9* 11.8*   Hematocrit % 35.6* 35.2* 38.0   Platelets K/uL 143* 146* 153   Gran % % 82.8* 88.4* 87.2*   Lymph % % 7.6* 6.3* 5.3*   Mono % % 6.9 4.9 6.7   Eosinophil % % 2.0 0.0 0.0   Basophil % % 0.3 0.0 0.2   Differential Method  Automated Automated Automated       Metabolic Panel (last 72 hours):  Recent Labs   Lab Result Units 07/07/22  0811 07/08/22  0711 07/09/22  0753   Sodium mmol/L 135* 135* 139   Potassium mmol/L 4.2 4.2 4.3   Chloride mmol/L 96 96 97    CO2 mmol/L 29 31* 32*   Glucose mg/dL 62* 129* 108   BUN mg/dL 37* 34* 33*   Creatinine mg/dL 1.4 1.1 1.0   Albumin g/dL 2.5* 2.5* 2.7*   Total Bilirubin mg/dL 0.6 0.5 0.5   Alkaline Phosphatase U/L 107 102 106   AST U/L 33 19 17   ALT U/L 33 30 26   Magnesium mg/dL 2.2 2.2 2.4   Phosphorus mg/dL 4.0 4.7* 4.4       Vancomycin Administrations:  vancomycin given in the last 96 hours                   vancomycin 750 mg in dextrose 5 % 250 mL IVPB (ready to mix system) (mg) 750 mg New Bag 07/08/22 1132    vancomycin 750 mg in dextrose 5 % 250 mL IVPB (ready to mix system) (mg) 750 mg New Bag 07/07/22 0923    vancomycin in dextrose 5 % 1 gram/250 mL IVPB 1,000 mg (mg) 1,000 mg New Bag 07/05/22 2245                Microbiologic Results:  Microbiology Results (last 7 days)     Procedure Component Value Units Date/Time    Blood culture [[id number]] Collected: 07/07/22 0811    Order Status: Completed Specimen: Blood from Peripheral, Left Arm Updated: 07/09/22 1012     Blood Culture, Routine No Growth to date      No Growth to date      No Growth to date    Blood culture [[id number]] Collected: 07/07/22 0825    Order Status: Completed Specimen: Blood from Antecubital, Right Arm Updated: 07/09/22 1012     Blood Culture, Routine No Growth to date      No Growth to date      No Growth to date    Blood culture x two cultures. Draw prior to antibiotics. [[id number]] Collected: 07/05/22 2210    Order Status: Completed Specimen: Blood from Peripheral, Forearm, Right Updated: 07/08/22 2312     Blood Culture, Routine No Growth to date      No Growth to date      No Growth to date      No Growth to date    Narrative:      Aerobic and anaerobic    Urine culture [[id number]]  (Abnormal)  (Susceptibility) Collected: 07/06/22 0053    Order Status: Completed Specimen: Urine Updated: 07/08/22 1155     Urine Culture, Routine ENTEROCOCCUS FAECIUM VRE  >100,000 cfu/ml  No other significant isolate      Narrative:      Specimen Source->Urine     Blood culture x two cultures. Draw prior to antibiotics. [[id number]]  (Abnormal)  (Susceptibility) Collected: 07/05/22 2210    Order Status: Completed Specimen: Blood from Peripheral, Forearm, Left Updated: 07/08/22 1040     Blood Culture, Routine Gram stain cortney bottle: Gram positive cocci      Results called to and read back by:Anabelle Jewell RN 07/06/2022  17:09      Gram stain aer bottle: Gram positive cocci       Positive results previously called 07/07/2022  12:06      STAPHYLOCOCCUS AUREUS  ID consult required at [facility],[place] and [facility] locations.      Narrative:      Aerobic and anaerobic    Culture, Respiratory with Gram Stain [[id number]]     Order Status: No result Specimen: Respiratory

## 2022-07-09 NOTE — PROGRESS NOTES
Discharge instructions and education given to pt. Pt verbalized understanding. Reviewed medication changes, new medications, future appointments and medication compliance. Pt informed to use Care Coordinator or call 24/7 [facility] on-call nurse for any further questions or concerns. Reviewed worsening signs or symptoms of infection/SOB. Peripheral IV removed, catheter intact. VISI and telemetry monitors removed. Personal items sent with pt. Pt waiting for wheelchair services to provide transport. Vitals signs stable at time of discharge.

## 2022-07-09 NOTE — PLAN OF CARE
[facility]  Department of Hospital Medicine  [address] [place] [address]  [phone] after hours  [phone] fax    HOSPICE  ORDERS    07/09/2022    Admit to Hospice:  Home Service    Diagnoses:   Active Hospital Problems    Diagnosis  POA    *Bacteremia [R78.81]  Yes     Priority: 1 - High    Cerebral microvascular disease [I67.89]  Yes     Extensive disease; likely contributing to her current mental state.      Acute cystitis without hematuria [N30.00]  Yes    Senile purpura [D69.2]  Yes    Palliative care encounter [Z51.5]  Not Applicable    Advanced care planning/counseling discussion [Z71.89]  Not Applicable    Chronic respiratory failure with hypoxia and hypercapnia [J96.11, J96.12]  Yes     Combination of severe debility, emphysema, diastolic heart failure.  Continues to benefit.      Symptomatic bradycardia [R00.1]  Yes    Acute on chronic diastolic heart failure [I50.33]  Yes      Resolved Hospital Problems    Diagnosis Date Resolved POA    Septic shock [A41.9, R65.21] 07/08/2022 Yes    OCTAVIO (acute kidney injury) [N17.9] 07/08/2022 Yes       Hospice Qualifying Diagnoses: Severe protein-calorie malnutrition       Patient has a life expectancy < 6 months due to:  1) Primary Hospice Diagnosis: Severe protein-calorie malnutrition  2) Comorbid Conditions Contributing to Decline: COPD, CHF, Recurrent infections    Vital Signs: Routine per Hospice Protocol.    Code Status: DNR/DNI    Allergies:   Review of patient's allergies indicates:   Allergen Reactions    Ancef in dextrose (iso-osm) Rash    Cefazolin Rash     Tolerating Zosyn admission 3/2022    Cefuroxime Rash    Sulfamethoxazole-trimethoprim Rash     Other reaction(s): Rash       Diet: Pleasure     Activities: As tolerated    Goals of Care Treatment Preferences:  Code Status: DNR    Health care agent: Arthur Miller  [facility] care agent number: [phone]       LaPOST: Yes  What is most  important right now is to focus on spending time at home, avoiding the hospital, comfort and QOL .  Accordingly, we have decided that the best plan to meet the patient's goals includes enrolling in hospice care.      Nursing: Per Hospice Routine.      Oxygen: Nasal Cannula, titrated to comfort     Medications:      Medication List      START taking these medications    linezolid 600 mg Tab  Commonly known as: ZYVOX  Take 1 tablet (600 mg total) by mouth every 12 (twelve) hours. for 11 days        CHANGE how you take these medications    levalbuterol 0.63 mg/3 mL nebulizer solution  Commonly known as: XOPENEX  Take 3 mLs (0.63 mg total) by nebulization every 4 (four) hours as needed for Wheezing or Shortness of Breath. Rescue  What changed: when to take this        CONTINUE taking these medications    acetaminophen 500 MG tablet  Commonly known as: TYLENOL  Take 500 mg by mouth daily as needed for Pain.     budesonide-formoterol 160-4.5 mcg 160-4.5 mcg/actuation Hfaa  Commonly known as: SYMBICORT  Inhale 2 puffs into the lungs every 12 (twelve) hours. Controller     ferrous sulfate 325 mg (65 mg iron) Tab tablet  Commonly known as: FEOSOL  Take 325 mg by mouth once daily.     fexofenadine 180 MG tablet  Commonly known as: ALLEGRA  Take 180 mg by mouth once daily.     furosemide 40 MG tablet  Commonly known as: LASIX  Take 1 tablet (40 mg total) by mouth once daily. In the case of 3lbs weight gain in 1d or 5lbs in 3d, administer additional dose of lasix in the afternoon.     Lactobacillus rhamnosus GG 10 billion cell capsule  Commonly known as: CULTURELLE  Take 1 capsule by mouth once daily.     metoprolol tartrate 25 MG tablet  Commonly known as: LOPRESSOR  Take 12.5 mg by mouth 2 (two) times daily.     MUCINEX ORAL  Take 1 tablet by mouth daily as needed.     potassium chloride 10 MEQ Tbsr  Commonly known as: KLOR-CON  Take 10 mEq by mouth once daily.     Saline NasaL 0.65 % nasal spray  Generic drug: sodium  chloride  1-2 sprays by Nasal route daily as needed for Congestion.     * SPIRIVA RESPIMAT 2.5 mcg/actuation inhaler  Generic drug: tiotropium bromide  Inhale 2 puffs into the lungs Daily. Controller     * SPIRIVA RESPIMAT 2.5 mcg/actuation inhaler  Generic drug: tiotropium bromide  Inhale 2 puffs into the lungs Daily. Controller     SYSTANE HYDRATION PF 0.4-0.3 % Drop  Generic drug: peg 400-propylene glycol (PF)  Apply 1 drop to eye daily as needed.     VITAMIN D3 COMPLETE ORAL  Take 1 tablet by mouth once daily.         * This list has 2 medication(s) that are the same as other medications prescribed for you. Read the directions carefully, and ask your doctor or other care provider to review them with you.            STOP taking these medications    albuterol 90 mcg/actuation inhaler  Commonly known as: PROAIR HFA     baclofen 10 MG tablet  Commonly known as: LIORESAL     bumetanide 1 MG tablet  Commonly known as: BUMEX     pantoprazole 40 MG tablet  Commonly known as: PROTONIX            Future Orders:  Hospice Medical Director may dictate new orders for comfortable care measures & sign death certificate.        _________________________________  Akhil Silva MD  07/09/2022

## 2022-07-09 NOTE — DISCHARGE SUMMARY
[facility] - [facility] Medicine  Discharge Summary      Patient Name: Venus Mayer  MRN: [id number]  Patient Class: IP- Inpatient  Admission Date: 7/5/2022  Hospital Length of Stay: 3 days  Discharge Date and Time:  07/09/2022 5:12 PM  Attending Physician: Javier Puente MD   Discharging Provider: Akhil Silva MD  Primary Care Provider: Primary Doctor [facility] Medicine Team: [facility]OSP MED 5 Akhil Silva MD    HPI:   This is a 89 year old lady with HFpEF (EF 60% with GIIDD 3/2022), COPD on 2L NC, HLD, and HTN who presented to the ED for increased weakness. History obtained primarily from daughters at bedside. They noticed the patient was unable to perform her daily ADLs and was a little more weak per the home physical therapist. They were also concerned that she was more altered and had trouble breathing on her home O2. They noted that she had a cough and gurgling sound from her throat. They mentioned that she had similar symptoms prior and saw an ENT for sinusitis and was given a steroid shot with some anti-histamines which helped with the symptoms. She also had a fall a couple of days ago where she hit her left leg on her bed. She denies any fevers, chills, nausea, vomiting, diarrhea, constipation. The family notes that she has had decreased PO intake over the past couple of months.     Upon chart review, patient was discharged home with hospice on 5/29. She was admitted for debility and delirium and found to have severe CO2 retention. She continued to decline which prompted hospice discussions. Patient was discharged with hospice. For this admission, they rescinded hospice as the patient was improving.     In the ED, patient was hypotensive to 90/55 and tachpneic but satting 99% on her home O2. Labs showed a WBC of 24.84, a potassium of 5.2, Cr of 1.2 and an AST of 88. BNP was elevated to 3645 and troponin was elevated to 0.294 and downtrending. Lactic was 3.5. UA showed >100  WBCs, 3+ leuks, and rare bacteria. CT head showed new acute sphenoid sinusitis with scattered ethmoid sinus disease. CXR showed some pulmonary interstitial edema and small left-sided pleural effusion.      * No surgery found *      Hospital Course:   Patient admitted for Mx of sepsis, likely source UTI vs PNA vs sinusitis. Recent h/o E. Coli UTI Tx w/ ertapenem. Acute respiratory decompensation requiring non rebreather and 8L oxygen support that resolved with breathing treatment and 1x steroid dose; CXR negative - suspect mucus plug. Blood cultures with Staph aureus. Continue vancomycin; ID consulted with recommendation for 14 days therapy per standard of care. PO option would be linezolid, as IV abx not compatible with hospice. Extensive GOC discussion with family and patient and decided to go home with hospice care.     Vitals:    07/09/22 1557   BP: (!) 140/83   Pulse: 90   Resp: 16   Temp: 97.6 °F (36.4 °C)     Physical Exam  Vitals reviewed.   Constitutional:       General: She is not in acute distress.     Appearance: Normal appearance.      Comments: Cachectic   HENT:      Head: Normocephalic and atraumatic.      Right Ear: External ear normal.      Left Ear: External ear normal.      Nose: Nose normal.      Mouth/Throat:      Pharynx: Oropharynx is clear.   Eyes:      General: No scleral icterus.     Conjunctiva/sclera: Conjunctivae normal.   Cardiovascular:      Rate and Rhythm: Normal rate and regular rhythm.      Pulses: Normal pulses.      Heart sounds: Normal heart sounds.   Pulmonary:      Effort: Pulmonary effort is normal.      Breath sounds: No wheezing.      Comments: On nasal cannula  Abdominal:      Palpations: Abdomen is soft.      Tenderness: There is no abdominal tenderness. There is no guarding.   Musculoskeletal:      Cervical back: Normal range of motion.      Right lower leg: Edema present.      Left lower leg: Edema present.      Comments: Trace pitting edema   Skin:     General: Skin is  "warm and dry.   Neurological:      General: No focal deficit present.      Mental Status: She is oriented to person, place, and time.   Psychiatric:         Mood and Affect: Mood normal.         Behavior: Behavior normal.         Goals of Care Treatment Preferences:  Code Status: DNR    Health care agent: Arthur Miller  [facility] care agent number: [phone]       [place]: Yes  What is most important right now is to focus on spending time at home, avoiding the hospital, comfort and QOL .  Accordingly, we have decided that the best plan to meet the patient's goals includes enrolling in hospice care.      Consults:   Consults (From admission, onward)        Status Ordering Provider     Inpatient consult to Midline team  Once        Provider:  (Not yet assigned)    Acknowledged SHELLEY ART     Pharmacy to dose Vancomycin consult  Once        Provider:  (Not yet assigned)   "And" Linked Group Details    Acknowledged DILSHAD MEZA     Inpatient consult to Infectious Diseases  Once        Provider:  (Not yet assigned)    Completed NORBERTO MARR     IP consult to case management  Once        Provider:  (Not yet assigned)    Acknowledged SHELLEY ART     Inpatient consult to Registered Dietitian/Nutritionist  Once        Provider:  (Not yet assigned)    Completed SHELLEY ART     Inpatient consult to Palliative Care  Once        Provider:  (Not yet assigned)    Completed SWAPNA ARCHER          No new Assessment & Plan notes have been filed under this hospital service since the last note was generated.  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] 05/30/2022 Yes    Acute cystitis without hematuria [N30.00] 05/21/2022 Yes    Senile purpura [D69.2] 03/30/2022 Yes    Palliative care encounter [Z51.5] 07/29/2019 Not Applicable    Advanced care " planning/counseling discussion [Z71.89]  Not Applicable    Chronic respiratory failure with hypoxia and hypercapnia [J96.11, J96.12] 07/06/2019 Yes    Symptomatic bradycardia [R00.1] 03/15/2017 Yes    Acute on chronic diastolic heart failure [I50.33] 01/17/2013 Yes      Problems Resolved During this Admission:    Diagnosis Date Noted Date Resolved POA    Septic shock [A41.9, R65.21] 04/09/2022 07/08/2022 Yes    OCTAVIO (acute kidney injury) [N17.9] 03/28/2013 07/08/2022 Yes       Discharged Condition: stable    Disposition: Hospice/Home    Follow Up:    Patient Instructions:   No discharge procedures on file.    Significant Diagnostic Studies: Reviewed    Pending Diagnostic Studies:     None         Medications:  Reconciled Home Medications:      Medication List      START taking these medications    linezolid 600 mg Tab  Commonly known as: ZYVOX  Take 1 tablet (600 mg total) by mouth every 12 (twelve) hours. for 11 days        CHANGE how you take these medications    levalbuterol 0.63 mg/3 mL nebulizer solution  Commonly known as: XOPENEX  Take 3 mLs (0.63 mg total) by nebulization every 4 (four) hours as needed for Wheezing or Shortness of Breath. Rescue  What changed: when to take this        CONTINUE taking these medications    acetaminophen 500 MG tablet  Commonly known as: TYLENOL  Take 500 mg by mouth daily as needed for Pain.     budesonide-formoterol 160-4.5 mcg 160-4.5 mcg/actuation Hfaa  Commonly known as: SYMBICORT  Inhale 2 puffs into the lungs every 12 (twelve) hours. Controller     ferrous sulfate 325 mg (65 mg iron) Tab tablet  Commonly known as: FEOSOL  Take 325 mg by mouth once daily.     fexofenadine 180 MG tablet  Commonly known as: ALLEGRA  Take 180 mg by mouth once daily.     furosemide 40 MG tablet  Commonly known as: LASIX  Take 1 tablet (40 mg total) by mouth once daily. In the case of 3lbs weight gain in 1d or 5lbs in 3d, administer additional dose of lasix in the afternoon.      Lactobacillus rhamnosus GG 10 billion cell capsule  Commonly known as: CULTURELLE  Take 1 capsule by mouth once daily.     metoprolol tartrate 25 MG tablet  Commonly known as: LOPRESSOR  Take 12.5 mg by mouth 2 (two) times daily.     MUCINEX ORAL  Take 1 tablet by mouth daily as needed.     potassium chloride 10 MEQ Tbsr  Commonly known as: KLOR-CON  Take 10 mEq by mouth once daily.     Saline NasaL 0.65 % nasal spray  Generic drug: sodium chloride  1-2 sprays by Nasal route daily as needed for Congestion.     * SPIRIVA RESPIMAT 2.5 mcg/actuation inhaler  Generic drug: tiotropium bromide  Inhale 2 puffs into the lungs Daily. Controller     * SPIRIVA RESPIMAT 2.5 mcg/actuation inhaler  Generic drug: tiotropium bromide  Inhale 2 puffs into the lungs Daily. Controller     SYSTANE HYDRATION PF 0.4-0.3 % Drop  Generic drug: peg 400-propylene glycol (PF)  Apply 1 drop to eye daily as needed.     VITAMIN D3 COMPLETE ORAL  Take 1 tablet by mouth once daily.         * This list has 2 medication(s) that are the same as other medications prescribed for you. Read the directions carefully, and ask your doctor or other care provider to review them with you.            STOP taking these medications    albuterol 90 mcg/actuation inhaler  Commonly known as: PROAIR HFA     baclofen 10 MG tablet  Commonly known as: LIORESAL     bumetanide 1 MG tablet  Commonly known as: BUMEX     pantoprazole 40 MG tablet  Commonly known as: PROTONIX            Indwelling Lines/Drains at time of discharge:   Lines/Drains/Airways     Drain  Duration           Female External Urinary Catheter 07/05/22 2237 3 days                Time spent on the discharge of patient: greater than 60 minutes         Akhil Silva MD  Department of Hospital Medicine  [facility]

## 2022-07-09 NOTE — ASSESSMENT & PLAN NOTE
DNR  Patient's family still wish patient to be DNR/DNI. Order reflected on chart. Patient's family had rescinded hospice.    - consulted palliative care to re-engage conversations   - extensive conversations with multiple family members between primary team and palliative care

## 2022-07-09 NOTE — PLAN OF CARE
89 year-old female admitted 7/5/22 for weakness/SOB/bacteremia. Pt has hx of CHF, cardiomyopathy, COPD, HLD, CAD, PVD, dysphagia  -AAOx2, pt disoriented to time and situation  -22 G Lt FA  -Vancomycin IVPB  -scheduled neb tx  -NC @ 3L  -[facility]/HS, slides @ 201  -new wound care orders: Lt shin cleanse with NS, apply Xeroform, Abd pad, Kerlix M,W, F. Rt Hip cleanse with NS, apply Xeroform, Foam dressings M, W, F  -Miconazole in groin area  -Purwick when in bed  -1 person assist to BSC  -daughter at bedside, pt sitting up in chair, wheels locked, non-skid socks in place, call light within reach  -pending discharge 7/10

## 2022-07-09 NOTE — ASSESSMENT & PLAN NOTE
Currently on IV vancomycin per ID recommendations    Patient admitted from hospice, which will not allow IV abx and lab draws upon discharge  - PO option of linezolid to cover MSSA bacteremia  - Extensive discussions with family about risks and benefits of IV vs PO

## 2022-07-09 NOTE — PLAN OF CARE
Pt is AAOx2-disoriented to time and situation, afebrile, and VSS overnight. Pt denied pain and SOB overnight. Pt had 1 BM overnight.  Pt is in bed in the lowest position, wheels locked, and call light in reach. Plan is for poss d/c today.

## 2022-07-10 ENCOUNTER — NURSE TRIAGE (OUTPATIENT)
Dept: ADMINISTRATIVE | Facility: CLINIC | Age: 87
End: 2022-07-10
Payer: MEDICARE

## 2022-07-10 LAB — BACTERIA BLD CULT: NORMAL

## 2022-07-10 RX ORDER — LINEZOLID 600 MG/1
600 TABLET, FILM COATED ORAL EVERY 12 HOURS
Qty: 22 TABLET | Refills: 0 | Status: SHIPPED | OUTPATIENT
Start: 2022-07-10 | End: 2022-07-21

## 2022-07-10 NOTE — TELEPHONE ENCOUNTER
Spoke with Lloyd Gibson (son):    Patient recently discharged. Reports that her prescription is not at the pharmacy. Will resend linezolid (ZYVOX) 600 mg electronically to [facility] [address] [place] [address].   Reason for Disposition   [1] Prescription prescribed recently is not at pharmacy AND [2] triager has access to patient's EMR AND [3] prescription is recorded in the EMR    Protocols used: MEDICATION QUESTION CALL-A-AH

## 2022-07-12 LAB
BACTERIA BLD CULT: NORMAL
BACTERIA BLD CULT: NORMAL

## 2023-09-24 NOTE — ASSESSMENT & PLAN NOTE
89 yo female with spontaneous PTX s/p pigtail placement in ED.     - Clamp chest tube this morning. Repeat CXR at 1000. If stable, will pull tube today. Suspect chest tube sentinel hole was briefly dislodged yesterday causing subq emphysema.   - Increased work of breathing improved after breathing treatment this morning.  - Wean O2 as tolerated  - Daily CXR     24-Sep-2023 12:45

## 2023-11-09 NOTE — HPI
"Venus Mayer is a 89 y.o. female with a PMHx of cardiomyopathy, CHF, pacemaker in place, HLD, HTN, COPD on home 2L NC who presents to [facility] after a fall at home earlier today. Daughter at bedside provides majority of history. Patient was in the bathroom when she called for caretaker's assistance (who was right outside the door) and found patient falling forward upon walking in. Caretaker attempted to catch her but both fell to the ground. Patient hit her forehead on the ground but never loss consciousness. Daughter states that patient's heart rate has been elevated to 100-110s and her blood pressure has been borderline low at home recently so she's unsure if this caused her to get lightheaded upon standing from the toilet earlier today. Patient also noted to have copious amounts of secretions, wet cough, sore throat, ear fullness, poor appetite and decreased PO intake recently. She was seen by outpatient ENT for symptoms and was started on zyrtec, mucinex and Flonase for sinus congestion/ presumed viral URI. Daughter noticed that patient voice sounds "raspy" and it seems as if she is either having difficulty swallowing or avoids foods due to possible pain with swallowing. No difficulty handling secretions, drooling, tripoding, or fever reported.      ED: tachycardic to  and tachypneic to RR 22. Satting 100% on 3L NC. Leukocytosis of WBC 19.03 with left shift. CT head/ C-spine without acute findings. CXR shows minimal interstitial and ground-glass changes at the lung bases could be associated with mild infiltrate, atelectasis or scarring. Given 500cc NS.   " Encounter Date: 11/9/2023       History   No chief complaint on file.    Patient is a 14 m.o. female with PMH of lip tie and tongue tie who presents to ED via family for concern for rash which began 1 day(s) ago.  Mom reports yesterday when they were out she noticed that patient has a red spot in her left arm.  Mom reports today she noticed patient had multiple red spots in her right hand.  Mom reports after patient's nap she was started to get spots on her left hand in her head.  Mom denies rash and patient's chest abdomen back or legs.  Mom denies patient any facial swelling or difficulty breathing.  Mom denies patient coughing.  Mom denies patient having a fever.  Mom reports patient has seemed more fussy today.  Mom reports patient has been drinking and eating normally and having normal urinary output.  Patient is awake and alert interactive and looking around.  Patient is in no acute distress and nontoxic appearing.                    Review of patient's allergies indicates:  No Known Allergies  History reviewed. No pertinent past medical history.  History reviewed. No pertinent surgical history.  Family History   Problem Relation Age of Onset    No Known Problems Mother      Social History     Tobacco Use    Smoking status: Never     Passive exposure: Never    Smokeless tobacco: Never     Review of Systems   Constitutional:  Negative for fever.   HENT: Negative.  Negative for facial swelling and sore throat.    Respiratory: Negative.  Negative for cough.    Cardiovascular:  Negative for palpitations.   Gastrointestinal: Negative.  Negative for nausea.   Genitourinary:  Negative for difficulty urinating.   Musculoskeletal:  Negative for back pain, joint swelling, neck pain and neck stiffness.   Skin:  Positive for rash. Negative for color change and pallor.   Neurological:  Negative for seizures.   Hematological:  Does not bruise/bleed easily.   Psychiatric/Behavioral: Negative.     All other systems reviewed  and are negative.      Physical Exam     Initial Vitals [11/09/23 1336]   BP Pulse Resp Temp SpO2   -- 115 30 98.4 °F (36.9 °C) 100 %      MAP       --         Physical Exam    Nursing note and vitals reviewed.  Constitutional: She appears well-developed and well-nourished. She is not diaphoretic. She is active and consolable. She cries on exam. She regards caregiver.  Non-toxic appearance. She does not have a sickly appearance. She does not appear ill. No distress.   HENT:   Head: Normocephalic and atraumatic. No signs of injury.   Right Ear: External ear and pinna normal.   Left Ear: Tympanic membrane, external ear, pinna and canal normal.   Nose: Nose normal. No nasal discharge.   Mouth/Throat: Mucous membranes are moist. Dentition is normal. No oropharyngeal exudate, pharynx swelling, pharynx erythema, pharynx petechiae or pharyngeal vesicles. No tonsillar exudate. Oropharynx is clear. Pharynx is normal.   Patient's right TM obstructed by cerumen   Eyes: Conjunctivae and EOM are normal. Pupils are equal, round, and reactive to light. Right eye exhibits no discharge. Left eye exhibits no discharge.   Neck: Neck supple.   Normal range of motion.  Cardiovascular:  Regular rhythm, S1 normal and S2 normal.        Pulses are strong.    Pulmonary/Chest: Effort normal and breath sounds normal. No nasal flaring or stridor. No respiratory distress. She has no wheezes. She has no rhonchi. She has no rales. She exhibits no retraction.   Abdominal: Abdomen is soft. Bowel sounds are normal. She exhibits no distension and no mass. There is no abdominal tenderness. No hernia. There is no rebound and no guarding.   Musculoskeletal:      Cervical back: Normal range of motion and neck supple.     Neurological: She is alert.   Skin: Skin is warm and dry. Capillary refill takes less than 2 seconds. Rash noted. No petechiae noted. Rash is not crusting.              ED Course   Procedures  Labs Reviewed - No data to display        Imaging Results    None          Medications   diphenhydrAMINE 12.5 mg/5 mL elixir 6.25 mg (6.25 mg Oral Given 11/9/23 1367)     Medical Decision Making  MDM    Patient presents for emergent evaluation of acute rash that poses a possible threat to life and/or bodily function.    Differential diagnosis included but not limited to viral exanthem, insect bite, cellulitis, hand-foot-mouth, contact dermatitis, nonspecific skin eruption.  In the ED patient found to have acute erythematous blanching rash to left forearm and bilateral hands on the dorsal aspect with 3 spots noted in patient's hairline on her scalp.  Patient is awake and alert in no acute distress with clear lung sounds bilaterally with a soft nontender abdomen with normal bowel sounds in all 4 quadrants.  Patient was acting appropriately per mom reports patient has been eating and drinking like her baseline self with normal urinary output.      Discharge [facility]  I discussed the patient presentation with my attending Dr. Burgos who individually evaluated the patient.    Patient was managed in the ED with oral Benadryl.    The response to treatment was good.    Patient's presentation most likely represents insect bites without signs of infection at this time.  Discussed with mom that she can give patient oral Zyrtec at home to help decrease itching.  Discussed with mom that she can apply topical Bactroban to the bites as needed.  Patient was discharged in stable condition with close follow up with pediatrician.  Detailed return precautions discussed to return to the ED for worsening rash, difficulty breathing, facial swelling, fever, vomiting, diarrhea, or any new or worsening concerns.  Mom states understanding.    Amount and/or Complexity of Data Reviewed  Independent Historian: parent    Risk  OTC drugs.    See np's note for details. I interviewed and examined the patient. I have reviewed the np's history and and agree with the np's history and physical  exam. I reviewed the assessment and plan/care with the np. I was present for the key/critical portions of any procedures. I agree that lesions appear most consistent with localized reactions to insect bite. Pt very well appearing otherwise. There is a small central jf consistent with insect bite to each lesion. Erythema, edema today, less likely cellulitis. More likely local allergic reaction. After discussing benefits/risks of abx, mother okay to monitor and reports she will have very close follow up with patient's pediatrician for monitoring.  I have taken pictures and placed in the chart so if patient returns these can be compared.  Patient is not itching no marks of excoriation although mom says that patient does not usually scratch a insect bites that she was gotten in the past.  No systemic symptoms.  Mom to take Zyrtec scheduled at home and Benadryl given here as we do not have zyrtec. Bactroban at night to areas, wrapped so pt does not touch and ingest. Strict return precautions discussed. Not k[place]asaki. Not sloughing. Not scalded skin. Less likely bed bugs as pt's sheets and clothes just fully washed as pt recently had viral infection.  Nancy Burgos MD  Emergency Medicine Staff Physician  \                                 Clinical Impression:   Final diagnoses:  [R21] Rash and nonspecific skin eruption  [W57.XXXA] Bug bite, initial encounter (Primary)        ED Disposition Condition    Discharge Stable          ED Prescriptions    None       Follow-up Information       Follow up With Specialties Details Why Contact Info Additional Information    Winsome Dockery MD Pediatrics Schedule an appointment as soon as possible for a visit on 11/13/2023 For recheck/continuing care [address]  [address]  [facility] [address]  [phone]       [facility] - Emergency Dept Emergency Medicine  If symptoms worsen [address] [facility] [facility] [id number] [phone] 1st floor              Soimara Hastings NP  11/09/23 8830       Nancy Burgos MD  11/09/23 7973

## 2024-03-04 NOTE — ASSESSMENT & PLAN NOTE
Confirmed pt to remain DNR     Last office visit date: 11/13/2023   Medication Refill Protocol Failed.  Protocol approved due to: other (documentation required). PROVIDER.      dilTIAZem (CARDIZEM CD) 180 MG 24 hr capsule         Sig: Take 1 capsule by mouth in the morning and 1 capsule in the evening.    Disp: 180 capsule    Refills: 0    Start: 3/4/2024    Class: Eprescribe    Non-formulary For: Benign hypertension with chronic kidney disease    Last ordered: 3 months ago (12/4/2023) by ABBIE Blair    Last dispensed: 12/11/2023    Rx #: [id number]    Calcium Channel Blockers Refill Protocol - 12 Month Protocol [id number]/04/2024 06:20 AM   Protocol Details Last BP was under 140/90 or if patient has diabetes, CAD, or PVD, BP under 130/80 in past year -- IF CRITERIA FAILED REFER TO PROTOCOL DETAILS    eGFR within last 12 months looking at last value    Seen by prescribing provider or same department within the last 12 months or has a future appt in 3 months - IF FAILED PLEASE LOOK AT CHART REVIEW FOR LAST VISIT AND PROCEED ACCORDINGLY    Last recorded pulse is greater than 49    Medication (including dose and sig) on current meds list

## 2024-03-31 NOTE — HPI
[facility]   Physical Therapy Treatment Note/ Discharge Summary     Name: Parisa Dimas  Clinic Number: [id number]    Therapy Diagnosis:   Encounter Diagnoses   Name Primary?    Decreased strength of lower extremity Yes    Impaired functional mobility, balance, gait, and endurance      Physician: Rose Mary Mabry NP    Visit Date: 3/13/2024    Physician Orders: PT Eval and Treat low back right leg  Medical Diagnosis from Referral: M54.41 (ICD-10-CM) - Lumbago with sciatica, right side G89.29 (ICD-10-CM) - Other chronic pain  Evaluation Date: 11/27/2023  Authorization Period Expiration: 12/31/2024  Plan of Care Expiration: 3/31/2024  Progress Note Due: 10th visit  Date of Surgery: NA  Visit # / Visits authorized: 12 / 20  Episode Visit: 18  FOTO: 1/3     Precautions: Standard      Time In: 10:00 AM  Time Out: 11:00 AM  Total Billable Time: 40 minutes    PTA Visit #: 1 / 5     Subjective     Patient reports: She reports that she has been doing well and has not had the pain but only 1x over the last week. She feels like she is doing much better and would like to continue with home exercise program and begin going to local gym.   She was compliant with home exercise program.  Response to previous treatment: appropriate muscle response  Functional change: Going for weeks without symptoms     Pain: 0/10, currently  Location: bilateral back, buttocks, lower legs, and upper legs   Patients goals: Improve walking    Objective      Observation: Patient ambulates to clinic independently     Posture: Forward head and rounded shoulders     Lumbar Range of Motion: * Denotes Pain    ROM   Flexion WNL   Extension WNL   Left Side Bending WNL   Right Side Bending WNL      Lower Extremity Strength  Right LE   Left LE     Hip Ext 4+/5 Hip Ext 4+/5   Hip ABD 4/5 Hip ABD  4+/5   Hip IR 4+/5 Hip IR 5/5   Hip ER 4+/5 Hip IR 5/5   Hip ADD (seated) 5/5 Hip ADD (seated) 5/5   Hip FLEX 4+/5 Hip FLEX 4+/5   Knee  HPI obtained from chart review     Mrs. Mayer is a 89 lady with PMH of  COPD (home O2 at 2 L NC)  40 pack year smoking history,  (quit 30 years ago), HFpEF, HOCM s/p AICD, PVD, HTN, HLD, and CKD. Recent admit for t spontaneous pneumothorax with pleurodesis in May 2019. She  presented to [facility] ED with acute shortness of breath and found to have a small right sided pneumothorax. S/p CT tube placement at OSH and transferred to [facility] for for further evaluation.  Pulmonary was then consulted for management of chest tube.     7/21: acute dyspneic, tachycardic, and diaphoretic.   Transferred to critical care for closer monitoring. CT placed at OSH dislodged and replaced with re-expansion of right lung.    CT surgery consulted  And have been monitoring chest tube.  Plan:  water seal and clamping trials on 7/23.  If fails clamping trials, plan is for repeat bedside pleurodesis or bronchoscopy with endobronchial valve placement. She is not a surgical candidate due to cardiac co morbidities.    .      Transitioned from critical care back to internal medicine.  CT surgery continues to follow and manage chest tube.   Palliative medicine consulted for goals of care.    FLEX 4+/5 Knee FLEX 5/5   Knee EXT 5/5 Knee EXT 5/5   Plantar flexion (seated) 5/5 Plantar flexion (seated) 5/5   Dorsiflexion 5/5 Dorsiflexion 5/5      Special Tests:   FABERs: Neg Right   EDIN: Neg Right  Hip Scour: Neg     Neural Tension Testing:  SLR: L (Neg); R (Neg)  Slump Test: L (Neg); R (Neg)     Sensation: Intact bilaterally      Red flag screen:               B/B changes: Neg              Clonus: Neg              Babinski: Neg     Endurance Assessment:    Evaluation   Timed Up and Go 10 seconds; 8 seconds   30 sec STS  9x with bilateral upper extremity assist; 13x      2 Minute Walk Test 262 feet with 1 episode of back pain      Table: Population Norms for TUG    Age  Average TUG    60 - 69 years  8.1 seconds    70 - 79 years  9.2 seconds    80 - 99 years  11.3 seconds         Intake Outcome Measure for FOTO Lumbar Spine Survey     Therapist reviewed FOTO scores for Parisa Dimas on 11/27/2023.   FOTO report - see Media section or FOTO account episode details.     Intake Score: 63; see media section           Treatment     Parisa received the treatments listed below:      manual therapy techniques: Joint mobilizations and Manual traction were applied to the: lumbar/thoracic spine for 00 minutes, including:     neuromuscular re-education activities: to improve Balance, Coordination, Kinesthetic, Sense, Proprioception, Posture, and Motor Control for 00 minutes, including:     therapeutic activities: to improve functional performance for 60 minutes, including:   Nustep/Recumbent Bike for 10 minutes/1,000K steps at Lvl 3 for endogenous release of opioids to improve tolerance to ADL's  Lateral band walks with Green TB; 5 laps at 1/2 wall (16 feet)  Standing prone press ups (against wall); 2 x 10 reps with 3 sec hold  Standing TA with ball press downs 3 second x 20 reps  Standing TA with ball press + marches x 20 reps  Paloff Press + lift; Green TB, 3 x 10 reps Bilateral  Hurdles forward/lateral (medium height)  3 x 10 each direction bilateral   Sit to stands in std chair; 3 x 10 with 4# dumbbell   Standing cone taps 3  x 10   Russian twists weighted and non-weighted exercises 3 x 10   Discussed seated, standing, and supine positions for all exercises given     Patient Education and Home Exercises       Education provided:   - Activity modification  - Plan of care  - Home exercise program  - Functional Anatomy    Written Home Exercises Provided: Patient instructed to cont prior HEP. Exercises were reviewed and Parisa was able to demonstrate them prior to the end of the session.  Parisa demonstrated good  understanding of the education provided. See Electronic Medical Record under Patient Instructions for exercises provided during therapy sessions    Assessment     Parisa has attended physical therapy for 21 visits for low back pain and has met 7 of 7 goals. She has demonstrated improvement with range of motion, strength, FOTO score, TUG score, and 30 second sit to stand. Patient's main functional limitation remains strength and slight recurring low back pain that lasts for 10 seconds or less less then 2x/week. Patient is appropriate for discharge at this time and plans to attend local gym to continue with progressions made during therapy. Patient provided comprehensive home exercise program and given resistance bands to continue with at home program as well. All questions were answered and concerns were addressed. Patient discharged this date with education to reach out to therapist if pain persists or worsens over the next month.     Parisa Is progressing well towards her goals.   Patient prognosis is Good.     Patient will continue to benefit from skilled outpatient physical therapy to address the deficits listed in the problem list box on initial evaluation, provide pt/family education and to maximize pt's level of independence in the home and community environment.     Patient's spiritual, cultural and educational needs  considered and pt agreeable to plan of care and goals.     Anticipated Barriers for therapy: chronicity of symptoms     Goals:  Short Term Goals: 4 weeks   1. Patient will reduce maximal pain rating to < 3/10 pain to facilitate ability to sleep through the night and recover from PT interventions.(Met)  2. Patient will be able to stand/ambulate for at least 10 minutes with < 3/10 pain to improve standing/walking tolerance. (Met)  3. Patient will be able to lift at least 5-10# with < 3/10 pain to improve lifting mechanics for work related tasks.(Met)     Long Term Goals: 8-10 weeks   1. Patient will improve 2 minute walk test by at least 40 feet indicating a clinically significant change in function. (Met)  2. Patient will demonstrate > 4/5 lower quarter strength to facilitate transfers from sit to stand from various surfaces without restriction.(Met)  3. Patient will improve 30 second sit to stand to at least 10x for improvement with transfer tasks.  (Met)  4. Patient will improve FOTO intake score to at least 66 indicating a clinically significant change in function.(Met)    Plan     Plan of Care Certification: 11/27/2023 to 3/31/2024.     Discharge this date but to reach out if pain returns/worsens.      Emilia Olivera, PT, DPT

## 2024-08-01 NOTE — SUBJECTIVE & OBJECTIVE
Interval History: NAEON. CT w/serous output. WBC 9.9 from 15.    Medications:  Continuous Infusions:  Scheduled Meds:   albuterol-ipratropium  3 mL Nebulization Q12H    aspirin  81 mg Oral Daily    enoxaparin  30 mg Subcutaneous Daily    ferrous sulfate  325 mg Oral Daily    multivitamin  1 tablet Oral Daily    piperacillin-tazobactam (ZOSYN) IVPB  4.5 g Intravenous Q8H    rOPINIRole  0.25 mg Oral QHS    tiotropium  18 mcg Inhalation Daily    vancomycin (VANCOCIN) IVPB  15 mg/kg Intravenous Once     PRN Meds:acetaminophen, albuterol, albuterol-ipratropium, Dextrose 10% Bolus, Dextrose 10% Bolus, glucagon (human recombinant), glucose, glucose, ondansetron, senna-docusate 8.6-50 mg, sodium chloride 0.9%, sodium chloride 0.9%     Review of patient's allergies indicates:   Allergen Reactions    Ancef in dextrose (iso-osm) Rash    Cefazolin Rash    Cefuroxime Rash    Sulfamethoxazole-trimethoprim Rash     Other reaction(s): Rash     Objective:     Vital Signs (Most Recent):  Temp: 97.7 °F (36.5 °C) (09/08/19 0852)  Pulse: 96 (09/08/19 0852)  Resp: 20 (09/08/19 0852)  BP: 139/63 (09/08/19 0852)  SpO2: (!) 94 % (09/08/19 0852) Vital Signs (24h Range):  Temp:  [97.5 °F (36.4 °C)-98.8 °F (37.1 °C)] 97.7 °F (36.5 °C)  Pulse:  [] 96  Resp:  [14-22] 20  SpO2:  [90 %-100 %] 94 %  BP: (118-158)/(54-84) 139/63     Intake/Output - Last 3 Shifts       09/06 0700 - 09/07 0659 09/07 0700 - 09/08 0659 09/08 0700 - 09/09 0659    I.V. (mL/kg)  600 (14.9)     Total Intake(mL/kg)  600 (14.9)     Urine (mL/kg/hr)  200 (0.2)     Chest Tube  7     Total Output  207     Net  +393            Urine Occurrence  1 x           SpO2: (!) 94 %  O2 Device (Oxygen Therapy): nasal cannula    Physical Exam   Constitutional: She appears well-developed.   Cardiovascular: Normal rate and regular rhythm.   Pulmonary/Chest: Effort normal.   CT w/serous output       Significant Labs:  CBC:   Recent Labs   Lab 09/08/19  0458   WBC 9.96   RBC  Pt is here for her 22w prenatal. Labs ordered.   2.70*   HGB 8.5*   HCT 27.4*   *   *   MCH 31.5*   MCHC 31.0*     CMP:   Recent Labs   Lab 09/08/19  0458   GLU 87   CALCIUM 9.9   ALBUMIN 2.3*   PROT 5.4*      K 3.4*   CO2 34*   CL 94*   BUN 21   CREATININE 0.9   ALKPHOS 87   ALT 16   AST 21   BILITOT 0.5       Significant Diagnostics:  CT: Interval placement of right-sided chest tube significant diminished volume of previously identified pneumothorax with re-expansion of the right lung, there remains right-sided hydropneumothorax with small amount of extrapulmonary air and small to moderate amount of pleural fluid including some loculated pleural fluid along the major fissure.    There are areas of pulmonary confluent infiltrate and consolidation involving the right lung most notably right upper lobe posteriorly and right lung base medially.    The left hemithorax is predominantly stable including spiculated focus at the left apex, although there is some mild atelectasis seen at the left base that has developed in the interim.    Precarinal enlarged lymph node appears to have increased in size and there is interval development of enlarged subcarinal lymph node.    Bilateral adrenal gland thickening more prominent on the left the possibility of underlying nodule is a consideration although the not well delineated.    VTE Risk Mitigation (From admission, onward)        Ordered     enoxaparin injection 30 mg  Daily      09/07/19 6334     IP VTE HIGH RISK PATIENT  Once      09/07/19 0459     Place sequential compression device  Until discontinued      09/07/19 4544

## 2025-03-05 NOTE — ASSESSMENT & PLAN NOTE
- pt complaining of muscle spasms in bilateral legs, no pain associated  - Fe deficient   - Ropinirole    01-Mar-2025 16:35 05-Mar-2025 04:50 01-Mar-2025 09:14 28-Feb-2025 01:30 01-Mar-2025 01:20

## 2025-03-28 NOTE — NURSING
"Paged via  and spoke with Thoracic Sx Md on call Dr. Reynolds  to report VS, pulse ox, pursed lipped breathing, pt c/o "not feeling well" and shortness of breath.  crepitus noted anterior and posterior chest wall.  Oxygen increased from 3-4L NC sats remain at 88. CXR reviewed per MD, not issue with the chest tube, recommends calling primary service in regards to dyspnea and sats.  Med 2 MD at bedside, assessed pt.  RT at bedside for scheduled treatment, current sats 100  "
0402 pt c/o complication with breathing. sats 100% with 3l NC. Right side chest is present with crepitus Charge nurse and RRT at bedside. STAT CXR ordered and completed. MD De La Garza at bedside.    0424: MD De La Garza contacting gen sx to eval CT.    0432: pt refused for us to notify family    0442 Nurse Sup recalled gen sx    0455 Gn sx intern Jenny Kingston at bedside; assessed CXR and CT in pt. Stated that she will notify Gen sx MD.     0458 gen sx intern stated that MD instructed to fix kink in CT    0459 stat CXR ordered, awaiting completion    0502 gen sx intern instructed to call If pt SOB gets worse, pt stated that SOB improved slightly. Other VSS, WCTM  
0700 Start of shift, report given from night nurse Magda VICENTE with known subcutaneous emphysema to right anterior and posterior chest wall, right neck and right breast and shoulder.  Pt stable,no changes.  At 1255 upon rounds, noted pt with significant dramatic change of facial sq emphysema, almost closing the left eye, neck and increase to right chest wall and shoulder.  Pt's daughter at bs, who noted that she helped pt to sit up in bed to eat when dramatic change occurred.  Physicians from CT surgery and IM2 called and came to room.  Air decompression performed at BS per Dr. Mckeon.  Significant improvement noted after procedure.  Pt now remains with 2cm open inc to Right chest wall, lightly packed with NS moistened gauze per MD order.  No occlusive dressing per MD order.  Report given to Ezra VICENTE at bedside, visualized site, sq emphysema, pt o2 sats=95% on 3LNC at time of report.  Pt's daughter at BS.    
Discharge instructions provided to pt and daughter at bedside. Both verbalized understanding. Wet to dry dressing placed to R CW incision. Daughter educated on how to perform dressing changes. Medication delivered to bedside. PIV removed and request made for transport to pick pt up. Will monitor.   
MD notified of pt facial flushing. AVSS, per chart. No c/o from pt. No new orders at this time, continue to monitor.   
Pt began c/o diff breathing similar to earlier episodes. PO dropped to 92% and HR up to 100. Chest tube drainage pushed into container. NC O2 increased to 3L from 2L. PO increased to 97-98% HR down to low 90's. Gen Surg called and port cxr done. gen surg came and redressed with airtight dsg/vaseline gauze/tegaderm. Pt still c/o not breathing as easy as she was just previous. Awaiting further orders and will cont to monitor.  
Pt resting denies pain, SOB, rr at 20, pulse ox 100 on 3LNC, respiratory treatment complete, pursed lipped breathing has stopped.  Lighting adjusted, bed alarm set, emotional support provided.    
Patient was informed of the reason for this intervention.

## 2025-06-30 NOTE — ASSESSMENT & PLAN NOTE
89F with h/o COPD, 40 pack year smoking hx, and chronic dCHF who was transferred from OSH for R sided PTX s/p chest tube satting 99% on 3 L O2 NC. Dislodgement 07/21 PM with reaccumulation of PTX, re-expansion of lung after replacement of chest tube. Stepped down 07/22 PM, 07/23 AM started water seal trial    - Titrate O2 NC to maintain >88% O2 sats  - Continue home tiotropium, fluticasone, and rescue albuterol   - CTS consulted recs appreciated - advised water seal and clamping trials with pleurodesis if failed  - Water seal 07/23 with clamping trial 07/24; will be seen by Pulm 07/24 AM.   No

## (undated) DEVICE — CATH AND LOADER DEPLOYMENT HUD

## (undated) DEVICE — FORCEP JAW RADIAL PULM 2X100CM

## (undated) DEVICE — CONTAINER SPECIMEN STRL 4OZ

## (undated) DEVICE — SUT VICRYL 2-0 27 CT-1

## (undated) DEVICE — SPONGE IV DRAIN 4X4 STERILE

## (undated) DEVICE — NDL SPINAL 18GX3.5 SPINOCAN

## (undated) DEVICE — SUT MCRYL PLUS 4-0 PS2 27IN

## (undated) DEVICE — DRAPE ABDOMINAL TIBURON 14X11

## (undated) DEVICE — SEE MEDLINE ITEM 152622

## (undated) DEVICE — ELECTRODE BLADE INSULATED 1 IN

## (undated) DEVICE — DRESSING TELFA STRL 4X3 LF

## (undated) DEVICE — CLOSURE SKIN STERI STRIP 1/2X4

## (undated) DEVICE — SEE MEDLINE ITEM 157131

## (undated) DEVICE — ADAPTER SWIVEL

## (undated) DEVICE — TAPE SILK 3IN

## (undated) DEVICE — DRAIN CHEST DRY SUCTION

## (undated) DEVICE — SEE MEDLINE ITEM 157117

## (undated) DEVICE — STOPCOCK DISCOFIX 3 WAY

## (undated) DEVICE — SUT VICRYL 3-0 27 SH

## (undated) DEVICE — GLOVE BIOGEL SKINSENSE PI 7.5

## (undated) DEVICE — SYS LABEL CORRECT MED

## (undated) DEVICE — SYR SLIP TIP 20CC

## (undated) DEVICE — DRESSING TRANS 2X2 TEGADERM

## (undated) DEVICE — WIRE JAGWIRE 35G PULMONARY

## (undated) DEVICE — SYR ONLY LUER LOCK 20CC

## (undated) DEVICE — Device

## (undated) DEVICE — TRAY MINOR GEN SURG

## (undated) DEVICE — POWDER TALC STERILE

## (undated) DEVICE — GOWN SMART IMP BREATHABLE XXLG

## (undated) DEVICE — SUT SILK 0 STRANDS 30IN BLK

## (undated) DEVICE — PACK SET UP CONVERTORS

## (undated) DEVICE — CATH EMBOLECTOMY 7FR 80CM

## (undated) DEVICE — SEE MEDLINE ITEM 146416

## (undated) DEVICE — DRAPE INCISE IOBAN 2 23X17IN

## (undated) DEVICE — SPONGE DERMACEA 4X4IN 12PLY

## (undated) DEVICE — BLADE ELECTRO EDGE INSULATED

## (undated) DEVICE — DRESSING TRANS 4X4 TEGADERM

## (undated) DEVICE — SEE MEDLINE ITEM 152487

## (undated) DEVICE — SEE MEDLINE ITEM 146313

## (undated) DEVICE — SUT SILK 0 BLK BR FSL 18 IN

## (undated) DEVICE — ELECTRODE REM PLYHSV RETURN 9